# Patient Record
Sex: MALE | Race: WHITE | NOT HISPANIC OR LATINO | Employment: OTHER | URBAN - METROPOLITAN AREA
[De-identification: names, ages, dates, MRNs, and addresses within clinical notes are randomized per-mention and may not be internally consistent; named-entity substitution may affect disease eponyms.]

---

## 2017-06-20 ENCOUNTER — APPOINTMENT (OUTPATIENT)
Dept: CT IMAGING | Facility: HOSPITAL | Age: 72
End: 2017-06-20

## 2017-06-20 ENCOUNTER — HOSPITAL ENCOUNTER (INPATIENT)
Facility: HOSPITAL | Age: 72
LOS: 6 days | Discharge: HOME HEALTH | End: 2017-06-26
Attending: EMERGENCY MEDICINE | Admitting: INTERNAL MEDICINE

## 2017-06-20 ENCOUNTER — APPOINTMENT (OUTPATIENT)
Dept: GENERAL RADIOLOGY | Facility: HOSPITAL | Age: 72
End: 2017-06-20

## 2017-06-20 DIAGNOSIS — D72.829 LEUKOCYTOSIS, UNSPECIFIED TYPE: ICD-10-CM

## 2017-06-20 DIAGNOSIS — M62.82 NON-TRAUMATIC RHABDOMYOLYSIS: ICD-10-CM

## 2017-06-20 DIAGNOSIS — R53.1 WEAKNESS: ICD-10-CM

## 2017-06-20 DIAGNOSIS — A41.9 SEPSIS, DUE TO UNSPECIFIED ORGANISM: ICD-10-CM

## 2017-06-20 DIAGNOSIS — L03.115 CELLULITIS OF RIGHT LOWER EXTREMITY: ICD-10-CM

## 2017-06-20 DIAGNOSIS — Z78.9 IMPAIRED MOBILITY AND ADLS: ICD-10-CM

## 2017-06-20 DIAGNOSIS — N28.9 RENAL INSUFFICIENCY: ICD-10-CM

## 2017-06-20 DIAGNOSIS — Z74.09 IMPAIRED MOBILITY AND ADLS: ICD-10-CM

## 2017-06-20 DIAGNOSIS — Z74.09 IMPAIRED FUNCTIONAL MOBILITY, BALANCE, GAIT, AND ENDURANCE: ICD-10-CM

## 2017-06-20 DIAGNOSIS — R50.9 FEBRILE ILLNESS: Primary | ICD-10-CM

## 2017-06-20 PROBLEM — N17.9 AKI (ACUTE KIDNEY INJURY) (HCC): Status: ACTIVE | Noted: 2017-06-20

## 2017-06-20 PROBLEM — I73.9 PVD (PERIPHERAL VASCULAR DISEASE) (HCC): Status: ACTIVE | Noted: 2017-06-20

## 2017-06-20 LAB
ALBUMIN SERPL-MCNC: 3.5 G/DL (ref 3.2–4.8)
ALBUMIN/GLOB SERPL: 0.9 G/DL (ref 1.5–2.5)
ALP SERPL-CCNC: 99 U/L (ref 25–100)
ALT SERPL W P-5'-P-CCNC: 13 U/L (ref 7–40)
ANION GAP SERPL CALCULATED.3IONS-SCNC: 6 MMOL/L (ref 3–11)
AST SERPL-CCNC: 27 U/L (ref 0–33)
BASOPHILS # BLD AUTO: 0.03 10*3/MM3 (ref 0–0.2)
BASOPHILS NFR BLD AUTO: 0.1 % (ref 0–1)
BILIRUB SERPL-MCNC: 0.7 MG/DL (ref 0.3–1.2)
BUN BLD-MCNC: 19 MG/DL (ref 9–23)
BUN/CREAT SERPL: 11.2 (ref 7–25)
CALCIUM SPEC-SCNC: 9.1 MG/DL (ref 8.7–10.4)
CHLORIDE SERPL-SCNC: 101 MMOL/L (ref 99–109)
CK SERPL-CCNC: 598 U/L (ref 26–174)
CO2 SERPL-SCNC: 30 MMOL/L (ref 20–31)
CREAT BLD-MCNC: 1.7 MG/DL (ref 0.6–1.3)
CRP SERPL-MCNC: 7.55 MG/DL (ref 0–1)
D-LACTATE SERPL-SCNC: 1.6 MMOL/L (ref 0.5–2)
DEPRECATED RDW RBC AUTO: 54.5 FL (ref 37–54)
EOSINOPHIL # BLD AUTO: 0.01 10*3/MM3 (ref 0.1–0.3)
EOSINOPHIL NFR BLD AUTO: 0 % (ref 0–3)
ERYTHROCYTE [DISTWIDTH] IN BLOOD BY AUTOMATED COUNT: 14.6 % (ref 11.3–14.5)
ERYTHROCYTE [SEDIMENTATION RATE] IN BLOOD: 56 MM/HR (ref 0–20)
GFR SERPL CREATININE-BSD FRML MDRD: 40 ML/MIN/1.73
GLOBULIN UR ELPH-MCNC: 3.8 GM/DL
GLUCOSE BLD-MCNC: 108 MG/DL (ref 70–100)
HCT VFR BLD AUTO: 42.4 % (ref 38.9–50.9)
HGB BLD-MCNC: 13.8 G/DL (ref 13.1–17.5)
HOLD SPECIMEN: NORMAL
IMM GRANULOCYTES # BLD: 0.16 10*3/MM3 (ref 0–0.03)
IMM GRANULOCYTES NFR BLD: 0.6 % (ref 0–0.6)
INR PPP: 1.21
LIPASE SERPL-CCNC: 19 U/L (ref 6–51)
LYMPHOCYTES # BLD AUTO: 0.58 10*3/MM3 (ref 0.6–4.8)
LYMPHOCYTES NFR BLD AUTO: 2.2 % (ref 24–44)
MAGNESIUM SERPL-MCNC: 2.3 MG/DL (ref 1.3–2.7)
MCH RBC QN AUTO: 33.3 PG (ref 27–31)
MCHC RBC AUTO-ENTMCNC: 32.5 G/DL (ref 32–36)
MCV RBC AUTO: 102.4 FL (ref 80–99)
MONOCYTES # BLD AUTO: 1.35 10*3/MM3 (ref 0–1)
MONOCYTES NFR BLD AUTO: 5.2 % (ref 0–12)
MYOGLOBIN SERPL-MCNC: 570 NG/ML (ref 3–110)
NEUTROPHILS # BLD AUTO: 24.08 10*3/MM3 (ref 1.5–8.3)
NEUTROPHILS NFR BLD AUTO: 91.9 % (ref 41–71)
PLAT MORPH BLD: NORMAL
PLATELET # BLD AUTO: 260 10*3/MM3 (ref 150–450)
PMV BLD AUTO: 9.9 FL (ref 6–12)
POTASSIUM BLD-SCNC: 4.4 MMOL/L (ref 3.5–5.5)
PROT SERPL-MCNC: 7.3 G/DL (ref 5.7–8.2)
PROTHROMBIN TIME: 13.3 SECONDS (ref 9.6–11.5)
RBC # BLD AUTO: 4.14 10*6/MM3 (ref 4.2–5.76)
RBC MORPH BLD: NORMAL
SODIUM BLD-SCNC: 137 MMOL/L (ref 132–146)
WBC MORPH BLD: NORMAL
WBC NRBC COR # BLD: 26.21 10*3/MM3 (ref 3.5–10.8)
WHOLE BLOOD HOLD SPECIMEN: NORMAL
WHOLE BLOOD HOLD SPECIMEN: NORMAL

## 2017-06-20 PROCEDURE — 99285 EMERGENCY DEPT VISIT HI MDM: CPT

## 2017-06-20 PROCEDURE — 99291 CRITICAL CARE FIRST HOUR: CPT | Performed by: INTERNAL MEDICINE

## 2017-06-20 PROCEDURE — 87186 SC STD MICRODIL/AGAR DIL: CPT | Performed by: EMERGENCY MEDICINE

## 2017-06-20 PROCEDURE — 82550 ASSAY OF CK (CPK): CPT | Performed by: EMERGENCY MEDICINE

## 2017-06-20 PROCEDURE — 87147 CULTURE TYPE IMMUNOLOGIC: CPT | Performed by: EMERGENCY MEDICINE

## 2017-06-20 PROCEDURE — 71010 HC CHEST PA OR AP: CPT

## 2017-06-20 PROCEDURE — 85025 COMPLETE CBC W/AUTO DIFF WBC: CPT | Performed by: EMERGENCY MEDICINE

## 2017-06-20 PROCEDURE — 83735 ASSAY OF MAGNESIUM: CPT | Performed by: EMERGENCY MEDICINE

## 2017-06-20 PROCEDURE — 93005 ELECTROCARDIOGRAM TRACING: CPT | Performed by: EMERGENCY MEDICINE

## 2017-06-20 PROCEDURE — 85610 PROTHROMBIN TIME: CPT | Performed by: EMERGENCY MEDICINE

## 2017-06-20 PROCEDURE — 86140 C-REACTIVE PROTEIN: CPT | Performed by: EMERGENCY MEDICINE

## 2017-06-20 PROCEDURE — 85007 BL SMEAR W/DIFF WBC COUNT: CPT | Performed by: EMERGENCY MEDICINE

## 2017-06-20 PROCEDURE — 70450 CT HEAD/BRAIN W/O DYE: CPT

## 2017-06-20 PROCEDURE — 80053 COMPREHEN METABOLIC PANEL: CPT | Performed by: EMERGENCY MEDICINE

## 2017-06-20 PROCEDURE — 81003 URINALYSIS AUTO W/O SCOPE: CPT | Performed by: EMERGENCY MEDICINE

## 2017-06-20 PROCEDURE — 87150 DNA/RNA AMPLIFIED PROBE: CPT | Performed by: EMERGENCY MEDICINE

## 2017-06-20 PROCEDURE — 83874 ASSAY OF MYOGLOBIN: CPT | Performed by: EMERGENCY MEDICINE

## 2017-06-20 PROCEDURE — 87040 BLOOD CULTURE FOR BACTERIA: CPT | Performed by: EMERGENCY MEDICINE

## 2017-06-20 PROCEDURE — 83605 ASSAY OF LACTIC ACID: CPT | Performed by: EMERGENCY MEDICINE

## 2017-06-20 PROCEDURE — 83690 ASSAY OF LIPASE: CPT | Performed by: EMERGENCY MEDICINE

## 2017-06-20 PROCEDURE — 85652 RBC SED RATE AUTOMATED: CPT | Performed by: EMERGENCY MEDICINE

## 2017-06-20 PROCEDURE — 25010000002 PIPERACILLIN-TAZOBACTAM: Performed by: EMERGENCY MEDICINE

## 2017-06-20 PROCEDURE — 25010000002 VANCOMYCIN: Performed by: EMERGENCY MEDICINE

## 2017-06-20 PROCEDURE — 87086 URINE CULTURE/COLONY COUNT: CPT | Performed by: EMERGENCY MEDICINE

## 2017-06-20 RX ORDER — METOPROLOL TARTRATE 100 MG/1
100 TABLET ORAL 2 TIMES DAILY
COMMUNITY
End: 2018-03-15 | Stop reason: HOSPADM

## 2017-06-20 RX ORDER — SODIUM CHLORIDE 0.9 % (FLUSH) 0.9 %
10 SYRINGE (ML) INJECTION AS NEEDED
Status: DISCONTINUED | OUTPATIENT
Start: 2017-06-20 | End: 2017-06-26 | Stop reason: HOSPADM

## 2017-06-20 RX ORDER — MORPHINE SULFATE 15 MG/1
15 TABLET ORAL EVERY 6 HOURS PRN
Status: ON HOLD | COMMUNITY
End: 2018-02-20

## 2017-06-20 RX ORDER — MINOCYCLINE HYDROCHLORIDE 100 MG/1
100 CAPSULE ORAL 2 TIMES DAILY
COMMUNITY
End: 2017-06-26 | Stop reason: HOSPADM

## 2017-06-20 RX ORDER — SODIUM CHLORIDE 9 MG/ML
10 INJECTION, SOLUTION INTRAVENOUS CONTINUOUS
Status: DISCONTINUED | OUTPATIENT
Start: 2017-06-20 | End: 2017-06-26 | Stop reason: HOSPADM

## 2017-06-20 RX ORDER — IPRATROPIUM BROMIDE AND ALBUTEROL SULFATE 2.5; .5 MG/3ML; MG/3ML
3 SOLUTION RESPIRATORY (INHALATION)
Status: DISCONTINUED | OUTPATIENT
Start: 2017-06-20 | End: 2017-06-26 | Stop reason: HOSPADM

## 2017-06-20 RX ORDER — FUROSEMIDE 20 MG/1
40 TABLET ORAL 2 TIMES DAILY
Status: ON HOLD | COMMUNITY
End: 2018-02-20

## 2017-06-20 RX ORDER — ACETAMINOPHEN 325 MG/1
650 TABLET ORAL EVERY 6 HOURS PRN
Status: DISCONTINUED | OUTPATIENT
Start: 2017-06-20 | End: 2017-06-26 | Stop reason: HOSPADM

## 2017-06-20 RX ORDER — MELOXICAM 15 MG/1
15 TABLET ORAL DAILY
Status: ON HOLD | COMMUNITY
End: 2018-02-20

## 2017-06-20 RX ORDER — HYDROCODONE BITARTRATE AND ACETAMINOPHEN 7.5; 325 MG/1; MG/1
1 TABLET ORAL 3 TIMES DAILY PRN
COMMUNITY
End: 2018-03-15 | Stop reason: HOSPADM

## 2017-06-20 RX ORDER — ONDANSETRON 2 MG/ML
4 INJECTION INTRAMUSCULAR; INTRAVENOUS EVERY 6 HOURS PRN
Status: DISCONTINUED | OUTPATIENT
Start: 2017-06-20 | End: 2017-06-26 | Stop reason: HOSPADM

## 2017-06-20 RX ADMIN — SODIUM CHLORIDE 1000 ML: 9 INJECTION, SOLUTION INTRAVENOUS at 20:57

## 2017-06-20 RX ADMIN — TAZOBACTAM SODIUM AND PIPERACILLIN SODIUM 4.5 G: .5; 4 INJECTION, POWDER, LYOPHILIZED, FOR SOLUTION INTRAVENOUS at 20:58

## 2017-06-20 RX ADMIN — VANCOMYCIN HYDROCHLORIDE 1500 MG: 1 INJECTION, POWDER, LYOPHILIZED, FOR SOLUTION INTRAVENOUS at 22:49

## 2017-06-20 NOTE — ED PROVIDER NOTES
Subjective   HPI Comments: Mr. Yassine Love is a 71 y.o. male who presents to the ED after a fall. EMS notes that he was found on the floor today after being down for 30 minutes. It is unknown if he had any LOC, but did have a fever of 102 while being checked out by home health. En route a temperature of 105 was noted and a rectal temperature of 99.5. He was hypotensive en route at 78/50. Family does note that he has increased redness in his RLE. He denies any CP, abdominal pain, N/V/D, urinary sx, or any other sx at this time.  Patient is a 71 y.o. male presenting with fever.   History provided by:  Patient  Fever   Max temp prior to arrival:  105  Temp source:  Unable to specify  Severity:  Moderate  Onset quality:  Sudden  Duration: Today.  Timing:  Unable to specify  Progression:  Unable to specify  Chronicity:  New  Relieved by:  None tried  Worsened by:  Nothing  Ineffective treatments:  None tried  Associated symptoms: no chest pain, no chills, no cough, no diarrhea, no dysuria, no headaches, no nausea, no rhinorrhea, no sore throat and no vomiting        Review of Systems   Constitutional: Positive for fever. Negative for chills.   HENT: Negative for rhinorrhea and sore throat.    Respiratory: Negative for cough.    Cardiovascular: Negative for chest pain.   Gastrointestinal: Negative for abdominal pain, blood in stool, diarrhea, nausea and vomiting.   Genitourinary: Negative for difficulty urinating, dysuria and hematuria.   Skin: Positive for color change (RLE).   Neurological: Positive for weakness. Negative for headaches.   All other systems reviewed and are negative.      Past Medical History:   Diagnosis Date   • Cancer    • Chronic pain    • COPD (chronic obstructive pulmonary disease)    • Hypertension        Allergies   Allergen Reactions   • Ceftin [Cefuroxime Axetil] Angioedema       Past Surgical History:   Procedure Laterality Date   • LEG SURGERY     • NECK SURGERY         History reviewed.  No pertinent family history.    Social History     Social History   • Marital status:      Spouse name: N/A   • Number of children: N/A   • Years of education: N/A     Social History Main Topics   • Smoking status: Current Every Day Smoker     Packs/day: 1.00     Types: Cigarettes   • Smokeless tobacco: None   • Alcohol use No   • Drug use: No   • Sexual activity: Defer     Other Topics Concern   • None     Social History Narrative   • None         Objective   Physical Exam   Constitutional: He is oriented to person, place, and time. He appears well-developed and well-nourished. No distress.   HENT:   Head: Normocephalic and atraumatic.   Eyes: Conjunctivae are normal.   Neck: Normal range of motion. Neck supple.   Cardiovascular: Normal rate, regular rhythm, normal heart sounds and intact distal pulses.    Pulmonary/Chest: Effort normal. No respiratory distress.   Crackles in right base that do not clear with coughing.    Abdominal: Soft. There is no tenderness.   Musculoskeletal: Normal range of motion.   Neurological: He is alert and oriented to person, place, and time. He has normal strength.   Skin: Skin is warm and dry. There is erythema.   Erythema and warmth at RLE. Erythema extends medially 4 cm proximally to the knee but circumflexly inferior to the knee more anteriorly than posteriorly.   Psychiatric: He has a normal mood and affect. His behavior is normal.   Nursing note and vitals reviewed.      Critical Care  Performed by: AYDEN RAMIREZ  Authorized by: AYDEN RAMIREZ   Total critical care time: 35 minutes  Critical care time was exclusive of separately billable procedures and treating other patients.  Critical care was necessary to treat or prevent imminent or life-threatening deterioration of the following conditions: circulatory failure and sepsis.  Critical care was time spent personally by me on the following activities: discussions with primary provider, evaluation of patient's response  to treatment, ordering and performing treatments and interventions, examination of patient, ordering and review of laboratory studies, re-evaluation of patient's condition, obtaining history from patient or surrogate and ordering and review of radiographic studies.               ED Course  ED Course   Comment By Time   Discussed case with Dr. Nelson, [facility] medicine, who will admit the pt. - Trever Cortez 06/20 1952   Discussed admission with patient and family who are in agreement.  Temperature at the scene by home health was greater than 102°.  Family does report the erythema of the right leg is new and different today. Aren Mohr MD 06/20 1952   Patient reports that he's had no difficulty with penicillins in the past.  White white count is markedly elevated.  Sepsis protocol antibiotics are ordered as well as sepsis fluid bolus. Aren Mohr MD 06/20 2055   Blood pressure with progressive decline throughout the ED course.  Second fluid bolus is started.  Legs are reevaluated after removal of his dressings with large open areas on his feet and the continued erythema up his right leg.  Sepsis antibiotics are given. Aren Mohr MD 06/20 2147   Discussed case with Dr. Leigh, intesnivist, who will admit the pt to the ICU. -[facility] 2150     Recent Results (from the past 24 hour(s))   Comprehensive Metabolic Panel    Collection Time: 06/20/17  7:55 PM   Result Value Ref Range    Glucose 108 (H) 70 - 100 mg/dL    BUN 19 9 - 23 mg/dL    Creatinine 1.70 (H) 0.60 - 1.30 mg/dL    Sodium 137 132 - 146 mmol/L    Potassium 4.4 3.5 - 5.5 mmol/L    Chloride 101 99 - 109 mmol/L    CO2 30.0 20.0 - 31.0 mmol/L    Calcium 9.1 8.7 - 10.4 mg/dL    Total Protein 7.3 5.7 - 8.2 g/dL    Albumin 3.50 3.20 - 4.80 g/dL    ALT (SGPT) 13 7 - 40 U/L    AST (SGOT) 27 0 - 33 U/L    Alkaline Phosphatase 99 25 - 100 U/L    Total Bilirubin 0.7 0.3 - 1.2 mg/dL    eGFR Non African Amer 40 (L) >60 mL/min/1.73    Globulin 3.8  gm/dL    A/G Ratio 0.9 (L) 1.5 - 2.5 g/dL    BUN/Creatinine Ratio 11.2 7.0 - 25.0    Anion Gap 6.0 3.0 - 11.0 mmol/L   Protime-INR    Collection Time: 06/20/17  7:55 PM   Result Value Ref Range    Protime 13.3 (H) 9.6 - 11.5 Seconds    INR 1.21    Lipase    Collection Time: 06/20/17  7:55 PM   Result Value Ref Range    Lipase 19 6 - 51 U/L   C-reactive Protein    Collection Time: 06/20/17  7:55 PM   Result Value Ref Range    C-Reactive Protein 7.55 (H) 0.00 - 1.00 mg/dL   Sedimentation Rate    Collection Time: 06/20/17  7:55 PM   Result Value Ref Range    Sed Rate 56 (H) 0 - 20 mm/hr   Magnesium    Collection Time: 06/20/17  7:55 PM   Result Value Ref Range    Magnesium 2.3 1.3 - 2.7 mg/dL   Lactic Acid, Plasma    Collection Time: 06/20/17  7:55 PM   Result Value Ref Range    Lactate 1.6 0.5 - 2.0 mmol/L   CBC Auto Differential    Collection Time: 06/20/17  7:55 PM   Result Value Ref Range    WBC 26.21 (H) 3.50 - 10.80 10*3/mm3    RBC 4.14 (L) 4.20 - 5.76 10*6/mm3    Hemoglobin 13.8 13.1 - 17.5 g/dL    Hematocrit 42.4 38.9 - 50.9 %    .4 (H) 80.0 - 99.0 fL    MCH 33.3 (H) 27.0 - 31.0 pg    MCHC 32.5 32.0 - 36.0 g/dL    RDW 14.6 (H) 11.3 - 14.5 %    RDW-SD 54.5 (H) 37.0 - 54.0 fl    MPV 9.9 6.0 - 12.0 fL    Platelets 260 150 - 450 10*3/mm3    Neutrophil % 91.9 (H) 41.0 - 71.0 %    Lymphocyte % 2.2 (L) 24.0 - 44.0 %    Monocyte % 5.2 0.0 - 12.0 %    Eosinophil % 0.0 0.0 - 3.0 %    Basophil % 0.1 0.0 - 1.0 %    Immature Grans % 0.6 0.0 - 0.6 %    Neutrophils, Absolute 24.08 (H) 1.50 - 8.30 10*3/mm3    Lymphocytes, Absolute 0.58 (L) 0.60 - 4.80 10*3/mm3    Monocytes, Absolute 1.35 (H) 0.00 - 1.00 10*3/mm3    Eosinophils, Absolute 0.01 (L) 0.10 - 0.30 10*3/mm3    Basophils, Absolute 0.03 0.00 - 0.20 10*3/mm3    Immature Grans, Absolute 0.16 (H) 0.00 - 0.03 10*3/mm3   Light Blue Top    Collection Time: 06/20/17  7:55 PM   Result Value Ref Range    Extra Tube hold for add-on    Green Top (Gel)    Collection Time:  "06/20/17  7:55 PM   Result Value Ref Range    Extra Tube Hold for add-ons.    Lavender Top    Collection Time: 06/20/17  7:55 PM   Result Value Ref Range    Extra Tube hold for add-on    CK    Collection Time: 06/20/17  7:55 PM   Result Value Ref Range    Creatine Kinase 598 (H) 26 - 174 U/L   Myoglobin, Serum    Collection Time: 06/20/17  7:55 PM   Result Value Ref Range    Myoglobin 570.0 (H) 3.0 - 110.0 ng/mL   Scan Slide    Collection Time: 06/20/17  7:55 PM   Result Value Ref Range    RBC Morphology Normal Normal    WBC Morphology Normal Normal    Platelet Morphology Normal Normal     Note: In addition to lab results from this visit, the labs listed above may include labs taken at another facility or during a different encounter within the last 24 hours. Please correlate lab times with ED admission and discharge times for further clarification of the services performed during this visit.    CT Head Without Contrast   Final Result   Abnormal      1. No acute findings.      2. Non-acute findings are described above.      THIS DOCUMENT HAS BEEN ELECTRONICALLY SIGNED BY RAY SALAZAR MD      XR Chest 1 View   Final Result   Abnormal      1. No acute findings.      2. Non-acute findings are described above.      THIS DOCUMENT HAS BEEN ELECTRONICALLY SIGNED BY RAY SALAZAR MD        Vitals:    06/20/17 1900 06/20/17 1907 06/20/17 2100   BP: 105/52  (!) 85/42   Pulse: 94     Resp: 20     Temp:  99.5 °F (37.5 °C)    TempSrc: Oral Rectal    SpO2: 100%     Weight: 170 lb (77.1 kg)     Height: 70\" (177.8 cm)       Medications   sodium chloride 0.9 % flush 10 mL (not administered)   sodium chloride 0.9 % bolus 2,313 mL (1,000 mL Intravenous New Bag 6/20/17 2057)   vancomycin 1500 mg/500 mL 0.9% NS IVPB ([facility]) (not administered)   piperacillin-tazobactam (ZOSYN) 4.5 g/100 mL 0.9% NS IVPB (mbp) (4.5 g Intravenous New Bag 6/20/17 2058)     ECG/EMG Results (last 24 hours)     ** No results found for the last 24 hours. **    "                       MDM    Final diagnoses:   Febrile illness   Cellulitis of right lower extremity   Weakness   Sepsis, due to unspecified organism   Renal insufficiency   Non-traumatic rhabdomyolysis   Leukocytosis, unspecified type       Documentation assistance provided by brice LIMON.  Information recorded by the scribe was done at my direction and has been verified and validated by me.     Trever Limon  06/20/17 1926       Trever Limon  06/20/17 1952       Aren Mohr MD  06/20/17 1952       Aren Mohr MD  06/20/17 2057       Aren Mohr MD  06/20/17 2150       Aren Mohr MD  06/20/17 2157

## 2017-06-21 PROBLEM — R50.9 FEBRILE ILLNESS: Status: ACTIVE | Noted: 2017-06-21

## 2017-06-21 PROBLEM — A40.9 STREPTOCOCCAL SEPSIS (HCC): Status: ACTIVE | Noted: 2017-06-20

## 2017-06-21 LAB
ALBUMIN SERPL-MCNC: 2.6 G/DL (ref 3.2–4.8)
ALBUMIN/GLOB SERPL: 1 G/DL (ref 1.5–2.5)
ALP SERPL-CCNC: 83 U/L (ref 25–100)
ALT SERPL W P-5'-P-CCNC: 15 U/L (ref 7–40)
ANION GAP SERPL CALCULATED.3IONS-SCNC: 1 MMOL/L (ref 3–11)
ARTERIAL PATENCY WRIST A: ABNORMAL
AST SERPL-CCNC: 25 U/L (ref 0–33)
ATMOSPHERIC PRESS: ABNORMAL MMHG
BACTERIA BLD CULT: ABNORMAL
BASE EXCESS BLDA CALC-SCNC: 2.6 MMOL/L (ref 0–2)
BASOPHILS # BLD AUTO: 0.05 10*3/MM3 (ref 0–0.2)
BASOPHILS NFR BLD AUTO: 0.2 % (ref 0–1)
BDY SITE: ABNORMAL
BILIRUB SERPL-MCNC: 0.6 MG/DL (ref 0.3–1.2)
BILIRUB UR QL STRIP: NEGATIVE
BUN BLD-MCNC: 21 MG/DL (ref 9–23)
BUN/CREAT SERPL: 15 (ref 7–25)
CALCIUM SPEC-SCNC: 7.8 MG/DL (ref 8.7–10.4)
CHLORIDE SERPL-SCNC: 111 MMOL/L (ref 99–109)
CLARITY UR: CLEAR
CO2 BLDA-SCNC: 28 MMOL/L (ref 22–33)
CO2 SERPL-SCNC: 29 MMOL/L (ref 20–31)
COHGB MFR BLD: 3.5 % (ref 0–2)
COLOR UR: YELLOW
CREAT BLD-MCNC: 1.4 MG/DL (ref 0.6–1.3)
CRP SERPL-MCNC: 13.12 MG/DL (ref 0–1)
DEPRECATED RDW RBC AUTO: 56.4 FL (ref 37–54)
EOSINOPHIL # BLD AUTO: 0 10*3/MM3 (ref 0.1–0.3)
EOSINOPHIL NFR BLD AUTO: 0 % (ref 0–3)
ERYTHROCYTE [DISTWIDTH] IN BLOOD BY AUTOMATED COUNT: 14.8 % (ref 11.3–14.5)
FOLATE SERPL-MCNC: 2.41 NG/ML (ref 3.2–20)
GFR SERPL CREATININE-BSD FRML MDRD: 50 ML/MIN/1.73
GLOBULIN UR ELPH-MCNC: 2.6 GM/DL
GLUCOSE BLD-MCNC: 111 MG/DL (ref 70–100)
GLUCOSE UR STRIP-MCNC: NEGATIVE MG/DL
HCO3 BLDA-SCNC: 26.8 MMOL/L (ref 20–26)
HCT VFR BLD AUTO: 38.1 % (ref 38.9–50.9)
HCT VFR BLD CALC: 36.7 %
HGB BLD-MCNC: 12.4 G/DL (ref 13.1–17.5)
HGB BLDA-MCNC: 12 G/DL (ref 13.5–17.5)
HGB UR QL STRIP.AUTO: NEGATIVE
HOROWITZ INDEX BLD+IHG-RTO: 28 %
IMM GRANULOCYTES # BLD: 0.16 10*3/MM3 (ref 0–0.03)
IMM GRANULOCYTES NFR BLD: 0.5 % (ref 0–0.6)
INR PPP: 1.27
KETONES UR QL STRIP: NEGATIVE
LEUKOCYTE ESTERASE UR QL STRIP.AUTO: NEGATIVE
LYMPHOCYTES # BLD AUTO: 0.93 10*3/MM3 (ref 0.6–4.8)
LYMPHOCYTES NFR BLD AUTO: 3.1 % (ref 24–44)
MAGNESIUM SERPL-MCNC: 2.1 MG/DL (ref 1.3–2.7)
MCH RBC QN AUTO: 34 PG (ref 27–31)
MCHC RBC AUTO-ENTMCNC: 32.5 G/DL (ref 32–36)
MCV RBC AUTO: 104.4 FL (ref 80–99)
METHGB BLD QL: 0.7 % (ref 0–1.5)
MODALITY: ABNORMAL
MONOCYTES # BLD AUTO: 1.17 10*3/MM3 (ref 0–1)
MONOCYTES NFR BLD AUTO: 3.9 % (ref 0–12)
NEUTROPHILS # BLD AUTO: 27.7 10*3/MM3 (ref 1.5–8.3)
NEUTROPHILS NFR BLD AUTO: 92.3 % (ref 41–71)
NITRITE UR QL STRIP: NEGATIVE
OXYHGB MFR BLDV: 86.1 % (ref 94–99)
PCO2 BLDA: 38.7 MM HG (ref 35–48)
PH BLDA: 7.45 PH UNITS (ref 7.35–7.45)
PH UR STRIP.AUTO: 7 [PH] (ref 5–8)
PHOSPHATE SERPL-MCNC: 4 MG/DL (ref 2.4–5.1)
PLATELET # BLD AUTO: 265 10*3/MM3 (ref 150–450)
PMV BLD AUTO: 10 FL (ref 6–12)
PO2 BLDA: 54.8 MM HG (ref 83–108)
POTASSIUM BLD-SCNC: 4.7 MMOL/L (ref 3.5–5.5)
PROCALCITONIN SERPL-MCNC: 23.07 NG/ML
PROT SERPL-MCNC: 5.2 G/DL (ref 5.7–8.2)
PROT UR QL STRIP: NEGATIVE
PROTHROMBIN TIME: 14 SECONDS (ref 9.6–11.5)
RBC # BLD AUTO: 3.65 10*6/MM3 (ref 4.2–5.76)
SODIUM BLD-SCNC: 141 MMOL/L (ref 132–146)
SP GR UR STRIP: 1.02 (ref 1–1.03)
UROBILINOGEN UR QL STRIP: NORMAL
VIT B12 BLD-MCNC: 285 PG/ML (ref 211–911)
WBC NRBC COR # BLD: 30.01 10*3/MM3 (ref 3.5–10.8)

## 2017-06-21 PROCEDURE — 94640 AIRWAY INHALATION TREATMENT: CPT

## 2017-06-21 PROCEDURE — 86140 C-REACTIVE PROTEIN: CPT | Performed by: INTERNAL MEDICINE

## 2017-06-21 PROCEDURE — 87186 SC STD MICRODIL/AGAR DIL: CPT | Performed by: INTERNAL MEDICINE

## 2017-06-21 PROCEDURE — 82607 VITAMIN B-12: CPT | Performed by: INTERNAL MEDICINE

## 2017-06-21 PROCEDURE — 83735 ASSAY OF MAGNESIUM: CPT | Performed by: INTERNAL MEDICINE

## 2017-06-21 PROCEDURE — 84145 PROCALCITONIN (PCT): CPT | Performed by: INTERNAL MEDICINE

## 2017-06-21 PROCEDURE — 80053 COMPREHEN METABOLIC PANEL: CPT | Performed by: INTERNAL MEDICINE

## 2017-06-21 PROCEDURE — 25010000002 HYDROMORPHONE PER 4 MG: Performed by: NURSE PRACTITIONER

## 2017-06-21 PROCEDURE — 87205 SMEAR GRAM STAIN: CPT | Performed by: INTERNAL MEDICINE

## 2017-06-21 PROCEDURE — 25010000002 MEROPENEM: Performed by: INTERNAL MEDICINE

## 2017-06-21 PROCEDURE — 99291 CRITICAL CARE FIRST HOUR: CPT | Performed by: INTERNAL MEDICINE

## 2017-06-21 PROCEDURE — 94760 N-INVAS EAR/PLS OXIMETRY 1: CPT

## 2017-06-21 PROCEDURE — 97162 PT EVAL MOD COMPLEX 30 MIN: CPT

## 2017-06-21 PROCEDURE — 87070 CULTURE OTHR SPECIMN AEROBIC: CPT | Performed by: INTERNAL MEDICINE

## 2017-06-21 PROCEDURE — 84100 ASSAY OF PHOSPHORUS: CPT | Performed by: INTERNAL MEDICINE

## 2017-06-21 PROCEDURE — 85610 PROTHROMBIN TIME: CPT | Performed by: INTERNAL MEDICINE

## 2017-06-21 PROCEDURE — 25010000002 VANCOMYCIN HCL IN NACL 1.75-0.9 GM/500ML-% SOLUTION

## 2017-06-21 PROCEDURE — 97110 THERAPEUTIC EXERCISES: CPT

## 2017-06-21 PROCEDURE — 85025 COMPLETE CBC W/AUTO DIFF WBC: CPT | Performed by: INTERNAL MEDICINE

## 2017-06-21 PROCEDURE — 25010000002 HYDROMORPHONE PER 4 MG: Performed by: INTERNAL MEDICINE

## 2017-06-21 PROCEDURE — 87077 CULTURE AEROBIC IDENTIFY: CPT | Performed by: INTERNAL MEDICINE

## 2017-06-21 PROCEDURE — 36600 WITHDRAWAL OF ARTERIAL BLOOD: CPT | Performed by: INTERNAL MEDICINE

## 2017-06-21 PROCEDURE — 94799 UNLISTED PULMONARY SVC/PX: CPT

## 2017-06-21 PROCEDURE — 25010000002 HEPARIN (PORCINE) PER 1000 UNITS: Performed by: INTERNAL MEDICINE

## 2017-06-21 PROCEDURE — 82805 BLOOD GASES W/O2 SATURATION: CPT | Performed by: INTERNAL MEDICINE

## 2017-06-21 PROCEDURE — 82746 ASSAY OF FOLIC ACID SERUM: CPT | Performed by: INTERNAL MEDICINE

## 2017-06-21 RX ORDER — VANCOMYCIN 1.75 GRAM/500 ML IN 0.9 % SODIUM CHLORIDE INTRAVENOUS
20 ONCE
Status: COMPLETED | OUTPATIENT
Start: 2017-06-21 | End: 2017-06-21

## 2017-06-21 RX ORDER — BUDESONIDE AND FORMOTEROL FUMARATE DIHYDRATE 80; 4.5 UG/1; UG/1
2 AEROSOL RESPIRATORY (INHALATION)
COMMUNITY
End: 2018-03-15 | Stop reason: HOSPADM

## 2017-06-21 RX ORDER — ITRACONAZOLE 100 MG/1
100 CAPSULE ORAL 2 TIMES DAILY
COMMUNITY
End: 2017-06-26 | Stop reason: HOSPADM

## 2017-06-21 RX ORDER — ALBUTEROL SULFATE 90 UG/1
2 AEROSOL, METERED RESPIRATORY (INHALATION) EVERY 4 HOURS PRN
COMMUNITY
End: 2018-03-15 | Stop reason: HOSPADM

## 2017-06-21 RX ORDER — NICOTINE 21 MG/24HR
1 PATCH, TRANSDERMAL 24 HOURS TRANSDERMAL EVERY 24 HOURS
Status: DISCONTINUED | OUTPATIENT
Start: 2017-06-21 | End: 2017-06-26 | Stop reason: HOSPADM

## 2017-06-21 RX ORDER — HYDROMORPHONE HYDROCHLORIDE 1 MG/ML
0.2 INJECTION, SOLUTION INTRAMUSCULAR; INTRAVENOUS; SUBCUTANEOUS EVERY 4 HOURS PRN
Status: DISCONTINUED | OUTPATIENT
Start: 2017-06-21 | End: 2017-06-21

## 2017-06-21 RX ORDER — HYDROCODONE BITARTRATE AND ACETAMINOPHEN 7.5; 325 MG/1; MG/1
1 TABLET ORAL EVERY 6 HOURS PRN
Status: DISCONTINUED | OUTPATIENT
Start: 2017-06-21 | End: 2017-06-26 | Stop reason: HOSPADM

## 2017-06-21 RX ORDER — HYDROMORPHONE HYDROCHLORIDE 1 MG/ML
0.2 INJECTION, SOLUTION INTRAMUSCULAR; INTRAVENOUS; SUBCUTANEOUS
Status: DISCONTINUED | OUTPATIENT
Start: 2017-06-21 | End: 2017-06-26 | Stop reason: HOSPADM

## 2017-06-21 RX ORDER — VANCOMYCIN HYDROCHLORIDE
15 EVERY 24 HOURS
Status: DISCONTINUED | OUTPATIENT
Start: 2017-06-22 | End: 2017-06-22

## 2017-06-21 RX ORDER — HEPARIN SODIUM 5000 [USP'U]/ML
5000 INJECTION, SOLUTION INTRAVENOUS; SUBCUTANEOUS EVERY 8 HOURS SCHEDULED
Status: DISCONTINUED | OUTPATIENT
Start: 2017-06-21 | End: 2017-06-26 | Stop reason: HOSPADM

## 2017-06-21 RX ORDER — HYDROMORPHONE HYDROCHLORIDE 1 MG/ML
0.2 INJECTION, SOLUTION INTRAMUSCULAR; INTRAVENOUS; SUBCUTANEOUS ONCE
Status: COMPLETED | OUTPATIENT
Start: 2017-06-21 | End: 2017-06-21

## 2017-06-21 RX ORDER — DOCUSATE SODIUM 250 MG
250 CAPSULE ORAL DAILY PRN
COMMUNITY
End: 2018-08-20

## 2017-06-21 RX ADMIN — HYDROMORPHONE HYDROCHLORIDE 0.2 MG: 1 INJECTION, SOLUTION INTRAMUSCULAR; INTRAVENOUS; SUBCUTANEOUS at 22:57

## 2017-06-21 RX ADMIN — HYDROMORPHONE HYDROCHLORIDE 0.2 MG: 1 INJECTION, SOLUTION INTRAMUSCULAR; INTRAVENOUS; SUBCUTANEOUS at 14:19

## 2017-06-21 RX ADMIN — MEROPENEM 1 G: 1 INJECTION, POWDER, FOR SOLUTION INTRAVENOUS at 07:31

## 2017-06-21 RX ADMIN — HYDROMORPHONE HYDROCHLORIDE 0.2 MG: 1 INJECTION, SOLUTION INTRAMUSCULAR; INTRAVENOUS; SUBCUTANEOUS at 18:07

## 2017-06-21 RX ADMIN — SODIUM CHLORIDE 500 ML: 9 INJECTION, SOLUTION INTRAVENOUS at 12:02

## 2017-06-21 RX ADMIN — IPRATROPIUM BROMIDE AND ALBUTEROL SULFATE 3 ML: .5; 3 SOLUTION RESPIRATORY (INHALATION) at 20:24

## 2017-06-21 RX ADMIN — IPRATROPIUM BROMIDE AND ALBUTEROL SULFATE 3 ML: .5; 3 SOLUTION RESPIRATORY (INHALATION) at 07:40

## 2017-06-21 RX ADMIN — HYDROCODONE BITARTRATE AND ACETAMINOPHEN 1 TABLET: 7.5; 325 TABLET ORAL at 14:49

## 2017-06-21 RX ADMIN — SODIUM CHLORIDE 1000 ML: 9 INJECTION, SOLUTION INTRAVENOUS at 00:40

## 2017-06-21 RX ADMIN — HYDROCODONE BITARTRATE AND ACETAMINOPHEN 1 TABLET: 7.5; 325 TABLET ORAL at 08:43

## 2017-06-21 RX ADMIN — MEROPENEM 1 G: 1 INJECTION, POWDER, FOR SOLUTION INTRAVENOUS at 17:06

## 2017-06-21 RX ADMIN — Medication 1750 MG: at 13:27

## 2017-06-21 RX ADMIN — NICOTINE 1 PATCH: 21 PATCH, EXTENDED RELEASE TRANSDERMAL at 00:38

## 2017-06-21 RX ADMIN — HYDROCODONE BITARTRATE AND ACETAMINOPHEN 1 TABLET: 7.5; 325 TABLET ORAL at 20:47

## 2017-06-21 RX ADMIN — Medication 0.02 MCG/KG/MIN: at 00:30

## 2017-06-21 RX ADMIN — HEPARIN SODIUM 5000 UNITS: 5000 INJECTION, SOLUTION INTRAVENOUS; SUBCUTANEOUS at 20:47

## 2017-06-21 RX ADMIN — SODIUM CHLORIDE 150 ML/HR: 9 INJECTION, SOLUTION INTRAVENOUS at 00:30

## 2017-06-21 RX ADMIN — MEROPENEM 1 G: 1 INJECTION, POWDER, FOR SOLUTION INTRAVENOUS at 00:32

## 2017-06-21 RX ADMIN — IPRATROPIUM BROMIDE AND ALBUTEROL SULFATE 3 ML: .5; 3 SOLUTION RESPIRATORY (INHALATION) at 12:17

## 2017-06-21 RX ADMIN — HYDROMORPHONE HYDROCHLORIDE 0.2 MG: 1 INJECTION, SOLUTION INTRAMUSCULAR; INTRAVENOUS; SUBCUTANEOUS at 12:02

## 2017-06-21 RX ADMIN — HEPARIN SODIUM 5000 UNITS: 5000 INJECTION, SOLUTION INTRAVENOUS; SUBCUTANEOUS at 13:29

## 2017-06-21 RX ADMIN — HYDROMORPHONE HYDROCHLORIDE 0.2 MG: 1 INJECTION, SOLUTION INTRAMUSCULAR; INTRAVENOUS; SUBCUTANEOUS at 20:47

## 2017-06-21 RX ADMIN — HYDROMORPHONE HYDROCHLORIDE 0.2 MG: 1 INJECTION, SOLUTION INTRAMUSCULAR; INTRAVENOUS; SUBCUTANEOUS at 08:43

## 2017-06-21 RX ADMIN — SODIUM CHLORIDE 500 ML: 9 INJECTION, SOLUTION INTRAVENOUS at 18:07

## 2017-06-21 RX ADMIN — ACETAMINOPHEN 650 MG: 325 TABLET, FILM COATED ORAL at 06:08

## 2017-06-21 RX ADMIN — IPRATROPIUM BROMIDE AND ALBUTEROL SULFATE 3 ML: .5; 3 SOLUTION RESPIRATORY (INHALATION) at 15:52

## 2017-06-21 RX ADMIN — SODIUM CHLORIDE 500 ML: 9 INJECTION, SOLUTION INTRAVENOUS at 09:47

## 2017-06-21 NOTE — PROGRESS NOTES
Discharge Planning Assessment  [facility]     Patient Name: Yassine Love  MRN: [id number]  Today's Date: 6/21/2017    Admit Date: 6/20/2017          Discharge Needs Assessment       06/21/17 1517    Living Environment    Lives With alone    Living Arrangements house    Home Accessibility stairs to enter home    Number of Stairs to Enter Home 1    Stair Railings at Home none    Type of Financial/Environmental Concern none    Transportation Available family or friend will provide    Living Environment    Provides Primary Care For no one    Quality Of Family Relationships unable to assess    Discharge Needs Assessment    Concerns To Be Addressed denies needs/concerns at this time    Readmission Within The Last 30 Days no previous admission in last 30 days    Equipment Currently Used at Home walker, rolling;cane, straight;oxygen    Equipment Needed After Discharge none            Discharge Plan       06/21/17 1518    Case Management/Social Work Plan    Plan Ongoing    Patient/Family In Agreement With Plan yes    Additional Comments Spoke with patient at bedside.  Patient resides in [facility] and lives alone.  Prior to admission patient was independent with ADL's and uses a walker for mobility.  Patient has a walk in shower.  Patient is on oxygen at home when needed and states this is provided by [employer].  Patient states he has a portable tank.  PT at bedside.  CM will continue to follow.        Discharge Placement     No information found        Expected Discharge Date and Time     Expected Discharge Date Expected Discharge Time    Jun 26, 2017               Demographic Summary       06/21/17 1517    Referral Information    Admission Type inpatient    Arrived From home or self-care    Referral Source admission list    Reason For Consult discharge planning    Record Reviewed clinical discipline documentation;history and physical;medical record;patient profile    Contact Information    Permission Granted to  Share Information With ;family/designee            Functional Status     None            Psychosocial     None            Abuse/Neglect     None            Legal     None            Substance Abuse     None            Patient Forms     None          Nory Lackey RN

## 2017-06-21 NOTE — PROGRESS NOTES
"Adult Nutrition  Assessment/PES    Patient Name:  Yassine Love  YOB: 1945  MRN: [id number]  Admit Date:  6/20/2017    Assessment Date:  6/21/2017        Reason for Assessment       06/21/17 0950    Reason for Assessment    Reason For Assessment/Visit identified at risk by screening criteria    Identified At Risk By Screening Criteria large or nonhealing wound, burn or pressure ulcer;difficulty chewing    Time Spent (min) 30    Diagnosis Diagnosis    Infectious Disease Sepsis   r/t cellulitis.     Pulmonary/Critical Care COPD    Skin Cellulitis;Non healing wound    Substance Use Tobacco   Patient Active Problem List   Diagnosis   • Sepsis   • Cellulitis of right lower extremity   • RAFAEL (acute kidney injury)   • PVD (peripheral vascular disease)   • Febrile illness                Nutrition/Diet History       06/21/17 0951    Nutrition/Diet History    Food Preferences [facility].     Factors Affecting Nutritional Intake Factors    Reported/Observed By RN    Other lower ext cellulitis x 1 year.  No s/s of dysphagia.  Tolerating PO diet.  Not much PO Intake.  per pt- amber any chewing or swallowing issues.  did not eat much for breakfast; feels appetite better now.  Refused supplement @ this time.              Anthropometrics       06/21/17 0953    Anthropometrics    Height 177.8 cm (70\")    Weight 85.3 kg (188 lb)    Ideal Body Weight (IBW)    Ideal Body Weight (IBW), Male (kg) 76.48    % Ideal Body Weight 111.73    Body Mass Index (BMI)    BMI (kg/m2) 27.03            Labs/Tests/Procedures/Meds       06/21/17 0953    Labs/Tests/Procedures/Meds    Labs/Tests Review Reviewed;Creat    Medication Review Reviewed, pertinent;Pressors   IVF @ 150ml                Nutrition Prescription Ordered       06/21/17 0954    Nutrition Prescription PO    Current PO Diet Soft Texture    Texture Whole foods            Evaluation of Received Nutrient/Fluid Intake       06/21/17 0956    PO Evaluation    Number of Days PO " Intake Evaluated Insufficient Data              Problem/Interventions:        Problem 1       06/21/17 0958    Nutrition Diagnoses Problem 1    Problem 1 Increased Nutrient Needs    Etiology (related to) Other (comment)   skin integrity.     Signs/Symptoms (evidenced by) Other (comment)   lower ext cellulitis (non healing wounds) x 1 year                    Intervention Goal       06/21/17 0956    Intervention Goal    General Nutrition support treatment    PO Establish PO            Nutrition Intervention       06/21/17 0957    Nutrition Intervention    RD/Tech Action Follow Tx progress;Care plan reviewd;Interview for preference;Menu provided;Supplement offered/refused;Encourage intake              Education/Evaluation       06/21/17 0958    Monitor/Evaluation    Monitor Per protocol;PO intake;Pertinent labs            Electronically signed by:  Emerald Bowen RD  06/21/17 12:53 PM

## 2017-06-21 NOTE — PLAN OF CARE
Problem: Patient Care Overview (Adult)  Goal: Plan of Care Review  Outcome: Ongoing (interventions implemented as appropriate)    06/21/17 1445   Coping/Psychosocial Response Interventions   Plan Of Care Reviewed With patient   Outcome Evaluation   Outcome Summary/Follow up Plan PT evaluation complete. Pt demonstrates generalized weakness and decreased indep re: functional mobility, warranting further skilled PT services to promote PLOF and safe d/c. Req'd significant edu re: benefit of therapy and OOB activity in order to participate. Recommend SNF placement based upon current level of function.          Problem: Inpatient Physical Therapy  Goal: Bed Mobility Goal LTG- PT  Outcome: Ongoing (interventions implemented as appropriate)    06/21/17 1445   Bed Mobility PT LTG   Bed Mobility PT LTG, Date Established 06/21/17   Bed Mobility PT LTG, Time to Achieve 2 wks   Bed Mobility PT LTG, Activity Type supine to sit/sit to supine   Bed Mobility PT LTG, [place] Level supervision required       Goal: Transfer Training Goal 1 LTG- PT  Outcome: Ongoing (interventions implemented as appropriate)    06/21/17 1445   Transfer Training PT LTG   Transfer Training PT LTG, Date Established 06/21/17   Transfer Training PT LTG, Time to Achieve 2 wks   Transfer Training PT LTG, Activity Type sit to stand/stand to sit   Transfer Training PT LTG, [place] Level supervision required   Transfer Training PT LTG, Assist Device walker, rolling       Goal: Gait Training Goal LTG- PT  Outcome: Ongoing (interventions implemented as appropriate)    06/21/17 1445   Gait Training PT LTG   Gait Training Goal PT LTG, Date Established 06/21/17   Gait Training Goal PT LTG, Time to Achieve 2 wks   Gait Training Goal PT LTG, [place] Level supervision required   Gait Training Goal PT LTG, Assist Device walker, rolling   Gait Training Goal PT LTG, Distance to Achieve 200

## 2017-06-21 NOTE — PATIENT CARE CONFERENCE
ICU ROUNDS: PT wound care consult. Mobility consult per rounds today. Pt lives alone and is at risk of falling.

## 2017-06-21 NOTE — PLAN OF CARE
Problem: Patient Care Overview (Adult)  Goal: Plan of Care Review  Outcome: Ongoing (interventions implemented as appropriate)    06/21/17 1445 06/21/17 1833   Coping/Psychosocial Response Interventions   Plan Of Care Reviewed With patient --    Patient Care Overview   Progress --  progress towards functional goals is fair   Outcome Evaluation   Outcome Summary/Follow up Plan --  Weaning levo. NS boluses for MAP <65. Gr + cocci in chains in aerobic. Vanc started. OOB to chair. Ambulated with PT. Medicate for pain.          Problem: Sepsis (Adult)  Goal: Signs and Symptoms of Listed Potential Problems Will be Absent or Manageable (Sepsis)  Outcome: Ongoing (interventions implemented as appropriate)    06/21/17 0507   Sepsis   Problems Assessed (Sepsis) all   Problems Present (Sepsis) hypoperfusion/hemodynamic instability;hypoxia/hypoxemia;progression of infection         Problem: Fall Risk (Adult)  Goal: Identify Related Risk Factors and Signs and Symptoms  Outcome: Outcome(s) achieved Date Met:  06/21/17  Goal: Absence of Falls  Outcome: Ongoing (interventions implemented as appropriate)    06/21/17 1833   Fall Risk (Adult)   Absence of Falls making progress toward outcome         Problem: Pressure Ulcer (Adult)  Goal: Signs and Symptoms of Listed Potential Problems Will be Absent or Manageable (Pressure Ulcer)  Outcome: Ongoing (interventions implemented as appropriate)    06/21/17 0507 06/21/17 1833   Pressure Ulcer   Problems Assessed (Pressure Ulcer) --  all   Problems Present (Pressure Ulcer) pain;infection;wound complications;wound progression/extension --
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
## 2017-06-21 NOTE — CONSULTS
Patient has refused PICC line. Dr Otero aware. Please re consult if marvin florentino wants later  Flaquita Menendez RN [facility], [place]

## 2017-06-21 NOTE — PROGRESS NOTES
"INTENSIVIST NOTE     Hospital Day: 1      Mr. Yassine Love, 71 y.o. male is followed for:   <principal problem not specified>       SUBJECTIVE     71 y.o. male who was found on the floor by his home health nurse. He reports that he slid down to the floor and could not get up. He thinks he laid there for approximately 30 minutes. His temperature was 102. He was brought to the emergency room where his systolic blood pressure was 78. It did improve with volume resuscitation. He has edema and erythema of his lower extremities, right greater than left. He reports his legs have been swelling for years, \"up and down\". He is currently taking doxycycline. He is minimally ambulatory with a walker. He denies loss of consciousness. He has a chronic cough productive of mucus. He denies chest pain. One year ago approximately he was hospitalized at [facility] for cellulitis of the right leg. He reports poor circulation to his lower extremities.  He was admitted to ICU with a diagnosis of sepsis, likely from lower extremity cellulitis and acute kidney injury.    Interval history:    Main complaint is lower extremity pain.  Remains on low dose pressor therapy.  Blood cultures positive for Streptococcus    The patient's relevant past medical, surgical and social history were reviewed and updated in Epic as appropriate.       OBJECTIVE     Vital Sign Min/Max for last 24 hours  Temp  Min: 97.7 °F (36.5 °C)  Max: 99.5 °F (37.5 °C)   BP  Min: 51/35  Max: 120/69   Pulse  Min: 56  Max: 94   Resp  Min: 18  Max: 28   SpO2  Min: 86 %  Max: 100 %   Flow (L/min)  Min: 2  Max: 4   Weight  Min: 170 lb (77.1 kg)  Max: 188 lb 15 oz (85.7 kg)      Intake/Output Summary (Last 24 hours) at 06/21/17 1609  Last data filed at 06/21/17 1400   Gross per 24 hour   Intake          3901.17 ml   Output             1020 ml   Net          2881.17 ml      Flowsheet Rows         First Filed Value    Admission Height  70\" (177.8 cm) Documented at 06/20/2017 " "1900    Admission Weight  170 lb (77.1 kg) Documented at 06/20/2017 1900         Last 3 weights    06/20/17  1900 06/20/17  2345 06/21/17  0953   Weight: 170 lb (77.1 kg) 188 lb 15 oz (85.7 kg) 188 lb (85.3 kg)            Objective:  General Appearance:  In no acute distress and ill-appearing.    Vital signs: (most recent): Blood pressure 99/71, pulse 80, temperature 97.7 °F (36.5 °C), temperature source Axillary, resp. rate 18, height 70\" (177.8 cm), weight 188 lb (85.3 kg), SpO2 95 %.    HEENT: Normal HEENT exam.    Lungs:  Normal respiratory rate and normal effort.  He is not in respiratory distress.  There are decreased breath sounds.  No wheezes, rales or rhonchi.    Heart: Normal rate.  Regular rhythm.  S1 normal and S2 normal.  No murmur, gallop or friction rub.   Chest: Symmetric chest wall expansion.   Abdomen: Abdomen is soft and non-distended.  Bowel sounds are normal.   There is no abdominal tenderness.   There is no mass. There is no splenomegaly. There is no hepatomegaly.   Extremities: (Chronic edema that is severe with erythema.  Absence of right great toenail)  Neurological: Patient is alert and oriented to person, place and time.    Pupils:  Pupils are equal, round, and reactive to light.    Skin:  Warm and dry.              I reviewed the patient's new clinical results.  I reviewed the patient's new imaging results/reports including actual images and agree with reports.      Chest X-Ray:  No acute infiltrates    INFUSIONS    Pharmacy Consult     norepinephrine 0.02-0.3 mcg/kg/min Last Rate: 0.04 mcg/kg/min (06/21/17 1256)   Pharmacy to dose vancomycin     sodium chloride 100 mL/hr Last Rate: 100 mL/hr (06/21/17 0946)         Results from last 7 days  Lab Units 06/21/17 0434 06/20/17 1955   WBC 10*3/mm3 30.01* 26.21*   HEMOGLOBIN g/dL 12.4* 13.8   HEMATOCRIT % 38.1* 42.4   PLATELETS 10*3/mm3 265 260       Results from last 7 days  Lab Units 06/21/17 0434 06/20/17 1955   SODIUM mmol/L 141 137 "   POTASSIUM mmol/L 4.7 4.4   CHLORIDE mmol/L 111* 101   TOTAL CO2 mmol/L 29.0 30.0   BUN mg/dL 21 19   GLUCOSE mg/dL 111* 108*   CREATININE mg/dL 1.40* 1.70*   MAGNESIUM mg/dL 2.1 2.3   CALCIUM mg/dL 7.8* 9.1           Results from last 7 days  Lab Units 06/21/17  0406   PH, ARTERIAL pH units 7.450   PCO2, ARTERIAL mm Hg 38.7   PO2 ART mm Hg 54.8*   FIO2 % 28         [facility] Ventilation:      I reviewed the patient's medications.    Assessment/Plan   ASSESSMENT      Hospital Problem List     Streptococcal sepsis    Cellulitis of right lower extremity    RAFAEL (acute kidney injury)    PVD (peripheral vascular disease)    Febrile illness            He has sepsis with the presumed source being cellulitis of his lower extremity and right great toe infection.  Streptococcus is present in the blood.  He is requiring small amounts of pressor support.  Renal function has improved slightly with fluid resuscitation.          PLAN     -Continue aggressive fluid resuscitation   -Wean pressors   -Vancomycin and Merrem   -Monitor labs and renal function   -Additional meds for pain control   -Infectious disease consult   -Remains critically ill          I discussed the patient's findings and my recommendations with patient and nursing staff     Plan of care and goals reviewed with mulitdisciplinary team at daily rounds.    Critical Care time spent in direct patient care: 30 minutes (excluding procedure time, if applicable) including high complexity decision making to assess, manipulate, and support vital organ system failure in this individual who has impairment of one or more vital organ systems such that there is a high probability of imminent or life threatening deterioration in the patient’s condition.    Giles Metzger MD  Pulmonary and Critical Care Medicine  06/21/17 4:09 PM

## 2017-06-21 NOTE — PROGRESS NOTES
Pharmacy Consult--Antimicrobial Dosing  Pt is a 72yo male on vancomycin for the treatment of bacteremia.    Consulting Provider: Giles Metzger MD    Current Antimicrobial Therapy  1. PTD vancomycin   2. Meropenem 1g IV q8h      Allergies as of 06/20/2017 - Steve as Reviewed 06/20/2017   Allergen Reaction Noted   • Ceftin [cefuroxime axetil] Angioedema 06/20/2017         Labs      Results from last 7 days     Lab Units 06/21/17 0434 06/20/17 1955   SODIUM mmol/L 141 137   POTASSIUM mmol/L 4.7 4.4   CHLORIDE mmol/L 111* 101   TOTAL CO2 mmol/L 29.0 30.0   BUN mg/dL 21 19   CREATININE mg/dL 1.40* 1.70*   CALCIUM mg/dL 7.8* 9.1   BILIRUBIN mg/dL 0.6 0.7   ALK PHOS U/L 83 99   ALT (SGPT) U/L 15 13   AST (SGOT) U/L 25 27   GLUCOSE mg/dL 111* 108*         Results from last 7 days     Lab Units 06/21/17 0434 06/20/17 1955   WBC 10*3/mm3 30.01* 26.21*   HEMOGLOBIN g/dL 12.4* 13.8   HEMATOCRIT % 38.1* 42.4   PLATELETS 10*3/mm3 265 260       Evaluation of Dosing  Weight: 85.3kg  Goal Trough: 15-20 mcg/mL    Estimated Creatinine Clearance: 49.2 mL/min (by C-G formula based on Cr of 1.4).    I/O last 3 completed shifts:  In: 1234.9 [I.V.:1034.9; IV Piggyback:200]  Out: 570 [Urine:570]      Microbiology and Radiology  Blood Culture   Date Value Ref Range Status   06/20/2017 No growth at less than 24 hours  Preliminary       BCID, PCR   Date Value Ref Range Status   06/20/2017 (A) Negative by BCID PCR. Culture to Follow. Final    Streptococcus spp, not A, B, or pneumoniae. Identification by BCID PCR.         Plan:  Patient has elevated wbc, but is afebrile. Scr is improving, uop is poor.  1. Patient given loading dose of vancomycin 1500mg (20mg/kg) IV x 1 today. Maintenance dose of vancomycin 1250mg q24h scheduled to start on 6/22.  2. Vancomycin trough ordered for 6/23 @ 0830 prior to 3rd dose.  3. Continue to follow patient's clinical progress and monitor renal function and make dose adjustments as  needed.    Thanks,  Jorge Billings, PharmD  6/21/2017  3:15 PM

## 2017-06-21 NOTE — PLAN OF CARE
Problem: Patient Care Overview (Adult)  Goal: Plan of Care Review  Outcome: Ongoing (interventions implemented as appropriate)    06/21/17 2530   Coping/Psychosocial Response Interventions   Plan Of Care Reviewed With patient   Patient Care Overview   Progress no change   Outcome Evaluation   Outcome Summary/Follow up Plan patient was found in floor by HH nurse, EMS was called and pt was brought to the ED. Presented with cellulitis of BLE that the pt states has been ongoing for over a year. pt is hypotensive, 3L NS bolus given, levophed started. on 2L NC (baseline).          Problem: Sepsis (Adult)  Goal: Signs and Symptoms of Listed Potential Problems Will be Absent or Manageable (Sepsis)  Outcome: Ongoing (interventions implemented as appropriate)    Problem: Fall Risk (Adult)  Goal: Identify Related Risk Factors and Signs and Symptoms  Outcome: Ongoing (interventions implemented as appropriate)  Goal: Absence of Falls  Outcome: Ongoing (interventions implemented as appropriate)    Problem: Pressure Ulcer (Adult)  Goal: Signs and Symptoms of Listed Potential Problems Will be Absent or Manageable (Pressure Ulcer)  Outcome: Ongoing (interventions implemented as appropriate)

## 2017-06-21 NOTE — NURSING NOTE
WOCN consulted for BLE. Patient presents with BLE cellulitis and pitting edema. On IV abx therapy. Consulted PT wound care for compression wrap therapy. Recommended waiting 24 hours r/t pain. Thanks
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
## 2017-06-22 ENCOUNTER — APPOINTMENT (OUTPATIENT)
Dept: GENERAL RADIOLOGY | Facility: HOSPITAL | Age: 72
End: 2017-06-22

## 2017-06-22 LAB
ALBUMIN SERPL-MCNC: 2.5 G/DL (ref 3.2–4.8)
ANION GAP SERPL CALCULATED.3IONS-SCNC: -3 MMOL/L (ref 3–11)
BASOPHILS # BLD AUTO: 0.02 10*3/MM3 (ref 0–0.2)
BASOPHILS NFR BLD AUTO: 0.1 % (ref 0–1)
BUN BLD-MCNC: 17 MG/DL (ref 9–23)
BUN/CREAT SERPL: 17 (ref 7–25)
CALCIUM SPEC-SCNC: 8.1 MG/DL (ref 8.7–10.4)
CHLORIDE SERPL-SCNC: 113 MMOL/L (ref 99–109)
CO2 SERPL-SCNC: 28 MMOL/L (ref 20–31)
CREAT BLD-MCNC: 1 MG/DL (ref 0.6–1.3)
DEPRECATED RDW RBC AUTO: 54.1 FL (ref 37–54)
EOSINOPHIL # BLD AUTO: 0.16 10*3/MM3 (ref 0.1–0.3)
EOSINOPHIL NFR BLD AUTO: 1.1 % (ref 0–3)
ERYTHROCYTE [DISTWIDTH] IN BLOOD BY AUTOMATED COUNT: 14.6 % (ref 11.3–14.5)
GFR SERPL CREATININE-BSD FRML MDRD: 73 ML/MIN/1.73
GLUCOSE BLD-MCNC: 99 MG/DL (ref 70–100)
HCT VFR BLD AUTO: 35.5 % (ref 38.9–50.9)
HGB BLD-MCNC: 11.4 G/DL (ref 13.1–17.5)
IMM GRANULOCYTES # BLD: 0.02 10*3/MM3 (ref 0–0.03)
IMM GRANULOCYTES NFR BLD: 0.1 % (ref 0–0.6)
LYMPHOCYTES # BLD AUTO: 0.68 10*3/MM3 (ref 0.6–4.8)
LYMPHOCYTES NFR BLD AUTO: 4.8 % (ref 24–44)
MAGNESIUM SERPL-MCNC: 2.2 MG/DL (ref 1.3–2.7)
MCH RBC QN AUTO: 32.9 PG (ref 27–31)
MCHC RBC AUTO-ENTMCNC: 32.1 G/DL (ref 32–36)
MCV RBC AUTO: 102.3 FL (ref 80–99)
MONOCYTES # BLD AUTO: 0.69 10*3/MM3 (ref 0–1)
MONOCYTES NFR BLD AUTO: 4.9 % (ref 0–12)
NEUTROPHILS # BLD AUTO: 12.58 10*3/MM3 (ref 1.5–8.3)
NEUTROPHILS NFR BLD AUTO: 89 % (ref 41–71)
PHOSPHATE SERPL-MCNC: 2.2 MG/DL (ref 2.4–5.1)
PLATELET # BLD AUTO: 226 10*3/MM3 (ref 150–450)
PMV BLD AUTO: 9.6 FL (ref 6–12)
POTASSIUM BLD-SCNC: 3.6 MMOL/L (ref 3.5–5.5)
RBC # BLD AUTO: 3.47 10*6/MM3 (ref 4.2–5.76)
SODIUM BLD-SCNC: 138 MMOL/L (ref 132–146)
WBC NRBC COR # BLD: 14.15 10*3/MM3 (ref 3.5–10.8)

## 2017-06-22 PROCEDURE — 25010000002 VANCOMYCIN HCL IN NACL 1.25-0.9 GM/250ML-% SOLUTION

## 2017-06-22 PROCEDURE — 99223 1ST HOSP IP/OBS HIGH 75: CPT | Performed by: THORACIC SURGERY (CARDIOTHORACIC VASCULAR SURGERY)

## 2017-06-22 PROCEDURE — 83735 ASSAY OF MAGNESIUM: CPT | Performed by: INTERNAL MEDICINE

## 2017-06-22 PROCEDURE — 25010000002 HEPARIN (PORCINE) PER 1000 UNITS: Performed by: INTERNAL MEDICINE

## 2017-06-22 PROCEDURE — 94760 N-INVAS EAR/PLS OXIMETRY 1: CPT

## 2017-06-22 PROCEDURE — 25010000002 MEROPENEM: Performed by: INTERNAL MEDICINE

## 2017-06-22 PROCEDURE — 94799 UNLISTED PULMONARY SVC/PX: CPT

## 2017-06-22 PROCEDURE — 80069 RENAL FUNCTION PANEL: CPT | Performed by: INTERNAL MEDICINE

## 2017-06-22 PROCEDURE — 97602 WOUND(S) CARE NON-SELECTIVE: CPT

## 2017-06-22 PROCEDURE — 25010000002 HYDROMORPHONE PER 4 MG: Performed by: INTERNAL MEDICINE

## 2017-06-22 PROCEDURE — 97116 GAIT TRAINING THERAPY: CPT

## 2017-06-22 PROCEDURE — 97110 THERAPEUTIC EXERCISES: CPT

## 2017-06-22 PROCEDURE — 29581 APPL MULTLAYER CMPRN SYS LEG: CPT

## 2017-06-22 PROCEDURE — 99233 SBSQ HOSP IP/OBS HIGH 50: CPT | Performed by: INTERNAL MEDICINE

## 2017-06-22 PROCEDURE — 94640 AIRWAY INHALATION TREATMENT: CPT

## 2017-06-22 PROCEDURE — 71010 HC CHEST PA OR AP: CPT

## 2017-06-22 PROCEDURE — 85025 COMPLETE CBC W/AUTO DIFF WBC: CPT | Performed by: INTERNAL MEDICINE

## 2017-06-22 RX ORDER — MORPHINE SULFATE 15 MG/1
15 TABLET, FILM COATED, EXTENDED RELEASE ORAL EVERY 12 HOURS SCHEDULED
Status: DISCONTINUED | OUTPATIENT
Start: 2017-06-22 | End: 2017-06-23

## 2017-06-22 RX ORDER — METOPROLOL TARTRATE 50 MG/1
50 TABLET, FILM COATED ORAL EVERY 12 HOURS SCHEDULED
Status: DISCONTINUED | OUTPATIENT
Start: 2017-06-22 | End: 2017-06-26 | Stop reason: HOSPADM

## 2017-06-22 RX ADMIN — VANCOMYCIN HYDROCHLORIDE 1250 MG: 1 INJECTION, POWDER, LYOPHILIZED, FOR SOLUTION INTRAVENOUS at 09:56

## 2017-06-22 RX ADMIN — MEROPENEM 1 G: 1 INJECTION, POWDER, FOR SOLUTION INTRAVENOUS at 01:07

## 2017-06-22 RX ADMIN — IPRATROPIUM BROMIDE AND ALBUTEROL SULFATE 3 ML: .5; 3 SOLUTION RESPIRATORY (INHALATION) at 12:16

## 2017-06-22 RX ADMIN — HYDROCODONE BITARTRATE AND ACETAMINOPHEN 1 TABLET: 7.5; 325 TABLET ORAL at 03:25

## 2017-06-22 RX ADMIN — HYDROCODONE BITARTRATE AND ACETAMINOPHEN 1 TABLET: 7.5; 325 TABLET ORAL at 20:52

## 2017-06-22 RX ADMIN — SODIUM CHLORIDE 100 ML/HR: 9 INJECTION, SOLUTION INTRAVENOUS at 01:11

## 2017-06-22 RX ADMIN — IPRATROPIUM BROMIDE AND ALBUTEROL SULFATE 3 ML: .5; 3 SOLUTION RESPIRATORY (INHALATION) at 07:31

## 2017-06-22 RX ADMIN — IPRATROPIUM BROMIDE AND ALBUTEROL SULFATE 3 ML: .5; 3 SOLUTION RESPIRATORY (INHALATION) at 16:11

## 2017-06-22 RX ADMIN — HYDROMORPHONE HYDROCHLORIDE 0.2 MG: 1 INJECTION, SOLUTION INTRAMUSCULAR; INTRAVENOUS; SUBCUTANEOUS at 01:08

## 2017-06-22 RX ADMIN — HYDROCODONE BITARTRATE AND ACETAMINOPHEN 1 TABLET: 7.5; 325 TABLET ORAL at 09:54

## 2017-06-22 RX ADMIN — HEPARIN SODIUM 5000 UNITS: 5000 INJECTION, SOLUTION INTRAVENOUS; SUBCUTANEOUS at 06:56

## 2017-06-22 RX ADMIN — METOPROLOL TARTRATE 50 MG: 50 TABLET, FILM COATED ORAL at 21:11

## 2017-06-22 RX ADMIN — MORPHINE SULFATE 15 MG: 15 TABLET, EXTENDED RELEASE ORAL at 12:30

## 2017-06-22 RX ADMIN — MEROPENEM 1 G: 1 INJECTION, POWDER, FOR SOLUTION INTRAVENOUS at 08:25

## 2017-06-22 RX ADMIN — HYDROMORPHONE HYDROCHLORIDE 0.2 MG: 1 INJECTION, SOLUTION INTRAMUSCULAR; INTRAVENOUS; SUBCUTANEOUS at 03:25

## 2017-06-22 RX ADMIN — HEPARIN SODIUM 5000 UNITS: 5000 INJECTION, SOLUTION INTRAVENOUS; SUBCUTANEOUS at 14:57

## 2017-06-22 RX ADMIN — MEROPENEM 1 G: 1 INJECTION, POWDER, FOR SOLUTION INTRAVENOUS at 16:22

## 2017-06-22 RX ADMIN — NICOTINE 1 PATCH: 21 PATCH, EXTENDED RELEASE TRANSDERMAL at 01:09

## 2017-06-22 RX ADMIN — HYDROMORPHONE HYDROCHLORIDE 0.2 MG: 1 INJECTION, SOLUTION INTRAMUSCULAR; INTRAVENOUS; SUBCUTANEOUS at 06:56

## 2017-06-22 RX ADMIN — MORPHINE SULFATE 15 MG: 15 TABLET, EXTENDED RELEASE ORAL at 20:52

## 2017-06-22 RX ADMIN — HYDROCODONE BITARTRATE AND ACETAMINOPHEN 1 TABLET: 7.5; 325 TABLET ORAL at 15:01

## 2017-06-22 RX ADMIN — IPRATROPIUM BROMIDE AND ALBUTEROL SULFATE 3 ML: .5; 3 SOLUTION RESPIRATORY (INHALATION) at 20:07

## 2017-06-22 RX ADMIN — HEPARIN SODIUM 5000 UNITS: 5000 INJECTION, SOLUTION INTRAVENOUS; SUBCUTANEOUS at 21:11

## 2017-06-22 RX ADMIN — SODIUM CHLORIDE 10 ML/HR: 9 INJECTION, SOLUTION INTRAVENOUS at 17:43

## 2017-06-22 RX ADMIN — METOPROLOL TARTRATE 50 MG: 50 TABLET, FILM COATED ORAL at 09:54

## 2017-06-22 NOTE — PLAN OF CARE
Problem: Patient Care Overview (Adult)  Goal: Plan of Care Review  Outcome: Ongoing (interventions implemented as appropriate)    06/22/17 1437   Coping/Psychosocial Response Interventions   Plan Of Care Reviewed With patient   Patient Care Overview   Progress improving   Outcome Evaluation   Outcome Summary/Follow up Plan Pt with improved motivation to participate today. Progressed forward ambulation distance to 140 ft with use of RWx and 1 seated rest break. Edu pt re: HEP. Will cont PT POC as clinically warranted.          Problem: Inpatient Physical Therapy  Goal: Bed Mobility Goal LTG- PT  Outcome: Ongoing (interventions implemented as appropriate)    06/21/17 1445 06/22/17 1437   Bed Mobility PT LTG   Bed Mobility PT LTG, Date Established 06/21/17 --    Bed Mobility PT LTG, Time to Achieve 2 wks --    Bed Mobility PT LTG, Activity Type supine to sit/sit to supine --    Bed Mobility PT LTG, [place] Level supervision required --    Bed Mobility PT LTG, Outcome --  goal ongoing       Goal: Transfer Training Goal 1 LTG- PT  Outcome: Ongoing (interventions implemented as appropriate)    06/21/17 1445 06/22/17 1437   Transfer Training PT LTG   Transfer Training PT LTG, Date Established 06/21/17 --    Transfer Training PT LTG, Time to Achieve 2 wks --    Transfer Training PT LTG, Activity Type sit to stand/stand to sit --    Transfer Training PT LTG, [place] Level supervision required --    Transfer Training PT LTG, Assist Device walker, rolling --    Transfer Training PT LTG, Outcome --  goal ongoing       Goal: Gait Training Goal LTG- PT  Outcome: Ongoing (interventions implemented as appropriate)    06/21/17 1445 06/22/17 1437   Gait Training PT LTG   Gait Training Goal PT LTG, Date Established 06/21/17 --    Gait Training Goal PT LTG, Time to Achieve 2 wks --    Gait Training Goal PT LTG, [place] Level supervision required --    Gait Training Goal PT LTG, Assist Device walker, rolling --    Gait  Training Goal PT LTG, Distance to Achieve 200 --    Gait Training Goal PT LTG, Outcome --  goal ongoing

## 2017-06-22 NOTE — THERAPY TREATMENT NOTE
Acute Care - Physical Therapy Treatment Note  [facility]     Patient Name: Yassine Love  : 1945  MRN: [id number]  Today's Date: 2017  Onset of Illness/Injury or Date of Surgery Date: 17  Date of Referral to PT: 17  Referring Physician: Dr Otero    Admit Date: 2017    Visit Dx:    ICD-10-CM ICD-9-CM   1. Febrile illness R50.9 780.60   2. Cellulitis of right lower extremity L03.115 682.6   3. Weakness R53.1 780.79   4. Sepsis, due to unspecified organism A41.9 038.9     995.91   5. Renal insufficiency N28.9 593.9   6. Non-traumatic rhabdomyolysis M62.82 728.88   7. Leukocytosis, unspecified type D72.829 288.60   8. Impaired functional mobility, balance, gait, and endurance Z74.09 V49.89     Patient Active Problem List   Diagnosis   • Streptococcal sepsis   • Cellulitis of right lower extremity   • RAFAEL (acute kidney injury)   • PVD (peripheral vascular disease)   • Febrile illness               Adult Rehabilitation Note       17 1340          Rehab Assessment/Intervention    Discipline physical therapist  -LS      Document Type therapy note (daily note)  -LS      Subjective Information agree to therapy;weakness;pain  -LS      Patient Effort, Rehab Treatment good  -LS      Precautions/Limitations fall precautions;oxygen therapy device and L/min;other (see comments)   hypersensitive BLEs with noted dec skin integrity  -LS      Recorded by [LS] Zari Rai, PT      Vital Signs    Pre Systolic BP Rehab 98  -LS      Pre Treatment Diastolic BP 56  -LS      Post Systolic BP Rehab 114  -LS      Post Treatment Diastolic BP 68  -LS      Pretreatment Heart Rate (beats/min) 83  -LS      Posttreatment Heart Rate (beats/min) 83  -LS      Pre SpO2 (%) 96  -LS      O2 Delivery Pre Treatment supplemental O2  -LS      Post SpO2 (%) 94  -LS      O2 Delivery Post Treatment supplemental O2  -LS      Pre Patient Position Sitting  -LS      Intra Patient Position Standing  -LS      Post Patient  Position Sitting  -LS      Recorded by [LS] Zari Rai, PT      Pain Assessment    Pain Assessment 0-10  -LS      Pain Score 8  -LS      Post Pain Score 8  -LS      Pain Type Chronic pain  -LS      Pain Location Back   and L hip  -LS      Pain Intervention(s) Repositioned;Ambulation/increased activity  -LS      Response to Interventions tolerated; RN aware  -LS      Recorded by [LS] Zari Rai, PT      Cognitive Assessment/Intervention    Current Cognitive/Communication Assessment functional  -LS      Orientation Status oriented x 4  -LS      Follows Commands/Answers Questions able to follow single-step instructions;100% of the time;needs cueing  -LS      Personal Safety mild impairment;decreased awareness, need for safety;decreased insight to deficits  -LS      Personal Safety Interventions fall prevention program maintained;gait belt;nonskid shoes/slippers when out of bed  -LS      Recorded by [LS] Zari Rai, PT      Bed Mobility, Assessment/Treatment    Bed Mobility, Comment [facility]IC upon arrival.  -LS      Recorded by [LS] Zari Rai, PT      Transfer Assessment/Treatment    Transfers, Sit-Stand [place] contact guard assist;verbal cues required  -LS      Transfers, Stand-Sit [place] contact guard assist;verbal cues required  -LS      Transfers, Sit-Stand-Sit, Assist Device rolling walker  -LS      Transfer, Impairments strength decreased;impaired balance  -LS      Transfer, Comment VC's for hand placement and weightshifting forward to initiate transfer.   -LS      Recorded by [LS] Zari Rai, PT      Gait Assessment/Treatment    Gait, [place] Level contact guard assist;verbal cues required;1 person + 1 person to manage equipment  -LS      Gait, Assistive Device rolling walker  -LS      Gait, Distance (Feet) 140  -LS      Gait, Gait Deviations vidhi decreased;forward flexed posture;step length decreased;decreased heel strike  -LS      Gait, Impairments strength decreased;pain  -LS       Gait, Comment VC's for upright posture and pushing RWx forward at steady pace to promote reciprocal gait pattern. Req'd 1 brief sitting rest break due to fatigue (after 70 ft gait).   -LS      Recorded by [LS] Zari Rai, PT      Motor Skills/Interventions    Additional Documentation Balance Skills Training (Group)  -LS      Recorded by [LS] Zari Rai, PT      Balance Skills Training    Sitting-Level of Assistance Close supervision  -LS      Sitting-Balance Support Feet supported  -LS      Standing-Level of Assistance Contact guard  -LS      Static Standing Balance Support assistive device  -LS      Gait Balance-Level of Assistance Contact guard  -LS      Gait Balance Support assistive device  -LS      Recorded by [LS] Zari Rai, PT      Therapy Exercises    Bilateral Lower Extremities AROM:;10 reps;sitting;ankle pumps/circles;glut sets;quad sets  -LS      Bilateral Upper Extremity AROM:;sitting;hand pumps;elbow flexion/extension  -LS      Recorded by [LS] Zari Rai, PT      Positioning and Restraints    Pre-Treatment Position sitting in chair/recliner  -LS      Post Treatment Position chair  -LS      In Chair notified nsg;reclined;call light within reach;encouraged to call for assist;exit alarm on;RUE elevated;LUE elevated;legs elevated;heels elevated  -LS      Recorded by [LS] Zari Rai, PT        User Key  (r) = Recorded By, (t) = Taken By, (c) = Cosigned By    Initials Name Effective Dates    SARWAT Rai, PT 06/19/15 -                 IP PT Goals       06/22/17 1437 06/22/17 0815 06/21/17 1445    Bed Mobility PT LTG    Bed Mobility PT LTG, Date Established   06/21/17  -LS    Bed Mobility PT LTG, Time to Achieve   2 wks  -LS    Bed Mobility PT LTG, Activity Type   supine to sit/sit to supine  -LS    Bed Mobility PT LTG, [place] Level   supervision required  -LS    Bed Mobility PT LTG, Outcome goal ongoing  -LS      Transfer Training PT LTG    Transfer Training PT LTG, Date Established    06/21/17  -LS    Transfer Training PT LTG, Time to Achieve   2 wks  -LS    Transfer Training PT LTG, Activity Type   sit to stand/stand to sit  -LS    Transfer Training PT LTG, [place] Level   supervision required  -LS    Transfer Training PT LTG, Assist Device   walker, rolling  -LS    Transfer Training PT LTG, Outcome goal ongoing  -LS      Gait Training PT LTG    Gait Training Goal PT LTG, Date Established   06/21/17  -LS    Gait Training Goal PT LTG, Time to Achieve   2 wks  -LS    Gait Training Goal PT LTG, [place] Level   supervision required  -LS    Gait Training Goal PT LTG, Assist Device   walker, rolling  -LS    Gait Training Goal PT LTG, Distance to Achieve   200  -LS    Gait Training Goal PT LTG, Outcome goal ongoing  -LS      Wound Care PT LTG    Wound Care PT LTG 1, Date Established  06/22/17  -MF     Wound Care PT LTG 1, Time to Achieve  other (see comments)   10 days  -MF     Wound Care PT LTG 1, Location  BLEs  -MF     Wound Care PT LTG 1, No S&S of Infection  yes  -MF     Wound Care PT LTG 1, Decrease Limb Girth %  10  -MF     Wound Care PT LTG 1, No Skin Break Down  yes  -MF     Wound Care PT LTG 1, No New Skin Break Down  yes  -MF       User Key  (r) = Recorded By, (t) = Taken By, (c) = Cosigned By    Initials Name Provider Type    ALBANIA Hassan, PT Physical Therapist    SARWAT Rai, PT Physical Therapist          Physical Therapy Education     Title: PT OT SLP Therapies (Active)     Topic: Physical Therapy (Active)     Point: Mobility training (Active)    Learning Progress Summary    Learner Readiness Method Response Comment Documented by Status   Patient Acceptance E,D NR   06/22/17 1437 Active    Acceptance E,D NR   06/21/17 1445 Active               Point: Home exercise program (Active)    Learning Progress Summary    Learner Readiness Method Response Comment Documented by Status   Patient Acceptance E,D NR   06/22/17 1437 Active               Point: Body  mechanics (Active)    Learning Progress Summary    Learner Readiness Method Response Comment Documented by Status   Patient Acceptance E,D NR  LS 06/22/17 1437 Active    Acceptance E,D NR  LS 06/21/17 1445 Active               Point: Precautions (Active)    Learning Progress Summary    Learner Readiness Method Response Comment Documented by Status   Patient Acceptance E,D NR  LS 06/22/17 1437 Active    Acceptance E,D NR  LS 06/21/17 1445 Active                      User Key     Initials Effective Dates Name Provider Type Discipline     06/19/15 -  Zari Rai, PT Physical Therapist PT                    PT Recommendation and Plan  Anticipated Discharge Disposition: skilled nursing facility  Demonstrates Need for Referral to Another Service: occupational therapy  PT Frequency: daily  Plan of Care Review  Plan Of Care Reviewed With: patient  Progress: improving  Outcome Summary/Follow up Plan: Pt with improved motivation to participate today. Progressed forward ambulation distance to 140 ft with use of RWx and 1 seated rest break. Edu pt re: HEP. Will cont PT POC as clinically warranted.           Outcome Measures       06/22/17 1340 06/21/17 1340       How much help from another person do you currently need...    Turning from your back to your side while in flat bed without using bedrails? 3  -LS 3  -LS     Moving from lying on back to sitting on the side of a flat bed without bedrails? 2  -LS 2  -LS     Moving to and from a bed to a chair (including a wheelchair)? 3  -LS 3  -LS     Standing up from a chair using your arms (e.g., wheelchair, bedside chair)? 3  -LS 3  -LS     Climbing 3-5 steps with a railing? 1  -LS 1  -LS     To walk in hospital room? 3  -LS 3  -LS     AM-PAC 6 Clicks Score 15  -LS 15  -LS     Functional Assessment    Outcome Measure Options AM-PAC 6 Clicks Basic Mobility (PT)  -LS AM-PAC 6 Clicks Basic Mobility (PT)  -LS       User Key  (r) = Recorded By, (t) = Taken By, (c) = Cosigned By     Initials Name Provider Type    LS Zari Rai, PT Physical Therapist           Time Calculation:         PT Charges       06/22/17 1439 06/22/17 0815       Time Calculation    Start Time 1340  - 0815  -     PT Received On 06/22/17  -LS      PT Goal Re-Cert Due Date 07/01/17  -LS 07/01/17  -MF     Time Calculation- PT    Total Timed Code Minutes- PT 24 minute(s)  -LS 60 minute(s)  -       User Key  (r) = Recorded By, (t) = Taken By, (c) = Cosigned By    Initials Name Provider Type     Carlos Hassan, PT Physical Therapist    LS Zari Rai, PT Physical Therapist          Therapy Charges for Today     Code Description Service Date Service Provider Modifiers Qty    70631005246 HC PT EVAL MOD COMPLEXITY 4 6/21/2017 Zair Rai, PT GP 1    68833878713 HC PT THER PROC EA 15 MIN 6/21/2017 Zari Rai, PT GP 1    55114987765 HC GAIT TRAINING EA 15 MIN 6/22/2017 Zari Rai, PT GP 1    23428129857 HC PT THER PROC EA 15 MIN 6/22/2017 Zari Rai, PT GP 1    80959079537 HC PT THER SUPP EA 15 MIN 6/22/2017 Zari Rai, PT GP 2          PT G-Codes  Outcome Measure Options: AM-PAC 6 Clicks Basic Mobility (PT)    Zari Rai, PT  6/22/2017

## 2017-06-22 NOTE — PROGRESS NOTES
Nutrition Services      Multidisciplinary Rounds  Time: 20min  Patient Name: Yassine Love  Date of Encounter: 1:39 PM  MRN: [id number]  Admission date: 6/20/2017      Reason for visit: MDR. RD to continue to follow per protocol.     Additional information obtained during MDR:      Current diet:        Dietary Orders            Start     Ordered    06/21/17 1255  DIET MESSAGE 6/21 choice for lunch.  Once     Comments:  6/21 choice for lunch.    06/21/17 1254    06/20/17 2247  Diet Soft Texture; Whole Foods; Thin  Diet Effective Now     Question Answer Comment   Diet Texture / Consistency Soft Texture    Select Texture: Whole [employer]    Fluid Consistency Thin        06/20/17 2246            Intervention:  Review menu  Follow treatment plan  Care plan reviewed    Follow up:   Per protocol    Electronically signed by:  Emerald Bowen RD  06/22/17 1:39 PM

## 2017-06-22 NOTE — PROGRESS NOTES
Continued Stay Note  [facility]     Patient Name: Yassine Love  MRN: [id number]  Today's Date: 6/22/2017    Admit Date: 6/20/2017          Discharge Plan       06/22/17 1508    Case Management/Social Work Plan    Plan [facility]    Additional Comments Spoke with patient at bedside.  He is now agreeable to rehab at discharge.  Will send out referrals after OT consult.  CM will continue to follow.              Discharge Codes     None        Expected Discharge Date and Time     Expected Discharge Date Expected Discharge Time    Jun 26, 2017             Nory Lackey RN

## 2017-06-22 NOTE — PLAN OF CARE
Problem: Patient Care Overview (Adult)  Goal: Plan of Care Review  Outcome: Ongoing (interventions implemented as appropriate)    06/22/17 9217   Coping/Psychosocial Response Interventions   Plan Of Care Reviewed With patient   Patient Care Overview   Progress improving   Outcome Evaluation   Outcome Summary/Follow up Plan Pt up to chair all shift. Ambulated with PT. OT consult. MD notified of K 3.6- no orders. Restarted home morphine and metoprolol at decreased dose. Agrees to PICC line today- PICC RN unable to come this day. Pt more opimistic regarding potential need for rehab at discharge. VSS throughout shift.        Goal: Adult Individualization and Mutuality  Outcome: Ongoing (interventions implemented as appropriate)  Goal: Discharge Needs Assessment  Outcome: Ongoing (interventions implemented as appropriate)    Problem: Sepsis (Adult)  Goal: Signs and Symptoms of Listed Potential Problems Will be Absent or Manageable (Sepsis)  Outcome: Ongoing (interventions implemented as appropriate)    Problem: Fall Risk (Adult)  Goal: Absence of Falls  Outcome: Ongoing (interventions implemented as appropriate)    Problem: Pressure Ulcer (Adult)  Goal: Signs and Symptoms of Listed Potential Problems Will be Absent or Manageable (Pressure Ulcer)  Outcome: Ongoing (interventions implemented as appropriate)
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
## 2017-06-22 NOTE — PLAN OF CARE
Problem: Patient Care Overview (Adult)  Goal: Plan of Care Review  Outcome: Ongoing (interventions implemented as appropriate)    06/22/17 0815   Coping/Psychosocial Response Interventions   Plan Of Care Reviewed With patient   Outcome Evaluation   Outcome Summary/Follow up Plan Pt presents with BLE edema and weeping ulcerations. Pt will benefit from light compression to help increase venous return to improve healing potential.          Problem: Inpatient Physical Therapy  Goal: Wound Care Goal 1 LTG- PT  Outcome: Ongoing (interventions implemented as appropriate)    06/22/17 0815   Wound Care PT LTG   Wound Care PT LTG 1, Date Established 06/22/17   Wound Care PT LTG 1, Time to Achieve other (see comments)  (10 days)   Wound Care PT LTG 1, Location BLEs   Wound Care PT LTG 1, No S&S of Infection yes   Wound Care PT LTG 1, Decrease Limb Girth % 10   Wound Care PT LTG 1, No Skin Break Down yes   Wound Care PT LTG 1, No New Skin Break Down yes

## 2017-06-22 NOTE — PROGRESS NOTES
"INTENSIVIST NOTE     Hospital Day: 2      Mr. Yassine Love, 71 y.o. male is followed for:   <principal problem not specified>       SUBJECTIVE     71 y.o. male who was found on the floor by his home health nurse. He reports that he slid down to the floor and could not get up. He thinks he laid there for approximately 30 minutes. His temperature was 102. He was brought to the emergency room where his systolic blood pressure was 78. It did improve with volume resuscitation. He has edema and erythema of his lower extremities, right greater than left. He reports his legs have been swelling for years, \"up and down\". He is currently taking doxycycline. He is minimally ambulatory with a walker. He denies loss of consciousness. He has a chronic cough productive of mucus. He denies chest pain. One year ago approximately he was hospitalized at [facility] for cellulitis of the right leg. He reports poor circulation to his lower extremities.  He was admitted to ICU with a diagnosis of sepsis, likely from lower extremity cellulitis and acute kidney injury.    Interval history:    Continues to complain of back and leg pain.  Off pressors.    The patient's relevant past medical, surgical and social history were reviewed and updated in Epic as appropriate.       OBJECTIVE     Vital Sign Min/Max for last 24 hours  Temp  Min: 97.7 °F (36.5 °C)  Max: 98.2 °F (36.8 °C)   BP  Min: 74/55  Max: 136/90   Pulse  Min: 71  Max: 120   Resp  Min: 16  Max: 24   SpO2  Min: 89 %  Max: 98 %   Flow (L/min)  Min: 2  Max: 4   No Data Recorded      Intake/Output Summary (Last 24 hours) at 06/22/17 1548  Last data filed at 06/22/17 1300   Gross per 24 hour   Intake             3726 ml   Output              700 ml   Net             3026 ml      Flowsheet Rows         First Filed Value    Admission Height  70\" (177.8 cm) Documented at 06/20/2017 1900    Admission Weight  170 lb (77.1 kg) Documented at 06/20/2017 1900         Last 3 weights    " "06/20/17  1900 06/20/17  2345 06/21/17  0953   Weight: 170 lb (77.1 kg) 188 lb 15 oz (85.7 kg) 188 lb (85.3 kg)            Objective:  General Appearance:  In no acute distress and ill-appearing.    Vital signs: (most recent): Blood pressure 98/56, pulse 82, temperature 97.7 °F (36.5 °C), temperature source Tympanic, resp. rate 18, height 70\" (177.8 cm), weight 188 lb (85.3 kg), SpO2 95 %.    HEENT: Normal HEENT exam.    Lungs:  Normal respiratory rate and normal effort.  He is not in respiratory distress.  There are decreased breath sounds.  No wheezes, rales or rhonchi.    Heart: Normal rate.  Regular rhythm.  S1 normal and S2 normal.  No murmur, gallop or friction rub.   Chest: Symmetric chest wall expansion.   Abdomen: Abdomen is soft and non-distended.  Bowel sounds are normal.   There is no abdominal tenderness.   There is no mass. There is no splenomegaly. There is no hepatomegaly.   Extremities: (Chronic edema that is severe with erythema.  Absence of right great toenail)  Neurological: Patient is alert and oriented to person, place and time.    Pupils:  Pupils are equal, round, and reactive to light.    Skin:  Warm and dry.              I reviewed the patient's new clinical results.  I reviewed the patient's new imaging results/reports including actual images and agree with reports.      Chest X-Ray:  Bibasilar infiltrates    INFUSIONS    Pharmacy Consult     Pharmacy to dose vancomycin     sodium chloride 10 mL/hr Last Rate: 10 mL/hr (06/22/17 0956)         Results from last 7 days  Lab Units 06/22/17 0432 06/21/17 0434 06/20/17 1955   WBC 10*3/mm3 14.15* 30.01* 26.21*   HEMOGLOBIN g/dL 11.4* 12.4* 13.8   HEMATOCRIT % 35.5* 38.1* 42.4   PLATELETS 10*3/mm3 226 265 260       Results from last 7 days  Lab Units 06/22/17  0432 06/21/17 0434 06/20/17 1955   SODIUM mmol/L 138 141 137   POTASSIUM mmol/L 3.6 4.7 4.4   CHLORIDE mmol/L 113* 111* 101   TOTAL CO2 mmol/L 28.0 29.0 30.0   BUN mg/dL 17 21 19 "   GLUCOSE mg/dL 99 111* 108*   CREATININE mg/dL 1.00 1.40* 1.70*   MAGNESIUM mg/dL 2.2 2.1 2.3   CALCIUM mg/dL 8.1* 7.8* 9.1           Results from last 7 days  Lab Units 06/21/17  0406   PH, ARTERIAL pH units 7.450   PCO2, ARTERIAL mm Hg 38.7   PO2 ART mm Hg 54.8*   FIO2 % 28         [facility] Ventilation:      I reviewed the patient's medications.    Assessment/Plan   ASSESSMENT      Hospital Problem List     Streptococcal sepsis    Cellulitis of right lower extremity    RAFAEL (acute kidney injury)    PVD (peripheral vascular disease)    Febrile illness            Beta-hemolytic strep and blood presumably due to cellulitis/toe infection.  Clinically improved and is not requiring pressors and renal function has returned to baseline.          PLAN     -Decrease IV fluids   -Resume Lopressor as at home at a reduced dose for now  -Antibiotics per ID  -Monitor labs and renal function   -Will resume long-acting morphine as it home for his chronic back pain   -Infectious disease Input appreciated           I discussed the patient's findings and my recommendations with patient and nursing staff     Plan of care and goals reviewed with mulitdisciplinary team at daily rounds.    Giles Metzger MD  Pulmonary and Critical Care Medicine  06/22/17 3:48 PM

## 2017-06-22 NOTE — PLAN OF CARE
Problem: Patient Care Overview (Adult)  Goal: Plan of Care Review  Outcome: Ongoing (interventions implemented as appropriate)    06/22/17 0620   Coping/Psychosocial Response Interventions   Plan Of Care Reviewed With patient   Patient Care Overview   Progress improving   Outcome Evaluation   Outcome Summary/Follow up Plan was able to wean levophed off, WBC count this morning has decreased from 30K TO 14K. Medication for pain given frequnetly. c/o of chronic back pain that is not being controlled appropriately here per patient.          Problem: Sepsis (Adult)  Goal: Signs and Symptoms of Listed Potential Problems Will be Absent or Manageable (Sepsis)  Outcome: Ongoing (interventions implemented as appropriate)    Problem: Fall Risk (Adult)  Goal: Absence of Falls  Outcome: Ongoing (interventions implemented as appropriate)    Problem: Pressure Ulcer (Adult)  Goal: Signs and Symptoms of Listed Potential Problems Will be Absent or Manageable (Pressure Ulcer)  Outcome: Ongoing (interventions implemented as appropriate)
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
## 2017-06-23 ENCOUNTER — APPOINTMENT (OUTPATIENT)
Dept: GENERAL RADIOLOGY | Facility: HOSPITAL | Age: 72
End: 2017-06-23

## 2017-06-23 LAB
ALBUMIN SERPL-MCNC: 2.6 G/DL (ref 3.2–4.8)
ANION GAP SERPL CALCULATED.3IONS-SCNC: -1 MMOL/L (ref 3–11)
BACTERIA SPEC AEROBE CULT: NORMAL
BASOPHILS # BLD AUTO: 0.03 10*3/MM3 (ref 0–0.2)
BASOPHILS NFR BLD AUTO: 0.3 % (ref 0–1)
BUN BLD-MCNC: 13 MG/DL (ref 9–23)
BUN/CREAT SERPL: 14.4 (ref 7–25)
CALCIUM SPEC-SCNC: 8.2 MG/DL (ref 8.7–10.4)
CHLORIDE SERPL-SCNC: 115 MMOL/L (ref 99–109)
CO2 SERPL-SCNC: 25 MMOL/L (ref 20–31)
CREAT BLD-MCNC: 0.9 MG/DL (ref 0.6–1.3)
DEPRECATED RDW RBC AUTO: 53.6 FL (ref 37–54)
EOSINOPHIL # BLD AUTO: 0.49 10*3/MM3 (ref 0.1–0.3)
EOSINOPHIL NFR BLD AUTO: 5.1 % (ref 0–3)
ERYTHROCYTE [DISTWIDTH] IN BLOOD BY AUTOMATED COUNT: 14.6 % (ref 11.3–14.5)
GFR SERPL CREATININE-BSD FRML MDRD: 83 ML/MIN/1.73
GLUCOSE BLD-MCNC: 84 MG/DL (ref 70–100)
HCT VFR BLD AUTO: 33.4 % (ref 38.9–50.9)
HGB BLD-MCNC: 10.8 G/DL (ref 13.1–17.5)
IMM GRANULOCYTES # BLD: 0.02 10*3/MM3 (ref 0–0.03)
IMM GRANULOCYTES NFR BLD: 0.2 % (ref 0–0.6)
LYMPHOCYTES # BLD AUTO: 1.11 10*3/MM3 (ref 0.6–4.8)
LYMPHOCYTES NFR BLD AUTO: 11.5 % (ref 24–44)
MAGNESIUM SERPL-MCNC: 2.2 MG/DL (ref 1.3–2.7)
MCH RBC QN AUTO: 32.7 PG (ref 27–31)
MCHC RBC AUTO-ENTMCNC: 32.3 G/DL (ref 32–36)
MCV RBC AUTO: 101.2 FL (ref 80–99)
MONOCYTES # BLD AUTO: 0.84 10*3/MM3 (ref 0–1)
MONOCYTES NFR BLD AUTO: 8.7 % (ref 0–12)
NEUTROPHILS # BLD AUTO: 7.13 10*3/MM3 (ref 1.5–8.3)
NEUTROPHILS NFR BLD AUTO: 74.2 % (ref 41–71)
PHOSPHATE SERPL-MCNC: 2.2 MG/DL (ref 2.4–5.1)
PLATELET # BLD AUTO: 222 10*3/MM3 (ref 150–450)
PMV BLD AUTO: 9.7 FL (ref 6–12)
POTASSIUM BLD-SCNC: 3.9 MMOL/L (ref 3.5–5.5)
RBC # BLD AUTO: 3.3 10*6/MM3 (ref 4.2–5.76)
SODIUM BLD-SCNC: 139 MMOL/L (ref 132–146)
WBC NRBC COR # BLD: 9.62 10*3/MM3 (ref 3.5–10.8)

## 2017-06-23 PROCEDURE — 99233 SBSQ HOSP IP/OBS HIGH 50: CPT | Performed by: INTERNAL MEDICINE

## 2017-06-23 PROCEDURE — C1894 INTRO/SHEATH, NON-LASER: HCPCS

## 2017-06-23 PROCEDURE — 71010 HC CHEST PA OR AP: CPT

## 2017-06-23 PROCEDURE — 25010000002 MEROPENEM: Performed by: INTERNAL MEDICINE

## 2017-06-23 PROCEDURE — 94799 UNLISTED PULMONARY SVC/PX: CPT

## 2017-06-23 PROCEDURE — 94640 AIRWAY INHALATION TREATMENT: CPT

## 2017-06-23 PROCEDURE — 85025 COMPLETE CBC W/AUTO DIFF WBC: CPT | Performed by: INTERNAL MEDICINE

## 2017-06-23 PROCEDURE — 25010000002 HEPARIN (PORCINE) PER 1000 UNITS: Performed by: INTERNAL MEDICINE

## 2017-06-23 PROCEDURE — 97602 WOUND(S) CARE NON-SELECTIVE: CPT

## 2017-06-23 PROCEDURE — 97116 GAIT TRAINING THERAPY: CPT

## 2017-06-23 PROCEDURE — 4A02X4A MEASUREMENT OF CARDIAC ELECTRICAL ACTIVITY, GUIDANCE, EXTERNAL APPROACH: ICD-10-PCS | Performed by: INTERNAL MEDICINE

## 2017-06-23 PROCEDURE — 80069 RENAL FUNCTION PANEL: CPT | Performed by: INTERNAL MEDICINE

## 2017-06-23 PROCEDURE — 97110 THERAPEUTIC EXERCISES: CPT

## 2017-06-23 PROCEDURE — 97167 OT EVAL HIGH COMPLEX 60 MIN: CPT

## 2017-06-23 PROCEDURE — 83735 ASSAY OF MAGNESIUM: CPT | Performed by: INTERNAL MEDICINE

## 2017-06-23 PROCEDURE — 02HV33Z INSERTION OF INFUSION DEVICE INTO SUPERIOR VENA CAVA, PERCUTANEOUS APPROACH: ICD-10-PCS | Performed by: INTERNAL MEDICINE

## 2017-06-23 PROCEDURE — 94760 N-INVAS EAR/PLS OXIMETRY 1: CPT

## 2017-06-23 PROCEDURE — 99232 SBSQ HOSP IP/OBS MODERATE 35: CPT | Performed by: THORACIC SURGERY (CARDIOTHORACIC VASCULAR SURGERY)

## 2017-06-23 RX ORDER — MORPHINE SULFATE 30 MG/1
30 TABLET, FILM COATED, EXTENDED RELEASE ORAL EVERY 12 HOURS SCHEDULED
Status: DISCONTINUED | OUTPATIENT
Start: 2017-06-23 | End: 2017-06-26 | Stop reason: HOSPADM

## 2017-06-23 RX ORDER — SODIUM CHLORIDE 0.9 % (FLUSH) 0.9 %
10 SYRINGE (ML) INJECTION AS NEEDED
Status: DISCONTINUED | OUTPATIENT
Start: 2017-06-23 | End: 2017-06-26 | Stop reason: HOSPADM

## 2017-06-23 RX ORDER — MORPHINE SULFATE 15 MG/1
15 TABLET, FILM COATED, EXTENDED RELEASE ORAL ONCE
Status: COMPLETED | OUTPATIENT
Start: 2017-06-23 | End: 2017-06-23

## 2017-06-23 RX ADMIN — HEPARIN SODIUM 5000 UNITS: 5000 INJECTION, SOLUTION INTRAVENOUS; SUBCUTANEOUS at 05:49

## 2017-06-23 RX ADMIN — HYDROCODONE BITARTRATE AND ACETAMINOPHEN 1 TABLET: 7.5; 325 TABLET ORAL at 10:08

## 2017-06-23 RX ADMIN — MORPHINE SULFATE 30 MG: 30 TABLET, EXTENDED RELEASE ORAL at 20:18

## 2017-06-23 RX ADMIN — MEROPENEM 1 G: 1 INJECTION, POWDER, FOR SOLUTION INTRAVENOUS at 00:00

## 2017-06-23 RX ADMIN — HEPARIN SODIUM 5000 UNITS: 5000 INJECTION, SOLUTION INTRAVENOUS; SUBCUTANEOUS at 20:19

## 2017-06-23 RX ADMIN — MORPHINE SULFATE 15 MG: 15 TABLET, EXTENDED RELEASE ORAL at 12:31

## 2017-06-23 RX ADMIN — IPRATROPIUM BROMIDE AND ALBUTEROL SULFATE 3 ML: .5; 3 SOLUTION RESPIRATORY (INHALATION) at 16:05

## 2017-06-23 RX ADMIN — IPRATROPIUM BROMIDE AND ALBUTEROL SULFATE 3 ML: .5; 3 SOLUTION RESPIRATORY (INHALATION) at 20:01

## 2017-06-23 RX ADMIN — MORPHINE SULFATE 15 MG: 15 TABLET, EXTENDED RELEASE ORAL at 08:32

## 2017-06-23 RX ADMIN — HYDROCODONE BITARTRATE AND ACETAMINOPHEN 1 TABLET: 7.5; 325 TABLET ORAL at 22:08

## 2017-06-23 RX ADMIN — IPRATROPIUM BROMIDE AND ALBUTEROL SULFATE 3 ML: .5; 3 SOLUTION RESPIRATORY (INHALATION) at 08:20

## 2017-06-23 RX ADMIN — METOPROLOL TARTRATE 50 MG: 50 TABLET, FILM COATED ORAL at 20:19

## 2017-06-23 RX ADMIN — HYDROCODONE BITARTRATE AND ACETAMINOPHEN 1 TABLET: 7.5; 325 TABLET ORAL at 03:14

## 2017-06-23 RX ADMIN — IPRATROPIUM BROMIDE AND ALBUTEROL SULFATE 3 ML: .5; 3 SOLUTION RESPIRATORY (INHALATION) at 12:43

## 2017-06-23 RX ADMIN — MEROPENEM 1 G: 1 INJECTION, POWDER, FOR SOLUTION INTRAVENOUS at 08:34

## 2017-06-23 RX ADMIN — ERTAPENEM SODIUM 1 G: 1 INJECTION, POWDER, LYOPHILIZED, FOR SOLUTION INTRAMUSCULAR; INTRAVENOUS at 13:34

## 2017-06-23 RX ADMIN — HEPARIN SODIUM 5000 UNITS: 5000 INJECTION, SOLUTION INTRAVENOUS; SUBCUTANEOUS at 13:37

## 2017-06-23 RX ADMIN — HYDROCODONE BITARTRATE AND ACETAMINOPHEN 1 TABLET: 7.5; 325 TABLET ORAL at 16:04

## 2017-06-23 RX ADMIN — NICOTINE 1 PATCH: 21 PATCH, EXTENDED RELEASE TRANSDERMAL at 00:02

## 2017-06-23 RX ADMIN — METOPROLOL TARTRATE 50 MG: 50 TABLET, FILM COATED ORAL at 08:32

## 2017-06-23 NOTE — PROGRESS NOTES
CTS Progress Note       LOS: 3 days   Patient Care Team:  No Known Provider as PCP - General  No Known Provider as PCP - Family Medicine    Chief complaint; no new current complaints    Vital Signs  Temp:  [97.6 °F (36.4 °C)-98.2 °F (36.8 °C)] 98.1 °F (36.7 °C)  Heart Rate:  [] 87  Resp:  [14-20] 16  BP: ()/(54-83) 105/60    Physical Exam: Continues with edema of lower extremities but Doppler signals are present and posterior tibial and dorsalis pedis vessels       Results     Results from last 7 days  Lab Units 06/23/17  0334   WBC 10*3/mm3 9.62   HEMOGLOBIN g/dL 10.8*   HEMATOCRIT % 33.4*   PLATELETS 10*3/mm3 222       Results from last 7 days  Lab Units 06/23/17  0334   SODIUM mmol/L 139   POTASSIUM mmol/L 3.9   CHLORIDE mmol/L 115*   TOTAL CO2 mmol/L 25.0   BUN mg/dL 13   CREATININE mg/dL 0.90   GLUCOSE mg/dL 84   CALCIUM mg/dL 8.2*           Imaging Results (last 24 hours)     Procedure Component Value Units Date/Time    XR Chest 1 View [[id number]] Collected:  06/22/17 0959     Updated:  06/22/17 1137    Narrative:       EXAMINATION: XR CHEST 1 VW-      INDICATION: R50.9-Fever, unspecified; L03.115-Cellulitis of right lower  limb; R53.1-Weakness; A41.9-Sepsis, unspecified organism; N28.9-Disorder  of kidney and ureter, unspecified; M62.82-Rhabdomyolysis;  D72.829-Elevated white blood cell count, unspecified; Z74.09-Other  reduced mobility.      FINDINGS: Compared to previous studies of 2 days ago, the chest has  changed for the worse with increasing volume loss in the mid and lower  lung zones. Further, congestive interstitial and alveolar airspace  opacities are identified in both mid and lower lung zones with  consolidation at the bases and basilar effusions. These are all new and  progressive over 2 days. The upper lung zones remain clear and the heart  remains normal.           Impression:       Interval development of consolidative atelectatic airspace  opacities in the mid and lower lung zones  with volume reduction and  abnormal basilar densities with small effusions at the bases, all new  and progressive since 2 days ago.     D:  06/22/2017  E:  06/22/2017     This report was finalized on 6/22/2017 11:35 AM by Dr. Yassine Watson MD.       XR Chest 1 View [[id number]] Updated:  06/23/17 0530          Assessment    Active Problems:    Streptococcal sepsis    Cellulitis of right lower extremity    RAFAEL (acute kidney injury)    PVD (peripheral vascular disease)    Febrile illness  Patient should be a candidate for Unna boot therapy.  Doppler signals are present in the posterior tibial and dorsalis pedis vessels bilaterally.  They are not palpable secondary to edema of the lower extremities.  I have been reluctant to perform a CT and she O based upon patient's elevated serum creatinine levels.  However his levels have returned to a normal range for a few days.  I will order a CT angiogram of the aorta with runoff today to 4 formally evaluate his vasculature      Plan   CT angiogram of the abdominal aorta with complete runoff and 3-D reconstruction this a.m.    Please note that portions of this note were completed with a voice recognition program. Efforts were made to edit the dictations, but occasionally words are mistranscribed.    Mario Moore MD  06/23/17  8:32 AM

## 2017-06-23 NOTE — THERAPY WOUND CARE TREATMENT
Acute Care - Wound/Debridement Treatment Note  [facility]     Patient Name: Yassine Love  : 1945  MRN: [id number]  Today's Date: 2017  Onset of Illness/Injury or Date of Surgery Date: 17   Date of Referral to PT: 17   Referring Physician: Dr Otero       Admit Date: 2017    Visit Dx:    ICD-10-CM ICD-9-CM   1. Febrile illness R50.9 780.60   2. Cellulitis of right lower extremity L03.115 682.6   3. Weakness R53.1 780.79   4. Sepsis, due to unspecified organism A41.9 038.9     995.91   5. Renal insufficiency N28.9 593.9   6. Non-traumatic rhabdomyolysis M62.82 728.88   7. Leukocytosis, unspecified type D72.829 288.60   8. Impaired functional mobility, balance, gait, and endurance Z74.09 V49.89       Patient Active Problem List   Diagnosis   • Streptococcal sepsis   • Cellulitis of right lower extremity   • RAFAEL (acute kidney injury)   • PVD (peripheral vascular disease)   • Febrile illness                 Lymphedema       17 0815          Compression/Skin Care    Bandaging Comments BLE wraps intact with no issues noted to this point. xeroform replaced to toes  -        User Key  (r) = Recorded By, (t) = Taken By, (c) = Cosigned By    Initials Name Provider Type    ALBANIA Hassan, PT Physical Therapist          WOUND DEBRIDEMENT  Debridement Site 1  Location- Site 1: Btoes  Selective Debridement- Site 1: Wound Surface >20cmsq  Instruments- Site 1: other (comment) (4x4s and wash cloth)  Excised Tissue Description- Site 1: minimum, other (comment), slough (dry flaking skin)  Bleeding- Site 1: none                  Adult Rehabilitation Note       17 1025 17 0815 17 1340    Rehab Assessment/Intervention    Discipline physical therapist  -LS physical therapist  -MF physical therapist  -LS    Document Type therapy note (daily note)  -LS therapy note (daily note)  -MF therapy note (daily note)  -LS    Subjective Information agree to therapy;complains of;pain   -LS agree to therapy;complains of;weakness;fatigue;pain  -MF agree to therapy;weakness;pain  -LS    Patient Effort, Rehab Treatment good  -LS  good  -LS    Precautions/Limitations fall precautions;oxygen therapy device and L/min;other (see comments)   hypersensitive BLEs with dec skin integrity  -LS  fall precautions;oxygen therapy device and L/min;other (see comments)   hypersensitive BLEs with noted dec skin integrity  -LS    Recorded by [LS] Zari Rai, PT [MF] Carlos Hassan, PT [LS] Zari Rai, PT    Vital Signs    Pre Systolic BP Rehab 117  -LS  98  -LS    Pre Treatment Diastolic BP 69  -LS  56  -LS    Post Systolic BP Rehab 124  -LS  114  -LS    Post Treatment Diastolic BP 68  -LS  68  -LS    Pretreatment Heart Rate (beats/min) 80  -LS  83  -LS    Posttreatment Heart Rate (beats/min) 77  -LS  83  -LS    Pre SpO2 (%) 93  -LS  96  -LS    O2 Delivery Pre Treatment supplemental O2   3L  -LS  supplemental O2  -LS    Post SpO2 (%) 97  -LS  94  -LS    O2 Delivery Post Treatment supplemental O2  -LS  supplemental O2  -LS    Pre Patient Position Sitting  -LS  Sitting  -LS    Intra Patient Position Standing  -LS  Standing  -LS    Post Patient Position Sitting  -LS  Sitting  -LS    Recorded by [LS] Zari Rai, PT  [LS] Zari Rai, PT    Pain Assessment    Pain Assessment 0-10  -LS Rojas-Fontenot FACES  -MF 0-10  -LS    Rojas-Fontenot FACES Pain Rating  6  -MF     Pain Score 6  -LS  8  -LS    Post Pain Score 6  -LS  8  -LS    Pain Type Chronic pain  -LS  Chronic pain  -LS    Pain Location Knee   and also hips  -LS Foot  -MF Back   and L hip  -LS    Pain Orientation Right;Left  -LS Right;Left  -MF     Pain Intervention(s) Repositioned;Ambulation/increased activity  -LS Repositioned;Medication (See MAR)  -MF Repositioned;Ambulation/increased activity  -LS    Response to Interventions tolerated; RN aware  -LS  tolerated; RN aware  -LS    Recorded by [LS] Zari Rai, PT [MF] Carlos Hassan, PT [LS] Zari MENDOZA  Fredi PT    Cognitive Assessment/Intervention    Current Cognitive/Communication Assessment functional  -LS  functional  -LS    Orientation Status oriented x 4  -LS  oriented x 4  -LS    Follows Commands/Answers Questions able to follow single-step instructions;100% of the time;needs cueing;needs increased time  -LS  able to follow single-step instructions;100% of the time;needs cueing  -LS    Personal Safety mild impairment;decreased awareness, need for safety  -LS  mild impairment;decreased awareness, need for safety;decreased insight to deficits  -LS    Personal Safety Interventions fall prevention program maintained;gait belt;nonskid shoes/slippers when out of bed  -LS  fall prevention program maintained;gait belt;nonskid shoes/slippers when out of bed  -LS    Recorded by [LS] Zari Rai, PT  [LS] Zari Rai, PT    Bed Mobility, Assessment/Treatment    Bed Mobility, Comment [facility] upon arrival.   -LS  [facility] upon arrival.  -LS    Recorded by [LS] Zari Rai, PT  [LS] Zari Rai, PT    Transfer Assessment/Treatment    Transfers, Sit-Stand [place] minimum assist (75% patient effort);2 person assist required;verbal cues required  -LS  contact guard assist;verbal cues required  -LS    Transfers, Stand-Sit [place] minimum assist (75% patient effort);2 person assist required;verbal cues required  -LS  contact guard assist;verbal cues required  -LS    Transfers, Sit-Stand-Sit, Assist Device rolling walker  -LS  rolling walker  -LS    Transfer, Impairments   strength decreased;impaired balance  -LS    Transfer, Comment   VC's for hand placement and weightshifting forward to initiate transfer.   -LS    Recorded by [LS] Zari Rai, PT  [LS] Zari Rai, PT    Gait Assessment/Treatment    Gait, [place] Level contact guard assist;2 person assist required  -LS  contact guard assist;verbal cues required;1 person + 1 person to manage equipment  -LS    Gait, Assistive Device rolling walker  -LS   rolling walker  -LS    Gait, Distance (Feet) 180  -LS  140  -LS    Gait, Gait Deviations vidhi decreased;forward flexed posture;decreased heel strike;step length decreased  -LS  vidhi decreased;forward flexed posture;step length decreased;decreased heel strike  -LS    Gait, Impairments strength decreased;pain  -LS  strength decreased;pain  -LS    Gait, Comment VC's for PLB and increasing step length within RWx. Required 1 brief seated rest break after approx 80 ft of gait due to fatigue.   -LS  VC's for upright posture and pushing RWx forward at steady pace to promote reciprocal gait pattern. Req'd 1 brief sitting rest break due to fatigue (after 70 ft gait).   -LS    Recorded by [LS] Zari Rai, PT  [LS] Zari Rai, PT    Motor Skills/Interventions    Additional Documentation Balance Skills Training (Group)  -LS  Balance Skills Training (Group)  -LS    Recorded by [LS] Zari Rai, PT  [LS] Zari Rai PT    Balance Skills Training    Sitting-Level of Assistance Close supervision  -LS  Close supervision  -LS    Sitting-Balance Support Feet supported  -LS  Feet supported  -LS    Standing-Level of Assistance Contact guard  -LS  Contact guard  -LS    Static Standing Balance Support assistive device  -LS  assistive device  -LS    Gait Balance-Level of Assistance Contact guard;x2  -LS  Contact guard  -LS    Gait Balance Support assistive device  -LS  assistive device  -LS    Recorded by [LS] Zari Rai, PT  [LS] Zari Rai, PT    Therapy Exercises    Bilateral Lower Extremities AROM:;10 reps;ankle pumps/circles;LAQ;hip abduction/adduction  -LS  AROM:;10 reps;sitting;ankle pumps/circles;glut sets;quad sets  -LS    Bilateral Upper Extremity   AROM:;sitting;hand pumps;elbow flexion/extension  -LS    Recorded by [LS] Zari Rai, PT  [LS] Zari Rai, PT    Positioning and Restraints    Pre-Treatment Position sitting in chair/recliner  -LS sitting in chair/recliner  -MF sitting in chair/recliner   -LS    Post Treatment Position chair  -LS chair  -MF chair  -LS    In Chair notified nsg;reclined;call light within reach;encouraged to call for assist;exit alarm on;RUE elevated;LUE elevated;legs elevated;heels elevated  -LS reclined;call light within reach;with nsg  -MF notified nsg;reclined;call light within reach;encouraged to call for assist;exit alarm on;RUE elevated;LUE elevated;legs elevated;heels elevated  -LS    Recorded by [LS] Zari Rai, PT [] Carlos Hassan, PT [LS] Zari Rai, PT      User Key  (r) = Recorded By, (t) = Taken By, (c) = Cosigned By    Initials Name Effective Dates     Carlos Hassan, PT 06/19/15 -     LS Zari Rai, PT 06/19/15 -                 IP PT Goals       06/23/17 1101 06/23/17 0815 06/22/17 1437    Bed Mobility PT LTG    Bed Mobility PT LTG, Outcome goal ongoing  -LS  goal ongoing  -LS    Transfer Training PT LTG    Transfer Training PT LTG, Outcome goal ongoing  -LS  goal ongoing  -LS    Gait Training PT LTG    Gait Training Goal PT LTG, Outcome goal ongoing  -LS  goal ongoing  -LS    Wound Care PT LTG    Wound Care PT LTG 1, Outcome  goal ongoing  -       06/22/17 0815 06/21/17 1445       Bed Mobility PT LTG    Bed Mobility PT LTG, Date Established  06/21/17  -LS     Bed Mobility PT LTG, Time to Achieve  2 wks  -LS     Bed Mobility PT LTG, Activity Type  supine to sit/sit to supine  -LS     Bed Mobility PT LTG, [place] Level  supervision required  -LS     Transfer Training PT LTG    Transfer Training PT LTG, Date Established  06/21/17  -LS     Transfer Training PT LTG, Time to Achieve  2 wks  -LS     Transfer Training PT LTG, Activity Type  sit to stand/stand to sit  -LS     Transfer Training PT LTG, [place] Level  supervision required  -LS     Transfer Training PT LTG, Assist Device  walker, rolling  -LS     Gait Training PT LTG    Gait Training Goal PT LTG, Date Established  06/21/17  -LS     Gait Training Goal PT LTG, Time to Achieve  2 wks   -LS     Gait Training Goal PT LTG, [place] Level  supervision required  -LS     Gait Training Goal PT LTG, Assist Device  walker, rolling  -LS     Gait Training Goal PT LTG, Distance to Achieve  200  -LS     Wound Care PT LTG    Wound Care PT LTG 1, Date Established 06/22/17  -MF      Wound Care PT LTG 1, Time to Achieve other (see comments)   10 days  -MF      Wound Care PT LTG 1, Location BLEs  -MF      Wound Care PT LTG 1, No S&S of Infection yes  -MF      Wound Care PT LTG 1, Decrease Limb Girth % 10  -MF      Wound Care PT LTG 1, No Skin Break Down yes  -MF      Wound Care PT LTG 1, No New Skin Break Down yes  -MF        User Key  (r) = Recorded By, (t) = Taken By, (c) = Cosigned By    Initials Name Provider Type    ALBANIA Hassan, PT Physical Therapist    LS Zari Rai, PT Physical Therapist          Physical Therapy Education     Title: PT OT SLP Therapies (Active)     Topic: Physical Therapy (Active)     Point: Mobility training (Active)    Learning Progress Summary    Learner Readiness Method Response Comment Documented by Status   Patient Acceptance E,D NR   06/23/17 1100 Active    Acceptance E VU   06/23/17 0151 Done    Acceptance E,D NR   06/22/17 1437 Active    Acceptance E,D NR   06/21/17 1445 Active               Point: Home exercise program (Active)    Learning Progress Summary    Learner Readiness Method Response Comment Documented by Status   Patient Acceptance E,D NR   06/23/17 1100 Active    Acceptance E,D NR   06/22/17 1437 Active               Point: Body mechanics (Active)    Learning Progress Summary    Learner Readiness Method Response Comment Documented by Status   Patient Acceptance E,D NR   06/23/17 1100 Active    Acceptance E VU   06/23/17 0151 Done    Acceptance E,D NR   06/22/17 1437 Active    Acceptance E,D NR   06/21/17 1445 Active               Point: Precautions (Active)    Learning Progress Summary    Learner Readiness Method Response Comment  Documented by Status   Patient Acceptance E,D NR  LS 06/23/17 1100 Active    Acceptance E,D NR  LS 06/22/17 1437 Active    Acceptance E,D NR  LS 06/21/17 1445 Active                      User Key     Initials Effective Dates Name Provider Type Discipline    LS 06/19/15 -  Zari Rai, PT Physical Therapist PT    NN 06/16/16 -  Demian De La Torre, RN Registered Nurse Nurse                   PT ASSESSMENT (last 72 hours)      PT Evaluation       06/23/17 1025 06/23/17 1000    Rehab Evaluation    Document Type therapy note (daily note)  -LS     Subjective Information agree to therapy;complains of;pain  -LS     Patient Effort, Rehab Treatment good  -LS     General Information    Precautions/Limitations fall precautions;oxygen therapy device and L/min;other (see comments)   hypersensitive BLEs with dec skin integrity  -LS     Vital Signs    Pre Systolic BP Rehab 117  -LS     Pre Treatment Diastolic BP 69  -LS     Post Systolic BP Rehab 124  -LS     Post Treatment Diastolic BP 68  -LS     Pretreatment Heart Rate (beats/min) 80  -LS     Posttreatment Heart Rate (beats/min) 77  -LS     Pre SpO2 (%) 93  -LS     O2 Delivery Pre Treatment supplemental O2   3L  -LS     Post SpO2 (%) 97  -LS     O2 Delivery Post Treatment supplemental O2  -LS     Pre Patient Position Sitting  -LS     Intra Patient Position Standing  -LS     Post Patient Position Sitting  -LS     Pain Assessment    Pain Assessment 0-10  -LS     Pain Score 6  -LS     Post Pain Score 6  -LS     Pain Type Chronic pain  -LS     Pain Location Knee   and also hips  -LS     Pain Orientation Right;Left  -LS     Pain Intervention(s) Repositioned;Ambulation/increased activity  -LS     Response to Interventions tolerated; RN aware  -LS     Cognitive Assessment/Intervention    Current Cognitive/Communication Assessment functional  -LS     Orientation Status oriented x 4  -LS     Follows Commands/Answers Questions able to follow single-step instructions;100% of the  time;needs cueing;needs increased time  -LS     Personal Safety mild impairment;decreased awareness, need for safety  -LS     Personal Safety Interventions fall prevention program maintained;gait belt;nonskid shoes/slippers when out of bed  -LS     Bed Mobility, Assessment/Treatment    Bed Mobility, Comment [facility] upon arrival.   -LS     Transfer Assessment/Treatment    Transfers, Sit-Stand [place] minimum assist (75% patient effort);2 person assist required;verbal cues required  -LS     Transfers, Stand-Sit [place] minimum assist (75% patient effort);2 person assist required;verbal cues required  -LS     Transfers, Sit-Stand-Sit, Assist Device rolling walker  -LS     Gait Assessment/Treatment    Gait, [place] Level contact guard assist;2 person assist required  -LS     Gait, Assistive Device rolling walker  -LS     Gait, Distance (Feet) 180  -LS     Gait, Gait Deviations ivdhi decreased;forward flexed posture;decreased heel strike;step length decreased  -LS     Gait, Impairments strength decreased;pain  -LS     Gait, Comment VC's for PLB and increasing step length within RWx. Required 1 brief seated rest break after approx 80 ft of gait due to fatigue.   -LS     Motor Skills/Interventions    Additional Documentation Balance Skills Training (Group)  -LS     Balance Skills Training    Sitting-Level of Assistance Close supervision  -LS     Sitting-Balance Support Feet supported  -LS     Standing-Level of Assistance Contact guard  -LS     Static Standing Balance Support assistive device  -LS     Gait Balance-Level of Assistance Contact guard;x2  -LS     Gait Balance Support assistive device  -LS     Therapy Exercises    Bilateral Lower Extremities AROM:;10 reps;ankle pumps/circles;LAQ;hip abduction/adduction  -LS     Sensory Assessment/Intervention    LLE Light Touch  (P)  moderate impairment  -SS    RLE Light Touch  (P)  moderate impairment  -SS    Positioning and Restraints    Pre-Treatment Position  sitting in chair/recliner  -LS     Post Treatment Position chair  -LS     In Chair notified nsg;reclined;call light within reach;encouraged to call for assist;exit alarm on;RUE elevated;LUE elevated;legs elevated;heels elevated  -LS       06/23/17 0815 06/23/17 0800    Rehab Evaluation    Document Type therapy note (daily note)  -     Subjective Information agree to therapy;complains of;weakness;fatigue;pain  -MF     Pain Assessment    Pain Assessment Rojas-Baker FACES  -     Rojas-Baker FACES Pain Rating 6  -     Pain Location Foot  -     Pain Orientation Right;Left  -MF     Pain Intervention(s) Repositioned;Medication (See MAR)  -     Sensory Assessment/Intervention    LLE Light Touch  moderate impairment  -SS    RLE Light Touch  moderate impairment  -SS    Positioning and Restraints    Pre-Treatment Position sitting in chair/recliner  -     Post Treatment Position chair  -MF     In Chair reclined;call light within reach;with nsg  -MF       06/23/17 0600 06/23/17 0400    Sensory Assessment/Intervention    LLE Light Touch moderate impairment  -NN moderate impairment  -NN    RLE Light Touch moderate impairment  -NN moderate impairment  -NN      06/23/17 0200 06/23/17 0000    Sensory Assessment/Intervention    LLE Light Touch moderate impairment  -NN moderate impairment  -NN    RLE Light Touch moderate impairment  -NN moderate impairment  -NN      06/22/17 2200 06/22/17 2000    Sensory Assessment/Intervention    LLE Light Touch moderate impairment  -NN moderate impairment  -NN    RLE Light Touch moderate impairment  -NN moderate impairment  -NN      06/22/17 1340 06/22/17 0815    Rehab Evaluation    Document Type therapy note (daily note)  - therapy note (daily note);evaluation   wound care eval  -    Subjective Information agree to therapy;weakness;pain  -LS agree to therapy;complains of;weakness;fatigue;pain  -MF    Patient Effort, Rehab Treatment good  -LS     General Information    Patient Profile  Review  yes  -    Onset of Illness/Injury or Date of Surgery Date  06/21/17  -    Referring Physician  Dr Otero  -    Pertinent History Of Current Problem  BLE edema with weeping erythema to feet  -    Precautions/Limitations fall precautions;oxygen therapy device and L/min;other (see comments)   hypersensitive BLEs with noted dec skin integrity  -     Plans/Goals Discussed With  patient;agreed upon  -    Risks Reviewed  patient:;increased discomfort  -    Benefits Reviewed  patient:;improve skin integrity  -    Barriers to Rehab  medically complex;previous functional deficit;physical barrier  -    Living Environment    Lives With  alone  -    Living Arrangements  house  -    Clinical Impression    Date of Referral to PT  06/21/17  -    PT Diagnosis  BLE edema with pain and weeping ulcerations.  -    Patient/Family Goals Statement  decrease pain and edema  -    Criteria for Skilled Therapeutic Interventions Met  yes;treatment indicated  -    Rehab Potential  fair, will monitor progress closely  -    Vital Signs    Pre Systolic BP Rehab 98  -LS     Pre Treatment Diastolic BP 56  -LS     Post Systolic BP Rehab 114  -LS     Post Treatment Diastolic BP 68  -LS     Pretreatment Heart Rate (beats/min) 83  -LS     Posttreatment Heart Rate (beats/min) 83  -LS     Pre SpO2 (%) 96  -LS     O2 Delivery Pre Treatment supplemental O2  -LS     Post SpO2 (%) 94  -LS     O2 Delivery Post Treatment supplemental O2  -LS     Pre Patient Position Sitting  -LS     Intra Patient Position Standing  -LS     Post Patient Position Sitting  -LS     Pain Assessment    Pain Assessment 0-10  -LS Rojas-Fontenot FACES  -    Rojas-Baker FACES Pain Rating  6  -MF    Pain Score 8  -LS     Post Pain Score 8  -LS     Pain Type Chronic pain  -LS Chronic pain  -    Pain Location Back   and L hip  -LS Knee  -    Pain Orientation  Right;Left  -    Pain Intervention(s) Repositioned;Ambulation/increased activity  -  Repositioned;Medication (See MAR)  -MF    Response to Interventions tolerated; RN aware  -LS     Cognitive Assessment/Intervention    Current Cognitive/Communication Assessment functional  -LS functional  -MF    Orientation Status oriented x 4  -LS oriented x 4  -MF    Follows Commands/Answers Questions able to follow single-step instructions;100% of the time;needs cueing  -LS     Personal Safety mild impairment;decreased awareness, need for safety;decreased insight to deficits  -LS     Personal Safety Interventions fall prevention program maintained;gait belt;nonskid shoes/slippers when out of bed  -LS     Bed Mobility, Assessment/Treatment    Bed Mobility, Comment [facility] upon arrival.  -LS     Transfer Assessment/Treatment    Transfers, Sit-Stand [place] contact guard assist;verbal cues required  -LS     Transfers, Stand-Sit [place] contact guard assist;verbal cues required  -LS     Transfers, Sit-Stand-Sit, Assist Device rolling walker  -LS     Transfer, Impairments strength decreased;impaired balance  -LS     Transfer, Comment VC's for hand placement and weightshifting forward to initiate transfer.   -LS     Gait Assessment/Treatment    Gait, [place] Level contact guard assist;verbal cues required;1 person + 1 person to manage equipment  -LS     Gait, Assistive Device rolling walker  -LS     Gait, Distance (Feet) 140  -LS     Gait, Gait Deviations vidhi decreased;forward flexed posture;step length decreased;decreased heel strike  -LS     Gait, Impairments strength decreased;pain  -LS     Gait, Comment VC's for upright posture and pushing RWx forward at steady pace to promote reciprocal gait pattern. Req'd 1 brief sitting rest break due to fatigue (after 70 ft gait).   -LS     Motor Skills/Interventions    Additional Documentation Balance Skills Training (Group)  -LS     Balance Skills Training    Sitting-Level of Assistance Close supervision  -LS     Sitting-Balance Support Feet supported  -      Standing-Level of Assistance Contact guard  -LS     Static Standing Balance Support assistive device  -LS     Gait Balance-Level of Assistance Contact guard  -LS     Gait Balance Support assistive device  -LS     Therapy Exercises    Bilateral Lower Extremities AROM:;10 reps;sitting;ankle pumps/circles;glut sets;quad sets  -LS     Bilateral Upper Extremity AROM:;sitting;hand pumps;elbow flexion/extension  -     Positioning and Restraints    Pre-Treatment Position sitting in chair/recliner  - sitting in chair/recliner  -    Post Treatment Position chair  -LS chair  -MF    In Chair notified nsg;reclined;call light within reach;encouraged to call for assist;exit alarm on;RUE elevated;LUE elevated;legs elevated;heels elevated  - reclined;call light within reach  -      06/22/17 0600 06/22/17 0400    Muscle Tone Assessment    Bilateral Upper Extremities Muscle Tone Assessment mildly decreased tone  -CH mildly decreased tone  -CH    Bilateral Lower Extremities Muscle Tone Assessment severely decreased tone  -CH severely decreased tone  -CH      06/22/17 0200 06/22/17 0000    Muscle Tone Assessment    Bilateral Upper Extremities Muscle Tone Assessment mildly decreased tone  -CH mildly decreased tone  -CH    Bilateral Lower Extremities Muscle Tone Assessment severely decreased tone  -CH severely decreased tone  -CH      06/21/17 2200 06/21/17 2000    Muscle Tone Assessment    Bilateral Upper Extremities Muscle Tone Assessment mildly decreased tone  -CH mildly decreased tone  -CH    Bilateral Lower Extremities Muscle Tone Assessment severely decreased tone  -CH severely decreased tone  -CH      06/21/17 1800 06/21/17 1600    Muscle Tone Assessment    Bilateral Upper Extremities Muscle Tone Assessment mildly decreased tone  -NC mildly decreased tone  -NC    Bilateral Lower Extremities Muscle Tone Assessment severely decreased tone  -NC severely decreased tone  -NC      06/21/17 1517 06/21/17 1400    General  Information    Equipment Currently Used at Home walker, rolling;cane, straight;oxygen  -SC     Living Environment    Lives With alone  -SC     Living Arrangements house  -SC     Home Accessibility stairs to enter home  -SC     Number of Stairs to Enter Home 1  -SC     Stair Railings at Home none  -SC     Type of Financial/Environmental Concern none  -SC     Transportation Available family or friend will provide  -SC     Muscle Tone Assessment    Bilateral Upper Extremities Muscle Tone Assessment  mildly decreased tone  -NC    Bilateral Lower Extremities Muscle Tone Assessment  severely decreased tone  -NC      06/21/17 1340 06/21/17 1200    Rehab Evaluation    Document Type evaluation  -LS     Subjective Information agree to therapy;complains of;pain  -LS     Patient Effort, Rehab Treatment good  -LS     General Information    Patient Profile Review yes  -LS     Onset of Illness/Injury or Date of Surgery Date 06/21/17  -     Referring Physician MD Yassine  -     Pertinent History Of Current Problem To ED after  nurse found pt down on floor at home; noted significant LE edema. Found to be septic d/t BLE cellulitis.   -LS     Precautions/Limitations fall precautions;oxygen therapy device and L/min;other (see comments)   hypersensitive BLEs; decreased skin integ  -LS     Prior Level of Function independent:;all household mobility;ADL's;bathing;dressing  -LS     Equipment Currently Used at Home walker, rolling;cane, straight;oxygen  -LS     Plans/Goals Discussed With patient;agreed upon  -     Risks Reviewed patient:;LOB;increased discomfort;dizziness;change in vital signs  -LS     Benefits Reviewed patient:;improve function;increase independence;increase strength;increase balance;increase knowledge;decrease pain  -LS     Barriers to Rehab medically complex  -LS     Living Environment    Lives With alone  -LS     Living Arrangements house  -     Home Accessibility stairs to enter home  -LS     Number of  Stairs to Enter Home 1  -LS     Living Environment Comment Pt reports his son also lives in [place].   -LS     Clinical Impression    Date of Referral to PT 06/21/17  -LS     PT Diagnosis impaired functional mobility, balance, gait  -LS     Patient/Family Goals Statement return to PLOF  -LS     Rehab Potential good, to achieve stated therapy goals  -LS     Vital Signs    Pre Systolic BP Rehab 94  -LS     Pre Treatment Diastolic BP 54  -LS     Post Systolic BP Rehab 107  -LS     Post Treatment Diastolic BP 62  -LS     Pretreatment Heart Rate (beats/min) 80  -LS     Posttreatment Heart Rate (beats/min) 87  -LS     Pre SpO2 (%) 94  -LS     O2 Delivery Pre Treatment supplemental O2   4L  -LS     Post SpO2 (%) 89  -LS     O2 Delivery Post Treatment supplemental O2  -LS     Pre Patient Position Supine  -LS     Intra Patient Position Standing  -LS     Post Patient Position Sitting  -LS     Pain Assessment    Pain Assessment 0-10  -LS     Pain Score 6  -LS     Post Pain Score 6  -LS     Pain Type Chronic pain  -LS     Pain Location Knee   and also chronic LBP  -LS     Pain Orientation Right;Left  -LS     Pain Intervention(s) Ambulation/increased activity;Repositioned;Medication (See MAR)  -LS     Response to Interventions RN aware; tolerated  -LS     ROM (Range of Motion)    General ROM other (see comments)   noted decreased cervical extension (baseline; hx surgery x3)  -LS     MMT (Manual Muscle Testing)    General MMT Assessment lower extremity strength deficits identified;upper extremity strength deficits identified  -LS     Upper Extremity    Upper Ext Manual Muscle Testing Detail BUE grossly 4-/5  -LS     Lower Extremity    Lower Ext Manual Muscle Testing Detail BLEs grossly 4-/5 as demonstrated functionally; formal MMT deferred due to LE sensitivity today  -LS     Muscle Tone Assessment    Bilateral Upper Extremities Muscle Tone Assessment  mildly decreased tone  -NC    Bilateral Lower Extremities Muscle Tone  Assessment  severely decreased tone  -NC    Bed Mobility, Assessment/Treatment    Bed Mobility, Assistive Device draw sheet;head of bed elevated  -LS     Bed Mob, Supine to Sit, Kirby moderate assist (50% patient effort);2 person assist required;verbal cues required  -LS     Bed Mob, Sit to Supine, Kirby not tested  -LS     Bed Mobility, Impairments pain;strength decreased  -LS     Bed Mobility, Comment VC's for sequencing.   -LS     Transfer Assessment/Treatment    Transfers, Sit-Stand Kirby contact guard assist;verbal cues required  -LS     Transfers, Stand-Sit Kirby contact guard assist;verbal cues required  -LS     Transfers, Sit-Stand-Sit, Assist Device rolling walker  -LS     Transfer, Impairments strength decreased;pain  -LS     Transfer, Comment VC's for appropriate hand placement.   -LS     Gait Assessment/Treatment    Gait, Kirby Level minimum assist (75% patient effort);verbal cues required;1 person + 1 person to manage equipment  -LS     Gait, Assistive Device rolling walker  -LS     Gait, Distance (Feet) 105  -LS     Gait, Gait Deviations vidhi decreased;forward flexed posture;step length decreased;decreased heel strike  -LS     Gait, Impairments pain;strength decreased;impaired balance  -LS     Gait, Comment VC's for posture and taking steps within RWx. Followed with recliner for safety. Distance limited by fatigue.   -LS     Motor Skills/Interventions    Additional Documentation Balance Skills Training (Group)  -LS     Balance Skills Training    Sitting-Level of Assistance Contact guard  -LS     Sitting-Balance Support Feet supported  -LS     Standing-Level of Assistance Contact guard  -LS     Static Standing Balance Support assistive device  -LS     Gait Balance-Level of Assistance Minimum assistance  -LS     Gait Balance Support assistive device  -LS     Therapy Exercises    Bilateral Lower Extremities AROM:;10 reps;supine;ankle pumps/circles  -LS     Sensory  Assessment/Intervention    Sensory Impairment hypersensitivity  -LS     Light Touch RLE;LLE  -LS     LLE Light Touch moderate impairment  -LS     RLE Light Touch moderate impairment  -LS     Positioning and Restraints    Pre-Treatment Position in bed  -LS     Post Treatment Position chair  -LS     In Chair notified nsg;reclined;call light within reach;encouraged to call for assist;exit alarm on;legs elevated;waffle cushion;heels elevated;LUE elevated;RUE elevated  -LS       06/21/17 1000 06/21/17 0755    Muscle Tone Assessment    Bilateral Upper Extremities Muscle Tone Assessment mildly decreased tone  -NC mildly decreased tone  -NC    Bilateral Lower Extremities Muscle Tone Assessment severely decreased tone  -NC severely decreased tone  -NC      06/21/17 0600 06/21/17 0400    Muscle Tone Assessment    Muscle Tone Assessment Bilateral Upper Extremities;Bilateral Lower Extremities  -CH Bilateral Upper Extremities;Bilateral Lower Extremities  -CH    Bilateral Upper Extremities Muscle Tone Assessment mildly decreased tone  -CH mildly decreased tone  -CH    Bilateral Lower Extremities Muscle Tone Assessment severely decreased tone  -CH severely decreased tone  -CH      06/21/17 0200 06/21/17 0007    Living Environment    Lives With  alone  -DJ    Living Arrangements  house  -DJ    Home Accessibility  no concerns  -DJ    Stair Railings at Home  none  -DJ    Type of Financial/Environmental Concern  none  -DJ    Transportation Available  family or friend will provide  -DJ    Muscle Tone Assessment    Muscle Tone Assessment Bilateral Upper Extremities;Bilateral Lower Extremities  -CH     Bilateral Upper Extremities Muscle Tone Assessment mildly decreased tone  -CH     Bilateral Lower Extremities Muscle Tone Assessment severely decreased tone  -CH       06/21/17 0000 06/20/17 5444    General Information    Equipment Currently Used at Home  cane, quad;oxygen;walker, standard;shower chair  -DJ    Muscle Tone Assessment     Muscle Tone Assessment Bilateral Upper Extremities;Bilateral Lower Extremities  -CH     Bilateral Upper Extremities Muscle Tone Assessment mildly decreased tone  -CH     Bilateral Lower Extremities Muscle Tone Assessment severely decreased tone  -CH       User Key  (r) = Recorded By, (t) = Taken By, (c) = Cosigned By    Initials Name Provider Type    LABANIA Hassan, PT Physical Therapist    LS Zari Rai, PT Physical Therapist    ASH Palma, RN Registered Nurse    MARY Sanchez, RN Registered Nurse    AJAY Cornell, RN Registered Nurse    STEVE Lackey, NAHID Case Manager    NN Demian De La Torre, RN Registered Nurse    SS Perla Jones, RN Registered Nurse            PT Recommendation and Plan  Anticipated Discharge Disposition: skilled nursing facility  Demonstrates Need for Referral to Another Service: occupational therapy  PT Frequency: daily    Plan Of Care Reviewed With: patient       Outcome Summary/Follow up Plan: BLE wraps intact, PT changed toe dressings with light mechanical debridement to help decrease bioburden and improve healing potential.           Outcome Measures       06/23/17 1025 06/22/17 1340 06/21/17 1340    How much help from another person do you currently need...    Turning from your back to your side while in flat bed without using bedrails? 3  -LS 3  -LS 3  -LS    Moving from lying on back to sitting on the side of a flat bed without bedrails? 2  -LS 2  -LS 2  -LS    Moving to and from a bed to a chair (including a wheelchair)? 3  -LS 3  -LS 3  -LS    Standing up from a chair using your arms (e.g., wheelchair, bedside chair)? 3  -LS 3  -LS 3  -LS    Climbing 3-5 steps with a railing? 2  -LS 1  -LS 1  -LS    To walk in hospital room? 3  -LS 3  -LS 3  -LS    AM-PAC 6 Clicks Score 16  -LS 15  -LS 15  -LS    Functional Assessment    Outcome Measure Options AM-PAC 6 Clicks Basic Mobility (PT)  -LS AM-PAC 6 Clicks Basic Mobility (PT)  -LS AM-PAC 6 Clicks Basic  Mobility (PT)  -LS      User Key  (r) = Recorded By, (t) = Taken By, (c) = Cosigned By    Initials Name Provider Type    LS Zari Rai, PT Physical Therapist              Time Calculation        PT Charges       06/23/17 1103 06/23/17 0815       Time Calculation    Start Time 1025  -LS 0815  -     PT Received On 06/23/17  -      PT Goal Re-Cert Due Date 07/01/17  -LS 07/01/17  -MF     Time Calculation- PT    Total Timed Code Minutes- PT 25 minute(s)  -LS 20 minute(s)  -       User Key  (r) = Recorded By, (t) = Taken By, (c) = Cosigned By    Initials Name Provider Type     Carlos Hassan, PT Physical Therapist     Zari Rai, PT Physical Therapist             Therapy Charges for Today     Code Description Service Date Service Provider Modifiers Qty    37164267541 HC PT MULTI LAYER COMP SYS BELOW KNEE 6/22/2017 Carlos Hassan, PT GP 1    79109911401 HC NONSELECTIVE DEBRIDEMENT 6/22/2017 Carlos Hassan, PT GP 1    [id number] HC NONSELECTIVE DEBRIDEMENT 6/23/2017 Carlos Hassan, PT GP 1            PT G-Codes  Outcome Measure Options: AM-PAC 6 Clicks Basic Mobility (PT)        Carlos Hassan, PT  6/23/2017

## 2017-06-23 NOTE — THERAPY EVALUATION
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     Topic: Occupational Therapy (Active)     Point: Home exercise program (Done)    Description: Instruct learner(s) on appropriate technique for monitoring, assisting and/or progressing therapeutic exercises/activities.    Learning Progress Summary    Learner Readiness Method Response Comment Documented by Status   Patient Acceptance E,D VU,NR Body mechanics with fxl transfers; BUE HEP initiated; reinforced need for call for assist with OOB activities TA 06/23/17 1129 Done               Point: Precautions (Done)    Description: Instruct learner(s) on prescribed precautions during self-care and functional transfers.    Learning Progress Summary    Learner Readiness Method Response Comment Documented by Status   Patient Acceptance E,D VU,NR Body mechanics with fxl transfers; BUE HEP initiated; reinforced need for call for assist with OOB activities TA 06/23/17 1129 Done               Point: Body mechanics (Done)    Description: Instruct learner(s) on proper positioning and spine alignment during self-care, functional mobility activities and/or exercises.    Learning Progress Summary    Learner Readiness Method Response Comment Documented by Status   Patient  Acceptance E,D VU,NR Body mechanics with fxl transfers; BUE HEP initiated; reinforced need for call for assist with OOB activities TA 06/23/17 1129 Done                      User Key     Initials Effective Dates Name Provider Type Discipline     03/14/16 -  Nehemias Aponte OT Occupational Therapist OT                  OT Recommendation and Plan  Anticipated Equipment Needs At Discharge:  (AE TBD)  Anticipated Discharge Disposition: skilled nursing facility  Planned Therapy Interventions: activity intolerance, adaptive equipment training, ADL retraining, balance training, bed mobility training, home exercise program, ROM (Range of Motion), strengthening, transfer training  Therapy Frequency: daily (Per priority policy)  Plan of Care Review  Plan Of Care Reviewed With: patient  Outcome Summary/Follow up Plan: Pt limited by chronic joint pain; fxl decline from PLOF, deficits in ADL performance, fxl mobility, occupational endurance; will benefit from skilled OT services to address deficits, facilitate increased fxl I, safe transition to next level of care. Recommend SNF for rehab at discharge.          OT Goals       06/23/17 1131          Bed Mobility OT LTG    Bed Mobility OT LTG, Date Established 06/23/17  -TA      Bed Mobility OT LTG, Time to Achieve 1 wk  -TA      Bed Mobility OT LTG, Activity Type supine to sit/sit to supine  -TA      Bed Mobility OT LTG, [place] Level supervision required;verbal cues required  -TA      Bed Mobility OT LTG, Outcome goal ongoing  -TA      Transfer Training OT LTG    Transfer Training OT LTG, Date Established 06/23/17  -TA      Transfer Training OT LTG, Time to Achieve 1 wk  -TA      Transfer Training OT LTG, Activity Type bed to chair /chair to bed;sit to stand/stand to sit;toilet  -TA      Transfer Training OT LTG, [place] Level supervision required;verbal cues required  -TA      Transfer Training OT LTG, Assist Device walker, rolling  -TA      Transfer Training OT  LTG, Outcome goal ongoing  -TA      Patient Education OT LTG    Patient Education OT LTG, Date Established 06/23/17  -TA      Patient Education OT LTG, Time to Achieve 1 wk  -TA      Patient Education OT LTG, Education Type HEP;adaptive equipment mgmt;adaptive breathing;energy conservation  -TA      Patient Education OT LTG, Education Understanding verbalizes understanding;demonstrates adequately  -TA      Patient Education OT LTG Outcome goal ongoing  -TA      Toileting OT LTG    Toileting Goal OT LTG, Date Established 06/23/17  -TA      Toileting Goal OT LTG, Time to Achieve 1 wk  -TA      Toileting Goal OT LTG, [place] Level minimum assist (75% patient effort)  -TA      Toileting Goal OT LTG, Outcome goal ongoing  -TA      LB Dressing OT LTG    LB Dressing Goal OT LTG, Date Established 06/23/17  -TA      LB Dressing Goal OT LTG, Time to Achieve 1 wk  -TA      LB Dressing Goal OT LTG, [place] Level minimum assist (75% patient effort)  -TA      LB Dressing Goal OT LTG, Adaptive Equipment --   AE PRN  -TA      LB Dressing Goal OT LTG, Outcome goal ongoing  -TA        User Key  (r) = Recorded By, (t) = Taken By, (c) = Cosigned By    Initials Name Provider Type    MONIQUE Aponte, RAOUL Occupational Therapist                Outcome Measures       06/23/17 1025 06/23/17 1017 06/22/17 1340    How much help from another person do you currently need...    Turning from your back to your side while in flat bed without using bedrails? 3  -LS  3  -LS    Moving from lying on back to sitting on the side of a flat bed without bedrails? 2  -LS  2  -LS    Moving to and from a bed to a chair (including a wheelchair)? 3  -LS  3  -LS    Standing up from a chair using your arms (e.g., wheelchair, bedside chair)? 3  -LS  3  -LS    Climbing 3-5 steps with a railing? 2  -LS  1  -LS    To walk in hospital room? 3  -LS  3  -LS    AM-PAC 6 Clicks Score 16  -LS  15  -LS    How much help from another is currently needed...     Putting on and taking off regular lower body clothing?  2  -TA     Bathing (including washing, rinsing, and drying)  2  -TA     Toileting (which includes using toilet bed pan or urinal)  2  -TA     Putting on and taking off regular upper body clothing  3  -TA     Taking care of personal grooming (such as brushing teeth)  3  -TA     Eating meals  4  -TA     Score  16  -TA     Functional Assessment    Outcome Measure Options AM-PAC 6 Clicks Basic Mobility (PT)  -LS AM-PAC 6 Clicks Daily Activity (OT)  -TA AM-PAC 6 Clicks Basic Mobility (PT)  -LS      06/21/17 1340          How much help from another person do you currently need...    Turning from your back to your side while in flat bed without using bedrails? 3  -LS      Moving from lying on back to sitting on the side of a flat bed without bedrails? 2  -LS      Moving to and from a bed to a chair (including a wheelchair)? 3  -LS      Standing up from a chair using your arms (e.g., wheelchair, bedside chair)? 3  -LS      Climbing 3-5 steps with a railing? 1  -LS      To walk in hospital room? 3  -LS      AM-PAC 6 Clicks Score 15  -LS      Functional Assessment    Outcome Measure Options AM-PAC 6 Clicks Basic Mobility (PT)  -LS        User Key  (r) = Recorded By, (t) = Taken By, (c) = Cosigned By    Initials Name Provider Type    LS Zari Rai, PT Physical Therapist    TA Nehemias Aponte OT Occupational Therapist          Time Calculation:   OT Start Time: 1017 (ttc 0 minutes)    Therapy Charges for Today     Code Description Service Date Service Provider Modifiers Qty    [id number]  OT EVAL HIGH COMPLEXITY 4 6/23/2017 Nehemias Aponte OT GO 1               Nehemias Aponte OT  6/23/2017

## 2017-06-23 NOTE — PLAN OF CARE
Problem: Patient Care Overview (Adult)  Goal: Plan of Care Review  Outcome: Ongoing (interventions implemented as appropriate)    06/23/17 1101   Coping/Psychosocial Response Interventions   Plan Of Care Reviewed With patient   Patient Care Overview   Progress improving   Outcome Evaluation   Outcome Summary/Follow up Plan Pt progressed forward ambulation distance to 180 total ft (seated rest after 80 ft) with use of RWx and 3L O2. Gave good effort with progression of LE ther ex; cont to be limited by c/o pain. Will cont PT POC for progression towards goals.          Problem: Inpatient Physical Therapy  Goal: Bed Mobility Goal LTG- PT  Outcome: Ongoing (interventions implemented as appropriate)    06/21/17 1445 06/23/17 1101   Bed Mobility PT LTG   Bed Mobility PT LTG, Date Established 06/21/17 --    Bed Mobility PT LTG, Time to Achieve 2 wks --    Bed Mobility PT LTG, Activity Type supine to sit/sit to supine --    Bed Mobility PT LTG, [place] Level supervision required --    Bed Mobility PT LTG, Outcome --  goal ongoing       Goal: Transfer Training Goal 1 LTG- PT  Outcome: Ongoing (interventions implemented as appropriate)    06/21/17 1445 06/23/17 1101   Transfer Training PT LTG   Transfer Training PT LTG, Date Established 06/21/17 --    Transfer Training PT LTG, Time to Achieve 2 wks --    Transfer Training PT LTG, Activity Type sit to stand/stand to sit --    Transfer Training PT LTG, [place] Level supervision required --    Transfer Training PT LTG, Assist Device walker, rolling --    Transfer Training PT LTG, Outcome --  goal ongoing       Goal: Gait Training Goal LTG- PT  Outcome: Ongoing (interventions implemented as appropriate)    06/21/17 1445 06/23/17 1101   Gait Training PT LTG   Gait Training Goal PT LTG, Date Established 06/21/17 --    Gait Training Goal PT LTG, Time to Achieve 2 wks --    Gait Training Goal PT LTG, [place] Level supervision required --    Gait Training Goal PT LTG,  Assist Device walker, rolling --    Gait Training Goal PT LTG, Distance to Achieve 200 --    Gait Training Goal PT LTG, Outcome --  goal ongoing

## 2017-06-23 NOTE — PROGRESS NOTES
Continued Stay Note  [facility]     Patient Name: Yassine Love  MRN: [id number]  Today's Date: 6/23/2017    Admit Date: 6/20/2017          Discharge Plan       06/23/17 1425    Case Management/Social Work Plan    Plan update    Additional Comments Sierra Yeager at [facility] has a bed available for pt when he is medically ready. CM will cont. to follow.      06/23/17 1241    Case Management/Social Work Plan    Additional Comments Referral received from [facility] for IV antibiotics at their office after discharge.  Spoke with Dr. Duncan regarding at this time patient could benefit from short term rehab before he returns home, where he lives alone.  Dr. Duncan states that office is aware it is a possibility that patient will improve enough before discharge to go home or that patient will end up refusing rehab and will then need to go to their office for daily IV antibiotics.  Consult faxed to office as requested at [phone].  CM will continue to follow.              Discharge Codes     None        Expected Discharge Date and Time     Expected Discharge Date Expected Discharge Time    Jun 26, 2017         [facility] will be able to accept pt to [facility] when he is medically ready for transfer.    Sulma Patel RN
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
## 2017-06-23 NOTE — PLAN OF CARE
Problem: Patient Care Overview (Adult)  Goal: Plan of Care Review  Outcome: Ongoing (interventions implemented as appropriate)    06/23/17 1131   Coping/Psychosocial Response Interventions   Plan Of Care Reviewed With patient   Outcome Evaluation   Outcome Summary/Follow up Plan Pt limited by chronic joint pain; fxl decline from PLOF, deficits in ADL performance, fxl mobility, occupational endurance; will benefit from skilled OT services to address deficits, facilitate increased fxl I, safe transition to next level of care. Recommend SNF for rehab at discharge.         Problem: Inpatient Occupational Therapy  Goal: Bed Mobility Goal LTG- OT  Outcome: Ongoing (interventions implemented as appropriate)    06/23/17 1131   Bed Mobility OT LTG   Bed Mobility OT LTG, Date Established 06/23/17   Bed Mobility OT LTG, Time to Achieve 1 wk   Bed Mobility OT LTG, Activity Type supine to sit/sit to supine   Bed Mobility OT LTG, [place] Level supervision required;verbal cues required   Bed Mobility OT LTG, Outcome goal ongoing       Goal: Transfer Training Goal 1 LTG- OT  Outcome: Ongoing (interventions implemented as appropriate)    06/23/17 1131   Transfer Training OT LTG   Transfer Training OT LTG, Date Established 06/23/17   Transfer Training OT LTG, Time to Achieve 1 wk   Transfer Training OT LTG, Activity Type bed to chair /chair to bed;sit to stand/stand to sit;toilet   Transfer Training OT LTG, [place] Level supervision required;verbal cues required   Transfer Training OT LTG, Assist Device walker, rolling   Transfer Training OT LTG, Outcome goal ongoing       Goal: Patient Education Goal LTG- OT  Outcome: Ongoing (interventions implemented as appropriate)    06/23/17 1131   Patient Education OT LTG   Patient Education OT LTG, Date Established 06/23/17   Patient Education OT LTG, Time to Achieve 1 wk   Patient Education OT LTG, Education Type HEP;adaptive equipment mgmt;adaptive breathing;energy conservation    Patient Education OT LTG, Education Understanding verbalizes understanding;demonstrates adequately   Patient Education OT LTG Outcome goal ongoing       Goal: Toileting Goal LTG- OT  Outcome: Ongoing (interventions implemented as appropriate)    06/23/17 1131   Toileting OT LTG   Toileting Goal OT LTG, Date Established 06/23/17   Toileting Goal OT LTG, Time to Achieve 1 wk   Toileting Goal OT LTG, [place] Level minimum assist (75% patient effort)   Toileting Goal OT LTG, Outcome goal ongoing       Goal: LB Dressing Goal LTG- OT  Outcome: Ongoing (interventions implemented as appropriate)    06/23/17 1131   LB Dressing OT LTG   LB Dressing Goal OT LTG, Date Established 06/23/17   LB Dressing Goal OT LTG, Time to Achieve 1 wk   LB Dressing Goal OT LTG, [place] Level minimum assist (75% patient effort)   LB Dressing Goal OT LTG, Adaptive Equipment (AE PRN)   LB Dressing Goal OT LTG, Outcome goal ongoing

## 2017-06-23 NOTE — PATIENT CARE CONFERENCE
ICU ROUNDS: OT consult pending. Cont PT mobility and wound care POC. Scheduled to undergo CT ang of LEs today. D/c disposition is SNF at this time.

## 2017-06-23 NOTE — PROGRESS NOTES
"INTENSIVIST NOTE     Hospital Day: 3      Mr. Yassine Love, 71 y.o. male is followed for:   <principal problem not specified>       SUBJECTIVE     71 y.o. male who was found on the floor by his home health nurse. He reports that he slid down to the floor and could not get up. He thinks he laid there for approximately 30 minutes. His temperature was 102. He was brought to the emergency room where his systolic blood pressure was 78. It did improve with volume resuscitation. He has edema and erythema of his lower extremities, right greater than left. He reports his legs have been swelling for years, \"up and down\". He is currently taking doxycycline. He is minimally ambulatory with a walker. He denies loss of consciousness. He has a chronic cough productive of mucus. He denies chest pain. One year ago approximately he was hospitalized at [facility] for cellulitis of the right leg. He reports poor circulation to his lower extremities.  He was admitted to ICU with a diagnosis of sepsis, likely from lower extremity cellulitis and acute kidney injury.    Interval history:    Continues to complain of back and leg pain despite MS Contin and as needed Percocet    The patient's relevant past medical, surgical and social history were reviewed and updated in Epic as appropriate.       OBJECTIVE     Vital Sign Min/Max for last 24 hours  Temp  Min: 97.5 °F (36.4 °C)  Max: 98.2 °F (36.8 °C)   BP  Min: 93/54  Max: 138/83   Pulse  Min: 61  Max: 108   Resp  Min: 14  Max: 18   SpO2  Min: 91 %  Max: 100 %   Flow (L/min)  Min: 2  Max: 4   No Data Recorded      Intake/Output Summary (Last 24 hours) at 06/23/17 1630  Last data filed at 06/23/17 1200   Gross per 24 hour   Intake              460 ml   Output             1225 ml   Net             -765 ml      Flowsheet Rows         First Filed Value    Admission Height  70\" (177.8 cm) Documented at 06/20/2017 1900    Admission Weight  170 lb (77.1 kg) Documented at 06/20/2017 1900         " "Last 3 weights    06/20/17  1900 06/20/17  2345 06/21/17  0953   Weight: 170 lb (77.1 kg) 188 lb 15 oz (85.7 kg) 188 lb (85.3 kg)            Objective:  General Appearance:  In no acute distress and ill-appearing.    Vital signs: (most recent): Blood pressure 113/82, pulse 89, temperature 97.5 °F (36.4 °C), temperature source Axillary, resp. rate 18, height 70\" (177.8 cm), weight 188 lb (85.3 kg), SpO2 91 %.    HEENT: Normal HEENT exam.    Lungs:  Normal respiratory rate and normal effort.  He is not in respiratory distress.  There are decreased breath sounds.  No wheezes, rales or rhonchi.    Heart: Normal rate.  Regular rhythm.  S1 normal and S2 normal.  No murmur, gallop or friction rub.   Chest: Symmetric chest wall expansion.   Abdomen: Abdomen is soft and non-distended.  Bowel sounds are normal.   There is no abdominal tenderness.   There is no mass. There is no splenomegaly. There is no hepatomegaly.   Extremities: (Chronic edema that is severe with erythema.  Absence of right great toenail)  Neurological: Patient is alert and oriented to person, place and time.    Pupils:  Pupils are equal, round, and reactive to light.    Skin:  Warm and dry.              I reviewed the patient's new clinical results.  I reviewed the patient's new imaging results/reports including actual images and agree with reports.      Chest X-Ray:  Bibasilar infiltrates    INFUSIONS    Pharmacy Consult     sodium chloride 10 mL/hr Last Rate: 10 mL/hr (06/22/17 1743)         Results from last 7 days  Lab Units 06/23/17  0334 06/22/17 0432 06/21/17  0434   WBC 10*3/mm3 9.62 14.15* 30.01*   HEMOGLOBIN g/dL 10.8* 11.4* 12.4*   HEMATOCRIT % 33.4* 35.5* 38.1*   PLATELETS 10*3/mm3 222 226 265       Results from last 7 days  Lab Units 06/23/17  0334 06/22/17  0432 06/21/17  0434   SODIUM mmol/L 139 138 141   POTASSIUM mmol/L 3.9 3.6 4.7   CHLORIDE mmol/L 115* 113* 111*   TOTAL CO2 mmol/L 25.0 28.0 29.0   BUN mg/dL 13 17 21   GLUCOSE mg/dL 84 " 99 111*   CREATININE mg/dL 0.90 1.00 1.40*   MAGNESIUM mg/dL 2.2 2.2 2.1   CALCIUM mg/dL 8.2* 8.1* 7.8*           Results from last 7 days  Lab Units 06/21/17  0406   PH, ARTERIAL pH units 7.450   PCO2, ARTERIAL mm Hg 38.7   PO2 ART mm Hg 54.8*   FIO2 % 28         [facility]      I reviewed the patient's medications.    Assessment/Plan   ASSESSMENT      Hospital Problem List     Streptococcal sepsis    Cellulitis of right lower extremity    RAFAEL (acute kidney injury)    PVD (peripheral vascular disease)    Febrile illness            Beta-hemolytic strep and blood presumably due to cellulitis/toe infection.  Clinically improved and is not requiring pressors and renal function has returned to baseline. Okay for telemetry transfer.          PLAN     -Dr. Moore has ordered a lower extremity CT angiogram as his renal function has improved  -Antibiotics per ID  -Monitor labs and renal function   -Still having pain so we will increase his MS Contin to his home doses.  -Telemetry transfer           I discussed the patient's findings and my recommendations with patient and nursing staff     Plan of care and goals reviewed with mulitdisciplinary team at daily rounds.    Giles Metzger MD  Pulmonary and Critical Care Medicine  06/23/17 4:30 PM

## 2017-06-23 NOTE — DISCHARGE PLACEMENT REQUEST
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 a voice recognition program. Efforts were made to edit the dictations, but occasionally words are mistranscribed.    Mario Moore MD  06/23/17  8:32 AM         Electronically signed by Mario Moore MD at 6/23/2017  8:34 AM           Physical Therapy Notes (most recent note)      Zari Rai, PT at 6/23/2017  9:47 AM  Version 1 of 1         ICU ROUNDS: OT consult pending. Cont PT mobility and wound care POC. Scheduled to undergo CT ang of LEs today. D/c disposition is SNF at this time.      Electronically signed by Zari Rai PT at 6/23/2017  9:48 AM        Occupational Therapy Notes (most recent note)     No notes of this type exist for this encounter.

## 2017-06-23 NOTE — PROGRESS NOTES
Continued Stay Note  [facility]     Patient Name: Yassine Love  MRN: [id number]  Today's Date: 6/23/2017    Admit Date: 6/20/2017          Discharge Plan       06/23/17 1114    Case Management/Social Work Plan    Additional Comments Referral given to Zohra with [facility].  Spoke with Brea and  faxed referral to [facility].  CM will continue to follow.              Discharge Codes     None        Expected Discharge Date and Time     Expected Discharge Date Expected Discharge Time    Jun 26, 2017             Nory Lackey RN

## 2017-06-23 NOTE — PLAN OF CARE
Problem: Patient Care Overview (Adult)  Goal: Plan of Care Review  Outcome: Ongoing (interventions implemented as appropriate)    06/23/17 0427   Coping/Psychosocial Response Interventions   Plan Of Care Reviewed With patient   Patient Care Overview   Progress no change   Outcome Evaluation   Outcome Summary/Follow up Plan Pt in chair all shift. Pt reports normally sleeps in chair at home. Ambulated briefly this evening. Pain control continues to be problematic. Pt never appears to be completely comfortable. Recieved positive blood culture Gram + bacillus, pt already on vancomycin. Slated for evaluation for PICC line today.         Problem: Sepsis (Adult)  Goal: Signs and Symptoms of Listed Potential Problems Will be Absent or Manageable (Sepsis)  Outcome: Ongoing (interventions implemented as appropriate)    06/23/17 0427   Sepsis   Problems Assessed (Sepsis) all   Problems Present (Sepsis) hypoperfusion/hemodynamic instability;hypoxia/hypoxemia;progression of infection         Problem: Fall Risk (Adult)  Goal: Absence of Falls  Outcome: Ongoing (interventions implemented as appropriate)    06/23/17 0427   Fall Risk (Adult)   Absence of Falls making progress toward outcome         Problem: Pressure Ulcer (Adult)  Goal: Signs and Symptoms of Listed Potential Problems Will be Absent or Manageable (Pressure Ulcer)  Outcome: Ongoing (interventions implemented as appropriate)    06/23/17 0427   Pressure Ulcer   Problems Assessed (Pressure Ulcer) all   Problems Present (Pressure Ulcer) pain;wound complications;infection;wound progression/extension

## 2017-06-23 NOTE — THERAPY TREATMENT NOTE
Acute Care - Physical Therapy Treatment Note  [facility]     Patient Name: Yassine Love  : 1945  MRN: [id number]  Today's Date: 2017  Onset of Illness/Injury or Date of Surgery Date: 17  Date of Referral to PT: 17  Referring Physician: Dr Otero    Admit Date: 2017    Visit Dx:    ICD-10-CM ICD-9-CM   1. Febrile illness R50.9 780.60   2. Cellulitis of right lower extremity L03.115 682.6   3. Weakness R53.1 780.79   4. Sepsis, due to unspecified organism A41.9 038.9     995.91   5. Renal insufficiency N28.9 593.9   6. Non-traumatic rhabdomyolysis M62.82 728.88   7. Leukocytosis, unspecified type D72.829 288.60   8. Impaired functional mobility, balance, gait, and endurance Z74.09 V49.89     Patient Active Problem List   Diagnosis   • Streptococcal sepsis   • Cellulitis of right lower extremity   • RAFAEL (acute kidney injury)   • PVD (peripheral vascular disease)   • Febrile illness               Adult Rehabilitation Note       17 1025 17 1340       Rehab Assessment/Intervention    Discipline physical therapist  -LS physical therapist  -LS     Document Type therapy note (daily note)  -LS therapy note (daily note)  -LS     Subjective Information agree to therapy;complains of;pain  -LS agree to therapy;weakness;pain  -LS     Patient Effort, Rehab Treatment good  -LS good  -LS     Precautions/Limitations fall precautions;oxygen therapy device and L/min;other (see comments)   hypersensitive BLEs with dec skin integrity  -LS fall precautions;oxygen therapy device and L/min;other (see comments)   hypersensitive BLEs with noted dec skin integrity  -LS     Recorded by [LS] Zari Rai, PT [LS] Zari Rai, PT     Vital Signs    Pre Systolic BP Rehab 117  -LS 98  -LS     Pre Treatment Diastolic BP 69  -LS 56  -LS     Post Systolic BP Rehab 124  -  -LS     Post Treatment Diastolic BP 68  -LS 68  -LS     Pretreatment Heart Rate (beats/min) 80  -LS 83  -LS     Posttreatment  Heart Rate (beats/min) 77  -LS 83  -LS     Pre SpO2 (%) 93  -LS 96  -LS     O2 Delivery Pre Treatment supplemental O2   3L  -LS supplemental O2  -LS     Post SpO2 (%) 97  -LS 94  -LS     O2 Delivery Post Treatment supplemental O2  -LS supplemental O2  -LS     Pre Patient Position Sitting  -LS Sitting  -LS     Intra Patient Position Standing  -LS Standing  -LS     Post Patient Position Sitting  -LS Sitting  -LS     Recorded by [LS] Zari Rai, PT [LS] Zari Rai, PT     Pain Assessment    Pain Assessment 0-10  -LS 0-10  -LS     Pain Score 6  -LS 8  -LS     Post Pain Score 6  -LS 8  -LS     Pain Type Chronic pain  -LS Chronic pain  -LS     Pain Location Knee   and also hips  -LS Back   and L hip  -LS     Pain Orientation Right;Left  -LS      Pain Intervention(s) Repositioned;Ambulation/increased activity  -LS Repositioned;Ambulation/increased activity  -LS     Response to Interventions tolerated; RN aware  -LS tolerated; RN aware  -LS     Recorded by [LS] Zari Rai, PT [LS] Zari Rai, PT     Cognitive Assessment/Intervention    Current Cognitive/Communication Assessment functional  -LS functional  -LS     Orientation Status oriented x 4  -LS oriented x 4  -LS     Follows Commands/Answers Questions able to follow single-step instructions;100% of the time;needs cueing;needs increased time  -LS able to follow single-step instructions;100% of the time;needs cueing  -LS     Personal Safety mild impairment;decreased awareness, need for safety  -LS mild impairment;decreased awareness, need for safety;decreased insight to deficits  -LS     Personal Safety Interventions fall prevention program maintained;gait belt;nonskid shoes/slippers when out of bed  -LS fall prevention program maintained;gait belt;nonskid shoes/slippers when out of bed  -LS     Recorded by [LS] Zari Rai, PT [LS] Zari Rai, PT     Bed Mobility, Assessment/Treatment    Bed Mobility, Comment UIC upon arrival.   -LS UIC upon arrival.   -LS     Recorded by [LS] Zari Rai, PT [LS] Zari Rai, PT     Transfer Assessment/Treatment    Transfers, Sit-Stand [place] minimum assist (75% patient effort);2 person assist required;verbal cues required  -LS contact guard assist;verbal cues required  -LS     Transfers, Stand-Sit [place] minimum assist (75% patient effort);2 person assist required;verbal cues required  -LS contact guard assist;verbal cues required  -LS     Transfers, Sit-Stand-Sit, Assist Device rolling walker  -LS rolling walker  -LS     Transfer, Impairments  strength decreased;impaired balance  -LS     Transfer, Comment  VC's for hand placement and weightshifting forward to initiate transfer.   -LS     Recorded by [LS] Zari Rai, PT [LS] Zari Rai, PT     Gait Assessment/Treatment    Gait, [place] Level contact guard assist;2 person assist required  -LS contact guard assist;verbal cues required;1 person + 1 person to manage equipment  -LS     Gait, Assistive Device rolling walker  -LS rolling walker  -LS     Gait, Distance (Feet) 180  -  -LS     Gait, Gait Deviations vidhi decreased;forward flexed posture;decreased heel strike;step length decreased  -LS vidhi decreased;forward flexed posture;step length decreased;decreased heel strike  -LS     Gait, Impairments strength decreased;pain  -LS strength decreased;pain  -LS     Gait, Comment VC's for PLB and increasing step length within RWx. Required 1 brief seated rest break after approx 80 ft of gait due to fatigue.   -LS VC's for upright posture and pushing RWx forward at steady pace to promote reciprocal gait pattern. Req'd 1 brief sitting rest break due to fatigue (after 70 ft gait).   -LS     Recorded by [LS] Zari Rai, PT [LS] Zari Rai, PT     Motor Skills/Interventions    Additional Documentation Balance Skills Training (Group)  -LS Balance Skills Training (Group)  -LS     Recorded by [LS] Zari Rai, PT [LS] Zari Rai, PT     Balance  Skills Training    Sitting-Level of Assistance Close supervision  -LS Close supervision  -LS     Sitting-Balance Support Feet supported  -LS Feet supported  -LS     Standing-Level of Assistance Contact guard  -LS Contact guard  -LS     Static Standing Balance Support assistive device  -LS assistive device  -LS     Gait Balance-Level of Assistance Contact guard;x2  -LS Contact guard  -LS     Gait Balance Support assistive device  -LS assistive device  -LS     Recorded by [LS] Zari Rai, PT [LS] Zari Rai, PT     Therapy Exercises    Bilateral Lower Extremities AROM:;10 reps;ankle pumps/circles;LAQ;hip abduction/adduction  -LS AROM:;10 reps;sitting;ankle pumps/circles;glut sets;quad sets  -LS     Bilateral Upper Extremity  AROM:;sitting;hand pumps;elbow flexion/extension  -LS     Recorded by [LS] Zari Rai, PT [LS] Zari Rai, PT     Positioning and Restraints    Pre-Treatment Position sitting in chair/recliner  -LS sitting in chair/recliner  -LS     Post Treatment Position chair  -LS chair  -LS     In Chair notified nsg;reclined;call light within reach;encouraged to call for assist;exit alarm on;RUE elevated;LUE elevated;legs elevated;heels elevated  -LS notified nsg;reclined;call light within reach;encouraged to call for assist;exit alarm on;RUE elevated;LUE elevated;legs elevated;heels elevated  -LS     Recorded by [LS] Zari Rai, PT [LS] Zari Rai, PT       User Key  (r) = Recorded By, (t) = Taken By, (c) = Cosigned By    Initials Name Effective Dates     Zari Rai, PT 06/19/15 -                 IP PT Goals       06/23/17 1101 06/22/17 1437 06/22/17 0815    Bed Mobility PT LTG    Bed Mobility PT LTG, Outcome goal ongoing  -LS goal ongoing  -LS     Transfer Training PT LTG    Transfer Training PT LTG, Outcome goal ongoing  -LS goal ongoing  -LS     Gait Training PT LTG    Gait Training Goal PT LTG, Outcome goal ongoing  -LS goal ongoing  -LS     Wound Care PT LTG    Wound Care PT LTG 1,  Date Established   06/22/17  -MF    Wound Care PT LTG 1, Time to Achieve   other (see comments)   10 days  -MF    Wound Care PT LTG 1, Location   BLEs  -MF    Wound Care PT LTG 1, No S&S of Infection   yes  -MF    Wound Care PT LTG 1, Decrease Limb Girth %   10  -MF    Wound Care PT LTG 1, No Skin Break Down   yes  -MF    Wound Care PT LTG 1, No New Skin Break Down   yes  -MF      06/21/17 1445          Bed Mobility PT LTG    Bed Mobility PT LTG, Date Established 06/21/17  -LS      Bed Mobility PT LTG, Time to Achieve 2 wks  -LS      Bed Mobility PT LTG, Activity Type supine to sit/sit to supine  -LS      Bed Mobility PT LTG, [place] Level supervision required  -LS      Transfer Training PT LTG    Transfer Training PT LTG, Date Established 06/21/17  -LS      Transfer Training PT LTG, Time to Achieve 2 wks  -LS      Transfer Training PT LTG, Activity Type sit to stand/stand to sit  -LS      Transfer Training PT LTG, [place] Level supervision required  -LS      Transfer Training PT LTG, Assist Device walker, rolling  -LS      Gait Training PT LTG    Gait Training Goal PT LTG, Date Established 06/21/17  -LS      Gait Training Goal PT LTG, Time to Achieve 2 wks  -LS      Gait Training Goal PT LTG, [place] Level supervision required  -LS      Gait Training Goal PT LTG, Assist Device walker, rolling  -LS      Gait Training Goal PT LTG, Distance to Achieve 200  -LS        User Key  (r) = Recorded By, (t) = Taken By, (c) = Cosigned By    Initials Name Provider Type    ALBANIA Hassan, PT Physical Therapist    LS Zari Rai, PT Physical Therapist          Physical Therapy Education     Title: PT OT SLP Therapies (Active)     Topic: Physical Therapy (Active)     Point: Mobility training (Active)    Learning Progress Summary    Learner Readiness Method Response Comment Documented by Status   Patient Acceptance E,D NR  LS 06/23/17 1100 Active    Acceptance E VU  NN 06/23/17 0151 Done    Acceptance E,D NR   LS 06/22/17 1437 Active    Acceptance E,D NR  LS 06/21/17 1445 Active               Point: Home exercise program (Active)    Learning Progress Summary    Learner Readiness Method Response Comment Documented by Status   Patient Acceptance E,D NR  LS 06/23/17 1100 Active    Acceptance E,D NR  LS 06/22/17 1437 Active               Point: Body mechanics (Active)    Learning Progress Summary    Learner Readiness Method Response Comment Documented by Status   Patient Acceptance E,D NR  LS 06/23/17 1100 Active    Acceptance E VU  NN 06/23/17 0151 Done    Acceptance E,D NR  LS 06/22/17 1437 Active    Acceptance E,D NR  LS 06/21/17 1445 Active               Point: Precautions (Active)    Learning Progress Summary    Learner Readiness Method Response Comment Documented by Status   Patient Acceptance E,D NR  LS 06/23/17 1100 Active    Acceptance E,D NR  LS 06/22/17 1437 Active    Acceptance E,D NR  LS 06/21/17 1445 Active                      User Key     Initials Effective Dates Name Provider Type Discipline     06/19/15 -  Zari Rai, PT Physical Therapist PT     06/16/16 -  Demian De La Torre, RN Registered Nurse Nurse                    PT Recommendation and Plan  Anticipated Discharge Disposition: skilled nursing facility  Demonstrates Need for Referral to Another Service: occupational therapy  PT Frequency: daily  Plan of Care Review  Plan Of Care Reviewed With: patient  Progress: improving  Outcome Summary/Follow up Plan: Pt progressed forward ambulation distance to 180 total ft (seated rest after 80 ft) with use of RWx and 3L O2. Gave good effort with progression of LE ther ex; cont to be limited by c/o pain. Will cont PT POC for progression towards goals.           Outcome Measures       06/23/17 1025 06/22/17 1340 06/21/17 1340    How much help from another person do you currently need...    Turning from your back to your side while in flat bed without using bedrails? 3  -LS 3  -LS 3  -LS    Moving from lying  on back to sitting on the side of a flat bed without bedrails? 2  -LS 2  -LS 2  -LS    Moving to and from a bed to a chair (including a wheelchair)? 3  -LS 3  -LS 3  -LS    Standing up from a chair using your arms (e.g., wheelchair, bedside chair)? 3  -LS 3  -LS 3  -LS    Climbing 3-5 steps with a railing? 2  -LS 1  -LS 1  -LS    To walk in hospital room? 3  -LS 3  -LS 3  -LS    AM-PAC 6 Clicks Score 16  -LS 15  -LS 15  -LS    Functional Assessment    Outcome Measure Options AM-PAC 6 Clicks Basic Mobility (PT)  -LS AM-PAC 6 Clicks Basic Mobility (PT)  -LS AM-PAC 6 Clicks Basic Mobility (PT)  -LS      User Key  (r) = Recorded By, (t) = Taken By, (c) = Cosigned By    Initials Name Provider Type    LS Zari Rai, PT Physical Therapist           Time Calculation:         PT Charges       06/23/17 1103          Time Calculation    Start Time 1025  -LS      PT Received On 06/23/17  -LS      PT Goal Re-Cert Due Date 07/01/17  -LS      Time Calculation- PT    Total Timed Code Minutes- PT 25 minute(s)  -LS        User Key  (r) = Recorded By, (t) = Taken By, (c) = Cosigned By    Initials Name Provider Type    SARWAT Rai, PT Physical Therapist          Therapy Charges for Today     Code Description Service Date Service Provider Modifiers Qty    66658838455 HC GAIT TRAINING EA 15 MIN 6/22/2017 Zari Rai, PT GP 1    38984591661 HC PT THER PROC EA 15 MIN 6/22/2017 Zari Rai, PT GP 1    33682153494 HC PT THER SUPP EA 15 MIN 6/22/2017 Zari Rai, PT GP 2    91456891523 HC GAIT TRAINING EA 15 MIN 6/23/2017 Zari Rai, PT GP 1    17404110888 HC PT THER PROC EA 15 MIN 6/23/2017 Zari Rai, PT GP 1          PT G-Codes  Outcome Measure Options: AM-PAC 6 Clicks Basic Mobility (PT)    Zari Rai, PT  6/23/2017

## 2017-06-23 NOTE — PLAN OF CARE
Problem: Patient Care Overview (Adult)  Goal: Plan of Care Review  Outcome: Ongoing (interventions implemented as appropriate)    06/23/17 0815   Coping/Psychosocial Response Interventions   Plan Of Care Reviewed With patient   Outcome Evaluation   Outcome Summary/Follow up Plan BLE wraps intact, PT changed toe dressings with light mechanical debridement to help decrease bioburden and improve healing potential.          Problem: Inpatient Physical Therapy  Goal: Wound Care Goal 1 LTG- PT  Outcome: Ongoing (interventions implemented as appropriate)    06/22/17 0815 06/23/17 0815   Wound Care PT LTG   Wound Care PT LTG 1, Date Established 06/22/17 --    Wound Care PT LTG 1, Time to Achieve other (see comments)  (10 days) --    Wound Care PT LTG 1, Location BLEs --    Wound Care PT LTG 1, No S&S of Infection yes --    Wound Care PT LTG 1, Decrease Limb Girth % 10 --    Wound Care PT LTG 1, No Skin Break Down yes --    Wound Care PT LTG 1, No New Skin Break Down yes --    Wound Care PT LTG 1, Outcome --  goal ongoing

## 2017-06-24 ENCOUNTER — APPOINTMENT (OUTPATIENT)
Dept: GENERAL RADIOLOGY | Facility: HOSPITAL | Age: 72
End: 2017-06-24

## 2017-06-24 PROBLEM — R65.21 SEPTIC SHOCK (HCC): Status: ACTIVE | Noted: 2017-06-20

## 2017-06-24 PROBLEM — R50.9 FEBRILE ILLNESS: Status: RESOLVED | Noted: 2017-06-21 | Resolved: 2017-06-24

## 2017-06-24 PROBLEM — R78.81 STREPTOCOCCAL BACTEREMIA: Status: ACTIVE | Noted: 2017-06-24

## 2017-06-24 PROBLEM — B95.5 STREPTOCOCCAL BACTEREMIA: Status: ACTIVE | Noted: 2017-06-24

## 2017-06-24 LAB
ALBUMIN SERPL-MCNC: 2.7 G/DL (ref 3.2–4.8)
ANION GAP SERPL CALCULATED.3IONS-SCNC: 4 MMOL/L (ref 3–11)
BACTERIA SPEC RESP CULT: ABNORMAL
BASOPHILS # BLD AUTO: 0.06 10*3/MM3 (ref 0–0.2)
BASOPHILS NFR BLD AUTO: 0.7 % (ref 0–1)
BUN BLD-MCNC: 12 MG/DL (ref 9–23)
BUN/CREAT SERPL: 15 (ref 7–25)
CALCIUM SPEC-SCNC: 8.7 MG/DL (ref 8.7–10.4)
CHLORIDE SERPL-SCNC: 112 MMOL/L (ref 99–109)
CO2 SERPL-SCNC: 26 MMOL/L (ref 20–31)
CREAT BLD-MCNC: 0.8 MG/DL (ref 0.6–1.3)
DEPRECATED RDW RBC AUTO: 55.3 FL (ref 37–54)
EOSINOPHIL # BLD AUTO: 0.54 10*3/MM3 (ref 0.1–0.3)
EOSINOPHIL NFR BLD AUTO: 6.1 % (ref 0–3)
ERYTHROCYTE [DISTWIDTH] IN BLOOD BY AUTOMATED COUNT: 14.7 % (ref 11.3–14.5)
GFR SERPL CREATININE-BSD FRML MDRD: 95 ML/MIN/1.73
GLUCOSE BLD-MCNC: 89 MG/DL (ref 70–100)
GRAM STN SPEC: ABNORMAL
HCT VFR BLD AUTO: 36.3 % (ref 38.9–50.9)
HGB BLD-MCNC: 11.5 G/DL (ref 13.1–17.5)
IMM GRANULOCYTES # BLD: 0.02 10*3/MM3 (ref 0–0.03)
IMM GRANULOCYTES NFR BLD: 0.2 % (ref 0–0.6)
LYMPHOCYTES # BLD AUTO: 1.24 10*3/MM3 (ref 0.6–4.8)
LYMPHOCYTES NFR BLD AUTO: 14 % (ref 24–44)
MAGNESIUM SERPL-MCNC: 2.1 MG/DL (ref 1.3–2.7)
MCH RBC QN AUTO: 32.8 PG (ref 27–31)
MCHC RBC AUTO-ENTMCNC: 31.7 G/DL (ref 32–36)
MCV RBC AUTO: 103.4 FL (ref 80–99)
MONOCYTES # BLD AUTO: 0.78 10*3/MM3 (ref 0–1)
MONOCYTES NFR BLD AUTO: 8.8 % (ref 0–12)
NEUTROPHILS # BLD AUTO: 6.19 10*3/MM3 (ref 1.5–8.3)
NEUTROPHILS NFR BLD AUTO: 70.2 % (ref 41–71)
PHOSPHATE SERPL-MCNC: 2.4 MG/DL (ref 2.4–5.1)
PLATELET # BLD AUTO: 243 10*3/MM3 (ref 150–450)
PMV BLD AUTO: 9.7 FL (ref 6–12)
POTASSIUM BLD-SCNC: 4.4 MMOL/L (ref 3.5–5.5)
RBC # BLD AUTO: 3.51 10*6/MM3 (ref 4.2–5.76)
SODIUM BLD-SCNC: 142 MMOL/L (ref 132–146)
WBC NRBC COR # BLD: 8.83 10*3/MM3 (ref 3.5–10.8)

## 2017-06-24 PROCEDURE — 94799 UNLISTED PULMONARY SVC/PX: CPT

## 2017-06-24 PROCEDURE — 94640 AIRWAY INHALATION TREATMENT: CPT

## 2017-06-24 PROCEDURE — 71010 HC CHEST PA OR AP: CPT

## 2017-06-24 PROCEDURE — 80069 RENAL FUNCTION PANEL: CPT | Performed by: INTERNAL MEDICINE

## 2017-06-24 PROCEDURE — 94760 N-INVAS EAR/PLS OXIMETRY 1: CPT

## 2017-06-24 PROCEDURE — 99231 SBSQ HOSP IP/OBS SF/LOW 25: CPT | Performed by: THORACIC SURGERY (CARDIOTHORACIC VASCULAR SURGERY)

## 2017-06-24 PROCEDURE — 83735 ASSAY OF MAGNESIUM: CPT | Performed by: INTERNAL MEDICINE

## 2017-06-24 PROCEDURE — 85025 COMPLETE CBC W/AUTO DIFF WBC: CPT | Performed by: INTERNAL MEDICINE

## 2017-06-24 PROCEDURE — 99233 SBSQ HOSP IP/OBS HIGH 50: CPT | Performed by: INTERNAL MEDICINE

## 2017-06-24 PROCEDURE — 25010000002 HEPARIN (PORCINE) PER 1000 UNITS: Performed by: INTERNAL MEDICINE

## 2017-06-24 RX ORDER — BUDESONIDE AND FORMOTEROL FUMARATE DIHYDRATE 80; 4.5 UG/1; UG/1
2 AEROSOL RESPIRATORY (INHALATION)
Status: DISCONTINUED | OUTPATIENT
Start: 2017-06-24 | End: 2017-06-26 | Stop reason: HOSPADM

## 2017-06-24 RX ORDER — FUROSEMIDE 40 MG/1
40 TABLET ORAL 2 TIMES DAILY
Status: DISCONTINUED | OUTPATIENT
Start: 2017-06-24 | End: 2017-06-26 | Stop reason: HOSPADM

## 2017-06-24 RX ADMIN — NICOTINE 1 PATCH: 21 PATCH, EXTENDED RELEASE TRANSDERMAL at 00:41

## 2017-06-24 RX ADMIN — FUROSEMIDE 40 MG: 40 TABLET ORAL at 17:33

## 2017-06-24 RX ADMIN — HEPARIN SODIUM 5000 UNITS: 5000 INJECTION, SOLUTION INTRAVENOUS; SUBCUTANEOUS at 20:24

## 2017-06-24 RX ADMIN — HEPARIN SODIUM 5000 UNITS: 5000 INJECTION, SOLUTION INTRAVENOUS; SUBCUTANEOUS at 06:32

## 2017-06-24 RX ADMIN — ERTAPENEM SODIUM 1 G: 1 INJECTION, POWDER, LYOPHILIZED, FOR SOLUTION INTRAMUSCULAR; INTRAVENOUS at 09:06

## 2017-06-24 RX ADMIN — IPRATROPIUM BROMIDE AND ALBUTEROL SULFATE 3 ML: .5; 3 SOLUTION RESPIRATORY (INHALATION) at 20:06

## 2017-06-24 RX ADMIN — HYDROCODONE BITARTRATE AND ACETAMINOPHEN 1 TABLET: 7.5; 325 TABLET ORAL at 17:33

## 2017-06-24 RX ADMIN — HYDROCODONE BITARTRATE AND ACETAMINOPHEN 1 TABLET: 7.5; 325 TABLET ORAL at 03:48

## 2017-06-24 RX ADMIN — IPRATROPIUM BROMIDE AND ALBUTEROL SULFATE 3 ML: .5; 3 SOLUTION RESPIRATORY (INHALATION) at 12:58

## 2017-06-24 RX ADMIN — METOPROLOL TARTRATE 50 MG: 50 TABLET, FILM COATED ORAL at 20:24

## 2017-06-24 RX ADMIN — HYDROCODONE BITARTRATE AND ACETAMINOPHEN 1 TABLET: 7.5; 325 TABLET ORAL at 10:57

## 2017-06-24 RX ADMIN — MORPHINE SULFATE 30 MG: 30 TABLET, EXTENDED RELEASE ORAL at 20:24

## 2017-06-24 RX ADMIN — HEPARIN SODIUM 5000 UNITS: 5000 INJECTION, SOLUTION INTRAVENOUS; SUBCUTANEOUS at 14:44

## 2017-06-24 RX ADMIN — MORPHINE SULFATE 30 MG: 30 TABLET, EXTENDED RELEASE ORAL at 09:06

## 2017-06-24 RX ADMIN — BUDESONIDE AND FORMOTEROL FUMARATE DIHYDRATE 2 PUFF: 80; 4.5 AEROSOL RESPIRATORY (INHALATION) at 20:07

## 2017-06-24 RX ADMIN — IPRATROPIUM BROMIDE AND ALBUTEROL SULFATE 3 ML: .5; 3 SOLUTION RESPIRATORY (INHALATION) at 08:42

## 2017-06-24 NOTE — PLAN OF CARE
Problem: Sepsis (Adult)  Goal: Signs and Symptoms of Listed Potential Problems Will be Absent or Manageable (Sepsis)  Outcome: Ongoing (interventions implemented as appropriate)    Problem: Fall Risk (Adult)  Goal: Absence of Falls  Outcome: Ongoing (interventions implemented as appropriate)    Problem: Pressure Ulcer (Adult)  Goal: Signs and Symptoms of Listed Potential Problems Will be Absent or Manageable (Pressure Ulcer)  Outcome: Ongoing (interventions implemented as appropriate)

## 2017-06-24 NOTE — PROGRESS NOTES
CTS Progress Note       LOS: 4 days   Patient Care Team:  No Known Provider as PCP - General  No Known Provider as PCP - Family Medicine    Chief complaint; no new complaints    Vital Signs  Temp:  [97.8 °F (36.6 °C)-98.3 °F (36.8 °C)] 97.8 °F (36.6 °C)  Heart Rate:  [66-94] 86  Resp:  [16-18] 18  BP: (102-124)/(57-95) 114/70    Physical Exam: Still with peripheral edema       Results     Results from last 7 days  Lab Units 06/24/17  0346   WBC 10*3/mm3 8.83   HEMOGLOBIN g/dL 11.5*   HEMATOCRIT % 36.3*   PLATELETS 10*3/mm3 243       Results from last 7 days  Lab Units 06/24/17  0346   SODIUM mmol/L 142   POTASSIUM mmol/L 4.4   CHLORIDE mmol/L 112*   TOTAL CO2 mmol/L 26.0   BUN mg/dL 12   CREATININE mg/dL 0.80   GLUCOSE mg/dL 89   CALCIUM mg/dL 8.7           Imaging Results (last 24 hours)     Procedure Component Value Units Date/Time    XR Chest 1 View [[id number]] Collected:  06/23/17 0951     Updated:  06/23/17 1015    Narrative:       EXAMINATION: XR CHEST 1 VW- 06/23/2017     INDICATION: R50.9-Fever, unspecified; L03.115-Cellulitis of right lower  limb; R53.1-Weakness; A41.9-Sepsis, unspecified organism; N28.9-Disorder  of kidney and ureter, unspecified; M62.82-Rhabdomyolysis;  D72.829-Elevated white blood cell count, unspecified; Z74.09-Other  reduced mobility      COMPARISON: 06/22/2017     FINDINGS: The heart is slightly large. There is bibasilar airspace  disease and probable left pleural effusion. Aeration of the lungs has  improved slightly when compared with 06/22/2017.           Impression:       Bibasilar airspace disease and small left pleural effusion,  slightly improved when compared to the previous examination of the  previous day.     D:  06/23/2017  E:  06/23/2017     This report was finalized on 6/23/2017 10:13 AM by Dr. Nehemias Nye MD.       XR Chest 1 View [[id number]] Updated:  06/24/17 0454          Assessment    Active Problems:    Streptococcal sepsis    Cellulitis of right lower extremity     RAFAEL (acute kidney injury)    PVD (peripheral vascular disease)    Febrile illness  CT angiogram with runoff which was ordered before yesterday at 8:30 AM is still yet to be completed over 24 hours later.  Will recheck on the order.  Plan   As stated above    Please note that portions of this note were completed with a voice recognition program. Efforts were made to edit the dictations, but occasionally words are mistranscribed.    Mario Moore MD  06/24/17  9:28 AM

## 2017-06-24 NOTE — PROGRESS NOTES
[facility] Medicine Services  INPATIENT PROGRESS NOTE    Date of Admission: 6/20/2017  Length of Stay: 4  Primary Care Physician: No Known Provider    Subjective   CC: sepsis    HPI:  Assuming care from ICU.    He states he is doing better.  Legs are typically swollen.  States he feels like he is able to go home alone.    Review Of Systems:   Review of Systems   Constitutional: Positive for fatigue. Negative for fever.   Respiratory: Negative for shortness of breath.    Cardiovascular: Positive for leg swelling.   Gastrointestinal: Negative for abdominal pain.   All other systems reviewed and are negative.        Objective      Temp:  [97.8 °F (36.6 °C)-98.3 °F (36.8 °C)] 97.8 °F (36.6 °C)  Heart Rate:  [66-94] 86  Resp:  [16-18] 16  BP: (105-124)/(62-95) 114/70  Physical Exam   Constitutional: He is oriented to person, place, and time. He appears well-developed and well-nourished. No distress.   HENT:   Head: Normocephalic.   Eyes: Pupils are equal, round, and reactive to light.   Cardiovascular: Normal rate, regular rhythm and normal heart sounds.    Pulmonary/Chest: Effort normal and breath sounds normal. No respiratory distress.   Abdominal: Soft. Bowel sounds are normal.   obese   Musculoskeletal:   Legs wrapped, wound not examined.  Pitting edema around knees and thighs   Neurological: He is alert and oriented to person, place, and time.   Skin: Skin is warm and dry. No pallor.   Psychiatric: He has a normal mood and affect.   Vitals reviewed.        Results Review:    I have reviewed the labs, radiology results and diagnostic studies.      Results from last 7 days  Lab Units 06/24/17  0346   WBC 10*3/mm3 8.83   HEMOGLOBIN g/dL 11.5*   PLATELETS 10*3/mm3 243       Results from last 7 days  Lab Units 06/24/17  0346   SODIUM mmol/L 142   POTASSIUM mmol/L 4.4   CHLORIDE mmol/L 112*   TOTAL CO2 mmol/L 26.0   BUN mg/dL 12   CREATININE mg/dL 0.80   GLUCOSE mg/dL 89   CALCIUM mg/dL 8.7        Culture Data:      Blood Culture   Date Value Ref Range Status   06/20/2017 Abnormal Stain (A)  Preliminary   06/20/2017 Streptococcus, Beta Hemolytic, Group C (A)  Preliminary     Urine Culture   Date Value Ref Range Status   06/20/2017 No growth at 2 days  Final     Respiratory Culture   Date Value Ref Range Status   06/21/2017 Light growth (2+) Proteus vulgaris (A)  Final     Comment:          06/21/2017 Light growth (2+) Pseudomonas aeruginosa (A)  Final   06/21/2017 Scant growth (1+) Normal Respiratory Nataliia  Final       Radiology Data:   Reviewe    I have reviewed the medications.    Assessment/Plan     Problem List  Hospital Problem List     * (Principal)Septic shock    Cellulitis of right lower extremity    RAFAEL (acute kidney injury)    Streptococcal bacteremia    PVD (peripheral vascular disease)          Assessment/Plan:  - taking over from ICU.  Admitted there with septic shock requiring pressors and found to have legs wounds/cellulitis and strep bacteremia.  - clinically improved.  Continues on invanz, plan 2 IV therapy, PICC in place  - reviewed Dr. Moore note, CT with runoff pending  - check echo, resume home lasix  - resume home meds as appropriate  - patient plans home with HH as discharge.  Discussed therapy recs for SNF, but he feels like he can care for himself.  - if goes home, ID plans office for daily infusions, but will need to d/w CM on Monday to determine if this is feasable for the patient.    DVT prophylaxis: heparin    High complexity    Travis Kamara MD   06/24/17   3:49 PM

## 2017-06-24 NOTE — PLAN OF CARE
Problem: Patient Care Overview (Adult)  Goal: Plan of Care Review  Outcome: Ongoing (interventions implemented as appropriate)    06/24/17 0233   Coping/Psychosocial Response Interventions   Plan Of Care Reviewed With patient   Patient Care Overview   Progress improving   Outcome Evaluation   Outcome Summary/Follow up Plan chronic pain, hips, back, knees, frequent c/o pain, position changes q2       Goal: Adult Individualization and Mutuality  Outcome: Ongoing (interventions implemented as appropriate)  Goal: Discharge Needs Assessment  Outcome: Ongoing (interventions implemented as appropriate)    Problem: Sepsis (Adult)  Goal: Signs and Symptoms of Listed Potential Problems Will be Absent or Manageable (Sepsis)  Outcome: Ongoing (interventions implemented as appropriate)    Problem: Fall Risk (Adult)  Goal: Absence of Falls  Outcome: Ongoing (interventions implemented as appropriate)    Problem: Pressure Ulcer (Adult)  Goal: Signs and Symptoms of Listed Potential Problems Will be Absent or Manageable (Pressure Ulcer)  Outcome: Ongoing (interventions implemented as appropriate)

## 2017-06-25 ENCOUNTER — APPOINTMENT (OUTPATIENT)
Dept: GENERAL RADIOLOGY | Facility: HOSPITAL | Age: 72
End: 2017-06-25

## 2017-06-25 ENCOUNTER — APPOINTMENT (OUTPATIENT)
Dept: CARDIOLOGY | Facility: HOSPITAL | Age: 72
End: 2017-06-25
Attending: INTERNAL MEDICINE

## 2017-06-25 LAB
ALBUMIN SERPL-MCNC: 2.9 G/DL (ref 3.2–4.8)
ANION GAP SERPL CALCULATED.3IONS-SCNC: 2 MMOL/L (ref 3–11)
BASOPHILS # BLD AUTO: 0.08 10*3/MM3 (ref 0–0.2)
BASOPHILS NFR BLD AUTO: 1.1 % (ref 0–1)
BH CV ECHO MEAS - AO MAX PG (FULL): 3.7 MMHG
BH CV ECHO MEAS - AO MAX PG: 8.1 MMHG
BH CV ECHO MEAS - AO MEAN PG (FULL): 2 MMHG
BH CV ECHO MEAS - AO MEAN PG: 4 MMHG
BH CV ECHO MEAS - AO V2 MAX: 142 CM/SEC
BH CV ECHO MEAS - AO V2 MEAN: 99 CM/SEC
BH CV ECHO MEAS - AO V2 VTI: 29.2 CM
BH CV ECHO MEAS - AVA(I,A): 3.4 CM^2
BH CV ECHO MEAS - AVA(I,D): 3.4 CM^2
BH CV ECHO MEAS - AVA(V,A): 3.3 CM^2
BH CV ECHO MEAS - AVA(V,D): 3.3 CM^2
BH CV ECHO MEAS - BSA(HAYCOCK): 2.1 M^2
BH CV ECHO MEAS - BSA: 2 M^2
BH CV ECHO MEAS - BZI_BMI: 27 KILOGRAMS/M^2
BH CV ECHO MEAS - BZI_METRIC_HEIGHT: 177.8 CM
BH CV ECHO MEAS - BZI_METRIC_WEIGHT: 85.3 KG
BH CV ECHO MEAS - EDV(CUBED): 64 ML
BH CV ECHO MEAS - EDV(TEICH): 70 ML
BH CV ECHO MEAS - EF(CUBED): 72.5 %
BH CV ECHO MEAS - EF(TEICH): 64.8 %
BH CV ECHO MEAS - ESV(CUBED): 17.6 ML
BH CV ECHO MEAS - ESV(TEICH): 24.6 ML
BH CV ECHO MEAS - FS: 35 %
BH CV ECHO MEAS - IVS/LVPW: 1
BH CV ECHO MEAS - IVSD: 1.3 CM
BH CV ECHO MEAS - LA DIMENSION: 4.4 CM
BH CV ECHO MEAS - LAT PEAK E' VEL: 11.7 CM/SEC
BH CV ECHO MEAS - LV MASS(C)D: 174.8 GRAMS
BH CV ECHO MEAS - LV MASS(C)DI: 86 GRAMS/M^2
BH CV ECHO MEAS - LV MAX PG: 4.3 MMHG
BH CV ECHO MEAS - LV MEAN PG: 2 MMHG
BH CV ECHO MEAS - LV V1 MAX: 104 CM/SEC
BH CV ECHO MEAS - LV V1 MEAN: 67.6 CM/SEC
BH CV ECHO MEAS - LV V1 VTI: 21.9 CM
BH CV ECHO MEAS - LVIDD: 4 CM
BH CV ECHO MEAS - LVIDS: 2.6 CM
BH CV ECHO MEAS - LVOT AREA (M): 4.5 CM^2
BH CV ECHO MEAS - LVOT AREA: 4.5 CM^2
BH CV ECHO MEAS - LVOT DIAM: 2.4 CM
BH CV ECHO MEAS - LVPWD: 1.2 CM
BH CV ECHO MEAS - MED PEAK E' VEL: 5.75 CM/SEC
BH CV ECHO MEAS - MV A MAX VEL: 75.5 CM/SEC
BH CV ECHO MEAS - MV E MAX VEL: 88.4 CM/SEC
BH CV ECHO MEAS - MV E/A: 1.2
BH CV ECHO MEAS - PA ACC SLOPE: 519 CM/SEC^2
BH CV ECHO MEAS - PA ACC TIME: 0.12 SEC
BH CV ECHO MEAS - PA MAX PG: 2.8 MMHG
BH CV ECHO MEAS - PA PR(ACCEL): 25 MMHG
BH CV ECHO MEAS - PA V2 MAX: 83.6 CM/SEC
BH CV ECHO MEAS - SI(CUBED): 22.8 ML/M^2
BH CV ECHO MEAS - SI(LVOT): 48.7 ML/M^2
BH CV ECHO MEAS - SI(TEICH): 22.3 ML/M^2
BH CV ECHO MEAS - SV(CUBED): 46.4 ML
BH CV ECHO MEAS - SV(LVOT): 99.1 ML
BH CV ECHO MEAS - SV(TEICH): 45.4 ML
BH CV ECHO MEAS - TAPSE (>1.6): 2.9 CM2
BH CV VAS BP RIGHT ARM: NORMAL MMHG
BH CV XLRA - RV BASE: 3.6 CM
BH CV XLRA - RV LENGTH: 9.7 CM
BH CV XLRA - RV MID: 3.1 CM
BH CV XLRA - TDI S': 14.2 CM/SEC
BUN BLD-MCNC: 11 MG/DL (ref 9–23)
BUN/CREAT SERPL: 12.2 (ref 7–25)
CALCIUM SPEC-SCNC: 8.9 MG/DL (ref 8.7–10.4)
CHLORIDE SERPL-SCNC: 110 MMOL/L (ref 99–109)
CO2 SERPL-SCNC: 30 MMOL/L (ref 20–31)
CREAT BLD-MCNC: 0.9 MG/DL (ref 0.6–1.3)
DEPRECATED RDW RBC AUTO: 55.6 FL (ref 37–54)
E/E' RATIO: 7.6
EOSINOPHIL # BLD AUTO: 0.78 10*3/MM3 (ref 0.1–0.3)
EOSINOPHIL NFR BLD AUTO: 10.3 % (ref 0–3)
ERYTHROCYTE [DISTWIDTH] IN BLOOD BY AUTOMATED COUNT: 14.7 % (ref 11.3–14.5)
GFR SERPL CREATININE-BSD FRML MDRD: 83 ML/MIN/1.73
GLUCOSE BLD-MCNC: 83 MG/DL (ref 70–100)
HCT VFR BLD AUTO: 36 % (ref 38.9–50.9)
HGB BLD-MCNC: 11.3 G/DL (ref 13.1–17.5)
IMM GRANULOCYTES # BLD: 0.02 10*3/MM3 (ref 0–0.03)
IMM GRANULOCYTES NFR BLD: 0.3 % (ref 0–0.6)
LEFT ATRIUM VOLUME INDEX: 30.5 ML/M2
LV EF 2D ECHO EST: 60 %
LYMPHOCYTES # BLD AUTO: 1.46 10*3/MM3 (ref 0.6–4.8)
LYMPHOCYTES NFR BLD AUTO: 19.4 % (ref 24–44)
MAGNESIUM SERPL-MCNC: 2 MG/DL (ref 1.3–2.7)
MCH RBC QN AUTO: 32.7 PG (ref 27–31)
MCHC RBC AUTO-ENTMCNC: 31.4 G/DL (ref 32–36)
MCV RBC AUTO: 104 FL (ref 80–99)
MONOCYTES # BLD AUTO: 0.89 10*3/MM3 (ref 0–1)
MONOCYTES NFR BLD AUTO: 11.8 % (ref 0–12)
NEUTROPHILS # BLD AUTO: 4.31 10*3/MM3 (ref 1.5–8.3)
NEUTROPHILS NFR BLD AUTO: 57.1 % (ref 41–71)
PHOSPHATE SERPL-MCNC: 3.5 MG/DL (ref 2.4–5.1)
PLATELET # BLD AUTO: 242 10*3/MM3 (ref 150–450)
PMV BLD AUTO: 9.5 FL (ref 6–12)
POTASSIUM BLD-SCNC: 4.4 MMOL/L (ref 3.5–5.5)
RBC # BLD AUTO: 3.46 10*6/MM3 (ref 4.2–5.76)
SODIUM BLD-SCNC: 142 MMOL/L (ref 132–146)
WBC NRBC COR # BLD: 7.54 10*3/MM3 (ref 3.5–10.8)

## 2017-06-25 PROCEDURE — 93306 TTE W/DOPPLER COMPLETE: CPT | Performed by: INTERNAL MEDICINE

## 2017-06-25 PROCEDURE — 25010000002 HEPARIN (PORCINE) PER 1000 UNITS: Performed by: INTERNAL MEDICINE

## 2017-06-25 PROCEDURE — 83735 ASSAY OF MAGNESIUM: CPT | Performed by: INTERNAL MEDICINE

## 2017-06-25 PROCEDURE — 93306 TTE W/DOPPLER COMPLETE: CPT

## 2017-06-25 PROCEDURE — 71010 HC CHEST PA OR AP: CPT

## 2017-06-25 PROCEDURE — 94640 AIRWAY INHALATION TREATMENT: CPT

## 2017-06-25 PROCEDURE — 94799 UNLISTED PULMONARY SVC/PX: CPT

## 2017-06-25 PROCEDURE — 80069 RENAL FUNCTION PANEL: CPT | Performed by: INTERNAL MEDICINE

## 2017-06-25 PROCEDURE — 94760 N-INVAS EAR/PLS OXIMETRY 1: CPT

## 2017-06-25 PROCEDURE — 85025 COMPLETE CBC W/AUTO DIFF WBC: CPT | Performed by: INTERNAL MEDICINE

## 2017-06-25 PROCEDURE — 99231 SBSQ HOSP IP/OBS SF/LOW 25: CPT | Performed by: THORACIC SURGERY (CARDIOTHORACIC VASCULAR SURGERY)

## 2017-06-25 PROCEDURE — 99233 SBSQ HOSP IP/OBS HIGH 50: CPT | Performed by: INTERNAL MEDICINE

## 2017-06-25 RX ADMIN — IPRATROPIUM BROMIDE AND ALBUTEROL SULFATE 3 ML: .5; 3 SOLUTION RESPIRATORY (INHALATION) at 16:17

## 2017-06-25 RX ADMIN — ACETAMINOPHEN 650 MG: 325 TABLET, FILM COATED ORAL at 20:49

## 2017-06-25 RX ADMIN — MORPHINE SULFATE 30 MG: 30 TABLET, EXTENDED RELEASE ORAL at 20:49

## 2017-06-25 RX ADMIN — FUROSEMIDE 40 MG: 40 TABLET ORAL at 17:35

## 2017-06-25 RX ADMIN — MORPHINE SULFATE 30 MG: 30 TABLET, EXTENDED RELEASE ORAL at 08:38

## 2017-06-25 RX ADMIN — IPRATROPIUM BROMIDE AND ALBUTEROL SULFATE 3 ML: .5; 3 SOLUTION RESPIRATORY (INHALATION) at 08:51

## 2017-06-25 RX ADMIN — BUDESONIDE AND FORMOTEROL FUMARATE DIHYDRATE 2 PUFF: 80; 4.5 AEROSOL RESPIRATORY (INHALATION) at 20:18

## 2017-06-25 RX ADMIN — IPRATROPIUM BROMIDE AND ALBUTEROL SULFATE 3 ML: .5; 3 SOLUTION RESPIRATORY (INHALATION) at 20:17

## 2017-06-25 RX ADMIN — FUROSEMIDE 40 MG: 40 TABLET ORAL at 08:38

## 2017-06-25 RX ADMIN — HEPARIN SODIUM 5000 UNITS: 5000 INJECTION, SOLUTION INTRAVENOUS; SUBCUTANEOUS at 06:03

## 2017-06-25 RX ADMIN — BUDESONIDE AND FORMOTEROL FUMARATE DIHYDRATE 2 PUFF: 80; 4.5 AEROSOL RESPIRATORY (INHALATION) at 08:51

## 2017-06-25 RX ADMIN — METOPROLOL TARTRATE 50 MG: 50 TABLET, FILM COATED ORAL at 20:49

## 2017-06-25 RX ADMIN — HYDROCODONE BITARTRATE AND ACETAMINOPHEN 1 TABLET: 7.5; 325 TABLET ORAL at 00:52

## 2017-06-25 RX ADMIN — IPRATROPIUM BROMIDE AND ALBUTEROL SULFATE 3 ML: .5; 3 SOLUTION RESPIRATORY (INHALATION) at 13:47

## 2017-06-25 RX ADMIN — NICOTINE 1 PATCH: 21 PATCH, EXTENDED RELEASE TRANSDERMAL at 00:52

## 2017-06-25 RX ADMIN — METOPROLOL TARTRATE 50 MG: 50 TABLET, FILM COATED ORAL at 08:38

## 2017-06-25 RX ADMIN — ACETAMINOPHEN 325 MG: 325 TABLET, FILM COATED ORAL at 06:47

## 2017-06-25 RX ADMIN — HYDROCODONE BITARTRATE AND ACETAMINOPHEN 1 TABLET: 7.5; 325 TABLET ORAL at 13:01

## 2017-06-25 RX ADMIN — HYDROCODONE BITARTRATE AND ACETAMINOPHEN 1 TABLET: 7.5; 325 TABLET ORAL at 06:48

## 2017-06-25 RX ADMIN — HEPARIN SODIUM 5000 UNITS: 5000 INJECTION, SOLUTION INTRAVENOUS; SUBCUTANEOUS at 14:39

## 2017-06-25 RX ADMIN — ERTAPENEM SODIUM 1 G: 1 INJECTION, POWDER, LYOPHILIZED, FOR SOLUTION INTRAMUSCULAR; INTRAVENOUS at 08:38

## 2017-06-25 RX ADMIN — HEPARIN SODIUM 5000 UNITS: 5000 INJECTION, SOLUTION INTRAVENOUS; SUBCUTANEOUS at 21:20

## 2017-06-25 NOTE — PROGRESS NOTES
"   LOS: 5 days   Patient Care Team:  No Known Provider as PCP - General  No Known Provider as PCP - Family Medicine     Chief complaint leg pain, sepsis      Subjective sitting up in bed, still has leg pain    Objective    Vital Sign Min/Max for last 24 hours  Temp  Min: 98.3 °F (36.8 °C)  Max: 98.3 °F (36.8 °C)   BP  Min: 114/70  Max: 126/78   Pulse  Min: 85  Max: 100   Resp  Min: 16  Max: 18   SpO2  Min: 94 %  Max: 98 %   Flow (L/min)  Min: 2  Max: 2   No Data Recorded     Flowsheet Rows         First Filed Value    Admission Height  70\" (177.8 cm) Documented at 06/20/2017 1900    Admission Weight  170 lb (77.1 kg) Documented at 06/20/2017 1900          Physical Exam:Afebrile, vital signs are stable    Bilateral lower extremity edema and venous stasis                  Results Review:     Results from last 7 days  Lab Units 06/25/17  0523   WBC 10*3/mm3 7.54   HEMOGLOBIN g/dL 11.3*   HEMATOCRIT % 36.0*   PLATELETS 10*3/mm3 242       Results from last 7 days  Lab Units 06/25/17  0523   SODIUM mmol/L 142   POTASSIUM mmol/L 4.4   CHLORIDE mmol/L 110*   TOTAL CO2 mmol/L 30.0   BUN mg/dL 11   CREATININE mg/dL 0.90   GLUCOSE mg/dL 83   CALCIUM mg/dL 8.9       Results from last 7 days  Lab Units 06/21/17  0406   PH, ARTERIAL pH units 7.450   PO2 ART mm Hg 54.8*   PCO2, ARTERIAL mm Hg 38.7   HCO3 ART mmol/L 26.8*         Assessment    Principal Problem:    Septic shock  Active Problems:    Cellulitis of right lower extremity    RAFAEL (acute kidney injury)    PVD (peripheral vascular disease)    Streptococcal bacteremia      He refused to have a CTA done on Friday.  He has agreed to have it done today.  Discussed with the CT scan a CTA with runoff.  The lower extremities today    As above patient has now agreed to proceed with CT angiogram today to more clearly define arterial vasculature    Timmy Lawson PA-C  06/25/17  8:16 AM      Please note that portions of this note were completed with a voice recognition program. " Efforts were made to edit the dictations, but words may be mistranscribed

## 2017-06-25 NOTE — PROGRESS NOTES
[facility] Medicine Services  INPATIENT PROGRESS NOTE    Date of Admission: 6/20/2017  Length of Stay: 5  Primary Care Physician: No Known Provider    Subjective   CC: sepsis    HPI:  No problems overnight.  He is doing well, able to ambulate with walker.  Still wanting to go home.    Review Of Systems:   Review of Systems   Constitutional: Positive for fatigue. Negative for fever.   Respiratory: Negative for shortness of breath.    Cardiovascular: Positive for leg swelling.   Gastrointestinal: Negative for abdominal pain.   All other systems reviewed and are negative.        Objective      Temp:  [98.3 °F (36.8 °C)] 98.3 °F (36.8 °C)  Heart Rate:  [] 100  Resp:  [16-18] 18  BP: (114-126)/(70-78) 126/78  Physical Exam   Constitutional: He is oriented to person, place, and time. He appears well-developed and well-nourished. No distress.   Up on side of bed   HENT:   Head: Normocephalic and atraumatic.   Eyes: Pupils are equal, round, and reactive to light.   Cardiovascular: Normal rate, regular rhythm and normal heart sounds.    Pulmonary/Chest: Effort normal and breath sounds normal. No respiratory distress.   Abdominal: Soft. Bowel sounds are normal.   obese   Musculoskeletal:   Legs wrapped, wound not examined.  Pitting edema around knees and thighs   Neurological: He is alert and oriented to person, place, and time.   Skin: Skin is warm and dry. No pallor.   Psychiatric: He has a normal mood and affect.   Vitals reviewed.  Exam unchanged from previous      Results Review:    I have reviewed the labs, radiology results and diagnostic studies.      Results from last 7 days  Lab Units 06/25/17  0523   WBC 10*3/mm3 7.54   HEMOGLOBIN g/dL 11.3*   PLATELETS 10*3/mm3 242       Results from last 7 days  Lab Units 06/25/17  0523   SODIUM mmol/L 142   POTASSIUM mmol/L 4.4   CHLORIDE mmol/L 110*   TOTAL CO2 mmol/L 30.0   BUN mg/dL 11   CREATININE mg/dL 0.90   GLUCOSE mg/dL 83   CALCIUM mg/dL  8.9       Culture Data:      Blood Culture   Date Value Ref Range Status   06/20/2017 Abnormal Stain (A)  Preliminary   06/20/2017 Streptococcus, Beta Hemolytic, Group C (A)  Preliminary     Urine Culture   Date Value Ref Range Status   06/20/2017 No growth at 2 days  Final     Respiratory Culture   Date Value Ref Range Status   06/21/2017 Light growth (2+) Proteus vulgaris (A)  Final     Comment:          06/21/2017 Light growth (2+) Pseudomonas aeruginosa (A)  Final   06/21/2017 Scant growth (1+) Normal Respiratory Nataliia  Final       Radiology Data:   CXR (my read) - improved congestion    I have reviewed the medications.    Assessment/Plan     Problem List  Hospital Problem List     * (Principal)Septic shock    Cellulitis of right lower extremity    RAFAEL (acute kidney injury)    Streptococcal bacteremia    PVD (peripheral vascular disease)          Assessment/Plan:  - Admitted to ICU with septic shock requiring pressors and found to have legs wounds/cellulitis and strep bacteremia.  - clinically improved.  Continues on invanz, plan 2 weeks IV therapy, PICC in place  - reviewed Dr. Moore note, patient to have CTA today to eval vasculature  - renal function as resolved to normal  - echo pending, resumed home lasix  - resumed home meds as appropriate  - d/c daily CXR  - patient again expresses desire to go home at the time of discharge with HH.  Will d/w CM tomorrow re: plan for IV abx and HH.  - possible home tomorrow    DVT prophylaxis: heparin    High complexity    Travis Kamara MD   06/25/17   8:29 AM

## 2017-06-25 NOTE — PLAN OF CARE
Problem: Patient Care Overview (Adult)  Goal: Plan of Care Review  Outcome: Ongoing (interventions implemented as appropriate)    06/25/17 0153   Coping/Psychosocial Response Interventions   Plan Of Care Reviewed With patient   Patient Care Overview   Progress improving   Outcome Evaluation   Outcome Summary/Follow up Plan o2 sat better, less frequent calls for pain meds, rested well, VSS       Goal: Adult Individualization and Mutuality  Outcome: Ongoing (interventions implemented as appropriate)  Goal: Discharge Needs Assessment  Outcome: Ongoing (interventions implemented as appropriate)    Problem: Sepsis (Adult)  Goal: Signs and Symptoms of Listed Potential Problems Will be Absent or Manageable (Sepsis)  Outcome: Ongoing (interventions implemented as appropriate)    Problem: Fall Risk (Adult)  Goal: Absence of Falls  Outcome: Ongoing (interventions implemented as appropriate)    Problem: Pressure Ulcer (Adult)  Goal: Signs and Symptoms of Listed Potential Problems Will be Absent or Manageable (Pressure Ulcer)  Outcome: Ongoing (interventions implemented as appropriate)

## 2017-06-26 VITALS
TEMPERATURE: 99 F | HEART RATE: 71 BPM | WEIGHT: 188.05 LBS | BODY MASS INDEX: 26.92 KG/M2 | DIASTOLIC BLOOD PRESSURE: 63 MMHG | SYSTOLIC BLOOD PRESSURE: 112 MMHG | RESPIRATION RATE: 24 BRPM | HEIGHT: 70 IN | OXYGEN SATURATION: 97 %

## 2017-06-26 LAB
ALBUMIN SERPL-MCNC: 3 G/DL (ref 3.2–4.8)
ANION GAP SERPL CALCULATED.3IONS-SCNC: 3 MMOL/L (ref 3–11)
BACTERIA SPEC AEROBE CULT: ABNORMAL
BACTERIA SPEC AEROBE CULT: ABNORMAL
BASOPHILS # BLD AUTO: 0.06 10*3/MM3 (ref 0–0.2)
BASOPHILS NFR BLD AUTO: 0.9 % (ref 0–1)
BUN BLD-MCNC: 13 MG/DL (ref 9–23)
BUN/CREAT SERPL: 13 (ref 7–25)
CALCIUM SPEC-SCNC: 9 MG/DL (ref 8.7–10.4)
CHLORIDE SERPL-SCNC: 106 MMOL/L (ref 99–109)
CO2 SERPL-SCNC: 31 MMOL/L (ref 20–31)
CREAT BLD-MCNC: 1 MG/DL (ref 0.6–1.3)
DEPRECATED RDW RBC AUTO: 52.1 FL (ref 37–54)
EOSINOPHIL # BLD AUTO: 0.6 10*3/MM3 (ref 0.1–0.3)
EOSINOPHIL NFR BLD AUTO: 9.4 % (ref 0–3)
ERYTHROCYTE [DISTWIDTH] IN BLOOD BY AUTOMATED COUNT: 14.1 % (ref 11.3–14.5)
GFR SERPL CREATININE-BSD FRML MDRD: 73 ML/MIN/1.73
GLUCOSE BLD-MCNC: 86 MG/DL (ref 70–100)
GRAM STN SPEC: ABNORMAL
GRAM STN SPEC: ABNORMAL
HCT VFR BLD AUTO: 36.3 % (ref 38.9–50.9)
HGB BLD-MCNC: 11.7 G/DL (ref 13.1–17.5)
IMM GRANULOCYTES # BLD: 0.02 10*3/MM3 (ref 0–0.03)
IMM GRANULOCYTES NFR BLD: 0.3 % (ref 0–0.6)
ISOLATED FROM: ABNORMAL
ISOLATED FROM: ABNORMAL
LYMPHOCYTES # BLD AUTO: 1.36 10*3/MM3 (ref 0.6–4.8)
LYMPHOCYTES NFR BLD AUTO: 21.3 % (ref 24–44)
MAGNESIUM SERPL-MCNC: 1.8 MG/DL (ref 1.3–2.7)
MCH RBC QN AUTO: 32.3 PG (ref 27–31)
MCHC RBC AUTO-ENTMCNC: 32.2 G/DL (ref 32–36)
MCV RBC AUTO: 100.3 FL (ref 80–99)
MONOCYTES # BLD AUTO: 0.75 10*3/MM3 (ref 0–1)
MONOCYTES NFR BLD AUTO: 11.7 % (ref 0–12)
NEUTROPHILS # BLD AUTO: 3.61 10*3/MM3 (ref 1.5–8.3)
NEUTROPHILS NFR BLD AUTO: 56.4 % (ref 41–71)
PHOSPHATE SERPL-MCNC: 3.9 MG/DL (ref 2.4–5.1)
PLATELET # BLD AUTO: 264 10*3/MM3 (ref 150–450)
PMV BLD AUTO: 9.8 FL (ref 6–12)
POTASSIUM BLD-SCNC: 4.4 MMOL/L (ref 3.5–5.5)
RBC # BLD AUTO: 3.62 10*6/MM3 (ref 4.2–5.76)
SODIUM BLD-SCNC: 140 MMOL/L (ref 132–146)
STREP GROUPING: ABNORMAL
WBC NRBC COR # BLD: 6.4 10*3/MM3 (ref 3.5–10.8)

## 2017-06-26 PROCEDURE — 94640 AIRWAY INHALATION TREATMENT: CPT

## 2017-06-26 PROCEDURE — 83735 ASSAY OF MAGNESIUM: CPT | Performed by: INTERNAL MEDICINE

## 2017-06-26 PROCEDURE — 29581 APPL MULTLAYER CMPRN SYS LEG: CPT

## 2017-06-26 PROCEDURE — 25010000002 HEPARIN (PORCINE) PER 1000 UNITS: Performed by: INTERNAL MEDICINE

## 2017-06-26 PROCEDURE — 97530 THERAPEUTIC ACTIVITIES: CPT

## 2017-06-26 PROCEDURE — 99239 HOSP IP/OBS DSCHRG MGMT >30: CPT | Performed by: INTERNAL MEDICINE

## 2017-06-26 PROCEDURE — 94799 UNLISTED PULMONARY SVC/PX: CPT

## 2017-06-26 PROCEDURE — 85025 COMPLETE CBC W/AUTO DIFF WBC: CPT | Performed by: INTERNAL MEDICINE

## 2017-06-26 PROCEDURE — 97602 WOUND(S) CARE NON-SELECTIVE: CPT

## 2017-06-26 PROCEDURE — 25010000002 HYDROMORPHONE PER 4 MG: Performed by: INTERNAL MEDICINE

## 2017-06-26 PROCEDURE — 97116 GAIT TRAINING THERAPY: CPT

## 2017-06-26 PROCEDURE — 99231 SBSQ HOSP IP/OBS SF/LOW 25: CPT | Performed by: THORACIC SURGERY (CARDIOTHORACIC VASCULAR SURGERY)

## 2017-06-26 PROCEDURE — 80069 RENAL FUNCTION PANEL: CPT | Performed by: INTERNAL MEDICINE

## 2017-06-26 RX ADMIN — HYDROMORPHONE HYDROCHLORIDE 0.2 MG: 1 INJECTION, SOLUTION INTRAMUSCULAR; INTRAVENOUS; SUBCUTANEOUS at 05:22

## 2017-06-26 RX ADMIN — ACETAMINOPHEN 650 MG: 325 TABLET, FILM COATED ORAL at 03:59

## 2017-06-26 RX ADMIN — BUDESONIDE AND FORMOTEROL FUMARATE DIHYDRATE 2 PUFF: 80; 4.5 AEROSOL RESPIRATORY (INHALATION) at 07:40

## 2017-06-26 RX ADMIN — MORPHINE SULFATE 30 MG: 30 TABLET, EXTENDED RELEASE ORAL at 08:16

## 2017-06-26 RX ADMIN — METOPROLOL TARTRATE 50 MG: 50 TABLET, FILM COATED ORAL at 08:16

## 2017-06-26 RX ADMIN — IPRATROPIUM BROMIDE AND ALBUTEROL SULFATE 3 ML: .5; 3 SOLUTION RESPIRATORY (INHALATION) at 07:40

## 2017-06-26 RX ADMIN — HEPARIN SODIUM 5000 UNITS: 5000 INJECTION, SOLUTION INTRAVENOUS; SUBCUTANEOUS at 05:04

## 2017-06-26 RX ADMIN — NICOTINE 1 PATCH: 21 PATCH, EXTENDED RELEASE TRANSDERMAL at 00:52

## 2017-06-26 RX ADMIN — HYDROCODONE BITARTRATE AND ACETAMINOPHEN 1 TABLET: 7.5; 325 TABLET ORAL at 05:21

## 2017-06-26 RX ADMIN — FUROSEMIDE 40 MG: 40 TABLET ORAL at 08:16

## 2017-06-26 RX ADMIN — ERTAPENEM SODIUM 1 G: 1 INJECTION, POWDER, LYOPHILIZED, FOR SOLUTION INTRAMUSCULAR; INTRAVENOUS at 08:16

## 2017-06-26 RX ADMIN — IPRATROPIUM BROMIDE AND ALBUTEROL SULFATE 3 ML: .5; 3 SOLUTION RESPIRATORY (INHALATION) at 11:58

## 2017-06-26 RX ADMIN — HYDROMORPHONE HYDROCHLORIDE 0.2 MG: 1 INJECTION, SOLUTION INTRAMUSCULAR; INTRAVENOUS; SUBCUTANEOUS at 08:35

## 2017-06-26 NOTE — PROGRESS NOTES
Continued Stay Note  [facility]     Patient Name: Yassine Love  MRN: [id number]  Today's Date: 6/26/2017    Admit Date: 6/20/2017          Discharge Plan       06/26/17 1117    Case Management/Social Work Plan    Additional Comments Spoke with patient regarding transportation to [facility] for IV antibx-would have to use Wheels and he is currently with incomplete application.  Unable to arrange transportation right away, plan IV antibx treatment @ home through home health vs. [facility] office.  Uses [facility]-will consult with orders.      06/26/17 1051    Case Management/Social Work Plan    Plan Home    Patient/Family In Agreement With Plan --   Patient    Additional Comments Received calls from [facility] offering bed for short term rehab; met with Mr. Love-he wishes to go home vs. transfer to SNF.  Plan home with home health; IV antibiotics through [facility]-will arrange transportation.  Per PT, will need rolling walker for home use, will order.              Discharge Codes     None        Expected Discharge Date and Time     Expected Discharge Date Expected Discharge Time    Jun 26, 2017             ANDRY Tierney

## 2017-06-26 NOTE — PROGRESS NOTES
"Adult Nutrition  Assessment/PES    Patient Name:  Yassine Love  YOB: 1945  MRN: [id number]  Admit Date:  6/20/2017    Assessment Date:  6/26/2017        Reason for Assessment       06/26/17 1342    Reason for Assessment    Reason For Assessment/Visit follow up protocol    Time Spent (min) 20    Diagnosis Diagnosis   per notes this admission                Anthropometrics       06/26/17 1342    Anthropometrics    Height 177.8 cm (70\")    Weight 85.3 kg (188 lb 0.8 oz)    Ideal Body Weight (IBW)    Ideal Body Weight (IBW), Male (kg) 76.48    % Ideal Body Weight 111.77    Body Mass Index (BMI)    BMI (kg/m2) 27.04            Labs/Tests/Procedures/Meds       06/26/17 1342    Labs/Tests/Procedures/Meds    Labs/Tests Review Reviewed                Nutrition Prescription Ordered       06/26/17 1342    Nutrition Prescription PO    Current PO Diet Soft Texture    Texture Whole foods    Fluid Consistency Thin            Evaluation of Received Nutrient/Fluid Intake       06/26/17 1342    PO Evaluation    Number of Meals 6    % PO Intake 92              Problem/Interventions:        Problem 1       06/26/17 1420    Nutrition Diagnoses Problem 1    Problem 1 Increased Nutrient Needs    Etiology (related to) Other (comment)   skin integrity.     Signs/Symptoms (evidenced by) Other (comment)   lower ext cellulitis (non healing wounds) x 1 year                    Intervention Goal       06/26/17 1421    Intervention Goal    General Nutrition support treatment    PO Continue positive trend            Nutrition Intervention       06/26/17 1421    Nutrition Intervention    RD/Tech Action Care plan reviewd;Follow Tx progress              Education/Evaluation       06/26/17 1421    Monitor/Evaluation    Monitor Per protocol;PO intake        Comments:      Electronically signed by:  Micki Fallon CNA  06/26/17 2:23 PM  "

## 2017-06-26 NOTE — PLAN OF CARE
Problem: Patient Care Overview (Adult)  Goal: Plan of Care Review  Outcome: Ongoing (interventions implemented as appropriate)  Goal: Adult Individualization and Mutuality  Outcome: Ongoing (interventions implemented as appropriate)  Goal: Discharge Needs Assessment  Outcome: Ongoing (interventions implemented as appropriate)    Problem: Sepsis (Adult)  Goal: Signs and Symptoms of Listed Potential Problems Will be Absent or Manageable (Sepsis)  Outcome: Ongoing (interventions implemented as appropriate)    Problem: Fall Risk (Adult)  Goal: Absence of Falls  Outcome: Ongoing (interventions implemented as appropriate)    Problem: Pressure Ulcer (Adult)  Goal: Signs and Symptoms of Listed Potential Problems Will be Absent or Manageable (Pressure Ulcer)  Outcome: Ongoing (interventions implemented as appropriate)

## 2017-06-26 NOTE — PROGRESS NOTES
Continued Stay Note  [facility]     Patient Name: Yassine Love  MRN: [id number]  Today's Date: 6/26/2017    Admit Date: 6/20/2017          Discharge Plan       06/26/17 1436    Case Management/Social Work Plan    Plan Home    Patient/Family In Agreement With Plan --   Patient    Final Note    Final Note Discharge home today.  Arrangements in place for [facility] (#[phone]) for the following:  daily IV antibx tx (Invanz 1g) x 2 weeks; RN for [facility] care; PT/OT.  Faxed medical record to [facility] per request. Invanz provided through [facility]-patient with no Rx coverage, co-pay for medication over $2,000.  Discussed at length with Mr. Love, who reports he is able to pay cost of medication and wishes to proceed.  Home O2 in place through [facility]; currently with rolling walker @ home.  Will arrange MOW's and [employer] per patient request.  All d/c plans discussed with Osman Duncan & Asad.  Yolanda Alfred, Ext. [phone]              Discharge Codes     None        Expected Discharge Date and Time     Expected Discharge Date Expected Discharge Time    Jun 26, 2017             ANDRY Tierney

## 2017-06-26 NOTE — THERAPY TREATMENT NOTE
Acute Care - Physical Therapy Treatment Note  [facility]     Patient Name: Yassine Love  : 1945  MRN: [id number]  Today's Date: 2017  Onset of Illness/Injury or Date of Surgery Date: 17  Date of Referral to PT: 17  Referring Physician: Dr Otero    Admit Date: 2017    Visit Dx:    ICD-10-CM ICD-9-CM   1. Febrile illness R50.9 780.60   2. Cellulitis of right lower extremity L03.115 682.6   3. Weakness R53.1 780.79   4. Sepsis, due to unspecified organism A41.9 038.9     995.91   5. Renal insufficiency N28.9 593.9   6. Non-traumatic rhabdomyolysis M62.82 728.88   7. Leukocytosis, unspecified type D72.829 288.60   8. Impaired functional mobility, balance, gait, and endurance Z74.09 V49.89   9. Impaired mobility and ADLs Z74.09 799.89     Patient Active Problem List   Diagnosis   • Septic shock   • Cellulitis of right lower extremity   • RAFAEL (acute kidney injury)   • PVD (peripheral vascular disease)   • Streptococcal bacteremia               Adult Rehabilitation Note       17 0807 17 1025       Rehab Assessment/Intervention    Discipline physical therapist  - physical therapist  -     Document Type therapy note (daily note)  - therapy note (daily note)  -     Subjective Information agree to therapy;complains of;pain  - agree to therapy;complains of;pain  -LS     Patient Effort, Rehab Treatment good  -EH good  -LS     Precautions/Limitations fall precautions;oxygen therapy device and L/min   hypersensitive LEs, decreased skin integrity  - fall precautions;oxygen therapy device and L/min;other (see comments)   hypersensitive BLEs with dec skin integrity  -LS     Recorded by [EH] Mary Kate Parra, PT [LS] Zari Rai, PT     Vital Signs    Pre Systolic BP Rehab 112  -  -LS     Pre Treatment Diastolic BP 63  -EH 69  -LS     Post Systolic BP Rehab  124  -LS     Post Treatment Diastolic BP  68  -LS     Pretreatment Heart Rate (beats/min) 98  -EH 80  -LS      Posttreatment Heart Rate (beats/min)  77  -LS     Pre SpO2 (%) 89  -EH 93  -LS     O2 Delivery Pre Treatment room air  - supplemental O2   3L  -LS     Post SpO2 (%) 94  -EH 97  -LS     O2 Delivery Post Treatment supplemental O2  -EH supplemental O2  -LS     Pre Patient Position Sitting  -EH Sitting  -LS     Intra Patient Position Standing  -EH Standing  -LS     Post Patient Position Sitting  -EH Sitting  -LS     Recorded by [EH] Mary Kate Parra, PT [LS] Zari Rai, PT     Pain Assessment    Pain Assessment 0-10  -EH 0-10  -LS     Rojas-Fontenot FACES Pain Rating 4  -EH      Pain Score 3  -EH 6  -LS     Post Pain Score 3  -EH 6  -LS     Pain Type Chronic pain  - Chronic pain  -LS     Pain Location Knee  - Knee   and also hips  -LS     Pain Orientation Left;Right   and hip  - Right;Left  -LS     Pain Intervention(s)  Repositioned;Ambulation/increased activity  -LS     Response to Interventions  tolerated; RN aware  -LS     Recorded by [EH] Mary Kate Parra, PT [LS] Zari Rai, PT     Cognitive Assessment/Intervention    Current Cognitive/Communication Assessment functional  - functional  -LS     Orientation Status oriented x 4  - oriented x 4  -LS     Follows Commands/Answers Questions 100% of the time;able to follow single-step instructions  - able to follow single-step instructions;100% of the time;needs cueing;needs increased time  -     Personal Safety mild impairment  - mild impairment;decreased awareness, need for safety  -     Personal Safety Interventions fall prevention program maintained;nonskid shoes/slippers when out of bed;gait belt  - fall prevention program maintained;gait belt;nonskid shoes/slippers when out of bed  -LS     Recorded by [EH] Mary Kate Parra, PT [LS] Zari Rai, PT     Bed Mobility, Assessment/Treatment    Bed Mobility, Comment pt sitting EOB; requests to be left EOB  -[facility] upon arrival.   -LS     Recorded by [EH] Mary Kate Parra, PT [LS] Zari  KELLY aRi, PT     Transfer Assessment/Treatment    Transfers, Sit-Stand [place] supervision required  - minimum assist (75% patient effort);2 person assist required;verbal cues required  -LS     Transfers, Stand-Sit [place] conditional independence  - minimum assist (75% patient effort);2 person assist required;verbal cues required  -LS     Transfers, Sit-Stand-Sit, Assist Device rolling walker  - rolling walker  -LS     Recorded by [] Mary Kate Parra, PT [] Zari Rai, PT     Gait Assessment/Treatment    Gait, [place] Level supervision required   initially CGA; no LOB  - contact guard assist;2 person assist required  -LS     Gait, Assistive Device rolling walker  - rolling walker  -LS     Gait, Distance (Feet) 200  - 180  -     Gait, Gait Deviations vidhi decreased;forward flexed posture;step length decreased  - vidhi decreased;forward flexed posture;decreased heel strike;step length decreased  -     Gait, Impairments  strength decreased;pain  -     Gait, Comment 3 standing rest breaks; extended time needed; neck in forward flexed posture, per pt can not look up   cues for pursed lip breathing needed  - VC's for PLB and increasing step length within RWx. Required 1 brief seated rest break after approx 80 ft of gait due to fatigue.   -LS     Recorded by [EH] Mary Kate Parra, PT [] Zari Rai, PT     Motor Skills/Interventions    Additional Documentation  Balance Skills Training (Group)  -LS     Recorded by  [LS] Zari Rai, PT     Balance Skills Training    Sitting-Level of Assistance  Close supervision  -     Sitting-Balance Support  Feet supported  -     Standing-Level of Assistance  Contact guard  -     Static Standing Balance Support  assistive device  -     Gait Balance-Level of Assistance  Contact guard;x2  -LS     Gait Balance Support  assistive device  -LS     Recorded by  [LS] Zari Rai, PT     Therapy Exercises    Bilateral Lower  Extremities  AROM:;10 reps;ankle pumps/circles;LAQ;hip abduction/adduction  -LS     Recorded by  [LS] Zari Rai, PT     Positioning and Restraints    Pre-Treatment Position in bed  - sitting in chair/recliner  -LS     Post Treatment Position bed  - chair  -LS     In Bed notified nsg;sitting EOB;call light within reach;encouraged to call for assist;with PT;with nsg   with PT wound care  -EH      In Chair  notified nsg;reclined;call light within reach;encouraged to call for assist;exit alarm on;RUE elevated;LUE elevated;legs elevated;heels elevated  -LS     Recorded by [EH] Mary Kate Parra, PT [LS] Zari Rai, PT       User Key  (r) = Recorded By, (t) = Taken By, (c) = Cosigned By    Initials Name Effective Dates     Mary Kate Parra, PT 06/19/15 -     LS Zari Rai, PT 06/19/15 -                 IP PT Goals       06/26/17 0848 06/23/17 1101 06/23/17 0815    Bed Mobility PT LTG    Bed Mobility PT LTG, Outcome goal not met  -EH goal ongoing  -LS     Transfer Training PT LTG    Transfer Training PT LTG, Outcome goal met  -EH goal ongoing  -LS     Gait Training PT LTG    Gait Training Goal PT LTG, Outcome goal met  - goal ongoing  -LS     Wound Care PT LTG    Wound Care PT LTG 1, Outcome   goal ongoing  -MF      06/22/17 1437 06/22/17 0815 06/21/17 1445    Bed Mobility PT LTG    Bed Mobility PT LTG, Date Established   06/21/17  -LS    Bed Mobility PT LTG, Time to Achieve   2 wks  -LS    Bed Mobility PT LTG, Activity Type   supine to sit/sit to supine  -LS    Bed Mobility PT LTG, [place] Level   supervision required  -LS    Bed Mobility PT LTG, Outcome goal ongoing  -LS      Transfer Training PT LTG    Transfer Training PT LTG, Date Established   06/21/17  -LS    Transfer Training PT LTG, Time to Achieve   2 wks  -LS    Transfer Training PT LTG, Activity Type   sit to stand/stand to sit  -LS    Transfer Training PT LTG, [place] Level   supervision required  -LS    Transfer Training PT LTG,  Assist Device   walker, rolling  -LS    Transfer Training PT LTG, Outcome goal ongoing  -LS      Gait Training PT LTG    Gait Training Goal PT LTG, Date Established   06/21/17  -LS    Gait Training Goal PT LTG, Time to Achieve   2 wks  -LS    Gait Training Goal PT LTG, [place] Level   supervision required  -LS    Gait Training Goal PT LTG, Assist Device   walker, rolling  -LS    Gait Training Goal PT LTG, Distance to Achieve   200  -LS    Gait Training Goal PT LTG, Outcome goal ongoing  -LS      Wound Care PT LTG    Wound Care PT LTG 1, Date Established  06/22/17  -MF     Wound Care PT LTG 1, Time to Achieve  other (see comments)   10 days  -MF     Wound Care PT LTG 1, Location  BLEs  -MF     Wound Care PT LTG 1, No S&S of Infection  yes  -MF     Wound Care PT LTG 1, Decrease Limb Girth %  10  -MF     Wound Care PT LTG 1, No Skin Break Down  yes  -MF     Wound Care PT LTG 1, No New Skin Break Down  yes  -MF       User Key  (r) = Recorded By, (t) = Taken By, (c) = Cosigned By    Initials Name Provider Type    MF Carlos Hassan, PT Physical Therapist     Mary Kate Parra, PT Physical Therapist     Zari Rai, PT Physical Therapist          Physical Therapy Education     Title: PT OT SLP Therapies (Active)     Topic: Physical Therapy (Done)     Point: Mobility training (Done)    Learning Progress Summary    Learner Readiness Method Response Comment Documented by Status   Patient Acceptance E JODY ISAAC teaching on RWx vs standard Wx. Pt recommended to have Rwx.  06/26/17 0845 Done    Acceptance E,D NR   06/23/17 1100 Active    Acceptance E VU   06/23/17 0151 Done    Acceptance E,D NR   06/22/17 1437 Active    Acceptance E,D NR   06/21/17 1445 Active               Point: Home exercise program (Done)    Learning Progress Summary    Learner Readiness Method Response Comment Documented by Status   Patient Acceptance E JODY ISAAC teaching on RWx vs standard Wx. Pt recommended to have Rwx.  06/26/17 0845  Done    Acceptance E,D NR  LS 06/23/17 1100 Active    Acceptance E,D NR  LS 06/22/17 1437 Active               Point: Body mechanics (Done)    Learning Progress Summary    Learner Readiness Method Response Comment Documented by Status   Patient Acceptance E VU,DU teaching on RWx vs standard Wx. Pt recommended to have Rwx.  06/26/17 0845 Done    Acceptance E,D NR  LS 06/23/17 1100 Active    Acceptance E VU  NN 06/23/17 0151 Done    Acceptance E,D NR  LS 06/22/17 1437 Active    Acceptance E,D NR  LS 06/21/17 1445 Active               Point: Precautions (Done)    Learning Progress Summary    Learner Readiness Method Response Comment Documented by Status   Patient Acceptance E VU,DU teaching on RWx vs standard Wx. Pt recommended to have Rwx.  06/26/17 0845 Done    Acceptance E,D NR  LS 06/23/17 1100 Active    Acceptance E,D NR  LS 06/22/17 1437 Active    Acceptance E,D NR  LS 06/21/17 1445 Active                      User Key     Initials Effective Dates Name Provider Type Discipline     06/19/15 -  Mary Kate Parra, PT Physical Therapist PT     06/19/15 -  Zari Rai, PT Physical Therapist PT     06/16/16 -  Demian De La Torre, RN Registered Nurse Nurse                    PT Recommendation and Plan  Anticipated Discharge Disposition: skilled nursing facility  Demonstrates Need for Referral to Another Service: occupational therapy  PT Frequency: daily  Plan of Care Review  Plan Of Care Reviewed With: patient  Outcome Summary/Follow up Plan: Pt had fatigue with need for standing rest breaks during gait x200' using RWx. Pt independence with gait and t/f improved. Pt expected to D/C home. Reports need for new walker for increased steadiness,  a RWx is recommended,          Outcome Measures       06/26/17 0807 06/23/17 1025 06/23/17 1017    How much help from another person do you currently need...    Turning from your back to your side while in flat bed without using bedrails? 3  -EH 3  -LS     Moving from  lying on back to sitting on the side of a flat bed without bedrails? 3  -EH 2  -LS     Moving to and from a bed to a chair (including a wheelchair)? 3  -EH 3  -LS     Standing up from a chair using your arms (e.g., wheelchair, bedside chair)? 4  -EH 3  -LS     Climbing 3-5 steps with a railing? 3  -EH 2  -LS     To walk in hospital room? 3  -EH 3  -LS     AM-PAC 6 Clicks Score 19  -EH 16  -LS     How much help from another is currently needed...    Putting on and taking off regular lower body clothing?   2  -TA    Bathing (including washing, rinsing, and drying)   2  -TA    Toileting (which includes using toilet bed pan or urinal)   2  -TA    Putting on and taking off regular upper body clothing   3  -TA    Taking care of personal grooming (such as brushing teeth)   3  -TA    Eating meals   4  -TA    Score   16  -TA    Functional Assessment    Outcome Measure Options AM-PAC 6 Clicks Basic Mobility (PT)  - AM-PAC 6 Clicks Basic Mobility (PT)  -LS AM-PAC 6 Clicks Daily Activity (OT)  -TA      User Key  (r) = Recorded By, (t) = Taken By, (c) = Cosigned By    Initials Name Provider Type     Mary Kate Parra, PT Physical Therapist    SARWAT Rai, PT Physical Therapist    TA Nehemias Aponte, OT Occupational Therapist           Time Calculation:         PT Charges       06/26/17 0900          Time Calculation    Start Time 0807  -      PT Received On 06/26/17  -      PT Goal Re-Cert Due Date 07/01/17  -      Time Calculation- PT    Total Timed Code Minutes- PT 26 minute(s)  -        User Key  (r) = Recorded By, (t) = Taken By, (c) = Cosigned By    Initials Name Provider Type     Mary Kate Parra, PT Physical Therapist          Therapy Charges for Today     Code Description Service Date Service Provider Modifiers Qty    80651284915 HC GAIT TRAINING EA 15 MIN 6/26/2017 Mary Kate Parra, PT GP 1    15241162450 HC PT THERAPEUTIC ACT EA 15 MIN 6/26/2017 Mary Kate Parra, PT GP 1          PT  G-Codes  Outcome Measure Options: AM-PAC 6 Clicks Basic Mobility (PT)    Mary Kate Parra, PT  6/26/2017

## 2017-06-26 NOTE — PLAN OF CARE
Problem: Patient Care Overview (Adult)  Goal: Plan of Care Review  Outcome: Outcome(s) achieved Date Met:  06/26/17 06/26/17 0848   Coping/Psychosocial Response Interventions   Plan Of Care Reviewed With patient   Outcome Evaluation   Outcome Summary/Follow up Plan Pt had fatigue with need for standing rest breaks during gait x200' using RWx. Pt independence with gait and t/f improved. Pt expected to D/C home. Reports need for new walker for increased steadiness, a RWx is recommended.         Problem: Inpatient Physical Therapy  Goal: Bed Mobility Goal LTG- PT  Outcome: Unable to achieve outcome(s) by discharge Date Met:  06/26/17 06/21/17 1445 06/26/17 0848   Bed Mobility PT LTG   Bed Mobility PT LTG, Date Established 06/21/17 --    Bed Mobility PT LTG, Time to Achieve 2 wks --    Bed Mobility PT LTG, Activity Type supine to sit/sit to supine --    Bed Mobility PT LTG, [place] Level supervision required --    Bed Mobility PT LTG, Outcome --  goal not met       Goal: Transfer Training Goal 1 LTG- PT  Outcome: Outcome(s) achieved Date Met:  06/26/17 06/21/17 1445 06/26/17 0848   Transfer Training PT LTG   Transfer Training PT LTG, Date Established 06/21/17 --    Transfer Training PT LTG, Time to Achieve 2 wks --    Transfer Training PT LTG, Activity Type sit to stand/stand to sit --    Transfer Training PT LTG, [place] Level supervision required --    Transfer Training PT LTG, Assist Device walker, rolling --    Transfer Training PT LTG, Outcome --  goal met       Goal: Gait Training Goal LTG- PT  Outcome: Outcome(s) achieved Date Met:  06/26/17 06/21/17 1445 06/26/17 0848   Gait Training PT LTG   Gait Training Goal PT LTG, Date Established 06/21/17 --    Gait Training Goal PT LTG, Time to Achieve 2 wks --    Gait Training Goal PT LTG, [place] Level supervision required --    Gait Training Goal PT LTG, Assist Device walker, rolling --    Gait Training Goal PT LTG, Distance to Achieve 200 --     Gait Training Goal PT LTG, Outcome --  goal met

## 2017-06-26 NOTE — PLAN OF CARE
Problem: Patient Care Overview (Adult)  Goal: Plan of Care Review  Outcome: Ongoing (interventions implemented as appropriate)    06/26/17 0815   Coping/Psychosocial Response Interventions   Plan Of Care Reviewed With patient   Outcome Evaluation   Outcome Summary/Follow up Plan Pt cont to have significant edema in BLEs, but erythema and ulcerations to B feet noted to be significantly improved.          Problem: Inpatient Physical Therapy  Goal: Wound Care Goal 1 LTG- PT  Outcome: Ongoing (interventions implemented as appropriate)    06/22/17 0815 06/23/17 0815   Wound Care PT LTG   Wound Care PT LTG 1, Date Established 06/22/17 --    Wound Care PT LTG 1, Time to Achieve other (see comments)  (10 days) --    Wound Care PT LTG 1, Location BLEs --    Wound Care PT LTG 1, No S&S of Infection yes --    Wound Care PT LTG 1, Decrease Limb Girth % 10 --    Wound Care PT LTG 1, No Skin Break Down yes --    Wound Care PT LTG 1, No New Skin Break Down yes --    Wound Care PT LTG 1, Outcome --  goal ongoing

## 2017-06-26 NOTE — PROGRESS NOTES
CTS Progress Note       LOS: 6 days   Patient Care Team:  No Known Provider as PCP - General  No Known Provider as PCP - Family Medicine        Vital Signs  Temp:  [97.4 °F (36.3 °C)-98.4 °F (36.9 °C)] 98.1 °F (36.7 °C)  Heart Rate:  [] 88  Resp:  [18] 18  BP: (104-122)/(67-82) 122/82  Chief complaint;  wants to go home  Physical Exam:  Still with some peripheral edema     Results     Results from last 7 days  Lab Units 06/26/17  0338   WBC 10*3/mm3 6.40   HEMOGLOBIN g/dL 11.7*   HEMATOCRIT % 36.3*   PLATELETS 10*3/mm3 264       Results from last 7 days  Lab Units 06/26/17  0338   SODIUM mmol/L 140   POTASSIUM mmol/L 4.4   CHLORIDE mmol/L 106   TOTAL CO2 mmol/L 31.0   BUN mg/dL 13   CREATININE mg/dL 1.00   GLUCOSE mg/dL 86   CALCIUM mg/dL 9.0           Imaging Results (last 24 hours)     Procedure Component Value Units Date/Time    XR Chest 1 View [[id number]] Collected:  06/25/17 1353     Updated:  06/25/17 2231    Narrative:          EXAMINATION: XR CHEST, SINGLE VIEW - 06/25/2017     INDICATION: R50.9-Fever, unspecified; L03.115-Cellulitis of right lower  limb; R53.1-Weakness; A41.9-Sepsis, unspecified organism; N28.9-Disorder  of kidney and ureter, unspecified; M62.82-Rhabdomyolysis;  D72.829-Elevated white blood cell count, unspecified; Z74.09-Other  reduced mobility.      COMPARISON: 6/24/2017 portable chest radiograph.     FINDINGS: Perihilar disease is improved, now with only mild remaining  perihilar edema. There are small pleural effusions and mild bibasilar  atelectasis, also improved. No pneumothorax is seen. PICC line remains  in the distal SVC in good position.           Impression:       Resolving pulmonary vascular congestion.     DICTATED:     06/25/2017  EDITED:         06/25/2017     This report was finalized on 6/25/2017 10:29 PM by DR. Chip Hsieh MD.             Assessment    Principal Problem:    Septic shock  Active Problems:    Cellulitis of right lower extremity    RAFAEL (acute kidney  injury)    PVD (peripheral vascular disease)    Streptococcal bacteremia    Patient initially refused CT angiogram.  He then agreed to CT angiogram and the orders still in place but apparently the test has  not been done.  I feel quite confident this patient has no significant arterial disease.  He has  good-quality Doppler signals in both the posterior tibial and dorsalis pedis vessels bilaterally.  These are difficult to palpate due to peripheral edema.  Also I think his primary problem is venous stasis disease.  At this point I see no real advantage in aggressively pursuing a CT angiogram if Dr. Kamara thinks the patient is able to be discharged home today.  Agree with discharge home today and cancel CT angiogram  Plan   Home when okay with medical service    Please note that portions of this note were completed with a voice recognition program. Efforts were made to edit the dictations, but occasionally words are mistranscribed.    Mario Moore MD  06/26/17  7:26 AM
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
## 2017-06-26 NOTE — DISCHARGE SUMMARY
[facility] Medicine Services  DISCHARGE SUMMARY       Date of Admission: 6/20/2017  Date of Discharge:  6/26/2017  Primary Care Physician: No Known Provider  Consulting Physician(s)     Provider Relationship    Paul Duncan MD Consulting Physician          Discharge Diagnoses:  Active Hospital Problems (** Indicates Principal Problem)    Diagnosis Date Noted   • **Septic shock [A41.9, R65.21] 06/20/2017   • Streptococcal bacteremia [R78.81] 06/24/2017     Priority: Medium   • Cellulitis of right lower extremity [L03.115] 06/20/2017     Priority: Medium   • RAFAEL (acute kidney injury) [N17.9] 06/20/2017     Priority: Medium   • PVD (peripheral vascular disease) [I73.9] 06/20/2017      Resolved Hospital Problems    Diagnosis Date Noted Date Resolved   • Febrile illness [R50.9] 06/21/2017 06/24/2017       Presenting Problem/History of Present Illness  Febrile illness [R50.9]     Chief Complaint on Day of Discharge: LE wounds    History of Present Illness on Day of Discharge:   No problems overnight.  He is anxious to get home and still declines any skilled facility.    Hospital Course  Patient is a 71 y.o. male with multiple medical problems who was found on the floor by his home health nurse and unable to get up.  He was noted to have temperature 100.2 in the emergency room initial blood pressure systolic was 78.  He has had waxing and waning edema and erythema of both lower extremities and was currently taking doxycycline on arrival.  He was admitted into the ICU with septic shock and did eventually require pressors.  White blood cell count 24,000 on arrival.  Resume source was lower extremity cellulitis.  Blood cultures ultimately returned with streptococcus.  Dr. Duncan from infectious disease was consulted and antibiotics were modified to Invanz with plan to complete 2 weeks of IV therapy.  He was monitored on the ICU until he became hemodynamically stable.  There was some  "consideration of any peripheral arterial disease and vascular surgery was consulted however he had pulses by Doppler in the foot was warm.  There was some talk of an angiogram to further evaluate this but ultimately decided this was not indicated.  His renal failure did improve to normal.  The patient was recommended to go to some inpatient rehabilitation due to difficulty walking and generalized weakness however he feels he is at his baseline and will do okay at home.   has arranged a rolling walker, home health, and his home IV antibiotics.  He will follow-up with Dr. Duncan for final determination of antibiotic length.  PICC line is in place prior to discharge.      Procedures Performed         Consults:   Consults     Date and Time Order Name Status Description    6/21/2017 1544 Inpatient Consult to Vascular Surgery Completed     6/20/2017 2242 Inpatient Consult to Infectious Diseases Completed           Pertinent Test Results:  Blood cultures 2 of 2 bottles- beta hemolytic streptococcus, group C    Echocardiogram showed a dilated left atrium, normal LV function, trace MR and TR    Creatinine was 1.7 on admission, 1.03 at discharge    Condition on Discharge:  Fair    Physical Exam on Discharge:/63  Pulse 97  Temp 99 °F (37.2 °C) (Oral)   Resp 22  Ht 70\" (177.8 cm)  Wt 188 lb (85.3 kg)  SpO2 93%  BMI 26.98 kg/m2  Physical Exam   Constitutional: He is oriented to person, place, and time. He appears well-developed and well-nourished.   Up in bed, no distress, mild chronically ill appearance.   HENT:   Head: Normocephalic and atraumatic.   Cardiovascular: Normal rate, regular rhythm and normal heart sounds.    PICC in RUE   Pulmonary/Chest: Effort normal and breath sounds normal.   Diminished at bases   Abdominal: Soft. Bowel sounds are normal. He exhibits no distension.   obese   Musculoskeletal: He exhibits no edema.   Legs wrapped bilaterally to knees, some pitting edema in knees and thighs "   Neurological: He is alert and oriented to person, place, and time. No cranial nerve deficit.   Skin: Skin is warm and dry.   Psychiatric: He has a normal mood and affect.   Vitals reviewed.        Discharge Disposition  Home or Self Care    Discharge Medications   Yassine Love   Home Medication Instructions LEXA:[id number]    Printed on:06/26/17 5236   Medication Information                      albuterol (PROVENTIL HFA;VENTOLIN HFA) 108 (90 BASE) MCG/ACT inhaler  Inhale 2 puffs Every 4 (Four) Hours As Needed for Wheezing.             budesonide-formoterol (SYMBICORT) 80-4.5 MCG/ACT inhaler  Inhale 2 puffs 2 (Two) Times a Day.             docusate sodium (COLACE) 250 MG capsule  Take 250 mg by mouth Daily.             ertapenem (INVanz) 1 g/100 mL 0.9% NS VTB (mbp)  Infuse 100 mL into a venous catheter Daily. Indications: Skin and Soft Tissue Infection             furosemide (LASIX) 20 MG tablet  Take 40 mg by mouth 2 (Two) Times a Day.             HYDROcodone-acetaminophen (NORCO) 7.5-325 MG per tablet  Take 1 tablet by mouth Daily.             meloxicam (MOBIC) 15 MG tablet  Take 15 mg by mouth Daily.             metoprolol tartrate (LOPRESSOR) 100 MG tablet  Take 100 mg by mouth 2 (Two) Times a Day.             Morphine (MSIR) 15 MG tablet  Take 15 mg by mouth Every 6 (Six) Hours As Needed for Severe Pain (7-10).             tiotropium (SPIRIVA) 18 MCG per inhalation capsule  Place 1 capsule into inhaler and inhale Daily.                 Discharge Diet: Cardiac    Discharge Care Plan / Instructions:    Activity at Discharge:   Activity Instructions     Activity as Tolerated                   Follow-up Appointments  No future appointments.  Additional Instructions for the Follow-ups that You Need to Schedule     Discharge Follow-up with Specialty    As directed    Specialty:  Dr. Duncan per him               Test Results Pending at Discharge   Order Current Status    Blood Culture Preliminary result    Blood  Culture Preliminary result           Travis Kamara MD 06/26/17 11:36 AM    Time: Discharge 40 min    Please note that portions of this note may have been completed with a voice recognition program. Efforts were made to edit the dictations, but occasionally words are mistranscribed.

## 2017-06-26 NOTE — PROGRESS NOTES
Continued Stay Note  [facility]     Patient Name: Yassine Love  MRN: [id number]  Today's Date: 6/26/2017    Admit Date: 6/20/2017          Discharge Plan       06/26/17 1051    Case Management/Social Work Plan    Plan Home    Patient/Family In Agreement With Plan --   Patient    Additional Comments Received calls from [facility] [place] offering bed for short term rehab; met with Mr. Love-he wishes to go home vs. transfer to SNF.  Plan home with home health; IV antibiotics through [facility]-will arrange transportation.  Per PT, will need rolling walker for home use, will order.  Yolanda Alfred, Ext. [phone]              Discharge Codes     None        Expected Discharge Date and Time     Expected Discharge Date Expected Discharge Time    Jun 26, 2017             ANDRY Tierney

## 2017-06-27 NOTE — THERAPY DISCHARGE NOTE
Acute Care - Physical Therapy Discharge Summary  [facility]       Patient Name: Yassine Love  : 1945  MRN: [id number]    Today's Date: 2017  Onset of Illness/Injury or Date of Surgery Date: 17    Date of Referral to PT: 17  Referring Physician: Dr Otero      Admit Date: 2017      PT Recommendation and Plan    Visit Dx:    ICD-10-CM ICD-9-CM   1. Febrile illness R50.9 780.60   2. Cellulitis of right lower extremity L03.115 682.6   3. Weakness R53.1 780.79   4. Sepsis, due to unspecified organism A41.9 038.9     995.91   5. Renal insufficiency N28.9 593.9   6. Non-traumatic rhabdomyolysis M62.82 728.88   7. Leukocytosis, unspecified type D72.829 288.60   8. Impaired functional mobility, balance, gait, and endurance Z74.09 V49.89   9. Impaired mobility and ADLs Z74.09 799.89             Outcome Measures       17 0807          How much help from another person do you currently need...    Turning from your back to your side while in flat bed without using bedrails? 3  -EH      Moving from lying on back to sitting on the side of a flat bed without bedrails? 3  -EH      Moving to and from a bed to a chair (including a wheelchair)? 3  -EH      Standing up from a chair using your arms (e.g., wheelchair, bedside chair)? 4  -EH      Climbing 3-5 steps with a railing? 3  -EH      To walk in hospital room? 3  -EH      AM-PAC 6 Clicks Score 19  -EH      Functional Assessment    Outcome Measure Options AM-PAC 6 Clicks Basic Mobility (PT)  -EH        User Key  (r) = Recorded By, (t) = Taken By, (c) = Cosigned By    Initials Name Provider Type     Mary Kate Parra, PT Physical Therapist                      IP PT Goals       17 0848 17 1101 17 0815    Bed Mobility PT LTG    Bed Mobility PT LTG, Outcome goal not met  -EH goal ongoing  -LS     Transfer Training PT LTG    Transfer Training PT LTG, Outcome goal met  -EH goal ongoing  -LS     Gait Training PT LTG    Gait  Training Goal PT LTG, Outcome goal met  - goal ongoing  -LS     Wound Care PT LTG    Wound Care PT LTG 1, Outcome   goal ongoing  -MF      06/22/17 1437 06/22/17 0815 06/21/17 1445    Bed Mobility PT LTG    Bed Mobility PT LTG, Date Established   06/21/17  -LS    Bed Mobility PT LTG, Time to Achieve   2 wks  -LS    Bed Mobility PT LTG, Activity Type   supine to sit/sit to supine  -LS    Bed Mobility PT LTG, [place] Level   supervision required  -LS    Bed Mobility PT LTG, Outcome goal ongoing  -LS      Transfer Training PT LTG    Transfer Training PT LTG, Date Established   06/21/17  -LS    Transfer Training PT LTG, Time to Achieve   2 wks  -LS    Transfer Training PT LTG, Activity Type   sit to stand/stand to sit  -LS    Transfer Training PT LTG, [place] Level   supervision required  -LS    Transfer Training PT LTG, Assist Device   walker, rolling  -LS    Transfer Training PT LTG, Outcome goal ongoing  -LS      Gait Training PT LTG    Gait Training Goal PT LTG, Date Established   06/21/17  -LS    Gait Training Goal PT LTG, Time to Achieve   2 wks  -LS    Gait Training Goal PT LTG, [place] Level   supervision required  -LS    Gait Training Goal PT LTG, Assist Device   walker, rolling  -LS    Gait Training Goal PT LTG, Distance to Achieve   200  -LS    Gait Training Goal PT LTG, Outcome goal ongoing  -LS      Wound Care PT LTG    Wound Care PT LTG 1, Date Established  06/22/17  -MF     Wound Care PT LTG 1, Time to Achieve  other (see comments)   10 days  -MF     Wound Care PT LTG 1, Location  BLEs  -MF     Wound Care PT LTG 1, No S&S of Infection  yes  -MF     Wound Care PT LTG 1, Decrease Limb Girth %  10  -MF     Wound Care PT LTG 1, No Skin Break Down  yes  -MF     Wound Care PT LTG 1, No New Skin Break Down  yes  -MF       User Key  (r) = Recorded By, (t) = Taken By, (c) = Cosigned By    Initials Name Provider Type    ALBANIA Hassan, PT Physical Therapist     Mary Ktae Parra, PT  Physical Therapist    SARWAT Rai, PT Physical Therapist            Goals Status: Treatment plan discontinued secondary to discharge from acute facility.  PT Discharge Summary  Reason for Discharge: Discharge from facility  Outcomes Achieved: Refer to plan of care for updates on goals achieved  Discharge Destination: Home with home health      Annie Templeton, PT   6/27/2017

## 2017-07-05 ENCOUNTER — TRANSCRIBE ORDERS (OUTPATIENT)
Dept: PHYSICAL THERAPY | Facility: HOSPITAL | Age: 72
End: 2017-07-05

## 2017-07-05 DIAGNOSIS — L03.119 CELLULITIS OF LOWER EXTREMITY, UNSPECIFIED LATERALITY: Primary | ICD-10-CM

## 2017-09-02 NOTE — PROGRESS NOTES
[facility] Emergency Department    [address] [place] [address]    Phone:  [phone]    Fax:  [phone]                                       Leland Min   MRN: [id number]    Department:  [facility] Emergency Department   Date of Visit:  9/2/2017           After Visit Summary Signature Page     I have received my discharge instructions, and my questions have been answered. I have discussed any challenges I see with this plan with the nurse or doctor.    ..........................................................................................................................................  Patient/Patient Representative Signature      ..........................................................................................................................................  Patient Representative Print Name and Relationship to Patient    ..................................................               ................................................  Date                                            Time    ..........................................................................................................................................  Reviewed by Signature/Title    ...................................................              ..............................................  Date                                                            Time           Adult Nutrition  Assessment/PES    Patient Name:  Yassine Love  YOB: 1945  MRN: [id number]  Admit Date:  6/20/2017    Assessment Date:  6/23/2017        Reason for Assessment       06/23/17 1211    Reason for Assessment    Reason For Assessment/Visit follow up protocol;multidisciplinary rounds    Time Spent (min) 30    Diagnosis Diagnosis   per previous nutrition note.               Nutrition/Diet History       06/23/17 1211    Nutrition/Diet History    Reported/Observed By Patient    Other good appetite, likes food provided.               Labs/Tests/Procedures/Meds       06/23/17 1212    Labs/Tests/Procedures/Meds    Labs/Tests Review Reviewed;Phos   replacement protocol ordered.     Medication Review Reviewed, pertinent                Nutrition Prescription Ordered       06/23/17 1213    Nutrition Prescription PO    Current PO Diet Regular    Texture Whole foods            Evaluation of Received Nutrient/Fluid Intake       06/23/17 1213    PO Evaluation    Number of Days PO Intake Evaluated 1 day    Number of Meals 3    % PO Intake 58              Problem/Interventions:        Problem 1       06/23/17 1213    Nutrition Diagnoses Problem 1    Problem 1 Increased Nutrient Needs    Etiology (related to) Other (comment)   skin integrity.     Signs/Symptoms (evidenced by) Other (comment)   lower ext cellulitis (non healing wounds) x 1 year                    Intervention Goal       06/23/17 1214    Intervention Goal    General Nutrition support treatment    PO Continue positive trend            Nutrition Intervention       06/23/17 1214    Nutrition Intervention    RD/Tech Action Care plan reviewd;Follow Tx progress;Supplement provided;Interview for preference;Encourage intake;Menu adjusted              Education/Evaluation       06/23/17 1214    Monitor/Evaluation    Monitor Per protocol;PO intake;Pertinent labs            Electronically signed by:  Emerald Bowen RD  06/23/17 12:14 PM

## 2017-09-08 ENCOUNTER — TRANSCRIBE ORDERS (OUTPATIENT)
Dept: PHYSICAL THERAPY | Facility: HOSPITAL | Age: 72
End: 2017-09-08

## 2017-09-08 DIAGNOSIS — I89.0 LYMPH EDEMA: Primary | ICD-10-CM

## 2018-02-16 ENCOUNTER — HOSPITAL ENCOUNTER (INPATIENT)
Facility: HOSPITAL | Age: 73
LOS: 27 days | Discharge: NURSING, REHAB OR LONG-TERM CARE | End: 2018-03-15
Attending: EMERGENCY MEDICINE | Admitting: INTERNAL MEDICINE

## 2018-02-16 ENCOUNTER — APPOINTMENT (OUTPATIENT)
Dept: GENERAL RADIOLOGY | Facility: HOSPITAL | Age: 73
End: 2018-02-16

## 2018-02-16 DIAGNOSIS — Z74.09 IMPAIRED MOBILITY AND ADLS: ICD-10-CM

## 2018-02-16 DIAGNOSIS — Z74.09 IMPAIRED FUNCTIONAL MOBILITY, BALANCE, GAIT, AND ENDURANCE: ICD-10-CM

## 2018-02-16 DIAGNOSIS — I10 ESSENTIAL HYPERTENSION: ICD-10-CM

## 2018-02-16 DIAGNOSIS — Z78.9 IMPAIRED MOBILITY AND ADLS: ICD-10-CM

## 2018-02-16 DIAGNOSIS — L03.116 BILATERAL CELLULITIS OF LOWER LEG: Primary | ICD-10-CM

## 2018-02-16 DIAGNOSIS — L03.115 CELLULITIS OF RIGHT LOWER EXTREMITY: ICD-10-CM

## 2018-02-16 DIAGNOSIS — I73.9 PVD (PERIPHERAL VASCULAR DISEASE) (HCC): ICD-10-CM

## 2018-02-16 DIAGNOSIS — L03.116 CELLULITIS OF BOTH LOWER EXTREMITIES: ICD-10-CM

## 2018-02-16 DIAGNOSIS — R09.02 HYPOXIA: ICD-10-CM

## 2018-02-16 DIAGNOSIS — L03.115 BILATERAL CELLULITIS OF LOWER LEG: Primary | ICD-10-CM

## 2018-02-16 DIAGNOSIS — L03.115 CELLULITIS OF BOTH LOWER EXTREMITIES: ICD-10-CM

## 2018-02-16 DIAGNOSIS — J44.9 CHRONIC OBSTRUCTIVE PULMONARY DISEASE, UNSPECIFIED COPD TYPE (HCC): ICD-10-CM

## 2018-02-16 DIAGNOSIS — N17.9 AKI (ACUTE KIDNEY INJURY) (HCC): ICD-10-CM

## 2018-02-16 PROBLEM — E87.1 HYPONATREMIA: Status: ACTIVE | Noted: 2018-02-16

## 2018-02-16 LAB
ALBUMIN SERPL-MCNC: 3.4 G/DL (ref 3.2–4.8)
ALBUMIN/GLOB SERPL: 0.9 G/DL (ref 1.5–2.5)
ALP SERPL-CCNC: 110 U/L (ref 25–100)
ALT SERPL W P-5'-P-CCNC: 7 U/L (ref 7–40)
ANION GAP SERPL CALCULATED.3IONS-SCNC: 6 MMOL/L (ref 3–11)
AST SERPL-CCNC: 16 U/L (ref 0–33)
BASOPHILS # BLD AUTO: 0.03 10*3/MM3 (ref 0–0.2)
BASOPHILS NFR BLD AUTO: 0.2 % (ref 0–1)
BILIRUB SERPL-MCNC: 0.4 MG/DL (ref 0.3–1.2)
BNP SERPL-MCNC: 33 PG/ML (ref 0–100)
BUN BLD-MCNC: 33 MG/DL (ref 9–23)
BUN/CREAT SERPL: 19.4 (ref 7–25)
CALCIUM SPEC-SCNC: 8.5 MG/DL (ref 8.7–10.4)
CHLORIDE SERPL-SCNC: 101 MMOL/L (ref 99–109)
CO2 SERPL-SCNC: 23 MMOL/L (ref 20–31)
CREAT BLD-MCNC: 1.7 MG/DL (ref 0.6–1.3)
DEPRECATED RDW RBC AUTO: 62.3 FL (ref 37–54)
EOSINOPHIL # BLD AUTO: 0.06 10*3/MM3 (ref 0–0.3)
EOSINOPHIL NFR BLD AUTO: 0.4 % (ref 0–3)
ERYTHROCYTE [DISTWIDTH] IN BLOOD BY AUTOMATED COUNT: 17 % (ref 11.3–14.5)
GFR SERPL CREATININE-BSD FRML MDRD: 40 ML/MIN/1.73
GLOBULIN UR ELPH-MCNC: 3.9 GM/DL
GLUCOSE BLD-MCNC: 118 MG/DL (ref 70–100)
HCT VFR BLD AUTO: 37.8 % (ref 38.9–50.9)
HGB BLD-MCNC: 12.5 G/DL (ref 13.1–17.5)
HOLD SPECIMEN: NORMAL
HOLD SPECIMEN: NORMAL
IMM GRANULOCYTES # BLD: 0.04 10*3/MM3 (ref 0–0.03)
IMM GRANULOCYTES NFR BLD: 0.3 % (ref 0–0.6)
LYMPHOCYTES # BLD AUTO: 0.45 10*3/MM3 (ref 0.6–4.8)
LYMPHOCYTES NFR BLD AUTO: 3.2 % (ref 24–44)
MCH RBC QN AUTO: 33.2 PG (ref 27–31)
MCHC RBC AUTO-ENTMCNC: 33.1 G/DL (ref 32–36)
MCV RBC AUTO: 100.3 FL (ref 80–99)
MONOCYTES # BLD AUTO: 1.34 10*3/MM3 (ref 0–1)
MONOCYTES NFR BLD AUTO: 9.6 % (ref 0–12)
NEUTROPHILS # BLD AUTO: 12.07 10*3/MM3 (ref 1.5–8.3)
NEUTROPHILS NFR BLD AUTO: 86.3 % (ref 41–71)
PLATELET # BLD AUTO: 340 10*3/MM3 (ref 150–450)
PMV BLD AUTO: 9.3 FL (ref 6–12)
POTASSIUM BLD-SCNC: 4.1 MMOL/L (ref 3.5–5.5)
PROT SERPL-MCNC: 7.3 G/DL (ref 5.7–8.2)
RBC # BLD AUTO: 3.77 10*6/MM3 (ref 4.2–5.76)
SODIUM BLD-SCNC: 130 MMOL/L (ref 132–146)
TROPONIN I SERPL-MCNC: 0.02 NG/ML (ref 0–0.07)
WBC NRBC COR # BLD: 13.99 10*3/MM3 (ref 3.5–10.8)
WHOLE BLOOD HOLD SPECIMEN: NORMAL
WHOLE BLOOD HOLD SPECIMEN: NORMAL

## 2018-02-16 PROCEDURE — 25010000002 MEROPENEM: Performed by: PHYSICIAN ASSISTANT

## 2018-02-16 PROCEDURE — 25010000002 HYDROMORPHONE PER 4 MG: Performed by: EMERGENCY MEDICINE

## 2018-02-16 PROCEDURE — 25010000002 ONDANSETRON PER 1 MG: Performed by: PHYSICIAN ASSISTANT

## 2018-02-16 PROCEDURE — 84484 ASSAY OF TROPONIN QUANT: CPT

## 2018-02-16 PROCEDURE — 99406 BEHAV CHNG SMOKING 3-10 MIN: CPT | Performed by: NURSE PRACTITIONER

## 2018-02-16 PROCEDURE — 93005 ELECTROCARDIOGRAM TRACING: CPT | Performed by: EMERGENCY MEDICINE

## 2018-02-16 PROCEDURE — 80053 COMPREHEN METABOLIC PANEL: CPT | Performed by: EMERGENCY MEDICINE

## 2018-02-16 PROCEDURE — 25010000002 HEPARIN (PORCINE) PER 1000 UNITS: Performed by: INTERNAL MEDICINE

## 2018-02-16 PROCEDURE — 25010000002 VANCOMYCIN PER 500 MG: Performed by: PHYSICIAN ASSISTANT

## 2018-02-16 PROCEDURE — 99285 EMERGENCY DEPT VISIT HI MDM: CPT

## 2018-02-16 PROCEDURE — 85025 COMPLETE CBC W/AUTO DIFF WBC: CPT | Performed by: EMERGENCY MEDICINE

## 2018-02-16 PROCEDURE — 83880 ASSAY OF NATRIURETIC PEPTIDE: CPT | Performed by: EMERGENCY MEDICINE

## 2018-02-16 PROCEDURE — 99223 1ST HOSP IP/OBS HIGH 75: CPT | Performed by: INTERNAL MEDICINE

## 2018-02-16 PROCEDURE — 87205 SMEAR GRAM STAIN: CPT | Performed by: PHYSICIAN ASSISTANT

## 2018-02-16 PROCEDURE — 87040 BLOOD CULTURE FOR BACTERIA: CPT | Performed by: PHYSICIAN ASSISTANT

## 2018-02-16 PROCEDURE — 87070 CULTURE OTHR SPECIMN AEROBIC: CPT | Performed by: PHYSICIAN ASSISTANT

## 2018-02-16 PROCEDURE — 87147 CULTURE TYPE IMMUNOLOGIC: CPT | Performed by: PHYSICIAN ASSISTANT

## 2018-02-16 PROCEDURE — 25010000002 HYDROMORPHONE PER 4 MG: Performed by: PHYSICIAN ASSISTANT

## 2018-02-16 PROCEDURE — 71045 X-RAY EXAM CHEST 1 VIEW: CPT

## 2018-02-16 PROCEDURE — 87077 CULTURE AEROBIC IDENTIFY: CPT | Performed by: PHYSICIAN ASSISTANT

## 2018-02-16 PROCEDURE — 87186 SC STD MICRODIL/AGAR DIL: CPT | Performed by: PHYSICIAN ASSISTANT

## 2018-02-16 PROCEDURE — 25010000002 VANCOMYCIN

## 2018-02-16 RX ORDER — HEPARIN SODIUM 5000 [USP'U]/ML
5000 INJECTION, SOLUTION INTRAVENOUS; SUBCUTANEOUS EVERY 8 HOURS SCHEDULED
Status: DISCONTINUED | OUTPATIENT
Start: 2018-02-16 | End: 2018-03-15 | Stop reason: HOSPADM

## 2018-02-16 RX ORDER — METOPROLOL TARTRATE 100 MG/1
100 TABLET ORAL EVERY 12 HOURS SCHEDULED
Status: DISCONTINUED | OUTPATIENT
Start: 2018-02-16 | End: 2018-02-18

## 2018-02-16 RX ORDER — ONDANSETRON 2 MG/ML
4 INJECTION INTRAMUSCULAR; INTRAVENOUS EVERY 6 HOURS PRN
Status: DISCONTINUED | OUTPATIENT
Start: 2018-02-16 | End: 2018-03-15 | Stop reason: HOSPADM

## 2018-02-16 RX ORDER — DOCUSATE SODIUM 100 MG/1
200 CAPSULE, LIQUID FILLED ORAL DAILY
Status: DISCONTINUED | OUTPATIENT
Start: 2018-02-17 | End: 2018-03-15 | Stop reason: HOSPADM

## 2018-02-16 RX ORDER — BISACODYL 10 MG
10 SUPPOSITORY, RECTAL RECTAL DAILY PRN
Status: DISCONTINUED | OUTPATIENT
Start: 2018-02-16 | End: 2018-03-15 | Stop reason: HOSPADM

## 2018-02-16 RX ORDER — ACETAMINOPHEN 325 MG/1
650 TABLET ORAL EVERY 4 HOURS PRN
Status: DISCONTINUED | OUTPATIENT
Start: 2018-02-16 | End: 2018-03-15 | Stop reason: HOSPADM

## 2018-02-16 RX ORDER — FUROSEMIDE 40 MG/1
40 TABLET ORAL DAILY
Status: DISCONTINUED | OUTPATIENT
Start: 2018-02-17 | End: 2018-02-18

## 2018-02-16 RX ORDER — VANCOMYCIN HYDROCHLORIDE 1 G/200ML
1000 INJECTION, SOLUTION INTRAVENOUS ONCE
Status: COMPLETED | OUTPATIENT
Start: 2018-02-16 | End: 2018-02-16

## 2018-02-16 RX ORDER — MORPHINE SULFATE 30 MG/1
15 TABLET ORAL EVERY 6 HOURS PRN
Status: DISCONTINUED | OUTPATIENT
Start: 2018-02-16 | End: 2018-02-17

## 2018-02-16 RX ORDER — BUDESONIDE AND FORMOTEROL FUMARATE DIHYDRATE 80; 4.5 UG/1; UG/1
2 AEROSOL RESPIRATORY (INHALATION)
Status: DISCONTINUED | OUTPATIENT
Start: 2018-02-17 | End: 2018-03-06

## 2018-02-16 RX ORDER — SODIUM CHLORIDE 0.9 % (FLUSH) 0.9 %
1-10 SYRINGE (ML) INJECTION AS NEEDED
Status: DISCONTINUED | OUTPATIENT
Start: 2018-02-16 | End: 2018-03-15 | Stop reason: HOSPADM

## 2018-02-16 RX ORDER — HYDROMORPHONE HYDROCHLORIDE 1 MG/ML
0.25 INJECTION, SOLUTION INTRAMUSCULAR; INTRAVENOUS; SUBCUTANEOUS ONCE
Status: COMPLETED | OUTPATIENT
Start: 2018-02-16 | End: 2018-02-16

## 2018-02-16 RX ORDER — IPRATROPIUM BROMIDE AND ALBUTEROL SULFATE 2.5; .5 MG/3ML; MG/3ML
3 SOLUTION RESPIRATORY (INHALATION)
Status: DISCONTINUED | OUTPATIENT
Start: 2018-02-17 | End: 2018-03-15 | Stop reason: HOSPADM

## 2018-02-16 RX ORDER — SODIUM CHLORIDE 0.9 % (FLUSH) 0.9 %
10 SYRINGE (ML) INJECTION AS NEEDED
Status: DISCONTINUED | OUTPATIENT
Start: 2018-02-16 | End: 2018-03-15 | Stop reason: HOSPADM

## 2018-02-16 RX ORDER — VANCOMYCIN HYDROCHLORIDE 1 G/200ML
1000 INJECTION, SOLUTION INTRAVENOUS EVERY 24 HOURS
Status: DISCONTINUED | OUTPATIENT
Start: 2018-02-17 | End: 2018-02-16

## 2018-02-16 RX ADMIN — HYDROMORPHONE HYDROCHLORIDE 0.25 MG: 10 INJECTION INTRAMUSCULAR; INTRAVENOUS; SUBCUTANEOUS at 20:06

## 2018-02-16 RX ADMIN — HYDROMORPHONE HYDROCHLORIDE 0.25 MG: 10 INJECTION INTRAMUSCULAR; INTRAVENOUS; SUBCUTANEOUS at 21:11

## 2018-02-16 RX ADMIN — HEPARIN SODIUM 5000 UNITS: 5000 INJECTION, SOLUTION INTRAVENOUS; SUBCUTANEOUS at 22:43

## 2018-02-16 RX ADMIN — NICOTINE 1 PATCH: 7 PATCH, EXTENDED RELEASE TRANSDERMAL at 20:59

## 2018-02-16 RX ADMIN — VANCOMYCIN HYDROCHLORIDE 500 MG: 500 INJECTION, POWDER, LYOPHILIZED, FOR SOLUTION INTRAVENOUS at 23:32

## 2018-02-16 RX ADMIN — MEROPENEM 1 G: 1 INJECTION, POWDER, FOR SOLUTION INTRAVENOUS at 20:20

## 2018-02-16 RX ADMIN — VANCOMYCIN HYDROCHLORIDE 1000 MG: 1 INJECTION, SOLUTION INTRAVENOUS at 20:22

## 2018-02-16 RX ADMIN — ONDANSETRON 4 MG: 2 INJECTION INTRAMUSCULAR; INTRAVENOUS at 20:06

## 2018-02-17 PROBLEM — E87.1 HYPONATREMIA: Status: RESOLVED | Noted: 2018-02-16 | Resolved: 2018-02-17

## 2018-02-17 LAB
ALBUMIN SERPL-MCNC: 2.8 G/DL (ref 3.2–4.8)
ALBUMIN/GLOB SERPL: 0.9 G/DL (ref 1.5–2.5)
ALP SERPL-CCNC: 91 U/L (ref 25–100)
ALT SERPL W P-5'-P-CCNC: 4 U/L (ref 7–40)
ANION GAP SERPL CALCULATED.3IONS-SCNC: 2 MMOL/L (ref 3–11)
AST SERPL-CCNC: 15 U/L (ref 0–33)
BASOPHILS # BLD AUTO: 0.03 10*3/MM3 (ref 0–0.2)
BASOPHILS NFR BLD AUTO: 0.3 % (ref 0–1)
BILIRUB SERPL-MCNC: 0.5 MG/DL (ref 0.3–1.2)
BUN BLD-MCNC: 32 MG/DL (ref 9–23)
BUN/CREAT SERPL: 21.3 (ref 7–25)
CALCIUM SPEC-SCNC: 8.1 MG/DL (ref 8.7–10.4)
CHLORIDE SERPL-SCNC: 104 MMOL/L (ref 99–109)
CO2 SERPL-SCNC: 28 MMOL/L (ref 20–31)
CREAT BLD-MCNC: 1.5 MG/DL (ref 0.6–1.3)
D-LACTATE SERPL-SCNC: 0.8 MMOL/L (ref 0.5–2)
DEPRECATED RDW RBC AUTO: 62.2 FL (ref 37–54)
EOSINOPHIL # BLD AUTO: 0.1 10*3/MM3 (ref 0–0.3)
EOSINOPHIL NFR BLD AUTO: 1.1 % (ref 0–3)
ERYTHROCYTE [DISTWIDTH] IN BLOOD BY AUTOMATED COUNT: 16.8 % (ref 11.3–14.5)
GFR SERPL CREATININE-BSD FRML MDRD: 46 ML/MIN/1.73
GLOBULIN UR ELPH-MCNC: 3.1 GM/DL
GLUCOSE BLD-MCNC: 112 MG/DL (ref 70–100)
HCT VFR BLD AUTO: 33.3 % (ref 38.9–50.9)
HGB BLD-MCNC: 10.7 G/DL (ref 13.1–17.5)
IMM GRANULOCYTES # BLD: 0.03 10*3/MM3 (ref 0–0.03)
IMM GRANULOCYTES NFR BLD: 0.3 % (ref 0–0.6)
LYMPHOCYTES # BLD AUTO: 0.71 10*3/MM3 (ref 0.6–4.8)
LYMPHOCYTES NFR BLD AUTO: 7.6 % (ref 24–44)
MCH RBC QN AUTO: 32.3 PG (ref 27–31)
MCHC RBC AUTO-ENTMCNC: 32.1 G/DL (ref 32–36)
MCV RBC AUTO: 100.6 FL (ref 80–99)
MONOCYTES # BLD AUTO: 1.38 10*3/MM3 (ref 0–1)
MONOCYTES NFR BLD AUTO: 14.7 % (ref 0–12)
NEUTROPHILS # BLD AUTO: 7.13 10*3/MM3 (ref 1.5–8.3)
NEUTROPHILS NFR BLD AUTO: 76 % (ref 41–71)
PLATELET # BLD AUTO: 279 10*3/MM3 (ref 150–450)
PMV BLD AUTO: 8.9 FL (ref 6–12)
POTASSIUM BLD-SCNC: 4.2 MMOL/L (ref 3.5–5.5)
PROT SERPL-MCNC: 5.9 G/DL (ref 5.7–8.2)
RBC # BLD AUTO: 3.31 10*6/MM3 (ref 4.2–5.76)
SODIUM BLD-SCNC: 134 MMOL/L (ref 132–146)
WBC NRBC COR # BLD: 9.38 10*3/MM3 (ref 3.5–10.8)

## 2018-02-17 PROCEDURE — 25010000002 MEROPENEM: Performed by: INTERNAL MEDICINE

## 2018-02-17 PROCEDURE — 99233 SBSQ HOSP IP/OBS HIGH 50: CPT | Performed by: FAMILY MEDICINE

## 2018-02-17 PROCEDURE — P9046 ALBUMIN (HUMAN), 25%, 20 ML: HCPCS | Performed by: INTERNAL MEDICINE

## 2018-02-17 PROCEDURE — 83605 ASSAY OF LACTIC ACID: CPT | Performed by: INTERNAL MEDICINE

## 2018-02-17 PROCEDURE — 25010000002 VANCOMYCIN

## 2018-02-17 PROCEDURE — 29581 APPL MULTLAYER CMPRN SYS LEG: CPT | Performed by: PHYSICAL THERAPIST

## 2018-02-17 PROCEDURE — 25010000002 HYDROMORPHONE PER 4 MG: Performed by: FAMILY MEDICINE

## 2018-02-17 PROCEDURE — 94640 AIRWAY INHALATION TREATMENT: CPT

## 2018-02-17 PROCEDURE — 94799 UNLISTED PULMONARY SVC/PX: CPT

## 2018-02-17 PROCEDURE — 25010000002 HEPARIN (PORCINE) PER 1000 UNITS: Performed by: INTERNAL MEDICINE

## 2018-02-17 PROCEDURE — 85025 COMPLETE CBC W/AUTO DIFF WBC: CPT | Performed by: INTERNAL MEDICINE

## 2018-02-17 PROCEDURE — 80053 COMPREHEN METABOLIC PANEL: CPT | Performed by: INTERNAL MEDICINE

## 2018-02-17 PROCEDURE — 97162 PT EVAL MOD COMPLEX 30 MIN: CPT | Performed by: PHYSICAL THERAPIST

## 2018-02-17 PROCEDURE — 94760 N-INVAS EAR/PLS OXIMETRY 1: CPT

## 2018-02-17 PROCEDURE — 25010000002 ALBUMIN HUMAN 25% PER 50 ML: Performed by: INTERNAL MEDICINE

## 2018-02-17 RX ORDER — HYDROMORPHONE HYDROCHLORIDE 1 MG/ML
0.5 INJECTION, SOLUTION INTRAMUSCULAR; INTRAVENOUS; SUBCUTANEOUS
Status: DISCONTINUED | OUTPATIENT
Start: 2018-02-17 | End: 2018-02-19

## 2018-02-17 RX ORDER — ALBUMIN (HUMAN) 12.5 G/50ML
12.5 SOLUTION INTRAVENOUS ONCE
Status: COMPLETED | OUTPATIENT
Start: 2018-02-17 | End: 2018-02-17

## 2018-02-17 RX ORDER — ALBUMIN (HUMAN) 12.5 G/50ML
12.5 SOLUTION INTRAVENOUS ONCE
Status: DISCONTINUED | OUTPATIENT
Start: 2018-02-17 | End: 2018-02-17 | Stop reason: SDUPTHER

## 2018-02-17 RX ORDER — MORPHINE SULFATE 30 MG/1
15 TABLET ORAL EVERY 4 HOURS PRN
Status: DISCONTINUED | OUTPATIENT
Start: 2018-02-17 | End: 2018-02-18

## 2018-02-17 RX ADMIN — MORPHINE SULFATE 15 MG: 30 TABLET ORAL at 09:21

## 2018-02-17 RX ADMIN — IPRATROPIUM BROMIDE AND ALBUTEROL SULFATE 3 ML: 2.5; .5 SOLUTION RESPIRATORY (INHALATION) at 16:07

## 2018-02-17 RX ADMIN — ACETAMINOPHEN 650 MG: 325 TABLET ORAL at 21:58

## 2018-02-17 RX ADMIN — ACETAMINOPHEN 650 MG: 325 TABLET ORAL at 01:26

## 2018-02-17 RX ADMIN — BUDESONIDE AND FORMOTEROL FUMARATE DIHYDRATE 2 PUFF: 80; 4.5 AEROSOL RESPIRATORY (INHALATION) at 19:56

## 2018-02-17 RX ADMIN — MORPHINE SULFATE 15 MG: 30 TABLET ORAL at 16:39

## 2018-02-17 RX ADMIN — IPRATROPIUM BROMIDE AND ALBUTEROL SULFATE 3 ML: 2.5; .5 SOLUTION RESPIRATORY (INHALATION) at 07:50

## 2018-02-17 RX ADMIN — HYDROMORPHONE HYDROCHLORIDE 0.5 MG: 10 INJECTION INTRAMUSCULAR; INTRAVENOUS; SUBCUTANEOUS at 23:12

## 2018-02-17 RX ADMIN — HYDROMORPHONE HYDROCHLORIDE 0.5 MG: 10 INJECTION INTRAMUSCULAR; INTRAVENOUS; SUBCUTANEOUS at 18:45

## 2018-02-17 RX ADMIN — ALBUMIN HUMAN 12.5 G: 0.25 SOLUTION INTRAVENOUS at 05:32

## 2018-02-17 RX ADMIN — VANCOMYCIN HYDROCHLORIDE 1250 MG: 10 INJECTION, POWDER, LYOPHILIZED, FOR SOLUTION INTRAVENOUS at 21:05

## 2018-02-17 RX ADMIN — MEROPENEM 1 G: 1 INJECTION, POWDER, FOR SOLUTION INTRAVENOUS at 15:35

## 2018-02-17 RX ADMIN — HEPARIN SODIUM 5000 UNITS: 5000 INJECTION, SOLUTION INTRAVENOUS; SUBCUTANEOUS at 05:32

## 2018-02-17 RX ADMIN — NICOTINE 1 PATCH: 7 PATCH, EXTENDED RELEASE TRANSDERMAL at 21:07

## 2018-02-17 RX ADMIN — SODIUM CHLORIDE 250 ML: 9 INJECTION, SOLUTION INTRAVENOUS at 01:27

## 2018-02-17 RX ADMIN — SODIUM CHLORIDE 500 ML: 9 INJECTION, SOLUTION INTRAVENOUS at 05:32

## 2018-02-17 RX ADMIN — BUDESONIDE AND FORMOTEROL FUMARATE DIHYDRATE 2 PUFF: 80; 4.5 AEROSOL RESPIRATORY (INHALATION) at 07:50

## 2018-02-17 RX ADMIN — FUROSEMIDE 40 MG: 40 TABLET ORAL at 09:21

## 2018-02-17 RX ADMIN — MEROPENEM 1 G: 1 INJECTION, POWDER, FOR SOLUTION INTRAVENOUS at 06:30

## 2018-02-17 RX ADMIN — ACETAMINOPHEN 650 MG: 325 TABLET ORAL at 06:12

## 2018-02-17 RX ADMIN — IPRATROPIUM BROMIDE AND ALBUTEROL SULFATE 3 ML: 2.5; .5 SOLUTION RESPIRATORY (INHALATION) at 19:56

## 2018-02-17 RX ADMIN — MORPHINE SULFATE 15 MG: 30 TABLET ORAL at 21:06

## 2018-02-17 RX ADMIN — DOCUSATE SODIUM 200 MG: 100 CAPSULE, LIQUID FILLED ORAL at 09:21

## 2018-02-17 RX ADMIN — MAGNESIUM HYDROXIDE 10 ML: 2400 SUSPENSION ORAL at 21:05

## 2018-02-17 RX ADMIN — HEPARIN SODIUM 5000 UNITS: 5000 INJECTION, SOLUTION INTRAVENOUS; SUBCUTANEOUS at 21:05

## 2018-02-17 RX ADMIN — HEPARIN SODIUM 5000 UNITS: 5000 INJECTION, SOLUTION INTRAVENOUS; SUBCUTANEOUS at 13:37

## 2018-02-18 ENCOUNTER — APPOINTMENT (OUTPATIENT)
Dept: GENERAL RADIOLOGY | Facility: HOSPITAL | Age: 73
End: 2018-02-18

## 2018-02-18 LAB
ANION GAP SERPL CALCULATED.3IONS-SCNC: 0 MMOL/L (ref 3–11)
ANION GAP SERPL CALCULATED.3IONS-SCNC: 1 MMOL/L (ref 3–11)
BNP SERPL-MCNC: 404 PG/ML (ref 0–100)
BUN BLD-MCNC: 25 MG/DL (ref 9–23)
BUN BLD-MCNC: 26 MG/DL (ref 9–23)
BUN/CREAT SERPL: 20.8 (ref 7–25)
BUN/CREAT SERPL: 21.7 (ref 7–25)
CALCIUM SPEC-SCNC: 7.6 MG/DL (ref 8.7–10.4)
CALCIUM SPEC-SCNC: 7.9 MG/DL (ref 8.7–10.4)
CHLORIDE SERPL-SCNC: 108 MMOL/L (ref 99–109)
CHLORIDE SERPL-SCNC: 108 MMOL/L (ref 99–109)
CO2 SERPL-SCNC: 26 MMOL/L (ref 20–31)
CO2 SERPL-SCNC: 29 MMOL/L (ref 20–31)
CREAT BLD-MCNC: 1.2 MG/DL (ref 0.6–1.3)
CREAT BLD-MCNC: 1.2 MG/DL (ref 0.6–1.3)
DEPRECATED RDW RBC AUTO: 60.5 FL (ref 37–54)
ERYTHROCYTE [DISTWIDTH] IN BLOOD BY AUTOMATED COUNT: 17 % (ref 11.3–14.5)
GFR SERPL CREATININE-BSD FRML MDRD: 59 ML/MIN/1.73
GFR SERPL CREATININE-BSD FRML MDRD: 59 ML/MIN/1.73
GLUCOSE BLD-MCNC: 106 MG/DL (ref 70–100)
GLUCOSE BLD-MCNC: 146 MG/DL (ref 70–100)
HCT VFR BLD AUTO: 29.8 % (ref 38.9–50.9)
HGB BLD-MCNC: 9.7 G/DL (ref 13.1–17.5)
MCH RBC QN AUTO: 32.2 PG (ref 27–31)
MCHC RBC AUTO-ENTMCNC: 32.6 G/DL (ref 32–36)
MCV RBC AUTO: 99 FL (ref 80–99)
PLATELET # BLD AUTO: 262 10*3/MM3 (ref 150–450)
PMV BLD AUTO: 8.9 FL (ref 6–12)
POTASSIUM BLD-SCNC: 4.6 MMOL/L (ref 3.5–5.5)
POTASSIUM BLD-SCNC: 4.8 MMOL/L (ref 3.5–5.5)
RBC # BLD AUTO: 3.01 10*6/MM3 (ref 4.2–5.76)
SODIUM BLD-SCNC: 135 MMOL/L (ref 132–146)
SODIUM BLD-SCNC: 137 MMOL/L (ref 132–146)
VANCOMYCIN TROUGH SERPL-MCNC: 16.1 MCG/ML (ref 10–20)
WBC NRBC COR # BLD: 9.22 10*3/MM3 (ref 3.5–10.8)

## 2018-02-18 PROCEDURE — 94640 AIRWAY INHALATION TREATMENT: CPT

## 2018-02-18 PROCEDURE — 25010000002 MEROPENEM: Performed by: INTERNAL MEDICINE

## 2018-02-18 PROCEDURE — 87040 BLOOD CULTURE FOR BACTERIA: CPT | Performed by: NURSE PRACTITIONER

## 2018-02-18 PROCEDURE — 94799 UNLISTED PULMONARY SVC/PX: CPT

## 2018-02-18 PROCEDURE — 25010000002 VANCOMYCIN PER 500 MG

## 2018-02-18 PROCEDURE — 71045 X-RAY EXAM CHEST 1 VIEW: CPT

## 2018-02-18 PROCEDURE — 80048 BASIC METABOLIC PNL TOTAL CA: CPT | Performed by: FAMILY MEDICINE

## 2018-02-18 PROCEDURE — 85027 COMPLETE CBC AUTOMATED: CPT | Performed by: NURSE PRACTITIONER

## 2018-02-18 PROCEDURE — 94760 N-INVAS EAR/PLS OXIMETRY 1: CPT

## 2018-02-18 PROCEDURE — 80202 ASSAY OF VANCOMYCIN: CPT

## 2018-02-18 PROCEDURE — 80048 BASIC METABOLIC PNL TOTAL CA: CPT | Performed by: NURSE PRACTITIONER

## 2018-02-18 PROCEDURE — 99233 SBSQ HOSP IP/OBS HIGH 50: CPT | Performed by: HOSPITALIST

## 2018-02-18 PROCEDURE — 83880 ASSAY OF NATRIURETIC PEPTIDE: CPT | Performed by: HOSPITALIST

## 2018-02-18 PROCEDURE — 25010000002 HEPARIN (PORCINE) PER 1000 UNITS: Performed by: INTERNAL MEDICINE

## 2018-02-18 RX ORDER — VANCOMYCIN HYDROCHLORIDE 1 G/200ML
1000 INJECTION, SOLUTION INTRAVENOUS EVERY 24 HOURS
Status: DISCONTINUED | OUTPATIENT
Start: 2018-02-18 | End: 2018-02-19

## 2018-02-18 RX ORDER — ALBUTEROL SULFATE 2.5 MG/3ML
2.5 SOLUTION RESPIRATORY (INHALATION) EVERY 6 HOURS PRN
Status: DISCONTINUED | OUTPATIENT
Start: 2018-02-18 | End: 2018-03-06

## 2018-02-18 RX ORDER — METOPROLOL TARTRATE 50 MG/1
50 TABLET, FILM COATED ORAL EVERY 12 HOURS SCHEDULED
Status: DISCONTINUED | OUTPATIENT
Start: 2018-02-18 | End: 2018-02-18

## 2018-02-18 RX ORDER — GABAPENTIN 100 MG/1
100 CAPSULE ORAL EVERY 8 HOURS SCHEDULED
Status: DISCONTINUED | OUTPATIENT
Start: 2018-02-18 | End: 2018-02-20

## 2018-02-18 RX ORDER — MORPHINE SULFATE 30 MG/1
30 TABLET ORAL EVERY 4 HOURS PRN
Status: DISCONTINUED | OUTPATIENT
Start: 2018-02-18 | End: 2018-02-18

## 2018-02-18 RX ORDER — MORPHINE SULFATE 30 MG/1
30 TABLET ORAL EVERY 6 HOURS PRN
Status: DISCONTINUED | OUTPATIENT
Start: 2018-02-18 | End: 2018-03-15 | Stop reason: HOSPADM

## 2018-02-18 RX ORDER — MORPHINE SULFATE 30 MG/1
15 TABLET ORAL ONCE
Status: COMPLETED | OUTPATIENT
Start: 2018-02-18 | End: 2018-02-18

## 2018-02-18 RX ADMIN — MORPHINE SULFATE 15 MG: 30 TABLET ORAL at 10:15

## 2018-02-18 RX ADMIN — VANCOMYCIN HYDROCHLORIDE 1000 MG: 1 INJECTION, SOLUTION INTRAVENOUS at 21:55

## 2018-02-18 RX ADMIN — IPRATROPIUM BROMIDE AND ALBUTEROL SULFATE 3 ML: 2.5; .5 SOLUTION RESPIRATORY (INHALATION) at 08:55

## 2018-02-18 RX ADMIN — IPRATROPIUM BROMIDE AND ALBUTEROL SULFATE 3 ML: 2.5; .5 SOLUTION RESPIRATORY (INHALATION) at 13:54

## 2018-02-18 RX ADMIN — MORPHINE SULFATE 30 MG: 30 TABLET ORAL at 17:14

## 2018-02-18 RX ADMIN — MAGNESIUM HYDROXIDE 10 ML: 2400 SUSPENSION ORAL at 08:37

## 2018-02-18 RX ADMIN — MORPHINE SULFATE 15 MG: 30 TABLET ORAL at 10:14

## 2018-02-18 RX ADMIN — GABAPENTIN 100 MG: 100 CAPSULE ORAL at 13:45

## 2018-02-18 RX ADMIN — HEPARIN SODIUM 5000 UNITS: 5000 INJECTION, SOLUTION INTRAVENOUS; SUBCUTANEOUS at 15:12

## 2018-02-18 RX ADMIN — MEROPENEM 1 G: 1 INJECTION, POWDER, FOR SOLUTION INTRAVENOUS at 04:11

## 2018-02-18 RX ADMIN — GABAPENTIN 100 MG: 100 CAPSULE ORAL at 19:30

## 2018-02-18 RX ADMIN — NICOTINE 1 PATCH: 7 PATCH, EXTENDED RELEASE TRANSDERMAL at 19:31

## 2018-02-18 RX ADMIN — MEROPENEM 1 G: 1 INJECTION, POWDER, FOR SOLUTION INTRAVENOUS at 16:00

## 2018-02-18 RX ADMIN — METOPROLOL TARTRATE 100 MG: 100 TABLET ORAL at 08:37

## 2018-02-18 RX ADMIN — HEPARIN SODIUM 5000 UNITS: 5000 INJECTION, SOLUTION INTRAVENOUS; SUBCUTANEOUS at 19:30

## 2018-02-18 RX ADMIN — IPRATROPIUM BROMIDE AND ALBUTEROL SULFATE 3 ML: 2.5; .5 SOLUTION RESPIRATORY (INHALATION) at 19:49

## 2018-02-18 RX ADMIN — BUDESONIDE AND FORMOTEROL FUMARATE DIHYDRATE 2 PUFF: 80; 4.5 AEROSOL RESPIRATORY (INHALATION) at 08:55

## 2018-02-18 RX ADMIN — MORPHINE SULFATE 15 MG: 30 TABLET ORAL at 09:26

## 2018-02-18 RX ADMIN — HEPARIN SODIUM 5000 UNITS: 5000 INJECTION, SOLUTION INTRAVENOUS; SUBCUTANEOUS at 05:10

## 2018-02-18 RX ADMIN — IPRATROPIUM BROMIDE AND ALBUTEROL SULFATE 3 ML: 2.5; .5 SOLUTION RESPIRATORY (INHALATION) at 15:52

## 2018-02-18 RX ADMIN — BUDESONIDE AND FORMOTEROL FUMARATE DIHYDRATE 2 PUFF: 80; 4.5 AEROSOL RESPIRATORY (INHALATION) at 19:49

## 2018-02-18 RX ADMIN — ACETAMINOPHEN 650 MG: 325 TABLET ORAL at 20:29

## 2018-02-18 RX ADMIN — SODIUM CHLORIDE 250 ML: 9 INJECTION, SOLUTION INTRAVENOUS at 00:50

## 2018-02-18 RX ADMIN — DOCUSATE SODIUM 200 MG: 100 CAPSULE, LIQUID FILLED ORAL at 08:37

## 2018-02-18 RX ADMIN — FUROSEMIDE 40 MG: 40 TABLET ORAL at 08:37

## 2018-02-18 RX ADMIN — MORPHINE SULFATE 15 MG: 30 TABLET ORAL at 05:10

## 2018-02-18 RX ADMIN — MORPHINE SULFATE 30 MG: 30 TABLET ORAL at 13:45

## 2018-02-19 LAB
ALBUMIN SERPL-MCNC: 2.5 G/DL (ref 3.2–4.8)
ANION GAP SERPL CALCULATED.3IONS-SCNC: 0 MMOL/L (ref 3–11)
ARTERIAL PATENCY WRIST A: ABNORMAL
ATMOSPHERIC PRESS: ABNORMAL MMHG
BASE EXCESS BLDA CALC-SCNC: 1.7 MMOL/L (ref 0–2)
BDY SITE: ABNORMAL
BNP SERPL-MCNC: 337 PG/ML (ref 0–100)
BUN BLD-MCNC: 22 MG/DL (ref 9–23)
BUN/CREAT SERPL: 20 (ref 7–25)
CALCIUM SPEC-SCNC: 7.9 MG/DL (ref 8.7–10.4)
CHLORIDE SERPL-SCNC: 105 MMOL/L (ref 99–109)
CO2 BLDA-SCNC: 27.8 MMOL/L (ref 22–33)
CO2 SERPL-SCNC: 28 MMOL/L (ref 20–31)
COHGB MFR BLD: 1.6 % (ref 0–2)
CREAT BLD-MCNC: 1.1 MG/DL (ref 0.6–1.3)
GFR SERPL CREATININE-BSD FRML MDRD: 66 ML/MIN/1.73
GLUCOSE BLD-MCNC: 110 MG/DL (ref 70–100)
GLUCOSE BLDC GLUCOMTR-MCNC: 128 MG/DL (ref 70–130)
HCO3 BLDA-SCNC: 26.5 MMOL/L (ref 20–26)
HCT VFR BLD CALC: 30.7 %
HGB BLDA-MCNC: 10 G/DL (ref 13.5–17.5)
HOROWITZ INDEX BLD+IHG-RTO: 44 %
METHGB BLD QL: 0.9 % (ref 0–1.5)
MODALITY: ABNORMAL
OXYHGB MFR BLDV: 80.5 % (ref 94–99)
PCO2 BLDA: 41.6 MM HG (ref 35–48)
PH BLDA: 7.41 PH UNITS (ref 7.35–7.45)
PHOSPHATE SERPL-MCNC: 2.5 MG/DL (ref 2.4–5.1)
PO2 BLDA: 46.3 MM HG (ref 83–108)
POTASSIUM BLD-SCNC: 5 MMOL/L (ref 3.5–5.5)
SAO2 % BLDCOA: 80.5 %
SODIUM BLD-SCNC: 133 MMOL/L (ref 132–146)

## 2018-02-19 PROCEDURE — 94760 N-INVAS EAR/PLS OXIMETRY 1: CPT

## 2018-02-19 PROCEDURE — 36600 WITHDRAWAL OF ARTERIAL BLOOD: CPT | Performed by: NURSE PRACTITIONER

## 2018-02-19 PROCEDURE — 83880 ASSAY OF NATRIURETIC PEPTIDE: CPT | Performed by: HOSPITALIST

## 2018-02-19 PROCEDURE — 25010000002 HYDROMORPHONE PER 4 MG: Performed by: HOSPITALIST

## 2018-02-19 PROCEDURE — 87205 SMEAR GRAM STAIN: CPT | Performed by: HOSPITALIST

## 2018-02-19 PROCEDURE — 94640 AIRWAY INHALATION TREATMENT: CPT

## 2018-02-19 PROCEDURE — 82962 GLUCOSE BLOOD TEST: CPT

## 2018-02-19 PROCEDURE — 25010000002 MEROPENEM: Performed by: INTERNAL MEDICINE

## 2018-02-19 PROCEDURE — 25010000002 FUROSEMIDE PER 20 MG: Performed by: HOSPITALIST

## 2018-02-19 PROCEDURE — 87070 CULTURE OTHR SPECIMN AEROBIC: CPT | Performed by: HOSPITALIST

## 2018-02-19 PROCEDURE — 97110 THERAPEUTIC EXERCISES: CPT

## 2018-02-19 PROCEDURE — 25010000002 HEPARIN (PORCINE) PER 1000 UNITS: Performed by: INTERNAL MEDICINE

## 2018-02-19 PROCEDURE — 82805 BLOOD GASES W/O2 SATURATION: CPT | Performed by: NURSE PRACTITIONER

## 2018-02-19 PROCEDURE — 99233 SBSQ HOSP IP/OBS HIGH 50: CPT | Performed by: HOSPITALIST

## 2018-02-19 PROCEDURE — 94799 UNLISTED PULMONARY SVC/PX: CPT

## 2018-02-19 PROCEDURE — 80069 RENAL FUNCTION PANEL: CPT | Performed by: HOSPITALIST

## 2018-02-19 PROCEDURE — 25010000002 METHYLPREDNISOLONE PER 125 MG: Performed by: HOSPITALIST

## 2018-02-19 PROCEDURE — 25010000002 DAPTOMYCIN PER 1 MG: Performed by: INTERNAL MEDICINE

## 2018-02-19 RX ORDER — FUROSEMIDE 10 MG/ML
40 INJECTION INTRAMUSCULAR; INTRAVENOUS ONCE
Status: COMPLETED | OUTPATIENT
Start: 2018-02-19 | End: 2018-02-19

## 2018-02-19 RX ORDER — GUAIFENESIN 600 MG/1
600 TABLET, EXTENDED RELEASE ORAL 2 TIMES DAILY PRN
Status: DISCONTINUED | OUTPATIENT
Start: 2018-02-19 | End: 2018-03-15 | Stop reason: HOSPADM

## 2018-02-19 RX ORDER — METHYLPREDNISOLONE SODIUM SUCCINATE 125 MG/2ML
125 INJECTION, POWDER, LYOPHILIZED, FOR SOLUTION INTRAMUSCULAR; INTRAVENOUS ONCE
Status: COMPLETED | OUTPATIENT
Start: 2018-02-19 | End: 2018-02-19

## 2018-02-19 RX ORDER — HYDROMORPHONE HYDROCHLORIDE 1 MG/ML
0.5 INJECTION, SOLUTION INTRAMUSCULAR; INTRAVENOUS; SUBCUTANEOUS
Status: DISCONTINUED | OUTPATIENT
Start: 2018-02-19 | End: 2018-02-27

## 2018-02-19 RX ADMIN — METHYLPREDNISOLONE SODIUM SUCCINATE 125 MG: 125 INJECTION, POWDER, FOR SOLUTION INTRAMUSCULAR; INTRAVENOUS at 17:34

## 2018-02-19 RX ADMIN — IPRATROPIUM BROMIDE AND ALBUTEROL SULFATE 3 ML: 2.5; .5 SOLUTION RESPIRATORY (INHALATION) at 16:32

## 2018-02-19 RX ADMIN — MEROPENEM 1 G: 1 INJECTION, POWDER, FOR SOLUTION INTRAVENOUS at 16:22

## 2018-02-19 RX ADMIN — ACETAMINOPHEN 650 MG: 325 TABLET ORAL at 19:41

## 2018-02-19 RX ADMIN — METOPROLOL TARTRATE 25 MG: 25 TABLET ORAL at 08:41

## 2018-02-19 RX ADMIN — MEROPENEM 1 G: 1 INJECTION, POWDER, FOR SOLUTION INTRAVENOUS at 04:03

## 2018-02-19 RX ADMIN — DAPTOMYCIN 350 MG: 500 INJECTION, POWDER, LYOPHILIZED, FOR SOLUTION INTRAVENOUS at 20:48

## 2018-02-19 RX ADMIN — GABAPENTIN 100 MG: 100 CAPSULE ORAL at 20:48

## 2018-02-19 RX ADMIN — HEPARIN SODIUM 5000 UNITS: 5000 INJECTION, SOLUTION INTRAVENOUS; SUBCUTANEOUS at 04:04

## 2018-02-19 RX ADMIN — HYDROMORPHONE HYDROCHLORIDE 0.5 MG: 10 INJECTION INTRAMUSCULAR; INTRAVENOUS; SUBCUTANEOUS at 11:50

## 2018-02-19 RX ADMIN — BUDESONIDE AND FORMOTEROL FUMARATE DIHYDRATE 2 PUFF: 80; 4.5 AEROSOL RESPIRATORY (INHALATION) at 07:26

## 2018-02-19 RX ADMIN — IPRATROPIUM BROMIDE AND ALBUTEROL SULFATE 3 ML: 2.5; .5 SOLUTION RESPIRATORY (INHALATION) at 07:26

## 2018-02-19 RX ADMIN — FUROSEMIDE 40 MG: 10 INJECTION, SOLUTION INTRAMUSCULAR; INTRAVENOUS at 08:41

## 2018-02-19 RX ADMIN — NICOTINE 1 PATCH: 7 PATCH, EXTENDED RELEASE TRANSDERMAL at 20:47

## 2018-02-19 RX ADMIN — HEPARIN SODIUM 5000 UNITS: 5000 INJECTION, SOLUTION INTRAVENOUS; SUBCUTANEOUS at 13:12

## 2018-02-19 RX ADMIN — BUDESONIDE AND FORMOTEROL FUMARATE DIHYDRATE 2 PUFF: 80; 4.5 AEROSOL RESPIRATORY (INHALATION) at 20:46

## 2018-02-19 RX ADMIN — MORPHINE SULFATE 30 MG: 30 TABLET ORAL at 11:38

## 2018-02-19 RX ADMIN — DOCUSATE SODIUM 200 MG: 100 CAPSULE, LIQUID FILLED ORAL at 08:41

## 2018-02-19 RX ADMIN — HEPARIN SODIUM 5000 UNITS: 5000 INJECTION, SOLUTION INTRAVENOUS; SUBCUTANEOUS at 20:47

## 2018-02-19 RX ADMIN — IPRATROPIUM BROMIDE AND ALBUTEROL SULFATE 3 ML: 2.5; .5 SOLUTION RESPIRATORY (INHALATION) at 20:46

## 2018-02-19 RX ADMIN — MORPHINE SULFATE 30 MG: 30 TABLET ORAL at 17:34

## 2018-02-19 RX ADMIN — GABAPENTIN 100 MG: 100 CAPSULE ORAL at 04:03

## 2018-02-19 RX ADMIN — FUROSEMIDE 40 MG: 10 INJECTION, SOLUTION INTRAMUSCULAR; INTRAVENOUS at 16:22

## 2018-02-19 RX ADMIN — GABAPENTIN 100 MG: 100 CAPSULE ORAL at 13:12

## 2018-02-19 RX ADMIN — IPRATROPIUM BROMIDE AND ALBUTEROL SULFATE 3 ML: 2.5; .5 SOLUTION RESPIRATORY (INHALATION) at 13:12

## 2018-02-20 ENCOUNTER — APPOINTMENT (OUTPATIENT)
Dept: GENERAL RADIOLOGY | Facility: HOSPITAL | Age: 73
End: 2018-02-20

## 2018-02-20 ENCOUNTER — APPOINTMENT (OUTPATIENT)
Dept: CARDIOLOGY | Facility: HOSPITAL | Age: 73
End: 2018-02-20
Attending: INTERNAL MEDICINE

## 2018-02-20 LAB
ALBUMIN SERPL-MCNC: 2.6 G/DL (ref 3.2–4.8)
ANION GAP SERPL CALCULATED.3IONS-SCNC: 3 MMOL/L (ref 3–11)
BH CV LOWER VASCULAR LEFT COMMON FEMORAL AUGMENT: NORMAL
BH CV LOWER VASCULAR LEFT COMMON FEMORAL COMPRESS: NORMAL
BH CV LOWER VASCULAR LEFT COMMON FEMORAL PHASIC: NORMAL
BH CV LOWER VASCULAR LEFT COMMON FEMORAL SPONT: NORMAL
BH CV LOWER VASCULAR LEFT DISTAL FEMORAL COMPRESS: NORMAL
BH CV LOWER VASCULAR LEFT MID FEMORAL AUGMENT: NORMAL
BH CV LOWER VASCULAR LEFT MID FEMORAL COMPRESS: NORMAL
BH CV LOWER VASCULAR LEFT MID FEMORAL PHASIC: NORMAL
BH CV LOWER VASCULAR LEFT MID FEMORAL SPONT: NORMAL
BH CV LOWER VASCULAR LEFT POPLITEAL AUGMENT: NORMAL
BH CV LOWER VASCULAR LEFT POPLITEAL COMPRESS: NORMAL
BH CV LOWER VASCULAR LEFT POPLITEAL PHASIC: NORMAL
BH CV LOWER VASCULAR LEFT POPLITEAL SPONT: NORMAL
BH CV LOWER VASCULAR LEFT PROXIMAL FEMORAL COMPRESS: NORMAL
BH CV LOWER VASCULAR LEFT SAPHENOFEMORAL JUNCTION AUGMENT: NORMAL
BH CV LOWER VASCULAR LEFT SAPHENOFEMORAL JUNCTION COMPRESS: NORMAL
BH CV LOWER VASCULAR LEFT SAPHENOFEMORAL JUNCTION PHASIC: NORMAL
BH CV LOWER VASCULAR LEFT SAPHENOFEMORAL JUNCTION SPONT: NORMAL
BH CV LOWER VASCULAR RIGHT COMMON FEMORAL AUGMENT: NORMAL
BH CV LOWER VASCULAR RIGHT COMMON FEMORAL COMPRESS: NORMAL
BH CV LOWER VASCULAR RIGHT COMMON FEMORAL PHASIC: NORMAL
BH CV LOWER VASCULAR RIGHT COMMON FEMORAL SPONT: NORMAL
BH CV LOWER VASCULAR RIGHT DISTAL FEMORAL COMPRESS: NORMAL
BH CV LOWER VASCULAR RIGHT MID FEMORAL AUGMENT: NORMAL
BH CV LOWER VASCULAR RIGHT MID FEMORAL COMPRESS: NORMAL
BH CV LOWER VASCULAR RIGHT MID FEMORAL PHASIC: NORMAL
BH CV LOWER VASCULAR RIGHT MID FEMORAL SPONT: NORMAL
BH CV LOWER VASCULAR RIGHT POPLITEAL AUGMENT: NORMAL
BH CV LOWER VASCULAR RIGHT POPLITEAL COMPRESS: NORMAL
BH CV LOWER VASCULAR RIGHT POPLITEAL PHASIC: NORMAL
BH CV LOWER VASCULAR RIGHT POPLITEAL SPONT: NORMAL
BH CV LOWER VASCULAR RIGHT PROXIMAL FEMORAL COMPRESS: NORMAL
BH CV LOWER VASCULAR RIGHT SAPHENOFEMORAL JUNCTION AUGMENT: NORMAL
BH CV LOWER VASCULAR RIGHT SAPHENOFEMORAL JUNCTION COMPRESS: NORMAL
BH CV LOWER VASCULAR RIGHT SAPHENOFEMORAL JUNCTION PHASIC: NORMAL
BH CV LOWER VASCULAR RIGHT SAPHENOFEMORAL JUNCTION SPONT: NORMAL
BNP SERPL-MCNC: 344 PG/ML (ref 0–100)
BUN BLD-MCNC: 25 MG/DL (ref 9–23)
BUN/CREAT SERPL: 25 (ref 7–25)
CALCIUM SPEC-SCNC: 7.9 MG/DL (ref 8.7–10.4)
CHLORIDE SERPL-SCNC: 103 MMOL/L (ref 99–109)
CO2 SERPL-SCNC: 32 MMOL/L (ref 20–31)
CREAT BLD-MCNC: 1 MG/DL (ref 0.6–1.3)
DEPRECATED RDW RBC AUTO: 60.4 FL (ref 37–54)
ERYTHROCYTE [DISTWIDTH] IN BLOOD BY AUTOMATED COUNT: 16.7 % (ref 11.3–14.5)
GFR SERPL CREATININE-BSD FRML MDRD: 73 ML/MIN/1.73
GLUCOSE BLD-MCNC: 156 MG/DL (ref 70–100)
HCT VFR BLD AUTO: 34.3 % (ref 38.9–50.9)
HGB BLD-MCNC: 11.1 G/DL (ref 13.1–17.5)
MCH RBC QN AUTO: 31.9 PG (ref 27–31)
MCHC RBC AUTO-ENTMCNC: 32.4 G/DL (ref 32–36)
MCV RBC AUTO: 98.6 FL (ref 80–99)
PHOSPHATE SERPL-MCNC: 4 MG/DL (ref 2.4–5.1)
PLATELET # BLD AUTO: 300 10*3/MM3 (ref 150–450)
PMV BLD AUTO: 9.3 FL (ref 6–12)
POTASSIUM BLD-SCNC: 4.3 MMOL/L (ref 3.5–5.5)
RBC # BLD AUTO: 3.48 10*6/MM3 (ref 4.2–5.76)
SODIUM BLD-SCNC: 138 MMOL/L (ref 132–146)
WBC NRBC COR # BLD: 9.18 10*3/MM3 (ref 3.5–10.8)

## 2018-02-20 PROCEDURE — 25010000002 MEROPENEM: Performed by: INTERNAL MEDICINE

## 2018-02-20 PROCEDURE — 83880 ASSAY OF NATRIURETIC PEPTIDE: CPT | Performed by: HOSPITALIST

## 2018-02-20 PROCEDURE — 80069 RENAL FUNCTION PANEL: CPT | Performed by: HOSPITALIST

## 2018-02-20 PROCEDURE — 71045 X-RAY EXAM CHEST 1 VIEW: CPT

## 2018-02-20 PROCEDURE — 94640 AIRWAY INHALATION TREATMENT: CPT

## 2018-02-20 PROCEDURE — 94760 N-INVAS EAR/PLS OXIMETRY 1: CPT

## 2018-02-20 PROCEDURE — 85027 COMPLETE CBC AUTOMATED: CPT | Performed by: HOSPITALIST

## 2018-02-20 PROCEDURE — 25010000002 HEPARIN (PORCINE) PER 1000 UNITS: Performed by: INTERNAL MEDICINE

## 2018-02-20 PROCEDURE — 93970 EXTREMITY STUDY: CPT

## 2018-02-20 PROCEDURE — 25010000002 DAPTOMYCIN PER 1 MG: Performed by: INTERNAL MEDICINE

## 2018-02-20 PROCEDURE — 94799 UNLISTED PULMONARY SVC/PX: CPT

## 2018-02-20 PROCEDURE — 93970 EXTREMITY STUDY: CPT | Performed by: INTERNAL MEDICINE

## 2018-02-20 PROCEDURE — 99233 SBSQ HOSP IP/OBS HIGH 50: CPT | Performed by: HOSPITALIST

## 2018-02-20 PROCEDURE — 63710000001 PREDNISONE PER 1 MG: Performed by: HOSPITALIST

## 2018-02-20 RX ORDER — BUMETANIDE 1 MG/1
2 TABLET ORAL
Status: DISCONTINUED | OUTPATIENT
Start: 2018-02-20 | End: 2018-02-21

## 2018-02-20 RX ORDER — GABAPENTIN 100 MG/1
200 CAPSULE ORAL EVERY 8 HOURS SCHEDULED
Status: DISCONTINUED | OUTPATIENT
Start: 2018-02-20 | End: 2018-02-25

## 2018-02-20 RX ORDER — METOLAZONE 5 MG/1
5 TABLET ORAL ONCE
Status: COMPLETED | OUTPATIENT
Start: 2018-02-20 | End: 2018-02-20

## 2018-02-20 RX ORDER — FUROSEMIDE 40 MG/1
40 TABLET ORAL 2 TIMES DAILY
COMMUNITY
End: 2018-03-15 | Stop reason: HOSPADM

## 2018-02-20 RX ORDER — FLUTICASONE PROPIONATE 50 MCG
2 SPRAY, SUSPENSION (ML) NASAL DAILY
Status: DISCONTINUED | OUTPATIENT
Start: 2018-02-20 | End: 2018-03-15 | Stop reason: HOSPADM

## 2018-02-20 RX ORDER — BUMETANIDE 1 MG/1
2 TABLET ORAL ONCE
Status: COMPLETED | OUTPATIENT
Start: 2018-02-20 | End: 2018-02-20

## 2018-02-20 RX ORDER — PREDNISONE 20 MG/1
40 TABLET ORAL
Status: COMPLETED | OUTPATIENT
Start: 2018-02-20 | End: 2018-02-23

## 2018-02-20 RX ADMIN — MEROPENEM 1 G: 1 INJECTION, POWDER, FOR SOLUTION INTRAVENOUS at 04:16

## 2018-02-20 RX ADMIN — GABAPENTIN 200 MG: 100 CAPSULE ORAL at 21:09

## 2018-02-20 RX ADMIN — DAPTOMYCIN 350 MG: 500 INJECTION, POWDER, LYOPHILIZED, FOR SOLUTION INTRAVENOUS at 21:10

## 2018-02-20 RX ADMIN — GABAPENTIN 100 MG: 100 CAPSULE ORAL at 06:11

## 2018-02-20 RX ADMIN — METOLAZONE 5 MG: 5 TABLET ORAL at 18:00

## 2018-02-20 RX ADMIN — BUMETANIDE 2 MG: 1 TABLET ORAL at 08:18

## 2018-02-20 RX ADMIN — MORPHINE SULFATE 30 MG: 30 TABLET ORAL at 12:09

## 2018-02-20 RX ADMIN — GABAPENTIN 200 MG: 100 CAPSULE ORAL at 14:47

## 2018-02-20 RX ADMIN — MORPHINE SULFATE 30 MG: 30 TABLET ORAL at 21:09

## 2018-02-20 RX ADMIN — MEROPENEM 1 G: 1 INJECTION, POWDER, FOR SOLUTION INTRAVENOUS at 23:31

## 2018-02-20 RX ADMIN — BUMETANIDE 2 MG: 1 TABLET ORAL at 14:48

## 2018-02-20 RX ADMIN — IPRATROPIUM BROMIDE AND ALBUTEROL SULFATE 3 ML: 2.5; .5 SOLUTION RESPIRATORY (INHALATION) at 16:14

## 2018-02-20 RX ADMIN — MORPHINE SULFATE 30 MG: 30 TABLET ORAL at 06:12

## 2018-02-20 RX ADMIN — IPRATROPIUM BROMIDE AND ALBUTEROL SULFATE 3 ML: 2.5; .5 SOLUTION RESPIRATORY (INHALATION) at 13:18

## 2018-02-20 RX ADMIN — HEPARIN SODIUM 5000 UNITS: 5000 INJECTION, SOLUTION INTRAVENOUS; SUBCUTANEOUS at 14:48

## 2018-02-20 RX ADMIN — HEPARIN SODIUM 5000 UNITS: 5000 INJECTION, SOLUTION INTRAVENOUS; SUBCUTANEOUS at 06:12

## 2018-02-20 RX ADMIN — FLUTICASONE PROPIONATE 2 SPRAY: 50 SPRAY, METERED NASAL at 10:24

## 2018-02-20 RX ADMIN — IPRATROPIUM BROMIDE AND ALBUTEROL SULFATE 3 ML: 2.5; .5 SOLUTION RESPIRATORY (INHALATION) at 08:36

## 2018-02-20 RX ADMIN — BUDESONIDE AND FORMOTEROL FUMARATE DIHYDRATE 2 PUFF: 80; 4.5 AEROSOL RESPIRATORY (INHALATION) at 20:01

## 2018-02-20 RX ADMIN — NICOTINE 1 PATCH: 7 PATCH, EXTENDED RELEASE TRANSDERMAL at 21:09

## 2018-02-20 RX ADMIN — PREDNISONE 40 MG: 20 TABLET ORAL at 10:24

## 2018-02-20 RX ADMIN — BUMETANIDE 2 MG: 1 TABLET ORAL at 18:00

## 2018-02-20 RX ADMIN — GUAIFENESIN 600 MG: 600 TABLET, EXTENDED RELEASE ORAL at 04:25

## 2018-02-20 RX ADMIN — DOCUSATE SODIUM 200 MG: 100 CAPSULE, LIQUID FILLED ORAL at 08:18

## 2018-02-20 RX ADMIN — MEROPENEM 1 G: 1 INJECTION, POWDER, FOR SOLUTION INTRAVENOUS at 16:37

## 2018-02-20 RX ADMIN — HEPARIN SODIUM 5000 UNITS: 5000 INJECTION, SOLUTION INTRAVENOUS; SUBCUTANEOUS at 21:09

## 2018-02-20 RX ADMIN — MORPHINE SULFATE 30 MG: 30 TABLET ORAL at 00:56

## 2018-02-20 RX ADMIN — BUDESONIDE AND FORMOTEROL FUMARATE DIHYDRATE 2 PUFF: 80; 4.5 AEROSOL RESPIRATORY (INHALATION) at 08:36

## 2018-02-20 RX ADMIN — GUAIFENESIN 600 MG: 600 TABLET, EXTENDED RELEASE ORAL at 08:18

## 2018-02-20 RX ADMIN — IPRATROPIUM BROMIDE AND ALBUTEROL SULFATE 3 ML: 2.5; .5 SOLUTION RESPIRATORY (INHALATION) at 20:01

## 2018-02-21 LAB
ALBUMIN SERPL-MCNC: 2.9 G/DL (ref 3.2–4.8)
ANION GAP SERPL CALCULATED.3IONS-SCNC: 8 MMOL/L (ref 3–11)
BACTERIA SPEC AEROBE CULT: NORMAL
BACTERIA SPEC AEROBE CULT: NORMAL
BACTERIA SPEC RESP CULT: NORMAL
BACTERIA SPEC RESP CULT: NORMAL
BUN BLD-MCNC: 37 MG/DL (ref 9–23)
BUN/CREAT SERPL: 30.8 (ref 7–25)
CALCIUM SPEC-SCNC: 8.4 MG/DL (ref 8.7–10.4)
CHLORIDE SERPL-SCNC: 94 MMOL/L (ref 99–109)
CO2 SERPL-SCNC: 37 MMOL/L (ref 20–31)
CREAT BLD-MCNC: 1.2 MG/DL (ref 0.6–1.3)
DEPRECATED RDW RBC AUTO: 59 FL (ref 37–54)
ERYTHROCYTE [DISTWIDTH] IN BLOOD BY AUTOMATED COUNT: 16.4 % (ref 11.3–14.5)
GFR SERPL CREATININE-BSD FRML MDRD: 59 ML/MIN/1.73
GLUCOSE BLD-MCNC: 104 MG/DL (ref 70–100)
GRAM STN SPEC: NORMAL
GRAM STN SPEC: NORMAL
HCT VFR BLD AUTO: 34.9 % (ref 38.9–50.9)
HGB BLD-MCNC: 11.5 G/DL (ref 13.1–17.5)
MAGNESIUM SERPL-MCNC: 2.1 MG/DL (ref 1.3–2.7)
MCH RBC QN AUTO: 32.3 PG (ref 27–31)
MCHC RBC AUTO-ENTMCNC: 33 G/DL (ref 32–36)
MCV RBC AUTO: 98 FL (ref 80–99)
PHOSPHATE SERPL-MCNC: 3.6 MG/DL (ref 2.4–5.1)
PLATELET # BLD AUTO: 377 10*3/MM3 (ref 150–450)
PMV BLD AUTO: 10 FL (ref 6–12)
POTASSIUM BLD-SCNC: 3.6 MMOL/L (ref 3.5–5.5)
RBC # BLD AUTO: 3.56 10*6/MM3 (ref 4.2–5.76)
SODIUM BLD-SCNC: 139 MMOL/L (ref 132–146)
WBC NRBC COR # BLD: 16.62 10*3/MM3 (ref 3.5–10.8)

## 2018-02-21 PROCEDURE — 94799 UNLISTED PULMONARY SVC/PX: CPT

## 2018-02-21 PROCEDURE — 94760 N-INVAS EAR/PLS OXIMETRY 1: CPT

## 2018-02-21 PROCEDURE — 80069 RENAL FUNCTION PANEL: CPT | Performed by: HOSPITALIST

## 2018-02-21 PROCEDURE — 83735 ASSAY OF MAGNESIUM: CPT | Performed by: HOSPITALIST

## 2018-02-21 PROCEDURE — 25010000002 DAPTOMYCIN PER 1 MG: Performed by: INTERNAL MEDICINE

## 2018-02-21 PROCEDURE — 94640 AIRWAY INHALATION TREATMENT: CPT

## 2018-02-21 PROCEDURE — 25010000002 HEPARIN (PORCINE) PER 1000 UNITS: Performed by: INTERNAL MEDICINE

## 2018-02-21 PROCEDURE — 99232 SBSQ HOSP IP/OBS MODERATE 35: CPT | Performed by: HOSPITALIST

## 2018-02-21 PROCEDURE — 63710000001 PREDNISONE PER 1 MG: Performed by: HOSPITALIST

## 2018-02-21 PROCEDURE — 85027 COMPLETE CBC AUTOMATED: CPT | Performed by: HOSPITALIST

## 2018-02-21 PROCEDURE — 25010000002 MEROPENEM: Performed by: INTERNAL MEDICINE

## 2018-02-21 PROCEDURE — 25010000002 ONDANSETRON PER 1 MG: Performed by: PHYSICIAN ASSISTANT

## 2018-02-21 RX ORDER — BUMETANIDE 1 MG/1
2 TABLET ORAL DAILY
Status: DISCONTINUED | OUTPATIENT
Start: 2018-02-22 | End: 2018-02-24

## 2018-02-21 RX ADMIN — FLUTICASONE PROPIONATE 2 SPRAY: 50 SPRAY, METERED NASAL at 09:52

## 2018-02-21 RX ADMIN — BUDESONIDE AND FORMOTEROL FUMARATE DIHYDRATE 2 PUFF: 80; 4.5 AEROSOL RESPIRATORY (INHALATION) at 09:18

## 2018-02-21 RX ADMIN — IPRATROPIUM BROMIDE AND ALBUTEROL SULFATE 3 ML: 2.5; .5 SOLUTION RESPIRATORY (INHALATION) at 13:10

## 2018-02-21 RX ADMIN — HEPARIN SODIUM 5000 UNITS: 5000 INJECTION, SOLUTION INTRAVENOUS; SUBCUTANEOUS at 05:52

## 2018-02-21 RX ADMIN — GABAPENTIN 200 MG: 100 CAPSULE ORAL at 22:35

## 2018-02-21 RX ADMIN — GABAPENTIN 200 MG: 100 CAPSULE ORAL at 14:04

## 2018-02-21 RX ADMIN — HEPARIN SODIUM 5000 UNITS: 5000 INJECTION, SOLUTION INTRAVENOUS; SUBCUTANEOUS at 13:52

## 2018-02-21 RX ADMIN — MORPHINE SULFATE 30 MG: 30 TABLET ORAL at 05:52

## 2018-02-21 RX ADMIN — BUDESONIDE AND FORMOTEROL FUMARATE DIHYDRATE 2 PUFF: 80; 4.5 AEROSOL RESPIRATORY (INHALATION) at 20:28

## 2018-02-21 RX ADMIN — MEROPENEM 1 G: 1 INJECTION, POWDER, FOR SOLUTION INTRAVENOUS at 16:58

## 2018-02-21 RX ADMIN — MORPHINE SULFATE 30 MG: 30 TABLET ORAL at 22:35

## 2018-02-21 RX ADMIN — IPRATROPIUM BROMIDE AND ALBUTEROL SULFATE 3 ML: 2.5; .5 SOLUTION RESPIRATORY (INHALATION) at 09:18

## 2018-02-21 RX ADMIN — HEPARIN SODIUM 5000 UNITS: 5000 INJECTION, SOLUTION INTRAVENOUS; SUBCUTANEOUS at 22:35

## 2018-02-21 RX ADMIN — NICOTINE 1 PATCH: 7 PATCH, EXTENDED RELEASE TRANSDERMAL at 19:46

## 2018-02-21 RX ADMIN — PREDNISONE 40 MG: 20 TABLET ORAL at 09:52

## 2018-02-21 RX ADMIN — IPRATROPIUM BROMIDE AND ALBUTEROL SULFATE 3 ML: 2.5; .5 SOLUTION RESPIRATORY (INHALATION) at 20:27

## 2018-02-21 RX ADMIN — MAGNESIUM HYDROXIDE 10 ML: 2400 SUSPENSION ORAL at 17:03

## 2018-02-21 RX ADMIN — MEROPENEM 1 G: 1 INJECTION, POWDER, FOR SOLUTION INTRAVENOUS at 09:52

## 2018-02-21 RX ADMIN — DOCUSATE SODIUM 200 MG: 100 CAPSULE, LIQUID FILLED ORAL at 09:52

## 2018-02-21 RX ADMIN — ONDANSETRON 4 MG: 2 INJECTION INTRAMUSCULAR; INTRAVENOUS at 19:45

## 2018-02-21 RX ADMIN — DAPTOMYCIN 350 MG: 500 INJECTION, POWDER, LYOPHILIZED, FOR SOLUTION INTRAVENOUS at 19:45

## 2018-02-21 RX ADMIN — IPRATROPIUM BROMIDE AND ALBUTEROL SULFATE 3 ML: 2.5; .5 SOLUTION RESPIRATORY (INHALATION) at 16:24

## 2018-02-21 RX ADMIN — GABAPENTIN 200 MG: 100 CAPSULE ORAL at 05:52

## 2018-02-21 RX ADMIN — BUMETANIDE 2 MG: 1 TABLET ORAL at 09:51

## 2018-02-21 RX ADMIN — MORPHINE SULFATE 30 MG: 30 TABLET ORAL at 17:03

## 2018-02-22 LAB
ALBUMIN SERPL-MCNC: 2.8 G/DL (ref 3.2–4.8)
ANION GAP SERPL CALCULATED.3IONS-SCNC: 3 MMOL/L (ref 3–11)
BUN BLD-MCNC: 43 MG/DL (ref 9–23)
BUN/CREAT SERPL: 35.8 (ref 7–25)
CALCIUM SPEC-SCNC: 8.7 MG/DL (ref 8.7–10.4)
CHLORIDE SERPL-SCNC: 94 MMOL/L (ref 99–109)
CO2 SERPL-SCNC: 40 MMOL/L (ref 20–31)
CREAT BLD-MCNC: 1.2 MG/DL (ref 0.6–1.3)
DEPRECATED RDW RBC AUTO: 59.4 FL (ref 37–54)
ERYTHROCYTE [DISTWIDTH] IN BLOOD BY AUTOMATED COUNT: 16.4 % (ref 11.3–14.5)
GFR SERPL CREATININE-BSD FRML MDRD: 59 ML/MIN/1.73
GLUCOSE BLD-MCNC: 132 MG/DL (ref 70–100)
HCT VFR BLD AUTO: 34.1 % (ref 38.9–50.9)
HGB BLD-MCNC: 11.1 G/DL (ref 13.1–17.5)
MAGNESIUM SERPL-MCNC: 2.4 MG/DL (ref 1.3–2.7)
MCH RBC QN AUTO: 32 PG (ref 27–31)
MCHC RBC AUTO-ENTMCNC: 32.6 G/DL (ref 32–36)
MCV RBC AUTO: 98.3 FL (ref 80–99)
PHOSPHATE SERPL-MCNC: 3.6 MG/DL (ref 2.4–5.1)
PLATELET # BLD AUTO: 369 10*3/MM3 (ref 150–450)
PMV BLD AUTO: 10 FL (ref 6–12)
POTASSIUM BLD-SCNC: 3.3 MMOL/L (ref 3.5–5.5)
RBC # BLD AUTO: 3.47 10*6/MM3 (ref 4.2–5.76)
SODIUM BLD-SCNC: 137 MMOL/L (ref 132–146)
WBC NRBC COR # BLD: 12.69 10*3/MM3 (ref 3.5–10.8)

## 2018-02-22 PROCEDURE — 83735 ASSAY OF MAGNESIUM: CPT | Performed by: HOSPITALIST

## 2018-02-22 PROCEDURE — 94640 AIRWAY INHALATION TREATMENT: CPT

## 2018-02-22 PROCEDURE — 97598 DBRDMT OPN WND ADDL 20CM/<: CPT

## 2018-02-22 PROCEDURE — 25010000002 ACETAZOLAMIDE PER 500 MG: Performed by: HOSPITALIST

## 2018-02-22 PROCEDURE — 29581 APPL MULTLAYER CMPRN SYS LEG: CPT

## 2018-02-22 PROCEDURE — 99232 SBSQ HOSP IP/OBS MODERATE 35: CPT | Performed by: HOSPITALIST

## 2018-02-22 PROCEDURE — 97116 GAIT TRAINING THERAPY: CPT

## 2018-02-22 PROCEDURE — 97166 OT EVAL MOD COMPLEX 45 MIN: CPT | Performed by: OCCUPATIONAL THERAPIST

## 2018-02-22 PROCEDURE — 85027 COMPLETE CBC AUTOMATED: CPT | Performed by: HOSPITALIST

## 2018-02-22 PROCEDURE — 80069 RENAL FUNCTION PANEL: CPT | Performed by: HOSPITALIST

## 2018-02-22 PROCEDURE — 25010000002 HEPARIN (PORCINE) PER 1000 UNITS: Performed by: INTERNAL MEDICINE

## 2018-02-22 PROCEDURE — 94799 UNLISTED PULMONARY SVC/PX: CPT

## 2018-02-22 PROCEDURE — 25010000002 MEROPENEM: Performed by: INTERNAL MEDICINE

## 2018-02-22 PROCEDURE — 63710000001 PREDNISONE PER 1 MG: Performed by: HOSPITALIST

## 2018-02-22 PROCEDURE — 97530 THERAPEUTIC ACTIVITIES: CPT | Performed by: OCCUPATIONAL THERAPIST

## 2018-02-22 PROCEDURE — 84132 ASSAY OF SERUM POTASSIUM: CPT | Performed by: HOSPITALIST

## 2018-02-22 PROCEDURE — 94760 N-INVAS EAR/PLS OXIMETRY 1: CPT

## 2018-02-22 PROCEDURE — 97597 DBRDMT OPN WND 1ST 20 CM/<: CPT

## 2018-02-22 PROCEDURE — 97164 PT RE-EVAL EST PLAN CARE: CPT

## 2018-02-22 RX ORDER — POTASSIUM CHLORIDE 1.5 G/1.77G
40 POWDER, FOR SOLUTION ORAL AS NEEDED
Status: DISCONTINUED | OUTPATIENT
Start: 2018-02-22 | End: 2018-03-15 | Stop reason: HOSPADM

## 2018-02-22 RX ORDER — MINOCYCLINE HYDROCHLORIDE 50 MG/1
100 CAPSULE ORAL EVERY 12 HOURS
Status: DISCONTINUED | OUTPATIENT
Start: 2018-02-22 | End: 2018-03-08

## 2018-02-22 RX ORDER — MAGNESIUM SULFATE HEPTAHYDRATE 40 MG/ML
4 INJECTION, SOLUTION INTRAVENOUS AS NEEDED
Status: DISCONTINUED | OUTPATIENT
Start: 2018-02-22 | End: 2018-03-15 | Stop reason: HOSPADM

## 2018-02-22 RX ORDER — POTASSIUM CHLORIDE 750 MG/1
40 CAPSULE, EXTENDED RELEASE ORAL AS NEEDED
Status: DISCONTINUED | OUTPATIENT
Start: 2018-02-22 | End: 2018-03-15 | Stop reason: HOSPADM

## 2018-02-22 RX ORDER — ACETAZOLAMIDE SODIUM 500 MG/5ML
500 INJECTION, POWDER, LYOPHILIZED, FOR SOLUTION INTRAVENOUS ONCE
Status: COMPLETED | OUTPATIENT
Start: 2018-02-22 | End: 2018-02-22

## 2018-02-22 RX ORDER — MAGNESIUM CARB/ALUMINUM HYDROX 105-160MG
296 TABLET,CHEWABLE ORAL ONCE
Status: COMPLETED | OUTPATIENT
Start: 2018-02-22 | End: 2018-02-22

## 2018-02-22 RX ORDER — BUMETANIDE 1 MG/1
2 TABLET ORAL DAILY
Status: DISCONTINUED | OUTPATIENT
Start: 2018-02-22 | End: 2018-02-23 | Stop reason: SDUPTHER

## 2018-02-22 RX ORDER — MAGNESIUM SULFATE HEPTAHYDRATE 40 MG/ML
2 INJECTION, SOLUTION INTRAVENOUS AS NEEDED
Status: DISCONTINUED | OUTPATIENT
Start: 2018-02-22 | End: 2018-03-15 | Stop reason: HOSPADM

## 2018-02-22 RX ADMIN — IPRATROPIUM BROMIDE AND ALBUTEROL SULFATE 3 ML: 2.5; .5 SOLUTION RESPIRATORY (INHALATION) at 20:08

## 2018-02-22 RX ADMIN — BUMETANIDE 2 MG: 1 TABLET ORAL at 18:49

## 2018-02-22 RX ADMIN — POTASSIUM CHLORIDE 40 MEQ: 750 CAPSULE, EXTENDED RELEASE ORAL at 09:13

## 2018-02-22 RX ADMIN — Medication 296 ML: at 13:01

## 2018-02-22 RX ADMIN — ACETAMINOPHEN 650 MG: 325 TABLET ORAL at 03:25

## 2018-02-22 RX ADMIN — MINOCYCLINE HYDROCHLORIDE 100 MG: 50 CAPSULE ORAL at 18:00

## 2018-02-22 RX ADMIN — HEPARIN SODIUM 5000 UNITS: 5000 INJECTION, SOLUTION INTRAVENOUS; SUBCUTANEOUS at 14:31

## 2018-02-22 RX ADMIN — MORPHINE SULFATE 30 MG: 30 TABLET ORAL at 20:40

## 2018-02-22 RX ADMIN — MEROPENEM 1 G: 1 INJECTION, POWDER, FOR SOLUTION INTRAVENOUS at 01:02

## 2018-02-22 RX ADMIN — IPRATROPIUM BROMIDE AND ALBUTEROL SULFATE 3 ML: 2.5; .5 SOLUTION RESPIRATORY (INHALATION) at 07:56

## 2018-02-22 RX ADMIN — IPRATROPIUM BROMIDE AND ALBUTEROL SULFATE 3 ML: 2.5; .5 SOLUTION RESPIRATORY (INHALATION) at 13:05

## 2018-02-22 RX ADMIN — BUDESONIDE AND FORMOTEROL FUMARATE DIHYDRATE 2 PUFF: 80; 4.5 AEROSOL RESPIRATORY (INHALATION) at 07:56

## 2018-02-22 RX ADMIN — PREDNISONE 40 MG: 20 TABLET ORAL at 09:13

## 2018-02-22 RX ADMIN — BUDESONIDE AND FORMOTEROL FUMARATE DIHYDRATE 2 PUFF: 80; 4.5 AEROSOL RESPIRATORY (INHALATION) at 20:10

## 2018-02-22 RX ADMIN — WATER 500 MG: 1 INJECTION INTRAMUSCULAR; INTRAVENOUS; SUBCUTANEOUS at 18:48

## 2018-02-22 RX ADMIN — BUMETANIDE 2 MG: 1 TABLET ORAL at 09:13

## 2018-02-22 RX ADMIN — GABAPENTIN 200 MG: 100 CAPSULE ORAL at 06:29

## 2018-02-22 RX ADMIN — MORPHINE SULFATE 30 MG: 30 TABLET ORAL at 06:30

## 2018-02-22 RX ADMIN — DOCUSATE SODIUM 200 MG: 100 CAPSULE, LIQUID FILLED ORAL at 09:14

## 2018-02-22 RX ADMIN — IPRATROPIUM BROMIDE AND ALBUTEROL SULFATE 3 ML: 2.5; .5 SOLUTION RESPIRATORY (INHALATION) at 16:53

## 2018-02-22 RX ADMIN — GABAPENTIN 200 MG: 100 CAPSULE ORAL at 20:39

## 2018-02-22 RX ADMIN — MEROPENEM 1 G: 1 INJECTION, POWDER, FOR SOLUTION INTRAVENOUS at 09:14

## 2018-02-22 RX ADMIN — FLUTICASONE PROPIONATE 2 SPRAY: 50 SPRAY, METERED NASAL at 09:14

## 2018-02-22 RX ADMIN — MORPHINE SULFATE 30 MG: 30 TABLET ORAL at 14:31

## 2018-02-22 RX ADMIN — NICOTINE 1 PATCH: 7 PATCH, EXTENDED RELEASE TRANSDERMAL at 20:40

## 2018-02-22 RX ADMIN — POTASSIUM CHLORIDE 40 MEQ: 750 CAPSULE, EXTENDED RELEASE ORAL at 14:31

## 2018-02-22 RX ADMIN — HEPARIN SODIUM 5000 UNITS: 5000 INJECTION, SOLUTION INTRAVENOUS; SUBCUTANEOUS at 20:40

## 2018-02-22 RX ADMIN — GABAPENTIN 200 MG: 100 CAPSULE ORAL at 14:31

## 2018-02-22 RX ADMIN — HEPARIN SODIUM 5000 UNITS: 5000 INJECTION, SOLUTION INTRAVENOUS; SUBCUTANEOUS at 06:30

## 2018-02-23 LAB
ALBUMIN SERPL-MCNC: 3.3 G/DL (ref 3.2–4.8)
ANION GAP SERPL CALCULATED.3IONS-SCNC: 7 MMOL/L (ref 3–11)
BACTERIA SPEC AEROBE CULT: ABNORMAL
BACTERIA SPEC AEROBE CULT: NORMAL
BACTERIA SPEC AEROBE CULT: NORMAL
BUN BLD-MCNC: 50 MG/DL (ref 9–23)
BUN/CREAT SERPL: 38.5 (ref 7–25)
CALCIUM SPEC-SCNC: 8.7 MG/DL (ref 8.7–10.4)
CHLORIDE SERPL-SCNC: 90 MMOL/L (ref 99–109)
CO2 SERPL-SCNC: 40 MMOL/L (ref 20–31)
CREAT BLD-MCNC: 1.3 MG/DL (ref 0.6–1.3)
DEPRECATED RDW RBC AUTO: 59.9 FL (ref 37–54)
ERYTHROCYTE [DISTWIDTH] IN BLOOD BY AUTOMATED COUNT: 16.3 % (ref 11.3–14.5)
GFR SERPL CREATININE-BSD FRML MDRD: 54 ML/MIN/1.73
GLUCOSE BLD-MCNC: 124 MG/DL (ref 70–100)
GRAM STN SPEC: ABNORMAL
HCT VFR BLD AUTO: 36.5 % (ref 38.9–50.9)
HGB BLD-MCNC: 11.7 G/DL (ref 13.1–17.5)
MCH RBC QN AUTO: 32.2 PG (ref 27–31)
MCHC RBC AUTO-ENTMCNC: 32.1 G/DL (ref 32–36)
MCV RBC AUTO: 100.6 FL (ref 80–99)
PHOSPHATE SERPL-MCNC: 3 MG/DL (ref 2.4–5.1)
PLATELET # BLD AUTO: 386 10*3/MM3 (ref 150–450)
PMV BLD AUTO: 9.7 FL (ref 6–12)
POTASSIUM BLD-SCNC: 4.5 MMOL/L (ref 3.5–5.5)
POTASSIUM BLD-SCNC: 4.6 MMOL/L (ref 3.5–5.5)
RBC # BLD AUTO: 3.63 10*6/MM3 (ref 4.2–5.76)
SODIUM BLD-SCNC: 137 MMOL/L (ref 132–146)
STREP GROUPING: ABNORMAL
WBC NRBC COR # BLD: 11.21 10*3/MM3 (ref 3.5–10.8)

## 2018-02-23 PROCEDURE — 25010000002 HYDROMORPHONE PER 4 MG: Performed by: HOSPITALIST

## 2018-02-23 PROCEDURE — 94640 AIRWAY INHALATION TREATMENT: CPT

## 2018-02-23 PROCEDURE — 94760 N-INVAS EAR/PLS OXIMETRY 1: CPT

## 2018-02-23 PROCEDURE — 94799 UNLISTED PULMONARY SVC/PX: CPT

## 2018-02-23 PROCEDURE — 99233 SBSQ HOSP IP/OBS HIGH 50: CPT | Performed by: INTERNAL MEDICINE

## 2018-02-23 PROCEDURE — 97110 THERAPEUTIC EXERCISES: CPT

## 2018-02-23 PROCEDURE — 25010000002 HEPARIN (PORCINE) PER 1000 UNITS: Performed by: INTERNAL MEDICINE

## 2018-02-23 PROCEDURE — 80069 RENAL FUNCTION PANEL: CPT | Performed by: HOSPITALIST

## 2018-02-23 PROCEDURE — 97116 GAIT TRAINING THERAPY: CPT

## 2018-02-23 PROCEDURE — 97530 THERAPEUTIC ACTIVITIES: CPT | Performed by: OCCUPATIONAL THERAPIST

## 2018-02-23 PROCEDURE — 85027 COMPLETE CBC AUTOMATED: CPT | Performed by: HOSPITALIST

## 2018-02-23 PROCEDURE — 63710000001 PREDNISONE PER 1 MG: Performed by: HOSPITALIST

## 2018-02-23 RX ADMIN — PREDNISONE 40 MG: 20 TABLET ORAL at 08:23

## 2018-02-23 RX ADMIN — HEPARIN SODIUM 5000 UNITS: 5000 INJECTION, SOLUTION INTRAVENOUS; SUBCUTANEOUS at 06:33

## 2018-02-23 RX ADMIN — IPRATROPIUM BROMIDE AND ALBUTEROL SULFATE 3 ML: 2.5; .5 SOLUTION RESPIRATORY (INHALATION) at 08:43

## 2018-02-23 RX ADMIN — IPRATROPIUM BROMIDE AND ALBUTEROL SULFATE 3 ML: 2.5; .5 SOLUTION RESPIRATORY (INHALATION) at 16:21

## 2018-02-23 RX ADMIN — GABAPENTIN 200 MG: 100 CAPSULE ORAL at 14:11

## 2018-02-23 RX ADMIN — DOCUSATE SODIUM 200 MG: 100 CAPSULE, LIQUID FILLED ORAL at 08:23

## 2018-02-23 RX ADMIN — IPRATROPIUM BROMIDE AND ALBUTEROL SULFATE 3 ML: 2.5; .5 SOLUTION RESPIRATORY (INHALATION) at 20:44

## 2018-02-23 RX ADMIN — MORPHINE SULFATE 30 MG: 30 TABLET ORAL at 06:50

## 2018-02-23 RX ADMIN — GABAPENTIN 200 MG: 100 CAPSULE ORAL at 06:33

## 2018-02-23 RX ADMIN — HEPARIN SODIUM 5000 UNITS: 5000 INJECTION, SOLUTION INTRAVENOUS; SUBCUTANEOUS at 21:08

## 2018-02-23 RX ADMIN — GABAPENTIN 200 MG: 100 CAPSULE ORAL at 21:07

## 2018-02-23 RX ADMIN — MORPHINE SULFATE 30 MG: 30 TABLET ORAL at 14:11

## 2018-02-23 RX ADMIN — BUDESONIDE AND FORMOTEROL FUMARATE DIHYDRATE 2 PUFF: 80; 4.5 AEROSOL RESPIRATORY (INHALATION) at 08:43

## 2018-02-23 RX ADMIN — IPRATROPIUM BROMIDE AND ALBUTEROL SULFATE 3 ML: 2.5; .5 SOLUTION RESPIRATORY (INHALATION) at 13:26

## 2018-02-23 RX ADMIN — MINOCYCLINE HYDROCHLORIDE 100 MG: 50 CAPSULE ORAL at 17:33

## 2018-02-23 RX ADMIN — NICOTINE 1 PATCH: 7 PATCH, EXTENDED RELEASE TRANSDERMAL at 21:08

## 2018-02-23 RX ADMIN — BISACODYL 10 MG: 10 SUPPOSITORY RECTAL at 08:23

## 2018-02-23 RX ADMIN — HYDROMORPHONE HYDROCHLORIDE 0.5 MG: 10 INJECTION INTRAMUSCULAR; INTRAVENOUS; SUBCUTANEOUS at 17:35

## 2018-02-23 RX ADMIN — BUDESONIDE AND FORMOTEROL FUMARATE DIHYDRATE 2 PUFF: 80; 4.5 AEROSOL RESPIRATORY (INHALATION) at 20:44

## 2018-02-23 RX ADMIN — MINOCYCLINE HYDROCHLORIDE 100 MG: 50 CAPSULE ORAL at 06:33

## 2018-02-23 RX ADMIN — FLUTICASONE PROPIONATE 2 SPRAY: 50 SPRAY, METERED NASAL at 08:23

## 2018-02-23 RX ADMIN — BUMETANIDE 2 MG: 1 TABLET ORAL at 08:23

## 2018-02-23 RX ADMIN — HEPARIN SODIUM 5000 UNITS: 5000 INJECTION, SOLUTION INTRAVENOUS; SUBCUTANEOUS at 14:11

## 2018-02-24 LAB
ANION GAP SERPL CALCULATED.3IONS-SCNC: 4 MMOL/L (ref 3–11)
BASOPHILS # BLD AUTO: 0.01 10*3/MM3 (ref 0–0.2)
BASOPHILS NFR BLD AUTO: 0.1 % (ref 0–1)
BUN BLD-MCNC: 55 MG/DL (ref 9–23)
BUN/CREAT SERPL: 42.3 (ref 7–25)
CALCIUM SPEC-SCNC: 8.5 MG/DL (ref 8.7–10.4)
CHLORIDE SERPL-SCNC: 91 MMOL/L (ref 99–109)
CO2 SERPL-SCNC: 41 MMOL/L (ref 20–31)
CREAT BLD-MCNC: 1.3 MG/DL (ref 0.6–1.3)
DEPRECATED RDW RBC AUTO: 60.8 FL (ref 37–54)
EOSINOPHIL # BLD AUTO: 0.01 10*3/MM3 (ref 0–0.3)
EOSINOPHIL NFR BLD AUTO: 0.1 % (ref 0–3)
ERYTHROCYTE [DISTWIDTH] IN BLOOD BY AUTOMATED COUNT: 16.3 % (ref 11.3–14.5)
GFR SERPL CREATININE-BSD FRML MDRD: 54 ML/MIN/1.73
GLUCOSE BLD-MCNC: 150 MG/DL (ref 70–100)
HCT VFR BLD AUTO: 35 % (ref 38.9–50.9)
HGB BLD-MCNC: 11 G/DL (ref 13.1–17.5)
IMM GRANULOCYTES # BLD: 0.11 10*3/MM3 (ref 0–0.03)
IMM GRANULOCYTES NFR BLD: 0.9 % (ref 0–0.6)
LYMPHOCYTES # BLD AUTO: 0.67 10*3/MM3 (ref 0.6–4.8)
LYMPHOCYTES NFR BLD AUTO: 5.7 % (ref 24–44)
MCH RBC QN AUTO: 31.9 PG (ref 27–31)
MCHC RBC AUTO-ENTMCNC: 31.4 G/DL (ref 32–36)
MCV RBC AUTO: 101.4 FL (ref 80–99)
MONOCYTES # BLD AUTO: 0.89 10*3/MM3 (ref 0–1)
MONOCYTES NFR BLD AUTO: 7.5 % (ref 0–12)
NEUTROPHILS # BLD AUTO: 10.1 10*3/MM3 (ref 1.5–8.3)
NEUTROPHILS NFR BLD AUTO: 85.7 % (ref 41–71)
PLATELET # BLD AUTO: 360 10*3/MM3 (ref 150–450)
PMV BLD AUTO: 9.9 FL (ref 6–12)
POTASSIUM BLD-SCNC: 4.1 MMOL/L (ref 3.5–5.5)
RBC # BLD AUTO: 3.45 10*6/MM3 (ref 4.2–5.76)
SODIUM BLD-SCNC: 136 MMOL/L (ref 132–146)
WBC NRBC COR # BLD: 11.79 10*3/MM3 (ref 3.5–10.8)

## 2018-02-24 PROCEDURE — 80048 BASIC METABOLIC PNL TOTAL CA: CPT | Performed by: INTERNAL MEDICINE

## 2018-02-24 PROCEDURE — 94640 AIRWAY INHALATION TREATMENT: CPT

## 2018-02-24 PROCEDURE — 85025 COMPLETE CBC W/AUTO DIFF WBC: CPT | Performed by: INTERNAL MEDICINE

## 2018-02-24 PROCEDURE — 99233 SBSQ HOSP IP/OBS HIGH 50: CPT | Performed by: INTERNAL MEDICINE

## 2018-02-24 PROCEDURE — 94760 N-INVAS EAR/PLS OXIMETRY 1: CPT

## 2018-02-24 PROCEDURE — 94799 UNLISTED PULMONARY SVC/PX: CPT

## 2018-02-24 PROCEDURE — 25010000002 HEPARIN (PORCINE) PER 1000 UNITS: Performed by: INTERNAL MEDICINE

## 2018-02-24 RX ORDER — CALCIUM CARBONATE 200(500)MG
2 TABLET,CHEWABLE ORAL 2 TIMES DAILY PRN
Status: DISCONTINUED | OUTPATIENT
Start: 2018-02-24 | End: 2018-03-15 | Stop reason: HOSPADM

## 2018-02-24 RX ORDER — FAMOTIDINE 20 MG/1
20 TABLET, FILM COATED ORAL 2 TIMES DAILY
Status: DISCONTINUED | OUTPATIENT
Start: 2018-02-24 | End: 2018-03-07

## 2018-02-24 RX ORDER — BUMETANIDE 1 MG/1
1 TABLET ORAL DAILY
Status: DISCONTINUED | OUTPATIENT
Start: 2018-02-24 | End: 2018-03-12

## 2018-02-24 RX ORDER — MAGNESIUM CARB/ALUMINUM HYDROX 105-160MG
150 TABLET,CHEWABLE ORAL ONCE
Status: COMPLETED | OUTPATIENT
Start: 2018-02-24 | End: 2018-02-24

## 2018-02-24 RX ADMIN — IPRATROPIUM BROMIDE AND ALBUTEROL SULFATE 3 ML: 2.5; .5 SOLUTION RESPIRATORY (INHALATION) at 12:34

## 2018-02-24 RX ADMIN — MAGNESIUM HYDROXIDE 10 ML: 2400 SUSPENSION ORAL at 11:36

## 2018-02-24 RX ADMIN — Medication 150 ML: at 11:36

## 2018-02-24 RX ADMIN — FLUTICASONE PROPIONATE 2 SPRAY: 50 SPRAY, METERED NASAL at 08:39

## 2018-02-24 RX ADMIN — BUMETANIDE 1 MG: 1 TABLET ORAL at 08:39

## 2018-02-24 RX ADMIN — BUDESONIDE AND FORMOTEROL FUMARATE DIHYDRATE 2 PUFF: 80; 4.5 AEROSOL RESPIRATORY (INHALATION) at 20:16

## 2018-02-24 RX ADMIN — ACETAMINOPHEN 650 MG: 325 TABLET ORAL at 09:17

## 2018-02-24 RX ADMIN — IPRATROPIUM BROMIDE AND ALBUTEROL SULFATE 3 ML: 2.5; .5 SOLUTION RESPIRATORY (INHALATION) at 08:14

## 2018-02-24 RX ADMIN — DOCUSATE SODIUM 200 MG: 100 CAPSULE, LIQUID FILLED ORAL at 08:39

## 2018-02-24 RX ADMIN — NICOTINE 1 PATCH: 7 PATCH, EXTENDED RELEASE TRANSDERMAL at 20:48

## 2018-02-24 RX ADMIN — MINOCYCLINE HYDROCHLORIDE 100 MG: 50 CAPSULE ORAL at 05:25

## 2018-02-24 RX ADMIN — Medication 2 TABLET: at 21:14

## 2018-02-24 RX ADMIN — ACETAMINOPHEN 650 MG: 325 TABLET ORAL at 03:22

## 2018-02-24 RX ADMIN — FAMOTIDINE 20 MG: 20 TABLET, FILM COATED ORAL at 20:58

## 2018-02-24 RX ADMIN — BUDESONIDE AND FORMOTEROL FUMARATE DIHYDRATE 2 PUFF: 80; 4.5 AEROSOL RESPIRATORY (INHALATION) at 08:13

## 2018-02-24 RX ADMIN — IPRATROPIUM BROMIDE AND ALBUTEROL SULFATE 3 ML: 2.5; .5 SOLUTION RESPIRATORY (INHALATION) at 20:16

## 2018-02-24 RX ADMIN — HEPARIN SODIUM 5000 UNITS: 5000 INJECTION, SOLUTION INTRAVENOUS; SUBCUTANEOUS at 14:13

## 2018-02-24 RX ADMIN — MINOCYCLINE HYDROCHLORIDE 100 MG: 50 CAPSULE ORAL at 17:52

## 2018-02-24 RX ADMIN — HEPARIN SODIUM 5000 UNITS: 5000 INJECTION, SOLUTION INTRAVENOUS; SUBCUTANEOUS at 20:45

## 2018-02-24 RX ADMIN — MORPHINE SULFATE 30 MG: 30 TABLET ORAL at 20:59

## 2018-02-24 RX ADMIN — GABAPENTIN 200 MG: 100 CAPSULE ORAL at 05:25

## 2018-02-24 RX ADMIN — MORPHINE SULFATE 30 MG: 30 TABLET ORAL at 14:13

## 2018-02-24 RX ADMIN — MORPHINE SULFATE 30 MG: 30 TABLET ORAL at 08:39

## 2018-02-24 RX ADMIN — HEPARIN SODIUM 5000 UNITS: 5000 INJECTION, SOLUTION INTRAVENOUS; SUBCUTANEOUS at 05:25

## 2018-02-24 RX ADMIN — GABAPENTIN 200 MG: 100 CAPSULE ORAL at 20:45

## 2018-02-24 RX ADMIN — GABAPENTIN 200 MG: 100 CAPSULE ORAL at 14:13

## 2018-02-24 RX ADMIN — IPRATROPIUM BROMIDE AND ALBUTEROL SULFATE 3 ML: 2.5; .5 SOLUTION RESPIRATORY (INHALATION) at 15:48

## 2018-02-25 LAB
ANION GAP SERPL CALCULATED.3IONS-SCNC: 4 MMOL/L (ref 3–11)
BUN BLD-MCNC: 52 MG/DL (ref 9–23)
BUN/CREAT SERPL: 43.3 (ref 7–25)
CALCIUM SPEC-SCNC: 8.5 MG/DL (ref 8.7–10.4)
CHLORIDE SERPL-SCNC: 94 MMOL/L (ref 99–109)
CO2 SERPL-SCNC: 37 MMOL/L (ref 20–31)
CREAT BLD-MCNC: 1.2 MG/DL (ref 0.6–1.3)
GFR SERPL CREATININE-BSD FRML MDRD: 59 ML/MIN/1.73
GLUCOSE BLD-MCNC: 114 MG/DL (ref 70–100)
POTASSIUM BLD-SCNC: 4.4 MMOL/L (ref 3.5–5.5)
SODIUM BLD-SCNC: 135 MMOL/L (ref 132–146)

## 2018-02-25 PROCEDURE — 25010000002 HYDROMORPHONE PER 4 MG: Performed by: HOSPITALIST

## 2018-02-25 PROCEDURE — 94640 AIRWAY INHALATION TREATMENT: CPT

## 2018-02-25 PROCEDURE — 94799 UNLISTED PULMONARY SVC/PX: CPT

## 2018-02-25 PROCEDURE — 99232 SBSQ HOSP IP/OBS MODERATE 35: CPT | Performed by: INTERNAL MEDICINE

## 2018-02-25 PROCEDURE — 25010000002 HEPARIN (PORCINE) PER 1000 UNITS: Performed by: INTERNAL MEDICINE

## 2018-02-25 PROCEDURE — 80048 BASIC METABOLIC PNL TOTAL CA: CPT | Performed by: INTERNAL MEDICINE

## 2018-02-25 RX ORDER — CASTOR OIL AND BALSAM, PERU 788; 87 MG/G; MG/G
OINTMENT TOPICAL EVERY 12 HOURS SCHEDULED
Status: DISCONTINUED | OUTPATIENT
Start: 2018-02-25 | End: 2018-03-15 | Stop reason: HOSPADM

## 2018-02-25 RX ORDER — GABAPENTIN 300 MG/1
300 CAPSULE ORAL EVERY 8 HOURS SCHEDULED
Status: DISCONTINUED | OUTPATIENT
Start: 2018-02-25 | End: 2018-02-26

## 2018-02-25 RX ADMIN — MORPHINE SULFATE 30 MG: 30 TABLET ORAL at 13:52

## 2018-02-25 RX ADMIN — MORPHINE SULFATE 30 MG: 30 TABLET ORAL at 21:38

## 2018-02-25 RX ADMIN — BUDESONIDE AND FORMOTEROL FUMARATE DIHYDRATE 2 PUFF: 80; 4.5 AEROSOL RESPIRATORY (INHALATION) at 07:55

## 2018-02-25 RX ADMIN — FAMOTIDINE 20 MG: 20 TABLET, FILM COATED ORAL at 08:02

## 2018-02-25 RX ADMIN — BUDESONIDE AND FORMOTEROL FUMARATE DIHYDRATE 2 PUFF: 80; 4.5 AEROSOL RESPIRATORY (INHALATION) at 20:31

## 2018-02-25 RX ADMIN — HEPARIN SODIUM 5000 UNITS: 5000 INJECTION, SOLUTION INTRAVENOUS; SUBCUTANEOUS at 06:28

## 2018-02-25 RX ADMIN — IPRATROPIUM BROMIDE AND ALBUTEROL SULFATE 3 ML: 2.5; .5 SOLUTION RESPIRATORY (INHALATION) at 12:01

## 2018-02-25 RX ADMIN — HEPARIN SODIUM 5000 UNITS: 5000 INJECTION, SOLUTION INTRAVENOUS; SUBCUTANEOUS at 21:32

## 2018-02-25 RX ADMIN — IPRATROPIUM BROMIDE AND ALBUTEROL SULFATE 3 ML: 2.5; .5 SOLUTION RESPIRATORY (INHALATION) at 15:53

## 2018-02-25 RX ADMIN — NICOTINE 1 PATCH: 7 PATCH, EXTENDED RELEASE TRANSDERMAL at 21:33

## 2018-02-25 RX ADMIN — CASTOR OIL AND BALSAM, PERU: 788; 87 OINTMENT TOPICAL at 21:38

## 2018-02-25 RX ADMIN — GABAPENTIN 300 MG: 300 CAPSULE ORAL at 21:32

## 2018-02-25 RX ADMIN — MINOCYCLINE HYDROCHLORIDE 100 MG: 50 CAPSULE ORAL at 06:27

## 2018-02-25 RX ADMIN — MAGNESIUM HYDROXIDE 10 ML: 2400 SUSPENSION ORAL at 22:28

## 2018-02-25 RX ADMIN — HYDROMORPHONE HYDROCHLORIDE 0.5 MG: 10 INJECTION INTRAMUSCULAR; INTRAVENOUS; SUBCUTANEOUS at 01:27

## 2018-02-25 RX ADMIN — FAMOTIDINE 20 MG: 20 TABLET, FILM COATED ORAL at 21:32

## 2018-02-25 RX ADMIN — MORPHINE SULFATE 30 MG: 30 TABLET ORAL at 08:02

## 2018-02-25 RX ADMIN — GABAPENTIN 300 MG: 300 CAPSULE ORAL at 13:52

## 2018-02-25 RX ADMIN — FLUTICASONE PROPIONATE 2 SPRAY: 50 SPRAY, METERED NASAL at 08:02

## 2018-02-25 RX ADMIN — BUMETANIDE 1 MG: 1 TABLET ORAL at 08:02

## 2018-02-25 RX ADMIN — DOCUSATE SODIUM 200 MG: 100 CAPSULE, LIQUID FILLED ORAL at 08:02

## 2018-02-25 RX ADMIN — GABAPENTIN 200 MG: 100 CAPSULE ORAL at 06:27

## 2018-02-25 RX ADMIN — IPRATROPIUM BROMIDE AND ALBUTEROL SULFATE 3 ML: 2.5; .5 SOLUTION RESPIRATORY (INHALATION) at 20:31

## 2018-02-25 RX ADMIN — MINOCYCLINE HYDROCHLORIDE 100 MG: 50 CAPSULE ORAL at 17:08

## 2018-02-25 RX ADMIN — HEPARIN SODIUM 5000 UNITS: 5000 INJECTION, SOLUTION INTRAVENOUS; SUBCUTANEOUS at 13:52

## 2018-02-25 RX ADMIN — IPRATROPIUM BROMIDE AND ALBUTEROL SULFATE 3 ML: 2.5; .5 SOLUTION RESPIRATORY (INHALATION) at 07:55

## 2018-02-26 ENCOUNTER — APPOINTMENT (OUTPATIENT)
Dept: GENERAL RADIOLOGY | Facility: HOSPITAL | Age: 73
End: 2018-02-26

## 2018-02-26 LAB
ANION GAP SERPL CALCULATED.3IONS-SCNC: 2 MMOL/L (ref 3–11)
BUN BLD-MCNC: 46 MG/DL (ref 9–23)
BUN/CREAT SERPL: 38.3 (ref 7–25)
CALCIUM SPEC-SCNC: 8.3 MG/DL (ref 8.7–10.4)
CHLORIDE SERPL-SCNC: 95 MMOL/L (ref 99–109)
CO2 SERPL-SCNC: 38 MMOL/L (ref 20–31)
CREAT BLD-MCNC: 1.2 MG/DL (ref 0.6–1.3)
GFR SERPL CREATININE-BSD FRML MDRD: 59 ML/MIN/1.73
GLUCOSE BLD-MCNC: 125 MG/DL (ref 70–100)
POTASSIUM BLD-SCNC: 3.8 MMOL/L (ref 3.5–5.5)
SODIUM BLD-SCNC: 135 MMOL/L (ref 132–146)

## 2018-02-26 PROCEDURE — 94799 UNLISTED PULMONARY SVC/PX: CPT

## 2018-02-26 PROCEDURE — 94640 AIRWAY INHALATION TREATMENT: CPT

## 2018-02-26 PROCEDURE — 80048 BASIC METABOLIC PNL TOTAL CA: CPT | Performed by: INTERNAL MEDICINE

## 2018-02-26 PROCEDURE — 94760 N-INVAS EAR/PLS OXIMETRY 1: CPT

## 2018-02-26 PROCEDURE — 25010000002 HEPARIN (PORCINE) PER 1000 UNITS: Performed by: INTERNAL MEDICINE

## 2018-02-26 PROCEDURE — 99233 SBSQ HOSP IP/OBS HIGH 50: CPT | Performed by: INTERNAL MEDICINE

## 2018-02-26 PROCEDURE — 71045 X-RAY EXAM CHEST 1 VIEW: CPT

## 2018-02-26 PROCEDURE — 97110 THERAPEUTIC EXERCISES: CPT

## 2018-02-26 RX ORDER — GABAPENTIN 100 MG/1
200 CAPSULE ORAL EVERY 8 HOURS SCHEDULED
Status: DISCONTINUED | OUTPATIENT
Start: 2018-02-26 | End: 2018-03-15 | Stop reason: HOSPADM

## 2018-02-26 RX ADMIN — IPRATROPIUM BROMIDE AND ALBUTEROL SULFATE 3 ML: 2.5; .5 SOLUTION RESPIRATORY (INHALATION) at 17:30

## 2018-02-26 RX ADMIN — DOCUSATE SODIUM 200 MG: 100 CAPSULE, LIQUID FILLED ORAL at 09:34

## 2018-02-26 RX ADMIN — FLUTICASONE PROPIONATE 2 SPRAY: 50 SPRAY, METERED NASAL at 09:35

## 2018-02-26 RX ADMIN — GABAPENTIN 300 MG: 300 CAPSULE ORAL at 06:24

## 2018-02-26 RX ADMIN — BUDESONIDE AND FORMOTEROL FUMARATE DIHYDRATE 2 PUFF: 80; 4.5 AEROSOL RESPIRATORY (INHALATION) at 19:44

## 2018-02-26 RX ADMIN — MINOCYCLINE HYDROCHLORIDE 100 MG: 50 CAPSULE ORAL at 06:24

## 2018-02-26 RX ADMIN — GABAPENTIN 200 MG: 100 CAPSULE ORAL at 18:25

## 2018-02-26 RX ADMIN — HEPARIN SODIUM 5000 UNITS: 5000 INJECTION, SOLUTION INTRAVENOUS; SUBCUTANEOUS at 22:51

## 2018-02-26 RX ADMIN — CASTOR OIL AND BALSAM, PERU: 788; 87 OINTMENT TOPICAL at 22:52

## 2018-02-26 RX ADMIN — BUDESONIDE AND FORMOTEROL FUMARATE DIHYDRATE 2 PUFF: 80; 4.5 AEROSOL RESPIRATORY (INHALATION) at 10:06

## 2018-02-26 RX ADMIN — GABAPENTIN 200 MG: 100 CAPSULE ORAL at 22:51

## 2018-02-26 RX ADMIN — ALBUTEROL SULFATE 2.5 MG: 2.5 SOLUTION RESPIRATORY (INHALATION) at 05:01

## 2018-02-26 RX ADMIN — HEPARIN SODIUM 5000 UNITS: 5000 INJECTION, SOLUTION INTRAVENOUS; SUBCUTANEOUS at 18:25

## 2018-02-26 RX ADMIN — CASTOR OIL AND BALSAM, PERU: 788; 87 OINTMENT TOPICAL at 09:35

## 2018-02-26 RX ADMIN — IPRATROPIUM BROMIDE AND ALBUTEROL SULFATE 3 ML: 2.5; .5 SOLUTION RESPIRATORY (INHALATION) at 19:44

## 2018-02-26 RX ADMIN — FAMOTIDINE 20 MG: 20 TABLET, FILM COATED ORAL at 22:51

## 2018-02-26 RX ADMIN — IPRATROPIUM BROMIDE AND ALBUTEROL SULFATE 3 ML: 2.5; .5 SOLUTION RESPIRATORY (INHALATION) at 10:01

## 2018-02-26 RX ADMIN — MORPHINE SULFATE 30 MG: 30 TABLET ORAL at 09:34

## 2018-02-26 RX ADMIN — HEPARIN SODIUM 5000 UNITS: 5000 INJECTION, SOLUTION INTRAVENOUS; SUBCUTANEOUS at 06:24

## 2018-02-26 RX ADMIN — NICOTINE 1 PATCH: 7 PATCH, EXTENDED RELEASE TRANSDERMAL at 22:51

## 2018-02-26 RX ADMIN — FAMOTIDINE 20 MG: 20 TABLET, FILM COATED ORAL at 09:34

## 2018-02-26 RX ADMIN — BUMETANIDE 1 MG: 1 TABLET ORAL at 09:34

## 2018-02-26 RX ADMIN — MINOCYCLINE HYDROCHLORIDE 100 MG: 50 CAPSULE ORAL at 18:25

## 2018-02-27 PROCEDURE — 94799 UNLISTED PULMONARY SVC/PX: CPT

## 2018-02-27 PROCEDURE — 99232 SBSQ HOSP IP/OBS MODERATE 35: CPT | Performed by: INTERNAL MEDICINE

## 2018-02-27 PROCEDURE — 97597 DBRDMT OPN WND 1ST 20 CM/<: CPT

## 2018-02-27 PROCEDURE — 94640 AIRWAY INHALATION TREATMENT: CPT

## 2018-02-27 PROCEDURE — 93010 ELECTROCARDIOGRAM REPORT: CPT | Performed by: INTERNAL MEDICINE

## 2018-02-27 PROCEDURE — 25010000002 HEPARIN (PORCINE) PER 1000 UNITS: Performed by: INTERNAL MEDICINE

## 2018-02-27 PROCEDURE — 93005 ELECTROCARDIOGRAM TRACING: CPT | Performed by: INTERNAL MEDICINE

## 2018-02-27 PROCEDURE — 94760 N-INVAS EAR/PLS OXIMETRY 1: CPT

## 2018-02-27 PROCEDURE — 29581 APPL MULTLAYER CMPRN SYS LEG: CPT

## 2018-02-27 PROCEDURE — 25010000002 HYDROMORPHONE PER 4 MG: Performed by: HOSPITALIST

## 2018-02-27 RX ORDER — HYDROCODONE BITARTRATE AND ACETAMINOPHEN 5; 325 MG/1; MG/1
1 TABLET ORAL EVERY 4 HOURS PRN
Status: DISCONTINUED | OUTPATIENT
Start: 2018-02-27 | End: 2018-03-04

## 2018-02-27 RX ORDER — HYDROCODONE BITARTRATE AND ACETAMINOPHEN 5; 325 MG/1; MG/1
1 TABLET ORAL EVERY 6 HOURS PRN
Status: DISCONTINUED | OUTPATIENT
Start: 2018-02-27 | End: 2018-02-27

## 2018-02-27 RX ADMIN — CASTOR OIL AND BALSAM, PERU: 788; 87 OINTMENT TOPICAL at 09:47

## 2018-02-27 RX ADMIN — FLUTICASONE PROPIONATE 2 SPRAY: 50 SPRAY, METERED NASAL at 09:46

## 2018-02-27 RX ADMIN — DOCUSATE SODIUM 200 MG: 100 CAPSULE, LIQUID FILLED ORAL at 09:47

## 2018-02-27 RX ADMIN — GABAPENTIN 200 MG: 100 CAPSULE ORAL at 23:11

## 2018-02-27 RX ADMIN — HYDROMORPHONE HYDROCHLORIDE 0.5 MG: 10 INJECTION INTRAMUSCULAR; INTRAVENOUS; SUBCUTANEOUS at 01:09

## 2018-02-27 RX ADMIN — MINOCYCLINE HYDROCHLORIDE 100 MG: 50 CAPSULE ORAL at 18:09

## 2018-02-27 RX ADMIN — MORPHINE SULFATE 30 MG: 30 TABLET ORAL at 02:28

## 2018-02-27 RX ADMIN — NICOTINE 1 PATCH: 7 PATCH, EXTENDED RELEASE TRANSDERMAL at 23:10

## 2018-02-27 RX ADMIN — FAMOTIDINE 20 MG: 20 TABLET, FILM COATED ORAL at 23:10

## 2018-02-27 RX ADMIN — HEPARIN SODIUM 5000 UNITS: 5000 INJECTION, SOLUTION INTRAVENOUS; SUBCUTANEOUS at 23:10

## 2018-02-27 RX ADMIN — MORPHINE SULFATE 30 MG: 30 TABLET ORAL at 23:15

## 2018-02-27 RX ADMIN — Medication 2 TABLET: at 09:46

## 2018-02-27 RX ADMIN — BUDESONIDE AND FORMOTEROL FUMARATE DIHYDRATE 2 PUFF: 80; 4.5 AEROSOL RESPIRATORY (INHALATION) at 07:52

## 2018-02-27 RX ADMIN — IPRATROPIUM BROMIDE AND ALBUTEROL SULFATE 3 ML: 2.5; .5 SOLUTION RESPIRATORY (INHALATION) at 12:24

## 2018-02-27 RX ADMIN — GABAPENTIN 200 MG: 100 CAPSULE ORAL at 18:09

## 2018-02-27 RX ADMIN — BUMETANIDE 1 MG: 1 TABLET ORAL at 09:46

## 2018-02-27 RX ADMIN — IPRATROPIUM BROMIDE AND ALBUTEROL SULFATE 3 ML: 2.5; .5 SOLUTION RESPIRATORY (INHALATION) at 16:15

## 2018-02-27 RX ADMIN — IPRATROPIUM BROMIDE AND ALBUTEROL SULFATE 3 ML: 2.5; .5 SOLUTION RESPIRATORY (INHALATION) at 20:18

## 2018-02-27 RX ADMIN — IPRATROPIUM BROMIDE AND ALBUTEROL SULFATE 3 ML: 2.5; .5 SOLUTION RESPIRATORY (INHALATION) at 07:52

## 2018-02-27 RX ADMIN — HEPARIN SODIUM 5000 UNITS: 5000 INJECTION, SOLUTION INTRAVENOUS; SUBCUTANEOUS at 18:10

## 2018-02-27 RX ADMIN — MORPHINE SULFATE 30 MG: 30 TABLET ORAL at 18:09

## 2018-02-27 RX ADMIN — BUDESONIDE AND FORMOTEROL FUMARATE DIHYDRATE 2 PUFF: 80; 4.5 AEROSOL RESPIRATORY (INHALATION) at 20:18

## 2018-02-27 RX ADMIN — CASTOR OIL AND BALSAM, PERU: 788; 87 OINTMENT TOPICAL at 23:13

## 2018-02-27 RX ADMIN — MORPHINE SULFATE 30 MG: 30 TABLET ORAL at 09:47

## 2018-02-27 RX ADMIN — FAMOTIDINE 20 MG: 20 TABLET, FILM COATED ORAL at 09:47

## 2018-02-28 LAB
ANION GAP SERPL CALCULATED.3IONS-SCNC: 6 MMOL/L (ref 3–11)
BUN BLD-MCNC: 42 MG/DL (ref 9–23)
BUN/CREAT SERPL: 32.3 (ref 7–25)
CALCIUM SPEC-SCNC: 8.4 MG/DL (ref 8.7–10.4)
CHLORIDE SERPL-SCNC: 96 MMOL/L (ref 99–109)
CO2 SERPL-SCNC: 34 MMOL/L (ref 20–31)
CREAT BLD-MCNC: 1.3 MG/DL (ref 0.6–1.3)
GFR SERPL CREATININE-BSD FRML MDRD: 54 ML/MIN/1.73
GLUCOSE BLD-MCNC: 97 MG/DL (ref 70–100)
POTASSIUM BLD-SCNC: 4 MMOL/L (ref 3.5–5.5)
SODIUM BLD-SCNC: 136 MMOL/L (ref 132–146)

## 2018-02-28 PROCEDURE — 97530 THERAPEUTIC ACTIVITIES: CPT

## 2018-02-28 PROCEDURE — 94799 UNLISTED PULMONARY SVC/PX: CPT

## 2018-02-28 PROCEDURE — 99232 SBSQ HOSP IP/OBS MODERATE 35: CPT | Performed by: INTERNAL MEDICINE

## 2018-02-28 PROCEDURE — 94760 N-INVAS EAR/PLS OXIMETRY 1: CPT

## 2018-02-28 PROCEDURE — 94640 AIRWAY INHALATION TREATMENT: CPT

## 2018-02-28 PROCEDURE — 80048 BASIC METABOLIC PNL TOTAL CA: CPT | Performed by: INTERNAL MEDICINE

## 2018-02-28 PROCEDURE — 25010000002 HEPARIN (PORCINE) PER 1000 UNITS: Performed by: INTERNAL MEDICINE

## 2018-02-28 RX ADMIN — IPRATROPIUM BROMIDE AND ALBUTEROL SULFATE 3 ML: 2.5; .5 SOLUTION RESPIRATORY (INHALATION) at 07:16

## 2018-02-28 RX ADMIN — DICLOFENAC SODIUM 2 G: 10 GEL TOPICAL at 18:13

## 2018-02-28 RX ADMIN — HEPARIN SODIUM 5000 UNITS: 5000 INJECTION, SOLUTION INTRAVENOUS; SUBCUTANEOUS at 14:53

## 2018-02-28 RX ADMIN — HYDROCODONE BITARTRATE AND ACETAMINOPHEN 1 TABLET: 5; 325 TABLET ORAL at 19:28

## 2018-02-28 RX ADMIN — HYDROCODONE BITARTRATE AND ACETAMINOPHEN 1 TABLET: 5; 325 TABLET ORAL at 14:56

## 2018-02-28 RX ADMIN — IPRATROPIUM BROMIDE AND ALBUTEROL SULFATE 3 ML: 2.5; .5 SOLUTION RESPIRATORY (INHALATION) at 20:27

## 2018-02-28 RX ADMIN — BUMETANIDE 1 MG: 1 TABLET ORAL at 08:50

## 2018-02-28 RX ADMIN — NICOTINE 1 PATCH: 7 PATCH, EXTENDED RELEASE TRANSDERMAL at 21:01

## 2018-02-28 RX ADMIN — BUDESONIDE AND FORMOTEROL FUMARATE DIHYDRATE 2 PUFF: 80; 4.5 AEROSOL RESPIRATORY (INHALATION) at 20:27

## 2018-02-28 RX ADMIN — BUDESONIDE AND FORMOTEROL FUMARATE DIHYDRATE 2 PUFF: 80; 4.5 AEROSOL RESPIRATORY (INHALATION) at 07:16

## 2018-02-28 RX ADMIN — MINOCYCLINE HYDROCHLORIDE 100 MG: 50 CAPSULE ORAL at 05:07

## 2018-02-28 RX ADMIN — MINOCYCLINE HYDROCHLORIDE 100 MG: 50 CAPSULE ORAL at 18:13

## 2018-02-28 RX ADMIN — FAMOTIDINE 20 MG: 20 TABLET, FILM COATED ORAL at 21:00

## 2018-02-28 RX ADMIN — FLUTICASONE PROPIONATE 2 SPRAY: 50 SPRAY, METERED NASAL at 08:50

## 2018-02-28 RX ADMIN — IPRATROPIUM BROMIDE AND ALBUTEROL SULFATE 3 ML: 2.5; .5 SOLUTION RESPIRATORY (INHALATION) at 13:43

## 2018-02-28 RX ADMIN — MORPHINE SULFATE 30 MG: 30 TABLET ORAL at 05:00

## 2018-02-28 RX ADMIN — FAMOTIDINE 20 MG: 20 TABLET, FILM COATED ORAL at 08:50

## 2018-02-28 RX ADMIN — HEPARIN SODIUM 5000 UNITS: 5000 INJECTION, SOLUTION INTRAVENOUS; SUBCUTANEOUS at 05:00

## 2018-02-28 RX ADMIN — DICLOFENAC SODIUM 2 G: 10 GEL TOPICAL at 21:00

## 2018-02-28 RX ADMIN — DOCUSATE SODIUM 200 MG: 100 CAPSULE, LIQUID FILLED ORAL at 08:50

## 2018-02-28 RX ADMIN — CASTOR OIL AND BALSAM, PERU 1 EACH: 788; 87 OINTMENT TOPICAL at 20:59

## 2018-02-28 RX ADMIN — GABAPENTIN 200 MG: 100 CAPSULE ORAL at 05:00

## 2018-02-28 RX ADMIN — HEPARIN SODIUM 5000 UNITS: 5000 INJECTION, SOLUTION INTRAVENOUS; SUBCUTANEOUS at 21:00

## 2018-02-28 RX ADMIN — GABAPENTIN 200 MG: 100 CAPSULE ORAL at 14:53

## 2018-02-28 RX ADMIN — GABAPENTIN 200 MG: 100 CAPSULE ORAL at 21:00

## 2018-02-28 RX ADMIN — CASTOR OIL AND BALSAM, PERU: 788; 87 OINTMENT TOPICAL at 08:51

## 2018-03-01 PROCEDURE — 94640 AIRWAY INHALATION TREATMENT: CPT

## 2018-03-01 PROCEDURE — 97116 GAIT TRAINING THERAPY: CPT

## 2018-03-01 PROCEDURE — 97110 THERAPEUTIC EXERCISES: CPT

## 2018-03-01 PROCEDURE — 99232 SBSQ HOSP IP/OBS MODERATE 35: CPT | Performed by: HOSPITALIST

## 2018-03-01 PROCEDURE — 94760 N-INVAS EAR/PLS OXIMETRY 1: CPT

## 2018-03-01 PROCEDURE — 94799 UNLISTED PULMONARY SVC/PX: CPT

## 2018-03-01 PROCEDURE — 25010000002 HEPARIN (PORCINE) PER 1000 UNITS: Performed by: INTERNAL MEDICINE

## 2018-03-01 RX ADMIN — Medication 10 ML: at 21:18

## 2018-03-01 RX ADMIN — DOCUSATE SODIUM 200 MG: 100 CAPSULE, LIQUID FILLED ORAL at 08:10

## 2018-03-01 RX ADMIN — BUDESONIDE AND FORMOTEROL FUMARATE DIHYDRATE 2 PUFF: 80; 4.5 AEROSOL RESPIRATORY (INHALATION) at 20:30

## 2018-03-01 RX ADMIN — GABAPENTIN 200 MG: 100 CAPSULE ORAL at 14:22

## 2018-03-01 RX ADMIN — BUMETANIDE 1 MG: 1 TABLET ORAL at 08:10

## 2018-03-01 RX ADMIN — HYDROCODONE BITARTRATE AND ACETAMINOPHEN 1 TABLET: 5; 325 TABLET ORAL at 01:10

## 2018-03-01 RX ADMIN — GABAPENTIN 200 MG: 100 CAPSULE ORAL at 06:02

## 2018-03-01 RX ADMIN — HYDROCODONE BITARTRATE AND ACETAMINOPHEN 1 TABLET: 5; 325 TABLET ORAL at 11:16

## 2018-03-01 RX ADMIN — HYDROCODONE BITARTRATE AND ACETAMINOPHEN 1 TABLET: 5; 325 TABLET ORAL at 21:21

## 2018-03-01 RX ADMIN — IPRATROPIUM BROMIDE AND ALBUTEROL SULFATE 3 ML: 2.5; .5 SOLUTION RESPIRATORY (INHALATION) at 12:55

## 2018-03-01 RX ADMIN — DICLOFENAC SODIUM 2 G: 10 GEL TOPICAL at 08:10

## 2018-03-01 RX ADMIN — CASTOR OIL AND BALSAM, PERU: 788; 87 OINTMENT TOPICAL at 08:11

## 2018-03-01 RX ADMIN — DICLOFENAC SODIUM 2 G: 10 GEL TOPICAL at 17:59

## 2018-03-01 RX ADMIN — HYDROCODONE BITARTRATE AND ACETAMINOPHEN 1 TABLET: 5; 325 TABLET ORAL at 06:02

## 2018-03-01 RX ADMIN — IPRATROPIUM BROMIDE AND ALBUTEROL SULFATE 3 ML: 2.5; .5 SOLUTION RESPIRATORY (INHALATION) at 06:29

## 2018-03-01 RX ADMIN — MORPHINE SULFATE 30 MG: 30 TABLET ORAL at 08:10

## 2018-03-01 RX ADMIN — IPRATROPIUM BROMIDE AND ALBUTEROL SULFATE 3 ML: 2.5; .5 SOLUTION RESPIRATORY (INHALATION) at 15:59

## 2018-03-01 RX ADMIN — GABAPENTIN 200 MG: 100 CAPSULE ORAL at 21:18

## 2018-03-01 RX ADMIN — HEPARIN SODIUM 5000 UNITS: 5000 INJECTION, SOLUTION INTRAVENOUS; SUBCUTANEOUS at 14:21

## 2018-03-01 RX ADMIN — FLUTICASONE PROPIONATE 2 SPRAY: 50 SPRAY, METERED NASAL at 08:10

## 2018-03-01 RX ADMIN — CASTOR OIL AND BALSAM, PERU: 788; 87 OINTMENT TOPICAL at 21:19

## 2018-03-01 RX ADMIN — FAMOTIDINE 20 MG: 20 TABLET, FILM COATED ORAL at 21:18

## 2018-03-01 RX ADMIN — HEPARIN SODIUM 5000 UNITS: 5000 INJECTION, SOLUTION INTRAVENOUS; SUBCUTANEOUS at 06:02

## 2018-03-01 RX ADMIN — HEPARIN SODIUM 5000 UNITS: 5000 INJECTION, SOLUTION INTRAVENOUS; SUBCUTANEOUS at 21:19

## 2018-03-01 RX ADMIN — DICLOFENAC SODIUM 2 G: 10 GEL TOPICAL at 12:03

## 2018-03-01 RX ADMIN — NICOTINE 1 PATCH: 7 PATCH, EXTENDED RELEASE TRANSDERMAL at 21:18

## 2018-03-01 RX ADMIN — FAMOTIDINE 20 MG: 20 TABLET, FILM COATED ORAL at 08:10

## 2018-03-01 RX ADMIN — BUDESONIDE AND FORMOTEROL FUMARATE DIHYDRATE 2 PUFF: 80; 4.5 AEROSOL RESPIRATORY (INHALATION) at 06:29

## 2018-03-01 RX ADMIN — MINOCYCLINE HYDROCHLORIDE 100 MG: 50 CAPSULE ORAL at 17:58

## 2018-03-01 RX ADMIN — MORPHINE SULFATE 30 MG: 30 TABLET ORAL at 14:22

## 2018-03-01 RX ADMIN — HYDROCODONE BITARTRATE AND ACETAMINOPHEN 1 TABLET: 5; 325 TABLET ORAL at 15:45

## 2018-03-01 RX ADMIN — MINOCYCLINE HYDROCHLORIDE 100 MG: 50 CAPSULE ORAL at 06:02

## 2018-03-01 RX ADMIN — DICLOFENAC SODIUM 2 G: 10 GEL TOPICAL at 21:19

## 2018-03-01 RX ADMIN — IPRATROPIUM BROMIDE AND ALBUTEROL SULFATE 3 ML: 2.5; .5 SOLUTION RESPIRATORY (INHALATION) at 20:30

## 2018-03-01 NOTE — THERAPY TREATMENT NOTE
Acute Care - Physical Therapy Treatment Note  [facility]     Patient Name: Yassine Love  : 1945  MRN: [id number]  Today's Date: 3/1/2018  Onset of Illness/Injury or Date of Surgery Date: 18  Date of Referral to PT: 18  Referring Physician: Dr. Watkins    Admit Date: 2018    Visit Dx:    ICD-10-CM ICD-9-CM   1. Bilateral cellulitis of lower leg L03.116 682.6    L03.115    2. RAFAEL (acute kidney injury) N17.9 584.9   3. PVD (peripheral vascular disease) I73.9 443.9   4. Impaired mobility and ADLs Z74.09 799.89   5. Impaired functional mobility, balance, gait, and endurance Z74.09 V49.89     Patient Active Problem List   Diagnosis   • Cellulitis of right lower extremity   • RAFAEL (acute kidney injury)   • PVD (peripheral vascular disease)   • Cellulitis of both lower extremities   • COPD (chronic obstructive pulmonary disease)   • Essential hypertension   • Hypoxia   • Bilateral cellulitis of lower leg               Adult Rehabilitation Note       18 1021 18 1012 18 1400    Rehab Assessment/Intervention    Discipline physical therapist  -EH occupational therapist  -ROSEANNE physical therapist  -    Document Type therapy note (daily note)  - therapy note (daily note)  -ROSEANNE therapy note (daily note)  -    Subjective Information agree to therapy;complains of;pain;fatigue  - agree to therapy;complains of;pain  -ROSEANNE agree to therapy;complains of;weakness;fatigue;pain  -    Patient Effort, Rehab Treatment adequate  -EH adequate  -ROSEANNE     Symptoms Noted During/After Treatment fatigue;significant change in vital signs  -      Symptoms Noted Comment oxygen desaturation  -      Precautions/Limitations fall precautions;oxygen therapy device and L/min  -EH fall precautions;oxygen therapy device and L/min  -ROSEANNE     Recorded by [EH] Mary Kate Parra, PT [ROSEANNE] Marichuy Gagnon, OT [MF] Carlos Hassan, PT    Vital Signs    Pre Systolic BP Rehab  103  -ROSEANNE     Pre Treatment Diastolic BP  67   -ROSEANNE     Pretreatment Heart Rate (beats/min)  91  -ROSEANNE     Intratreatment Heart Rate (beats/min)  98  -ROSAENNE     Posttreatment Heart Rate (beats/min)  100  -ROSEANNE     Pre SpO2 (%) 97  -EH 93  -ROSEANNE     O2 Delivery Pre Treatment supplemental O2  -EH supplemental O2  -ROSEANNE     Intra SpO2 (%) 84  -EH 78  -ROSEANNE     O2 Delivery Intra Treatment supplemental O2  -EH room air   pt. refused 02 in bathroom, offered multiple times  -ROSEANNE     Post SpO2 (%) 93  -EH 89  -ROSEANNE     O2 Delivery Post Treatment supplemental O2  -EH supplemental O2  -ROSEANNE     Pre Patient Position Supine  -EH      Intra Patient Position Standing  -EH      Post Patient Position Supine  -EH      Recovery Time 3  -EH      Rest Breaks  3   2 small, 1 longer rest break  -      Recorded by [EH] Mary Kate Parra, PT [ROSEANNE] Marichuy Gagnon, OT     Pain Assessment    Pain Assessment Rojas-Fontenot FACES  - 0-10  -ROSEANNE Rojas-Fontenot FACES  -    Rojas-Baker FACES Pain Rating 6  -EH  6  -MF    Pain Score  8  -ROSEANNE     Post Pain Score  8  -ROSEANNE     Pain Type Chronic pain  - Chronic pain  -ROSEANNE Chronic pain  -    Pain Location Leg  -EH Leg  -ROSEANNE Leg  -MF    Pain Orientation Right;Left  -EH Right;Left  -ROSEANNE Right;Left  -MF    Pain Intervention(s) Repositioned;Ambulation/increased activity  - Ambulation/increased activity;Repositioned  -ROSEANNE Repositioned  -    Response to Interventions Pt c/o increased pain in back, hip, and knee during mobility. Pt c/o hip pain during turns and has 1 instance of knee buckling.  - tolerated  -ROSEANNE     Recorded by [EH] Mary Kate Parra, PT [ROSEANNE] Marichuy Gagnon, OT [MF] Carlos Hassan, PT    Cognitive Assessment/Intervention    Current Cognitive/Communication Assessment functional  - functional  -ROSEANNE     Orientation Status oriented x 4  -EH oriented to;person   other not reassessed  -ROSEANNE     Follows Commands/Answers Questions 100% of the time;able to follow single-step instructions  - 100% of the time;able to follow single-step instructions  -      Personal Safety WNL/WFL  -EH WNL/WFL  -ROSEANNE     Personal Safety Interventions fall prevention program maintained;gait belt;nonskid shoes/slippers when out of bed  - fall prevention program maintained;gait belt;nonskid shoes/slippers when out of bed  -ROSEANNE     Recorded by [EH] Mary Kate Parra, PT [ROSEANNE] Marichuy Gagnon, OT     Bed Mobility, Assessment/Treatment    Bed Mobility, Assistive Device bed rails;head of bed elevated  - bed rails;head of bed elevated  -ROSEANNE     Bed Mobility, Scoot/Bridge, [place]  supervision required  -ROSEANNE     Bed Mob, Supine to Sit, [place] conditional independence  - supervision required  -ROSEANNE     Bed Mob, Sit to Supine, [place] conditional independence   bed rail only  -      Bed Mobility, Safety Issues  decreased use of arms for pushing/pulling;decreased use of legs for bridging/pushing;impaired trunk control for bed mobility  -ROSEANNE     Bed Mobility, Impairments  strength decreased;pain  -ROSEANNE     Bed Mobility, Comment no extra time required; pt sits and rests prior to mobility.  - extra time, but completed without assist.  -ROSEANNE     Recorded by [EH] Mary Kate Parra, PT [ROSEANNE] Marichuy Gagnon, OT     Transfer Assessment/Treatment    Transfers, Sit-Stand [place] contact guard assist  - contact guard assist  -ROSEANNE     Transfers, Stand-Sit [place] contact guard assist  - contact guard assist;verbal cues required   cues to reach back  -ROSEANNE     Transfers, Sit-Stand-Sit, Assist Device rolling walker  - rolling walker  -ROSEANNE     Toilet Transfer, [place] not tested  - contact guard assist  -ROSEANNE     Toilet Transfer, Assistive Device  rolling walker;elevated toilet seat   grab bar  -ROSEANNE     Transfer, Safety Issues step length decreased;weight-shifting ability decreased  - step length decreased;weight-shifting ability decreased  -ROSEANNE     Transfer, Impairments strength decreased;impaired balance;pain  - strength decreased;impaired balance;pain  -ROSEANNE     Transfer, Comment   Pt. did not need physical assist, but extra time and cues for hand placement stand to sit.  -     Recorded by [] Mary Kate Parra, PT [ROSEANNE] Marichuy Gagnon OT     Gait Assessment/Treatment    Gait, [place] Level minimum assist (75% patient effort)  -      Gait, Assistive Device rolling walker  -      Gait, Distance (Feet) 50   3 standing rest breaks during ambulation  -      Gait, Gait Pattern Analysis swing-to gait  -      Gait, Gait Deviations bilateral:;step length decreased;vidhi decreased;right:;knee buckling;double stance time increased   1 instance of knee buckling  -      Gait, Safety Issues sequencing ability decreased;step length decreased;steps too close front assistive device  -      Gait, Impairments pain;strength decreased;motor control impaired  -      Gait, Comment PT with forward flexed posture at cervical and thoracic spine. With cueing pt able to improve posture somewhat in t-spine but unable to maintain it  -      Recorded by [] Mary Kate Parra, PT      Functional Mobility    Functional Mobility- Ind. Level  contact guard assist  -     Functional Mobility- Device  rolling walker  -     Functional Mobility-Distance (Feet)  20  -     Functional Mobility- Safety Issues  step length decreased;weight-shifting ability decreased  -     Functional Mobility- Comment  to toilet and back to recliner  -     Recorded by  [ROSEANNE] Marichuy Gagnon OT     Upper Body Dressing Assessment/Training    UB Dressing Assess/Train, Clothing Type  donning:   robe  -ROSEANNE     UB Dressing Assess/Train, Position  edge of bed  -     UB Dressing Assess/Train, [place]  moderate assist (50% patient effort)  -     UB Dressing Assess/Train, Impairments  ROM decreased;decreased flexibility;pain  -ROSEANNE     Recorded by  [ROSEANNE] Marichuy Gagnon OT     Lower Body Dressing Assessment/Training    LB Dressing Assess/Train, Clothing Type  doffing:;donning:;slipper socks   used pillow case as pants  leg due no pants available.  -ROSEANNE     LB Dressing Assess/Train, Assist Device  reacher;sock-aid;long-handled shoe horn  -ROSEANNE     LB Dressing Assess/Train, [place]  supervision required;verbal cues required;minimum assist (75% patient effort)  -ROSEANNE     LB Dressing Assess/Train, Impairments  decreased flexibility;impaired vision  -ROSEANNE     LB Dressing Assess/Train, Comment  Pt. able to doff socks post education using shoe horn with min cues.  Pt. able to jewell sock on aide post demonstration with min assist to help over leg wraps which have sticky texture.  When wraps off will increase sock-aid effectiveness..    -ROSEANNE     Recorded by  [ROSEANNE] Marichuy Gagnon, OT     Toileting Assessment/Training    Toileting Assess/Train, Comment  Pt. to bathroom for BM, but only urinated despite trying for several minutes.    -ROSEANNE     Recorded by  [ROSEANNE] Marichuy Gagnon, OT     Balance Skills Training    Sitting-Level of Assistance  Independent  -ROSEANNE     Sitting-Balance Support  Feet supported  -ROSEANNE     Standing-Level of Assistance  Contact guard  -ROSEANNE     Static Standing Balance Support  assistive device  -ROSEANNE     Recorded by  [ROSEANNE] Marichuy Gagnon, OT     Therapy Exercises    Bilateral Lower Extremities --   deferred, cueing for PLB performed  -      Recorded by [] Mary Kate Parra, PT      Positioning and Restraints    Pre-Treatment Position in bed  - in bed  -ROSEANNE in bed  -    Post Treatment Position bed  - chair  -ROSEANNE bed  -    In Bed supine;call light within reach;encouraged to call for assist  -  supine;call light within reach;encouraged to call for assist  -    In Chair  reclined;call light within reach;encouraged to call for assist;exit alarm on   Pt. declined waffle boots back on or pillow under legs.  -ROSEANNE     Recorded by [] Mary Kate Parra, PT [ROSEANNE] Marichuy Gagnon, OT [] Carlos Hassan, PT      02/26/18 0857          Rehab Assessment/Intervention    Discipline physical therapist  -LM      Document Type therapy note  (daily note)  -LM      Subjective Information agree to therapy;complains of;pain;fatigue  -LM      Patient Effort, Rehab Treatment adequate  -LM      Symptoms Noted During/After Treatment fatigue  -LM      Precautions/Limitations fall precautions;oxygen therapy device and L/min  -LM      Recorded by [LM] Bekah Caro, PT      Vital Signs    Pre SpO2 (%) 91  -LM      O2 Delivery Pre Treatment supplemental O2   4L  -LM      O2 Delivery Intra Treatment supplemental O2  -LM      Post SpO2 (%) 90  -LM      O2 Delivery Post Treatment supplemental O2  -LM      Pre Patient Position Supine  -LM      Intra Patient Position Standing  -LM      Post Patient Position Sitting  -LM      Recorded by [LM] Bekah Caro PT      Pain Assessment    Pain Assessment 0-10  -LM      Pain Score 7  -LM      Post Pain Score 7  -LM      Pain Type Chronic pain  -LM      Pain Location Hip  -LM      Pain Orientation Left  -LM      Pain Intervention(s) Repositioned;Ambulation/increased activity  -LM      Recorded by [LM] Bekah Caro PT      Cognitive Assessment/Intervention    Current Cognitive/Communication Assessment functional  -LM      Orientation Status oriented x 4  -LM      Follows Commands/Answers Questions 75% of the time;able to follow single-step instructions;needs cueing;needs repetition  -LM      Personal Safety mild impairment;decreased awareness, need for assist;decreased awareness, need for safety;decreased insight to deficits  -LM      Recorded by [LM] Bekah Caro PT      Bed Mobility, Assessment/Treatment    Bed Mobility, Assistive Device bed rails;head of bed elevated  -LM      Bed Mob, Supine to Sit, [place] supervision required;verbal cues required  -LM      Bed Mob, Sit to Supine, [place] not tested  -LM      Bed Mobility, Safety Issues decreased use of arms for pushing/pulling;decreased use of legs for bridging/pushing  -LM      Bed Mobility, Impairments strength decreased;pain  -LM      Bed  Mobility, Comment Pt positioned HOB to 90 degrees before attempting to transition. Pt able to bring bilateral LEs off EOB, cues to scoot hips forward to place feet flat on floor.  -LM      Recorded by [LM] Bekah Caro PT      Transfer Assessment/Treatment    Transfers, Sit-Stand [place] minimum assist (75% patient effort);verbal cues required  -LM      Transfers, Stand-Sit [place] minimum assist (75% patient effort);verbal cues required  -LM      Transfers, Sit-Stand-Sit, Assist Device rolling walker  -LM      Transfer, Safety Issues balance decreased during turns;sequencing ability decreased;step length decreased;weight-shifting ability decreased  -LM      Transfer, Impairments strength decreased;impaired balance  -LM      Transfer, Comment Pt required increased assist to complete full stand, pt reports increased left hip today. Upon standing, pt unable to tolerate increased weight-bearing through left LE, ambulated to bedside chair. Cues for correct hand placement with transfers.  -LM      Recorded by [LM] Bekah Caro PT      Gait Assessment/Treatment    Gait, [place] Level contact guard assist;verbal cues required  -LM      Gait, Assistive Device rolling walker  -LM      Gait, Distance (Feet) 8  -LM      Gait, Gait Pattern Analysis swing-through gait  -LM      Gait, Gait Deviations bilateral:;vidhi decreased;decreased heel strike;forward flexed posture;step length decreased;weight-shifting ability decreased  -LM      Gait, Safety Issues sequencing ability decreased;step length decreased;weight-shifting ability decreased;balance decreased during turns  -LM      Gait, Impairments strength decreased;impaired balance;pain  -LM      Gait, Comment Pt able to take small steps to bedside chair at slow pace with very forward flexed posture. Verbal cues for upright posture, increase step length, remain within RW during turns. Cues to fully back up to chair prior to sitting. Gait limited by  increased left hip pain.  -LM      Recorded by [LM] Bekah Caro PT      Therapy Exercises    Bilateral Lower Extremities AROM:;10 reps;sitting;ankle pumps/circles;glut sets;SLR;LAQ;hip flexion   modA SLR  -LM      Bilateral Upper Extremity AROM:;10 reps;sitting;elbow flexion/extension;shoulder extension/flexion  -LM      Recorded by [LM] Bekah Caro PT      Positioning and Restraints    Pre-Treatment Position in bed  -LM      Post Treatment Position chair  -LM      In Chair notified nsg;reclined;sitting;call light within reach;encouraged to call for assist;exit alarm on;legs elevated;waffle boot/both   pillows under knees  -LM      Recorded by [LM] Bekah Caro PT        User Key  (r) = Recorded By, (t) = Taken By, (c) = Cosigned By    Initials Name Effective Dates    ROSEANNE Marichuy Spivey Kalin, OT 06/22/15 -      Carlos Hassan, PT 06/19/15 -     EH Mary Kate Parra, PT 06/19/15 -     LM Bekah Caro, PT 06/09/17 -                 IP PT Goals       03/01/18 1058 02/26/18 1445 02/23/18 0949    Bed Mobility PT LTG    Bed Mobility PT LTG, Date Goal Reviewed  02/26/18  -LM     Bed Mobility PT LTG, Outcome goal ongoing  -EH goal ongoing  -LM goal ongoing  -BD    Transfer Training PT LTG    Transfer Training PT  LTG, Date Goal Reviewed  02/26/18  -LM     Transfer Training PT LTG, Outcome goal ongoing  -EH goal ongoing  -LM goal ongoing  -BD    Gait Training PT LTG    Gait Training Goal PT LTG, Date Goal Reviewed  02/26/18  -LM     Gait Training Goal PT LTG, Outcome goal ongoing  -EH goal ongoing  -LM goal ongoing  -BD      02/22/18 1156 02/22/18 1050 02/19/18 1135    Bed Mobility PT LTG    Bed Mobility PT LTG, Date Established 02/22/18  -KM      Bed Mobility PT LTG, Time to Achieve 2 wks  -KM      Bed Mobility PT LTG, Activity Type all bed mobility  -KM      Bed Mobility PT LTG, [place] Level independent  -KM      Transfer Training PT LTG    Transfer Training PT LTG, Date Established 02/22/18  -KM       Transfer Training PT LTG, Time to Achieve 2 wks  -KM      Transfer Training PT LTG, Activity Type bed to chair /chair to bed;sit to stand/stand to sit  -KM      Transfer Training PT LTG, [place] Level independent  -KM      Transfer Training PT LTG, Assist Device walker, rolling  -KM      Gait Training PT LTG    Gait Training Goal PT LTG, Date Established 02/22/18  -KM      Gait Training Goal PT LTG, Time to Achieve 2 wks  -KM      Gait Training Goal PT LTG, [place] Level independent  -KM      Gait Training Goal PT LTG, Assist Device walker, rolling  -KM      Gait Training Goal PT LTG, Distance to Achieve 100  -KM      Wound Care PT LTG    Wound Care PT LTG 1, Outcome  goal partially met  -MF goal ongoing  -MF    Wound Care 2 PT LTG    Wound Care PT LTG 2, Outcome  goal partially met  -MF goal ongoing  -MF      02/17/18 1121          Wound Care PT LTG    Wound Care PT LTG 1, Date Established 02/17/18  -MW      Wound Care PT LTG 1, Time to Achieve 2 wks  -MW      Wound Care PT LTG 1, Location LLE   -MW      Wound Care PT LTG 1, No S&S of Infection yes  -MW      Wound Care PT LTG 1, Decrease Wound Size 10%  -MW      Wound Care PT LTG 1, Decrease Exudate minimum  -MW      Wound Care PT LTG 1, Decrease Limb Girth % 10%  -MW      Wound Care PT LTG 1, No New Skin Break Down yes  -MW      Wound Care PT LTG 1, Education S&S of infection;dressing changes;edema management;progression of POC;skin care/inspection  -MW      Wound Care PT LTG 1, Education Understanding verbalize understanding  -MW      Wound Care 2 PT LTG    Wound Care PT LTG 2, Date Established 02/17/18  -MW      Wound Care PT LTG 2, Time to Achieve 2 wks  -MW      Wound Care PT LTG 2, Location RLE   -MW      Wound Care PT LTG 2, No S&S of Infection yes  -MW      Wound Care PT LTG 2, Decrease Wound Size 20%  -MW      Wound Care PT LTG 2, Decrease Exudate minimum  -MW      Wound Care PT LTG 2, Decrease Limb Girth % 10%  -MW      Wound Care PT LTG 2,  No New Skin Break Down yes  -MW      Wound Care PT LTG 2, Education dressing changes;edema management;pressure relief;pain managment;progression of POC  -MW      Wound Care PT LTG 2, Education Understanding verbalize understanding  -MW        User Key  (r) = Recorded By, (t) = Taken By, (c) = Cosigned By    Initials Name Provider Type    MF Carlos Hassan, PT Physical Therapist     Mary Kate Parra, PT Physical Therapist     Carmela Washington, PT Physical Therapist     Karen Lopez, PT Physical Therapist    BD Estrella Rojo, PT Physical Therapist     Bekah Caro, PT Physical Therapist          Physical Therapy Education     Title: PT OT SLP Therapies (Done)     Topic: Physical Therapy (Done)     Point: Mobility training (Done)    Learning Progress Summary    Learner Readiness Method Response Comment Documented by Status   Patient Acceptance E VU,NR   03/01/18 1058 Done    Acceptance E,D NR Reviewed benefits of activity, HEP, safety with mobility.  02/26/18 1445 Active    Acceptance E NR   02/23/18 0949 Active    Acceptance E [facility] 02/22/18 1159 Done               Point: Home exercise program (Done)    Learning Progress Summary    Learner Readiness Method Response Comment Documented by Status   Patient Acceptance E VU,NR   03/01/18 1058 Done    Acceptance E,D NR Reviewed benefits of activity, HEP, safety with mobility.  02/26/18 1445 Active    Acceptance E NR   02/23/18 0949 Active    Acceptance E VU   02/22/18 1159 Done               Point: Body mechanics (Done)    Learning Progress Summary    Learner Readiness Method Response Comment Documented by Status   Patient Acceptance E VU,NR   03/01/18 1058 Done    Acceptance E,D NR Reviewed benefits of activity, HEP, safety with mobility.  02/26/18 1445 Active    Acceptance E NR   02/23/18 0949 Active    Acceptance E [facility] 02/22/18 1159 Done               Point: Precautions (Done)    Learning Progress Summary    Learner  Readiness Method Response Comment Documented by Status   Patient Acceptance E VU,NR   03/01/18 1058 Done    Acceptance E,D NR Reviewed benefits of activity, HEP, safety with mobility.  02/26/18 1445 Active    Acceptance E NR   02/23/18 0949 Active    Acceptance E VU   02/22/18 1159 Done    Acceptance E VU reviewed POC for skin/ edema care.  02/17/18 1126 Done                      User Key     Initials Effective Dates Name Provider Type Discipline     06/19/15 -  Mary Kate Parra, PT Physical Therapist PT     06/19/15 -  Carmela Washington, PT Physical Therapist PT     02/12/18 -  Karen Lopez, PT Physical Therapist PT     06/13/16 -  Estrella Rojo, PT Physical Therapist PT     06/09/17 -  Bekah Caro, PT Physical Therapist PT                    PT Recommendation and Plan  Anticipated Discharge Disposition: home with home health, skilled nursing facility  Planned Therapy Interventions: wound care  Plan of Care Review  Plan Of Care Reviewed With: patient  Progress: progress toward functional goals as expected  Outcome Summary/Follow up Plan: Pt ambulates 50 ft with RWx CGA with need for 3 standing rest breaks and cues for PLB. C/o back, hip, knee, leg pain while ambulating.          Outcome Measures       02/28/18 1012 02/26/18 1417       How much help from another person do you currently need...    Turning from your back to your side while in flat bed without using bedrails?  3  -LM     Moving from lying on back to sitting on the side of a flat bed without bedrails?  2  -LM     Moving to and from a bed to a chair (including a wheelchair)?  3  -LM     Standing up from a chair using your arms (e.g., wheelchair, bedside chair)?  3  -LM     Climbing 3-5 steps with a railing?  2  -LM     To walk in hospital room?  3  -LM     AM-PAC 6 Clicks Score  16  -LM     How much help from another is currently needed...    Putting on and taking off regular lower body clothing? 2   min socks with  AE  -ROSEANNE      Bathing (including washing, rinsing, and drying) 2  -ROSEANNE      Toileting (which includes using toilet bed pan or urinal) 3  -ROSEANNE      Putting on and taking off regular upper body clothing 2  -ROSEANNE      Taking care of personal grooming (such as brushing teeth) 3  -ROSEANNE      Eating meals 3  -ROSEANNE      Score 15  -ROSEANNE      Functional Assessment    Outcome Measure Options AM-PAC 6 Clicks Daily Activity (OT)  -ROSEANNE AM-PAC 6 Clicks Basic Mobility (PT)  -LM       User Key  (r) = Recorded By, (t) = Taken By, (c) = Cosigned By    Initials Name Provider Type    ROSEANNE Marichuy Gagnon, OT Occupational Therapist    LM Bekah Caro, PT Physical Therapist           Time Calculation:         PT Charges       03/01/18 1101          Time Calculation    Start Time 1021  -      PT Received On 03/01/18  -      PT Goal Re-Cert Due Date 03/09/18  -      Time Calculation- PT    Total Timed Code Minutes- PT 23 minute(s)  -        User Key  (r) = Recorded By, (t) = Taken By, (c) = Cosigned By    Initials Name Provider Type     Mary Kate Parra, PT Physical Therapist          Therapy Charges for Today     Code Description Service Date Service Provider Modifiers Qty    37184611189 HC GAIT TRAINING EA 15 MIN 3/1/2018 Mary Kate Parra, PT GP 1    [id number] HC PT THER PROC EA 15 MIN 3/1/2018 Mary Kate Parra, PT GP 1          PT G-Codes  Outcome Measure Options: AM-PAC 6 Clicks Daily Activity (OT)    Mary Kate Parra, PT  3/1/2018

## 2018-03-01 NOTE — PROGRESS NOTES
[facility] Medicine Services  PROGRESS NOTE    Patient Name: Yassine Love  : 1945  MRN: [id number]    Date of Admission: 2018  Length of Stay: 13  Primary Care Physician: No Known Provider    Subjective   Subjective     CC:  F/U LE pain    HPI:  Patient seen this morning. He says he is doing better with PT, states he walked 50 feet today.    Review of Systems  Gen-no fevers, no chills  CV-no chest pain, no palpitations  Resp-no cough, no dyspnea  GI-no N/V/D, no abd pain      Otherwise ROS is negative except as mentioned in the HPI.    Objective   Objective     Vital Signs:   Temp:  [97.9 °F (36.6 °C)-98.5 °F (36.9 °C)] 98.5 °F (36.9 °C)  Heart Rate:  [76-87] 86  Resp:  [16-20] 18  BP: ()/(54-81) 92/54        Physical Exam:  Gen-no acute distress, on 5 L today  CV-RRR, S1 S2 normal, no m/r/g  Resp-decreased bilaterally, no wheezes, nonlabored   Abd-soft, NT, ND, +BS  Ext-BLE edema, both legs wrapped  Neuro-A&Ox3, no focal deficits  Psych-appropriate mood      Results Reviewed:  I have personally reviewed current lab, radiology, and data and agree.      Results from last 7 days  Lab Units 18  0319 18  0307   WBC 10*3/mm3 11.79* 11.21*   HEMOGLOBIN g/dL 11.0* 11.7*   HEMATOCRIT % 35.0* 36.5*   PLATELETS 10*3/mm3 360 386       Results from last 7 days  Lab Units 18  0451 18  0513 18  0841   SODIUM mmol/L 136 135 135   POTASSIUM mmol/L 4.0 3.8 4.4   CHLORIDE mmol/L 96* 95* 94*   CO2 mmol/L 34.0* 38.0* 37.0*   BUN mg/dL 42* 46* 52*   CREATININE mg/dL 1.30 1.20 1.20   GLUCOSE mg/dL 97 125* 114*   CALCIUM mg/dL 8.4* 8.3* 8.5*     No results found for: BNP  No results found for: PHART    Microbiology Results Abnormal     Procedure Component Value - Date/Time    Blood Culture - Blood, [[id number]]  (Normal) Collected:  18 2308    Lab Status:  Final result Specimen:  Blood from Arm, Left Updated:  18 2331     Blood Culture No growth at 5  days    Blood Culture - Blood, [[id number]]  (Normal) Collected:  02/18/18 2311    Lab Status:  Final result Specimen:  Blood from Arm, Left Updated:  02/23/18 2331     Blood Culture No growth at 5 days    Wound Culture - Wound, Foot, Right [[id number]]  (Abnormal)  (Susceptibility) Collected:  02/16/18 1931    Lab Status:  Final result Specimen:  Wound from Foot, Right Updated:  02/23/18 1045     Wound Culture --      Light growth (2+) Pseudomonas aeruginosa (A)      Scant growth (1+) Escherichia coli (A)      Scant growth (1+) Proteus mirabilis (A)      Scant growth (1+) Enterococcus faecalis (A)      Scant growth (1+) Streptococcus, Beta Hemolytic, Group G (A)        If Clindamycin or Erythromycin is the drug of choice, notify the laboratory within 7 days to request susceptibility testing.        STREP GROUPING G     Gram Stain Result Few (2+) WBCs seen      Many (4+) Gram negative bacilli      Many (4+) Gram positive bacilli      Many (4+) Gram positive cocci in pairs and clusters    Susceptibility      Pseudomonas aeruginosa     FAVIAN     Aztreonam <=8 ug/ml Susceptible     Cefepime <=8 ug/ml Susceptible     Ceftazidime 4 ug/ml Susceptible     Gentamicin <=4 ug/ml Susceptible     Levofloxacin <=2 ug/ml Susceptible     Meropenem <=1 ug/ml Susceptible     Piperacillin + Tazobactam <=16 ug/ml Susceptible     Tobramycin <=4 ug/ml Susceptible                Susceptibility      Escherichia coli     FAVIAN     Ampicillin <=8 ug/ml Susceptible     Ampicillin + Sulbactam <=8/4 ug/ml Susceptible     Aztreonam <=8 ug/ml Susceptible     Cefepime <=8 ug/ml Susceptible     Cefotaxime <=2 ug/ml Susceptible     Ceftriaxone <=8 ug/ml Susceptible     Cefuroxime sodium <=4 ug/ml Susceptible     Ertapenem <=1 ug/ml Susceptible     Gentamicin <=4 ug/ml Susceptible     Levofloxacin <=2 ug/ml Susceptible     Meropenem <=1 ug/ml Susceptible     Piperacillin + Tazobactam <=16 ug/ml Susceptible     Tetracycline <=4 ug/ml Susceptible      Tobramycin <=4 ug/ml Susceptible     Trimethoprim + Sulfamethoxazole <=2/38 ug/ml Susceptible                Susceptibility      Proteus mirabilis     FAVIAN     Ampicillin >16 ug/ml Resistant     Ampicillin + Sulbactam >16/8 ug/ml Resistant     Aztreonam <=8 ug/ml Susceptible     Cefepime <=8 ug/ml Susceptible     Cefotaxime <=2 ug/ml Susceptible     Ceftriaxone <=8 ug/ml Susceptible     Cefuroxime sodium <=4 ug/ml Susceptible     Ertapenem <=1 ug/ml Susceptible     Gentamicin <=4 ug/ml Susceptible     Levofloxacin <=2 ug/ml Susceptible     Meropenem <=1 ug/ml Susceptible     Piperacillin + Tazobactam <=16 ug/ml Susceptible     Tetracycline >8 ug/ml Resistant     Tobramycin <=4 ug/ml Susceptible     Trimethoprim + Sulfamethoxazole >2/38 ug/ml Resistant                Susceptibility      Enterococcus faecalis     FAVIAN     Ampicillin <=2 ug/ml Susceptible     Gentamicin High Level Synergy <=500 ug/ml Susceptible     Linezolid 2 ug/ml Susceptible     Penicillin G 2 ug/ml Susceptible     Streptomycin High Level Synergy <=1000 ug/ml Susceptible     Vancomycin 1 ug/ml Susceptible                    Blood Culture - Blood, Blood, Venous Line [[id number]]  (Normal) Collected:  02/16/18 2010    Lab Status:  Final result Specimen:  Blood from Arm, Left Updated:  02/21/18 2046     Blood Culture No growth at 5 days    Blood Culture - Blood, Blood, Venous Line [[id number]]  (Normal) Collected:  02/16/18 2005    Lab Status:  Final result Specimen:  Blood from Arm, Right Updated:  02/21/18 2046     Blood Culture No growth at 5 days    Respiratory Culture - Sputum, Cough [[id number]] Collected:  02/19/18 0656    Lab Status:  Final result Specimen:  Sputum from Cough Updated:  02/21/18 0931     Respiratory Culture --      Scant growth (1+) Normal Respiratory Nataliia     Gram Stain Result Occasional WBCs per low power field      No organisms seen          Imaging Results (last 24 hours)     ** No results found for the last 24 hours. **             I have reviewed the medications.    Assessment/Plan   Assessment / Plan     Hospital Problem List     * (Principal)Cellulitis of both lower extremities    RAFAEL (acute kidney injury)    PVD (peripheral vascular disease)    COPD (chronic obstructive pulmonary disease)    Essential hypertension    Hypoxia    Bilateral cellulitis of lower leg           Brief Hospital Course to date:  Yassine Love is a 72 y.o. male  with a past medical history significant for PVD, essential hypertension, COPD, cellulitis of lower extremities, and tobacco abuse who presents to the ED with complaints of fevers, chills and bilateral lower extremity pain/edema/redness.    Assessment & Plan:  Acute hypoxemic respiratory failure - stable  - secondary to COPD exacerbation, diastolic heart failure  - He is still hypoxic on room air with ambulation. Not on supplemental O2 at home, however claims he was previously on 2L. Consult CM to set up home oxygen for discharge  - Decreased bumex to 1mg daily due to contraction alkalosis, renal function stable.  - Daily fluid restriction to 1500cc  - treating COPD as below  - I expect will improve with continued ambulation and I encouraged frequent use of incentive spirometer    COPD exacerbation: Symbicort, Duonebs, completed 5 day course of prednisone    Acute on chronic diastolic CHF: ECHO 06/2017 EF normal    LE cellulitis/ Chronic LE wounds: switched to minocycline, ID on board. Continue PO morphine at home dose PRN - OLIVA reviewed. Decreased gabapentin back to prior dose, increased dose made him drowsy. Continue PT WOC for bilateral LE wrapping.    HTN: Holding BP meds for normotension    RAFAEL: Baseline Cr 0.9-1.3. Stable    DVT Prophylaxis:  H    CODE STATUS: Conditional Code    Disposition: I expect the patient to be discharged TBD. CM working with son and patient to make decision regarding rehab at discharge vs. Home. Son to possibly visit facilities on Monday. I think ultimately patient  will end up going home with HHPT.     Leora Ignacio MD  03/01/18  3:39 PM

## 2018-03-01 NOTE — PLAN OF CARE
Problem: Patient Care Overview (Adult)  Goal: Plan of Care Review  Outcome: Ongoing (interventions implemented as appropriate)   03/01/18 1656   Coping/Psychosocial Response Interventions   Plan Of Care Reviewed With patient   Patient Care Overview   Progress no change   Outcome Evaluation   Outcome Summary/Follow up Plan Vitals WNL with SBP in the 90's today. Pt continues to require 5 L NC. Pt alert and oriented X 4. Viods spontaneously. BM this AM. Tolerates ambulation in room with assist X 1 and walker. Pt reports generalized pain affecting back and bilateral legs. This pain seems to be improved more so with PRN lortab than morphine. Pt more compliant with fluid restriction today, but requires frequent education regarding this matter. HH vs. SNF at D/C.        Problem: Cellulitis (Adult)  Goal: Signs and Symptoms of Listed Potential Problems Will be Absent or Manageable (Cellulitis)  Outcome: Ongoing (interventions implemented as appropriate)   03/01/18 1656   Cellulitis   Problems Assessed (Cellulitis) all   Problems Present (Cellulitis) pain;infection       Problem: Pressure Ulcer Risk (Jim Scale) (Adult,Obstetrics,Pediatric)  Goal: Identify Related Risk Factors and Signs and Symptoms  Outcome: Ongoing (interventions implemented as appropriate)   03/01/18 1656   Pressure Ulcer Risk (Jim Scale)   Related Risk Factors (Pressure Ulcer Risk (Jim Scale)) mobility impaired     Goal: Skin Integrity  Outcome: Ongoing (interventions implemented as appropriate)   03/01/18 1656   Pressure Ulcer Risk (Jim Scale) (Adult,Obstetrics,Pediatric)   Skin Integrity making progress toward outcome       Problem: Respiratory Insufficiency (Adult)  Goal: Identify Related Risk Factors and Signs and Symptoms  Outcome: Ongoing (interventions implemented as appropriate)   03/01/18 1656   Respiratory Insufficiency   Related Risk Factors (Respiratory Insufficiency) activity intolerance;knowledge deficit;pain;smoking   Signs and  Symptoms (Respiratory Insufficiency) abnormal breath sounds;decreased oxygen saturation     Goal: Acid/Base Balance  Outcome: Ongoing (interventions implemented as appropriate)   03/01/18 1656   Respiratory Insufficiency (Adult)   Acid/Base Balance making progress toward outcome     Goal: Effective Ventilation  Outcome: Ongoing (interventions implemented as appropriate)   03/01/18 1656   Respiratory Insufficiency (Adult)   Effective Ventilation making progress toward outcome       Problem: Fall Risk (Adult)  Goal: Identify Related Risk Factors and Signs and Symptoms  Outcome: Ongoing (interventions implemented as appropriate)   03/01/18 1656   Fall Risk   Fall Risk: Related Risk Factors history of falls;gait/mobility problems;environment unfamiliar   Fall Risk: Signs and Symptoms presence of risk factors     Goal: Absence of Falls  Outcome: Ongoing (interventions implemented as appropriate)   03/01/18 1656   Fall Risk (Adult)   Absence of Falls making progress toward outcome

## 2018-03-01 NOTE — NURSING NOTE
72 y.o. Male seen today for wound care f/u visit on right ischial pressure injury, which has resolved/ reepithelialized, and friction and shear on gluteal/ coccyx area. Venelex to be continued BID and a dry nonadherent dressing to be applied. WOCN will f/u and please call with any concerns or questions.

## 2018-03-01 NOTE — PROGRESS NOTES
Continued Stay Note  [facility]     Patient Name: Yassine Love  MRN: [id number]  Today's Date: 3/1/2018    Admit Date: 2/16/2018          Discharge Plan       03/01/18 1434    Case Management/Social Work Plan    Plan Hopefully rehab     Additional Comments Complex Care still  following for discharge plans. Hopefully patient will stay on course and agree with short term rehab for therapy and bilat wound care. I have spoken with Amarilis at [facility] and they have a WOC nurse on staff  to do drsg changes and just recently added a wound care doctor to their staff and he would follow also. Spoke with [facility]la/Piotr and they have a Woc nurse at the [facility] location. Spoke with Korin/Tasha and they can offer patient a bed at [facility] or [facility] and both facilities have Woc nurses. Rec'd call from [facility] @ [phone]. They follow patient for SKN x2 per week for wound care and PT x 2 per week. Patient will only allow one nurse in the home. [facility] just added a WOC to their staff and this nurse will take  over care for his wounds if patient decides to go home at d/c.  MD will need a resume order for  at d/c if plan is home instead of [facility] facility. [facility] fax# [phone].  plans to see patient in am to explain all this information. Plan still to talk with son on Monday and have him tour these four facilites before patient can make his mind up.  Glendy at ext [phone]               Discharge Codes     None        Expected Discharge Date and Time     Expected Discharge Date Expected Discharge Time    Mar 5, 2018             Silvana Owusu, RN

## 2018-03-01 NOTE — PLAN OF CARE
Problem: Patient Care Overview (Adult)  Goal: Plan of Care Review  Outcome: Ongoing (interventions implemented as appropriate)   02/28/18 2003   Coping/Psychosocial Response Interventions   Plan Of Care Reviewed With patient   Patient Care Overview   Progress progress toward functional goals as expected   Outcome Evaluation   Outcome Summary/Follow up Plan Pain seems to be a small amount better, ambulated well to bathroom twice. Seems slightly gittery,. He normally smokes, ptovide him with hard candy to help.       Problem: Cellulitis (Adult)  Goal: Signs and Symptoms of Listed Potential Problems Will be Absent or Manageable (Cellulitis)  Outcome: Ongoing (interventions implemented as appropriate)   02/28/18 2003   Cellulitis   Problems Assessed (Cellulitis) all   Problems Present (Cellulitis) pain

## 2018-03-01 NOTE — PLAN OF CARE
Problem: Patient Care Overview (Adult)  Goal: Plan of Care Review  Outcome: Ongoing (interventions implemented as appropriate)   03/01/18 1058   Coping/Psychosocial Response Interventions   Plan Of Care Reviewed With patient   Patient Care Overview   Progress progress toward functional goals as expected   Outcome Evaluation   Outcome Summary/Follow up Plan Pt ambulates 50 ft with RWx CGA with need for 3 standing rest breaks and cues for PLB. C/o back, hip, knee, leg pain while ambulating.       Problem: Inpatient Physical Therapy  Goal: Bed Mobility Goal LTG- PT  Outcome: Outcome(s) achieved Date Met: 03/01/18 02/22/18 1156 02/26/18 1445 03/01/18 1058   Bed Mobility PT LTG   Bed Mobility PT LTG, Date Established 02/22/18 --  --    Bed Mobility PT LTG, Time to Achieve 2 wks --  --    Bed Mobility PT LTG, Activity Type all bed mobility --  --    Bed Mobility PT LTG, [place] Level independent --  --    Bed Mobility PT LTG, Date Goal Reviewed --  02/26/18 --    Bed Mobility PT LTG, Outcome --  --  goal ongoing     Goal: Transfer Training Goal 1 LTG- PT  Outcome: Ongoing (interventions implemented as appropriate)   02/22/18 1156 02/26/18 1445 03/01/18 1058   Transfer Training PT LTG   Transfer Training PT LTG, Date Established 02/22/18 --  --    Transfer Training PT LTG, Time to Achieve 2 wks --  --    Transfer Training PT LTG, Activity Type bed to chair /chair to bed;sit to stand/stand to sit --  --    Transfer Training PT LTG, [place] Level independent --  --    Transfer Training PT LTG, Assist Device walker, rolling --  --    Transfer Training PT LTG, Date Goal Reviewed --  02/26/18 --    Transfer Training PT LTG, Outcome --  --  goal ongoing     Goal: Gait Training Goal LTG- PT  Outcome: Ongoing (interventions implemented as appropriate)   02/22/18 1156 02/26/18 1445 03/01/18 1058   Gait Training PT LTG   Gait Training Goal PT LTG, Date Established 02/22/18 --  --    Gait Training Goal PT LTG, Time to  Achieve 2 wks --  --    Gait Training Goal PT LTG, [place] Level independent --  --    Gait Training Goal PT LTG, Assist Device walker, rolling --  --    Gait Training Goal PT LTG, Distance to Achieve 100 --  --    Gait Training Goal PT LTG, Date Goal Reviewed --  02/26/18 --    Gait Training Goal PT LTG, Outcome --  --  goal ongoing

## 2018-03-02 LAB
ANION GAP SERPL CALCULATED.3IONS-SCNC: 4 MMOL/L (ref 3–11)
BUN BLD-MCNC: 36 MG/DL (ref 9–23)
BUN/CREAT SERPL: 30 (ref 7–25)
CALCIUM SPEC-SCNC: 8.3 MG/DL (ref 8.7–10.4)
CHLORIDE SERPL-SCNC: 99 MMOL/L (ref 99–109)
CO2 SERPL-SCNC: 35 MMOL/L (ref 20–31)
CREAT BLD-MCNC: 1.2 MG/DL (ref 0.6–1.3)
DEPRECATED RDW RBC AUTO: 64.9 FL (ref 37–54)
ERYTHROCYTE [DISTWIDTH] IN BLOOD BY AUTOMATED COUNT: 17.4 % (ref 11.3–14.5)
GFR SERPL CREATININE-BSD FRML MDRD: 59 ML/MIN/1.73
GLUCOSE BLD-MCNC: 105 MG/DL (ref 70–100)
HCT VFR BLD AUTO: 30 % (ref 38.9–50.9)
HGB BLD-MCNC: 9.3 G/DL (ref 13.1–17.5)
MCH RBC QN AUTO: 31.6 PG (ref 27–31)
MCHC RBC AUTO-ENTMCNC: 31 G/DL (ref 32–36)
MCV RBC AUTO: 102 FL (ref 80–99)
PLATELET # BLD AUTO: 364 10*3/MM3 (ref 150–450)
PMV BLD AUTO: 10 FL (ref 6–12)
POTASSIUM BLD-SCNC: 4.1 MMOL/L (ref 3.5–5.5)
RBC # BLD AUTO: 2.94 10*6/MM3 (ref 4.2–5.76)
SODIUM BLD-SCNC: 138 MMOL/L (ref 132–146)
WBC NRBC COR # BLD: 10.47 10*3/MM3 (ref 3.5–10.8)

## 2018-03-02 PROCEDURE — 85027 COMPLETE CBC AUTOMATED: CPT | Performed by: HOSPITALIST

## 2018-03-02 PROCEDURE — 99232 SBSQ HOSP IP/OBS MODERATE 35: CPT | Performed by: NURSE PRACTITIONER

## 2018-03-02 PROCEDURE — 94640 AIRWAY INHALATION TREATMENT: CPT

## 2018-03-02 PROCEDURE — 80048 BASIC METABOLIC PNL TOTAL CA: CPT | Performed by: HOSPITALIST

## 2018-03-02 PROCEDURE — 25010000002 HEPARIN (PORCINE) PER 1000 UNITS: Performed by: INTERNAL MEDICINE

## 2018-03-02 PROCEDURE — 97110 THERAPEUTIC EXERCISES: CPT

## 2018-03-02 PROCEDURE — 94799 UNLISTED PULMONARY SVC/PX: CPT

## 2018-03-02 PROCEDURE — 29580 STRAPPING UNNA BOOT: CPT

## 2018-03-02 PROCEDURE — 97116 GAIT TRAINING THERAPY: CPT

## 2018-03-02 PROCEDURE — 97597 DBRDMT OPN WND 1ST 20 CM/<: CPT

## 2018-03-02 RX ADMIN — DICLOFENAC SODIUM 2 G: 10 GEL TOPICAL at 21:05

## 2018-03-02 RX ADMIN — CASTOR OIL AND BALSAM, PERU: 788; 87 OINTMENT TOPICAL at 21:05

## 2018-03-02 RX ADMIN — IPRATROPIUM BROMIDE AND ALBUTEROL SULFATE 3 ML: 2.5; .5 SOLUTION RESPIRATORY (INHALATION) at 16:08

## 2018-03-02 RX ADMIN — IPRATROPIUM BROMIDE AND ALBUTEROL SULFATE 3 ML: 2.5; .5 SOLUTION RESPIRATORY (INHALATION) at 08:23

## 2018-03-02 RX ADMIN — BUDESONIDE AND FORMOTEROL FUMARATE DIHYDRATE 2 PUFF: 80; 4.5 AEROSOL RESPIRATORY (INHALATION) at 20:07

## 2018-03-02 RX ADMIN — CASTOR OIL AND BALSAM, PERU: 788; 87 OINTMENT TOPICAL at 08:14

## 2018-03-02 RX ADMIN — NICOTINE 1 PATCH: 7 PATCH, EXTENDED RELEASE TRANSDERMAL at 21:05

## 2018-03-02 RX ADMIN — MORPHINE SULFATE 30 MG: 30 TABLET ORAL at 15:31

## 2018-03-02 RX ADMIN — DICLOFENAC SODIUM 2 G: 10 GEL TOPICAL at 18:45

## 2018-03-02 RX ADMIN — BUDESONIDE AND FORMOTEROL FUMARATE DIHYDRATE 2 PUFF: 80; 4.5 AEROSOL RESPIRATORY (INHALATION) at 08:23

## 2018-03-02 RX ADMIN — GABAPENTIN 200 MG: 100 CAPSULE ORAL at 06:24

## 2018-03-02 RX ADMIN — MINOCYCLINE HYDROCHLORIDE 100 MG: 50 CAPSULE ORAL at 06:24

## 2018-03-02 RX ADMIN — FAMOTIDINE 20 MG: 20 TABLET, FILM COATED ORAL at 08:14

## 2018-03-02 RX ADMIN — DOCUSATE SODIUM 200 MG: 100 CAPSULE, LIQUID FILLED ORAL at 08:15

## 2018-03-02 RX ADMIN — ACETAMINOPHEN 650 MG: 325 TABLET ORAL at 15:31

## 2018-03-02 RX ADMIN — FLUTICASONE PROPIONATE 2 SPRAY: 50 SPRAY, METERED NASAL at 08:14

## 2018-03-02 RX ADMIN — HEPARIN SODIUM 5000 UNITS: 5000 INJECTION, SOLUTION INTRAVENOUS; SUBCUTANEOUS at 21:05

## 2018-03-02 RX ADMIN — GABAPENTIN 200 MG: 100 CAPSULE ORAL at 21:04

## 2018-03-02 RX ADMIN — IPRATROPIUM BROMIDE AND ALBUTEROL SULFATE 3 ML: 2.5; .5 SOLUTION RESPIRATORY (INHALATION) at 20:07

## 2018-03-02 RX ADMIN — DICLOFENAC SODIUM 2 G: 10 GEL TOPICAL at 12:18

## 2018-03-02 RX ADMIN — HYDROCODONE BITARTRATE AND ACETAMINOPHEN 1 TABLET: 5; 325 TABLET ORAL at 14:43

## 2018-03-02 RX ADMIN — MORPHINE SULFATE 30 MG: 30 TABLET ORAL at 08:14

## 2018-03-02 RX ADMIN — HEPARIN SODIUM 5000 UNITS: 5000 INJECTION, SOLUTION INTRAVENOUS; SUBCUTANEOUS at 06:24

## 2018-03-02 RX ADMIN — HYDROCODONE BITARTRATE AND ACETAMINOPHEN 1 TABLET: 5; 325 TABLET ORAL at 06:24

## 2018-03-02 RX ADMIN — MINOCYCLINE HYDROCHLORIDE 100 MG: 50 CAPSULE ORAL at 18:45

## 2018-03-02 RX ADMIN — HEPARIN SODIUM 5000 UNITS: 5000 INJECTION, SOLUTION INTRAVENOUS; SUBCUTANEOUS at 14:43

## 2018-03-02 RX ADMIN — IPRATROPIUM BROMIDE AND ALBUTEROL SULFATE 3 ML: 2.5; .5 SOLUTION RESPIRATORY (INHALATION) at 12:58

## 2018-03-02 RX ADMIN — GABAPENTIN 200 MG: 100 CAPSULE ORAL at 14:43

## 2018-03-02 RX ADMIN — FAMOTIDINE 20 MG: 20 TABLET, FILM COATED ORAL at 21:05

## 2018-03-02 RX ADMIN — HYDROCODONE BITARTRATE AND ACETAMINOPHEN 1 TABLET: 5; 325 TABLET ORAL at 10:03

## 2018-03-02 RX ADMIN — BUMETANIDE 1 MG: 1 TABLET ORAL at 08:14

## 2018-03-02 RX ADMIN — DICLOFENAC SODIUM 2 G: 10 GEL TOPICAL at 08:14

## 2018-03-02 NOTE — PLAN OF CARE
Problem: Patient Care Overview (Adult)  Goal: Plan of Care Review  Outcome: Ongoing (interventions implemented as appropriate)   03/02/18 0800   Coping/Psychosocial Response Interventions   Plan Of Care Reviewed With patient   Outcome Evaluation   Outcome Summary/Follow up Plan BLE ulcerations significantly improved with only small superficial ulceration to RLE. Pt does cont to have moderate erythema and pain to BLE with h/o venous stasis ulcerations. PT changed to unna boot today to provide compression and for calamine / zinc to help protect skin and decrease inflammation to improve healing potential.        Problem: Inpatient Physical Therapy  Goal: Wound Care Goal 1 LTG- PT  Outcome: Ongoing (interventions implemented as appropriate)   02/17/18 1121 02/22/18 1050   Wound Care PT LTG   Wound Care PT LTG 1, Date Established 02/17/18 --    Wound Care PT LTG 1, Time to Achieve 2 wks --    Wound Care PT LTG 1, Location LLE  --    Wound Care PT LTG 1, No S&S of Infection yes --    Wound Care PT LTG 1, Decrease Wound Size 10% --    Wound Care PT LTG 1, Decrease Exudate minimum --    Wound Care PT LTG 1, Decrease Limb Girth % 10% --    Wound Care PT LTG 1, No New Skin Break Down yes --    Wound Care PT LTG 1, Education S&S of infection;dressing changes;edema management;progression of POC;skin care/inspection --    Wound Care PT LTG 1, Education Understanding verbalize understanding --    Wound Care PT LTG 1, Outcome --  goal partially met     Goal: Wound Care Goal 2 LTG- PT  Outcome: Ongoing (interventions implemented as appropriate)   02/17/18 1121 02/22/18 1050   Wound Care 2 PT LTG   Wound Care PT LTG 2, Date Established 02/17/18 --    Wound Care PT LTG 2, Time to Achieve 2 wks --    Wound Care PT LTG 2, Location RLE  --    Wound Care PT LTG 2, No S&S of Infection yes --    Wound Care PT LTG 2, Decrease Wound Size 20% --    Wound Care PT LTG 2, Decrease Exudate minimum --    Wound Care PT LTG 2, Decrease Limb Girth %  10% --    Wound Care PT LTG 2, No New Skin Break Down yes --    Wound Care PT LTG 2, Education dressing changes;edema management;pressure relief;pain managment;progression of POC --    Wound Care PT LTG 2, Education Understanding verbalize understanding --    Wound Care PT LTG 2, Outcome --  goal partially met

## 2018-03-02 NOTE — PROGRESS NOTES
Continued Stay Note  [facility]     Patient Name: Yassine Love  MRN: [id number]  Today's Date: 3/2/2018    Admit Date: 2/16/2018          Discharge Plan       03/02/18 1455    Case Management/Social Work Plan    Plan Home     Patient/Family In Agreement With Plan yes    Additional Comments Plan home over the weekend. Contacted [employer]  with resume order for SKN and new order for PT. All information faxed. Need to contact [employer] with d/c date. Patient wanted to use [employer] @ [phone] (used in past), Called and faxed order and demo to [phone]. Please call Dottie at d/c use their # [phone]  and fax room air sats to [phone]. Patient has family or friend to drive him home. Glendy ext [phone]       03/02/18 1205    Case Management/Social Work Plan    Plan Home     Additional Comments Spoke with  about patient's wishes to go home with home health. She's not surprised patient told her he was going home.Patient not medically ready for d/c today. Called son Chris and he agrees with plan home at d/c. CM following. Glendy at ext [phone]               Discharge Codes     None        Expected Discharge Date and Time     Expected Discharge Date Expected Discharge Time    Mar 5, 2018             Silvana Owusu, RN

## 2018-03-02 NOTE — THERAPY WOUND CARE TREATMENT
Acute Care - Wound/Debridement Treatment Note  [facility]     Patient Name: Yassine Love  : 1945  MRN: [id number]  Today's Date: 3/2/2018  Onset of Illness/Injury or Date of Surgery Date: 18   Date of Referral to PT: 18   Referring Physician: Dr. Watkins       Admit Date: 2018    Visit Dx:    ICD-10-CM ICD-9-CM   1. Bilateral cellulitis of lower leg L03.116 682.6    L03.115    2. RAFAEL (acute kidney injury) N17.9 584.9   3. PVD (peripheral vascular disease) I73.9 443.9   4. Impaired mobility and ADLs Z74.09 799.89   5. Impaired functional mobility, balance, gait, and endurance Z74.09 V49.89               Patient Active Problem List   Diagnosis   • Cellulitis of right lower extremity   • RAFAEL (acute kidney injury)   • PVD (peripheral vascular disease)   • Cellulitis of both lower extremities   • COPD (chronic obstructive pulmonary disease)   • Essential hypertension   • Hypoxia   • Bilateral cellulitis of lower leg               LDA Wound       18 0800 18 1315       Wound 18 2225 Bilateral lower leg     Wound - Properties Group Date first assessed: 18  -KB Time first assessed:   -KB Side: Bilateral  -KB Orientation: lower  -KB Location: leg  -KB    Wound WDL ex  -MF      Dressing Appearance dry;intact  -MF      Base moist;pink  -MF      Periwound Area redness;swelling;intact;dry  -MF      Edges irregular  -MF      Length (cm) --   scant ulceration to R lateral foot   -MF      Drainage Characteristics/Odor serous  -MF      Drainage Amount small  -MF      Picture taken yes  -MF      Wound Cleaning cleansed with;soap and water  -MF      Wound Interventions debrided  -MF      Dressing foam;low-adherent  -MF      Wound 18 1020 Bilateral gluteal other (see comments)    Wound - Properties Group Date first assessed: 18  -AS Time first assessed: 1020  -AS Side: Bilateral  -AS Location: gluteal  -AS Type: other (see comments)  -AS, friction/shearing      Base   "dry;purple;reddened;not granulating;clean;pink   friction/ shear damage  -     Periwound Area  redness;blanchable;intact  -     Edges  irregular;jagged  -     Drainage Characteristics/Odor  no odor  -HW     Drainage Amount  none  -HW     Wound Interventions  barrier applied;other (see comments)   venelex and zguard applied  -     Dressing  open to air  -       User Key  (r) = Recorded By, (t) = Taken By, (c) = Cosigned By    Initials Name Provider Type     Carlos Hassan, PT Physical Therapist    BRITTNI Queen, RN Registered Nurse    AS Dhaval Cardozo, RN Registered Nurse     BRITTNY Thomas Nurse Practitioner              Lymphedema       03/02/18 0800          Lymphedema Edema Assessment    Ptting Edema Category By severity  -      Pitting Edema Mild  -      Skin Changes/Observations    Location/Assessment Lower Extremity  -      Lower Extremity Conditions bilateral:;scaly;fragile  -      Lymphedema Sensation    Lymphedema Sensation Comments hypersensitive  -      Compression/Skin Care    Compression/Skin Care skin care;wrapping location;bandaging  -      Skin Care washed/dried;lotion applied  -      Wrapping Location lower extremity  -      Wrapping Location LE bilateral:;toes;foot;ankle;calf  -      Wrapping Comments unna boot to BLEs with 4\" coban and spandage to secure  -        User Key  (r) = Recorded By, (t) = Taken By, (c) = Cosigned By    Initials Name Provider Type     Carlos Hassan, PT Physical Therapist          WOUND DEBRIDEMENT  Debridement Site 1  Location- Site 1: BLEs  Selective Debridement- Site 1: Wound Surface >20cmsq  Instruments- Site 1: tweezers  Excised Tissue Description- Site 1: moderate, other (comment) (dry, flaking, nonviable skin)  Bleeding- Site 1: none                  Adult Rehabilitation Note       03/02/18 0800 03/01/18 1021 02/28/18 1012    Rehab Assessment/Intervention    Discipline physical therapist  - physical therapist "  - occupational therapist  -ROSEANNE    Document Type therapy note (daily note)  - therapy note (daily note)  - therapy note (daily note)  -    Subjective Information agree to therapy;complains of;weakness;fatigue;pain  - agree to therapy;complains of;pain;fatigue  - agree to therapy;complains of;pain  -    Patient Effort, Rehab Treatment  adequate  - adequate  -ROSEANNE    Symptoms Noted During/After Treatment  fatigue;significant change in vital signs  -     Symptoms Noted Comment  oxygen desaturation  -     Precautions/Limitations  fall precautions;oxygen therapy device and L/min  - fall precautions;oxygen therapy device and L/min  -ROSEANNE    Recorded by [] Carlos Hassan, PT [EH] Mary Kate Parra, PT [ROSEANNE] Marichuy Gagnon, OT    Vital Signs    Pre Systolic BP Rehab   103  -ROSEANNE    Pre Treatment Diastolic BP   67  -ROSEANNE    Pretreatment Heart Rate (beats/min)   91  -ROSEANNE    Intratreatment Heart Rate (beats/min)   98  -ROSEANNE    Posttreatment Heart Rate (beats/min)   100  -ROSEANNE    Pre SpO2 (%)  97  -EH 93  -ROSEANNE    O2 Delivery Pre Treatment  supplemental O2  - supplemental O2  -ROSEANNE    Intra SpO2 (%)  84  -EH 78  -ROSEANNE    O2 Delivery Intra Treatment  supplemental O2  - room air   pt. refused 02 in bathroom, offered multiple times  -ROSEANNE    Post SpO2 (%)  93  -EH 89  -ROSEANNE    O2 Delivery Post Treatment  supplemental O2  - supplemental O2  -ROSEANNE    Pre Patient Position  Supine  -     Intra Patient Position  Standing  -     Post Patient Position  Supine  -EH     Recovery Time  3  -EH     Rest Breaks   3   2 small, 1 longer rest break  -     Recorded by  [] Mary Kate Parra, PT [ROSEANNE] Marichuy Gagnon, OT    Pain Assessment    Pain Assessment Rojas-Baker FACES  - Rojas-Baker FACES  - 0-10  -ROSEANNE    Rojas-[place] FACES Pain Rating 6  - 6  -EH     Pain Score   8  -ROSEANNE    Post Pain Score   8  -ROSEANNE    Pain Type Chronic pain  - Chronic pain  - Chronic pain  -    Pain Location Leg  - Leg  - Leg  -    Pain Orientation  Right;Left  - Right;Left  - Right;Left  -ROSEANNE    Pain Intervention(s) Repositioned  - Repositioned;Ambulation/increased activity  - Ambulation/increased activity;Repositioned  -ROSEANNE    Response to Interventions  Pt c/o increased pain in back, hip, and knee during mobility. Pt c/o hip pain during turns and has 1 instance of knee buckling.  - tolerated  -ROSEANNE    Recorded by [] Carlos Hassan, PT [] Mary Kate Parra, PT [ROSEANNE] Marichuy Gagnon, OT    Cognitive Assessment/Intervention    Current Cognitive/Communication Assessment  functional  - functional  -ROSEANNE    Orientation Status  oriented x 4  - oriented to;person   other not reassessed  -ROSEANNE    Follows Commands/Answers Questions  100% of the time;able to follow single-step instructions  - 100% of the time;able to follow single-step instructions  -    Personal Safety  WNL/WFL  - WNL/WFL  -ROSEANNE    Personal Safety Interventions  fall prevention program maintained;gait belt;nonskid shoes/slippers when out of bed  - fall prevention program maintained;gait belt;nonskid shoes/slippers when out of bed  -ROSEANNE    Recorded by  [] Mary Kate Parra, PT [ROSEANNE] Marichuy Gagnon, OT    Bed Mobility, Assessment/Treatment    Bed Mobility, Assistive Device  bed rails;head of bed elevated  - bed rails;head of bed elevated  -    Bed Mobility, Scoot/Bridge, [place]   supervision required  -    Bed Mob, Supine to Sit, [place]  conditional independence  - supervision required  -    Bed Mob, Sit to Supine, [place]  conditional independence   bed rail only  -     Bed Mobility, Safety Issues   decreased use of arms for pushing/pulling;decreased use of legs for bridging/pushing;impaired trunk control for bed mobility  -    Bed Mobility, Impairments   strength decreased;pain  -    Bed Mobility, Comment  no extra time required; pt sits and rests prior to mobility.  - extra time, but completed without assist.  -ROSEANNE    Recorded by  [] Mary Kate ALAMO  Fidel, PT [ROSEANNE] Marichuy Gagnon, OT    Transfer Assessment/Treatment    Transfers, Sit-Stand [place]  contact guard assist  - contact guard assist  -ROSEANNE    Transfers, Stand-Sit [place]  contact guard assist  - contact guard assist;verbal cues required   cues to reach back  -ROSEANNE    Transfers, Sit-Stand-Sit, Assist Device  rolling walker  - rolling walker  -ROSEANNE    Toilet Transfer, [place]  not tested  - contact guard assist  -ROSEANNE    Toilet Transfer, Assistive Device   rolling walker;elevated toilet seat   grab bar  -ROSEANNE    Transfer, Safety Issues  step length decreased;weight-shifting ability decreased  - step length decreased;weight-shifting ability decreased  -ROSEANNE    Transfer, Impairments  strength decreased;impaired balance;pain  - strength decreased;impaired balance;pain  -ROSEANNE    Transfer, Comment   Pt. did not need physical assist, but extra time and cues for hand placement stand to sit.  -ROSEANNE    Recorded by  [EH] Mary Kate Parra, PT [ROSEANNE] Marichuy Gagnon, OT    Gait Assessment/Treatment    Gait, [place] Level  minimum assist (75% patient effort)  -     Gait, Assistive Device  rolling walker  -     Gait, Distance (Feet)  50   3 standing rest breaks during ambulation  -     Gait, Gait Pattern Analysis  swing-to gait  -     Gait, Gait Deviations  bilateral:;step length decreased;vidhi decreased;right:;knee buckling;double stance time increased   1 instance of knee buckling  -     Gait, Safety Issues  sequencing ability decreased;step length decreased;steps too close front assistive device  -     Gait, Impairments  pain;strength decreased;motor control impaired  -     Gait, Comment  PT with forward flexed posture at cervical and thoracic spine. With cueing pt able to improve posture somewhat in t-spine but unable to maintain it  -     Recorded by  [EH] Mary Kate Parra, PT     Functional Mobility    Functional Mobility- Ind. Level   contact guard assist  -ROSEANNE    Functional  Mobility- Device   rolling walker  -ROSEANNE    Functional Mobility-Distance (Feet)   20  -ROSEANNE    Functional Mobility- Safety Issues   step length decreased;weight-shifting ability decreased  -ROSEANNE    Functional Mobility- Comment   to toilet and back to recliner  -ROSEANNE    Recorded by   [ROSEANNE] Marichuy Gagnon OT    Upper Body Dressing Assessment/Training    UB Dressing Assess/Train, Clothing Type   donning:   robe  -ROSEANNE    UB Dressing Assess/Train, Position   edge of bed  -ROSEANNE    UB Dressing Assess/Train, [place]   moderate assist (50% patient effort)  -ROSEANNE    UB Dressing Assess/Train, Impairments   ROM decreased;decreased flexibility;pain  -ROSEANNE    Recorded by   [ROSEANNE] Marichuy Gagnon OT    Lower Body Dressing Assessment/Training    LB Dressing Assess/Train, Clothing Type   doffing:;donning:;slipper socks   used pillow case as pants leg due no pants available.  -ROSEANNE    LB Dressing Assess/Train, Assist Device   reacher;sock-aid;long-handled shoe horn  -ROSEANNE    LB Dressing Assess/Train, [place]   supervision required;verbal cues required;minimum assist (75% patient effort)  -ROSEANNE    LB Dressing Assess/Train, Impairments   decreased flexibility;impaired vision  -ROSEANNE    LB Dressing Assess/Train, Comment   Pt. able to doff socks post education using shoe horn with min cues.  Pt. able to jewell sock on aide post demonstration with min assist to help over leg wraps which have sticky texture.  When wraps off will increase sock-aid effectiveness..    -ROSEANNE    Recorded by   [ROSEANNE] Marichuy Gagnon OT    Toileting Assessment/Training    Toileting Assess/Train, Comment   Pt. to bathroom for BM, but only urinated despite trying for several minutes.    -ROSEANNE    Recorded by   [ROSEANNE] Marichuy Gagnon OT    Balance Skills Training    Sitting-Level of Assistance   Independent  -ROSEANNE    Sitting-Balance Support   Feet supported  -ROSEANNE    Standing-Level of Assistance   Contact guard  -ROSEANNE    Static Standing Balance Support   assistive device  -ROSEANNE    Recorded by   [ROSEANNE]  Marichuy Gagnon, OT    Therapy Exercises    Bilateral Lower Extremities  --   deferred, cueing for PLB performed  -     Recorded by  [EH] Mary Kate Parra, PT     Positioning and Restraints    Pre-Treatment Position in bed  -MF in bed  -EH in bed  -ROSEANEN    Post Treatment Position bed  -MF bed  -EH chair  -ROSEANNE    In Bed supine;call light within reach  - supine;call light within reach;encouraged to call for assist  -EH     In Chair   reclined;call light within reach;encouraged to call for assist;exit alarm on   Pt. declined waffle boots back on or pillow under legs.  -    Recorded by [] Carlos Hassan, PT [EH] Mary Kate Parra, PT [ROSEANNE] Marichuy Gagnon, OT      02/27/18 1400          Rehab Assessment/Intervention    Discipline physical therapist  -      Document Type therapy note (daily note)  -      Subjective Information agree to therapy;complains of;weakness;fatigue;pain  -      Recorded by [MF] Carlos Hassan, PT      Pain Assessment    Pain Assessment Rojas-Baker FACES  -      Rojas-Baker FACES Pain Rating 6  -      Pain Type Chronic pain  -      Pain Location Leg  -      Pain Orientation Right;Left  -      Pain Intervention(s) Repositioned  -      Recorded by [] Carlos Hassan, PT      Positioning and Restraints    Pre-Treatment Position in bed  -MF      Post Treatment Position bed  -MF      In Bed supine;call light within reach;encouraged to call for assist  -      Recorded by [] Carlos Hassan, PT        User Key  (r) = Recorded By, (t) = Taken By, (c) = Cosigned By    Initials Name Effective Dates    ROSEANNE Marichuy Gagnon, OT 06/22/15 -      Carlos Hassan, PT 06/19/15 -     EH Mary Kate Parra, PT 06/19/15 -                 IP PT Goals       03/01/18 1058 02/26/18 1445 02/23/18 0949    Bed Mobility PT LTG    Bed Mobility PT LTG, Date Goal Reviewed  02/26/18  -LM     Bed Mobility PT LTG, Outcome goal ongoing  -EH goal ongoing  -LM goal ongoing  -BD    Transfer  Training PT LTG    Transfer Training PT  LTG, Date Goal Reviewed  02/26/18  -LM     Transfer Training PT LTG, Outcome goal ongoing  -EH goal ongoing  -LM goal ongoing  -BD    Gait Training PT LTG    Gait Training Goal PT LTG, Date Goal Reviewed  02/26/18  -LM     Gait Training Goal PT LTG, Outcome goal ongoing  -EH goal ongoing  -LM goal ongoing  -BD      02/22/18 1156 02/22/18 1050 02/19/18 1135    Bed Mobility PT LTG    Bed Mobility PT LTG, Date Established 02/22/18  -KM      Bed Mobility PT LTG, Time to Achieve 2 wks  -KM      Bed Mobility PT LTG, Activity Type all bed mobility  -KM      Bed Mobility PT LTG, [place] Level independent  -KM      Transfer Training PT LTG    Transfer Training PT LTG, Date Established 02/22/18  -KM      Transfer Training PT LTG, Time to Achieve 2 wks  -KM      Transfer Training PT LTG, Activity Type bed to chair /chair to bed;sit to stand/stand to sit  -KM      Transfer Training PT LTG, [place] Level independent  -KM      Transfer Training PT LTG, Assist Device walker, rolling  -KM      Gait Training PT LTG    Gait Training Goal PT LTG, Date Established 02/22/18  -KM      Gait Training Goal PT LTG, Time to Achieve 2 wks  -KM      Gait Training Goal PT LTG, [place] Level independent  -KM      Gait Training Goal PT LTG, Assist Device walker, rolling  -KM      Gait Training Goal PT LTG, Distance to Achieve 100  -KM      Wound Care PT LTG    Wound Care PT LTG 1, Outcome  goal partially met  -MF goal ongoing  -MF    Wound Care 2 PT LTG    Wound Care PT LTG 2, Outcome  goal partially met  -MF goal ongoing  -MF      02/17/18 1121          Wound Care PT LTG    Wound Care PT LTG 1, Date Established 02/17/18  -MW      Wound Care PT LTG 1, Time to Achieve 2 wks  -MW      Wound Care PT LTG 1, Location LLE   -MW      Wound Care PT LTG 1, No S&S of Infection yes  -MW      Wound Care PT LTG 1, Decrease Wound Size 10%  -MW      Wound Care PT LTG 1, Decrease Exudate minimum  -MW       Wound Care PT LTG 1, Decrease Limb Girth % 10%  -MW      Wound Care PT LTG 1, No New Skin Break Down yes  -MW      Wound Care PT LTG 1, Education S&S of infection;dressing changes;edema management;progression of POC;skin care/inspection  -MW      Wound Care PT LTG 1, Education Understanding verbalize understanding  -MW      Wound Care 2 PT LTG    Wound Care PT LTG 2, Date Established 02/17/18  -MW      Wound Care PT LTG 2, Time to Achieve 2 wks  -MW      Wound Care PT LTG 2, Location RLE   -MW      Wound Care PT LTG 2, No S&S of Infection yes  -MW      Wound Care PT LTG 2, Decrease Wound Size 20%  -MW      Wound Care PT LTG 2, Decrease Exudate minimum  -MW      Wound Care PT LTG 2, Decrease Limb Girth % 10%  -MW      Wound Care PT LTG 2, No New Skin Break Down yes  -MW      Wound Care PT LTG 2, Education dressing changes;edema management;pressure relief;pain managment;progression of POC  -MW      Wound Care PT LTG 2, Education Understanding verbalize understanding  -MW        User Key  (r) = Recorded By, (t) = Taken By, (c) = Cosigned By    Initials Name Provider Type     Carlos Hassan, PT Physical Therapist     Mary Kate Parra, PT Physical Therapist    KM Carmela Washington, PT Physical Therapist    MW Karen Lopez, PT Physical Therapist    BD Estrella Rojo, PT Physical Therapist    LM Bekah Caro, PT Physical Therapist          Physical Therapy Education     Title: PT OT SLP Therapies (Done)     Topic: Physical Therapy (Done)     Point: Mobility training (Done)    Learning Progress Summary    Learner Readiness Method Response Comment Documented by Status   Patient Acceptance E VU,NR   03/01/18 1058 Done    Acceptance E,D NR Reviewed benefits of activity, HEP, safety with mobility. LM 02/26/18 1445 Active    Acceptance E NR  BD 02/23/18 0949 Active    Acceptance E VU  KM 02/22/18 1159 Done               Point: Home exercise program (Done)    Learning Progress Summary    Learner  Readiness Method Response Comment Documented by Status   Patient Acceptance E VU,NR   03/01/18 1058 Done    Acceptance E,D NR Reviewed benefits of activity, HEP, safety with mobility.  02/26/18 1445 Active    Acceptance E NR   02/23/18 0949 Active    Acceptance E VU   02/22/18 1159 Done               Point: Body mechanics (Done)    Learning Progress Summary    Learner Readiness Method Response Comment Documented by Status   Patient Acceptance E VU,NR   03/01/18 1058 Done    Acceptance E,D NR Reviewed benefits of activity, HEP, safety with mobility.  02/26/18 1445 Active    Acceptance E NR   02/23/18 0949 Active    Acceptance E VU   02/22/18 1159 Done               Point: Precautions (Done)    Learning Progress Summary    Learner Readiness Method Response Comment Documented by Status   Patient Acceptance E VU,NR   03/01/18 1058 Done    Acceptance E,D NR Reviewed benefits of activity, HEP, safety with mobility.  02/26/18 1445 Active    Acceptance E NR   02/23/18 0949 Active    Acceptance E VU   02/22/18 1159 Done    Acceptance E VU reviewed POC for skin/ edema care.  02/17/18 1126 Done                      User Key     Initials Effective Dates Name Provider Type Discipline     06/19/15 -  Mary Kate Parra, PT Physical Therapist PT     06/19/15 -  Carmela Washington, PT Physical Therapist PT     02/12/18 -  Karen Lopez, PT Physical Therapist PT     06/13/16 -  Estrella Rojo, PT Physical Therapist PT     06/09/17 -  Bekah Caro, PT Physical Therapist PT                   PT ASSESSMENT (last 72 hours)      PT Evaluation       03/02/18 0800 03/01/18 2020    Rehab Evaluation    Document Type therapy note (daily note)  -     Subjective Information agree to therapy;complains of;weakness;fatigue;pain  -     Pain Assessment    Pain Assessment Rojas-Baker FACES  -     Rojas-Baker FACES Pain Rating 6  -     Pain Type Chronic pain  -     Pain Location Leg  -      Pain Orientation Right;Left  -     Pain Intervention(s) Repositioned  -     Muscle Tone Assessment    Muscle Tone Assessment  Bilateral Upper Extremities  -TC    Bilateral Upper Extremities Muscle Tone Assessment  mildly decreased tone  -TC    Bilateral Lower Extremities Muscle Tone Assessment  moderately decreased tone  -TC    Sensory Assessment/Intervention    Light Touch  LUE;RUE  -TC    LUE Light Touch  mild impairment   chronic tingling  -TC    RUE Light Touch  mild impairment   chronic tingling  -TC    Positioning and Restraints    Pre-Treatment Position in bed  -     Post Treatment Position bed  -     In Bed supine;call light within reach  -       03/01/18 1021 03/01/18 0810    Rehab Evaluation    Document Type therapy note (daily note)  -     Subjective Information agree to therapy;complains of;pain;fatigue  -     Patient Effort, Rehab Treatment adequate  -     Symptoms Noted During/After Treatment fatigue;significant change in vital signs  -     Symptoms Noted Comment oxygen desaturation  -     General Information    Precautions/Limitations fall precautions;oxygen therapy device and L/min  -     Vital Signs    Pre SpO2 (%) 97  -EH     O2 Delivery Pre Treatment supplemental O2  -EH     Intra SpO2 (%) 84  -EH     O2 Delivery Intra Treatment supplemental O2  -EH     Post SpO2 (%) 93  -EH     O2 Delivery Post Treatment supplemental O2  -EH     Pre Patient Position Supine  -EH     Intra Patient Position Standing  -EH     Post Patient Position Supine  -EH     Recovery Time 3  -EH     Rest Breaks  3   2 small, 1 longer rest break  -     Pain Assessment    Pain Assessment Rojas-[place] FACES  -     Rojas-Baker FACES Pain Rating 6  -     Pain Type Chronic pain  -     Pain Location Leg  -     Pain Orientation Right;Left  -     Pain Intervention(s) Repositioned;Ambulation/increased activity  -     Response to Interventions Pt c/o increased pain in back, hip, and knee during mobility. Pt  c/o hip pain during turns and has 1 instance of knee buckling.  -     Cognitive Assessment/Intervention    Current Cognitive/Communication Assessment functional  -     Orientation Status oriented x 4  -     Follows Commands/Answers Questions 100% of the time;able to follow single-step instructions  -     Personal Safety WNL/WFL  -     Personal Safety Interventions fall prevention program maintained;gait belt;nonskid shoes/slippers when out of bed  -     Muscle Tone Assessment    Muscle Tone Assessment  LLE;RLE  -KW    LLE Muscle Tone Assessment  moderately decreased tone  -    RLE Muscle Tone Assessment  mildly decreased tone  -KW    Bed Mobility, Assessment/Treatment    Bed Mobility, Assistive Device bed rails;head of bed elevated  -     Bed Mob, Supine to Sit, [place] conditional independence  -     Bed Mob, Sit to Supine, [place] conditional independence   bed rail only  -     Bed Mobility, Comment no extra time required; pt sits and rests prior to mobility.  -     Transfer Assessment/Treatment    Transfers, Sit-Stand [place] contact guard assist  -     Transfers, Stand-Sit [place] contact guard assist  -     Transfers, Sit-Stand-Sit, Assist Device rolling walker  -     Toilet Transfer, [place] not tested  -     Transfer, Safety Issues step length decreased;weight-shifting ability decreased  -     Transfer, Impairments strength decreased;impaired balance;pain  -     Gait Assessment/Treatment    Gait, [place] Level minimum assist (75% patient effort)  -     Gait, Assistive Device rolling walker  -     Gait, Distance (Feet) 50   3 standing rest breaks during ambulation  -     Gait, Gait Pattern Analysis swing-to gait  -     Gait, Gait Deviations bilateral:;step length decreased;vidhi decreased;right:;knee buckling;double stance time increased   1 instance of knee buckling  -     Gait, Safety Issues sequencing ability decreased;step length  decreased;steps too close front assistive device  -     Gait, Impairments pain;strength decreased;motor control impaired  -     Gait, Comment PT with forward flexed posture at cervical and thoracic spine. With cueing pt able to improve posture somewhat in t-spine but unable to maintain it  -     Therapy Exercises    Bilateral Lower Extremities --   deferred, cueing for PLB performed  -     Sensory Assessment/Intervention    LUE Light Touch  mild impairment   baseline tingling  -KW    RUE Light Touch  mild impairment   baseline tingling  -KW    Positioning and Restraints    Pre-Treatment Position in bed  -     Post Treatment Position bed  -     In Bed supine;call light within reach;encouraged to call for assist  -       02/28/18 2015 02/28/18 1012    Rehab Evaluation    Document Type  therapy note (daily note)  -ROSEANNE    Subjective Information  agree to therapy;complains of;pain  -ROSEANNE    Patient Effort, Rehab Treatment  adequate  -ROSEANNE    General Information    Precautions/Limitations  fall precautions;oxygen therapy device and L/min  -ROSEANNE    Vital Signs    Pre Systolic BP Rehab  103  -ROSEANNE    Pre Treatment Diastolic BP  67  -ROSEANNE    Pretreatment Heart Rate (beats/min)  91  -ROSEANNE    Intratreatment Heart Rate (beats/min)  98  -ROSEANNE    Posttreatment Heart Rate (beats/min)  100  -ROSEANNE    Pre SpO2 (%)  93  -ROSEANNE    O2 Delivery Pre Treatment  supplemental O2  -ROSEANNE    Intra SpO2 (%)  78  -ROSEANNE    O2 Delivery Intra Treatment  room air   pt. refused 02 in bathroom, offered multiple times  -ROSEANNE    Post SpO2 (%)  89  -ROSEANNE    O2 Delivery Post Treatment  supplemental O2  -ROSEANNE    Pain Assessment    Pain Assessment  0-10  -ROSEANNE    Pain Score  8  -ROSEANNE    Post Pain Score  8  -ROSEANNE    Pain Type  Chronic pain  -ROSEANNE    Pain Location  Leg  -ROSEANNE    Pain Orientation  Right;Left  -ROSEANNE    Pain Intervention(s)  Ambulation/increased activity;Repositioned  -ROSEANNE    Response to Interventions  tolerated  -ROSEANNE    Cognitive Assessment/Intervention    Current  Cognitive/Communication Assessment  functional  -ROSEANNE    Orientation Status  oriented to;person   other not reassessed  -ROSEANNE    Follows Commands/Answers Questions  100% of the time;able to follow single-step instructions  -ROSEANNE    Personal Safety  WNL/WFL  -ROSEANNE    Personal Safety Interventions  fall prevention program maintained;gait belt;nonskid shoes/slippers when out of bed  -ROSEANNE    Muscle Tone Assessment    Muscle Tone Assessment Bilateral Upper Extremities  -TC     Bilateral Upper Extremities Muscle Tone Assessment mildly decreased tone  -TC     Bilateral Lower Extremities Muscle Tone Assessment moderately decreased tone  -TC     Bed Mobility, Assessment/Treatment    Bed Mobility, Assistive Device  bed rails;head of bed elevated  -ROSEANNE    Bed Mobility, Scoot/Bridge, Reading  supervision required  -ROSEANNE    Bed Mob, Supine to Sit, Reading  supervision required  -ROSEANNE    Bed Mobility, Safety Issues  decreased use of arms for pushing/pulling;decreased use of legs for bridging/pushing;impaired trunk control for bed mobility  -ROSEANNE    Bed Mobility, Impairments  strength decreased;pain  -ROSEANNE    Bed Mobility, Comment  extra time, but completed without assist.  -ROSEANNE    Transfer Assessment/Treatment    Transfers, Sit-Stand Reading  contact guard assist  -ROSEANNE    Transfers, Stand-Sit Reading  contact guard assist;verbal cues required   cues to reach back  -ROSEANNE    Transfers, Sit-Stand-Sit, Assist Device  rolling walker  -ROSEANNE    Toilet Transfer, Reading  contact guard assist  -ROSEANNE    Toilet Transfer, Assistive Device  rolling walker;elevated toilet seat   grab bar  -ROSEANNE    Transfer, Safety Issues  step length decreased;weight-shifting ability decreased  -ROSEANNE    Transfer, Impairments  strength decreased;impaired balance;pain  -ROSEANNE    Transfer, Comment  Pt. did not need physical assist, but extra time and cues for hand placement stand to sit.  -ROSEANNE    Balance Skills Training    Sitting-Level of Assistance  Independent  -ROSEANNE     Sitting-Balance Support  Feet supported  -ROSEANNE    Standing-Level of Assistance  Contact guard  -ROSEANNE    Static Standing Balance Support  assistive device  -ROSEANNE    Positioning and Restraints    Pre-Treatment Position  in bed  -ROSEANNE    Post Treatment Position  chair  -ROSEANNE    In Chair  reclined;call light within reach;encouraged to call for assist;exit alarm on   Pt. declined waffle boots back on or pillow under legs.  -ROSEANNE      02/28/18 0850 02/27/18 2000    Muscle Tone Assessment    Muscle Tone Assessment Bilateral Upper Extremities  -KL Bilateral Upper Extremities  -    Bilateral Upper Extremities Muscle Tone Assessment mildly decreased tone  -KL mildly decreased tone  -BH    Bilateral Lower Extremities Muscle Tone Assessment moderately decreased tone  -KL moderately decreased tone  -    Sensory Assessment/Intervention    LUE Light Touch  mild impairment  -    RUE Light Touch  mild impairment  -      02/27/18 1400       Rehab Evaluation    Document Type therapy note (daily note)  -     Subjective Information agree to therapy;complains of;weakness;fatigue;pain  -     Pain Assessment    Pain Assessment Rojas-Baker FACES  -     Rojas-Baker FACES Pain Rating 6  -     Pain Type Chronic pain  -     Pain Location Leg  -     Pain Orientation Right;Left  -     Pain Intervention(s) Repositioned  -MF     Positioning and Restraints    Pre-Treatment Position in bed  -MF     Post Treatment Position bed  -MF     In Bed supine;call light within reach;encouraged to call for assist  -       User Key  (r) = Recorded By, (t) = Taken By, (c) = Cosigned By    Initials Name Provider Type    ROSEANNE Gagnon, OT Occupational Therapist     Carlos Hassan, PT Physical Therapist     Mary Kate Parra, PT Physical Therapist     Jossy Sommers, RN Registered Nurse    DAVIE Ying, RN Registered Nurse    MARK Nicholas, NAHID Registered Nurse    JOCE Baron, RN Registered Nurse            PT Recommendation  and Plan  Anticipated Discharge Disposition: home with home health, skilled nursing facility  Planned Therapy Interventions: wound care    Plan Of Care Reviewed With: patient   Progress: improving   Outcome Summary/Follow up Plan: BLE ulcerations significantly improved with only small superficial ulceration to RLE.  Pt does cont to have moderate erythema and pain to BLE with h/o venous stasis ulcerations.  PT changed to unna boot today to provide compression and for calamine / zinc to help protect skin and decrease inflammation to improve healing potential.           Outcome Measures       02/28/18 1012          How much help from another is currently needed...    Putting on and taking off regular lower body clothing? 2   min socks with AE  -ROSEANNE      Bathing (including washing, rinsing, and drying) 2  -ROSEANNE      Toileting (which includes using toilet bed pan or urinal) 3  -ROSEANNE      Putting on and taking off regular upper body clothing 2  -ROSEANNE      Taking care of personal grooming (such as brushing teeth) 3  -ROSEANNE      Eating meals 3  -ROSEANNE      Score 15  -ROSEANNE      Functional Assessment    Outcome Measure Options AM-PAC 6 Clicks Daily Activity (OT)  -        User Key  (r) = Recorded By, (t) = Taken By, (c) = Cosigned By    Initials Name Provider Type     Marichuy Gagnon, OT Occupational Therapist              Time Calculation        PT Charges       03/02/18 0800          Time Calculation    Start Time 0800  -      PT Goal Re-Cert Due Date 03/09/18  -      Time Calculation- PT    Total Timed Code Minutes- PT 45 minute(s)  -        User Key  (r) = Recorded By, (t) = Taken By, (c) = Cosigned By    Initials Name Provider Type     Carlos Hassan, PT Physical Therapist             Therapy Charges for Today     Code Description Service Date Service Provider Modifiers Qty    [id number] HC PT STAPPING UNNA BOOT 3/2/2018 Carlos Hassan, PT GP 1    [id number] HC MICHAEL DEBRIDE OPEN WOUND UP TO 20CM 3/2/2018 Carlos LEE  Mo, PT GP 1            PT G-Codes  Outcome Measure Options: AM-PAC 6 Clicks Daily Activity (OT)        Carlos Hassan, PT  3/2/2018

## 2018-03-02 NOTE — THERAPY TREATMENT NOTE
Acute Care - Physical Therapy Treatment Note  [facility]     Patient Name: Yassine Love  : 1945  MRN: [id number]  Today's Date: 3/2/2018  Onset of Illness/Injury or Date of Surgery Date: 18  Date of Referral to PT: 18  Referring Physician: Dr. Watkins    Admit Date: 2018    Visit Dx:    ICD-10-CM ICD-9-CM   1. Bilateral cellulitis of lower leg L03.116 682.6    L03.115    2. RAFAEL (acute kidney injury) N17.9 584.9   3. PVD (peripheral vascular disease) I73.9 443.9   4. Impaired mobility and ADLs Z74.09 799.89   5. Impaired functional mobility, balance, gait, and endurance Z74.09 V49.89     Patient Active Problem List   Diagnosis   • Cellulitis of right lower extremity   • RAFAEL (acute kidney injury)   • PVD (peripheral vascular disease)   • Cellulitis of both lower extremities   • COPD (chronic obstructive pulmonary disease)   • Essential hypertension   • Hypoxia   • Bilateral cellulitis of lower leg               Adult Rehabilitation Note       18 1053 18 0800 18 1021    Rehab Assessment/Intervention    Discipline physical therapy assistant  -AS physical therapist  - physical therapist  -    Document Type therapy note (daily note)  -AS therapy note (daily note)  - therapy note (daily note)  -    Subjective Information agree to therapy;complains of;pain;weakness  -AS agree to therapy;complains of;weakness;fatigue;pain  - agree to therapy;complains of;pain;fatigue  -    Patient Effort, Rehab Treatment good  -AS  adequate  -    Symptoms Noted During/After Treatment fatigue;increased pain  -AS  fatigue;significant change in vital signs  -    Symptoms Noted Comment   oxygen desaturation  -EH    Precautions/Limitations fall precautions;other (see comments)   bilateral LE edema and leg wraps  -AS  fall precautions;oxygen therapy device and L/min  -EH    Recorded by [AS] Yasmine Fuller PTA [MF] Carlos Hassan, PT [] Mary Kate Parra, PT    Vital Signs     Pre SpO2 (%)   97  -EH    O2 Delivery Pre Treatment   supplemental O2  -EH    Intra SpO2 (%)   84  -EH    O2 Delivery Intra Treatment   supplemental O2  -EH    Post SpO2 (%)   93  -EH    O2 Delivery Post Treatment   supplemental O2  -EH    Pre Patient Position   Supine  -EH    Intra Patient Position   Standing  -EH    Post Patient Position   Supine  -EH    Recovery Time   3  -EH    Rest Breaks    3   2 small, 1 longer rest break  -EH    Recorded by   [EH] Mary Kate Parra, PT    Pain Assessment    Pain Assessment Rojas-Fontenot FACES  -AS Rojas-Baker FACES  -MF Rojas-Baker FACES  -EH    Rojas-Baker FACES Pain Rating 2  -AS 6  -MF 6  -EH    Pain Type Chronic pain  -AS Chronic pain  -MF Chronic pain  -EH    Pain Location Leg  -AS Leg  -MF Leg  -EH    Pain Orientation Right;Left  -AS Right;Left  -MF Right;Left  -EH    Pain Intervention(s) Repositioned;Ambulation/increased activity  -AS Repositioned  -MF Repositioned;Ambulation/increased activity  -EH    Response to Interventions tolerated ambulation in hallway and up to chair  -AS  Pt c/o increased pain in back, hip, and knee during mobility. Pt c/o hip pain during turns and has 1 instance of knee buckling.  -EH    Recorded by [AS] Yasmine Fuller PTA [MF] Carlos Hassan, PT [] Mary Kate Parra, PT    Cognitive Assessment/Intervention    Current Cognitive/Communication Assessment functional  -AS  functional  -EH    Orientation Status oriented to;person;place  -AS  oriented x 4  -EH    Follows Commands/Answers Questions 100% of the time  -AS  100% of the time;able to follow single-step instructions  -EH    Personal Safety mild impairment  -AS  WNL/WFL  -    Personal Safety Interventions fall prevention program maintained;gait belt;nonskid shoes/slippers when out of bed;other (see comments)   exit alarm  -AS  fall prevention program maintained;gait belt;nonskid shoes/slippers when out of bed  -EH    Recorded by [AS] Yasmine Fuller PTA  [] Mary Kate ALAMO  Fidel PT    Bed Mobility, Assessment/Treatment    Bed Mobility, Assistive Device bed rails;head of bed elevated  -AS  bed rails;head of bed elevated  -    Bed Mob, Supine to Sit, [place] supervision required  -AS  conditional independence  -    Bed Mob, Sit to Supine, [place] not tested  -AS  conditional independence   bed rail only  -    Bed Mobility, Comment patient demonstrated good technique  -AS  no extra time required; pt sits and rests prior to mobility.  -EH    Recorded by [AS] Yasmine Fuller PTA  [] Mary Kate Parra, PT    Transfer Assessment/Treatment    Transfers, Sit-Stand [place] verbal cues required;contact guard assist  -AS  contact guard assist  -    Transfers, Stand-Sit [place] verbal cues required;contact guard assist  -AS  contact guard assist  -    Transfers, Sit-Stand-Sit, Assist Device rolling walker  -AS  rolling walker  -    Toilet Transfer, [place]   not tested  -    Transfer, Safety Issues   step length decreased;weight-shifting ability decreased  -    Transfer, Impairments strength decreased  -AS  strength decreased;impaired balance;pain  -    Transfer, Comment verbal cues for hand placement  -AS      Recorded by [AS] Yasmine Fuller PTA  [] Mary Kate Parra, PT    Gait Assessment/Treatment    Gait, [place] Level verbal cues required;minimum assist (75% patient effort)  -AS  minimum assist (75% patient effort)  -    Gait, Assistive Device rolling walker  -AS  rolling walker  -    Gait, Distance (Feet) 80  -AS  50   3 standing rest breaks during ambulation  -    Gait, Gait Pattern Analysis   swing-to gait  -    Gait, Gait Deviations vidhi decreased;forward flexed posture;step length decreased  -AS  bilateral:;step length decreased;vidhi decreased;right:;knee buckling;double stance time increased   1 instance of knee buckling  -    Gait, Safety Issues step length decreased;supplemental O2  -AS  sequencing  ability decreased;step length decreased;steps too close front assistive device  -EH    Gait, Impairments strength decreased  -AS  pain;strength decreased;motor control impaired  -EH    Gait, Comment verbal cues for posture and to look forward rather than down at floor  -AS  PT with forward flexed posture at cervical and thoracic spine. With cueing pt able to improve posture somewhat in t-spine but unable to maintain it  -EH    Recorded by [AS] Yasmine Fuller PTA  [] Mary Kate Parra, PT    Therapy Exercises    Bilateral Lower Extremities AROM:;10 reps;sitting;ankle pumps/circles;hip flexion;LAQ  -AS  --   deferred, cueing for PLB performed  -EH    Recorded by [AS] Yasmine Fuller PTA  [] Mary Kate Parra, PT    Positioning and Restraints    Pre-Treatment Position in bed  -AS in bed  - in bed  -    Post Treatment Position chair  -AS bed  - bed  -    In Bed  supine;call light within reach  - supine;call light within reach;encouraged to call for assist  -    In Chair reclined;call light within reach;encouraged to call for assist;exit alarm on  -AS      Recorded by [AS] Yasmine Fuller PTA [MF] Carlos Hassan, PT [EH] Mary Kate Parra, PT      02/28/18 1012 02/27/18 1400       Rehab Assessment/Intervention    Discipline occupational therapist  -ROSEANNE physical therapist  -     Document Type therapy note (daily note)  - therapy note (daily note)  -     Subjective Information agree to therapy;complains of;pain  - agree to therapy;complains of;weakness;fatigue;pain  -     Patient Effort, Rehab Treatment adequate  -      Precautions/Limitations fall precautions;oxygen therapy device and L/min  -ROSEANNE      Recorded by [ROSEANNE] Marichuy Gagnon OT [MF] Carlos Hassan, PT     Vital Signs    Pre Systolic BP Rehab 103  -ROSEANNE      Pre Treatment Diastolic BP 67  -ROSEANNE      Pretreatment Heart Rate (beats/min) 91  -ROSEANNE      Intratreatment Heart Rate (beats/min) 98  -ROSEANNE      Posttreatment Heart  Rate (beats/min) 100  -ROSEANNE      Pre SpO2 (%) 93  -ROSEANNE      O2 Delivery Pre Treatment supplemental O2  -ROSEANNE      Intra SpO2 (%) 78  -ROSEANNE      O2 Delivery Intra Treatment room air   pt. refused 02 in bathroom, offered multiple times  -ROSEANNE      Post SpO2 (%) 89  -ROSEANNE      O2 Delivery Post Treatment supplemental O2  -ROSEANNE      Recorded by [ROSEANNE] Marichuy Gagnon, OT      Pain Assessment    Pain Assessment 0-10  -ROSEANNE Rojas-Fontenot FACES  -MF     Rojas-Baker FACES Pain Rating  6  -MF     Pain Score 8  -ROSEANNE      Post Pain Score 8  -ROSEANNE      Pain Type Chronic pain  -ROSEANNE Chronic pain  -MF     Pain Location Leg  -ROSEANNE Leg  -MF     Pain Orientation Right;Left  -ROSEANNE Right;Left  -MF     Pain Intervention(s) Ambulation/increased activity;Repositioned  -ROSEANNE Repositioned  -MF     Response to Interventions tolerated  -ROSEANNE      Recorded by [ROSEANNE] Marichuy Gagnon, OT [MF] Carlos Hassan, PT     Cognitive Assessment/Intervention    Current Cognitive/Communication Assessment functional  -ROSEANNE      Orientation Status oriented to;person   other not reassessed  -ROSEANNE      Follows Commands/Answers Questions 100% of the time;able to follow single-step instructions  -ROSEANNE      Personal Safety WNL/WFL  -ROSEANNE      Personal Safety Interventions fall prevention program maintained;gait belt;nonskid shoes/slippers when out of bed  -ROSEANNE      Recorded by [ROSEANNE] Marichuy Gagnon, OT      Bed Mobility, Assessment/Treatment    Bed Mobility, Assistive Device bed rails;head of bed elevated  -ROSEANNE      Bed Mobility, Scoot/Bridge, [place] supervision required  -ORSEANNE      Bed Mob, Supine to Sit, [place] supervision required  -ROSEANNE      Bed Mobility, Safety Issues decreased use of arms for pushing/pulling;decreased use of legs for bridging/pushing;impaired trunk control for bed mobility  -ROSEANNE      Bed Mobility, Impairments strength decreased;pain  -ROSEANNE      Bed Mobility, Comment extra time, but completed without assist.  -ROSEANNE      Recorded by [ROSEANNE] Marichuy Gagnon, OT      Transfer  Assessment/Treatment    Transfers, Sit-Stand [place] contact guard assist  -ROSEANNE      Transfers, Stand-Sit [place] contact guard assist;verbal cues required   cues to reach back  -ROSEANNE      Transfers, Sit-Stand-Sit, Assist Device rolling walker  -ROSEANNE      Toilet Transfer, [place] contact guard assist  -ROSEANNE      Toilet Transfer, Assistive Device rolling walker;elevated toilet seat   grab bar  -ROSEANNE      Transfer, Safety Issues step length decreased;weight-shifting ability decreased  -ROSEANNE      Transfer, Impairments strength decreased;impaired balance;pain  -ROSEANNE      Transfer, Comment Pt. did not need physical assist, but extra time and cues for hand placement stand to sit.  -ROSEANNE      Recorded by [ROSEANNE] Marichuy Gagnon OT      Functional Mobility    Functional Mobility- Ind. Level contact guard assist  -ROSEANNE      Functional Mobility- Device rolling walker  -ROSEANNE      Functional Mobility-Distance (Feet) 20  -ROSEANNE      Functional Mobility- Safety Issues step length decreased;weight-shifting ability decreased  -ROSEANNE      Functional Mobility- Comment to toilet and back to recliner  -ROSEANNE      Recorded by [ROSEANNE] Marichuy Gagnon OT      Upper Body Dressing Assessment/Training    UB Dressing Assess/Train, Clothing Type donning:   robe  -ROSEANNE      UB Dressing Assess/Train, Position edge of bed  -ROSEANNE      UB Dressing Assess/Train, [place] moderate assist (50% patient effort)  -ROSEANNE      UB Dressing Assess/Train, Impairments ROM decreased;decreased flexibility;pain  -ROSEANNE      Recorded by [ROSEANNE] Marichuy Gagnon OT      Lower Body Dressing Assessment/Training    LB Dressing Assess/Train, Clothing Type doffing:;donning:;slipper socks   used pillow case as pants leg due no pants available.  -ROSEANNE      LB Dressing Assess/Train, Assist Device reacher;sock-aid;long-handled shoe horn  -ROSEANNE      LB Dressing Assess/Train, [place] supervision required;verbal cues required;minimum assist (75% patient effort)  -ROSEANNE      LB Dressing Assess/Train,  Impairments decreased flexibility;impaired vision  -ROSEANNE      LB Dressing Assess/Train, Comment Pt. able to doff socks post education using shoe horn with min cues.  Pt. able to jewell sock on aide post demonstration with min assist to help over leg wraps which have sticky texture.  When wraps off will increase sock-aid effectiveness..    -ROSEANNE      Recorded by [ROSEANNE] Marichuy Gagnon, OT      Toileting Assessment/Training    Toileting Assess/Train, Comment Pt. to bathroom for BM, but only urinated despite trying for several minutes.    -ROSEANNE      Recorded by [ROSEANNE] Marichuy Gagnon, OT      Balance Skills Training    Sitting-Level of Assistance Independent  -ROSEANNE      Sitting-Balance Support Feet supported  -ROSEANNE      Standing-Level of Assistance Contact guard  -ROSEANNE      Static Standing Balance Support assistive device  -ROSEANNE      Recorded by [ROSEANNE] Marichuy Gagnon, OT      Positioning and Restraints    Pre-Treatment Position in bed  -ROSEANNE in bed  -     Post Treatment Position chair  -ROSEANNE bed  -     In Bed  supine;call light within reach;encouraged to call for assist  -     In Chair reclined;call light within reach;encouraged to call for assist;exit alarm on   Pt. declined waffle boots back on or pillow under legs.  -ROSEANNE      Recorded by [ROSEANNE] Marichuy Gagnon, OT [MF] Carlos Hassan, PT       User Key  (r) = Recorded By, (t) = Taken By, (c) = Cosigned By    Initials Name Effective Dates    ROSEANNE Gagnon, OT 06/22/15 -      Carlos Hassan, PT 06/19/15 -      Mary Kate Parra, PT 06/19/15 -     AS Yasmine Fuller, PTA 06/22/15 -                 IP PT Goals       03/02/18 1127 03/01/18 1058 02/26/18 1445    Bed Mobility PT LTG    Bed Mobility PT LTG, Date Goal Reviewed   02/26/18  -LM    Bed Mobility PT LTG, Outcome  goal ongoing  -EH goal ongoing  -LM    Transfer Training PT LTG    Transfer Training PT  LTG, Date Goal Reviewed 03/02/18  -AS  02/26/18  -LM    Transfer Training PT LTG, Outcome goal ongoing  -AS goal ongoing   -EH goal ongoing  -LM    Gait Training PT LTG    Gait Training Goal PT LTG, Date Goal Reviewed 03/02/18  -AS  02/26/18  -LM    Gait Training Goal PT LTG, Outcome goal ongoing  -AS goal ongoing  -EH goal ongoing  -LM      02/23/18 0949 02/22/18 1156 02/22/18 1050    Bed Mobility PT LTG    Bed Mobility PT LTG, Date Established  02/22/18  -KM     Bed Mobility PT LTG, Time to Achieve  2 wks  -KM     Bed Mobility PT LTG, Activity Type  all bed mobility  -KM     Bed Mobility PT LTG, [place] Level  independent  -KM     Bed Mobility PT LTG, Outcome goal ongoing  -BD      Transfer Training PT LTG    Transfer Training PT LTG, Date Established  02/22/18  -KM     Transfer Training PT LTG, Time to Achieve  2 wks  -KM     Transfer Training PT LTG, Activity Type  bed to chair /chair to bed;sit to stand/stand to sit  -KM     Transfer Training PT LTG, [place] Level  independent  -KM     Transfer Training PT LTG, Assist Device  walker, rolling  -KM     Transfer Training PT LTG, Outcome goal ongoing  -BD      Gait Training PT LTG    Gait Training Goal PT LTG, Date Established  02/22/18  -KM     Gait Training Goal PT LTG, Time to Achieve  2 wks  -KM     Gait Training Goal PT LTG, [place] Level  independent  -KM     Gait Training Goal PT LTG, Assist Device  walker, rolling  -KM     Gait Training Goal PT LTG, Distance to Achieve  100  -KM     Gait Training Goal PT LTG, Outcome goal ongoing  -BD      Wound Care PT LTG    Wound Care PT LTG 1, Outcome   goal partially met  -MF    Wound Care 2 PT LTG    Wound Care PT LTG 2, Outcome   goal partially met  -MF      02/19/18 1135 02/17/18 1121       Wound Care PT LTG    Wound Care PT LTG 1, Date Established  02/17/18  -MW     Wound Care PT LTG 1, Time to Achieve  2 wks  -MW     Wound Care PT LTG 1, Location  LLE   -MW     Wound Care PT LTG 1, No S&S of Infection  yes  -MW     Wound Care PT LTG 1, Decrease Wound Size  10%  -MW     Wound Care PT LTG 1, Decrease Exudate  minimum  -MW      Wound Care PT LTG 1, Decrease Limb Girth %  10%  -MW     Wound Care PT LTG 1, No New Skin Break Down  yes  -MW     Wound Care PT LTG 1, Education  S&S of infection;dressing changes;edema management;progression of POC;skin care/inspection  -MW     Wound Care PT LTG 1, Education Understanding  verbalize understanding  -MW     Wound Care PT LTG 1, Outcome goal ongoing  -MF      Wound Care 2 PT LTG    Wound Care PT LTG 2, Date Established  02/17/18  -MW     Wound Care PT LTG 2, Time to Achieve  2 wks  -MW     Wound Care PT LTG 2, Location  RLE   -MW     Wound Care PT LTG 2, No S&S of Infection  yes  -MW     Wound Care PT LTG 2, Decrease Wound Size  20%  -MW     Wound Care PT LTG 2, Decrease Exudate  minimum  -MW     Wound Care PT LTG 2, Decrease Limb Girth %  10%  -MW     Wound Care PT LTG 2, No New Skin Break Down  yes  -MW     Wound Care PT LTG 2, Education  dressing changes;edema management;pressure relief;pain managment;progression of POC  -MW     Wound Care PT LTG 2, Education Understanding  verbalize understanding  -MW     Wound Care PT LTG 2, Outcome goal ongoing  -MF        User Key  (r) = Recorded By, (t) = Taken By, (c) = Cosigned By    Initials Name Provider Type     Carlos Hassan, PT Physical Therapist     Mary Kate Parra, PT Physical Therapist    KM Carmela Washington, PT Physical Therapist    AS Yasmine Fuller, PTA Physical Therapy Assistant    MW Karen Lopez, PT Physical Therapist    JORDYN Rojo, PT Physical Therapist    DARWIN Caro, PT Physical Therapist          Physical Therapy Education     Title: PT OT SLP Therapies (Active)     Topic: Physical Therapy (Active)     Point: Mobility training (Active)    Learning Progress Summary    Learner Readiness Method Response Comment Documented by Status   Patient Acceptance E NR  AS 03/02/18 1126 Active    Acceptance E VU,NR  EH 03/01/18 1058 Done    Acceptance E,D NR Reviewed benefits of activity, HEP, safety with  mobility.  02/26/18 1445 Active    Acceptance E NR   02/23/18 0949 Active    Acceptance E VU   02/22/18 1159 Done               Point: Home exercise program (Active)    Learning Progress Summary    Learner Readiness Method Response Comment Documented by Status   Patient Acceptance E NR  AS 03/02/18 1126 Active    Acceptance E VU,NR   03/01/18 1058 Done    Acceptance E,D NR Reviewed benefits of activity, HEP, safety with mobility.  02/26/18 1445 Active    Acceptance E NR   02/23/18 0949 Active    Acceptance E VU   02/22/18 1159 Done               Point: Body mechanics (Active)    Learning Progress Summary    Learner Readiness Method Response Comment Documented by Status   Patient Acceptance E NR  AS 03/02/18 1126 Active    Acceptance E VU,NR   03/01/18 1058 Done    Acceptance E,D NR Reviewed benefits of activity, HEP, safety with mobility.  02/26/18 1445 Active    Acceptance E NR   02/23/18 0949 Active    Acceptance E VU   02/22/18 1159 Done               Point: Precautions (Active)    Learning Progress Summary    Learner Readiness Method Response Comment Documented by Status   Patient Acceptance E NR  AS 03/02/18 1126 Active    Acceptance E VU,NR   03/01/18 1058 Done    Acceptance E,D NR Reviewed benefits of activity, HEP, safety with mobility.  02/26/18 1445 Active    Acceptance E NR   02/23/18 0949 Active    Acceptance E VU   02/22/18 1159 Done    Acceptance E VU reviewed POC for skin/ edema care.  02/17/18 1126 Done                      User Key     Initials Effective Dates Name Provider Type Discipline     06/19/15 -  Mary Kate Parra, PT Physical Therapist PT     06/19/15 -  Carmela Washington, PT Physical Therapist PT    AS 06/22/15 -  Yasmine Fuller, PTA Physical Therapy Assistant PT     02/12/18 -  Karen Lopez, PT Physical Therapist PT     06/13/16 -  Estrella Rojo, PT Physical Therapist PT     06/09/17 -  Bekah Caro, PT Physical Therapist PT                     PT Recommendation and Plan  Anticipated Discharge Disposition: home with home health, skilled nursing facility  Planned Therapy Interventions: wound care  Plan of Care Review  Plan Of Care Reviewed With: patient  Progress: improving  Outcome Summary/Follow up Plan: patient able to increase gait distance, continues to fatigue quickly with mobility, B LE exercises performed sitting in recliner. Exit alarm set.          Outcome Measures       03/02/18 1053 02/28/18 1012       How much help from another person do you currently need...    Turning from your back to your side while in flat bed without using bedrails? 4  -AS      Moving from lying on back to sitting on the side of a flat bed without bedrails? 3  -AS      Moving to and from a bed to a chair (including a wheelchair)? 3  -AS      Standing up from a chair using your arms (e.g., wheelchair, bedside chair)? 3  -AS      Climbing 3-5 steps with a railing? 2  -AS      To walk in hospital room? 3  -AS      AM-PAC 6 Clicks Score 18  -AS      How much help from another is currently needed...    Putting on and taking off regular lower body clothing?  2   min socks with AE  -ROSEANNE     Bathing (including washing, rinsing, and drying)  2  -ROSEANNE     Toileting (which includes using toilet bed pan or urinal)  3  -ROSEANNE     Putting on and taking off regular upper body clothing  2  -ROSEANNE     Taking care of personal grooming (such as brushing teeth)  3  -ROSEANNE     Eating meals  3  -ROSEANNE     Score  15  -ROSEANNE     Functional Assessment    Outcome Measure Options AM-PAC 6 Clicks Basic Mobility (PT)  -AS AM-PAC 6 Clicks Daily Activity (OT)  -ROSEANNE       User Key  (r) = Recorded By, (t) = Taken By, (c) = Cosigned By    Initials Name Provider Type    ROSEANNE Gagnon, OT Occupational Therapist    AS Yasmine Fuller PTA Physical Therapy Assistant           Time Calculation:         PT Charges       03/02/18 1129 03/02/18 0800       Time Calculation    Start Time 1053  -AS 0800  -      PT Received On 03/02/18  -AS      PT Goal Re-Cert Due Date 03/09/18  -AS 03/09/18  -     Time Calculation- PT    Total Timed Code Minutes- PT 27 minute(s)  -AS 45 minute(s)  -       User Key  (r) = Recorded By, (t) = Taken By, (c) = Cosigned By    Initials Name Provider Type    MF Carlos Hassan, PT Physical Therapist    AS Yasmine Fuller, CRISTÓBAL Physical Therapy Assistant          Therapy Charges for Today     Code Description Service Date Service Provider Modifiers Qty    70807641670 HC GAIT TRAINING EA 15 MIN 3/2/2018 Yasmine Fuller, CRISTÓBAL GP 1    98708623521 HC PT THER PROC EA 15 MIN 3/2/2018 Yasmine Fuller, CRISTÓBAL GP 1          PT G-Codes  Outcome Measure Options: AM-PAC 6 Clicks Basic Mobility (PT)    Yasmine Fuller PTA  3/2/2018

## 2018-03-02 NOTE — PROGRESS NOTES
Continued Stay Note  [facility]     Patient Name: Yassine Love  MRN: [id number]  Today's Date: 3/2/2018    Admit Date: 2/16/2018          Discharge Plan       03/02/18 1129    Case Management/Social Work Plan    Plan Plan home     Patient/Family In Agreement With Plan yes    Additional Comments Spoke with patient at bedside and he wants to go home at time of d/c. He's not interested in short term rehab. I explained to him there are several facilites in [place] that have [facility] nurses on site  and they would continue his wound care for  20 more days and then he could go home. Patient wants and plans to go home. [facility] He has DME in the home, may need home 02 at d/c. Call placed to  to discuss. Glendy at ext [phone]               Discharge Codes     None        Expected Discharge Date and Time     Expected Discharge Date Expected Discharge Time    Mar 5, 2018             Silvana Owusu RN

## 2018-03-02 NOTE — PLAN OF CARE
Problem: Patient Care Overview (Adult)  Goal: Plan of Care Review  Outcome: Ongoing (interventions implemented as appropriate)   03/02/18 1127   Coping/Psychosocial Response Interventions   Plan Of Care Reviewed With patient   Patient Care Overview   Progress improving   Outcome Evaluation   Outcome Summary/Follow up Plan patient able to increase gait distance, continues to fatigue quickly with mobility, B LE exercises performed sitting in recliner. Exit alarm set.       Problem: Inpatient Physical Therapy  Goal: Transfer Training Goal 1 LTG- PT  Outcome: Ongoing (interventions implemented as appropriate)   02/22/18 1156 03/02/18 1127   Transfer Training PT LTG   Transfer Training PT LTG, Date Established 02/22/18 --    Transfer Training PT LTG, Time to Achieve 2 wks --    Transfer Training PT LTG, Activity Type bed to chair /chair to bed;sit to stand/stand to sit --    Transfer Training PT LTG, [place] Level independent --    Transfer Training PT LTG, Assist Device walker, rolling --    Transfer Training PT LTG, Date Goal Reviewed --  03/02/18   Transfer Training PT LTG, Outcome --  goal ongoing     Goal: Gait Training Goal LTG- PT  Outcome: Ongoing (interventions implemented as appropriate)   02/22/18 1156 03/02/18 1127   Gait Training PT LTG   Gait Training Goal PT LTG, Date Established 02/22/18 --    Gait Training Goal PT LTG, Time to Achieve 2 wks --    Gait Training Goal PT LTG, [place] Level independent --    Gait Training Goal PT LTG, Assist Device walker, rolling --    Gait Training Goal PT LTG, Distance to Achieve 100 --    Gait Training Goal PT LTG, Date Goal Reviewed --  03/02/18   Gait Training Goal PT LTG, Outcome --  goal ongoing

## 2018-03-02 NOTE — PROGRESS NOTES
Continued Stay Note  [facility]     Patient Name: Yassine Love  MRN: [id number]  Today's Date: 3/2/2018    Admit Date: 2/16/2018          Discharge Plan       03/02/18 0808    Case Management/Social Work Plan    Plan Son    Additional Comments Son Chris called yesterday afternoon and asked to check with [facility] to see if they have a [facility] nurse. They do not. Son plans to be here today to discuss rehab options. Glendy at [phone]               Discharge Codes     None        Expected Discharge Date and Time     Expected Discharge Date Expected Discharge Time    Mar 5, 2018             Silvana Owusu RN

## 2018-03-02 NOTE — PROGRESS NOTES
Adult Nutrition  Assessment/PES    Patient Name:  Yassine Love  YOB: 1945  MRN: [id number]  Admit Date:  2/16/2018    Assessment Date:  3/2/2018    Comments:            Reason for Assessment       03/02/18 1127    Reason for Assessment    Reason For Assessment/Visit follow up protocol    Time Spent (min) 15    Diagnosis --   per notes this adm                        Nutrition Prescription Ordered       03/02/18 1128    Nutrition Prescription PO    Current PO Diet Regular    Common Modifiers Cardiac    Fluid Restriction mL per Day 1500 mL            Evaluation of Received Nutrient/Fluid Intake       03/02/18 1128    PO Evaluation    Number of Meals 8    % PO Intake 88            Problem/Interventions:        Problem 1       03/02/18 1128    Nutrition Diagnoses Problem 1    Problem 1 Nutrition Appropriate for Condition at this Time    Etiology (related to) MNT for Treatment/Condition    Signs/Symptoms (evidenced by) PO Intake    Percent (%) intake recorded 88 %    Over number of meals 8                    Intervention Goal       03/02/18 1128    Intervention Goal    PO Maintain intake            Nutrition Intervention       03/02/18 1128    Nutrition Intervention    RD/Tech Action Follow Tx progress              Education/Evaluation       03/02/18 1128    Monitor/Evaluation    Monitor Per protocol        Electronically signed by:  Laura Mcginnis MS,RD,LD  03/02/18 11:29 AM

## 2018-03-02 NOTE — PROGRESS NOTES
[facility]  PROGRESS NOTE    Patient Name: Yassine Love  : 1945  MRN: [id number]    Date of Admission: 2018  Length of Stay: 14  Primary Care Physician: No Known Provider    Subjective   Subjective     CC:  F/U LE pain    HPI:  He is walking more and more with PT. Gets winded when he walks a very far distance otherwise denies dyspnea. His breathing feels the same on 2L vs 5L    Review of Systems  Gen-no fevers, no chills  CV-no chest pain, no palpitations  Resp-AM cough  GI-no N/V/D, no abd pain      Otherwise ROS is negative except as mentioned in the HPI.    Objective   Objective     Vital Signs:   Temp:  [98.1 °F (36.7 °C)-99.1 °F (37.3 °C)] 99 °F (37.2 °C)  Heart Rate:  [81-91] 90  Resp:  [2-18] 16  BP: ()/(52-70) 108/66        Physical Exam:  Gen-no acute distress, sitting up in chair  CV-RRR, S1 S2 normal, no m/r/g  Resp-decreased bilaterally throughout, normal WOB, decreased O2 to 3L  Abd-soft, NT, ND, +BS  Ext-BLE edema, both legs wrapped  Neuro-A&Ox3, no focal deficits  Psych-appropriate mood      Results Reviewed:  I have personally reviewed current lab, radiology, and data and agree.      Results from last 7 days  Lab Units 18  0501 18  0319   WBC 10*3/mm3 10.47 11.79*   HEMOGLOBIN g/dL 9.3* 11.0*   HEMATOCRIT % 30.0* 35.0*   PLATELETS 10*3/mm3 364 360       Results from last 7 days  Lab Units 18  0501 18  0451 18  0513   SODIUM mmol/L 138 136 135   POTASSIUM mmol/L 4.1 4.0 3.8   CHLORIDE mmol/L 99 96* 95*   CO2 mmol/L 35.0* 34.0* 38.0*   BUN mg/dL 36* 42* 46*   CREATININE mg/dL 1.20 1.30 1.20   GLUCOSE mg/dL 105* 97 125*   CALCIUM mg/dL 8.3* 8.4* 8.3*           Microbiology Results Abnormal     Procedure Component Value - Date/Time    Blood Culture - Blood, [[id number]]  (Normal) Collected:  18 2308    Lab Status:  Final result Specimen:  Blood from Arm, Left Updated:  18 2331     Blood Culture No  growth at 5 days    Blood Culture - Blood, [[id number]]  (Normal) Collected:  02/18/18 2311    Lab Status:  Final result Specimen:  Blood from Arm, Left Updated:  02/23/18 2331     Blood Culture No growth at 5 days    Wound Culture - Wound, Foot, Right [[id number]]  (Abnormal)  (Susceptibility) Collected:  02/16/18 1931    Lab Status:  Final result Specimen:  Wound from Foot, Right Updated:  02/23/18 1045     Wound Culture --      Light growth (2+) Pseudomonas aeruginosa (A)      Scant growth (1+) Escherichia coli (A)      Scant growth (1+) Proteus mirabilis (A)      Scant growth (1+) Enterococcus faecalis (A)      Scant growth (1+) Streptococcus, Beta Hemolytic, Group G (A)        If Clindamycin or Erythromycin is the drug of choice, notify the laboratory within 7 days to request susceptibility testing.        STREP GROUPING G     Gram Stain Result Few (2+) WBCs seen      Many (4+) Gram negative bacilli      Many (4+) Gram positive bacilli      Many (4+) Gram positive cocci in pairs and clusters    Susceptibility      Pseudomonas aeruginosa     FAVIAN     Aztreonam <=8 ug/ml Susceptible     Cefepime <=8 ug/ml Susceptible     Ceftazidime 4 ug/ml Susceptible     Gentamicin <=4 ug/ml Susceptible     Levofloxacin <=2 ug/ml Susceptible     Meropenem <=1 ug/ml Susceptible     Piperacillin + Tazobactam <=16 ug/ml Susceptible     Tobramycin <=4 ug/ml Susceptible                Susceptibility      Escherichia coli     FAVIAN     Ampicillin <=8 ug/ml Susceptible     Ampicillin + Sulbactam <=8/4 ug/ml Susceptible     Aztreonam <=8 ug/ml Susceptible     Cefepime <=8 ug/ml Susceptible     Cefotaxime <=2 ug/ml Susceptible     Ceftriaxone <=8 ug/ml Susceptible     Cefuroxime sodium <=4 ug/ml Susceptible     Ertapenem <=1 ug/ml Susceptible     Gentamicin <=4 ug/ml Susceptible     Levofloxacin <=2 ug/ml Susceptible     Meropenem <=1 ug/ml Susceptible     Piperacillin + Tazobactam <=16 ug/ml Susceptible     Tetracycline <=4 ug/ml  Susceptible     Tobramycin <=4 ug/ml Susceptible     Trimethoprim + Sulfamethoxazole <=2/38 ug/ml Susceptible                Susceptibility      Proteus mirabilis     FAVIAN     Ampicillin >16 ug/ml Resistant     Ampicillin + Sulbactam >16/8 ug/ml Resistant     Aztreonam <=8 ug/ml Susceptible     Cefepime <=8 ug/ml Susceptible     Cefotaxime <=2 ug/ml Susceptible     Ceftriaxone <=8 ug/ml Susceptible     Cefuroxime sodium <=4 ug/ml Susceptible     Ertapenem <=1 ug/ml Susceptible     Gentamicin <=4 ug/ml Susceptible     Levofloxacin <=2 ug/ml Susceptible     Meropenem <=1 ug/ml Susceptible     Piperacillin + Tazobactam <=16 ug/ml Susceptible     Tetracycline >8 ug/ml Resistant     Tobramycin <=4 ug/ml Susceptible     Trimethoprim + Sulfamethoxazole >2/38 ug/ml Resistant                Susceptibility      Enterococcus faecalis     FAVIAN     Ampicillin <=2 ug/ml Susceptible     Gentamicin High Level Synergy <=500 ug/ml Susceptible     Linezolid 2 ug/ml Susceptible     Penicillin G 2 ug/ml Susceptible     Streptomycin High Level Synergy <=1000 ug/ml Susceptible     Vancomycin 1 ug/ml Susceptible                    Blood Culture - Blood, Blood, Venous Line [[id number]]  (Normal) Collected:  02/16/18 2010    Lab Status:  Final result Specimen:  Blood from Arm, Left Updated:  02/21/18 2046     Blood Culture No growth at 5 days    Blood Culture - Blood, Blood, Venous Line [[id number]]  (Normal) Collected:  02/16/18 2005    Lab Status:  Final result Specimen:  Blood from Arm, Right Updated:  02/21/18 2046     Blood Culture No growth at 5 days    Respiratory Culture - Sputum, Cough [[id number]] Collected:  02/19/18 0656    Lab Status:  Final result Specimen:  Sputum from Cough Updated:  02/21/18 0931     Respiratory Culture --      Scant growth (1+) Normal Respiratory Nataliia     Gram Stain Result Occasional WBCs per low power field      No organisms seen          Imaging Results (last 24 hours)     ** No results found for the last 24  hours. **            I have reviewed the medications.    Assessment/Plan   Assessment / Plan     Hospital Problem List     * (Principal)Cellulitis of both lower extremities    RAFAEL (acute kidney injury)    PVD (peripheral vascular disease)    COPD (chronic obstructive pulmonary disease)    Essential hypertension    Hypoxia    Bilateral cellulitis of lower leg           Brief Hospital Course to date:  Yassine Love is a 72 y.o. male  with a past medical history significant for PVD, essential hypertension, COPD, cellulitis of lower extremities, and tobacco abuse who presents to the ED with complaints of fevers, chills and bilateral lower extremity pain/edema/redness.    Assessment & Plan:  Acute hypoxemic respiratory failure - stable  - secondary to COPD exacerbation, diastolic heart failure  - He is still hypoxic on room air with ambulation. Not on supplemental O2 at home, however claims he was previously on 2L. Consult CM to set up home oxygen for discharge  - Decreased bumex to 1mg daily due to contraction alkalosis, renal function stable. Hold for SBP less than 100  - Daily fluid restriction to 1500cc (which patient is non complaint with)  - treating COPD as below  - I expect will improve with continued ambulation and I encouraged frequent use of incentive spirometer    COPD exacerbation: Symbicort, Duonebs, completed 5 day course of prednisone, stable at this time    Acute on chronic diastolic CHF: ECHO 06/2017 EF normal    LE cellulitis/ Chronic LE wounds: switched to minocycline through 3/10, ID on board. Continue PO morphine at home dose PRN - OLIVA reviewed. Decreased gabapentin back to prior dose, increased dose made him drowsy. Continue PT WOC for bilateral LE wrapping.    HTN: Holding BP meds for normotension    RAFAEL: Baseline Cr 0.9-1.3. Stable    DVT Prophylaxis:  [facility]    CODE STATUS: Conditional Code    Disposition: He is refusing rehab. He can go home tomorrow or Sunday    Belle Valdivia  APRN  03/02/18  12:41 PM

## 2018-03-02 NOTE — PROGRESS NOTES
Continued Stay Note  [facility]     Patient Name: Yassine Love  MRN: [id number]  Today's Date: 3/2/2018    Admit Date: 2/16/2018          Discharge Plan       03/02/18 1205    Case Management/Social Work Plan    Plan Home     Additional Comments Spoke with  about patient's wishes to go home with home health. She's not surprised patient told her he was going home.Patient not medically ready for d/c today maybe ready at the beginning of next week.  Called son Chris and he agrees with plan home at d/c. CM following. Glendy at ext [phone]       03/02/18 1129    Case Management/Social Work Plan    Plan Plan home     Patient/Family In Agreement With Plan yes    Additional Comments Spoke with patient at bedside and he wants to go home at time of d/c. He's not interested in short term rehab. I explained to him there are several facilites in [place] that have [facility] nurses on site  and they would continue his wound care for  20 more days and then he could go home. Patient wants and plans to go home. He is followed by Charla  for SKN and PT. He has DME in the home, may need home 02 at d/c. Call placed to  to discuss. Glendy at ext [phone]               Discharge Codes     None        Expected Discharge Date and Time     Expected Discharge Date Expected Discharge Time    Mar 5, 2018             Silvana Owusu, RN

## 2018-03-03 ENCOUNTER — APPOINTMENT (OUTPATIENT)
Dept: GENERAL RADIOLOGY | Facility: HOSPITAL | Age: 73
End: 2018-03-03

## 2018-03-03 LAB
DEPRECATED RDW RBC AUTO: 67.1 FL (ref 37–54)
ERYTHROCYTE [DISTWIDTH] IN BLOOD BY AUTOMATED COUNT: 17.4 % (ref 11.3–14.5)
FERRITIN SERPL-MCNC: 175 NG/ML (ref 22–322)
FLUAV SUBTYP SPEC NAA+PROBE: NOT DETECTED
FLUBV RNA ISLT QL NAA+PROBE: NOT DETECTED
FOLATE SERPL-MCNC: 5.65 NG/ML (ref 3.2–20)
HCT VFR BLD AUTO: 30 % (ref 38.9–50.9)
HCT VFR BLD AUTO: 30.1 % (ref 38.9–50.9)
HGB BLD-MCNC: 9.3 G/DL (ref 13.1–17.5)
HGB BLD-MCNC: 9.3 G/DL (ref 13.1–17.5)
IRON 24H UR-MRATE: 28 MCG/DL (ref 50–175)
IRON SATN MFR SERPL: 11 % (ref 20–50)
MCH RBC QN AUTO: 32.4 PG (ref 27–31)
MCHC RBC AUTO-ENTMCNC: 31 G/DL (ref 32–36)
MCV RBC AUTO: 104.5 FL (ref 80–99)
PLATELET # BLD AUTO: 326 10*3/MM3 (ref 150–450)
PMV BLD AUTO: 10.5 FL (ref 6–12)
RBC # BLD AUTO: 2.87 10*6/MM3 (ref 4.2–5.76)
TIBC SERPL-MCNC: 260 MCG/DL (ref 250–450)
VIT B12 BLD-MCNC: 360 PG/ML (ref 211–911)
WBC NRBC COR # BLD: 9.89 10*3/MM3 (ref 3.5–10.8)

## 2018-03-03 PROCEDURE — 87502 INFLUENZA DNA AMP PROBE: CPT | Performed by: NURSE PRACTITIONER

## 2018-03-03 PROCEDURE — 94760 N-INVAS EAR/PLS OXIMETRY 1: CPT

## 2018-03-03 PROCEDURE — 94640 AIRWAY INHALATION TREATMENT: CPT

## 2018-03-03 PROCEDURE — 82746 ASSAY OF FOLIC ACID SERUM: CPT | Performed by: NURSE PRACTITIONER

## 2018-03-03 PROCEDURE — 71045 X-RAY EXAM CHEST 1 VIEW: CPT

## 2018-03-03 PROCEDURE — 83540 ASSAY OF IRON: CPT | Performed by: NURSE PRACTITIONER

## 2018-03-03 PROCEDURE — 25010000002 HEPARIN (PORCINE) PER 1000 UNITS: Performed by: INTERNAL MEDICINE

## 2018-03-03 PROCEDURE — 94799 UNLISTED PULMONARY SVC/PX: CPT

## 2018-03-03 PROCEDURE — 85014 HEMATOCRIT: CPT | Performed by: NURSE PRACTITIONER

## 2018-03-03 PROCEDURE — 85018 HEMOGLOBIN: CPT | Performed by: NURSE PRACTITIONER

## 2018-03-03 PROCEDURE — 82728 ASSAY OF FERRITIN: CPT | Performed by: NURSE PRACTITIONER

## 2018-03-03 PROCEDURE — 85027 COMPLETE CBC AUTOMATED: CPT | Performed by: NURSE PRACTITIONER

## 2018-03-03 PROCEDURE — 99232 SBSQ HOSP IP/OBS MODERATE 35: CPT | Performed by: NURSE PRACTITIONER

## 2018-03-03 PROCEDURE — 82607 VITAMIN B-12: CPT | Performed by: NURSE PRACTITIONER

## 2018-03-03 PROCEDURE — 83550 IRON BINDING TEST: CPT | Performed by: NURSE PRACTITIONER

## 2018-03-03 RX ADMIN — GABAPENTIN 200 MG: 100 CAPSULE ORAL at 21:04

## 2018-03-03 RX ADMIN — BUMETANIDE 1 MG: 1 TABLET ORAL at 08:40

## 2018-03-03 RX ADMIN — IPRATROPIUM BROMIDE AND ALBUTEROL SULFATE 3 ML: 2.5; .5 SOLUTION RESPIRATORY (INHALATION) at 12:48

## 2018-03-03 RX ADMIN — GABAPENTIN 200 MG: 100 CAPSULE ORAL at 14:50

## 2018-03-03 RX ADMIN — DICLOFENAC SODIUM 2 G: 10 GEL TOPICAL at 21:04

## 2018-03-03 RX ADMIN — IPRATROPIUM BROMIDE AND ALBUTEROL SULFATE 3 ML: 2.5; .5 SOLUTION RESPIRATORY (INHALATION) at 20:26

## 2018-03-03 RX ADMIN — CASTOR OIL AND BALSAM, PERU: 788; 87 OINTMENT TOPICAL at 08:40

## 2018-03-03 RX ADMIN — ACETAMINOPHEN 650 MG: 325 TABLET ORAL at 21:04

## 2018-03-03 RX ADMIN — MORPHINE SULFATE 30 MG: 30 TABLET ORAL at 16:05

## 2018-03-03 RX ADMIN — MINOCYCLINE HYDROCHLORIDE 100 MG: 50 CAPSULE ORAL at 18:58

## 2018-03-03 RX ADMIN — HYDROCODONE BITARTRATE AND ACETAMINOPHEN 1 TABLET: 5; 325 TABLET ORAL at 14:48

## 2018-03-03 RX ADMIN — HYDROCODONE BITARTRATE AND ACETAMINOPHEN 1 TABLET: 5; 325 TABLET ORAL at 09:54

## 2018-03-03 RX ADMIN — HEPARIN SODIUM 5000 UNITS: 5000 INJECTION, SOLUTION INTRAVENOUS; SUBCUTANEOUS at 05:45

## 2018-03-03 RX ADMIN — DICLOFENAC SODIUM 2 G: 10 GEL TOPICAL at 18:59

## 2018-03-03 RX ADMIN — FAMOTIDINE 20 MG: 20 TABLET, FILM COATED ORAL at 21:04

## 2018-03-03 RX ADMIN — DICLOFENAC SODIUM 2 G: 10 GEL TOPICAL at 14:51

## 2018-03-03 RX ADMIN — BUDESONIDE AND FORMOTEROL FUMARATE DIHYDRATE 2 PUFF: 80; 4.5 AEROSOL RESPIRATORY (INHALATION) at 08:43

## 2018-03-03 RX ADMIN — IPRATROPIUM BROMIDE AND ALBUTEROL SULFATE 3 ML: 2.5; .5 SOLUTION RESPIRATORY (INHALATION) at 16:23

## 2018-03-03 RX ADMIN — MINOCYCLINE HYDROCHLORIDE 100 MG: 50 CAPSULE ORAL at 05:45

## 2018-03-03 RX ADMIN — FLUTICASONE PROPIONATE 2 SPRAY: 50 SPRAY, METERED NASAL at 08:40

## 2018-03-03 RX ADMIN — IPRATROPIUM BROMIDE AND ALBUTEROL SULFATE 3 ML: 2.5; .5 SOLUTION RESPIRATORY (INHALATION) at 08:43

## 2018-03-03 RX ADMIN — HYDROCODONE BITARTRATE AND ACETAMINOPHEN 1 TABLET: 5; 325 TABLET ORAL at 04:17

## 2018-03-03 RX ADMIN — BUDESONIDE AND FORMOTEROL FUMARATE DIHYDRATE 2 PUFF: 80; 4.5 AEROSOL RESPIRATORY (INHALATION) at 20:26

## 2018-03-03 RX ADMIN — GABAPENTIN 200 MG: 100 CAPSULE ORAL at 05:45

## 2018-03-03 RX ADMIN — FAMOTIDINE 20 MG: 20 TABLET, FILM COATED ORAL at 08:41

## 2018-03-03 RX ADMIN — CASTOR OIL AND BALSAM, PERU: 788; 87 OINTMENT TOPICAL at 20:50

## 2018-03-03 RX ADMIN — DICLOFENAC SODIUM 2 G: 10 GEL TOPICAL at 08:40

## 2018-03-03 RX ADMIN — HEPARIN SODIUM 5000 UNITS: 5000 INJECTION, SOLUTION INTRAVENOUS; SUBCUTANEOUS at 14:51

## 2018-03-03 RX ADMIN — HEPARIN SODIUM 5000 UNITS: 5000 INJECTION, SOLUTION INTRAVENOUS; SUBCUTANEOUS at 21:04

## 2018-03-03 RX ADMIN — NICOTINE 1 PATCH: 7 PATCH, EXTENDED RELEASE TRANSDERMAL at 21:09

## 2018-03-03 NOTE — PLAN OF CARE
Problem: Patient Care Overview (Adult)  Goal: Plan of Care Review  Outcome: Ongoing (interventions implemented as appropriate)   03/02/18 2047   Coping/Psychosocial Response Interventions   Plan Of Care Reviewed With patient   Patient Care Overview   Progress improving   Outcome Evaluation   Outcome Summary/Follow up Plan Vitals WNL with SBP in the 110's throughout the shift today. Holding parameters added to bumex. Pt continues to void spontaneously. Tolerates ambulation with assist X 1 and walker. Pt sat up in chair this afternoon. Pt continues to report pain ranging from 4-7/10 with prn morphine and lortab as well as scheduled voltaren cream. Pt reports pain in back, buttocks, and bilateral legs. Venelex and z guard applied to coccyx wound and mepilex changed this AM. Pt repositioned on side to relieve pressure from coccyx, but pt does not tolerate this position for long. Pt refusing heel elevation this shift. Discussed pt's non-compliance with 1500 mL fluid restriction with BRITTNY Alvares. Pt continues to refuse rehab. Anticipate D/C home with home health tomorrow. RA sat of 82% charted to help pt qualify for home O2.        Problem: Cellulitis (Adult)  Goal: Signs and Symptoms of Listed Potential Problems Will be Absent or Manageable (Cellulitis)  Outcome: Ongoing (interventions implemented as appropriate)   03/02/18 2047   Cellulitis   Problems Assessed (Cellulitis) all   Problems Present (Cellulitis) pain       Problem: Pressure Ulcer Risk (Jim Scale) (Adult,Obstetrics,Pediatric)  Goal: Identify Related Risk Factors and Signs and Symptoms  Outcome: Ongoing (interventions implemented as appropriate)   03/02/18 2047   Pressure Ulcer Risk (Jim Scale)   Related Risk Factors (Pressure Ulcer Risk (Jim Scale)) mobility impaired     Goal: Skin Integrity  Outcome: Ongoing (interventions implemented as appropriate)   03/02/18 2047   Pressure Ulcer Risk (Jim Scale) (Adult,Obstetrics,Pediatric)   Skin  Integrity making progress toward outcome       Problem: Respiratory Insufficiency (Adult)  Goal: Identify Related Risk Factors and Signs and Symptoms  Outcome: Ongoing (interventions implemented as appropriate)   03/02/18 2047   Respiratory Insufficiency   Related Risk Factors (Respiratory Insufficiency) activity intolerance;knowledge deficit;pain;smoking   Signs and Symptoms (Respiratory Insufficiency) abnormal breath sounds;decreased oxygen saturation     Goal: Acid/Base Balance  Outcome: Ongoing (interventions implemented as appropriate)   03/02/18 2047   Respiratory Insufficiency (Adult)   Acid/Base Balance making progress toward outcome     Goal: Effective Ventilation  Outcome: Ongoing (interventions implemented as appropriate)   03/02/18 2047   Respiratory Insufficiency (Adult)   Effective Ventilation making progress toward outcome       Problem: Fall Risk (Adult)  Goal: Identify Related Risk Factors and Signs and Symptoms  Outcome: Ongoing (interventions implemented as appropriate)   03/02/18 2047   Fall Risk   Fall Risk: Related Risk Factors gait/mobility problems;environment unfamiliar   Fall Risk: Signs and Symptoms presence of risk factors     Goal: Absence of Falls  Outcome: Ongoing (interventions implemented as appropriate)   03/02/18 2047   Fall Risk (Adult)   Absence of Falls making progress toward outcome

## 2018-03-03 NOTE — SIGNIFICANT NOTE
Pt hypotensive, was low 100s, then 77/66 and 88/48, says sitting on edge of bed but has been in that position for some time. On diuretic with volume overload, respiratory failure. Recent ECHO not bad though, looks like PVD and some of that could be localized in BLE too - RN states lungs overall sound clear.    Recheck in 10 min and call with update. Would prefer not to give fluids if able. Non-compliant with fluid restriction. Might do little 250 ml bolus.    I note hgb lower? Recheck h/h. RN does not know of any bleeding.    ID following for cellulitis, that doesn't seem to be worse.

## 2018-03-03 NOTE — PROGRESS NOTES
[facility] Medicine Services  PROGRESS NOTE    Patient Name: Yassine Love  : 1945  MRN: [id number]    Date of Admission: 2018  Length of Stay: 15  Primary Care Physician: No Known Provider    Subjective   Subjective     CC:  F/U LE pain    HPI:  Feels worse today. Is aching all over, runny nose, cough is a little worse. Feels weak.   Never had a colonoscopy and doesn't want one.     Review of Systems  Gen-no fevers, no chills  CV-no chest pain, no palpitations  Resp-AM cough  GI-no N/V/D, no abd pain      Otherwise ROS is negative except as mentioned in the HPI.    Objective   Objective     Vital Signs:   Temp:  [98.2 °F (36.8 °C)-99.5 °F (37.5 °C)] 99.1 °F (37.3 °C)  Heart Rate:  [79-95] 95  Resp:  [14-16] 14  BP: ()/(29-72) 90/50        Physical Exam:  Gen-no acute distress, sitting up on side of bed, looks weaker today compared to yesterday  CV-RRR, S1 S2 normal, no m/r/g  Resp-decreased bilaterally throughout, normal WOB, on 5LNC, loose cough with tan sputum noted  Abd-soft, NT, ND, +BS  Ext-BLE edema, both legs wrapped  Neuro-A&Ox3, no focal deficits  Psych-appropriate mood      Results Reviewed:  I have personally reviewed current lab, radiology, and data and agree.      Results from last 7 days  Lab Units 18  0136 18  0501   WBC 10*3/mm3 9.89 10.47   HEMOGLOBIN g/dL 9.3*  9.3* 9.3*   HEMATOCRIT % 30.0*  30.1* 30.0*   PLATELETS 10*3/mm3 326 364       Results from last 7 days  Lab Units 18  0501 18  0451 18  0513   SODIUM mmol/L 138 136 135   POTASSIUM mmol/L 4.1 4.0 3.8   CHLORIDE mmol/L 99 96* 95*   CO2 mmol/L 35.0* 34.0* 38.0*   BUN mg/dL 36* 42* 46*   CREATININE mg/dL 1.20 1.30 1.20   GLUCOSE mg/dL 105* 97 125*   CALCIUM mg/dL 8.3* 8.4* 8.3*           Microbiology Results Abnormal     Procedure Component Value - Date/Time    Blood Culture - Blood, [[id number]]  (Normal) Collected:  18 2028    Lab Status:  Final result  Specimen:  Blood from Arm, Left Updated:  02/23/18 2331     Blood Culture No growth at 5 days    Blood Culture - Blood, [[id number]]  (Normal) Collected:  02/18/18 2311    Lab Status:  Final result Specimen:  Blood from Arm, Left Updated:  02/23/18 2331     Blood Culture No growth at 5 days    Wound Culture - Wound, Foot, Right [[id number]]  (Abnormal)  (Susceptibility) Collected:  02/16/18 1931    Lab Status:  Final result Specimen:  Wound from Foot, Right Updated:  02/23/18 1045     Wound Culture --      Light growth (2+) Pseudomonas aeruginosa (A)      Scant growth (1+) Escherichia coli (A)      Scant growth (1+) Proteus mirabilis (A)      Scant growth (1+) Enterococcus faecalis (A)      Scant growth (1+) Streptococcus, Beta Hemolytic, Group G (A)        If Clindamycin or Erythromycin is the drug of choice, notify the laboratory within 7 days to request susceptibility testing.        STREP GROUPING G     Gram Stain Result Few (2+) WBCs seen      Many (4+) Gram negative bacilli      Many (4+) Gram positive bacilli      Many (4+) Gram positive cocci in pairs and clusters    Susceptibility      Pseudomonas aeruginosa     FAVIAN     Aztreonam <=8 ug/ml Susceptible     Cefepime <=8 ug/ml Susceptible     Ceftazidime 4 ug/ml Susceptible     Gentamicin <=4 ug/ml Susceptible     Levofloxacin <=2 ug/ml Susceptible     Meropenem <=1 ug/ml Susceptible     Piperacillin + Tazobactam <=16 ug/ml Susceptible     Tobramycin <=4 ug/ml Susceptible                Susceptibility      Escherichia coli     FAVIAN     Ampicillin <=8 ug/ml Susceptible     Ampicillin + Sulbactam <=8/4 ug/ml Susceptible     Aztreonam <=8 ug/ml Susceptible     Cefepime <=8 ug/ml Susceptible     Cefotaxime <=2 ug/ml Susceptible     Ceftriaxone <=8 ug/ml Susceptible     Cefuroxime sodium <=4 ug/ml Susceptible     Ertapenem <=1 ug/ml Susceptible     Gentamicin <=4 ug/ml Susceptible     Levofloxacin <=2 ug/ml Susceptible     Meropenem <=1 ug/ml Susceptible      Piperacillin + Tazobactam <=16 ug/ml Susceptible     Tetracycline <=4 ug/ml Susceptible     Tobramycin <=4 ug/ml Susceptible     Trimethoprim + Sulfamethoxazole <=2/38 ug/ml Susceptible                Susceptibility      Proteus mirabilis     FAVIAN     Ampicillin >16 ug/ml Resistant     Ampicillin + Sulbactam >16/8 ug/ml Resistant     Aztreonam <=8 ug/ml Susceptible     Cefepime <=8 ug/ml Susceptible     Cefotaxime <=2 ug/ml Susceptible     Ceftriaxone <=8 ug/ml Susceptible     Cefuroxime sodium <=4 ug/ml Susceptible     Ertapenem <=1 ug/ml Susceptible     Gentamicin <=4 ug/ml Susceptible     Levofloxacin <=2 ug/ml Susceptible     Meropenem <=1 ug/ml Susceptible     Piperacillin + Tazobactam <=16 ug/ml Susceptible     Tetracycline >8 ug/ml Resistant     Tobramycin <=4 ug/ml Susceptible     Trimethoprim + Sulfamethoxazole >2/38 ug/ml Resistant                Susceptibility      Enterococcus faecalis     FAVIAN     Ampicillin <=2 ug/ml Susceptible     Gentamicin High Level Synergy <=500 ug/ml Susceptible     Linezolid 2 ug/ml Susceptible     Penicillin G 2 ug/ml Susceptible     Streptomycin High Level Synergy <=1000 ug/ml Susceptible     Vancomycin 1 ug/ml Susceptible                    Blood Culture - Blood, Blood, Venous Line [[id number]]  (Normal) Collected:  02/16/18 2010    Lab Status:  Final result Specimen:  Blood from Arm, Left Updated:  02/21/18 2046     Blood Culture No growth at 5 days    Blood Culture - Blood, Blood, Venous Line [[id number]]  (Normal) Collected:  02/16/18 2005    Lab Status:  Final result Specimen:  Blood from Arm, Right Updated:  02/21/18 2046     Blood Culture No growth at 5 days    Respiratory Culture - Sputum, Cough [[id number]] Collected:  02/19/18 0656    Lab Status:  Final result Specimen:  Sputum from Cough Updated:  02/21/18 0931     Respiratory Culture --      Scant growth (1+) Normal Respiratory Nataliia     Gram Stain Result Occasional WBCs per low power field      No organisms seen           I have reviewed the medications.    Assessment/Plan   Assessment / Plan     Hospital Problem List     * (Principal)Cellulitis of both lower extremities    RAFAEL (acute kidney injury)    PVD (peripheral vascular disease)    COPD (chronic obstructive pulmonary disease)    Essential hypertension    Hypoxia    Bilateral cellulitis of lower leg           Brief Hospital Course to date:  Yassine Love is a 72 y.o. male  with a past medical history significant for PVD, essential hypertension, COPD, cellulitis of lower extremities, and tobacco abuse who presents to the ED with complaints of fevers, chills and bilateral lower extremity pain/edema/redness.    Assessment & Plan:  Acute hypoxemic respiratory failure - stable  - secondary to COPD exacerbation, diastolic heart failure  - He is still hypoxic on room air with ambulation. Not on supplemental O2 at home, however claims he was previously on 2L. Consult CM to set up home oxygen for discharge  - Continue daily Bumex, hold for SBP less than 100  - Daily fluid restriction to 1500cc (which patient is non complaint with)  - treating COPD as below  - will check CXR  - check for flu    COPD exacerbation: Symbicort, Duonebs, completed 5 day course of prednisone, stable at this time    Acute on chronic diastolic CHF: ECHO 06/2017 EF normal    LE cellulitis/ Chronic LE wounds: switched to minocycline through 3/10, ID on board. Continue PO morphine at home dose PRN - OLIVA reviewed. Decreased gabapentin back to prior dose, increased dose made him drowsy. Continue PT WOC for bilateral LE wrapping.    HTN: Holding BP meds for normotension    RAFAEL: Baseline Cr 0.9-1.3. Stable    Anemia: mixed anemia, Iron low, will start oral Iron, check stool for occult, monitor H&H, no signs of overt bleeding    DVT Prophylaxis:  [facility]    CODE STATUS: Conditional Code    Disposition: He is refusing rehab. He can go home tomorrow if better??    B[place], APRN  03/03/18  2:22 PM

## 2018-03-04 ENCOUNTER — APPOINTMENT (OUTPATIENT)
Dept: CT IMAGING | Facility: HOSPITAL | Age: 73
End: 2018-03-04

## 2018-03-04 LAB
BNP SERPL-MCNC: 145 PG/ML (ref 0–100)
HEMOCCULT STL QL: NEGATIVE

## 2018-03-04 PROCEDURE — 94760 N-INVAS EAR/PLS OXIMETRY 1: CPT

## 2018-03-04 PROCEDURE — 0 IOPAMIDOL PER 1 ML: Performed by: INTERNAL MEDICINE

## 2018-03-04 PROCEDURE — 99233 SBSQ HOSP IP/OBS HIGH 50: CPT | Performed by: INTERNAL MEDICINE

## 2018-03-04 PROCEDURE — 94640 AIRWAY INHALATION TREATMENT: CPT

## 2018-03-04 PROCEDURE — 71275 CT ANGIOGRAPHY CHEST: CPT

## 2018-03-04 PROCEDURE — 83880 ASSAY OF NATRIURETIC PEPTIDE: CPT | Performed by: INTERNAL MEDICINE

## 2018-03-04 PROCEDURE — 82272 OCCULT BLD FECES 1-3 TESTS: CPT | Performed by: NURSE PRACTITIONER

## 2018-03-04 PROCEDURE — 97530 THERAPEUTIC ACTIVITIES: CPT

## 2018-03-04 PROCEDURE — 94799 UNLISTED PULMONARY SVC/PX: CPT

## 2018-03-04 PROCEDURE — 25010000002 HEPARIN (PORCINE) PER 1000 UNITS: Performed by: INTERNAL MEDICINE

## 2018-03-04 RX ORDER — HYDROCODONE BITARTRATE AND ACETAMINOPHEN 7.5; 325 MG/1; MG/1
1 TABLET ORAL EVERY 4 HOURS PRN
Status: DISPENSED | OUTPATIENT
Start: 2018-03-04 | End: 2018-03-14

## 2018-03-04 RX ORDER — LANOLIN ALCOHOL/MO/W.PET/CERES
1000 CREAM (GRAM) TOPICAL DAILY
Status: DISCONTINUED | OUTPATIENT
Start: 2018-03-05 | End: 2018-03-15 | Stop reason: HOSPADM

## 2018-03-04 RX ADMIN — GABAPENTIN 200 MG: 100 CAPSULE ORAL at 06:13

## 2018-03-04 RX ADMIN — HYDROCODONE BITARTRATE AND ACETAMINOPHEN 1 TABLET: 7.5; 325 TABLET ORAL at 17:47

## 2018-03-04 RX ADMIN — ACETAMINOPHEN 650 MG: 325 TABLET ORAL at 08:42

## 2018-03-04 RX ADMIN — HEPARIN SODIUM 5000 UNITS: 5000 INJECTION, SOLUTION INTRAVENOUS; SUBCUTANEOUS at 14:48

## 2018-03-04 RX ADMIN — MINOCYCLINE HYDROCHLORIDE 100 MG: 50 CAPSULE ORAL at 17:47

## 2018-03-04 RX ADMIN — MINOCYCLINE HYDROCHLORIDE 100 MG: 50 CAPSULE ORAL at 06:13

## 2018-03-04 RX ADMIN — NICOTINE 1 PATCH: 7 PATCH, EXTENDED RELEASE TRANSDERMAL at 20:43

## 2018-03-04 RX ADMIN — ALBUTEROL SULFATE 2.5 MG: 2.5 SOLUTION RESPIRATORY (INHALATION) at 04:26

## 2018-03-04 RX ADMIN — BUDESONIDE AND FORMOTEROL FUMARATE DIHYDRATE 2 PUFF: 80; 4.5 AEROSOL RESPIRATORY (INHALATION) at 07:32

## 2018-03-04 RX ADMIN — IPRATROPIUM BROMIDE AND ALBUTEROL SULFATE 3 ML: 2.5; .5 SOLUTION RESPIRATORY (INHALATION) at 19:55

## 2018-03-04 RX ADMIN — HEPARIN SODIUM 5000 UNITS: 5000 INJECTION, SOLUTION INTRAVENOUS; SUBCUTANEOUS at 20:43

## 2018-03-04 RX ADMIN — IPRATROPIUM BROMIDE AND ALBUTEROL SULFATE 3 ML: 2.5; .5 SOLUTION RESPIRATORY (INHALATION) at 07:32

## 2018-03-04 RX ADMIN — MORPHINE SULFATE 30 MG: 30 TABLET ORAL at 14:48

## 2018-03-04 RX ADMIN — DICLOFENAC SODIUM 2 G: 10 GEL TOPICAL at 21:00

## 2018-03-04 RX ADMIN — MORPHINE SULFATE 30 MG: 30 TABLET ORAL at 06:13

## 2018-03-04 RX ADMIN — FAMOTIDINE 20 MG: 20 TABLET, FILM COATED ORAL at 08:42

## 2018-03-04 RX ADMIN — DICLOFENAC SODIUM 2 G: 10 GEL TOPICAL at 14:49

## 2018-03-04 RX ADMIN — CASTOR OIL AND BALSAM, PERU: 788; 87 OINTMENT TOPICAL at 08:42

## 2018-03-04 RX ADMIN — IPRATROPIUM BROMIDE AND ALBUTEROL SULFATE 3 ML: 2.5; .5 SOLUTION RESPIRATORY (INHALATION) at 17:08

## 2018-03-04 RX ADMIN — BUMETANIDE 1 MG: 1 TABLET ORAL at 08:42

## 2018-03-04 RX ADMIN — HEPARIN SODIUM 5000 UNITS: 5000 INJECTION, SOLUTION INTRAVENOUS; SUBCUTANEOUS at 06:13

## 2018-03-04 RX ADMIN — FLUTICASONE PROPIONATE 2 SPRAY: 50 SPRAY, METERED NASAL at 08:42

## 2018-03-04 RX ADMIN — ACETAMINOPHEN 650 MG: 325 TABLET ORAL at 20:42

## 2018-03-04 RX ADMIN — GABAPENTIN 200 MG: 100 CAPSULE ORAL at 20:43

## 2018-03-04 RX ADMIN — DICLOFENAC SODIUM 2 G: 10 GEL TOPICAL at 08:42

## 2018-03-04 RX ADMIN — MORPHINE SULFATE 30 MG: 30 TABLET ORAL at 20:43

## 2018-03-04 RX ADMIN — IOPAMIDOL 80 ML: 755 INJECTION, SOLUTION INTRAVENOUS at 16:00

## 2018-03-04 RX ADMIN — IPRATROPIUM BROMIDE AND ALBUTEROL SULFATE 3 ML: 2.5; .5 SOLUTION RESPIRATORY (INHALATION) at 12:48

## 2018-03-04 RX ADMIN — DOCUSATE SODIUM 200 MG: 100 CAPSULE, LIQUID FILLED ORAL at 08:42

## 2018-03-04 RX ADMIN — BUDESONIDE AND FORMOTEROL FUMARATE DIHYDRATE 2 PUFF: 80; 4.5 AEROSOL RESPIRATORY (INHALATION) at 19:57

## 2018-03-04 RX ADMIN — FAMOTIDINE 20 MG: 20 TABLET, FILM COATED ORAL at 20:43

## 2018-03-04 RX ADMIN — HYDROCODONE BITARTRATE AND ACETAMINOPHEN 1 TABLET: 5; 325 TABLET ORAL at 04:24

## 2018-03-04 RX ADMIN — GABAPENTIN 200 MG: 100 CAPSULE ORAL at 14:47

## 2018-03-04 RX ADMIN — CASTOR OIL AND BALSAM, PERU: 788; 87 OINTMENT TOPICAL at 21:00

## 2018-03-04 NOTE — PROGRESS NOTES
"    [facility] Medicine Services  PROGRESS NOTE    Patient Name: Yassine Love  : 1945  MRN: [id number]    Date of Admission: 2018  Length of Stay: 16  Primary Care Physician: No Known Provider    Subjective   Subjective     CC:  F/U LE pain    HPI:  Very dyspneic today.  Barely able to walk to BR and back on oxygen  Awoke this morning \"thinking I was going to die\" due to dyspnea.   Vague chest pain lower R side.   Says he was not like this at home  Bringing up a little sputum  Quit tob when he was admitted - jairo patch helping.   Generalzied aching - pain meds help but not enough.    Review of Systems  Gen-no fevers, no chills  CV-no palpitations  GI-no N/V/D, no abd pain      Otherwise ROS is negative except as mentioned in the HPI.    Objective   Objective     Vital Signs:   Temp:  [97.9 °F (36.6 °C)-99.9 °F (37.7 °C)] 98.2 °F (36.8 °C)  Heart Rate:  [] 95  Resp:  [16-22] 17  BP: ()/(53-60) 108/55        Physical Exam:  Gen-no acute distress, sitting up on side of bed, looks uncomfortable, mildly labord resps,   CV-RRR, S1 S2 normal, no m/r/g  Resp-decreased bilaterally throughout, increased WOB, rhonchi that partly clear with coughing, rare wheeze  Abd-soft, NT, ND, +BS  Ext-BLE edema, both legs wrapped  Neuro-A&Ox3, no focal deficits  Psych-appropriate mood  musc-skel - neck thrust forward, hard time lifting head      Results Reviewed:  I have personally reviewed current lab, radiology, and data and agree.      Results from last 7 days  Lab Units 18  0136 18  0501   WBC 10*3/mm3 9.89 10.47   HEMOGLOBIN g/dL 9.3*  9.3* 9.3*   HEMATOCRIT % 30.0*  30.1* 30.0*   PLATELETS 10*3/mm3 326 364       Results from last 7 days  Lab Units 18  0501 18  0451 18  0513   SODIUM mmol/L 138 136 135   POTASSIUM mmol/L 4.1 4.0 3.8   CHLORIDE mmol/L 99 96* 95*   CO2 mmol/L 35.0* 34.0* 38.0*   BUN mg/dL 36* 42* 46*   CREATININE mg/dL 1.20 1.30 1.20 "   GLUCOSE mg/dL 105* 97 125*   CALCIUM mg/dL 8.3* 8.4* 8.3*           Microbiology Results Abnormal     Procedure Component Value - Date/Time    Influenza A & B, RT PCR - Swab, Nasopharynx [[id number]]  (Normal) Collected:  03/03/18 1356    Lab Status:  Final result Specimen:  Swab from Nasopharynx Updated:  03/03/18 1607     Influenza A PCR Not Detected     Influenza B PCR Not Detected    Blood Culture - Blood, [[id number]]  (Normal) Collected:  02/18/18 2308    Lab Status:  Final result Specimen:  Blood from Arm, Left Updated:  02/23/18 2331     Blood Culture No growth at 5 days    Blood Culture - Blood, [[id number]]  (Normal) Collected:  02/18/18 2311    Lab Status:  Final result Specimen:  Blood from Arm, Left Updated:  02/23/18 2331     Blood Culture No growth at 5 days    Wound Culture - Wound, Foot, Right [[id number]]  (Abnormal)  (Susceptibility) Collected:  02/16/18 1931    Lab Status:  Final result Specimen:  Wound from Foot, Right Updated:  02/23/18 1045     Wound Culture --      Light growth (2+) Pseudomonas aeruginosa (A)      Scant growth (1+) Escherichia coli (A)      Scant growth (1+) Proteus mirabilis (A)      Scant growth (1+) Enterococcus faecalis (A)      Scant growth (1+) Streptococcus, Beta Hemolytic, Group G (A)        If Clindamycin or Erythromycin is the drug of choice, notify the laboratory within 7 days to request susceptibility testing.        STREP GROUPING G     Gram Stain Result Few (2+) WBCs seen      Many (4+) Gram negative bacilli      Many (4+) Gram positive bacilli      Many (4+) Gram positive cocci in pairs and clusters    Susceptibility      Pseudomonas aeruginosa     FAVIAN     Aztreonam <=8 ug/ml Susceptible     Cefepime <=8 ug/ml Susceptible     Ceftazidime 4 ug/ml Susceptible     Gentamicin <=4 ug/ml Susceptible     Levofloxacin <=2 ug/ml Susceptible     Meropenem <=1 ug/ml Susceptible     Piperacillin + Tazobactam <=16 ug/ml Susceptible     Tobramycin <=4 ug/ml Susceptible                 Susceptibility      Escherichia coli     FAVIAN     Ampicillin <=8 ug/ml Susceptible     Ampicillin + Sulbactam <=8/4 ug/ml Susceptible     Aztreonam <=8 ug/ml Susceptible     Cefepime <=8 ug/ml Susceptible     Cefotaxime <=2 ug/ml Susceptible     Ceftriaxone <=8 ug/ml Susceptible     Cefuroxime sodium <=4 ug/ml Susceptible     Ertapenem <=1 ug/ml Susceptible     Gentamicin <=4 ug/ml Susceptible     Levofloxacin <=2 ug/ml Susceptible     Meropenem <=1 ug/ml Susceptible     Piperacillin + Tazobactam <=16 ug/ml Susceptible     Tetracycline <=4 ug/ml Susceptible     Tobramycin <=4 ug/ml Susceptible     Trimethoprim + Sulfamethoxazole <=2/38 ug/ml Susceptible                Susceptibility      Proteus mirabilis     FAVIAN     Ampicillin >16 ug/ml Resistant     Ampicillin + Sulbactam >16/8 ug/ml Resistant     Aztreonam <=8 ug/ml Susceptible     Cefepime <=8 ug/ml Susceptible     Cefotaxime <=2 ug/ml Susceptible     Ceftriaxone <=8 ug/ml Susceptible     Cefuroxime sodium <=4 ug/ml Susceptible     Ertapenem <=1 ug/ml Susceptible     Gentamicin <=4 ug/ml Susceptible     Levofloxacin <=2 ug/ml Susceptible     Meropenem <=1 ug/ml Susceptible     Piperacillin + Tazobactam <=16 ug/ml Susceptible     Tetracycline >8 ug/ml Resistant     Tobramycin <=4 ug/ml Susceptible     Trimethoprim + Sulfamethoxazole >2/38 ug/ml Resistant                Susceptibility      Enterococcus faecalis     FAVIAN     Ampicillin <=2 ug/ml Susceptible     Gentamicin High Level Synergy <=500 ug/ml Susceptible     Linezolid 2 ug/ml Susceptible     Penicillin G 2 ug/ml Susceptible     Streptomycin High Level Synergy <=1000 ug/ml Susceptible     Vancomycin 1 ug/ml Susceptible                    Blood Culture - Blood, Blood, Venous Line [[id number]]  (Normal) Collected:  02/16/18 2010    Lab Status:  Final result Specimen:  Blood from Arm, Left Updated:  02/21/18 2046     Blood Culture No growth at 5 days    Blood Culture - Blood, Blood, Venous Line  [[id number]]  (Normal) Collected:  02/16/18 2005    Lab Status:  Final result Specimen:  Blood from Arm, Right Updated:  02/21/18 2046     Blood Culture No growth at 5 days    Respiratory Culture - Sputum, Cough [[id number]] Collected:  02/19/18 0656    Lab Status:  Final result Specimen:  Sputum from Cough Updated:  02/21/18 0931     Respiratory Culture --      Scant growth (1+) Normal Respiratory Nataliia     Gram Stain Result Occasional WBCs per low power field      No organisms seen          I have reviewed the medications.    Assessment/Plan   Assessment / Plan     Hospital Problem List     * (Principal)Cellulitis of both lower extremities    RAFAEL (acute kidney injury)    PVD (peripheral vascular disease)    COPD (chronic obstructive pulmonary disease)    Essential hypertension    Hypoxia    Bilateral cellulitis of lower leg           Brief Hospital Course to date:  Yassine Love is a 72 y.o. male  with a past medical history significant for PVD, essential hypertension, COPD, cellulitis of lower extremities, and tobacco abuse who presents to the ED with complaints of fevers, chills and bilateral lower extremity pain/edema/redness.    Assessment & Plan:    Acute hypoxemic respiratory failure -   - secondary to COPD exacerbation, diastolic heart failure  -- need to r/o PE , could also be flash pulm edema or copd flare.   -- chec CT-A  - Continue daily Bumex, hold for SBP less than 100  - Daily fluid restriction to 1500cc (which patient is non complaint with)  - treating COPD as below  - check for flu    COPD exacerbation: Symbicort, Duonebs, completed 5 day course of prednisone, stable at this time    Acute on chronic diastolic CHF: ECHO 06/2017 EF normal    LE cellulitis/ Chronic LE wounds: switched to minocycline through 3/10, ID on board. Continue PO morphine at home dose PRN - OLIVA reviewed. Decreased gabapentin back to prior dose, increased dose made him drowsy. Continue PT WOC for bilateral LE  wrapping.    HTN: Holding BP meds for normotension    RAFAEL: Baseline Cr 0.9-1.3. Stable    Anemia: mixed anemia, Iron low, will start oral Iron, check stool for occult, monitor H&H, no signs of overt bleeding    DVT Prophylaxis:  [facility]    CODE STATUS: Conditional Code    Disposition: He is refusing rehab. He can go home tomorrow if better??    Yumiko Castanon MD  03/04/18  1:06 PM

## 2018-03-04 NOTE — THERAPY PROGRESS REPORT/RE-CERT
Acute Care - Occupational Therapy Progress Note  [facility]     Patient Name: Yassine Love  : 1945  MRN: [id number]  Today's Date: 3/4/2018  Onset of Illness/Injury or Date of Surgery Date: 18  Date of Referral to OT: 18  Referring Physician: Dr. Watkins      Admit Date: 2018    Visit Dx:     ICD-10-CM ICD-9-CM   1. Bilateral cellulitis of lower leg L03.116 682.6    L03.115    2. RAFAEL (acute kidney injury) N17.9 584.9   3. PVD (peripheral vascular disease) I73.9 443.9   4. Impaired mobility and ADLs Z74.09 799.89   5. Impaired functional mobility, balance, gait, and endurance Z74.09 V49.89   6. Cellulitis of both lower extremities L03.115 682.6    L03.116    7. Chronic obstructive pulmonary disease, unspecified COPD type J44.9 496   8. Essential hypertension I10 401.9   9. Hypoxia R09.02 799.02   10. Cellulitis of right lower extremity L03.115 682.6     Patient Active Problem List   Diagnosis   • Cellulitis of right lower extremity   • RAFAEL (acute kidney injury)   • PVD (peripheral vascular disease)   • Cellulitis of both lower extremities   • COPD (chronic obstructive pulmonary disease)   • Essential hypertension   • Hypoxia   • Bilateral cellulitis of lower leg             Adult Rehabilitation Note       18 1403 18 1053 18 0800    Rehab Assessment/Intervention    Discipline occupational therapist  -TA physical therapy assistant  -AS physical therapist  -    Document Type progress note;therapy note (daily note)  -TA therapy note (daily note)  -AS therapy note (daily note)  -    Subjective Information agree to therapy;no complaints  -TA agree to therapy;complains of;pain;weakness  -AS agree to therapy;complains of;weakness;fatigue;pain  -    Patient Effort, Rehab Treatment good  -TA good  -AS     Symptoms Noted During/After Treatment shortness of breath  -TA fatigue;increased pain  -AS     Precautions/Limitations fall precautions;oxygen therapy device and L/min;other  (see comments)   BLE wraps  -TA fall precautions;other (see comments)   bilateral LE edema and leg wraps  -AS     Recorded by [TA] Nehemias Aponte OT [AS] Yasmine Fuller PTA [MF] Carlos Hassan, PT    Vital Signs    Pre SpO2 (%) 97  -TA      O2 Delivery Pre Treatment supplemental O2  -TA      Intra SpO2 (%) 88  -TA      O2 Delivery Intra Treatment supplemental O2  -TA      Post SpO2 (%) 94  -TA      O2 Delivery Post Treatment supplemental O2  -TA      Pre Patient Position Supine  -TA      Intra Patient Position Standing  -TA      Post Patient Position Sitting  -TA      Recorded by [TA] Nehemias Aponte OT      Pain Assessment    Pain Assessment Rojas-Fontenot FACES  -TA Rojas-Baker FACES  -AS Rojas-Baker FACES  -MF    Rojas-Baker FACES Pain Rating 2  -TA 2  -AS 6  -MF    Pain Score 5  -TA      Post Pain Score 5  -TA      Pain Type Chronic pain  -TA Chronic pain  -AS Chronic pain  -MF    Pain Location Leg  -TA Leg  -AS Leg  -MF    Pain Orientation Left;Right  -TA Right;Left  -AS Right;Left  -MF    Pain Intervention(s) Repositioned;Ambulation/increased activity  -TA Repositioned;Ambulation/increased activity  -AS Repositioned  -MF    Response to Interventions tolerated  -TA tolerated ambulation in hallway and up to chair  -AS     Recorded by [TA] Nehemias Aponte OT [AS] Yasmine Fuller PTA [MF] Carlos Hassan, PT    Cognitive Assessment/Intervention    Current Cognitive/Communication Assessment functional  -TA functional  -AS     Orientation Status oriented to;person;place;situation  -TA oriented to;person;place  -AS     Follows Commands/Answers Questions 100% of the time;able to follow single-step instructions  -% of the time  -AS     Personal Safety mild impairment  -TA mild impairment  -AS     Personal Safety Interventions fall prevention program maintained;gait belt;nonskid shoes/slippers when out of bed;supervised activity  -TA fall prevention program maintained;gait belt;nonskid  shoes/slippers when out of bed;other (see comments)   exit alarm  -AS     Recorded by [TA] Nehemias Aponte OT [AS] Yasmine Fuller PTA     Bed Mobility, Assessment/Treatment    Bed Mobility, Assistive Device bed rails  -TA bed rails;head of bed elevated  -AS     Bed Mobility, Scoot/Bridge, [place] supervision required  -TA      Bed Mob, Supine to Sit, [place] supervision required  -TA supervision required  -AS     Bed Mob, Sit to Supine, [place] not tested  -TA not tested  -AS     Bed Mobility, Impairments strength decreased;pain  -TA      Bed Mobility, Comment  patient demonstrated good technique  -AS     Recorded by [TA] Nehemias Aponte OT [AS] Yasmine Fuller PTA     Transfer Assessment/Treatment    Transfers, Sit-Stand [place] contact guard assist;verbal cues required  -TA verbal cues required;contact guard assist  -AS     Transfers, Stand-Sit [place] contact guard assist;verbal cues required  -TA verbal cues required;contact guard assist  -AS     Transfers, Sit-Stand-Sit, Assist Device rolling walker  -TA rolling walker  -AS     Transfer, Safety Issues step length decreased;weight-shifting ability decreased  -TA      Transfer, Impairments strength decreased;impaired balance  -TA strength decreased  -AS     Transfer, Comment VCs for safe technique  -TA verbal cues for hand placement  -AS     Recorded by [TA] Nehemias Aponte OT [AS] Yasmine Fuller PTA     Gait Assessment/Treatment    Gait, [place] Level  verbal cues required;minimum assist (75% patient effort)  -AS     Gait, Assistive Device  rolling walker  -AS     Gait, Distance (Feet)  80  -AS     Gait, Gait Deviations  vidhi decreased;forward flexed posture;step length decreased  -AS     Gait, Safety Issues  step length decreased;supplemental O2  -AS     Gait, Impairments  strength decreased  -AS     Gait, Comment  verbal cues for posture and to look forward rather than down at floor  -AS     Recorded  by  [AS] Yasmine Fuller PTA     Functional Mobility    Functional Mobility- Ind. Level contact guard assist;verbal cues required  -TA      Functional Mobility- Device rolling walker  -TA      Functional Mobility-Distance (Feet) 5  -TA      Functional Mobility- Safety Issues step length decreased;weight-shifting ability decreased;supplemental O2  -TA      Functional Mobility- Comment VCs for adaptive breathing  -TA      Recorded by [TA] Nehemias Aponte OT      Toileting Assessment/Training    Toileting Assess/Train, Assistive Device urinal  -TA      Toileting Assess/Train, Position sitting;edge of bed  -TA      Toileting Assess/Train, Indepen Level supervision required;set up required  -TA      Recorded by [TA] Nehemias Aponte OT      Motor Skills/Interventions    Additional Documentation Balance Skills Training (Group)  -TA      Recorded by [TA] Nehemias Aponte OT      Balance Skills Training    Sitting-Level of Assistance Distant supervision  -TA      Sitting-Balance Support Feet supported  -TA      Standing-Level of Assistance Contact guard  -TA      Static Standing Balance Support assistive device  -TA      Standing-Balance Activities Weight Shift A-P;Weight Shift R-L;Reaching for objects  -TA      Recorded by [TA] Nehemias Aponte OT      Therapy Exercises    Bilateral Lower Extremities  AROM:;10 reps;sitting;ankle pumps/circles;hip flexion;LAQ  -AS     Recorded by  [AS] Yasmine Fuller PTA     Positioning and Restraints    Pre-Treatment Position in bed  -TA in bed  -AS in bed  -MF    Post Treatment Position chair  -TA chair  -AS bed  -MF    In Bed   supine;call light within reach  -MF    In Chair reclined;call light within reach;encouraged to call for assist;exit alarm on;waffle cushion;legs elevated  -TA reclined;call light within reach;encouraged to call for assist;exit alarm on  -AS     Recorded by [TA] Nehemias Aponte OT [AS] Yasmine Fuller PTA [MF] Carlos Hassan, PT       User Key  (r) = Recorded By, (t) = Taken By, (c) = Cosigned By    Initials Name Effective Dates    MF Carlos JESUS Hassan, PT 06/19/15 -     AS Yasmine Fuller, PTA 06/22/15 -     TA Nehemias Aponte, OT 03/14/16 -                 OT Goals       03/04/18 1441 02/28/18 1100 02/23/18 1223    Transfer Training OT LTG    Transfer Training OT LTG, Outcome goal ongoing   CGA this date for STS from EOB  -TA goal ongoing   CGA level, close to meeting goal  -ROSEANNE goal ongoing  -AR    Patient Education OT LTG    Patient Education OT LTG Outcome goal ongoing   Progressing with adaptive breathing  -TA goal ongoing   good progress with AE and AB  -ROSEANNE goal ongoing  -AR    LB Dressing OT LTG    LB Dressing Goal OT LTG, Outcome goal partially met  -TA goal partially met   met donning and doffing socks.  -ROSEANNE goal ongoing  -AR    Endurance OT LTG    Endurance Goal OT LTG, Outcome goal ongoing   88% with toileting, fxl transfers  -TA goal ongoing   89 % with dressing task, dropped toileting due 02 off  -ROSEANNE goal ongoing  -AR      02/22/18 1729          Transfer Training OT LTG    Transfer Training OT LTG, Date Established 02/22/18  -AR      Transfer Training OT LTG, Time to Achieve 1 wk  -AR      Transfer Training OT LTG, Activity Type toilet;sit to stand/stand to sit  -AR      Transfer Training OT LTG, [place] Level verbal cues required;supervision required  -AR      Transfer Training OT LTG, Assist Device commode, bedside;walker, rolling  -AR      Patient Education OT LTG    Patient Education OT LTG, Date Established 02/22/18  -AR      Patient Education OT LTG, Time to Achieve 1 wk  -AR      Patient Education OT LTG, Education Type adaptive equipment mgmt;energy conservation;work simplification;adaptive breathing  -AR      Patient Education OT LTG, Education Understanding demonstrates adequately;verbalizes understanding  -AR      LB Dressing OT LTG    LB Dressing Goal OT LTG, Date Established 02/22/18  -AR      LB Dressing  Goal OT LTG, Time to Achieve 1 wk  -AR      LB Dressing Goal OT LTG, Activity Type Pt will complete LB dressing task   -AR      LB Dressing Goal OT LTG, [place] Level verbal cues required;moderate assist (50% patient effort)  -AR      LB Dressing Goal OT LTG, Adaptive Equipment sock-aid;reacher  -AR      Endurance OT LTG    Endurance Goal OT LTG, Date Established 02/22/18  -AR      Endurance Goal OT LTG, Time to Achieve 1 wk  -AR      Endurance Goal OT LTG, Additional Goal Pt will complete 5 minurtes ADL activity while maintaining oxygen saturation 90% or above with min cues for PLB and EC/WS  -AR        User Key  (r) = Recorded By, (t) = Taken By, (c) = Cosigned By    Initials Name Provider Type    ROSEANNE Gagnon, OT Occupational Therapist    RUPERTO Kumar, OT Occupational Therapist    MONIQUE Aponte, OT Occupational Therapist          Occupational Therapy Education     Title: PT OT SLP Therapies (Active)     Topic: Occupational Therapy (Done)     Point: ADL training (Done)    Description: Instruct learner(s) on proper safety adaptation and remediation techniques during self care or transfers.   Instruct in proper use of assistive devices.    Learning Progress Summary    Learner Readiness Method Response Comment Documented by Status   Patient Acceptance E,FRANSICO ISAAC,DU,NR AE for increased LBD indep.  need to keep 02 on at all times.  adaptive breathing. ROSEANNE 02/28/18 1059 Done    LINNEA Hung D VU,NR bed mobility, transfer training, BUE HEP, ADL retraining, PLB AR 02/23/18 1222 Done    LINNEA Hung VU,NR role of OT, goals of care, ADL retraining, bed mobility, transfer training AR 02/22/18 1728 Done               Point: Home exercise program (Done)    Description: Instruct learner(s) on appropriate technique for monitoring, assisting and/or progressing therapeutic exercises/activities.    Learning Progress Summary    Learner Readiness Method Response Comment Documented by Status   Patient Acceptance DUANE,D  VU,NR Education re adaptive breathing with exertion to assist with mgt of SOA; reinforced need for call for assist with OOB activities. TA 03/04/18 1440 Done    Eager ETB VU,NR role of OT, goals of care, ADL retraining, bed mobility, transfer training AR 02/22/18 1728 Done               Point: Precautions (Done)    Description: Instruct learner(s) on prescribed precautions during self-care and functional transfers.    Learning Progress Summary    Learner Readiness Method Response Comment Documented by Status   Patient Acceptance E,D VU,NR Education re adaptive breathing with exertion to assist with mgt of SOA; reinforced need for call for assist with OOB activities. TA 03/04/18 1440 Done    Acceptance E,D VU,DU,NR AE for increased LBD indep.  need to keep 02 on at all times.  adaptive breathing. ROSEANNE 02/28/18 1059 Done    Eager E,TB,D VU,NR bed mobility, transfer training, BUE HEP, ADL retraining, PLB AR 02/23/18 1222 Done    Eagjose rafael ETB VU,NR role of OT, goals of care, ADL retraining, bed mobility, transfer training AR 02/22/18 1728 Done               Point: Body mechanics (Done)    Description: Instruct learner(s) on proper positioning and spine alignment during self-care, functional mobility activities and/or exercises.    Learning Progress Summary    Learner Readiness Method Response Comment Documented by Status   Patient Eager E,TB,D VU,NR bed mobility, transfer training, BUE HEP, ADL retraining, PLB AR 02/23/18 1222 Done    LINNEA Hung VU,NR role of OT, goals of care, ADL retraining, bed mobility, transfer training AR 02/22/18 1728 Done                      User Key     Initials Effective Dates Name Provider Type Discipline    ROSEANNE 06/22/15 -  Marichuy Gagnon, OT Occupational Therapist OT    AR 06/22/15 -  Merline Kumar, OT Occupational Therapist OT    TA 03/14/16 -  Nehemias Aponte, OT Occupational Therapist OT                  OT Recommendation and Plan  Anticipated Equipment Needs At Discharge:   (defer-recommend SNF-level rehab)  Anticipated Discharge Disposition: skilled nursing facility  Therapy Frequency: daily (per priority policy)  Plan of Care Review  Plan Of Care Reviewed With: patient  Progress: improving  Outcome Summary/Follow up Plan: Pt progressing with fxl activity tolerance; desat to 88% with toileting, fxl transfers but recovers into 90s with VCs for adaptive breathing in less than 1 minute. CGA STS and CGA for ambulation to chair with RW.. Continue per OT POC.        Outcome Measures       03/04/18 1403 03/02/18 1053       How much help from another person do you currently need...    Turning from your back to your side while in flat bed without using bedrails?  4  -AS     Moving from lying on back to sitting on the side of a flat bed without bedrails?  3  -AS     Moving to and from a bed to a chair (including a wheelchair)?  3  -AS     Standing up from a chair using your arms (e.g., wheelchair, bedside chair)?  3  -AS     Climbing 3-5 steps with a railing?  2  -AS     To walk in hospital room?  3  -AS     AM-PAC 6 Clicks Score  18  -AS     How much help from another is currently needed...    Putting on and taking off regular lower body clothing? 2  -TA      Bathing (including washing, rinsing, and drying) 2  -TA      Toileting (which includes using toilet bed pan or urinal) 3  -TA      Putting on and taking off regular upper body clothing 3  -TA      Taking care of personal grooming (such as brushing teeth) 3  -TA      Eating meals 4  -TA      Score 17  -TA      Functional Assessment    Outcome Measure Options AM-PAC 6 Clicks Daily Activity (OT)  -TA AM-PAC 6 Clicks Basic Mobility (PT)  -AS       User Key  (r) = Recorded By, (t) = Taken By, (c) = Cosigned By    Initials Name Provider Type    AS Yasmine Fuller PTA Physical Therapy Assistant    MONIQUE Aponte OT Occupational Therapist           Time Calculation:         Time Calculation- OT       03/04/18 1446          Time  Calculation- OT    OT Start Time 1403   ttc 28 minutes  -TA      Total Timed Code Minutes- OT 28 minute(s)  -TA      OT Received On 03/04/18  -TA      OT Goal Re-Cert Due Date 03/14/18  -TA        User Key  (r) = Recorded By, (t) = Taken By, (c) = Cosigned By    Initials Name Provider Type    TA Nehemias Aponte OT Occupational Therapist           Therapy Charges for Today     Code Description Service Date Service Provider Modifiers Qty    [id number]  OT THERAPEUTIC ACT EA 15 MIN 3/4/2018 Nehemias Aponte OT GO 2               Nehemias Aponte OT  3/4/2018

## 2018-03-04 NOTE — PLAN OF CARE
Problem: Patient Care Overview (Adult)  Goal: Plan of Care Review  Outcome: Ongoing (interventions implemented as appropriate)   03/04/18 1441   Coping/Psychosocial Response Interventions   Plan Of Care Reviewed With patient   Patient Care Overview   Progress improving   Outcome Evaluation   Outcome Summary/Follow up Plan Pt progressing with fxl activity tolerance; desat to 88% with toileting, fxl transfers but recovers into 90s with VCs for adaptive breathing in less than 1 minute. CGA STS and CGA for ambulation to chair with RW.. Continue per OT POC.       Problem: Inpatient Occupational Therapy  Goal: Transfer Training Goal 1 LTG- OT  Outcome: Ongoing (interventions implemented as appropriate)   02/22/18 1729 03/04/18 1441   Transfer Training OT LTG   Transfer Training OT LTG, Date Established 02/22/18 --    Transfer Training OT LTG, Time to Achieve 1 wk --    Transfer Training OT LTG, Activity Type toilet;sit to stand/stand to sit --    Transfer Training OT LTG, [place] Level verbal cues required;supervision required --    Transfer Training OT LTG, Assist Device commode, bedside;walker, rolling --    Transfer Training OT LTG, Outcome --  goal ongoing  (CGA this date for STS from EOB)     Goal: Patient Education Goal LTG- OT  Outcome: Ongoing (interventions implemented as appropriate)   02/22/18 1729 03/04/18 1441   Patient Education OT LTG   Patient Education OT LTG, Date Established 02/22/18 --    Patient Education OT LTG, Time to Achieve 1 wk --    Patient Education OT LTG, Education Type adaptive equipment mgmt;energy conservation;work simplification;adaptive breathing --    Patient Education OT LTG, Education Understanding demonstrates adequately;verbalizes understanding --    Patient Education OT LTG Outcome --  goal ongoing  (Progressing with adaptive breathing)     Goal: LB Dressing Goal LTG- OT  Outcome: Ongoing (interventions implemented as appropriate)   02/22/18 1729 03/04/18 1441   LB Dressing  OT LTG   LB Dressing Goal OT LTG, Date Established 02/22/18 --    LB Dressing Goal OT LTG, Time to Achieve 1 wk --    LB Dressing Goal OT LTG, Activity Type Pt will complete LB dressing task  --    LB Dressing Goal OT LTG, [place] Level verbal cues required;moderate assist (50% patient effort) --    LB Dressing Goal OT LTG, Adaptive Equipment sock-aid;reacher --    LB Dressing Goal OT LTG, Outcome --  goal partially met     Goal: Endurance Goal LTG- OT  Outcome: Ongoing (interventions implemented as appropriate)   02/22/18 1729 03/04/18 1441   Endurance OT LTG   Endurance Goal OT LTG, Date Established 02/22/18 --    Endurance Goal OT LTG, Time to Achieve 1 wk --    Endurance Goal OT LTG, Additional Goal Pt will complete 5 minurtes ADL activity while maintaining oxygen saturation 90% or above with min cues for PLB and EC/WS --    Endurance Goal OT LTG, Outcome --  goal ongoing  (88% with toileting, fxl transfers)

## 2018-03-05 LAB
ANION GAP SERPL CALCULATED.3IONS-SCNC: 3 MMOL/L (ref 3–11)
BUN BLD-MCNC: 22 MG/DL (ref 9–23)
BUN/CREAT SERPL: 18.3 (ref 7–25)
CALCIUM SPEC-SCNC: 8.5 MG/DL (ref 8.7–10.4)
CHLORIDE SERPL-SCNC: 102 MMOL/L (ref 99–109)
CO2 SERPL-SCNC: 32 MMOL/L (ref 20–31)
CREAT BLD-MCNC: 1.2 MG/DL (ref 0.6–1.3)
DEPRECATED RDW RBC AUTO: 64.2 FL (ref 37–54)
ERYTHROCYTE [DISTWIDTH] IN BLOOD BY AUTOMATED COUNT: 17.1 % (ref 11.3–14.5)
GFR SERPL CREATININE-BSD FRML MDRD: 59 ML/MIN/1.73
GLUCOSE BLD-MCNC: 104 MG/DL (ref 70–100)
HCT VFR BLD AUTO: 28.5 % (ref 38.9–50.9)
HGB BLD-MCNC: 9 G/DL (ref 13.1–17.5)
MCH RBC QN AUTO: 32.5 PG (ref 27–31)
MCHC RBC AUTO-ENTMCNC: 31.6 G/DL (ref 32–36)
MCV RBC AUTO: 102.9 FL (ref 80–99)
PLATELET # BLD AUTO: 334 10*3/MM3 (ref 150–450)
PMV BLD AUTO: 9.6 FL (ref 6–12)
POTASSIUM BLD-SCNC: 4.2 MMOL/L (ref 3.5–5.5)
RBC # BLD AUTO: 2.77 10*6/MM3 (ref 4.2–5.76)
SODIUM BLD-SCNC: 137 MMOL/L (ref 132–146)
WBC NRBC COR # BLD: 9.11 10*3/MM3 (ref 3.5–10.8)

## 2018-03-05 PROCEDURE — 25010000002 HEPARIN (PORCINE) PER 1000 UNITS: Performed by: INTERNAL MEDICINE

## 2018-03-05 PROCEDURE — 99233 SBSQ HOSP IP/OBS HIGH 50: CPT | Performed by: INTERNAL MEDICINE

## 2018-03-05 PROCEDURE — 25010000002 METHYLPREDNISOLONE PER 40 MG: Performed by: INTERNAL MEDICINE

## 2018-03-05 PROCEDURE — 85027 COMPLETE CBC AUTOMATED: CPT | Performed by: INTERNAL MEDICINE

## 2018-03-05 PROCEDURE — 97110 THERAPEUTIC EXERCISES: CPT

## 2018-03-05 PROCEDURE — 94799 UNLISTED PULMONARY SVC/PX: CPT

## 2018-03-05 PROCEDURE — 97602 WOUND(S) CARE NON-SELECTIVE: CPT

## 2018-03-05 PROCEDURE — 94760 N-INVAS EAR/PLS OXIMETRY 1: CPT

## 2018-03-05 PROCEDURE — 80048 BASIC METABOLIC PNL TOTAL CA: CPT | Performed by: INTERNAL MEDICINE

## 2018-03-05 PROCEDURE — 29580 STRAPPING UNNA BOOT: CPT

## 2018-03-05 PROCEDURE — 94640 AIRWAY INHALATION TREATMENT: CPT

## 2018-03-05 RX ORDER — METHYLPREDNISOLONE SODIUM SUCCINATE 40 MG/ML
40 INJECTION, POWDER, LYOPHILIZED, FOR SOLUTION INTRAMUSCULAR; INTRAVENOUS EVERY 12 HOURS
Status: DISCONTINUED | OUTPATIENT
Start: 2018-03-05 | End: 2018-03-06

## 2018-03-05 RX ADMIN — DICLOFENAC SODIUM 2 G: 10 GEL TOPICAL at 08:44

## 2018-03-05 RX ADMIN — FAMOTIDINE 20 MG: 20 TABLET, FILM COATED ORAL at 21:42

## 2018-03-05 RX ADMIN — METHYLPREDNISOLONE SODIUM SUCCINATE 40 MG: 40 INJECTION, POWDER, FOR SOLUTION INTRAMUSCULAR; INTRAVENOUS at 16:19

## 2018-03-05 RX ADMIN — BUMETANIDE 1 MG: 1 TABLET ORAL at 08:44

## 2018-03-05 RX ADMIN — DICLOFENAC SODIUM 2 G: 10 GEL TOPICAL at 17:03

## 2018-03-05 RX ADMIN — IPRATROPIUM BROMIDE AND ALBUTEROL SULFATE 3 ML: 2.5; .5 SOLUTION RESPIRATORY (INHALATION) at 16:24

## 2018-03-05 RX ADMIN — HYDROCODONE BITARTRATE AND ACETAMINOPHEN 1 TABLET: 7.5; 325 TABLET ORAL at 16:19

## 2018-03-05 RX ADMIN — MORPHINE SULFATE 30 MG: 30 TABLET ORAL at 21:42

## 2018-03-05 RX ADMIN — HEPARIN SODIUM 5000 UNITS: 5000 INJECTION, SOLUTION INTRAVENOUS; SUBCUTANEOUS at 05:41

## 2018-03-05 RX ADMIN — BUDESONIDE AND FORMOTEROL FUMARATE DIHYDRATE 2 PUFF: 80; 4.5 AEROSOL RESPIRATORY (INHALATION) at 08:07

## 2018-03-05 RX ADMIN — CYANOCOBALAMIN TAB 1000 MCG 1000 MCG: 1000 TAB at 08:44

## 2018-03-05 RX ADMIN — ACETAMINOPHEN 650 MG: 325 TABLET ORAL at 14:34

## 2018-03-05 RX ADMIN — BUDESONIDE AND FORMOTEROL FUMARATE DIHYDRATE 2 PUFF: 80; 4.5 AEROSOL RESPIRATORY (INHALATION) at 19:21

## 2018-03-05 RX ADMIN — IPRATROPIUM BROMIDE AND ALBUTEROL SULFATE 3 ML: 2.5; .5 SOLUTION RESPIRATORY (INHALATION) at 08:07

## 2018-03-05 RX ADMIN — CASTOR OIL AND BALSAM, PERU: 788; 87 OINTMENT TOPICAL at 08:44

## 2018-03-05 RX ADMIN — IPRATROPIUM BROMIDE AND ALBUTEROL SULFATE 3 ML: 2.5; .5 SOLUTION RESPIRATORY (INHALATION) at 19:21

## 2018-03-05 RX ADMIN — MINOCYCLINE HYDROCHLORIDE 100 MG: 50 CAPSULE ORAL at 05:41

## 2018-03-05 RX ADMIN — GABAPENTIN 200 MG: 100 CAPSULE ORAL at 05:41

## 2018-03-05 RX ADMIN — DOCUSATE SODIUM 200 MG: 100 CAPSULE, LIQUID FILLED ORAL at 08:44

## 2018-03-05 RX ADMIN — ACETAMINOPHEN 650 MG: 325 TABLET ORAL at 08:48

## 2018-03-05 RX ADMIN — DICLOFENAC SODIUM 2 G: 10 GEL TOPICAL at 14:27

## 2018-03-05 RX ADMIN — CASTOR OIL AND BALSAM, PERU: 788; 87 OINTMENT TOPICAL at 21:00

## 2018-03-05 RX ADMIN — GABAPENTIN 200 MG: 100 CAPSULE ORAL at 21:42

## 2018-03-05 RX ADMIN — MINOCYCLINE HYDROCHLORIDE 100 MG: 50 CAPSULE ORAL at 17:02

## 2018-03-05 RX ADMIN — FAMOTIDINE 20 MG: 20 TABLET, FILM COATED ORAL at 08:44

## 2018-03-05 RX ADMIN — IPRATROPIUM BROMIDE AND ALBUTEROL SULFATE 3 ML: 2.5; .5 SOLUTION RESPIRATORY (INHALATION) at 12:12

## 2018-03-05 RX ADMIN — HEPARIN SODIUM 5000 UNITS: 5000 INJECTION, SOLUTION INTRAVENOUS; SUBCUTANEOUS at 21:42

## 2018-03-05 RX ADMIN — NICOTINE 1 PATCH: 7 PATCH, EXTENDED RELEASE TRANSDERMAL at 21:42

## 2018-03-05 RX ADMIN — GABAPENTIN 200 MG: 100 CAPSULE ORAL at 14:27

## 2018-03-05 NOTE — PROGRESS NOTES
"    [facility] Medicine Services  PROGRESS NOTE    Patient Name: Yassine Love  : 1945  MRN: [id number]    Date of Admission: 2018  Length of Stay: 17  Primary Care Physician: No Known Provider    Subjective   Subjective     CC:  F/U LE pain    HPI:  Had another 'spell' of severe dyspnea while walking to [place] - \"thought I might die\".   Takes a long time to recover after getting back to bed  When [place] oxygen slips off, sats plunge to 70s.      Quit tob when he was admitted - jairo patch helping.     Review of Systems   HENT: Positive for trouble swallowing.      Gen-no fevers, no chills  CV-no palpitations  GI-no N/V/D, no abd pain      Otherwise ROS is negative except as mentioned in the HPI.    Objective   Objective     Vital Signs:   Temp:  [97.3 °F (36.3 °C)-98.8 °F (37.1 °C)] 97.8 °F (36.6 °C)  Heart Rate:  [83-97] 94  Resp:  [16-24] 16  BP: (104-116)/(62-66) 116/66        Physical Exam:  Gen- asleep, hard to wake but wakes to full alertness.  Very pleasanat.  No acute distress at rest on oxygen    CV-RRR, S1 S2 normal, no m/r/g  Resp-decreased bilaterally throughout, nl WOB, few rhonchi today, rare wheeze  Abd-soft, NT, ND, +BS  Ext-BLE edema, both legs wrapped  Neuro-A&Ox3, no focal deficits  Psych-appropriate mood  musc-skel - neck thrust forward, hard time lifting head      Results Reviewed:  I have personally reviewed current lab, radiology, and data and agree.      Results from last 7 days  Lab Units 18  0605 18  0136 18  0501   WBC 10*3/mm3 9.11 9.89 10.47   HEMOGLOBIN g/dL 9.0* 9.3*  9.3* 9.3*   HEMATOCRIT % 28.5* 30.0*  30.1* 30.0*   PLATELETS 10*3/mm3 334 326 364       Results from last 7 days  Lab Units 18  0605 18  0501 18  0451   SODIUM mmol/L 137 138 136   POTASSIUM mmol/L 4.2 4.1 4.0   CHLORIDE mmol/L 102 99 96*   CO2 mmol/L 32.0* 35.0* 34.0*   BUN mg/dL 22 36* 42*   CREATININE mg/dL 1.20 1.20 1.30   GLUCOSE mg/dL 104* 105* 97 "   CALCIUM mg/dL 8.5* 8.3* 8.4*           Microbiology Results Abnormal     Procedure Component Value - Date/Time    Influenza A & B, RT PCR - Swab, Nasopharynx [[id number]]  (Normal) Collected:  03/03/18 1356    Lab Status:  Final result Specimen:  Swab from Nasopharynx Updated:  03/03/18 1607     Influenza A PCR Not Detected     Influenza B PCR Not Detected    Blood Culture - Blood, [[id number]]  (Normal) Collected:  02/18/18 2308    Lab Status:  Final result Specimen:  Blood from Arm, Left Updated:  02/23/18 2331     Blood Culture No growth at 5 days    Blood Culture - Blood, [[id number]]  (Normal) Collected:  02/18/18 2311    Lab Status:  Final result Specimen:  Blood from Arm, Left Updated:  02/23/18 2331     Blood Culture No growth at 5 days    Wound Culture - Wound, Foot, Right [[id number]]  (Abnormal)  (Susceptibility) Collected:  02/16/18 1931    Lab Status:  Final result Specimen:  Wound from Foot, Right Updated:  02/23/18 1045     Wound Culture --      Light growth (2+) Pseudomonas aeruginosa (A)      Scant growth (1+) Escherichia coli (A)      Scant growth (1+) Proteus mirabilis (A)      Scant growth (1+) Enterococcus faecalis (A)      Scant growth (1+) Streptococcus, Beta Hemolytic, Group G (A)        If Clindamycin or Erythromycin is the drug of choice, notify the laboratory within 7 days to request susceptibility testing.        STREP GROUPING G     Gram Stain Result Few (2+) WBCs seen      Many (4+) Gram negative bacilli      Many (4+) Gram positive bacilli      Many (4+) Gram positive cocci in pairs and clusters    Susceptibility      Pseudomonas aeruginosa     FAVIAN     Aztreonam <=8 ug/ml Susceptible     Cefepime <=8 ug/ml Susceptible     Ceftazidime 4 ug/ml Susceptible     Gentamicin <=4 ug/ml Susceptible     Levofloxacin <=2 ug/ml Susceptible     Meropenem <=1 ug/ml Susceptible     Piperacillin + Tazobactam <=16 ug/ml Susceptible     Tobramycin <=4 ug/ml Susceptible                Susceptibility       Escherichia coli     FAVIAN     Ampicillin <=8 ug/ml Susceptible     Ampicillin + Sulbactam <=8/4 ug/ml Susceptible     Aztreonam <=8 ug/ml Susceptible     Cefepime <=8 ug/ml Susceptible     Cefotaxime <=2 ug/ml Susceptible     Ceftriaxone <=8 ug/ml Susceptible     Cefuroxime sodium <=4 ug/ml Susceptible     Ertapenem <=1 ug/ml Susceptible     Gentamicin <=4 ug/ml Susceptible     Levofloxacin <=2 ug/ml Susceptible     Meropenem <=1 ug/ml Susceptible     Piperacillin + Tazobactam <=16 ug/ml Susceptible     Tetracycline <=4 ug/ml Susceptible     Tobramycin <=4 ug/ml Susceptible     Trimethoprim + Sulfamethoxazole <=2/38 ug/ml Susceptible                Susceptibility      Proteus mirabilis     FAVIAN     Ampicillin >16 ug/ml Resistant     Ampicillin + Sulbactam >16/8 ug/ml Resistant     Aztreonam <=8 ug/ml Susceptible     Cefepime <=8 ug/ml Susceptible     Cefotaxime <=2 ug/ml Susceptible     Ceftriaxone <=8 ug/ml Susceptible     Cefuroxime sodium <=4 ug/ml Susceptible     Ertapenem <=1 ug/ml Susceptible     Gentamicin <=4 ug/ml Susceptible     Levofloxacin <=2 ug/ml Susceptible     Meropenem <=1 ug/ml Susceptible     Piperacillin + Tazobactam <=16 ug/ml Susceptible     Tetracycline >8 ug/ml Resistant     Tobramycin <=4 ug/ml Susceptible     Trimethoprim + Sulfamethoxazole >2/38 ug/ml Resistant                Susceptibility      Enterococcus faecalis     FAVIAN     Ampicillin <=2 ug/ml Susceptible     Gentamicin High Level Synergy <=500 ug/ml Susceptible     Linezolid 2 ug/ml Susceptible     Penicillin G 2 ug/ml Susceptible     Streptomycin High Level Synergy <=1000 ug/ml Susceptible     Vancomycin 1 ug/ml Susceptible                    Blood Culture - Blood, Blood, Venous Line [[id number]]  (Normal) Collected:  02/16/18 2010    Lab Status:  Final result Specimen:  Blood from Arm, Left Updated:  02/21/18 2046     Blood Culture No growth at 5 days    Blood Culture - Blood, Blood, Venous Line [[id number]]  (Normal) Collected:   02/16/18 2005    Lab Status:  Final result Specimen:  Blood from Arm, Right Updated:  02/21/18 2046     Blood Culture No growth at 5 days    Respiratory Culture - Sputum, Cough [[id number]] Collected:  02/19/18 0656    Lab Status:  Final result Specimen:  Sputum from Cough Updated:  02/21/18 0931     Respiratory Culture --      Scant growth (1+) Normal Respiratory Nataliia     Gram Stain Result Occasional WBCs per low power field      No organisms seen          I have reviewed the medications.    Assessment/Plan   Assessment / Plan     Hospital Problem List     * (Principal)Cellulitis of both lower extremities    RAFAEL (acute kidney injury)    PVD (peripheral vascular disease)    COPD (chronic obstructive pulmonary disease)    Essential hypertension    Hypoxia    Bilateral cellulitis of lower leg           Brief Hospital Course to date:  Yassine Love is a 72 y.o. male  with a past medical history significant for PVD, essential hypertension, COPD, cellulitis of lower extremities, and tobacco abuse who presents to the ED with complaints of fevers, chills and bilateral lower extremity pain/edema/redness.    Assessment & Plan:    Acute hypoxemic respiratory failure -   - secondary to COPD exacerbation, diastolic heart failure  -- consider pulm HTN - echo done last summer  --  CT-A shows no PE  - Continue daily Bumex, hold for SBP less than 100  - Daily fluid restriction to 1500cc (which patient is non complaint with)  - treating COPD as below  - pulm consult    COPD exacerbation: Symbicort, Duonebs, completed 5 day course of prednisone,   -- will give a day of IV steroids to see if it helps anything    Acute on chronic diastolic CHF: ECHO 06/2017 EF normal    LE cellulitis/ Chronic LE wounds: switched to minocycline through 3/10, ID on board. Continue PO morphine at home dose PRN - OLIVA reviewed. Decreased gabapentin back to prior dose, increased dose made him drowsy. Continue PT WOC for bilateral LE wrapping.    HTN:  Holding BP meds for normotension    RAFAEL: Baseline Cr 0.9-1.3. Stable    Anemia: mixed anemia, Iron low, will start oral Iron, check stool for occult, monitor H&H, no signs of overt bleeding    DVT Prophylaxis:  [facility]    CODE STATUS: Conditional Code    Disposition: refusing rehab    Yumiko Castanon MD  03/05/18  1:58 PM

## 2018-03-05 NOTE — PLAN OF CARE
Problem: Patient Care Overview (Adult)  Goal: Plan of Care Review  Outcome: Ongoing (interventions implemented as appropriate)   03/05/18 1821   Coping/Psychosocial Response Interventions   Plan Of Care Reviewed With patient   Patient Care Overview   Progress improving   Outcome Evaluation   Outcome Summary/Follow up Plan Pt short of breath this AM through much of the afternoon. Resting now. Still remains on 5L.

## 2018-03-05 NOTE — PLAN OF CARE
Problem: Patient Care Overview (Adult)  Goal: Plan of Care Review  Outcome: Ongoing (interventions implemented as appropriate)   03/05/18 1613   Coping/Psychosocial Response Interventions   Plan Of Care Reviewed With patient   Outcome Evaluation   Outcome Summary/Follow up Plan Pt limited this date due to fatigue and pain, causing refusal of OOB activity. PT to continue as able.        Problem: Inpatient Physical Therapy  Goal: Transfer Training Goal 1 LTG- PT  Outcome: Ongoing (interventions implemented as appropriate)   02/22/18 1156 03/02/18 1127 03/05/18 1613   Transfer Training PT LTG   Transfer Training PT LTG, Date Established 02/22/18 --  --    Transfer Training PT LTG, Time to Achieve 2 wks --  --    Transfer Training PT LTG, Activity Type bed to chair /chair to bed;sit to stand/stand to sit --  --    Transfer Training PT LTG, [place] Level independent --  --    Transfer Training PT LTG, Assist Device walker, rolling --  --    Transfer Training PT LTG, Date Goal Reviewed --  03/02/18 --    Transfer Training PT LTG, Outcome --  --  goal ongoing     Goal: Gait Training Goal LTG- PT  Outcome: Ongoing (interventions implemented as appropriate)   02/22/18 1156 03/02/18 1127 03/05/18 1613   Gait Training PT LTG   Gait Training Goal PT LTG, Date Established 02/22/18 --  --    Gait Training Goal PT LTG, Time to Achieve 2 wks --  --    Gait Training Goal PT LTG, [place] Level independent --  --    Gait Training Goal PT LTG, Assist Device walker, rolling --  --    Gait Training Goal PT LTG, Distance to Achieve 100 --  --    Gait Training Goal PT LTG, Date Goal Reviewed --  03/02/18 --    Gait Training Goal PT LTG, Outcome --  --  goal ongoing

## 2018-03-05 NOTE — THERAPY WOUND CARE TREATMENT
Acute Care - Wound/Debridement Treatment Note  [facility]     Patient Name: Yassine Love  : 1945  MRN: [id number]  Today's Date: 3/5/2018  Onset of Illness/Injury or Date of Surgery Date: 18   Date of Referral to PT: 18   Referring Physician: Dr. Watknis       Admit Date: 2018    Visit Dx:    ICD-10-CM ICD-9-CM   1. Bilateral cellulitis of lower leg L03.116 682.6    L03.115    2. RAFAEL (acute kidney injury) N17.9 584.9   3. PVD (peripheral vascular disease) I73.9 443.9   4. Impaired mobility and ADLs Z74.09 799.89   5. Impaired functional mobility, balance, gait, and endurance Z74.09 V49.89   6. Cellulitis of both lower extremities L03.115 682.6    L03.116    7. Chronic obstructive pulmonary disease, unspecified COPD type J44.9 496   8. Essential hypertension I10 401.9   9. Hypoxia R09.02 799.02   10. Cellulitis of right lower extremity L03.115 682.6       Patient Active Problem List   Diagnosis   • Cellulitis of right lower extremity   • RAFAEL (acute kidney injury)   • PVD (peripheral vascular disease)   • Cellulitis of both lower extremities   • COPD (chronic obstructive pulmonary disease)   • Essential hypertension   • Hypoxia   • Bilateral cellulitis of lower leg               LDA Wound       18 1500          [REMOVED] Wound 18 2225 Bilateral lower leg     Wound - Properties Group Date first assessed: 18  -KB Time first assessed: 2225  -KB Side: Bilateral  -KB Orientation: lower  -KB Location: leg  -KB Resolution Date: 18  -MF Resolution Time: 1530  -MF    Wound WDL ex  -MF      Dressing Appearance dry;intact  -MF      Base closed/resurfaced  -MF      Wound 18 1020 Bilateral gluteal other (see comments)    Wound - Properties Group Date first assessed: 18  -AS Time first assessed: 1020  -AS Side: Bilateral  -AS Location: gluteal  -AS Type: other (see comments)  -AS, friction/shearing        User Key  (r) = Recorded By, (t) = Taken By, (c) = Cosigned By     Initials Name Provider Type     Carlos Hassan, PT Physical Therapist    BRITTNI Queen, RN Registered Nurse    AS Dhaval Cardozo, RN Registered Nurse              Lymphedema       03/05/18 1500          Lymphedema Edema Assessment    Ptting Edema Category By severity  -      Pitting Edema Mild  -      Skin Changes/Observations    Location/Assessment Lower Extremity  -      Lower Extremity Conditions bilateral:;scaly;fragile  -      Lower Extremity Color/Pigment bilateral:;erythema;red  -      Compression/Skin Care    Compression/Skin Care skin care;wrapping location;bandaging  -      Skin Care washed/dried;lotion applied  -      Wrapping Location lower extremity  -      Wrapping Location LE bilateral:;toes;foot;ankle;calf  -      Wrapping Comments unna boots with coban and spandage to secure  -        User Key  (r) = Recorded By, (t) = Taken By, (c) = Cosigned By    Initials Name Provider Type     Carlos Hassan, PT Physical Therapist          WOUND DEBRIDEMENT  Debridement Site 1  Location- Site 1: BLEs  Selective Debridement- Site 1: Wound Surface >20cmsq  Instruments- Site 1: other (comment) (wash cloth and 4x4s)  Excised Tissue Description- Site 1: moderate, other (comment) (dry, flaking, nonviable skin)  Bleeding- Site 1: none                  Adult Rehabilitation Note       03/05/18 1556 03/05/18 1500 03/04/18 1403    Rehab Assessment/Intervention    Discipline physical therapist  -EH physical therapist  -MF occupational therapist  -TA    Document Type therapy note (daily note)  - therapy note (daily note)  -MF progress note;therapy note (daily note)  -TA    Subjective Information agree to therapy;complains of;pain;fatigue  - agree to therapy;complains of;pain  - agree to therapy;no complaints  -TA    Patient Effort, Rehab Treatment   good  -TA    Symptoms Noted During/After Treatment   shortness of breath  -TA    Symptoms Noted Comment had prior SOB episode per notes  this date; Wound care PT left a few minutes prior to mobility PT entering  -      Precautions/Limitations   fall precautions;oxygen therapy device and L/min;other (see comments)   BLE wraps  -TA    Recorded by [] Mary Kate Parra, PT [MF] Carlos Hassan, PT [TA] Nehemias Aponte, OT    Vital Signs    Pre SpO2 (%) 94  -EH  97  -TA    O2 Delivery Pre Treatment supplemental O2  -EH  supplemental O2  -TA    Intra SpO2 (%)   88  -TA    O2 Delivery Intra Treatment   supplemental O2  -TA    Post SpO2 (%) 92  -EH  94  -TA    O2 Delivery Post Treatment supplemental O2  -EH  supplemental O2  -TA    Pre Patient Position   Supine  -TA    Intra Patient Position   Standing  -TA    Post Patient Position   Sitting  -TA    Recorded by [] Mary Kate Parra, PT  [TA] Nehemias Aponte, RAOUL    Pain Assessment    Pain Assessment Rojas-Fontenot FACES  - Rojas-Baker FACES  - Rojas-Baker FACES  -    Rojas-Fontenot FACES Pain Rating 6  - 6  - 2  -TA    Pain Score   5  -TA    Post Pain Score   5  -TA    Pain Type Chronic pain  - Chronic pain  - Chronic pain  -TA    Pain Location Leg   and back  - Leg  - Leg  -TA    Pain Orientation Right;Left  - Right;Left  - Left;Right  -TA    Pain Intervention(s) Repositioned;Ambulation/increased activity  -  Repositioned;Ambulation/increased activity  -    Response to Interventions tolerated  -  tolerated  -TA    Recorded by [] Mary Kate Parra, PT [MF] Carlos Hassan, PT [TA] Nehemias Aponte, RAOUL    Cognitive Assessment/Intervention    Current Cognitive/Communication Assessment functional  -  functional  -TA    Orientation Status oriented to;person;situation;place  -  oriented to;person;place;situation  -TA    Follows Commands/Answers Questions 100% of the time;able to follow single-step instructions   with encouragement  -  100% of the time;able to follow single-step instructions  -TA    Personal Safety   mild impairment  -TA    Personal Safety  Interventions   fall prevention program maintained;gait belt;nonskid shoes/slippers when out of bed;supervised activity  -TA    Recorded by [EH] Mary Kate Parra, PT  [TA] Nehemias Aponte OT    Bed Mobility, Assessment/Treatment    Bed Mobility, Assistive Device   bed rails  -TA    Bed Mobility, Scoot/Bridge, [place]   supervision required  -TA    Bed Mob, Supine to Sit, [place]   supervision required  -TA    Bed Mob, Sit to Supine, [place]   not tested  -TA    Bed Mobility, Impairments   strength decreased;pain  -TA    Bed Mobility, Comment Refuses mobility  -EH      Recorded by [EH] Mary Kate Parra, PT  [TA] Nehemias Aponte OT    Transfer Assessment/Treatment    Transfers, Sit-Stand [place]   contact guard assist;verbal cues required  -TA    Transfers, Stand-Sit [place]   contact guard assist;verbal cues required  -TA    Transfers, Sit-Stand-Sit, Assist Device   rolling walker  -TA    Transfer, Safety Issues   step length decreased;weight-shifting ability decreased  -TA    Transfer, Impairments   strength decreased;impaired balance  -TA    Transfer, Comment   VCs for safe technique  -TA    Recorded by   [TA] Nehemias Aponte OT    Functional Mobility    Functional Mobility- Ind. Level   contact guard assist;verbal cues required  -TA    Functional Mobility- Device   rolling walker  -TA    Functional Mobility-Distance (Feet)   5  -TA    Functional Mobility- Safety Issues   step length decreased;weight-shifting ability decreased;supplemental O2  -TA    Functional Mobility- Comment   VCs for adaptive breathing  -TA    Recorded by   [TA] Nehemias Aponte OT    Toileting Assessment/Training    Toileting Assess/Train, Assistive Device   urinal  -TA    Toileting Assess/Train, Position   sitting;edge of bed  -TA    Toileting Assess/Train, Indepen Level   supervision required;set up required  -TA    Recorded by   [TA] Nehemias Aponte OT    Motor Skills/Interventions     Additional Documentation   Balance Skills Training (Group)  -TA    Recorded by   [TA] Nehemias Aponte OT    Balance Skills Training    Sitting-Level of Assistance   Distant supervision  -TA    Sitting-Balance Support   Feet supported  -TA    Standing-Level of Assistance   Contact guard  -TA    Static Standing Balance Support   assistive device  -TA    Standing-Balance Activities   Weight Shift A-P;Weight Shift R-L;Reaching for objects  -TA    Recorded by   [TA] Nehemias Aponte OT    Therapy Exercises    Bilateral Lower Extremities AROM:;5 reps;ankle pumps/circles;quad sets;glut sets  -      Bilateral Upper Extremity AROM:;5 reps;elbow flexion/extension;hand pumps;shoulder extension/flexion;pronation/supination  -EH      Recorded by [EH] Mary Kate Parra, PT      Positioning and Restraints    Pre-Treatment Position in bed  - in bed  - in bed  -    Post Treatment Position bed  - bed  - chair  -TA    In Bed notified nsg;supine;call light within reach;encouraged to call for assist;exit alarm on  - supine;call light within reach  -     In Chair   reclined;call light within reach;encouraged to call for assist;exit alarm on;waffle cushion;legs elevated  -TA    Recorded by [EH] Mary Kate Parra, PT [MF] Carlos Hassan, PT [TA] Nehemias Aponte OT      User Key  (r) = Recorded By, (t) = Taken By, (c) = Cosigned By    Initials Name Effective Dates     Carlos Hassan, PT 06/19/15 -      Mary Kate Parra, PT 06/19/15 -     TA Nehemias Aponte OT 03/14/16 -                 IP PT Goals       03/05/18 1613 03/02/18 1127 03/01/18 1058    Bed Mobility PT LTG    Bed Mobility PT LTG, Outcome   goal ongoing  -    Transfer Training PT LTG    Transfer Training PT  LTG, Date Goal Reviewed  03/02/18  -AS     Transfer Training PT LTG, Outcome goal ongoing  - goal ongoing  -AS goal ongoing  -    Gait Training PT LTG    Gait Training Goal PT LTG, Date Goal Reviewed  03/02/18  -AS     Gait  Training Goal PT LTG, Outcome goal ongoing  -EH goal ongoing  -AS goal ongoing  -EH      02/26/18 1445 02/23/18 0949 02/22/18 1156    Bed Mobility PT LTG    Bed Mobility PT LTG, Date Established   02/22/18  -KM    Bed Mobility PT LTG, Time to Achieve   2 wks  -KM    Bed Mobility PT LTG, Activity Type   all bed mobility  -KM    Bed Mobility PT LTG, [place] Level   independent  -KM    Bed Mobility PT LTG, Date Goal Reviewed 02/26/18  -LM      Bed Mobility PT LTG, Outcome goal ongoing  -LM goal ongoing  -BD     Transfer Training PT LTG    Transfer Training PT LTG, Date Established   02/22/18  -KM    Transfer Training PT LTG, Time to Achieve   2 wks  -KM    Transfer Training PT LTG, Activity Type   bed to chair /chair to bed;sit to stand/stand to sit  -KM    Transfer Training PT LTG, [place] Level   independent  -KM    Transfer Training PT LTG, Assist Device   walker, rolling  -KM    Transfer Training PT  LTG, Date Goal Reviewed 02/26/18  -LM      Transfer Training PT LTG, Outcome goal ongoing  -LM goal ongoing  -BD     Gait Training PT LTG    Gait Training Goal PT LTG, Date Established   02/22/18  -KM    Gait Training Goal PT LTG, Time to Achieve   2 wks  -KM    Gait Training Goal PT LTG, [place] Level   independent  -KM    Gait Training Goal PT LTG, Assist Device   walker, rolling  -KM    Gait Training Goal PT LTG, Distance to Achieve   100  -KM    Gait Training Goal PT LTG, Date Goal Reviewed 02/26/18  -LM      Gait Training Goal PT LTG, Outcome goal ongoing  -LM goal ongoing  -BD       02/22/18 1050          Wound Care PT LTG    Wound Care PT LTG 1, Outcome goal partially met  -MF      Wound Care 2 PT LTG    Wound Care PT LTG 2, Outcome goal partially met  -MF        User Key  (r) = Recorded By, (t) = Taken By, (c) = Cosigned By    Initials Name Provider Type    ALBANIA Hassan, PT Physical Therapist     Mary Kate Parra, PT Physical Therapist     Carmela Washington, PT Physical  Therapist    AS Yasmine Fuller, PTA Physical Therapy Assistant    BD Estrella Rojo, PT Physical Therapist    LM Bekah Caro, PT Physical Therapist          Physical Therapy Education     Title: PT OT SLP Therapies (Active)     Topic: Physical Therapy (Active)     Point: Mobility training (Active)    Learning Progress Summary    Learner Readiness Method Response Comment Documented by Status   Patient Acceptance E NR   03/05/18 1613 Active    Acceptance E NR  AS 03/02/18 1126 Active    Acceptance E VU,NR   03/01/18 1058 Done    Acceptance E,D NR Reviewed benefits of activity, HEP, safety with mobility.  02/26/18 1445 Active    Acceptance E NR   02/23/18 0949 Active    Acceptance E VU   02/22/18 1159 Done               Point: Home exercise program (Active)    Learning Progress Summary    Learner Readiness Method Response Comment Documented by Status   Patient Acceptance E NR   03/05/18 1613 Active    Acceptance E NR  AS 03/02/18 1126 Active    Acceptance E VU,NR   03/01/18 1058 Done    Acceptance E,D NR Reviewed benefits of activity, HEP, safety with mobility.  02/26/18 1445 Active    Acceptance E NR   02/23/18 0949 Active    Acceptance E VU   02/22/18 1159 Done               Point: Body mechanics (Active)    Learning Progress Summary    Learner Readiness Method Response Comment Documented by Status   Patient Acceptance E NR  AS 03/02/18 1126 Active    Acceptance E VU,NR   03/01/18 1058 Done    Acceptance E,D NR Reviewed benefits of activity, HEP, safety with mobility.  02/26/18 1445 Active    Acceptance E NR   02/23/18 0949 Active    Acceptance E VU   02/22/18 1159 Done               Point: Precautions (Active)    Learning Progress Summary    Learner Readiness Method Response Comment Documented by Status   Patient Acceptance E NR   03/05/18 1613 Active    Acceptance E NR  AS 03/02/18 1126 Active    Acceptance E VU,NR   03/01/18 1058 Done    Acceptance E,D NR Reviewed  benefits of activity, HEP, safety with mobility.  02/26/18 1445 Active    Acceptance E NR  BD 02/23/18 0949 Active    Acceptance E VU   02/22/18 1159 Done    Acceptance E VU reviewed POC for skin/ edema care.  02/17/18 1126 Done                      User Key     Initials Effective Dates Name Provider Type Discipline     06/19/15 -  Mary Kate Parra, PT Physical Therapist PT     06/19/15 -  Carmela Washington, PT Physical Therapist PT    AS 06/22/15 -  Yasmine Fuller, PTA Physical Therapy Assistant PT     02/12/18 -  Karen Lopez, PT Physical Therapist PT     06/13/16 -  Estrella Rojo, PT Physical Therapist PT     06/09/17 -  Bekah Caro, PT Physical Therapist PT                   PT ASSESSMENT (last 72 hours)      PT Evaluation       03/05/18 1556 03/05/18 1500    Rehab Evaluation    Document Type therapy note (daily note)  - therapy note (daily note)  -    Subjective Information agree to therapy;complains of;pain;fatigue  - agree to therapy;complains of;pain  -    Symptoms Noted Comment had prior SOB episode per notes this date; Wound care PT left a few minutes prior to mobility PT entering  -     Vital Signs    Pre SpO2 (%) 94  -EH     O2 Delivery Pre Treatment supplemental O2  -EH     Post SpO2 (%) 92  -EH     O2 Delivery Post Treatment supplemental O2  -EH     Pain Assessment    Pain Assessment Rojas-Fontenot FACES  - Rojas-Baker FACES  -    Rojas-Baker FACES Pain Rating 6  -EH 6  -MF    Pain Type Chronic pain  -EH Chronic pain  -MF    Pain Location Leg   and back  - Leg  -MF    Pain Orientation Right;Left  -EH Right;Left  -MF    Pain Intervention(s) Repositioned;Ambulation/increased activity  -EH     Response to Interventions tolerated  -EH     Cognitive Assessment/Intervention    Current Cognitive/Communication Assessment functional  -EH     Orientation Status oriented to;person;situation;place  -     Follows Commands/Answers Questions 100% of the time;able to  follow single-step instructions   with encouragement  [facility]     Bed Mobility, Assessment/Treatment    Bed Mobility, Comment Refuses mobility  -     Therapy Exercises    Bilateral Lower Extremities AROM:;5 reps;ankle pumps/circles;quad sets;glut sets  -     Bilateral Upper Extremity AROM:;5 reps;elbow flexion/extension;hand pumps;shoulder extension/flexion;pronation/supination  [facility]     Positioning and Restraints    Pre-Treatment Position in bed  - in bed  -    Post Treatment Position bed  - bed  -    In Bed notified nsg;supine;call light within reach;encouraged to call for assist;exit alarm on  - supine;call light within reach  -      03/05/18 0848 03/04/18 2030    Muscle Tone Assessment    Muscle Tone Assessment  Bilateral Upper Extremities  -    Bilateral Upper Extremities Muscle Tone Assessment  mildly decreased tone  -    Bilateral Lower Extremities Muscle Tone Assessment moderately decreased tone  -AS moderately decreased tone  -    Sensory Assessment/Intervention    LUE Light Touch  mild impairment  -    RUE Light Touch  mild impairment  -      03/04/18 1403 03/04/18 0852    Rehab Evaluation    Document Type progress note;therapy note (daily note)  -TA     Subjective Information agree to therapy;no complaints  -     Patient Effort, Rehab Treatment good  -TA     Symptoms Noted During/After Treatment shortness of breath  -TA     General Information    Precautions/Limitations fall precautions;oxygen therapy device and L/min;other (see comments)   BLE wraps  -TA     Vital Signs    Pre SpO2 (%) 97  -TA     O2 Delivery Pre Treatment supplemental O2  -TA     Intra SpO2 (%) 88  -TA     O2 Delivery Intra Treatment supplemental O2  -TA     Post SpO2 (%) 94  -TA     O2 Delivery Post Treatment supplemental O2  -TA     Pre Patient Position Supine  -TA     Intra Patient Position Standing  -TA     Post Patient Position Sitting  -TA     Pain Assessment    Pain Assessment Rojas-Fontenot FACES  -TA      Rojas-Baker FACES Pain Rating 2  -TA     Pain Score 5  -TA     Post Pain Score 5  -TA     Pain Type Chronic pain  -TA     Pain Location Leg  -TA     Pain Orientation Left;Right  -TA     Pain Intervention(s) Repositioned;Ambulation/increased activity  -TA     Response to Interventions tolerated  -TA     Cognitive Assessment/Intervention    Current Cognitive/Communication Assessment functional  -TA     Orientation Status oriented to;person;place;situation  -TA     Follows Commands/Answers Questions 100% of the time;able to follow single-step instructions  -TA     Personal Safety mild impairment  -TA     Personal Safety Interventions fall prevention program maintained;gait belt;nonskid shoes/slippers when out of bed;supervised activity  -TA     Muscle Tone Assessment    Muscle Tone Assessment  Bilateral Upper Extremities;Bilateral Lower Extremities  -SJ    Bilateral Upper Extremities Muscle Tone Assessment  mildly decreased tone  -SJ    LLE Muscle Tone Assessment  moderately decreased tone  -SJ    RLE Muscle Tone Assessment  moderately decreased tone  -SJ    Bed Mobility, Assessment/Treatment    Bed Mobility, Assistive Device bed rails  -TA     Bed Mobility, Scoot/Bridge, [place] supervision required  -TA     Bed Mob, Supine to Sit, [place] supervision required  -TA     Bed Mob, Sit to Supine, [place] not tested  -TA     Bed Mobility, Impairments strength decreased;pain  -TA     Transfer Assessment/Treatment    Transfers, Sit-Stand [place] contact guard assist;verbal cues required  -TA     Transfers, Stand-Sit [place] contact guard assist;verbal cues required  -TA     Transfers, Sit-Stand-Sit, Assist Device rolling walker  -TA     Transfer, Safety Issues step length decreased;weight-shifting ability decreased  -TA     Transfer, Impairments strength decreased;impaired balance  -TA     Transfer, Comment VCs for safe technique  -TA     Motor Skills/Interventions    Additional Documentation Balance  Skills Training (Group)  -TA     Balance Skills Training    Sitting-Level of Assistance Distant supervision  -TA     Sitting-Balance Support Feet supported  -TA     Standing-Level of Assistance Contact guard  -TA     Static Standing Balance Support assistive device  -TA     Standing-Balance Activities Weight Shift A-P;Weight Shift R-L;Reaching for objects  -TA     Positioning and Restraints    Pre-Treatment Position in bed  -TA     Post Treatment Position chair  -TA     In Chair reclined;call light within reach;encouraged to call for assist;exit alarm on;waffle cushion;legs elevated  -TA       03/03/18 2104 03/03/18 0853    Muscle Tone Assessment    Muscle Tone Assessment Bilateral Upper Extremities;Bilateral Lower Extremities  -AB Bilateral Upper Extremities;Bilateral Lower Extremities  -SJ    Bilateral Upper Extremities Muscle Tone Assessment mildly decreased tone  -AB mildly decreased tone  -SJ    LLE Muscle Tone Assessment moderately decreased tone  -AB moderately decreased tone  -SJ    RLE Muscle Tone Assessment moderately decreased tone  -AB moderately decreased tone  -SJ      03/02/18 2105       Muscle Tone Assessment    Muscle Tone Assessment Bilateral Upper Extremities;Bilateral Lower Extremities  -AB     Bilateral Upper Extremities Muscle Tone Assessment mildly decreased tone  -AB     LLE Muscle Tone Assessment moderately decreased tone  -AB     RLE Muscle Tone Assessment moderately decreased tone  -AB       User Key  (r) = Recorded By, (t) = Taken By, (c) = Cosigned By    Initials Name Provider Type    ALBANIA Hassan, PT Physical Therapist    EH Mary Kate Parra, PT Physical Therapist    TA Nehemias Aponte, OT Occupational Therapist    AB Norma Roque, RN Registered Nurse     Jossy Sommers, RN Registered Nurse    LAURENCE Aguilar RN Registered Nurse    AS Osiel Ramirez RN Registered Nurse            PT Recommendation and Plan  Anticipated Discharge Disposition: home with home  health, skilled nursing facility  Planned Therapy Interventions: wound care    Plan Of Care Reviewed With: patient   Progress: improving   Outcome Summary/Follow up Plan: Pt progressing well with unna boots.  BLE skin intact with no open ulcerations noted, but pt cont to have very fragile skin with moderate erythema.  Pt will benefit from cont compression to help improve LE skin integrity.           Outcome Measures       03/05/18 1556 03/04/18 1403       How much help from another person do you currently need...    Turning from your back to your side while in flat bed without using bedrails? 4  -EH      Moving from lying on back to sitting on the side of a flat bed without bedrails? 3  -EH      Moving to and from a bed to a chair (including a wheelchair)? 3  -EH      Standing up from a chair using your arms (e.g., wheelchair, bedside chair)? 3  -EH      Climbing 3-5 steps with a railing? 2  -EH      To walk in hospital room? 3  -EH      AM-PAC 6 Clicks Score 18  -EH      How much help from another is currently needed...    Putting on and taking off regular lower body clothing?  2  -TA     Bathing (including washing, rinsing, and drying)  2  -TA     Toileting (which includes using toilet bed pan or urinal)  3  -TA     Putting on and taking off regular upper body clothing  3  -TA     Taking care of personal grooming (such as brushing teeth)  3  -TA     Eating meals  4  -TA     Score  17  -TA     Functional Assessment    Outcome Measure Options AM-PAC 6 Clicks Basic Mobility (PT)  - AM-PAC 6 Clicks Daily Activity (OT)  -TA       User Key  (r) = Recorded By, (t) = Taken By, (c) = Cosigned By    Initials Name Provider Type     Mary Kate Parra, PT Physical Therapist    MONIQUE Aponte OT Occupational Therapist              Time Calculation        PT Charges       03/05/18 1615 03/05/18 1500       Time Calculation    Start Time 1546  -EH 1500  -MF     PT Received On 03/05/18  -      PT Goal Re-Cert Due  Date 03/15/18  - 03/15/18  -     Time Calculation- PT    Total Timed Code Minutes- PT 9 minute(s)  - 35 minute(s)  -       User Key  (r) = Recorded By, (t) = Taken By, (c) = Cosigned By    Initials Name Provider Type     Carlos Hassan, PT Physical Therapist     Mary Kate Parra, PT Physical Therapist             Therapy Charges for Today     Code Description Service Date Service Provider Modifiers Qty    43576767613 HC PT STAPPING UNNA BOOT 3/5/2018 Carlos Hassan, PT GP 1    13079534104 HC NONSELECTIVE DEBRIDEMENT 3/5/2018 Carlos Hassan, PT GP 1            PT G-Codes  Outcome Measure Options: AM-PAC 6 Clicks Basic Mobility (PT)        Carlos Hassan, PT  3/5/2018

## 2018-03-05 NOTE — PLAN OF CARE
Problem: Patient Care Overview (Adult)  Goal: Plan of Care Review  Outcome: Ongoing (interventions implemented as appropriate)   03/05/18 0249   Coping/Psychosocial Response Interventions   Plan Of Care Reviewed With patient   Patient Care Overview   Progress improving   Outcome Evaluation   Outcome Summary/Follow up Plan pt. unable to go w/o O2/4-5L. He is able to ambulate well. Pain controlled with PO meds.

## 2018-03-05 NOTE — PROGRESS NOTES
Continued Stay Note  [facility]     Patient Name: Yassine Love  MRN: [id number]  Today's Date: 3/5/2018    Admit Date: 2/16/2018          Discharge Plan       03/05/18 1334    Case Management/Social Work Plan    Plan Rehab/Assisted Living    Patient/Family In Agreement With Plan --   Patient/Son    Additional Comments Met with son and representatives from [facility] [employer] [facility], (contact: Stephanie Sorensen, [phone]), in patient room.  [facility] would like for Mr. Love to increase ambulation and decrease O2 prior to transfer and are recommending short term rehab.  Mr. Love is agreeable, he and son have requested a referral be made to [facility].  [facility] report they work well with [facility] and have accepted several patients post rehab. Referral made to [facility], case/rehab plan discussed with [facility] Liaison.  Yolanda Tima, Ext. [phone]      03/05/18 1143    Case Management/Social Work Plan    Plan Home/Assisted Living    Patient/Family In Agreement With Plan --   Patient/Son    Additional Comments Spoke with patient's son this am, reports he has located an assisted living facility for his father which will allow him to have pet and smoke-son reports father is agreeable.  Son and liaison from facility planning to come to hospital later today to meet patient and discuss facility.  Discussed with son that his father would need to continue to be agreeable to go to assisted living facility and be agreeable to speak with representative at hospital-discussed transfers to assisted living facilities are arranged by patient/family privately due to private pay costs, etc.  Son voiced understanding, informed him I would be happy to speak with liaison from assisted living facility as needed.  Plan home vs. assisted living facility when medically ready-looks like he remains on 5 litres of O2.                 Discharge Codes     None        Expected Discharge Date and Time     Expected Discharge Date Expected  Discharge Time    Mar 5, 2018             ANDRY Tierney

## 2018-03-05 NOTE — PLAN OF CARE
Problem: Patient Care Overview (Adult)  Goal: Plan of Care Review  Outcome: Ongoing (interventions implemented as appropriate)   03/05/18 1500   Coping/Psychosocial Response Interventions   Plan Of Care Reviewed With patient   Patient Care Overview   Progress improving   Outcome Evaluation   Outcome Summary/Follow up Plan Pt progressing well with unna boots. BLE skin intact with no open ulcerations noted, but pt cont to have very fragile skin with moderate erythema. Pt will benefit from cont compression to help improve LE skin integrity.        Problem: Inpatient Physical Therapy  Goal: Wound Care Goal 1 LTG- PT  Outcome: Ongoing (interventions implemented as appropriate)   02/17/18 1121 02/22/18 1050   Wound Care PT LTG   Wound Care PT LTG 1, Date Established 02/17/18 --    Wound Care PT LTG 1, Time to Achieve 2 wks --    Wound Care PT LTG 1, Location LLE  --    Wound Care PT LTG 1, No S&S of Infection yes --    Wound Care PT LTG 1, Decrease Wound Size 10% --    Wound Care PT LTG 1, Decrease Exudate minimum --    Wound Care PT LTG 1, Decrease Limb Girth % 10% --    Wound Care PT LTG 1, No New Skin Break Down yes --    Wound Care PT LTG 1, Education S&S of infection;dressing changes;edema management;progression of POC;skin care/inspection --    Wound Care PT LTG 1, Education Understanding verbalize understanding --    Wound Care PT LTG 1, Outcome --  goal partially met     Goal: Wound Care Goal 2 LTG- PT  Outcome: Ongoing (interventions implemented as appropriate)   02/17/18 1121 02/22/18 1050   Wound Care 2 PT LTG   Wound Care PT LTG 2, Date Established 02/17/18 --    Wound Care PT LTG 2, Time to Achieve 2 wks --    Wound Care PT LTG 2, Location RLE  --    Wound Care PT LTG 2, No S&S of Infection yes --    Wound Care PT LTG 2, Decrease Wound Size 20% --    Wound Care PT LTG 2, Decrease Exudate minimum --    Wound Care PT LTG 2, Decrease Limb Girth % 10% --    Wound Care PT LTG 2, No New Skin Break Down yes --     Wound Care PT LTG 2, Education dressing changes;edema management;pressure relief;pain managment;progression of POC --    Wound Care PT LTG 2, Education Understanding verbalize understanding --    Wound Care PT LTG 2, Outcome --  goal partially met

## 2018-03-05 NOTE — PROGRESS NOTES
Continued Stay Note   [facility]     Patient Name: Yassine Love  MRN: [id number]  Today's Date: 3/5/2018    Admit Date: 2/16/2018          Discharge Plan       03/05/18 1143    Case Management/Social Work Plan    Plan Home/Assisted Living    Patient/Family In Agreement With Plan --   Patient/Son    Additional Comments Spoke with patient's son this am, reports he has located an assisted living facility for his father which will allow him to have pet and smoke-son reports father is agreeable.  Son and liaison from facility planning to visit hospital later today to meet patient and discuss facility.  Discussed with son that his father would need to continue to be agreeable to go to assisted living facility and be agreeable to speak with representative at hospital-discussed transfers to assisted living facilities are arranged by patient/family privately due to private pay costs, etc.  Son voiced understanding, informed him I would be happy to speak with liaison from assisted living facility as needed.  Plan home vs. assisted living facility when medically ready-looks like he remains on 5 litres of O2.  Yolanda Alfred, Ext. [phone]                 Discharge Codes     None        Expected Discharge Date and Time     Expected Discharge Date Expected Discharge Time    Mar 5, 2018             ANDRY Tierney

## 2018-03-05 NOTE — PROGRESS NOTES
Yassine Love  1945  [id number]  3/5/2018    CC:   Chief Complaint   Patient presents with   • Leg Swelling       Yassine Love is a 72 y.o. male here for leg infections   History of present illness:    Mr. Yassine Love is a 72 y.o. male with CC: Cellulitis of both lower extremities. He presented to the [facility] ED with increased bilateral extremity edema with increased pain and redness in the setting of unna boot therapy via home health care for the past several months. He does complain of foul odor and drainage of BLE and fever, and chills.    He denies No SOB or cough today: No chest pain, No N/V/D at present and no abdominal pain.  No  symptoms: no new rash: No HA, photophobia or neck stiffness; He denies other focal pain.  Mr. Love was followed by Dr. Duncan during a June, 2017 hospitalization and was was treated with Invanz for a streptococcal bacteremia.      2/18 - co leg infection  No fever or chills  2/19/18 - C/O HA, generalized pain, and respiratory distress  Patient denies any fever, chills, or sweats.  Patient denies any nausea, vomiting, or diarrhea.  Now on hi-emperatriz oxygen. Seen and agree  2/20 - co leg infections  Co weakness  No fever  Lives alone  2/21/18 - Patient reports mild dyspnea, remains on hi emperatriz oxygen.  Patient denies any fever, chills, or sweats.  Patient denies any nausea, vomiting, or diarrhea.  C/O BLE pain.  Seen and agree  2/22 - co SOA  Co weakness  No fever or chills  No rash  Co leg pain and swelling  2/23/18 - Patient is sleeping today peacefully on 4 LNC.  ROS discussed with staff.  Seen and agree  2/26 - No hx available  ROS discussed with nurse  2/28/18:   Does not want to go to rehab, wants to go home.  Unna boots just changed yesterday.  No fever, chills, sweats.   Seen and agree  3/5/18 - Looking at places for rehab.  Patient denies any fever, chills, or sweats.  Per nursing requiring 4LNC.  On oral antibiotics.    Seen and agree      Past medical history:  Past  Medical History:   Diagnosis Date   • Cancer    • Cellulitis of both lower extremities     [facility] 2016   • Chronic pain    • COPD (chronic obstructive pulmonary disease)    • Hypertension    • Osteoarthritis cervical spine    • Osteoarthritis of both knees    • Osteoarthritis of left hip        Medications:   Current Facility-Administered Medications:   •  acetaminophen (TYLENOL) tablet 650 mg, 650 mg, Oral, Q4H PRN, Nicole Hewitt DO, 650 mg at 03/05/18 0848  •  albuterol (PROVENTIL) nebulizer solution 0.083% 2.5 mg/3mL, 2.5 mg, Nebulization, Q6H PRN, Gay Elise, APRN, 2.5 mg at 03/04/18 0426  •  bisacodyl (DULCOLAX) suppository 10 mg, 10 mg, Rectal, Daily PRN, Nicole Hewitt DO, 10 mg at 02/23/18 0823  •  budesonide-formoterol (SYMBICORT) 80-4.5 MCG/ACT inhaler 2 puff, 2 puff, Inhalation, BID - RT, Nicole Hewitt DO, 2 puff at 03/05/18 0807  •  bumetanide (BUMEX) tablet 1 mg, 1 mg, Oral, Daily, BRITTNY Alvares, 1 mg at 03/05/18 0844  •  calcium carbonate (TUMS) chewable tablet 500 mg (200 mg elemental), 2 tablet, Oral, BID PRN, Beatriz Yousif APRN, 2 tablet at 02/27/18 0946  •  castor oil-balsam peru (VENELEX) ointment, , Topical, Q12H, Nicole Hewitt DO  •  diclofenac (VOLTAREN) 1 % gel 2 g, 2 g, Topical, 4x Daily, Nicole Hewitt DO, 2 g at 03/05/18 0844  •  docusate sodium (COLACE) capsule 200 mg, 200 mg, Oral, Daily, Nicole Hewitt DO, 200 mg at 03/05/18 0844  •  famotidine (PEPCID) tablet 20 mg, 20 mg, Oral, BID, Beatriz Yousif APRN, 20 mg at 03/05/18 0844  •  fluticasone (FLONASE) 50 MCG/ACT nasal spray 2 spray, 2 spray, Each Nare, Daily, Esmer Watkins MD, 2 spray at 03/04/18 0842  •  gabapentin (NEURONTIN) capsule 200 mg, 200 mg, Oral, Q8H, Nicole Hewitt DO, 200 mg at 03/05/18 0541  •  guaiFENesin (MUCINEX) 12 hr tablet 600 mg, 600 mg, Oral, BID PRN, Ezequiel Alfred PA-C, 600 mg at 02/20/18 0818  •  heparin (porcine) 5000 UNIT/ML injection 5,000 Units,  5,000 Units, Subcutaneous, Q8H, Nicole Hewitt DO, 5,000 Units at 03/05/18 0541  •  HYDROcodone-acetaminophen (NORCO) 7.5-325 MG per tablet 1 tablet, 1 tablet, Oral, Q4H PRN, Yumiko Castanon MD, 1 tablet at 03/04/18 1747  •  ipratropium-albuterol (DUO-NEB) nebulizer solution 3 mL, 3 mL, Nebulization, 4x Daily - RT, Nicole Hewitt DO, 3 mL at 03/05/18 1212  •  magnesium hydroxide (MILK OF MAGNESIA) suspension 2400 mg/10mL 10 mL, 10 mL, Oral, Daily PRN, Nicole Hewitt DO, 10 mL at 02/25/18 2228  •  Magnesium Sulfate 2 gram Bolus, followed by 8 gram infusion (total Mg dose 10 grams)- Mg less than or equal to 1mg/dL, 2 g, Intravenous, PRN **OR** Magnesium Sulfate 6 gram Infusion (2 gm x 3) -Mg 1.1 -1.5 mg/dL, 2 g, Intravenous, PRN **OR** magnesium sulfate 4 gram infusion- Mg 1.6-1.9 mg/dL, 4 g, Intravenous, PRN, Esmer Watkins MD  •  minocycline (MINOCIN,DYNACIN) capsule 100 mg, 100 mg, Oral, Q12H, Parish Belle MD, 100 mg at 03/05/18 0541  •  Morphine (MSIR) tablet 30 mg, 30 mg, Oral, Q6H PRN, Esmer Watkins MD, 30 mg at 03/04/18 2043  •  nicotine (NICODERM CQ) 7 MG/24HR patch 1 patch, 1 patch, Transdermal, Q24H, Alexandro Suarez PA-C, 1 patch at 03/04/18 2043  •  ondansetron (ZOFRAN) injection 4 mg, 4 mg, Intravenous, Q6H PRN, Alexandro Suarez PA-C, 4 mg at 02/21/18 1945  •  Pharmacy Consult - [facility], , Does not apply, Daily, Annette Ray [facility]  •  potassium chloride (KLOR-CON) packet 40 mEq, 40 mEq, Oral, PRN, Esmer Watkins MD  •  potassium chloride (MICRO-K) CR capsule 40 mEq, 40 mEq, Oral, PRN, Esmer Watkins MD, 40 mEq at 02/22/18 1431  •  sodium chloride 0.9 % flush 1-10 mL, 1-10 mL, Intravenous, PRN, Nicole Hewitt, DO  •  sodium chloride 0.9 % flush 10 mL, 10 mL, Intravenous, PRN, Steve Mcmanus MD, 10 mL at 03/01/18 2113  •  vitamin B-12 (CYANOCOBALAMIN) tablet 1,000 mcg, 1,000 mcg, Oral, Daily, Yumiko Castanon MD, 1,000 mcg at 03/05/18 0844  Antibiotics:  Anti-Infectives     Ordered     Dose/Rate  "Route Frequency Start Stop    02/22/18 1646  minocycline (MINOCIN,DYNACIN) capsule 100 mg     Zeb Moore [facility] reviewed the order on 03/02/18 1310.   Ordering Provider:  Parish Belle MD    100 mg Oral Every 12 Hours 02/22/18 1800 03/10/18 2359    02/16/18 2302  vancomycin 500 mg/100 mL 0.9% NS IVPB (mbp)     Ordering Provider:  Carlos Walls RPH    500 mg  over 60 Minutes Intravenous Once 02/16/18 2345 02/17/18 0032    02/16/18 1952  vancomycin (VANCOCIN) in iso-osmotic dextrose IVPB 1 g (premix) 200 mL     Ordering Provider:  Alexandro Suarez PA-C    1,000 mg  over 60 Minutes Intravenous Once 02/16/18 1954 02/16/18 2122 02/16/18 1952  meropenem (MERREM) 1 g/100 mL 0.9% NS VTB (mbp)     Ordering Provider:  Alexandro Suarez PA-C    1 g  over 30 Minutes Intravenous Once 02/16/18 1954 02/16/18 2059          Allergies:  is allergic to ciprofloxacin hcl and ceftin [cefuroxime axetil].    Family History: family history includes COPD in his brother; Heart attack in his brother; Stroke in his father.    Social History:  reports that he has been smoking Cigarettes.  He has a 84.00 pack-year smoking history. He does not have any smokeless tobacco history on file. He reports that he does not drink alcohol or use illicit drugs.    Review of Systems: All other reviewed and negative except as per HPI    Blood pressure 116/66, pulse 94, temperature 97.8 °F (36.6 °C), temperature source Oral, resp. rate 16, height 172.7 cm (68\"), weight 82.2 kg (181 lb 4.8 oz), SpO2 (!) 87 %.  GENERAL:Chronically ill appearing.  Sitting on side of bed.  HEENT: Oropharynx without thrush.  EYES: . No conjunctival injection. No icterus.   LYMPHATICS: No lymphadenopathy of the neck or axillary or inguinal regions.   HEART:  Irregular  LUNGS:  Diminished throughout.  On 4LNC.  ABDOMEN: Soft, nontender, ND   SKIN: BLE dressings intact.    EXT:  + edema. BLE with compression stockings.  Seen and agree - groggy          DIAGNOSTICS:  Lab " Results   Component Value Date    WBC 9.11 03/05/2018    HGB 9.0 (L) 03/05/2018    HCT 28.5 (L) 03/05/2018     03/05/2018     Lab Results   Component Value Date    CRP 13.12 (H) 06/21/2017     Lab Results   Component Value Date    SEDRATE 56 (H) 06/20/2017     Lab Results   Component Value Date    GLUCOSE 104 (H) 03/05/2018    BUN 22 03/05/2018    CREATININE 1.20 03/05/2018    EGFRIFNONA 59 (L) 03/05/2018    BCR 18.3 03/05/2018    CO2 32.0 (H) 03/05/2018    CALCIUM 8.5 (L) 03/05/2018    ALBUMIN 3.30 02/23/2018    LABIL2 0.9 (L) 02/17/2018    AST 15 02/17/2018    ALT 4 (L) 02/17/2018       Microbiology:   Microbiology Results Abnormal     Procedure Component Value - Date/Time    Influenza A & B, RT PCR - Swab, Nasopharynx [[id number]]  (Normal) Collected:  03/03/18 1356    Lab Status:  Final result Specimen:  Swab from Nasopharynx Updated:  03/03/18 1607     Influenza A PCR Not Detected     Influenza B PCR Not Detected    Blood Culture - Blood, [[id number]]  (Normal) Collected:  02/18/18 2308    Lab Status:  Final result Specimen:  Blood from Arm, Left Updated:  02/23/18 2331     Blood Culture No growth at 5 days    Blood Culture - Blood, [[id number]]  (Normal) Collected:  02/18/18 2311    Lab Status:  Final result Specimen:  Blood from Arm, Left Updated:  02/23/18 2331     Blood Culture No growth at 5 days    Wound Culture - Wound, Foot, Right [[id number]]  (Abnormal)  (Susceptibility) Collected:  02/16/18 1931    Lab Status:  Final result Specimen:  Wound from Foot, Right Updated:  02/23/18 1045     Wound Culture --      Light growth (2+) Pseudomonas aeruginosa (A)      Scant growth (1+) Escherichia coli (A)      Scant growth (1+) Proteus mirabilis (A)      Scant growth (1+) Enterococcus faecalis (A)      Scant growth (1+) Streptococcus, Beta Hemolytic, Group G (A)        If Clindamycin or Erythromycin is the drug of choice, notify the laboratory within 7 days to request susceptibility testing.        STREP  GROUPING G     Gram Stain Result Few (2+) WBCs seen      Many (4+) Gram negative bacilli      Many (4+) Gram positive bacilli      Many (4+) Gram positive cocci in pairs and clusters    Susceptibility      Pseudomonas aeruginosa     FAVIAN     Aztreonam <=8 ug/ml Susceptible     Cefepime <=8 ug/ml Susceptible     Ceftazidime 4 ug/ml Susceptible     Gentamicin <=4 ug/ml Susceptible     Levofloxacin <=2 ug/ml Susceptible     Meropenem <=1 ug/ml Susceptible     Piperacillin + Tazobactam <=16 ug/ml Susceptible     Tobramycin <=4 ug/ml Susceptible                Susceptibility      Escherichia coli     FAVIAN     Ampicillin <=8 ug/ml Susceptible     Ampicillin + Sulbactam <=8/4 ug/ml Susceptible     Aztreonam <=8 ug/ml Susceptible     Cefepime <=8 ug/ml Susceptible     Cefotaxime <=2 ug/ml Susceptible     Ceftriaxone <=8 ug/ml Susceptible     Cefuroxime sodium <=4 ug/ml Susceptible     Ertapenem <=1 ug/ml Susceptible     Gentamicin <=4 ug/ml Susceptible     Levofloxacin <=2 ug/ml Susceptible     Meropenem <=1 ug/ml Susceptible     Piperacillin + Tazobactam <=16 ug/ml Susceptible     Tetracycline <=4 ug/ml Susceptible     Tobramycin <=4 ug/ml Susceptible     Trimethoprim + Sulfamethoxazole <=2/38 ug/ml Susceptible                Susceptibility      Proteus mirabilis     FAVIAN     Ampicillin >16 ug/ml Resistant     Ampicillin + Sulbactam >16/8 ug/ml Resistant     Aztreonam <=8 ug/ml Susceptible     Cefepime <=8 ug/ml Susceptible     Cefotaxime <=2 ug/ml Susceptible     Ceftriaxone <=8 ug/ml Susceptible     Cefuroxime sodium <=4 ug/ml Susceptible     Ertapenem <=1 ug/ml Susceptible     Gentamicin <=4 ug/ml Susceptible     Levofloxacin <=2 ug/ml Susceptible     Meropenem <=1 ug/ml Susceptible     Piperacillin + Tazobactam <=16 ug/ml Susceptible     Tetracycline >8 ug/ml Resistant     Tobramycin <=4 ug/ml Susceptible     Trimethoprim + Sulfamethoxazole >2/38 ug/ml Resistant                Susceptibility      Enterococcus faecalis      FAVIAN     Ampicillin <=2 ug/ml Susceptible     Gentamicin High Level Synergy <=500 ug/ml Susceptible     Linezolid 2 ug/ml Susceptible     Penicillin G 2 ug/ml Susceptible     Streptomycin High Level Synergy <=1000 ug/ml Susceptible     Vancomycin 1 ug/ml Susceptible                    Blood Culture - Blood, Blood, Venous Line [[id number]]  (Normal) Collected:  02/16/18 2010    Lab Status:  Final result Specimen:  Blood from Arm, Left Updated:  02/21/18 2046     Blood Culture No growth at 5 days    Blood Culture - Blood, Blood, Venous Line [[id number]]  (Normal) Collected:  02/16/18 2005    Lab Status:  Final result Specimen:  Blood from Arm, Right Updated:  02/21/18 2046     Blood Culture No growth at 5 days    Respiratory Culture - Sputum, Cough [[id number]] Collected:  02/19/18 0656    Lab Status:  Final result Specimen:  Sputum from Cough Updated:  02/21/18 0931     Respiratory Culture --      Scant growth (1+) Normal Respiratory Nataliia     Gram Stain Result Occasional WBCs per low power field      No organisms seen              RADIOLOGY:  Imaging Results (last 72 hours)     Procedure Component Value Units Date/Time    XR Chest 1 View [[id number]] Collected:  02/16/18 2125     Updated:  02/16/18 2212    Narrative:       EXAM:    XR Chest, 1 View    CLINICAL HISTORY:    72 years old, male; Peripheral vascular disease, unspecified; Cellulitis of   right lower limb; Cellulitis of left lower limb; Acute kidney failure,   unspecified; Signs and symptoms; Other: Hypoxia    TECHNIQUE:    Frontal view of the chest.    COMPARISON:    CR - XR CHEST 1  2017-06-25 06:32    FINDINGS:    Lungs:  There is mild prominence of the pulmonary vasculature. Chronic   scarring.    Pleural space:  Unremarkable.  No pneumothorax.    Heart:  There is an atherosclerotic cardiovascular configuration without   cardiomegaly.    Mediastinum:  Unremarkable.    Bones/joints:  There are degenerative changes of the spine.      Impression:          Mild pulmonary vascular congestion.    THIS DOCUMENT HAS BEEN ELECTRONICALLY SIGNED BY LENIN DIAZ MD    XR Chest 1 View [[id number]] Updated:  02/18/18 1333        CXR's seen and compared        Assessment and Plan:   Impression:      -- Cellulitis - Bilateral Lower Extremities - H/O streptococcal bacteremia      -- Acute Kidney Injury - stable     -- COPD - with worsening respiratory failure   diuretics given      --  HTN      -- Tobacco Abuse      -- Acute Hypoxic respiratory failure, remaining on 4LNC.    -- Leukocytosis, on prednisone.      -- Infiltrates - Primarily fluid, improved        PLAN/RECOMMENDATIONS:      -- Continue Minocycline - planning through 3/10  -- Continue wound care and compression wraps.    UM discussed with nursing looking for short term placement options.    BRITTNY Cotto for Dr. Parish Belle  3/5/2018

## 2018-03-06 ENCOUNTER — APPOINTMENT (OUTPATIENT)
Dept: GENERAL RADIOLOGY | Facility: HOSPITAL | Age: 73
End: 2018-03-06
Attending: INTERNAL MEDICINE

## 2018-03-06 LAB
MAGNESIUM SERPL-MCNC: 2.2 MG/DL (ref 1.3–2.7)
POTASSIUM BLD-SCNC: 4.4 MMOL/L (ref 3.5–5.5)

## 2018-03-06 PROCEDURE — 94799 UNLISTED PULMONARY SVC/PX: CPT

## 2018-03-06 PROCEDURE — 25010000002 HEPARIN (PORCINE) PER 1000 UNITS: Performed by: INTERNAL MEDICINE

## 2018-03-06 PROCEDURE — 63710000001 PREDNISONE PER 1 MG: Performed by: INTERNAL MEDICINE

## 2018-03-06 PROCEDURE — 97530 THERAPEUTIC ACTIVITIES: CPT

## 2018-03-06 PROCEDURE — 94760 N-INVAS EAR/PLS OXIMETRY 1: CPT

## 2018-03-06 PROCEDURE — 99223 1ST HOSP IP/OBS HIGH 75: CPT | Performed by: INTERNAL MEDICINE

## 2018-03-06 PROCEDURE — 92610 EVALUATE SWALLOWING FUNCTION: CPT

## 2018-03-06 PROCEDURE — 83735 ASSAY OF MAGNESIUM: CPT | Performed by: NURSE PRACTITIONER

## 2018-03-06 PROCEDURE — 25010000002 METHYLPREDNISOLONE PER 40 MG: Performed by: INTERNAL MEDICINE

## 2018-03-06 PROCEDURE — 93010 ELECTROCARDIOGRAM REPORT: CPT | Performed by: INTERNAL MEDICINE

## 2018-03-06 PROCEDURE — 84132 ASSAY OF SERUM POTASSIUM: CPT | Performed by: NURSE PRACTITIONER

## 2018-03-06 PROCEDURE — 74220 X-RAY XM ESOPHAGUS 1CNTRST: CPT

## 2018-03-06 PROCEDURE — 93005 ELECTROCARDIOGRAM TRACING: CPT | Performed by: NURSE PRACTITIONER

## 2018-03-06 PROCEDURE — 97116 GAIT TRAINING THERAPY: CPT

## 2018-03-06 PROCEDURE — 71046 X-RAY EXAM CHEST 2 VIEWS: CPT

## 2018-03-06 PROCEDURE — 97110 THERAPEUTIC EXERCISES: CPT

## 2018-03-06 PROCEDURE — 94640 AIRWAY INHALATION TREATMENT: CPT

## 2018-03-06 PROCEDURE — 99232 SBSQ HOSP IP/OBS MODERATE 35: CPT | Performed by: INTERNAL MEDICINE

## 2018-03-06 RX ORDER — ACETYLCYSTEINE 200 MG/ML
4 SOLUTION ORAL; RESPIRATORY (INHALATION) 4 TIMES DAILY
Status: DISCONTINUED | OUTPATIENT
Start: 2018-03-06 | End: 2018-03-08

## 2018-03-06 RX ORDER — BUDESONIDE 0.5 MG/2ML
0.5 INHALANT ORAL
Status: DISCONTINUED | OUTPATIENT
Start: 2018-03-06 | End: 2018-03-15 | Stop reason: HOSPADM

## 2018-03-06 RX ORDER — ARFORMOTEROL TARTRATE 15 UG/2ML
15 SOLUTION RESPIRATORY (INHALATION)
Status: DISCONTINUED | OUTPATIENT
Start: 2018-03-06 | End: 2018-03-15 | Stop reason: HOSPADM

## 2018-03-06 RX ORDER — PREDNISONE 20 MG/1
40 TABLET ORAL
Status: COMPLETED | OUTPATIENT
Start: 2018-03-06 | End: 2018-03-10

## 2018-03-06 RX ADMIN — FAMOTIDINE 20 MG: 20 TABLET, FILM COATED ORAL at 08:28

## 2018-03-06 RX ADMIN — BUDESONIDE 0.5 MG: 0.5 INHALANT RESPIRATORY (INHALATION) at 19:27

## 2018-03-06 RX ADMIN — DICLOFENAC SODIUM 2 G: 10 GEL TOPICAL at 12:04

## 2018-03-06 RX ADMIN — IPRATROPIUM BROMIDE AND ALBUTEROL SULFATE 3 ML: 2.5; .5 SOLUTION RESPIRATORY (INHALATION) at 07:38

## 2018-03-06 RX ADMIN — MINOCYCLINE HYDROCHLORIDE 100 MG: 50 CAPSULE ORAL at 17:30

## 2018-03-06 RX ADMIN — ACETAMINOPHEN 650 MG: 325 TABLET ORAL at 04:17

## 2018-03-06 RX ADMIN — ACETYLCYSTEINE 4 ML: 200 SOLUTION ORAL; RESPIRATORY (INHALATION) at 12:53

## 2018-03-06 RX ADMIN — CYANOCOBALAMIN TAB 1000 MCG 1000 MCG: 1000 TAB at 08:28

## 2018-03-06 RX ADMIN — HYDROCODONE BITARTRATE AND ACETAMINOPHEN 1 TABLET: 7.5; 325 TABLET ORAL at 08:28

## 2018-03-06 RX ADMIN — HEPARIN SODIUM 5000 UNITS: 5000 INJECTION, SOLUTION INTRAVENOUS; SUBCUTANEOUS at 19:54

## 2018-03-06 RX ADMIN — BUDESONIDE AND FORMOTEROL FUMARATE DIHYDRATE 2 PUFF: 80; 4.5 AEROSOL RESPIRATORY (INHALATION) at 07:38

## 2018-03-06 RX ADMIN — MINOCYCLINE HYDROCHLORIDE 100 MG: 50 CAPSULE ORAL at 04:18

## 2018-03-06 RX ADMIN — HYDROCODONE BITARTRATE AND ACETAMINOPHEN 1 TABLET: 7.5; 325 TABLET ORAL at 17:33

## 2018-03-06 RX ADMIN — IPRATROPIUM BROMIDE AND ALBUTEROL SULFATE 3 ML: 2.5; .5 SOLUTION RESPIRATORY (INHALATION) at 12:52

## 2018-03-06 RX ADMIN — NICOTINE 1 PATCH: 7 PATCH, EXTENDED RELEASE TRANSDERMAL at 19:53

## 2018-03-06 RX ADMIN — HEPARIN SODIUM 5000 UNITS: 5000 INJECTION, SOLUTION INTRAVENOUS; SUBCUTANEOUS at 04:17

## 2018-03-06 RX ADMIN — CASTOR OIL AND BALSAM, PERU: 788; 87 OINTMENT TOPICAL at 08:29

## 2018-03-06 RX ADMIN — BARIUM SULFATE 183 ML: 960 POWDER, FOR SUSPENSION ORAL at 11:48

## 2018-03-06 RX ADMIN — GABAPENTIN 200 MG: 100 CAPSULE ORAL at 19:53

## 2018-03-06 RX ADMIN — FLUTICASONE PROPIONATE 2 SPRAY: 50 SPRAY, METERED NASAL at 08:29

## 2018-03-06 RX ADMIN — HEPARIN SODIUM 5000 UNITS: 5000 INJECTION, SOLUTION INTRAVENOUS; SUBCUTANEOUS at 13:36

## 2018-03-06 RX ADMIN — IPRATROPIUM BROMIDE AND ALBUTEROL SULFATE 3 ML: 2.5; .5 SOLUTION RESPIRATORY (INHALATION) at 16:04

## 2018-03-06 RX ADMIN — ARFORMOTEROL TARTRATE 15 MCG: 15 SOLUTION RESPIRATORY (INHALATION) at 19:30

## 2018-03-06 RX ADMIN — GABAPENTIN 200 MG: 100 CAPSULE ORAL at 04:17

## 2018-03-06 RX ADMIN — DICLOFENAC SODIUM 2 G: 10 GEL TOPICAL at 08:29

## 2018-03-06 RX ADMIN — DOCUSATE SODIUM 200 MG: 100 CAPSULE, LIQUID FILLED ORAL at 08:28

## 2018-03-06 RX ADMIN — BUMETANIDE 1 MG: 1 TABLET ORAL at 08:28

## 2018-03-06 RX ADMIN — GABAPENTIN 200 MG: 100 CAPSULE ORAL at 13:36

## 2018-03-06 RX ADMIN — ARFORMOTEROL TARTRATE 15 MCG: 15 SOLUTION RESPIRATORY (INHALATION) at 13:05

## 2018-03-06 RX ADMIN — FAMOTIDINE 20 MG: 20 TABLET, FILM COATED ORAL at 19:54

## 2018-03-06 RX ADMIN — IPRATROPIUM BROMIDE AND ALBUTEROL SULFATE 3 ML: 2.5; .5 SOLUTION RESPIRATORY (INHALATION) at 19:27

## 2018-03-06 RX ADMIN — ACETAMINOPHEN 650 MG: 325 TABLET ORAL at 19:53

## 2018-03-06 RX ADMIN — PREDNISONE 40 MG: 20 TABLET ORAL at 12:03

## 2018-03-06 RX ADMIN — DICLOFENAC SODIUM 2 G: 10 GEL TOPICAL at 17:30

## 2018-03-06 NOTE — PROGRESS NOTES
Yassine Love  1945  [id number]  3/6/2018    CC:   Chief Complaint   Patient presents with   • Leg Swelling       Yassine Love is a 72 y.o. male here for leg infections   History of present illness:    Mr. Yassine Love is a 72 y.o. male with CC: Cellulitis of both lower extremities. He presented to the [facility] ED with increased bilateral extremity edema with increased pain and redness in the setting of unna boot therapy via home health care for the past several months. He does complain of foul odor and drainage of BLE and fever, and chills.    He denies No SOB or cough today: No chest pain, No N/V/D at present and no abdominal pain.  No  symptoms: no new rash: No HA, photophobia or neck stiffness; He denies other focal pain.  Mr. Love was followed by Dr. Duncan during a June, 2017 hospitalization and was was treated with Invanz for a streptococcal bacteremia.      2/18 - co leg infection  No fever or chills  2/19/18 - C/O HA, generalized pain, and respiratory distress  Patient denies any fever, chills, or sweats.  Patient denies any nausea, vomiting, or diarrhea.  Now on hi-emperatriz oxygen. Seen and agree  2/20 - co leg infections  Co weakness  No fever  Lives alone  2/21/18 - Patient reports mild dyspnea, remains on hi emperatriz oxygen.  Patient denies any fever, chills, or sweats.  Patient denies any nausea, vomiting, or diarrhea.  C/O BLE pain.  Seen and agree  2/22 - co SOA  Co weakness  No fever or chills  No rash  Co leg pain and swelling  2/23/18 - Patient is sleeping today peacefully on 4 LNC.  ROS discussed with staff.  Seen and agree  2/26 - No hx available  ROS discussed with nurse  2/28/18:   Does not want to go to rehab, wants to go home.  Unna boots just changed yesterday.  No fever, chills, sweats.   Seen and agree  3/5/18 - Looking at places for rehab.  Patient denies any fever, chills, or sweats.  Per nursing requiring 4LNC.  On oral antibiotics.    Seen and agree  3/6/18 - C/O pain in BLE.   Patient denies any fever, chills, or sweats.  Continues on 4LNC.  Patient denies any nausea, vomiting, or diarrhea.  RN at .  Patient working with PT.  Seen and agree      Past medical history:  Past Medical History:   Diagnosis Date   • Cancer    • Cellulitis of both lower extremities     [facility] 2016   • Chronic pain    • COPD (chronic obstructive pulmonary disease)    • Hypertension    • Osteoarthritis cervical spine    • Osteoarthritis of both knees    • Osteoarthritis of left hip        Medications:   Current Facility-Administered Medications:   •  acetaminophen (TYLENOL) tablet 650 mg, 650 mg, Oral, Q4H PRN, Nicole Hewitt DO, 650 mg at 03/06/18 0417  •  acetylcysteine (MUCOMYST) 20 % solution 4 mL, 4 mL, Nebulization, 4x Daily, Todd Keys MD  •  arformoterol (BROVANA) nebulizer solution 15 mcg, 15 mcg, Nebulization, BID - RT, Todd Keys MD  •  bisacodyl (DULCOLAX) suppository 10 mg, 10 mg, Rectal, Daily PRN, Nicloe Hewitt DO, 10 mg at 02/23/18 0823  •  budesonide (PULMICORT) nebulizer solution 0.5 mg, 0.5 mg, Nebulization, BID - RT, Todd Keys MD  •  bumetanide (BUMEX) tablet 1 mg, 1 mg, Oral, Daily, BRITTNY Alvares, 1 mg at 03/06/18 0828  •  calcium carbonate (TUMS) chewable tablet 500 mg (200 mg elemental), 2 tablet, Oral, BID PRN, BRITTNY Estrada, 2 tablet at 02/27/18 0946  •  castor oil-balsam peru (VENELEX) ointment, , Topical, Q12H, Nicole Hewitt DO  •  diclofenac (VOLTAREN) 1 % gel 2 g, 2 g, Topical, 4x Daily, Nicole Hewitt DO, 2 g at 03/06/18 0829  •  docusate sodium (COLACE) capsule 200 mg, 200 mg, Oral, Daily, Nicole Hewitt DO, 200 mg at 03/06/18 0828  •  famotidine (PEPCID) tablet 20 mg, 20 mg, Oral, BID, BRITTNY Estrada, 20 mg at 03/06/18 0828  •  fluticasone (FLONASE) 50 MCG/ACT nasal spray 2 spray, 2 spray, Each Nare, Daily, Esmer Watkins MD, 2 spray at 03/06/18 0829  •  gabapentin  (NEURONTIN) capsule 200 mg, 200 mg, Oral, Q8H, Nicole Hewitt DO, 200 mg at 03/06/18 0417  •  guaiFENesin (MUCINEX) 12 hr tablet 600 mg, 600 mg, Oral, BID PRN, Ezequiel Alfred PA-C, 600 mg at 02/20/18 0818  •  heparin (porcine) 5000 UNIT/ML injection 5,000 Units, 5,000 Units, Subcutaneous, Q8H, Nicole Hewitt DO, 5,000 Units at 03/06/18 0417  •  HYDROcodone-acetaminophen (NORCO) 7.5-325 MG per tablet 1 tablet, 1 tablet, Oral, Q4H PRN, Yumiko Castanon MD, 1 tablet at 03/06/18 0828  •  ipratropium-albuterol (DUO-NEB) nebulizer solution 3 mL, 3 mL, Nebulization, 4x Daily - RT, Nicole Hewitt DO, 3 mL at 03/06/18 0738  •  magnesium hydroxide (MILK OF MAGNESIA) suspension 2400 mg/10mL 10 mL, 10 mL, Oral, Daily PRN, Nicole Hewitt DO, 10 mL at 02/25/18 2228  •  Magnesium Sulfate 2 gram Bolus, followed by 8 gram infusion (total Mg dose 10 grams)- Mg less than or equal to 1mg/dL, 2 g, Intravenous, PRN **OR** Magnesium Sulfate 6 gram Infusion (2 gm x 3) -Mg 1.1 -1.5 mg/dL, 2 g, Intravenous, PRN **OR** magnesium sulfate 4 gram infusion- Mg 1.6-1.9 mg/dL, 4 g, Intravenous, PRN, Esmer Watkins MD  •  minocycline (MINOCIN,DYNACIN) capsule 100 mg, 100 mg, Oral, Q12H, Parish Belle MD, 100 mg at 03/06/18 0418  •  Morphine (MSIR) tablet 30 mg, 30 mg, Oral, Q6H PRN, Esmer Watkins MD, 30 mg at 03/05/18 2142  •  nicotine (NICODERM CQ) 7 MG/24HR patch 1 patch, 1 patch, Transdermal, Q24H, Alexandro Suarez PA-C, 1 patch at 03/05/18 2142  •  ondansetron (ZOFRAN) injection 4 mg, 4 mg, Intravenous, Q6H PRN, Alexandro Suarez PA-C, 4 mg at 02/21/18 1945  •  Pharmacy Consult - [facility], , Does not apply, Daily, Annette Ray, [facility]  •  potassium chloride (KLOR-CON) packet 40 mEq, 40 mEq, Oral, PRN, Esmer Watkins MD  •  potassium chloride (MICRO-K) CR capsule 40 mEq, 40 mEq, Oral, PRN, Esmer Watkins MD, 40 mEq at 02/22/18 1431  •  predniSONE (DELTASONE) tablet 40 mg, 40 mg, Oral, Daily With Breakfast, Todd Keys,  "MD  •  sodium chloride 0.9 % flush 1-10 mL, 1-10 mL, Intravenous, PRN, Nicole Hewitt, DO  •  sodium chloride 0.9 % flush 10 mL, 10 mL, Intravenous, PRN, Steve Mcmanus MD, 10 mL at 03/01/18 2118  •  vitamin B-12 (CYANOCOBALAMIN) tablet 1,000 mcg, 1,000 mcg, Oral, Daily, Yumiko Castanon MD, 1,000 mcg at 03/06/18 0828  Antibiotics:  Anti-Infectives     Ordered     Dose/Rate Route Frequency Start Stop    02/22/18 1646  minocycline (MINOCIN,DYNACIN) capsule 100 mg     Zeb Moore [facility] reviewed the order on 03/02/18 1310.   Ordering Provider:  Parish Belle MD    100 mg Oral Every 12 Hours 02/22/18 1800 03/10/18 2359 02/16/18 2302  vancomycin 500 mg/100 mL 0.9% NS IVPB (mbp)     Ordering Provider:  Carlos Walls RPH    500 mg  over 60 Minutes Intravenous Once 02/16/18 2345 02/17/18 0032    02/16/18 1952  vancomycin (VANCOCIN) in iso-osmotic dextrose IVPB 1 g (premix) 200 mL     Ordering Provider:  Alexandro Suarez PA-C    1,000 mg  over 60 Minutes Intravenous Once 02/16/18 1954 02/16/18 2122 02/16/18 1952  meropenem (MERREM) 1 g/100 mL 0.9% NS VTB (mbp)     Ordering Provider:  Alexandro Suarez PA-C    1 g  over 30 Minutes Intravenous Once 02/16/18 1954 02/16/18 2059          Allergies:  is allergic to ciprofloxacin hcl and ceftin [cefuroxime axetil].    Family History: family history includes COPD in his brother; Heart attack in his brother; Stroke in his father.    Social History:  reports that he has been smoking Cigarettes.  He has a 84.00 pack-year smoking history. He does not have any smokeless tobacco history on file. He reports that he does not drink alcohol or use illicit drugs.    Review of Systems: All other reviewed and negative except as per HPI    Blood pressure 132/72, pulse 104, temperature 98.1 °F (36.7 °C), resp. rate 16, height 172.7 cm (68\"), weight 82.2 kg (181 lb 4.8 oz), SpO2 90 %.  GENERAL:Chronically ill appearing.  Walking with PT.  HEENT: Oropharynx without thrush.  EYES: . No " conjunctival injection. No icterus.   LYMPHATICS: No lymphadenopathy of the neck or axillary or inguinal regions.   HEART:  Irregular  LUNGS:  Diminished throughout.  On 4LNC.  ABDOMEN: Soft, nontender, ND   SKIN: BLE dressings intact.  Three small deep tissues injuries noted on buttocks and sacral area.  Mild erythema, appear superficial.    EXT:  + edema. BLE with compression stockings.  Seen and agree          DIAGNOSTICS:  Lab Results   Component Value Date    WBC 9.11 03/05/2018    HGB 9.0 (L) 03/05/2018    HCT 28.5 (L) 03/05/2018     03/05/2018     Lab Results   Component Value Date    CRP 13.12 (H) 06/21/2017     Lab Results   Component Value Date    SEDRATE 56 (H) 06/20/2017     Lab Results   Component Value Date    GLUCOSE 104 (H) 03/05/2018    BUN 22 03/05/2018    CREATININE 1.20 03/05/2018    EGFRIFNONA 59 (L) 03/05/2018    BCR 18.3 03/05/2018    CO2 32.0 (H) 03/05/2018    CALCIUM 8.5 (L) 03/05/2018    ALBUMIN 3.30 02/23/2018    LABIL2 0.9 (L) 02/17/2018    AST 15 02/17/2018    ALT 4 (L) 02/17/2018       Microbiology:   Microbiology Results Abnormal     Procedure Component Value - Date/Time    Influenza A & B, RT PCR - Swab, Nasopharynx [[id number]]  (Normal) Collected:  03/03/18 1356    Lab Status:  Final result Specimen:  Swab from Nasopharynx Updated:  03/03/18 1607     Influenza A PCR Not Detected     Influenza B PCR Not Detected    Blood Culture - Blood, [[id number]]  (Normal) Collected:  02/18/18 2308    Lab Status:  Final result Specimen:  Blood from Arm, Left Updated:  02/23/18 2331     Blood Culture No growth at 5 days    Blood Culture - Blood, [[id number]]  (Normal) Collected:  02/18/18 2311    Lab Status:  Final result Specimen:  Blood from Arm, Left Updated:  02/23/18 2331     Blood Culture No growth at 5 days    Wound Culture - Wound, Foot, Right [[id number]]  (Abnormal)  (Susceptibility) Collected:  02/16/18 1931    Lab Status:  Final result Specimen:  Wound from Foot, Right Updated:   02/23/18 1045     Wound Culture --      Light growth (2+) Pseudomonas aeruginosa (A)      Scant growth (1+) Escherichia coli (A)      Scant growth (1+) Proteus mirabilis (A)      Scant growth (1+) Enterococcus faecalis (A)      Scant growth (1+) Streptococcus, Beta Hemolytic, Group G (A)        If Clindamycin or Erythromycin is the drug of choice, notify the laboratory within 7 days to request susceptibility testing.        STREP GROUPING G     Gram Stain Result Few (2+) WBCs seen      Many (4+) Gram negative bacilli      Many (4+) Gram positive bacilli      Many (4+) Gram positive cocci in pairs and clusters    Susceptibility      Pseudomonas aeruginosa     FAVIAN     Aztreonam <=8 ug/ml Susceptible     Cefepime <=8 ug/ml Susceptible     Ceftazidime 4 ug/ml Susceptible     Gentamicin <=4 ug/ml Susceptible     Levofloxacin <=2 ug/ml Susceptible     Meropenem <=1 ug/ml Susceptible     Piperacillin + Tazobactam <=16 ug/ml Susceptible     Tobramycin <=4 ug/ml Susceptible                Susceptibility      Escherichia coli     FAVIAN     Ampicillin <=8 ug/ml Susceptible     Ampicillin + Sulbactam <=8/4 ug/ml Susceptible     Aztreonam <=8 ug/ml Susceptible     Cefepime <=8 ug/ml Susceptible     Cefotaxime <=2 ug/ml Susceptible     Ceftriaxone <=8 ug/ml Susceptible     Cefuroxime sodium <=4 ug/ml Susceptible     Ertapenem <=1 ug/ml Susceptible     Gentamicin <=4 ug/ml Susceptible     Levofloxacin <=2 ug/ml Susceptible     Meropenem <=1 ug/ml Susceptible     Piperacillin + Tazobactam <=16 ug/ml Susceptible     Tetracycline <=4 ug/ml Susceptible     Tobramycin <=4 ug/ml Susceptible     Trimethoprim + Sulfamethoxazole <=2/38 ug/ml Susceptible                Susceptibility      Proteus mirabilis     FAVIAN     Ampicillin >16 ug/ml Resistant     Ampicillin + Sulbactam >16/8 ug/ml Resistant     Aztreonam <=8 ug/ml Susceptible     Cefepime <=8 ug/ml Susceptible     Cefotaxime <=2 ug/ml Susceptible     Ceftriaxone <=8 ug/ml Susceptible      Cefuroxime sodium <=4 ug/ml Susceptible     Ertapenem <=1 ug/ml Susceptible     Gentamicin <=4 ug/ml Susceptible     Levofloxacin <=2 ug/ml Susceptible     Meropenem <=1 ug/ml Susceptible     Piperacillin + Tazobactam <=16 ug/ml Susceptible     Tetracycline >8 ug/ml Resistant     Tobramycin <=4 ug/ml Susceptible     Trimethoprim + Sulfamethoxazole >2/38 ug/ml Resistant                Susceptibility      Enterococcus faecalis     FAVIAN     Ampicillin <=2 ug/ml Susceptible     Gentamicin High Level Synergy <=500 ug/ml Susceptible     Linezolid 2 ug/ml Susceptible     Penicillin G 2 ug/ml Susceptible     Streptomycin High Level Synergy <=1000 ug/ml Susceptible     Vancomycin 1 ug/ml Susceptible                    Blood Culture - Blood, Blood, Venous Line [[id number]]  (Normal) Collected:  02/16/18 2010    Lab Status:  Final result Specimen:  Blood from Arm, Left Updated:  02/21/18 2046     Blood Culture No growth at 5 days    Blood Culture - Blood, Blood, Venous Line [[id number]]  (Normal) Collected:  02/16/18 2005    Lab Status:  Final result Specimen:  Blood from Arm, Right Updated:  02/21/18 2046     Blood Culture No growth at 5 days    Respiratory Culture - Sputum, Cough [[id number]] Collected:  02/19/18 0656    Lab Status:  Final result Specimen:  Sputum from Cough Updated:  02/21/18 0931     Respiratory Culture --      Scant growth (1+) Normal Respiratory Nataliia     Gram Stain Result Occasional WBCs per low power field      No organisms seen              RADIOLOGY:  Imaging Results (last 72 hours)     Procedure Component Value Units Date/Time    XR Chest 1 View [[id number]] Collected:  02/16/18 2125     Updated:  02/16/18 2212    Narrative:       EXAM:    XR Chest, 1 View    CLINICAL HISTORY:    72 years old, male; Peripheral vascular disease, unspecified; Cellulitis of   right lower limb; Cellulitis of left lower limb; Acute kidney failure,   unspecified; Signs and symptoms; Other: Hypoxia    TECHNIQUE:    Frontal  view of the chest.    COMPARISON:    CR - XR CHEST 1 VW 2017-06-25 06:32    FINDINGS:    Lungs:  There is mild prominence of the pulmonary vasculature. Chronic   scarring.    Pleural space:  Unremarkable.  No pneumothorax.    Heart:  There is an atherosclerotic cardiovascular configuration without   cardiomegaly.    Mediastinum:  Unremarkable.    Bones/joints:  There are degenerative changes of the spine.      Impression:         Mild pulmonary vascular congestion.    THIS DOCUMENT HAS BEEN ELECTRONICALLY SIGNED BY LENIN DIAZ MD    XR Chest 1 View [[id number]] Updated:  02/18/18 1333        CXR's seen and compared        Assessment and Plan:   Impression:      -- Cellulitis - Bilateral Lower Extremities - H/O streptococcal bacteremia      -- Acute Kidney Injury - stable     -- COPD - with worsening respiratory failure   diuretics given      --  HTN      -- Tobacco Abuse      -- Acute Hypoxic respiratory failure, remaining on 4LNC.    -- Leukocytosis, on prednisone.      -- Infiltrates - Primarily fluid, improved     -- Possible aspiration per pulmonary note - speech is to evaluate.     -- DTI on buttocks    -- PNA? - new - check CXR       PLAN/RECOMMENDATIONS:      -- Continue Minocycline - planning through 3/10  -- Continue wound care and compression wraps.    UM discussed with nursing looking for short term placement options.    Angy Singh, BRITTNY for Dr. Parish Belle  3/6/2018

## 2018-03-06 NOTE — PLAN OF CARE
Problem: Patient Care Overview (Adult)  Goal: Plan of Care Review  Outcome: Ongoing (interventions implemented as appropriate)   03/06/18 1119   Coping/Psychosocial Response Interventions   Plan Of Care Reviewed With patient   Patient Care Overview   Progress improving   Outcome Evaluation   Outcome Summary/Follow up Plan Pt with no s/s aspiration or pharyngeal dysphagia with any consistency. Did multiple trials of thins. Pt's swallow appears functional. May use regular thin barium for esophagram.
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
## 2018-03-06 NOTE — PROGRESS NOTES
Continued Stay Note   [place]     Patient Name: Yassine Love  MRN: [id number]  Today's Date: 3/6/2018    Admit Date: 2/16/2018          Discharge Plan       03/06/18 1428    Case Management/Social Work Plan    Plan Rehab     Additional Comments Complex Care following for discharge needs/plans. Spoke with Maribeth/THAI and she is following along for a bed in their pulmonary unit once ready for d/c. Waiting on speech consult and Pulmonary follow up. Updated son Chris. Glendy at [phone]               Discharge Codes     None        Expected Discharge Date and Time     Expected Discharge Date Expected Discharge Time    Mar 9, 2018             Silvana Owusu RN

## 2018-03-06 NOTE — THERAPY TREATMENT NOTE
Acute Care - Occupational Therapy Treatment Note  [facility]     Patient Name: Yassine Love  : 1945  MRN: [id number]  Today's Date: 3/6/2018  Onset of Illness/Injury or Date of Surgery Date: 18  Date of Referral to OT: 18  Referring Physician: Dr. Watkins      Admit Date: 2018    Visit Dx:     ICD-10-CM ICD-9-CM   1. Bilateral cellulitis of lower leg L03.116 682.6    L03.115    2. RAFAEL (acute kidney injury) N17.9 584.9   3. PVD (peripheral vascular disease) I73.9 443.9   4. Impaired mobility and ADLs Z74.09 799.89   5. Impaired functional mobility, balance, gait, and endurance Z74.09 V49.89   6. Cellulitis of both lower extremities L03.115 682.6    L03.116    7. Chronic obstructive pulmonary disease, unspecified COPD type J44.9 496   8. Essential hypertension I10 401.9   9. Hypoxia R09.02 799.02   10. Cellulitis of right lower extremity L03.115 682.6     Patient Active Problem List   Diagnosis   • Cellulitis of right lower extremity   • RAFAEL (acute kidney injury)   • PVD (peripheral vascular disease)   • Cellulitis of both lower extremities   • COPD (chronic obstructive pulmonary disease)   • Essential hypertension   • Hypoxia   • Bilateral cellulitis of lower leg             Adult Rehabilitation Note       18 1053 18 1024 18 0910    Rehab Assessment/Intervention    Discipline occupational therapist  -HK physical therapist  -LM physical therapist  -    Document Type therapy note (daily note)  -HK therapy note (daily note)  -LM therapy note (daily note)  -MC    Subjective Information agree to therapy;no complaints  -HK agree to therapy   complains of BLE swelling  -LM agree to therapy;complains of;pain  -MC    Patient Effort, Rehab Treatment good  -HK good  -LM     Symptoms Noted During/After Treatment fatigue;shortness of breath  -HK fatigue;shortness of breath  -LM     Symptoms Noted Comment  Required standing rest breaks with ambulation, notified RN.  -LM      Precautions/Limitations fall precautions;oxygen therapy device and L/min  -HK fall precautions;oxygen therapy device and L/min   BLE wraps  -LM     Recorded by [HK] Violette Smith, OT [LM] Bekah Caro, PT [MC] Jessi Villa, PT    Vital Signs    Pre Systolic BP Rehab --   RN cleared for tx  -HK      Pre SpO2 (%) 92  -HK 92  -LM     O2 Delivery Pre Treatment supplemental O2  -HK supplemental O2  -LM     Intra SpO2 (%) 89  -HK      O2 Delivery Intra Treatment room air   washing face  -HK      Post SpO2 (%) 92  -HK 92  -LM     O2 Delivery Post Treatment supplemental O2  -HK supplemental O2  -LM     Recorded by [HK] Violette Smith, OT [LM] Bekah Caro, PT     Pain Assessment    Pain Assessment Rojas-Fontenot FACES  -HK Rojas-Baker FACES  -LM Rojas-Fontenot FACES  -MC    Rojas-Fontenot FACES Pain Rating 4  -HK 6  -LM 4  -MC    Pain Type Chronic pain  -HK Chronic pain  -LM     Pain Location Hip  -HK Hip   and knee  -LM     Pain Orientation Left  -HK Left  -LM     Pain Intervention(s) Repositioned;Ambulation/increased activity  -HK Repositioned;Ambulation/increased activity  -LM     Response to Interventions Pt tolerated  -HK      Recorded by [HK] Violette Smith, OT [LM] Bekah Caro, PT [MC] Jessi Villa, PT    Vision Assessment/Intervention    Visual Impairment WFL  -HK      Recorded by [HK] Violette Smith OT      Cognitive Assessment/Intervention    Current Cognitive/Communication Assessment functional  -HK functional  -LM     Orientation Status oriented x 4  -HK oriented x 4  -LM     Follows Commands/Answers Questions 100% of the time;able to follow single-step instructions  -% of the time;able to follow single-step instructions;needs cueing  -LM     Personal Safety mild impairment;decreased awareness, need for assist;decreased awareness, need for safety;decreased insight to deficits  -HK mild impairment;decreased awareness, need for assist;decreased awareness, need for safety  -LM     Recorded by [HK] Violette BATEMAN  Luis, OT [LM] Bekah Caro, PT     Bed Mobility, Assessment/Treatment    Bed Mob, Supine to Sit, [place]  not tested  -LM     Bed Mob, Sit to Supine, [place]  not tested  -LM     Bed Mobility, Comment Pt received sitting upright in chair  -HK Received sitting EOB, left UIC.  -LM     Recorded by [HK] Violette Smith, OT [LM] Bekah Caro, PT     Transfer Assessment/Treatment    Transfers, Sit-Stand [place] contact guard assist;verbal cues required  -HK contact guard assist;verbal cues required  -LM     Transfers, Stand-Sit [place] contact guard assist;verbal cues required  -HK contact guard assist;verbal cues required  -LM     Transfers, Sit-Stand-Sit, Assist Device rolling walker  -HK rolling walker  -LM     Toilet Transfer, [place]  contact guard assist;verbal cues required  -LM     Toilet Transfer, Assistive Device  rolling walker;elevated toilet seat  -LM     Transfer, Safety Issues  step length decreased  -LM     Transfer, Impairments strength decreased;impaired balance  -HK strength decreased;impaired balance  -LM     Transfer, Comment Pt requires verbal cues for safe hand placement   -HK Verbal cues for correct hand placement.  -LM     Recorded by [HK] Violette Smith, OT [LM] Bekah Caro, PT     Gait Assessment/Treatment    Gait, [place] Level  contact guard assist;verbal cues required  -LM     Gait, Assistive Device  rolling walker  -LM     Gait, Distance (Feet)  45  -LM     Gait, Gait Pattern Analysis  swing-to gait  -LM     Gait, Gait Deviations  bilateral:;vidhi decreased;forward flexed posture;step length decreased;weight-shifting ability decreased  -LM     Gait, Safety Issues  balance decreased during turns;step length decreased;weight-shifting ability decreased  -LM     Gait, Impairments  strength decreased;impaired balance;pain  -LM     Gait, Comment  Pt ambulated with step-to pattern at slow pace with forward flexed posture. Verbal cues for upright posture,  "increase step length, and remain within RW during turns. Pt required multiple standing rest breaks due to SOA and increasing left hip/knee \"bone on bone\" pain. Gait limited by pain and fatigue.  -LM     Recorded by  [LM] Bekah Caro, PT     Functional Mobility    Functional Mobility- Comment not tested  -HK      Recorded by [HK] Violette Smith, OT      Upper Body Bathing Assessment/Training    UB Bathing Assess/Train, Position sitting  -HK      UB Bathing Assess/Train, [place] Level minimum assist (75% patient effort);verbal cues required  -HK      UB Bathing Assess/Train, Impairments strength decreased;impaired balance;ROM decreased;decreased flexibility  -HK      UB Bathing Assess/Train, Comment OT provided Cecille to wash back   -HK      Recorded by [HK] Violette Smith OT      Lower Body Bathing Assessment/Training    LB Bathing Assess/Train, Position sitting;standing  -HK      LB Bathing Assess/Train, [place] Level supervision required;verbal cues required  -HK      LB Bathing Assess/Train, Impairments strength decreased;impaired balance;ROM decreased;decreased flexibility  -HK      LB Bathing Assess/Train, Comment OT provided verbal cues to ensure all of body was washed.   -HK      Recorded by [HK] Violette Smith, RAOUL      Toileting Assessment/Training    Toileting Assess/Train, Comment pt declined need to void  -HK      Recorded by [HK] Violette Smith OT      Grooming Assessment/Training    Grooming Assess/Train, Position sitting  -HK      Grooming Assess/Train, Indepen Level set up required  -HK      Grooming Assess/Train, Impairments strength decreased;impaired balance  -HK      Grooming Assess/Train, Comment Pt rinsed mouth with mouth wash but declined to brush teeth  -HK      Recorded by [HK] Violette Smith OT      Balance Skills Training    Sitting-Level of Assistance Close supervision  -HK      Sitting-Balance Support Feet supported  -HK      Standing-Level of Assistance Contact guard  -HK      Static " Standing Balance Support assistive device  -HK      Recorded by [HK] Violette Smith OT      Therapy Exercises    Bilateral Lower Extremities  --   deferred as pt with other staff (nsg&infec disease)  -LM     Bilateral Upper Extremity --   speech entered for swallow before exercises could be done.   -HK      Recorded by [HK] Violette Smith OT [LM] Bekah Caro PT     Positioning and Restraints    Pre-Treatment Position sitting in chair/recliner  -HK in bed  -LM in bed  -    Post Treatment Position chair  -HK chair  -LM bed  -MC    In Bed   supine;call light within reach;encouraged to call for assist  -MC    In Chair notified nsg;sitting;call light within reach;encouraged to call for assist;exit alarm on;with SLP  -HK reclined;sitting;call light within reach;encouraged to call for assist;exit alarm on;with other staff;with nsg  -LM     Recorded by [HK] Violette Smith OT [LM] Bekah Caro, PT [] Jessi Villa, PT      03/05/18 1556 03/05/18 1500 03/04/18 1403    Rehab Assessment/Intervention    Discipline physical therapist  - physical therapist  - occupational therapist  -TA    Document Type therapy note (daily note)  - therapy note (daily note)  - progress note;therapy note (daily note)  -TA    Subjective Information agree to therapy;complains of;pain;fatigue  - agree to therapy;complains of;pain  - agree to therapy;no complaints  -TA    Patient Effort, Rehab Treatment   good  -TA    Symptoms Noted During/After Treatment   shortness of breath  -TA    Symptoms Noted Comment had prior SOB episode per notes this date; Wound care PT left a few minutes prior to mobility PT entering  -      Precautions/Limitations   fall precautions;oxygen therapy device and L/min;other (see comments)   BLE wraps  -TA    Recorded by [] Mary Kate Parra, PT [MF] Carlos Hassan, PT [TA] Nehemias Aponte OT    Vital Signs    Pre SpO2 (%) 94  -EH  97  -TA    O2 Delivery Pre Treatment supplemental O2  -   supplemental O2  -TA    Intra SpO2 (%)   88  -TA    O2 Delivery Intra Treatment   supplemental O2  -TA    Post SpO2 (%) 92  -EH  94  -TA    O2 Delivery Post Treatment supplemental O2  -EH  supplemental O2  -TA    Pre Patient Position   Supine  -TA    Intra Patient Position   Standing  -TA    Post Patient Position   Sitting  -TA    Recorded by [] Mary Kate Parra, PT  [TA] Nehemias Aponte OT    Pain Assessment    Pain Assessment Rojas-Fontenot FACES  -EH Rojas-Baker FACES  - Rojas-Baker FACES  -TA    Rojas-Fontenot FACES Pain Rating 6  -EH 6  -MF 2  -TA    Pain Score   5  -TA    Post Pain Score   5  -TA    Pain Type Chronic pain  -EH Chronic pain  -MF Chronic pain  -TA    Pain Location Leg   and back  -EH Leg  -MF Leg  -TA    Pain Orientation Right;Left  -EH Right;Left  -MF Left;Right  -TA    Pain Intervention(s) Repositioned;Ambulation/increased activity  -  Repositioned;Ambulation/increased activity  -TA    Response to Interventions tolerated  -  tolerated  -TA    Recorded by [EH] Mary Kate Parra, PT [MF] Carlos Hassan, PT [TA] Nehemias Aponte, RAOUL    Cognitive Assessment/Intervention    Current Cognitive/Communication Assessment functional  -  functional  -TA    Orientation Status oriented to;person;situation;place  -  oriented to;person;place;situation  -TA    Follows Commands/Answers Questions 100% of the time;able to follow single-step instructions   with encouragement  -  100% of the time;able to follow single-step instructions  -TA    Personal Safety   mild impairment  -TA    Personal Safety Interventions   fall prevention program maintained;gait belt;nonskid shoes/slippers when out of bed;supervised activity  -TA    Recorded by [] Mary Kate Parra, PT  [TA] Nehemias Aponte, RAOUL    Bed Mobility, Assessment/Treatment    Bed Mobility, Assistive Device   bed rails  -TA    Bed Mobility, Scoot/Bridge, [place]   supervision required  -TA    Bed Mob, Supine to Sit, [place]    supervision required  -TA    Bed Mob, Sit to Supine, [place]   not tested  -TA    Bed Mobility, Impairments   strength decreased;pain  -TA    Bed Mobility, Comment Refuses mobility  -EH      Recorded by [EH] Mary Kate Parra, PT  [TA] Nehemias Aponte OT    Transfer Assessment/Treatment    Transfers, Sit-Stand [place]   contact guard assist;verbal cues required  -TA    Transfers, Stand-Sit [place]   contact guard assist;verbal cues required  -TA    Transfers, Sit-Stand-Sit, Assist Device   rolling walker  -TA    Transfer, Safety Issues   step length decreased;weight-shifting ability decreased  -TA    Transfer, Impairments   strength decreased;impaired balance  -TA    Transfer, Comment   VCs for safe technique  -TA    Recorded by   [TA] Nehemias Aponte OT    Functional Mobility    Functional Mobility- Ind. Level   contact guard assist;verbal cues required  -TA    Functional Mobility- Device   rolling walker  -TA    Functional Mobility-Distance (Feet)   5  -TA    Functional Mobility- Safety Issues   step length decreased;weight-shifting ability decreased;supplemental O2  -TA    Functional Mobility- Comment   VCs for adaptive breathing  -TA    Recorded by   [TA] Nehemias Aponte OT    Toileting Assessment/Training    Toileting Assess/Train, Assistive Device   urinal  -TA    Toileting Assess/Train, Position   sitting;edge of bed  -TA    Toileting Assess/Train, [place]epen Level   supervision required;set up required  -TA    Recorded by   [TA] Nehemias Aponte OT    Motor Skills/Interventions    Additional Documentation   Balance Skills Training (Group)  -TA    Recorded by   [TA] Nehemias Aponte OT    Balance Skills Training    Sitting-Level of Assistance   Distant supervision  -TA    Sitting-Balance Support   Feet supported  -TA    Standing-Level of Assistance   Contact guard  -TA    Static Standing Balance Support   assistive device  -TA    Standing-Balance Activities   Weight Shift  A-P;Weight Shift R-L;Reaching for objects  -TA    Recorded by   [TA] Nehemias Aponte OT    Therapy Exercises    Bilateral Lower Extremities AROM:;5 reps;ankle pumps/circles;quad sets;glut sets  -      Bilateral Upper Extremity AROM:;5 reps;elbow flexion/extension;hand pumps;shoulder extension/flexion;pronation/supination  -      Recorded by [EH] Mary Kate Parra, PT      Positioning and Restraints    Pre-Treatment Position in bed  - in bed  - in bed  -    Post Treatment Position bed  - bed  - chair  -TA    In Bed notified nsg;supine;call light within reach;encouraged to call for assist;exit alarm on  - supine;call light within reach  -     In Chair   reclined;call light within reach;encouraged to call for assist;exit alarm on;waffle cushion;legs elevated  -TA    Recorded by [EH] Mary Kate Parra, PT [MF] Carlos Hassan, PT [TA] Nehemias Aponte OT      User Key  (r) = Recorded By, (t) = Taken By, (c) = Cosigned By    Initials Name Effective Dates     Carlos Hassan, PT 06/19/15 -     EH Mary Kate Parra, PT 06/19/15 -     TA Nehemias Aponte, OT 03/14/16 -     NORY Villa, PT 03/14/16 -     LM Bekah Caro, PT 06/09/17 -     HK Violette Smith, OT 09/28/17 -                 OT Goals       03/04/18 1441 02/28/18 1100 02/23/18 1223    Transfer Training OT LTG    Transfer Training OT LTG, Outcome goal ongoing   CGA this date for STS from EOB  -TA goal ongoing   CGA level, close to meeting goal  -ROSEANNE goal ongoing  -AR    Patient Education OT LTG    Patient Education OT LTG Outcome goal ongoing   Progressing with adaptive breathing  -TA goal ongoing   good progress with AE and AB  -ROSEANNE goal ongoing  -AR    LB Dressing OT LTG    LB Dressing Goal OT LTG, Outcome goal partially met  -TA goal partially met   met donning and doffing socks.  -ROSEANNE goal ongoing  -AR    Endurance OT LTG    Endurance Goal OT LTG, Outcome goal ongoing   88% with toileting, fxl transfers  -TA goal  ongoing   89 % with dressing task, dropped toileting due 02 off  -ROSEANNE goal ongoing  -AR      02/22/18 1729          Transfer Training OT LTG    Transfer Training OT LTG, Date Established 02/22/18  -AR      Transfer Training OT LTG, Time to Achieve 1 wk  -AR      Transfer Training OT LTG, Activity Type toilet;sit to stand/stand to sit  -AR      Transfer Training OT LTG, [place] Level verbal cues required;supervision required  -AR      Transfer Training OT LTG, Assist Device commode, bedside;walker, rolling  -AR      Patient Education OT LTG    Patient Education OT LTG, Date Established 02/22/18  -AR      Patient Education OT LTG, Time to Achieve 1 wk  -AR      Patient Education OT LTG, Education Type adaptive equipment mgmt;energy conservation;work simplification;adaptive breathing  -AR      Patient Education OT LTG, Education Understanding demonstrates adequately;verbalizes understanding  -AR      LB Dressing OT LTG    LB Dressing Goal OT LTG, Date Established 02/22/18  -AR      LB Dressing Goal OT LTG, Time to Achieve 1 wk  -AR      LB Dressing Goal OT LTG, Activity Type Pt will complete LB dressing task   -AR      LB Dressing Goal OT LTG, [place] Level verbal cues required;moderate assist (50% patient effort)  -AR      LB Dressing Goal OT LTG, Adaptive Equipment sock-aid;reacher  -AR      Endurance OT LTG    Endurance Goal OT LTG, Date Established 02/22/18  -AR      Endurance Goal OT LTG, Time to Achieve 1 wk  -AR      Endurance Goal OT LTG, Additional Goal Pt will complete 5 minurtes ADL activity while maintaining oxygen saturation 90% or above with min cues for PLB and EC/WS  -AR        User Key  (r) = Recorded By, (t) = Taken By, (c) = Cosigned By    Initials Name Provider Type    ROSEANNE Gagnon, OT Occupational Therapist    RUPERTO Kumar, OT Occupational Therapist    MONIQUE Aponte, OT Occupational Therapist          Occupational Therapy Education     Title: PT OT SLP Therapies  (Active)     Topic: Occupational Therapy (Done)     Point: ADL training (Done)    Description: Instruct learner(s) on proper safety adaptation and remediation techniques during self care or transfers.   Instruct in proper use of assistive devices.    Learning Progress Summary    Learner Readiness Method Response Comment Documented by Status   Patient Acceptance E VU Pt educated on ADL retraining with self bathing and safety precautions. HK 03/06/18 1306 Done    Acceptance E,D VU,DU,NR AE for increased LBD indep.  need to keep 02 on at all times.  adaptive breathing. ROSEANNE 02/28/18 1059 Done    Eager E,TB,D VU,NR bed mobility, transfer training, BUE HEP, ADL retraining, PLB AR 02/23/18 1222 Done    LINNEA Hung VU,NR role of OT, goals of care, ADL retraining, bed mobility, transfer training AR 02/22/18 1728 Done               Point: Home exercise program (Done)    Description: Instruct learner(s) on appropriate technique for monitoring, assisting and/or progressing therapeutic exercises/activities.    Learning Progress Summary    Learner Readiness Method Response Comment Documented by Status   Patient Acceptance DUANED VU,NR Education re adaptive breathing with exertion to assist with mgt of SOA; reinforced need for call for assist with OOB activities. TA 03/04/18 1440 Done    LINNEA Hung VU,NR role of OT, goals of care, ADL retraining, bed mobility, transfer training AR 02/22/18 1728 Done               Point: Precautions (Done)    Description: Instruct learner(s) on prescribed precautions during self-care and functional transfers.    Learning Progress Summary    Learner Readiness Method Response Comment Documented by Status   Patient Acceptance E VU Pt educated on ADL retraining with self bathing and safety precautions. HK 03/06/18 1306 Done    Acceptance ED VU,NR Education re adaptive breathing with exertion to assist with mgt of SOA; reinforced need for call for assist with OOB activities. TA 03/04/18 1440 Done     Acceptance FRANSICO ZEPEDA,DU,NR AE for increased LBD indep.  need to keep 02 on at all times.  adaptive breathing. ROSEANNE 02/28/18 1059 Done    LINNEA Hung D VU,NR bed mobility, transfer training, BUE HEP, ADL retraining, PLB AR 02/23/18 1222 Done    LINNEA Hung VU,NR role of OT, goals of care, ADL retraining, bed mobility, transfer training AR 02/22/18 1728 Done               Point: Body mechanics (Done)    Description: Instruct learner(s) on proper positioning and spine alignment during self-care, functional mobility activities and/or exercises.    Learning Progress Summary    Learner Readiness Method Response Comment Documented by Status   Patient LINNEA Hung D VU,NR bed mobility, transfer training, BUE HEP, ADL retraining, PLB AR 02/23/18 1222 Done    LINNEA Hung VU,NR role of OT, goals of care, ADL retraining, bed mobility, transfer training AR 02/22/18 1728 Done                      User Key     Initials Effective Dates Name Provider Type Discipline    ROSEANNE 06/22/15 -  Marichuy Gagnon, OT Occupational Therapist OT    AR 06/22/15 -  Merline Kumar, OT Occupational Therapist OT    TA 03/14/16 -  Nehemias Aponte, OT Occupational Therapist OT     09/28/17 -  Violette Smith, OT Occupational Therapist OT                  OT Recommendation and Plan  Anticipated Equipment Needs At Discharge:  (defer-recommend SNF-level rehab)  Anticipated Discharge Disposition: skilled nursing facility  Therapy Frequency: daily (per priority policy)  Plan of Care Review  Plan Of Care Reviewed With: patient  Progress: improving  Outcome Summary/Follow up Plan: Pt completed UB and LB bathing with Cecille for back. Pt rinsed mouth with mouth wash with setup. Pt requires CGA and RW for sit to stand.  Continue IP OT per POC.         Outcome Measures       03/06/18 1053 03/06/18 1024 03/05/18 1556    How much help from another person do you currently need...    Turning from your back to your side while in flat bed without using bedrails?  4  -LM 4  -EH     Moving from lying on back to sitting on the side of a flat bed without bedrails?  3  -LM 3  -EH    Moving to and from a bed to a chair (including a wheelchair)?  3  -LM 3  -EH    Standing up from a chair using your arms (e.g., wheelchair, bedside chair)?  3  -LM 3  -EH    Climbing 3-5 steps with a railing?  2  -LM 2  -EH    To walk in hospital room?  3  -LM 3  -EH    AM-PAC 6 Clicks Score  18  -LM 18  -EH    How much help from another is currently needed...    Putting on and taking off regular lower body clothing? 2  -HK      Bathing (including washing, rinsing, and drying) 3  -HK      Toileting (which includes using toilet bed pan or urinal) 3  -HK      Putting on and taking off regular upper body clothing 2  -HK      Taking care of personal grooming (such as brushing teeth) 3  -HK      Eating meals 3  -HK      Score 16  -HK      Functional Assessment    Outcome Measure Options AM-PAC 6 Clicks Daily Activity (OT)  -HK AM-PAC 6 Clicks Basic Mobility (PT)  - AM-PAC 6 Clicks Basic Mobility (PT)  -      03/04/18 1403          How much help from another is currently needed...    Putting on and taking off regular lower body clothing? 2  -TA      Bathing (including washing, rinsing, and drying) 2  -TA      Toileting (which includes using toilet bed pan or urinal) 3  -TA      Putting on and taking off regular upper body clothing 3  -TA      Taking care of personal grooming (such as brushing teeth) 3  -TA      Eating meals 4  -TA      Score 17  -TA      Functional Assessment    Outcome Measure Options AM-PAC 6 Clicks Daily Activity (OT)  -TA        User Key  (r) = Recorded By, (t) = Taken By, (c) = Cosigned By    Initials Name Provider Type     Mary Kate Parra, PT Physical Therapist    TA Nehemias Aponte, OT Occupational Therapist    DARWIN Caro, PT Physical Therapist    HK Violette Smith, OT Occupational Therapist           Time Calculation:         Time Calculation- OT       03/06/18 1308          Time  Calculation- OT    OT Start Time 1053  -      Total Timed Code Minutes- OT 12 minute(s)  -HK      OT Received On 03/06/18  -      OT Goal Re-Cert Due Date 03/14/18  -        User Key  (r) = Recorded By, (t) = Taken By, (c) = Cosigned By    Initials Name Provider Type     Violette Smith OT Occupational Therapist           Therapy Charges for Today     Code Description Service Date Service Provider Modifiers Qty    [id number]  OT THERAPEUTIC ACT EA 15 MIN 3/6/2018 Violette Smith OT GO 1               Violette Smith OT  3/6/2018

## 2018-03-06 NOTE — PROGRESS NOTES
"    [facility] Medicine Services  PROGRESS NOTE    Patient Name: Yassine Love  : 1945  MRN: [id number]    Date of Admission: 2018  Length of Stay: 18  Primary Care Physician: No Known Provider    Subjective   Subjective     CC:  F/U LE pain    HPI:  Had another 'spell' of severe dyspnea while walking to [place] - \"thought I might die\".   Takes a long time to recover after getting back to bed  When [place] oxygen slips off, sats plunge to 70s.      Quit tob when he was admitted - jairo patch helping.     Review of Systems   HENT: Positive for trouble swallowing.      Gen-no fevers, no chills  CV-no palpitations  GI-no N/V/D, no abd pain      Otherwise ROS is negative except as mentioned in the HPI.    Objective   Objective     Vital Signs:   Temp:  [98 °F (36.7 °C)-98.2 °F (36.8 °C)] 98.1 °F (36.7 °C)  Heart Rate:  [] 104  Resp:  [16-18] 16  BP: (110-133)/(63-78) 132/72        Physical Exam:  Gen- asleep, hard to wake but wakes to full alertness.  Very pleasanat.  No acute distress at rest on oxygen    CV-RRR, S1 S2 normal, no m/r/g  Resp-decreased bilaterally throughout, nl WOB, few rhonchi today, rare wheeze  Abd-soft, NT, ND, +BS  Ext-BLE edema, both legs wrapped  Neuro-A&Ox3, no focal deficits  Psych-appropriate mood  musc-skel - neck thrust forward, hard time lifting head      Results Reviewed:  I have personally reviewed current lab, radiology, and data and agree.      Results from last 7 days  Lab Units 18  0605 18  0136 18  0501   WBC 10*3/mm3 9.11 9.89 10.47   HEMOGLOBIN g/dL 9.0* 9.3*  9.3* 9.3*   HEMATOCRIT % 28.5* 30.0*  30.1* 30.0*   PLATELETS 10*3/mm3 334 326 364       Results from last 7 days  Lab Units 18  0108 18  0605 18  0501 18  0451   SODIUM mmol/L  --  137 138 136   POTASSIUM mmol/L 4.4 4.2 4.1 4.0   CHLORIDE mmol/L  --  102 99 96*   CO2 mmol/L  --  32.0* 35.0* 34.0*   BUN mg/dL  --  22 36* 42*   CREATININE mg/dL  --  " 1.20 1.20 1.30   GLUCOSE mg/dL  --  104* 105* 97   CALCIUM mg/dL  --  8.5* 8.3* 8.4*           Microbiology Results Abnormal     Procedure Component Value - Date/Time    Influenza A & B, RT PCR - Swab, Nasopharynx [[id number]]  (Normal) Collected:  03/03/18 1356    Lab Status:  Final result Specimen:  Swab from Nasopharynx Updated:  03/03/18 1607     Influenza A PCR Not Detected     Influenza B PCR Not Detected    Blood Culture - Blood, [[id number]]  (Normal) Collected:  02/18/18 2308    Lab Status:  Final result Specimen:  Blood from Arm, Left Updated:  02/23/18 2331     Blood Culture No growth at 5 days    Blood Culture - Blood, [[id number]]  (Normal) Collected:  02/18/18 2311    Lab Status:  Final result Specimen:  Blood from Arm, Left Updated:  02/23/18 2331     Blood Culture No growth at 5 days    Wound Culture - Wound, Foot, Right [[id number]]  (Abnormal)  (Susceptibility) Collected:  02/16/18 1931    Lab Status:  Final result Specimen:  Wound from Foot, Right Updated:  02/23/18 1045     Wound Culture --      Light growth (2+) Pseudomonas aeruginosa (A)      Scant growth (1+) Escherichia coli (A)      Scant growth (1+) Proteus mirabilis (A)      Scant growth (1+) Enterococcus faecalis (A)      Scant growth (1+) Streptococcus, Beta Hemolytic, Group G (A)        If Clindamycin or Erythromycin is the drug of choice, notify the laboratory within 7 days to request susceptibility testing.        STREP GROUPING G     Gram Stain Result Few (2+) WBCs seen      Many (4+) Gram negative bacilli      Many (4+) Gram positive bacilli      Many (4+) Gram positive cocci in pairs and clusters    Susceptibility      Pseudomonas aeruginosa     FAVIAN     Aztreonam <=8 ug/ml Susceptible     Cefepime <=8 ug/ml Susceptible     Ceftazidime 4 ug/ml Susceptible     Gentamicin <=4 ug/ml Susceptible     Levofloxacin <=2 ug/ml Susceptible     Meropenem <=1 ug/ml Susceptible     Piperacillin + Tazobactam <=16 ug/ml Susceptible     Tobramycin <=4  ug/ml Susceptible                Susceptibility      Escherichia coli     FAVIAN     Ampicillin <=8 ug/ml Susceptible     Ampicillin + Sulbactam <=8/4 ug/ml Susceptible     Aztreonam <=8 ug/ml Susceptible     Cefepime <=8 ug/ml Susceptible     Cefotaxime <=2 ug/ml Susceptible     Ceftriaxone <=8 ug/ml Susceptible     Cefuroxime sodium <=4 ug/ml Susceptible     Ertapenem <=1 ug/ml Susceptible     Gentamicin <=4 ug/ml Susceptible     Levofloxacin <=2 ug/ml Susceptible     Meropenem <=1 ug/ml Susceptible     Piperacillin + Tazobactam <=16 ug/ml Susceptible     Tetracycline <=4 ug/ml Susceptible     Tobramycin <=4 ug/ml Susceptible     Trimethoprim + Sulfamethoxazole <=2/38 ug/ml Susceptible                Susceptibility      Proteus mirabilis     FAVIAN     Ampicillin >16 ug/ml Resistant     Ampicillin + Sulbactam >16/8 ug/ml Resistant     Aztreonam <=8 ug/ml Susceptible     Cefepime <=8 ug/ml Susceptible     Cefotaxime <=2 ug/ml Susceptible     Ceftriaxone <=8 ug/ml Susceptible     Cefuroxime sodium <=4 ug/ml Susceptible     Ertapenem <=1 ug/ml Susceptible     Gentamicin <=4 ug/ml Susceptible     Levofloxacin <=2 ug/ml Susceptible     Meropenem <=1 ug/ml Susceptible     Piperacillin + Tazobactam <=16 ug/ml Susceptible     Tetracycline >8 ug/ml Resistant     Tobramycin <=4 ug/ml Susceptible     Trimethoprim + Sulfamethoxazole >2/38 ug/ml Resistant                Susceptibility      Enterococcus faecalis     FAVIAN     Ampicillin <=2 ug/ml Susceptible     Gentamicin High Level Synergy <=500 ug/ml Susceptible     Linezolid 2 ug/ml Susceptible     Penicillin G 2 ug/ml Susceptible     Streptomycin High Level Synergy <=1000 ug/ml Susceptible     Vancomycin 1 ug/ml Susceptible                    Blood Culture - Blood, Blood, Venous Line [[id number]]  (Normal) Collected:  02/16/18 2010    Lab Status:  Final result Specimen:  Blood from Arm, Left Updated:  02/21/18 2046     Blood Culture No growth at 5 days    Blood Culture - Blood,  Blood, Venous Line [[id number]]  (Normal) Collected:  02/16/18 2005    Lab Status:  Final result Specimen:  Blood from Arm, Right Updated:  02/21/18 2046     Blood Culture No growth at 5 days    Respiratory Culture - Sputum, Cough [[id number]] Collected:  02/19/18 0656    Lab Status:  Final result Specimen:  Sputum from Cough Updated:  02/21/18 0931     Respiratory Culture --      Scant growth (1+) Normal Respiratory Nataliia     Gram Stain Result Occasional WBCs per low power field      No organisms seen          I have reviewed the medications.    Assessment/Plan   Assessment / Plan     Hospital Problem List     * (Principal)Cellulitis of both lower extremities    RAFAEL (acute kidney injury)    PVD (peripheral vascular disease)    COPD (chronic obstructive pulmonary disease)    Essential hypertension    Hypoxia    Bilateral cellulitis of lower leg           Brief Hospital Course to date:  Yassine Love is a 72 y.o. male  with a past medical history significant for PVD, essential hypertension, COPD, cellulitis of lower extremities, and tobacco abuse who presents to the ED with complaints of fevers, chills and bilateral lower extremity pain/edema/redness.    Assessment & Plan:    Acute hypoxemic respiratory failure -   - secondary to probable aspiration, COPD exacerbation, diastolic heart failure  -- Pulm recs noted and appreciated  --  CT-A shows no PE  - Continue daily Bumex, hold for SBP less than 100  - Daily fluid restriction to 1500cc (which patient is non complaint with)  - treating COPD as below    COPD exacerbation: Symbicort, Duonebs,repeat 5 day course of prednisone,       Acute on chronic diastolic CHF: ECHO 06/2017 EF normal    LE cellulitis/ Chronic LE wounds: switched to minocycline through 3/10, ID on board. Continue PO morphine at home dose PRN - OLIVA reviewed. Decreased gabapentin back to prior dose, increased dose made him drowsy. Continue PT WOC for bilateral LE wrapping.    HTN: Holding BP meds for  normotension    RAFAEL: Baseline Cr 0.9-1.3. Stable    Anemia: mixed anemia, Iron low, will start oral Iron, check stool for occult, monitor H&H, no signs of overt bleeding    DVT Prophylaxis:  [facility]    CODE STATUS: Conditional Code    Disposition: refusing rehab    Yumiko Castanon MD  03/06/18  11:42 AM
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
## 2018-03-06 NOTE — THERAPY TREATMENT NOTE
Acute Care - Physical Therapy Treatment Note  [facility]     Patient Name: Yassine Love  : 1945  MRN: [id number]  Today's Date: 3/6/2018  Onset of Illness/Injury or Date of Surgery Date: 18  Date of Referral to PT: 18  Referring Physician: Dr. Watkins    Admit Date: 2018    Visit Dx:    ICD-10-CM ICD-9-CM   1. Bilateral cellulitis of lower leg L03.116 682.6    L03.115    2. RAFAEL (acute kidney injury) N17.9 584.9   3. PVD (peripheral vascular disease) I73.9 443.9   4. Impaired mobility and ADLs Z74.09 799.89   5. Impaired functional mobility, balance, gait, and endurance Z74.09 V49.89   6. Cellulitis of both lower extremities L03.115 682.6    L03.116    7. Chronic obstructive pulmonary disease, unspecified COPD type J44.9 496   8. Essential hypertension I10 401.9   9. Hypoxia R09.02 799.02   10. Cellulitis of right lower extremity L03.115 682.6     Patient Active Problem List   Diagnosis   • Cellulitis of right lower extremity   • RAFAEL (acute kidney injury)   • PVD (peripheral vascular disease)   • Cellulitis of both lower extremities   • COPD (chronic obstructive pulmonary disease)   • Essential hypertension   • Hypoxia   • Bilateral cellulitis of lower leg               Adult Rehabilitation Note       18 1024 18 0910 18 1556    Rehab Assessment/Intervention    Discipline physical therapist  -LM physical therapist  -MC physical therapist  -    Document Type therapy note (daily note)  -LM therapy note (daily note)  - therapy note (daily note)  -    Subjective Information agree to therapy   complains of BLE swelling  - agree to therapy;complains of;pain  - agree to therapy;complains of;pain;fatigue  -    Patient Effort, Rehab Treatment good  -      Symptoms Noted During/After Treatment fatigue;shortness of breath  -      Symptoms Noted Comment Required standing rest breaks with ambulation, notified RN.  -LM  had prior SOB episode per notes this date; Wound  care PT left a few minutes prior to mobility PT entering  -    Precautions/Limitations fall precautions;oxygen therapy device and L/min   BLE wraps  -LM      Recorded by [LM] Bekah Caro, PT [] Jessi Villa, PT [] Mary Kate Parra, PT    Vital Signs    Pre SpO2 (%) 92  -LM  94  -EH    O2 Delivery Pre Treatment supplemental O2  -LM  supplemental O2  -EH    Post SpO2 (%) 92  -LM  92  -EH    O2 Delivery Post Treatment supplemental O2  -LM  supplemental O2  -EH    Recorded by [LM] Bekah Caro, PT  [EH] Mary Kate Parra, PT    Pain Assessment    Pain Assessment Rojas-Fontenot FACES  -LM Rojas-Fontenot FACES  -MC Rojas-Baker FACES  -EH    Rojas-Baker FACES Pain Rating 6  -LM 4  -MC 6  -EH    Pain Type Chronic pain  -LM  Chronic pain  -EH    Pain Location Hip   and knee  -LM  Leg   and back  -EH    Pain Orientation Left  -LM  Right;Left  -EH    Pain Intervention(s) Repositioned;Ambulation/increased activity  -LM  Repositioned;Ambulation/increased activity  -EH    Response to Interventions   tolerated  -EH    Recorded by [LM] Bekah Caro, PT [] Jessi Villa, PT [EH] Mary Kate Parra, PT    Cognitive Assessment/Intervention    Current Cognitive/Communication Assessment functional  -LM  functional  -EH    Orientation Status oriented x 4  -LM  oriented to;person;situation;place  -    Follows Commands/Answers Questions 100% of the time;able to follow single-step instructions;needs cueing  -  100% of the time;able to follow single-step instructions   with encouragement  -    Personal Safety mild impairment;decreased awareness, need for assist;decreased awareness, need for safety  -LM      Recorded by [LM] Bekah Caro, PT  [EH] Mary Kate Parra, PT    Bed Mobility, Assessment/Treatment    Bed Mob, Supine to Sit, [place] not tested  -LM      Bed Mob, Sit to Supine, [place] not tested  -LM      Bed Mobility, Comment Received sitting EOB, left UIC.  -LM  Refuses mobility  -     "Recorded by [LM] Bekah Caro, PT  [EH] Mary Kate Parra, PT    Transfer Assessment/Treatment    Transfers, Sit-Stand [place] contact guard assist;verbal cues required  -LM      Transfers, Stand-Sit [place] contact guard assist;verbal cues required  -LM      Transfers, Sit-Stand-Sit, Assist Device rolling walker  -LM      Toilet Transfer, [place] contact guard assist;verbal cues required  -LM      Toilet Transfer, Assistive Device rolling walker;elevated toilet seat  -LM      Transfer, Safety Issues step length decreased  -LM      Transfer, Impairments strength decreased;impaired balance  -LM      Transfer, Comment Verbal cues for correct hand placement.  -LM      Recorded by [LM] Bekah Caro, PT      Gait Assessment/Treatment    Gait, [place] Level contact guard assist;verbal cues required  -LM      Gait, Assistive Device rolling walker  -LM      Gait, Distance (Feet) 45  -LM      Gait, Gait Pattern Analysis swing-to gait  -LM      Gait, Gait Deviations bilateral:;vidhi decreased;forward flexed posture;step length decreased;weight-shifting ability decreased  -LM      Gait, Safety Issues balance decreased during turns;step length decreased;weight-shifting ability decreased  -LM      Gait, Impairments strength decreased;impaired balance;pain  -LM      Gait, Comment Pt ambulated with step-to pattern at slow pace with forward flexed posture. Verbal cues for upright posture, increase step length, and remain within RW during turns. Pt required multiple standing rest breaks due to SOA and increasing left hip/knee \"bone on bone\" pain. Gait limited by pain and fatigue.  -LM      Recorded by [LM] Bekah aCro, PT      Therapy Exercises    Bilateral Lower Extremities --   deferred as pt with other staff (nsg&infec disease)  -  AROM:;5 reps;ankle pumps/circles;quad sets;glut sets  -    Bilateral Upper Extremity   AROM:;5 reps;elbow flexion/extension;hand pumps;shoulder " extension/flexion;pronation/supination  -    Recorded by [LM] Bekah Caro, PT  [EH] Mary Kate Parra, PT    Positioning and Restraints    Pre-Treatment Position in bed  - in bed  - in bed  -    Post Treatment Position chair  - bed  - bed  -    In Bed  supine;call light within reach;encouraged to call for assist  - notified nsg;supine;call light within reach;encouraged to call for assist;exit alarm on  -    In Chair reclined;sitting;call light within reach;encouraged to call for assist;exit alarm on;with other staff;with nsg  -LM      Recorded by [LM] Bekah Caro, PT [] Jessi Villa PT [] Mary Kate Parra, PT      03/05/18 1500 03/04/18 1403       Rehab Assessment/Intervention    Discipline physical therapist  -MF occupational therapist  -TA     Document Type therapy note (daily note)  -MF progress note;therapy note (daily note)  -TA     Subjective Information agree to therapy;complains of;pain  - agree to therapy;no complaints  -TA     Patient Effort, Rehab Treatment  good  -TA     Symptoms Noted During/After Treatment  shortness of breath  -TA     Precautions/Limitations  fall precautions;oxygen therapy device and L/min;other (see comments)   BLE wraps  -TA     Recorded by [MF] Carlos Hassan, PT [TA] Nehemias Aponte OT     Vital Signs    Pre SpO2 (%)  97  -TA     O2 Delivery Pre Treatment  supplemental O2  -TA     Intra SpO2 (%)  88  -TA     O2 Delivery Intra Treatment  supplemental O2  -TA     Post SpO2 (%)  94  -TA     O2 Delivery Post Treatment  supplemental O2  -TA     Pre Patient Position  Supine  -TA     Intra Patient Position  Standing  -TA     Post Patient Position  Sitting  -TA     Recorded by  [TA] Nehemias Aponte OT     Pain Assessment    Pain Assessment Rojas-Baker FACES  - Rojas-Baker FACES  -TA     Rojas-Fontenot FACES Pain Rating 6  - 2  -TA     Pain Score  5  -TA     Post Pain Score  5  -TA     Pain Type Chronic pain  - Chronic pain  -TA     Pain  Location Leg  -MF Leg  -TA     Pain Orientation Right;Left  -MF Left;Right  -TA     Pain Intervention(s)  Repositioned;Ambulation/increased activity  -TA     Response to Interventions  tolerated  -TA     Recorded by [MF] Carlos Hassan, PT [TA] Nehemias Aponte OT     Cognitive Assessment/Intervention    Current Cognitive/Communication Assessment  functional  -TA     Orientation Status  oriented to;person;place;situation  -TA     Follows Commands/Answers Questions  100% of the time;able to follow single-step instructions  -TA     Personal Safety  mild impairment  -TA     Personal Safety Interventions  fall prevention program maintained;gait belt;nonskid shoes/slippers when out of bed;supervised activity  -TA     Recorded by  [TA] Nehemias Aponte OT     Bed Mobility, Assessment/Treatment    Bed Mobility, Assistive Device  bed rails  -TA     Bed Mobility, Scoot/Bridge, [place]  supervision required  -TA     Bed Mob, Supine to Sit, [place]  supervision required  -TA     Bed Mob, Sit to Supine, [place]  not tested  -TA     Bed Mobility, Impairments  strength decreased;pain  -TA     Recorded by  [TA] Nehemias Aponte OT     Transfer Assessment/Treatment    Transfers, Sit-Stand [place]  contact guard assist;verbal cues required  -TA     Transfers, Stand-Sit [place]  contact guard assist;verbal cues required  -TA     Transfers, Sit-Stand-Sit, Assist Device  rolling walker  -TA     Transfer, Safety Issues  step length decreased;weight-shifting ability decreased  -TA     Transfer, Impairments  strength decreased;impaired balance  -TA     Transfer, Comment  VCs for safe technique  -TA     Recorded by  [TA] Nehemias Aponte OT     Functional Mobility    Functional Mobility- Ind. Level  contact guard assist;verbal cues required  -TA     Functional Mobility- Device  rolling walker  -TA     Functional Mobility-Distance (Feet)  5  -TA     Functional Mobility- Safety Issues  step length  decreased;weight-shifting ability decreased;supplemental O2  -TA     Functional Mobility- Comment  VCs for adaptive breathing  -TA     Recorded by  [TA] Nehemias Aponte OT     Toileting Assessment/Training    Toileting Assess/Train, Assistive Device  urinal  -TA     Toileting Assess/Train, Position  sitting;edge of bed  -TA     Toileting Assess/Train, Indepen Level  supervision required;set up required  -TA     Recorded by  [TA] Nehemias Aponte OT     Motor Skills/Interventions    Additional Documentation  Balance Skills Training (Group)  -TA     Recorded by  [TA] Nehemias Aponte OT     Balance Skills Training    Sitting-Level of Assistance  Distant supervision  -TA     Sitting-Balance Support  Feet supported  -TA     Standing-Level of Assistance  Contact guard  -TA     Static Standing Balance Support  assistive device  -TA     Standing-Balance Activities  Weight Shift A-P;Weight Shift R-L;Reaching for objects  -TA     Recorded by  [TA] Nehemias Aponte OT     Positioning and Restraints    Pre-Treatment Position in bed  -MF in bed  -TA     Post Treatment Position bed  - chair  -TA     In Bed supine;call light within reach  -      In Chair  reclined;call light within reach;encouraged to call for assist;exit alarm on;waffle cushion;legs elevated  -TA     Recorded by [MF] Carlos Hassan, PT [TA] Nehemias Aponte OT       User Key  (r) = Recorded By, (t) = Taken By, (c) = Cosigned By    Initials Name Effective Dates     Carlos Hassan, PT 06/19/15 -     EH Mary Kate Parra, PT 06/19/15 -     TA Nehemias Aponte OT 03/14/16 -      Jessi iVlla, PT 03/14/16 -     LM Bekah Caro, PT 06/09/17 -                 IP PT Goals       03/06/18 1051 03/06/18 0910 03/05/18 1613    Transfer Training PT LTG    Transfer Training PT  LTG, Date Goal Reviewed 03/06/18  -LM      Transfer Training PT LTG, Outcome goal ongoing  -LM  goal ongoing  -EH    Gait Training PT LTG    Gait Training Goal  PT LTG, Date Goal Reviewed 03/06/18  -LM      Gait Training Goal PT LTG, Outcome goal ongoing  -LM  goal ongoing  -EH    Wound Care PT LTG    Wound Care PT LTG 1, Outcome  goal partially met  -MC     Wound Care 2 PT LTG    Wound Care PT LTG 2, Outcome  goal partially met  -MC       03/02/18 1127 03/01/18 1058 02/26/18 1445    Bed Mobility PT LTG    Bed Mobility PT LTG, Date Goal Reviewed   02/26/18  -LM    Bed Mobility PT LTG, Outcome  goal ongoing  -EH goal ongoing  -LM    Transfer Training PT LTG    Transfer Training PT  LTG, Date Goal Reviewed 03/02/18  -AS  02/26/18  -LM    Transfer Training PT LTG, Outcome goal ongoing  -AS goal ongoing  -EH goal ongoing  -LM    Gait Training PT LTG    Gait Training Goal PT LTG, Date Goal Reviewed 03/02/18  -AS  02/26/18  -LM    Gait Training Goal PT LTG, Outcome goal ongoing  -AS goal ongoing  -EH goal ongoing  -LM      02/23/18 0949 02/22/18 1156 02/22/18 1050    Bed Mobility PT LTG    Bed Mobility PT LTG, Date Established  02/22/18  -KM     Bed Mobility PT LTG, Time to Achieve  2 wks  -KM     Bed Mobility PT LTG, Activity Type  all bed mobility  -KM     Bed Mobility PT LTG, Long Bottom Level  independent  -KM     Bed Mobility PT LTG, Outcome goal ongoing  -BD      Transfer Training PT LTG    Transfer Training PT LTG, Date Established  02/22/18  -KM     Transfer Training PT LTG, Time to Achieve  2 wks  -KM     Transfer Training PT LTG, Activity Type  bed to chair /chair to bed;sit to stand/stand to sit  -KM     Transfer Training PT LTG, [place] Level  independent  -KM     Transfer Training PT LTG, Assist Device  walker, rolling  -KM     Transfer Training PT LTG, Outcome goal ongoing  -BD      Gait Training PT LTG    Gait Training Goal PT LTG, Date Established  02/22/18  -KM     Gait Training Goal PT LTG, Time to Achieve  2 wks  -KM     Gait Training Goal PT LTG, Long [place] Level  independent  -KM     Gait Training Goal PT LTG, Assist Device  walker, rolling  -KM      Gait Training Goal PT LTG, Distance to Achieve  100  -KM     Gait Training Goal PT LTG, Outcome goal ongoing  -BD      Wound Care PT LTG    Wound Care PT LTG 1, Outcome   goal partially met  -MF    Wound Care 2 PT LTG    Wound Care PT LTG 2, Outcome   goal partially met  -MF      User Key  (r) = Recorded By, (t) = Taken By, (c) = Cosigned By    Initials Name Provider Type     Carlos Hassan, PT Physical Therapist     Mary Kate Parra, PT Physical Therapist     Carmela Washington, PT Physical Therapist    AS Yasmine Fulelr, PTA Physical Therapy Assistant     Jessi Villa, PT Physical Therapist    BD Estrella Rojo, PT Physical Therapist    LM Bekah Caro, PT Physical Therapist          Physical Therapy Education     Title: PT OT SLP Therapies (Active)     Topic: Physical Therapy (Active)     Point: Mobility training (Done)    Learning Progress Summary    Learner Readiness Method Response Comment Documented by Status   Patient Acceptance E,D NR,VU Reviewed benefits of activity, ambulating with nsg, safety with mobility/transfers, correct gait mechanics.  03/06/18 1051 Done    Acceptance E NR   03/05/18 1613 Active    Acceptance E NR  AS 03/02/18 1126 Active    Acceptance E VU,NR   03/01/18 1058 Done    Acceptance E,D NR Reviewed benefits of activity, HEP, safety with mobility.  02/26/18 1445 Active    Acceptance E NR   02/23/18 0949 Active    Acceptance E VU   02/22/18 1159 Done               Point: Home exercise program (Active)    Learning Progress Summary    Learner Readiness Method Response Comment Documented by Status   Patient Acceptance E NR   03/05/18 1613 Active    Acceptance E NR  AS 03/02/18 1126 Active    Acceptance E VU,NR   03/01/18 1058 Done    Acceptance E,D NR Reviewed benefits of activity, HEP, safety with mobility.  02/26/18 1445 Active    Acceptance E NR   02/23/18 0949 Active    Acceptance E VU   02/22/18 1159 Done               Point: Body  mechanics (Done)    Learning Progress Summary    Learner Readiness Method Response Comment Documented by Status   Patient Acceptance E,D NR,VU Reviewed benefits of activity, ambulating with nsg, safety with mobility/transfers, correct gait mechanics.  03/06/18 1051 Done    Acceptance E NR  AS 03/02/18 1126 Active    Acceptance E VU,NR   03/01/18 1058 Done    Acceptance E,D NR Reviewed benefits of activity, HEP, safety with mobility.  02/26/18 1445 Active    Acceptance E NR   02/23/18 0949 Active    Acceptance E VU   02/22/18 1159 Done               Point: Precautions (Done)    Learning Progress Summary    Learner Readiness Method Response Comment Documented by Status   Patient Acceptance E,D NR,VU Reviewed benefits of activity, ambulating with nsg, safety with mobility/transfers, correct gait mechanics.  03/06/18 1051 Done    Acceptance E NR   03/05/18 1613 Active    Acceptance E NR  AS 03/02/18 1126 Active    Acceptance E VU,NR   03/01/18 1058 Done    Acceptance E,D NR Reviewed benefits of activity, HEP, safety with mobility.  02/26/18 1445 Active    Acceptance E NR   02/23/18 0949 Active    Acceptance E VU   02/22/18 1159 Done    Acceptance E VU reviewed POC for skin/ edema care.  02/17/18 1126 Done                      User Key     Initials Effective Dates Name Provider Type Discipline     06/19/15 -  Mary Kate Parra, PT Physical Therapist PT     06/19/15 -  Carmela Washington, PT Physical Therapist PT    AS 06/22/15 -  Yasmine Fuller, PTA Physical Therapy Assistant PT     02/12/18 -  Karen Lopez, PT Physical Therapist PT     06/13/16 -  Estrella Rojo, PT Physical Therapist PT     06/09/17 -  Bekah Caro, PT Physical Therapist PT                    PT Recommendation and Plan  Anticipated Discharge Disposition: home with home health, skilled nursing facility  Planned Therapy Interventions: wound care  Plan of Care Review  Plan Of Care Reviewed With:  patient  Progress: progress toward functional goals as expected  Outcome Summary/Follow up Plan: Pt increased ambulation distance to 45 feet with CGA and RW, limited by increased left hip/knee pain and SOA. Pt requires CGA for bed mobility and transfers, will progress with mobility as able.           Outcome Measures       03/06/18 1024 03/05/18 1556 03/04/18 1403    How much help from another person do you currently need...    Turning from your back to your side while in flat bed without using bedrails? 4  -LM 4  -EH     Moving from lying on back to sitting on the side of a flat bed without bedrails? 3  -LM 3  -EH     Moving to and from a bed to a chair (including a wheelchair)? 3  -LM 3  -EH     Standing up from a chair using your arms (e.g., wheelchair, bedside chair)? 3  -LM 3  -EH     Climbing 3-5 steps with a railing? 2  -LM 2  -EH     To walk in hospital room? 3  -LM 3  -EH     AM-PAC 6 Clicks Score 18  -LM 18  -EH     How much help from another is currently needed...    Putting on and taking off regular lower body clothing?   2  -TA    Bathing (including washing, rinsing, and drying)   2  -TA    Toileting (which includes using toilet bed pan or urinal)   3  -TA    Putting on and taking off regular upper body clothing   3  -TA    Taking care of personal grooming (such as brushing teeth)   3  -TA    Eating meals   4  -TA    Score   17  -TA    Functional Assessment    Outcome Measure Options AM-PAC 6 Clicks Basic Mobility (PT)  -LM AM-PAC 6 Clicks Basic Mobility (PT)  -EH AM-PAC 6 Clicks Daily Activity (OT)  -TA      User Key  (r) = Recorded By, (t) = Taken By, (c) = Cosigned By    Initials Name Provider Type    EH Mary Kate Parra, PT Physical Therapist    TA Nehemias Aponte, OT Occupational Therapist    DARWIN Caro, PT Physical Therapist           Time Calculation:         PT Charges       03/06/18 1053 03/06/18 0910       Time Calculation    Start Time 1024  -LM 0910  -     PT Received On  03/06/18  -LM      PT Goal Re-Cert Due Date 03/15/18  -LM 03/15/18  -     Time Calculation- PT    Total Timed Code Minutes- PT 23 minute(s)  -LM 8 minute(s)  -       User Key  (r) = Recorded By, (t) = Taken By, (c) = Cosigned By    Initials Name Provider Type    NORY Villa, PT Physical Therapist    LM Bekah Caro, PT Physical Therapist          Therapy Charges for Today     Code Description Service Date Service Provider Modifiers Qty    60496206110 HC GAIT TRAINING EA 15 MIN 3/6/2018 Bekah Caro, PT GP 1    89116247624 HC PT THER PROC EA 15 MIN 3/6/2018 Bekah Caro, PT GP 1          PT G-Codes  Outcome Measure Options: AM-PAC 6 Clicks Basic Mobility (PT)    Bekah Caro PT  3/6/2018

## 2018-03-06 NOTE — THERAPY WOUND CARE TREATMENT
Acute Care - Wound/Debridement Treatment Note  [facility]     Patient Name: Yassine Love  : 1945  MRN: [id number]  Today's Date: 3/6/2018  Onset of Illness/Injury or Date of Surgery Date: 18   Date of Referral to PT: 18   Referring Physician: Dr. Watkins       Admit Date: 2018    Visit Dx:    ICD-10-CM ICD-9-CM   1. Bilateral cellulitis of lower leg L03.116 682.6    L03.115    2. RAFAEL (acute kidney injury) N17.9 584.9   3. PVD (peripheral vascular disease) I73.9 443.9   4. Impaired mobility and ADLs Z74.09 799.89   5. Impaired functional mobility, balance, gait, and endurance Z74.09 V49.89   6. Cellulitis of both lower extremities L03.115 682.6    L03.116    7. Chronic obstructive pulmonary disease, unspecified COPD type J44.9 496   8. Essential hypertension I10 401.9   9. Hypoxia R09.02 799.02   10. Cellulitis of right lower extremity L03.115 682.6       Patient Active Problem List   Diagnosis   • Cellulitis of right lower extremity   • RAFAEL (acute kidney injury)   • PVD (peripheral vascular disease)   • Cellulitis of both lower extremities   • COPD (chronic obstructive pulmonary disease)   • Essential hypertension   • Hypoxia   • Bilateral cellulitis of lower leg               LDA Wound       18 1500          [REMOVED] Wound 18 2225 Bilateral lower leg     Wound - Properties Group Date first assessed: 18  -KB Time first assessed: 2225  -KB Side: Bilateral  -KB Orientation: lower  -KB Location: leg  -KB Resolution Date: 18  -MF Resolution Time: 1530  -MF    Wound WDL ex  -MF      Dressing Appearance dry;intact  -MF      Base closed/resurfaced  -MF      Wound 18 1020 Bilateral gluteal other (see comments)    Wound - Properties Group Date first assessed: 18  -AS Time first assessed: 1020  -AS Side: Bilateral  -AS Location: gluteal  -AS Type: other (see comments)  -AS, friction/shearing        User Key  (r) = Recorded By, (t) = Taken By, (c) = Cosigned By     Initials Name Provider Type     Carlos Hassan, PT Physical Therapist    BRITTNI Queen, RN Registered Nurse    AS Dhaval Cardozo, RN Registered Nurse              Lymphedema       03/06/18 0910 03/05/18 1500       Lymphedema Edema Assessment    Ptting Edema Category  By severity  -     Pitting Edema  Mild  -     Skin Changes/Observations    Location/Assessment  Lower Extremity  -     Lower Extremity Conditions  bilateral:;scaly;fragile  -     Lower Extremity Color/Pigment  bilateral:;erythema;red  -     Lymphedema Pulses/Capillary Refill    Lymphedema Pulses/Capillary Refill capillary refill  -      Capillary Refill lower extremity capillary refill  -      Lower Extremity Capillary Refill right:;left:;less than 3 seconds  -      Compression/Skin Care    Compression/Skin Care  skin care;wrapping location;bandaging  -     Skin Care  washed/dried;lotion applied  -     Wrapping Location  lower extremity  -     Wrapping Location LE  bilateral:;toes;foot;ankle;calf  -     Wrapping Comments  unna boots with coban and spandage to secure  -     Bandaging Comments BLE wraps intact with no apparent issues or complaints from the pt.  -        User Key  (r) = Recorded By, (t) = Taken By, (c) = Cosigned By    Initials Name Provider Type     Carlos Hassan, PT Physical Therapist    NORY Villa, PT Physical Therapist          WOUND DEBRIDEMENT                     Adult Rehabilitation Note       03/06/18 0910 03/05/18 1556 03/05/18 1500    Rehab Assessment/Intervention    Discipline physical therapist  - physical therapist  - physical therapist  -    Document Type therapy note (daily note)  - therapy note (daily note)  - therapy note (daily note)  -    Subjective Information agree to therapy;complains of;pain  - agree to therapy;complains of;pain;fatigue  - agree to therapy;complains of;pain  -    Symptoms Noted Comment  had prior SOB episode per notes this date;  Wound care PT left a few minutes prior to mobility PT entering  -EH     Recorded by [] Jessi Villa, PT [] Mary Kate Parra, PT [] Carlos Hassan, PT    Vital Signs    Pre SpO2 (%)  94  -EH     O2 Delivery Pre Treatment  supplemental O2  -EH     Post SpO2 (%)  92  -EH     O2 Delivery Post Treatment  supplemental O2  -EH     Recorded by  [] Mary Kate Parra, PT     Pain Assessment    Pain Assessment Rojas-Fontenot FACES  - Rojas-Baker FACES  - Rojas-Baker FACES  -    Rojas-Baker FACES Pain Rating 4  -MC 6  -EH 6  -MF    Pain Type  Chronic pain  -EH Chronic pain  -    Pain Location  Leg   and back  - Leg  -MF    Pain Orientation  Right;Left  -EH Right;Left  -    Pain Intervention(s)  Repositioned;Ambulation/increased activity  -     Response to Interventions  tolerated  -EH     Recorded by [] Jessi Villa, PT [] Mary Kate Parra, PT [] Carlos Hassan, PT    Cognitive Assessment/Intervention    Current Cognitive/Communication Assessment  functional  -     Orientation Status  oriented to;person;situation;place  -     Follows Commands/Answers Questions  100% of the time;able to follow single-step instructions   with encouragement  -EH     Recorded by  [] Mary Kate Parra, PT     Bed Mobility, Assessment/Treatment    Bed Mobility, Comment  Refuses mobility  -EH     Recorded by  [] Mary Kate Parra, PT     Therapy Exercises    Bilateral Lower Extremities  AROM:;5 reps;ankle pumps/circles;quad sets;glut sets  -     Bilateral Upper Extremity  AROM:;5 reps;elbow flexion/extension;hand pumps;shoulder extension/flexion;pronation/supination  -EH     Recorded by  [] Mary Kate Parra, PT     Positioning and Restraints    Pre-Treatment Position in bed  - in bed  - in bed  -    Post Treatment Position bed  - bed  - bed  -    In Bed supine;call light within reach;encouraged to call for assist  - notified nsg;supine;call light within reach;encouraged to call  for assist;exit alarm on  - supine;call light within reach  -    Recorded by [] Jessi Villa, PT [] Mary Kate Parra, PT [] Carlos Hassan, PT      03/04/18 0381          Rehab Assessment/Intervention    Discipline occupational therapist  -TA      Document Type progress note;therapy note (daily note)  -TA      Subjective Information agree to therapy;no complaints  -TA      Patient Effort, Rehab Treatment good  -TA      Symptoms Noted During/After Treatment shortness of breath  -TA      Precautions/Limitations fall precautions;oxygen therapy device and L/min;other (see comments)   BLE wraps  -TA      Recorded by [TA] Nehemias Aponte OT      Vital Signs    Pre SpO2 (%) 97  -TA      O2 Delivery Pre Treatment supplemental O2  -TA      Intra SpO2 (%) 88  -TA      O2 Delivery Intra Treatment supplemental O2  -TA      Post SpO2 (%) 94  -TA      O2 Delivery Post Treatment supplemental O2  -TA      Pre Patient Position Supine  -TA      Intra Patient Position Standing  -TA      Post Patient Position Sitting  -TA      Recorded by [TA] Nehemias Aponte OT      Pain Assessment    Pain Assessment Rojas-Fontenot FACES  -TA      Rojas-Fontenot FACES Pain Rating 2  -TA      Pain Score 5  -TA      Post Pain Score 5  -TA      Pain Type Chronic pain  -TA      Pain Location Leg  -TA      Pain Orientation Left;Right  -TA      Pain Intervention(s) Repositioned;Ambulation/increased activity  -TA      Response to Interventions tolerated  -TA      Recorded by [TA] Nehemias Aponte OT      Cognitive Assessment/Intervention    Current Cognitive/Communication Assessment functional  -TA      Orientation Status oriented to;person;place;situation  -TA      Follows Commands/Answers Questions 100% of the time;able to follow single-step instructions  -TA      Personal Safety mild impairment  -TA      Personal Safety Interventions fall prevention program maintained;gait belt;nonskid shoes/slippers when out of bed;supervised  activity  -TA      Recorded by [TA] Nehemias Aponte OT      Bed Mobility, Assessment/Treatment    Bed Mobility, Assistive Device bed rails  -TA      Bed Mobility, Scoot/Bridge, [place] supervision required  -TA      Bed Mob, Supine to Sit, [place] supervision required  -TA      Bed Mob, Sit to Supine, [place] not tested  -TA      Bed Mobility, Impairments strength decreased;pain  -TA      Recorded by [TA] Nehemias Aponte OT      Transfer Assessment/Treatment    Transfers, Sit-Stand [place] contact guard assist;verbal cues required  -TA      Transfers, Stand-Sit [place] contact guard assist;verbal cues required  -TA      Transfers, Sit-Stand-Sit, Assist Device rolling walker  -TA      Transfer, Safety Issues step length decreased;weight-shifting ability decreased  -TA      Transfer, Impairments strength decreased;impaired balance  -TA      Transfer, Comment VCs for safe technique  -TA      Recorded by [TA] Nehemias Aponte OT      Functional Mobility    Functional Mobility- Ind. Level contact guard assist;verbal cues required  -TA      Functional Mobility- Device rolling walker  -TA      Functional Mobility-Distance (Feet) 5  -TA      Functional Mobility- Safety Issues step length decreased;weight-shifting ability decreased;supplemental O2  -TA      Functional Mobility- Comment VCs for adaptive breathing  -TA      Recorded by [TA] Nehemias Aponte OT      Toileting Assessment/Training    Toileting Assess/Train, Assistive Device urinal  -TA      Toileting Assess/Train, Position sitting;edge of bed  -TA      Toileting Assess/Train, Indepen Level supervision required;set up required  -TA      Recorded by [TA] Nehemias Aponte OT      Motor Skills/Interventions    Additional Documentation Balance Skills Training (Group)  -TA      Recorded by [TA] Nehemias Aponte OT      Balance Skills Training    Sitting-Level of Assistance Distant supervision  -TA      Sitting-Balance  Support Feet supported  -TA      Standing-Level of Assistance Contact guard  -TA      Static Standing Balance Support assistive device  -TA      Standing-Balance Activities Weight Shift A-P;Weight Shift R-L;Reaching for objects  -TA      Recorded by [TA] Nehemias Aponte OT      Positioning and Restraints    Pre-Treatment Position in bed  -TA      Post Treatment Position chair  -TA      In Chair reclined;call light within reach;encouraged to call for assist;exit alarm on;waffle cushion;legs elevated  -TA      Recorded by [TA] Nehemias Aponte OT        User Key  (r) = Recorded By, (t) = Taken By, (c) = Cosigned By    Initials Name Effective Dates     Carlos Hassan, PT 06/19/15 -     EH Mary Kate Parra, PT 06/19/15 -     TA Nehemias Apnote, OT 03/14/16 -     MC Jessi Villa, PT 03/14/16 -                 IP PT Goals       03/06/18 0910 03/05/18 1613 03/02/18 1127    Transfer Training PT LTG    Transfer Training PT  LTG, Date Goal Reviewed   03/02/18  -AS    Transfer Training PT LTG, Outcome  goal ongoing  -EH goal ongoing  -AS    Gait Training PT LTG    Gait Training Goal PT LTG, Date Goal Reviewed   03/02/18  -AS    Gait Training Goal PT LTG, Outcome  goal ongoing  -EH goal ongoing  -AS    Wound Care PT LTG    Wound Care PT LTG 1, Outcome goal partially met  -      Wound Care 2 PT LTG    Wound Care PT LTG 2, Outcome goal partially met  -        03/01/18 1058 02/26/18 1445 02/23/18 0949    Bed Mobility PT LTG    Bed Mobility PT LTG, Date Goal Reviewed  02/26/18  -LM     Bed Mobility PT LTG, Outcome goal ongoing  -EH goal ongoing  -LM goal ongoing  -BD    Transfer Training PT LTG    Transfer Training PT  LTG, Date Goal Reviewed  02/26/18  -LM     Transfer Training PT LTG, Outcome goal ongoing  -EH goal ongoing  -LM goal ongoing  -BD    Gait Training PT LTG    Gait Training Goal PT LTG, Date Goal Reviewed  02/26/18  -LM     Gait Training Goal PT LTG, Outcome goal ongoing  -EH goal ongoing   -LM goal ongoing  -BD      02/22/18 1156 02/22/18 1050       Bed Mobility PT LTG    Bed Mobility PT LTG, Date Established 02/22/18  -KM      Bed Mobility PT LTG, Time to Achieve 2 wks  -KM      Bed Mobility PT LTG, Activity Type all bed mobility  -KM      Bed Mobility PT LTG, [place] Level independent  -KM      Transfer Training PT LTG    Transfer Training PT LTG, Date Established 02/22/18  -KM      Transfer Training PT LTG, Time to Achieve 2 wks  -KM      Transfer Training PT LTG, Activity Type bed to chair /chair to bed;sit to stand/stand to sit  -KM      Transfer Training PT LTG, [place] Level independent  -KM      Transfer Training PT LTG, Assist Device walker, rolling  -KM      Gait Training PT LTG    Gait Training Goal PT LTG, Date Established 02/22/18  -KM      Gait Training Goal PT LTG, Time to Achieve 2 wks  -KM      Gait Training Goal PT LTG, [place] Level independent  -KM      Gait Training Goal PT LTG, Assist Device walker, rolling  -KM      Gait Training Goal PT LTG, Distance to Achieve 100  -KM      Wound Care PT LTG    Wound Care PT LTG 1, Outcome  goal partially met  -MF     Wound Care 2 PT LTG    Wound Care PT LTG 2, Outcome  goal partially met  -MF       User Key  (r) = Recorded By, (t) = Taken By, (c) = Cosigned By    Initials Name Provider Type     Carlos Hassan, PT Physical Therapist     Mary Kate Parra, PT Physical Therapist     Carmela Washington, PT Physical Therapist    AS Yasmine Fuller, PTA Physical Therapy Assistant    NORY Villa, PT Physical Therapist    JORDYN Rojo, PT Physical Therapist    LM Bekah Caro, PT Physical Therapist          Physical Therapy Education     Title: PT OT SLP Therapies (Active)     Topic: Physical Therapy (Active)     Point: Mobility training (Active)    Learning Progress Summary    Learner Readiness Method Response Comment Documented by Status   Patient Acceptance E NR   03/05/18 8044 Active     Acceptance E NR  AS 03/02/18 1126 Active    Acceptance E VU,NR   03/01/18 1058 Done    Acceptance E,D NR Reviewed benefits of activity, HEP, safety with mobility.  02/26/18 1445 Active    Acceptance E NR   02/23/18 0949 Active    Acceptance E VU  KM 02/22/18 1159 Done               Point: Home exercise program (Active)    Learning Progress Summary    Learner Readiness Method Response Comment Documented by Status   Patient Acceptance E NR   03/05/18 1613 Active    Acceptance E NR  AS 03/02/18 1126 Active    Acceptance E VU,NR   03/01/18 1058 Done    Acceptance E,D NR Reviewed benefits of activity, HEP, safety with mobility.  02/26/18 1445 Active    Acceptance E NR   02/23/18 0949 Active    Acceptance E VU   02/22/18 1159 Done               Point: Body mechanics (Active)    Learning Progress Summary    Learner Readiness Method Response Comment Documented by Status   Patient Acceptance E NR  AS 03/02/18 1126 Active    Acceptance E VU,NR   03/01/18 1058 Done    Acceptance E,D NR Reviewed benefits of activity, HEP, safety with mobility.  02/26/18 1445 Active    Acceptance E NR   02/23/18 0949 Active    Acceptance E VU   02/22/18 1159 Done               Point: Precautions (Active)    Learning Progress Summary    Learner Readiness Method Response Comment Documented by Status   Patient Acceptance E NR   03/05/18 1613 Active    Acceptance E NR  AS 03/02/18 1126 Active    Acceptance E VU,NR   03/01/18 1058 Done    Acceptance E,D NR Reviewed benefits of activity, HEP, safety with mobility.  02/26/18 1445 Active    Acceptance E NR   02/23/18 0949 Active    Acceptance E VU   02/22/18 1159 Done    Acceptance E VU reviewed POC for skin/ edema care.  02/17/18 1126 Done                      User Key     Initials Effective Dates Name Provider Type Discipline     06/19/15 -  Mary Kate Parra, PT Physical Therapist PT     06/19/15 -  Carmela Washington, PT Physical Therapist PT    AS  06/22/15 -  Yasmine Fuller, PTA Physical Therapy Assistant PT    MW 02/12/18 -  Karen Lopez, PT Physical Therapist PT    BD 06/13/16 -  Estrella Rojo, PT Physical Therapist PT    LM 06/09/17 -  Bekah Caro, PT Physical Therapist PT                   PT ASSESSMENT (last 72 hours)      PT Evaluation       03/06/18 0910 03/06/18 0800    Rehab Evaluation    Document Type therapy note (daily note)  -     Subjective Information agree to therapy;complains of;pain  -     Pain Assessment    Pain Assessment Rojas-Fontenot FACES  -     Rojas-Fontenot FACES Pain Rating 4  -     Sensory Assessment/Intervention    Light Touch  LUE;RUE  -TA    LUE Light Touch  mild impairment  -TA    RUE Light Touch  mild impairment  -TA    Positioning and Restraints    Pre-Treatment Position in bed  -     Post Treatment Position bed  -     In Bed supine;call light within reach;encouraged to call for assist  -       03/05/18 2142 03/05/18 1556    Rehab Evaluation    Document Type  therapy note (daily note)  -    Subjective Information  agree to therapy;complains of;pain;fatigue  -    Symptoms Noted Comment  had prior SOB episode per notes this date; Wound care PT left a few minutes prior to mobility PT entering  -    Vital Signs    Pre SpO2 (%)  94  -EH    O2 Delivery Pre Treatment  supplemental O2  -EH    Post SpO2 (%)  92  -EH    O2 Delivery Post Treatment  supplemental O2  -EH    Pain Assessment    Pain Assessment  Rojas-Fontenot FACES  -    Rojas-Baker FACES Pain Rating  6  -    Pain Type  Chronic pain  -    Pain Location  Leg   and back  -    Pain Orientation  Right;Left  -    Pain Intervention(s)  Repositioned;Ambulation/increased activity  -    Response to Interventions  tolerated  -    Cognitive Assessment/Intervention    Current Cognitive/Communication Assessment  functional  -    Orientation Status  oriented to;person;situation;place  -    Follows Commands/Answers Questions  100% of the  time;able to follow single-step instructions   with encouragement  -    Muscle Tone Assessment    Muscle Tone Assessment Bilateral Upper Extremities  -     Bilateral Upper Extremities Muscle Tone Assessment mildly decreased tone  -     Bilateral Lower Extremities Muscle Tone Assessment moderately decreased tone  -     Bed Mobility, Assessment/Treatment    Bed Mobility, Comment  Refuses mobility  -    Therapy Exercises    Bilateral Lower Extremities  AROM:;5 reps;ankle pumps/circles;quad sets;glut sets  -    Bilateral Upper Extremity  AROM:;5 reps;elbow flexion/extension;hand pumps;shoulder extension/flexion;pronation/supination  -    Sensory Assessment/Intervention    LUE Light Touch mild impairment  -     RUE Light Touch mild impairment  -     Positioning and Restraints    Pre-Treatment Position  in bed  -    Post Treatment Position  bed  -    In Bed  notified nsg;supine;call light within reach;encouraged to call for assist;exit alarm on  -      03/05/18 1500 03/05/18 0848    Rehab Evaluation    Document Type therapy note (daily note)  -     Subjective Information agree to therapy;complains of;pain  -     Pain Assessment    Pain Assessment Rojas-Baker FACES  -     Rojas-Baker FACES Pain Rating 6  -     Pain Type Chronic pain  -     Pain Location Leg  -     Pain Orientation Right;Left  -     Muscle Tone Assessment    Bilateral Lower Extremities Muscle Tone Assessment  moderately decreased tone  -AS    Positioning and Restraints    Pre-Treatment Position in bed  -     Post Treatment Position bed  -     In Bed supine;call light within reach  -       03/04/18 2030 03/04/18 1403    Rehab Evaluation    Document Type  progress note;therapy note (daily note)  -TAA    Subjective Information  agree to therapy;no complaints  -TAA    Patient Effort, Rehab Treatment  good  -TAA    Symptoms Noted During/After Treatment  shortness of breath  -TAA    General Information     Precautions/Limitations  fall precautions;oxygen therapy device and L/min;other (see comments)   BLE wraps  -TAA    Vital Signs    Pre SpO2 (%)  97  -TAA    O2 Delivery Pre Treatment  supplemental O2  -TAA    Intra SpO2 (%)  88  -TAA    O2 Delivery Intra Treatment  supplemental O2  -TAA    Post SpO2 (%)  94  -TAA    O2 Delivery Post Treatment  supplemental O2  -TAA    Pre Patient Position  Supine  -TAA    Intra Patient Position  Standing  -TAA    Post Patient Position  Sitting  -TAA    Pain Assessment    Pain Assessment  Rojas-Fontenot FACES  -TAA    Rojas-Fontenot FACES Pain Rating  2  -TAA    Pain Score  5  -TAA    Post Pain Score  5  -TAA    Pain Type  Chronic pain  -TAA    Pain Location  Leg  -TAA    Pain Orientation  Left;Right  -TAA    Pain Intervention(s)  Repositioned;Ambulation/increased activity  -TAA    Response to Interventions  tolerated  -TAA    Cognitive Assessment/Intervention    Current Cognitive/Communication Assessment  functional  -TAA    Orientation Status  oriented to;person;place;situation  -TAA    Follows Commands/Answers Questions  100% of the time;able to follow single-step instructions  -[facility]    Personal Safety  mild impairment  -TAA    Personal Safety Interventions  fall prevention program maintained;gait belt;nonskid shoes/slippers when out of bed;supervised activity  -[facility]    Muscle Tone Assessment    Muscle Tone Assessment Bilateral Upper Extremities  -     Bilateral Upper Extremities Muscle Tone Assessment mildly decreased tone  -     Bilateral Lower Extremities Muscle Tone Assessment moderately decreased tone  -     Bed Mobility, Assessment/Treatment    Bed Mobility, Assistive Device  bed rails  -TAA    Bed Mobility, Scoot/Bridge, [place]  supervision required  -TAA    Bed Mob, Supine to Sit, [place]  supervision required  -TAA    Bed Mob, Sit to Supine, [place]  not tested  -TAA    Bed Mobility, Impairments  strength decreased;pain  -TAA    Transfer Assessment/Treatment     Transfers, Sit-Stand [facility]  contact guard assist;verbal cues required  -TAA    Transfers, Stand-Sit [facility]  contact guard assist;verbal cues required  -TAA    Transfers, Sit-Stand-Sit, Assist Device  rolling walker  -TAA    Transfer, Safety Issues  step length decreased;weight-shifting ability decreased  -TAA    Transfer, Impairments  strength decreased;impaired balance  -TAA    Transfer, Comment  VCs for safe technique  -TAA    Motor Skills/Interventions    Additional Documentation  Balance Skills Training (Group)  -TAA    Balance Skills Training    Sitting-Level of Assistance  Distant supervision  -TAA    Sitting-Balance Support  Feet supported  -TAA    Standing-Level of Assistance  Contact guard  -TAA    Static Standing Balance Support  assistive device  -TAA    Standing-Balance Activities  Weight Shift A-P;Weight Shift R-L;Reaching for objects  -TAA    Sensory Assessment/Intervention    LUE Light Touch mild impairment  -BH     RUE Light Touch mild impairment  -     Positioning and Restraints    Pre-Treatment Position  in bed  -TAA    Post Treatment Position  chair  -TAA    In Chair  reclined;call light within reach;encouraged to call for assist;exit alarm on;waffle cushion;legs elevated  -[facility]      03/04/18 0852 03/03/18 2266    Muscle Tone Assessment    Muscle Tone Assessment Bilateral Upper Extremities;Bilateral Lower Extremities  -SJ Bilateral Upper Extremities;Bilateral Lower Extremities  -AB    Bilateral Upper Extremities Muscle Tone Assessment mildly decreased tone  -SJ mildly decreased tone  -AB    LLE Muscle Tone Assessment moderately decreased tone  -SJ moderately decreased tone  -AB    RLE Muscle Tone Assessment moderately decreased tone  -SJ moderately decreased tone  -AB      User Key  (r) = Recorded By, (t) = Taken By, (c) = Cosigned By    Initials Name Provider Type    ALBANIA Hassan, PT Physical Therapist    MYRA Parra, PT Physical Therapist    TA Latanya Gonzalez RN  Registered Nurse    ISRA Aponte, OT Occupational Therapist    NORY Villa, PT Physical Therapist    AB Norma Roque, RN Registered Nurse     Jossy Sommers, RN Registered Nurse    LAURENCE Aguilar, RN Registered Nurse    AS Osiel Ramirez, RN Registered Nurse            PT Recommendation and Plan  Anticipated Discharge Disposition: home with home health, skilled nursing facility  Planned Therapy Interventions: wound care    Plan Of Care Reviewed With: patient   Progress: progress toward functional goals as expected   Outcome Summary/Follow up Plan: BLE unna boots intact with no apparent issues or complaints from the pt. Anticipate change in 1-2 days.          Outcome Measures       03/05/18 1556 03/04/18 1403       How much help from another person do you currently need...    Turning from your back to your side while in flat bed without using bedrails? 4  -EH      Moving from lying on back to sitting on the side of a flat bed without bedrails? 3  -EH      Moving to and from a bed to a chair (including a wheelchair)? 3  -EH      Standing up from a chair using your arms (e.g., wheelchair, bedside chair)? 3  -EH      Climbing 3-5 steps with a railing? 2  -EH      To walk in hospital room? 3  -EH      AM-PAC 6 Clicks Score 18  -EH      How much help from another is currently needed...    Putting on and taking off regular lower body clothing?  2  -TA     Bathing (including washing, rinsing, and drying)  2  -TA     Toileting (which includes using toilet bed pan or urinal)  3  -TA     Putting on and taking off regular upper body clothing  3  -TA     Taking care of personal grooming (such as brushing teeth)  3  -TA     Eating meals  4  -TA     Score  17  -TA     Functional Assessment    Outcome Measure Options AM-PAC 6 Clicks Basic Mobility (PT)  -EH AM-PAC 6 Clicks Daily Activity (OT)  -TA       User Key  (r) = Recorded By, (t) = Taken By, (c) = Cosigned By    Initials Name Provider Type      Mary Kate Parra, PT Physical Therapist    TA Nehemias Aponte, OT Occupational Therapist              Time Calculation        PT Charges       03/06/18 0910          Time Calculation    Start Time 0910  -      PT Goal Re-Cert Due Date 03/15/18  -      Time Calculation- PT    Total Timed Code Minutes- PT 8 minute(s)  -        User Key  (r) = Recorded By, (t) = Taken By, (c) = Cosigned By    Initials Name Provider Type     Jessi Villa, PT Physical Therapist             Therapy Charges for Today     Code Description Service Date Service Provider Modifiers Qty    90905030591 HC PT THER PROC EA 15 MIN 3/6/2018 Jessi Villa, PT GP 1            PT G-Codes  Outcome Measure Options: AM-PAC 6 Clicks Basic Mobility (PT)        Jessi Villa, PT  3/6/2018

## 2018-03-06 NOTE — PLAN OF CARE
Problem: Patient Care Overview (Adult)  Goal: Plan of Care Review  Outcome: Ongoing (interventions implemented as appropriate)   03/06/18 0910   Coping/Psychosocial Response Interventions   Plan Of Care Reviewed With patient   Patient Care Overview   Progress progress toward functional goals as expected   Outcome Evaluation   Outcome Summary/Follow up Plan BLE unna boots intact with no apparent issues or complaints from the pt. Anticipate change in 1-2 days.       Problem: Inpatient Physical Therapy  Goal: Wound Care Goal 1 LTG- PT  Outcome: Ongoing (interventions implemented as appropriate)   02/17/18 1121 03/06/18 0910   Wound Care PT LTG   Wound Care PT LTG 1, Date Established 02/17/18 --    Wound Care PT LTG 1, Time to Achieve 2 wks --    Wound Care PT LTG 1, Location LLE  --    Wound Care PT LTG 1, No S&S of Infection yes --    Wound Care PT LTG 1, Decrease Wound Size 10% --    Wound Care PT LTG 1, Decrease Exudate minimum --    Wound Care PT LTG 1, Decrease Limb Girth % 10% --    Wound Care PT LTG 1, No New Skin Break Down yes --    Wound Care PT LTG 1, Education S&S of infection;dressing changes;edema management;progression of POC;skin care/inspection --    Wound Care PT LTG 1, Education Understanding verbalize understanding --    Wound Care PT LTG 1, Outcome --  goal partially met     Goal: Wound Care Goal 2 LTG- PT  Outcome: Ongoing (interventions implemented as appropriate)   02/17/18 1121 03/06/18 0910   Wound Care 2 PT LTG   Wound Care PT LTG 2, Date Established 02/17/18 --    Wound Care PT LTG 2, Time to Achieve 2 wks --    Wound Care PT LTG 2, Location RLE  --    Wound Care PT LTG 2, No S&S of Infection yes --    Wound Care PT LTG 2, Decrease Wound Size 20% --    Wound Care PT LTG 2, Decrease Exudate minimum --    Wound Care PT LTG 2, Decrease Limb Girth % 10% --    Wound Care PT LTG 2, No New Skin Break Down yes --    Wound Care PT LTG 2, Education dressing changes;edema management;pressure  relief;pain managment;progression of POC --    Wound Care PT LTG 2, Education Understanding verbalize understanding --    Wound Care PT LTG 2, Outcome --  goal partially met

## 2018-03-06 NOTE — PLAN OF CARE
Problem: Patient Care Overview (Adult)  Goal: Plan of Care Review  Outcome: Ongoing (interventions implemented as appropriate)   03/06/18 9255   Coping/Psychosocial Response Interventions   Plan Of Care Reviewed With patient   Patient Care Overview   Progress no change   Outcome Evaluation   Outcome Summary/Follow up Plan VSS, complaints of pain managed with meds. Worked with PT today. OOB to chair for quite a bit today.

## 2018-03-06 NOTE — PLAN OF CARE
Problem: Patient Care Overview (Adult)  Goal: Plan of Care Review  Outcome: Ongoing (interventions implemented as appropriate)   03/06/18 1306   Coping/Psychosocial Response Interventions   Plan Of Care Reviewed With patient   Patient Care Overview   Progress improving   Outcome Evaluation   Outcome Summary/Follow up Plan Pt completed UB and LB bathing with Cecille for back. Pt rinsed mouth with mouth wash with setup. Pt requires CGA and RW for sit to stand. Continue IP OT per POC.        Problem: Inpatient Occupational Therapy  Goal: Transfer Training Goal 1 LTG- OT  Outcome: Ongoing (interventions implemented as appropriate)   02/22/18 1729 03/04/18 1441   Transfer Training OT LTG   Transfer Training OT LTG, Date Established 02/22/18 --    Transfer Training OT LTG, Time to Achieve 1 wk --    Transfer Training OT LTG, Activity Type toilet;sit to stand/stand to sit --    Transfer Training OT LTG, [place] Level verbal cues required;supervision required --    Transfer Training OT LTG, Assist Device commode, bedside;walker, rolling --    Transfer Training OT LTG, Outcome --  goal ongoing  (CGA this date for STS from EOB)     Goal: Patient Education Goal LTG- OT  Outcome: Ongoing (interventions implemented as appropriate)   02/22/18 1729 03/04/18 1441   Patient Education OT LTG   Patient Education OT LTG, Date Established 02/22/18 --    Patient Education OT LTG, Time to Achieve 1 wk --    Patient Education OT LTG, Education Type adaptive equipment mgmt;energy conservation;work simplification;adaptive breathing --    Patient Education OT LTG, Education Understanding demonstrates adequately;verbalizes understanding --    Patient Education OT LTG Outcome --  goal ongoing  (Progressing with adaptive breathing)     Goal: LB Dressing Goal LTG- OT  Outcome: Ongoing (interventions implemented as appropriate)   02/22/18 1729 03/04/18 1441   LB Dressing OT LTG   LB Dressing Goal OT LTG, Date Established 02/22/18 --    LB  Dressing Goal OT LTG, Time to Achieve 1 wk --    LB Dressing Goal OT LTG, Activity Type Pt will complete LB dressing task  --    LB Dressing Goal OT LTG, [place] Level verbal cues required;moderate assist (50% patient effort) --    LB Dressing Goal OT LTG, Adaptive Equipment sock-aid;reacher --    LB Dressing Goal OT LTG, Outcome --  goal partially met     Goal: Endurance Goal LTG- OT  Outcome: Ongoing (interventions implemented as appropriate)   02/22/18 1729 03/04/18 1441   Endurance OT LTG   Endurance Goal OT LTG, Date Established 02/22/18 --    Endurance Goal OT LTG, Time to Achieve 1 wk --    Endurance Goal OT LTG, Additional Goal Pt will complete 5 minurtes ADL activity while maintaining oxygen saturation 90% or above with min cues for PLB and EC/WS --    Endurance Goal OT LTG, Outcome --  goal ongoing  (88% with toileting, fxl transfers)

## 2018-03-06 NOTE — PLAN OF CARE
Problem: Patient Care Overview (Adult)  Goal: Plan of Care Review  Outcome: Ongoing (interventions implemented as appropriate)   03/06/18 1051   Coping/Psychosocial Response Interventions   Plan Of Care Reviewed With patient   Patient Care Overview   Progress progress toward functional goals as expected   Outcome Evaluation   Outcome Summary/Follow up Plan Pt increased ambulation distance to 45 feet with CGA and RW, limited by increased left hip/knee pain and SOA. Pt requires CGA for bed mobility and transfers, will progress with mobility as able.        Problem: Inpatient Physical Therapy  Goal: Transfer Training Goal 1 LTG- PT  Outcome: Ongoing (interventions implemented as appropriate)   02/22/18 1156 03/06/18 1051   Transfer Training PT LTG   Transfer Training PT LTG, Date Established 02/22/18 --    Transfer Training PT LTG, Time to Achieve 2 wks --    Transfer Training PT LTG, Activity Type bed to chair /chair to bed;sit to stand/stand to sit --    Transfer Training PT LTG, [place] Level independent --    Transfer Training PT LTG, Assist Device walker, rolling --    Transfer Training PT LTG, Date Goal Reviewed --  03/06/18   Transfer Training PT LTG, Outcome --  goal ongoing     Goal: Gait Training Goal LTG- PT  Outcome: Ongoing (interventions implemented as appropriate)   02/22/18 1156 03/06/18 1051   Gait Training PT LTG   Gait Training Goal PT LTG, Date Established 02/22/18 --    Gait Training Goal PT LTG, Time to Achieve 2 wks --    Gait Training Goal PT LTG, [place] Level independent --    Gait Training Goal PT LTG, Assist Device walker, rolling --    Gait Training Goal PT LTG, Distance to Achieve 100 --    Gait Training Goal PT LTG, Date Goal Reviewed --  03/06/18   Gait Training Goal PT LTG, Outcome --  goal ongoing

## 2018-03-06 NOTE — PLAN OF CARE
"Problem: Patient Care Overview (Adult)  Goal: Plan of Care Review  Outcome: Ongoing (interventions implemented as appropriate)   03/06/18 0341   Coping/Psychosocial Response Interventions   Plan Of Care Reviewed With patient   Patient Care Overview   Progress progress toward functional goals is gradual   Outcome Evaluation   Outcome Summary/Follow up Plan PT. RESTED WELL UNTIL 1 AM. HE HAD A RUN A V-TACH (20SEC). AS I WOKE HIM, HE WENT BACK INTO [facility]. PROVIDER CALLED AND EKG AND LABS WERE OBTAINED. ALL WAS WNL. PT. VERY NERVOUS NOW AND STATES \"IM SCARED TO SLEEP NOW.\" I REASSURED HIM THAT WE WERE WATCHING HIM CLOSE AND THAT HE COULD REST.          "

## 2018-03-07 ENCOUNTER — APPOINTMENT (OUTPATIENT)
Dept: CARDIOLOGY | Facility: HOSPITAL | Age: 73
End: 2018-03-07
Attending: INTERNAL MEDICINE

## 2018-03-07 PROCEDURE — 97530 THERAPEUTIC ACTIVITIES: CPT

## 2018-03-07 PROCEDURE — 94640 AIRWAY INHALATION TREATMENT: CPT

## 2018-03-07 PROCEDURE — 94799 UNLISTED PULMONARY SVC/PX: CPT

## 2018-03-07 PROCEDURE — 99233 SBSQ HOSP IP/OBS HIGH 50: CPT | Performed by: INTERNAL MEDICINE

## 2018-03-07 PROCEDURE — 87070 CULTURE OTHR SPECIMN AEROBIC: CPT | Performed by: INTERNAL MEDICINE

## 2018-03-07 PROCEDURE — 87205 SMEAR GRAM STAIN: CPT | Performed by: INTERNAL MEDICINE

## 2018-03-07 PROCEDURE — 63710000001 PREDNISONE PER 1 MG: Performed by: INTERNAL MEDICINE

## 2018-03-07 PROCEDURE — 87186 SC STD MICRODIL/AGAR DIL: CPT | Performed by: INTERNAL MEDICINE

## 2018-03-07 PROCEDURE — 97116 GAIT TRAINING THERAPY: CPT

## 2018-03-07 PROCEDURE — 87077 CULTURE AEROBIC IDENTIFY: CPT | Performed by: INTERNAL MEDICINE

## 2018-03-07 PROCEDURE — 25010000002 HEPARIN (PORCINE) PER 1000 UNITS: Performed by: INTERNAL MEDICINE

## 2018-03-07 RX ORDER — NICOTINE 21 MG/24HR
1 PATCH, TRANSDERMAL 24 HOURS TRANSDERMAL EVERY 24 HOURS
Status: DISCONTINUED | OUTPATIENT
Start: 2018-03-07 | End: 2018-03-09

## 2018-03-07 RX ORDER — PANTOPRAZOLE SODIUM 40 MG/1
40 TABLET, DELAYED RELEASE ORAL
Status: DISCONTINUED | OUTPATIENT
Start: 2018-03-07 | End: 2018-03-15 | Stop reason: HOSPADM

## 2018-03-07 RX ADMIN — FAMOTIDINE 20 MG: 20 TABLET, FILM COATED ORAL at 08:45

## 2018-03-07 RX ADMIN — HEPARIN SODIUM 5000 UNITS: 5000 INJECTION, SOLUTION INTRAVENOUS; SUBCUTANEOUS at 06:20

## 2018-03-07 RX ADMIN — IPRATROPIUM BROMIDE AND ALBUTEROL SULFATE 3 ML: 2.5; .5 SOLUTION RESPIRATORY (INHALATION) at 19:29

## 2018-03-07 RX ADMIN — CASTOR OIL AND BALSAM, PERU: 788; 87 OINTMENT TOPICAL at 13:22

## 2018-03-07 RX ADMIN — NICOTINE 1 PATCH: 21 PATCH, EXTENDED RELEASE TRANSDERMAL at 10:32

## 2018-03-07 RX ADMIN — CASTOR OIL AND BALSAM, PERU: 788; 87 OINTMENT TOPICAL at 21:46

## 2018-03-07 RX ADMIN — PREDNISONE 40 MG: 20 TABLET ORAL at 08:45

## 2018-03-07 RX ADMIN — BUDESONIDE 0.5 MG: 0.5 INHALANT RESPIRATORY (INHALATION) at 19:29

## 2018-03-07 RX ADMIN — DOCUSATE SODIUM 200 MG: 100 CAPSULE, LIQUID FILLED ORAL at 08:45

## 2018-03-07 RX ADMIN — BUDESONIDE 0.5 MG: 0.5 INHALANT RESPIRATORY (INHALATION) at 07:48

## 2018-03-07 RX ADMIN — HEPARIN SODIUM 5000 UNITS: 5000 INJECTION, SOLUTION INTRAVENOUS; SUBCUTANEOUS at 21:43

## 2018-03-07 RX ADMIN — HYDROCODONE BITARTRATE AND ACETAMINOPHEN 1 TABLET: 7.5; 325 TABLET ORAL at 17:17

## 2018-03-07 RX ADMIN — ARFORMOTEROL TARTRATE 15 MCG: 15 SOLUTION RESPIRATORY (INHALATION) at 07:48

## 2018-03-07 RX ADMIN — DICLOFENAC SODIUM 2 G: 10 GEL TOPICAL at 13:22

## 2018-03-07 RX ADMIN — MINOCYCLINE HYDROCHLORIDE 100 MG: 50 CAPSULE ORAL at 17:17

## 2018-03-07 RX ADMIN — FLUTICASONE PROPIONATE 2 SPRAY: 50 SPRAY, METERED NASAL at 08:45

## 2018-03-07 RX ADMIN — CYANOCOBALAMIN TAB 1000 MCG 1000 MCG: 1000 TAB at 08:45

## 2018-03-07 RX ADMIN — HYDROCODONE BITARTRATE AND ACETAMINOPHEN 1 TABLET: 7.5; 325 TABLET ORAL at 08:45

## 2018-03-07 RX ADMIN — GABAPENTIN 200 MG: 100 CAPSULE ORAL at 13:22

## 2018-03-07 RX ADMIN — MORPHINE SULFATE 30 MG: 30 TABLET ORAL at 18:52

## 2018-03-07 RX ADMIN — IPRATROPIUM BROMIDE AND ALBUTEROL SULFATE 3 ML: 2.5; .5 SOLUTION RESPIRATORY (INHALATION) at 12:52

## 2018-03-07 RX ADMIN — HEPARIN SODIUM 5000 UNITS: 5000 INJECTION, SOLUTION INTRAVENOUS; SUBCUTANEOUS at 13:22

## 2018-03-07 RX ADMIN — HYDROCODONE BITARTRATE AND ACETAMINOPHEN 1 TABLET: 7.5; 325 TABLET ORAL at 13:22

## 2018-03-07 RX ADMIN — DICLOFENAC SODIUM 2 G: 10 GEL TOPICAL at 04:55

## 2018-03-07 RX ADMIN — GABAPENTIN 200 MG: 100 CAPSULE ORAL at 21:43

## 2018-03-07 RX ADMIN — DICLOFENAC SODIUM 2 G: 10 GEL TOPICAL at 08:45

## 2018-03-07 RX ADMIN — ARFORMOTEROL TARTRATE 15 MCG: 15 SOLUTION RESPIRATORY (INHALATION) at 19:29

## 2018-03-07 RX ADMIN — GABAPENTIN 200 MG: 100 CAPSULE ORAL at 06:20

## 2018-03-07 RX ADMIN — CASTOR OIL AND BALSAM, PERU: 788; 87 OINTMENT TOPICAL at 04:55

## 2018-03-07 RX ADMIN — MINOCYCLINE HYDROCHLORIDE 100 MG: 50 CAPSULE ORAL at 06:21

## 2018-03-07 RX ADMIN — IPRATROPIUM BROMIDE AND ALBUTEROL SULFATE 3 ML: 2.5; .5 SOLUTION RESPIRATORY (INHALATION) at 16:25

## 2018-03-07 RX ADMIN — PANTOPRAZOLE SODIUM 40 MG: 40 TABLET, DELAYED RELEASE ORAL at 13:22

## 2018-03-07 RX ADMIN — NICOTINE 1 PATCH: 7 PATCH, EXTENDED RELEASE TRANSDERMAL at 21:47

## 2018-03-07 RX ADMIN — MORPHINE SULFATE 30 MG: 30 TABLET ORAL at 01:36

## 2018-03-07 RX ADMIN — IPRATROPIUM BROMIDE AND ALBUTEROL SULFATE 3 ML: 2.5; .5 SOLUTION RESPIRATORY (INHALATION) at 07:48

## 2018-03-07 RX ADMIN — BUMETANIDE 1 MG: 1 TABLET ORAL at 08:45

## 2018-03-07 NOTE — PLAN OF CARE
Problem: Patient Care Overview (Adult)  Goal: Plan of Care Review  Outcome: Ongoing (interventions implemented as appropriate)   03/07/18 1537   Coping/Psychosocial Response Interventions   Plan Of Care Reviewed With patient   Patient Care Overview   Progress progress toward functional goals as expected   Outcome Evaluation   Outcome Summary/Follow up Plan Pt increased ambulation distance to 102 feet with CGA and RW, limited by increased left hip/knee pain and SOA. Will progress with mobility as able.        Problem: Inpatient Physical Therapy  Goal: Transfer Training Goal 1 LTG- PT  Outcome: Ongoing (interventions implemented as appropriate)   02/22/18 1156 03/07/18 1537   Transfer Training PT LTG   Transfer Training PT LTG, Date Established 02/22/18 --    Transfer Training PT LTG, Time to Achieve 2 wks --    Transfer Training PT LTG, Activity Type bed to chair /chair to bed;sit to stand/stand to sit --    Transfer Training PT LTG, [place] Level independent --    Transfer Training PT LTG, Assist Device walker, rolling --    Transfer Training PT LTG, Date Goal Reviewed --  03/07/18   Transfer Training PT LTG, Outcome --  goal ongoing     Goal: Gait Training Goal LTG- PT  Outcome: Ongoing (interventions implemented as appropriate)   02/22/18 1156 03/07/18 1537   Gait Training PT LTG   Gait Training Goal PT LTG, Date Established 02/22/18 --    Gait Training Goal PT LTG, Time to Achieve 2 wks --    Gait Training Goal PT LTG, [place] Level independent --    Gait Training Goal PT LTG, Assist Device walker, rolling --    Gait Training Goal PT LTG, Distance to Achieve 100 --    Gait Training Goal PT LTG, Date Goal Reviewed --  03/07/18   Gait Training Goal PT LTG, Outcome --  goal partially met  (met distance, not assistance)

## 2018-03-07 NOTE — PROGRESS NOTES
INTENSIVIST / PULMONARY FOLLOW UP NOTE     Hospital:  LOS: 19 days   Mr. Yassine Love, 72 y.o. male is followed for:   Principal Problem:    Cellulitis of both lower extremities  Active Problems:    RAFAEL (acute kidney injury)    PVD (peripheral vascular disease)    COPD (chronic obstructive pulmonary disease)    Essential hypertension    Hypoxia    Bilateral cellulitis of lower leg          SUBJECTIVE   Breathing about the same    The patient's relevant past medical, surgical, family, and social history were reviewed    Allergies and medications were reviewed    ROS:  Per subjective, all other systems were reviewed and were negative        OBJECTIVE     Vital Sign Min/Max for last 24 hours:  Temp  Min: 97.7 °F (36.5 °C)  Max: 98.6 °F (37 °C)   BP  Min: 105/63  Max: 123/71   Pulse  Min: 80  Max: 112   Resp  Min: 16  Max: 20   SpO2  Min: 93 %  Max: 96 %   Flow (L/min)  Min: 4  Max: 5     Physical Exam:  General Appearance:  Conversant, in no acute distress  Eyes:  No scleral icterus or pallor, pupils normal  Ears, Nose, Mouth, Throat:  Atraumatic, oropharynx clear  Neck:  Trachea midline, thyroid normal  Respiratory:  Diminished to auscultation bilaterally, normal effort, no tenderness to palpation  Cardiovascular:  Regular rate and rhythm, no murmurs,1+ peripheral edema, no thrill  Gastrointestinal:  Soft, non-tender, non-distended, no hepatosplenomegaly  Skin:  Normal temperature, no rash  Psychiatric:  Alert and oriented x 3, normal judgement and insight  Neuro:  No new focal neurologic deficits observed    Telemetry:  Sinus Rhythm: normal sinus rhythm           Hemodynamics:   CVP:     PAP:     PAOP:     CO:     CI:     SVI:     SVR:       SpO2: 94 % SpO2  Min: 93 %  Max: 96 %   Device: nasal cannula    Flow Rate: 5 Flow (L/min)  Min: 4  Max: 5     Mechanical Ventilator Settings:                                         Intake/Ouptut 24 hrs (7:00AM - 6:59 AM)  Intake & Output (last 3 days)       03/04 0701 - 03/05  0700 03/05 0701 - 03/06 0700 03/06 0701 - 03/07 0700 03/07 0701 - 03/08 0700    P.O. 1220 840 600     Total Intake(mL/kg) 1220 (14.8) 840 (10.2) 600 (7.3)     Urine (mL/kg/hr) 1550 (0.8) 975 (0.5) 2900 (1.5) 175 (0.4)    Stool 0 (0) 0 (0)  0 (0)    Total Output 8288 707 8409 175    Net -330 -135 -2300 -175            Unmeasured Urine Occurrence  2 x  1 x    Unmeasured Stool Occurrence 1 x 2 x  1 x          Lines, Drains & Airways    Active LDAs     None                Hematology:    Results from last 7 days  Lab Units 03/05/18  0605 03/03/18  0136 03/02/18  0501   WBC 10*3/mm3 9.11 9.89 10.47   HEMOGLOBIN g/dL 9.0* 9.3*  9.3* 9.3*   HEMATOCRIT % 28.5* 30.0*  30.1* 30.0*   PLATELETS 10*3/mm3 334 326 364     Electrolytes, Magnesium and Phosphorus:    Results from last 7 days  Lab Units 03/06/18  0108 03/05/18  0605 03/02/18  0501   SODIUM mmol/L  --  137 138   CHLORIDE mmol/L  --  102 99   POTASSIUM mmol/L 4.4 4.2 4.1   CO2 mmol/L  --  32.0* 35.0*   MAGNESIUM mg/dL 2.2  --   --      Renal:    Results from last 7 days  Lab Units 03/05/18  0605 03/02/18  0501   CREATININE mg/dL 1.20 1.20   BUN mg/dL 22 36*     Estimated Creatinine Clearance: 57.3 mL/min (by C-G formula based on Cr of 1.2).  Hepatic:      Arterial Blood Gases:              Lab Results   Component Value Date    LACTATE 0.8 02/17/2018       Relevant imaging studies and labs from 03/07/18 were reviewed and interpreted by me    Medications (drips):         acetylcysteine 4 mL Nebulization 4x Daily   arformoterol 15 mcg Nebulization BID - RT   budesonide 0.5 mg Nebulization BID - RT   bumetanide 1 mg Oral Daily   castor oil-balsam peru  Topical Q12H   diclofenac 2 g Topical 4x Daily   docusate sodium 200 mg Oral Daily   fluticasone 2 spray Each Nare Daily   gabapentin 200 mg Oral Q8H   heparin (porcine) 5,000 Units Subcutaneous Q8H   ipratropium-albuterol 3 mL Nebulization 4x Daily - RT   minocycline 100 mg Oral Q12H   nicotine 1 patch Transdermal Q24H    nicotine 1 patch Transdermal Q24H   pantoprazole 40 mg Oral Q AM   pharmacy consult - MTM  Does not apply Daily   predniSONE 40 mg Oral Daily With Breakfast   cyancobalamin 1,000 mcg Oral Daily       Assessment/Plan   IMPRESSION / PLAN     Inpatient Problem List:  72 y.o.male:  Hospital Problem List     * (Principal)Cellulitis of both lower extremities    RAFAEL (acute kidney injury)    PVD (peripheral vascular disease)    COPD (chronic obstructive pulmonary disease)    Essential hypertension    Hypoxia    Bilateral cellulitis of lower leg           Impression:  72 y.o.male with relevant PMH of extensive tobacco abuse, copd, home O2 admitted 2/16/2018 w/ bilateral LE cellulitis.  Hospital course complicated by acute on chronic worsening of hypoxemia.  CT PE protocol 3/4 shows upper lobe predominant emphysema, possibly some fibrosis along the fissures, No PE, small bilateral pleural effusions, bilateral lower lobe dependent consolidations, and endobronchial material in the distal bronchus intermedius suspicious for ongoing aspiration.     Plan:  Hypoxemia / COPD - I think he is aspirating from reflux and he has V/Q mismatch from his lower lobe consolidations/atelectasis. Start PPI, reflux precautions.  There appears to be endobronchial material in the distal BI.  I discussed doing bronch (which would require general anesthesia given his O2 requirement and co-morbidities) vs optimal pulmonary toilet with repeat CT in 3 months and he prefers the later.  I think aspiration is most likely but I can't rule out endobronchial tumor given his risk factors.  Will give 5 day course of pred 40mg, changed bronchodilators to pulmicort + brovanna bid + duoneb qid + mucomyst qid.  Do IS q1hwa and flutter qid.  Abx per ID.      Ok for [facility] from my standpoint.  I would continue Pulmicort/Brovana/Ipratropium neb bid.  Repeat CT in 3 months as O/P.    Todd Keys MD  Intensive Care Medicine  03/07/18 12:22 PM

## 2018-03-07 NOTE — PROGRESS NOTES
Yassine Love  1945  [id number]  3/7/2018    CC:   Chief Complaint   Patient presents with   • Leg Swelling       Yassine Love is a 72 y.o. male here for leg infections   History of present illness:    Mr. Yassine Love is a 72 y.o. male with CC: Cellulitis of both lower extremities. He presented to the [facility] ED with increased bilateral extremity edema with increased pain and redness in the setting of unna boot therapy via home health care for the past several months. He does complain of foul odor and drainage of BLE and fever, and chills.    He denies No SOB or cough today: No chest pain, No N/V/D at present and no abdominal pain.  No  symptoms: no new rash: No HA, photophobia or neck stiffness; He denies other focal pain.  Mr. Love was followed by Dr. Duncan during a June, 2017 hospitalization and was was treated with Invanz for a streptococcal bacteremia.      2/18 - co leg infection  No fever or chills  2/19/18 - C/O HA, generalized pain, and respiratory distress  Patient denies any fever, chills, or sweats.  Patient denies any nausea, vomiting, or diarrhea.  Now on hi-emperatriz oxygen. Seen and agree  2/20 - co leg infections  Co weakness  No fever  Lives alone  2/21/18 - Patient reports mild dyspnea, remains on hi emperatriz oxygen.  Patient denies any fever, chills, or sweats.  Patient denies any nausea, vomiting, or diarrhea.  C/O BLE pain.  Seen and agree  2/22 - co SOA  Co weakness  No fever or chills  No rash  Co leg pain and swelling  2/23/18 - Patient is sleeping today peacefully on 4 LNC.  ROS discussed with staff.  Seen and agree  2/26 - No hx available  ROS discussed with nurse  2/28/18:   Does not want to go to rehab, wants to go home.  Unna boots just changed yesterday.  No fever, chills, sweats.   Seen and agree  3/5/18 - Looking at places for rehab.  Patient denies any fever, chills, or sweats.  Per nursing requiring 4LNC.  On oral antibiotics.    Seen and agree  3/6/18 - C/O pain in BLE.   Patient denies any fever, chills, or sweats.  Continues on 4LNC.  Patient denies any nausea, vomiting, or diarrhea.  RN at .  Patient working with PT.  Seen and agree  3/7 - co new issue with decubitus  Co SOA  Co weakness  No fever      Past medical history:  Past Medical History:   Diagnosis Date   • Cancer    • Cellulitis of both lower extremities     [facility] 2016   • Chronic pain    • COPD (chronic obstructive pulmonary disease)    • Hypertension    • Osteoarthritis cervical spine    • Osteoarthritis of both knees    • Osteoarthritis of left hip        Medications:   Current Facility-Administered Medications:   •  acetaminophen (TYLENOL) tablet 650 mg, 650 mg, Oral, Q4H PRN, Nicole Hewitt DO, 650 mg at 03/06/18 1953  •  acetylcysteine (MUCOMYST) 20 % solution 4 mL, 4 mL, Nebulization, 4x Daily, Todd Keys MD, 4 mL at 03/06/18 1253  •  arformoterol (BROVANA) nebulizer solution 15 mcg, 15 mcg, Nebulization, BID - RT, Todd Keys MD, 15 mcg at 03/07/18 0748  •  bisacodyl (DULCOLAX) suppository 10 mg, 10 mg, Rectal, Daily PRN, Nicole Hewitt DO, 10 mg at 02/23/18 0823  •  budesonide (PULMICORT) nebulizer solution 0.5 mg, 0.5 mg, Nebulization, BID - RT, Todd Keys MD, 0.5 mg at 03/07/18 0748  •  bumetanide (BUMEX) tablet 1 mg, 1 mg, Oral, Daily, BRITTNY Alvares, 1 mg at 03/07/18 0845  •  calcium carbonate (TUMS) chewable tablet 500 mg (200 mg elemental), 2 tablet, Oral, BID PRN, BRITTNY Estrada, 2 tablet at 02/27/18 0946  •  castor oil-balsam peru (VENELEX) ointment, , Topical, Q12H, Nicole Hewitt DO  •  diclofenac (VOLTAREN) 1 % gel 2 g, 2 g, Topical, 4x Daily, Nicole Hewitt DO, 2 g at 03/07/18 0845  •  docusate sodium (COLACE) capsule 200 mg, 200 mg, Oral, Daily, Nicole Hewitt DO, 200 mg at 03/07/18 0845  •  fluticasone (FLONASE) 50 MCG/ACT nasal spray 2 spray, 2 spray, Each Nare, Daily, Esmer Watkins MD, 2 spray at  03/07/18 0845  •  gabapentin (NEURONTIN) capsule 200 mg, 200 mg, Oral, Q8H, Nicole Hewitt DO, 200 mg at 03/07/18 0620  •  guaiFENesin (MUCINEX) 12 hr tablet 600 mg, 600 mg, Oral, BID PRN, Ezequiel Alfred PA-C, 600 mg at 02/20/18 0818  •  heparin (porcine) 5000 UNIT/ML injection 5,000 Units, 5,000 Units, Subcutaneous, Q8H, Nicole Hewitt DO, 5,000 Units at 03/07/18 0620  •  HYDROcodone-acetaminophen (NORCO) 7.5-325 MG per tablet 1 tablet, 1 tablet, Oral, Q4H PRN, Yumiko Castanon MD, 1 tablet at 03/07/18 0845  •  ipratropium-albuterol (DUO-NEB) nebulizer solution 3 mL, 3 mL, Nebulization, 4x Daily - RT, Nicole Hewitt DO, 3 mL at 03/07/18 0748  •  magnesium hydroxide (MILK OF MAGNESIA) suspension 2400 mg/10mL 10 mL, 10 mL, Oral, Daily PRN, Nicole Hewitt DO, 10 mL at 02/25/18 2228  •  Magnesium Sulfate 2 gram Bolus, followed by 8 gram infusion (total Mg dose 10 grams)- Mg less than or equal to 1mg/dL, 2 g, Intravenous, PRN **OR** Magnesium Sulfate 6 gram Infusion (2 gm x 3) -Mg 1.1 -1.5 mg/dL, 2 g, Intravenous, PRN **OR** magnesium sulfate 4 gram infusion- Mg 1.6-1.9 mg/dL, 4 g, Intravenous, PRN, Esmer Watkins MD  •  minocycline (MINOCIN,DYNACIN) capsule 100 mg, 100 mg, Oral, Q12H, Parish Belle MD, 100 mg at 03/07/18 0621  •  Morphine (MSIR) tablet 30 mg, 30 mg, Oral, Q6H PRN, Esmer Watkins MD, 30 mg at 03/07/18 0136  •  nicotine (NICODERM CQ) 21 MG/24HR patch 1 patch, 1 patch, Transdermal, Q24H, Yumiko Castanon MD, 1 patch at 03/07/18 1032  •  nicotine (NICODERM CQ) 7 MG/24HR patch 1 patch, 1 patch, Transdermal, Q24H, Alexandro Suarez PA-C, 1 patch at 03/06/18 1953  •  ondansetron (ZOFRAN) injection 4 mg, 4 mg, Intravenous, Q6H PRN, Alexandro Suarez PA-C, 4 mg at 02/21/18 1945  •  pantoprazole (PROTONIX) EC tablet 40 mg, 40 mg, Oral, Q AM, Todd Keys MD  •  Pharmacy Consult - [facility], , Does not apply, Daily, Annette Ray, [facility]  •  potassium chloride (KLOR-CON) packet 40 mEq, 40 mEq,  Oral, PRN, Esmer Watkins MD  •  potassium chloride (MICRO-K) CR capsule 40 mEq, 40 mEq, Oral, PRN, Esmer Watkins MD, 40 mEq at 02/22/18 1431  •  predniSONE (DELTASONE) tablet 40 mg, 40 mg, Oral, Daily With Breakfast, Todd Keys MD, 40 mg at 03/07/18 0845  •  sodium chloride 0.9 % flush 1-10 mL, 1-10 mL, Intravenous, PRN, Nicole Hewitt DO  •  sodium chloride 0.9 % flush 10 mL, 10 mL, Intravenous, PRN, Steve Mcmanus MD, 10 mL at 03/01/18 2118  •  vitamin B-12 (CYANOCOBALAMIN) tablet 1,000 mcg, 1,000 mcg, Oral, Daily, Yumiko Castanon MD, 1,000 mcg at 03/07/18 0845  Antibiotics:  Anti-Infectives     Ordered     Dose/Rate Route Frequency Start Stop    02/22/18 1646  minocycline (MINOCIN,DYNACIN) capsule 100 mg     Zeb Moore [facility] reviewed the order on 03/02/18 1310.   Ordering Provider:  Parish Belle MD    100 mg Oral Every 12 Hours 02/22/18 1800 03/10/18 2359    02/16/18 2302  vancomycin 500 mg/100 mL 0.9% NS IVPB (mbp)     Ordering Provider:  Carlos Walls RPH    500 mg  over 60 Minutes Intravenous Once 02/16/18 2345 02/17/18 0032    02/16/18 1952  vancomycin (VANCOCIN) in iso-osmotic dextrose IVPB 1 g (premix) 200 mL     Ordering Provider:  Alexandro Suarez PA-C    1,000 mg  over 60 Minutes Intravenous Once 02/16/18 1954 02/16/18 2122 02/16/18 1952  meropenem (MERREM) 1 g/100 mL 0.9% NS VTB (mbp)     Ordering Provider:  Alexandro Suarez PA-C    1 g  over 30 Minutes Intravenous Once 02/16/18 1954 02/16/18 2059          Allergies:  is allergic to ciprofloxacin hcl and ceftin [cefuroxime axetil].    Family History: family history includes COPD in his brother; Heart attack in his brother; Stroke in his father.    Social History:  reports that he has been smoking Cigarettes.  He has a 84.00 pack-year smoking history. He does not have any smokeless tobacco history on file. He reports that he does not drink alcohol or use illicit drugs.    Review of Systems: All other reviewed and  "negative except as per HPI    Blood pressure 105/63, pulse 88, temperature 98.6 °F (37 °C), temperature source Oral, resp. rate 16, height 172.7 cm (68\"), weight 82.2 kg (181 lb 4.8 oz), SpO2 94 %.  GENERAL:Chronically ill appearing.    HEENT: Oropharynx without thrush.  EYES: . No conjunctival injection. No icterus.   LYMPHATICS: No lymphadenopathy of the neck or axillary or inguinal regions.   HEART:  Irregular  LUNGS:  Diminished throughout.  On 4LNC.  ABDOMEN: Soft, nontender, ND   SKIN: BLE dressings intact.  Sacral decub seen, stage 2  EXT:  + edema. BLE with compression stockings.          DIAGNOSTICS:  Lab Results   Component Value Date    WBC 9.11 03/05/2018    HGB 9.0 (L) 03/05/2018    HCT 28.5 (L) 03/05/2018     03/05/2018     Lab Results   Component Value Date    CRP 13.12 (H) 06/21/2017     Lab Results   Component Value Date    SEDRATE 56 (H) 06/20/2017     Lab Results   Component Value Date    GLUCOSE 104 (H) 03/05/2018    BUN 22 03/05/2018    CREATININE 1.20 03/05/2018    EGFRIFNONA 59 (L) 03/05/2018    BCR 18.3 03/05/2018    CO2 32.0 (H) 03/05/2018    CALCIUM 8.5 (L) 03/05/2018    ALBUMIN 3.30 02/23/2018    LABIL2 0.9 (L) 02/17/2018    AST 15 02/17/2018    ALT 4 (L) 02/17/2018       Microbiology:   Microbiology Results Abnormal     Procedure Component Value - Date/Time    Influenza A & B, RT PCR - Swab, Nasopharynx [[id number]]  (Normal) Collected:  03/03/18 1356    Lab Status:  Final result Specimen:  Swab from Nasopharynx Updated:  03/03/18 1607     Influenza A PCR Not Detected     Influenza B PCR Not Detected    Blood Culture - Blood, [[id number]]  (Normal) Collected:  02/18/18 2308    Lab Status:  Final result Specimen:  Blood from Arm, Left Updated:  02/23/18 2331     Blood Culture No growth at 5 days    Blood Culture - Blood, [[id number]]  (Normal) Collected:  02/18/18 2311    Lab Status:  Final result Specimen:  Blood from Arm, Left Updated:  02/23/18 6594     Blood Culture No growth at 5 " days    Wound Culture - Wound, Foot, Right [[id number]]  (Abnormal)  (Susceptibility) Collected:  02/16/18 1931    Lab Status:  Final result Specimen:  Wound from Foot, Right Updated:  02/23/18 1045     Wound Culture --      Light growth (2+) Pseudomonas aeruginosa (A)      Scant growth (1+) Escherichia coli (A)      Scant growth (1+) Proteus mirabilis (A)      Scant growth (1+) Enterococcus faecalis (A)      Scant growth (1+) Streptococcus, Beta Hemolytic, Group G (A)        If Clindamycin or Erythromycin is the drug of choice, notify the laboratory within 7 days to request susceptibility testing.        STREP GROUPING G     Gram Stain Result Few (2+) WBCs seen      Many (4+) Gram negative bacilli      Many (4+) Gram positive bacilli      Many (4+) Gram positive cocci in pairs and clusters    Susceptibility      Pseudomonas aeruginosa     FAVIAN     Aztreonam <=8 ug/ml Susceptible     Cefepime <=8 ug/ml Susceptible     Ceftazidime 4 ug/ml Susceptible     Gentamicin <=4 ug/ml Susceptible     Levofloxacin <=2 ug/ml Susceptible     Meropenem <=1 ug/ml Susceptible     Piperacillin + Tazobactam <=16 ug/ml Susceptible     Tobramycin <=4 ug/ml Susceptible                Susceptibility      Escherichia coli     FAVIAN     Ampicillin <=8 ug/ml Susceptible     Ampicillin + Sulbactam <=8/4 ug/ml Susceptible     Aztreonam <=8 ug/ml Susceptible     Cefepime <=8 ug/ml Susceptible     Cefotaxime <=2 ug/ml Susceptible     Ceftriaxone <=8 ug/ml Susceptible     Cefuroxime sodium <=4 ug/ml Susceptible     Ertapenem <=1 ug/ml Susceptible     Gentamicin <=4 ug/ml Susceptible     Levofloxacin <=2 ug/ml Susceptible     Meropenem <=1 ug/ml Susceptible     Piperacillin + Tazobactam <=16 ug/ml Susceptible     Tetracycline <=4 ug/ml Susceptible     Tobramycin <=4 ug/ml Susceptible     Trimethoprim + Sulfamethoxazole <=2/38 ug/ml Susceptible                Susceptibility      Proteus mirabilis     FAVIAN     Ampicillin >16 ug/ml Resistant      Ampicillin + Sulbactam >16/8 ug/ml Resistant     Aztreonam <=8 ug/ml Susceptible     Cefepime <=8 ug/ml Susceptible     Cefotaxime <=2 ug/ml Susceptible     Ceftriaxone <=8 ug/ml Susceptible     Cefuroxime sodium <=4 ug/ml Susceptible     Ertapenem <=1 ug/ml Susceptible     Gentamicin <=4 ug/ml Susceptible     Levofloxacin <=2 ug/ml Susceptible     Meropenem <=1 ug/ml Susceptible     Piperacillin + Tazobactam <=16 ug/ml Susceptible     Tetracycline >8 ug/ml Resistant     Tobramycin <=4 ug/ml Susceptible     Trimethoprim + Sulfamethoxazole >2/38 ug/ml Resistant                Susceptibility      Enterococcus faecalis     FAVIAN     Ampicillin <=2 ug/ml Susceptible     Gentamicin High Level Synergy <=500 ug/ml Susceptible     Linezolid 2 ug/ml Susceptible     Penicillin G 2 ug/ml Susceptible     Streptomycin High Level Synergy <=1000 ug/ml Susceptible     Vancomycin 1 ug/ml Susceptible                    Blood Culture - Blood, Blood, Venous Line [[id number]]  (Normal) Collected:  02/16/18 2010    Lab Status:  Final result Specimen:  Blood from Arm, Left Updated:  02/21/18 2046     Blood Culture No growth at 5 days    Blood Culture - Blood, Blood, Venous Line [[id number]]  (Normal) Collected:  02/16/18 2005    Lab Status:  Final result Specimen:  Blood from Arm, Right Updated:  02/21/18 2046     Blood Culture No growth at 5 days    Respiratory Culture - Sputum, Cough [[id number]] Collected:  02/19/18 0656    Lab Status:  Final result Specimen:  Sputum from Cough Updated:  02/21/18 0931     Respiratory Culture --      Scant growth (1+) Normal Respiratory Nataliia     Gram Stain Result Occasional WBCs per low power field      No organisms seen              RADIOLOGY:  Imaging Results (last 72 hours)     Procedure Component Value Units Date/Time    XR Chest 1 View [[id number]] Collected:  02/16/18 2125     Updated:  02/16/18 2212    Narrative:       EXAM:    XR Chest, 1 View    CLINICAL HISTORY:    72 years old, male; Peripheral  vascular disease, unspecified; Cellulitis of   right lower limb; Cellulitis of left lower limb; Acute kidney failure,   unspecified; Signs and symptoms; Other: Hypoxia    TECHNIQUE:    Frontal view of the chest.    COMPARISON:    CR - XR CHEST 1 VW 2017-06-25 06:32    FINDINGS:    Lungs:  There is mild prominence of the pulmonary vasculature. Chronic   scarring.    Pleural space:  Unremarkable.  No pneumothorax.    Heart:  There is an atherosclerotic cardiovascular configuration without   cardiomegaly.    Mediastinum:  Unremarkable.    Bones/joints:  There are degenerative changes of the spine.      Impression:         Mild pulmonary vascular congestion.    THIS DOCUMENT HAS BEEN ELECTRONICALLY SIGNED BY LENIN DIAZ MD    XR Chest 1 View [[id number]] Updated:  02/18/18 1333        CXR's seen and compared        Assessment and Plan:   Impression:      -- Cellulitis - Bilateral Lower Extremities - H/O streptococcal bacteremia      -- Acute Kidney Injury - stable     -- COPD - with worsening respiratory failure   diuretics given      --  HTN      -- Tobacco Abuse      -- Acute Hypoxic respiratory failure, remaining on 4LNC.    -- Leukocytosis, on prednisone.      -- Infiltrates - Primarily fluid, improved     -- Possible aspiration per pulmonary note - speech is to evaluate.     -- DTI - sacral  Stage 2 - new    -- PNA? -  CXR better to me       PLAN/RECOMMENDATIONS:      -- Continue Minocycline - planning through 3/10  -- Continue wound care and compression wraps.    UM discussed with nursing looking for short term placement options.    Parish Belle MD for Dr. Parish Belle  3/7/2018

## 2018-03-07 NOTE — PROGRESS NOTES
Continued Stay Note  [facility]     Patient Name: Yassine Love  MRN: [id number]  Today's Date: 3/7/2018    Admit Date: 2/16/2018          Discharge Plan       03/07/18 0951    Case Management/Social Work Plan    Plan Rehab/[facility]    Additional Comments Received call from [facility] this am, patient has been accepted and will be placed in bed on Pulmonary Rehab Unit.  Tentatively plan transfer tomorrow, 3/7/18, pending approval of physicians.  Yolanda Alfred, Ext. [phone]              Discharge Codes     None        Expected Discharge Date and Time     Expected Discharge Date Expected Discharge Time    Mar 9, 2018             ANDRY Tierney

## 2018-03-07 NOTE — PROGRESS NOTES
Continued Stay Note  [facility]     Patient Name: Yassine Love  MRN: [id number]  Today's Date: 3/7/2018    Admit Date: 2/16/2018          Discharge Plan       03/07/18 1637    Case Management/Social Work Plan    Plan [facility]    Patient/Family In Agreement With Plan --   Patient/Son    Additional Comments Arrangements in place to transfer to [facility] tomorrow, 3/8/18.  [facility] to transport at 1:00pm.  Discussed with patient's son, who is agreeable.  Yolanda Alfred, Ext. [phone]               Discharge Codes     None        Expected Discharge Date and Time     Expected Discharge Date Expected Discharge Time    Mar 9, 2018             ANDRY Tierney

## 2018-03-07 NOTE — PLAN OF CARE
Problem: Patient Care Overview (Adult)  Goal: Plan of Care Review  Outcome: Ongoing (interventions implemented as appropriate)   03/07/18 0905   Coping/Psychosocial Response Interventions   Plan Of Care Reviewed With patient   Patient Care Overview   Progress no change   Outcome Evaluation   Outcome Summary/Follow up Plan WO follow-up for bilateral gluteal shearing damage. Wounds at this time show no signs of improvement. Will continue Venelex ointment at this time. Patient had refused a LUCINA mattress r/t discomfort. Ordered Dolphin bed. Hopeful that he will tolerate the surface. Also presents with a new friction blister on medial sacral spine, not a PI. [facility] will continue to follow. Please contact [facility] nurse if needs arise. Thanks

## 2018-03-07 NOTE — PROGRESS NOTES
[facility] Medicine Services  PROGRESS NOTE    Patient Name: Yassine Love  : 1945  MRN: [id number]    Date of Admission: 2018  Length of Stay: 19  Primary Care Physician: No Known Provider    Subjective   Subjective     CC:  F/U LE pain    HPI:  Stable today.    Esophagram result noted  Feels about the same, comfortable at rest but TREJO     Quit tob when he was admitted - jairo patch helping.     Review of Systems   HENT: Positive for trouble swallowing.      Gen-no fevers, no chills  CV-no palpitations  GI-no N/V/D, no abd pain      Otherwise ROS is negative except as mentioned in the HPI.    Objective   Objective     Vital Signs:   Temp:  [97.7 °F (36.5 °C)-98.6 °F (37 °C)] 98.6 °F (37 °C)  Heart Rate:  [80-96] 96  Resp:  [16-20] 16  BP: (105-123)/(63-75) 119/75        Physical Exam:  Gen- asleep, wakes easily, very pleasant.  No acute distress at rest on oxygen    CV-RRR, S1 S2 normal, no m/r/g  Resp- decreased bilaterally throughout, nl WOB, occ rhonchi and wheeze.    Abd-soft, NT, ND, +BS  Ext-BLE edema, both legs wrapped  Neuro-A&Ox3, no focal deficits  Psych-appropriate mood    Results Reviewed:  I have personally reviewed current lab, radiology, and data and agree.      Results from last 7 days  Lab Units 18  0605 18  0136 18  0501   WBC 10*3/mm3 9.11 9.89 10.47   HEMOGLOBIN g/dL 9.0* 9.3*  9.3* 9.3*   HEMATOCRIT % 28.5* 30.0*  30.1* 30.0*   PLATELETS 10*3/mm3 334 326 364       Results from last 7 days  Lab Units 18  0108 18  0605 18  0501   SODIUM mmol/L  --  137 138   POTASSIUM mmol/L 4.4 4.2 4.1   CHLORIDE mmol/L  --  102 99   CO2 mmol/L  --  32.0* 35.0*   BUN mg/dL  --  22 36*   CREATININE mg/dL  --  1.20 1.20   GLUCOSE mg/dL  --  104* 105*   CALCIUM mg/dL  --  8.5* 8.3*           Microbiology Results Abnormal     Procedure Component Value - Date/Time    Respiratory Culture - Sputum, Cough [[id number]] Collected:  18 0801     Lab Status:  Preliminary result Specimen:  Sputum from Cough Updated:  03/07/18 1400     Gram Stain Result Moderate (3+) WBCs per low power field      Rare (1+) Epithelial cells per low power field      Many (4+) Yeast      Rare (1+) Normal respiratory ernesto    Influenza A & B, RT PCR - Swab, Nasopharynx [[id number]]  (Normal) Collected:  03/03/18 1356    Lab Status:  Final result Specimen:  Swab from Nasopharynx Updated:  03/03/18 1607     Influenza A PCR Not Detected     Influenza B PCR Not Detected    Blood Culture - Blood, [[id number]]  (Normal) Collected:  02/18/18 2308    Lab Status:  Final result Specimen:  Blood from Arm, Left Updated:  02/23/18 2331     Blood Culture No growth at 5 days    Blood Culture - Blood, [[id number]]  (Normal) Collected:  02/18/18 2311    Lab Status:  Final result Specimen:  Blood from Arm, Left Updated:  02/23/18 2331     Blood Culture No growth at 5 days    Wound Culture - Wound, Foot, Right [[id number]]  (Abnormal)  (Susceptibility) Collected:  02/16/18 1931    Lab Status:  Final result Specimen:  Wound from Foot, Right Updated:  02/23/18 1045     Wound Culture --      Light growth (2+) Pseudomonas aeruginosa (A)      Scant growth (1+) Escherichia coli (A)      Scant growth (1+) Proteus mirabilis (A)      Scant growth (1+) Enterococcus faecalis (A)      Scant growth (1+) Streptococcus, Beta Hemolytic, Group G (A)        If Clindamycin or Erythromycin is the drug of choice, notify the laboratory within 7 days to request susceptibility testing.        STREP GROUPING G     Gram Stain Result Few (2+) WBCs seen      Many (4+) Gram negative bacilli      Many (4+) Gram positive bacilli      Many (4+) Gram positive cocci in pairs and clusters    Susceptibility      Pseudomonas aeruginosa     FAVIAN     Aztreonam <=8 ug/ml Susceptible     Cefepime <=8 ug/ml Susceptible     Ceftazidime 4 ug/ml Susceptible     Gentamicin <=4 ug/ml Susceptible     Levofloxacin <=2 ug/ml Susceptible     Meropenem  <=1 ug/ml Susceptible     Piperacillin + Tazobactam <=16 ug/ml Susceptible     Tobramycin <=4 ug/ml Susceptible                Susceptibility      Escherichia coli     FAVIAN     Ampicillin <=8 ug/ml Susceptible     Ampicillin + Sulbactam <=8/4 ug/ml Susceptible     Aztreonam <=8 ug/ml Susceptible     Cefepime <=8 ug/ml Susceptible     Cefotaxime <=2 ug/ml Susceptible     Ceftriaxone <=8 ug/ml Susceptible     Cefuroxime sodium <=4 ug/ml Susceptible     Ertapenem <=1 ug/ml Susceptible     Gentamicin <=4 ug/ml Susceptible     Levofloxacin <=2 ug/ml Susceptible     Meropenem <=1 ug/ml Susceptible     Piperacillin + Tazobactam <=16 ug/ml Susceptible     Tetracycline <=4 ug/ml Susceptible     Tobramycin <=4 ug/ml Susceptible     Trimethoprim + Sulfamethoxazole <=2/38 ug/ml Susceptible                Susceptibility      Proteus mirabilis     FAVIAN     Ampicillin >16 ug/ml Resistant     Ampicillin + Sulbactam >16/8 ug/ml Resistant     Aztreonam <=8 ug/ml Susceptible     Cefepime <=8 ug/ml Susceptible     Cefotaxime <=2 ug/ml Susceptible     Ceftriaxone <=8 ug/ml Susceptible     Cefuroxime sodium <=4 ug/ml Susceptible     Ertapenem <=1 ug/ml Susceptible     Gentamicin <=4 ug/ml Susceptible     Levofloxacin <=2 ug/ml Susceptible     Meropenem <=1 ug/ml Susceptible     Piperacillin + Tazobactam <=16 ug/ml Susceptible     Tetracycline >8 ug/ml Resistant     Tobramycin <=4 ug/ml Susceptible     Trimethoprim + Sulfamethoxazole >2/38 ug/ml Resistant                Susceptibility      Enterococcus faecalis     FAVIAN     Ampicillin <=2 ug/ml Susceptible     Gentamicin High Level Synergy <=500 ug/ml Susceptible     Linezolid 2 ug/ml Susceptible     Penicillin G 2 ug/ml Susceptible     Streptomycin High Level Synergy <=1000 ug/ml Susceptible     Vancomycin 1 ug/ml Susceptible                    Blood Culture - Blood, Blood, Venous Line [[id number]]  (Normal) Collected:  02/16/18 2010    Lab Status:  Final result Specimen:  Blood from Arm,  Left Updated:  02/21/18 2046     Blood Culture No growth at 5 days    Blood Culture - Blood, Blood, Venous Line [[id number]]  (Normal) Collected:  02/16/18 2005    Lab Status:  Final result Specimen:  Blood from Arm, Right Updated:  02/21/18 2046     Blood Culture No growth at 5 days    Respiratory Culture - Sputum, Cough [[id number]] Collected:  02/19/18 0656    Lab Status:  Final result Specimen:  Sputum from Cough Updated:  02/21/18 0931     Respiratory Culture --      Scant growth (1+) Normal Respiratory Nataliia     Gram Stain Result Occasional WBCs per low power field      No organisms seen          I have reviewed the medications.    Assessment/Plan   Assessment / Plan     Hospital Problem List     * (Principal)Cellulitis of both lower extremities    RAFAEL (acute kidney injury)    PVD (peripheral vascular disease)    COPD (chronic obstructive pulmonary disease)    Essential hypertension    Hypoxia    Bilateral cellulitis of lower leg           Brief Hospital Course to date:  Yassine Love is a 72 y.o. male  with a past medical history significant for PVD, essential hypertension, COPD, cellulitis of lower extremities, and tobacco abuse who presents to the ED with complaints of fevers, chills and bilateral lower extremity pain/edema/redness.    Assessment & Plan:    Acute hypoxemic respiratory failure -   - secondary to probable aspiration, COPD exacerbation, diastolic heart failure  -- Pulm recs noted and appreciated  --  CT-A shows no PE  - Continue daily Bumex, hold for SBP less than 100  - Daily fluid restriction to 1500cc (which patient is non complaint with)  - treating COPD as below    COPD exacerbation: Symbicort, Duonebs,repeat 5 day course of prednisone,     Vtach nonsustained  -- check Tsh and echo and mag/gary      Acute on chronic diastolic CHF: ECHO 06/2017 EF normal    LE cellulitis/ Chronic LE wounds: switched to minocycline through 3/10, ID on board. Continue PO morphine at home dose PRN - OLIVA  reviewed. Decreased gabapentin back to prior dose, increased dose made him drowsy. Continue PT WOC for bilateral LE wrapping.    HTN: Holding BP meds for normotension    RAFAEL: Baseline Cr 0.9-1.3. Stable    Anemia: mixed anemia, Iron low, will start oral Iron, check stool for occult, monitor H&H, no signs of overt bleeding    DVT Prophylaxis:  [facility]    CODE STATUS: Conditional Code    Disposition: agrees to rehab - [facility] christina Castanon MD  03/07/18  2:54 PM

## 2018-03-07 NOTE — THERAPY TREATMENT NOTE
Acute Care - Occupational Therapy Treatment Note  [facility]     Patient Name: Yassine Love  : 1945  MRN: [id number]  Today's Date: 3/7/2018  Onset of Illness/Injury or Date of Surgery Date: 18  Date of Referral to OT: 18  Referring Physician: Dr. Watkins      Admit Date: 2018    Visit Dx:     ICD-10-CM ICD-9-CM   1. Bilateral cellulitis of lower leg L03.116 682.6    L03.115    2. RAFAEL (acute kidney injury) N17.9 584.9   3. PVD (peripheral vascular disease) I73.9 443.9   4. Impaired mobility and ADLs Z74.09 799.89   5. Impaired functional mobility, balance, gait, and endurance Z74.09 V49.89   6. Cellulitis of both lower extremities L03.115 682.6    L03.116    7. Chronic obstructive pulmonary disease, unspecified COPD type J44.9 496   8. Essential hypertension I10 401.9   9. Hypoxia R09.02 799.02   10. Cellulitis of right lower extremity L03.115 682.6     Patient Active Problem List   Diagnosis   • Cellulitis of right lower extremity   • RAFAEL (acute kidney injury)   • PVD (peripheral vascular disease)   • Cellulitis of both lower extremities   • COPD (chronic obstructive pulmonary disease)   • Essential hypertension   • Hypoxia   • Bilateral cellulitis of lower leg             Adult Rehabilitation Note       18 1524 18 1507 18 1053    Rehab Assessment/Intervention    Discipline occupational therapist  -AN physical therapist  -LM occupational therapist  -HK    Document Type therapy note (daily note)  -AN therapy note (daily note)  -LM therapy note (daily note)  -HK    Subjective Information agree to therapy;complains of;fatigue  -AN agree to therapy;complains of;fatigue;pain  -LM agree to therapy;no complaints  -HK    Patient Effort, Rehab Treatment good  -AN good  -LM good  -HK    Symptoms Noted During/After Treatment dizziness  -AN fatigue;shortness of breath  -LM fatigue;shortness of breath  -HK    Symptoms Noted Comment  Required multiple standing rest breaks with  ambulation  -LM     Precautions/Limitations fall precautions;oxygen therapy device and L/min  -AN fall precautions;other (see comments);oxygen therapy device and L/min   BLE wraps  -LM fall precautions;oxygen therapy device and L/min  -HK    Recorded by [AN] Ebony Valdes OT [LM] Bekah Caro, PT [HK] Violette Smith OT    Vital Signs    Pre Systolic BP Rehab   --   RN cleared for tx  -HK    Pre SpO2 (%) 92  -AN 93  -LM 92  -HK    O2 Delivery Pre Treatment supplemental O2  -AN supplemental O2  -LM supplemental O2  -HK    Intra SpO2 (%) 89  -AN  89  -HK    O2 Delivery Intra Treatment supplemental O2  -AN supplemental O2  -LM room air   washing face  -HK    Post SpO2 (%) 91  -AN 90  -LM 92  -HK    O2 Delivery Post Treatment supplemental O2  -AN supplemental O2  -LM supplemental O2  -HK    Pre Patient Position Sitting  -AN Supine  -LM     Intra Patient Position Sitting  -AN Standing  -LM     Post Patient Position  Sitting  -LM     Recorded by [AN] Ebony Valdes OT [LM] Bekah Caro, PT [HK] Violette Smith OT    Pain Assessment    Pain Assessment No/denies pain  -AN Rojas-Fontenot FACES  -LM Rojas-Fontenot FACES  -HK    Rojas-Fontenot FACES Pain Rating  4  -LM 4  -HK    Pain Type  Chronic pain  -LM Chronic pain  -HK    Pain Location  Hip   and knee  -LM Hip  -HK    Pain Orientation  Left  -LM Left  -HK    Pain Intervention(s)  Repositioned;Ambulation/increased activity  -LM Repositioned;Ambulation/increased activity  -HK    Response to Interventions   Pt tolerated  -HK    Recorded by [AN] Ebony Valdes OT [LM] Bekah Caro, PT [HK] Violette Smith OT    Vision Assessment/Intervention    Visual Impairment   WFL  -HK    Recorded by   [HK] Violette Smith OT    Cognitive Assessment/Intervention    Current Cognitive/Communication Assessment functional  -AN functional  -LM functional  -HK    Orientation Status oriented x 4  -AN oriented x 4  -LM oriented x 4  -HK    Follows Commands/Answers Questions 75% of the time  -% of  the time;able to follow single-step instructions;needs cueing  -% of the time;able to follow single-step instructions  -HK    Personal Safety mild impairment;decreased awareness, need for assist  -AN mild impairment;decreased awareness, need for assist;decreased awareness, need for safety  -LM mild impairment;decreased awareness, need for assist;decreased awareness, need for safety;decreased insight to deficits  -HK    Recorded by [AN] Ebony Valdes, OT [LM] Bekah Caro, PT [HK] Violette Smith, OT    Bed Mobility, Assessment/Treatment    Bed Mobility, Assistive Device  bed rails;head of bed elevated  -LM     Bed Mob, Supine to Sit, [place]  supervision required  -LM     Bed Mob, Sit to Supine, [place]  not tested   Pt left UIC  -LM     Bed Mobility, Safety Issues  decreased use of arms for pushing/pulling;decreased use of legs for bridging/pushing  -LM     Bed Mobility, Impairments  strength decreased;pain  -LM     Bed Mobility, Comment pt up in chair  -AN Pt demo good safety awareness.  -LM Pt received sitting upright in chair  -HK    Recorded by [AN] Ebony Valdes, OT [LM] Bekah Caro, PT [HK] Violette Smith, OT    Transfer Assessment/Treatment    Transfers, Sit-Stand [place]  contact guard assist;verbal cues required  -LM contact guard assist;verbal cues required  -HK    Transfers, Stand-Sit [place]  contact guard assist;verbal cues required  -LM contact guard assist;verbal cues required  -HK    Transfers, Sit-Stand-Sit, Assist Device  rolling walker  -LM rolling walker  -HK    Transfer, Safety Issues  step length decreased  -LM     Transfer, Impairments  strength decreased;impaired balance  -LM strength decreased;impaired balance  -HK    Transfer, Comment tx from chair, just return from ambulation with PT  -AN Verbal cues to push from bed when standing, reach for chair armrests when sitting.  -LM Pt requires verbal cues for safe hand placement   -HK    Recorded by [AN] Ebony HERNANDEZ  Lucio, OT [LM] Bekah Caro, PT [HK] Violette Smith, OT    Gait Assessment/Treatment    Gait, [place] Level  contact guard assist;verbal cues required  -LM     Gait, Assistive Device  rolling walker  -LM     Gait, Distance (Feet)  102  -LM     Gait, Gait Pattern Analysis  swing-through gait  -LM     Gait, Gait Deviations  bilateral:;vidhi decreased;decreased heel strike;forward flexed posture;step length decreased;toe-to-floor clearance decreased;weight-shifting ability decreased  -LM     Gait, Safety Issues  balance decreased during turns;step length decreased  -LM     Gait, Impairments  strength decreased;impaired balance;pain  -LM     Gait, Comment  Pt ambulated with step-through pattern at slow pace with wide WARREN and forward flexed posture. Pt demo decreased swing phase and decreased step length. Verbal cues for upright posture and increase step length. Gait limited by increased left hip/knee pain and SOA. Pt required x4 standing rest breaks throughout ambulation.  -LM     Recorded by  [LM] Bekah Caro, PT     Functional Mobility    Functional Mobility- Comment   not tested  -HK    Recorded by   [HK] Violette Smith, OT    Upper Body Bathing Assessment/Training    UB Bathing Assess/Train, Position   sitting  -HK    UB Bathing Assess/Train, [place] Level   minimum assist (75% patient effort);verbal cues required  -HK    UB Bathing Assess/Train, Impairments   strength decreased;impaired balance;ROM decreased;decreased flexibility  -HK    UB Bathing Assess/Train, Comment   OT provided Cecille to wash back   -HK    Recorded by   [HK] Violette Smith, OT    Lower Body Bathing Assessment/Training    LB Bathing Assess/Train, Position   sitting;standing  -HK    LB Bathing Assess/Train, [place] Level   supervision required;verbal cues required  -HK    LB Bathing Assess/Train, Impairments   strength decreased;impaired balance;ROM decreased;decreased flexibility  -HK    LB Bathing Assess/Train, Comment   OT  provided verbal cues to ensure all of body was washed.   -HK    Recorded by   [HK] Violette Smith OT    Toileting Assessment/Training    Toileting Assess/Train, Assistive Device urinal  -AN      Toileting Assess/Train, Position sitting  -AN      Toileting Assess/Train, Comment   pt declined need to void  -HK    Recorded by [AN] Ebony Valdes OT  [HK] Violette Smith, RAOUL    Grooming Assessment/Training    Grooming Assess/Train, Position   sitting  -HK    Grooming Assess/Train, [place] Level   set up required  -HK    Grooming Assess/Train, Impairments   strength decreased;impaired balance  -HK    Grooming Assess/Train, Comment   Pt rinsed mouth with mouth wash but declined to brush teeth  -HK    Recorded by   [HK] Violette Smith OT    Balance Skills Training    Sitting-Level of Assistance Distant supervision  -AN  Close supervision  -HK    Sitting-Balance Support   Feet supported  -HK    Standing-Level of Assistance   Contact guard  -HK    Static Standing Balance Support   assistive device  -HK    Recorded by [AN] Ebony Valdes OT  [HK] Violette Smith OT    Therapy Exercises    Bilateral Upper Extremity AROM:;10 reps;15 reps;elbow flexion/extension;hand pumps;pronation/supination;shoulder horizontal abd/add;shoulder extension/flexion;shoulder pendulum;shoulder protraction/retraction   5 reps overhead shoulder flex; 2 rest breaks  -AN  --   speech entered for swallow before exercises could be done.   -HK    Recorded by [AN] Ebony Valdes OT  [HK] Violette Smith OT    Positioning and Restraints    Pre-Treatment Position sitting in chair/recliner  -AN in bed  -LM sitting in chair/recliner  -HK    Post Treatment Position chair  -AN chair  -LM chair  -HK    In Chair reclined;call light within reach;encouraged to call for assist;waffle cushion  -AN sitting;with OT  -LM notified nsg;sitting;call light within reach;encouraged to call for assist;exit alarm on;with SLP  -HK    Recorded by [AN] Ebony Valdes OT [LM] Bekah WOO  Vania, PT [] Violette BATEMAN Smith, OT      03/06/18 1024 03/06/18 0910 03/05/18 1556    Rehab Assessment/Intervention    Discipline physical therapist  -LM physical therapist  - physical therapist  -    Document Type therapy note (daily note)  -LM therapy note (daily note)  - therapy note (daily note)  -    Subjective Information agree to therapy   complains of BLE swelling  - agree to therapy;complains of;pain  - agree to therapy;complains of;pain;fatigue  -    Patient Effort, Rehab Treatment good  -      Symptoms Noted During/After Treatment fatigue;shortness of breath  -      Symptoms Noted Comment Required standing rest breaks with ambulation, notified RN.  -LM  had prior SOB episode per notes this date; Wound care PT left a few minutes prior to mobility PT entering  -    Precautions/Limitations fall precautions;oxygen therapy device and L/min   BLE wraps  -LM      Recorded by [LM] Bekah Caro, PT [] Jessi Villa, PT [EH] Mary Kate Parra, PT    Vital Signs    Pre SpO2 (%) 92  -LM  94  -EH    O2 Delivery Pre Treatment supplemental O2  -LM  supplemental O2  -EH    Post SpO2 (%) 92  -LM  92  -EH    O2 Delivery Post Treatment supplemental O2  -LM  supplemental O2  -EH    Recorded by [LM] Bekah Caro, PT  [] Mary Kate Parra, PT    Pain Assessment    Pain Assessment Rojas-Fontenot FACES  -LM Rojas-Fontenot FACES  - Rojas-Baker FACES  -EH    Rojas-Baker FACES Pain Rating 6  -LM 4  -MC 6  -EH    Pain Type Chronic pain  -LM  Chronic pain  -EH    Pain Location Hip   and knee  -LM  Leg   and back  -EH    Pain Orientation Left  -LM  Right;Left  -EH    Pain Intervention(s) Repositioned;Ambulation/increased activity  -LM  Repositioned;Ambulation/increased activity  -    Response to Interventions   tolerated  -EH    Recorded by [LM] Bekah Caro, PT [] Jessi Villa, PT [EH] Mary Kate Parra, PT    Cognitive Assessment/Intervention    Current Cognitive/Communication Assessment  functional  -LM  functional  -    Orientation Status oriented x 4  -LM  oriented to;person;situation;place  -    Follows Commands/Answers Questions 100% of the time;able to follow single-step instructions;needs cueing  -LM  100% of the time;able to follow single-step instructions   with encouragement  -    Personal Safety mild impairment;decreased awareness, need for assist;decreased awareness, need for safety  -LM      Recorded by [LM] Bekah Caro, PT  [EH] Mary Kate Parra, PT    Bed Mobility, Assessment/Treatment    Bed Mob, Supine to Sit, [place] not tested  -LM      Bed Mob, Sit to Supine, [place] not tested  -LM      Bed Mobility, Comment Received sitting EOB, left UIC.  -LM  Refuses mobility  -EH    Recorded by [LM] Bekah Caro, PT  [EH] Mary Kate Parra, PT    Transfer Assessment/Treatment    Transfers, Sit-Stand [place] contact guard assist;verbal cues required  -LM      Transfers, Stand-Sit [place] contact guard assist;verbal cues required  -LM      Transfers, Sit-Stand-Sit, Assist Device rolling walker  -LM      Toilet Transfer, [place] contact guard assist;verbal cues required  -LM      Toilet Transfer, Assistive Device rolling walker;elevated toilet seat  -LM      Transfer, Safety Issues step length decreased  -LM      Transfer, Impairments strength decreased;impaired balance  -LM      Transfer, Comment Verbal cues for correct hand placement.  -LM      Recorded by [LM] Bekah Caro PT      Gait Assessment/Treatment    Gait, [place] Level contact guard assist;verbal cues required  -LM      Gait, Assistive Device rolling walker  -LM      Gait, Distance (Feet) 45  -LM      Gait, Gait Pattern Analysis swing-to gait  -LM      Gait, Gait Deviations bilateral:;vidhi decreased;forward flexed posture;step length decreased;weight-shifting ability decreased  -LM      Gait, Safety Issues balance decreased during turns;step length decreased;weight-shifting  "ability decreased  -LM      Gait, Impairments strength decreased;impaired balance;pain  -LM      Gait, Comment Pt ambulated with step-to pattern at slow pace with forward flexed posture. Verbal cues for upright posture, increase step length, and remain within RW during turns. Pt required multiple standing rest breaks due to SOA and increasing left hip/knee \"bone on bone\" pain. Gait limited by pain and fatigue.  -      Recorded by [LM] Bekah Caro PT      Therapy Exercises    Bilateral Lower Extremities --   deferred as pt with other staff (nsg&infec disease)  -  AROM:;5 reps;ankle pumps/circles;quad sets;glut sets  -    Bilateral Upper Extremity   AROM:;5 reps;elbow flexion/extension;hand pumps;shoulder extension/flexion;pronation/supination  -    Recorded by [] Bekah Caro, PT  [] Mary Kate Parra, PT    Positioning and Restraints    Pre-Treatment Position in bed  - in bed  - in bed  -    Post Treatment Position chair  - bed  - bed  -    In Bed  supine;call light within reach;encouraged to call for assist  - notified nsg;supine;call light within reach;encouraged to call for assist;exit alarm on  -    In Chair reclined;sitting;call light within reach;encouraged to call for assist;exit alarm on;with other staff;with nsg  -LM      Recorded by [LM] Bekah Caro, PT [] Jessi Villa PT [] Mary Kate Parra, PT      03/05/18 1500          Rehab Assessment/Intervention    Discipline physical therapist  -      Document Type therapy note (daily note)  -      Subjective Information agree to therapy;complains of;pain  -      Recorded by [] Carlos Hassan, PT      Pain Assessment    Pain Assessment Rojas-Baker FACES  -      Rojas-Baker FACES Pain Rating 6  -      Pain Type Chronic pain  -      Pain Location Leg  -      Pain Orientation Right;Left  -MF      Recorded by [] Carlos Hassan, PT      Positioning and Restraints    Pre-Treatment Position in bed  " -MF      Post Treatment Position bed  -MF      In Bed supine;call light within reach  -MF      Recorded by [MF] Carlos Hassan, PT        User Key  (r) = Recorded By, (t) = Taken By, (c) = Cosigned By    Initials Name Effective Dates    MF Carlos Whitneylan, PT 06/19/15 -     EH Mary Kate ALAMO Fidel, PT 06/19/15 -     AN Ebony Valdes, OT 06/22/15 -     NORY Villa, PT 03/14/16 -     LM Bekah Caro, PT 06/09/17 - 03/06/18     Bekah Caro, PT 03/07/18 -     HK Violette FRANSICO Smith, OT 09/28/17 - 03/06/18                OT Goals       03/04/18 1441 02/28/18 1100 02/23/18 1223    Transfer Training OT LTG    Transfer Training OT LTG, Outcome goal ongoing   CGA this date for STS from EOB  -TA goal ongoing   CGA level, close to meeting goal  -ROSEANNE goal ongoing  -AR    Patient Education OT LTG    Patient Education OT LTG Outcome goal ongoing   Progressing with adaptive breathing  -TA goal ongoing   good progress with AE and AB  -ROSEANNE goal ongoing  -AR    LB Dressing OT LTG    LB Dressing Goal OT LTG, Outcome goal partially met  -TA goal partially met   met donning and doffing socks.  -ROSEANNE goal ongoing  -AR    Endurance OT LTG    Endurance Goal OT LTG, Outcome goal ongoing   88% with toileting, fxl transfers  -TA goal ongoing   89 % with dressing task, dropped toileting due 02 off  -ROSEANNE goal ongoing  -AR      02/22/18 1729          Transfer Training OT LTG    Transfer Training OT LTG, Date Established 02/22/18  -AR      Transfer Training OT LTG, Time to Achieve 1 wk  -AR      Transfer Training OT LTG, Activity Type toilet;sit to stand/stand to sit  -AR      Transfer Training OT LTG, [place] Level verbal cues required;supervision required  -AR      Transfer Training OT LTG, Assist Device commode, bedside;walker, rolling  -AR      Patient Education OT LTG    Patient Education OT LTG, Date Established 02/22/18  -AR      Patient Education OT LTG, Time to Achieve 1 wk  -AR      Patient Education OT LTG, Education Type  adaptive equipment mgmt;energy conservation;work simplification;adaptive breathing  -AR      Patient Education OT LTG, Education Understanding demonstrates adequately;verbalizes understanding  -AR      LB Dressing OT LTG    LB Dressing Goal OT LTG, Date Established 02/22/18  -AR      LB Dressing Goal OT LTG, Time to Achieve 1 wk  -AR      LB Dressing Goal OT LTG, Activity Type Pt will complete LB dressing task   -AR      LB Dressing Goal OT LTG, [place] Level verbal cues required;moderate assist (50% patient effort)  -AR      LB Dressing Goal OT LTG, Adaptive Equipment sock-aid;reacher  -AR      Endurance OT LTG    Endurance Goal OT LTG, Date Established 02/22/18  -AR      Endurance Goal OT LTG, Time to Achieve 1 wk  -AR      Endurance Goal OT LTG, Additional Goal Pt will complete 5 minurtes ADL activity while maintaining oxygen saturation 90% or above with min cues for PLB and EC/WS  -AR        User Key  (r) = Recorded By, (t) = Taken By, (c) = Cosigned By    Initials Name Provider Type    ROSEANNE Gagnon, OT Occupational Therapist    RUPERTO Kumar, OT Occupational Therapist    MONIQUE Aponte, OT Occupational Therapist          Occupational Therapy Education     Title: PT OT SLP Therapies (Active)     Topic: Occupational Therapy (Done)     Point: ADL training (Done)    Description: Instruct learner(s) on proper safety adaptation and remediation techniques during self care or transfers.   Instruct in proper use of assistive devices.    Learning Progress Summary    Learner Readiness Method Response Comment Documented by Status   Patient Acceptance E VU Pt educated on ADL retraining with self bathing and safety precautions.  03/06/18 1306 Done    Acceptance FRANSICO ZEPEDA,JODY,NR AE for increased LBD indep.  need to keep 02 on at all times.  adaptive breathing.  02/28/18 1059 Done    LINNEA Hung,D PONCHO,NR bed mobility, transfer training, BUE HEP, ADL retraining, PLB AR 02/23/18 1222 Done    LINNEA Hung  VU,NR role of OT, goals of care, ADL retraining, bed mobility, transfer training AR 02/22/18 1728 Done               Point: Home exercise program (Done)    Description: Instruct learner(s) on appropriate technique for monitoring, assisting and/or progressing therapeutic exercises/activities.    Learning Progress Summary    Learner Readiness Method Response Comment Documented by Status   Patient Acceptance E VU,NR Reviewed benefits of therapeutic ex/activities. AN 03/07/18 1545 Done    Acceptance ED VU,NR Education re adaptive breathing with exertion to assist with mgt of SOA; reinforced need for call for assist with OOB activities. TA 03/04/18 1440 Done    LINNEA Hung VU,NR role of OT, goals of care, ADL retraining, bed mobility, transfer training AR 02/22/18 1728 Done               Point: Precautions (Done)    Description: Instruct learner(s) on prescribed precautions during self-care and functional transfers.    Learning Progress Summary    Learner Readiness Method Response Comment Documented by Status   Patient Acceptance E VU Pt educated on ADL retraining with self bathing and safety precautions. HK 03/06/18 1306 Done    Acceptance ED VU,NR Education re adaptive breathing with exertion to assist with mgt of SOA; reinforced need for call for assist with OOB activities. TA 03/04/18 1440 Done    Acceptance E,D VU,DU,NR AE for increased LBD indep.  need to keep 02 on at all times.  adaptive breathing. ROSEANNE 02/28/18 1059 Done    Donyer DUANE,TB,D VU,NR bed mobility, transfer training, BUE HEP, ADL retraining, PLB AR 02/23/18 1222 Done    Christina ZEPEDATB VU,NR role of OT, goals of care, ADL retraining, bed mobility, transfer training AR 02/22/18 1728 Done               Point: Body mechanics (Done)    Description: Instruct learner(s) on proper positioning and spine alignment during self-care, functional mobility activities and/or exercises.    Learning Progress Summary    Learner Readiness Method Response Comment Documented by  Status   Patient Eager E,TB,D VU,NR bed mobility, transfer training, BUE HEP, ADL retraining, PLB AR 02/23/18 1222 Done    Eager E,TB VU,NR role of OT, goals of care, ADL retraining, bed mobility, transfer training AR 02/22/18 1728 Done                      User Key     Initials Effective Dates Name Provider Type Discipline    ROSEANNE 06/22/15 -  Marichuy Gagnon, OT Occupational Therapist OT    AN 06/22/15 -  Ebony Valdes, OT Occupational Therapist OT    AR 06/22/15 -  Merline Kumar, OT Occupational Therapist OT    TA 03/14/16 -  Nehemias Aponte, OT Occupational Therapist OT    HK 09/28/17 - 03/06/18 Violette Smith, OT Occupational Therapist OT                  OT Recommendation and Plan  Anticipated Equipment Needs At Discharge:  (defer-recommend SNF-level rehab)  Anticipated Discharge Disposition: skilled nursing facility  Therapy Frequency: daily (per priority policy)  Plan of Care Review  Plan Of Care Reviewed With: patient  Progress: progress toward functional goals as expected  Outcome Summary/Follow up Plan: Pt. tolerated UE ex with OT with ADL following ambulation with PT. Pt increasing endurance of functional activities with O2 above 90% during majority of session. Continue OT.         Outcome Measures       03/07/18 1524 03/07/18 1507 03/06/18 1053    How much help from another person do you currently need...    Turning from your back to your side while in flat bed without using bedrails?  4  -LM     Moving from lying on back to sitting on the side of a flat bed without bedrails?  3  -LM     Moving to and from a bed to a chair (including a wheelchair)?  3  -LM     Standing up from a chair using your arms (e.g., wheelchair, bedside chair)?  3  -LM     Climbing 3-5 steps with a railing?  2  -LM     To walk in hospital room?  3  -LM     AM-PAC 6 Clicks Score  18  -LM     How much help from another is currently needed...    Putting on and taking off regular lower body clothing? 2  -AN  2  -HK    Bathing  (including washing, rinsing, and drying) 3  -AN  3  -HK    Toileting (which includes using toilet bed pan or urinal) 3  -AN  3  -HK    Putting on and taking off regular upper body clothing 2  -AN  2  -HK    Taking care of personal grooming (such as brushing teeth) 3  -AN  3  -HK    Eating meals 3  -AN  3  -HK    Score 16  -AN  16  -HK    Functional Assessment    Outcome Measure Options  AM-PAC 6 Clicks Basic Mobility (PT)  -LM AM-PAC 6 Clicks Daily Activity (OT)  -HK      03/06/18 1024 03/05/18 1556       How much help from another person do you currently need...    Turning from your back to your side while in flat bed without using bedrails? 4  -LM 4  -EH     Moving from lying on back to sitting on the side of a flat bed without bedrails? 3  -LM 3  -EH     Moving to and from a bed to a chair (including a wheelchair)? 3  -LM 3  -EH     Standing up from a chair using your arms (e.g., wheelchair, bedside chair)? 3  -LM 3  -EH     Climbing 3-5 steps with a railing? 2  -LM 2  -EH     To walk in hospital room? 3  -LM 3  -EH     AM-PAC 6 Clicks Score 18  -LM 18  -EH     Functional Assessment    Outcome Measure Options AM-PAC 6 Clicks Basic Mobility (PT)  -LM AM-PAC 6 Clicks Basic Mobility (PT)  -EH       User Key  (r) = Recorded By, (t) = Taken By, (c) = Cosigned By    Initials Name Provider Type     Mary Kate Parra, PT Physical Therapist    MITRA Valdes, OT Occupational Therapist    LM Bekah Caro, PT Physical Therapist     Bekah Caro, PT Physical Therapist    HK Violette Smith, OT Occupational Therapist           Time Calculation:         Time Calculation- OT       03/07/18 1554 03/07/18 1552       Time Calculation- OT    OT Start Time 1524  -AN 1524  -AN     Total Timed Code Minutes- OT 16 minute(s)  -AN      OT Received On 03/07/18  -AN 03/07/18  -AN     OT Goal Re-Cert Due Date 03/14/18  -AN        User Key  (r) = Recorded By, (t) = Taken By, (c) = Cosigned By    Initials Name Provider Type    AN  Ebony Valdes OT Occupational Therapist           Therapy Charges for Today     Code Description Service Date Service Provider Modifiers Qty    [id number]  OT THERAPEUTIC ACT EA 15 MIN 3/7/2018 Ebony Valdes OT GO 1               Ebony Valdes OT  3/7/2018

## 2018-03-07 NOTE — PLAN OF CARE
Problem: Patient Care Overview (Adult)  Goal: Plan of Care Review  Outcome: Ongoing (interventions implemented as appropriate)   03/07/18 1547   Coping/Psychosocial Response Interventions   Plan Of Care Reviewed With patient   Patient Care Overview   Progress progress toward functional goals as expected   Outcome Evaluation   Outcome Summary/Follow up Plan Pt. tolerated UE ex with OT with ADL following ambulation with PT. Pt increasing endurance of functional activities with O2 above 90% during majority of session. Continue OT.        Problem: Inpatient Occupational Therapy  Goal: Transfer Training Goal 1 LTG- OT  Outcome: Ongoing (interventions implemented as appropriate)   02/22/18 1729 03/04/18 1441   Transfer Training OT LTG   Transfer Training OT LTG, Date Established 02/22/18 --    Transfer Training OT LTG, Time to Achieve 1 wk --    Transfer Training OT LTG, Activity Type toilet;sit to stand/stand to sit --    Transfer Training OT LTG, [place] Level verbal cues required;supervision required --    Transfer Training OT LTG, Assist Device commode, bedside;walker, rolling --    Transfer Training OT LTG, Outcome --  goal ongoing  (CGA this date for STS from EOB)     Goal: Patient Education Goal LTG- OT  Outcome: Ongoing (interventions implemented as appropriate)   02/22/18 1729 03/04/18 1441   Patient Education OT LTG   Patient Education OT LTG, Date Established 02/22/18 --    Patient Education OT LTG, Time to Achieve 1 wk --    Patient Education OT LTG, Education Type adaptive equipment mgmt;energy conservation;work simplification;adaptive breathing --    Patient Education OT LTG, Education Understanding demonstrates adequately;verbalizes understanding --    Patient Education OT LTG Outcome --  goal ongoing  (Progressing with adaptive breathing)     Goal: LB Dressing Goal LTG- OT  Outcome: Ongoing (interventions implemented as appropriate)   02/22/18 1729 03/04/18 1441   LB Dressing OT LTG   LB Dressing Goal  OT LTG, Date Established 02/22/18 --    LB Dressing Goal OT LTG, Time to Achieve 1 wk --    LB Dressing Goal OT LTG, Activity Type Pt will complete LB dressing task  --    LB Dressing Goal OT LTG, [place] Level verbal cues required;moderate assist (50% patient effort) --    LB Dressing Goal OT LTG, Adaptive Equipment sock-aid;reacher --    LB Dressing Goal OT LTG, Outcome --  goal partially met     Goal: Endurance Goal LTG- OT  Outcome: Ongoing (interventions implemented as appropriate)   02/22/18 1729 03/04/18 1441   Endurance OT LTG   Endurance Goal OT LTG, Date Established 02/22/18 --    Endurance Goal OT LTG, Time to Achieve 1 wk --    Endurance Goal OT LTG, Additional Goal Pt will complete 5 minurtes ADL activity while maintaining oxygen saturation 90% or above with min cues for PLB and EC/WS --    Endurance Goal OT LTG, Outcome --  goal ongoing  (88% with toileting, fxl transfers)

## 2018-03-07 NOTE — THERAPY TREATMENT NOTE
Acute Care - Physical Therapy Treatment Note  [facility]     Patient Name: Yassine Love  : 1945  MRN: [id number]  Today's Date: 3/7/2018  Onset of Illness/Injury or Date of Surgery Date: 18  Date of Referral to PT: 18  Referring Physician: Dr. Watkins    Admit Date: 2018    Visit Dx:    ICD-10-CM ICD-9-CM   1. Bilateral cellulitis of lower leg L03.116 682.6    L03.115    2. RAFAEL (acute kidney injury) N17.9 584.9   3. PVD (peripheral vascular disease) I73.9 443.9   4. Impaired mobility and ADLs Z74.09 799.89   5. Impaired functional mobility, balance, gait, and endurance Z74.09 V49.89   6. Cellulitis of both lower extremities L03.115 682.6    L03.116    7. Chronic obstructive pulmonary disease, unspecified COPD type J44.9 496   8. Essential hypertension I10 401.9   9. Hypoxia R09.02 799.02   10. Cellulitis of right lower extremity L03.115 682.6     Patient Active Problem List   Diagnosis   • Cellulitis of right lower extremity   • RAFAEL (acute kidney injury)   • PVD (peripheral vascular disease)   • Cellulitis of both lower extremities   • COPD (chronic obstructive pulmonary disease)   • Essential hypertension   • Hypoxia   • Bilateral cellulitis of lower leg               Adult Rehabilitation Note       18 1507 18 1053 18 1024    Rehab Assessment/Intervention    Discipline physical therapist  -LM occupational therapist  -HK physical therapist  -LM    Document Type therapy note (daily note)  -LM therapy note (daily note)  -HK therapy note (daily note)  -LM    Subjective Information agree to therapy;complains of;fatigue;pain  -LM agree to therapy;no complaints  -HK agree to therapy   complains of BLE swelling  -LM    Patient Effort, Rehab Treatment good  -LM good  -HK good  -LM    Symptoms Noted During/After Treatment fatigue;shortness of breath  -LM fatigue;shortness of breath  -HK fatigue;shortness of breath  -LM    Symptoms Noted Comment Required multiple standing rest  breaks with ambulation  -LM  Required standing rest breaks with ambulation, notified RN.  -LM    Precautions/Limitations fall precautions;other (see comments);oxygen therapy device and L/min   BLE wraps  -LM fall precautions;oxygen therapy device and L/min  -HK fall precautions;oxygen therapy device and L/min   BLE wraps  -LM    Recorded by [LM] Bekah Caro, PT [HK] Violette Smith, OT [LM] Bekah Caro PT    Vital Signs    Pre Systolic BP Rehab  --   RN cleared for tx  -HK     Pre SpO2 (%) 93  -LM 92  -HK 92  -LM    O2 Delivery Pre Treatment supplemental O2  -LM supplemental O2  -HK supplemental O2  -LM    Intra SpO2 (%)  89  -HK     O2 Delivery Intra Treatment supplemental O2  -LM room air   washing face  -HK     Post SpO2 (%) 90  -LM 92  -HK 92  -LM    O2 Delivery Post Treatment supplemental O2  -LM supplemental O2  -HK supplemental O2  -LM    Pre Patient Position Supine  -LM      Intra Patient Position Standing  -LM      Post Patient Position Sitting  -LM      Recorded by [LM] Bekah Caro, PT [HK] Violette Smith, OT [LM] Bekah Caro, PT    Pain Assessment    Pain Assessment Rojas-Fontenot FACES  -LM Rojas-Fontenot FACES  -HK Rojas-Baker FACES  -LM    Rojas-Fontenot FACES Pain Rating 4  -LM 4  -HK 6  -LM    Pain Type Chronic pain  -LM Chronic pain  -HK Chronic pain  -LM    Pain Location Hip   and knee  -LM Hip  -HK Hip   and knee  -LM    Pain Orientation Left  -LM Left  -HK Left  -LM    Pain Intervention(s) Repositioned;Ambulation/increased activity  -LM Repositioned;Ambulation/increased activity  -HK Repositioned;Ambulation/increased activity  -LM    Response to Interventions  Pt tolerated  -HK     Recorded by [LM] Bekah Caro, PT [HK] Violette Smith, OT [LM] Bekah Caro, PT    Vision Assessment/Intervention    Visual Impairment  WFL  -HK     Recorded by  [HK] Violette Smith, OT     Cognitive Assessment/Intervention    Current Cognitive/Communication Assessment functional  -LM functional  -HK functional  -LM     Orientation Status oriented x 4  -LM oriented x 4  -HK oriented x 4  -LM    Follows Commands/Answers Questions 100% of the time;able to follow single-step instructions;needs cueing  -% of the time;able to follow single-step instructions  -% of the time;able to follow single-step instructions;needs cueing  -LM    Personal Safety mild impairment;decreased awareness, need for assist;decreased awareness, need for safety  -LM mild impairment;decreased awareness, need for assist;decreased awareness, need for safety;decreased insight to deficits  -HK mild impairment;decreased awareness, need for assist;decreased awareness, need for safety  -LM    Recorded by [LM] Bekah Caro, PT [HK] Violette Smith, OT [LM] Bekah Caro PT    Bed Mobility, Assessment/Treatment    Bed Mobility, Assistive Device bed rails;head of bed elevated  -LM      Bed Mob, Supine to Sit, [place] supervision required  -LM  not tested  -LM    Bed Mob, Sit to Supine, [place] not tested   Pt left UIC  -LM  not tested  -LM    Bed Mobility, Safety Issues decreased use of arms for pushing/pulling;decreased use of legs for bridging/pushing  -LM      Bed Mobility, Impairments strength decreased;pain  -LM      Bed Mobility, Comment Pt demo good safety awareness.  -LM Pt received sitting upright in chair  -HK Received sitting EOB, left UIC.  -LM    Recorded by [LM] Bekah Caro, PT [HK] Violette Smith, OT [LM] Bekah Caro PT    Transfer Assessment/Treatment    Transfers, Sit-Stand [place] contact guard assist;verbal cues required  -LM contact guard assist;verbal cues required  -HK contact guard assist;verbal cues required  -LM    Transfers, Stand-Sit [place] contact guard assist;verbal cues required  -LM contact guard assist;verbal cues required  -HK contact guard assist;verbal cues required  -LM    Transfers, Sit-Stand-Sit, Assist Device rolling walker  -LM rolling walker  -HK rolling walker  -LM    Toilet Transfer,  [place]   contact guard assist;verbal cues required  -LM    Toilet Transfer, Assistive Device   rolling walker;elevated toilet seat  -LM    Transfer, Safety Issues step length decreased  -LM  step length decreased  -LM    Transfer, Impairments strength decreased;impaired balance  -LM strength decreased;impaired balance  -HK strength decreased;impaired balance  -LM    Transfer, Comment Verbal cues to push from bed when standing, reach for chair armrests when sitting.  -LM Pt requires verbal cues for safe hand placement   -HK Verbal cues for correct hand placement.  -LM    Recorded by [LM] Bekah Caro PT [HK] Violette Smith OT [LM] Bekah Caro PT    Gait Assessment/Treatment    Gait, [place] Level contact guard assist;verbal cues required  -LM  contact guard assist;verbal cues required  -LM    Gait, Assistive Device rolling walker  -LM  rolling walker  -LM    Gait, Distance (Feet) 102  -LM  45  -LM    Gait, Gait Pattern Analysis swing-through gait  -LM  swing-to gait  -LM    Gait, Gait Deviations bilateral:;vidhi decreased;decreased heel strike;forward flexed posture;step length decreased;toe-to-floor clearance decreased;weight-shifting ability decreased  -LM  bilateral:;vidhi decreased;forward flexed posture;step length decreased;weight-shifting ability decreased  -LM    Gait, Safety Issues balance decreased during turns;step length decreased  -LM  balance decreased during turns;step length decreased;weight-shifting ability decreased  -LM    Gait, Impairments strength decreased;impaired balance;pain  -LM  strength decreased;impaired balance;pain  -LM    Gait, Comment Pt ambulated with step-through pattern at slow pace with wide WARREN and forward flexed posture. Pt demo decreased swing phase and decreased step length. Verbal cues for upright posture and increase step length. Gait limited by increased left hip/knee pain and SOA. Pt required x4 standing rest breaks throughout ambulation.  -LM  Pt  "ambulated with step-to pattern at slow pace with forward flexed posture. Verbal cues for upright posture, increase step length, and remain within RW during turns. Pt required multiple standing rest breaks due to SOA and increasing left hip/knee \"bone on bone\" pain. Gait limited by pain and fatigue.  -LM    Recorded by [LM] Bekah Caro, PT  [LM] Bekah Caro, PT    Functional Mobility    Functional Mobility- Comment  not tested  -HK     Recorded by  [HK] Violette Smith, OT     Upper Body Bathing Assessment/Training    UB Bathing Assess/Train, Position  sitting  -HK     UB Bathing Assess/Train, [place] Level  minimum assist (75% patient effort);verbal cues required  -HK     UB Bathing Assess/Train, Impairments  strength decreased;impaired balance;ROM decreased;decreased flexibility  -HK     UB Bathing Assess/Train, Comment  OT provided Cecille to wash back   -HK     Recorded by  [HK] Violette Smith, OT     Lower Body Bathing Assessment/Training    LB Bathing Assess/Train, Position  sitting;standing  -HK     LB Bathing Assess/Train, [place] Level  supervision required;verbal cues required  -HK     LB Bathing Assess/Train, Impairments  strength decreased;impaired balance;ROM decreased;decreased flexibility  -HK     LB Bathing Assess/Train, Comment  OT provided verbal cues to ensure all of body was washed.   -HK     Recorded by  [HK] Violette Smith OT     Toileting Assessment/Training    Toileting Assess/Train, Comment  pt declined need to void  -HK     Recorded by  [HK] Violette Smith, OT     Grooming Assessment/Training    Grooming Assess/Train, Position  sitting  -HK     Grooming Assess/Train, Indepen Level  set up required  -HK     Grooming Assess/Train, Impairments  strength decreased;impaired balance  -HK     Grooming Assess/Train, Comment  Pt rinsed mouth with mouth wash but declined to brush teeth  -HK     Recorded by  [HK] Violette Smith, OT     Balance Skills Training    Sitting-Level of Assistance  Close " supervision  -HK     Sitting-Balance Support  Feet supported  -HK     Standing-Level of Assistance  Contact guard  -HK     Static Standing Balance Support  assistive device  -HK     Recorded by  [HK] Violette Smith OT     Therapy Exercises    Bilateral Lower Extremities   --   deferred as pt with other staff (nsg&infec disease)  -LM    Bilateral Upper Extremity  --   speech entered for swallow before exercises could be done.   -HK     Recorded by  [HK] Violette Smith OT [LM] Bekah Caro PT    Positioning and Restraints    Pre-Treatment Position in bed  -LM sitting in chair/recliner  -HK in bed  -LM    Post Treatment Position chair  -LM chair  -HK chair  -LM    In Chair sitting;with OT  -LM notified nsg;sitting;call light within reach;encouraged to call for assist;exit alarm on;with SLP  -HK reclined;sitting;call light within reach;encouraged to call for assist;exit alarm on;with other staff;with nsg  -LM    Recorded by [LM] Bekah Caro PT [HK] Violette Smith, OT [LM] Bekah Caro PT      03/06/18 0910 03/05/18 1556 03/05/18 1500    Rehab Assessment/Intervention    Discipline physical therapist  - physical therapist  - physical therapist  -    Document Type therapy note (daily note)  - therapy note (daily note)  - therapy note (daily note)  -    Subjective Information agree to therapy;complains of;pain  - agree to therapy;complains of;pain;fatigue  - agree to therapy;complains of;pain  -    Symptoms Noted Comment  had prior SOB episode per notes this date; Wound care PT left a few minutes prior to mobility PT entering  -EH     Recorded by [] Jessi Villa, PT [] Mary Kate Parra, PT [] Carlos Hassan, PT    Vital Signs    Pre SpO2 (%)  94  -EH     O2 Delivery Pre Treatment  supplemental O2  -EH     Post SpO2 (%)  92  -EH     O2 Delivery Post Treatment  supplemental O2  -EH     Recorded by  [] Mary Kate Parra, PT     Pain Assessment    Pain Assessment Rojas-Fontenot FACES   - Rojas-Fontenot FACES  - Rojas-Baker FACES  -    Rojas-Baker FACES Pain Rating 4  - 6  -EH 6  -MF    Pain Type  Chronic pain  -EH Chronic pain  -    Pain Location  Leg   and back  - Leg  -MF    Pain Orientation  Right;Left  -EH Right;Left  -MF    Pain Intervention(s)  Repositioned;Ambulation/increased activity  -     Response to Interventions  tolerated  -EH     Recorded by [] Jessi Villa, PT [] Mary Kate Parra, PT [] Carlos Hassan, PT    Cognitive Assessment/Intervention    Current Cognitive/Communication Assessment  functional  -     Orientation Status  oriented to;person;situation;place  -     Follows Commands/Answers Questions  100% of the time;able to follow single-step instructions   with encouragement  -     Recorded by  [] Mary Kate Parra, PT     Bed Mobility, Assessment/Treatment    Bed Mobility, Comment  Refuses mobility  -     Recorded by  [] Mary Kate Parra, PT     Therapy Exercises    Bilateral Lower Extremities  AROM:;5 reps;ankle pumps/circles;quad sets;glut sets  -     Bilateral Upper Extremity  AROM:;5 reps;elbow flexion/extension;hand pumps;shoulder extension/flexion;pronation/supination  -     Recorded by  [] Mary Kate Parra, PT     Positioning and Restraints    Pre-Treatment Position in bed  - in bed  - in bed  -    Post Treatment Position bed  - bed  - bed  -    In Bed supine;call light within reach;encouraged to call for assist  - notified nsg;supine;call light within reach;encouraged to call for assist;exit alarm on  - supine;call light within reach  -    Recorded by [] Jessi Villa, PT [] Mary Kate Parra, PT [] Carlos Hassan, PT      User Key  (r) = Recorded By, (t) = Taken By, (c) = Cosigned By    Initials Name Effective Dates     Carlos Hassan, PT 06/19/15 -      Mary Kate Parra, PT 06/19/15 -      Jessi Villa, PT 03/14/16 -      Bekah Caro, PT 06/09/17 - 03/06/18     Bekah WOO  Vania, PT 03/07/18 -     HK Violette FRANSICO Smith, OT 09/28/17 - 03/06/18                IP PT Goals       03/07/18 1537 03/06/18 1051 03/06/18 0910    Transfer Training PT LTG    Transfer Training PT  LTG, Date Goal Reviewed 03/07/18  -LM 03/06/18  -LM     Transfer Training PT LTG, Outcome goal ongoing  -LM goal ongoing  -LM     Gait Training PT LTG    Gait Training Goal PT LTG, Date Goal Reviewed 03/07/18  -LM 03/06/18  -LM     Gait Training Goal PT LTG, Outcome goal partially met   met distance, not assistance  -LM goal ongoing  -LM     Wound Care PT LTG    Wound Care PT LTG 1, Outcome   goal partially met  -MC    Wound Care 2 PT LTG    Wound Care PT LTG 2, Outcome   goal partially met  -MC      03/05/18 1613 03/02/18 1127 03/01/18 1058    Bed Mobility PT LTG    Bed Mobility PT LTG, Outcome   goal ongoing  -EH    Transfer Training PT LTG    Transfer Training PT  LTG, Date Goal Reviewed  03/02/18  -AS     Transfer Training PT LTG, Outcome goal ongoing  -EH goal ongoing  -AS goal ongoing  -EH    Gait Training PT LTG    Gait Training Goal PT LTG, Date Goal Reviewed  03/02/18  -AS     Gait Training Goal PT LTG, Outcome goal ongoing  -EH goal ongoing  -AS goal ongoing  -EH      02/26/18 1445 02/23/18 0949 02/22/18 1156    Bed Mobility PT LTG    Bed Mobility PT LTG, Date Established   02/22/18  -KM    Bed Mobility PT LTG, Time to Achieve   2 wks  -KM    Bed Mobility PT LTG, Activity Type   all bed mobility  -KM    Bed Mobility PT LTG, [place] Level   independent  -KM    Bed Mobility PT LTG, Date Goal Reviewed 02/26/18  -LM      Bed Mobility PT LTG, Outcome goal ongoing  -LM goal ongoing  -BD     Transfer Training PT LTG    Transfer Training PT LTG, Date Established   02/22/18  -KM    Transfer Training PT LTG, Time to Achieve   2 wks  -KM    Transfer Training PT LTG, Activity Type   bed to chair /chair to bed;sit to stand/stand to sit  -KM    Transfer Training PT LTG, [place] Level   independent  -KM    Transfer  Training PT LTG, Assist Device   walker, rolling  -KM    Transfer Training PT  LTG, Date Goal Reviewed 02/26/18  -LM      Transfer Training PT LTG, Outcome goal ongoing  -LM goal ongoing  -BD     Gait Training PT LTG    Gait Training Goal PT LTG, Date Established   02/22/18  -KM    Gait Training Goal PT LTG, Time to Achieve   2 wks  -KM    Gait Training Goal PT LTG, [place] Level   independent  -KM    Gait Training Goal PT LTG, Assist Device   walker, rolling  -KM    Gait Training Goal PT LTG, Distance to Achieve   100  -KM    Gait Training Goal PT LTG, Date Goal Reviewed 02/26/18  -LM      Gait Training Goal PT LTG, Outcome goal ongoing  -LM goal ongoing  -BD       02/22/18 1050          Wound Care PT LTG    Wound Care PT LTG 1, Outcome goal partially met  -MF      Wound Care 2 PT LTG    Wound Care PT LTG 2, Outcome goal partially met  -MF        User Key  (r) = Recorded By, (t) = Taken By, (c) = Cosigned By    Initials Name Provider Type     Carlos Hassan, PT Physical Therapist     Mary Kate Parra, PT Physical Therapist     Carmela Washington, PT Physical Therapist    AS Yasmine Fuller, PTA Physical Therapy Assistant     Jessi Villa, PT Physical Therapist    BD Estrella Rojo, PT Physical Therapist     Bekah Caro, PT Physical Therapist     Bekah Caro, PT Physical Therapist          Physical Therapy Education     Title: PT OT SLP Therapies (Active)     Topic: Physical Therapy (Active)     Point: Mobility training (Done)    Learning Progress Summary    Learner Readiness Method Response Comment Documented by Status   Patient Acceptance E,D NR,VU Reviewed benefits of activity, ambulating in [place] with nsg, safety with mobility, correct gait mechanics.  03/07/18 1537 Done    Acceptance E,D NR,VU Reviewed benefits of activity, ambulating with nsg, safety with mobility/transfers, correct gait mechanics.  03/06/18 1051 Done    Acceptance E NR   03/05/18 1613 Active     Acceptance E NR  AS 03/02/18 1126 Active    Acceptance E VU,NR   03/01/18 1058 Done    Acceptance E,D NR Reviewed benefits of activity, HEP, safety with mobility.  02/26/18 1445 Active    Acceptance E NR   02/23/18 0949 Active    Acceptance E VU   02/22/18 1159 Done               Point: Home exercise program (Active)    Learning Progress Summary    Learner Readiness Method Response Comment Documented by Status   Patient Acceptance E NR   03/05/18 1613 Active    Acceptance E NR  AS 03/02/18 1126 Active    Acceptance E VU,NR   03/01/18 1058 Done    Acceptance E,D NR Reviewed benefits of activity, HEP, safety with mobility.  02/26/18 1445 Active    Acceptance E NR   02/23/18 0949 Active    Acceptance E VU   02/22/18 1159 Done               Point: Body mechanics (Done)    Learning Progress Summary    Learner Readiness Method Response Comment Documented by Status   Patient Acceptance E,D NR,VU Reviewed benefits of activity, ambulating in [place] with nsg, safety with mobility, correct gait mechanics.  03/07/18 1537 Done    Acceptance E,D NR,VU Reviewed benefits of activity, ambulating with nsg, safety with mobility/transfers, correct gait mechanics.  03/06/18 1051 Done    Acceptance E NR  AS 03/02/18 1126 Active    Acceptance E VU,NR   03/01/18 1058 Done    Acceptance E,D NR Reviewed benefits of activity, HEP, safety with mobility.  02/26/18 1445 Active    Acceptance E NR   02/23/18 0949 Active    Acceptance E VU   02/22/18 1159 Done               Point: Precautions (Done)    Learning Progress Summary    Learner Readiness Method Response Comment Documented by Status   Patient Acceptance E,D NR,VU Reviewed benefits of activity, ambulating in [place] with nsg, safety with mobility, correct gait mechanics.  03/07/18 1537 Done    Acceptance E,D NR,VU Reviewed benefits of activity, ambulating with nsg, safety with mobility/transfers, correct gait mechanics.  03/06/18 1051 Done    Acceptance E NR    03/05/18 1613 Active    Acceptance E NR  AS 03/02/18 1126 Active    Acceptance E VU,NR   03/01/18 1058 Done    Acceptance E,D NR Reviewed benefits of activity, HEP, safety with mobility.  02/26/18 1445 Active    Acceptance E NR  BD 02/23/18 0949 Active    Acceptance E VU   02/22/18 1159 Done    Acceptance E VU reviewed POC for skin/ edema care.  02/17/18 1126 Done                      User Key     Initials Effective Dates Name Provider Type Discipline     06/19/15 -  Mary Kate Parra, PT Physical Therapist PT     06/19/15 -  Carmela Washington, PT Physical Therapist PT    AS 06/22/15 -  Yasmine Fuller, PTA Physical Therapy Assistant PT     02/12/18 - 03/06/18 Karen Lopez, PT Physical Therapist PT     06/13/16 -  Estrella Rojo, PT Physical Therapist PT     06/09/17 - 03/06/18 Bekah Caro, PT Physical Therapist PT     03/07/18 -  Bekah Caro, PT Physical Therapist PT                    PT Recommendation and Plan  Anticipated Discharge Disposition: home with home health, skilled nursing facility  Planned Therapy Interventions: wound care  Plan of Care Review  Plan Of Care Reviewed With: patient  Progress: progress toward functional goals as expected  Outcome Summary/Follow up Plan: Pt increased ambulation distance to 102 feet with CGA and RW, limited by increased left hip/knee pain and SOA. Will progress with mobility as able.           Outcome Measures       03/07/18 1507 03/06/18 1053 03/06/18 1024    How much help from another person do you currently need...    Turning from your back to your side while in flat bed without using bedrails? 4  -LM  4  -LM    Moving from lying on back to sitting on the side of a flat bed without bedrails? 3  -LM  3  -LM    Moving to and from a bed to a chair (including a wheelchair)? 3  -LM  3  -LM    Standing up from a chair using your arms (e.g., wheelchair, bedside chair)? 3  -LM  3  -LM    Climbing 3-5 steps with a railing? 2  -LM  2   -LM    To walk in hospital room? 3  -LM  3  -LM    AM-PAC 6 Clicks Score 18  -LM  18  -LM    How much help from another is currently needed...    Putting on and taking off regular lower body clothing?  2  -HK     Bathing (including washing, rinsing, and drying)  3  -HK     Toileting (which includes using toilet bed pan or urinal)  3  -HK     Putting on and taking off regular upper body clothing  2  -HK     Taking care of personal grooming (such as brushing teeth)  3  -HK     Eating meals  3  -HK     Score  16  -HK     Functional Assessment    Outcome Measure Options AM-PAC 6 Clicks Basic Mobility (PT)  -LM AM-PAC 6 Clicks Daily Activity (OT)  -HK AM-PAC 6 Clicks Basic Mobility (PT)  -LM      03/05/18 1556          How much help from another person do you currently need...    Turning from your back to your side while in flat bed without using bedrails? 4  -EH      Moving from lying on back to sitting on the side of a flat bed without bedrails? 3  -EH      Moving to and from a bed to a chair (including a wheelchair)? 3  -EH      Standing up from a chair using your arms (e.g., wheelchair, bedside chair)? 3  -EH      Climbing 3-5 steps with a railing? 2  -EH      To walk in hospital room? 3  -EH      AM-PAC 6 Clicks Score 18  -EH      Functional Assessment    Outcome Measure Options AM-PAC 6 Clicks Basic Mobility (PT)  -EH        User Key  (r) = Recorded By, (t) = Taken By, (c) = Cosigned By    Initials Name Provider Type     Mary Kate Parra, PT Physical Therapist    LM Bekah Caro, PT Physical Therapist     Bekah Caro, PT Physical Therapist    HK Violette Smith, OT Occupational Therapist           Time Calculation:         PT Charges       03/07/18 1539          Time Calculation    Start Time 1507  -LM      PT Received On 03/07/18  -LM      PT Goal Re-Cert Due Date 03/15/18  -LM      Time Calculation- PT    Total Timed Code Minutes- PT 21 minute(s)  -LM        User Key  (r) = Recorded By, (t) = Taken By,  (c) = Cosigned By    Initials Name Provider Type    LM Bekah Caro, PT Physical Therapist          Therapy Charges for Today     Code Description Service Date Service Provider Modifiers Qty    56725894439 HC GAIT TRAINING EA 15 MIN 3/6/2018 Bekah Caro, PT GP 1    36295387223 HC PT THER PROC EA 15 MIN 3/6/2018 Bekah Caro, PT GP 1    30348362705 HC GAIT TRAINING EA 15 MIN 3/7/2018 Bekah Caro, PT GP 1          PT G-Codes  Outcome Measure Options: AM-PAC 6 Clicks Basic Mobility (PT)    Bekah Caro PT  3/7/2018

## 2018-03-08 ENCOUNTER — APPOINTMENT (OUTPATIENT)
Dept: CARDIOLOGY | Facility: HOSPITAL | Age: 73
End: 2018-03-08
Attending: INTERNAL MEDICINE

## 2018-03-08 PROBLEM — I47.29 NON-SUSTAINED VENTRICULAR TACHYCARDIA (HCC): Status: ACTIVE | Noted: 2018-03-08

## 2018-03-08 LAB
ANION GAP SERPL CALCULATED.3IONS-SCNC: 2 MMOL/L (ref 3–11)
BH CV ECHO MEAS - AO ROOT AREA (BSA CORRECTED): 1.9
BH CV ECHO MEAS - AO ROOT AREA: 10.6 CM^2
BH CV ECHO MEAS - AO ROOT DIAM: 3.7 CM
BH CV ECHO MEAS - BSA(HAYCOCK): 2 M^2
BH CV ECHO MEAS - BSA: 2 M^2
BH CV ECHO MEAS - BZI_BMI: 27.5 KILOGRAMS/M^2
BH CV ECHO MEAS - BZI_METRIC_HEIGHT: 172.7 CM
BH CV ECHO MEAS - BZI_METRIC_WEIGHT: 82.1 KG
BH CV ECHO MEAS - CONTRAST EF 4CH: 66.3 ML/M^2
BH CV ECHO MEAS - EDV(CUBED): 93.2 ML
BH CV ECHO MEAS - EDV(MOD-SP4): 83 ML
BH CV ECHO MEAS - EDV(TEICH): 94.1 ML
BH CV ECHO MEAS - EF(CUBED): 74.1 %
BH CV ECHO MEAS - EF(MOD-SP4): 66.3 %
BH CV ECHO MEAS - EF(TEICH): 66 %
BH CV ECHO MEAS - ESV(CUBED): 24.2 ML
BH CV ECHO MEAS - ESV(MOD-SP4): 28 ML
BH CV ECHO MEAS - ESV(TEICH): 32 ML
BH CV ECHO MEAS - FS: 36.2 %
BH CV ECHO MEAS - IVS/LVPW: 0.94
BH CV ECHO MEAS - IVSD: 0.87 CM
BH CV ECHO MEAS - LA DIMENSION: 4 CM
BH CV ECHO MEAS - LA/AO: 1.1
BH CV ECHO MEAS - LV DIASTOLIC VOL/BSA (35-75): 42.4 ML/M^2
BH CV ECHO MEAS - LV MASS(C)D: 135.1 GRAMS
BH CV ECHO MEAS - LV MASS(C)DI: 69 GRAMS/M^2
BH CV ECHO MEAS - LV SYSTOLIC VOL/BSA (12-30): 14.3 ML/M^2
BH CV ECHO MEAS - LVIDD: 4.5 CM
BH CV ECHO MEAS - LVIDS: 2.9 CM
BH CV ECHO MEAS - LVLD AP4: 7.5 CM
BH CV ECHO MEAS - LVLS AP4: 6 CM
BH CV ECHO MEAS - LVPWD: 0.93 CM
BH CV ECHO MEAS - RVDD: 3.3 CM
BH CV ECHO MEAS - SI(CUBED): 35.2 ML/M^2
BH CV ECHO MEAS - SI(MOD-SP4): 28.1 ML/M^2
BH CV ECHO MEAS - SI(TEICH): 31.7 ML/M^2
BH CV ECHO MEAS - SV(CUBED): 69 ML
BH CV ECHO MEAS - SV(MOD-SP4): 55 ML
BH CV ECHO MEAS - SV(TEICH): 62.1 ML
BH CV VAS BP LEFT ARM: NORMAL MMHG
BNP SERPL-MCNC: 107 PG/ML (ref 0–100)
BUN BLD-MCNC: 26 MG/DL (ref 9–23)
BUN/CREAT SERPL: 26 (ref 7–25)
CA-I SERPL ISE-MCNC: 1.22 MMOL/L (ref 1.12–1.32)
CALCIUM SPEC-SCNC: 8.6 MG/DL (ref 8.7–10.4)
CHLORIDE SERPL-SCNC: 107 MMOL/L (ref 99–109)
CO2 SERPL-SCNC: 31 MMOL/L (ref 20–31)
CREAT BLD-MCNC: 1 MG/DL (ref 0.6–1.3)
DEPRECATED RDW RBC AUTO: 62.7 FL (ref 37–54)
ERYTHROCYTE [DISTWIDTH] IN BLOOD BY AUTOMATED COUNT: 17 % (ref 11.3–14.5)
GFR SERPL CREATININE-BSD FRML MDRD: 73 ML/MIN/1.73
GLUCOSE BLD-MCNC: 103 MG/DL (ref 70–100)
HCT VFR BLD AUTO: 30.1 % (ref 38.9–50.9)
HGB BLD-MCNC: 9.6 G/DL (ref 13.1–17.5)
LV EF 2D ECHO EST: 65 %
MAGNESIUM SERPL-MCNC: 2.1 MG/DL (ref 1.3–2.7)
MAXIMAL PREDICTED HEART RATE: 147 BPM
MCH RBC QN AUTO: 32.3 PG (ref 27–31)
MCHC RBC AUTO-ENTMCNC: 31.9 G/DL (ref 32–36)
MCV RBC AUTO: 101.3 FL (ref 80–99)
PLATELET # BLD AUTO: 334 10*3/MM3 (ref 150–450)
PMV BLD AUTO: 9.7 FL (ref 6–12)
POTASSIUM BLD-SCNC: 4.1 MMOL/L (ref 3.5–5.5)
RBC # BLD AUTO: 2.97 10*6/MM3 (ref 4.2–5.76)
SODIUM BLD-SCNC: 140 MMOL/L (ref 132–146)
STRESS TARGET HR: 125 BPM
TSH SERPL DL<=0.05 MIU/L-ACNC: 1.84 MIU/ML (ref 0.35–5.35)
WBC NRBC COR # BLD: 9.02 10*3/MM3 (ref 3.5–10.8)

## 2018-03-08 PROCEDURE — 94799 UNLISTED PULMONARY SVC/PX: CPT

## 2018-03-08 PROCEDURE — 85027 COMPLETE CBC AUTOMATED: CPT | Performed by: INTERNAL MEDICINE

## 2018-03-08 PROCEDURE — 25010000002 HEPARIN (PORCINE) PER 1000 UNITS: Performed by: INTERNAL MEDICINE

## 2018-03-08 PROCEDURE — 93325 DOPPLER ECHO COLOR FLOW MAPG: CPT

## 2018-03-08 PROCEDURE — 94760 N-INVAS EAR/PLS OXIMETRY 1: CPT

## 2018-03-08 PROCEDURE — 93308 TTE F-UP OR LMTD: CPT | Performed by: INTERNAL MEDICINE

## 2018-03-08 PROCEDURE — 82330 ASSAY OF CALCIUM: CPT | Performed by: INTERNAL MEDICINE

## 2018-03-08 PROCEDURE — 83880 ASSAY OF NATRIURETIC PEPTIDE: CPT | Performed by: PHYSICIAN ASSISTANT

## 2018-03-08 PROCEDURE — 80048 BASIC METABOLIC PNL TOTAL CA: CPT | Performed by: INTERNAL MEDICINE

## 2018-03-08 PROCEDURE — 83735 ASSAY OF MAGNESIUM: CPT | Performed by: INTERNAL MEDICINE

## 2018-03-08 PROCEDURE — 93308 TTE F-UP OR LMTD: CPT

## 2018-03-08 PROCEDURE — 93321 DOPPLER ECHO F-UP/LMTD STD: CPT | Performed by: INTERNAL MEDICINE

## 2018-03-08 PROCEDURE — 97116 GAIT TRAINING THERAPY: CPT

## 2018-03-08 PROCEDURE — 84443 ASSAY THYROID STIM HORMONE: CPT | Performed by: INTERNAL MEDICINE

## 2018-03-08 PROCEDURE — 97110 THERAPEUTIC EXERCISES: CPT

## 2018-03-08 PROCEDURE — 99233 SBSQ HOSP IP/OBS HIGH 50: CPT | Performed by: PHYSICIAN ASSISTANT

## 2018-03-08 PROCEDURE — 63710000001 PREDNISONE PER 1 MG: Performed by: INTERNAL MEDICINE

## 2018-03-08 PROCEDURE — 92610 EVALUATE SWALLOWING FUNCTION: CPT

## 2018-03-08 PROCEDURE — 94640 AIRWAY INHALATION TREATMENT: CPT

## 2018-03-08 PROCEDURE — 99222 1ST HOSP IP/OBS MODERATE 55: CPT | Performed by: INTERNAL MEDICINE

## 2018-03-08 PROCEDURE — 93321 DOPPLER ECHO F-UP/LMTD STD: CPT

## 2018-03-08 RX ORDER — SACCHAROMYCES BOULARDII 250 MG
250 CAPSULE ORAL 2 TIMES DAILY
Status: DISCONTINUED | OUTPATIENT
Start: 2018-03-08 | End: 2018-03-15 | Stop reason: HOSPADM

## 2018-03-08 RX ORDER — MINOCYCLINE HYDROCHLORIDE 50 MG/1
100 CAPSULE ORAL EVERY 12 HOURS
Status: DISCONTINUED | OUTPATIENT
Start: 2018-03-08 | End: 2018-03-15 | Stop reason: HOSPADM

## 2018-03-08 RX ADMIN — BUMETANIDE 1 MG: 1 TABLET ORAL at 08:25

## 2018-03-08 RX ADMIN — Medication 250 MG: at 12:33

## 2018-03-08 RX ADMIN — ACETAMINOPHEN 650 MG: 325 TABLET ORAL at 01:04

## 2018-03-08 RX ADMIN — IPRATROPIUM BROMIDE AND ALBUTEROL SULFATE 3 ML: 2.5; .5 SOLUTION RESPIRATORY (INHALATION) at 11:59

## 2018-03-08 RX ADMIN — MORPHINE SULFATE 30 MG: 30 TABLET ORAL at 08:25

## 2018-03-08 RX ADMIN — HYDROCODONE BITARTRATE AND ACETAMINOPHEN 1 TABLET: 7.5; 325 TABLET ORAL at 17:34

## 2018-03-08 RX ADMIN — HYDROCODONE BITARTRATE AND ACETAMINOPHEN 1 TABLET: 7.5; 325 TABLET ORAL at 03:40

## 2018-03-08 RX ADMIN — HEPARIN SODIUM 5000 UNITS: 5000 INJECTION, SOLUTION INTRAVENOUS; SUBCUTANEOUS at 21:36

## 2018-03-08 RX ADMIN — PREDNISONE 40 MG: 20 TABLET ORAL at 08:25

## 2018-03-08 RX ADMIN — GABAPENTIN 200 MG: 100 CAPSULE ORAL at 21:35

## 2018-03-08 RX ADMIN — BUDESONIDE 0.5 MG: 0.5 INHALANT RESPIRATORY (INHALATION) at 19:43

## 2018-03-08 RX ADMIN — MINOCYCLINE HYDROCHLORIDE 100 MG: 50 CAPSULE ORAL at 17:34

## 2018-03-08 RX ADMIN — MORPHINE SULFATE 30 MG: 30 TABLET ORAL at 21:37

## 2018-03-08 RX ADMIN — MORPHINE SULFATE 30 MG: 30 TABLET ORAL at 13:50

## 2018-03-08 RX ADMIN — ARFORMOTEROL TARTRATE 15 MCG: 15 SOLUTION RESPIRATORY (INHALATION) at 19:43

## 2018-03-08 RX ADMIN — HEPARIN SODIUM 5000 UNITS: 5000 INJECTION, SOLUTION INTRAVENOUS; SUBCUTANEOUS at 13:50

## 2018-03-08 RX ADMIN — CYANOCOBALAMIN TAB 1000 MCG 1000 MCG: 1000 TAB at 08:25

## 2018-03-08 RX ADMIN — HYDROCODONE BITARTRATE AND ACETAMINOPHEN 1 TABLET: 7.5; 325 TABLET ORAL at 12:33

## 2018-03-08 RX ADMIN — FLUTICASONE PROPIONATE 2 SPRAY: 50 SPRAY, METERED NASAL at 08:30

## 2018-03-08 RX ADMIN — METOPROLOL TARTRATE 25 MG: 25 TABLET ORAL at 21:36

## 2018-03-08 RX ADMIN — HEPARIN SODIUM 5000 UNITS: 5000 INJECTION, SOLUTION INTRAVENOUS; SUBCUTANEOUS at 06:28

## 2018-03-08 RX ADMIN — NICOTINE 1 PATCH: 21 PATCH, EXTENDED RELEASE TRANSDERMAL at 08:25

## 2018-03-08 RX ADMIN — ARFORMOTEROL TARTRATE 15 MCG: 15 SOLUTION RESPIRATORY (INHALATION) at 08:50

## 2018-03-08 RX ADMIN — METOPROLOL TARTRATE 25 MG: 25 TABLET ORAL at 12:33

## 2018-03-08 RX ADMIN — CASTOR OIL AND BALSAM, PERU 1 EACH: 788; 87 OINTMENT TOPICAL at 21:43

## 2018-03-08 RX ADMIN — GABAPENTIN 200 MG: 100 CAPSULE ORAL at 06:28

## 2018-03-08 RX ADMIN — CASTOR OIL AND BALSAM, PERU: 788; 87 OINTMENT TOPICAL at 08:25

## 2018-03-08 RX ADMIN — IPRATROPIUM BROMIDE AND ALBUTEROL SULFATE 3 ML: 2.5; .5 SOLUTION RESPIRATORY (INHALATION) at 16:05

## 2018-03-08 RX ADMIN — BUDESONIDE 0.5 MG: 0.5 INHALANT RESPIRATORY (INHALATION) at 08:50

## 2018-03-08 RX ADMIN — HYDROCODONE BITARTRATE AND ACETAMINOPHEN 1 TABLET: 7.5; 325 TABLET ORAL at 21:37

## 2018-03-08 RX ADMIN — Medication 250 MG: at 21:35

## 2018-03-08 RX ADMIN — GABAPENTIN 200 MG: 100 CAPSULE ORAL at 13:49

## 2018-03-08 RX ADMIN — IPRATROPIUM BROMIDE AND ALBUTEROL SULFATE 3 ML: 2.5; .5 SOLUTION RESPIRATORY (INHALATION) at 19:43

## 2018-03-08 RX ADMIN — MINOCYCLINE HYDROCHLORIDE 100 MG: 50 CAPSULE ORAL at 06:28

## 2018-03-08 RX ADMIN — PANTOPRAZOLE SODIUM 40 MG: 40 TABLET, DELAYED RELEASE ORAL at 06:28

## 2018-03-08 RX ADMIN — DOCUSATE SODIUM 200 MG: 100 CAPSULE, LIQUID FILLED ORAL at 08:25

## 2018-03-08 NOTE — PROGRESS NOTES
Continued Stay Note  [facility]     Patient Name: Yassine Love  MRN: [id number]  Today's Date: 3/8/2018    Admit Date: 2/16/2018          Discharge Plan       03/08/18 0922    Case Management/Social Work Plan    Plan [facility]    Patient/Family In Agreement With Plan --   Patient/Son    Final Note    Final Note Transfer to [facility] today @ 1:00pm via [facility].  Discussed transfer with both patient/son and they voice agreement with discharge plan.  [facility] RN to call report to [phone]; will fax transfer summary to [phone].  Yolanda Aflred, Ext. [phone]              Discharge Codes       03/08/18 0925    Discharge Codes    Discharge Codes 62  Discharged/transferred to an inpatient rehabilitation facility including distinct parts of units of a hospital        Expected Discharge Date and Time     Expected Discharge Date Expected Discharge Time    Mar 9, 2018             ANDRY Tierney

## 2018-03-08 NOTE — PLAN OF CARE
Problem: Patient Care Overview (Adult)  Goal: Plan of Care Review  Outcome: Ongoing (interventions implemented as appropriate)   03/08/18 0829   Coping/Psychosocial Response Interventions   Plan Of Care Reviewed With patient   Patient Care Overview   Progress improving   Outcome Evaluation   Outcome Summary/Follow up Plan patient with increased hip and knee pain limiting gait distance. B LE exercises in chair.       Problem: Inpatient Physical Therapy  Goal: Transfer Training Goal 1 LTG- PT  Outcome: Ongoing (interventions implemented as appropriate)   02/22/18 1156 03/08/18 0829   Transfer Training PT LTG   Transfer Training PT LTG, Date Established 02/22/18 --    Transfer Training PT LTG, Time to Achieve 2 wks --    Transfer Training PT LTG, Activity Type bed to chair /chair to bed;sit to stand/stand to sit --    Transfer Training PT LTG, [place] Level independent --    Transfer Training PT LTG, Assist Device walker, rolling --    Transfer Training PT LTG, Date Goal Reviewed --  03/08/18   Transfer Training PT LTG, Outcome --  goal ongoing     Goal: Gait Training Goal LTG- PT  Outcome: Ongoing (interventions implemented as appropriate)   02/22/18 1156 03/08/18 0829   Gait Training PT LTG   Gait Training Goal PT LTG, Date Established 02/22/18 --    Gait Training Goal PT LTG, Time to Achieve 2 wks --    Gait Training Goal PT LTG, [place] Level independent --    Gait Training Goal PT LTG, Assist Device walker, rolling --    Gait Training Goal PT LTG, Distance to Achieve 100 --    Gait Training Goal PT LTG, Date Goal Reviewed --  03/08/18   Gait Training Goal PT LTG, Outcome --  goal ongoing

## 2018-03-08 NOTE — PROGRESS NOTES
Yassine Love  1945  [id number]  3/8/2018    CC:   Chief Complaint   Patient presents with   • Leg Swelling       Yassine Love is a 72 y.o. male here for leg infections   History of present illness:    Mr. Yassine Love is a 72 y.o. male with CC: Cellulitis of both lower extremities. He presented to the [facility] ED with increased bilateral extremity edema with increased pain and redness in the setting of unna boot therapy via home health care for the past several months. He does complain of foul odor and drainage of BLE and fever, and chills.    He denies No SOB or cough today: No chest pain, No N/V/D at present and no abdominal pain.  No  symptoms: no new rash: No HA, photophobia or neck stiffness; He denies other focal pain.  Mr. Love was followed by Dr. Duncan during a June, 2017 hospitalization and was was treated with Invanz for a streptococcal bacteremia.      2/18 - co leg infection  No fever or chills  2/19/18 - C/O HA, generalized pain, and respiratory distress  Patient denies any fever, chills, or sweats.  Patient denies any nausea, vomiting, or diarrhea.  Now on hi-emperatriz oxygen. Seen and agree  2/20 - co leg infections  Co weakness  No fever  Lives alone  2/21/18 - Patient reports mild dyspnea, remains on hi emperatriz oxygen.  Patient denies any fever, chills, or sweats.  Patient denies any nausea, vomiting, or diarrhea.  C/O BLE pain.  Seen and agree  2/22 - co SOA  Co weakness  No fever or chills  No rash  Co leg pain and swelling  2/23/18 - Patient is sleeping today peacefully on 4 LNC.  ROS discussed with staff.  Seen and agree  2/26 - No hx available  ROS discussed with nurse  2/28/18:   Does not want to go to rehab, wants to go home.  Unna boots just changed yesterday.  No fever, chills, sweats.   Seen and agree  3/5/18 - Looking at places for rehab.  Patient denies any fever, chills, or sweats.  Per nursing requiring 4LNC.  On oral antibiotics.    Seen and agree  3/6/18 - C/O pain in BLE.   "Patient denies any fever, chills, or sweats.  Continues on 4LNC.  Patient denies any nausea, vomiting, or diarrhea.  RN at .  Patient working with PT.  Seen and agree  3/7 - co new issue with decubitus  Co SOA  Co weakness  No fever  3/8/18 - \"They think there is something wrong with my heart.\"  Patient denies any fever, chills, or sweats.  Patient reports to me he doesn't want any chest compressions or be put on the vent.\"  Discussed with nursing this is current conditional code status.  Seen and agree.    Past medical history:  Past Medical History:   Diagnosis Date   • Cancer    • Cellulitis of both lower extremities     [facility] 2016   • Chronic pain    • COPD (chronic obstructive pulmonary disease)    • Hypertension    • Osteoarthritis cervical spine    • Osteoarthritis of both knees    • Osteoarthritis of left hip        Medications:   Current Facility-Administered Medications:   •  acetaminophen (TYLENOL) tablet 650 mg, 650 mg, Oral, Q4H PRN, Nicole Hewitt DO, 650 mg at 03/08/18 0104  •  arformoterol (BROVANA) nebulizer solution 15 mcg, 15 mcg, Nebulization, BID - RT, Todd Keys MD, 15 mcg at 03/08/18 0850  •  bisacodyl (DULCOLAX) suppository 10 mg, 10 mg, Rectal, Daily PRN, Nicole Hewitt DO, 10 mg at 02/23/18 0823  •  budesonide (PULMICORT) nebulizer solution 0.5 mg, 0.5 mg, Nebulization, BID - RT, Todd Keys MD, 0.5 mg at 03/08/18 0850  •  bumetanide (BUMEX) tablet 1 mg, 1 mg, Oral, Daily, BRITTNY Alvares, 1 mg at 03/08/18 0825  •  calcium carbonate (TUMS) chewable tablet 500 mg (200 mg elemental), 2 tablet, Oral, BID PRN, BRITTNY Estrada, 2 tablet at 02/27/18 0946  •  castor oil-balsam peru (VENELEX) ointment, , Topical, Q12H, Nicole Hewitt DO  •  diclofenac (VOLTAREN) 1 % gel 2 g, 2 g, Topical, 4x Daily, Nicole Hewitt DO, 2 g at 03/07/18 1322  •  docusate sodium (COLACE) capsule 200 mg, 200 mg, Oral, Daily, Nicole Hewitt DO, 200 " mg at 03/08/18 0825  •  fluticasone (FLONASE) 50 MCG/ACT nasal spray 2 spray, 2 spray, Each Nare, Daily, Esmer Watkins MD, 2 spray at 03/08/18 0830  •  gabapentin (NEURONTIN) capsule 200 mg, 200 mg, Oral, Q8H, Nicole Hewitt DO, 200 mg at 03/08/18 0628  •  guaiFENesin (MUCINEX) 12 hr tablet 600 mg, 600 mg, Oral, BID PRN, Ezequiel Alfred PA-C, 600 mg at 02/20/18 0818  •  heparin (porcine) 5000 UNIT/ML injection 5,000 Units, 5,000 Units, Subcutaneous, Q8H, Nicole Hewitt DO, 5,000 Units at 03/08/18 0628  •  HYDROcodone-acetaminophen (NORCO) 7.5-325 MG per tablet 1 tablet, 1 tablet, Oral, Q4H PRN, Yumiko Castanon MD, 1 tablet at 03/08/18 0340  •  ipratropium-albuterol (DUO-NEB) nebulizer solution 3 mL, 3 mL, Nebulization, 4x Daily - RT, Nicole Hewitt DO, 3 mL at 03/07/18 1929  •  magnesium hydroxide (MILK OF MAGNESIA) suspension 2400 mg/10mL 10 mL, 10 mL, Oral, Daily PRN, Nicole Hewitt DO, 10 mL at 02/25/18 2228  •  Magnesium Sulfate 2 gram Bolus, followed by 8 gram infusion (total Mg dose 10 grams)- Mg less than or equal to 1mg/dL, 2 g, Intravenous, PRN **OR** Magnesium Sulfate 6 gram Infusion (2 gm x 3) -Mg 1.1 -1.5 mg/dL, 2 g, Intravenous, PRN **OR** magnesium sulfate 4 gram infusion- Mg 1.6-1.9 mg/dL, 4 g, Intravenous, PRN, Esmer Watkins MD  •  metoprolol tartrate (LOPRESSOR) tablet 25 mg, 25 mg, Oral, Q12H, BRITTNY Schaefer  •  minocycline (MINOCIN,DYNACIN) capsule 100 mg, 100 mg, Oral, Q12H, Casie M Mayne, PA-C  •  Morphine (MSIR) tablet 30 mg, 30 mg, Oral, Q6H PRN, Esmer Watkins MD, 30 mg at 03/08/18 0825  •  nicotine (NICODERM CQ) 21 MG/24HR patch 1 patch, 1 patch, Transdermal, Q24H, Yumiko Castanon MD, 1 patch at 03/08/18 0825  •  ondansetron (ZOFRAN) injection 4 mg, 4 mg, Intravenous, Q6H PRN, Alexandro Suarez PA-C, 4 mg at 02/21/18 1945  •  pantoprazole (PROTONIX) EC tablet 40 mg, 40 mg, Oral, Q AM, Todd Keys MD, 40 mg at 03/08/18 0628  •  Pharmacy Consult -  MTM, , Does not apply, Daily, Annette Ray RPH  •  potassium chloride (KLOR-CON) packet 40 mEq, 40 mEq, Oral, PRN, Esmer Watkins MD  •  potassium chloride (MICRO-K) CR capsule 40 mEq, 40 mEq, Oral, PRN, Esmer Watkins MD, 40 mEq at 02/22/18 1431  •  predniSONE (DELTASONE) tablet 40 mg, 40 mg, Oral, Daily With Breakfast, Todd Keys MD, 40 mg at 03/08/18 0825  •  saccharomyces boulardii (FLORASTOR) capsule 250 mg, 250 mg, Oral, BID, Casie M Mayne, PA-C  •  sodium chloride 0.9 % flush 1-10 mL, 1-10 mL, Intravenous, PRN, Nicole Hewitt,   •  sodium chloride 0.9 % flush 10 mL, 10 mL, Intravenous, PRN, Steve Mcmanus MD, 10 mL at 03/01/18 2118  •  vitamin B-12 (CYANOCOBALAMIN) tablet 1,000 mcg, 1,000 mcg, Oral, Daily, Yumiko Castanon MD, 1,000 mcg at 03/08/18 0825  Antibiotics:  Anti-Infectives     Ordered     Dose/Rate Route Frequency Start Stop    03/08/18 0921  minocycline (MINOCIN,DYNACIN) capsule 100 mg     Casie M Mayne, PA-C reviewed the order on 03/08/18 0955.   Ordering Provider:  Casie M Mayne, PA-C    100 mg Oral Every 12 Hours 03/08/18 1800 03/17/18 2359    02/16/18 2302  vancomycin 500 mg/100 mL 0.9% NS IVPB (mbp)     Ordering Provider:  Carlos Walls RPH    500 mg  over 60 Minutes Intravenous Once 02/16/18 2345 02/17/18 0032    02/16/18 1952  vancomycin (VANCOCIN) in iso-osmotic dextrose IVPB 1 g (premix) 200 mL     Ordering Provider:  Alexandro Suarez PA-C    1,000 mg  over 60 Minutes Intravenous Once 02/16/18 1954 02/16/18 2122    02/16/18 1952  meropenem (MERREM) 1 g/100 mL 0.9% NS VTB (mbp)     Ordering Provider:  Alexandro Suarez PA-C    1 g  over 30 Minutes Intravenous Once 02/16/18 1954 02/16/18 2059          Allergies:  is allergic to ciprofloxacin hcl and ceftin [cefuroxime axetil].    Family History: family history includes COPD in his brother; Heart attack in his brother; Stroke in his father.    Social History:  reports that he has been smoking Cigarettes.  He has a  "84.00 pack-year smoking history. He does not have any smokeless tobacco history on file. He reports that he does not drink alcohol or use illicit drugs.    Review of Systems: All other reviewed and negative except as per HPI    Blood pressure 116/74, pulse 82, temperature 98.2 °F (36.8 °C), resp. rate 16, height 172.7 cm (68\"), weight 82.2 kg (181 lb 4.8 oz), SpO2 97 %.  GENERAL:Chronically ill appearing.  Sitting on side of bed.   HEENT: Oropharynx without thrush.  EYES: . No conjunctival injection. No icterus.   LYMPHATICS: No lymphadenopathy of the neck or axillary or inguinal regions.   HEART:  Irregular  LUNGS:  Diminished throughout.  On 4LNC.  ABDOMEN: Soft, nontender, ND   SKIN: BLE dressings intact.  Sacral decub multiple sites stage 2  EXT:  + edema. BLE with compression stockings. Seen and agree.          DIAGNOSTICS:  Lab Results   Component Value Date    WBC 9.02 03/08/2018    HGB 9.6 (L) 03/08/2018    HCT 30.1 (L) 03/08/2018     03/08/2018     Lab Results   Component Value Date    CRP 13.12 (H) 06/21/2017     Lab Results   Component Value Date    SEDRATE 56 (H) 06/20/2017     Lab Results   Component Value Date    GLUCOSE 103 (H) 03/08/2018    BUN 26 (H) 03/08/2018    CREATININE 1.00 03/08/2018    EGFRIFNONA 73 03/08/2018    BCR 26.0 (H) 03/08/2018    CO2 31.0 03/08/2018    CALCIUM 8.6 (L) 03/08/2018    ALBUMIN 3.30 02/23/2018    LABIL2 0.9 (L) 02/17/2018    AST 15 02/17/2018    ALT 4 (L) 02/17/2018       Microbiology:   Microbiology Results Abnormal     Procedure Component Value - Date/Time    Respiratory Culture - Sputum, Cough [[id number]]  (Abnormal) Collected:  03/07/18 0801    Lab Status:  Preliminary result Specimen:  Sputum from Cough Updated:  03/08/18 0745     Respiratory Culture --      Light growth (2+) Gram Negative Bacilli (A)      Scant growth (1+) Normal Respiratory Nataliia     Gram Stain Result Moderate (3+) WBCs per low power field      Rare (1+) Epithelial cells per low power " field      Many (4+) Yeast      Rare (1+) Normal respiratory ernesto    Influenza A & B, RT PCR - Swab, Nasopharynx [[id number]]  (Normal) Collected:  03/03/18 1356    Lab Status:  Final result Specimen:  Swab from Nasopharynx Updated:  03/03/18 1607     Influenza A PCR Not Detected     Influenza B PCR Not Detected    Blood Culture - Blood, [[id number]]  (Normal) Collected:  02/18/18 2308    Lab Status:  Final result Specimen:  Blood from Arm, Left Updated:  02/23/18 2331     Blood Culture No growth at 5 days    Blood Culture - Blood, [[id number]]  (Normal) Collected:  02/18/18 2311    Lab Status:  Final result Specimen:  Blood from Arm, Left Updated:  02/23/18 2331     Blood Culture No growth at 5 days    Wound Culture - Wound, Foot, Right [[id number]]  (Abnormal)  (Susceptibility) Collected:  02/16/18 1931    Lab Status:  Final result Specimen:  Wound from Foot, Right Updated:  02/23/18 1045     Wound Culture --      Light growth (2+) Pseudomonas aeruginosa (A)      Scant growth (1+) Escherichia coli (A)      Scant growth (1+) Proteus mirabilis (A)      Scant growth (1+) Enterococcus faecalis (A)      Scant growth (1+) Streptococcus, Beta Hemolytic, Group G (A)        If Clindamycin or Erythromycin is the drug of choice, notify the laboratory within 7 days to request susceptibility testing.        STREP GROUPING G     Gram Stain Result Few (2+) WBCs seen      Many (4+) Gram negative bacilli      Many (4+) Gram positive bacilli      Many (4+) Gram positive cocci in pairs and clusters    Susceptibility      Pseudomonas aeruginosa     FAVIAN     Aztreonam <=8 ug/ml Susceptible     Cefepime <=8 ug/ml Susceptible     Ceftazidime 4 ug/ml Susceptible     Gentamicin <=4 ug/ml Susceptible     Levofloxacin <=2 ug/ml Susceptible     Meropenem <=1 ug/ml Susceptible     Piperacillin + Tazobactam <=16 ug/ml Susceptible     Tobramycin <=4 ug/ml Susceptible                Susceptibility      Escherichia coli     FAVIAN     Ampicillin <=8  ug/ml Susceptible     Ampicillin + Sulbactam <=8/4 ug/ml Susceptible     Aztreonam <=8 ug/ml Susceptible     Cefepime <=8 ug/ml Susceptible     Cefotaxime <=2 ug/ml Susceptible     Ceftriaxone <=8 ug/ml Susceptible     Cefuroxime sodium <=4 ug/ml Susceptible     Ertapenem <=1 ug/ml Susceptible     Gentamicin <=4 ug/ml Susceptible     Levofloxacin <=2 ug/ml Susceptible     Meropenem <=1 ug/ml Susceptible     Piperacillin + Tazobactam <=16 ug/ml Susceptible     Tetracycline <=4 ug/ml Susceptible     Tobramycin <=4 ug/ml Susceptible     Trimethoprim + Sulfamethoxazole <=2/38 ug/ml Susceptible                Susceptibility      Proteus mirabilis     FAVIAN     Ampicillin >16 ug/ml Resistant     Ampicillin + Sulbactam >16/8 ug/ml Resistant     Aztreonam <=8 ug/ml Susceptible     Cefepime <=8 ug/ml Susceptible     Cefotaxime <=2 ug/ml Susceptible     Ceftriaxone <=8 ug/ml Susceptible     Cefuroxime sodium <=4 ug/ml Susceptible     Ertapenem <=1 ug/ml Susceptible     Gentamicin <=4 ug/ml Susceptible     Levofloxacin <=2 ug/ml Susceptible     Meropenem <=1 ug/ml Susceptible     Piperacillin + Tazobactam <=16 ug/ml Susceptible     Tetracycline >8 ug/ml Resistant     Tobramycin <=4 ug/ml Susceptible     Trimethoprim + Sulfamethoxazole >2/38 ug/ml Resistant                Susceptibility      Enterococcus faecalis     FAVIAN     Ampicillin <=2 ug/ml Susceptible     Gentamicin High Level Synergy <=500 ug/ml Susceptible     Linezolid 2 ug/ml Susceptible     Penicillin G 2 ug/ml Susceptible     Streptomycin High Level Synergy <=1000 ug/ml Susceptible     Vancomycin 1 ug/ml Susceptible                    Blood Culture - Blood, Blood, Venous Line [[id number]]  (Normal) Collected:  02/16/18 2010    Lab Status:  Final result Specimen:  Blood from Arm, Left Updated:  02/21/18 2046     Blood Culture No growth at 5 days    Blood Culture - Blood, Blood, Venous Line [[id number]]  (Normal) Collected:  02/16/18 2005    Lab Status:  Final result  Specimen:  Blood from Arm, Right Updated:  02/21/18 2046     Blood Culture No growth at 5 days    Respiratory Culture - Sputum, Cough [[id number]] Collected:  02/19/18 0656    Lab Status:  Final result Specimen:  Sputum from Cough Updated:  02/21/18 0931     Respiratory Culture --      Scant growth (1+) Normal Respiratory Nataliia     Gram Stain Result Occasional WBCs per low power field      No organisms seen              RADIOLOGY:  Imaging Results (last 72 hours)     Procedure Component Value Units Date/Time    XR Chest 1 View [[id number]] Collected:  02/16/18 2125     Updated:  02/16/18 2212    Narrative:       EXAM:    XR Chest, 1 View    CLINICAL HISTORY:    72 years old, male; Peripheral vascular disease, unspecified; Cellulitis of   right lower limb; Cellulitis of left lower limb; Acute kidney failure,   unspecified; Signs and symptoms; Other: Hypoxia    TECHNIQUE:    Frontal view of the chest.    COMPARISON:    CR - XR CHEST 1 VW 2017-06-25 06:32    FINDINGS:    Lungs:  There is mild prominence of the pulmonary vasculature. Chronic   scarring.    Pleural space:  Unremarkable.  No pneumothorax.    Heart:  There is an atherosclerotic cardiovascular configuration without   cardiomegaly.    Mediastinum:  Unremarkable.    Bones/joints:  There are degenerative changes of the spine.      Impression:         Mild pulmonary vascular congestion.    THIS DOCUMENT HAS BEEN ELECTRONICALLY SIGNED BY LENIN DIAZ MD    XR Chest 1 View [[id number]] Updated:  02/18/18 1333        CXR's seen and compared        Assessment and Plan:   Impression:      -- Cellulitis - Bilateral Lower Extremities - H/O streptococcal bacteremia      -- Acute Kidney Injury - stable     -- COPD - with worsening respiratory failure - diuretics given      --  HTN      -- Tobacco Abuse      -- Acute Hypoxic respiratory failure, remaining on 4LNC.    -- Leukocytosis, on prednisone.      -- Infiltrates - Primarily fluid, improved     -- Possible aspiration  per pulmonary note - speech is to evaluate.     -- DTI - sacral  Stage 2     -- Gram negative and sputum, new       PLAN/RECOMMENDATIONS:      -- Continue Minocycline - planning through 3/10  -- Continue wound care and compression wraps.    UM discussed with nursing planning to go to [facility] after cleared by cardiology.    Discussed with primary team, the gram negative in the sputum probably won't need intervention.    BRITTNY Cotto for Dr. Parish Belle  3/8/2018

## 2018-03-08 NOTE — PLAN OF CARE
Problem: Patient Care Overview (Adult)  Goal: Plan of Care Review  Outcome: Ongoing (interventions implemented as appropriate)   03/08/18 0532   Coping/Psychosocial Response Interventions   Plan Of Care Reviewed With patient   Patient Care Overview   Progress improving   Outcome Evaluation   Outcome Summary/Follow up Plan VSS. No ectopy on telemetry. Plan to d/c to [facility] today via ambulance at 13:00.      Goal: Adult Individualization and Mutuality  Outcome: Ongoing (interventions implemented as appropriate)    Goal: Discharge Needs Assessment  Outcome: Ongoing (interventions implemented as appropriate)      Problem: Cellulitis (Adult)  Goal: Signs and Symptoms of Listed Potential Problems Will be Absent or Manageable (Cellulitis)  Outcome: Ongoing (interventions implemented as appropriate)      Problem: Pressure Ulcer Risk (Jim Scale) (Adult,Obstetrics,Pediatric)  Goal: Identify Related Risk Factors and Signs and Symptoms  Outcome: Ongoing (interventions implemented as appropriate)    Goal: Skin Integrity  Outcome: Ongoing (interventions implemented as appropriate)      Problem: Respiratory Insufficiency (Adult)  Goal: Identify Related Risk Factors and Signs and Symptoms  Outcome: Ongoing (interventions implemented as appropriate)    Goal: Acid/Base Balance  Outcome: Ongoing (interventions implemented as appropriate)    Goal: Effective Ventilation  Outcome: Ongoing (interventions implemented as appropriate)      Problem: Fall Risk (Adult)  Goal: Identify Related Risk Factors and Signs and Symptoms  Outcome: Outcome(s) achieved Date Met: 03/08/18    Goal: Absence of Falls  Outcome: Outcome(s) achieved Date Met: 03/08/18

## 2018-03-08 NOTE — PROGRESS NOTES
Continued Stay Note   [facility]     Patient Name: Yassine Love  MRN: [id number]  Today's Date: 3/8/2018    Admit Date: 2/16/2018          Discharge Plan       03/08/18 1322    Case Management/Social Work Plan    Plan Rehab/[facility]    Patient/Family In Agreement With Plan --   Patient/Son    Additional Comments Postponed transfer to [facility] for 3/9/18 pending Cardiology recommendations.  Ambulance scheduled for pick-up on 3/9/18 @ 4:00pm.  Discussed with [facility].  Yolanda Alfred, Ext. [phone]                Discharge Codes     None        Expected Discharge Date and Time     Expected Discharge Date Expected Discharge Time    Mar 9, 2018             ANDRY Tierney

## 2018-03-08 NOTE — PLAN OF CARE
Problem: Patient Care Overview (Adult)  Goal: Plan of Care Review  Outcome: Ongoing (interventions implemented as appropriate)   03/08/18 1926   Coping/Psychosocial Response Interventions   Plan Of Care Reviewed With patient   Outcome Evaluation   Outcome Summary/Follow up Plan Dysphagia Re-Eval: Showing no s/s or concerns for pharyngeal dysphagia. Not recent Barium swallow (esophagram) mentioned reflux. In agreement with MD, worsening lung status could be related to refllux. Recommend: Continue regular diet, thin liquids, strict reflux precautions, further recs per MD. No further SLP needs at this time.

## 2018-03-08 NOTE — CONSULTS
[facility] Consultation Note    Yassine Love  1945  [phone]      03/08/18    DATE OF ADMISSION: 2/16/2018  [address]    No Known Provider  [facility] / [facility] [id number]    Chief Complaint: NSVT, possible sustained VTach           Patient Active Problem List   Diagnosis   • Cellulitis of right lower extremity   • RAFAEL (acute kidney injury)   • PVD (peripheral vascular disease)   • Cellulitis of both lower extremities   • COPD (chronic obstructive pulmonary disease)   • Essential hypertension   • Hypoxia   • Bilateral cellulitis of lower leg   • Non-sustained ventricular tachycardia       History of Present Illness:   Patient is a 72 year old  male with a past medical history notable for PVD, hypertension, COPD, cellulitis of lower extremities, tobacco abuse, remote history of atrial fibrillation who presented to the ED back on 2/16 for c/o fevers, chills, and bilateral LE pain/edema/redness.  He has been treated for bilateral LE cellulitis and COPD exacerbation/acute respiratory failure.  He was improving and was planning on transferring to rehab today but he developed 2 episodes of NSVT, first episode 3/6 at 1238AM lasting 20seconds, second episode 3/7 at 0704AM lasting 40 seconds.  He states when the first episode occurred, he was sleeping and was awoken by the nurses.  When he woke up, he noted that his heart was racing but otherwise felt well.  The second episode early in the morning of the 7th, he was awake when he had sudden onset of heart racing but denied feeling dizzy, lightheaded.  He denies any previous syncopal episodes.  Echocardiogram has been ordered and is pending at time of consult.  Last echocardiogram in June showing EF 60% and no significant valvular abnormalities.  He denies following with a cardiologist and denies any previous ischemic work up including stress testing or left heart catheterization.  He denies any c/o chest pain,  pressure, or tightness either at rest or with exertion.  He has chronic shortness of breath with exertion which can be significant at times.  He is a current smoker, 1 1/2 PPD x 60 years.  His discharge to rehab has been put on hold until he is further evaluated by cardiology.      Cardiac risk factors: Male, hypertension, tobacco abuse, family history of heart disease    Allergies   Allergen Reactions   • Ciprofloxacin Hcl Anaphylaxis   • Ceftin [Cefuroxime Axetil] Angioedema       Prior to Admission Medications     Prescriptions Last Dose Informant Patient Reported? Taking?    albuterol (PROVENTIL HFA;VENTOLIN HFA) 108 (90 BASE) MCG/ACT inhaler Past Week  Yes Yes    Inhale 2 puffs Every 4 (Four) Hours As Needed for Wheezing.    budesonide-formoterol (SYMBICORT) 80-4.5 MCG/ACT inhaler 2/16/2018  Yes Yes    Inhale 2 puffs 2 (Two) Times a Day.    docusate sodium (COLACE) 250 MG capsule Past Week  Yes Yes    Take 250 mg by mouth Daily As Needed for Constipation.    furosemide (LASIX) 40 MG tablet   Yes Yes    Take 40 mg by mouth 2 (Two) Times a Day.    HYDROcodone-acetaminophen (NORCO) 7.5-325 MG per tablet 2/15/2018  Yes Yes    Take 1 tablet by mouth 3 (Three) Times a Day As Needed for Severe Pain .    metoprolol tartrate (LOPRESSOR) 100 MG tablet 2/16/2018  Yes Yes    Take 100 mg by mouth 2 (Two) Times a Day.    Morphine Sulfate ER 15 MG tablet extended-release   Yes Yes    Take 15 mg by mouth Every 12 (Twelve) Hours.    tiotropium (SPIRIVA) 18 MCG per inhalation capsule 2/16/2018  Yes Yes    Place 1 capsule into inhaler and inhale Daily.    ertapenem (INVanz) 1 g/100 mL 0.9% NS VTB (mbp)   No No    Infuse 100 mL into a venous catheter Daily. Indications: Skin and Soft Tissue Infection            Current Facility-Administered Medications:   •  acetaminophen (TYLENOL) tablet 650 mg, 650 mg, Oral, Q4H PRN, Nicole Hewitt, , 650 mg at 03/08/18 0104  •  arformoterol (BROVANA) nebulizer solution 15 mcg, 15 mcg,  Nebulization, BID - RT, Todd Keys MD, 15 mcg at 03/08/18 0850  •  bisacodyl (DULCOLAX) suppository 10 mg, 10 mg, Rectal, Daily PRN, Nicole Hewitt DO, 10 mg at 02/23/18 0823  •  budesonide (PULMICORT) nebulizer solution 0.5 mg, 0.5 mg, Nebulization, BID - RT, Todd Keys MD, 0.5 mg at 03/08/18 0850  •  bumetanide (BUMEX) tablet 1 mg, 1 mg, Oral, Daily, Belle Valdivia, APRN, 1 mg at 03/08/18 0825  •  calcium carbonate (TUMS) chewable tablet 500 mg (200 mg elemental), 2 tablet, Oral, BID PRN, Beatriz Yousif, APRN, 2 tablet at 02/27/18 0946  •  castor oil-balsam peru (VENELEX) ointment, , Topical, Q12H, Nicole Hewitt DO  •  diclofenac (VOLTAREN) 1 % gel 2 g, 2 g, Topical, 4x Daily, Nicole Hewitt DO, 2 g at 03/07/18 1322  •  docusate sodium (COLACE) capsule 200 mg, 200 mg, Oral, Daily, Nicole Hewitt DO, 200 mg at 03/08/18 0825  •  fluticasone (FLONASE) 50 MCG/ACT nasal spray 2 spray, 2 spray, Each Nare, Daily, Esmer Watkins MD, 2 spray at 03/08/18 0830  •  gabapentin (NEURONTIN) capsule 200 mg, 200 mg, Oral, Q8H, Nicole Hewitt DO, 200 mg at 03/08/18 0628  •  guaiFENesin (MUCINEX) 12 hr tablet 600 mg, 600 mg, Oral, BID PRN, Ezequiel Alfred PA-C, 600 mg at 02/20/18 0818  •  heparin (porcine) 5000 UNIT/ML injection 5,000 Units, 5,000 Units, Subcutaneous, Q8H, Nicole Hewitt DO, 5,000 Units at 03/08/18 0628  •  HYDROcodone-acetaminophen (NORCO) 7.5-325 MG per tablet 1 tablet, 1 tablet, Oral, Q4H PRN, Yumiko Castanon MD, 1 tablet at 03/08/18 0340  •  ipratropium-albuterol (DUO-NEB) nebulizer solution 3 mL, 3 mL, Nebulization, 4x Daily - RT, Nicole Hewitt DO, 3 mL at 03/07/18 1929  •  magnesium hydroxide (MILK OF MAGNESIA) suspension 2400 mg/10mL 10 mL, 10 mL, Oral, Daily PRN, Nicole Hewitt DO, 10 mL at 02/25/18 2228  •  Magnesium Sulfate 2 gram Bolus, followed by 8 gram infusion (total Mg dose 10 grams)- Mg less than or equal to 1mg/dL, 2 g, Intravenous,  PRN **OR** Magnesium Sulfate 6 gram Infusion (2 gm x 3) -Mg 1.1 -1.5 mg/dL, 2 g, Intravenous, PRN **OR** magnesium sulfate 4 gram infusion- Mg 1.6-1.9 mg/dL, 4 g, Intravenous, PRN, Esmer Watkins MD  •  minocycline (MINOCIN,DYNACIN) capsule 100 mg, 100 mg, Oral, Q12H, Casie M Mayne, PA-C  •  Morphine (MSIR) tablet 30 mg, 30 mg, Oral, Q6H PRN, Esmer Watkins MD, 30 mg at 03/08/18 0825  •  nicotine (NICODERM CQ) 21 MG/24HR patch 1 patch, 1 patch, Transdermal, Q24H, Yumiko Castanon MD, 1 patch at 03/08/18 0825  •  nicotine (NICODERM CQ) 7 MG/24HR patch 1 patch, 1 patch, Transdermal, Q24H, Alexandro Suarez PA-C, 1 patch at 03/07/18 2147  •  ondansetron (ZOFRAN) injection 4 mg, 4 mg, Intravenous, Q6H PRN, Alexandro Suarez PA-C, 4 mg at 02/21/18 1945  •  pantoprazole (PROTONIX) EC tablet 40 mg, 40 mg, Oral, Q AM, Todd Keys MD, 40 mg at 03/08/18 0628  •  Pharmacy Consult - [facility], , Does not apply, Daily, Annette Ray, [facility]  •  potassium chloride (KLOR-CON) packet 40 mEq, 40 mEq, Oral, PRN, Esmer Watkins MD  •  potassium chloride (MICRO-K) CR capsule 40 mEq, 40 mEq, Oral, PRN, Esmer Watkins MD, 40 mEq at 02/22/18 1431  •  predniSONE (DELTASONE) tablet 40 mg, 40 mg, Oral, Daily With Breakfast, Todd Keys MD, 40 mg at 03/08/18 0825  •  sodium chloride 0.9 % flush 1-10 mL, 1-10 mL, Intravenous, PRN, Nicole Hewitt,   •  sodium chloride 0.9 % flush 10 mL, 10 mL, Intravenous, PRN, Steve Mcmanus MD, 10 mL at 03/01/18 2118  •  vitamin B-12 (CYANOCOBALAMIN) tablet 1,000 mcg, 1,000 mcg, Oral, Daily, Yumiko Castanon MD, 1,000 mcg at 03/08/18 0825    Social History     Social History   • Marital status:    • Number of children: 1     Occupational History   • retired       Social History Main Topics   • Smoking status: Current Every Day Smoker     Packs/day: 1.50     Years: 56.00     Types: Cigarettes   • Alcohol use No   • Drug use: No   • Sexual activity: Defer     Social  History Narrative    Moved to [place] 17 yr ago to be near son.  Lives alone. Minimally ambulatory with walker       Family History   Problem Relation Age of Onset   • Stroke Father    • Heart attack Brother    • COPD Brother        REVIEW OF SYSTEMS:   CONST:  + weakness, no weight loss, fever, chills   HEENT:  No visual loss, blurred vision, double vision, yellow sclerae.                   No hearing loss, congestion, sore throat.   SKIN:      No rashes, urticaria, ulcers, sores.     RESP:     + shortness of breath, - hemoptysis, + cough, sputum.   GI:           No anorexia, nausea, vomiting, diarrhea. No abdominal pain, melena.   :         No burning on urination, hematuria or increased frequency.  ENDO:    No diaphoresis, cold or heat intolerance. No polyuria or polydipsia.   NEURO:  No headache, + occasional dizziness, - syncope, paralysis, ataxia, or parasthesias.                  No change in bowel or bladder control. No history of CVA/TIA  MUSC:    No muscle, back pain, joint pain or stiffness.   HEME:    No anemia, bleeding, bruising. No history of DVT/PE.  PSYCH:  No history of depression, anxiety         Objective:  Vitals:    03/08/18 0100 03/08/18 0345 03/08/18 0500 03/08/18 0850   BP:  113/65     BP Location:  Left arm     Patient Position:  Lying     Pulse:  74  82   Resp:  18  16   Temp:  97.8 °F (36.6 °C)     TempSrc:  Oral     SpO2: 95%  95% 97%   Weight:       Height:             Vital Sign Min/Max for last 24 hours  Temp  Min: 97.4 °F (36.3 °C)  Max: 97.9 °F (36.6 °C)   BP  Min: 113/65  Max: 133/77   Pulse  Min: 74  Max: 100   Resp  Min: 16  Max: 18   SpO2  Min: 88 %  Max: 97 %   Flow (L/min)  Min: 4  Max: 5      Intake/Output Summary (Last 24 hours) at 03/08/18 0949  Last data filed at 03/08/18 0500   Gross per 24 hour   Intake              780 ml   Output              950 ml   Net             -170 ml             Physical Exam:  GEN: Well nourished, Well- developed  No acute distress  HEENT: Poor  dentition, normocephalic, Atraumatic, PERRLA, moist mucous membranes  NECK: supple, NO JVD, no thyromegaly, no lymphadenopathy  CARD: S1S2, RRR, no murmur, gallop, rub  LUNGS: Diffuse rhonchi bilaterally, normal respiratory effort on nasal cannula  ABDOMEN: Soft, nontender, normal bowel sounds  EXTREMITIES: LE wrapped, bilateral LE edema, no clubbing, cyanosis  SKIN: Warm, dry  NEURO: No focal deficits  PSYCHIATRIC: Normal affect and mood      Data:     Results from last 7 days  Lab Units 03/08/18  0601 03/05/18  0605 03/03/18  0136   WBC 10*3/mm3 9.02 9.11 9.89   HEMOGLOBIN g/dL 9.6* 9.0* 9.3*  9.3*   HEMATOCRIT % 30.1* 28.5* 30.0*  30.1*   PLATELETS 10*3/mm3 334 334 326       Results from last 7 days  Lab Units 03/08/18  0601 03/06/18  0108 03/05/18  0605 03/02/18  0501   SODIUM mmol/L 140  --  137 138   POTASSIUM mmol/L 4.1 4.4 4.2 4.1   CHLORIDE mmol/L 107  --  102 99   CO2 mmol/L 31.0  --  32.0* 35.0*   BUN mg/dL 26*  --  22 36*   CREATININE mg/dL 1.00  --  1.20 1.20   GLUCOSE mg/dL 103*  --  104* 105*            Results from last 7 days  Lab Units 03/08/18  0601   TSH mIU/mL 1.841                           Intake/Output Summary (Last 24 hours) at 03/08/18 0949  Last data filed at 03/08/18 0500   Gross per 24 hour   Intake              780 ml   Output              950 ml   Net             -170 ml       Chest X-Ray:  Imaging Results (last 24 hours)     ** No results found for the last 24 hours. **          Telemetry: NSR, HR   EKG 3/6/18: Sinus tachycardia,  bpm       Assessment and Plan:   1. NSVT, possible sustained VTach on 3/7 (40 seconds) versus SVT with aberrancy  - Two episodes of NSVT, one episode 3/6 lasting 20 seconds, another episode 3/7 with symptoms of racing heart lasting 40 seconds.  - Echocardiogram pending at this time  - Thyroid, electrolytes normal  - Would pursue ischemic evaluation with Lexiscan stress test, likely tomorrow as patient has not been NPO today, if positive, would  pursue left heart catheterization and patient is agreeable to this  - Restarted metoprolol at 25 mg BID; on 100mg BID at home    2. SVT, 20 seconds, 3/7/18 16:28  -Beta blocker as above    3. Essential hypertension:  - Well controlled  - Starting metoprolol as noted above, monitor BPs; increase as tolerated    4. COPD/hypoxic respiratory failure:  - Managed per primary team/pulmonology    Scribed for Carlos Harvey MD by Zohra Griggs, APRN. 3/8/2018  9:49 AM      I, Carlos Harvey MD, personally performed the services as scribed by the above named individual. I have made any necessary edits and it is both accurate and complete.     Carlos Harvey MD, MSc, [facility]  Interventional Cardiology  [facility] at [facility]

## 2018-03-08 NOTE — PROGRESS NOTES
Continued Stay Note  [facility]     Patient Name: Yassine Love  MRN: [id number]  Today's Date: 3/8/2018    Admit Date: 2/16/2018          Discharge Plan     Consent obtained for the participation in the [facility] Transitions Program. Yessi Duomnt RN                Discharge Codes     None        Expected Discharge Date and Time     Expected Discharge Date Expected Discharge Time    Mar 9, 2018             Yessi Dumont RN

## 2018-03-08 NOTE — PROGRESS NOTES
[facility] Medicine Services  PROGRESS NOTE    Patient Name: Yassine Love  : 1945  MRN: [id number]    Date of Admission: 2018  Length of Stay: 20  Primary Care Physician: No Known Provider    Subjective   Subjective     CC:  F/U LE pain    HPI:  nsg reports NSVT 2 mornings  3/6 and 3/7. Pt reports sitting dyspneic with ambulating short distances, intermittent palpitations. Occasional dizziness upon standing. No Fever, chills, myalgia, CP, orthopnea, N/V. Pt reports being treated for afib requiring ECV a few years ago, does not have a cardiologist.     Review of Systems  Gen-no fevers, no chills  Resp- cough, dyspnea with activity  CV- palpitations no CP or orthopnea  GI-no N/V/D, no abd pain  - no difficulty urinating, dysuria  Msk-lower extremity edema improving, cramping of legs at times improving  Neuro- occasional dizziness, no syncope  Skin- erythema legs improving      Otherwise ROS is negative except as mentioned in the HPI.    Objective   Objective     Vital Signs:   Temp:  [97.4 °F (36.3 °C)-97.9 °F (36.6 °C)] 97.8 °F (36.6 °C)  Heart Rate:  [] 82  Resp:  [16-18] 16  BP: (113-133)/(65-77) 113/65        Physical Exam:  Gen-sitting up in bed, awake alert, appears in NAD    CV-RRR, S1 S2 normal, no m/r/g  Resp-decreased bilaterally throughout greatest in bases, few scattered rhonchi, nonlabored respirations on 4LNC  Abd-soft, NT, ND, +BS  Neuro-A&Ox3, no focal deficits  Psych-appropriate mood, cooperative  Msk: BLE edema, both legs wrapped did not remove, no cyanosis of extremities  Skin- visible aspects of distal feet with mild erythema R>L    Results Reviewed:  I have personally reviewed current lab, radiology, and data and agree.      Results from last 7 days  Lab Units 18  0601 18  0605 18  0136   WBC 10*3/mm3 9.02 9.11 9.89   HEMOGLOBIN g/dL 9.6* 9.0* 9.3*  9.3*   HEMATOCRIT % 30.1* 28.5* 30.0*  30.1*   PLATELETS 10*3/mm3 961 852 115        Results from last 7 days  Lab Units 03/08/18  0601 03/06/18  0108 03/05/18  0605 03/02/18  0501   SODIUM mmol/L 140  --  137 138   POTASSIUM mmol/L 4.1 4.4 4.2 4.1   CHLORIDE mmol/L 107  --  102 99   CO2 mmol/L 31.0  --  32.0* 35.0*   BUN mg/dL 26*  --  22 36*   CREATININE mg/dL 1.00  --  1.20 1.20   GLUCOSE mg/dL 103*  --  104* 105*   CALCIUM mg/dL 8.6*  --  8.5* 8.3*           Microbiology Results Abnormal     Procedure Component Value - Date/Time    Respiratory Culture - Sputum, Cough [[id number]]  (Abnormal) Collected:  03/07/18 0801    Lab Status:  Preliminary result Specimen:  Sputum from Cough Updated:  03/08/18 0745     Respiratory Culture --      Light growth (2+) Gram Negative Bacilli (A)      Scant growth (1+) Normal Respiratory Ernesto     Gram Stain Result Moderate (3+) WBCs per low power field      Rare (1+) Epithelial cells per low power field      Many (4+) Yeast      Rare (1+) Normal respiratory ernesto    Influenza A & B, RT PCR - Swab, Nasopharynx [[id number]]  (Normal) Collected:  03/03/18 1356    Lab Status:  Final result Specimen:  Swab from Nasopharynx Updated:  03/03/18 1607     Influenza A PCR Not Detected     Influenza B PCR Not Detected    Blood Culture - Blood, [[id number]]  (Normal) Collected:  02/18/18 2308    Lab Status:  Final result Specimen:  Blood from Arm, Left Updated:  02/23/18 2331     Blood Culture No growth at 5 days    Blood Culture - Blood, [[id number]]  (Normal) Collected:  02/18/18 2311    Lab Status:  Final result Specimen:  Blood from Arm, Left Updated:  02/23/18 2331     Blood Culture No growth at 5 days    Wound Culture - Wound, Foot, Right [[id number]]  (Abnormal)  (Susceptibility) Collected:  02/16/18 1931    Lab Status:  Final result Specimen:  Wound from Foot, Right Updated:  02/23/18 1045     Wound Culture --      Light growth (2+) Pseudomonas aeruginosa (A)      Scant growth (1+) Escherichia coli (A)      Scant growth (1+) Proteus mirabilis (A)      Scant growth  (1+) Enterococcus faecalis (A)      Scant growth (1+) Streptococcus, Beta Hemolytic, Group G (A)        If Clindamycin or Erythromycin is the drug of choice, notify the laboratory within 7 days to request susceptibility testing.        STREP GROUPING G     Gram Stain Result Few (2+) WBCs seen      Many (4+) Gram negative bacilli      Many (4+) Gram positive bacilli      Many (4+) Gram positive cocci in pairs and clusters    Susceptibility      Pseudomonas aeruginosa     FAVIAN     Aztreonam <=8 ug/ml Susceptible     Cefepime <=8 ug/ml Susceptible     Ceftazidime 4 ug/ml Susceptible     Gentamicin <=4 ug/ml Susceptible     Levofloxacin <=2 ug/ml Susceptible     Meropenem <=1 ug/ml Susceptible     Piperacillin + Tazobactam <=16 ug/ml Susceptible     Tobramycin <=4 ug/ml Susceptible                Susceptibility      Escherichia coli     FAVIAN     Ampicillin <=8 ug/ml Susceptible     Ampicillin + Sulbactam <=8/4 ug/ml Susceptible     Aztreonam <=8 ug/ml Susceptible     Cefepime <=8 ug/ml Susceptible     Cefotaxime <=2 ug/ml Susceptible     Ceftriaxone <=8 ug/ml Susceptible     Cefuroxime sodium <=4 ug/ml Susceptible     Ertapenem <=1 ug/ml Susceptible     Gentamicin <=4 ug/ml Susceptible     Levofloxacin <=2 ug/ml Susceptible     Meropenem <=1 ug/ml Susceptible     Piperacillin + Tazobactam <=16 ug/ml Susceptible     Tetracycline <=4 ug/ml Susceptible     Tobramycin <=4 ug/ml Susceptible     Trimethoprim + Sulfamethoxazole <=2/38 ug/ml Susceptible                Susceptibility      Proteus mirabilis     FAVIAN     Ampicillin >16 ug/ml Resistant     Ampicillin + Sulbactam >16/8 ug/ml Resistant     Aztreonam <=8 ug/ml Susceptible     Cefepime <=8 ug/ml Susceptible     Cefotaxime <=2 ug/ml Susceptible     Ceftriaxone <=8 ug/ml Susceptible     Cefuroxime sodium <=4 ug/ml Susceptible     Ertapenem <=1 ug/ml Susceptible     Gentamicin <=4 ug/ml Susceptible     Levofloxacin <=2 ug/ml Susceptible     Meropenem <=1 ug/ml Susceptible      Piperacillin + Tazobactam <=16 ug/ml Susceptible     Tetracycline >8 ug/ml Resistant     Tobramycin <=4 ug/ml Susceptible     Trimethoprim + Sulfamethoxazole >2/38 ug/ml Resistant                Susceptibility      Enterococcus faecalis     FAVIAN     Ampicillin <=2 ug/ml Susceptible     Gentamicin High Level Synergy <=500 ug/ml Susceptible     Linezolid 2 ug/ml Susceptible     Penicillin G 2 ug/ml Susceptible     Streptomycin High Level Synergy <=1000 ug/ml Susceptible     Vancomycin 1 ug/ml Susceptible                    Blood Culture - Blood, Blood, Venous Line [[id number]]  (Normal) Collected:  02/16/18 2010    Lab Status:  Final result Specimen:  Blood from Arm, Left Updated:  02/21/18 2046     Blood Culture No growth at 5 days    Blood Culture - Blood, Blood, Venous Line [[id number]]  (Normal) Collected:  02/16/18 2005    Lab Status:  Final result Specimen:  Blood from Arm, Right Updated:  02/21/18 2046     Blood Culture No growth at 5 days    Respiratory Culture - Sputum, Cough [[id number]] Collected:  02/19/18 0656    Lab Status:  Final result Specimen:  Sputum from Cough Updated:  02/21/18 0931     Respiratory Culture --      Scant growth (1+) Normal Respiratory Nataliia     Gram Stain Result Occasional WBCs per low power field      No organisms seen          I have reviewed the medications.    Assessment/Plan   Assessment / Plan     Hospital Problem List     * (Principal)Cellulitis of both lower extremities    RAFAEL (acute kidney injury)    PVD (peripheral vascular disease)    COPD (chronic obstructive pulmonary disease)    Essential hypertension    Hypoxia    Bilateral cellulitis of lower leg    Non-sustained ventricular tachycardia           Brief Hospital Course to date:  Yassine Love is a 72 y.o. male  with a past medical history significant for PVD, essential hypertension, COPD, cellulitis of lower extremities, and tobacco abuse who presents to the ED with complaints of fevers, chills and bilateral lower  extremity pain/edema/redness.    Assessment & Plan:    Acute hypoxemic respiratory failure -   - secondary to COPD exacerbation, diastolic heart failure  -- consider pulm HTN - echo done last summer  -- CTA shows no PE  -reviewed cxr 3/6 stable no changes  - Continue daily Bumex, hold for SBP less than 100  - Daily fluid restriction to 1500cc (which patient is non complaint with)  - treating COPD as below  - pulmonology consulting, appreciate input:  day course of pred 40mg, changed bronchodilators to pulmicort + brovanna bid + duoneb qid + mucomyst qid.  Do IS q1hwa and flutter qid.  Abx per ID. At dc continue Pulmicort/Brovana/Ipratropium neb bid.  Repeat CT in 3 months as O/P.  -pulms suspects aspiration, SLP consult  -sputum 3/7 following. D/w Dr Belle, extending abx for additional week. Probiotic     COPD exacerbation: Symbicort, Duonebs, completed 5 day course of prednisone,   -- getting prednisone 40mg   -currently on 4L NC    Acute on chronic diastolic CHF: ECHO 06/2017 EF normal  -will repeat ECHO and check BNP given recent NSVT     NSVT   -3/6 and 3/7 in a.m.   -TSH normal, Mg and K+ normal  -Consult Cardiology, appreciate input. [facility].   -awaiting ECHO    LE cellulitis/ Chronic LE wounds: switched to minocycline through 3/10, ID on board. Continue PO morphine at home dose PRN - OLIVA reviewed. Decreased gabapentin back to prior dose, increased dose made him drowsy. Continue PT WOC for bilateral LE wrapping.    HTN: Holding BP meds for normotension    RAFAEL: Baseline Cr 0.9-1.3. Stable    Anemia: mixed anemia, Iron low, will start oral Iron, check stool for occult, monitor H&H, no signs of overt bleeding    Tobacco abuse- nicotine patch, plans to stop    DVT Prophylaxis:  [facility]    CODE STATUS: Conditional Code    Disposition: will delay transfer to Rehab until tomorrow pending cardiology evaluation, d/w CM  More than 50% of time spent counseling on current  illness and plan of care. Case discussed with: pt,nsg,  Cardiology, ID, and Dr Castanon as well CM.   Total time spent face to face with the patient was 45 minutes.  Total time of the encounter was 60 minutes.        Casie M Mayne, PA-C  03/08/18  9:35 AM
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
## 2018-03-08 NOTE — THERAPY TREATMENT NOTE
Acute Care - Physical Therapy Treatment Note  [facility]     Patient Name: Yassine Love  : 1945  MRN: [id number]  Today's Date: 3/8/2018  Onset of Illness/Injury or Date of Surgery Date: 18  Date of Referral to PT: 18  Referring Physician: Dr. Watkisn    Admit Date: 2018    Visit Dx:    ICD-10-CM ICD-9-CM   1. Bilateral cellulitis of lower leg L03.116 682.6    L03.115    2. RAFAEL (acute kidney injury) N17.9 584.9   3. PVD (peripheral vascular disease) I73.9 443.9   4. Impaired mobility and ADLs Z74.09 799.89   5. Impaired functional mobility, balance, gait, and endurance Z74.09 V49.89   6. Cellulitis of both lower extremities L03.115 682.6    L03.116    7. Chronic obstructive pulmonary disease, unspecified COPD type J44.9 496   8. Essential hypertension I10 401.9   9. Hypoxia R09.02 799.02   10. Cellulitis of right lower extremity L03.115 682.6     Patient Active Problem List   Diagnosis   • Cellulitis of right lower extremity   • RAFAEL (acute kidney injury)   • PVD (peripheral vascular disease)   • Cellulitis of both lower extremities   • COPD (chronic obstructive pulmonary disease)   • Essential hypertension   • Hypoxia   • Bilateral cellulitis of lower leg               Adult Rehabilitation Note       18 0800 18 1524 18 1507    Rehab Assessment/Intervention    Discipline physical therapy assistant  -AS occupational therapist  -AN physical therapist  -LM    Document Type therapy note (daily note)  -AS therapy note (daily note)  -AN therapy note (daily note)  -LM    Subjective Information agree to therapy;complains of;pain;weakness  -AS agree to therapy;complains of;fatigue  -AN agree to therapy;complains of;fatigue;pain  -LM    Patient Effort, Rehab Treatment good  -AS good  -AN good  -LM    Symptoms Noted During/After Treatment increased pain;fatigue  -AS dizziness  -AN fatigue;shortness of breath  -LM    Symptoms Noted Comment   Required multiple standing rest breaks with  ambulation  -LM    Precautions/Limitations fall precautions;oxygen therapy device and L/min;other (see comments)   B LE wraps due to edema  -AS fall precautions;oxygen therapy device and L/min  -AN fall precautions;other (see comments);oxygen therapy device and L/min   BLE wraps  -LM    Recorded by [AS] Yasmine Fuller, CRISTÓBAL [AN] Ebony Valdes, OT [LM] Bekah Caro, PT    Vital Signs    Pre SpO2 (%)  92  -AN 93  -LM    O2 Delivery Pre Treatment  supplemental O2  -AN supplemental O2  -LM    Intra SpO2 (%)  89  -AN     O2 Delivery Intra Treatment  supplemental O2  -AN supplemental O2  -LM    Post SpO2 (%)  91  -AN 90  -LM    O2 Delivery Post Treatment  supplemental O2  -AN supplemental O2  -LM    Pre Patient Position  Sitting  -AN Supine  -LM    Intra Patient Position  Sitting  -AN Standing  -LM    Post Patient Position   Sitting  -LM    Recorded by  [AN] Ebony Valdes OT [LM] Bekah Caro, PT    Pain Assessment    Pain Assessment 0-10  -AS No/denies pain  -AN Rojas-Fontenot FACES  -LM    Rojas-Fontenot FACES Pain Rating   4  -LM    Pain Score 6  -AS      Post Pain Score 6  -AS      Pain Type Chronic pain  -AS  Chronic pain  -LM    Pain Location Hip  -AS  Hip   and knee  -LM    Pain Orientation Left  -AS  Left  -LM    Patient's Stated Pain Goal No pain  -AS      Pain Intervention(s) Repositioned;Ambulation/increased activity  -AS  Repositioned;Ambulation/increased activity  -LM    Response to Interventions tolerated  -AS      Recorded by [AS] Yasmine Fuller, CRISTÓBAL [AN] Ebony Valdes, OT [LM] Bekah Caro, PT    Cognitive Assessment/Intervention    Current Cognitive/Communication Assessment functional  -AS functional  -AN functional  -LM    Orientation Status oriented x 4  -AS oriented x 4  -AN oriented x 4  -LM    Follows Commands/Answers Questions 75% of the time;able to follow single-step instructions;needs cueing  -AS 75% of the time  -% of the time;able to follow single-step instructions;needs cueing   -LM    Personal Safety mild impairment;decreased awareness, need for assist;decreased awareness, need for safety;decreased insight to deficits  -AS mild impairment;decreased awareness, need for assist  -AN mild impairment;decreased awareness, need for assist;decreased awareness, need for safety  -LM    Personal Safety Interventions fall prevention program maintained;gait belt;nonskid shoes/slippers when out of bed;other (see comments)   exit alarm  -AS      Recorded by [AS] Yasmine Fuller PTA [AN] Ebony Valdes, OT [LM] Bekah Caro, PT    Bed Mobility, Assessment/Treatment    Bed Mobility, Assistive Device   bed rails;head of bed elevated  -LM    Bed Mob, Supine to Sit, [place] supervision required  -AS  supervision required  -LM    Bed Mob, Sit to Supine, [place] not tested  -AS  not tested   Pt left UIC  -LM    Bed Mobility, Safety Issues   decreased use of arms for pushing/pulling;decreased use of legs for bridging/pushing  -LM    Bed Mobility, Impairments strength decreased;pain  -AS  strength decreased;pain  -LM    Bed Mobility, Comment  pt up in chair  -AN Pt demo good safety awareness.  -LM    Recorded by [AS] Yasmine Fuller PTA [AN] Ebony Valdes, OT [LM] Bekah Caro, PT    Transfer Assessment/Treatment    Transfers, Sit-Stand [place] verbal cues required;contact guard assist  -AS  contact guard assist;verbal cues required  -LM    Transfers, Stand-Sit [place] verbal cues required;contact guard assist  -AS  contact guard assist;verbal cues required  -LM    Transfers, Sit-Stand-Sit, Assist Device rolling walker  -AS  rolling walker  -LM    Transfer, Safety Issues   step length decreased  -LM    Transfer, Impairments   strength decreased;impaired balance  -LM    Transfer, Comment verbal cues to reach back when sitting  -AS tx from chair, just return from ambulation with PT  -AN Verbal cues to push from bed when standing, reach for chair armrests when sitting.  -LM     Recorded by [AS] Yasmine Fuller PTA [AN] Ebony Valdes OT [LM] Bekah Caro PT    Gait Assessment/Treatment    Gait, [place] Level verbal cues required;contact guard assist  -AS  contact guard assist;verbal cues required  -LM    Gait, Assistive Device rolling walker  -AS  rolling walker  -LM    Gait, Distance (Feet) 80  -AS  102  -LM    Gait, Gait Pattern Analysis   swing-through gait  -LM    Gait, Gait Deviations bilateral:;vidhi decreased;forward flexed posture;step length decreased;weight-shifting ability decreased;left:;antalgic  -AS  bilateral:;vidhi decreased;decreased heel strike;forward flexed posture;step length decreased;toe-to-floor clearance decreased;weight-shifting ability decreased  -LM    Gait, Safety Issues balance decreased during turns;sequencing ability decreased;step length decreased;weight-shifting ability decreased;supplemental O2  -AS  balance decreased during turns;step length decreased  -LM    Gait, Impairments strength decreased;impaired balance;pain  -AS  strength decreased;impaired balance;pain  -LM    Gait, Comment patient takes several standing rest breaks due to pain in left hip and knee.  -AS  Pt ambulated with step-through pattern at slow pace with wide WARREN and forward flexed posture. Pt demo decreased swing phase and decreased step length. Verbal cues for upright posture and increase step length. Gait limited by increased left hip/knee pain and SOA. Pt required x4 standing rest breaks throughout ambulation.  -LM    Recorded by [AS] Yasmine Fuller PTA  [LM] Bekah Caro PT    Toileting Assessment/Training    Toileting Assess/Train, Assistive Device  urinal  -AN     Toileting Assess/Train, Position  sitting  -AN     Recorded by  [AN] Ebony Valdes OT     Balance Skills Training    Sitting-Level of Assistance  Distant supervision  -AN     Recorded by  [AN] Ebony Valdes OT     Therapy Exercises    Bilateral Lower Extremities AROM:;10 reps;sitting;ankle  pumps/circles;LAQ  -AS      Bilateral Upper Extremity  AROM:;10 reps;15 reps;elbow flexion/extension;hand pumps;pronation/supination;shoulder horizontal abd/add;shoulder extension/flexion;shoulder pendulum;shoulder protraction/retraction   5 reps overhead shoulder flex; 2 rest breaks  -AN     Recorded by [AS] Yasmine Fuller PTA [AN] Ebony Valdes OT     Positioning and Restraints    Pre-Treatment Position in bed  -AS sitting in chair/recliner  -AN in bed  -LM    Post Treatment Position chair  -AS chair  -AN chair  -LM    In Chair sitting;call light within reach;encouraged to call for assist;exit alarm on  -AS reclined;call light within reach;encouraged to call for assist;waffle cushion  -AN sitting;with OT  -LM    Recorded by [AS] Yasmine Fuller PTA [AN] Ebony Valdes OT [LM] Bekah Caro, PT      03/06/18 1053 03/06/18 1024 03/06/18 0910    Rehab Assessment/Intervention    Discipline occupational therapist  -HK physical therapist  -LM physical therapist  -    Document Type therapy note (daily note)  -HK therapy note (daily note)  - therapy note (daily note)  -    Subjective Information agree to therapy;no complaints  -HK agree to therapy   complains of BLE swelling  -LM agree to therapy;complains of;pain  -    Patient Effort, Rehab Treatment good  -HK good  -LM     Symptoms Noted During/After Treatment fatigue;shortness of breath  -HK fatigue;shortness of breath  -LM     Symptoms Noted Comment  Required standing rest breaks with ambulation, notified RN.  -LM     Precautions/Limitations fall precautions;oxygen therapy device and L/min  -HK fall precautions;oxygen therapy device and L/min   BLE wraps  -LM     Recorded by [HK] Violette Smith, OT [LM] Bekah Caro, PT [MC] Jessi Villa, PT    Vital Signs    Pre Systolic BP Rehab --   RN cleared for tx  -HK      Pre SpO2 (%) 92  -HK 92  -LM     O2 Delivery Pre Treatment supplemental O2  -HK supplemental O2  -LM     Intra SpO2 (%) 89  -HK       O2 Delivery Intra Treatment room air   washing face  -HK      Post SpO2 (%) 92  -HK 92  -LM     O2 Delivery Post Treatment supplemental O2  -HK supplemental O2  -LM     Recorded by [HK] Violette Smith, OT [LM] Bekah Caro, PT     Pain Assessment    Pain Assessment Rojas-Fontenot FACES  -HK Rojas-Baker FACES  -LM Rojas-Fontenot FACES  -MC    Rojas-Fontenot FACES Pain Rating 4  -HK 6  -LM 4  -MC    Pain Type Chronic pain  -HK Chronic pain  -LM     Pain Location Hip  -HK Hip   and knee  -LM     Pain Orientation Left  -HK Left  -LM     Pain Intervention(s) Repositioned;Ambulation/increased activity  -HK Repositioned;Ambulation/increased activity  -LM     Response to Interventions Pt tolerated  -HK      Recorded by [HK] Violette Smith, OT [LM] Bekah Caro, PT [MC] Jessi Villa, PT    Vision Assessment/Intervention    Visual Impairment WFL  -HK      Recorded by [HK] Violette Smith, OT      Cognitive Assessment/Intervention    Current Cognitive/Communication Assessment functional  -HK functional  -LM     Orientation Status oriented x 4  -HK oriented x 4  -LM     Follows Commands/Answers Questions 100% of the time;able to follow single-step instructions  -% of the time;able to follow single-step instructions;needs cueing  -LM     Personal Safety mild impairment;decreased awareness, need for assist;decreased awareness, need for safety;decreased insight to deficits  -HK mild impairment;decreased awareness, need for assist;decreased awareness, need for safety  -LM     Recorded by [HK] Violette Smith, OT [LM] Bekah Caro, PT     Bed Mobility, Assessment/Treatment    Bed Mob, Supine to Sit, [place]  not tested  -LM     Bed Mob, Sit to Supine, [place]  not tested  -LM     Bed Mobility, Comment Pt received sitting upright in chair  -HK Received sitting EOB, left UIC.  -LM     Recorded by [HK] Violette Smith, OT [LM] Bekah Caro, PT     Transfer Assessment/Treatment    Transfers, Sit-Stand [place] contact  "guard assist;verbal cues required  -HK contact guard assist;verbal cues required  -LM     Transfers, Stand-Sit [place] contact guard assist;verbal cues required  -HK contact guard assist;verbal cues required  -LM     Transfers, Sit-Stand-Sit, Assist Device rolling walker  -HK rolling walker  -LM     Toilet Transfer, [place]  contact guard assist;verbal cues required  -LM     Toilet Transfer, Assistive Device  rolling walker;elevated toilet seat  -LM     Transfer, Safety Issues  step length decreased  -LM     Transfer, Impairments strength decreased;impaired balance  -HK strength decreased;impaired balance  -LM     Transfer, Comment Pt requires verbal cues for safe hand placement   -HK Verbal cues for correct hand placement.  -LM     Recorded by [HK] Violette Smith OT [LM] Bekah Caro, PT     Gait Assessment/Treatment    Gait, [place] Level  contact guard assist;verbal cues required  -LM     Gait, Assistive Device  rolling walker  -LM     Gait, Distance (Feet)  45  -LM     Gait, Gait Pattern Analysis  swing-to gait  -LM     Gait, Gait Deviations  bilateral:;vidhi decreased;forward flexed posture;step length decreased;weight-shifting ability decreased  -LM     Gait, Safety Issues  balance decreased during turns;step length decreased;weight-shifting ability decreased  -LM     Gait, Impairments  strength decreased;impaired balance;pain  -LM     Gait, Comment  Pt ambulated with step-to pattern at slow pace with forward flexed posture. Verbal cues for upright posture, increase step length, and remain within RW during turns. Pt required multiple standing rest breaks due to SOA and increasing left hip/knee \"bone on bone\" pain. Gait limited by pain and fatigue.  -LM     Recorded by  [LM] Bekah Caro, PT     Functional Mobility    Functional Mobility- Comment not tested  -HK      Recorded by [HK] Violette Smith, OT      Upper Body Bathing Assessment/Training    UB Bathing Assess/Train, Position sitting  " -HK      UB Bathing Assess/Train, [place] Level minimum assist (75% patient effort);verbal cues required  -HK      UB Bathing Assess/Train, Impairments strength decreased;impaired balance;ROM decreased;decreased flexibility  -HK      UB Bathing Assess/Train, Comment OT provided Cecille to wash back   -HK      Recorded by [HK] Violette Smith, OT      Lower Body Bathing Assessment/Training    LB Bathing Assess/Train, Position sitting;standing  -HK      LB Bathing Assess/Train, [place] Level supervision required;verbal cues required  -HK      LB Bathing Assess/Train, Impairments strength decreased;impaired balance;ROM decreased;decreased flexibility  -HK      LB Bathing Assess/Train, Comment OT provided verbal cues to ensure all of body was washed.   -HK      Recorded by [HK] Violette Smith OT      Toileting Assessment/Training    Toileting Assess/Train, Comment pt declined need to void  -HK      Recorded by [HK] Violette Smith, OT      Grooming Assessment/Training    Grooming Assess/Train, Position sitting  -HK      Grooming Assess/Train, Indep[place] Level set up required  -HK      Grooming Assess/Train, Impairments strength decreased;impaired balance  -HK      Grooming Assess/Train, Comment Pt rinsed mouth with mouth wash but declined to brush teeth  -HK      Recorded by [HK] Violette Smith OT      Balance Skills Training    Sitting-Level of Assistance Close supervision  -HK      Sitting-Balance Support Feet supported  -HK      Standing-Level of Assistance Contact guard  -HK      Static Standing Balance Support assistive device  -HK      Recorded by [HK] Violette Smith OT      Therapy Exercises    Bilateral Lower Extremities  --   deferred as pt with other staff (nsg&infec disease)  -LM     Bilateral Upper Extremity --   speech entered for swallow before exercises could be done.   -HK      Recorded by [HK] Violette Smith OT [LM] Bekah Caro, PT     Positioning and Restraints    Pre-Treatment Position sitting in  chair/recliner  -HK in bed  -LM in bed  -    Post Treatment Position chair  -HK chair  - bed  -    In Bed   supine;call light within reach;encouraged to call for assist  -    In Chair notified nsg;sitting;call light within reach;encouraged to call for assist;exit alarm on;with SLP  -HK reclined;sitting;call light within reach;encouraged to call for assist;exit alarm on;with other staff;with nsg  -LM     Recorded by [] Violette Smith, OT [] Bekah Caro, PT [] Jessi Villa, PT      03/05/18 1556 03/05/18 1500       Rehab Assessment/Intervention    Discipline physical therapist  - physical therapist  -     Document Type therapy note (daily note)  - therapy note (daily note)  -     Subjective Information agree to therapy;complains of;pain;fatigue  - agree to therapy;complains of;pain  -     Symptoms Noted Comment had prior SOB episode per notes this date; Wound care PT left a few minutes prior to mobility PT entering  -      Recorded by [] Mary Kate Parra, PT [] Carlos Hassan, PT     Vital Signs    Pre SpO2 (%) 94  -EH      O2 Delivery Pre Treatment supplemental O2  -EH      Post SpO2 (%) 92  -EH      O2 Delivery Post Treatment supplemental O2  -EH      Recorded by [] Mary Kate aPrra, PT      Pain Assessment    Pain Assessment Rojas-Fontenot FACES  - Rojas-Baker FACES  -     Rojas-Baker FACES Pain Rating 6  -EH 6  -MF     Pain Type Chronic pain  - Chronic pain  -     Pain Location Leg   and back  - Leg  -MF     Pain Orientation Right;Left  -EH Right;Left  -     Pain Intervention(s) Repositioned;Ambulation/increased activity  -      Response to Interventions tolerated  -EH      Recorded by [] Mary Kate Parra, PT [] Carlos Hassan, PT     Cognitive Assessment/Intervention    Current Cognitive/Communication Assessment functional  -      Orientation Status oriented to;person;situation;place  -      Follows Commands/Answers Questions 100% of the  time;able to follow single-step instructions   with encouragement  -      Recorded by [] Mary Kate Parra, PT      Bed Mobility, Assessment/Treatment    Bed Mobility, Comment Refuses mobility  -      Recorded by [] Mary Kate Parra PT      Therapy Exercises    Bilateral Lower Extremities AROM:;5 reps;ankle pumps/circles;quad sets;glut sets  -      Bilateral Upper Extremity AROM:;5 reps;elbow flexion/extension;hand pumps;shoulder extension/flexion;pronation/supination  -      Recorded by [] Mary Kate Parra PT      Positioning and Restraints    Pre-Treatment Position in bed  - in bed  -     Post Treatment Position bed  - bed  -     In Bed notified nsg;supine;call light within reach;encouraged to call for assist;exit alarm on  - supine;call light within reach  -     Recorded by [] Mary Kate Parra, PT [] Carlos Hassan, PT       User Key  (r) = Recorded By, (t) = Taken By, (c) = Cosigned By    Initials Name Effective Dates     Carlos Hassan, PT 06/19/15 -      Mary Kate Parra, PT 06/19/15 -     AN Ebony Valdes, OT 06/22/15 -     AS Yasmine Fuller, PTA 06/22/15 -      Jessi Villa, PT 03/14/16 -     LM Bekah Caro, PT 06/09/17 - 03/06/18     Bekah Caro, PT 03/07/18 -     HK Violette Smith, OT 09/28/17 - 03/06/18                IP PT Goals       03/08/18 0829 03/07/18 1537 03/06/18 1051    Transfer Training PT LTG    Transfer Training PT  LTG, Date Goal Reviewed 03/08/18  -AS 03/07/18  -LM 03/06/18  -LM    Transfer Training PT LTG, Outcome goal ongoing  -AS goal ongoing  -LM goal ongoing  -LM    Gait Training PT LTG    Gait Training Goal PT LTG, Date Goal Reviewed 03/08/18  -AS 03/07/18  -LM 03/06/18  -LM    Gait Training Goal PT LTG, Outcome goal ongoing  -AS goal partially met   met distance, not assistance  -LM goal ongoing  -LM      03/06/18 0910 03/05/18 1613 03/02/18 1127    Transfer Training PT LTG    Transfer Training PT  LTG, Date Goal  Reviewed   03/02/18  -AS    Transfer Training PT LTG, Outcome  goal ongoing  -EH goal ongoing  -AS    Gait Training PT LTG    Gait Training Goal PT LTG, Date Goal Reviewed   03/02/18  -AS    Gait Training Goal PT LTG, Outcome  goal ongoing  -EH goal ongoing  -AS    Wound Care PT LTG    Wound Care PT LTG 1, Outcome goal partially met  -      Wound Care 2 PT LTG    Wound Care PT LTG 2, Outcome goal partially met  -MC        03/01/18 1058 02/26/18 1445 02/23/18 0949    Bed Mobility PT LTG    Bed Mobility PT LTG, Date Goal Reviewed  02/26/18  -LM     Bed Mobility PT LTG, Outcome goal ongoing  -EH goal ongoing  -LM goal ongoing  -BD    Transfer Training PT LTG    Transfer Training PT  LTG, Date Goal Reviewed  02/26/18  -LM     Transfer Training PT LTG, Outcome goal ongoing  -EH goal ongoing  -LM goal ongoing  -BD    Gait Training PT LTG    Gait Training Goal PT LTG, Date Goal Reviewed  02/26/18  -LM     Gait Training Goal PT LTG, Outcome goal ongoing  -EH goal ongoing  -LM goal ongoing  -BD      User Key  (r) = Recorded By, (t) = Taken By, (c) = Cosigned By    Initials Name Provider Type    EH Mary Kate Parra, PT Physical Therapist    AS Yasmine Fuller, PTA Physical Therapy Assistant    MC Jessi Villa, PT Physical Therapist    JORDYN Rojo, PT Physical Therapist    LM Bekah Caro, PT Physical Therapist     Bekah Caro, PT Physical Therapist          Physical Therapy Education     Title: PT OT SLP Therapies (Active)     Topic: Physical Therapy (Active)     Point: Mobility training (Active)    Learning Progress Summary    Learner Readiness Method Response Comment Documented by Status   Patient Acceptance E NR  AS 03/08/18 0829 Active    Acceptance E,D NR,VU Reviewed benefits of activity, ambulating in [place] with nsg, safety with mobility, correct gait mechanics. LM 03/07/18 1537 Done    Acceptance E,D NR,VU Reviewed benefits of activity, ambulating with nsg, safety with mobility/transfers,  correct gait mechanics.  03/06/18 1051 Done    Acceptance E NR   03/05/18 1613 Active    Acceptance E NR  AS 03/02/18 1126 Active    Acceptance E VU,NR   03/01/18 1058 Done    Acceptance E,D NR Reviewed benefits of activity, HEP, safety with mobility.  02/26/18 1445 Active    Acceptance E NR   02/23/18 0949 Active    Acceptance E VU   02/22/18 1159 Done               Point: Home exercise program (Active)    Learning Progress Summary    Learner Readiness Method Response Comment Documented by Status   Patient Acceptance E NR  AS 03/08/18 0829 Active    Acceptance E NR   03/05/18 1613 Active    Acceptance E NR  AS 03/02/18 1126 Active    Acceptance E VU,NR   03/01/18 1058 Done    Acceptance E,D NR Reviewed benefits of activity, HEP, safety with mobility.  02/26/18 1445 Active    Acceptance E NR   02/23/18 0949 Active    Acceptance E VU   02/22/18 1159 Done               Point: Body mechanics (Active)    Learning Progress Summary    Learner Readiness Method Response Comment Documented by Status   Patient Acceptance E NR  AS 03/08/18 0829 Active    Acceptance E,D NR,VU Reviewed benefits of activity, ambulating in [place] with nsg, safety with mobility, correct gait mechanics.  03/07/18 1537 Done    Acceptance E,D NR,VU Reviewed benefits of activity, ambulating with nsg, safety with mobility/transfers, correct gait mechanics.  03/06/18 1051 Done    Acceptance E NR  AS 03/02/18 1126 Active    Acceptance E VU,NR   03/01/18 1058 Done    Acceptance E,D NR Reviewed benefits of activity, HEP, safety with mobility.  02/26/18 1445 Active    Acceptance E NR   02/23/18 0949 Active    Acceptance E VU   02/22/18 1159 Done               Point: Precautions (Active)    Learning Progress Summary    Learner Readiness Method Response Comment Documented by Status   Patient Acceptance E NR  AS 03/08/18 0829 Active    Acceptance E,D NR,VU Reviewed benefits of activity, ambulating in [place] with nsg, safety with  mobility, correct gait mechanics.  03/07/18 1537 Done    Acceptance E,D NR,VU Reviewed benefits of activity, ambulating with nsg, safety with mobility/transfers, correct gait mechanics.  03/06/18 1051 Done    Acceptance E NR   03/05/18 1613 Active    Acceptance E NR  AS 03/02/18 1126 Active    Acceptance E VU,NR   03/01/18 1058 Done    Acceptance E,D NR Reviewed benefits of activity, HEP, safety with mobility.  02/26/18 1445 Active    Acceptance E NR   02/23/18 0949 Active    Acceptance E VU   02/22/18 1159 Done    Acceptance E VU reviewed POC for skin/ edema care.  02/17/18 1126 Done                      User Key     Initials Effective Dates Name Provider Type Discipline     06/19/15 -  Mary Kate Parra, PT Physical Therapist PT     06/19/15 -  Carmela Washington, PT Physical Therapist PT    AS 06/22/15 -  Yasmine Fuller, PTA Physical Therapy Assistant PT     02/12/18 - 03/06/18 Karen Lopez, PT Physical Therapist PT     06/13/16 -  Estrella Rojo, PT Physical Therapist PT     06/09/17 - 03/06/18 Bekah Caro, PT Physical Therapist PT     03/07/18 -  Bekah Caro, PT Physical Therapist PT                    PT Recommendation and Plan  Anticipated Discharge Disposition: home with home health, skilled nursing facility  Planned Therapy Interventions: wound care  Plan of Care Review  Plan Of Care Reviewed With: patient  Progress: improving  Outcome Summary/Follow up Plan: patient with increased hip and knee pain limiting gait distance. B LE exercises in chair.          Outcome Measures       03/08/18 0800 03/07/18 1524 03/07/18 1507    How much help from another person do you currently need...    Turning from your back to your side while in flat bed without using bedrails? 4  -AS  4  -LM    Moving from lying on back to sitting on the side of a flat bed without bedrails? 4  -AS  3  -LM    Moving to and from a bed to a chair (including a wheelchair)? 3  -AS  3  -LM     Standing up from a chair using your arms (e.g., wheelchair, bedside chair)? 3  -AS  3  -LM    Climbing 3-5 steps with a railing? 2  -AS  2  -LM    To walk in hospital room? 3  -AS  3  -LM    AM-PAC 6 Clicks Score 19  -AS  18  -LM    How much help from another is currently needed...    Putting on and taking off regular lower body clothing?  2  -AN     Bathing (including washing, rinsing, and drying)  3  -AN     Toileting (which includes using toilet bed pan or urinal)  3  -AN     Putting on and taking off regular upper body clothing  2  -AN     Taking care of personal grooming (such as brushing teeth)  3  -AN     Eating meals  3  -AN     Score  16  -AN     Functional Assessment    Outcome Measure Options AM-PAC 6 Clicks Basic Mobility (PT)  -AS  AM-PAC 6 Clicks Basic Mobility (PT)  -LM      03/06/18 1053 03/06/18 1024 03/05/18 1556    How much help from another person do you currently need...    Turning from your back to your side while in flat bed without using bedrails?  4  -LM 4  -EH    Moving from lying on back to sitting on the side of a flat bed without bedrails?  3  -LM 3  -EH    Moving to and from a bed to a chair (including a wheelchair)?  3  -LM 3  -EH    Standing up from a chair using your arms (e.g., wheelchair, bedside chair)?  3  -LM 3  -EH    Climbing 3-5 steps with a railing?  2  -LM 2  -EH    To walk in hospital room?  3  -LM 3  -EH    AM-PAC 6 Clicks Score  18  -LM 18  -EH    How much help from another is currently needed...    Putting on and taking off regular lower body clothing? 2  -HK      Bathing (including washing, rinsing, and drying) 3  -HK      Toileting (which includes using toilet bed pan or urinal) 3  -HK      Putting on and taking off regular upper body clothing 2  -HK      Taking care of personal grooming (such as brushing teeth) 3  -HK      Eating meals 3  -HK      Score 16  -HK      Functional Assessment    Outcome Measure Options AM-PAC 6 Clicks Daily Activity (OT)  -HK AM-PAC 6  Clicks Basic Mobility (PT)  -LM AM-PAC 6 Clicks Basic Mobility (PT)  -      User Key  (r) = Recorded By, (t) = Taken By, (c) = Cosigned By    Initials Name Provider Type     Mary Kate Parra, PT Physical Therapist    MITRA Valdes, OT Occupational Therapist    AS Yasmine Fuller, PTA Physical Therapy Assistant    LM Bekah Caro, PT Physical Therapist     Bekah Caro, PT Physical Therapist    HK Violette Smith, OT Occupational Therapist           Time Calculation:         PT Charges       03/08/18 0831          Time Calculation    Start Time 0800  -AS      PT Received On 03/08/18  -AS      PT Goal Re-Cert Due Date 03/15/18  -AS      Time Calculation- PT    Total Timed Code Minutes- PT 24 minute(s)  -AS        User Key  (r) = Recorded By, (t) = Taken By, (c) = Cosigned By    Initials Name Provider Type    AS Yasmine Fuller PTA Physical Therapy Assistant          Therapy Charges for Today     Code Description Service Date Service Provider Modifiers Qty    37181753987 HC GAIT TRAINING EA 15 MIN 3/8/2018 Yasmine Fuller PTA GP 1    84080672411 HC PT THER PROC EA 15 MIN 3/8/2018 Yasmine Fuller PTA GP 1          PT G-Codes  Outcome Measure Options: AM-PAC 6 Clicks Basic Mobility (PT)    Yasmine Fuller PTA  3/8/2018

## 2018-03-09 ENCOUNTER — APPOINTMENT (OUTPATIENT)
Dept: CARDIOLOGY | Facility: HOSPITAL | Age: 73
End: 2018-03-09
Attending: NURSE PRACTITIONER

## 2018-03-09 LAB
BACTERIA SPEC RESP CULT: ABNORMAL
BH CV STRESS BP STAGE 2: NORMAL
BH CV STRESS BP STAGE 4: NORMAL
BH CV STRESS COMMENTS STAGE 1: NORMAL
BH CV STRESS COMMENTS STAGE 2: NORMAL
BH CV STRESS DOSE REGADENOSON STAGE 1: 0.4
BH CV STRESS DURATION MIN STAGE 1: 0
BH CV STRESS DURATION MIN STAGE 2: 1
BH CV STRESS DURATION MIN STAGE 3: 1
BH CV STRESS DURATION MIN STAGE 4: 1
BH CV STRESS DURATION SEC STAGE 1: 10
BH CV STRESS DURATION SEC STAGE 2: 0
BH CV STRESS DURATION SEC STAGE 3: 0
BH CV STRESS DURATION SEC STAGE 4: 0
BH CV STRESS HR STAGE 1: 76
BH CV STRESS HR STAGE 2: 72
BH CV STRESS HR STAGE 3: 65
BH CV STRESS HR STAGE 4: 62
BH CV STRESS O2 STAGE 1: 100
BH CV STRESS O2 STAGE 2: 100
BH CV STRESS O2 STAGE 3: 99
BH CV STRESS O2 STAGE 4: 99
BH CV STRESS PROTOCOL 1: NORMAL
BH CV STRESS RECOVERY BP: NORMAL MMHG
BH CV STRESS RECOVERY HR: 62 BPM
BH CV STRESS RECOVERY O2: 99 %
BH CV STRESS STAGE 1: 1
BH CV STRESS STAGE 2: 2
BH CV STRESS STAGE 3: 3
BH CV STRESS STAGE 4: 4
GRAM STN SPEC: ABNORMAL
LV EF NUC BP: 58 %
MAXIMAL PREDICTED HEART RATE: 147 BPM
PERCENT MAX PREDICTED HR: 51.7 %
STRESS BASELINE BP: NORMAL MMHG
STRESS BASELINE HR: 63 BPM
STRESS O2 SAT REST: 97 %
STRESS PERCENT HR: 61 %
STRESS POST ESTIMATED WORKLOAD: 1 METS
STRESS POST EXERCISE DUR MIN: 4 MIN
STRESS POST EXERCISE DUR SEC: 0 SEC
STRESS POST O2 SAT PEAK: 100 %
STRESS POST PEAK BP: NORMAL MMHG
STRESS POST PEAK HR: 76 BPM
STRESS TARGET HR: 125 BPM

## 2018-03-09 PROCEDURE — 93017 CV STRESS TEST TRACING ONLY: CPT

## 2018-03-09 PROCEDURE — 78492 MYOCRD IMG PET MLT RST&STRS: CPT

## 2018-03-09 PROCEDURE — 94640 AIRWAY INHALATION TREATMENT: CPT

## 2018-03-09 PROCEDURE — C1769 GUIDE WIRE: HCPCS | Performed by: INTERNAL MEDICINE

## 2018-03-09 PROCEDURE — 78492 MYOCRD IMG PET MLT RST&STRS: CPT | Performed by: INTERNAL MEDICINE

## 2018-03-09 PROCEDURE — 0 RUBIDIUM CHLORIDE: Performed by: INTERNAL MEDICINE

## 2018-03-09 PROCEDURE — 99232 SBSQ HOSP IP/OBS MODERATE 35: CPT | Performed by: PHYSICIAN ASSISTANT

## 2018-03-09 PROCEDURE — 93018 CV STRESS TEST I&R ONLY: CPT | Performed by: INTERNAL MEDICINE

## 2018-03-09 PROCEDURE — 94799 UNLISTED PULMONARY SVC/PX: CPT

## 2018-03-09 PROCEDURE — 25010000002 FENTANYL CITRATE (PF) 100 MCG/2ML SOLUTION: Performed by: INTERNAL MEDICINE

## 2018-03-09 PROCEDURE — 25010000002 HEPARIN (PORCINE) PER 1000 UNITS: Performed by: INTERNAL MEDICINE

## 2018-03-09 PROCEDURE — 99232 SBSQ HOSP IP/OBS MODERATE 35: CPT | Performed by: INTERNAL MEDICINE

## 2018-03-09 PROCEDURE — A9555 RB82 RUBIDIUM: HCPCS | Performed by: INTERNAL MEDICINE

## 2018-03-09 PROCEDURE — 25010000002 MIDAZOLAM PER 1 MG: Performed by: INTERNAL MEDICINE

## 2018-03-09 PROCEDURE — 93458 L HRT ARTERY/VENTRICLE ANGIO: CPT | Performed by: INTERNAL MEDICINE

## 2018-03-09 PROCEDURE — 63710000001 PREDNISONE PER 1 MG: Performed by: INTERNAL MEDICINE

## 2018-03-09 PROCEDURE — 0 IOPAMIDOL PER 1 ML: Performed by: INTERNAL MEDICINE

## 2018-03-09 PROCEDURE — 94760 N-INVAS EAR/PLS OXIMETRY 1: CPT

## 2018-03-09 PROCEDURE — 25010000002 REGADENOSON 0.4 MG/5ML SOLUTION: Performed by: INTERNAL MEDICINE

## 2018-03-09 PROCEDURE — C1894 INTRO/SHEATH, NON-LASER: HCPCS | Performed by: INTERNAL MEDICINE

## 2018-03-09 RX ORDER — NICOTINE 21 MG/24HR
1 PATCH, TRANSDERMAL 24 HOURS TRANSDERMAL
Status: DISCONTINUED | OUTPATIENT
Start: 2018-03-09 | End: 2018-03-15 | Stop reason: HOSPADM

## 2018-03-09 RX ORDER — LIDOCAINE HYDROCHLORIDE 10 MG/ML
INJECTION, SOLUTION EPIDURAL; INFILTRATION; INTRACAUDAL; PERINEURAL AS NEEDED
Status: DISCONTINUED | OUTPATIENT
Start: 2018-03-09 | End: 2018-03-09 | Stop reason: HOSPADM

## 2018-03-09 RX ORDER — ATORVASTATIN CALCIUM 40 MG/1
40 TABLET, FILM COATED ORAL NIGHTLY
Status: DISCONTINUED | OUTPATIENT
Start: 2018-03-09 | End: 2018-03-15 | Stop reason: HOSPADM

## 2018-03-09 RX ORDER — MIDAZOLAM HYDROCHLORIDE 1 MG/ML
INJECTION INTRAMUSCULAR; INTRAVENOUS AS NEEDED
Status: DISCONTINUED | OUTPATIENT
Start: 2018-03-09 | End: 2018-03-09 | Stop reason: HOSPADM

## 2018-03-09 RX ORDER — FENTANYL CITRATE 50 UG/ML
INJECTION, SOLUTION INTRAMUSCULAR; INTRAVENOUS AS NEEDED
Status: DISCONTINUED | OUTPATIENT
Start: 2018-03-09 | End: 2018-03-09 | Stop reason: HOSPADM

## 2018-03-09 RX ORDER — SODIUM CHLORIDE 9 MG/ML
INJECTION, SOLUTION INTRAVENOUS CONTINUOUS PRN
Status: DISCONTINUED | OUTPATIENT
Start: 2018-03-09 | End: 2018-03-09 | Stop reason: HOSPADM

## 2018-03-09 RX ORDER — ASPIRIN 81 MG/1
81 TABLET ORAL DAILY
Status: DISCONTINUED | OUTPATIENT
Start: 2018-03-09 | End: 2018-03-15 | Stop reason: HOSPADM

## 2018-03-09 RX ADMIN — HEPARIN SODIUM 5000 UNITS: 5000 INJECTION, SOLUTION INTRAVENOUS; SUBCUTANEOUS at 06:22

## 2018-03-09 RX ADMIN — PREDNISONE 40 MG: 20 TABLET ORAL at 16:37

## 2018-03-09 RX ADMIN — HYDROCODONE BITARTRATE AND ACETAMINOPHEN 1 TABLET: 7.5; 325 TABLET ORAL at 18:56

## 2018-03-09 RX ADMIN — RUBIDIUM CHLORIDE RB-82 1 DOSE: 150 INJECTION, SOLUTION INTRAVENOUS at 08:20

## 2018-03-09 RX ADMIN — IPRATROPIUM BROMIDE AND ALBUTEROL SULFATE 3 ML: 2.5; .5 SOLUTION RESPIRATORY (INHALATION) at 16:09

## 2018-03-09 RX ADMIN — GABAPENTIN 200 MG: 100 CAPSULE ORAL at 20:58

## 2018-03-09 RX ADMIN — HYDROCODONE BITARTRATE AND ACETAMINOPHEN 1 TABLET: 7.5; 325 TABLET ORAL at 02:05

## 2018-03-09 RX ADMIN — MINOCYCLINE HYDROCHLORIDE 100 MG: 50 CAPSULE ORAL at 06:22

## 2018-03-09 RX ADMIN — CYANOCOBALAMIN TAB 1000 MCG 1000 MCG: 1000 TAB at 16:38

## 2018-03-09 RX ADMIN — REGADENOSON 0.4 MG: 0.08 INJECTION, SOLUTION INTRAVENOUS at 08:33

## 2018-03-09 RX ADMIN — BUDESONIDE 0.5 MG: 0.5 INHALANT RESPIRATORY (INHALATION) at 19:27

## 2018-03-09 RX ADMIN — ASPIRIN 81 MG: 81 TABLET, COATED ORAL at 16:37

## 2018-03-09 RX ADMIN — BUMETANIDE 1 MG: 1 TABLET ORAL at 16:38

## 2018-03-09 RX ADMIN — Medication 250 MG: at 16:37

## 2018-03-09 RX ADMIN — ARFORMOTEROL TARTRATE 15 MCG: 15 SOLUTION RESPIRATORY (INHALATION) at 19:27

## 2018-03-09 RX ADMIN — HEPARIN SODIUM 5000 UNITS: 5000 INJECTION, SOLUTION INTRAVENOUS; SUBCUTANEOUS at 20:58

## 2018-03-09 RX ADMIN — MORPHINE SULFATE 30 MG: 30 TABLET ORAL at 16:41

## 2018-03-09 RX ADMIN — CASTOR OIL AND BALSAM, PERU: 788; 87 OINTMENT TOPICAL at 20:59

## 2018-03-09 RX ADMIN — NICOTINE 1 PATCH: 14 PATCH TRANSDERMAL at 16:39

## 2018-03-09 RX ADMIN — IPRATROPIUM BROMIDE AND ALBUTEROL SULFATE 3 ML: 2.5; .5 SOLUTION RESPIRATORY (INHALATION) at 19:27

## 2018-03-09 RX ADMIN — PANTOPRAZOLE SODIUM 40 MG: 40 TABLET, DELAYED RELEASE ORAL at 06:22

## 2018-03-09 RX ADMIN — IPRATROPIUM BROMIDE AND ALBUTEROL SULFATE 3 ML: 2.5; .5 SOLUTION RESPIRATORY (INHALATION) at 12:46

## 2018-03-09 RX ADMIN — GABAPENTIN 200 MG: 100 CAPSULE ORAL at 16:37

## 2018-03-09 RX ADMIN — Medication 250 MG: at 20:57

## 2018-03-09 RX ADMIN — HYDROCODONE BITARTRATE AND ACETAMINOPHEN 1 TABLET: 7.5; 325 TABLET ORAL at 06:22

## 2018-03-09 RX ADMIN — RUBIDIUM CHLORIDE RB-82 1 DOSE: 150 INJECTION, SOLUTION INTRAVENOUS at 08:40

## 2018-03-09 RX ADMIN — MINOCYCLINE HYDROCHLORIDE 100 MG: 50 CAPSULE ORAL at 20:58

## 2018-03-09 RX ADMIN — ATORVASTATIN CALCIUM 40 MG: 40 TABLET, FILM COATED ORAL at 20:57

## 2018-03-09 RX ADMIN — GABAPENTIN 200 MG: 100 CAPSULE ORAL at 06:22

## 2018-03-09 RX ADMIN — DICLOFENAC SODIUM 2 G: 10 GEL TOPICAL at 20:59

## 2018-03-09 RX ADMIN — DOCUSATE SODIUM 200 MG: 100 CAPSULE, LIQUID FILLED ORAL at 16:38

## 2018-03-09 NOTE — PROGRESS NOTES
"    [facility] Medicine Services  PROGRESS NOTE    Patient Name: Yassine Love  : 1945  MRN: [id number]    Date of Admission: 2018  Length of Stay: 21  Primary Care Physician: No Known Provider    Subjective     CC: f/u cellulitis, NSVT    HPI:  \"It's not looking good.\" Stress test this morning, now planning for [facility] later this afternoon. Feeling discouraged.  Short of breath with activity. Currently, some mild left-sided chest pain. None during his stress test. No nausea or vomiting. Bowels moved yesterday. Feels like his legs are improving       Review of Systems  Gen- No fevers, chills  CV- No palpitations, (+) left-sided chest pain   Resp- (+) cough, exertional dyspnea  GI- No N/V/D, abd pain  Skin - BLE erythema and cramping are improving    Otherwise ROS is negative except as mentioned in the HPI.    Objective     Vital Signs:   Temp:  [98.1 °F (36.7 °C)-99 °F (37.2 °C)] 98.1 °F (36.7 °C)  Heart Rate:  [56-89] 56  Resp:  [16-18] 16  BP: (111-125)/(63-71) 125/71        Physical Exam:  Constitutional: No acute distress, awake, alert and conversant. He is upright on side of bed.   HENT: NCAT, mucous membranes moist  Respiratory: Decreased breath sounds bilaterally, particularly in bases. No wheezes, rales or rhonchi. Non-labored respirations on 4L NC.   Cardiovascular: RRR, no murmurs, rubs, or gallops, palpable pedal pulses bilaterally  Gastrointestinal: Positive bowel sounds, soft, nontender, nondistended  Musculoskeletal: BLEs are wrapped, did not remove for exam. Visible aspects are mildly edematous and erythematous R > L  Psychiatric: Appropriate affect, cooperative  Neurologic: Oriented x 3, strength symmetric in all extremities, Cranial Nerves grossly intact to confrontation, speech clear  Skin: No rashes    Results Reviewed:  I have personally reviewed current lab, radiology, and data and agree.      Results from last 7 days  Lab Units 18  0601 18  0605 " 03/03/18  0136   WBC 10*3/mm3 9.02 9.11 9.89   HEMOGLOBIN g/dL 9.6* 9.0* 9.3*  9.3*   HEMATOCRIT % 30.1* 28.5* 30.0*  30.1*   PLATELETS 10*3/mm3 334 334 326       Results from last 7 days  Lab Units 03/08/18  0601 03/06/18  0108 03/05/18  0605   SODIUM mmol/L 140  --  137   POTASSIUM mmol/L 4.1 4.4 4.2   CHLORIDE mmol/L 107  --  102   CO2 mmol/L 31.0  --  32.0*   BUN mg/dL 26*  --  22   CREATININE mg/dL 1.00  --  1.20   GLUCOSE mg/dL 103*  --  104*   CALCIUM mg/dL 8.6*  --  8.5*     Microbiology Results Abnormal     Procedure Component Value - Date/Time    Respiratory Culture - Sputum, Cough [[id number]]  (Abnormal) Collected:  03/07/18 0801    Lab Status:  Preliminary result Specimen:  Sputum from Cough Updated:  03/08/18 0745     Respiratory Culture --      Light growth (2+) Gram Negative Bacilli (A)      Scant growth (1+) Normal Respiratory Ernesto     Gram Stain Result Moderate (3+) WBCs per low power field      Rare (1+) Epithelial cells per low power field      Many (4+) Yeast      Rare (1+) Normal respiratory ernesto    Influenza A & B, RT PCR - Swab, Nasopharynx [[id number]]  (Normal) Collected:  03/03/18 1356    Lab Status:  Final result Specimen:  Swab from Nasopharynx Updated:  03/03/18 1607     Influenza A PCR Not Detected     Influenza B PCR Not Detected    Blood Culture - Blood, [[id number]]  (Normal) Collected:  02/18/18 2308    Lab Status:  Final result Specimen:  Blood from Arm, Left Updated:  02/23/18 2331     Blood Culture No growth at 5 days    Blood Culture - Blood, [[id number]]  (Normal) Collected:  02/18/18 2311    Lab Status:  Final result Specimen:  Blood from Arm, Left Updated:  02/23/18 2331     Blood Culture No growth at 5 days    Wound Culture - Wound, Foot, Right [[id number]]  (Abnormal)  (Susceptibility) Collected:  02/16/18 1931    Lab Status:  Final result Specimen:  Wound from Foot, Right Updated:  02/23/18 1045     Wound Culture --      Light growth (2+) Pseudomonas aeruginosa (A)       Scant growth (1+) Escherichia coli (A)      Scant growth (1+) Proteus mirabilis (A)      Scant growth (1+) Enterococcus faecalis (A)      Scant growth (1+) Streptococcus, Beta Hemolytic, Group G (A)        If Clindamycin or Erythromycin is the drug of choice, notify the laboratory within 7 days to request susceptibility testing.        STREP GROUPING G     Gram Stain Result Few (2+) WBCs seen      Many (4+) Gram negative bacilli      Many (4+) Gram positive bacilli      Many (4+) Gram positive cocci in pairs and clusters    Susceptibility      Pseudomonas aeruginosa     FAVIAN     Aztreonam <=8 ug/ml Susceptible     Cefepime <=8 ug/ml Susceptible     Ceftazidime 4 ug/ml Susceptible     Gentamicin <=4 ug/ml Susceptible     Levofloxacin <=2 ug/ml Susceptible     Meropenem <=1 ug/ml Susceptible     Piperacillin + Tazobactam <=16 ug/ml Susceptible     Tobramycin <=4 ug/ml Susceptible                Susceptibility      Escherichia coli     FAVIAN     Ampicillin <=8 ug/ml Susceptible     Ampicillin + Sulbactam <=8/4 ug/ml Susceptible     Aztreonam <=8 ug/ml Susceptible     Cefepime <=8 ug/ml Susceptible     Cefotaxime <=2 ug/ml Susceptible     Ceftriaxone <=8 ug/ml Susceptible     Cefuroxime sodium <=4 ug/ml Susceptible     Ertapenem <=1 ug/ml Susceptible     Gentamicin <=4 ug/ml Susceptible     Levofloxacin <=2 ug/ml Susceptible     Meropenem <=1 ug/ml Susceptible     Piperacillin + Tazobactam <=16 ug/ml Susceptible     Tetracycline <=4 ug/ml Susceptible     Tobramycin <=4 ug/ml Susceptible     Trimethoprim + Sulfamethoxazole <=2/38 ug/ml Susceptible                Susceptibility      Proteus mirabilis     FAVIAN     Ampicillin >16 ug/ml Resistant     Ampicillin + Sulbactam >16/8 ug/ml Resistant     Aztreonam <=8 ug/ml Susceptible     Cefepime <=8 ug/ml Susceptible     Cefotaxime <=2 ug/ml Susceptible     Ceftriaxone <=8 ug/ml Susceptible     Cefuroxime sodium <=4 ug/ml Susceptible     Ertapenem <=1 ug/ml Susceptible      Gentamicin <=4 ug/ml Susceptible     Levofloxacin <=2 ug/ml Susceptible     Meropenem <=1 ug/ml Susceptible     Piperacillin + Tazobactam <=16 ug/ml Susceptible     Tetracycline >8 ug/ml Resistant     Tobramycin <=4 ug/ml Susceptible     Trimethoprim + Sulfamethoxazole >2/38 ug/ml Resistant                Susceptibility      Enterococcus faecalis     FAVIAN     Ampicillin <=2 ug/ml Susceptible     Gentamicin High Level Synergy <=500 ug/ml Susceptible     Linezolid 2 ug/ml Susceptible     Penicillin G 2 ug/ml Susceptible     Streptomycin High Level Synergy <=1000 ug/ml Susceptible     Vancomycin 1 ug/ml Susceptible                    Blood Culture - Blood, Blood, Venous Line [[id number]]  (Normal) Collected:  02/16/18 2010    Lab Status:  Final result Specimen:  Blood from Arm, Left Updated:  02/21/18 2046     Blood Culture No growth at 5 days    Blood Culture - Blood, Blood, Venous Line [[id number]]  (Normal) Collected:  02/16/18 2005    Lab Status:  Final result Specimen:  Blood from Arm, Right Updated:  02/21/18 2046     Blood Culture No growth at 5 days    Respiratory Culture - Sputum, Cough [[id number]] Collected:  02/19/18 0656    Lab Status:  Final result Specimen:  Sputum from Cough Updated:  02/21/18 0931     Respiratory Culture --      Scant growth (1+) Normal Respiratory Nataliia     Gram Stain Result Occasional WBCs per low power field      No organisms seen          I have reviewed the medications.    Assessment/Plan   Assessment / Plan     Hospital Problem List     * (Principal)Cellulitis of both lower extremities    RAFAEL (acute kidney injury)    PVD (peripheral vascular disease)    COPD (chronic obstructive pulmonary disease)    Essential hypertension    Hypoxia    Bilateral cellulitis of lower leg    Non-sustained ventricular tachycardia        Brief Hospital Course to date:  Yassine Love is a 72 y.o. male  with a past medical history significant for PVD, essential hypertension, COPD, cellulitis of lower  extremities, and tobacco abuse who presents to the ED with complaints of fevers, chills and bilateral lower extremity pain/edema/redness.    Assessment & Plan:    Acute hypoxemic respiratory failure -   - secondary to COPD exacerbation and diastolic heart failure  - CTA negative for PE  - 3/6 CXR reviewed, stable, no acute changes  - 3/8 ECHO EF 63%, RVSP not noted. Consider pulmonary hypertension   - Continue daily Bumex, hold for SBP less than 100  - Daily fluid restriction to 1500cc (which patient is non-complaint with)  - treating COPD as below  - Pulmonology consulting, appreciate input: 5 day course of pred 40mg, changed bronchodilators to pulmicort + brovanna bid + duoneb qid + mucomyst qid. Do IS q1hwa and flutter qid. Abx per ID. At dc continue Pulmicort/Brovana/Ipratropium neb bid.  Repeat CT chest  3 months as O/P.  - No s/s or concerns for pharyngeal dysphagia, continue regular diet. Possibly secondary to reflux  -sputum 3/7 following. D/w Dr Belle, extending abx for additional week. Probiotic     COPD exacerbation: Symbicort, Duonebs  - Continue 5 day course of prednisone 40mg   - Currently on 4L NC    NSVT   -3/6 and 3/7 in a.m.   -TSH normal, Mg and K+ normal  - Continue Beta blocker per cardiology  - Stress test today, planning for [facility] later this afternoon     LE cellulitis/ Chronic LE wounds: switched to minocycline through 3/10, ID on board. Continue PO morphine at home dose PRN - OLIVA reviewed. Decreased gabapentin back to prior dose, increased dose made him drowsy. Continue PT WOC for bilateral LE wrapping.    HTN: Holding BP meds for normotension    RAFAEL: Baseline Cr 0.9-1.3. Stable. Monitor renal function after [facility] today     Anemia: mixed anemia, Iron low, will start oral Iron. Negative FOBT     Tobacco abuse- nicotine patch, decrease dose today     DVT Prophylaxis:  H  CODE STATUS: Conditional Code    Disposition: To [facility] when cleared by cardiology. Over the weekend or Monday? CM notified.      Stella Garvin PA-C  03/09/18  10:54 AM

## 2018-03-09 NOTE — PROGRESS NOTES
Adult Nutrition  Assessment/PES    Patient Name:  Yassine Love  YOB: 1945  MRN: [id number]  Admit Date:  2/16/2018    Assessment Date:  3/9/2018    Comments:            Reason for Assessment       03/09/18 1307    Reason for Assessment    Reason For Assessment/Visit follow up protocol    Time Spent (min) 15    Diagnosis --   complete list per MD notes this adm                        Nutrition Prescription Ordered       03/09/18 1307    Nutrition Prescription PO    Current PO Diet NPO   NPO today; pt has been on cardiac diet with 1.5L/d fluid restriction prior to today.             Evaluation of Received Nutrient/Fluid Intake       03/09/18 1308    PO Evaluation    Number of Meals 7    % PO Intake 86            Problem/Interventions:        Problem 1       03/09/18 1308    Nutrition Diagnoses Problem 1    Problem 1 No Nutrition Diagnosis at this Time    Signs/Symptoms (evidenced by) PO Intake    Percent (%) intake recorded 86 % @ 7 meals                    Intervention Goal       03/09/18 1309    Intervention Goal    PO Maintain intake            Nutrition Intervention       03/09/18 1309    Nutrition Intervention    RD/Tech Action Follow Tx progress              Education/Evaluation       03/09/18 1309    Monitor/Evaluation    Monitor Per protocol        Electronically signed by:  Laura Mcginnis, MS,RD,LD  03/09/18 1:09 PM

## 2018-03-09 NOTE — PROGRESS NOTES
Yassine Love  1945  [id number]  3/9/2018    CC:   Chief Complaint   Patient presents with   • Leg Swelling       Yassine Love is a 72 y.o. male here for leg infections   History of present illness:    Mr. Yassine Love is a 72 y.o. male with CC: Cellulitis of both lower extremities. He presented to the [facility] ED with increased bilateral extremity edema with increased pain and redness in the setting of unna boot therapy via home health care for the past several months. He does complain of foul odor and drainage of BLE and fever, and chills.    He denies No SOB or cough today: No chest pain, No N/V/D at present and no abdominal pain.  No  symptoms: no new rash: No HA, photophobia or neck stiffness; He denies other focal pain.  Mr. Love was followed by Dr. Duncan during a June, 2017 hospitalization and was was treated with Invanz for a streptococcal bacteremia.      2/18 - co leg infection  No fever or chills  2/19/18 - C/O HA, generalized pain, and respiratory distress  Patient denies any fever, chills, or sweats.  Patient denies any nausea, vomiting, or diarrhea.  Now on hi-emperatriz oxygen. Seen and agree  2/20 - co leg infections  Co weakness  No fever  Lives alone  2/21/18 - Patient reports mild dyspnea, remains on hi emperatriz oxygen.  Patient denies any fever, chills, or sweats.  Patient denies any nausea, vomiting, or diarrhea.  C/O BLE pain.  Seen and agree  2/22 - co SOA  Co weakness  No fever or chills  No rash  Co leg pain and swelling  2/23/18 - Patient is sleeping today peacefully on 4 LNC.  ROS discussed with staff.  Seen and agree  2/26 - No hx available  ROS discussed with nurse  2/28/18:   Does not want to go to rehab, wants to go home.  Unna boots just changed yesterday.  No fever, chills, sweats.   Seen and agree  3/5/18 - Looking at places for rehab.  Patient denies any fever, chills, or sweats.  Per nursing requiring 4LNC.  On oral antibiotics.    Seen and agree  3/6/18 - C/O pain in BLE.   "Patient denies any fever, chills, or sweats.  Continues on 4LNC.  Patient denies any nausea, vomiting, or diarrhea.  RN at .  Patient working with PT.  Seen and agree  3/7 - co new issue with decubitus  Co SOA  Co weakness  No fever  3/8/18 - \"They think there is something wrong with my heart.\"  Patient denies any fever, chills, or sweats.  Patient reports to me he doesn't want any chest compressions or be put on the vent.\"  Discussed with nursing this is current conditional code status.  Seen and agree.  3/9 - No Hx available  ROS discussed with staff      Past medical history:  Past Medical History:   Diagnosis Date   • Cancer    • Cellulitis of both lower extremities     [facility] [place] 2016   • Chronic pain    • COPD (chronic obstructive pulmonary disease)    • Hypertension    • Osteoarthritis cervical spine    • Osteoarthritis of both knees    • Osteoarthritis of left hip        Medications:   Current Facility-Administered Medications:   •  acetaminophen (TYLENOL) tablet 650 mg, 650 mg, Oral, Q4H PRN, Nicole Hewitt DO, 650 mg at 03/08/18 0104  •  arformoterol (BROVANA) nebulizer solution 15 mcg, 15 mcg, Nebulization, BID - RT, Todd Keys MD, 15 mcg at 03/08/18 1943  •  bisacodyl (DULCOLAX) suppository 10 mg, 10 mg, Rectal, Daily PRN, Nicole Hewitt DO, 10 mg at 02/23/18 0823  •  budesonide (PULMICORT) nebulizer solution 0.5 mg, 0.5 mg, Nebulization, BID - RT, Todd Keys MD, 0.5 mg at 03/08/18 1943  •  bumetanide (BUMEX) tablet 1 mg, 1 mg, Oral, Daily, Belle Valdivia APRN, 1 mg at 03/08/18 0825  •  calcium carbonate (TUMS) chewable tablet 500 mg (200 mg elemental), 2 tablet, Oral, BID PRN, Beatriz Yousif APRN, 2 tablet at 02/27/18 0946  •  castor oil-balsam peru (VENELEX) ointment, , Topical, Q12H, Nicole Hewitt DO, 1 each at 03/08/18 2143  •  diclofenac (VOLTAREN) 1 % gel 2 g, 2 g, Topical, 4x Daily, Nicole Hewitt DO, 2 g at 03/07/18 1322  •  docusate " sodium (COLACE) capsule 200 mg, 200 mg, Oral, Daily, Nicole Hewitt DO, 200 mg at 03/08/18 0825  •  fluticasone (FLONASE) 50 MCG/ACT nasal spray 2 spray, 2 spray, Each Nare, Daily, Esmer Watkins MD, 2 spray at 03/08/18 0830  •  gabapentin (NEURONTIN) capsule 200 mg, 200 mg, Oral, Q8H, Nicole Hewitt DO, 200 mg at 03/09/18 0622  •  guaiFENesin (MUCINEX) 12 hr tablet 600 mg, 600 mg, Oral, BID PRN, Ezequiel Alfred PA-C, 600 mg at 02/20/18 0818  •  heparin (porcine) 5000 UNIT/ML injection 5,000 Units, 5,000 Units, Subcutaneous, Q8H, Nicole Hewitt DO, 5,000 Units at 03/09/18 0622  •  HYDROcodone-acetaminophen (NORCO) 7.5-325 MG per tablet 1 tablet, 1 tablet, Oral, Q4H PRN, Yumiko Castanon MD, 1 tablet at 03/09/18 0622  •  ipratropium-albuterol (DUO-NEB) nebulizer solution 3 mL, 3 mL, Nebulization, 4x Daily - RT, Nicole Hewitt DO, 3 mL at 03/08/18 1943  •  magnesium hydroxide (MILK OF MAGNESIA) suspension 2400 mg/10mL 10 mL, 10 mL, Oral, Daily PRN, Nicole Hewitt DO, 10 mL at 02/25/18 2228  •  Magnesium Sulfate 2 gram Bolus, followed by 8 gram infusion (total Mg dose 10 grams)- Mg less than or equal to 1mg/dL, 2 g, Intravenous, PRN **OR** Magnesium Sulfate 6 gram Infusion (2 gm x 3) -Mg 1.1 -1.5 mg/dL, 2 g, Intravenous, PRN **OR** magnesium sulfate 4 gram infusion- Mg 1.6-1.9 mg/dL, 4 g, Intravenous, PRN, Esmer Watkins MD  •  metoprolol tartrate (LOPRESSOR) tablet 25 mg, 25 mg, Oral, Q12H, BRITTNY Schaefer, 25 mg at 03/08/18 2136  •  minocycline (MINOCIN,DYNACIN) capsule 100 mg, 100 mg, Oral, Q12H, Casie M Mayne, PA-C, 100 mg at 03/09/18 0622  •  Morphine (MSIR) tablet 30 mg, 30 mg, Oral, Q6H PRN, Esmer Watkins MD, 30 mg at 03/08/18 2137  •  nicotine (NICODERM CQ) 21 MG/24HR patch 1 patch, 1 patch, Transdermal, Q24H, Yumiko Castanon MD, 1 patch at 03/08/18 0825  •  ondansetron (ZOFRAN) injection 4 mg, 4 mg, Intravenous, Q6H PRN, Alexandro Suarez PA-C, 4 mg at 02/21/18 1945  •   pantoprazole (PROTONIX) EC tablet 40 mg, 40 mg, Oral, Q AM, Todd Keys MD, 40 mg at 03/09/18 0622  •  Pharmacy Consult - [facility], , Does not apply, Daily, Annette Ray RPH  •  potassium chloride (KLOR-CON) packet 40 mEq, 40 mEq, Oral, PRN, Esmer Watkins MD  •  potassium chloride (MICRO-K) CR capsule 40 mEq, 40 mEq, Oral, PRN, Esmer Watkins MD, 40 mEq at 02/22/18 1431  •  predniSONE (DELTASONE) tablet 40 mg, 40 mg, Oral, Daily With Breakfast, Todd Keys MD, 40 mg at 03/08/18 0825  •  saccharomyces boulardii (FLORASTOR) capsule 250 mg, 250 mg, Oral, BID, Casie M Mayne, PA-C, 250 mg at 03/08/18 2135  •  sodium chloride 0.9 % flush 1-10 mL, 1-10 mL, Intravenous, PRN, Nicole Hewitt,   •  sodium chloride 0.9 % flush 10 mL, 10 mL, Intravenous, PRN, Steve Mcmanus MD, 10 mL at 03/01/18 2118  •  vitamin B-12 (CYANOCOBALAMIN) tablet 1,000 mcg, 1,000 mcg, Oral, Daily, Yumiko Castanon MD, 1,000 mcg at 03/08/18 0825  Antibiotics:  Anti-Infectives     Ordered     Dose/Rate Route Frequency Start Stop    03/08/18 0921  minocycline (MINOCIN,DYNACIN) capsule 100 mg     Casie M Mayne, PA-C reviewed the order on 03/08/18 0955.   Ordering Provider:  Casie M Mayne, PA-C    100 mg Oral Every 12 Hours 03/08/18 1800 03/17/18 2359    02/16/18 2302  vancomycin 500 mg/100 mL 0.9% NS IVPB (mbp)     Ordering Provider:  Carlos Walls RPH    500 mg  over 60 Minutes Intravenous Once 02/16/18 2345 02/17/18 0032    02/16/18 1952  vancomycin (VANCOCIN) in iso-osmotic dextrose IVPB 1 g (premix) 200 mL     Ordering Provider:  Alexandro Suarez PA-C    1,000 mg  over 60 Minutes Intravenous Once 02/16/18 1954 02/16/18 2122 02/16/18 1952  meropenem (MERREM) 1 g/100 mL 0.9% NS VTB (mbp)     Ordering Provider:  Alexandro Suarez PA-C    1 g  over 30 Minutes Intravenous Once 02/16/18 1954 02/16/18 2059          Allergies:  is allergic to ciprofloxacin hcl and ceftin [cefuroxime axetil].    Family History: family  "history includes COPD in his brother; Heart attack in his brother; Stroke in his father.    Social History:  reports that he has been smoking Cigarettes.  He has a 84.00 pack-year smoking history. He does not have any smokeless tobacco history on file. He reports that he does not drink alcohol or use illicit drugs.    Review of Systems: All other reviewed and negative except as per HPI    Blood pressure 125/71, pulse 56, temperature 98.1 °F (36.7 °C), resp. rate 16, height 172.7 cm (67.99\"), weight 82.1 kg (181 lb), SpO2 95 %.  GENERAL:Chronically ill appearing.  Groggy  In bed  HEENT: Oropharynx without thrush.  EYES: . No conjunctival injection. No icterus.   LYMPHATICS: No lymphadenopathy of the neck or axillary or inguinal regions.   HEART:  Irregular  LUNGS:  Diminished throughout.  On 4LNC.  ABDOMEN: Soft, nontender, ND   SKIN: BLE dressings intact.  Sacral decub multiple sites stage 2  EXT:  + edema. BLE with compression stockings. Seen and agree.          DIAGNOSTICS:  Lab Results   Component Value Date    WBC 9.02 03/08/2018    HGB 9.6 (L) 03/08/2018    HCT 30.1 (L) 03/08/2018     03/08/2018     Lab Results   Component Value Date    CRP 13.12 (H) 06/21/2017     Lab Results   Component Value Date    SEDRATE 56 (H) 06/20/2017     Lab Results   Component Value Date    GLUCOSE 103 (H) 03/08/2018    BUN 26 (H) 03/08/2018    CREATININE 1.00 03/08/2018    EGFRIFNONA 73 03/08/2018    BCR 26.0 (H) 03/08/2018    CO2 31.0 03/08/2018    CALCIUM 8.6 (L) 03/08/2018    ALBUMIN 3.30 02/23/2018    LABIL2 0.9 (L) 02/17/2018    AST 15 02/17/2018    ALT 4 (L) 02/17/2018       Microbiology:   Microbiology Results Abnormal     Procedure Component Value - Date/Time    Respiratory Culture - Sputum, Cough [[id number]]  (Abnormal) Collected:  03/07/18 0801    Lab Status:  Preliminary result Specimen:  Sputum from Cough Updated:  03/08/18 0745     Respiratory Culture --      Light growth (2+) Gram Negative Bacilli (A)      " Scant growth (1+) Normal Respiratory Ernesto     Gram Stain Result Moderate (3+) WBCs per low power field      Rare (1+) Epithelial cells per low power field      Many (4+) Yeast      Rare (1+) Normal respiratory ernesto    Influenza A & B, RT PCR - Swab, Nasopharynx [[id number]]  (Normal) Collected:  03/03/18 1356    Lab Status:  Final result Specimen:  Swab from Nasopharynx Updated:  03/03/18 1607     Influenza A PCR Not Detected     Influenza B PCR Not Detected    Blood Culture - Blood, [[id number]]  (Normal) Collected:  02/18/18 2308    Lab Status:  Final result Specimen:  Blood from Arm, Left Updated:  02/23/18 2331     Blood Culture No growth at 5 days    Blood Culture - Blood, [[id number]]  (Normal) Collected:  02/18/18 2311    Lab Status:  Final result Specimen:  Blood from Arm, Left Updated:  02/23/18 2331     Blood Culture No growth at 5 days    Wound Culture - Wound, Foot, Right [[id number]]  (Abnormal)  (Susceptibility) Collected:  02/16/18 1931    Lab Status:  Final result Specimen:  Wound from Foot, Right Updated:  02/23/18 1045     Wound Culture --      Light growth (2+) Pseudomonas aeruginosa (A)      Scant growth (1+) Escherichia coli (A)      Scant growth (1+) Proteus mirabilis (A)      Scant growth (1+) Enterococcus faecalis (A)      Scant growth (1+) Streptococcus, Beta Hemolytic, Group G (A)        If Clindamycin or Erythromycin is the drug of choice, notify the laboratory within 7 days to request susceptibility testing.        STREP GROUPING G     Gram Stain Result Few (2+) WBCs seen      Many (4+) Gram negative bacilli      Many (4+) Gram positive bacilli      Many (4+) Gram positive cocci in pairs and clusters    Susceptibility      Pseudomonas aeruginosa     FAVIAN     Aztreonam <=8 ug/ml Susceptible     Cefepime <=8 ug/ml Susceptible     Ceftazidime 4 ug/ml Susceptible     Gentamicin <=4 ug/ml Susceptible     Levofloxacin <=2 ug/ml Susceptible     Meropenem <=1 ug/ml Susceptible     Piperacillin +  Tazobactam <=16 ug/ml Susceptible     Tobramycin <=4 ug/ml Susceptible                Susceptibility      Escherichia coli     FAVIAN     Ampicillin <=8 ug/ml Susceptible     Ampicillin + Sulbactam <=8/4 ug/ml Susceptible     Aztreonam <=8 ug/ml Susceptible     Cefepime <=8 ug/ml Susceptible     Cefotaxime <=2 ug/ml Susceptible     Ceftriaxone <=8 ug/ml Susceptible     Cefuroxime sodium <=4 ug/ml Susceptible     Ertapenem <=1 ug/ml Susceptible     Gentamicin <=4 ug/ml Susceptible     Levofloxacin <=2 ug/ml Susceptible     Meropenem <=1 ug/ml Susceptible     Piperacillin + Tazobactam <=16 ug/ml Susceptible     Tetracycline <=4 ug/ml Susceptible     Tobramycin <=4 ug/ml Susceptible     Trimethoprim + Sulfamethoxazole <=2/38 ug/ml Susceptible                Susceptibility      Proteus mirabilis     FAVIAN     Ampicillin >16 ug/ml Resistant     Ampicillin + Sulbactam >16/8 ug/ml Resistant     Aztreonam <=8 ug/ml Susceptible     Cefepime <=8 ug/ml Susceptible     Cefotaxime <=2 ug/ml Susceptible     Ceftriaxone <=8 ug/ml Susceptible     Cefuroxime sodium <=4 ug/ml Susceptible     Ertapenem <=1 ug/ml Susceptible     Gentamicin <=4 ug/ml Susceptible     Levofloxacin <=2 ug/ml Susceptible     Meropenem <=1 ug/ml Susceptible     Piperacillin + Tazobactam <=16 ug/ml Susceptible     Tetracycline >8 ug/ml Resistant     Tobramycin <=4 ug/ml Susceptible     Trimethoprim + Sulfamethoxazole >2/38 ug/ml Resistant                Susceptibility      Enterococcus faecalis     FAVIAN     Ampicillin <=2 ug/ml Susceptible     Gentamicin High Level Synergy <=500 ug/ml Susceptible     Linezolid 2 ug/ml Susceptible     Penicillin G 2 ug/ml Susceptible     Streptomycin High Level Synergy <=1000 ug/ml Susceptible     Vancomycin 1 ug/ml Susceptible                    Blood Culture - Blood, Blood, Venous Line [[id number]]  (Normal) Collected:  02/16/18 2010    Lab Status:  Final result Specimen:  Blood from Arm, Left Updated:  02/21/18 2046     Blood  Culture No growth at 5 days    Blood Culture - Blood, Blood, Venous Line [[id number]]  (Normal) Collected:  02/16/18 2005    Lab Status:  Final result Specimen:  Blood from Arm, Right Updated:  02/21/18 2046     Blood Culture No growth at 5 days    Respiratory Culture - Sputum, Cough [[id number]] Collected:  02/19/18 0656    Lab Status:  Final result Specimen:  Sputum from Cough Updated:  02/21/18 0931     Respiratory Culture --      Scant growth (1+) Normal Respiratory Nataliia     Gram Stain Result Occasional WBCs per low power field      No organisms seen              RADIOLOGY:  Imaging Results (last 72 hours)     Procedure Component Value Units Date/Time    XR Chest 1 View [[id number]] Collected:  02/16/18 2125     Updated:  02/16/18 2212    Narrative:       EXAM:    XR Chest, 1 View    CLINICAL HISTORY:    72 years old, male; Peripheral vascular disease, unspecified; Cellulitis of   right lower limb; Cellulitis of left lower limb; Acute kidney failure,   unspecified; Signs and symptoms; Other: Hypoxia    TECHNIQUE:    Frontal view of the chest.    COMPARISON:    CR - XR CHEST 1 VW 2017-06-25 06:32    FINDINGS:    Lungs:  There is mild prominence of the pulmonary vasculature. Chronic   scarring.    Pleural space:  Unremarkable.  No pneumothorax.    Heart:  There is an atherosclerotic cardiovascular configuration without   cardiomegaly.    Mediastinum:  Unremarkable.    Bones/joints:  There are degenerative changes of the spine.      Impression:         Mild pulmonary vascular congestion.    THIS DOCUMENT HAS BEEN ELECTRONICALLY SIGNED BY LENIN DIAZ MD    XR Chest 1 View [[id number]] Updated:  02/18/18 1333        CXR's seen and compared        Assessment and Plan:   Impression:      -- Cellulitis - Bilateral Lower Extremities - H/O streptococcal bacteremia      -- Acute Kidney Injury - stable     -- COPD - with worsening respiratory failure - diuretics given      --  HTN      -- Tobacco Abuse      -- Acute Hypoxic  respiratory failure, remaining on 4LNC.    -- Leukocytosis, on prednisone.      -- Infiltrates - Primarily fluid, improved     -- Possible aspiration per pulmonary note - speech is to evaluate.     -- DTI - sacral  Stage 2     -- Gram negative and sputum, new       PLAN/RECOMMENDATIONS:      -- Continue Minocycline - planning through 3/10  -- Continue wound care and compression wraps.    UM discussed with nursing planning to go to [facility] after cleared by cardiology.    Discussed with primary team, the gram negative in the sputum probably won't need intervention.  Complex case but feel OK to go to [facility] - we can see there    Parish Belle MD for Dr. Parish Belle  3/9/2018

## 2018-03-09 NOTE — PROGRESS NOTES
Continued Stay Note  [facility]     Patient Name: Yassine Love  MRN: [id number]  Today's Date: 3/9/2018    Admit Date: 2/16/2018          Discharge Plan       03/09/18 1201    Case Management/Social Work Plan    Plan [facility]    Patient/Family In Agreement With Plan --   Patient/Son    Additional Comments Updated [facility] regarding plan for Cardiology intervention today.  Transfer postponed until Monday, 3/12/18 @ 4:00pm--pending Cardiology approval.  Yolanda Alfred, ext. [phone]              Discharge Codes     None        Expected Discharge Date and Time     Expected Discharge Date Expected Discharge Time    Mar 9, 2018             ANDRY Tierney

## 2018-03-09 NOTE — PROGRESS NOTES
[facility]    Inpatient Progress Note      Chief Complaint/Reason for service:    · Wide complex tachycardia         Subjective:       Denies palpitations during 15 sec of NSVT yesterday evening. Denies chest pain. Stable chronic dyspnea improved with NC O2    Past medical, surgical, social and family history reviewed in the patient's electronic medical record.    Review of Systems:   Positive for dyspnea  Negative for exertional chest pain, lower extremity edema, palpitations     Problem List  Active Hospital Problems (** Indicates Principal Problem)    Diagnosis Date Noted   • **Cellulitis of both lower extremities [L03.115, L03.116] 02/16/2018   • Non-sustained ventricular tachycardia [I47.2] 03/08/2018   • COPD (chronic obstructive pulmonary disease) [J44.9] 02/16/2018   • Essential hypertension [I10] 02/16/2018   • Hypoxia [R09.02] 02/16/2018   • Bilateral cellulitis of lower leg [L03.116, L03.115] 02/16/2018   • RAFAEL (acute kidney injury) [N17.9] 06/20/2017   • PVD (peripheral vascular disease) [I73.9] 06/20/2017      Resolved Hospital Problems    Diagnosis Date Noted Date Resolved   • Hyponatremia [E87.1] 02/16/2018 02/17/2018            Objective:      Current Facility-Administered Medications:   •  acetaminophen (TYLENOL) tablet 650 mg, 650 mg, Oral, Q4H PRN, Nicole Hewitt DO, 650 mg at 03/08/18 0104  •  arformoterol (BROVANA) nebulizer solution 15 mcg, 15 mcg, Nebulization, BID - RT, Todd Keys MD, 15 mcg at 03/08/18 1943  •  bisacodyl (DULCOLAX) suppository 10 mg, 10 mg, Rectal, Daily PRN, Nicole Hewitt DO, 10 mg at 02/23/18 0823  •  budesonide (PULMICORT) nebulizer solution 0.5 mg, 0.5 mg, Nebulization, BID - RT, Todd Keys MD, 0.5 mg at 03/08/18 1943  •  bumetanide (BUMEX) tablet 1 mg, 1 mg, Oral, Daily, BRITTNY Alvares, 1 mg at 03/08/18 0825  •  calcium carbonate (TUMS) chewable tablet 500 mg (200 mg elemental), 2  tablet, Oral, BID PRN, BRITTNY Estrada, 2 tablet at 02/27/18 0946  •  castor oil-balsam peru (VENELEX) ointment, , Topical, Q12H, Nicole Hewitt DO, 1 each at 03/08/18 2143  •  diclofenac (VOLTAREN) 1 % gel 2 g, 2 g, Topical, 4x Daily, Nicole Hewitt DO, 2 g at 03/07/18 1322  •  docusate sodium (COLACE) capsule 200 mg, 200 mg, Oral, Daily, Nicole Hewitt DO, 200 mg at 03/08/18 0825  •  fluticasone (FLONASE) 50 MCG/ACT nasal spray 2 spray, 2 spray, Each Nare, Daily, Esmer Watkins MD, 2 spray at 03/08/18 0830  •  gabapentin (NEURONTIN) capsule 200 mg, 200 mg, Oral, Q8H, Nicole Hewitt DO, 200 mg at 03/09/18 0622  •  guaiFENesin (MUCINEX) 12 hr tablet 600 mg, 600 mg, Oral, BID PRN, Ezequiel Alfred PA-C, 600 mg at 02/20/18 0818  •  heparin (porcine) 5000 UNIT/ML injection 5,000 Units, 5,000 Units, Subcutaneous, Q8H, Nicole Hewitt DO, 5,000 Units at 03/09/18 0622  •  HYDROcodone-acetaminophen (NORCO) 7.5-325 MG per tablet 1 tablet, 1 tablet, Oral, Q4H PRN, Yumiko Castanon MD, 1 tablet at 03/09/18 0622  •  ipratropium-albuterol (DUO-NEB) nebulizer solution 3 mL, 3 mL, Nebulization, 4x Daily - RT, Nicole Hewitt DO, 3 mL at 03/08/18 1943  •  magnesium hydroxide (MILK OF MAGNESIA) suspension 2400 mg/10mL 10 mL, 10 mL, Oral, Daily PRN, Nicole Hewitt DO, 10 mL at 02/25/18 2228  •  Magnesium Sulfate 2 gram Bolus, followed by 8 gram infusion (total Mg dose 10 grams)- Mg less than or equal to 1mg/dL, 2 g, Intravenous, PRN **OR** Magnesium Sulfate 6 gram Infusion (2 gm x 3) -Mg 1.1 -1.5 mg/dL, 2 g, Intravenous, PRN **OR** magnesium sulfate 4 gram infusion- Mg 1.6-1.9 mg/dL, 4 g, Intravenous, PRN, Esmer Watkins MD  •  metoprolol tartrate (LOPRESSOR) tablet 25 mg, 25 mg, Oral, Q12H, BRITTNY Schaefer, 25 mg at 03/08/18 2136  •  minocycline (MINOCIN,DYNACIN) capsule 100 mg, 100 mg, Oral, Q12H, Casie M Mayne, PA-C, 100 mg at 03/09/18 0622  •  Morphine (MSIR) tablet 30 mg, 30 mg, Oral,  Q6H PRN, Esmer Watkins MD, 30 mg at 03/08/18 2137  •  nicotine (NICODERM CQ) 21 MG/24HR patch 1 patch, 1 patch, Transdermal, Q24H, Yumiko Castanon MD, 1 patch at 03/08/18 0825  •  ondansetron (ZOFRAN) injection 4 mg, 4 mg, Intravenous, Q6H PRN, Alexandro Suarez PA-C, 4 mg at 02/21/18 1945  •  pantoprazole (PROTONIX) EC tablet 40 mg, 40 mg, Oral, Q AM, Todd Keys MD, 40 mg at 03/09/18 0622  •  Pharmacy Consult - [facility], , Does not apply, Daily, Annette Ray, [facility]  •  potassium chloride (KLOR-CON) packet 40 mEq, 40 mEq, Oral, PRN, Esmer Watkins MD  •  potassium chloride (MICRO-K) CR capsule 40 mEq, 40 mEq, Oral, PRN, Esmer Watkins MD, 40 mEq at 02/22/18 1431  •  predniSONE (DELTASONE) tablet 40 mg, 40 mg, Oral, Daily With Breakfast, Todd Keys MD, 40 mg at 03/08/18 0825  •  regadenoson (LEXISCAN) injection 0.4 mg, 0.4 mg, Intravenous, Once, Yumiko Castanon MD  •  saccharomyces boulardii (FLORASTOR) capsule 250 mg, 250 mg, Oral, BID, Casie M Mayne, PA-C, 250 mg at 03/08/18 2135  •  sodium chloride 0.9 % flush 1-10 mL, 1-10 mL, Intravenous, PRN, Nicole Hewitt DO  •  sodium chloride 0.9 % flush 10 mL, 10 mL, Intravenous, PRN, Steve Mcmanus MD, 10 mL at 03/01/18 2118  •  vitamin B-12 (CYANOCOBALAMIN) tablet 1,000 mcg, 1,000 mcg, Oral, Daily, Yumiko Castanon MD, 1,000 mcg at 03/08/18 0825      Vital Sign Min/Max for last 24 hours  Temp  Min: 98.1 °F (36.7 °C)  Max: 99 °F (37.2 °C)   BP  Min: 111/63  Max: 125/71   Pulse  Min: 56  Max: 89   Resp  Min: 16  Max: 18   SpO2  Min: 92 %  Max: 97 %   Flow (L/min)  Min: 4  Max: 5      Intake/Output Summary (Last 24 hours) at 03/09/18 0825  Last data filed at 03/08/18 2004   Gross per 24 hour   Intake                0 ml   Output              720 ml   Net             -720 ml           CONSTITUTIONAL: No acute distress, normal affect  RESPIRATORY: Normal effort. Without wheezing or rales. Mild rhonchi  CARDIOVASCULAR: Regular rate and rhythm with  normal S1 and S2. Without murmur, gallop or rub.  PERIPHERAL VASCULAR: Normal radial pulses bilaterally. There is mild peripheral edema bilaterally.    Results Review:   I reviewed the patient's recent labs in the electronic medical record.      Tele:  NSR    TTE 3/8/18  · This was a limited echocardiogram performed to assess left ventricular ejection fraction and wall motion only  · Left ventricular systolic function is normal. Estimated EF = 65%.  · All left ventricular wall segments contract normally.    TTE 6/2017  · Left ventricular systolic function is normal. Estimated EF = 60%.  · Left atrial cavity size is mildly dilated.  · Trace mitral regurgitation, trace tricuspid regurgitation.         Assessment/Plan:     ASSESSMENT:  1. NSVT, possible sustained VTach (40 seconds) versus SVT with aberrancy  - Two episodes of NSVT/VT, 3/6 00:38AM lasting 20 seconds, 3/7 07:04AM with symptoms of racing heart lasting 40 seconds, 3/8 22:52PM 15 seconds    - Echocardiogram without significant structural abnormality  - Thyroid, electrolytes normal  - Currently on metoprolol at 25 mg BID; on 100mg BID at home     2. SVT, 20 seconds, 3/7/18 16:28  -Beta blocker as above     3. Essential hypertension:  - Well controlled  - Starting metoprolol as noted above, monitor BPs; increase as tolerated     4. COPD/hypoxic respiratory failure:  - Managed per primary team/pulmonology    PLAN:  -Stress test today; leave NPO afterwards for possible C to follow if stress abnormal  -Continue metoprolol    Carlos Harvey MD, MSc, [facility]  Interventional Cardiology  [facility]  3/9/2018     Addendum  Stress test:  · Left ventricular ejection fraction is normal (Calculated EF = 58%).  · There were occasional PACs were noted with pharmacologic stress and during recovery.  · Myocardial perfusion imaging indicates a normal myocardial perfusion study with no evidence of ischemia.  · The patient had severe diffuse  coronary artery calcification on the CT scan portion of the stress test.  · Impressions are consistent with a perfusion study indicating low risk, but overall the patient's risk is uncertain in light of the patient's recurrent ventricular tachycardia syndrome and severe coronary calcification noted on the stress test CT scan.    Plan: [facility] today, possible future EP study and/or anti arrhythmics    Carlos Harvey MD, MSc, [facility]  Interventional Cardiology  [facility]      Addendum  [facility]:  IMPRESSION:  · There is moderate diffuse coronary calcification.  There is severe multivessel coronary artery disease involving the proximal LAD and the mid RCA.  · There is an 80% discrete proximal LAD stenosis.  There is an 80% discrete mid RCA stenosis.  · Left ventricular ejection fraction 65% with no regional wall motion abnormalities in the VILLA projection.     RECOMMENDATIONS:  · Consideration of multivessel PCI.  The patient's coronary disease has a low SYNTAX score and lesions are amenable to PCI. Would plan to perform intervention through the femoral approach since the patient had elbow pain via the radial approach and the lesions are moderately calcified. Plan to perform PCI next Tuesday  · Aspirin 81 mg, high intensity statin.  · Hemoglobin A1c, fasting lipid panel.  · Management of constipation/heavy stool burden noted during xrays of heart cath per primary team    Carlos Harvey MD, MSc, [facility]  Interventional Cardiology  [facility]

## 2018-03-09 NOTE — PLAN OF CARE
Problem: Patient Care Overview (Adult)  Goal: Plan of Care Review  Outcome: Ongoing (interventions implemented as appropriate)   03/09/18 1723   Coping/Psychosocial Response Interventions   Plan Of Care Reviewed With patient   Patient Care Overview   Progress no change   Outcome Evaluation   Outcome Summary/Follow up Plan VSS. No CP or SOA at this time. NSR on the monitor. No ectopy at this time. Had LHC today, pt does have blockages and Dr. Harvey will take him back to cath lab Tuesday and go femoral approach. No complaints at this time. Will continue to monitor.

## 2018-03-10 LAB
ANION GAP SERPL CALCULATED.3IONS-SCNC: 4 MMOL/L (ref 3–11)
ARTICHOKE IGE QN: 143 MG/DL (ref 0–130)
BUN BLD-MCNC: 30 MG/DL (ref 9–23)
BUN/CREAT SERPL: 27.3 (ref 7–25)
CALCIUM SPEC-SCNC: 8.9 MG/DL (ref 8.7–10.4)
CHLORIDE SERPL-SCNC: 100 MMOL/L (ref 99–109)
CHOLEST SERPL-MCNC: 215 MG/DL (ref 0–200)
CO2 SERPL-SCNC: 32 MMOL/L (ref 20–31)
CREAT BLD-MCNC: 1.1 MG/DL (ref 0.6–1.3)
DEPRECATED RDW RBC AUTO: 61.3 FL (ref 37–54)
ERYTHROCYTE [DISTWIDTH] IN BLOOD BY AUTOMATED COUNT: 16.2 % (ref 11.3–14.5)
GFR SERPL CREATININE-BSD FRML MDRD: 66 ML/MIN/1.73
GLUCOSE BLD-MCNC: 119 MG/DL (ref 70–100)
HBA1C MFR BLD: 6.1 % (ref 4.8–5.6)
HCT VFR BLD AUTO: 35.4 % (ref 38.9–50.9)
HDLC SERPL-MCNC: 70 MG/DL (ref 40–60)
HGB BLD-MCNC: 11.3 G/DL (ref 13.1–17.5)
MCH RBC QN AUTO: 32.7 PG (ref 27–31)
MCHC RBC AUTO-ENTMCNC: 31.9 G/DL (ref 32–36)
MCV RBC AUTO: 102.3 FL (ref 80–99)
PLATELET # BLD AUTO: 310 10*3/MM3 (ref 150–450)
PMV BLD AUTO: 9.2 FL (ref 6–12)
POTASSIUM BLD-SCNC: 5.5 MMOL/L (ref 3.5–5.5)
RBC # BLD AUTO: 3.46 10*6/MM3 (ref 4.2–5.76)
SODIUM BLD-SCNC: 136 MMOL/L (ref 132–146)
TRIGL SERPL-MCNC: 117 MG/DL (ref 0–150)
WBC NRBC COR # BLD: 9.87 10*3/MM3 (ref 3.5–10.8)

## 2018-03-10 PROCEDURE — 99232 SBSQ HOSP IP/OBS MODERATE 35: CPT | Performed by: INTERNAL MEDICINE

## 2018-03-10 PROCEDURE — 94799 UNLISTED PULMONARY SVC/PX: CPT

## 2018-03-10 PROCEDURE — 97597 DBRDMT OPN WND 1ST 20 CM/<: CPT

## 2018-03-10 PROCEDURE — 97110 THERAPEUTIC EXERCISES: CPT

## 2018-03-10 PROCEDURE — 99231 SBSQ HOSP IP/OBS SF/LOW 25: CPT | Performed by: INTERNAL MEDICINE

## 2018-03-10 PROCEDURE — 94760 N-INVAS EAR/PLS OXIMETRY 1: CPT

## 2018-03-10 PROCEDURE — 29580 STRAPPING UNNA BOOT: CPT

## 2018-03-10 PROCEDURE — 94640 AIRWAY INHALATION TREATMENT: CPT

## 2018-03-10 PROCEDURE — 85027 COMPLETE CBC AUTOMATED: CPT | Performed by: INTERNAL MEDICINE

## 2018-03-10 PROCEDURE — 83036 HEMOGLOBIN GLYCOSYLATED A1C: CPT | Performed by: INTERNAL MEDICINE

## 2018-03-10 PROCEDURE — 80061 LIPID PANEL: CPT | Performed by: INTERNAL MEDICINE

## 2018-03-10 PROCEDURE — 80048 BASIC METABOLIC PNL TOTAL CA: CPT | Performed by: INTERNAL MEDICINE

## 2018-03-10 PROCEDURE — 63710000001 PREDNISONE PER 1 MG: Performed by: INTERNAL MEDICINE

## 2018-03-10 PROCEDURE — 97598 DBRDMT OPN WND ADDL 20CM/<: CPT

## 2018-03-10 PROCEDURE — 25010000002 HEPARIN (PORCINE) PER 1000 UNITS: Performed by: INTERNAL MEDICINE

## 2018-03-10 RX ADMIN — MINOCYCLINE HYDROCHLORIDE 100 MG: 50 CAPSULE ORAL at 05:59

## 2018-03-10 RX ADMIN — Medication 250 MG: at 08:28

## 2018-03-10 RX ADMIN — PANTOPRAZOLE SODIUM 40 MG: 40 TABLET, DELAYED RELEASE ORAL at 06:00

## 2018-03-10 RX ADMIN — ASPIRIN 81 MG: 81 TABLET, COATED ORAL at 08:28

## 2018-03-10 RX ADMIN — MINOCYCLINE HYDROCHLORIDE 100 MG: 50 CAPSULE ORAL at 16:47

## 2018-03-10 RX ADMIN — ARFORMOTEROL TARTRATE 15 MCG: 15 SOLUTION RESPIRATORY (INHALATION) at 10:03

## 2018-03-10 RX ADMIN — IPRATROPIUM BROMIDE AND ALBUTEROL SULFATE 3 ML: 2.5; .5 SOLUTION RESPIRATORY (INHALATION) at 10:04

## 2018-03-10 RX ADMIN — HYDROCODONE BITARTRATE AND ACETAMINOPHEN 1 TABLET: 7.5; 325 TABLET ORAL at 16:47

## 2018-03-10 RX ADMIN — HEPARIN SODIUM 5000 UNITS: 5000 INJECTION, SOLUTION INTRAVENOUS; SUBCUTANEOUS at 06:00

## 2018-03-10 RX ADMIN — PREDNISONE 40 MG: 20 TABLET ORAL at 08:29

## 2018-03-10 RX ADMIN — METOPROLOL TARTRATE 25 MG: 25 TABLET ORAL at 20:03

## 2018-03-10 RX ADMIN — GABAPENTIN 200 MG: 100 CAPSULE ORAL at 20:03

## 2018-03-10 RX ADMIN — HYDROCODONE BITARTRATE AND ACETAMINOPHEN 1 TABLET: 7.5; 325 TABLET ORAL at 04:18

## 2018-03-10 RX ADMIN — BUDESONIDE 0.5 MG: 0.5 INHALANT RESPIRATORY (INHALATION) at 20:31

## 2018-03-10 RX ADMIN — ACETAMINOPHEN 650 MG: 325 TABLET ORAL at 20:03

## 2018-03-10 RX ADMIN — CYANOCOBALAMIN TAB 1000 MCG 1000 MCG: 1000 TAB at 08:28

## 2018-03-10 RX ADMIN — BUMETANIDE 1 MG: 1 TABLET ORAL at 08:28

## 2018-03-10 RX ADMIN — MORPHINE SULFATE 30 MG: 30 TABLET ORAL at 08:28

## 2018-03-10 RX ADMIN — HEPARIN SODIUM 5000 UNITS: 5000 INJECTION, SOLUTION INTRAVENOUS; SUBCUTANEOUS at 20:03

## 2018-03-10 RX ADMIN — GABAPENTIN 200 MG: 100 CAPSULE ORAL at 16:47

## 2018-03-10 RX ADMIN — IPRATROPIUM BROMIDE AND ALBUTEROL SULFATE 3 ML: 2.5; .5 SOLUTION RESPIRATORY (INHALATION) at 14:01

## 2018-03-10 RX ADMIN — Medication 250 MG: at 20:03

## 2018-03-10 RX ADMIN — ARFORMOTEROL TARTRATE 15 MCG: 15 SOLUTION RESPIRATORY (INHALATION) at 20:31

## 2018-03-10 RX ADMIN — IPRATROPIUM BROMIDE AND ALBUTEROL SULFATE 3 ML: 2.5; .5 SOLUTION RESPIRATORY (INHALATION) at 20:31

## 2018-03-10 RX ADMIN — CASTOR OIL AND BALSAM, PERU: 788; 87 OINTMENT TOPICAL at 08:28

## 2018-03-10 RX ADMIN — HEPARIN SODIUM 5000 UNITS: 5000 INJECTION, SOLUTION INTRAVENOUS; SUBCUTANEOUS at 16:47

## 2018-03-10 RX ADMIN — GABAPENTIN 200 MG: 100 CAPSULE ORAL at 05:59

## 2018-03-10 RX ADMIN — CASTOR OIL AND BALSAM, PERU: 788; 87 OINTMENT TOPICAL at 20:05

## 2018-03-10 RX ADMIN — DOCUSATE SODIUM 200 MG: 100 CAPSULE, LIQUID FILLED ORAL at 08:29

## 2018-03-10 RX ADMIN — METOPROLOL TARTRATE 25 MG: 25 TABLET ORAL at 08:29

## 2018-03-10 RX ADMIN — NICOTINE 1 PATCH: 14 PATCH TRANSDERMAL at 08:28

## 2018-03-10 RX ADMIN — ATORVASTATIN CALCIUM 40 MG: 40 TABLET, FILM COATED ORAL at 20:03

## 2018-03-10 RX ADMIN — BUDESONIDE 0.5 MG: 0.5 INHALANT RESPIRATORY (INHALATION) at 10:03

## 2018-03-10 NOTE — PLAN OF CARE
Problem: Patient Care Overview (Adult)  Goal: Plan of Care Review   03/10/18 1023   Coping/Psychosocial Response Interventions   Plan Of Care Reviewed With patient   Outcome Evaluation   Outcome Summary/Follow up Plan Pt walking w/less assistance. Distance limited by SOA.

## 2018-03-10 NOTE — PLAN OF CARE
Problem: Patient Care Overview (Adult)  Goal: Plan of Care Review  Outcome: Ongoing (interventions implemented as appropriate)   03/09/18 1723 03/10/18 0516   Coping/Psychosocial Response Interventions   Plan Of Care Reviewed With patient --    Patient Care Overview   Progress --  progress toward functional goals as expected   Outcome Evaluation   Outcome Summary/Follow up Plan --  Pt rested well this shift. C/o pain in bilateral legs, relieved with PRN medication. VSS. NSR. Held evening metoprolol due to BP. 4L per NC.      Goal: Adult Individualization and Mutuality  Outcome: Ongoing (interventions implemented as appropriate)    Goal: Discharge Needs Assessment  Outcome: Ongoing (interventions implemented as appropriate)      Problem: Cellulitis (Adult)  Goal: Signs and Symptoms of Listed Potential Problems Will be Absent or Manageable (Cellulitis)  Outcome: Ongoing (interventions implemented as appropriate)      Problem: Pressure Ulcer Risk (Jim Scale) (Adult,Obstetrics,Pediatric)  Goal: Identify Related Risk Factors and Signs and Symptoms  Outcome: Ongoing (interventions implemented as appropriate)    Goal: Skin Integrity  Outcome: Ongoing (interventions implemented as appropriate)      Problem: Respiratory Insufficiency (Adult)  Goal: Identify Related Risk Factors and Signs and Symptoms  Outcome: Ongoing (interventions implemented as appropriate)    Goal: Acid/Base Balance  Outcome: Ongoing (interventions implemented as appropriate)    Goal: Effective Ventilation  Outcome: Ongoing (interventions implemented as appropriate)      Problem: Fall Risk (Adult)  Goal: Identify Related Risk Factors and Signs and Symptoms  Outcome: Ongoing (interventions implemented as appropriate)    Goal: Absence of Fall  Outcome: Ongoing (interventions implemented as appropriate)

## 2018-03-10 NOTE — THERAPY TREATMENT NOTE
Acute Care - Physical Therapy Treatment Note  [facility]     Patient Name: Yassine Love  : 1945  MRN: [id number]  Today's Date: 3/10/2018     Date of Referral to PT: 18       Admit Date: 2018    Visit Dx:    ICD-10-CM ICD-9-CM   1. Bilateral cellulitis of lower leg L03.116 682.6    L03.115    2. RAFAEL (acute kidney injury) N17.9 584.9   3. PVD (peripheral vascular disease) I73.9 443.9   4. Impaired mobility and ADLs Z74.09 799.89   5. Impaired functional mobility, balance, gait, and endurance Z74.09 V49.89   6. Cellulitis of both lower extremities L03.115 682.6    L03.116    7. Chronic obstructive pulmonary disease, unspecified COPD type J44.9 496   8. Essential hypertension I10 401.9   9. Hypoxia R09.02 799.02   10. Cellulitis of right lower extremity L03.115 682.6     Patient Active Problem List   Diagnosis   • Cellulitis of right lower extremity   • RAFAEL (acute kidney injury)   • PVD (peripheral vascular disease)   • Cellulitis of both lower extremities   • COPD (chronic obstructive pulmonary disease)   • Essential hypertension   • Hypoxia   • Bilateral cellulitis of lower leg   • Non-sustained ventricular tachycardia       Therapy Treatment    Therapy Treatment / Health Promotion    Treatment Time/Intention  Discipline: physical therapist  Document Type: therapy note (daily note)  Subjective Information: complains of, dyspnea  Therapy Frequency (PT Clinical Impression): daily  Therapy Frequency (OT Eval): daily    Vitals/Pain/Safety  Vital Signs  Pre SpO2 (%): 91  O2 Delivery Pre Treatment: supplemental O2 (3L)  O2 Delivery Intra Treatment: supplemental O2 (3L)  Post SpO2 (%): 91  O2 Delivery Post Treatment: supplemental O2 (3L)  Pain Assessment  Additional Documentation: Pain Scale: Numbers Pre/Post-Treatment (Group)  Pain Scale: Numbers Pre/Post-Treatment  Pain Scale: Numbers, Pretreatment: 0/10 - no pain  Pain Scale: Numbers, Post-Treatment: 0/10 - no pain  Positioning and  Restraints  Pre-Treatment Position: in bed  Post Treatment Position: chair  In Chair: notified nsg, sitting, call light within reach, encouraged to call for assist, legs elevated    Mobility,ADL,Motor, Modality  Bed Mobility Assessment/Treatment  Supine-Sit [place] (Bed Mobility): conditional independence  Assistive Device (Bed Mobility): bed rails  Comment (Bed Mobility): increased time and effort  Transfer Assessment/Treatment  Transfer Assessment/Treatment: sit-stand transfer  Comment (Transfers): vcs for safe hand placement with sit to stand but used approp hand placement with stand to sit without vcs  Sit-Stand Transfer  Sit-Stand [place] (Transfers): supervision  Assistive Device (Sit-Stand Transfers): walker, front-wheeled  Stand-Sit Transfer  Stand-Sit [place] (Transfers): supervision  Gait/Stairs Assessment/Training  [place] Level (Gait): supervision  Assistive Device (Gait): walker, front-wheeled  Distance in Feet (Gait): 80  Deviations/Abnormal Patterns (Gait): gait speed decreased, bilateral deviations, stride length decreased  Comment (Gait/Stairs): fwd flexed posture. several standing rest breaks due to SOA     Therapeutic Exercise  Lower Extremity Range of Motion (Therapeutic Exercise): hip flexion/extension, right, knee flexion/extension, right, ankle dorsiflexion/plantar flexion, bilateral (R hip flex & LAQ. pt refused exs on LLE saying needs THR&TKR)  Exercise Type (Therapeutic Exercise): AROM (active range of motion)  Position (Therapeutic Exercise): seated  Sets/Reps (Therapeutic Exercise): 10           ROM/MMT             Sensory, Edema, Orthotics          Cognition, Communication, Swallow  Speaking Valve  Pre SpO2 (%): 91  Post SpO2 (%): 91  General Eating/Swallowing Observations  Pre SpO2 (%): 91  Post SpO2 (%): 91    Outcome Summary               PT Rehab Goals     Row Name 03/10/18 0935             Transfer Goal 1 (PT)    Activity/Assistive Device (Transfer Goal 1, PT)  bed-to-chair/chair-to-bed;sit-to-stand/stand-to-sit;walker, rolling  -LS      [place] Level/Cues Needed (Transfer Goal 1, PT) independent  -LS         Gait Training Goal 1 (PT)    Activity/Assistive Device (Gait Training Goal 1, PT) walker, rolling   vcs for upright posture. PT also adjusted walker.  -LS      [place] Level (Gait Training Goal 1, PT) independent  -LS      Distance (Gait Goal 1, PT) 100  -LS        User Key  (r) = Recorded By, (t) = Taken By, (c) = Cosigned By    Initials Name Provider Type    LS Bekah Kwan, PT Physical Therapist          Physical Therapy Education     Title: PT OT SLP Therapies (Active)     Topic: Physical Therapy (Active)     Point: Mobility training (Done)    Learning Progress Summary     Learner Status Readiness Method Response Comment Documented by    Patient Done Acceptance E VU,NR  LS 03/10/18 1025     Active Acceptance E NR  AS 03/08/18 0829     Done Acceptance E,D NR,VU Reviewed benefits of activity, ambulating in [place] with nsg, safety with mobility, correct gait mechanics.  03/07/18 1537     Done Acceptance E,D NR,VU Reviewed benefits of activity, ambulating with nsg, safety with mobility/transfers, correct gait mechanics. LM 03/06/18 1051     Active Acceptance E NR  EH 03/05/18 1613     Active Acceptance E NR  AS 03/02/18 1126     Done Acceptance E VU,NR   03/01/18 1058     Active Acceptance E,D NR Reviewed benefits of activity, HEP, safety with mobility. LM 02/26/18 1445     Active Acceptance E NR  BD 02/23/18 0949     Done Acceptance E VU  KM 02/22/18 1159          Point: Home exercise program (Active)    Learning Progress Summary     Learner Status Readiness Method Response Comment Documented by    Patient Active Acceptance E NR  AS 03/08/18 0829     Active Acceptance E NR  EH 03/05/18 1613     Active Acceptance E NR  AS 03/02/18 1126     Done Acceptance E VU,NR   03/01/18 1058     Active Acceptance E,D NR Reviewed benefits of activity, HEP,  safety with mobility.  02/26/18 1445     Active Acceptance E NR   02/23/18 0949     Done Acceptance E VU   02/22/18 1159          Point: Body mechanics (Active)    Learning Progress Summary     Learner Status Readiness Method Response Comment Documented by    Patient Active Acceptance E NR  AS 03/08/18 0829     Done Acceptance E,D NR,VU Reviewed benefits of activity, ambulating in [place] with nsg, safety with mobility, correct gait mechanics.  03/07/18 1537     Done Acceptance E,D NR,VU Reviewed benefits of activity, ambulating with nsg, safety with mobility/transfers, correct gait mechanics.  03/06/18 1051     Active Acceptance E NR  AS 03/02/18 1126     Done Acceptance E VU,NR   03/01/18 1058     Active Acceptance E,D NR Reviewed benefits of activity, HEP, safety with mobility.  02/26/18 1445     Active Acceptance E NR   02/23/18 0949     Done Acceptance E VU   02/22/18 1159          Point: Precautions (Active)    Learning Progress Summary     Learner Status Readiness Method Response Comment Documented by    Patient Active Acceptance E NR  AS 03/08/18 0829     Done Acceptance E,D NR,VU Reviewed benefits of activity, ambulating in [place] with nsg, safety with mobility, correct gait mechanics.  03/07/18 1537     Done Acceptance E,D NR,VU Reviewed benefits of activity, ambulating with nsg, safety with mobility/transfers, correct gait mechanics.  03/06/18 1051     Active Acceptance E NR   03/05/18 1613     Active Acceptance E NR  AS 03/02/18 1126     Done Acceptance E VU,NR   03/01/18 1058     Active Acceptance E,D NR Reviewed benefits of activity, HEP, safety with mobility.  02/26/18 1445     Active Acceptance E NR   02/23/18 0949     Done Acceptance E VU   02/22/18 1159     Done Acceptance E VU reviewed POC for skin/ edema care.  02/17/18 1126                      User Key     Initials Effective Dates Name Provider Type Discipline     06/19/15 -  Mary Kate Parra, PT Physical  Therapist PT    KM 06/19/15 -  Carmela Washington, PT Physical Therapist PT    AS 06/22/15 -  Yasmine Fuller, PTA Physical Therapy Assistant PT    LS 06/19/15 -  Bekah Kwan, PT Physical Therapist PT    MW 02/12/18 - 03/06/18 Karen Lopez, PT Physical Therapist PT    BD 06/13/16 -  Estrella Rojo, PT Physical Therapist PT    LM 06/09/17 - 03/06/18 Bekah Caro, PT Physical Therapist PT    LM 03/07/18 -  Bekah Caro, PT Physical Therapist PT                    PT Recommendation and Plan  Therapy Frequency (PT Clinical Impression): daily             Outcome Measures     Row Name 03/10/18 0935 03/08/18 0800 03/07/18 1524       How much help from another person do you currently need...    Turning from your back to your side while in flat bed without using bedrails? 4  -LS 4  -AS  --    Moving from lying on back to sitting on the side of a flat bed without bedrails? 4  -LS 4  -AS  --    Moving to and from a bed to a chair (including a wheelchair)? 3  -LS 3  -AS  --    Standing up from a chair using your arms (e.g., wheelchair, bedside chair)? 3  -LS 3  -AS  --    Climbing 3-5 steps with a railing? 3  -LS 2  -AS  --    To walk in hospital room? 3  -LS 3  -AS  --    AM-PAC 6 Clicks Score 20  -LS 19  -AS  --       How much help from another is currently needed...    Putting on and taking off regular lower body clothing?  --  -- 2  -AN    Bathing (including washing, rinsing, and drying)  --  -- 3  -AN    Toileting (which includes using toilet bed pan or urinal)  --  -- 3  -AN    Putting on and taking off regular upper body clothing  --  -- 2  -AN    Taking care of personal grooming (such as brushing teeth)  --  -- 3  -AN    Eating meals  --  -- 3  -AN    Score  --  -- 16  -AN       Functional Assessment    Outcome Measure Options AM-PAC 6 Clicks Basic Mobility (PT)  -LS AM-PAC 6 Clicks Basic Mobility (PT)  -AS  --    Row Name 03/07/18 1507             How much help from another person do you  currently need...    Turning from your back to your side while in flat bed without using bedrails? 4  -LM      Moving from lying on back to sitting on the side of a flat bed without bedrails? 3  -LM      Moving to and from a bed to a chair (including a wheelchair)? 3  -LM      Standing up from a chair using your arms (e.g., wheelchair, bedside chair)? 3  -LM      Climbing 3-5 steps with a railing? 2  -LM      To walk in hospital room? 3  -LM      AM-PAC 6 Clicks Score 18  -LM         Functional Assessment    Outcome Measure Options AM-PAC 6 Clicks Basic Mobility (PT)  -LM        User Key  (r) = Recorded By, (t) = Taken By, (c) = Cosigned By    Initials Name Provider Type    AN Ebony Valdes, OT Occupational Therapist    AS Yasmine Fuller, PTA Physical Therapy Assistant    SARWAT Kwan, PT Physical Therapist    LM Bekah Caro, PT Physical Therapist           Time Calculation:         PT Charges     Row Name 03/10/18 1023             Time Calculation    Start Time 0935   tcc: 25min  -LS      PT Received On 03/10/18  -      PT Goal Re-Cert Due Date 03/15/18  -        User Key  (r) = Recorded By, (t) = Taken By, (c) = Cosigned By    Initials Name Provider Type     Bekah Kwan, PT Physical Therapist          Therapy Charges for Today     Code Description Service Date Service Provider Modifiers Qty    [id number] HC PT THER PROC EA 15 MIN 3/10/2018 Bekah Kwan, PT GP 2          PT G-Codes  Outcome Measure Options: AM-PAC 6 Clicks Basic Mobility (PT)    Bekah Kwan, PT  3/10/2018

## 2018-03-10 NOTE — PLAN OF CARE
Problem: Patient Care Overview (Adult)  Goal: Plan of Care Review  Outcome: Ongoing (interventions implemented as appropriate)   03/10/18 8630   Coping/Psychosocial Response Interventions   Plan Of Care Reviewed With patient   Patient Care Overview   Progress improving   Outcome Evaluation   Outcome Summary/Follow up Plan BLE with only trace/mild edema, improving erythema, and improving skin integrity once scaly areas debrided today. Pt will continue to benefit from skilled PT wound care for unna boot/edema mgmt.

## 2018-03-10 NOTE — PROGRESS NOTES
[facility] Medicine Services  PROGRESS NOTE    Patient Name: Yassine Love  : 1945  MRN: [id number]    Date of Admission: 2018  Length of Stay: 22  Primary Care Physician: No Known Provider    Subjective     CC: f/u cellulitis, NSVT    HPI:  Cath'd yesterday, results noted.   Breathing is stable.  No chest pain.    He has no dyspenea at rest on oxygen, but is still soa with walking.   He;s keeping his spirits up.  Doesn't need anything    Review of Systems  Gen- No fevers, chills  CV- No palpitations, (+) left-sided chest pain   Resp- (+) cough, exertional dyspnea  GI- No N/V/D, abd pain  Skin - BLE erythema and cramping are improving    Otherwise ROS is negative except as mentioned in the HPI.    Objective     Vital Signs:   Temp:  [97.9 °F (36.6 °C)-98.4 °F (36.9 °C)] 97.9 °F (36.6 °C)  Heart Rate:  [67-90] 69  Resp:  [18-20] 18  BP: ()/(49-89) 105/59        Physical Exam:  Constitutional: No acute distress, awake, alert and conversant. He is up in chair.  PT is debriding his feet currently.   HENT: NCAT, mucous membranes moist  Respiratory: Decreased breath sounds bilaterally, particularly in bases. No wheezes, rales or rhonchi. Non-labored respirations on 4L NC.   Cardiovascular: RRR, no murmurs, rubs, or gallops, palpable pedal pulses bilaterally  Gastrointestinal: Positive bowel sounds, soft, nontender, nondistended  Musculoskeletal: BLEs are unwrapped, lots of sloughing skin legs/dorsal feet, no weeping.  Erythema pretibially.   Psychiatric: Appropriate affect, cooperative  Neurologic: Oriented x 3, strength symmetric in all extremities, Cranial Nerves grossly intact to confrontation, speech clear  Skin: No rashes    Results Reviewed:  I have personally reviewed current lab, radiology, and data and agree.      Results from last 7 days  Lab Units 03/10/18  1000 18  0601 18  0605   WBC 10*3/mm3 9.87 9.02 9.11   HEMOGLOBIN g/dL 11.3* 9.6* 9.0*    HEMATOCRIT % 35.4* 30.1* 28.5*   PLATELETS 10*3/mm3 310 334 334       Results from last 7 days  Lab Units 03/10/18  1000 03/08/18  0601 03/06/18  0108 03/05/18  0605   SODIUM mmol/L 136 140  --  137   POTASSIUM mmol/L 5.5 4.1 4.4 4.2   CHLORIDE mmol/L 100 107  --  102   CO2 mmol/L 32.0* 31.0  --  32.0*   BUN mg/dL 30* 26*  --  22   CREATININE mg/dL 1.10 1.00  --  1.20   GLUCOSE mg/dL 119* 103*  --  104*   CALCIUM mg/dL 8.9 8.6*  --  8.5*     Microbiology Results Abnormal     Procedure Component Value - Date/Time    Respiratory Culture - Sputum, Cough [[id number]]  (Abnormal)  (Susceptibility) Collected:  03/07/18 0801    Lab Status:  Final result Specimen:  Sputum from Cough Updated:  03/09/18 1424     Respiratory Culture --      Light growth (2+) Klebsiella pneumoniae (A)      Scant growth (1+) Normal Respiratory Ernesto     Gram Stain Result Moderate (3+) WBCs per low power field      Rare (1+) Epithelial cells per low power field      Many (4+) Yeast      Rare (1+) Normal respiratory ernesto    Susceptibility      Klebsiella pneumoniae     FAVIAN     Ampicillin 16 ug/ml Intermediate     Ampicillin + Sulbactam <=8/4 ug/ml Susceptible     Aztreonam <=8 ug/ml Susceptible     Cefepime <=8 ug/ml Susceptible     Cefotaxime <=2 ug/ml Susceptible     Ceftriaxone <=8 ug/ml Susceptible     Cefuroxime sodium <=4 ug/ml Susceptible     Ertapenem <=1 ug/ml Susceptible     Gentamicin <=4 ug/ml Susceptible     Levofloxacin <=2 ug/ml Susceptible     Meropenem <=1 ug/ml Susceptible     Piperacillin + Tazobactam <=16 ug/ml Susceptible     Tetracycline <=4 ug/ml Susceptible     Tobramycin <=4 ug/ml Susceptible     Trimethoprim + Sulfamethoxazole <=2/38 ug/ml Susceptible                    Influenza A & B, RT PCR - Swab, Nasopharynx [[id number]]  (Normal) Collected:  03/03/18 1356    Lab Status:  Final result Specimen:  Swab from Nasopharynx Updated:  03/03/18 1607     Influenza A PCR Not Detected     Influenza B PCR Not Detected    Blood  Culture - Blood, [[id number]]  (Normal) Collected:  02/18/18 2308    Lab Status:  Final result Specimen:  Blood from Arm, Left Updated:  02/23/18 2331     Blood Culture No growth at 5 days    Blood Culture - Blood, [[id number]]  (Normal) Collected:  02/18/18 2311    Lab Status:  Final result Specimen:  Blood from Arm, Left Updated:  02/23/18 2331     Blood Culture No growth at 5 days    Wound Culture - Wound, Foot, Right [[id number]]  (Abnormal)  (Susceptibility) Collected:  02/16/18 1931    Lab Status:  Final result Specimen:  Wound from Foot, Right Updated:  02/23/18 1045     Wound Culture --      Light growth (2+) Pseudomonas aeruginosa (A)      Scant growth (1+) Escherichia coli (A)      Scant growth (1+) Proteus mirabilis (A)      Scant growth (1+) Enterococcus faecalis (A)      Scant growth (1+) Streptococcus, Beta Hemolytic, Group G (A)     Comment:   If Clindamycin or Erythromycin is the drug of choice, notify the laboratory within 7 days to request susceptibility testing.        STREP GROUPING G     Gram Stain Result Few (2+) WBCs seen      Many (4+) Gram negative bacilli      Many (4+) Gram positive bacilli      Many (4+) Gram positive cocci in pairs and clusters    Susceptibility      Pseudomonas aeruginosa    Escherichia coli       FAVIAN    FAVIAN       Ampicillin       <=8 ug/ml Susceptible       Ampicillin + Sulbactam       <=8/4 ug/ml Susceptible       Aztreonam <=8 ug/ml Susceptible    <=8 ug/ml Susceptible       Cefepime <=8 ug/ml Susceptible    <=8 ug/ml Susceptible       Cefotaxime       <=2 ug/ml Susceptible       Ceftazidime 4 ug/ml Susceptible             Ceftriaxone       <=8 ug/ml Susceptible       Cefuroxime sodium       <=4 ug/ml Susceptible       Ertapenem       <=1 ug/ml Susceptible       Gentamicin <=4 ug/ml Susceptible    <=4 ug/ml Susceptible       Levofloxacin <=2 ug/ml Susceptible    <=2 ug/ml Susceptible       Meropenem <=1 ug/ml Susceptible    <=1 ug/ml Susceptible       Piperacillin +  Tazobactam <=16 ug/ml Susceptible    <=16 ug/ml Susceptible       Tetracycline       <=4 ug/ml Susceptible       Tobramycin <=4 ug/ml Susceptible    <=4 ug/ml Susceptible       Trimethoprim + Sulfamethoxazole       <=2/38 ug/ml Susceptible                  Susceptibility      Proteus mirabilis    Enterococcus faecalis       FAVIAN    FAVIAN       Ampicillin >16 ug/ml Resistant    <=2 ug/ml Susceptible       Ampicillin + Sulbactam >16/8 ug/ml Resistant             Aztreonam <=8 ug/ml Susceptible             Cefepime <=8 ug/ml Susceptible             Cefotaxime <=2 ug/ml Susceptible             Ceftriaxone <=8 ug/ml Susceptible             Cefuroxime sodium <=4 ug/ml Susceptible             Ertapenem <=1 ug/ml Susceptible             Gentamicin <=4 ug/ml Susceptible             Gentamicin High Level Synergy       <=500 ug/ml Susceptible       Levofloxacin <=2 ug/ml Susceptible             Linezolid       2 ug/ml Susceptible       Meropenem <=1 ug/ml Susceptible             Penicillin G       2 ug/ml Susceptible       Piperacillin + Tazobactam <=16 ug/ml Susceptible             Streptomycin High Level Synergy       <=1000 ug/ml Susceptible       Tetracycline >8 ug/ml Resistant             Tobramycin <=4 ug/ml Susceptible             Trimethoprim + Sulfamethoxazole >2/38 ug/ml Resistant             Vancomycin       1 ug/ml Susceptible                      Blood Culture - Blood, Blood, Venous Line [[id number]]  (Normal) Collected:  02/16/18 2010    Lab Status:  Final result Specimen:  Blood from Arm, Left Updated:  02/21/18 2046     Blood Culture No growth at 5 days    Blood Culture - Blood, Blood, Venous Line [[id number]]  (Normal) Collected:  02/16/18 2005    Lab Status:  Final result Specimen:  Blood from Arm, Right Updated:  02/21/18 2046     Blood Culture No growth at 5 days    Respiratory Culture - Sputum, Cough [[id number]] Collected:  02/19/18 0656    Lab Status:  Final result Specimen:  Sputum from Cough Updated:   02/21/18 0931     Respiratory Culture --      Scant growth (1+) Normal Respiratory Nataliia     Gram Stain Result Occasional WBCs per low power field      No organisms seen          I have reviewed the medications.    Assessment/Plan   Assessment / Plan     Hospital Problem List     * (Principal)Cellulitis of both lower extremities    RAFAEL (acute kidney injury)    PVD (peripheral vascular disease)    COPD (chronic obstructive pulmonary disease)    Essential hypertension    Hypoxia    Bilateral cellulitis of lower leg    Non-sustained ventricular tachycardia        Brief Hospital Course to date:  Yassine Love is a 72 y.o. male  with a past medical history significant for PVD, essential hypertension, COPD, cellulitis of lower extremities, and tobacco abuse who presents to the ED with complaints of fevers, chills and bilateral lower extremity pain/edema/redness.    Assessment & Plan:    Acute hypoxemic respiratory failure -   - secondary to COPD exacerbation and diastolic heart failure  - CTA negative for PE  - 3/6 CXR reviewed, stable, no acute changes  - 3/8 ECHO EF 63%, RVSP not noted. Consider pulmonary hypertension   - Continue daily Bumex, hold for SBP less than 100  - Daily fluid restriction to 1500cc (which patient is non-complaint with)  - treating COPD as below  - Pulmonology consulting, appreciate input: 5 day course of pred 40mg, changed bronchodilators to pulmicort + brovanna bid + duoneb qid + mucomyst qid. Do IS q1hwa and flutter qid. Abx per ID. At dc continue Pulmicort/Brovana/Ipratropium neb bid.  Repeat CT chest  3 months as O/P.  - No s/s or concerns for pharyngeal dysphagia, continue regular diet. Possibly secondary to reflux  -sputum 3/7 following. D/w Dr Belle, extending abx for additional week. Probiotic     COPD exacerbation: Symbicort, Duonebs  - Continue 5 day course of prednisone 40mg   - Currently on 4L NC    NSVT ? VT of 40 secs  -3/6 and 3/7 in a.m.   -TSH normal, Mg and K+ normal  - [facility]  noted.  Plan PCI on Tuesday    LE cellulitis/ Chronic LE wounds: switched to minocycline through 3/10, ID on board. Continue PO morphine at home dose PRN - OLIVA reviewed. Decreased gabapentin back to prior dose, increased dose made him drowsy. Continue PT WOC for bilateral LE wrapping.    HTN: Holding BP meds for normotension    RAFAEL: Baseline Cr 0.9-1.3. Stable. Monitor renal function after LHC today     Anemia: mixed anemia, Iron low, will start oral Iron. Negative FOBT     Tobacco abuse- nicotine patch, decrease dose today     DVT Prophylaxis:  [facility]  CODE STATUS: Conditional Code    Disposition: To [facility] when cleared by cardiology. Over the weekend or Monday? CM notified.     Yumiko Castanon MD  03/10/18  3:10 PM

## 2018-03-10 NOTE — THERAPY WOUND CARE TREATMENT
Acute Care - Wound/Debridement Treatment Note  [facility]     Patient Name: Yassine Love  : 1945  MRN: [id number]  Today's Date: 3/10/2018      Date of Referral to PT: 18           Admit Date: 2018    Visit Dx:    ICD-10-CM ICD-9-CM   1. Bilateral cellulitis of lower leg L03.116 682.6    L03.115    2. RAFAEL (acute kidney injury) N17.9 584.9   3. PVD (peripheral vascular disease) I73.9 443.9   4. Impaired mobility and ADLs Z74.09 799.89   5. Impaired functional mobility, balance, gait, and endurance Z74.09 V49.89   6. Cellulitis of both lower extremities L03.115 682.6    L03.116    7. Chronic obstructive pulmonary disease, unspecified COPD type J44.9 496   8. Essential hypertension I10 401.9   9. Hypoxia R09.02 799.02   10. Cellulitis of right lower extremity L03.115 682.6       Patient Active Problem List   Diagnosis   • Cellulitis of right lower extremity   • RAFAEL (acute kidney injury)   • PVD (peripheral vascular disease)   • Cellulitis of both lower extremities   • COPD (chronic obstructive pulmonary disease)   • Essential hypertension   • Hypoxia   • Bilateral cellulitis of lower leg   • Non-sustained ventricular tachycardia               LDA Wound     Row Name               Wound 18 1020 other (see comments) gluteal    Wound - Properties Group Date first assessed: 18  - Time first assessed:   -ABHINAV Side: other (see comments)  -ABHINAV, bilateral  Location: gluteal  -ABHINAV       [REMOVED] Wound 18 1020 Bilateral gluteal other (see comments)    Wound - Properties Group Date first assessed: 18  -AS Time first assessed: 1020  -AS Side: Bilateral  -AS Location: gluteal  -AS Type: other (see comments)  -AS, friction/shearing   Additional Comments: for cutover, new LDA will be added  -ABHINAV Resolution Date: 03/10/18  -ABHINAV Resolution Time: 21ABHINAV      User Key  (r) = Recorded By, (t) = Taken By, (c) = Cosigned By    Initials Name Provider Type    ABHINAV Vicente RN  "Registered Nurse    AS Dhaval Cardozo RN Registered Nurse              Lymphedema     Row Name 03/10/18 3465             Subjective Comments    Subjective Comments Pt reports the tops of his feet are  to the touch, but otherwise his legs look and feel good to him.  -         Lymphedema Edema Assessment    Ptting Edema Category By severity  -      Pitting Edema Other (comment);Mild   trace/mild  -         Skin Changes/Observations    Lower Extremity Conditions bilateral:;intact;dry;shiny;scaly;crust;fragile  -      Lower Extremity Color/Pigment bilateral:;blanchable;erythema  -         Lymphedema Pulses/Capillary Refill    Lower Extremity Capillary Refill right:;left:;less than 3 seconds  -         Compression/Skin Care    Compression/Skin Care skin care;wrapping location;bandaging  -      Skin Care washed/dried  -      Wrapping Location lower extremity  -      Wrapping Location LE bilateral:;foot to knee  -      Wrapping Comments unna boots, 4\" coban, size 5 spandage  -      Bandaging Technique light compression;figure-eight  -        User Key  (r) = Recorded By, (t) = Taken By, (c) = Cosigned By    Initials Name Provider Type     Jessi Villa, PT Physical Therapist          WOUND DEBRIDEMENT  Debridement Site 1  Location- Site 1: BLEs  Selective Debridement- Site 1: Wound Surface >20cmsq  Instruments- Site 1: tweezers  Excised Tissue Description- Site 1: maximum, other (comment) (adherent skin flakes over fragile, intact skin)  Bleeding- Site 1: none            Therapy Treatment    Therapy Treatment / Health Promotion    Treatment Time/Intention  Discipline: physical therapist  Document Type: wound care, therapy note (daily note)  Subjective Information: no complaints  Care Plan Review: care plan/treatment goals reviewed, patient/other agree to care plan  Therapy Frequency (PT Clinical Impression): daily  Therapy Frequency (OT Eval): daily    Vitals/Pain/Safety  Vital " Signs  Pre SpO2 (%): 91  O2 Delivery Pre Treatment: supplemental O2 (3L)  O2 Delivery Intra Treatment: supplemental O2 (3L)  Post SpO2 (%): 91  O2 Delivery Post Treatment: supplemental O2 (3L)  Pain Assessment  Additional Documentation: Pain Scale: Numbers Pre/Post-Treatment (Group)  Pain Scale: Numbers Pre/Post-Treatment  Pain Scale: Numbers, Pretreatment: 0/10 - no pain  Pain Scale: Numbers, Post-Treatment: 0/10 - no pain  Positioning and Restraints  Pre-Treatment Position: sitting in chair/recliner  Post Treatment Position: chair  In Chair: reclined, call light within reach, encouraged to call for assist    Mobility,ADL,Motor, Modality  Bed Mobility Assessment/Treatment  Supine-Sit Candler (Bed Mobility): conditional independence  Assistive Device (Bed Mobility): bed rails  Comment (Bed Mobility): increased time and effort  Transfer Assessment/Treatment  Transfer Assessment/Treatment: sit-stand transfer  Comment (Transfers): vcs for safe hand placement with sit to stand but used approp hand placement with stand to sit without vcs  Sit-Stand Transfer  Sit-Stand Candler (Transfers): supervision  Assistive Device (Sit-Stand Transfers): walker, front-wheeled  Stand-Sit Transfer  Stand-Sit Candler (Transfers): supervision  Gait/Stairs Assessment/Training  Candler Level (Gait): supervision  Assistive Device (Gait): walker, front-wheeled  Distance in Feet (Gait): 80  Deviations/Abnormal Patterns (Gait): gait speed decreased, bilateral deviations, stride length decreased  Comment (Gait/Stairs): fwd flexed posture. several standing rest breaks due to SOA     Therapeutic Exercise  Lower Extremity Range of Motion (Therapeutic Exercise): hip flexion/extension, right, knee flexion/extension, right, ankle dorsiflexion/plantar flexion, bilateral (R hip flex & LAQ. pt refused exs on LLE saying needs THR&TKR)  Exercise Type (Therapeutic Exercise): AROM (active range of motion)  Position (Therapeutic Exercise):  seated  Sets/Reps (Therapeutic Exercise): 10           ROM/MMT             Sensory, Edema, Orthotics          Cognition, Communication, Swallow  Cognitive Assessment/Intervention  Additional Documentation: Cognitive Assessment/Intervention (Group)  Cognitive Assessment/Intervention- PT/OT  Orientation Status (Cognition): oriented x 4  Follows Commands (Cognition): WNL  Speaking Valve  Pre SpO2 (%): 91  Post SpO2 (%): 91  General Eating/Swallowing Observations  Pre SpO2 (%): 91  Post SpO2 (%): 91    Outcome Summary  Outcome Summary/Treatment Plan (PT)  Daily Summary of Progress (PT): progress toward functional goals as expected          PT Rehab Goals     Row Name 03/10/18 0935             Transfer Goal 1 (PT)    Activity/Assistive Device (Transfer Goal 1, PT) bed-to-chair/chair-to-bed;sit-to-stand/stand-to-sit;walker, rolling  -LS      [place] Level/Cues Needed (Transfer Goal 1, PT) independent  -LS         Gait Training Goal 1 (PT)    Activity/Assistive Device (Gait Training Goal 1, PT) walker, rolling   vcs for upright posture. PT also adjusted walker.  -LS      [place] Level (Gait Training Goal 1, PT) independent  -LS      Distance (Gait Goal 1, PT) 100  -LS        User Key  (r) = Recorded By, (t) = Taken By, (c) = Cosigned By    Initials Name Provider Type    LS Bekah Kwan, PT Physical Therapist          Physical Therapy Education     Title: PT OT SLP Therapies (Active)     Topic: Physical Therapy (Active)     Point: Mobility training (Done)    Learning Progress Summary     Learner Status Readiness Method Response Comment Documented by    Patient Done Acceptance E PONCHO,NR  LS 03/10/18 1025     Active Acceptance E NR  AS 03/08/18 0829     Done Acceptance E,D PONCHO AKBAR Reviewed benefits of activity, ambulating in [place] with nsg, safety with mobility, correct gait mechanics. LM 03/07/18 1537     Done Acceptance E,D PONCHO AKBAR Reviewed benefits of activity, ambulating with nsg, safety with mobility/transfers,  correct gait mechanics.  03/06/18 1051     Active Acceptance E NR   03/05/18 1613     Active Acceptance E NR  AS 03/02/18 1126     Done Acceptance E VU,NR   03/01/18 1058     Active Acceptance E,D NR Reviewed benefits of activity, HEP, safety with mobility.  02/26/18 1445     Active Acceptance E NR   02/23/18 0949     Done Acceptance E VU   02/22/18 1159          Point: Home exercise program (Active)    Learning Progress Summary     Learner Status Readiness Method Response Comment Documented by    Patient Active Acceptance E NR  AS 03/08/18 0829     Active Acceptance E NR   03/05/18 1613     Active Acceptance E NR  AS 03/02/18 1126     Done Acceptance E VU,NR   03/01/18 1058     Active Acceptance E,D NR Reviewed benefits of activity, HEP, safety with mobility.  02/26/18 1445     Active Acceptance E NR   02/23/18 0949     Done Acceptance E VU   02/22/18 1159          Point: Body mechanics (Active)    Learning Progress Summary     Learner Status Readiness Method Response Comment Documented by    Patient Active Acceptance E NR  AS 03/08/18 0829     Done Acceptance E,D NR,VU Reviewed benefits of activity, ambulating in [place] with nsg, safety with mobility, correct gait mechanics.  03/07/18 1537     Done Acceptance E,D NR,VU Reviewed benefits of activity, ambulating with nsg, safety with mobility/transfers, correct gait mechanics.  03/06/18 1051     Active Acceptance E NR  AS 03/02/18 1126     Done Acceptance E VU,NR   03/01/18 1058     Active Acceptance E,D NR Reviewed benefits of activity, HEP, safety with mobility.  02/26/18 1445     Active Acceptance E NR   02/23/18 0949     Done Acceptance E VU   02/22/18 1159          Point: Precautions (Active)    Learning Progress Summary     Learner Status Readiness Method Response Comment Documented by    Patient Active Acceptance E NR  AS 03/08/18 0829     Done Acceptance E,D NR,VU Reviewed benefits of activity, ambulating in [place] with nsg,  safety with mobility, correct gait mechanics.  03/07/18 1537     Done Acceptance E,D NR,VU Reviewed benefits of activity, ambulating with nsg, safety with mobility/transfers, correct gait mechanics.  03/06/18 1051     Active Acceptance E NR   03/05/18 1613     Active Acceptance E NR  AS 03/02/18 1126     Done Acceptance E VU,NR  EH 03/01/18 1058     Active Acceptance E,D NR Reviewed benefits of activity, HEP, safety with mobility.  02/26/18 1445     Active Acceptance E NR   02/23/18 0949     Done Acceptance E VU   02/22/18 1159     Done Acceptance E VU reviewed POC for skin/ edema care.  02/17/18 1126                      User Key     Initials Effective Dates Name Provider Type Discipline     06/19/15 -  Mary Kate Parra, PT Physical Therapist PT     06/19/15 -  Carmela Washington, PT Physical Therapist PT    AS 06/22/15 -  Yasmine Fuller, PTA Physical Therapy Assistant PT     06/19/15 -  Bekah Kwan, PT Physical Therapist PT     02/12/18 - 03/06/18 Karen Lopez, PT Physical Therapist PT     06/13/16 -  Estrella Rojo, PT Physical Therapist PT     06/09/17 - 03/06/18 Bekah Caro, PT Physical Therapist PT     03/07/18 -  Bekah Caro, PT Physical Therapist PT                   PT ASSESSMENT (last 72 hours)      Physical Therapy Evaluation     Row Name             Wound 02/25/18 1020 other (see comments) gluteal    Wound - Properties Group Date first assessed: 02/25/18  -ABHINAV Time first assessed: 1020  -ABHINAV Side: other (see comments)  -ABHINAV, bilateral  Location: gluteal  -ABHINAV    Row Name 03/10/18 1540          Physical Therapy Goals    Wound Care Goal Selection (PT) wound care, PT goal 1;wound care, PT goal 2  -MC     Additional Documentation Wound Care Goal Selection (PT) (Row)  -MC     Row Name 03/10/18 1540          Wound Care Goal 1 (PT)    Wound Care Goal 1 (PT) Pt will demonstrate 10% reduction in limb girth, with no s/sx of infection and no new skin  breakdown, to indicate healing progress.  -     Time Frame (Wound Care Goal 1, PT) 10 days  -     Progress/Outcome (Wound Care Goal 1, PT) goal ongoing  -       User Key  (r) = Recorded By, (t) = Taken By, (c) = Cosigned By    Initials Name Provider Type    NORY Villa, PT Physical Therapist    ABHINAV Vicente, RN Registered Nurse            PT Recommendation and Plan  Therapy Frequency (PT Clinical Impression): daily            Outcome Summary/Treatment Plan (PT)  Daily Summary of Progress (PT): progress toward functional goals as expected  Daily Summary of Progress (PT): progress toward functional goals as expected          Outcome Measures     Row Name 03/10/18 0935 03/08/18 0800          How much help from another person do you currently need...    Turning from your back to your side while in flat bed without using bedrails? 4  -LS 4  -AS     Moving from lying on back to sitting on the side of a flat bed without bedrails? 4  -LS 4  -AS     Moving to and from a bed to a chair (including a wheelchair)? 3  -LS 3  -AS     Standing up from a chair using your arms (e.g., wheelchair, bedside chair)? 3  -LS 3  -AS     Climbing 3-5 steps with a railing? 3  -LS 2  -AS     To walk in hospital room? 3  -LS 3  -AS     AM-PAC 6 Clicks Score 20  -LS 19  -AS        Functional Assessment    Outcome Measure Options AM-PAC 6 Clicks Basic Mobility (PT)  -LS AM-PAC 6 Clicks Basic Mobility (PT)  -AS       User Key  (r) = Recorded By, (t) = Taken By, (c) = Cosigned By    Initials Name Provider Type    AS Yasmine Fuller, PTA Physical Therapy Assistant     Bekah Kwan, PT Physical Therapist              Time Calculation        PT Charges     Row Name 03/10/18 1540 03/10/18 1023          Time Calculation    Start Time 1540  - 0935   tcc: 25min  -     PT Received On  -- 03/10/18  -     PT Goal Re-Cert Due Date 03/15/18  - 03/15/18  -       User Key  (r) = Recorded By, (t) = Taken By, (c) =  Cosigned By    Initials Name Provider Type    LS Bekah Kwan, PT Physical Therapist     Jessi Villa, PT Physical Therapist             Therapy Charges for Today     Code Description Service Date Service Provider Modifiers Qty    36687305243 HC PT STAPPING UNNA BOOT 3/10/2018 Jessi Villa, PT GP 1    58040137255 HC MICHAEL DEBRIDE OPEN WOUND UP TO 20CM 3/10/2018 Jessi Villa, PT GP 1    20802355822 HC PT MICHAEL DEBRIDE OPEN WOUND EA ADD 20CM 3/10/2018 Jessi Villa, PT GP 1            PT G-Codes  Outcome Measure Options: AM-PAC 6 Clicks Basic Mobility (PT)        Jessi Villa, PT  3/10/2018

## 2018-03-10 NOTE — PROGRESS NOTES
[facility]    Inpatient Progress Note      Chief Complaint/Reason for service:    · Wide complex tachycardia         Subjective:       Denies palpitations, chest pain. Stable chronic dyspnea improved with NC O2. Had significant bowel movement yesterday evening    Past medical, surgical, social and family history reviewed in the patient's electronic medical record.    Review of Systems:   Positive for dyspnea  Negative for exertional chest pain, lower extremity edema, palpitations     Problem List  Active Hospital Problems (** Indicates Principal Problem)    Diagnosis Date Noted   • **Cellulitis of both lower extremities [L03.115, L03.116] 02/16/2018   • Non-sustained ventricular tachycardia [I47.2] 03/08/2018   • COPD (chronic obstructive pulmonary disease) [J44.9] 02/16/2018   • Essential hypertension [I10] 02/16/2018   • Hypoxia [R09.02] 02/16/2018   • Bilateral cellulitis of lower leg [L03.116, L03.115] 02/16/2018   • RAFAEL (acute kidney injury) [N17.9] 06/20/2017   • PVD (peripheral vascular disease) [I73.9] 06/20/2017      Resolved Hospital Problems    Diagnosis Date Noted Date Resolved   • Hyponatremia [E87.1] 02/16/2018 02/17/2018            Objective:      Current Facility-Administered Medications:   •  acetaminophen (TYLENOL) tablet 650 mg, 650 mg, Oral, Q4H PRN, Nicole Hewitt, DO, 650 mg at 03/08/18 0104  •  arformoterol (BROVANA) nebulizer solution 15 mcg, 15 mcg, Nebulization, BID - RT, Todd Keys MD, 15 mcg at 03/09/18 1927  •  aspirin EC tablet 81 mg, 81 mg, Oral, Daily, Carlos Harvey MD, 81 mg at 03/09/18 1637  •  atorvastatin (LIPITOR) tablet 40 mg, 40 mg, Oral, Nightly, Carlos Harvey MD, 40 mg at 03/09/18 2057  •  bisacodyl (DULCOLAX) suppository 10 mg, 10 mg, Rectal, Daily PRN, Nicole Hewitt, DO, 10 mg at 02/23/18 0823  •  budesonide (PULMICORT) nebulizer solution 0.5 mg, 0.5 mg, Nebulization, BID - RT, Todd Keys MD, 0.5  mg at 03/09/18 1927  •  bumetanide (BUMEX) tablet 1 mg, 1 mg, Oral, Daily, Belle Valdivia, APRN, 1 mg at 03/09/18 1638  •  calcium carbonate (TUMS) chewable tablet 500 mg (200 mg elemental), 2 tablet, Oral, BID PRN, Beatriz Yousif, APRN, 2 tablet at 02/27/18 0946  •  castor oil-balsam peru (VENELEX) ointment, , Topical, Q12H, Nicole Hewitt DO  •  diclofenac (VOLTAREN) 1 % gel 2 g, 2 g, Topical, 4x Daily, Nicole Hewitt DO, 2 g at 03/09/18 2059  •  docusate sodium (COLACE) capsule 200 mg, 200 mg, Oral, Daily, Nicole Hewitt DO, 200 mg at 03/09/18 1638  •  fluticasone (FLONASE) 50 MCG/ACT nasal spray 2 spray, 2 spray, Each Nare, Daily, Esmer Watkins MD, 2 spray at 03/08/18 0830  •  gabapentin (NEURONTIN) capsule 200 mg, 200 mg, Oral, Q8H, Nicole Hewitt DO, 200 mg at 03/10/18 0559  •  guaiFENesin (MUCINEX) 12 hr tablet 600 mg, 600 mg, Oral, BID PRN, Ezequiel Alfred PA-C, 600 mg at 02/20/18 0818  •  heparin (porcine) 5000 UNIT/ML injection 5,000 Units, 5,000 Units, Subcutaneous, Q8H, Nicole Hewitt DO, 5,000 Units at 03/10/18 0600  •  HYDROcodone-acetaminophen (NORCO) 7.5-325 MG per tablet 1 tablet, 1 tablet, Oral, Q4H PRN, Yumiko Castanon MD, 1 tablet at 03/10/18 0418  •  ipratropium-albuterol (DUO-NEB) nebulizer solution 3 mL, 3 mL, Nebulization, 4x Daily - RT, Nicole Hewitt DO, 3 mL at 03/09/18 1927  •  magnesium hydroxide (MILK OF MAGNESIA) suspension 2400 mg/10mL 10 mL, 10 mL, Oral, Daily PRN, Nicole Hewitt DO, 10 mL at 02/25/18 2228  •  Magnesium Sulfate 2 gram Bolus, followed by 8 gram infusion (total Mg dose 10 grams)- Mg less than or equal to 1mg/dL, 2 g, Intravenous, PRN **OR** Magnesium Sulfate 6 gram Infusion (2 gm x 3) -Mg 1.1 -1.5 mg/dL, 2 g, Intravenous, PRN **OR** magnesium sulfate 4 gram infusion- Mg 1.6-1.9 mg/dL, 4 g, Intravenous, PRN, Esmer Watkins MD  •  metoprolol tartrate (LOPRESSOR) tablet 25 mg, 25 mg, Oral, Q12H, BRITTNY Schaefer, 25 mg at  03/08/18 2136  •  minocycline (MINOCIN,DYNACIN) capsule 100 mg, 100 mg, Oral, Q12H, Casie M Mayne, PA-C, 100 mg at 03/10/18 0559  •  Morphine (MSIR) tablet 30 mg, 30 mg, Oral, Q6H PRN, Esmer Watkins MD, 30 mg at 03/09/18 1641  •  nicotine (NICODERM CQ) 14 MG/24HR patch 1 patch, 1 patch, Transdermal, Q24H, Stella Garvin PA-C, 1 patch at 03/09/18 1639  •  ondansetron (ZOFRAN) injection 4 mg, 4 mg, Intravenous, Q6H PRN, Alexandro Suarez PA-C, 4 mg at 02/21/18 1945  •  pantoprazole (PROTONIX) EC tablet 40 mg, 40 mg, Oral, Q AM, Todd Keys MD, 40 mg at 03/10/18 0600  •  Pharmacy Consult - [facility], , Does not apply, Daily, Annette Ray, [facility]  •  potassium chloride (KLOR-CON) packet 40 mEq, 40 mEq, Oral, PRN, Esmer Watkins MD  •  potassium chloride (MICRO-K) CR capsule 40 mEq, 40 mEq, Oral, PRN, Esmer Watkins MD, 40 mEq at 02/22/18 1431  •  predniSONE (DELTASONE) tablet 40 mg, 40 mg, Oral, Daily With Breakfast, Todd Keys MD, 40 mg at 03/09/18 1637  •  saccharomyces boulardii (FLORASTOR) capsule 250 mg, 250 mg, Oral, BID, Casie M Mayne, PA-C, 250 mg at 03/09/18 2057  •  sodium chloride 0.9 % flush 1-10 mL, 1-10 mL, Intravenous, PRN, Nicole Hewitt,   •  sodium chloride 0.9 % flush 10 mL, 10 mL, Intravenous, PRN, Steve Mcmanus MD, 10 mL at 03/01/18 2118  •  vitamin B-12 (CYANOCOBALAMIN) tablet 1,000 mcg, 1,000 mcg, Oral, Daily, Yumiko Castanon MD, 1,000 mcg at 03/09/18 1638      Vital Sign Min/Max for last 24 hours  Temp  Min: 98 °F (36.7 °C)  Max: 98.4 °F (36.9 °C)   BP  Min: 88/52  Max: 125/71   Pulse  Min: 56  Max: 90   Resp  Min: 16  Max: 20   SpO2  Min: 78 %  Max: 97 %   Flow (L/min)  Min: 4  Max: 4      Intake/Output Summary (Last 24 hours) at 03/10/18 0740  Last data filed at 03/10/18 0355   Gross per 24 hour   Intake             1650 ml   Output             1675 ml   Net              -25 ml           CONSTITUTIONAL: No acute distress, normal affect  RESPIRATORY: Normal  effort. Without wheezing or rales. Mild rhonchi  CARDIOVASCULAR: Regular rate and rhythm with normal S1 and S2. Without murmur, gallop or rub.  PERIPHERAL VASCULAR: Right radial artery access site CDI. Normal radial pulses bilaterally. There is mild peripheral edema bilaterally.    Results Review:   I reviewed the patient's recent labs in the electronic medical record.      Tele:  NSR, no VTach overnight    TTE 3/8/18  · This was a limited echocardiogram performed to assess left ventricular ejection fraction and wall motion only  · Left ventricular systolic function is normal. Estimated EF = 65%.  · All left ventricular wall segments contract normally.    TTE 6/2017  · Left ventricular systolic function is normal. Estimated EF = 60%.  · Left atrial cavity size is mildly dilated.  · Trace mitral regurgitation, trace tricuspid regurgitation.    [facility] 3/9/18:  · There is moderate diffuse coronary calcification.  There is severe multivessel coronary artery disease involving the proximal LAD and the mid RCA.  · There is an 80% discrete proximal LAD stenosis.  There is an 80% discrete mid RCA stenosis.  · Left ventricular ejection fraction 65% with no regional wall motion abnormalities in the VILLA projection.         Assessment/Plan:     ASSESSMENT:  1. Severe 2V CAD  - Planning for PCI on Tuesday    2. NSVT, possible sustained VTach (40 seconds) versus SVT with aberrancy  - Two episodes of NSVT/VT, 3/6 00:38AM lasting 20 seconds, 3/7 07:04AM with symptoms of racing heart lasting 40 seconds, 3/8 22:52PM 15 seconds    - Echocardiogram without significant structural abnormality  - Thyroid, electrolytes normal  - Possibly ischemic rhythms in the setting of severe CAD and severe COPD  - Currently on metoprolol at 25 mg BID; on 100mg BID at home     3. SVT, 20 seconds, 3/7/18 16:28  -Beta blocker as above     4. Essential hypertension:  - Well controlled  - Starting metoprolol as noted above, monitor BPs; increase as  tolerated     5. COPD/hypoxic respiratory failure:  - Managed per primary team/pulmonology    PLAN:  -Planned PCI Tuesday  -Continue current CAD meds    Carlos Harvey MD, MSc, [facility]  Interventional Cardiology  [facility]  3/10/2018

## 2018-03-11 PROCEDURE — 94640 AIRWAY INHALATION TREATMENT: CPT

## 2018-03-11 PROCEDURE — 97530 THERAPEUTIC ACTIVITIES: CPT

## 2018-03-11 PROCEDURE — 99232 SBSQ HOSP IP/OBS MODERATE 35: CPT | Performed by: INTERNAL MEDICINE

## 2018-03-11 PROCEDURE — 99232 SBSQ HOSP IP/OBS MODERATE 35: CPT | Performed by: NURSE PRACTITIONER

## 2018-03-11 PROCEDURE — 25010000002 HEPARIN (PORCINE) PER 1000 UNITS: Performed by: INTERNAL MEDICINE

## 2018-03-11 PROCEDURE — 94799 UNLISTED PULMONARY SVC/PX: CPT

## 2018-03-11 PROCEDURE — 94760 N-INVAS EAR/PLS OXIMETRY 1: CPT

## 2018-03-11 RX ORDER — CLOPIDOGREL BISULFATE 75 MG/1
300 TABLET ORAL ONCE
Status: COMPLETED | OUTPATIENT
Start: 2018-03-12 | End: 2018-03-12

## 2018-03-11 RX ADMIN — IPRATROPIUM BROMIDE AND ALBUTEROL SULFATE 3 ML: 2.5; .5 SOLUTION RESPIRATORY (INHALATION) at 15:58

## 2018-03-11 RX ADMIN — METOPROLOL TARTRATE 25 MG: 25 TABLET ORAL at 08:13

## 2018-03-11 RX ADMIN — NICOTINE 1 PATCH: 14 PATCH TRANSDERMAL at 08:15

## 2018-03-11 RX ADMIN — ARFORMOTEROL TARTRATE 15 MCG: 15 SOLUTION RESPIRATORY (INHALATION) at 20:49

## 2018-03-11 RX ADMIN — GABAPENTIN 200 MG: 100 CAPSULE ORAL at 15:16

## 2018-03-11 RX ADMIN — BUDESONIDE 0.5 MG: 0.5 INHALANT RESPIRATORY (INHALATION) at 20:49

## 2018-03-11 RX ADMIN — ACETAMINOPHEN 650 MG: 325 TABLET ORAL at 08:13

## 2018-03-11 RX ADMIN — ACETAMINOPHEN 650 MG: 325 TABLET ORAL at 19:56

## 2018-03-11 RX ADMIN — IPRATROPIUM BROMIDE AND ALBUTEROL SULFATE 3 ML: 2.5; .5 SOLUTION RESPIRATORY (INHALATION) at 07:48

## 2018-03-11 RX ADMIN — MORPHINE SULFATE 30 MG: 30 TABLET ORAL at 09:09

## 2018-03-11 RX ADMIN — MINOCYCLINE HYDROCHLORIDE 100 MG: 50 CAPSULE ORAL at 05:37

## 2018-03-11 RX ADMIN — FLUTICASONE PROPIONATE 2 SPRAY: 50 SPRAY, METERED NASAL at 08:14

## 2018-03-11 RX ADMIN — DOCUSATE SODIUM 200 MG: 100 CAPSULE, LIQUID FILLED ORAL at 08:13

## 2018-03-11 RX ADMIN — GABAPENTIN 200 MG: 100 CAPSULE ORAL at 19:56

## 2018-03-11 RX ADMIN — ARFORMOTEROL TARTRATE 15 MCG: 15 SOLUTION RESPIRATORY (INHALATION) at 07:48

## 2018-03-11 RX ADMIN — CASTOR OIL AND BALSAM, PERU: 788; 87 OINTMENT TOPICAL at 08:15

## 2018-03-11 RX ADMIN — ASPIRIN 81 MG: 81 TABLET, COATED ORAL at 08:13

## 2018-03-11 RX ADMIN — Medication 250 MG: at 19:56

## 2018-03-11 RX ADMIN — MAGNESIUM HYDROXIDE 10 ML: 2400 SUSPENSION ORAL at 18:02

## 2018-03-11 RX ADMIN — HEPARIN SODIUM 5000 UNITS: 5000 INJECTION, SOLUTION INTRAVENOUS; SUBCUTANEOUS at 05:38

## 2018-03-11 RX ADMIN — BUMETANIDE 1 MG: 1 TABLET ORAL at 08:14

## 2018-03-11 RX ADMIN — BUDESONIDE 0.5 MG: 0.5 INHALANT RESPIRATORY (INHALATION) at 07:48

## 2018-03-11 RX ADMIN — CYANOCOBALAMIN TAB 1000 MCG 1000 MCG: 1000 TAB at 08:13

## 2018-03-11 RX ADMIN — CASTOR OIL AND BALSAM, PERU: 788; 87 OINTMENT TOPICAL at 19:57

## 2018-03-11 RX ADMIN — IPRATROPIUM BROMIDE AND ALBUTEROL SULFATE 3 ML: 2.5; .5 SOLUTION RESPIRATORY (INHALATION) at 20:49

## 2018-03-11 RX ADMIN — GABAPENTIN 200 MG: 100 CAPSULE ORAL at 05:37

## 2018-03-11 RX ADMIN — Medication 250 MG: at 08:14

## 2018-03-11 RX ADMIN — HEPARIN SODIUM 5000 UNITS: 5000 INJECTION, SOLUTION INTRAVENOUS; SUBCUTANEOUS at 19:57

## 2018-03-11 RX ADMIN — ATORVASTATIN CALCIUM 40 MG: 40 TABLET, FILM COATED ORAL at 19:56

## 2018-03-11 RX ADMIN — METOPROLOL TARTRATE 25 MG: 25 TABLET ORAL at 19:56

## 2018-03-11 RX ADMIN — IPRATROPIUM BROMIDE AND ALBUTEROL SULFATE 3 ML: 2.5; .5 SOLUTION RESPIRATORY (INHALATION) at 13:16

## 2018-03-11 RX ADMIN — HEPARIN SODIUM 5000 UNITS: 5000 INJECTION, SOLUTION INTRAVENOUS; SUBCUTANEOUS at 15:17

## 2018-03-11 RX ADMIN — PANTOPRAZOLE SODIUM 40 MG: 40 TABLET, DELAYED RELEASE ORAL at 05:37

## 2018-03-11 RX ADMIN — MINOCYCLINE HYDROCHLORIDE 100 MG: 50 CAPSULE ORAL at 18:02
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
## 2018-03-11 NOTE — PLAN OF CARE
Problem: Patient Care Overview (Adult)  Goal: Plan of Care Review  Outcome: Ongoing (interventions implemented as appropriate)   03/11/18 0517   Coping/Psychosocial Response Interventions   Plan Of Care Reviewed With patient   Patient Care Overview   Progress progress toward functional goals as expected   Outcome Evaluation   Outcome Summary/Follow up Plan Pt rested well this shift. No c/o pain. VSS. NSR.      Goal: Adult Individualization and Mutuality  Outcome: Ongoing (interventions implemented as appropriate)    Goal: Discharge Needs Assessment  Outcome: Ongoing (interventions implemented as appropriate)      Problem: Cellulitis (Adult)  Goal: Signs and Symptoms of Listed Potential Problems Will be Absent or Manageable (Cellulitis)  Outcome: Ongoing (interventions implemented as appropriate)      Problem: Pressure Ulcer Risk (Jim Scale) (Adult,Obstetrics,Pediatric)  Goal: Identify Related Risk Factors and Signs and Symptoms  Outcome: Ongoing (interventions implemented as appropriate)    Goal: Skin Integrity  Outcome: Ongoing (interventions implemented as appropriate)      Problem: Respiratory Insufficiency (Adult)  Goal: Identify Related Risk Factors and Signs and Symptoms  Outcome: Ongoing (interventions implemented as appropriate)    Goal: Acid/Base Balance  Outcome: Ongoing (interventions implemented as appropriate)    Goal: Effective Ventilation  Outcome: Ongoing (interventions implemented as appropriate)      Problem: Fall Risk (Adult)  Goal: Absence of Fall  Outcome: Ongoing (interventions implemented as appropriate)

## 2018-03-11 NOTE — PROGRESS NOTES
[facility] Medicine Services  PROGRESS NOTE    Patient Name: Yassine Love  : 1945  MRN: [id number]    Date of Admission: 2018  Length of Stay: 23  Primary Care Physician: No Known Provider    Subjective   CC: f/u cellulitis, NSVT    HPI:  Patient sitting up on the side of the bed in NAD.  Patient states that his legs are hurting, but the swelling is down.  Encouraged patient to keep his legs elevated as much as possible.  Positive BM, but small.  Patient states that he's eating very well.  No adverse events overnight.  No family in the room.    Patient is aware that he is going for cardiac intervention on Tuesday.    Review of Systems  Gen- No fevers, chills  CV- No palpitations, (+) left-sided chest pain   Resp- (+) cough, exertional dyspnea  GI- No N/V/D, abd pain  Skin - BLE erythema and cramping are improving  Otherwise ROS is negative except as mentioned in the HPI.    Objective   Vital Signs:   Temp:  [97.8 °F (36.6 °C)-98.3 °F (36.8 °C)] 98.3 °F (36.8 °C)  Heart Rate:  [59-78] 69  Resp:  [16-18] 17  BP: (105-118)/(59-71) 118/69  Physical Exam:  General: Alert, well-developed well-nourished male in no acute distress    Head: Normocephalic atraumatic    Eyes: PERRLA, EOMI, nonicteric, conjunctiva normal    ENT: Pink, moist mucous membranes    Neck: Supple, nontender, trachea midline without lymphadenopathy, JVD, nuchal rigidity.      Cardiovascular: RRR  no M/R/G  +1 DP pulses bilaterally    Respiratory: Nonlabored, symmetrical chest expansion, clear to auscultation bilaterally    Abdomen: Obese, soft, nontender, nondistended,  positive bowel sounds in all 4 quadrants     Extremities: FROM in upper and lower extremities bilaterally.  Positive edema bilaterally.  Cap refill in toes bilaterally <2sec.  Chronic changes to toes bilaterally.  LE below the knees wrapped in dressings-CDI    Skin: Pink/warm/dry.  No rash or lesions noted    Neuro: Alert and oriented to person  place time and situation, speech is clear, follows all commands, recent and remote memory intact    Psych: Patient is pleasant and cooperative.  Normal affect.      Results Reviewed:  I have personally reviewed current lab, radiology, and data and agree.    Results from last 7 days  Lab Units 03/10/18  1000 03/08/18  0601 03/05/18  0605   WBC 10*3/mm3 9.87 9.02 9.11   HEMOGLOBIN g/dL 11.3* 9.6* 9.0*   HEMATOCRIT % 35.4* 30.1* 28.5*   PLATELETS 10*3/mm3 310 334 334       Results from last 7 days  Lab Units 03/10/18  1000 03/08/18  0601 03/06/18  0108 03/05/18  0605   SODIUM mmol/L 136 140  --  137   POTASSIUM mmol/L 5.5 4.1 4.4 4.2   CHLORIDE mmol/L 100 107  --  102   CO2 mmol/L 32.0* 31.0  --  32.0*   BUN mg/dL 30* 26*  --  22   CREATININE mg/dL 1.10 1.00  --  1.20   GLUCOSE mg/dL 119* 103*  --  104*   CALCIUM mg/dL 8.9 8.6*  --  8.5*     Microbiology Results Abnormal     Procedure Component Value - Date/Time    Respiratory Culture - Sputum, Cough [[id number]]  (Abnormal)  (Susceptibility) Collected:  03/07/18 0801    Lab Status:  Final result Specimen:  Sputum from Cough Updated:  03/09/18 1424     Respiratory Culture --      Light growth (2+) Klebsiella pneumoniae (A)      Scant growth (1+) Normal Respiratory Ernesto     Gram Stain Result Moderate (3+) WBCs per low power field      Rare (1+) Epithelial cells per low power field      Many (4+) Yeast      Rare (1+) Normal respiratory ernesto    Susceptibility      Klebsiella pneumoniae     FAVIAN     Ampicillin 16 ug/ml Intermediate     Ampicillin + Sulbactam <=8/4 ug/ml Susceptible     Aztreonam <=8 ug/ml Susceptible     Cefepime <=8 ug/ml Susceptible     Cefotaxime <=2 ug/ml Susceptible     Ceftriaxone <=8 ug/ml Susceptible     Cefuroxime sodium <=4 ug/ml Susceptible     Ertapenem <=1 ug/ml Susceptible     Gentamicin <=4 ug/ml Susceptible     Levofloxacin <=2 ug/ml Susceptible     Meropenem <=1 ug/ml Susceptible     Piperacillin + Tazobactam <=16 ug/ml Susceptible      Tetracycline <=4 ug/ml Susceptible     Tobramycin <=4 ug/ml Susceptible     Trimethoprim + Sulfamethoxazole <=2/38 ug/ml Susceptible                    Influenza A & B, RT PCR - Swab, Nasopharynx [[id number]]  (Normal) Collected:  03/03/18 1356    Lab Status:  Final result Specimen:  Swab from Nasopharynx Updated:  03/03/18 1607     Influenza A PCR Not Detected     Influenza B PCR Not Detected    Blood Culture - Blood, [[id number]]  (Normal) Collected:  02/18/18 2308    Lab Status:  Final result Specimen:  Blood from Arm, Left Updated:  02/23/18 2331     Blood Culture No growth at 5 days    Blood Culture - Blood, [[id number]]  (Normal) Collected:  02/18/18 2311    Lab Status:  Final result Specimen:  Blood from Arm, Left Updated:  02/23/18 2331     Blood Culture No growth at 5 days    Wound Culture - Wound, Foot, Right [[id number]]  (Abnormal)  (Susceptibility) Collected:  02/16/18 1931    Lab Status:  Final result Specimen:  Wound from Foot, Right Updated:  02/23/18 1045     Wound Culture --      Light growth (2+) Pseudomonas aeruginosa (A)      Scant growth (1+) Escherichia coli (A)      Scant growth (1+) Proteus mirabilis (A)      Scant growth (1+) Enterococcus faecalis (A)      Scant growth (1+) Streptococcus, Beta Hemolytic, Group G (A)     Comment:   If Clindamycin or Erythromycin is the drug of choice, notify the laboratory within 7 days to request susceptibility testing.        STREP GROUPING G     Gram Stain Result Few (2+) WBCs seen      Many (4+) Gram negative bacilli      Many (4+) Gram positive bacilli      Many (4+) Gram positive cocci in pairs and clusters    Susceptibility      Pseudomonas aeruginosa    Escherichia coli       FAVIAN    FAVIAN       Ampicillin       <=8 ug/ml Susceptible       Ampicillin + Sulbactam       <=8/4 ug/ml Susceptible       Aztreonam <=8 ug/ml Susceptible    <=8 ug/ml Susceptible       Cefepime <=8 ug/ml Susceptible    <=8 ug/ml Susceptible       Cefotaxime       <=2 ug/ml  Susceptible       Ceftazidime 4 ug/ml Susceptible             Ceftriaxone       <=8 ug/ml Susceptible       Cefuroxime sodium       <=4 ug/ml Susceptible       Ertapenem       <=1 ug/ml Susceptible       Gentamicin <=4 ug/ml Susceptible    <=4 ug/ml Susceptible       Levofloxacin <=2 ug/ml Susceptible    <=2 ug/ml Susceptible       Meropenem <=1 ug/ml Susceptible    <=1 ug/ml Susceptible       Piperacillin + Tazobactam <=16 ug/ml Susceptible    <=16 ug/ml Susceptible       Tetracycline       <=4 ug/ml Susceptible       Tobramycin <=4 ug/ml Susceptible    <=4 ug/ml Susceptible       Trimethoprim + Sulfamethoxazole       <=2/38 ug/ml Susceptible                  Susceptibility      Proteus mirabilis    Enterococcus faecalis       FAVIAN    FAVIAN       Ampicillin >16 ug/ml Resistant    <=2 ug/ml Susceptible       Ampicillin + Sulbactam >16/8 ug/ml Resistant             Aztreonam <=8 ug/ml Susceptible             Cefepime <=8 ug/ml Susceptible             Cefotaxime <=2 ug/ml Susceptible             Ceftriaxone <=8 ug/ml Susceptible             Cefuroxime sodium <=4 ug/ml Susceptible             Ertapenem <=1 ug/ml Susceptible             Gentamicin <=4 ug/ml Susceptible             Gentamicin High Level Synergy       <=500 ug/ml Susceptible       Levofloxacin <=2 ug/ml Susceptible             Linezolid       2 ug/ml Susceptible       Meropenem <=1 ug/ml Susceptible             Penicillin G       2 ug/ml Susceptible       Piperacillin + Tazobactam <=16 ug/ml Susceptible             Streptomycin High Level Synergy       <=1000 ug/ml Susceptible       Tetracycline >8 ug/ml Resistant             Tobramycin <=4 ug/ml Susceptible             Trimethoprim + Sulfamethoxazole >2/38 ug/ml Resistant             Vancomycin       1 ug/ml Susceptible                      Blood Culture - Blood, Blood, Venous Line [[id number]]  (Normal) Collected:  02/16/18 2010    Lab Status:  Final result Specimen:  Blood from Arm, Left Updated:  02/21/18  2046     Blood Culture No growth at 5 days    Blood Culture - Blood, Blood, Venous Line [[id number]]  (Normal) Collected:  02/16/18 2005    Lab Status:  Final result Specimen:  Blood from Arm, Right Updated:  02/21/18 2046     Blood Culture No growth at 5 days    Respiratory Culture - Sputum, Cough [[id number]] Collected:  02/19/18 0656    Lab Status:  Final result Specimen:  Sputum from Cough Updated:  02/21/18 0931     Respiratory Culture --      Scant growth (1+) Normal Respiratory Nataliia     Gram Stain Result Occasional WBCs per low power field      No organisms seen        I have reviewed the medications.    Assessment/Plan   Assessment / Plan     Hospital Problem List     * (Principal)Cellulitis of both lower extremities    RAFAEL (acute kidney injury)    PVD (peripheral vascular disease)    COPD (chronic obstructive pulmonary disease)    Essential hypertension    Hypoxia    Bilateral cellulitis of lower leg    Non-sustained ventricular tachycardia        Brief Hospital Course to date:  Yassine Love is a 72 y.o. male  with a past medical history significant for PVD, essential hypertension, COPD, cellulitis of lower extremities, and tobacco abuse who presents to the ED with complaints of fevers, chills and bilateral lower extremity pain/edema/redness.    Assessment & Plan:  Acute hypoxemic respiratory failure -   - secondary to COPD exacerbation and diastolic heart failure  - CTA negative for PE  - 3/6 CXR reviewed, stable, no acute changes  - 3/8 ECHO EF 63%, RVSP not noted. Consider pulmonary hypertension   - Continue daily Bumex, hold for SBP less than 100  - Daily fluid restriction to 1500cc (which patient is non-complaint with)  - treating COPD as below  - Pulmonology consulting, appreciate input: 5 day course of pred 40mg, changed bronchodilators to pulmicort + brovanna bid + duoneb qid + mucomyst qid. Do IS q1hwa and flutter qid. Abx per ID. At dc continue Pulmicort/Brovana/Ipratropium neb bid.  Repeat CT  chest  3 months as O/P.  - No s/s or concerns for pharyngeal dysphagia, continue regular diet. Possibly secondary to reflux  -sputum 3/7 following. D/w Dr Belle, extending abx for additional week. Probiotic     Severe 2V CAD s/p LHC  --PCI of RCA and LAD Tuesday 3/13 per Dr. Harvey  --Plavix loading dose 300mg Monday 3/12; ordered    COPD exacerbation: Symbicort, Duonebs  - Continue 5 day course of prednisone 40mg   - Currently on 4L NC    NSVT ? VT of 40 secs  -3/6 and 3/7 in a.m.   -TSH normal, Mg and K+ normal  - LHC noted.  Plan PCI on Tuesday    LE cellulitis/ Chronic LE wounds: switched to minocycline through 3/10, ID on board. Continue PO morphine at home dose PRN - OLIVA reviewed. Decreased gabapentin back to prior dose, increased dose made him drowsy. Continue PT WOC for bilateral LE wrapping.    HTN: Holding BP meds for normotension    RAFAEL: Baseline Cr 0.9-1.3. Stable. Monitor renal function after LHC today     Anemia: mixed anemia, Iron low, will start oral Iron. Negative FOBT     Tobacco abuse- Nicotine patch    DVT Prophylaxis:  [facility]  CODE STATUS: Conditional Code    Disposition: To [facility] when cleared by cardiology. Over the weekend or Monday? CM notified.     Yarelis Elias, APRN  03/11/18  10:37 AM

## 2018-03-11 NOTE — PROGRESS NOTES
[facility] at [facility]    Inpatient Progress Note      Chief Complaint/Reason for service:    · Wide complex tachycardia         Subjective:       Denies palpitations, chest pain. Stable chronic dyspnea improved with NC O2. Nervous about PCI on Tues    Past medical, surgical, social and family history reviewed in the patient's electronic medical record.    Review of Systems:   Positive for dyspnea  Negative for exertional chest pain, lower extremity edema, palpitations     Problem List  Active Hospital Problems (** Indicates Principal Problem)    Diagnosis Date Noted   • **Cellulitis of both lower extremities [L03.115, L03.116] 02/16/2018   • Non-sustained ventricular tachycardia [I47.2] 03/08/2018   • COPD (chronic obstructive pulmonary disease) [J44.9] 02/16/2018   • Essential hypertension [I10] 02/16/2018   • Hypoxia [R09.02] 02/16/2018   • Bilateral cellulitis of lower leg [L03.116, L03.115] 02/16/2018   • RAFAEL (acute kidney injury) [N17.9] 06/20/2017   • PVD (peripheral vascular disease) [I73.9] 06/20/2017      Resolved Hospital Problems    Diagnosis Date Noted Date Resolved   • Hyponatremia [E87.1] 02/16/2018 02/17/2018            Objective:      Current Facility-Administered Medications:   •  acetaminophen (TYLENOL) tablet 650 mg, 650 mg, Oral, Q4H PRN, Nicole Hewitt DO, 650 mg at 03/11/18 0813  •  arformoterol (BROVANA) nebulizer solution 15 mcg, 15 mcg, Nebulization, BID - RT, Todd Keys MD, 15 mcg at 03/11/18 0748  •  aspirin EC tablet 81 mg, 81 mg, Oral, Daily, Carlos Harvey MD, 81 mg at 03/11/18 0813  •  atorvastatin (LIPITOR) tablet 40 mg, 40 mg, Oral, Nightly, Carlos Harvey MD, 40 mg at 03/10/18 2003  •  bisacodyl (DULCOLAX) suppository 10 mg, 10 mg, Rectal, Daily PRN, Nicole Hewitt DO, 10 mg at 02/23/18 0823  •  budesonide (PULMICORT) nebulizer solution 0.5 mg, 0.5 mg, Nebulization, BID - RT, Todd Keys MD, 0.5 mg at 03/11/18 0748  •   bumetanide (BUMEX) tablet 1 mg, 1 mg, Oral, Daily, Belle Valdivia, APRN, 1 mg at 03/11/18 0814  •  calcium carbonate (TUMS) chewable tablet 500 mg (200 mg elemental), 2 tablet, Oral, BID PRN, Beatriz Yousif, APRN, 2 tablet at 02/27/18 0946  •  castor oil-balsam peru (VENELEX) ointment, , Topical, Q12H, Nicole Hewitt DO  •  diclofenac (VOLTAREN) 1 % gel 2 g, 2 g, Topical, 4x Daily, Nicole Hewitt DO, 2 g at 03/09/18 2059  •  docusate sodium (COLACE) capsule 200 mg, 200 mg, Oral, Daily, Nicole Hewitt DO, 200 mg at 03/11/18 0813  •  fluticasone (FLONASE) 50 MCG/ACT nasal spray 2 spray, 2 spray, Each Nare, Daily, Esmer Watkins MD, 2 spray at 03/11/18 0814  •  gabapentin (NEURONTIN) capsule 200 mg, 200 mg, Oral, Q8H, Nicole Hewitt DO, 200 mg at 03/11/18 0537  •  guaiFENesin (MUCINEX) 12 hr tablet 600 mg, 600 mg, Oral, BID PRN, Ezequiel Alfred PA-C, 600 mg at 02/20/18 0818  •  heparin (porcine) 5000 UNIT/ML injection 5,000 Units, 5,000 Units, Subcutaneous, Q8H, Nicole Hewitt DO, 5,000 Units at 03/11/18 0538  •  HYDROcodone-acetaminophen (NORCO) 7.5-325 MG per tablet 1 tablet, 1 tablet, Oral, Q4H PRN, Yumiko Castanon MD, 1 tablet at 03/10/18 1647  •  ipratropium-albuterol (DUO-NEB) nebulizer solution 3 mL, 3 mL, Nebulization, 4x Daily - RT, Nicole Hewitt DO, 3 mL at 03/11/18 0748  •  magnesium hydroxide (MILK OF MAGNESIA) suspension 2400 mg/10mL 10 mL, 10 mL, Oral, Daily PRN, Nicole Hewitt DO, 10 mL at 02/25/18 2228  •  Magnesium Sulfate 2 gram Bolus, followed by 8 gram infusion (total Mg dose 10 grams)- Mg less than or equal to 1mg/dL, 2 g, Intravenous, PRN **OR** Magnesium Sulfate 6 gram Infusion (2 gm x 3) -Mg 1.1 -1.5 mg/dL, 2 g, Intravenous, PRN **OR** magnesium sulfate 4 gram infusion- Mg 1.6-1.9 mg/dL, 4 g, Intravenous, PRN, Esmer Watkins MD  •  metoprolol tartrate (LOPRESSOR) tablet 25 mg, 25 mg, Oral, Q12H, Zohra Griggs, APRN, 25 mg at 03/11/18 1597  •  minocycline  (MINOCIN,DYNACIN) capsule 100 mg, 100 mg, Oral, Q12H, Casie M Mayne, PA-C, 100 mg at 03/11/18 0537  •  Morphine (MSIR) tablet 30 mg, 30 mg, Oral, Q6H PRN, Esmer Watkins MD, 30 mg at 03/11/18 0909  •  nicotine (NICODERM CQ) 14 MG/24HR patch 1 patch, 1 patch, Transdermal, Q24H, Stella Garvin PA-C, 1 patch at 03/11/18 0815  •  ondansetron (ZOFRAN) injection 4 mg, 4 mg, Intravenous, Q6H PRN, Alexandro Suarez PA-C, 4 mg at 02/21/18 1945  •  pantoprazole (PROTONIX) EC tablet 40 mg, 40 mg, Oral, Q AM, Todd Keys MD, 40 mg at 03/11/18 0537  •  Pharmacy Consult - [facility], , Does not apply, Daily, Annette Ray, [facility]  •  potassium chloride (KLOR-CON) packet 40 mEq, 40 mEq, Oral, PRN, Esmer Watkins MD  •  potassium chloride (MICRO-K) CR capsule 40 mEq, 40 mEq, Oral, PRN, Esmer Watkins MD, 40 mEq at 02/22/18 1431  •  saccharomyces boulardii (FLORASTOR) capsule 250 mg, 250 mg, Oral, BID, Casie M Mayne, PA-C, 250 mg at 03/11/18 0814  •  sodium chloride 0.9 % flush 1-10 mL, 1-10 mL, Intravenous, PRN, Nicole Hewitt, DO  •  sodium chloride 0.9 % flush 10 mL, 10 mL, Intravenous, PRN, Steve Mcmanus MD, 10 mL at 03/01/18 2118  •  vitamin B-12 (CYANOCOBALAMIN) tablet 1,000 mcg, 1,000 mcg, Oral, Daily, Yumiko Castanon MD, 1,000 mcg at 03/11/18 0813      Vital Sign Min/Max for last 24 hours  Temp  Min: 97.8 °F (36.6 °C)  Max: 98.3 °F (36.8 °C)   BP  Min: 105/59  Max: 118/69   Pulse  Min: 59  Max: 78   Resp  Min: 16  Max: 18   SpO2  Min: 94 %  Max: 100 %   No Data Recorded      Intake/Output Summary (Last 24 hours) at 03/11/18 0958  Last data filed at 03/11/18 0830   Gross per 24 hour   Intake             1080 ml   Output              800 ml   Net              280 ml           CONSTITUTIONAL: No acute distress, normal affect  RESPIRATORY: Normal effort. Without wheezing or rales. Mild rhonchi  CARDIOVASCULAR: Regular rate and rhythm with normal S1 and S2. Without murmur, gallop or rub.  PERIPHERAL VASCULAR:  Normal radial pulses bilaterally. There is mild peripheral edema bilaterally.    Results Review:   I reviewed the patient's recent labs in the electronic medical record.      Tele:  NSR, no VTach overnight    TTE 3/8/18  · This was a limited echocardiogram performed to assess left ventricular ejection fraction and wall motion only  · Left ventricular systolic function is normal. Estimated EF = 65%.  · All left ventricular wall segments contract normally.    TTE 6/2017  · Left ventricular systolic function is normal. Estimated EF = 60%.  · Left atrial cavity size is mildly dilated.  · Trace mitral regurgitation, trace tricuspid regurgitation.    LHC 3/9/18:  · There is moderate diffuse coronary calcification.  There is severe multivessel coronary artery disease involving the proximal LAD and the mid RCA.  · There is an 80% discrete proximal LAD stenosis.  There is an 80% discrete mid RCA stenosis.  · Left ventricular ejection fraction 65% with no regional wall motion abnormalities in the VILLA projection.         Assessment/Plan:     ASSESSMENT:  1. Severe 2V CAD  - Planning for PCI on Tuesday    2. NSVT, possible sustained VTach (40 seconds) versus SVT with aberrancy  - Two episodes of NSVT/VT, 3/6 00:38AM lasting 20 seconds, 3/7 07:04AM with symptoms of racing heart lasting 40 seconds, 3/8 22:52PM 15 seconds    - Echocardiogram without significant structural abnormality  - Thyroid, electrolytes normal  - Possibly ischemic rhythms in the setting of severe CAD and severe COPD  - Currently on metoprolol at 25 mg BID; on 100mg BID at home     3. SVT, 20 seconds, 3/7/18 16:28  -Beta blocker as above     4. Essential hypertension:  - Well controlled  - Starting metoprolol as noted above, monitor BPs; increase as tolerated     5. COPD/hypoxic respiratory failure:  - Managed per primary team/pulmonology    PLAN:  -Planned PCI of RCA and LAD Tuesday  -Clopidogrel 300mg loading tomorrow AM   -Continue other CAD meds    Carlos  ENRIQUETA Harvey MD, MSc, [facility]  Interventional Cardiology  [facility]  3/11/2018

## 2018-03-11 NOTE — PLAN OF CARE
Problem: Patient Care Overview (Adult)  Goal: Plan of Care Review  Outcome: Ongoing (interventions implemented as appropriate)   03/11/18 1602   Coping/Psychosocial Response Interventions   Plan Of Care Reviewed With patient   Patient Care Overview   Progress no change   Outcome Evaluation   Outcome Summary/Follow up Plan Pt been sleeping on and off. VSS. No c.o. of pain.      Goal: Adult Individualization and Mutuality  Outcome: Ongoing (interventions implemented as appropriate)

## 2018-03-11 NOTE — PLAN OF CARE
Problem: Patient Care Overview (Adult)  Goal: Plan of Care Review  Outcome: Ongoing (interventions implemented as appropriate)   03/11/18 1449   Coping/Psychosocial Response Interventions   Plan Of Care Reviewed With patient   Patient Care Overview   Progress improving   Outcome Evaluation   Outcome Summary/Follow up Plan Pt demonstrated improved functional mobility and transfers this date. Cont OT POC

## 2018-03-12 PROBLEM — I25.10 CORONARY ARTERY DISEASE INVOLVING NATIVE CORONARY ARTERY WITHOUT ANGINA PECTORIS: Status: ACTIVE | Noted: 2018-03-12

## 2018-03-12 LAB
ANION GAP SERPL CALCULATED.3IONS-SCNC: 4 MMOL/L (ref 3–11)
BUN BLD-MCNC: 31 MG/DL (ref 9–23)
BUN/CREAT SERPL: 25.8 (ref 7–25)
CALCIUM SPEC-SCNC: 8.3 MG/DL (ref 8.7–10.4)
CHLORIDE SERPL-SCNC: 104 MMOL/L (ref 99–109)
CO2 SERPL-SCNC: 29 MMOL/L (ref 20–31)
CREAT BLD-MCNC: 1.2 MG/DL (ref 0.6–1.3)
GFR SERPL CREATININE-BSD FRML MDRD: 59 ML/MIN/1.73
GLUCOSE BLD-MCNC: 177 MG/DL (ref 70–100)
POTASSIUM BLD-SCNC: 4.5 MMOL/L (ref 3.5–5.5)
SODIUM BLD-SCNC: 137 MMOL/L (ref 132–146)

## 2018-03-12 PROCEDURE — 99232 SBSQ HOSP IP/OBS MODERATE 35: CPT | Performed by: PHYSICIAN ASSISTANT

## 2018-03-12 PROCEDURE — 94799 UNLISTED PULMONARY SVC/PX: CPT

## 2018-03-12 PROCEDURE — 94640 AIRWAY INHALATION TREATMENT: CPT

## 2018-03-12 PROCEDURE — 99232 SBSQ HOSP IP/OBS MODERATE 35: CPT | Performed by: NURSE PRACTITIONER

## 2018-03-12 PROCEDURE — 25010000002 HEPARIN (PORCINE) PER 1000 UNITS: Performed by: INTERNAL MEDICINE

## 2018-03-12 PROCEDURE — 80048 BASIC METABOLIC PNL TOTAL CA: CPT | Performed by: PHYSICIAN ASSISTANT

## 2018-03-12 PROCEDURE — 94760 N-INVAS EAR/PLS OXIMETRY 1: CPT

## 2018-03-12 RX ORDER — SODIUM CHLORIDE 9 MG/ML
50 INJECTION, SOLUTION INTRAVENOUS CONTINUOUS
Status: DISCONTINUED | OUTPATIENT
Start: 2018-03-12 | End: 2018-03-12

## 2018-03-12 RX ORDER — CLOPIDOGREL BISULFATE 75 MG/1
75 TABLET ORAL DAILY
Status: DISCONTINUED | OUTPATIENT
Start: 2018-03-13 | End: 2018-03-15 | Stop reason: HOSPADM

## 2018-03-12 RX ORDER — BUMETANIDE 1 MG/1
1 TABLET ORAL DAILY
Status: DISCONTINUED | OUTPATIENT
Start: 2018-03-14 | End: 2018-03-15 | Stop reason: HOSPADM

## 2018-03-12 RX ORDER — ALBUTEROL SULFATE 2.5 MG/3ML
2.5 SOLUTION RESPIRATORY (INHALATION) EVERY 6 HOURS PRN
Status: DISCONTINUED | OUTPATIENT
Start: 2018-03-12 | End: 2018-03-15 | Stop reason: HOSPADM

## 2018-03-12 RX ORDER — SODIUM CHLORIDE 9 MG/ML
75 INJECTION, SOLUTION INTRAVENOUS CONTINUOUS
Status: ACTIVE | OUTPATIENT
Start: 2018-03-12 | End: 2018-03-13

## 2018-03-12 RX ADMIN — METOPROLOL TARTRATE 25 MG: 25 TABLET ORAL at 22:47

## 2018-03-12 RX ADMIN — HYDROCODONE BITARTRATE AND ACETAMINOPHEN 1 TABLET: 7.5; 325 TABLET ORAL at 14:28

## 2018-03-12 RX ADMIN — IPRATROPIUM BROMIDE AND ALBUTEROL SULFATE 3 ML: 2.5; .5 SOLUTION RESPIRATORY (INHALATION) at 16:12

## 2018-03-12 RX ADMIN — BUMETANIDE 1 MG: 1 TABLET ORAL at 09:34

## 2018-03-12 RX ADMIN — FLUTICASONE PROPIONATE 2 SPRAY: 50 SPRAY, METERED NASAL at 09:35

## 2018-03-12 RX ADMIN — IPRATROPIUM BROMIDE AND ALBUTEROL SULFATE 3 ML: 2.5; .5 SOLUTION RESPIRATORY (INHALATION) at 07:37

## 2018-03-12 RX ADMIN — IPRATROPIUM BROMIDE AND ALBUTEROL SULFATE 3 ML: 2.5; .5 SOLUTION RESPIRATORY (INHALATION) at 20:24

## 2018-03-12 RX ADMIN — Medication 250 MG: at 09:34

## 2018-03-12 RX ADMIN — GABAPENTIN 200 MG: 100 CAPSULE ORAL at 14:31

## 2018-03-12 RX ADMIN — HEPARIN SODIUM 5000 UNITS: 5000 INJECTION, SOLUTION INTRAVENOUS; SUBCUTANEOUS at 22:53

## 2018-03-12 RX ADMIN — IPRATROPIUM BROMIDE AND ALBUTEROL SULFATE 3 ML: 2.5; .5 SOLUTION RESPIRATORY (INHALATION) at 12:17

## 2018-03-12 RX ADMIN — BUDESONIDE 0.5 MG: 0.5 INHALANT RESPIRATORY (INHALATION) at 07:37

## 2018-03-12 RX ADMIN — HYDROCODONE BITARTRATE AND ACETAMINOPHEN 1 TABLET: 7.5; 325 TABLET ORAL at 22:50

## 2018-03-12 RX ADMIN — MINOCYCLINE HYDROCHLORIDE 100 MG: 50 CAPSULE ORAL at 06:37

## 2018-03-12 RX ADMIN — PANTOPRAZOLE SODIUM 40 MG: 40 TABLET, DELAYED RELEASE ORAL at 06:37

## 2018-03-12 RX ADMIN — CYANOCOBALAMIN TAB 1000 MCG 1000 MCG: 1000 TAB at 09:35

## 2018-03-12 RX ADMIN — HYDROCODONE BITARTRATE AND ACETAMINOPHEN 1 TABLET: 7.5; 325 TABLET ORAL at 00:41

## 2018-03-12 RX ADMIN — HEPARIN SODIUM 5000 UNITS: 5000 INJECTION, SOLUTION INTRAVENOUS; SUBCUTANEOUS at 14:31

## 2018-03-12 RX ADMIN — CASTOR OIL AND BALSAM, PERU: 788; 87 OINTMENT TOPICAL at 22:52

## 2018-03-12 RX ADMIN — GABAPENTIN 200 MG: 100 CAPSULE ORAL at 22:46

## 2018-03-12 RX ADMIN — SODIUM CHLORIDE 75 ML/HR: 9 INJECTION, SOLUTION INTRAVENOUS at 12:57

## 2018-03-12 RX ADMIN — HEPARIN SODIUM 5000 UNITS: 5000 INJECTION, SOLUTION INTRAVENOUS; SUBCUTANEOUS at 06:37

## 2018-03-12 RX ADMIN — BUDESONIDE 0.5 MG: 0.5 INHALANT RESPIRATORY (INHALATION) at 20:24

## 2018-03-12 RX ADMIN — DOCUSATE SODIUM 200 MG: 100 CAPSULE, LIQUID FILLED ORAL at 09:34

## 2018-03-12 RX ADMIN — HYDROCODONE BITARTRATE AND ACETAMINOPHEN 1 TABLET: 7.5; 325 TABLET ORAL at 07:08

## 2018-03-12 RX ADMIN — DICLOFENAC SODIUM 2 G: 10 GEL TOPICAL at 22:50

## 2018-03-12 RX ADMIN — CASTOR OIL AND BALSAM, PERU: 788; 87 OINTMENT TOPICAL at 09:35

## 2018-03-12 RX ADMIN — MINOCYCLINE HYDROCHLORIDE 100 MG: 50 CAPSULE ORAL at 17:38

## 2018-03-12 RX ADMIN — ATORVASTATIN CALCIUM 40 MG: 40 TABLET, FILM COATED ORAL at 22:46

## 2018-03-12 RX ADMIN — HYDROCODONE BITARTRATE AND ACETAMINOPHEN 1 TABLET: 7.5; 325 TABLET ORAL at 18:50

## 2018-03-12 RX ADMIN — ARFORMOTEROL TARTRATE 15 MCG: 15 SOLUTION RESPIRATORY (INHALATION) at 07:37

## 2018-03-12 RX ADMIN — ARFORMOTEROL TARTRATE 15 MCG: 15 SOLUTION RESPIRATORY (INHALATION) at 20:24

## 2018-03-12 RX ADMIN — Medication 250 MG: at 22:46

## 2018-03-12 RX ADMIN — GABAPENTIN 200 MG: 100 CAPSULE ORAL at 06:37

## 2018-03-12 RX ADMIN — ASPIRIN 81 MG: 81 TABLET, COATED ORAL at 09:34

## 2018-03-12 RX ADMIN — CLOPIDOGREL BISULFATE 300 MG: 75 TABLET ORAL at 09:33

## 2018-03-12 RX ADMIN — NICOTINE 1 PATCH: 14 PATCH TRANSDERMAL at 09:35

## 2018-03-12 RX ADMIN — ACETAMINOPHEN 650 MG: 325 TABLET ORAL at 17:37

## 2018-03-12 RX ADMIN — METOPROLOL TARTRATE 25 MG: 25 TABLET ORAL at 09:34

## 2018-03-12 NOTE — THERAPY WOUND CARE TREATMENT
Acute Care - Wound/Debridement Treatment Note  [facility]     Patient Name: Yassine Love  : 1945  MRN: [id number]  Today's Date: 3/12/2018      Date of Referral to PT: 18           Admit Date: 2018    Visit Dx:    ICD-10-CM ICD-9-CM   1. Bilateral cellulitis of lower leg L03.116 682.6    L03.115    2. RAFAEL (acute kidney injury) N17.9 584.9   3. PVD (peripheral vascular disease) I73.9 443.9   4. Impaired mobility and ADLs Z74.09 799.89   5. Impaired functional mobility, balance, gait, and endurance Z74.09 V49.89   6. Cellulitis of both lower extremities L03.115 682.6    L03.116    7. Chronic obstructive pulmonary disease, unspecified COPD type J44.9 496   8. Essential hypertension I10 401.9   9. Hypoxia R09.02 799.02   10. Cellulitis of right lower extremity L03.115 682.6       Patient Active Problem List   Diagnosis   • Cellulitis of right lower extremity   • RAFAEL (acute kidney injury)   • PVD (peripheral vascular disease)   • Cellulitis of both lower extremities   • COPD (chronic obstructive pulmonary disease)   • Essential hypertension   • Hypoxia   • Bilateral cellulitis of lower leg   • Non-sustained ventricular tachycardia   • Coronary artery disease involving native coronary artery without angina pectoris               LDA Wound     Row Name               Wound 18 1020 other (see comments) gluteal    Wound - Properties Group Date first assessed: 18  -ABHINAV Time first assessed: 1020  -ABHINAV Side: other (see comments)  -ABHINAV, bilateral  Location: gluteal  -ABHINAV      User Key  (r) = Recorded By, (t) = Taken By, (c) = Cosigned By    Initials Name Provider Type    ABHINAV Vicente, RN Registered Nurse              Lymphedema     Row Name 18 1040             Lymphedema Pulses/Capillary Refill    Lower Extremity Capillary Refill right:;left:;less than 3 seconds  -SIMON         Compression/Skin Care    Bandaging Comments BLE unna boots intact without loosening or drainage.  -SIMON         User Key  (r) = Recorded By, (t) = Taken By, (c) = Cosigned By    Initials Name Provider Type    SIMON Villalobos, PT Physical Therapist            Therapy Treatment    Therapy Treatment / Health Promotion    Treatment Time/Intention  Discipline: physical therapist  Document Type: therapy note (daily note), wound care  Subjective Information: complains of, pain  Plan of Care Review  Plan of Care Reviewed With: patient    Vitals/Pain/Safety  Pain Assessment  Additional Documentation: Pain Scale: FACES Pre/Post-Treatment (Group)  Pain Scale: Numbers Pre/Post-Treatment  Pain Location: other (see comments) (buttocks/sacrum)  Pain Intervention(s): Repositioned  Pain Scale: Word Pre/Post-Treatment  Pain Location: other (see comments) (buttocks/sacrum)  Pain Intervention(s): Repositioned  Pain Scale: FACES Pre/Post-Treatment  Pain: FACES Scale, Pretreatment: 2-->hurts little bit  Pain: FACES Scale, Post-Treatment: 2-->hurts little bit  Pain Location: other (see comments) (buttocks/sacrum)  Pain Intervention(s): Repositioned  Positioning and Restraints  Pre-Treatment Position: sitting in chair/recliner  Post Treatment Position: chair  In Chair: reclined, call light within reach, encouraged to call for assist, legs elevated      Cognition, Communication, Swallow  Cognitive Assessment/Intervention- PT/OT  Orientation Status (Cognition): oriented x 4  Follows Commands (Cognition): WNL    Outcome Summary  Outcome Summary/Treatment Plan (PT)  Daily Summary of Progress (PT): progress toward functional goals as expected          PT Rehab Goals     Row Name 03/10/18 0935             Transfer Goal 1 (PT)    Activity/Assistive Device (Transfer Goal 1, PT) bed-to-chair/chair-to-bed;sit-to-stand/stand-to-sit;walker, rolling  -LS      [place] Level/Cues Needed (Transfer Goal 1, PT) independent  -LS         Gait Training Goal 1 (PT)    Activity/Assistive Device (Gait Training Goal 1, PT) walker, rolling   vcs for upright  posture. PT also adjusted walker.  -LS      [place] Level (Gait Training Goal 1, PT) independent  -LS      Distance (Gait Goal 1, PT) 100  -LS        User Key  (r) = Recorded By, (t) = Taken By, (c) = Cosigned By    Initials Name Provider Type    SARWAT Kwan, PT Physical Therapist          Physical Therapy Education     Title: PT OT SLP Therapies (Active)     Topic: Physical Therapy (Active)     Point: Mobility training (Done)    Learning Progress Summary     Learner Status Readiness Method Response Comment Documented by    Patient Done Acceptance E VU,NR   03/10/18 1025     Active Acceptance E NR  AS 03/08/18 0829     Done Acceptance E,D NR,VU Reviewed benefits of activity, ambulating in [place] with nsg, safety with mobility, correct gait mechanics.  03/07/18 1537     Done Acceptance E,D NR,VU Reviewed benefits of activity, ambulating with nsg, safety with mobility/transfers, correct gait mechanics.  03/06/18 1051     Active Acceptance E NR   03/05/18 1613     Active Acceptance E NR  AS 03/02/18 1126     Done Acceptance E VU,NR   03/01/18 1058     Active Acceptance E,D NR Reviewed benefits of activity, HEP, safety with mobility.  02/26/18 1445     Active Acceptance E NR   02/23/18 0949     Done Acceptance E VU  KM 02/22/18 1159          Point: Home exercise program (Active)    Learning Progress Summary     Learner Status Readiness Method Response Comment Documented by    Patient Active Acceptance E NR  AS 03/08/18 0829     Active Acceptance E NR  EH 03/05/18 1613     Active Acceptance E NR  AS 03/02/18 1126     Done Acceptance E VU,NR   03/01/18 1058     Active Acceptance E,D NR Reviewed benefits of activity, HEP, safety with mobility.  02/26/18 1445     Active Acceptance E NR   02/23/18 0949     Done Acceptance E VU  KM 02/22/18 1159          Point: Body mechanics (Active)    Learning Progress Summary     Learner Status Readiness Method Response Comment Documented by    Patient  Active Acceptance E NR  AS 03/08/18 0829     Done Acceptance E,D NR,VU Reviewed benefits of activity, ambulating in [place] with nsg, safety with mobility, correct gait mechanics.  03/07/18 1537     Done Acceptance E,D NR,VU Reviewed benefits of activity, ambulating with nsg, safety with mobility/transfers, correct gait mechanics.  03/06/18 1051     Active Acceptance E NR  AS 03/02/18 1126     Done Acceptance E VU,NR   03/01/18 1058     Active Acceptance E,D NR Reviewed benefits of activity, HEP, safety with mobility.  02/26/18 1445     Active Acceptance E NR   02/23/18 0949     Done Acceptance E VU   02/22/18 1159          Point: Precautions (Active)    Learning Progress Summary     Learner Status Readiness Method Response Comment Documented by    Patient Active Acceptance E NR  AS 03/08/18 0829     Done Acceptance E,D NR,VU Reviewed benefits of activity, ambulating in [place] with nsg, safety with mobility, correct gait mechanics.  03/07/18 1537     Done Acceptance E,D NR,VU Reviewed benefits of activity, ambulating with nsg, safety with mobility/transfers, correct gait mechanics.  03/06/18 1051     Active Acceptance E NR   03/05/18 1613     Active Acceptance E NR  AS 03/02/18 1126     Done Acceptance E VU,NR   03/01/18 1058     Active Acceptance E,D NR Reviewed benefits of activity, HEP, safety with mobility.  02/26/18 1445     Active Acceptance E NR   02/23/18 0949     Done Acceptance E VU   02/22/18 1159     Done Acceptance E VU reviewed POC for skin/ edema care.  02/17/18 1126                      User Key     Initials Effective Dates Name Provider Type Discipline     06/19/15 -  Mary Kate Parra, PT Physical Therapist PT     06/19/15 -  Carmela Washington, PT Physical Therapist PT    AS 06/22/15 -  Yasmine Fuller, PTA Physical Therapy Assistant PT     06/19/15 -  Bekah Kwan, PT Physical Therapist PT     02/12/18 - 03/06/18 Karen Lopez, PT Physical Therapist  PT    BD 06/13/16 -  Estrella Rojo, PT Physical Therapist PT    LM 06/09/17 - 03/06/18 Bekah Caro, PT Physical Therapist PT    LM 03/07/18 -  Bekah Caro, PT Physical Therapist PT                   PT ASSESSMENT (last 72 hours)      Physical Therapy Evaluation     Row Name             Wound 02/25/18 1020 other (see comments) gluteal    Wound - Properties Group Date first assessed: 02/25/18  -ABHINAV Time first assessed: 1020  -ABHINAV Side: other (see comments)  -ABHINAV, bilateral  Location: gluteal  -ABHINAV    Row Name 03/10/18 1540          Physical Therapy Goals    Wound Care Goal Selection (PT) wound care, PT goal 1;wound care, PT goal 2  -MC     Additional Documentation Wound Care Goal Selection (PT) (Row)  -     Row Name 03/10/18 1540          Wound Care Goal 1 (PT)    Wound Care Goal 1 (PT) Pt will demonstrate 10% reduction in limb girth, with no s/sx of infection and no new skin breakdown, to indicate healing progress.  -     Time Frame (Wound Care Goal 1, PT) 10 days  -     Progress/Outcome (Wound Care Goal 1, PT) goal ongoing  -       User Key  (r) = Recorded By, (t) = Taken By, (c) = Cosigned By    Initials Name Provider Type    NORY Villa, PT Physical Therapist    ABHINAV Vicente RN Registered Nurse            PT Recommendation and Plan  Therapy Frequency (PT Clinical Impression): daily        Progress: no change   Outcome Summary/Treatment Plan (PT)  Daily Summary of Progress (PT): progress toward functional goals as expected  Daily Summary of Progress (PT): progress toward functional goals as expected  Progress: no change  Outcome Summary: BLE unna boots intact, no drainage or loosening, B toes blanchable.  Plan to change wraps in 1-2 days.  Plan of Care Reviewed With: patient          Outcome Measures     Row Name 03/11/18 1319 03/10/18 0935          How much help from another person do you currently need...    Turning from your back to your side while in flat bed without using  bedrails?  -- 4  -LS     Moving from lying on back to sitting on the side of a flat bed without bedrails?  -- 4  -LS     Moving to and from a bed to a chair (including a wheelchair)?  -- 3  -LS     Standing up from a chair using your arms (e.g., wheelchair, bedside chair)?  -- 3  -LS     Climbing 3-5 steps with a railing?  -- 3  -LS     To walk in hospital room?  -- 3  -LS     AM-PAC 6 Clicks Score  -- 20  -LS        How much help from another is currently needed...    Putting on and taking off regular lower body clothing? 2  -MC  --     Bathing (including washing, rinsing, and drying) 3  -MC  --     Toileting (which includes using toilet bed pan or urinal) 3  -MC  --     Putting on and taking off regular upper body clothing 2  -MC  --     Taking care of personal grooming (such as brushing teeth) 3  -MC  --     Eating meals 3  -MC  --     Score 16  -MC  --        Functional Assessment    Outcome Measure Options AM-PAC 6 Clicks Daily Activity (OT)  -[facility]-PAC 6 Clicks Basic Mobility (PT)  -       User Key  (r) = Recorded By, (t) = Taken By, (c) = Cosigned By    Initials Name Provider Type     Bekah Kwan, PT Physical Therapist    NORY Calero, OT Occupational Therapist              Time Calculation        PT Charges     Row Name 03/12/18 1200             Time Calculation    Start Time 1040  -JM      PT Goal Re-Cert Due Date 03/15/18  -        User Key  (r) = Recorded By, (t) = Taken By, (c) = Cosigned By    Initials Name Provider Type    SIMON Villalobos, PT Physical Therapist                   PT G-Codes  Outcome Measure Options: -[facility] 6 Clicks Daily Activity (OT)        Yolanda Villalobos, PT  3/12/2018

## 2018-03-12 NOTE — PROGRESS NOTES
aYssine Love  1945  [id number]  3/12/2018    CC:   Chief Complaint   Patient presents with   • Leg Swelling       Yassine Love is a 72 y.o. male here for leg infections   History of present illness:    Mr. Yassine Love is a 72 y.o. male with CC: Cellulitis of both lower extremities. He presented to the [facility] ED with increased bilateral extremity edema with increased pain and redness in the setting of unna boot therapy via home health care for the past several months. He does complain of foul odor and drainage of BLE and fever, and chills.    He denies No SOB or cough today: No chest pain, No N/V/D at present and no abdominal pain.  No  symptoms: no new rash: No HA, photophobia or neck stiffness; He denies other focal pain.  Mr. Love was followed by Dr. Duncan during a June, 2017 hospitalization and was was treated with Invanz for a streptococcal bacteremia.      2/18 - co leg infection  No fever or chills  2/19/18 - C/O HA, generalized pain, and respiratory distress  Patient denies any fever, chills, or sweats.  Patient denies any nausea, vomiting, or diarrhea.  Now on hi-emperatriz oxygen. Seen and agree  2/20 - co leg infections  Co weakness  No fever  Lives alone  2/21/18 - Patient reports mild dyspnea, remains on hi emperatriz oxygen.  Patient denies any fever, chills, or sweats.  Patient denies any nausea, vomiting, or diarrhea.  C/O BLE pain.  Seen and agree  2/22 - co SOA  Co weakness  No fever or chills  No rash  Co leg pain and swelling  2/23/18 - Patient is sleeping today peacefully on 4 LNC.  ROS discussed with staff.  Seen and agree  2/26 - No hx available  ROS discussed with nurse  2/28/18:   Does not want to go to rehab, wants to go home.  Unna boots just changed yesterday.  No fever, chills, sweats.   Seen and agree  3/5/18 - Looking at places for rehab.  Patient denies any fever, chills, or sweats.  Per nursing requiring 4LNC.  On oral antibiotics.    Seen and agree  3/6/18 - C/O pain in BLE.   "Patient denies any fever, chills, or sweats.  Continues on 4LNC.  Patient denies any nausea, vomiting, or diarrhea.  RN at .  Patient working with PT.  Seen and agree  3/7 - co new issue with decubitus  Co SOA  Co weakness  No fever  3/8/18 - \"They think there is something wrong with my heart.\"  Patient denies any fever, chills, or sweats.  Patient reports to me he doesn't want any chest compressions or be put on the vent.\"  Discussed with nursing this is current conditional code status.  Seen and agree.  3/9 - No Hx available  ROS discussed with staff  3/12/18 - \"I'm have a heart cath tomorrow.\"  Patient denies any fever, chills, or sweats.  Patient denies any nausea, vomiting, or diarrhea.  C/O pain in BLE.  Seen and agree      Past medical history:  Past Medical History:   Diagnosis Date   • Cancer    • Cellulitis of both lower extremities     [facility] 2016   • Chronic pain    • COPD (chronic obstructive pulmonary disease)    • Hypertension    • Osteoarthritis cervical spine    • Osteoarthritis of both knees    • Osteoarthritis of left hip        Medications:   Current Facility-Administered Medications:   •  acetaminophen (TYLENOL) tablet 650 mg, 650 mg, Oral, Q4H PRN, Nicloe Hewitt DO, 650 mg at 03/11/18 1956  •  albuterol (PROVENTIL) nebulizer solution 0.083% 2.5 mg/3mL, 2.5 mg, Nebulization, Q6H PRN, Casie M Mayne, PA-C  •  arformoterol (BROVANA) nebulizer solution 15 mcg, 15 mcg, Nebulization, BID - RT, Todd Keys MD, 15 mcg at 03/12/18 0737  •  aspirin EC tablet 81 mg, 81 mg, Oral, Daily, Carlos Harvey MD, 81 mg at 03/12/18 0934  •  atorvastatin (LIPITOR) tablet 40 mg, 40 mg, Oral, Nightly, Carlos Harvey MD, 40 mg at 03/11/18 1956  •  bisacodyl (DULCOLAX) suppository 10 mg, 10 mg, Rectal, Daily PRN, Nicole Hewitt DO, 10 mg at 02/23/18 0823  •  budesonide (PULMICORT) nebulizer solution 0.5 mg, 0.5 mg, Nebulization, BID - RT, Todd Keys MD, 0.5 mg at 03/12/18 " 0737  •  [START ON 3/14/2018] bumetanide (BUMEX) tablet 1 mg, 1 mg, Oral, Daily, Casie M Mayne, PA-C  •  calcium carbonate (TUMS) chewable tablet 500 mg (200 mg elemental), 2 tablet, Oral, BID PRN, BRITTNY Estrada, 2 tablet at 02/27/18 0946  •  castor oil-balsam peru (VENELEX) ointment, , Topical, Q12H, Nicole Hewitt DO  •  [START ON 3/13/2018] clopidogrel (PLAVIX) tablet 75 mg, 75 mg, Oral, Daily, Carlos Harvey MD  •  diclofenac (VOLTAREN) 1 % gel 2 g, 2 g, Topical, 4x Daily, Nicole Hewitt DO, 2 g at 03/09/18 2059  •  docusate sodium (COLACE) capsule 200 mg, 200 mg, Oral, Daily, Nicole Hewitt DO, 200 mg at 03/12/18 0934  •  fluticasone (FLONASE) 50 MCG/ACT nasal spray 2 spray, 2 spray, Each Nare, Daily, Esmer Watkins MD, 2 spray at 03/12/18 0935  •  gabapentin (NEURONTIN) capsule 200 mg, 200 mg, Oral, Q8H, Nicole Hewitt DO, 200 mg at 03/12/18 0637  •  guaiFENesin (MUCINEX) 12 hr tablet 600 mg, 600 mg, Oral, BID PRN, Ezequiel Alfred PA-C, 600 mg at 02/20/18 0818  •  heparin (porcine) 5000 UNIT/ML injection 5,000 Units, 5,000 Units, Subcutaneous, Q8H, Nicole Hewitt DO, 5,000 Units at 03/12/18 0637  •  HYDROcodone-acetaminophen (NORCO) 7.5-325 MG per tablet 1 tablet, 1 tablet, Oral, Q4H PRN, Yumiko Castanon MD, 1 tablet at 03/12/18 0708  •  ipratropium-albuterol (DUO-NEB) nebulizer solution 3 mL, 3 mL, Nebulization, 4x Daily - RT, Nicole Hewitt DO, 3 mL at 03/12/18 1217  •  magnesium hydroxide (MILK OF MAGNESIA) suspension 2400 mg/10mL 10 mL, 10 mL, Oral, Daily PRN, Nicole Hewitt DO, 10 mL at 03/11/18 1802  •  Magnesium Sulfate 2 gram Bolus, followed by 8 gram infusion (total Mg dose 10 grams)- Mg less than or equal to 1mg/dL, 2 g, Intravenous, PRN **OR** Magnesium Sulfate 6 gram Infusion (2 gm x 3) -Mg 1.1 -1.5 mg/dL, 2 g, Intravenous, PRN **OR** magnesium sulfate 4 gram infusion- Mg 1.6-1.9 mg/dL, 4 g, Intravenous, PRN, Esmer Watkins MD  •  metoprolol tartrate (LOPRESSOR)  tablet 25 mg, 25 mg, Oral, Q12H, Zohra Griggs, APRN, 25 mg at 03/12/18 0934  •  minocycline (MINOCIN,DYNACIN) capsule 100 mg, 100 mg, Oral, Q12H, Casie M Mayne, PA-C, 100 mg at 03/12/18 0637  •  Morphine (MSIR) tablet 30 mg, 30 mg, Oral, Q6H PRN, Esmer Watkins MD, 30 mg at 03/11/18 0909  •  nicotine (NICODERM CQ) 14 MG/24HR patch 1 patch, 1 patch, Transdermal, Q24H, Stella Garvin PA-C, 1 patch at 03/12/18 0935  •  ondansetron (ZOFRAN) injection 4 mg, 4 mg, Intravenous, Q6H PRN, Alexandro Suarez PA-C, 4 mg at 02/21/18 1945  •  pantoprazole (PROTONIX) EC tablet 40 mg, 40 mg, Oral, Q AM, Todd Keys MD, 40 mg at 03/12/18 0637  •  Pharmacy Consult - [facility], , Does not apply, Daily, Annette Ray, [facility]  •  potassium chloride (KLOR-CON) packet 40 mEq, 40 mEq, Oral, PRN, Esmer Watkins MD  •  potassium chloride (MICRO-K) CR capsule 40 mEq, 40 mEq, Oral, PRN, Esmer Watkins MD, 40 mEq at 02/22/18 1431  •  saccharomyces boulardii (FLORASTOR) capsule 250 mg, 250 mg, Oral, BID, Casie M Mayne, PA-C, 250 mg at 03/12/18 0934  •  sodium chloride 0.9 % flush 1-10 mL, 1-10 mL, Intravenous, PRN, Nicole Hewitt, DO  •  sodium chloride 0.9 % flush 10 mL, 10 mL, Intravenous, PRN, Steve Mcmanus MD, 10 mL at 03/01/18 2118  •  sodium chloride 0.9 % infusion, 75 mL/hr, Intravenous, Continuous, Casie M Mayne, PA-C, Last Rate: 75 mL/hr at 03/12/18 1257, 75 mL/hr at 03/12/18 1257  •  vitamin B-12 (CYANOCOBALAMIN) tablet 1,000 mcg, 1,000 mcg, Oral, Daily, Yumiko Castanon MD, 1,000 mcg at 03/12/18 0935  Antibiotics:  Anti-Infectives     Ordered     Dose/Rate Route Frequency Start Stop    03/08/18 0921  minocycline (MINOCIN,DYNACIN) capsule 100 mg     Casie M Mayne, PA-C reviewed the order on 03/08/18 0955.   Ordering Provider:  Casie M Mayne, PA-C    100 mg Oral Every 12 Hours 03/08/18 1800 03/17/18 1789    02/16/18 2302  vancomycin 500 mg/100 mL 0.9% NS IVPB (mbp)     Ordering Provider:  Carlos Walls [facility]     "500 mg  over 60 Minutes Intravenous Once 02/16/18 2345 02/17/18 0032    02/16/18 1952  vancomycin (VANCOCIN) in iso-osmotic dextrose IVPB 1 g (premix) 200 mL     Ordering Provider:  Alexandro Suarez PA-C    1,000 mg  over 60 Minutes Intravenous Once 02/16/18 1954 02/16/18 2122 02/16/18 1952  meropenem (MERREM) 1 g/100 mL 0.9% NS VTB (mbp)     Ordering Provider:  Alexandro Suarez PA-C    1 g  over 30 Minutes Intravenous Once 02/16/18 1954 02/16/18 2059          Allergies:  is allergic to ciprofloxacin hcl and ceftin [cefuroxime axetil].    Family History: family history includes COPD in his brother; Heart attack in his brother; Stroke in his father.    Social History:  reports that he has been smoking Cigarettes.  He has a 84.00 pack-year smoking history. He does not have any smokeless tobacco history on file. He reports that he does not drink alcohol or use drugs.    Review of Systems: All other reviewed and negative except as per HPI    Blood pressure 105/53, pulse 64, temperature 98.4 °F (36.9 °C), temperature source Oral, resp. rate 18, height 172.7 cm (67.99\"), weight 82.1 kg (181 lb), SpO2 100 %.  GENERAL:Chronically ill appearing.  OOB in chair  HEENT: Oropharynx without thrush.  EYES: . No conjunctival injection. No icterus.   LYMPHATICS: No lymphadenopathy of the neck or axillary or inguinal regions.   HEART:  Irregular  LUNGS:  Diminished throughout.  On 4LNC.  ABDOMEN: Soft, nontender, ND   SKIN: BLE dressings intact.  Sacral decub multiple sites stage 2  EXT:  + edema. BLE with compression stockings.   Seen and agree      DIAGNOSTICS:  Lab Results   Component Value Date    WBC 9.87 03/10/2018    HGB 11.3 (L) 03/10/2018    HCT 35.4 (L) 03/10/2018     03/10/2018     Lab Results   Component Value Date    CRP 13.12 (H) 06/21/2017     Lab Results   Component Value Date    SEDRATE 56 (H) 06/20/2017     Lab Results   Component Value Date    GLUCOSE 177 (H) 03/12/2018    BUN 31 (H) 03/12/2018    " CREATININE 1.20 03/12/2018    EGFRIFNONA 59 (L) 03/12/2018    BCR 25.8 (H) 03/12/2018    CO2 29.0 03/12/2018    CALCIUM 8.3 (L) 03/12/2018    ALBUMIN 3.30 02/23/2018    LABIL2 0.9 (L) 02/17/2018    AST 15 02/17/2018    ALT 4 (L) 02/17/2018       Microbiology:   Microbiology Results Abnormal     Procedure Component Value - Date/Time    Respiratory Culture - Sputum, Cough [[id number]]  (Abnormal)  (Susceptibility) Collected:  03/07/18 0801    Lab Status:  Final result Specimen:  Sputum from Cough Updated:  03/09/18 1424     Respiratory Culture --      Light growth (2+) Klebsiella pneumoniae (A)      Scant growth (1+) Normal Respiratory Ernesto     Gram Stain Result Moderate (3+) WBCs per low power field      Rare (1+) Epithelial cells per low power field      Many (4+) Yeast      Rare (1+) Normal respiratory ernesto    Susceptibility      Klebsiella pneumoniae     FAVIAN     Ampicillin 16 ug/ml Intermediate     Ampicillin + Sulbactam <=8/4 ug/ml Susceptible     Aztreonam <=8 ug/ml Susceptible     Cefepime <=8 ug/ml Susceptible     Cefotaxime <=2 ug/ml Susceptible     Ceftriaxone <=8 ug/ml Susceptible     Cefuroxime sodium <=4 ug/ml Susceptible     Ertapenem <=1 ug/ml Susceptible     Gentamicin <=4 ug/ml Susceptible     Levofloxacin <=2 ug/ml Susceptible     Meropenem <=1 ug/ml Susceptible     Piperacillin + Tazobactam <=16 ug/ml Susceptible     Tetracycline <=4 ug/ml Susceptible     Tobramycin <=4 ug/ml Susceptible     Trimethoprim + Sulfamethoxazole <=2/38 ug/ml Susceptible                    Influenza A & B, RT PCR - Swab, Nasopharynx [[id number]]  (Normal) Collected:  03/03/18 1356    Lab Status:  Final result Specimen:  Swab from Nasopharynx Updated:  03/03/18 1607     Influenza A PCR Not Detected     Influenza B PCR Not Detected    Blood Culture - Blood, [[id number]]  (Normal) Collected:  02/18/18 2305    Lab Status:  Final result Specimen:  Blood from Arm, Left Updated:  02/23/18 2331     Blood Culture No growth at 5 days     Blood Culture - Blood, [[id number]]  (Normal) Collected:  02/18/18 2311    Lab Status:  Final result Specimen:  Blood from Arm, Left Updated:  02/23/18 2331     Blood Culture No growth at 5 days    Wound Culture - Wound, Foot, Right [[id number]]  (Abnormal)  (Susceptibility) Collected:  02/16/18 1931    Lab Status:  Final result Specimen:  Wound from Foot, Right Updated:  02/23/18 1045     Wound Culture --      Light growth (2+) Pseudomonas aeruginosa (A)      Scant growth (1+) Escherichia coli (A)      Scant growth (1+) Proteus mirabilis (A)      Scant growth (1+) Enterococcus faecalis (A)      Scant growth (1+) Streptococcus, Beta Hemolytic, Group G (A)     Comment:   If Clindamycin or Erythromycin is the drug of choice, notify the laboratory within 7 days to request susceptibility testing.        STREP GROUPING G     Gram Stain Result Few (2+) WBCs seen      Many (4+) Gram negative bacilli      Many (4+) Gram positive bacilli      Many (4+) Gram positive cocci in pairs and clusters    Susceptibility      Pseudomonas aeruginosa    Escherichia coli       FAVIAN    FAVIAN       Ampicillin       <=8 ug/ml Susceptible       Ampicillin + Sulbactam       <=8/4 ug/ml Susceptible       Aztreonam <=8 ug/ml Susceptible    <=8 ug/ml Susceptible       Cefepime <=8 ug/ml Susceptible    <=8 ug/ml Susceptible       Cefotaxime       <=2 ug/ml Susceptible       Ceftazidime 4 ug/ml Susceptible             Ceftriaxone       <=8 ug/ml Susceptible       Cefuroxime sodium       <=4 ug/ml Susceptible       Ertapenem       <=1 ug/ml Susceptible       Gentamicin <=4 ug/ml Susceptible    <=4 ug/ml Susceptible       Levofloxacin <=2 ug/ml Susceptible    <=2 ug/ml Susceptible       Meropenem <=1 ug/ml Susceptible    <=1 ug/ml Susceptible       Piperacillin + Tazobactam <=16 ug/ml Susceptible    <=16 ug/ml Susceptible       Tetracycline       <=4 ug/ml Susceptible       Tobramycin <=4 ug/ml Susceptible    <=4 ug/ml Susceptible       Trimethoprim +  Sulfamethoxazole       <=2/38 ug/ml Susceptible                  Susceptibility      Proteus mirabilis    Enterococcus faecalis       FAVIAN    FAVIAN       Ampicillin >16 ug/ml Resistant    <=2 ug/ml Susceptible       Ampicillin + Sulbactam >16/8 ug/ml Resistant             Aztreonam <=8 ug/ml Susceptible             Cefepime <=8 ug/ml Susceptible             Cefotaxime <=2 ug/ml Susceptible             Ceftriaxone <=8 ug/ml Susceptible             Cefuroxime sodium <=4 ug/ml Susceptible             Ertapenem <=1 ug/ml Susceptible             Gentamicin <=4 ug/ml Susceptible             Gentamicin High Level Synergy       <=500 ug/ml Susceptible       Levofloxacin <=2 ug/ml Susceptible             Linezolid       2 ug/ml Susceptible       Meropenem <=1 ug/ml Susceptible             Penicillin G       2 ug/ml Susceptible       Piperacillin + Tazobactam <=16 ug/ml Susceptible             Streptomycin High Level Synergy       <=1000 ug/ml Susceptible       Tetracycline >8 ug/ml Resistant             Tobramycin <=4 ug/ml Susceptible             Trimethoprim + Sulfamethoxazole >2/38 ug/ml Resistant             Vancomycin       1 ug/ml Susceptible                      Blood Culture - Blood, Blood, Venous Line [[id number]]  (Normal) Collected:  02/16/18 2010    Lab Status:  Final result Specimen:  Blood from Arm, Left Updated:  02/21/18 2046     Blood Culture No growth at 5 days    Blood Culture - Blood, Blood, Venous Line [[id number]]  (Normal) Collected:  02/16/18 2005    Lab Status:  Final result Specimen:  Blood from Arm, Right Updated:  02/21/18 2046     Blood Culture No growth at 5 days    Respiratory Culture - Sputum, Cough [[id number]] Collected:  02/19/18 0656    Lab Status:  Final result Specimen:  Sputum from Cough Updated:  02/21/18 0931     Respiratory Culture --      Scant growth (1+) Normal Respiratory Nataliia     Gram Stain Result Occasional WBCs per low power field      No organisms seen               RADIOLOGY:  Imaging Results (last 72 hours)     Procedure Component Value Units Date/Time    XR Chest 1 View [[id number]] Collected:  02/16/18 2125     Updated:  02/16/18 2212    Narrative:       EXAM:    XR Chest, 1 View    CLINICAL HISTORY:    72 years old, male; Peripheral vascular disease, unspecified; Cellulitis of   right lower limb; Cellulitis of left lower limb; Acute kidney failure,   unspecified; Signs and symptoms; Other: Hypoxia    TECHNIQUE:    Frontal view of the chest.    COMPARISON:    CR - XR CHEST 1 VW 2017-06-25 06:32    FINDINGS:    Lungs:  There is mild prominence of the pulmonary vasculature. Chronic   scarring.    Pleural space:  Unremarkable.  No pneumothorax.    Heart:  There is an atherosclerotic cardiovascular configuration without   cardiomegaly.    Mediastinum:  Unremarkable.    Bones/joints:  There are degenerative changes of the spine.      Impression:         Mild pulmonary vascular congestion.    THIS DOCUMENT HAS BEEN ELECTRONICALLY SIGNED BY LENIN DIAZ MD    XR Chest 1 View [[id number]] Updated:  02/18/18 1333            Assessment and Plan:   Impression:      -- Cellulitis - Bilateral Lower Extremities - H/O streptococcal bacteremia - currently on minocycline     -- Acute Kidney Injury - stable     -- COPD - with worsening respiratory failure - diuretics given      --  HTN      -- Tobacco Abuse      -- Acute Hypoxic respiratory failure, remaining on 4LNC.    -- Leukocytosis, on prednisone.      -- Infiltrates - Primarily fluid, improved     -- Possible aspiration per pulmonary note - speech is to evaluate.     -- DTI - sacral  Stage 2     -- Gram negative and sputum / Klebsiella pneumoniae    -  2Vessel CAD - cath tomorrow. - new issue       PLAN/RECOMMENDATIONS:      -- Continue Minocycline - continued through 3/17  -- Continue wound care and compression wraps.  -- Cath in     BRITTNY Cotto for Dr. Parish Belle  3/12/2018

## 2018-03-12 NOTE — PROGRESS NOTES
[facility] Medicine Services  PROGRESS NOTE    Patient Name: Yassine Love  : 1945  MRN: [id number]    Date of Admission: 2018  Length of Stay: 24  Primary Care Physician: No Known Provider    Subjective   CC: f/u cellulitis, NSVT    HPI:  Patient sitting up in chair eating lunch. Reports some lower abd cramping after colace, improved. Denies worsening SOA, CP, palpitations, N/V or difficulty urinating. Nebs help wheezing    Review of Systems  Gen- No fevers, chills  CV- No palpitations, CP  Resp- (+) cough, exertional dyspnea stable  GI- No N/V/D, abd pain  Skin - BLE erythema pain are improving  Otherwise ROS is negative except as mentioned in the HPI.    Objective   Vital Signs:   Temp:  [97.8 °F (36.6 °C)-98.9 °F (37.2 °C)] 97.8 °F (36.6 °C)  Heart Rate:  [61-83] 61  Resp:  [12-22] 18  BP: (104-133)/(58-92) 104/59  Physical Exam:  Gen-chronically ill appearing male, sitting up in chair, awake alert, appears in NAD    CV-RRR, S1 S2 normal, no m/r/g  Resp-decreased bilaterally throughout greatest in bases, few scattered expiratory wheezes, nonlabored respirations on 5LNC  Abd-soft, NT, ND, +BS  Neuro-A&Ox3, no focal deficits  Psych-appropriate mood, cooperative  Msk: BLE edema, both legs wrapped did not remove, no cyanosis of extremities  Skin- visible aspects of distal feet with mild erythema R>L     Results Reviewed:  I have personally reviewed current lab, radiology, and data and agree.    Results from last 7 days  Lab Units 03/10/18  1000 18  0601   WBC 10*3/mm3 9.87 9.02   HEMOGLOBIN g/dL 11.3* 9.6*   HEMATOCRIT % 35.4* 30.1*   PLATELETS 10*3/mm3 310 334       Results from last 7 days  Lab Units 18  0853 03/10/18  1000 18  0601   SODIUM mmol/L 137 136 140   POTASSIUM mmol/L 4.5 5.5 4.1   CHLORIDE mmol/L 104 100 107   CO2 mmol/L 29.0 32.0* 31.0   BUN mg/dL 31* 30* 26*   CREATININE mg/dL 1.20 1.10 1.00   GLUCOSE mg/dL 177* 119* 103*   CALCIUM mg/dL 8.3*  8.9 8.6*     Microbiology Results Abnormal     Procedure Component Value - Date/Time    Respiratory Culture - Sputum, Cough [[id number]]  (Abnormal)  (Susceptibility) Collected:  03/07/18 0801    Lab Status:  Final result Specimen:  Sputum from Cough Updated:  03/09/18 1424     Respiratory Culture --      Light growth (2+) Klebsiella pneumoniae (A)      Scant growth (1+) Normal Respiratory Ernesto     Gram Stain Result Moderate (3+) WBCs per low power field      Rare (1+) Epithelial cells per low power field      Many (4+) Yeast      Rare (1+) Normal respiratory ernesto    Susceptibility      Klebsiella pneumoniae     FAVIAN     Ampicillin 16 ug/ml Intermediate     Ampicillin + Sulbactam <=8/4 ug/ml Susceptible     Aztreonam <=8 ug/ml Susceptible     Cefepime <=8 ug/ml Susceptible     Cefotaxime <=2 ug/ml Susceptible     Ceftriaxone <=8 ug/ml Susceptible     Cefuroxime sodium <=4 ug/ml Susceptible     Ertapenem <=1 ug/ml Susceptible     Gentamicin <=4 ug/ml Susceptible     Levofloxacin <=2 ug/ml Susceptible     Meropenem <=1 ug/ml Susceptible     Piperacillin + Tazobactam <=16 ug/ml Susceptible     Tetracycline <=4 ug/ml Susceptible     Tobramycin <=4 ug/ml Susceptible     Trimethoprim + Sulfamethoxazole <=2/38 ug/ml Susceptible                    Influenza A & B, RT PCR - Swab, Nasopharynx [[id number]]  (Normal) Collected:  03/03/18 1356    Lab Status:  Final result Specimen:  Swab from Nasopharynx Updated:  03/03/18 1607     Influenza A PCR Not Detected     Influenza B PCR Not Detected    Blood Culture - Blood, [[id number]]  (Normal) Collected:  02/18/18 2308    Lab Status:  Final result Specimen:  Blood from Arm, Left Updated:  02/23/18 2331     Blood Culture No growth at 5 days    Blood Culture - Blood, [[id number]]  (Normal) Collected:  02/18/18 2311    Lab Status:  Final result Specimen:  Blood from Arm, Left Updated:  02/23/18 2331     Blood Culture No growth at 5 days    Wound Culture - Wound, Foot, Right [[id number]]   (Abnormal)  (Susceptibility) Collected:  02/16/18 1931    Lab Status:  Final result Specimen:  Wound from Foot, Right Updated:  02/23/18 1041     Wound Culture --      Light growth (2+) Pseudomonas aeruginosa (A)      Scant growth (1+) Escherichia coli (A)      Scant growth (1+) Proteus mirabilis (A)      Scant growth (1+) Enterococcus faecalis (A)      Scant growth (1+) Streptococcus, Beta Hemolytic, Group G (A)     Comment:   If Clindamycin or Erythromycin is the drug of choice, notify the laboratory within 7 days to request susceptibility testing.        STREP GROUPING G     Gram Stain Result Few (2+) WBCs seen      Many (4+) Gram negative bacilli      Many (4+) Gram positive bacilli      Many (4+) Gram positive cocci in pairs and clusters    Susceptibility      Pseudomonas aeruginosa    Escherichia coli       FAVIAN    FAVIAN       Ampicillin       <=8 ug/ml Susceptible       Ampicillin + Sulbactam       <=8/4 ug/ml Susceptible       Aztreonam <=8 ug/ml Susceptible    <=8 ug/ml Susceptible       Cefepime <=8 ug/ml Susceptible    <=8 ug/ml Susceptible       Cefotaxime       <=2 ug/ml Susceptible       Ceftazidime 4 ug/ml Susceptible             Ceftriaxone       <=8 ug/ml Susceptible       Cefuroxime sodium       <=4 ug/ml Susceptible       Ertapenem       <=1 ug/ml Susceptible       Gentamicin <=4 ug/ml Susceptible    <=4 ug/ml Susceptible       Levofloxacin <=2 ug/ml Susceptible    <=2 ug/ml Susceptible       Meropenem <=1 ug/ml Susceptible    <=1 ug/ml Susceptible       Piperacillin + Tazobactam <=16 ug/ml Susceptible    <=16 ug/ml Susceptible       Tetracycline       <=4 ug/ml Susceptible       Tobramycin <=4 ug/ml Susceptible    <=4 ug/ml Susceptible       Trimethoprim + Sulfamethoxazole       <=2/38 ug/ml Susceptible                  Susceptibility      Proteus mirabilis    Enterococcus faecalis       FAVIAN    FAVIAN       Ampicillin >16 ug/ml Resistant    <=2 ug/ml Susceptible       Ampicillin + Sulbactam >16/8 ug/ml  Resistant             Aztreonam <=8 ug/ml Susceptible             Cefepime <=8 ug/ml Susceptible             Cefotaxime <=2 ug/ml Susceptible             Ceftriaxone <=8 ug/ml Susceptible             Cefuroxime sodium <=4 ug/ml Susceptible             Ertapenem <=1 ug/ml Susceptible             Gentamicin <=4 ug/ml Susceptible             Gentamicin High Level Synergy       <=500 ug/ml Susceptible       Levofloxacin <=2 ug/ml Susceptible             Linezolid       2 ug/ml Susceptible       Meropenem <=1 ug/ml Susceptible             Penicillin G       2 ug/ml Susceptible       Piperacillin + Tazobactam <=16 ug/ml Susceptible             Streptomycin High Level Synergy       <=1000 ug/ml Susceptible       Tetracycline >8 ug/ml Resistant             Tobramycin <=4 ug/ml Susceptible             Trimethoprim + Sulfamethoxazole >2/38 ug/ml Resistant             Vancomycin       1 ug/ml Susceptible                      Blood Culture - Blood, Blood, Venous Line [[id number]]  (Normal) Collected:  02/16/18 2010    Lab Status:  Final result Specimen:  Blood from Arm, Left Updated:  02/21/18 2046     Blood Culture No growth at 5 days    Blood Culture - Blood, Blood, Venous Line [[id number]]  (Normal) Collected:  02/16/18 2005    Lab Status:  Final result Specimen:  Blood from Arm, Right Updated:  02/21/18 2046     Blood Culture No growth at 5 days    Respiratory Culture - Sputum, Cough [[id number]] Collected:  02/19/18 0656    Lab Status:  Final result Specimen:  Sputum from Cough Updated:  02/21/18 0931     Respiratory Culture --      Scant growth (1+) Normal Respiratory Nataliia     Gram Stain Result Occasional WBCs per low power field      No organisms seen        I have reviewed the medications.    Assessment/Plan   Assessment / Plan     Hospital Problem List     * (Principal)Cellulitis of both lower extremities    RAFAEL (acute kidney injury)    PVD (peripheral vascular disease)    COPD (chronic obstructive pulmonary disease)     Essential hypertension    Hypoxia    Bilateral cellulitis of lower leg    Non-sustained ventricular tachycardia        Brief Hospital Course to date:  Yasisne Love is a 72 y.o. male  with a past medical history significant for PVD, essential hypertension, COPD, cellulitis of lower extremities, and tobacco abuse who presents to the ED with complaints of fevers, chills and bilateral lower extremity pain/edema/redness.    Assessment & Plan:  Acute hypoxemic respiratory failure -   - secondary to COPD exacerbation and diastolic heart failure  - CTA negative for PE  - 3/6 CXR reviewed, stable, no acute changes  - 3/8 ECHO EF 63%, RVSP not noted. Consider pulmonary hypertension   - Continue daily Bumex, hold for SBP less than 100  - Daily fluid restriction to 1500cc (which patient is non-complaint with)  - treating COPD as below  - Pulmonology consulting, appreciate input: 5 day course of pred 40mg, changed bronchodilators to pulmicort + brovanna bid + duoneb qid + mucomyst qid. Do IS q1hwa and flutter qid. Abx per ID. At dc continue Pulmicort/Brovana/Ipratropium neb bid.  Repeat CT chest  3 months as O/P.    - No s/s or concerns for pharyngeal dysphagia, continue regular diet. Possibly secondary to reflux  -sputum 3/7 grew Klebsiella susceptible to tetracycline . D/w Dr Belle, extended abx for additional week. Probiotic     Severe 2V CAD s/p LHC  --LHC 3/11 revealed severe 2V CAD -80% LAD and RCA. Planning repeat LHC via femoral access and PCI tomorrow. By Dr Harvey   --Plavix loading dose 300mg Monday 3/12    COPD exacerbation: Symbicort, schedule Duonebs  - Continue 5 day course of prednisone 40mg   - Currently on 5L NC    NSVT ? VT of 40 secs  -3/6 and 3/7 in a.m.   -TSH normal, Mg and K+ normal    - LE cellulitis/ Chronic LE wounds: switched to minocycline through 3/10, ID on board. Continue PO morphine at home dose PRN - OLIVA reviewed. Decreased gabapentin back to prior dose, increased dose made him drowsy.  Continue PT WOC for bilateral LE wrapping.    HTN: Holding BP meds. Marginal BP at times.     RAFAEL: Baseline Cr 0.9-1.3. Stable. K+ improved, Cr 1.2 watch with 2nd cath planned. Will give gentle IVF NS @75ml/hr x24hr. Will hold bumex in a.m. For 1 dose. Strict I/O     Anemia: mixed anemia, Iron low, will start oral Iron. Negative FOBT     Tobacco abuse- Nicotine patch    DVT Prophylaxis:  [facility]  CODE STATUS: Conditional Code    Disposition: To [facility] when cleared by cardiology, planned PCI tomorrow.      Casie M Mayne, PA-C  03/12/18  11:50 AM

## 2018-03-12 NOTE — PROGRESS NOTES
[facility] at [facility]    Inpatient Progress Note      Chief Complaint/Reason for service:    · Wide complex tachycardia         Subjective:       Complains of fatigue from lack of sleep overnight.  He states he felt dyspneic throughout the night, but is feeling better now.  He is concerned about his [facility] planned for tomorrow.    Past medical, surgical, social and family history reviewed in the patient's electronic medical record.    Review of Systems:   Positive for dyspnea, anxiety  Negative for exertional chest pain, lower extremity edema, palpitations     Problem List  Active Hospital Problems (** Indicates Principal Problem)    Diagnosis Date Noted   • **Cellulitis of both lower extremities [L03.115, L03.116] 02/16/2018   • Non-sustained ventricular tachycardia [I47.2] 03/08/2018   • COPD (chronic obstructive pulmonary disease) [J44.9] 02/16/2018   • Essential hypertension [I10] 02/16/2018   • Hypoxia [R09.02] 02/16/2018   • Bilateral cellulitis of lower leg [L03.116, L03.115] 02/16/2018   • RAFAEL (acute kidney injury) [N17.9] 06/20/2017   • PVD (peripheral vascular disease) [I73.9] 06/20/2017      Resolved Hospital Problems    Diagnosis Date Noted Date Resolved   • Hyponatremia [E87.1] 02/16/2018 02/17/2018            Objective:      Current Facility-Administered Medications:   •  acetaminophen (TYLENOL) tablet 650 mg, 650 mg, Oral, Q4H PRN, Nicole Hewitt, DO, 650 mg at 03/11/18 1956  •  arformoterol (BROVANA) nebulizer solution 15 mcg, 15 mcg, Nebulization, BID - RT, Todd Keys MD, 15 mcg at 03/12/18 0737  •  aspirin EC tablet 81 mg, 81 mg, Oral, Daily, Carlos Harvey MD, 81 mg at 03/11/18 0813  •  atorvastatin (LIPITOR) tablet 40 mg, 40 mg, Oral, Nightly, Carlos Harvey MD, 40 mg at 03/11/18 1956  •  bisacodyl (DULCOLAX) suppository 10 mg, 10 mg, Rectal, Daily PRN, Nicole Hewitt DO, 10 mg at 02/23/18 0823  •  budesonide (PULMICORT) nebulizer solution 0.5 mg, 0.5  mg, Nebulization, BID - RT, Todd Keys MD, 0.5 mg at 03/12/18 0737  •  bumetanide (BUMEX) tablet 1 mg, 1 mg, Oral, Daily, BRITTNY Alvares, 1 mg at 03/11/18 0814  •  calcium carbonate (TUMS) chewable tablet 500 mg (200 mg elemental), 2 tablet, Oral, BID PRN, BRITTNY Estrada, 2 tablet at 02/27/18 0946  •  castor oil-balsam peru (VENELEX) ointment, , Topical, Q12H, Nicole Hewitt DO  •  clopidogrel (PLAVIX) tablet 300 mg, 300 mg, Oral, Once, Carlos Harvey MD  •  diclofenac (VOLTAREN) 1 % gel 2 g, 2 g, Topical, 4x Daily, Nicole Hewitt DO, 2 g at 03/09/18 2059  •  docusate sodium (COLACE) capsule 200 mg, 200 mg, Oral, Daily, Nicole Hewitt DO, 200 mg at 03/11/18 0813  •  fluticasone (FLONASE) 50 MCG/ACT nasal spray 2 spray, 2 spray, Each Nare, Daily, Esmer Watkins MD, 2 spray at 03/11/18 0814  •  gabapentin (NEURONTIN) capsule 200 mg, 200 mg, Oral, Q8H, Nicole Hewitt DO, 200 mg at 03/12/18 0637  •  guaiFENesin (MUCINEX) 12 hr tablet 600 mg, 600 mg, Oral, BID PRN, Ezequiel Alfred PA-C, 600 mg at 02/20/18 0818  •  heparin (porcine) 5000 UNIT/ML injection 5,000 Units, 5,000 Units, Subcutaneous, Q8H, Nicole Hewitt DO, 5,000 Units at 03/12/18 0637  •  HYDROcodone-acetaminophen (NORCO) 7.5-325 MG per tablet 1 tablet, 1 tablet, Oral, Q4H PRN, Yumiko Castanon MD, 1 tablet at 03/12/18 0708  •  ipratropium-albuterol (DUO-NEB) nebulizer solution 3 mL, 3 mL, Nebulization, 4x Daily - RT, Nicole Hewitt DO, 3 mL at 03/12/18 0737  •  magnesium hydroxide (MILK OF MAGNESIA) suspension 2400 mg/10mL 10 mL, 10 mL, Oral, Daily PRN, Nicole Hewitt DO, 10 mL at 03/11/18 1806  •  Magnesium Sulfate 2 gram Bolus, followed by 8 gram infusion (total Mg dose 10 grams)- Mg less than or equal to 1mg/dL, 2 g, Intravenous, PRN **OR** Magnesium Sulfate 6 gram Infusion (2 gm x 3) -Mg 1.1 -1.5 mg/dL, 2 g, Intravenous, PRN **OR** magnesium sulfate 4 gram infusion- Mg 1.6-1.9 mg/dL, 4 g, Intravenous,  PRN, Esmer Watkins MD  •  metoprolol tartrate (LOPRESSOR) tablet 25 mg, 25 mg, Oral, Q12H, BRITTNY Schaefer, 25 mg at 03/11/18 1956  •  minocycline (MINOCIN,DYNACIN) capsule 100 mg, 100 mg, Oral, Q12H, Casie M Mayne, PA-C, 100 mg at 03/12/18 0637  •  Morphine (MSIR) tablet 30 mg, 30 mg, Oral, Q6H PRN, Esmer Watkins MD, 30 mg at 03/11/18 0909  •  nicotine (NICODERM CQ) 14 MG/24HR patch 1 patch, 1 patch, Transdermal, Q24H, Stella Garvin PA-C, 1 patch at 03/11/18 0815  •  ondansetron (ZOFRAN) injection 4 mg, 4 mg, Intravenous, Q6H PRN, Alexandro Suarez PA-C, 4 mg at 02/21/18 1945  •  pantoprazole (PROTONIX) EC tablet 40 mg, 40 mg, Oral, Q AM, Todd Keys MD, 40 mg at 03/12/18 0637  •  Pharmacy Consult - [place], , Does not apply, Daily, Annette Ray, [facility]  •  potassium chloride (KLOR-CON) packet 40 mEq, 40 mEq, Oral, PRN, Esmer Watkins MD  •  potassium chloride (MICRO-K) CR capsule 40 mEq, 40 mEq, Oral, PRN, Esmer Watkins MD, 40 mEq at 02/22/18 1431  •  saccharomyces boulardii (FLORASTOR) capsule 250 mg, 250 mg, Oral, BID, Casie M Mayne, PA-C, 250 mg at 03/11/18 1956  •  sodium chloride 0.9 % flush 1-10 mL, 1-10 mL, Intravenous, PRN, Nicole Hewitt DO  •  sodium chloride 0.9 % flush 10 mL, 10 mL, Intravenous, PRN, Steve Mcmanus MD, 10 mL at 03/01/18 2118  •  vitamin B-12 (CYANOCOBALAMIN) tablet 1,000 mcg, 1,000 mcg, Oral, Daily, Yumkio Castanon MD, 1,000 mcg at 03/11/18 0813      Vital Sign Min/Max for last 24 hours  Temp  Min: 97.8 °F (36.6 °C)  Max: 98.9 °F (37.2 °C)   BP  Min: 104/59  Max: 133/72   Pulse  Min: 61  Max: 83   Resp  Min: 12  Max: 22   SpO2  Min: 91 %  Max: 100 %   No Data Recorded      Intake/Output Summary (Last 24 hours) at 03/12/18 0811  Last data filed at 03/12/18 0417   Gross per 24 hour   Intake             1080 ml   Output              725 ml   Net              355 ml           CONSTITUTIONAL: No acute distress, normal affect  RESPIRATORY: Normal effort.  Without wheezing or rales. Mild rhonchi.  CARDIOVASCULAR: Regular rate and rhythm with normal S1 and S2. Without murmur, gallop or rub.  PERIPHERAL VASCULAR: Normal radial pulses bilaterally. Normal dorsalis pedis pulses bilaterally. There is moderate peripheral edema bilaterally.    Results Review:   I reviewed the patient's recent labs in the electronic medical record.      Tele:  NSR, no VTach overnight    TTE 3/8/18  · This was a limited echocardiogram performed to assess left ventricular ejection fraction and wall motion only  · Left ventricular systolic function is normal. Estimated EF = 65%.  · All left ventricular wall segments contract normally.    TTE 6/2017  · Left ventricular systolic function is normal. Estimated EF = 60%.  · Left atrial cavity size is mildly dilated.  · Trace mitral regurgitation, trace tricuspid regurgitation.    C 3/9/18:  · There is moderate diffuse coronary calcification.  There is severe multivessel coronary artery disease involving the proximal LAD and the mid RCA.  · There is an 80% discrete proximal LAD stenosis.  There is an 80% discrete mid RCA stenosis.  · Left ventricular ejection fraction 65% with no regional wall motion abnormalities in the VILLA projection.         Assessment/Plan:     ASSESSMENT:  1. Severe 2V CAD  - Planning for PCI on Tuesday    2. NSVT, possible sustained VTach (40 seconds) versus SVT with aberrancy  - Two episodes of NSVT/VT, 3/6 00:38AM lasting 20 seconds, 3/7 07:04AM with symptoms of racing heart lasting 40 seconds, 3/8 22:52PM 15 seconds    - Echocardiogram without significant structural abnormality  - Thyroid, electrolytes normal  - Possibly ischemic rhythms in the setting of severe CAD and severe COPD  - Currently on metoprolol at 25 mg BID; on 100mg BID at home     3. SVT, 20 seconds, 3/7/18 16:28  -Beta blocker as above     4. Essential hypertension:  - Well controlled  - Starting metoprolol as noted above, monitor BPs; increase as  tolerated     5. COPD/hypoxic respiratory failure:  - Managed per primary team/pulmonology    6.Hyperlipidemia  -Previously started on atorvastatin 40 mg daily.    PLAN:  -Planned PCI of RCA and LAD tomorrow.  -Clopidogrel 300mg loading today.  -Continue other CAD meds  -NPO after midnight.    Patricia Guzman, APRN      3/12/2018

## 2018-03-12 NOTE — PLAN OF CARE
Problem: Patient Care Overview (Adult)  Goal: Plan of Care Review  Outcome: Ongoing (interventions implemented as appropriate)   03/12/18 0442   Coping/Psychosocial Response Interventions   Plan Of Care Reviewed With patient   Patient Care Overview   Progress progress toward functional goals as expected   Outcome Evaluation   Outcome Summary/Follow up Plan Pt rested well this shift. C/o headache, relieved with PRN meds. VSS. NSR.     Goal: Adult Individualization and Mutuality  Outcome: Ongoing (interventions implemented as appropriate)    Goal: Discharge Needs Assessment  Outcome: Ongoing (interventions implemented as appropriate)      Problem: Cellulitis (Adult)  Goal: Signs and Symptoms of Listed Potential Problems Will be Absent or Manageable (Cellulitis)  Outcome: Ongoing (interventions implemented as appropriate)      Problem: Fall Risk (Adult)  Goal: Identify Related Risk Factors and Signs and Symptoms  Outcome: Ongoing (interventions implemented as appropriate)    Goal: Absence of Fall  Outcome: Ongoing (interventions implemented as appropriate)

## 2018-03-12 NOTE — PLAN OF CARE
Problem: Patient Care Overview (Adult)  Goal: Plan of Care Review  Outcome: Ongoing (interventions implemented as appropriate)   03/12/18 1040   Coping/Psychosocial Response Interventions   Plan Of Care Reviewed With patient   Patient Care Overview   Progress progress toward functional goals as expected   Outcome Evaluation   Outcome Summary/Follow up Plan BLE unna boots intact wtihout loosening or drainage, B toes blanchable. Plan to change wraps again in 1-2 days.

## 2018-03-12 NOTE — PLAN OF CARE
Problem: Patient Care Overview  Goal: Plan of Care Review  Outcome: Ongoing (interventions implemented as appropriate)   03/12/18 1040   Coping/Psychosocial   Plan of Care Reviewed With patient   Plan of Care Review   Progress no change   OTHER   Outcome Summary BLE unna boots intact, no drainage or loosening, B toes blanchable. Plan to change wraps in 1-2 days.

## 2018-03-12 NOTE — PROGRESS NOTES
Continued Stay Note   [place]     Patient Name: Yassine Love  MRN: [id number]  Today's Date: 3/12/2018    Admit Date: 2/16/2018          Discharge Plan     Row Name 03/12/18 0910       Plan    Plan Placement     Final Note Complex Care following for discharge planning. Called and spoke with [facility] to let her know patient is scheduled for cardiac intervention on 3-13. Patient hopefully will be medically ready for discharge on 3-14 depending on bed availability at [facility] pulmonary unit. Called and cancelled [facility] ambulance for 3-12 at 1600. CM will arrange transportation and or family can transport.  CM following. Glendy at ext [phone]              Discharge Codes    No documentation.       Expected Discharge Date and Time     Expected Discharge Date Expected Discharge Time    Mar 9, 2018             Silvana Owusu RN

## 2018-03-13 LAB
ANION GAP SERPL CALCULATED.3IONS-SCNC: 4 MMOL/L (ref 3–11)
BUN BLD-MCNC: 32 MG/DL (ref 9–23)
BUN/CREAT SERPL: 29.1 (ref 7–25)
CALCIUM SPEC-SCNC: 8.1 MG/DL (ref 8.7–10.4)
CHLORIDE SERPL-SCNC: 102 MMOL/L (ref 99–109)
CO2 SERPL-SCNC: 30 MMOL/L (ref 20–31)
CREAT BLD-MCNC: 1.1 MG/DL (ref 0.6–1.3)
GFR SERPL CREATININE-BSD FRML MDRD: 66 ML/MIN/1.73
GLUCOSE BLD-MCNC: 89 MG/DL (ref 70–100)
HCT VFR BLD AUTO: 32.8 % (ref 38.9–50.9)
HGB BLD-MCNC: 10.4 G/DL (ref 13.1–17.5)
POTASSIUM BLD-SCNC: 4.4 MMOL/L (ref 3.5–5.5)
SODIUM BLD-SCNC: 136 MMOL/L (ref 132–146)

## 2018-03-13 PROCEDURE — 92978 ENDOLUMINL IVUS OCT C 1ST: CPT | Performed by: INTERNAL MEDICINE

## 2018-03-13 PROCEDURE — 97530 THERAPEUTIC ACTIVITIES: CPT

## 2018-03-13 PROCEDURE — C1769 GUIDE WIRE: HCPCS | Performed by: INTERNAL MEDICINE

## 2018-03-13 PROCEDURE — C1887 CATHETER, GUIDING: HCPCS | Performed by: INTERNAL MEDICINE

## 2018-03-13 PROCEDURE — C1894 INTRO/SHEATH, NON-LASER: HCPCS | Performed by: INTERNAL MEDICINE

## 2018-03-13 PROCEDURE — 92928 PRQ TCAT PLMT NTRAC ST 1 LES: CPT | Performed by: INTERNAL MEDICINE

## 2018-03-13 PROCEDURE — 94799 UNLISTED PULMONARY SVC/PX: CPT

## 2018-03-13 PROCEDURE — 85018 HEMOGLOBIN: CPT | Performed by: PHYSICIAN ASSISTANT

## 2018-03-13 PROCEDURE — C1725 CATH, TRANSLUMIN NON-LASER: HCPCS | Performed by: INTERNAL MEDICINE

## 2018-03-13 PROCEDURE — C1874 STENT, COATED/COV W/DEL SYS: HCPCS | Performed by: INTERNAL MEDICINE

## 2018-03-13 PROCEDURE — C9600 PERC DRUG-EL COR STENT SING: HCPCS | Performed by: INTERNAL MEDICINE

## 2018-03-13 PROCEDURE — 25010000002 BIVALIRUDIN PER 1 MG: Performed by: INTERNAL MEDICINE

## 2018-03-13 PROCEDURE — 80048 BASIC METABOLIC PNL TOTAL CA: CPT | Performed by: PHYSICIAN ASSISTANT

## 2018-03-13 PROCEDURE — 25010000002 MIDAZOLAM PER 1 MG: Performed by: INTERNAL MEDICINE

## 2018-03-13 PROCEDURE — 25010000002 FENTANYL CITRATE (PF) 100 MCG/2ML SOLUTION: Performed by: INTERNAL MEDICINE

## 2018-03-13 PROCEDURE — 94760 N-INVAS EAR/PLS OXIMETRY 1: CPT

## 2018-03-13 PROCEDURE — 99232 SBSQ HOSP IP/OBS MODERATE 35: CPT | Performed by: FAMILY MEDICINE

## 2018-03-13 PROCEDURE — 25010000002 HEPARIN (PORCINE) PER 1000 UNITS: Performed by: INTERNAL MEDICINE

## 2018-03-13 PROCEDURE — 0 IOPAMIDOL PER 1 ML: Performed by: INTERNAL MEDICINE

## 2018-03-13 PROCEDURE — 85014 HEMATOCRIT: CPT | Performed by: PHYSICIAN ASSISTANT

## 2018-03-13 PROCEDURE — C1753 CATH, INTRAVAS ULTRASOUND: HCPCS | Performed by: INTERNAL MEDICINE

## 2018-03-13 DEVICE — XIENCE ALPINE EVEROLIMUS ELUTING CORONARY STENT SYSTEM 4.00 MM X 18 MM / RAPID-EXCHANGE
Type: IMPLANTABLE DEVICE | Status: FUNCTIONAL
Brand: XIENCE ALPINE

## 2018-03-13 DEVICE — XIENCE ALPINE EVEROLIMUS ELUTING CORONARY STENT SYSTEM 3.50 MM X 15 MM / RAPID-EXCHANGE
Type: IMPLANTABLE DEVICE | Status: FUNCTIONAL
Brand: XIENCE ALPINE

## 2018-03-13 RX ORDER — SODIUM CHLORIDE 9 MG/ML
INJECTION, SOLUTION INTRAVENOUS CONTINUOUS PRN
Status: DISCONTINUED | OUTPATIENT
Start: 2018-03-13 | End: 2018-03-13 | Stop reason: HOSPADM

## 2018-03-13 RX ORDER — ISOSORBIDE MONONITRATE 30 MG/1
30 TABLET, EXTENDED RELEASE ORAL
Status: DISCONTINUED | OUTPATIENT
Start: 2018-03-13 | End: 2018-03-14

## 2018-03-13 RX ORDER — LIDOCAINE HYDROCHLORIDE 10 MG/ML
INJECTION, SOLUTION EPIDURAL; INFILTRATION; INTRACAUDAL; PERINEURAL AS NEEDED
Status: DISCONTINUED | OUTPATIENT
Start: 2018-03-13 | End: 2018-03-13 | Stop reason: HOSPADM

## 2018-03-13 RX ORDER — MIDAZOLAM HYDROCHLORIDE 1 MG/ML
INJECTION INTRAMUSCULAR; INTRAVENOUS AS NEEDED
Status: DISCONTINUED | OUTPATIENT
Start: 2018-03-13 | End: 2018-03-13 | Stop reason: HOSPADM

## 2018-03-13 RX ORDER — FENTANYL CITRATE 50 UG/ML
INJECTION, SOLUTION INTRAMUSCULAR; INTRAVENOUS AS NEEDED
Status: DISCONTINUED | OUTPATIENT
Start: 2018-03-13 | End: 2018-03-13 | Stop reason: HOSPADM

## 2018-03-13 RX ADMIN — HYDROCODONE BITARTRATE AND ACETAMINOPHEN 1 TABLET: 7.5; 325 TABLET ORAL at 08:48

## 2018-03-13 RX ADMIN — Medication 250 MG: at 08:48

## 2018-03-13 RX ADMIN — SODIUM CHLORIDE 75 ML/HR: 9 INJECTION, SOLUTION INTRAVENOUS at 10:12

## 2018-03-13 RX ADMIN — HYDROCODONE BITARTRATE AND ACETAMINOPHEN 1 TABLET: 7.5; 325 TABLET ORAL at 23:38

## 2018-03-13 RX ADMIN — MORPHINE SULFATE 30 MG: 30 TABLET ORAL at 03:39

## 2018-03-13 RX ADMIN — ARFORMOTEROL TARTRATE 15 MCG: 15 SOLUTION RESPIRATORY (INHALATION) at 21:08

## 2018-03-13 RX ADMIN — MINOCYCLINE HYDROCHLORIDE 100 MG: 50 CAPSULE ORAL at 06:20

## 2018-03-13 RX ADMIN — GABAPENTIN 200 MG: 100 CAPSULE ORAL at 06:19

## 2018-03-13 RX ADMIN — METOPROLOL TARTRATE 25 MG: 25 TABLET ORAL at 08:48

## 2018-03-13 RX ADMIN — ARFORMOTEROL TARTRATE 15 MCG: 15 SOLUTION RESPIRATORY (INHALATION) at 07:30

## 2018-03-13 RX ADMIN — CASTOR OIL AND BALSAM, PERU: 788; 87 OINTMENT TOPICAL at 08:47

## 2018-03-13 RX ADMIN — Medication 250 MG: at 21:12

## 2018-03-13 RX ADMIN — GABAPENTIN 200 MG: 100 CAPSULE ORAL at 14:55

## 2018-03-13 RX ADMIN — DOCUSATE SODIUM 200 MG: 100 CAPSULE, LIQUID FILLED ORAL at 08:48

## 2018-03-13 RX ADMIN — HEPARIN SODIUM 5000 UNITS: 5000 INJECTION, SOLUTION INTRAVENOUS; SUBCUTANEOUS at 21:12

## 2018-03-13 RX ADMIN — HYDROCODONE BITARTRATE AND ACETAMINOPHEN 1 TABLET: 7.5; 325 TABLET ORAL at 17:58

## 2018-03-13 RX ADMIN — BUDESONIDE 0.5 MG: 0.5 INHALANT RESPIRATORY (INHALATION) at 21:08

## 2018-03-13 RX ADMIN — CASTOR OIL AND BALSAM, PERU: 788; 87 OINTMENT TOPICAL at 21:00

## 2018-03-13 RX ADMIN — CYANOCOBALAMIN TAB 1000 MCG 1000 MCG: 1000 TAB at 08:54

## 2018-03-13 RX ADMIN — ASPIRIN 81 MG: 81 TABLET, COATED ORAL at 08:48

## 2018-03-13 RX ADMIN — PANTOPRAZOLE SODIUM 40 MG: 40 TABLET, DELAYED RELEASE ORAL at 06:20

## 2018-03-13 RX ADMIN — CLOPIDOGREL BISULFATE 75 MG: 75 TABLET ORAL at 08:48

## 2018-03-13 RX ADMIN — IPRATROPIUM BROMIDE AND ALBUTEROL SULFATE 3 ML: 2.5; .5 SOLUTION RESPIRATORY (INHALATION) at 16:55

## 2018-03-13 RX ADMIN — MINOCYCLINE HYDROCHLORIDE 100 MG: 50 CAPSULE ORAL at 17:59

## 2018-03-13 RX ADMIN — ATORVASTATIN CALCIUM 40 MG: 40 TABLET, FILM COATED ORAL at 21:11

## 2018-03-13 RX ADMIN — MORPHINE SULFATE 30 MG: 30 TABLET ORAL at 14:55

## 2018-03-13 RX ADMIN — MORPHINE SULFATE 30 MG: 30 TABLET ORAL at 21:12

## 2018-03-13 RX ADMIN — BUDESONIDE 0.5 MG: 0.5 INHALANT RESPIRATORY (INHALATION) at 07:30

## 2018-03-13 RX ADMIN — NICOTINE 1 PATCH: 14 PATCH TRANSDERMAL at 08:47

## 2018-03-13 RX ADMIN — DICLOFENAC SODIUM 2 G: 10 GEL TOPICAL at 22:17

## 2018-03-13 RX ADMIN — GABAPENTIN 200 MG: 100 CAPSULE ORAL at 21:11

## 2018-03-13 RX ADMIN — HYDROCODONE BITARTRATE AND ACETAMINOPHEN 1 TABLET: 7.5; 325 TABLET ORAL at 02:10

## 2018-03-13 RX ADMIN — METOPROLOL TARTRATE 25 MG: 25 TABLET ORAL at 21:12

## 2018-03-13 RX ADMIN — ISOSORBIDE MONONITRATE 30 MG: 30 TABLET, EXTENDED RELEASE ORAL at 14:55

## 2018-03-13 RX ADMIN — IPRATROPIUM BROMIDE AND ALBUTEROL SULFATE 3 ML: 2.5; .5 SOLUTION RESPIRATORY (INHALATION) at 21:08

## 2018-03-13 RX ADMIN — IPRATROPIUM BROMIDE AND ALBUTEROL SULFATE 3 ML: 2.5; .5 SOLUTION RESPIRATORY (INHALATION) at 07:30

## 2018-03-13 NOTE — PROGRESS NOTES
Yassine Love  1945  [id number]  3/13/2018    CC:   Chief Complaint   Patient presents with   • Leg Swelling       Yassine Love is a 72 y.o. male here for leg infections   History of present illness:    Mr. Yassine Love is a 72 y.o. male with CC: Cellulitis of both lower extremities. He presented to the [facility] ED with increased bilateral extremity edema with increased pain and redness in the setting of unna boot therapy via home health care for the past several months. He does complain of foul odor and drainage of BLE and fever, and chills.    He denies No SOB or cough today: No chest pain, No N/V/D at present and no abdominal pain.  No  symptoms: no new rash: No HA, photophobia or neck stiffness; He denies other focal pain.  Mr. Love was followed by Dr. Duncan during a June, 2017 hospitalization and was was treated with Invanz for a streptococcal bacteremia.      2/18 - co leg infection  No fever or chills  2/19/18 - C/O HA, generalized pain, and respiratory distress  Patient denies any fever, chills, or sweats.  Patient denies any nausea, vomiting, or diarrhea.  Now on hi-emperatriz oxygen. Seen and agree  2/20 - co leg infections  Co weakness  No fever  Lives alone  2/21/18 - Patient reports mild dyspnea, remains on hi emperatriz oxygen.  Patient denies any fever, chills, or sweats.  Patient denies any nausea, vomiting, or diarrhea.  C/O BLE pain.  Seen and agree  2/22 - co SOA  Co weakness  No fever or chills  No rash  Co leg pain and swelling  2/23/18 - Patient is sleeping today peacefully on 4 LNC.  ROS discussed with staff.  Seen and agree  2/26 - No hx available  ROS discussed with nurse  2/28/18:   Does not want to go to rehab, wants to go home.  Unna boots just changed yesterday.  No fever, chills, sweats.   Seen and agree  3/5/18 - Looking at places for rehab.  Patient denies any fever, chills, or sweats.  Per nursing requiring 4LNC.  On oral antibiotics.    Seen and agree  3/6/18 - C/O pain in BLE.   "Patient denies any fever, chills, or sweats.  Continues on 4LNC.  Patient denies any nausea, vomiting, or diarrhea.  RN at .  Patient working with PT.  Seen and agree  3/7 - co new issue with decubitus  Co SOA  Co weakness  No fever  3/8/18 - \"They think there is something wrong with my heart.\"  Patient denies any fever, chills, or sweats.  Patient reports to me he doesn't want any chest compressions or be put on the vent.\"  Discussed with nursing this is current conditional code status.  Seen and agree.  3/9 - No Hx available  ROS discussed with staff  3/12/18 - \"I'm have a heart cath tomorrow.\"  Patient denies any fever, chills, or sweats.  Patient denies any nausea, vomiting, or diarrhea.  C/O pain in BLE.  Seen and agree  3/13/18 - Patient is having heart cath today.  Patient denies any fever, chills, or sweats.  Patient denies any nausea, vomiting, or diarrhea.  Seen and agree    Past medical history:  Past Medical History:   Diagnosis Date   • Cancer    • Cellulitis of both lower extremities     hospitals 2016   • Chronic pain    • COPD (chronic obstructive pulmonary disease)    • Hypertension    • Osteoarthritis cervical spine    • Osteoarthritis of both knees    • Osteoarthritis of left hip        Medications:   Current Facility-Administered Medications:   •  acetaminophen (TYLENOL) tablet 650 mg, 650 mg, Oral, Q4H PRN, Nicole Hewitt, , 650 mg at 03/12/18 1737  •  albuterol (PROVENTIL) nebulizer solution 0.083% 2.5 mg/3mL, 2.5 mg, Nebulization, Q6H PRN, Casie M Mayne, PA-C  •  arformoterol (BROVANA) nebulizer solution 15 mcg, 15 mcg, Nebulization, BID - RT, Todd Keys MD, 15 mcg at 03/13/18 0730  •  aspirin EC tablet 81 mg, 81 mg, Oral, Daily, Carlos Harvey MD, 81 mg at 03/13/18 0848  •  atorvastatin (LIPITOR) tablet 40 mg, 40 mg, Oral, Nightly, Carlos Harvey MD, 40 mg at 03/12/18 2246  •  bisacodyl (DULCOLAX) suppository 10 mg, 10 mg, Rectal, Daily PRN, Nicole Hewitt DO, 10 mg " at 02/23/18 0823  •  budesonide (PULMICORT) nebulizer solution 0.5 mg, 0.5 mg, Nebulization, BID - RT, Todd Keys MD, 0.5 mg at 03/13/18 0730  •  [START ON 3/14/2018] bumetanide (BUMEX) tablet 1 mg, 1 mg, Oral, Daily, Casie M Mayne, PA-C  •  calcium carbonate (TUMS) chewable tablet 500 mg (200 mg elemental), 2 tablet, Oral, BID PRN, BRITTNY Estrada, 2 tablet at 02/27/18 0946  •  castor oil-balsam peru (VENELEX) ointment, , Topical, Q12H, Nicole Hewitt DO  •  clopidogrel (PLAVIX) tablet 75 mg, 75 mg, Oral, Daily, Carlos Harvey MD, 75 mg at 03/13/18 0848  •  diclofenac (VOLTAREN) 1 % gel 2 g, 2 g, Topical, 4x Daily, Nicole Hewitt DO, 2 g at 03/12/18 2250  •  docusate sodium (COLACE) capsule 200 mg, 200 mg, Oral, Daily, Nicole Hewitt DO, 200 mg at 03/13/18 0848  •  fluticasone (FLONASE) 50 MCG/ACT nasal spray 2 spray, 2 spray, Each Nare, Daily, Esmer Watkins MD, 2 spray at 03/12/18 0935  •  gabapentin (NEURONTIN) capsule 200 mg, 200 mg, Oral, Q8H, Nicole Hewitt DO, 200 mg at 03/13/18 0619  •  guaiFENesin (MUCINEX) 12 hr tablet 600 mg, 600 mg, Oral, BID PRN, Ezequiel Alfred PA-C, 600 mg at 02/20/18 0818  •  heparin (porcine) 5000 UNIT/ML injection 5,000 Units, 5,000 Units, Subcutaneous, Q8H, Nicole Hewitt DO, 5,000 Units at 03/12/18 2253  •  HYDROcodone-acetaminophen (NORCO) 7.5-325 MG per tablet 1 tablet, 1 tablet, Oral, Q4H PRN, Yumiko Castanon MD, 1 tablet at 03/13/18 0848  •  ipratropium-albuterol (DUO-NEB) nebulizer solution 3 mL, 3 mL, Nebulization, 4x Daily - RT, Nicole Hewitt DO, 3 mL at 03/13/18 0730  •  magnesium hydroxide (MILK OF MAGNESIA) suspension 2400 mg/10mL 10 mL, 10 mL, Oral, Daily PRN, Nicole Hewitt DO, 10 mL at 03/11/18 1802  •  Magnesium Sulfate 2 gram Bolus, followed by 8 gram infusion (total Mg dose 10 grams)- Mg less than or equal to 1mg/dL, 2 g, Intravenous, PRN **OR** Magnesium Sulfate 6 gram Infusion (2 gm x 3) -Mg 1.1 -1.5 mg/dL, 2 g,  Intravenous, PRN **OR** magnesium sulfate 4 gram infusion- Mg 1.6-1.9 mg/dL, 4 g, Intravenous, PRN, Esmer Watkins MD  •  metoprolol tartrate (LOPRESSOR) tablet 25 mg, 25 mg, Oral, Q12H, Zohra Griggs, APRN, 25 mg at 03/13/18 0848  •  minocycline (MINOCIN,DYNACIN) capsule 100 mg, 100 mg, Oral, Q12H, Casie M Mayne, PA-C, 100 mg at 03/13/18 0620  •  Morphine (MSIR) tablet 30 mg, 30 mg, Oral, Q6H PRN, Esmer Watkins MD, 30 mg at 03/13/18 0339  •  nicotine (NICODERM CQ) 14 MG/24HR patch 1 patch, 1 patch, Transdermal, Q24H, Stella Garvin PA-C, 1 patch at 03/13/18 0847  •  ondansetron (ZOFRAN) injection 4 mg, 4 mg, Intravenous, Q6H PRN, Alexandro Suarez PA-C, 4 mg at 02/21/18 1945  •  pantoprazole (PROTONIX) EC tablet 40 mg, 40 mg, Oral, Q AM, Todd Keys MD, 40 mg at 03/13/18 0620  •  Pharmacy Consult - [facility], , Does not apply, Daily, Annette Ray, [facility]  •  potassium chloride (KLOR-CON) packet 40 mEq, 40 mEq, Oral, PRN, Esmer Watkins MD  •  potassium chloride (MICRO-K) CR capsule 40 mEq, 40 mEq, Oral, PRN, Esmer Watkins MD, 40 mEq at 02/22/18 1431  •  saccharomyces boulardii (FLORASTOR) capsule 250 mg, 250 mg, Oral, BID, Casie M Mayne, PA-C, 250 mg at 03/13/18 0848  •  sodium chloride 0.9 % flush 1-10 mL, 1-10 mL, Intravenous, PRN, Nicole Hewitt, DO  •  sodium chloride 0.9 % flush 10 mL, 10 mL, Intravenous, PRN, Steve Mcmanus MD, 10 mL at 03/01/18 2118  •  sodium chloride 0.9 % infusion, 75 mL/hr, Intravenous, Continuous, Casie M Mayne, PA-C, Last Rate: 75 mL/hr at 03/12/18 1257, 75 mL/hr at 03/12/18 1257  •  vitamin B-12 (CYANOCOBALAMIN) tablet 1,000 mcg, 1,000 mcg, Oral, Daily, Yumiko Castanon MD, 1,000 mcg at 03/13/18 0854  Antibiotics:  Anti-Infectives     Ordered     Dose/Rate Route Frequency Start Stop    03/08/18 0921  minocycline (MINOCIN,DYNACIN) capsule 100 mg     Casie M Mayne, PA-C reviewed the order on 03/08/18 0955.   Ordering Provider:  Casie M Mayne, PA-C    100 mg  "Oral Every 12 Hours 03/08/18 1800 03/17/18 2359    02/16/18 2302  vancomycin 500 mg/100 mL 0.9% NS IVPB (mbp)     Ordering Provider:  Carlos Walls RPH    500 mg  over 60 Minutes Intravenous Once 02/16/18 2345 02/17/18 0032    02/16/18 1952  vancomycin (VANCOCIN) in iso-osmotic dextrose IVPB 1 g (premix) 200 mL     Ordering Provider:  Alexandro Suarez PA-C    1,000 mg  over 60 Minutes Intravenous Once 02/16/18 1954 02/16/18 2122 02/16/18 1952  meropenem (MERREM) 1 g/100 mL 0.9% NS VTB (mbp)     Ordering Provider:  Alexandro Suarez PA-C    1 g  over 30 Minutes Intravenous Once 02/16/18 1954 02/16/18 2059          Allergies:  is allergic to ciprofloxacin hcl and ceftin [cefuroxime axetil].    Family History: family history includes COPD in his brother; Heart attack in his brother; Stroke in his father.    Social History:  reports that he has been smoking Cigarettes.  He has a 84.00 pack-year smoking history. He does not have any smokeless tobacco history on file. He reports that he does not drink alcohol or use drugs.    Review of Systems: All other reviewed and negative except as per HPI    Blood pressure 125/74, pulse 70, temperature 98.1 °F (36.7 °C), temperature source Oral, resp. rate 20, height 172.7 cm (67.99\"), weight 82.1 kg (181 lb), SpO2 95 %.  GENERAL:Chronically ill appearing.  Mildly anxious  HEENT: Oropharynx without thrush.  EYES: . No conjunctival injection. No icterus.   LYMPHATICS: No lymphadenopathy of the neck or axillary or inguinal regions.   HEART:  Irregular  LUNGS:  Diminished throughout.  On 4LNC.  ABDOMEN: Soft, nontender, ND   SKIN: BLE dressings intact.  Sacral decub multiple sites stage 2  EXT:  + edema. BLE with compression stockings.   Seen and agree        DIAGNOSTICS:  Lab Results   Component Value Date    WBC 9.87 03/10/2018    HGB 10.4 (L) 03/13/2018    HCT 32.8 (L) 03/13/2018     03/10/2018     Lab Results   Component Value Date    CRP 13.12 (H) 06/21/2017     Lab " Results   Component Value Date    SEDRATE 56 (H) 06/20/2017     Lab Results   Component Value Date    GLUCOSE 89 03/13/2018    BUN 32 (H) 03/13/2018    CREATININE 1.10 03/13/2018    EGFRIFNONA 66 03/13/2018    BCR 29.1 (H) 03/13/2018    CO2 30.0 03/13/2018    CALCIUM 8.1 (L) 03/13/2018    ALBUMIN 3.30 02/23/2018    LABIL2 0.9 (L) 02/17/2018    AST 15 02/17/2018    ALT 4 (L) 02/17/2018       Microbiology:   Microbiology Results Abnormal     Procedure Component Value - Date/Time    Respiratory Culture - Sputum, Cough [[id number]]  (Abnormal)  (Susceptibility) Collected:  03/07/18 0801    Lab Status:  Final result Specimen:  Sputum from Cough Updated:  03/09/18 1424     Respiratory Culture --      Light growth (2+) Klebsiella pneumoniae (A)      Scant growth (1+) Normal Respiratory Ernesto     Gram Stain Result Moderate (3+) WBCs per low power field      Rare (1+) Epithelial cells per low power field      Many (4+) Yeast      Rare (1+) Normal respiratory ernesto    Susceptibility      Klebsiella pneumoniae     FAVIAN     Ampicillin 16 ug/ml Intermediate     Ampicillin + Sulbactam <=8/4 ug/ml Susceptible     Aztreonam <=8 ug/ml Susceptible     Cefepime <=8 ug/ml Susceptible     Cefotaxime <=2 ug/ml Susceptible     Ceftriaxone <=8 ug/ml Susceptible     Cefuroxime sodium <=4 ug/ml Susceptible     Ertapenem <=1 ug/ml Susceptible     Gentamicin <=4 ug/ml Susceptible     Levofloxacin <=2 ug/ml Susceptible     Meropenem <=1 ug/ml Susceptible     Piperacillin + Tazobactam <=16 ug/ml Susceptible     Tetracycline <=4 ug/ml Susceptible     Tobramycin <=4 ug/ml Susceptible     Trimethoprim + Sulfamethoxazole <=2/38 ug/ml Susceptible                    Influenza A & B, RT PCR - Swab, Nasopharynx [[id number]]  (Normal) Collected:  03/03/18 1356    Lab Status:  Final result Specimen:  Swab from Nasopharynx Updated:  03/03/18 1607     Influenza A PCR Not Detected     Influenza B PCR Not Detected    Blood Culture - Blood, [[id number]]  (Normal)  Collected:  02/18/18 2308    Lab Status:  Final result Specimen:  Blood from Arm, Left Updated:  02/23/18 2331     Blood Culture No growth at 5 days    Blood Culture - Blood, [[id number]]  (Normal) Collected:  02/18/18 2311    Lab Status:  Final result Specimen:  Blood from Arm, Left Updated:  02/23/18 2331     Blood Culture No growth at 5 days    Wound Culture - Wound, Foot, Right [[id number]]  (Abnormal)  (Susceptibility) Collected:  02/16/18 1931    Lab Status:  Final result Specimen:  Wound from Foot, Right Updated:  02/23/18 1045     Wound Culture --      Light growth (2+) Pseudomonas aeruginosa (A)      Scant growth (1+) Escherichia coli (A)      Scant growth (1+) Proteus mirabilis (A)      Scant growth (1+) Enterococcus faecalis (A)      Scant growth (1+) Streptococcus, Beta Hemolytic, Group G (A)     Comment:   If Clindamycin or Erythromycin is the drug of choice, notify the laboratory within 7 days to request susceptibility testing.        STREP GROUPING G     Gram Stain Result Few (2+) WBCs seen      Many (4+) Gram negative bacilli      Many (4+) Gram positive bacilli      Many (4+) Gram positive cocci in pairs and clusters    Susceptibility      Pseudomonas aeruginosa    Escherichia coli       FAVIAN    FAVIAN       Ampicillin       <=8 ug/ml Susceptible       Ampicillin + Sulbactam       <=8/4 ug/ml Susceptible       Aztreonam <=8 ug/ml Susceptible    <=8 ug/ml Susceptible       Cefepime <=8 ug/ml Susceptible    <=8 ug/ml Susceptible       Cefotaxime       <=2 ug/ml Susceptible       Ceftazidime 4 ug/ml Susceptible             Ceftriaxone       <=8 ug/ml Susceptible       Cefuroxime sodium       <=4 ug/ml Susceptible       Ertapenem       <=1 ug/ml Susceptible       Gentamicin <=4 ug/ml Susceptible    <=4 ug/ml Susceptible       Levofloxacin <=2 ug/ml Susceptible    <=2 ug/ml Susceptible       Meropenem <=1 ug/ml Susceptible    <=1 ug/ml Susceptible       Piperacillin + Tazobactam <=16 ug/ml Susceptible    <=16  ug/ml Susceptible       Tetracycline       <=4 ug/ml Susceptible       Tobramycin <=4 ug/ml Susceptible    <=4 ug/ml Susceptible       Trimethoprim + Sulfamethoxazole       <=2/38 ug/ml Susceptible                  Susceptibility      Proteus mirabilis    Enterococcus faecalis       FAVIAN    FAVIAN       Ampicillin >16 ug/ml Resistant    <=2 ug/ml Susceptible       Ampicillin + Sulbactam >16/8 ug/ml Resistant             Aztreonam <=8 ug/ml Susceptible             Cefepime <=8 ug/ml Susceptible             Cefotaxime <=2 ug/ml Susceptible             Ceftriaxone <=8 ug/ml Susceptible             Cefuroxime sodium <=4 ug/ml Susceptible             Ertapenem <=1 ug/ml Susceptible             Gentamicin <=4 ug/ml Susceptible             Gentamicin High Level Synergy       <=500 ug/ml Susceptible       Levofloxacin <=2 ug/ml Susceptible             Linezolid       2 ug/ml Susceptible       Meropenem <=1 ug/ml Susceptible             Penicillin G       2 ug/ml Susceptible       Piperacillin + Tazobactam <=16 ug/ml Susceptible             Streptomycin High Level Synergy       <=1000 ug/ml Susceptible       Tetracycline >8 ug/ml Resistant             Tobramycin <=4 ug/ml Susceptible             Trimethoprim + Sulfamethoxazole >2/38 ug/ml Resistant             Vancomycin       1 ug/ml Susceptible                      Blood Culture - Blood, Blood, Venous Line [[id number]]  (Normal) Collected:  02/16/18 2010    Lab Status:  Final result Specimen:  Blood from Arm, Left Updated:  02/21/18 2046     Blood Culture No growth at 5 days    Blood Culture - Blood, Blood, Venous Line [[id number]]  (Normal) Collected:  02/16/18 2005    Lab Status:  Final result Specimen:  Blood from Arm, Right Updated:  02/21/18 2046     Blood Culture No growth at 5 days    Respiratory Culture - Sputum, Cough [[id number]] Collected:  02/19/18 0656    Lab Status:  Final result Specimen:  Sputum from Cough Updated:  02/21/18 0931     Respiratory Culture --       Scant growth (1+) Normal Respiratory Nataliia     Gram Stain Result Occasional WBCs per low power field      No organisms seen              RADIOLOGY:  Imaging Results (last 72 hours)     Procedure Component Value Units Date/Time    XR Chest 1 View [[id number]] Collected:  02/16/18 2125     Updated:  02/16/18 2212    Narrative:       EXAM:    XR Chest, 1 View    CLINICAL HISTORY:    72 years old, male; Peripheral vascular disease, unspecified; Cellulitis of   right lower limb; Cellulitis of left lower limb; Acute kidney failure,   unspecified; Signs and symptoms; Other: Hypoxia    TECHNIQUE:    Frontal view of the chest.    COMPARISON:    CR - XR CHEST 1 VW 2017-06-25 06:32    FINDINGS:    Lungs:  There is mild prominence of the pulmonary vasculature. Chronic   scarring.    Pleural space:  Unremarkable.  No pneumothorax.    Heart:  There is an atherosclerotic cardiovascular configuration without   cardiomegaly.    Mediastinum:  Unremarkable.    Bones/joints:  There are degenerative changes of the spine.      Impression:         Mild pulmonary vascular congestion.    THIS DOCUMENT HAS BEEN ELECTRONICALLY SIGNED BY LENIN DIAZ MD    XR Chest 1 View [[id number]] Updated:  02/18/18 1333            Assessment and Plan:   Impression:      -- Cellulitis - Bilateral Lower Extremities - H/O streptococcal bacteremia - currently on minocycline     -- Acute Kidney Injury - stable     -- COPD - with worsening respiratory failure - diuretics given      --  HTN      -- Tobacco Abuse      -- Acute Hypoxic respiratory failure, remaining on 4LNC.    -- Leukocytosis, on prednisone.      -- Infiltrates - Primarily fluid, improved     -- Possible aspiration per pulmonary note - speech is to evaluate.     -- DTI - sacral  Stage 2     -- Gram negative and sputum / Klebsiella pneumoniae    -  2Vessel CAD - cath tomorrow. - new issue       PLAN/RECOMMENDATIONS:      -- Continue Minocycline - continued through 3/17  -- Continue wound care and  compression wraps.  -- heart cath today    BRITTNY Cotto for Dr. Parish Belle  3/13/2018

## 2018-03-13 NOTE — PLAN OF CARE
Problem: Patient Care Overview  Goal: Plan of Care Review  Outcome: Ongoing (interventions implemented as appropriate)   03/13/18 6543   Coping/Psychosocial   Plan of Care Reviewed With patient   Plan of Care Review   Progress no change   OTHER   Outcome Summary PT wound care following at this time for Unnaboot therapy and BLE management. Will defer care at this time to PT wound. Please contact WOC nurse if further needs arise. Thanks

## 2018-03-13 NOTE — PROGRESS NOTES
[facility] Medicine Services  PROGRESS NOTE    Patient Name: Yassine Love  : 1945  MRN: [id number]    Date of Admission: 2018  Length of Stay: 25  Primary Care Physician: No Known Provider    Subjective   CC: f/u cellulitis, NSVT    HPI:  Patient denies complaints, to go for heart cath today. Denies f/c/n/v/d    Review of Systems  Gen- No fevers, chills  CV- No palpitations, CP  Resp- (+) cough, exertional dyspnea stable  GI- No N/V/D, abd pain  Skin - BLE erythema pain are improving  Otherwise ROS is negative except as mentioned in the HPI.    Objective   Vital Signs:   Temp:  [97.7 °F (36.5 °C)-98.5 °F (36.9 °C)] 98.1 °F (36.7 °C)  Heart Rate:  [57-72] 62  Resp:  [16-20] 16  BP: (102-136)/(59-78) 123/67  Physical Exam:  Gen-chronically ill appearing male,  awake alert, appears in NAD    CV-RRR, S1 S2 normal, no m/r/g  Resp-decreased bilaterally throughout greatest in bases, few scattered expiratory wheezes, nonlabored respirations on 5LNC  Abd-soft, NT, ND, +BS  Neuro-A&Ox3, no focal deficits  Psych-appropriate mood, cooperative  Msk: BLE edema, both legs wrapped did not remove, no cyanosis of extremities  Skin- visible aspects of distal feet with mild erythema R>L     Results Reviewed:  I have personally reviewed current lab, radiology, and data and agree.    Results from last 7 days  Lab Units 18  0417 03/10/18  1000 18  0601   WBC 10*3/mm3  --  9.87 9.02   HEMOGLOBIN g/dL 10.4* 11.3* 9.6*   HEMATOCRIT % 32.8* 35.4* 30.1*   PLATELETS 10*3/mm3  --  310 334       Results from last 7 days  Lab Units 18  0416 18  0853 03/10/18  1000   SODIUM mmol/L 136 137 136   POTASSIUM mmol/L 4.4 4.5 5.5   CHLORIDE mmol/L 102 104 100   CO2 mmol/L 30.0 29.0 32.0*   BUN mg/dL 32* 31* 30*   CREATININE mg/dL 1.10 1.20 1.10   GLUCOSE mg/dL 89 177* 119*   CALCIUM mg/dL 8.1* 8.3* 8.9     Microbiology Results Abnormal     Procedure Component Value - Date/Time     Respiratory Culture - Sputum, Cough [[id number]]  (Abnormal)  (Susceptibility) Collected:  03/07/18 0801    Lab Status:  Final result Specimen:  Sputum from Cough Updated:  03/09/18 1424     Respiratory Culture --      Light growth (2+) Klebsiella pneumoniae (A)      Scant growth (1+) Normal Respiratory Ernesto     Gram Stain Result Moderate (3+) WBCs per low power field      Rare (1+) Epithelial cells per low power field      Many (4+) Yeast      Rare (1+) Normal respiratory ernesto    Susceptibility      Klebsiella pneumoniae     FAVIAN     Ampicillin 16 ug/ml Intermediate     Ampicillin + Sulbactam <=8/4 ug/ml Susceptible     Aztreonam <=8 ug/ml Susceptible     Cefepime <=8 ug/ml Susceptible     Cefotaxime <=2 ug/ml Susceptible     Ceftriaxone <=8 ug/ml Susceptible     Cefuroxime sodium <=4 ug/ml Susceptible     Ertapenem <=1 ug/ml Susceptible     Gentamicin <=4 ug/ml Susceptible     Levofloxacin <=2 ug/ml Susceptible     Meropenem <=1 ug/ml Susceptible     Piperacillin + Tazobactam <=16 ug/ml Susceptible     Tetracycline <=4 ug/ml Susceptible     Tobramycin <=4 ug/ml Susceptible     Trimethoprim + Sulfamethoxazole <=2/38 ug/ml Susceptible                    Influenza A & B, RT PCR - Swab, Nasopharynx [[id number]]  (Normal) Collected:  03/03/18 1356    Lab Status:  Final result Specimen:  Swab from Nasopharynx Updated:  03/03/18 1607     Influenza A PCR Not Detected     Influenza B PCR Not Detected    Blood Culture - Blood, [[id number]]  (Normal) Collected:  02/18/18 2308    Lab Status:  Final result Specimen:  Blood from Arm, Left Updated:  02/23/18 2331     Blood Culture No growth at 5 days    Blood Culture - Blood, [[id number]]  (Normal) Collected:  02/18/18 2311    Lab Status:  Final result Specimen:  Blood from Arm, Left Updated:  02/23/18 2331     Blood Culture No growth at 5 days    Wound Culture - Wound, Foot, Right [[id number]]  (Abnormal)  (Susceptibility) Collected:  02/16/18 1931    Lab Status:  Final result  Specimen:  Wound from Foot, Right Updated:  02/23/18 1045     Wound Culture --      Light growth (2+) Pseudomonas aeruginosa (A)      Scant growth (1+) Escherichia coli (A)      Scant growth (1+) Proteus mirabilis (A)      Scant growth (1+) Enterococcus faecalis (A)      Scant growth (1+) Streptococcus, Beta Hemolytic, Group G (A)     Comment:   If Clindamycin or Erythromycin is the drug of choice, notify the laboratory within 7 days to request susceptibility testing.        STREP GROUPING G     Gram Stain Result Few (2+) WBCs seen      Many (4+) Gram negative bacilli      Many (4+) Gram positive bacilli      Many (4+) Gram positive cocci in pairs and clusters    Susceptibility      Pseudomonas aeruginosa    Escherichia coli       FAVIAN    FAVIAN       Ampicillin       <=8 ug/ml Susceptible       Ampicillin + Sulbactam       <=8/4 ug/ml Susceptible       Aztreonam <=8 ug/ml Susceptible    <=8 ug/ml Susceptible       Cefepime <=8 ug/ml Susceptible    <=8 ug/ml Susceptible       Cefotaxime       <=2 ug/ml Susceptible       Ceftazidime 4 ug/ml Susceptible             Ceftriaxone       <=8 ug/ml Susceptible       Cefuroxime sodium       <=4 ug/ml Susceptible       Ertapenem       <=1 ug/ml Susceptible       Gentamicin <=4 ug/ml Susceptible    <=4 ug/ml Susceptible       Levofloxacin <=2 ug/ml Susceptible    <=2 ug/ml Susceptible       Meropenem <=1 ug/ml Susceptible    <=1 ug/ml Susceptible       Piperacillin + Tazobactam <=16 ug/ml Susceptible    <=16 ug/ml Susceptible       Tetracycline       <=4 ug/ml Susceptible       Tobramycin <=4 ug/ml Susceptible    <=4 ug/ml Susceptible       Trimethoprim + Sulfamethoxazole       <=2/38 ug/ml Susceptible                  Susceptibility      Proteus mirabilis    Enterococcus faecalis       FAVIAN    FAVIAN       Ampicillin >16 ug/ml Resistant    <=2 ug/ml Susceptible       Ampicillin + Sulbactam >16/8 ug/ml Resistant             Aztreonam <=8 ug/ml Susceptible             Cefepime <=8  ug/ml Susceptible             Cefotaxime <=2 ug/ml Susceptible             Ceftriaxone <=8 ug/ml Susceptible             Cefuroxime sodium <=4 ug/ml Susceptible             Ertapenem <=1 ug/ml Susceptible             Gentamicin <=4 ug/ml Susceptible             Gentamicin High Level Synergy       <=500 ug/ml Susceptible       Levofloxacin <=2 ug/ml Susceptible             Linezolid       2 ug/ml Susceptible       Meropenem <=1 ug/ml Susceptible             Penicillin G       2 ug/ml Susceptible       Piperacillin + Tazobactam <=16 ug/ml Susceptible             Streptomycin High Level Synergy       <=1000 ug/ml Susceptible       Tetracycline >8 ug/ml Resistant             Tobramycin <=4 ug/ml Susceptible             Trimethoprim + Sulfamethoxazole >2/38 ug/ml Resistant             Vancomycin       1 ug/ml Susceptible                      Blood Culture - Blood, Blood, Venous Line [[id number]]  (Normal) Collected:  02/16/18 2010    Lab Status:  Final result Specimen:  Blood from Arm, Left Updated:  02/21/18 2046     Blood Culture No growth at 5 days    Blood Culture - Blood, Blood, Venous Line [[id number]]  (Normal) Collected:  02/16/18 2005    Lab Status:  Final result Specimen:  Blood from Arm, Right Updated:  02/21/18 2046     Blood Culture No growth at 5 days    Respiratory Culture - Sputum, Cough [[id number]] Collected:  02/19/18 0656    Lab Status:  Final result Specimen:  Sputum from Cough Updated:  02/21/18 0931     Respiratory Culture --      Scant growth (1+) Normal Respiratory Nataliia     Gram Stain Result Occasional WBCs per low power field      No organisms seen        I have reviewed the medications.    Assessment/Plan   Assessment / Plan     Hospital Problem List     * (Principal)Cellulitis of both lower extremities    RAFAEL (acute kidney injury)    PVD (peripheral vascular disease)    COPD (chronic obstructive pulmonary disease)    Essential hypertension    Hypoxia    Bilateral cellulitis of lower leg     Non-sustained ventricular tachycardia    Coronary artery disease involving native coronary artery without angina pectoris        Brief Hospital Course to date:  Yassine Love is a 72 y.o. male  with a past medical history significant for PVD, essential hypertension, COPD, cellulitis of lower extremities, and tobacco abuse who presents to the ED with complaints of fevers, chills and bilateral lower extremity pain/edema/redness.    Assessment & Plan:  Acute hypoxemic respiratory failure -   - secondary to COPD exacerbation and diastolic heart failure  - CTA negative for PE  - 3/6 CXR reviewed, stable, no acute changes  - 3/8 ECHO EF 63%, RVSP not noted. Consider pulmonary hypertension   - Continue daily Bumex, holding today due to contrast and procedure, plan to restart in am.   - Daily fluid restriction to 1500cc (which patient is non-complaint with) was given light IVF for renoprotection.   - treating COPD as below  - Pulmonology consulting, appreciate input: 5 day course of pred 40mg, changed bronchodilators to pulmicort + brovanna bid + duoneb qid + mucomyst qid. Do IS q1hwa and flutter qid. Abx per ID. At dc continue Pulmicort/Brovana/Ipratropium neb bid.  Repeat CT chest  3 months as O/P.    - No s/s or concerns for pharyngeal dysphagia, continue regular diet. Possibly secondary to reflux  -sputum 3/7 grew Klebsiella susceptible to tetracycline . D/w Dr Belle, extended abx for additional week. Probiotic     Severe 2V CAD s/p LHC  --LHC 3/11 revealed severe 2V CAD -80% LAD and RCA. repeat LHC via femoral access and PCI today. By Dr Harvey   --Plavix load dose 300mg Monday 3/12    COPD exacerbation: Symbicort, schedule Duonebs  - finished  5 day course of prednisone 40mg on 3/10  - Currently on 5L NC    NSVT ? VT of 40 secs  -3/6 and 3/7 in a.m.   -TSH normal, Mg and K+ normal    - LE cellulitis/ Chronic LE wounds: switched to minocycline through 3/10, ID on board. Continue PO morphine at home dose PRN - OLIVA  reviewed. Decreased gabapentin back to prior dose, increased dose made him drowsy. Continue PT WOC for bilateral LE wrapping.    HTN: Holding BP meds. Marginal BP at times.     RAFAEL: Baseline Cr 0.9-1.3. Stable. K+ improved, Cr 1.2 watch with 2nd cath planned. Given IVF NS @75ml/hr x24hr. Will hold bumex today. For 1 dose. Strict I/O     Anemia: mixed anemia, Iron low, continue oral Iron. Negative FOBT     Tobacco abuse- Nicotine patch    DVT Prophylaxis:  [facility]  CODE STATUS: Full Code    Disposition: To [facility] when cleared by cardiology, planned PCI today      Danii Tabor,   03/13/18  2:59 PM

## 2018-03-13 NOTE — PLAN OF CARE
Problem: Patient Care Overview  Goal: Plan of Care Review  Outcome: Ongoing (interventions implemented as appropriate)   03/13/18 1121   Coping/Psychosocial   Plan of Care Reviewed With patient   Plan of Care Review   Progress improving   OTHER   Outcome Summary Pt completed bed mobility supine to sitting EOB with conditional I, sat EOB during grooming activities with UE support and close supervision. Pt bed to chair with CGA, R UE support, and VC's for HP and safety. Cont. IPOT per POC.

## 2018-03-13 NOTE — PLAN OF CARE
Problem: Fall Risk (Adult)  Goal: Identify Related Risk Factors and Signs and Symptoms  Outcome: Ongoing (interventions implemented as appropriate)    Goal: Absence of Fall  Outcome: Ongoing (interventions implemented as appropriate)      Problem: Arrhythmia/Dysrhythmia (Symptomatic) (Adult)  Goal: Signs and Symptoms of Listed Potential Problems Will be Absent, Minimized or Managed (Arrhythmia/Dysrhythmia)  Outcome: Ongoing (interventions implemented as appropriate)      Problem: Patient Care Overview  Goal: Plan of Care Review  Outcome: Ongoing (interventions implemented as appropriate)   03/13/18 0427   Coping/Psychosocial   Plan of Care Reviewed With patient   Plan of Care Review   Progress no change   OTHER   Outcome Summary VSS, unna boots intact. Been npo [id number] for heart cath today. Will continue to monitor

## 2018-03-13 NOTE — PROGRESS NOTES
[facility] at [facility]    Inpatient Progress Note      Chief Complaint/Reason for service:    · Wide complex tachycardia         Subjective:     Denies chest pain to suggest angina.  Denies palpitations to suggest DVT.  No VT noted on monitor over the last 2 days.  Stable chronic dyspnea on nasal cannula O2.  Anxious about PCI.  Has some left forearm and elbow discomfort that may be attributed to the prior radial artery access for his diagnostic catheterization.  Otherwise no difficulty using his hand.    Past medical, surgical, social and family history reviewed in the patient's electronic medical record.    Review of Systems:   Positive for dyspnea, arm discomfort  Negative for exertional chest pain, lower extremity edema, palpitations     Problem List  Active Hospital Problems (** Indicates Principal Problem)    Diagnosis Date Noted   • **Cellulitis of both lower extremities [L03.115, L03.116] 02/16/2018   • Coronary artery disease involving native coronary artery without angina pectoris [I25.10] 03/12/2018   • Non-sustained ventricular tachycardia [I47.2] 03/08/2018   • COPD (chronic obstructive pulmonary disease) [J44.9] 02/16/2018   • Essential hypertension [I10] 02/16/2018   • Hypoxia [R09.02] 02/16/2018   • Bilateral cellulitis of lower leg [L03.116, L03.115] 02/16/2018   • RAFAEL (acute kidney injury) [N17.9] 06/20/2017   • PVD (peripheral vascular disease) [I73.9] 06/20/2017      Resolved Hospital Problems    Diagnosis Date Noted Date Resolved   • Hyponatremia [E87.1] 02/16/2018 02/17/2018            Objective:      Current Facility-Administered Medications:   •  acetaminophen (TYLENOL) tablet 650 mg, 650 mg, Oral, Q4H PRN, Nicole Hewitt DO, 650 mg at 03/12/18 3987  •  albuterol (PROVENTIL) nebulizer solution 0.083% 2.5 mg/3mL, 2.5 mg, Nebulization, Q6H PRN, Casie M Mayne, PA-C  •  arformoterol (BROVANA) nebulizer solution 15 mcg, 15 mcg, Nebulization, BID - RT, Todd Tracy  MD Massimo, 15 mcg at 03/13/18 0730  •  aspirin EC tablet 81 mg, 81 mg, Oral, Daily, Carlos Harvey MD, 81 mg at 03/12/18 0934  •  atorvastatin (LIPITOR) tablet 40 mg, 40 mg, Oral, Nightly, Carlos Harvey MD, 40 mg at 03/12/18 2246  •  bisacodyl (DULCOLAX) suppository 10 mg, 10 mg, Rectal, Daily PRN, Nicole Hewitt DO, 10 mg at 02/23/18 0823  •  budesonide (PULMICORT) nebulizer solution 0.5 mg, 0.5 mg, Nebulization, BID - RT, Todd Keys MD, 0.5 mg at 03/13/18 0730  •  [START ON 3/14/2018] bumetanide (BUMEX) tablet 1 mg, 1 mg, Oral, Daily, Casie M Mayne, PA-C  •  calcium carbonate (TUMS) chewable tablet 500 mg (200 mg elemental), 2 tablet, Oral, BID PRN, BRITTNY Estrada, 2 tablet at 02/27/18 0946  •  castor oil-balsam peru (VENELEX) ointment, , Topical, Q12H, Nicole Hewitt DO  •  clopidogrel (PLAVIX) tablet 75 mg, 75 mg, Oral, Daily, Carlos Harvey MD  •  diclofenac (VOLTAREN) 1 % gel 2 g, 2 g, Topical, 4x Daily, Nicole Hewitt DO, 2 g at 03/12/18 2250  •  docusate sodium (COLACE) capsule 200 mg, 200 mg, Oral, Daily, Nicole Hewitt DO, 200 mg at 03/12/18 0934  •  fluticasone (FLONASE) 50 MCG/ACT nasal spray 2 spray, 2 spray, Each Nare, Daily, Esmer Watkins MD, 2 spray at 03/12/18 0935  •  gabapentin (NEURONTIN) capsule 200 mg, 200 mg, Oral, Q8H, Nicole Hewitt DO, 200 mg at 03/13/18 0619  •  guaiFENesin (MUCINEX) 12 hr tablet 600 mg, 600 mg, Oral, BID PRN, Ezequiel Alfred PA-C, 600 mg at 02/20/18 0818  •  heparin (porcine) 5000 UNIT/ML injection 5,000 Units, 5,000 Units, Subcutaneous, Q8H, Nicole Hewitt DO, 5,000 Units at 03/12/18 2253  •  HYDROcodone-acetaminophen (NORCO) 7.5-325 MG per tablet 1 tablet, 1 tablet, Oral, Q4H PRN, Yumiko Castanon MD, 1 tablet at 03/13/18 0210  •  ipratropium-albuterol (DUO-NEB) nebulizer solution 3 mL, 3 mL, Nebulization, 4x Daily - RT, Nicole Hewitt DO, 3 mL at 03/13/18 0730  •  magnesium hydroxide (MILK OF MAGNESIA) suspension 2400  mg/10mL 10 mL, 10 mL, Oral, Daily PRN, Nicole Hewitt, DO, 10 mL at 03/11/18 1802  •  Magnesium Sulfate 2 gram Bolus, followed by 8 gram infusion (total Mg dose 10 grams)- Mg less than or equal to 1mg/dL, 2 g, Intravenous, PRN **OR** Magnesium Sulfate 6 gram Infusion (2 gm x 3) -Mg 1.1 -1.5 mg/dL, 2 g, Intravenous, PRN **OR** magnesium sulfate 4 gram infusion- Mg 1.6-1.9 mg/dL, 4 g, Intravenous, PRN, Esmer Watkins MD  •  metoprolol tartrate (LOPRESSOR) tablet 25 mg, 25 mg, Oral, Q12H, BRITTNY Schaefer, 25 mg at 03/12/18 2247  •  minocycline (MINOCIN,DYNACIN) capsule 100 mg, 100 mg, Oral, Q12H, Casie M Mayne, PA-C, 100 mg at 03/13/18 0620  •  Morphine (MSIR) tablet 30 mg, 30 mg, Oral, Q6H PRN, Esmer Watkins MD, 30 mg at 03/13/18 0339  •  nicotine (NICODERM CQ) 14 MG/24HR patch 1 patch, 1 patch, Transdermal, Q24H, Stella Garvin PA-C, 1 patch at 03/12/18 0935  •  ondansetron (ZOFRAN) injection 4 mg, 4 mg, Intravenous, Q6H PRN, Alexandro Suarez PA-C, 4 mg at 02/21/18 1945  •  pantoprazole (PROTONIX) EC tablet 40 mg, 40 mg, Oral, Q AM, Todd Keys MD, 40 mg at 03/13/18 0620  •  Pharmacy Consult - [place], , Does not apply, Daily, Annette Ray, [facility]  •  potassium chloride (KLOR-CON) packet 40 mEq, 40 mEq, Oral, PRN, Esmer Watkins MD  •  potassium chloride (MICRO-K) CR capsule 40 mEq, 40 mEq, Oral, PRN, Esmer Watkins MD, 40 mEq at 02/22/18 1431  •  saccharomyces boulardii (FLORASTOR) capsule 250 mg, 250 mg, Oral, BID, Casie M Mayne, PA-C, 250 mg at 03/12/18 2246  •  sodium chloride 0.9 % flush 1-10 mL, 1-10 mL, Intravenous, PRN, Nicole Hewitt,   •  sodium chloride 0.9 % flush 10 mL, 10 mL, Intravenous, PRN, Steve Mcmanus MD, 10 mL at 03/01/18 2118  •  sodium chloride 0.9 % infusion, 75 mL/hr, Intravenous, Continuous, Casie M Mayne, PA-C, Last Rate: 75 mL/hr at 03/12/18 1257, 75 mL/hr at 03/12/18 1257  •  vitamin B-12 (CYANOCOBALAMIN) tablet 1,000 mcg, 1,000 mcg, Oral, Daily,  Yumiko Castanon MD, 1,000 mcg at 03/12/18 0935      Vital Sign Min/Max for last 24 hours  Temp  Min: 97.7 °F (36.5 °C)  Max: 98.5 °F (36.9 °C)   BP  Min: 102/59  Max: 131/76   Pulse  Min: 64  Max: 72   Resp  Min: 16  Max: 20   SpO2  Min: 91 %  Max: 100 %   No Data Recorded      Intake/Output Summary (Last 24 hours) at 03/13/18 0731  Last data filed at 03/13/18 0330   Gross per 24 hour   Intake             1080 ml   Output             1425 ml   Net             -345 ml         CONSTITUTIONAL: No acute distress, normal affect  RESPIRATORY: Normal effort. without wheezing or rales rhonchi   CARDIOVASCULAR: Regular rate and rhythm with normal S1 and S2. Without murmur, gallop or rub.  PERIPHERAL VASCULAR: There is mild peripheral edema bilaterally.  Lef mildly diminished left radial artery pulse.  t radial artery site without significant hematoma or ecchymosis.  Normal pulse ox waveform with pulse ox on third digit of left hand.    Results Review:   I reviewed the patient's recent labs in the electronic medical record.      Tele:  NSR, no VTach overnight    TTE 3/8/18  · This was a limited echocardiogram performed to assess left ventricular ejection fraction and wall motion only  · Left ventricular systolic function is normal. Estimated EF = 65%.  · All left ventricular wall segments contract normally.    TTE 6/2017  · Left ventricular systolic function is normal. Estimated EF = 60%.  · Left atrial cavity size is mildly dilated.  · Trace mitral regurgitation, trace tricuspid regurgitation.    [facility] 3/9/18:  · There is moderate diffuse coronary calcification.  There is severe multivessel coronary artery disease involving the proximal LAD and the mid RCA.  · There is an 80% discrete proximal LAD stenosis.  There is an 80% discrete mid RCA stenosis.  · Left ventricular ejection fraction 65% with no regional wall motion abnormalities in the VILLA projection.         Assessment/Plan:     ASSESSMENT:  1. Severe 2V CAD  - Planning  for PCI later today 3/13/18    2. NSVT, possible sustained VTach (40 seconds) versus SVT with aberrancy  - Two episodes of NSVT/VT, 3/6 00:38AM lasting 20 seconds, 3/7 07:04AM with symptoms of racing heart lasting 40 seconds, 3/8 22:52PM 15 seconds    - Echocardiogram without significant structural abnormality  - Thyroid, electrolytes normal  - Possibly ischemic rhythms in the setting of severe CAD and severe COPD  - Currently on metoprolol at 25 mg BID; on 100mg BID at home     3. SVT, 20 seconds, 3/7/18 16:28  -Beta blocker as above     4. Essential hypertension:  - Well controlled  - Starting metoprolol as noted above, monitor BPs; increase as tolerated     5. COPD/hypoxic respiratory failure:  - Managed per primary team/pulmonology    6. Hyperlipidemia  - . Continue statin.    PLAN:  -Planned PCI of LAD and RCA today via the right femoral artery approach.  Risks and benefits of the left heart catheterization were discussed.  Alternatives were also discussed.  Patient is agreeable to the plan.    -Loaded yesterday with Plavix, continuing today on daily dosing.  Continue all other CAD medicines for now.    -Adding Imdur for possible vasospasm of the left brachial artery and associated branches.      CHRISTEN Govea  03/13/18  8:10 AM     Carlos Harvey MD, MSc, [facility]  Interventional Cardiology  [facility] at [facility]    Addend  Heart Cath:  IMPRESSION:  · The 80% discrete proximal LAD stenosis is now status post intervention with a Xience Alpine 3.5 x 15 mm drug-eluting stent.  · The 80% discrete mid RCA stenosis is now status post intervention with a Xience Alpine 4.0 x 18 mm drug-eluting stent.    RECOMMENDATIONS:  · Continue dual antiplatelet, metoprolol, isosorbide mononitrate, high intensity statin  · Clinical monitoring overnight.  Patient may be except for discharge to rehabilitation by tomorrow.    Carlos Harvey MD, MSc, [facility]  Interventional Cardiology  [place]  [facility] at [facility]
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
## 2018-03-14 DIAGNOSIS — R00.2 PALPITATIONS: Primary | ICD-10-CM

## 2018-03-14 LAB
ANION GAP SERPL CALCULATED.3IONS-SCNC: 4 MMOL/L (ref 3–11)
BUN BLD-MCNC: 26 MG/DL (ref 9–23)
BUN/CREAT SERPL: 23.6 (ref 7–25)
CALCIUM SPEC-SCNC: 8.2 MG/DL (ref 8.7–10.4)
CHLORIDE SERPL-SCNC: 104 MMOL/L (ref 99–109)
CO2 SERPL-SCNC: 32 MMOL/L (ref 20–31)
CREAT BLD-MCNC: 1.1 MG/DL (ref 0.6–1.3)
DEPRECATED RDW RBC AUTO: 63.5 FL (ref 37–54)
ERYTHROCYTE [DISTWIDTH] IN BLOOD BY AUTOMATED COUNT: 17 % (ref 11.3–14.5)
GFR SERPL CREATININE-BSD FRML MDRD: 66 ML/MIN/1.73
GLUCOSE BLD-MCNC: 142 MG/DL (ref 70–100)
HCT VFR BLD AUTO: 33.7 % (ref 38.9–50.9)
HGB BLD-MCNC: 10.6 G/DL (ref 13.1–17.5)
MCH RBC QN AUTO: 32.1 PG (ref 27–31)
MCHC RBC AUTO-ENTMCNC: 31.5 G/DL (ref 32–36)
MCV RBC AUTO: 102.1 FL (ref 80–99)
PLATELET # BLD AUTO: 305 10*3/MM3 (ref 150–450)
PMV BLD AUTO: 9.8 FL (ref 6–12)
POTASSIUM BLD-SCNC: 5.3 MMOL/L (ref 3.5–5.5)
RBC # BLD AUTO: 3.3 10*6/MM3 (ref 4.2–5.76)
SODIUM BLD-SCNC: 140 MMOL/L (ref 132–146)
WBC NRBC COR # BLD: 8.26 10*3/MM3 (ref 3.5–10.8)

## 2018-03-14 PROCEDURE — 25010000002 HEPARIN (PORCINE) PER 1000 UNITS: Performed by: INTERNAL MEDICINE

## 2018-03-14 PROCEDURE — 94640 AIRWAY INHALATION TREATMENT: CPT

## 2018-03-14 PROCEDURE — 99233 SBSQ HOSP IP/OBS HIGH 50: CPT | Performed by: FAMILY MEDICINE

## 2018-03-14 PROCEDURE — 94799 UNLISTED PULMONARY SVC/PX: CPT

## 2018-03-14 PROCEDURE — 80048 BASIC METABOLIC PNL TOTAL CA: CPT | Performed by: INTERNAL MEDICINE

## 2018-03-14 PROCEDURE — 99232 SBSQ HOSP IP/OBS MODERATE 35: CPT | Performed by: INTERNAL MEDICINE

## 2018-03-14 PROCEDURE — 97110 THERAPEUTIC EXERCISES: CPT

## 2018-03-14 PROCEDURE — 85027 COMPLETE CBC AUTOMATED: CPT | Performed by: INTERNAL MEDICINE

## 2018-03-14 PROCEDURE — 94760 N-INVAS EAR/PLS OXIMETRY 1: CPT

## 2018-03-14 RX ORDER — HYDROCODONE BITARTRATE AND ACETAMINOPHEN 7.5; 325 MG/1; MG/1
1 TABLET ORAL EVERY 4 HOURS PRN
Status: DISCONTINUED | OUTPATIENT
Start: 2018-03-14 | End: 2018-03-15 | Stop reason: HOSPADM

## 2018-03-14 RX ADMIN — DOCUSATE SODIUM 200 MG: 100 CAPSULE, LIQUID FILLED ORAL at 08:11

## 2018-03-14 RX ADMIN — HEPARIN SODIUM 5000 UNITS: 5000 INJECTION, SOLUTION INTRAVENOUS; SUBCUTANEOUS at 05:20

## 2018-03-14 RX ADMIN — FLUTICASONE PROPIONATE 2 SPRAY: 50 SPRAY, METERED NASAL at 08:06

## 2018-03-14 RX ADMIN — ARFORMOTEROL TARTRATE 15 MCG: 15 SOLUTION RESPIRATORY (INHALATION) at 10:03

## 2018-03-14 RX ADMIN — MINOCYCLINE HYDROCHLORIDE 100 MG: 50 CAPSULE ORAL at 18:03

## 2018-03-14 RX ADMIN — ACETAMINOPHEN 650 MG: 325 TABLET ORAL at 15:36

## 2018-03-14 RX ADMIN — HEPARIN SODIUM 5000 UNITS: 5000 INJECTION, SOLUTION INTRAVENOUS; SUBCUTANEOUS at 21:47

## 2018-03-14 RX ADMIN — DICLOFENAC SODIUM 2 G: 10 GEL TOPICAL at 08:05

## 2018-03-14 RX ADMIN — ASPIRIN 81 MG: 81 TABLET, COATED ORAL at 08:11

## 2018-03-14 RX ADMIN — GABAPENTIN 200 MG: 100 CAPSULE ORAL at 21:47

## 2018-03-14 RX ADMIN — HYDROCODONE BITARTRATE AND ACETAMINOPHEN 1 TABLET: 7.5; 325 TABLET ORAL at 08:05

## 2018-03-14 RX ADMIN — METOPROLOL TARTRATE 25 MG: 25 TABLET ORAL at 21:47

## 2018-03-14 RX ADMIN — BUMETANIDE 1 MG: 1 TABLET ORAL at 08:11

## 2018-03-14 RX ADMIN — MORPHINE SULFATE 30 MG: 30 TABLET ORAL at 19:31

## 2018-03-14 RX ADMIN — DICLOFENAC SODIUM 2 G: 10 GEL TOPICAL at 21:54

## 2018-03-14 RX ADMIN — IPRATROPIUM BROMIDE AND ALBUTEROL SULFATE 3 ML: 2.5; .5 SOLUTION RESPIRATORY (INHALATION) at 08:09

## 2018-03-14 RX ADMIN — IPRATROPIUM BROMIDE AND ALBUTEROL SULFATE 3 ML: 2.5; .5 SOLUTION RESPIRATORY (INHALATION) at 21:03

## 2018-03-14 RX ADMIN — BUDESONIDE 0.5 MG: 0.5 INHALANT RESPIRATORY (INHALATION) at 08:11

## 2018-03-14 RX ADMIN — CASTOR OIL AND BALSAM, PERU 1 EACH: 788; 87 OINTMENT TOPICAL at 08:05

## 2018-03-14 RX ADMIN — IPRATROPIUM BROMIDE AND ALBUTEROL SULFATE 3 ML: 2.5; .5 SOLUTION RESPIRATORY (INHALATION) at 13:21

## 2018-03-14 RX ADMIN — HEPARIN SODIUM 5000 UNITS: 5000 INJECTION, SOLUTION INTRAVENOUS; SUBCUTANEOUS at 15:36

## 2018-03-14 RX ADMIN — Medication 250 MG: at 21:47

## 2018-03-14 RX ADMIN — METOPROLOL TARTRATE 25 MG: 25 TABLET ORAL at 08:11

## 2018-03-14 RX ADMIN — GABAPENTIN 200 MG: 100 CAPSULE ORAL at 15:36

## 2018-03-14 RX ADMIN — CLOPIDOGREL BISULFATE 75 MG: 75 TABLET ORAL at 08:11

## 2018-03-14 RX ADMIN — MORPHINE SULFATE 30 MG: 30 TABLET ORAL at 12:55

## 2018-03-14 RX ADMIN — MORPHINE SULFATE 30 MG: 30 TABLET ORAL at 05:19

## 2018-03-14 RX ADMIN — MINOCYCLINE HYDROCHLORIDE 100 MG: 50 CAPSULE ORAL at 05:19

## 2018-03-14 RX ADMIN — CASTOR OIL AND BALSAM, PERU: 788; 87 OINTMENT TOPICAL at 21:56

## 2018-03-14 RX ADMIN — GABAPENTIN 200 MG: 100 CAPSULE ORAL at 05:18

## 2018-03-14 RX ADMIN — CYANOCOBALAMIN TAB 1000 MCG 1000 MCG: 1000 TAB at 08:11

## 2018-03-14 RX ADMIN — BUDESONIDE 0.5 MG: 0.5 INHALANT RESPIRATORY (INHALATION) at 21:03

## 2018-03-14 RX ADMIN — ARFORMOTEROL TARTRATE 15 MCG: 15 SOLUTION RESPIRATORY (INHALATION) at 21:03

## 2018-03-14 RX ADMIN — Medication 250 MG: at 08:11

## 2018-03-14 RX ADMIN — IPRATROPIUM BROMIDE AND ALBUTEROL SULFATE 3 ML: 2.5; .5 SOLUTION RESPIRATORY (INHALATION) at 15:36

## 2018-03-14 RX ADMIN — ATORVASTATIN CALCIUM 40 MG: 40 TABLET, FILM COATED ORAL at 21:47

## 2018-03-14 RX ADMIN — NICOTINE 1 PATCH: 14 PATCH TRANSDERMAL at 08:10

## 2018-03-14 RX ADMIN — PANTOPRAZOLE SODIUM 40 MG: 40 TABLET, DELAYED RELEASE ORAL at 05:19

## 2018-03-14 RX ADMIN — ISOSORBIDE MONONITRATE 30 MG: 30 TABLET, EXTENDED RELEASE ORAL at 08:11

## 2018-03-14 NOTE — PLAN OF CARE
Problem: Patient Care Overview  Goal: Plan of Care Review  Outcome: Ongoing (interventions implemented as appropriate)   03/14/18 7050   Coping/Psychosocial   Plan of Care Reviewed With patient   Plan of Care Review   Progress no change   OTHER   Outcome Summary Cath site is c/d/i. Had some c/o of pain hip, head, and back pain. given PRN. [facility] tomorrow.

## 2018-03-14 NOTE — PROGRESS NOTES
Continued Stay Note  [facility]     Patient Name: Yassine Love  MRN: [id number]  Today's Date: 3/14/2018    Admit Date: 2/16/2018          Discharge Plan     Row Name 03/14/18 1247       Plan    Plan [facility] on 3-15     Patient/Family in Agreement with Plan yes    Plan Comments Patient has a bed at [facility] in their pulmonary unit on 3-15, call report to [phone] and fax transfer summary to [phone]. Called  bijal Perez, out of town, to inform of transfer and he agrees. Transportation arranged and  is at  1300 in patient's room and CM will arrange a [employer] 02 tank for car ride over.  Glendy at 0848     Row Name 03/14/18 1045       Plan    Plan [facility] on 3-15    Patient/Family in Agreement with Plan yes    Plan Comments Patient has a bed at [facility] on 3-15 in their pulmonary unit. Patient in agreement with this. CM will work on transportation. CM will call bijal Perez too. Glendy at ext [phone]               Discharge Codes    No documentation.       Expected Discharge Date and Time     Expected Discharge Date Expected Discharge Time    Mar 9, 2018             Silvana Owusu, RN

## 2018-03-14 NOTE — PLAN OF CARE
Problem: Patient Care Overview  Goal: Plan of Care Review  Outcome: Ongoing (interventions implemented as appropriate)   03/14/18 7436   Coping/Psychosocial   Plan of Care Reviewed With patient   Plan of Care Review   Progress improving   OTHER   Outcome Summary Able to perform 10 reps ther ex but decl. mobil d/t severe pain & fatigue (had amb to [facility] earlier w/ nsg); will d/c to [facility] 3-15

## 2018-03-14 NOTE — PLAN OF CARE
Problem: Fall Risk (Adult)  Goal: Identify Related Risk Factors and Signs and Symptoms  Outcome: Ongoing (interventions implemented as appropriate)    Goal: Absence of Fall  Outcome: Ongoing (interventions implemented as appropriate)      Problem: Arrhythmia/Dysrhythmia (Symptomatic) (Adult)  Goal: Signs and Symptoms of Listed Potential Problems Will be Absent, Minimized or Managed (Arrhythmia/Dysrhythmia)  Outcome: Ongoing (interventions implemented as appropriate)      Problem: Patient Care Overview  Goal: Plan of Care Review  Outcome: Ongoing (interventions implemented as appropriate)   03/14/18 0451   Coping/Psychosocial   Plan of Care Reviewed With patient   Plan of Care Review   Progress improving   OTHER   Outcome Summary Pt had heart cath today with stent to RCA and LAD. Has c/o pain in his feet and headache prn pain meds given. Possible d/c today to [facility]. Will continue to monitor.

## 2018-03-14 NOTE — PROGRESS NOTES
Continued Stay Note   [facility]     Patient Name: Yassine Love  MRN: [id number]  Today's Date: 3/14/2018    Admit Date: 2/16/2018          Discharge Plan     Row Name 03/14/18 1045       Plan    Plan [facility] on 3-15    Patient/Family in Agreement with Plan yes    Plan Comments Patient has a bed at [facility] on 3-15 in their pulmonary unit. Patient in agreement with this. CM will work on transportation. CM will call son Chris [place] ri[place]. Glendy at ext [phone]               Discharge Codes    No documentation.       Expected Discharge Date and Time     Expected Discharge Date Expected Discharge Time    Mar 9, 2018             Silvana Owusu RN

## 2018-03-14 NOTE — PROGRESS NOTES
Yassine Love  1945  [id number]  3/14/2018    CC:   Chief Complaint   Patient presents with   • Leg Swelling       Yassine Love is a 72 y.o. male here for leg infections   History of present illness:    Mr. Yassine Love is a 72 y.o. male with CC: Cellulitis of both lower extremities. He presented to the [facility] ED with increased bilateral extremity edema with increased pain and redness in the setting of unna boot therapy via home health care for the past several months. He does complain of foul odor and drainage of BLE and fever, and chills.    He denies No SOB or cough today: No chest pain, No N/V/D at present and no abdominal pain.  No  symptoms: no new rash: No HA, photophobia or neck stiffness; He denies other focal pain.  Mr. Love was followed by Dr. Duncan during a June, 2017 hospitalization and was was treated with Invanz for a streptococcal bacteremia.      2/18 - co leg infection  No fever or chills  2/19/18 - C/O HA, generalized pain, and respiratory distress  Patient denies any fever, chills, or sweats.  Patient denies any nausea, vomiting, or diarrhea.  Now on hi-emperatriz oxygen. Seen and agree  2/20 - co leg infections  Co weakness  No fever  Lives alone  2/21/18 - Patient reports mild dyspnea, remains on hi emperatriz oxygen.  Patient denies any fever, chills, or sweats.  Patient denies any nausea, vomiting, or diarrhea.  C/O BLE pain.  Seen and agree  2/22 - co SOA  Co weakness  No fever or chills  No rash  Co leg pain and swelling  2/23/18 - Patient is sleeping today peacefully on 4 LNC.  ROS discussed with staff.  Seen and agree  2/26 - No hx available  ROS discussed with nurse  2/28/18:   Does not want to go to rehab, wants to go home.  Unna boots just changed yesterday.  No fever, chills, sweats.   Seen and agree  3/5/18 - Looking at places for rehab.  Patient denies any fever, chills, or sweats.  Per nursing requiring 4LNC.  On oral antibiotics.    Seen and agree  3/6/18 - C/O pain in BLE.   "Patient denies any fever, chills, or sweats.  Continues on 4LNC.  Patient denies any nausea, vomiting, or diarrhea.  RN at .  Patient working with PT.  Seen and agree  3/7 - co new issue with decubitus  Co SOA  Co weakness  No fever  3/8/18 - \"They think there is something wrong with my heart.\"  Patient denies any fever, chills, or sweats.  Patient reports to me he doesn't want any chest compressions or be put on the vent.\"  Discussed with nursing this is current conditional code status.  Seen and agree.  3/9 - No Hx available  ROS discussed with staff  3/12/18 - \"I'm have a heart cath tomorrow.\"  Patient denies any fever, chills, or sweats.  Patient denies any nausea, vomiting, or diarrhea.  C/O pain in BLE.  Seen and agree  3/13/18 - Patient is having heart cath today.  Patient denies any fever, chills, or sweats.  Patient denies any nausea, vomiting, or diarrhea.  Seen and agree  3/14/18 - Patient did well yesterday with heart cath.  Patient denies any fever, chills, or sweats.  Patient denies any nausea, vomiting, or diarrhea. Concerned why his legs still hurt.  Concerned about discharge to [facility].  Seen and agree   Past medical history:  Past Medical History:   Diagnosis Date   • Cancer    • Cellulitis of both lower extremities     [facility] 2016   • Chronic pain    • COPD (chronic obstructive pulmonary disease)    • Hypertension    • Osteoarthritis cervical spine    • Osteoarthritis of both knees    • Osteoarthritis of left hip        Medications:   Current Facility-Administered Medications:   •  acetaminophen (TYLENOL) tablet 650 mg, 650 mg, Oral, Q4H PRN, Nicole Hewitt DO, 650 mg at 03/12/18 1737  •  albuterol (PROVENTIL) nebulizer solution 0.083% 2.5 mg/3mL, 2.5 mg, Nebulization, Q6H PRN, Casie M Mayne, PA-C  •  arformoterol (BROVANA) nebulizer solution 15 mcg, 15 mcg, Nebulization, BID - RT, Todd Keys MD, 15 mcg at 03/14/18 1003  •  aspirin EC tablet 81 mg, 81 mg, Oral, Daily, " Carlos Harvey MD, 81 mg at 03/14/18 0811  •  atorvastatin (LIPITOR) tablet 40 mg, 40 mg, Oral, Nightly, Carlos Harvey MD, 40 mg at 03/13/18 2111  •  bisacodyl (DULCOLAX) suppository 10 mg, 10 mg, Rectal, Daily PRN, Nicole Hewitt DO, 10 mg at 02/23/18 0823  •  budesonide (PULMICORT) nebulizer solution 0.5 mg, 0.5 mg, Nebulization, BID - RT, Todd Keys MD, 0.5 mg at 03/14/18 0811  •  bumetanide (BUMEX) tablet 1 mg, 1 mg, Oral, Daily, Casie M Mayne, PA-C, 1 mg at 03/14/18 0811  •  calcium carbonate (TUMS) chewable tablet 500 mg (200 mg elemental), 2 tablet, Oral, BID PRN, BRITTNY Estrada, 2 tablet at 02/27/18 0946  •  castor oil-balsam peru (VENELEX) ointment, , Topical, Q12H, Nicole Hewitt DO, 1 each at 03/14/18 0805  •  clopidogrel (PLAVIX) tablet 75 mg, 75 mg, Oral, Daily, Carlos Harvey MD, 75 mg at 03/14/18 0811  •  diclofenac (VOLTAREN) 1 % gel 2 g, 2 g, Topical, 4x Daily, Nicole Hewitt DO, 2 g at 03/14/18 0805  •  docusate sodium (COLACE) capsule 200 mg, 200 mg, Oral, Daily, Nicole Hewitt DO, 200 mg at 03/14/18 0811  •  fluticasone (FLONASE) 50 MCG/ACT nasal spray 2 spray, 2 spray, Each Nare, Daily, Esmer Watkins MD, 2 spray at 03/14/18 0806  •  gabapentin (NEURONTIN) capsule 200 mg, 200 mg, Oral, Q8H, Nicole Hewitt DO, 200 mg at 03/14/18 0518  •  guaiFENesin (MUCINEX) 12 hr tablet 600 mg, 600 mg, Oral, BID PRN, Ezequiel Alfred PA-C, 600 mg at 02/20/18 0818  •  heparin (porcine) 5000 UNIT/ML injection 5,000 Units, 5,000 Units, Subcutaneous, Q8H, Nicole Hewitt DO, 5,000 Units at 03/14/18 0520  •  HYDROcodone-acetaminophen (NORCO) 7.5-325 MG per tablet 1 tablet, 1 tablet, Oral, Q4H PRN, Yumiko Castanon MD, 1 tablet at 03/14/18 0805  •  ipratropium-albuterol (DUO-NEB) nebulizer solution 3 mL, 3 mL, Nebulization, 4x Daily - RT, Nicole Hewitt DO, 3 mL at 03/14/18 0809  •  magnesium hydroxide (MILK OF MAGNESIA) suspension 2400 mg/10mL 10 mL, 10 mL, Oral, Daily  PRN, Nicole Hewitt DO, 10 mL at 03/11/18 1802  •  Magnesium Sulfate 2 gram Bolus, followed by 8 gram infusion (total Mg dose 10 grams)- Mg less than or equal to 1mg/dL, 2 g, Intravenous, PRN **OR** Magnesium Sulfate 6 gram Infusion (2 gm x 3) -Mg 1.1 -1.5 mg/dL, 2 g, Intravenous, PRN **OR** magnesium sulfate 4 gram infusion- Mg 1.6-1.9 mg/dL, 4 g, Intravenous, PRN, Esmer Watkins MD  •  metoprolol tartrate (LOPRESSOR) tablet 25 mg, 25 mg, Oral, Q12H, BRITTNY Schaefer, 25 mg at 03/14/18 0811  •  minocycline (MINOCIN,DYNACIN) capsule 100 mg, 100 mg, Oral, Q12H, Casie M Mayne, PA-C, 100 mg at 03/14/18 0519  •  Morphine (MSIR) tablet 30 mg, 30 mg, Oral, Q6H PRN, Esmer Watkins MD, 30 mg at 03/14/18 0519  •  nicotine (NICODERM CQ) 14 MG/24HR patch 1 patch, 1 patch, Transdermal, Q24H, Stella Garvin PA-C, 1 patch at 03/14/18 0810  •  ondansetron (ZOFRAN) injection 4 mg, 4 mg, Intravenous, Q6H PRN, Alexandro Suarez PA-C, 4 mg at 02/21/18 1945  •  pantoprazole (PROTONIX) EC tablet 40 mg, 40 mg, Oral, Q AM, Todd Keys MD, 40 mg at 03/14/18 0519  •  Pharmacy Consult - [facility], , Does not apply, Daily, Annette Ray, [facility]  •  potassium chloride (KLOR-CON) packet 40 mEq, 40 mEq, Oral, PRN, Esmer Watkins MD  •  potassium chloride (MICRO-K) CR capsule 40 mEq, 40 mEq, Oral, PRN, Esmer Watkins MD, 40 mEq at 02/22/18 1431  •  saccharomyces boulardii (FLORASTOR) capsule 250 mg, 250 mg, Oral, BID, Casie M Mayne, PA-C, 250 mg at 03/14/18 0811  •  sodium chloride 0.9 % flush 1-10 mL, 1-10 mL, Intravenous, PRN, Nicole Hewitt,   •  sodium chloride 0.9 % flush 10 mL, 10 mL, Intravenous, PRN, Steve Mcmanus MD, 10 mL at 03/01/18 2118  •  vitamin B-12 (CYANOCOBALAMIN) tablet 1,000 mcg, 1,000 mcg, Oral, Daily, Yumiko Castanon MD, 1,000 mcg at 03/14/18 0811  Antibiotics:  Anti-Infectives     Ordered     Dose/Rate Route Frequency Start Stop    03/08/18 0921  minocycline (MINOCIN,DYNACIN) capsule 100  "mg     Casie M Mayne, PA-C reviewed the order on 03/08/18 0955.   Ordering Provider:  Casie M Mayne, PA-C    100 mg Oral Every 12 Hours 03/08/18 1800 03/17/18 2359    02/16/18 2302  vancomycin 500 mg/100 mL 0.9% NS IVPB (mbp)     Ordering Provider:  Carlos Walls, RPH    500 mg  over 60 Minutes Intravenous Once 02/16/18 2345 02/17/18 0032    02/16/18 1952  vancomycin (VANCOCIN) in iso-osmotic dextrose IVPB 1 g (premix) 200 mL     Ordering Provider:  Alexandro Suarez PA-C    1,000 mg  over 60 Minutes Intravenous Once 02/16/18 1954 02/16/18 2122 02/16/18 1952  meropenem (MERREM) 1 g/100 mL 0.9% NS VTB (mbp)     Ordering Provider:  Alexandro Suarez PA-C    1 g  over 30 Minutes Intravenous Once 02/16/18 1954 02/16/18 2059          Allergies:  is allergic to ciprofloxacin hcl and ceftin [cefuroxime axetil].    Family History: family history includes COPD in his brother; Heart attack in his brother; Stroke in his father.    Social History:  reports that he has been smoking Cigarettes.  He has a 84.00 pack-year smoking history. He does not have any smokeless tobacco history on file. He reports that he does not drink alcohol or use drugs.    Review of Systems: All other reviewed and negative except as per HPI    Blood pressure 121/75, pulse 77, temperature 98 °F (36.7 °C), temperature source Oral, resp. rate 16, height 172.7 cm (67.99\"), weight 82.1 kg (181 lb), SpO2 91 %.  GENERAL:Chronically ill appearing. Upset this a.m.  HEENT: Oropharynx without thrush.  EYES: . No conjunctival injection. No icterus.   LYMPHATICS: No lymphadenopathy of the neck or axillary or inguinal regions.   HEART:  Irregular  LUNGS:  Diminished throughout.  On 2LNC.  ABDOMEN: Soft, nontender, ND   SKIN: BLE dressings intact.  Sacral decub multiple sites stage 2  EXT:  + edema. BLE with compression stockings. Seen and agree       DIAGNOSTICS:  Lab Results   Component Value Date    WBC 8.26 03/14/2018    HGB 10.6 (L) 03/14/2018    HCT 33.7 " (L) 03/14/2018     03/14/2018     Lab Results   Component Value Date    CRP 13.12 (H) 06/21/2017     Lab Results   Component Value Date    SEDRATE 56 (H) 06/20/2017     Lab Results   Component Value Date    GLUCOSE 142 (H) 03/14/2018    BUN 26 (H) 03/14/2018    CREATININE 1.10 03/14/2018    EGFRIFNONA 66 03/14/2018    BCR 23.6 03/14/2018    CO2 32.0 (H) 03/14/2018    CALCIUM 8.2 (L) 03/14/2018    ALBUMIN 3.30 02/23/2018    LABIL2 0.9 (L) 02/17/2018    AST 15 02/17/2018    ALT 4 (L) 02/17/2018       Microbiology:   Microbiology Results Abnormal     Procedure Component Value - Date/Time    Respiratory Culture - Sputum, Cough [[id number]]  (Abnormal)  (Susceptibility) Collected:  03/07/18 0801    Lab Status:  Final result Specimen:  Sputum from Cough Updated:  03/09/18 1424     Respiratory Culture --      Light growth (2+) Klebsiella pneumoniae (A)      Scant growth (1+) Normal Respiratory Ernesto     Gram Stain Result Moderate (3+) WBCs per low power field      Rare (1+) Epithelial cells per low power field      Many (4+) Yeast      Rare (1+) Normal respiratory ernesto    Susceptibility      Klebsiella pneumoniae     FAVIAN     Ampicillin 16 ug/ml Intermediate     Ampicillin + Sulbactam <=8/4 ug/ml Susceptible     Aztreonam <=8 ug/ml Susceptible     Cefepime <=8 ug/ml Susceptible     Cefotaxime <=2 ug/ml Susceptible     Ceftriaxone <=8 ug/ml Susceptible     Cefuroxime sodium <=4 ug/ml Susceptible     Ertapenem <=1 ug/ml Susceptible     Gentamicin <=4 ug/ml Susceptible     Levofloxacin <=2 ug/ml Susceptible     Meropenem <=1 ug/ml Susceptible     Piperacillin + Tazobactam <=16 ug/ml Susceptible     Tetracycline <=4 ug/ml Susceptible     Tobramycin <=4 ug/ml Susceptible     Trimethoprim + Sulfamethoxazole <=2/38 ug/ml Susceptible                    Influenza A & B, RT PCR - Swab, Nasopharynx [[id number]]  (Normal) Collected:  03/03/18 1356    Lab Status:  Final result Specimen:  Swab from Nasopharynx Updated:  03/03/18  1607     Influenza A PCR Not Detected     Influenza B PCR Not Detected    Blood Culture - Blood, [[id number]]  (Normal) Collected:  02/18/18 2308    Lab Status:  Final result Specimen:  Blood from Arm, Left Updated:  02/23/18 2331     Blood Culture No growth at 5 days    Blood Culture - Blood, [[id number]]  (Normal) Collected:  02/18/18 2311    Lab Status:  Final result Specimen:  Blood from Arm, Left Updated:  02/23/18 2331     Blood Culture No growth at 5 days    Wound Culture - Wound, Foot, Right [[id number]]  (Abnormal)  (Susceptibility) Collected:  02/16/18 1931    Lab Status:  Final result Specimen:  Wound from Foot, Right Updated:  02/23/18 1045     Wound Culture --      Light growth (2+) Pseudomonas aeruginosa (A)      Scant growth (1+) Escherichia coli (A)      Scant growth (1+) Proteus mirabilis (A)      Scant growth (1+) Enterococcus faecalis (A)      Scant growth (1+) Streptococcus, Beta Hemolytic, Group G (A)     Comment:   If Clindamycin or Erythromycin is the drug of choice, notify the laboratory within 7 days to request susceptibility testing.        STREP GROUPING G     Gram Stain Result Few (2+) WBCs seen      Many (4+) Gram negative bacilli      Many (4+) Gram positive bacilli      Many (4+) Gram positive cocci in pairs and clusters    Susceptibility      Pseudomonas aeruginosa    Escherichia coli       FAVIAN    FAVIAN       Ampicillin       <=8 ug/ml Susceptible       Ampicillin + Sulbactam       <=8/4 ug/ml Susceptible       Aztreonam <=8 ug/ml Susceptible    <=8 ug/ml Susceptible       Cefepime <=8 ug/ml Susceptible    <=8 ug/ml Susceptible       Cefotaxime       <=2 ug/ml Susceptible       Ceftazidime 4 ug/ml Susceptible             Ceftriaxone       <=8 ug/ml Susceptible       Cefuroxime sodium       <=4 ug/ml Susceptible       Ertapenem       <=1 ug/ml Susceptible       Gentamicin <=4 ug/ml Susceptible    <=4 ug/ml Susceptible       Levofloxacin <=2 ug/ml Susceptible    <=2 ug/ml Susceptible        Meropenem <=1 ug/ml Susceptible    <=1 ug/ml Susceptible       Piperacillin + Tazobactam <=16 ug/ml Susceptible    <=16 ug/ml Susceptible       Tetracycline       <=4 ug/ml Susceptible       Tobramycin <=4 ug/ml Susceptible    <=4 ug/ml Susceptible       Trimethoprim + Sulfamethoxazole       <=2/38 ug/ml Susceptible                  Susceptibility      Proteus mirabilis    Enterococcus faecalis       FAVIAN    FAVIAN       Ampicillin >16 ug/ml Resistant    <=2 ug/ml Susceptible       Ampicillin + Sulbactam >16/8 ug/ml Resistant             Aztreonam <=8 ug/ml Susceptible             Cefepime <=8 ug/ml Susceptible             Cefotaxime <=2 ug/ml Susceptible             Ceftriaxone <=8 ug/ml Susceptible             Cefuroxime sodium <=4 ug/ml Susceptible             Ertapenem <=1 ug/ml Susceptible             Gentamicin <=4 ug/ml Susceptible             Gentamicin High Level Synergy       <=500 ug/ml Susceptible       Levofloxacin <=2 ug/ml Susceptible             Linezolid       2 ug/ml Susceptible       Meropenem <=1 ug/ml Susceptible             Penicillin G       2 ug/ml Susceptible       Piperacillin + Tazobactam <=16 ug/ml Susceptible             Streptomycin High Level Synergy       <=1000 ug/ml Susceptible       Tetracycline >8 ug/ml Resistant             Tobramycin <=4 ug/ml Susceptible             Trimethoprim + Sulfamethoxazole >2/38 ug/ml Resistant             Vancomycin       1 ug/ml Susceptible                      Blood Culture - Blood, Blood, Venous Line [[id number]]  (Normal) Collected:  02/16/18 2010    Lab Status:  Final result Specimen:  Blood from Arm, Left Updated:  02/21/18 2046     Blood Culture No growth at 5 days    Blood Culture - Blood, Blood, Venous Line [[id number]]  (Normal) Collected:  02/16/18 2005    Lab Status:  Final result Specimen:  Blood from Arm, Right Updated:  02/21/18 2046     Blood Culture No growth at 5 days    Respiratory Culture - Sputum, Cough [[id number]] Collected:  02/19/18  0656    Lab Status:  Final result Specimen:  Sputum from Cough Updated:  02/21/18 0931     Respiratory Culture --      Scant growth (1+) Normal Respiratory Nataliia     Gram Stain Result Occasional WBCs per low power field      No organisms seen              RADIOLOGY:  Imaging Results (last 72 hours)     Procedure Component Value Units Date/Time    XR Chest 1 View [[id number]] Collected:  02/16/18 2125     Updated:  02/16/18 2212    Narrative:       EXAM:    XR Chest, 1 View    CLINICAL HISTORY:    72 years old, male; Peripheral vascular disease, unspecified; Cellulitis of   right lower limb; Cellulitis of left lower limb; Acute kidney failure,   unspecified; Signs and symptoms; Other: Hypoxia    TECHNIQUE:    Frontal view of the chest.    COMPARISON:    CR - XR CHEST 1 VW 2017-06-25 06:32    FINDINGS:    Lungs:  There is mild prominence of the pulmonary vasculature. Chronic   scarring.    Pleural space:  Unremarkable.  No pneumothorax.    Heart:  There is an atherosclerotic cardiovascular configuration without   cardiomegaly.    Mediastinum:  Unremarkable.    Bones/joints:  There are degenerative changes of the spine.      Impression:         Mild pulmonary vascular congestion.    THIS DOCUMENT HAS BEEN ELECTRONICALLY SIGNED BY LENIN DIAZ MD    XR Chest 1 View [[id number]] Updated:  02/18/18 9423            Assessment and Plan:   Impression:      -- Cellulitis - Bilateral Lower Extremities - H/O streptococcal bacteremia - currently on minocycline     -- Acute Kidney Injury - stable     -- COPD - with worsening respiratory failure - diuretics given      --  HTN      -- Tobacco Abuse      -- Acute Hypoxic respiratory failure, remaining on 4LNC.    -- Leukocytosis, on prednisone.      -- Infiltrates - Primarily fluid, improved     -- Possible aspiration per pulmonary note - speech is to evaluate.     -- DTI - sacral  Stage 2     -- Gram negative and sputum / Klebsiella pneumoniae    -  2Vessel CAD - s/p  stent     PLAN/RECOMMENDATIONS:      -- Continue Minocycline - continued through 3/21- Reevaluate   -- Continue wound care and compression wraps.  -- planning transfer to [facility] at some point.  UM Discussed with staff     BRITTNY Cotto for Dr. Parish Belle  3/14/2018

## 2018-03-14 NOTE — THERAPY TREATMENT NOTE
Acute Care - Physical Therapy Treatment Note  [facility]     Patient Name: Yassine Love  : 1945  MRN: [id number]  Today's Date: 3/14/2018     Date of Referral to PT: 18       Admit Date: 2018    Visit Dx:    ICD-10-CM ICD-9-CM   1. Bilateral cellulitis of lower leg L03.116 682.6    L03.115    2. RAFAEL (acute kidney injury) N17.9 584.9   3. PVD (peripheral vascular disease) I73.9 443.9   4. Impaired mobility and ADLs Z74.09 799.89   5. Impaired functional mobility, balance, gait, and endurance Z74.09 V49.89   6. Cellulitis of both lower extremities L03.115 682.6    L03.116    7. Chronic obstructive pulmonary disease, unspecified COPD type J44.9 496   8. Essential hypertension I10 401.9   9. Hypoxia R09.02 799.02   10. Cellulitis of right lower extremity L03.115 682.6     Patient Active Problem List   Diagnosis   • Cellulitis of right lower extremity   • RAFAEL (acute kidney injury)   • PVD (peripheral vascular disease)   • Cellulitis of both lower extremities   • COPD (chronic obstructive pulmonary disease)   • Essential hypertension   • Hypoxia   • Bilateral cellulitis of lower leg   • Non-sustained ventricular tachycardia   • Coronary artery disease involving native coronary artery without angina pectoris       Therapy Treatment    Therapy Treatment / Health Promotion    Treatment Time/Intention  Discipline: physical therapist (Rubia Amador, PT)  Document Type: therapy note (daily note) (Rubia Amador, PT)  Subjective Information: complains of, weakness, pain (freezing;hadLHC,RHCyest;[facility]PremedL[facility]ab ) (Rubia Amador, PT)  Mode of Treatment: physical therapy (Rubai Amador, PT)  Patient/Family Observations: sup.in bed;tele;IV;O2,Exit alarm;blanket over head d/t severe HA/Shielding eyes from light (Rubia Amador, PT)  Care Plan Review: care plan/treatment goals reviewed, patient/other agree to care plan (Rubia Amador, PT)  Patient Effort: good (Rubia Amador,  "PT)  Existing Precautions/Restrictions: fall (\"bad L hip/knee;need THR,TKR\") (Rubia Amador PT)  Plan of Care Review  Plan of Care Reviewed With: patient (Rubia Amador PT)    Vitals/Pain/Safety  Vital Signs  Pre Systolic BP Rehab: 107 (Rubia Amador PT)  Pre Treatment Diastolic BP: 60 (Rubia Amador PT)  Post Systolic BP Rehab: 105 (Rubia Amador PT)  Post Treatment Diastolic BP: 60 (Rubia Amador PT)  Pretreatment Heart Rate (beats/min): 89 (Rubia Amador PT)  Intratreatment Heart Rate (beats/min): 92 (Rubia Amador PT)  Posttreatment Heart Rate (beats/min): 91 (Rubia Amador PT)  Pre SpO2 (%): 91 (Rubia Amador PT)  O2 Delivery Pre Treatment: nasal cannula (2L) (Rubia Amador PT)  Intra SpO2 (%): 89 (Rubia Amador PT)  O2 Delivery Intra Treatment: room air (Rubia Amador PT)  Post SpO2 (%): 85 (Rubia Amador PT)  O2 Delivery Post Treatment: room air (REMOVED cannula;\"agravatesHA';ref.to resume;nsg toPlaceMist ) (Rubia Amador PT)  Pre Patient Position: Supine (Rubia Amador PT)  Intra Patient Position: Supine (Rubia Amador PT)  Post Patient Position: Supine (respirex 1000 cc) (Rubia Amador PT)  Pain Assessment  Additional Documentation: Pain Scale: Numbers Pre/Post-Treatment (Group) (Rubia Amador PT)  Pain Scale: Numbers Pre/Post-Treatment  Pain Scale: Numbers, Pretreatment: 8/10 (Rubia Amador PT)  Pain Scale: Numbers, Post-Treatment: 9/10 (Rubia Amador PT)  Pain Location - Side: Bilateral (Rubia Amador PT)  Pain Location - Orientation: upper (Rubia Amador PT)  Pain Location: head (& B LE's) (Rubia Amador PT)  Pain Intervention(s): Medication (See MAR), Repositioned, Rest (Rubia M Perry, PT)  Pain Scale: Word Pre/Post-Treatment  Pain Location - Side: Bilateral (Rubia Amador, PT)  Pain Location - Orientation: upper (Rubia Amador, PT)  Pain Location: head (& B LE's) (Rubia Amador, PT)  Pain Intervention(s): Medication (See " MAR), Repositioned, Rest (Rubia Amador, PT)  Pain Scale: FACES Pre/Post-Treatment  Pain Location - Side: Bilateral (Rubia Amador PT)  Pain Location - Orientation: upper (Rubia Amador PT)  Pain Location: head (& B LE's) (Rubia Amador PT)  Pain Intervention(s): Medication (See MAR), Repositioned, Rest (Rubia Amador PT)  Positioning and Restraints  Pre-Treatment Position: in bed (Rubia Amador PT)  Post Treatment Position: bed (Rubia Amador PT)  In Bed: notified nsg, supine, call light within reach, encouraged to call for assist, exit alarm on (Rubia Amador PT)    Mobility,ADL,Motor, Modality  Bed Mobility Assessment/Treatment  Comment (Bed Mobility): decl. (severeHA,BODY ACHES) (Rubia Amador, PT)  Transfer Assessment/Treatment  Comment (Transfers): DECL. (Rubia Amador PT)  Gait/Stairs Assessment/Training  Comment (Gait/Stairs): DECL. (Rubia Amador PT)     Motor Skills Assessment/Interventions  Additional Documentation: Therapeutic Exercise (Group) (Rubia Amador, PT)  Therapeutic Exercise  Therapeutic Exercise: supine, upper extremities, supine, lower extremities (Rubia Amador PT)  Additional Documentation: Therapeutic Exercise (Row) (Rubia Amador, PT)  Upper Extremity Supine Therapeutic Exercise  Performed, Supine Upper Extremity (Therapeutic Exercise): shoulder flexion/extension, shoulder abduction/adduction, shoulder horizontal abduction/adduction, elbow flexion/extension (DEF. L curls d/t IV site pain) (Rubia Amador, JULIAN)  Exercise Type, Supine Upper Extremity (Therapeutic Exercise): AROM (active range of motion) (Rubia Amador PT)  Sets/Reps Detail, Supine Upper Extremity (Therapeutic Exercise): 10 (Rubia Amador, JULIAN)  Lower Extremity Supine Therapeutic Exercise  Performed, Supine Lower Extremity (Therapeutic Exercise): hip flexion/extension, hip abduction/adduction, hip external/internal rotation, knee flexion/extension, ankle dorsiflexion/plantarflexion,  SAQ (short arc quad) over bolster, quadriceps sets, hamstring sets, ankle pumps, heel slides, other (see comments) (def.L hip exer d/t need for THR (SEVERE pain)) (Rubia Amador, JULIAN)  Exercise Type, Supine Lower Extremity (Therapeutic Exercise): AROM (active range of motion) (Rubia Amador PT)  Sets/Reps Detail, Supine Lower Extremity (Therapeutic Exercise): 10 (Rubia Amador PT)           ROM/MMT             Sensory, Edema, Orthotics          Cognition, Communication, Swallow  Cognitive Assessment/Intervention  Additional Documentation: Cognitive Assessment/Intervention (Group) (Rubia Amador PT)  Cognitive Assessment/Intervention- PT/OT  Affect/Mental Status (Cognitive): WFL (Rubia Amador PT)  Orientation Status (Cognition): oriented x 4 (Rubia Amador PT)  Follows Commands (Cognition): follows one step commands, over 90% accuracy (Rubia Amador PT)  Cognitive Function (Cognitive): safety deficit (Rubia Amador PT)  Personal Safety Interventions: supervised activity (Rubia Amador PT)  Speaking Valve  Pretreatment Heart Rate (beats/min): 89 (Rubia Amador PT)  Pre SpO2 (%): 91 (Rubia Amador PT)  Posttreatment Heart Rate (beats/min): 91 (Rubia Amador PT)  Post SpO2 (%): 85 (Rubia Amador PT)  General Eating/Swallowing Observations  Pre SpO2 (%): 91 (Rubia Amador, PT)  Post SpO2 (%): 85 (Rubia Amador PT)    Outcome Summary  Outcome Summary/Treatment Plan (PT)  Daily Summary of Progress (PT): progress towards functional goals is fair (Rubia Amador, JULIAN)  Anticipated Discharge Disposition (PT): inpatient rehab facility (Rubia Amador, JULIAN)            PT Rehab Goals     Row Name 03/10/18 0935             Transfer Goal 1 (PT)    Activity/Assistive Device (Transfer Goal 1, PT) bed-to-chair/chair-to-bed;sit-to-stand/stand-to-sit;walker, rolling  -LS      [place] Level/Cues Needed (Transfer Goal 1, PT) independent  -LS         Gait Training Goal 1 (PT)     Activity/Assistive Device (Gait Training Goal 1, PT) walker, rolling   vcs for upright posture. PT also adjusted walker.  -LS      [place] Level (Gait Training Goal 1, PT) independent  -LS      Distance (Gait Goal 1, PT) 100  -LS        User Key  (r) = Recorded By, (t) = Taken By, (c) = Cosigned By    Initials Name Provider Type    LS Bekah Kwan, PT Physical Therapist          Physical Therapy Education     Title: PT OT SLP Therapies (Active)     Topic: Physical Therapy (Active)     Point: Mobility training (Active)    Learning Progress Summary     Learner Status Readiness Method Response Comment Documented by    Patient Active Eager E,D NR  DM 03/14/18 1718     Done Acceptance E VU,NR  LS 03/10/18 1025     Active Acceptance E NR  AS 03/08/18 0829     Done Acceptance E,D NR,VU Reviewed benefits of activity, ambulating in [place] with nsg, safety with mobility, correct gait mechanics.  03/07/18 1537     Done Acceptance E,D NR,VU Reviewed benefits of activity, ambulating with nsg, safety with mobility/transfers, correct gait mechanics.  03/06/18 1051     Active Acceptance E NR  EH 03/05/18 1613     Active Acceptance E NR  AS 03/02/18 1126     Done Acceptance E VU,NR  EH 03/01/18 1058     Active Acceptance E,D NR Reviewed benefits of activity, HEP, safety with mobility.  02/26/18 1445     Active Acceptance E NR  BD 02/23/18 0949     Done Acceptance E VU  KM 02/22/18 1159          Point: Home exercise program (Active)    Learning Progress Summary     Learner Status Readiness Method Response Comment Documented by    Patient Active Eager ED NR  DM 03/14/18 1718     Done Eager E VU  ROSEANNE 03/14/18 0822     Active Acceptance E NR  AS 03/08/18 0829     Active Acceptance E NR  EH 03/05/18 1613     Active Acceptance E NR  AS 03/02/18 1126     Done Acceptance E VU,NR  EH 03/01/18 1058     Active Acceptance E,D NR Reviewed benefits of activity, HEP, safety with mobility.  02/26/18 1445     Active Acceptance E NR    02/23/18 0949     Done Acceptance E VU  KM 02/22/18 1159          Point: Body mechanics (Active)    Learning Progress Summary     Learner Status Readiness Method Response Comment Documented by    Patient Active Eager E,D NR  DM 03/14/18 1718     Active Acceptance E NR  AS 03/08/18 0829     Done Acceptance E,D NR,VU Reviewed benefits of activity, ambulating in [place] with nsg, safety with mobility, correct gait mechanics.  03/07/18 1537     Done Acceptance E,D NR,VU Reviewed benefits of activity, ambulating with nsg, safety with mobility/transfers, correct gait mechanics.  03/06/18 1051     Active Acceptance E NR  AS 03/02/18 1126     Done Acceptance E VU,NR   03/01/18 1058     Active Acceptance E,D NR Reviewed benefits of activity, HEP, safety with mobility.  02/26/18 1445     Active Acceptance E NR   02/23/18 0949     Done Acceptance E VU   02/22/18 1159          Point: Precautions (Active)    Learning Progress Summary     Learner Status Readiness Method Response Comment Documented by    Patient Active Eager E,D NR  DM 03/14/18 1718     Active Acceptance E NR  AS 03/08/18 0829     Done Acceptance E,D NR,VU Reviewed benefits of activity, ambulating in [place] with nsg, safety with mobility, correct gait mechanics.  03/07/18 1537     Done Acceptance E,D NR,VU Reviewed benefits of activity, ambulating with nsg, safety with mobility/transfers, correct gait mechanics.  03/06/18 1051     Active Acceptance E NR   03/05/18 1613     Active Acceptance E NR  AS 03/02/18 1126     Done Acceptance E VU,NR   03/01/18 1058     Active Acceptance E,D NR Reviewed benefits of activity, HEP, safety with mobility.  02/26/18 1445     Active Acceptance E NR   02/23/18 0949     Done Acceptance E VU   02/22/18 1159     Done Acceptance E VU reviewed POC for skin/ edema care.  02/17/18 1126                      User Key     Initials Effective Dates Name Provider Type Discipline     06/19/15 -  Mary Kate Parra,  PT Physical Therapist PT    KM 06/19/15 -  Carmela Washington, PT Physical Therapist PT    DM 06/19/15 -  Rubia Amador, PT Physical Therapist PT    AS 06/22/15 -  Yasmine Fuller, PTA Physical Therapy Assistant PT    LS 06/19/15 -  Bekah Kwan, PT Physical Therapist PT    MW 02/12/18 - 03/06/18 Karen Lopez, PT Physical Therapist PT    BD 06/13/16 -  Estrella Rojo, PT Physical Therapist PT    ROSEANNE 06/16/16 -  Arlene Patel, RN Registered Nurse Nurse    LM 06/09/17 - 03/06/18 Bekah Caro, PT Physical Therapist PT    LM 03/07/18 -  Bekah Caro, PT Physical Therapist PT                    PT Recommendation and Plan  Anticipated Discharge Disposition (PT): inpatient rehab facility  Outcome Summary/Treatment Plan (PT)  Daily Summary of Progress (PT): progress towards functional goals is fair  Anticipated Discharge Disposition (PT): inpatient rehab facility  Plan of Care Reviewed With: patient  Progress: improving  Outcome Summary: Able to perform 10 reps ther ex  but decl. mobil d/t severe pain & fatigue (had amb to [facility] earlier w/ nsg); will d/c to [facility] 3-15          Outcome Measures     Row Name 03/14/18 1647 03/13/18 1010          How much help from another person do you currently need...    Turning from your back to your side while in flat bed without using bedrails? 4  -DM  --     Moving from lying on back to sitting on the side of a flat bed without bedrails? 4  -DM  --     Moving to and from a bed to a chair (including a wheelchair)? 3  -DM  --     Standing up from a chair using your arms (e.g., wheelchair, bedside chair)? 3  -DM  --     Climbing 3-5 steps with a railing? 3  -DM  --     To walk in hospital room? 3  -DM  --     AM-PAC 6 Clicks Score 20  -DM  --        How much help from another is currently needed...    Putting on and taking off regular lower body clothing?  -- 2  -KF     Bathing (including washing, rinsing, and drying)  -- 3  -KF     Toileting (which includes using  toilet bed pan or urinal)  -- 3  -KF     Putting on and taking off regular upper body clothing  -- 3  -KF     Taking care of personal grooming (such as brushing teeth)  -- 3  -KF     Eating meals  -- 3  -KF     Score  -- 17  -KF        Functional Assessment    Outcome Measure Options AM-PAC 6 Clicks Basic Mobility (PT)  -DM AM-PAC 6 Clicks Daily Activity (OT)  -KF       User Key  (r) = Recorded By, (t) = Taken By, (c) = Cosigned By    Initials Name Provider Type    STEFANI Amador, PT Physical Therapist    KF Cadence Buckley, OT Occupational Therapist           Time Calculation:         PT Charges     Row Name 03/14/18 1720             Time Calculation    Start Time 1647  -DM      PT Received On 03/14/18  -DM      PT Goal Re-Cert Due Date 03/15/18  -DM         Time Calculation- PT    Total Timed Code Minutes- PT 24 minute(s)  -DM        User Key  (r) = Recorded By, (t) = Taken By, (c) = Cosigned By    Initials Name Provider Type    STEFANI Amador, PT Physical Therapist          Therapy Charges for Today     Code Description Service Date Service Provider Modifiers Qty    [id number] HC PT THER PROC EA 15 MIN 3/14/2018 Rubia Amador, PT GP 2          PT G-Codes  Outcome Measure Options: AM-PAC 6 Clicks Basic Mobility (PT)    Rubia Amador, PT  3/14/2018

## 2018-03-14 NOTE — PROGRESS NOTES
"Adult Nutrition  Assessment/PES    Patient Name:  Yassine Love  YOB: 1945  MRN: [id number]  Admit Date:  2/16/2018    Assessment Date:  3/14/2018    Comments:            Adult Nutrition Assessment     Row Name 03/14/18 1430       Reason for Assessment    Reason For Assessment follow-up protocol   5\"       Nutrition Prescription PO    Current PO Diet Regular    Common Modifiers Cardiac          Problem/Interventions:        Problem 1     Row Name 03/14/18 1430       Nutrition Diagnoses Problem 1    Problem 1 No Nutrition Diagnosis at this Time    Signs/Symptoms (evidenced by) PO Intake    Percent (%) intake recorded 100 %    Over number of meals 3                          Education/Evaluation     Row Name 03/14/18 1430       Monitor/Evaluation    Monitor Per protocol        Electronically signed by:  Mary Finney RD  03/14/18 2:31 PM  "

## 2018-03-14 NOTE — PROGRESS NOTES
[facility] Medicine Services  PROGRESS NOTE    Patient Name: Yassine Love  : 1945  MRN: [id number]    Date of Admission: 2018  Length of Stay: 26  Primary Care Physician: No Known Provider    Subjective   CC: f/u cellulitis, NSVT    HPI:  Pt sitting up in bed, denies complaints. Agreeable to go to rehab tomorrow.  Denies cp or sob. Plan for ziopatch prior to discharge.     Review of Systems  Gen- No fevers, chills  CV- No palpitations, CP  Resp- (+) cough, exertional dyspnea stable  GI- No N/V/D, abd pain  Skin - BLE erythema pain are improving  Otherwise ROS is negative except as mentioned in the HPI.    Objective   Vital Signs:   Temp:  [97.9 °F (36.6 °C)-98.4 °F (36.9 °C)] 98.4 °F (36.9 °C)  Heart Rate:  [62-77] 73  Resp:  [16-18] 18  BP: (100-136)/(52-79) 101/52  Physical Exam:  Gen-chronically ill appearing male,  awake alert, appears in NAD    CV-RRR, S1 S2 normal, no m/r/g  Resp-decreased bilaterally throughout greatest in bases, few scattered expiratory wheezes, nonlabored respirations on 5LNC  Abd-soft, NT, ND, +BS  Neuro-A&Ox3, no focal deficits  Psych-appropriate mood, cooperative  Msk: BLE edema, both legs wrapped did not remove, no cyanosis of extremities  Skin- visible aspects of distal feet with mild erythema R>L     Results Reviewed:  I have personally reviewed current lab, radiology, and data and agree.    Results from last 7 days  Lab Units 18  0444 18  0417 03/10/18  1000 18  0601   WBC 10*3/mm3 8.26  --  9.87 9.02   HEMOGLOBIN g/dL 10.6* 10.4* 11.3* 9.6*   HEMATOCRIT % 33.7* 32.8* 35.4* 30.1*   PLATELETS 10*3/mm3 305  --  310 334       Results from last 7 days  Lab Units 18  0444 18  0416 18  0853   SODIUM mmol/L 140 136 137   POTASSIUM mmol/L 5.3 4.4 4.5   CHLORIDE mmol/L 104 102 104   CO2 mmol/L 32.0* 30.0 29.0   BUN mg/dL 26* 32* 31*   CREATININE mg/dL 1.10 1.10 1.20   GLUCOSE mg/dL 142* 89 177*   CALCIUM mg/dL 8.2*  8.1* 8.3*     Microbiology Results Abnormal     Procedure Component Value - Date/Time    Respiratory Culture - Sputum, Cough [[id number]]  (Abnormal)  (Susceptibility) Collected:  03/07/18 0801    Lab Status:  Final result Specimen:  Sputum from Cough Updated:  03/09/18 1424     Respiratory Culture --      Light growth (2+) Klebsiella pneumoniae (A)      Scant growth (1+) Normal Respiratory Ernesto     Gram Stain Result Moderate (3+) WBCs per low power field      Rare (1+) Epithelial cells per low power field      Many (4+) Yeast      Rare (1+) Normal respiratory ernesto    Susceptibility      Klebsiella pneumoniae     FAVIAN     Ampicillin 16 ug/ml Intermediate     Ampicillin + Sulbactam <=8/4 ug/ml Susceptible     Aztreonam <=8 ug/ml Susceptible     Cefepime <=8 ug/ml Susceptible     Cefotaxime <=2 ug/ml Susceptible     Ceftriaxone <=8 ug/ml Susceptible     Cefuroxime sodium <=4 ug/ml Susceptible     Ertapenem <=1 ug/ml Susceptible     Gentamicin <=4 ug/ml Susceptible     Levofloxacin <=2 ug/ml Susceptible     Meropenem <=1 ug/ml Susceptible     Piperacillin + Tazobactam <=16 ug/ml Susceptible     Tetracycline <=4 ug/ml Susceptible     Tobramycin <=4 ug/ml Susceptible     Trimethoprim + Sulfamethoxazole <=2/38 ug/ml Susceptible                    Influenza A & B, RT PCR - Swab, Nasopharynx [[id number]]  (Normal) Collected:  03/03/18 1356    Lab Status:  Final result Specimen:  Swab from Nasopharynx Updated:  03/03/18 1607     Influenza A PCR Not Detected     Influenza B PCR Not Detected    Blood Culture - Blood, [[id number]]  (Normal) Collected:  02/18/18 2308    Lab Status:  Final result Specimen:  Blood from Arm, Left Updated:  02/23/18 2331     Blood Culture No growth at 5 days    Blood Culture - Blood, [[id number]]  (Normal) Collected:  02/18/18 2311    Lab Status:  Final result Specimen:  Blood from Arm, Left Updated:  02/23/18 2331     Blood Culture No growth at 5 days    Wound Culture - Wound, Foot, Right [[id number]]   (Abnormal)  (Susceptibility) Collected:  02/16/18 1931    Lab Status:  Final result Specimen:  Wound from Foot, Right Updated:  02/23/18 104     Wound Culture --      Light growth (2+) Pseudomonas aeruginosa (A)      Scant growth (1+) Escherichia coli (A)      Scant growth (1+) Proteus mirabilis (A)      Scant growth (1+) Enterococcus faecalis (A)      Scant growth (1+) Streptococcus, Beta Hemolytic, Group G (A)     Comment:   If Clindamycin or Erythromycin is the drug of choice, notify the laboratory within 7 days to request susceptibility testing.        STREP GROUPING G     Gram Stain Result Few (2+) WBCs seen      Many (4+) Gram negative bacilli      Many (4+) Gram positive bacilli      Many (4+) Gram positive cocci in pairs and clusters    Susceptibility      Pseudomonas aeruginosa    Escherichia coli       FAVIAN    FAVIAN       Ampicillin       <=8 ug/ml Susceptible       Ampicillin + Sulbactam       <=8/4 ug/ml Susceptible       Aztreonam <=8 ug/ml Susceptible    <=8 ug/ml Susceptible       Cefepime <=8 ug/ml Susceptible    <=8 ug/ml Susceptible       Cefotaxime       <=2 ug/ml Susceptible       Ceftazidime 4 ug/ml Susceptible             Ceftriaxone       <=8 ug/ml Susceptible       Cefuroxime sodium       <=4 ug/ml Susceptible       Ertapenem       <=1 ug/ml Susceptible       Gentamicin <=4 ug/ml Susceptible    <=4 ug/ml Susceptible       Levofloxacin <=2 ug/ml Susceptible    <=2 ug/ml Susceptible       Meropenem <=1 ug/ml Susceptible    <=1 ug/ml Susceptible       Piperacillin + Tazobactam <=16 ug/ml Susceptible    <=16 ug/ml Susceptible       Tetracycline       <=4 ug/ml Susceptible       Tobramycin <=4 ug/ml Susceptible    <=4 ug/ml Susceptible       Trimethoprim + Sulfamethoxazole       <=2/38 ug/ml Susceptible                  Susceptibility      Proteus mirabilis    Enterococcus faecalis       FAVIAN    FAVIAN       Ampicillin >16 ug/ml Resistant    <=2 ug/ml Susceptible       Ampicillin + Sulbactam >16/8 ug/ml  Resistant             Aztreonam <=8 ug/ml Susceptible             Cefepime <=8 ug/ml Susceptible             Cefotaxime <=2 ug/ml Susceptible             Ceftriaxone <=8 ug/ml Susceptible             Cefuroxime sodium <=4 ug/ml Susceptible             Ertapenem <=1 ug/ml Susceptible             Gentamicin <=4 ug/ml Susceptible             Gentamicin High Level Synergy       <=500 ug/ml Susceptible       Levofloxacin <=2 ug/ml Susceptible             Linezolid       2 ug/ml Susceptible       Meropenem <=1 ug/ml Susceptible             Penicillin G       2 ug/ml Susceptible       Piperacillin + Tazobactam <=16 ug/ml Susceptible             Streptomycin High Level Synergy       <=1000 ug/ml Susceptible       Tetracycline >8 ug/ml Resistant             Tobramycin <=4 ug/ml Susceptible             Trimethoprim + Sulfamethoxazole >2/38 ug/ml Resistant             Vancomycin       1 ug/ml Susceptible                      Blood Culture - Blood, Blood, Venous Line [[id number]]  (Normal) Collected:  02/16/18 2010    Lab Status:  Final result Specimen:  Blood from Arm, Left Updated:  02/21/18 2046     Blood Culture No growth at 5 days    Blood Culture - Blood, Blood, Venous Line [[id number]]  (Normal) Collected:  02/16/18 2005    Lab Status:  Final result Specimen:  Blood from Arm, Right Updated:  02/21/18 2046     Blood Culture No growth at 5 days    Respiratory Culture - Sputum, Cough [[id number]] Collected:  02/19/18 0656    Lab Status:  Final result Specimen:  Sputum from Cough Updated:  02/21/18 0931     Respiratory Culture --      Scant growth (1+) Normal Respiratory Nataliia     Gram Stain Result Occasional WBCs per low power field      No organisms seen        I have reviewed the medications.    Assessment/Plan   Assessment / Plan     Hospital Problem List     * (Principal)Cellulitis of both lower extremities    RAFAEL (acute kidney injury)    PVD (peripheral vascular disease)    COPD (chronic obstructive pulmonary disease)     Essential hypertension    Hypoxia    Bilateral cellulitis of lower leg    Non-sustained ventricular tachycardia    Coronary artery disease involving native coronary artery without angina pectoris        Brief Hospital Course to date:  Yassine Love is a 72 y.o. male  with a past medical history significant for PVD, essential hypertension, COPD, cellulitis of lower extremities, and tobacco abuse who presents to the ED with complaints of fevers, chills and bilateral lower extremity pain/edema/redness.    Assessment & Plan:  Acute hypoxemic respiratory failure -   - secondary to COPD exacerbation and diastolic heart failure  - CTA negative for PE  - 3/6 CXR reviewed, stable, no acute changes  - 3/8 ECHO EF 63%, RVSP not noted. Consider pulmonary hypertension   - Continue daily Bumex,held yesterday due to contrast and procedure,restarted today   - Daily fluid restriction to 1500cc  - treating COPD as below  - Pulmonology consulted, appreciate input: s/p 5 day course of pred 40mg, now back on 10 mg daily No primary care provider on file. changed bronchodilators to pulmicort + brovanna bid + duoneb qid + mucomyst qid.. Abx per ID. At dc continue Pulmicort/Brovana/Ipratropium neb bid.  Repeat CT chest 3 months as O/P.    - No s/s or concerns for pharyngeal dysphagia, continue regular diet. Possibly secondary to reflux  -sputum 3/7 grew Klebsiella susceptible to tetracycline .on Doxy as outpt and Probiotic     Severe 2V CAD s/p LHC  --LHC 3/11 revealed severe 2V CAD -80% LAD and RCA. repeat LHC via femoral access and PCI today. By Dr Harvey   --Plavix load dose 300mg Monday 3/12    COPD exacerbation: Symbicort, schedule Duonebs  - finished  5 day course of prednisone 40mg on 3/10    NSVT ? VT of 40 secs  -3/6 and 3/7 in a.m.   -TSH normal, Mg and K+ normal    - LE cellulitis/ Chronic LE wounds: switched to minocycline through 3/10, ID on board. Continue WOC for bilateral LE wrapping.    HTN: Holding BP meds. Marginal BP at  times.     RAFAEL: Baseline Cr 0.9-1.3. Stable. K+ improved, Cr 1.1 now.     Anemia: mixed anemia, Iron low, continue oral Iron. Negative FOBT     Tobacco abuse- Nicotine patch    DVT Prophylaxis:  [facility]  CODE STATUS: Full Code    Disposition: To [facility] in am, will need CT chest 3 months as outpt      Danii Tabor, DO  03/14/18  1:21 PM

## 2018-03-15 VITALS
TEMPERATURE: 99.8 F | WEIGHT: 181 LBS | BODY MASS INDEX: 27.43 KG/M2 | SYSTOLIC BLOOD PRESSURE: 108 MMHG | RESPIRATION RATE: 20 BRPM | HEART RATE: 71 BPM | HEIGHT: 68 IN | OXYGEN SATURATION: 94 % | DIASTOLIC BLOOD PRESSURE: 55 MMHG

## 2018-03-15 PROCEDURE — 94640 AIRWAY INHALATION TREATMENT: CPT

## 2018-03-15 PROCEDURE — 94799 UNLISTED PULMONARY SVC/PX: CPT

## 2018-03-15 PROCEDURE — 99239 HOSP IP/OBS DSCHRG MGMT >30: CPT | Performed by: INTERNAL MEDICINE

## 2018-03-15 PROCEDURE — 25010000002 HEPARIN (PORCINE) PER 1000 UNITS: Performed by: INTERNAL MEDICINE

## 2018-03-15 RX ORDER — CLOPIDOGREL BISULFATE 75 MG/1
75 TABLET ORAL DAILY
Qty: 30 TABLET
Start: 2018-03-16 | End: 2019-01-02 | Stop reason: HOSPADM

## 2018-03-15 RX ORDER — CASTOR OIL AND BALSAM, PERU 788; 87 MG/G; MG/G
5 OINTMENT TOPICAL EVERY 12 HOURS SCHEDULED
Start: 2018-03-15 | End: 2018-08-20

## 2018-03-15 RX ORDER — BUDESONIDE 0.5 MG/2ML
0.5 INHALANT ORAL
Start: 2018-03-15 | End: 2018-08-20

## 2018-03-15 RX ORDER — ATORVASTATIN CALCIUM 40 MG/1
40 TABLET, FILM COATED ORAL NIGHTLY
Start: 2018-03-15 | End: 2018-08-20

## 2018-03-15 RX ORDER — MINOCYCLINE HYDROCHLORIDE 100 MG/1
100 CAPSULE ORAL EVERY 12 HOURS
Qty: 5 CAPSULE | Refills: 0
Start: 2018-03-15 | End: 2018-03-18

## 2018-03-15 RX ORDER — ARFORMOTEROL TARTRATE 15 UG/2ML
15 SOLUTION RESPIRATORY (INHALATION)
Qty: 120 ML
Start: 2018-03-15 | End: 2018-08-20

## 2018-03-15 RX ORDER — CALCIUM CARBONATE 200(500)MG
2 TABLET,CHEWABLE ORAL 2 TIMES DAILY PRN
Status: ON HOLD
Start: 2018-03-15 | End: 2018-10-03

## 2018-03-15 RX ORDER — GUAIFENESIN 600 MG/1
600 TABLET, EXTENDED RELEASE ORAL 2 TIMES DAILY PRN
Start: 2018-03-15 | End: 2018-08-20

## 2018-03-15 RX ORDER — MORPHINE SULFATE 30 MG/1
30 TABLET ORAL EVERY 6 HOURS PRN
Start: 2018-03-15 | End: 2018-08-20

## 2018-03-15 RX ORDER — ACETAMINOPHEN 325 MG/1
650 TABLET ORAL EVERY 4 HOURS PRN
Start: 2018-03-15 | End: 2018-08-20

## 2018-03-15 RX ORDER — FLUTICASONE PROPIONATE 50 MCG
2 SPRAY, SUSPENSION (ML) NASAL DAILY
Start: 2018-03-16

## 2018-03-15 RX ORDER — HYDROCODONE BITARTRATE AND ACETAMINOPHEN 7.5; 325 MG/1; MG/1
1 TABLET ORAL EVERY 4 HOURS PRN
Start: 2018-03-15 | End: 2018-03-24

## 2018-03-15 RX ORDER — BUMETANIDE 1 MG/1
1 TABLET ORAL DAILY
Start: 2018-03-15 | End: 2018-08-20

## 2018-03-15 RX ORDER — GABAPENTIN 100 MG/1
200 CAPSULE ORAL EVERY 8 HOURS SCHEDULED
Start: 2018-03-15 | End: 2018-08-20

## 2018-03-15 RX ORDER — ALBUTEROL SULFATE 2.5 MG/3ML
2.5 SOLUTION RESPIRATORY (INHALATION) EVERY 6 HOURS PRN
Refills: 12
Start: 2018-03-15 | End: 2018-03-30

## 2018-03-15 RX ORDER — PANTOPRAZOLE SODIUM 40 MG/1
40 TABLET, DELAYED RELEASE ORAL DAILY
Start: 2018-03-15 | End: 2018-10-22 | Stop reason: HOSPADM

## 2018-03-15 RX ORDER — BISACODYL 10 MG
10 SUPPOSITORY, RECTAL RECTAL DAILY PRN
Start: 2018-03-15 | End: 2018-08-20

## 2018-03-15 RX ORDER — ASPIRIN 81 MG/1
81 TABLET ORAL DAILY
Start: 2018-03-16 | End: 2019-01-02 | Stop reason: HOSPADM

## 2018-03-15 RX ORDER — NICOTINE 21 MG/24HR
1 PATCH, TRANSDERMAL 24 HOURS TRANSDERMAL
Start: 2018-03-16 | End: 2018-08-20

## 2018-03-15 RX ORDER — SACCHAROMYCES BOULARDII 250 MG
250 CAPSULE ORAL 2 TIMES DAILY
Start: 2018-03-15 | End: 2018-08-20

## 2018-03-15 RX ORDER — IPRATROPIUM BROMIDE AND ALBUTEROL SULFATE 2.5; .5 MG/3ML; MG/3ML
3 SOLUTION RESPIRATORY (INHALATION)
Qty: 360 ML
Start: 2018-03-15 | End: 2018-08-20

## 2018-03-15 RX ADMIN — MAGNESIUM HYDROXIDE 10 ML: 2400 SUSPENSION ORAL at 05:01

## 2018-03-15 RX ADMIN — Medication 250 MG: at 09:14

## 2018-03-15 RX ADMIN — ASPIRIN 81 MG: 81 TABLET, COATED ORAL at 09:04

## 2018-03-15 RX ADMIN — DOCUSATE SODIUM 200 MG: 100 CAPSULE, LIQUID FILLED ORAL at 09:05

## 2018-03-15 RX ADMIN — NICOTINE 1 PATCH: 14 PATCH TRANSDERMAL at 09:04

## 2018-03-15 RX ADMIN — BUDESONIDE 0.5 MG: 0.5 INHALANT RESPIRATORY (INHALATION) at 09:13

## 2018-03-15 RX ADMIN — DICLOFENAC SODIUM 2 G: 10 GEL TOPICAL at 09:05

## 2018-03-15 RX ADMIN — CYANOCOBALAMIN TAB 1000 MCG 1000 MCG: 1000 TAB at 09:04

## 2018-03-15 RX ADMIN — MORPHINE SULFATE 30 MG: 30 TABLET ORAL at 05:02

## 2018-03-15 RX ADMIN — GABAPENTIN 200 MG: 100 CAPSULE ORAL at 05:01

## 2018-03-15 RX ADMIN — PANTOPRAZOLE SODIUM 40 MG: 40 TABLET, DELAYED RELEASE ORAL at 05:01

## 2018-03-15 RX ADMIN — HYDROCODONE BITARTRATE AND ACETAMINOPHEN 1 TABLET: 7.5; 325 TABLET ORAL at 01:27

## 2018-03-15 RX ADMIN — IPRATROPIUM BROMIDE AND ALBUTEROL SULFATE 3 ML: 2.5; .5 SOLUTION RESPIRATORY (INHALATION) at 09:13

## 2018-03-15 RX ADMIN — FLUTICASONE PROPIONATE 2 SPRAY: 50 SPRAY, METERED NASAL at 09:06

## 2018-03-15 RX ADMIN — ARFORMOTEROL TARTRATE 15 MCG: 15 SOLUTION RESPIRATORY (INHALATION) at 09:13

## 2018-03-15 RX ADMIN — HEPARIN SODIUM 5000 UNITS: 5000 INJECTION, SOLUTION INTRAVENOUS; SUBCUTANEOUS at 05:02

## 2018-03-15 RX ADMIN — VARICELLA VIRUS VACCINE LIVE 0.5 ML: 1350 INJECTION, POWDER, LYOPHILIZED, FOR SUSPENSION SUBCUTANEOUS at 11:58

## 2018-03-15 RX ADMIN — CASTOR OIL AND BALSAM, PERU: 788; 87 OINTMENT TOPICAL at 09:14

## 2018-03-15 RX ADMIN — CLOPIDOGREL BISULFATE 75 MG: 75 TABLET ORAL at 09:04

## 2018-03-15 RX ADMIN — MINOCYCLINE HYDROCHLORIDE 100 MG: 50 CAPSULE ORAL at 05:01

## 2018-03-15 NOTE — PROGRESS NOTES
Continued Stay Note  [facility]     Patient Name: Yassine Love  MRN: [id number]  Today's Date: 3/15/2018    Admit Date: 2/16/2018          Discharge Plan     Row Name 03/15/18 0940       Plan    Plan Rehab    Patient/Family in Agreement with Plan yes    Plan Comments Patient transferring to [facility]/Pulmonary unit today. Lucien is transporting and  at 1300 in patient's room. Spoke with patient's son Chris this am and updated him on planned transfer. Spoke with Gladys/Rianna/HEATHER and she is bringing a portable loaner 02 tank to patient's room for transport to [facility]. Call report to [phone] and fax transfer summary to [phone]. Spoke with  this am and she is planning to do transfer summary and include wound care in her report. Glendy at [phone]               Discharge Codes    No documentation.       Expected Discharge Date and Time     Expected Discharge Date Expected Discharge Time    Mar 15, 2018             Silvana Owusu RN

## 2018-03-15 NOTE — PLAN OF CARE
Problem: Fall Risk (Adult)  Goal: Identify Related Risk Factors and Signs and Symptoms  Outcome: Ongoing (interventions implemented as appropriate)    Goal: Absence of Fall  Outcome: Ongoing (interventions implemented as appropriate)      Problem: Arrhythmia/Dysrhythmia (Symptomatic) (Adult)  Goal: Signs and Symptoms of Listed Potential Problems Will be Absent, Minimized or Managed (Arrhythmia/Dysrhythmia)  Outcome: Ongoing (interventions implemented as appropriate)      Problem: Patient Care Overview  Goal: Plan of Care Review  Outcome: Ongoing (interventions implemented as appropriate)   03/15/18 0417   Coping/Psychosocial   Plan of Care Reviewed With patient   Plan of Care Review   Progress improving   OTHER   Outcome Summary VSS, NSR on monitor, plan is to d/c to [facility] pulmonary unit. Transportation has been approved, needs to have a loaner tank of O2 for transport. Will continue to monitor.       Problem: Skin Injury Risk (Adult)  Goal: Identify Related Risk Factors and Signs and Symptoms  Outcome: Ongoing (interventions implemented as appropriate)    Goal: Skin Health and Integrity  Outcome: Ongoing (interventions implemented as appropriate)

## 2018-03-15 NOTE — PROGRESS NOTES
Yassine Love  1945  [id number]  3/15/2018    CC:   Chief Complaint   Patient presents with   • Leg Swelling       Yassine Love is a 72 y.o. male here for leg infections   History of present illness:    Mr. Yassine Love is a 72 y.o. male with CC: Cellulitis of both lower extremities. He presented to the [facility] ED with increased bilateral extremity edema with increased pain and redness in the setting of unna boot therapy via home health care for the past several months. He does complain of foul odor and drainage of BLE and fever, and chills.    He denies No SOB or cough today: No chest pain, No N/V/D at present and no abdominal pain.  No  symptoms: no new rash: No HA, photophobia or neck stiffness; He denies other focal pain.  Mr. Love was followed by Dr. Duncan during a June, 2017 hospitalization and was was treated with Invanz for a streptococcal bacteremia.      2/18 - co leg infection  No fever or chills  2/19/18 - C/O HA, generalized pain, and respiratory distress  Patient denies any fever, chills, or sweats.  Patient denies any nausea, vomiting, or diarrhea.  Now on hi-emperatriz oxygen. Seen and agree  2/20 - co leg infections  Co weakness  No fever  Lives alone  2/21/18 - Patient reports mild dyspnea, remains on hi emperatriz oxygen.  Patient denies any fever, chills, or sweats.  Patient denies any nausea, vomiting, or diarrhea.  C/O BLE pain.  Seen and agree  2/22 - co SOA  Co weakness  No fever or chills  No rash  Co leg pain and swelling  2/23/18 - Patient is sleeping today peacefully on 4 LNC.  ROS discussed with staff.  Seen and agree  2/26 - No hx available  ROS discussed with nurse  2/28/18:   Does not want to go to rehab, wants to go home.  Unna boots just changed yesterday.  No fever, chills, sweats.   Seen and agree  3/5/18 - Looking at places for rehab.  Patient denies any fever, chills, or sweats.  Per nursing requiring 4LNC.  On oral antibiotics.    Seen and agree  3/6/18 - C/O pain in BLE.   "Patient denies any fever, chills, or sweats.  Continues on 4LNC.  Patient denies any nausea, vomiting, or diarrhea.  RN at .  Patient working with PT.  Seen and agree  3/7 - co new issue with decubitus  Co SOA  Co weakness  No fever  3/8/18 - \"They think there is something wrong with my heart.\"  Patient denies any fever, chills, or sweats.  Patient reports to me he doesn't want any chest compressions or be put on the vent.\"  Discussed with nursing this is current conditional code status.  Seen and agree.  3/9 - No Hx available  ROS discussed with staff  3/12/18 - \"I'm have a heart cath tomorrow.\"  Patient denies any fever, chills, or sweats.  Patient denies any nausea, vomiting, or diarrhea.  C/O pain in BLE.  Seen and agree  3/13/18 - Patient is having heart cath today.  Patient denies any fever, chills, or sweats.  Patient denies any nausea, vomiting, or diarrhea.  Seen and agree  3/14/18 - Patient did well yesterday with heart cath.  Patient denies any fever, chills, or sweats.  Patient denies any nausea, vomiting, or diarrhea. Concerned why his legs still hurt.  Concerned about discharge to [facility].  Seen and agree   3/15/18 - Patient is going to [facility] today.  Patient denies any fever, chills, or sweats.  Patient denies any nausea, vomiting, or diarrhea.  Seen and agree  Past medical history:  Past Medical History:   Diagnosis Date   • Cancer    • Cellulitis of both lower extremities     [facility] 2016   • Chronic pain    • COPD (chronic obstructive pulmonary disease)    • Hypertension    • Osteoarthritis cervical spine    • Osteoarthritis of both knees    • Osteoarthritis of left hip        Medications:   Current Facility-Administered Medications:   •  acetaminophen (TYLENOL) tablet 650 mg, 650 mg, Oral, Q4H PRN, Nicole Hewitt DO, 650 mg at 03/14/18 1536  •  albuterol (PROVENTIL) nebulizer solution 0.083% 2.5 mg/3mL, 2.5 mg, Nebulization, Q6H PRN, Casie M Mayne, PA-C  •  arformoterol (BROVANA) nebulizer " solution 15 mcg, 15 mcg, Nebulization, BID - RT, Todd Keys MD, 15 mcg at 03/15/18 0913  •  aspirin EC tablet 81 mg, 81 mg, Oral, Daily, Carlos Harvey MD, 81 mg at 03/15/18 0904  •  atorvastatin (LIPITOR) tablet 40 mg, 40 mg, Oral, Nightly, Carlos Harvey MD, 40 mg at 03/14/18 2147  •  bisacodyl (DULCOLAX) suppository 10 mg, 10 mg, Rectal, Daily PRN, Nicole Hewitt DO, 10 mg at 02/23/18 0823  •  budesonide (PULMICORT) nebulizer solution 0.5 mg, 0.5 mg, Nebulization, BID - RT, Todd Keys MD, 0.5 mg at 03/15/18 0913  •  bumetanide (BUMEX) tablet 1 mg, 1 mg, Oral, Daily, Casie M Mayne, PA-C, 1 mg at 03/14/18 0811  •  calcium carbonate (TUMS) chewable tablet 500 mg (200 mg elemental), 2 tablet, Oral, BID PRN, Beatriz Yousif, APRN, 2 tablet at 02/27/18 0946  •  castor oil-balsam peru (VENELEX) ointment, , Topical, Q12H, Nicole Hewitt DO  •  clopidogrel (PLAVIX) tablet 75 mg, 75 mg, Oral, Daily, Carlos Harvey MD, 75 mg at 03/15/18 0904  •  diclofenac (VOLTAREN) 1 % gel 2 g, 2 g, Topical, 4x Daily, Nicole Hewitt DO, 2 g at 03/15/18 0905  •  docusate sodium (COLACE) capsule 200 mg, 200 mg, Oral, Daily, Nicole Hewitt DO, 200 mg at 03/15/18 0905  •  fluticasone (FLONASE) 50 MCG/ACT nasal spray 2 spray, 2 spray, Each Nare, Daily, Esmer Watkins MD, 2 spray at 03/15/18 0906  •  gabapentin (NEURONTIN) capsule 200 mg, 200 mg, Oral, Q8H, Nicole Hewitt DO, 200 mg at 03/15/18 0501  •  guaiFENesin (MUCINEX) 12 hr tablet 600 mg, 600 mg, Oral, BID PRN, Ezequiel Alfred PA-C, 600 mg at 02/20/18 0818  •  heparin (porcine) 5000 UNIT/ML injection 5,000 Units, 5,000 Units, Subcutaneous, Q8H, Nicole Hewitt DO, 5,000 Units at 03/15/18 0502  •  HYDROcodone-acetaminophen (NORCO) 7.5-325 MG per tablet 1 tablet, 1 tablet, Oral, Q4H PRN, Jelena Finney, BRITTNY, 1 tablet at 03/15/18 0127  •  ipratropium-albuterol (DUO-NEB) nebulizer solution 3 mL, 3 mL, Nebulization, 4x Daily - RT,  Nicole Hewitt, DO, 3 mL at 03/15/18 0913  •  magnesium hydroxide (MILK OF MAGNESIA) suspension 2400 mg/10mL 10 mL, 10 mL, Oral, Daily PRN, Nicole Hewitt DO, 10 mL at 03/15/18 0501  •  Magnesium Sulfate 2 gram Bolus, followed by 8 gram infusion (total Mg dose 10 grams)- Mg less than or equal to 1mg/dL, 2 g, Intravenous, PRN **OR** Magnesium Sulfate 6 gram Infusion (2 gm x 3) -Mg 1.1 -1.5 mg/dL, 2 g, Intravenous, PRN **OR** magnesium sulfate 4 gram infusion- Mg 1.6-1.9 mg/dL, 4 g, Intravenous, PRN, Esmer Watkins MD  •  metoprolol tartrate (LOPRESSOR) tablet 25 mg, 25 mg, Oral, Q12H, Zohra Griggs, APRN, 25 mg at 03/14/18 2147  •  minocycline (MINOCIN,DYNACIN) capsule 100 mg, 100 mg, Oral, Q12H, Casie M Mayne, PA-C, 100 mg at 03/15/18 0501  •  Morphine (MSIR) tablet 30 mg, 30 mg, Oral, Q6H PRN, Esmer Watkins MD, 30 mg at 03/15/18 0502  •  nicotine (NICODERM CQ) 14 MG/24HR patch 1 patch, 1 patch, Transdermal, Q24H, Stella Garvin PA-C, 1 patch at 03/15/18 0904  •  ondansetron (ZOFRAN) injection 4 mg, 4 mg, Intravenous, Q6H PRN, Alexandro Suarez PA-C, 4 mg at 02/21/18 1945  •  pantoprazole (PROTONIX) EC tablet 40 mg, 40 mg, Oral, Q AM, Todd Keys MD, 40 mg at 03/15/18 0501  •  Pharmacy Consult - [facility], , Does not apply, Daily, Annette Ray, [facility]  •  potassium chloride (KLOR-CON) packet 40 mEq, 40 mEq, Oral, PRN, Esmer Watkins MD  •  potassium chloride (MICRO-K) CR capsule 40 mEq, 40 mEq, Oral, PRN, Esmer Watkins MD, 40 mEq at 02/22/18 1431  •  saccharomyces boulardii (FLORASTOR) capsule 250 mg, 250 mg, Oral, BID, Casie M Mayne, PA-C, 250 mg at 03/15/18 0914  •  sodium chloride 0.9 % flush 1-10 mL, 1-10 mL, Intravenous, PRN, Nicole Hewitt, DO  •  sodium chloride 0.9 % flush 10 mL, 10 mL, Intravenous, PRN, Steve Mcmanus MD, 10 mL at 03/01/18 2118  •  vitamin B-12 (CYANOCOBALAMIN) tablet 1,000 mcg, 1,000 mcg, Oral, Daily, Yumiko Castanon MD, 1,000 mcg at 03/15/18  "0904  Antibiotics:  Anti-Infectives     Ordered     Dose/Rate Route Frequency Start Stop    03/15/18 0943  minocycline (MINOCIN,DYNACIN) 100 MG capsule     Ordering Provider:  Sheree Queen MD    100 mg Oral Every 12 Hours 03/15/18 0000 03/18/18 2359    03/08/18 0921  minocycline (MINOCIN,DYNACIN) capsule 100 mg     Casie M Mayne, PA-C reviewed the order on 03/08/18 0955.   Ordering Provider:  Casie M Mayne, PA-C    100 mg Oral Every 12 Hours 03/08/18 1800 03/17/18 2359    02/16/18 2302  vancomycin 500 mg/100 mL 0.9% NS IVPB (mbp)     Ordering Provider:  Carlos Walls RPH    500 mg  over 60 Minutes Intravenous Once 02/16/18 2345 02/17/18 0032 02/16/18 1952  vancomycin (VANCOCIN) in iso-osmotic dextrose IVPB 1 g (premix) 200 mL     Ordering Provider:  Alexandro Suarez PA-C    1,000 mg  over 60 Minutes Intravenous Once 02/16/18 1954 02/16/18 2122 02/16/18 1952  meropenem (MERREM) 1 g/100 mL 0.9% NS VTB (mbp)     Ordering Provider:  Alexandro Suarez PA-C    1 g  over 30 Minutes Intravenous Once 02/16/18 1954 02/16/18 2059          Allergies:  is allergic to ciprofloxacin hcl and ceftin [cefuroxime axetil].    Family History: family history includes COPD in his brother; Heart attack in his brother; Stroke in his father.    Social History:  reports that he has been smoking Cigarettes.  He has a 84.00 pack-year smoking history. He does not have any smokeless tobacco history on file. He reports that he does not drink alcohol or use drugs.    Review of Systems: All other reviewed and negative except as per HPI    Blood pressure 108/55, pulse 71, temperature 99.8 °F (37.7 °C), temperature source Oral, resp. rate 20, height 172.7 cm (67.99\"), weight 82.1 kg (181 lb), SpO2 94 %.  GENERAL:Chronically ill appearing. Alert  HEENT: Oropharynx without thrush.  EYES: . No conjunctival injection. No icterus.   LYMPHATICS: No lymphadenopathy of the neck or axillary or inguinal regions.   HEART:  Irregular  LUNGS:  " Diminished throughout.  On 3LNC.  ABDOMEN: Soft, nontender, ND   SKIN: BLE dressings intact.  Sacral decub multiple sites stage 2  EXT:  + edema. BLE with compression stockings. Seen and agree       DIAGNOSTICS:  Lab Results   Component Value Date    WBC 8.26 03/14/2018    HGB 10.6 (L) 03/14/2018    HCT 33.7 (L) 03/14/2018     03/14/2018     Lab Results   Component Value Date    CRP 13.12 (H) 06/21/2017     Lab Results   Component Value Date    SEDRATE 56 (H) 06/20/2017     Lab Results   Component Value Date    GLUCOSE 142 (H) 03/14/2018    BUN 26 (H) 03/14/2018    CREATININE 1.10 03/14/2018    EGFRIFNONA 66 03/14/2018    BCR 23.6 03/14/2018    CO2 32.0 (H) 03/14/2018    CALCIUM 8.2 (L) 03/14/2018    ALBUMIN 3.30 02/23/2018    LABIL2 0.9 (L) 02/17/2018    AST 15 02/17/2018    ALT 4 (L) 02/17/2018       Microbiology:   Microbiology Results Abnormal     Procedure Component Value - Date/Time    Respiratory Culture - Sputum, Cough [[id number]]  (Abnormal)  (Susceptibility) Collected:  03/07/18 0801    Lab Status:  Final result Specimen:  Sputum from Cough Updated:  03/09/18 1424     Respiratory Culture --      Light growth (2+) Klebsiella pneumoniae (A)      Scant growth (1+) Normal Respiratory Ernesto     Gram Stain Result Moderate (3+) WBCs per low power field      Rare (1+) Epithelial cells per low power field      Many (4+) Yeast      Rare (1+) Normal respiratory ernesto    Susceptibility      Klebsiella pneumoniae     FAVIAN     Ampicillin 16 ug/ml Intermediate     Ampicillin + Sulbactam <=8/4 ug/ml Susceptible     Aztreonam <=8 ug/ml Susceptible     Cefepime <=8 ug/ml Susceptible     Cefotaxime <=2 ug/ml Susceptible     Ceftriaxone <=8 ug/ml Susceptible     Cefuroxime sodium <=4 ug/ml Susceptible     Ertapenem <=1 ug/ml Susceptible     Gentamicin <=4 ug/ml Susceptible     Levofloxacin <=2 ug/ml Susceptible     Meropenem <=1 ug/ml Susceptible     Piperacillin + Tazobactam <=16 ug/ml Susceptible     Tetracycline <=4  ug/ml Susceptible     Tobramycin <=4 ug/ml Susceptible     Trimethoprim + Sulfamethoxazole <=2/38 ug/ml Susceptible                    Influenza A & B, RT PCR - Swab, Nasopharynx [[id number]]  (Normal) Collected:  03/03/18 1356    Lab Status:  Final result Specimen:  Swab from Nasopharynx Updated:  03/03/18 1607     Influenza A PCR Not Detected     Influenza B PCR Not Detected    Blood Culture - Blood, [[id number]]  (Normal) Collected:  02/18/18 2308    Lab Status:  Final result Specimen:  Blood from Arm, Left Updated:  02/23/18 2331     Blood Culture No growth at 5 days    Blood Culture - Blood, [[id number]]  (Normal) Collected:  02/18/18 2311    Lab Status:  Final result Specimen:  Blood from Arm, Left Updated:  02/23/18 2331     Blood Culture No growth at 5 days    Wound Culture - Wound, Foot, Right [[id number]]  (Abnormal)  (Susceptibility) Collected:  02/16/18 1931    Lab Status:  Final result Specimen:  Wound from Foot, Right Updated:  02/23/18 1045     Wound Culture --      Light growth (2+) Pseudomonas aeruginosa (A)      Scant growth (1+) Escherichia coli (A)      Scant growth (1+) Proteus mirabilis (A)      Scant growth (1+) Enterococcus faecalis (A)      Scant growth (1+) Streptococcus, Beta Hemolytic, Group G (A)     Comment:   If Clindamycin or Erythromycin is the drug of choice, notify the laboratory within 7 days to request susceptibility testing.        STREP GROUPING G     Gram Stain Result Few (2+) WBCs seen      Many (4+) Gram negative bacilli      Many (4+) Gram positive bacilli      Many (4+) Gram positive cocci in pairs and clusters    Susceptibility      Pseudomonas aeruginosa    Escherichia coli       FAVIAN    FAVIAN       Ampicillin       <=8 ug/ml Susceptible       Ampicillin + Sulbactam       <=8/4 ug/ml Susceptible       Aztreonam <=8 ug/ml Susceptible    <=8 ug/ml Susceptible       Cefepime <=8 ug/ml Susceptible    <=8 ug/ml Susceptible       Cefotaxime       <=2 ug/ml Susceptible        Ceftazidime 4 ug/ml Susceptible             Ceftriaxone       <=8 ug/ml Susceptible       Cefuroxime sodium       <=4 ug/ml Susceptible       Ertapenem       <=1 ug/ml Susceptible       Gentamicin <=4 ug/ml Susceptible    <=4 ug/ml Susceptible       Levofloxacin <=2 ug/ml Susceptible    <=2 ug/ml Susceptible       Meropenem <=1 ug/ml Susceptible    <=1 ug/ml Susceptible       Piperacillin + Tazobactam <=16 ug/ml Susceptible    <=16 ug/ml Susceptible       Tetracycline       <=4 ug/ml Susceptible       Tobramycin <=4 ug/ml Susceptible    <=4 ug/ml Susceptible       Trimethoprim + Sulfamethoxazole       <=2/38 ug/ml Susceptible                  Susceptibility      Proteus mirabilis    Enterococcus faecalis       FAVIAN    FAVIAN       Ampicillin >16 ug/ml Resistant    <=2 ug/ml Susceptible       Ampicillin + Sulbactam >16/8 ug/ml Resistant             Aztreonam <=8 ug/ml Susceptible             Cefepime <=8 ug/ml Susceptible             Cefotaxime <=2 ug/ml Susceptible             Ceftriaxone <=8 ug/ml Susceptible             Cefuroxime sodium <=4 ug/ml Susceptible             Ertapenem <=1 ug/ml Susceptible             Gentamicin <=4 ug/ml Susceptible             Gentamicin High Level Synergy       <=500 ug/ml Susceptible       Levofloxacin <=2 ug/ml Susceptible             Linezolid       2 ug/ml Susceptible       Meropenem <=1 ug/ml Susceptible             Penicillin G       2 ug/ml Susceptible       Piperacillin + Tazobactam <=16 ug/ml Susceptible             Streptomycin High Level Synergy       <=1000 ug/ml Susceptible       Tetracycline >8 ug/ml Resistant             Tobramycin <=4 ug/ml Susceptible             Trimethoprim + Sulfamethoxazole >2/38 ug/ml Resistant             Vancomycin       1 ug/ml Susceptible                      Blood Culture - Blood, Blood, Venous Line [[id number]]  (Normal) Collected:  02/16/18 2010    Lab Status:  Final result Specimen:  Blood from Arm, Left Updated:  02/21/18 2046     Blood  Culture No growth at 5 days    Blood Culture - Blood, Blood, Venous Line [[id number]]  (Normal) Collected:  02/16/18 2005    Lab Status:  Final result Specimen:  Blood from Arm, Right Updated:  02/21/18 2046     Blood Culture No growth at 5 days    Respiratory Culture - Sputum, Cough [[id number]] Collected:  02/19/18 0656    Lab Status:  Final result Specimen:  Sputum from Cough Updated:  02/21/18 0931     Respiratory Culture --      Scant growth (1+) Normal Respiratory Nataliia     Gram Stain Result Occasional WBCs per low power field      No organisms seen              RADIOLOGY:  Imaging Results (last 72 hours)     Procedure Component Value Units Date/Time    XR Chest 1 View [[id number]] Collected:  02/16/18 2125     Updated:  02/16/18 2212    Narrative:       EXAM:    XR Chest, 1 View    CLINICAL HISTORY:    72 years old, male; Peripheral vascular disease, unspecified; Cellulitis of   right lower limb; Cellulitis of left lower limb; Acute kidney failure,   unspecified; Signs and symptoms; Other: Hypoxia    TECHNIQUE:    Frontal view of the chest.    COMPARISON:    CR - XR CHEST 1 VW 2017-06-25 06:32    FINDINGS:    Lungs:  There is mild prominence of the pulmonary vasculature. Chronic   scarring.    Pleural space:  Unremarkable.  No pneumothorax.    Heart:  There is an atherosclerotic cardiovascular configuration without   cardiomegaly.    Mediastinum:  Unremarkable.    Bones/joints:  There are degenerative changes of the spine.      Impression:         Mild pulmonary vascular congestion.    THIS DOCUMENT HAS BEEN ELECTRONICALLY SIGNED BY LENIN DIAZ MD    XR Chest 1 View [[id number]] Updated:  02/18/18 1333            Assessment and Plan:   Impression:      -- Cellulitis - Bilateral Lower Extremities - H/O streptococcal bacteremia - currently on minocycline     -- Acute Kidney Injury - stable     -- COPD - with worsening respiratory failure - diuretics given      --  HTN      -- Tobacco Abuse      -- Acute Hypoxic  respiratory failure, remaining on 4LNC.    -- Leukocytosis, on prednisone.      -- Infiltrates - Primarily fluid, improved     -- Possible aspiration per pulmonary note - speech is to evaluate.     -- DTI - sacral  Stage 2     -- Gram negative and sputum / Klebsiella pneumoniae    -  2Vessel CAD - s/p stent     PLAN/RECOMMENDATIONS:      -- Continue Minocycline - continued through 3/21- Reevaluate   -- Continue wound care and compression wraps.  -- planning transfer to [facility] today    [facility] Discussed with staff   Discussed with pharmacy - will get varicella vaccine for patient due to neighbor (close friend currently has varicella, and patient reports he has never had the virus)  Discussed with primary team    BRITTNY Cotto for Dr. Parish Belle  3/15/2018

## 2018-03-15 NOTE — DISCHARGE SUMMARY
[facility] Medicine Services  DISCHARGE SUMMARY    Patient Name: Yassine Love  : 1945  MRN: [id number]    Date of Admission: 2018  Date of Discharge:  3/15/2018  Primary Care Physician: No Known Provider    Consults     Date and Time Order Name Status Description    3/8/2018 0920 Inpatient Cardiology Consult Completed     3/5/2018 1426 Inpatient Pulmonology Consult Completed     3/2/2018 1333 Inpatient Orthopedic Surgery Consult      2018 2219 Inpatient Consult to Infectious Diseases Completed         Hospital Course     Presenting Problem:   Bilateral cellulitis of lower leg [L03.116, L03.115]    Active Hospital Problems (** Indicates Principal Problem)    Diagnosis Date Noted   • **Cellulitis of both lower extremities [L03.115, L03.116] 2018   • Coronary artery disease involving native coronary artery without angina pectoris [I25.10] 2018   • Non-sustained ventricular tachycardia [I47.2] 2018   • COPD (chronic obstructive pulmonary disease) [J44.9] 2018   • Essential hypertension [I10] 2018   • Hypoxia [R09.02] 2018   • Bilateral cellulitis of lower leg [L03.116, L03.115] 2018   • RAFAEL (acute kidney injury) [N17.9] 2017   • PVD (peripheral vascular disease) [I73.9] 2017      Resolved Hospital Problems    Diagnosis Date Noted Date Resolved   • Hyponatremia [E87.1] 2018          Hospital Course:  Yassine Love is a 72 y.o. male with COPD, HTN, PVD, tobacco abuse who presented to the ED with complaints of worsening swelling, redness, fowl smell and pain of his bilateral lower extremities which have chronic wounds that are followed by home health and wound care as well as infectious disease with history of streptococcal bacteremia.  He was admitted for bilateral lower extremity cellulitis.  Hospital course complicated by acute hypoxemic respiratory failure/COPD exacerbation, possible V-tach vs. SVT  with aberrancy prompting stress test and subsequent [facility] with stent placement.    Bilateral lower extremity cellulitis   Initially started on vancomycin and meropenem, received 7 days of meropenem, eventually de-escalated to minocycline.  Wound culture with polymicrobal growth.  Blood cultures remained negative.  Followed by Dr. Belle with ID and will need outpatient follow up 1-2 weeks after discharge.  Plan to continue minocycline through 3/21.  Needs continued wound care with Unnaboot therapy changed twice weekly-due for next change 3/16/18.    COPD with exacerbation  Treated with course of PO prednisone and antibiotics as above.  Sputum culture grew klebsiella sensitive to tetracyclines.  Evaluated by Pulmonology with adjustments made to therapy.  CT chest showed endobronchial material, could not rule out endobronchial lesion.  Per discussion between patient and Dr. Keys, opted for conservative management with antimicrobials and aggressive pulmonary toilet and follow up CT chest in 3 months.  Needs to continue flutter valve QID and incentive spirometry Q1h.  Continue pulmicort, Brovanna BID, duoneb QID.  Follow up with pulmonology after discharge.    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  He had GIGI x 2 to LAD and RCA.  He will continue ASA, Plavix, atorvastatin.  Diuretics changed to bumex 1mg PO daily.  Metoprolol continued at decreased dose, 25mg BID (was on 100mg BID) due to low blood pressure.  This may be increased as tolerated following discharge.  IMDUR stopped due to low BP and headache.  He will need Zio Patch at discharge to be arranged by Cardiology clinic and follow up with Dr. Harvey in 4 weeks.       Procedure(s):  CBI LAD RCA       Day of Discharge     HPI:   Feeling OK today but still with significant pain in his legs.  Breathing is stable.  No chest pain.    Review of  Systems  CV- No chest pain, palpitations  Resp- Stable dyspnea  GI- No N/V/D, abd pain  MSK- Left knee/hip pain-chronic    Otherwise ROS is negative except as mentioned in the HPI.    Vital Signs:   Temp:  [98.1 °F (36.7 °C)-99.8 °F (37.7 °C)] 99.8 °F (37.7 °C)  Heart Rate:  [68-93] 71  Resp:  [16-22] 20  BP: (101-118)/(52-62) 108/55     Physical Exam:  Constitutional: No acute distress, awake, alert, sitting up in bed getting neb treatment  HENT: NCAT, mucous membranes moist  Respiratory: Clear to auscultation bilaterally, respiratory effort normal   Cardiovascular: RRR, no murmurs, rubs, or gallopsGastrointestinal: Positive bowel sounds, soft, nontender, nondistended  Musculoskeletal: Bilateral legs wrapped in Unnaboot  Psychiatric: Appropriate affect, cooperative  Neurologic: Cranial Nerves grossly intact to confrontation, speech clear  Skin: No rashes    Pertinent  and/or Most Recent Results       Results from last 7 days  Lab Units 03/14/18  0444 03/13/18  0417 03/13/18  0416 03/12/18  0853 03/10/18  1000   WBC 10*3/mm3 8.26  --   --   --  9.87   HEMOGLOBIN g/dL 10.6* 10.4*  --   --  11.3*   HEMATOCRIT % 33.7* 32.8*  --   --  35.4*   PLATELETS 10*3/mm3 305  --   --   --  310   SODIUM mmol/L 140  --  136 137 136   POTASSIUM mmol/L 5.3  --  4.4 4.5 5.5   CHLORIDE mmol/L 104  --  102 104 100   CO2 mmol/L 32.0*  --  30.0 29.0 32.0*   BUN mg/dL 26*  --  32* 31* 30*   CREATININE mg/dL 1.10  --  1.10 1.20 1.10   GLUCOSE mg/dL 142*  --  89 177* 119*   CALCIUM mg/dL 8.2*  --  8.1* 8.3* 8.9           Invalid input(s): PROT, LABALBU    Results from last 7 days  Lab Units 03/10/18  1000   CHOLESTEROL mg/dL 215*   TRIGLYCERIDES mg/dL 117   HDL CHOL mg/dL 70*       Results from last 7 days  Lab Units 03/10/18  1000   HEMOGLOBIN A1C % 6.10*     Brief Urine Lab Results  (Last result in the past 365 days)      Color   Clarity   Blood   Leuk Est   Nitrite   Protein   CREAT   Urine HCG        06/20/17 2350 Yellow Clear Negative  Negative Negative Negative               Microbiology Results Abnormal     Procedure Component Value - Date/Time    Respiratory Culture - Sputum, Cough [[id number]]  (Abnormal)  (Susceptibility) Collected:  03/07/18 0801    Lab Status:  Final result Specimen:  Sputum from Cough Updated:  03/09/18 1424     Respiratory Culture --      Light growth (2+) Klebsiella pneumoniae (A)      Scant growth (1+) Normal Respiratory Ernesto     Gram Stain Result Moderate (3+) WBCs per low power field      Rare (1+) Epithelial cells per low power field      Many (4+) Yeast      Rare (1+) Normal respiratory erensto    Susceptibility      Klebsiella pneumoniae     FAVIAN     Ampicillin 16 ug/ml Intermediate     Ampicillin + Sulbactam <=8/4 ug/ml Susceptible     Aztreonam <=8 ug/ml Susceptible     Cefepime <=8 ug/ml Susceptible     Cefotaxime <=2 ug/ml Susceptible     Ceftriaxone <=8 ug/ml Susceptible     Cefuroxime sodium <=4 ug/ml Susceptible     Ertapenem <=1 ug/ml Susceptible     Gentamicin <=4 ug/ml Susceptible     Levofloxacin <=2 ug/ml Susceptible     Meropenem <=1 ug/ml Susceptible     Piperacillin + Tazobactam <=16 ug/ml Susceptible     Tetracycline <=4 ug/ml Susceptible     Tobramycin <=4 ug/ml Susceptible     Trimethoprim + Sulfamethoxazole <=2/38 ug/ml Susceptible                    Influenza A & B, RT PCR - Swab, Nasopharynx [[id number]]  (Normal) Collected:  03/03/18 1356    Lab Status:  Final result Specimen:  Swab from Nasopharynx Updated:  03/03/18 1607     Influenza A PCR Not Detected     Influenza B PCR Not Detected    Blood Culture - Blood, [[id number]]  (Normal) Collected:  02/18/18 2308    Lab Status:  Final result Specimen:  Blood from Arm, Left Updated:  02/23/18 2331     Blood Culture No growth at 5 days    Blood Culture - Blood, [[id number]]  (Normal) Collected:  02/18/18 2311    Lab Status:  Final result Specimen:  Blood from Arm, Left Updated:  02/23/18 2331     Blood Culture No growth at 5 days    Wound Culture -  Wound, Foot, Right [[id number]]  (Abnormal)  (Susceptibility) Collected:  02/16/18 1931    Lab Status:  Final result Specimen:  Wound from Foot, Right Updated:  02/23/18 1045     Wound Culture --      Light growth (2+) Pseudomonas aeruginosa (A)      Scant growth (1+) Escherichia coli (A)      Scant growth (1+) Proteus mirabilis (A)      Scant growth (1+) Enterococcus faecalis (A)      Scant growth (1+) Streptococcus, Beta Hemolytic, Group G (A)     Comment:   If Clindamycin or Erythromycin is the drug of choice, notify the laboratory within 7 days to request susceptibility testing.        STREP GROUPING G     Gram Stain Result Few (2+) WBCs seen      Many (4+) Gram negative bacilli      Many (4+) Gram positive bacilli      Many (4+) Gram positive cocci in pairs and clusters    Susceptibility      Pseudomonas aeruginosa    Escherichia coli       FAVIAN    FAVIAN       Ampicillin       <=8 ug/ml Susceptible       Ampicillin + Sulbactam       <=8/4 ug/ml Susceptible       Aztreonam <=8 ug/ml Susceptible    <=8 ug/ml Susceptible       Cefepime <=8 ug/ml Susceptible    <=8 ug/ml Susceptible       Cefotaxime       <=2 ug/ml Susceptible       Ceftazidime 4 ug/ml Susceptible             Ceftriaxone       <=8 ug/ml Susceptible       Cefuroxime sodium       <=4 ug/ml Susceptible       Ertapenem       <=1 ug/ml Susceptible       Gentamicin <=4 ug/ml Susceptible    <=4 ug/ml Susceptible       Levofloxacin <=2 ug/ml Susceptible    <=2 ug/ml Susceptible       Meropenem <=1 ug/ml Susceptible    <=1 ug/ml Susceptible       Piperacillin + Tazobactam <=16 ug/ml Susceptible    <=16 ug/ml Susceptible       Tetracycline       <=4 ug/ml Susceptible       Tobramycin <=4 ug/ml Susceptible    <=4 ug/ml Susceptible       Trimethoprim + Sulfamethoxazole       <=2/38 ug/ml Susceptible                  Susceptibility      Proteus mirabilis    Enterococcus faecalis       FAVIAN    FAVIAN       Ampicillin >16 ug/ml Resistant    <=2 ug/ml Susceptible        Ampicillin + Sulbactam >16/8 ug/ml Resistant             Aztreonam <=8 ug/ml Susceptible             Cefepime <=8 ug/ml Susceptible             Cefotaxime <=2 ug/ml Susceptible             Ceftriaxone <=8 ug/ml Susceptible             Cefuroxime sodium <=4 ug/ml Susceptible             Ertapenem <=1 ug/ml Susceptible             Gentamicin <=4 ug/ml Susceptible             Gentamicin High Level Synergy       <=500 ug/ml Susceptible       Levofloxacin <=2 ug/ml Susceptible             Linezolid       2 ug/ml Susceptible       Meropenem <=1 ug/ml Susceptible             Penicillin G       2 ug/ml Susceptible       Piperacillin + Tazobactam <=16 ug/ml Susceptible             Streptomycin High Level Synergy       <=1000 ug/ml Susceptible       Tetracycline >8 ug/ml Resistant             Tobramycin <=4 ug/ml Susceptible             Trimethoprim + Sulfamethoxazole >2/38 ug/ml Resistant             Vancomycin       1 ug/ml Susceptible                      Blood Culture - Blood, Blood, Venous Line [[id number]]  (Normal) Collected:  02/16/18 2010    Lab Status:  Final result Specimen:  Blood from Arm, Left Updated:  02/21/18 2046     Blood Culture No growth at 5 days    Blood Culture - Blood, Blood, Venous Line [[id number]]  (Normal) Collected:  02/16/18 2005    Lab Status:  Final result Specimen:  Blood from Arm, Right Updated:  02/21/18 2046     Blood Culture No growth at 5 days    Respiratory Culture - Sputum, Cough [[id number]] Collected:  02/19/18 0656    Lab Status:  Final result Specimen:  Sputum from Cough Updated:  02/21/18 0931     Respiratory Culture --      Scant growth (1+) Normal Respiratory Nataliia     Gram Stain Result Occasional WBCs per low power field      No organisms seen          Imaging Results (all)     Procedure Component Value Units Date/Time    FL Esophagram Complete [[id number]] Collected:  03/06/18 1530     Updated:  03/06/18 2112    Narrative:       EXAMINATION: FL ESOPHAGRAM COMPLETE-     INDICATION:  screen for reflux; L03.116-Cellulitis of left lower limb;  L03.115-Cellulitis of right lower limb; N17.9-Acute kidney failure,  unspecified; I73.9-Peripheral vascular disease, unspecified;  Z74.09-Other reduced mobility; Z74.09-Other reduced mobility;  L03.115-Cellulitis of right lower limb; L03.116-Cellulitis of left lower  limb; J44.9-Chronic obstructive pulmonary disease, unspecified;  I10-Essential     TECHNIQUE: 2 minutes and 12 seconds of fluoroscopic time was used for  this exam. 14 associated images were saved.  imaging reveals a  gaseous abdomen.     COMPARISON: NONE     FINDINGS: Under fluoroscopic observation, the patient ingested thin  barium. The oral phase of deglutition appeared normal. The esophageal  mucosa appeared grossly normal. There was no evidence of a focal  esophageal stricture. There was mild esophageal dysmotility. There was  mild gastroesophageal reflux to the level of the thoracic inlet.  Examination of the stomach demonstrated multiple filling defects  consistent with undigested food.          Impression:       1. Mild esophageal dysmotility  2. Mild gastroesophageal reflux to the level of the thoracic inlet         This report was finalized on 3/6/2018 9:10 PM by DR. Chip Hsieh MD.       XR Chest PA & Lateral [[id number]] Collected:  03/06/18 1607     Updated:  03/06/18 1634    Narrative:       EXAMINATION: XR CHEST, PA AND LATERAL-03/06/2018:      INDICATION: SOA; L03.116-Cellulitis of left lower limb;  L03.115-Cellulitis of right lower limb; N17.9-Acute kidney failure,  unspecified; I73.9-Peripheral vascular disease, unspecified;  Z74.09-Other reduced mobility; Z74.09-Other reduced mobility;  L03.115-Cellulitis of right lower limb; L03.116-Cellulitis of left lower  limb; J44.9-Chronic obstructive pulmonary disease, unspecified;  I10-Essential (primary) hypertension.      COMPARISON: NONE.     FINDINGS: The heart is slightly large. There is patchy perihilar and  bibasilar airspace  disease with small, trace bilateral pleural  effusions. There has been little change since the previous examination  of 03/03/2018.           Impression:       Perihilar and bibasilar airspace changes with trace pleural  effusions, stable and unchanged when compared with 03/03/2018.     D:  03/06/2018  E:  03/06/2018     This report was finalized on 3/6/2018 4:32 PM by Dr. Nehemias Nye MD.       CT Angiogram Chest With & Without Contrast [[id number]] Collected:  03/04/18 1555     Updated:  03/05/18 0826    Narrative:       EXAMINATION: CT ANGIOGRAM CHEST W/WO CONTRAST - 03/04/2018      INDICATION: L03.116-Cellulitis of left lower limb; L03.115-Cellulitis of  right lower limb; N17.9-Acute kidney failure, unspecified;  I73.9-Peripheral vascular disease, unspecified; Z74.09-Other reduced  mobility; L03.115-Cellulitis of right lower limb; L03.116-Cellulitis of  left lower limb; J44.9-Chronic obstructive pulmonary disease,  unspecified. Shortness of breath.     TECHNIQUE: Multiple axial CT imaging is obtained of the chest from the  thoracic inlet through the adrenal glands following the administration  of intravenous contrast according to the CT angio protocol. 2D coronal  reformatted images were submitted to further facilitate diagnostic  accuracy and treatment planning.      The radiation dose reduction device was turned on for each scan per the  ALARA (As Low as Reasonably Achievable) protocol.     COMPARISON: None.     FINDINGS: There are small bilateral pleural effusions with elevation  identified of the right hemidiaphragm and consolidation identified at  the right lung base. Some atelectatic changes and consolidation as well  at the left lung base. There is no definite filling defect to suggest  evidence of pulmonary embolism. Extensive atherosclerotic disease seen  within the thoracic aorta. There is dilatation identified of the  descending thoracic aorta  proximally measuring 4 cm. The ascending  thoracic aorta  largest AP dimension is 4 cm. Cardiac chambers within  normal limits. There is no pericardial effusion. No bulky hilar or  axillary lymphadenopathy. Upper abdomen reveals multiple cysts seen  throughout the liver. Stones are seen in the gallbladder. The kidneys  and adrenal glands are both unremarkable. Degenerative change is seen  within the spine.       Impression:       Enlargement of the ascending and descending thoracic aorta  at 4 cm. There is no evidence of PE. There are small bilateral pleural  effusions with consolidation at the lung bases bilaterally suggesting  either infiltrate or atelectatic change. Elevation of the right  hemidiaphragm with multiple cysts seen in the liver. There are stones  seen in the gallbladder. Extensive degenerative change is seen within  the spine.     DICTATED:     03/04/2018  EDITED    :     03/04/2018      This report was finalized on 3/5/2018 8:24 AM by Dr. Nataly Whalen MD.       XR Chest 1 View [[id number]] Collected:  03/03/18 1815     Updated:  03/04/18 1136    Narrative:       EXAMINATION: XR CHEST 1 VW - 03/03/2018     INDICATION:  L03.116-Cellulitis of left lower limb; L03.115-Cellulitis  of right lower limb; N17.9-Acute kidney failure, unspecified;  I73.9-Peripheral vascular disease, unspecified;  Z74.09-Other reduced  mobility; J44.9-Chronic obstructive pulmonary disease, unspecified.     COMPARISON: 02/26/2018.     FINDINGS: Lung volumes are low. Small bilateral pleural effusions.  Increased pulmonary vascularity bilaterally. Heart is borderline  enlarged. Degenerative change is seen within the spine.           Impression:       Small bilateral pleural effusions with mild increased  markings at the lung bases. Prominence of the pulmonary vascularity. No  new focal parenchymal opacification present.     DICTATED:     03/03/2018  EDITED    :     03/03/2018      This report was finalized on 3/4/2018 11:33 AM by Dr. Nataly Whalen MD.       XR Chest 1 View  [[id number]] Collected:  02/26/18 0918     Updated:  02/26/18 1607    Narrative:       EXAMINATION: XR CHEST 1 VW-02/26/2018:      INDICATION: Fever; L03.116-Cellulitis of left lower limb;  L03.115-Cellulitis of right lower limb; N17.9-Acute kidney failure,  unspecified; I73.9-Peripheral vascular disease, unspecified;  Z74.09-Other reduced mobility.      COMPARISON: 02/20/2018.     FINDINGS: Portable chest reveals low lung volumes. The heart is  enlarged. Increased markings seen at the lung bases bilaterally. Small  left pleural effusion. Tiny right pleural effusion identified. Vascular  calcification seen within the thoracic aorta. Improvement seen in the  pulmonary vascularity.           Impression:       Improvement in the pulmonary vascularity with small  bilateral pleural effusions and mild increased markings at the lung  bases. Degenerative changes seen within the spine.     D:  02/26/2018  E:  02/26/2018     This report was finalized on 2/26/2018 4:04 PM by Dr. Nataly Whalen MD.       XR Chest 1 View [[id number]] Collected:  02/20/18 0825     Updated:  02/20/18 0949    Narrative:       EXAMINATION: XR CHEST 1 VW-      INDICATION: Hypoxia; L03.116-Cellulitis of left lower limb;  L03.115-Cellulitis of right lower limb; N17.9-Acute kidney failure,  unspecified; I73.9-Peripheral vascular disease, unspecified.      COMPARISON: 02/18/2018.     FINDINGS: Cardiac size borderline enlarged. Minimal decrease in hazy  bilateral pulmonary opacifications with persistent bibasilar atelectasis  and right basilar opacifications noted. Small right pleural effusion.             Impression:       Interval decrease in bibasilar opacities greatest on the  left with persistent right basilar opacifications and right pleural  effusion.     D:  02/20/2018  E:  02/20/2018     This report was finalized on 2/20/2018 9:47 AM by Dr. Abiodun Baker.       XR Chest 1 View [[id number]] Collected:  02/18/18 2255     Updated:  02/18/18 2342     Narrative:       EXAM:    XR Chest, 1 View    CLINICAL HISTORY:    72 years old, male; Peripheral vascular disease, unspecified; Cellulitis of   right lower limb; Cellulitis of left lower limb; Acute kidney failure,   unspecified; Signs and symptoms; Fever; Additional info: New fever    TECHNIQUE:    Frontal view of the chest.    COMPARISON:    CR - XR CHEST 1 VW 2018-02-18 12:47    FINDINGS:    Lungs:  Increased bilateral airspace disease, likely increased fluid no   pneumonia cannot be excluded.    Pleural space:  Increased pleural effusions.  No pneumothorax.    Heart:  The heart demonstrates diffuse enlargement.    Mediastinum:  Unremarkable.    Bones/joints:  Unremarkable.    Other findings:  Continued deterioration in the appearance of the chest.      Impression:         Continued deterioration in the appearance of the chest, likely secondary   predominantly to increased failure. Superimposed pneumonic infiltrate cannot be   excluded with certainty.    THIS DOCUMENT HAS BEEN ELECTRONICALLY SIGNED BY LENIN DIAZ MD    XR Chest 1 View [[id number]] Collected:  02/18/18 1556     Updated:  02/18/18 8911    Narrative:          EXAMINATION: XR CHEST, SINGLE VIEW - 02/18/2018     INDICATION:  L03.116-Cellulitis of left lower limb; L03.115-Cellulitis  of right lower limb; N17.9-Acute kidney failure, unspecified;  I73.9-Peripheral vascular disease, unspecified.     COMPARISON: 02/16/2018.     FINDINGS: Portable chest reveals patchy ill-defined opacification seen  within the right lung base worsening in the interval. Increased  pulmonary vascularity bilaterally. The heart is enlarged. Degenerative  change is seen within the spine. Vascular calcification is seen within  the thoracic aorta.           Impression:       Patchy ill-defined opacification within the right mid to  lower lung field. Increased pulmonary vascularity bilaterally. The heart  is enlarged. Degenerative change is seen within the spine.     DICTATED:      02/18/2018  EDITED    :     02/18/2018      This report was finalized on 2/18/2018 5:42 PM by Dr. Nataly Whalen MD.       XR Chest 1 View [[id number]] Collected:  02/16/18 2125     Updated:  02/16/18 2212    Narrative:       EXAM:    XR Chest, 1 View    CLINICAL HISTORY:    72 years old, male; Peripheral vascular disease, unspecified; Cellulitis of   right lower limb; Cellulitis of left lower limb; Acute kidney failure,   unspecified; Signs and symptoms; Other: Hypoxia    TECHNIQUE:    Frontal view of the chest.    COMPARISON:    CR - XR CHEST 1 VW 2017-06-25 06:32    FINDINGS:    Lungs:  There is mild prominence of the pulmonary vasculature. Chronic   scarring.    Pleural space:  Unremarkable.  No pneumothorax.    Heart:  There is an atherosclerotic cardiovascular configuration without   cardiomegaly.    Mediastinum:  Unremarkable.    Bones/joints:  There are degenerative changes of the spine.      Impression:         Mild pulmonary vascular congestion.    THIS DOCUMENT HAS BEEN ELECTRONICALLY SIGNED BY LENIN DIAZ MD          Results for orders placed during the hospital encounter of 02/16/18   Duplex Venous Lower Extremity - Bilateral CAR    Narrative · NO bilateral LE DVT in visualized vessels.  · Unable to visualize lower calf veins due to dressings bilaterally.          Results for orders placed during the hospital encounter of 02/16/18   Duplex Venous Lower Extremity - Bilateral CAR    Narrative · NO bilateral LE DVT in visualized vessels.  · Unable to visualize lower calf veins due to dressings bilaterally.          Results for orders placed during the hospital encounter of 02/16/18   Adult Transthoracic Echo Limited W/ Cont if Necessary Per Protocol    Narrative · This was a limited echocardiogram performed to assess left ventricular   ejection fraction and wall motion only  · Left ventricular systolic function is normal. Estimated EF = 65%.  · All left ventricular wall segments contract  normally.            Discharge Details      Yassine Love   Home Medication Instructions LEXA:[id number]    Printed on:03/15/18 3756   Medication Information                      acetaminophen (TYLENOL) 325 MG tablet  Take 2 tablets by mouth Every 4 (Four) Hours As Needed for Mild Pain .             albuterol (PROVENTIL) (2.5 MG/3ML) 0.083% nebulizer solution  Take 2.5 mg by nebulization Every 6 (Six) Hours As Needed for Shortness of Air.             arformoterol (BROVANA) 15 MCG/2ML nebulizer solution  Take 2 mL by nebulization 2 (Two) Times a Day.             aspirin 81 MG EC tablet  Take 1 tablet by mouth Daily.             atorvastatin (LIPITOR) 40 MG tablet  Take 1 tablet by mouth Every Night.             bisacodyl (DULCOLAX) 10 MG suppository  Insert 1 suppository into the rectum Daily As Needed for Constipation.             budesonide (PULMICORT) 0.5 MG/2ML nebulizer solution  Take 2 mL by nebulization 2 (Two) Times a Day.             bumetanide (BUMEX) 1 MG tablet  Take 1 tablet by mouth Daily.             calcium carbonate (TUMS) 500 MG chewable tablet  Chew 1,000 mg 2 (Two) Times a Day As Needed for Heartburn (do not admin 2 hours before or after critical medications please (ex abx)).             castor oil-balsam peru (VENELEX) ointment  Apply 5 g topically Every 12 (Twelve) Hours.             clopidogrel (PLAVIX) 75 MG tablet  Take 1 tablet by mouth Daily.             cyanocobalamin (VITAMIN B-12) 1000 MCG tablet  Take 1 tablet by mouth Daily.             diclofenac (VOLTAREN) 1 % gel gel  Apply 2 g topically 4 (Four) Times a Day.             docusate sodium (COLACE) 250 MG capsule  Take 250 mg by mouth Daily As Needed for Constipation.             fluticasone (FLONASE) 50 MCG/ACT nasal spray  2 sprays by Each Nare route Daily.             gabapentin (NEURONTIN) 100 MG capsule  Take 2 capsules by mouth Every 8 (Eight) Hours.             guaiFENesin (MUCINEX) 600 MG 12 hr tablet  Take 1 tablet by mouth  2 (Two) Times a Day As Needed for Congestion.             HYDROcodone-acetaminophen (NORCO) 7.5-325 MG per tablet  Take 1 tablet by mouth Every 4 (Four) Hours As Needed for Moderate Pain  for up to 9 days.             ipratropium-albuterol (DUO-NEB) 0.5-2.5 mg/mL nebulizer  Take 3 mL by nebulization 4 (Four) Times a Day.             magnesium hydroxide (MILK OF MAGNESIA) 2400 MG/10ML suspension suspension  Take 10 mL by mouth Daily As Needed (Constipation).             metoprolol tartrate (LOPRESSOR) 25 MG tablet  Take 1 tablet by mouth Every 12 (Twelve) Hours.             minocycline (MINOCIN,DYNACIN) 100 MG capsule  Take 1 capsule by mouth Every 12 (Twelve) Hours for 5 doses.             Morphine (MSIR) 30 MG tablet  Take 1 tablet by mouth Every 6 (Six) Hours As Needed for Severe Pain .             nicotine (NICODERM CQ) 14 MG/24HR patch  Place 1 patch on the skin Daily.             pantoprazole (PROTONIX) 40 MG EC tablet  Take 1 tablet by mouth Daily.             saccharomyces boulardii (FLORASTOR) 250 MG capsule  Take 1 capsule by mouth 2 (Two) Times a Day.                   Discharge Disposition:  Rehab Facility or Unit (DC - External)    Discharge Diet:      Discharge Activity:   Activity Instructions     Use assistance when walking as needed.                 Special Instructions: Unnaboots bilateral lower extremities twice weekly    No future appointments.    Additional Instructions for the Follow-ups that You Need to Schedule     Ambulatory Referral to Physical Therapy Evaluate and treat    As directed      Specialty modality needed?:  Evaluate and treat         Discharge Follow-up with PCP    As directed      Follow Up Details:  Within 1 week of discharge from rehab faciltiy         Discharge Follow-up with Specified Provider: Dr. Harvey, Cardiology, 4 weeks    As directed      To:  Dr. Harvey, Cardiology, 4 weeks         Discharge Follow-up with Specified Provider: Todd Keys, Pulmonary; 1 Month    As  directed      To:  Todd Keys, Pulmonary    Follow Up:  1 Month    Follow Up Details:  Needs repeat CT chest in 3 months               Time Spent on Discharge:  64 minutes    Electronically signed by Sheree Queen MD, 03/15/18, 9:43 AM.

## 2018-03-15 NOTE — PROGRESS NOTES
Continued Stay Note  [facility]     Patient Name: Yassine Love  MRN: [id number]  Today's Date: 3/15/2018    Admit Date: 2/16/2018          Discharge Plan     Row Name 03/15/18 1412       Plan    Final Discharge Disposition Code 62 - inpatient rehab facility    Row Name 03/15/18 1338       Plan    Final Discharge Disposition Code 62 - inpatient rehab facility              Discharge Codes    No documentation.       Expected Discharge Date and Time     Expected Discharge Date Expected Discharge Time    Mar 15, 2018             Silvana Owusu RN

## 2018-03-27 ENCOUNTER — TRANSCRIBE ORDERS (OUTPATIENT)
Dept: PAIN MEDICINE | Facility: CLINIC | Age: 73
End: 2018-03-27

## 2018-03-27 DIAGNOSIS — L03.90 CELLULITIS, UNSPECIFIED CELLULITIS SITE: Primary | ICD-10-CM

## 2018-03-30 RX ORDER — ALBUTEROL SULFATE 2.5 MG/3ML
2.5 SOLUTION RESPIRATORY (INHALATION) EVERY 6 HOURS PRN
Qty: 3 ML | Refills: 12 | Status: SHIPPED | OUTPATIENT
Start: 2018-03-30 | End: 2018-03-30 | Stop reason: SDUPTHER

## 2018-03-30 RX ORDER — ALBUTEROL SULFATE 2.5 MG/3ML
2.5 SOLUTION RESPIRATORY (INHALATION) EVERY 6 HOURS PRN
Qty: 360 ML | Refills: 2 | Status: SHIPPED | OUTPATIENT
Start: 2018-03-30 | End: 2018-08-01 | Stop reason: SDUPTHER

## 2018-03-30 NOTE — TELEPHONE ENCOUNTER
Pharm called needing correction on the  Albuterol Neb solution for the correct amount of 360 and with an icd  10 J44.9

## 2018-03-30 NOTE — TELEPHONE ENCOUNTER
Pt called and stated that he just got discharged from [facility] and is needing a new script for Albuterol Solution. Refilled per chart via fax to [facility] on [address] as pt requested. Pt notified and verbalized understanding.

## 2018-04-30 DIAGNOSIS — J44.9 CHRONIC OBSTRUCTIVE PULMONARY DISEASE, UNSPECIFIED COPD TYPE (HCC): Primary | ICD-10-CM

## 2018-04-30 DIAGNOSIS — R09.02 HYPOXIA: ICD-10-CM

## 2018-05-03 ENCOUNTER — HOSPITAL ENCOUNTER (OUTPATIENT)
Dept: CT IMAGING | Facility: HOSPITAL | Age: 73
Discharge: HOME | End: 2018-05-03
Attending: INTERNAL MEDICINE

## 2018-06-05 ENCOUNTER — TELEPHONE (OUTPATIENT)
Dept: CARDIOLOGY | Facility: CLINIC | Age: 73
End: 2018-06-05

## 2018-06-05 NOTE — TELEPHONE ENCOUNTER
Patient was ordered a ZIO to be placed after discharge from hospital in March 2018. He was transferred to [facility] and did not contact our office after discharge to set up. When I spoke to him today he said he has not had any fast heart rates and would rather not wear it at this time. He wants to wait until he sees Dr. Harvey for follow up in July to reassess the need for the monitor.

## 2018-08-01 RX ORDER — ALBUTEROL SULFATE 2.5 MG/3ML
2.5 SOLUTION RESPIRATORY (INHALATION) EVERY 6 HOURS PRN
Qty: 360 ML | Refills: 0 | Status: SHIPPED | OUTPATIENT
Start: 2018-08-01 | End: 2019-01-02 | Stop reason: HOSPADM

## 2018-08-01 NOTE — TELEPHONE ENCOUNTER
Pt LVM requesting refill on Rx Albuterol Nebulizer. Spoke with pt and advised him that he will need to schedule an apt. Refilled Rx Albuterol Nebulizer per chart via fax with no additional refills. Pt verbalized understanding.

## 2018-08-20 ENCOUNTER — HOSPITAL ENCOUNTER (INPATIENT)
Facility: HOSPITAL | Age: 73
LOS: 19 days | Discharge: NURSING, REHAB OR LONG-TERM CARE | End: 2018-09-08
Attending: EMERGENCY MEDICINE | Admitting: INTERNAL MEDICINE

## 2018-08-20 DIAGNOSIS — Z74.09 IMPAIRED FUNCTIONAL MOBILITY, BALANCE, GAIT, AND ENDURANCE: ICD-10-CM

## 2018-08-20 DIAGNOSIS — I87.2 STASIS DERMATITIS OF BOTH LEGS: ICD-10-CM

## 2018-08-20 DIAGNOSIS — G89.4 CHRONIC PAIN SYNDROME: ICD-10-CM

## 2018-08-20 DIAGNOSIS — Z86.79 HISTORY OF HYPERTENSION: ICD-10-CM

## 2018-08-20 DIAGNOSIS — Z74.09 IMPAIRED MOBILITY AND ADLS: ICD-10-CM

## 2018-08-20 DIAGNOSIS — L03.116 CELLULITIS OF LEFT LOWER LEG: Primary | ICD-10-CM

## 2018-08-20 DIAGNOSIS — Z78.9 IMPAIRED MOBILITY AND ADLS: ICD-10-CM

## 2018-08-20 DIAGNOSIS — R60.9 PERIPHERAL EDEMA: ICD-10-CM

## 2018-08-20 DIAGNOSIS — L03.119 RECURRENT CELLULITIS OF LOWER LEG: ICD-10-CM

## 2018-08-20 PROBLEM — A41.9 SEPSIS (HCC): Status: ACTIVE | Noted: 2018-08-20

## 2018-08-20 LAB
ALBUMIN SERPL-MCNC: 3.08 G/DL (ref 3.2–4.8)
ALBUMIN/GLOB SERPL: 0.7 G/DL (ref 1.5–2.5)
ALP SERPL-CCNC: 159 U/L (ref 25–100)
ALT SERPL W P-5'-P-CCNC: 23 U/L (ref 7–40)
ANION GAP SERPL CALCULATED.3IONS-SCNC: 3 MMOL/L (ref 3–11)
AST SERPL-CCNC: 20 U/L (ref 0–33)
BACTERIA UR QL AUTO: ABNORMAL /HPF
BASOPHILS # BLD AUTO: 0.03 10*3/MM3 (ref 0–0.2)
BASOPHILS NFR BLD AUTO: 0.4 % (ref 0–1)
BILIRUB SERPL-MCNC: 0.3 MG/DL (ref 0.3–1.2)
BILIRUB UR QL STRIP: NEGATIVE
BNP SERPL-MCNC: 44 PG/ML (ref 0–100)
BUN BLD-MCNC: 34 MG/DL (ref 9–23)
BUN/CREAT SERPL: 21.5 (ref 7–25)
CALCIUM SPEC-SCNC: 8.8 MG/DL (ref 8.7–10.4)
CHLORIDE SERPL-SCNC: 98 MMOL/L (ref 99–109)
CLARITY UR: CLEAR
CO2 SERPL-SCNC: 31 MMOL/L (ref 20–31)
COLOR UR: YELLOW
CREAT BLD-MCNC: 1.58 MG/DL (ref 0.6–1.3)
D-LACTATE SERPL-SCNC: 1.5 MMOL/L (ref 0.5–2)
DEPRECATED RDW RBC AUTO: 60.7 FL (ref 37–54)
EOSINOPHIL # BLD AUTO: 0.32 10*3/MM3 (ref 0–0.3)
EOSINOPHIL NFR BLD AUTO: 4.1 % (ref 0–3)
ERYTHROCYTE [DISTWIDTH] IN BLOOD BY AUTOMATED COUNT: 18.9 % (ref 11.3–14.5)
GFR SERPL CREATININE-BSD FRML MDRD: 43 ML/MIN/1.73
GLOBULIN UR ELPH-MCNC: 4.1 GM/DL
GLUCOSE BLD-MCNC: 95 MG/DL (ref 70–100)
GLUCOSE UR STRIP-MCNC: NEGATIVE MG/DL
HCT VFR BLD AUTO: 37.6 % (ref 38.9–50.9)
HGB BLD-MCNC: 11.7 G/DL (ref 13.1–17.5)
HGB UR QL STRIP.AUTO: NEGATIVE
HYALINE CASTS UR QL AUTO: ABNORMAL /LPF
IMM GRANULOCYTES # BLD: 0.02 10*3/MM3 (ref 0–0.03)
IMM GRANULOCYTES NFR BLD: 0.3 % (ref 0–0.6)
KETONES UR QL STRIP: NEGATIVE
KOH PREP NAIL: NORMAL
LEUKOCYTE ESTERASE UR QL STRIP.AUTO: ABNORMAL
LYMPHOCYTES # BLD AUTO: 0.55 10*3/MM3 (ref 0.6–4.8)
LYMPHOCYTES NFR BLD AUTO: 7.1 % (ref 24–44)
MCH RBC QN AUTO: 27.8 PG (ref 27–31)
MCHC RBC AUTO-ENTMCNC: 31.1 G/DL (ref 32–36)
MCV RBC AUTO: 89.3 FL (ref 80–99)
MONOCYTES # BLD AUTO: 0.67 10*3/MM3 (ref 0–1)
MONOCYTES NFR BLD AUTO: 8.7 % (ref 0–12)
NEUTROPHILS # BLD AUTO: 6.17 10*3/MM3 (ref 1.5–8.3)
NEUTROPHILS NFR BLD AUTO: 79.7 % (ref 41–71)
NITRITE UR QL STRIP: NEGATIVE
PH UR STRIP.AUTO: <=5 [PH] (ref 5–8)
PLATELET # BLD AUTO: 447 10*3/MM3 (ref 150–450)
PMV BLD AUTO: 9.9 FL (ref 6–12)
POTASSIUM BLD-SCNC: 4.5 MMOL/L (ref 3.5–5.5)
PROT SERPL-MCNC: 7.2 G/DL (ref 5.7–8.2)
PROT UR QL STRIP: NEGATIVE
RBC # BLD AUTO: 4.21 10*6/MM3 (ref 4.2–5.76)
RBC # UR: ABNORMAL /HPF
REF LAB TEST METHOD: ABNORMAL
SODIUM BLD-SCNC: 132 MMOL/L (ref 132–146)
SP GR UR STRIP: 1.02 (ref 1–1.03)
SQUAMOUS #/AREA URNS HPF: ABNORMAL /HPF
UROBILINOGEN UR QL STRIP: ABNORMAL
WBC NRBC COR # BLD: 7.74 10*3/MM3 (ref 3.5–10.8)
WBC UR QL AUTO: ABNORMAL /HPF

## 2018-08-20 PROCEDURE — 87147 CULTURE TYPE IMMUNOLOGIC: CPT | Performed by: FAMILY MEDICINE

## 2018-08-20 PROCEDURE — 87185 SC STD ENZYME DETCJ PER NZM: CPT | Performed by: FAMILY MEDICINE

## 2018-08-20 PROCEDURE — 87040 BLOOD CULTURE FOR BACTERIA: CPT | Performed by: EMERGENCY MEDICINE

## 2018-08-20 PROCEDURE — 94799 UNLISTED PULMONARY SVC/PX: CPT

## 2018-08-20 PROCEDURE — 25010000002 MEROPENEM: Performed by: NURSE PRACTITIONER

## 2018-08-20 PROCEDURE — 87186 SC STD MICRODIL/AGAR DIL: CPT | Performed by: FAMILY MEDICINE

## 2018-08-20 PROCEDURE — 87205 SMEAR GRAM STAIN: CPT | Performed by: FAMILY MEDICINE

## 2018-08-20 PROCEDURE — 94760 N-INVAS EAR/PLS OXIMETRY 1: CPT

## 2018-08-20 PROCEDURE — 87077 CULTURE AEROBIC IDENTIFY: CPT | Performed by: FAMILY MEDICINE

## 2018-08-20 PROCEDURE — 85025 COMPLETE CBC W/AUTO DIFF WBC: CPT | Performed by: EMERGENCY MEDICINE

## 2018-08-20 PROCEDURE — 87070 CULTURE OTHR SPECIMN AEROBIC: CPT | Performed by: FAMILY MEDICINE

## 2018-08-20 PROCEDURE — 25010000002 HEPARIN (PORCINE) PER 1000 UNITS: Performed by: NURSE PRACTITIONER

## 2018-08-20 PROCEDURE — 80053 COMPREHEN METABOLIC PANEL: CPT | Performed by: EMERGENCY MEDICINE

## 2018-08-20 PROCEDURE — 99223 1ST HOSP IP/OBS HIGH 75: CPT | Performed by: FAMILY MEDICINE

## 2018-08-20 PROCEDURE — 83605 ASSAY OF LACTIC ACID: CPT | Performed by: EMERGENCY MEDICINE

## 2018-08-20 PROCEDURE — 81001 URINALYSIS AUTO W/SCOPE: CPT | Performed by: EMERGENCY MEDICINE

## 2018-08-20 PROCEDURE — 99285 EMERGENCY DEPT VISIT HI MDM: CPT

## 2018-08-20 PROCEDURE — 25010000002 VANCOMYCIN 10 G RECONSTITUTED SOLUTION: Performed by: EMERGENCY MEDICINE

## 2018-08-20 PROCEDURE — 83880 ASSAY OF NATRIURETIC PEPTIDE: CPT | Performed by: EMERGENCY MEDICINE

## 2018-08-20 PROCEDURE — 87220 TISSUE EXAM FOR FUNGI: CPT | Performed by: EMERGENCY MEDICINE

## 2018-08-20 RX ORDER — GABAPENTIN 300 MG/1
300 CAPSULE ORAL 3 TIMES DAILY
Status: ON HOLD | COMMUNITY
End: 2018-08-20

## 2018-08-20 RX ORDER — HYDROCODONE BITARTRATE AND ACETAMINOPHEN 10; 325 MG/1; MG/1
1 TABLET ORAL 2 TIMES DAILY
COMMUNITY
End: 2018-09-08 | Stop reason: HOSPADM

## 2018-08-20 RX ORDER — HEPARIN SODIUM 5000 [USP'U]/ML
5000 INJECTION, SOLUTION INTRAVENOUS; SUBCUTANEOUS EVERY 8 HOURS SCHEDULED
Status: DISCONTINUED | OUTPATIENT
Start: 2018-08-20 | End: 2018-09-08 | Stop reason: HOSPADM

## 2018-08-20 RX ORDER — SODIUM CHLORIDE 0.9 % (FLUSH) 0.9 %
10 SYRINGE (ML) INJECTION AS NEEDED
Status: DISCONTINUED | OUTPATIENT
Start: 2018-08-20 | End: 2018-09-08 | Stop reason: HOSPADM

## 2018-08-20 RX ORDER — NICOTINE 21 MG/24HR
1 PATCH, TRANSDERMAL 24 HOURS TRANSDERMAL EVERY 24 HOURS
Status: DISCONTINUED | OUTPATIENT
Start: 2018-08-20 | End: 2018-09-08 | Stop reason: HOSPADM

## 2018-08-20 RX ORDER — ATORVASTATIN CALCIUM 40 MG/1
40 TABLET, FILM COATED ORAL NIGHTLY
Status: DISCONTINUED | OUTPATIENT
Start: 2018-08-20 | End: 2018-09-08 | Stop reason: HOSPADM

## 2018-08-20 RX ORDER — ACETAMINOPHEN 325 MG/1
325 TABLET ORAL EVERY 6 HOURS PRN
COMMUNITY
End: 2019-01-02 | Stop reason: HOSPADM

## 2018-08-20 RX ORDER — MORPHINE SULFATE 15 MG/1
15 TABLET, FILM COATED, EXTENDED RELEASE ORAL 2 TIMES DAILY
COMMUNITY
End: 2018-09-08 | Stop reason: HOSPADM

## 2018-08-20 RX ORDER — MORPHINE SULFATE 30 MG/1
30 TABLET, FILM COATED, EXTENDED RELEASE ORAL ONCE
Status: COMPLETED | OUTPATIENT
Start: 2018-08-20 | End: 2018-08-20

## 2018-08-20 RX ORDER — ASPIRIN 81 MG/1
81 TABLET ORAL DAILY
Status: DISCONTINUED | OUTPATIENT
Start: 2018-08-20 | End: 2018-09-08 | Stop reason: HOSPADM

## 2018-08-20 RX ORDER — SODIUM CHLORIDE 9 MG/ML
75 INJECTION, SOLUTION INTRAVENOUS CONTINUOUS
Status: DISCONTINUED | OUTPATIENT
Start: 2018-08-20 | End: 2018-08-23

## 2018-08-20 RX ORDER — MORPHINE SULFATE 15 MG/1
15 TABLET, FILM COATED, EXTENDED RELEASE ORAL EVERY 12 HOURS SCHEDULED
Status: DISCONTINUED | OUTPATIENT
Start: 2018-08-21 | End: 2018-08-23

## 2018-08-20 RX ORDER — ATORVASTATIN CALCIUM 40 MG/1
40 TABLET, FILM COATED ORAL NIGHTLY
COMMUNITY
End: 2019-01-02 | Stop reason: HOSPADM

## 2018-08-20 RX ORDER — FLUTICASONE PROPIONATE 50 MCG
2 SPRAY, SUSPENSION (ML) NASAL DAILY
Status: DISCONTINUED | OUTPATIENT
Start: 2018-08-20 | End: 2018-09-08 | Stop reason: HOSPADM

## 2018-08-20 RX ORDER — CLOPIDOGREL BISULFATE 75 MG/1
75 TABLET ORAL DAILY
Status: DISCONTINUED | OUTPATIENT
Start: 2018-08-20 | End: 2018-09-08 | Stop reason: HOSPADM

## 2018-08-20 RX ORDER — SODIUM CHLORIDE 0.9 % (FLUSH) 0.9 %
1-10 SYRINGE (ML) INJECTION AS NEEDED
Status: DISCONTINUED | OUTPATIENT
Start: 2018-08-20 | End: 2018-09-08 | Stop reason: HOSPADM

## 2018-08-20 RX ORDER — BUDESONIDE AND FORMOTEROL FUMARATE DIHYDRATE 160; 4.5 UG/1; UG/1
2 AEROSOL RESPIRATORY (INHALATION)
COMMUNITY
End: 2018-10-22 | Stop reason: HOSPADM

## 2018-08-20 RX ORDER — HYDROCODONE BITARTRATE AND ACETAMINOPHEN 10; 325 MG/1; MG/1
1 TABLET ORAL EVERY 6 HOURS PRN
Status: DISCONTINUED | OUTPATIENT
Start: 2018-08-20 | End: 2018-08-23

## 2018-08-20 RX ORDER — IPRATROPIUM BROMIDE AND ALBUTEROL SULFATE 2.5; .5 MG/3ML; MG/3ML
3 SOLUTION RESPIRATORY (INHALATION) EVERY 6 HOURS PRN
Status: DISCONTINUED | OUTPATIENT
Start: 2018-08-20 | End: 2018-09-08 | Stop reason: HOSPADM

## 2018-08-20 RX ORDER — BUDESONIDE AND FORMOTEROL FUMARATE DIHYDRATE 160; 4.5 UG/1; UG/1
2 AEROSOL RESPIRATORY (INHALATION)
Status: DISCONTINUED | OUTPATIENT
Start: 2018-08-20 | End: 2018-09-08 | Stop reason: HOSPADM

## 2018-08-20 RX ORDER — PANTOPRAZOLE SODIUM 40 MG/1
40 TABLET, DELAYED RELEASE ORAL
Status: DISCONTINUED | OUTPATIENT
Start: 2018-08-21 | End: 2018-09-08 | Stop reason: HOSPADM

## 2018-08-20 RX ORDER — FUROSEMIDE 40 MG/1
40 TABLET ORAL 2 TIMES DAILY
Status: ON HOLD | COMMUNITY
End: 2019-01-02 | Stop reason: SDUPTHER

## 2018-08-20 RX ADMIN — HEPARIN SODIUM 5000 UNITS: 5000 INJECTION, SOLUTION INTRAVENOUS; SUBCUTANEOUS at 20:44

## 2018-08-20 RX ADMIN — HYDROCODONE BITARTRATE AND ACETAMINOPHEN 1 TABLET: 10; 325 TABLET ORAL at 19:52

## 2018-08-20 RX ADMIN — NICOTINE 1 PATCH: 21 PATCH, EXTENDED RELEASE TRANSDERMAL at 20:44

## 2018-08-20 RX ADMIN — BUDESONIDE AND FORMOTEROL FUMARATE DIHYDRATE 2 PUFF: 160; 4.5 AEROSOL RESPIRATORY (INHALATION) at 21:23

## 2018-08-20 RX ADMIN — CLOPIDOGREL BISULFATE 75 MG: 75 TABLET ORAL at 20:43

## 2018-08-20 RX ADMIN — VANCOMYCIN HYDROCHLORIDE 1750 MG: 10 INJECTION, POWDER, LYOPHILIZED, FOR SOLUTION INTRAVENOUS at 15:01

## 2018-08-20 RX ADMIN — MORPHINE SULFATE 30 MG: 30 TABLET, EXTENDED RELEASE ORAL at 14:12

## 2018-08-20 RX ADMIN — MEROPENEM 500 MG: 500 INJECTION, POWDER, FOR SOLUTION INTRAVENOUS at 20:55

## 2018-08-20 RX ADMIN — ATORVASTATIN CALCIUM 40 MG: 40 TABLET, FILM COATED ORAL at 20:43

## 2018-08-20 RX ADMIN — ASPIRIN 81 MG: 81 TABLET, COATED ORAL at 20:43

## 2018-08-20 RX ADMIN — SODIUM CHLORIDE 75 ML/HR: 9 INJECTION, SOLUTION INTRAVENOUS at 20:55

## 2018-08-21 ENCOUNTER — APPOINTMENT (OUTPATIENT)
Dept: CARDIOLOGY | Facility: HOSPITAL | Age: 73
End: 2018-08-21
Attending: HOSPITALIST

## 2018-08-21 PROBLEM — L89.302 DECUBITUS ULCER OF BUTTOCK, STAGE 2 (HCC): Status: ACTIVE | Noted: 2018-08-21

## 2018-08-21 PROBLEM — L03.119 CELLULITIS OF LOWER EXTREMITY: Status: ACTIVE | Noted: 2018-02-16

## 2018-08-21 PROBLEM — R53.81 DEBILITY: Status: ACTIVE | Noted: 2018-08-21

## 2018-08-21 LAB
ANION GAP SERPL CALCULATED.3IONS-SCNC: 2 MMOL/L (ref 3–11)
BASOPHILS # BLD AUTO: 0.03 10*3/MM3 (ref 0–0.2)
BASOPHILS NFR BLD AUTO: 0.4 % (ref 0–1)
BUN BLD-MCNC: 25 MG/DL (ref 9–23)
BUN/CREAT SERPL: 21 (ref 7–25)
CALCIUM SPEC-SCNC: 7.8 MG/DL (ref 8.7–10.4)
CHLORIDE SERPL-SCNC: 106 MMOL/L (ref 99–109)
CO2 SERPL-SCNC: 30 MMOL/L (ref 20–31)
CREAT BLD-MCNC: 1.19 MG/DL (ref 0.6–1.3)
DEPRECATED RDW RBC AUTO: 60.4 FL (ref 37–54)
EOSINOPHIL # BLD AUTO: 0.17 10*3/MM3 (ref 0–0.3)
EOSINOPHIL NFR BLD AUTO: 2.2 % (ref 0–3)
ERYTHROCYTE [DISTWIDTH] IN BLOOD BY AUTOMATED COUNT: 18.8 % (ref 11.3–14.5)
GFR SERPL CREATININE-BSD FRML MDRD: 60 ML/MIN/1.73
GLUCOSE BLD-MCNC: 96 MG/DL (ref 70–100)
HCT VFR BLD AUTO: 32.8 % (ref 38.9–50.9)
HGB BLD-MCNC: 10.2 G/DL (ref 13.1–17.5)
IMM GRANULOCYTES # BLD: 0.02 10*3/MM3 (ref 0–0.03)
IMM GRANULOCYTES NFR BLD: 0.3 % (ref 0–0.6)
LYMPHOCYTES # BLD AUTO: 0.55 10*3/MM3 (ref 0.6–4.8)
LYMPHOCYTES NFR BLD AUTO: 7.1 % (ref 24–44)
MCH RBC QN AUTO: 27.5 PG (ref 27–31)
MCHC RBC AUTO-ENTMCNC: 31.1 G/DL (ref 32–36)
MCV RBC AUTO: 88.4 FL (ref 80–99)
MONOCYTES # BLD AUTO: 0.86 10*3/MM3 (ref 0–1)
MONOCYTES NFR BLD AUTO: 11.2 % (ref 0–12)
NEUTROPHILS # BLD AUTO: 6.09 10*3/MM3 (ref 1.5–8.3)
NEUTROPHILS NFR BLD AUTO: 79.1 % (ref 41–71)
PLATELET # BLD AUTO: 375 10*3/MM3 (ref 150–450)
PMV BLD AUTO: 9.7 FL (ref 6–12)
POTASSIUM BLD-SCNC: 4.6 MMOL/L (ref 3.5–5.5)
RBC # BLD AUTO: 3.71 10*6/MM3 (ref 4.2–5.76)
SODIUM BLD-SCNC: 138 MMOL/L (ref 132–146)
WBC NRBC COR # BLD: 7.7 10*3/MM3 (ref 3.5–10.8)

## 2018-08-21 PROCEDURE — 80048 BASIC METABOLIC PNL TOTAL CA: CPT | Performed by: NURSE PRACTITIONER

## 2018-08-21 PROCEDURE — 25010000002 HEPARIN (PORCINE) PER 1000 UNITS: Performed by: NURSE PRACTITIONER

## 2018-08-21 PROCEDURE — 94760 N-INVAS EAR/PLS OXIMETRY 1: CPT

## 2018-08-21 PROCEDURE — 25010000002 VANCOMYCIN 10 G RECONSTITUTED SOLUTION: Performed by: FAMILY MEDICINE

## 2018-08-21 PROCEDURE — 25010000002 MEROPENEM: Performed by: NURSE PRACTITIONER

## 2018-08-21 PROCEDURE — 99233 SBSQ HOSP IP/OBS HIGH 50: CPT | Performed by: HOSPITALIST

## 2018-08-21 PROCEDURE — 94640 AIRWAY INHALATION TREATMENT: CPT

## 2018-08-21 PROCEDURE — 94799 UNLISTED PULMONARY SVC/PX: CPT

## 2018-08-21 PROCEDURE — 97166 OT EVAL MOD COMPLEX 45 MIN: CPT

## 2018-08-21 PROCEDURE — 85025 COMPLETE CBC W/AUTO DIFF WBC: CPT | Performed by: NURSE PRACTITIONER

## 2018-08-21 PROCEDURE — 97162 PT EVAL MOD COMPLEX 30 MIN: CPT | Performed by: PHYSICAL THERAPIST

## 2018-08-21 RX ORDER — CASTOR OIL AND BALSAM, PERU 788; 87 MG/G; MG/G
OINTMENT TOPICAL EVERY 12 HOURS SCHEDULED
Status: DISCONTINUED | OUTPATIENT
Start: 2018-08-21 | End: 2018-09-08 | Stop reason: HOSPADM

## 2018-08-21 RX ADMIN — MEROPENEM 500 MG: 500 INJECTION, POWDER, FOR SOLUTION INTRAVENOUS at 02:43

## 2018-08-21 RX ADMIN — HEPARIN SODIUM 5000 UNITS: 5000 INJECTION, SOLUTION INTRAVENOUS; SUBCUTANEOUS at 20:16

## 2018-08-21 RX ADMIN — ATORVASTATIN CALCIUM 40 MG: 40 TABLET, FILM COATED ORAL at 20:16

## 2018-08-21 RX ADMIN — BUDESONIDE AND FORMOTEROL FUMARATE DIHYDRATE 2 PUFF: 160; 4.5 AEROSOL RESPIRATORY (INHALATION) at 19:27

## 2018-08-21 RX ADMIN — MORPHINE SULFATE 15 MG: 15 TABLET, EXTENDED RELEASE ORAL at 08:49

## 2018-08-21 RX ADMIN — BUDESONIDE AND FORMOTEROL FUMARATE DIHYDRATE 2 PUFF: 160; 4.5 AEROSOL RESPIRATORY (INHALATION) at 09:48

## 2018-08-21 RX ADMIN — HYDROCODONE BITARTRATE AND ACETAMINOPHEN 1 TABLET: 10; 325 TABLET ORAL at 19:31

## 2018-08-21 RX ADMIN — MICONAZOLE NITRATE 1 APPLICATION: 2 CREAM TOPICAL at 10:55

## 2018-08-21 RX ADMIN — CASTOR OIL AND BALSAM, PERU: 788; 87 OINTMENT TOPICAL at 10:35

## 2018-08-21 RX ADMIN — HYDROCODONE BITARTRATE AND ACETAMINOPHEN 1 TABLET: 10; 325 TABLET ORAL at 02:43

## 2018-08-21 RX ADMIN — SODIUM CHLORIDE 75 ML/HR: 9 INJECTION, SOLUTION INTRAVENOUS at 17:01

## 2018-08-21 RX ADMIN — MORPHINE SULFATE 15 MG: 15 TABLET, EXTENDED RELEASE ORAL at 20:15

## 2018-08-21 RX ADMIN — HEPARIN SODIUM 5000 UNITS: 5000 INJECTION, SOLUTION INTRAVENOUS; SUBCUTANEOUS at 06:05

## 2018-08-21 RX ADMIN — IPRATROPIUM BROMIDE AND ALBUTEROL SULFATE 3 ML: 2.5; .5 SOLUTION RESPIRATORY (INHALATION) at 17:56

## 2018-08-21 RX ADMIN — HYDROCODONE BITARTRATE AND ACETAMINOPHEN 1 TABLET: 10; 325 TABLET ORAL at 13:29

## 2018-08-21 RX ADMIN — ASPIRIN 81 MG: 81 TABLET, COATED ORAL at 08:49

## 2018-08-21 RX ADMIN — CLOPIDOGREL BISULFATE 75 MG: 75 TABLET ORAL at 08:50

## 2018-08-21 RX ADMIN — MEROPENEM 500 MG: 500 INJECTION, POWDER, FOR SOLUTION INTRAVENOUS at 17:49

## 2018-08-21 RX ADMIN — VANCOMYCIN HYDROCHLORIDE 1250 MG: 10 INJECTION, POWDER, LYOPHILIZED, FOR SOLUTION INTRAVENOUS at 13:30

## 2018-08-21 RX ADMIN — HYDROCODONE BITARTRATE AND ACETAMINOPHEN 1 TABLET: 10; 325 TABLET ORAL at 08:59

## 2018-08-21 RX ADMIN — MICONAZOLE NITRATE: 2 CREAM TOPICAL at 20:22

## 2018-08-21 RX ADMIN — CASTOR OIL AND BALSAM, PERU: 788; 87 OINTMENT TOPICAL at 20:22

## 2018-08-21 RX ADMIN — NICOTINE 1 PATCH: 21 PATCH, EXTENDED RELEASE TRANSDERMAL at 20:16

## 2018-08-21 RX ADMIN — PANTOPRAZOLE SODIUM 40 MG: 40 TABLET, DELAYED RELEASE ORAL at 06:05

## 2018-08-21 RX ADMIN — MEROPENEM 500 MG: 500 INJECTION, POWDER, FOR SOLUTION INTRAVENOUS at 10:35

## 2018-08-22 ENCOUNTER — APPOINTMENT (OUTPATIENT)
Dept: CARDIOLOGY | Facility: HOSPITAL | Age: 73
End: 2018-08-22
Attending: HOSPITALIST

## 2018-08-22 ENCOUNTER — APPOINTMENT (OUTPATIENT)
Dept: GENERAL RADIOLOGY | Facility: HOSPITAL | Age: 73
End: 2018-08-22

## 2018-08-22 LAB
ANION GAP SERPL CALCULATED.3IONS-SCNC: 5 MMOL/L (ref 3–11)
BACTERIA SPEC AEROBE CULT: ABNORMAL
BACTERIA SPEC AEROBE CULT: ABNORMAL
BASOPHILS # BLD AUTO: 0.04 10*3/MM3 (ref 0–0.2)
BASOPHILS NFR BLD AUTO: 0.6 % (ref 0–1)
BUN BLD-MCNC: 16 MG/DL (ref 9–23)
BUN/CREAT SERPL: 17.8 (ref 7–25)
CALCIUM SPEC-SCNC: 8 MG/DL (ref 8.7–10.4)
CHLORIDE SERPL-SCNC: 107 MMOL/L (ref 99–109)
CO2 SERPL-SCNC: 26 MMOL/L (ref 20–31)
CREAT BLD-MCNC: 0.9 MG/DL (ref 0.6–1.3)
DEPRECATED RDW RBC AUTO: 61.5 FL (ref 37–54)
EOSINOPHIL # BLD AUTO: 0.3 10*3/MM3 (ref 0–0.3)
EOSINOPHIL NFR BLD AUTO: 4.8 % (ref 0–3)
ERYTHROCYTE [DISTWIDTH] IN BLOOD BY AUTOMATED COUNT: 18.9 % (ref 11.3–14.5)
GFR SERPL CREATININE-BSD FRML MDRD: 83 ML/MIN/1.73
GLUCOSE BLD-MCNC: 93 MG/DL (ref 70–100)
GRAM STN SPEC: ABNORMAL
GRAM STN SPEC: ABNORMAL
HCT VFR BLD AUTO: 33 % (ref 38.9–50.9)
HGB BLD-MCNC: 10.2 G/DL (ref 13.1–17.5)
IMM GRANULOCYTES # BLD: 0.01 10*3/MM3 (ref 0–0.03)
IMM GRANULOCYTES NFR BLD: 0.2 % (ref 0–0.6)
LYMPHOCYTES # BLD AUTO: 0.62 10*3/MM3 (ref 0.6–4.8)
LYMPHOCYTES NFR BLD AUTO: 10 % (ref 24–44)
MCH RBC QN AUTO: 27.3 PG (ref 27–31)
MCHC RBC AUTO-ENTMCNC: 30.9 G/DL (ref 32–36)
MCV RBC AUTO: 88.5 FL (ref 80–99)
MONOCYTES # BLD AUTO: 0.64 10*3/MM3 (ref 0–1)
MONOCYTES NFR BLD AUTO: 10.3 % (ref 0–12)
NEUTROPHILS # BLD AUTO: 4.62 10*3/MM3 (ref 1.5–8.3)
NEUTROPHILS NFR BLD AUTO: 74.3 % (ref 41–71)
PLATELET # BLD AUTO: 381 10*3/MM3 (ref 150–450)
PMV BLD AUTO: 9.7 FL (ref 6–12)
POTASSIUM BLD-SCNC: 4.4 MMOL/L (ref 3.5–5.5)
RBC # BLD AUTO: 3.73 10*6/MM3 (ref 4.2–5.76)
SODIUM BLD-SCNC: 138 MMOL/L (ref 132–146)
STREP GROUPING: ABNORMAL
VANCOMYCIN TROUGH SERPL-MCNC: 15 MCG/ML (ref 10–20)
WBC NRBC COR # BLD: 6.22 10*3/MM3 (ref 3.5–10.8)

## 2018-08-22 PROCEDURE — 94799 UNLISTED PULMONARY SVC/PX: CPT

## 2018-08-22 PROCEDURE — 25010000002 VANCOMYCIN 10 G RECONSTITUTED SOLUTION: Performed by: FAMILY MEDICINE

## 2018-08-22 PROCEDURE — 25010000002 HEPARIN (PORCINE) PER 1000 UNITS: Performed by: NURSE PRACTITIONER

## 2018-08-22 PROCEDURE — 80202 ASSAY OF VANCOMYCIN: CPT | Performed by: FAMILY MEDICINE

## 2018-08-22 PROCEDURE — 25010000002 MEROPENEM: Performed by: NURSE PRACTITIONER

## 2018-08-22 PROCEDURE — 71045 X-RAY EXAM CHEST 1 VIEW: CPT

## 2018-08-22 PROCEDURE — 85025 COMPLETE CBC W/AUTO DIFF WBC: CPT | Performed by: HOSPITALIST

## 2018-08-22 PROCEDURE — 80048 BASIC METABOLIC PNL TOTAL CA: CPT | Performed by: HOSPITALIST

## 2018-08-22 PROCEDURE — 99233 SBSQ HOSP IP/OBS HIGH 50: CPT | Performed by: HOSPITALIST

## 2018-08-22 PROCEDURE — 94760 N-INVAS EAR/PLS OXIMETRY 1: CPT

## 2018-08-22 RX ORDER — ACETAMINOPHEN 325 MG/1
650 TABLET ORAL EVERY 6 HOURS PRN
Status: DISCONTINUED | OUTPATIENT
Start: 2018-08-22 | End: 2018-09-08 | Stop reason: HOSPADM

## 2018-08-22 RX ORDER — GABAPENTIN 400 MG/1
400 CAPSULE ORAL 3 TIMES DAILY
COMMUNITY
End: 2018-10-22 | Stop reason: HOSPADM

## 2018-08-22 RX ORDER — GABAPENTIN 400 MG/1
400 CAPSULE ORAL EVERY 8 HOURS SCHEDULED
Status: DISCONTINUED | OUTPATIENT
Start: 2018-08-22 | End: 2018-09-08 | Stop reason: HOSPADM

## 2018-08-22 RX ADMIN — GABAPENTIN 400 MG: 400 CAPSULE ORAL at 08:31

## 2018-08-22 RX ADMIN — HEPARIN SODIUM 5000 UNITS: 5000 INJECTION, SOLUTION INTRAVENOUS; SUBCUTANEOUS at 05:53

## 2018-08-22 RX ADMIN — PANTOPRAZOLE SODIUM 40 MG: 40 TABLET, DELAYED RELEASE ORAL at 05:53

## 2018-08-22 RX ADMIN — ATORVASTATIN CALCIUM 40 MG: 40 TABLET, FILM COATED ORAL at 21:09

## 2018-08-22 RX ADMIN — MORPHINE SULFATE 15 MG: 15 TABLET, EXTENDED RELEASE ORAL at 21:09

## 2018-08-22 RX ADMIN — MEROPENEM 500 MG: 500 INJECTION, POWDER, FOR SOLUTION INTRAVENOUS at 11:41

## 2018-08-22 RX ADMIN — VANCOMYCIN HYDROCHLORIDE 1250 MG: 10 INJECTION, POWDER, LYOPHILIZED, FOR SOLUTION INTRAVENOUS at 13:07

## 2018-08-22 RX ADMIN — BUDESONIDE AND FORMOTEROL FUMARATE DIHYDRATE 2 PUFF: 160; 4.5 AEROSOL RESPIRATORY (INHALATION) at 20:40

## 2018-08-22 RX ADMIN — ACETAMINOPHEN 650 MG: 325 TABLET ORAL at 14:35

## 2018-08-22 RX ADMIN — GABAPENTIN 400 MG: 400 CAPSULE ORAL at 21:09

## 2018-08-22 RX ADMIN — CASTOR OIL AND BALSAM, PERU: 788; 87 OINTMENT TOPICAL at 08:28

## 2018-08-22 RX ADMIN — HEPARIN SODIUM 5000 UNITS: 5000 INJECTION, SOLUTION INTRAVENOUS; SUBCUTANEOUS at 14:35

## 2018-08-22 RX ADMIN — HYDROCODONE BITARTRATE AND ACETAMINOPHEN 1 TABLET: 10; 325 TABLET ORAL at 18:38

## 2018-08-22 RX ADMIN — GABAPENTIN 400 MG: 400 CAPSULE ORAL at 11:39

## 2018-08-22 RX ADMIN — MEROPENEM 500 MG: 500 INJECTION, POWDER, FOR SOLUTION INTRAVENOUS at 02:31

## 2018-08-22 RX ADMIN — IPRATROPIUM BROMIDE AND ALBUTEROL SULFATE 3 ML: 2.5; .5 SOLUTION RESPIRATORY (INHALATION) at 23:33

## 2018-08-22 RX ADMIN — MEROPENEM 500 MG: 500 INJECTION, POWDER, FOR SOLUTION INTRAVENOUS at 18:39

## 2018-08-22 RX ADMIN — CASTOR OIL AND BALSAM, PERU: 788; 87 OINTMENT TOPICAL at 21:10

## 2018-08-22 RX ADMIN — HYDROCODONE BITARTRATE AND ACETAMINOPHEN 1 TABLET: 10; 325 TABLET ORAL at 02:31

## 2018-08-22 RX ADMIN — MICONAZOLE NITRATE: 2 CREAM TOPICAL at 21:10

## 2018-08-22 RX ADMIN — BUDESONIDE AND FORMOTEROL FUMARATE DIHYDRATE 2 PUFF: 160; 4.5 AEROSOL RESPIRATORY (INHALATION) at 09:24

## 2018-08-22 RX ADMIN — MICONAZOLE NITRATE: 2 CREAM TOPICAL at 08:29

## 2018-08-22 RX ADMIN — HYDROCODONE BITARTRATE AND ACETAMINOPHEN 1 TABLET: 10; 325 TABLET ORAL at 08:27

## 2018-08-22 RX ADMIN — CLOPIDOGREL BISULFATE 75 MG: 75 TABLET ORAL at 08:27

## 2018-08-22 RX ADMIN — SODIUM CHLORIDE 75 ML/HR: 9 INJECTION, SOLUTION INTRAVENOUS at 13:08

## 2018-08-22 RX ADMIN — METOPROLOL TARTRATE 25 MG: 25 TABLET ORAL at 11:39

## 2018-08-22 RX ADMIN — IPRATROPIUM BROMIDE AND ALBUTEROL SULFATE 3 ML: 2.5; .5 SOLUTION RESPIRATORY (INHALATION) at 06:10

## 2018-08-22 RX ADMIN — ASPIRIN 81 MG: 81 TABLET, COATED ORAL at 08:27

## 2018-08-22 RX ADMIN — HYDROCODONE BITARTRATE AND ACETAMINOPHEN 1 TABLET: 10; 325 TABLET ORAL at 12:32

## 2018-08-22 RX ADMIN — MORPHINE SULFATE 15 MG: 15 TABLET, EXTENDED RELEASE ORAL at 08:27

## 2018-08-22 RX ADMIN — HEPARIN SODIUM 5000 UNITS: 5000 INJECTION, SOLUTION INTRAVENOUS; SUBCUTANEOUS at 21:09

## 2018-08-22 RX ADMIN — METOPROLOL TARTRATE 25 MG: 25 TABLET ORAL at 21:09

## 2018-08-23 ENCOUNTER — APPOINTMENT (OUTPATIENT)
Dept: CARDIOLOGY | Facility: HOSPITAL | Age: 73
End: 2018-08-23
Attending: HOSPITALIST

## 2018-08-23 PROBLEM — G89.29 CHRONIC PAIN: Status: ACTIVE | Noted: 2018-08-23

## 2018-08-23 LAB
BACTERIA SPEC AEROBE CULT: ABNORMAL
GRAM STN SPEC: ABNORMAL

## 2018-08-23 PROCEDURE — 94640 AIRWAY INHALATION TREATMENT: CPT

## 2018-08-23 PROCEDURE — 94799 UNLISTED PULMONARY SVC/PX: CPT

## 2018-08-23 PROCEDURE — 94760 N-INVAS EAR/PLS OXIMETRY 1: CPT

## 2018-08-23 PROCEDURE — 25010000002 VANCOMYCIN 10 G RECONSTITUTED SOLUTION: Performed by: FAMILY MEDICINE

## 2018-08-23 PROCEDURE — 25010000002 HEPARIN (PORCINE) PER 1000 UNITS: Performed by: NURSE PRACTITIONER

## 2018-08-23 PROCEDURE — 25010000002 MEROPENEM: Performed by: NURSE PRACTITIONER

## 2018-08-23 PROCEDURE — 99233 SBSQ HOSP IP/OBS HIGH 50: CPT | Performed by: HOSPITALIST

## 2018-08-23 RX ORDER — MORPHINE SULFATE 30 MG/1
30 TABLET, FILM COATED, EXTENDED RELEASE ORAL EVERY 12 HOURS SCHEDULED
Status: DISCONTINUED | OUTPATIENT
Start: 2018-08-23 | End: 2018-09-08 | Stop reason: HOSPADM

## 2018-08-23 RX ORDER — SENNA AND DOCUSATE SODIUM 50; 8.6 MG/1; MG/1
2 TABLET, FILM COATED ORAL NIGHTLY
Status: DISCONTINUED | OUTPATIENT
Start: 2018-08-23 | End: 2018-09-08 | Stop reason: HOSPADM

## 2018-08-23 RX ORDER — BISACODYL 10 MG
10 SUPPOSITORY, RECTAL RECTAL DAILY PRN
Status: DISCONTINUED | OUTPATIENT
Start: 2018-08-23 | End: 2018-09-08 | Stop reason: HOSPADM

## 2018-08-23 RX ORDER — HYDROCODONE BITARTRATE AND ACETAMINOPHEN 10; 325 MG/1; MG/1
1 TABLET ORAL EVERY 4 HOURS PRN
Status: DISCONTINUED | OUTPATIENT
Start: 2018-08-23 | End: 2018-09-08 | Stop reason: HOSPADM

## 2018-08-23 RX ADMIN — HEPARIN SODIUM 5000 UNITS: 5000 INJECTION, SOLUTION INTRAVENOUS; SUBCUTANEOUS at 05:43

## 2018-08-23 RX ADMIN — HYDROCODONE BITARTRATE AND ACETAMINOPHEN 1 TABLET: 10; 325 TABLET ORAL at 13:20

## 2018-08-23 RX ADMIN — MICONAZOLE NITRATE: 2 CREAM TOPICAL at 08:22

## 2018-08-23 RX ADMIN — CLOPIDOGREL BISULFATE 75 MG: 75 TABLET ORAL at 08:22

## 2018-08-23 RX ADMIN — GABAPENTIN 400 MG: 400 CAPSULE ORAL at 21:44

## 2018-08-23 RX ADMIN — MEROPENEM 500 MG: 500 INJECTION, POWDER, FOR SOLUTION INTRAVENOUS at 10:49

## 2018-08-23 RX ADMIN — MEROPENEM 500 MG: 500 INJECTION, POWDER, FOR SOLUTION INTRAVENOUS at 18:28

## 2018-08-23 RX ADMIN — MICONAZOLE NITRATE: 2 CREAM TOPICAL at 21:48

## 2018-08-23 RX ADMIN — Medication 2 TABLET: at 21:44

## 2018-08-23 RX ADMIN — METOPROLOL TARTRATE 25 MG: 25 TABLET ORAL at 08:22

## 2018-08-23 RX ADMIN — HYDROCODONE BITARTRATE AND ACETAMINOPHEN 1 TABLET: 10; 325 TABLET ORAL at 09:37

## 2018-08-23 RX ADMIN — MORPHINE SULFATE 30 MG: 30 TABLET, EXTENDED RELEASE ORAL at 21:44

## 2018-08-23 RX ADMIN — BUDESONIDE AND FORMOTEROL FUMARATE DIHYDRATE 2 PUFF: 160; 4.5 AEROSOL RESPIRATORY (INHALATION) at 08:31

## 2018-08-23 RX ADMIN — HYDROCODONE BITARTRATE AND ACETAMINOPHEN 1 TABLET: 10; 325 TABLET ORAL at 17:41

## 2018-08-23 RX ADMIN — METOPROLOL TARTRATE 25 MG: 25 TABLET ORAL at 21:44

## 2018-08-23 RX ADMIN — VANCOMYCIN HYDROCHLORIDE 1250 MG: 10 INJECTION, POWDER, LYOPHILIZED, FOR SOLUTION INTRAVENOUS at 13:21

## 2018-08-23 RX ADMIN — CASTOR OIL AND BALSAM, PERU: 788; 87 OINTMENT TOPICAL at 08:22

## 2018-08-23 RX ADMIN — MORPHINE SULFATE 15 MG: 15 TABLET, EXTENDED RELEASE ORAL at 08:22

## 2018-08-23 RX ADMIN — ASPIRIN 81 MG: 81 TABLET, COATED ORAL at 08:22

## 2018-08-23 RX ADMIN — ACETAMINOPHEN 650 MG: 325 TABLET ORAL at 04:34

## 2018-08-23 RX ADMIN — BUDESONIDE AND FORMOTEROL FUMARATE DIHYDRATE 2 PUFF: 160; 4.5 AEROSOL RESPIRATORY (INHALATION) at 22:03

## 2018-08-23 RX ADMIN — CASTOR OIL AND BALSAM, PERU: 788; 87 OINTMENT TOPICAL at 21:49

## 2018-08-23 RX ADMIN — NICOTINE 1 PATCH: 21 PATCH, EXTENDED RELEASE TRANSDERMAL at 21:44

## 2018-08-23 RX ADMIN — GABAPENTIN 400 MG: 400 CAPSULE ORAL at 05:43

## 2018-08-23 RX ADMIN — GABAPENTIN 400 MG: 400 CAPSULE ORAL at 13:20

## 2018-08-23 RX ADMIN — ATORVASTATIN CALCIUM 40 MG: 40 TABLET, FILM COATED ORAL at 21:44

## 2018-08-23 RX ADMIN — MEROPENEM 500 MG: 500 INJECTION, POWDER, FOR SOLUTION INTRAVENOUS at 02:49

## 2018-08-23 RX ADMIN — PANTOPRAZOLE SODIUM 40 MG: 40 TABLET, DELAYED RELEASE ORAL at 05:43

## 2018-08-23 RX ADMIN — HYDROCODONE BITARTRATE AND ACETAMINOPHEN 1 TABLET: 10; 325 TABLET ORAL at 02:49

## 2018-08-23 RX ADMIN — ACETAMINOPHEN 650 MG: 325 TABLET ORAL at 11:06

## 2018-08-23 RX ADMIN — HYDROCODONE BITARTRATE AND ACETAMINOPHEN 1 TABLET: 10; 325 TABLET ORAL at 21:43

## 2018-08-23 RX ADMIN — HEPARIN SODIUM 5000 UNITS: 5000 INJECTION, SOLUTION INTRAVENOUS; SUBCUTANEOUS at 21:46

## 2018-08-24 ENCOUNTER — APPOINTMENT (OUTPATIENT)
Dept: CARDIOLOGY | Facility: HOSPITAL | Age: 73
End: 2018-08-24
Attending: HOSPITALIST

## 2018-08-24 LAB
ANION GAP SERPL CALCULATED.3IONS-SCNC: 0 MMOL/L (ref 3–11)
BUN BLD-MCNC: 13 MG/DL (ref 9–23)
BUN/CREAT SERPL: 13.7 (ref 7–25)
CALCIUM SPEC-SCNC: 7.8 MG/DL (ref 8.7–10.4)
CHLORIDE SERPL-SCNC: 109 MMOL/L (ref 99–109)
CO2 SERPL-SCNC: 29 MMOL/L (ref 20–31)
CREAT BLD-MCNC: 0.95 MG/DL (ref 0.6–1.3)
GFR SERPL CREATININE-BSD FRML MDRD: 78 ML/MIN/1.73
GLUCOSE BLD-MCNC: 111 MG/DL (ref 70–100)
POTASSIUM BLD-SCNC: 4.4 MMOL/L (ref 3.5–5.5)
SODIUM BLD-SCNC: 138 MMOL/L (ref 132–146)

## 2018-08-24 PROCEDURE — 94799 UNLISTED PULMONARY SVC/PX: CPT

## 2018-08-24 PROCEDURE — 25010000002 HEPARIN (PORCINE) PER 1000 UNITS: Performed by: NURSE PRACTITIONER

## 2018-08-24 PROCEDURE — 80048 BASIC METABOLIC PNL TOTAL CA: CPT | Performed by: HOSPITALIST

## 2018-08-24 PROCEDURE — 99233 SBSQ HOSP IP/OBS HIGH 50: CPT | Performed by: INTERNAL MEDICINE

## 2018-08-24 PROCEDURE — 25010000002 MEROPENEM: Performed by: NURSE PRACTITIONER

## 2018-08-24 PROCEDURE — 94760 N-INVAS EAR/PLS OXIMETRY 1: CPT

## 2018-08-24 PROCEDURE — 97110 THERAPEUTIC EXERCISES: CPT

## 2018-08-24 PROCEDURE — 94640 AIRWAY INHALATION TREATMENT: CPT

## 2018-08-24 PROCEDURE — 25010000002 MEROPENEM: Performed by: INTERNAL MEDICINE

## 2018-08-24 RX ADMIN — MICONAZOLE NITRATE: 2 CREAM TOPICAL at 08:35

## 2018-08-24 RX ADMIN — MEROPENEM 500 MG: 500 INJECTION, POWDER, FOR SOLUTION INTRAVENOUS at 11:13

## 2018-08-24 RX ADMIN — HYDROCODONE BITARTRATE AND ACETAMINOPHEN 1 TABLET: 10; 325 TABLET ORAL at 11:13

## 2018-08-24 RX ADMIN — ATORVASTATIN CALCIUM 40 MG: 40 TABLET, FILM COATED ORAL at 20:44

## 2018-08-24 RX ADMIN — MICONAZOLE NITRATE: 2 CREAM TOPICAL at 20:43

## 2018-08-24 RX ADMIN — GABAPENTIN 400 MG: 400 CAPSULE ORAL at 13:43

## 2018-08-24 RX ADMIN — Medication 2 TABLET: at 20:43

## 2018-08-24 RX ADMIN — CLOPIDOGREL BISULFATE 75 MG: 75 TABLET ORAL at 08:35

## 2018-08-24 RX ADMIN — GABAPENTIN 400 MG: 400 CAPSULE ORAL at 05:26

## 2018-08-24 RX ADMIN — CASTOR OIL AND BALSAM, PERU: 788; 87 OINTMENT TOPICAL at 08:35

## 2018-08-24 RX ADMIN — MORPHINE SULFATE 30 MG: 30 TABLET, EXTENDED RELEASE ORAL at 20:43

## 2018-08-24 RX ADMIN — HYDROCODONE BITARTRATE AND ACETAMINOPHEN 1 TABLET: 10; 325 TABLET ORAL at 22:35

## 2018-08-24 RX ADMIN — PANTOPRAZOLE SODIUM 40 MG: 40 TABLET, DELAYED RELEASE ORAL at 06:00

## 2018-08-24 RX ADMIN — HYDROCODONE BITARTRATE AND ACETAMINOPHEN 1 TABLET: 10; 325 TABLET ORAL at 02:06

## 2018-08-24 RX ADMIN — BUDESONIDE AND FORMOTEROL FUMARATE DIHYDRATE 2 PUFF: 160; 4.5 AEROSOL RESPIRATORY (INHALATION) at 22:54

## 2018-08-24 RX ADMIN — CASTOR OIL AND BALSAM, PERU: 788; 87 OINTMENT TOPICAL at 20:43

## 2018-08-24 RX ADMIN — HEPARIN SODIUM 5000 UNITS: 5000 INJECTION, SOLUTION INTRAVENOUS; SUBCUTANEOUS at 13:43

## 2018-08-24 RX ADMIN — METOPROLOL TARTRATE 25 MG: 25 TABLET ORAL at 20:43

## 2018-08-24 RX ADMIN — HYDROCODONE BITARTRATE AND ACETAMINOPHEN 1 TABLET: 10; 325 TABLET ORAL at 06:03

## 2018-08-24 RX ADMIN — ACETAMINOPHEN 650 MG: 325 TABLET ORAL at 05:27

## 2018-08-24 RX ADMIN — GABAPENTIN 400 MG: 400 CAPSULE ORAL at 20:43

## 2018-08-24 RX ADMIN — MEROPENEM 500 MG: 500 INJECTION, POWDER, FOR SOLUTION INTRAVENOUS at 20:41

## 2018-08-24 RX ADMIN — MEROPENEM 500 MG: 500 INJECTION, POWDER, FOR SOLUTION INTRAVENOUS at 02:33

## 2018-08-24 RX ADMIN — HEPARIN SODIUM 5000 UNITS: 5000 INJECTION, SOLUTION INTRAVENOUS; SUBCUTANEOUS at 05:26

## 2018-08-24 RX ADMIN — METOPROLOL TARTRATE 25 MG: 25 TABLET ORAL at 08:35

## 2018-08-24 RX ADMIN — BUDESONIDE AND FORMOTEROL FUMARATE DIHYDRATE 2 PUFF: 160; 4.5 AEROSOL RESPIRATORY (INHALATION) at 08:03

## 2018-08-24 RX ADMIN — HEPARIN SODIUM 5000 UNITS: 5000 INJECTION, SOLUTION INTRAVENOUS; SUBCUTANEOUS at 20:42

## 2018-08-24 RX ADMIN — MORPHINE SULFATE 30 MG: 30 TABLET, EXTENDED RELEASE ORAL at 09:24

## 2018-08-24 RX ADMIN — NICOTINE 1 PATCH: 21 PATCH, EXTENDED RELEASE TRANSDERMAL at 20:44

## 2018-08-24 RX ADMIN — ASPIRIN 81 MG: 81 TABLET, COATED ORAL at 08:35

## 2018-08-25 ENCOUNTER — APPOINTMENT (OUTPATIENT)
Dept: CARDIOLOGY | Facility: HOSPITAL | Age: 73
End: 2018-08-25
Attending: HOSPITALIST

## 2018-08-25 ENCOUNTER — APPOINTMENT (OUTPATIENT)
Dept: CARDIOLOGY | Facility: HOSPITAL | Age: 73
End: 2018-08-25

## 2018-08-25 LAB
ANION GAP SERPL CALCULATED.3IONS-SCNC: 2 MMOL/L (ref 3–11)
BACTERIA SPEC AEROBE CULT: NORMAL
BACTERIA SPEC AEROBE CULT: NORMAL
BH CV LEA LEFT ANT TIBIAL A DISTAL PSV: 47 CM/S
BH CV LEA LEFT ANT TIBIAL A MID PSV: 52 CM/S
BH CV LEA LEFT ANT TIBIAL A PROX PSV: 97 CM/S
BH CV LEA LEFT CFA DISTAL PSV: 170 CM/S
BH CV LEA LEFT CFA PROX PSV: 175 CM/S
BH CV LEA LEFT DFA PROX PSV: 72 CM/S
BH CV LEA LEFT POPITEAL A  DISTAL PSV: 65 CM/S
BH CV LEA LEFT POPITEAL A  MID PSV: 65 CM/S
BH CV LEA LEFT POPITEAL A  PROX PSV: 65 CM/S
BH CV LEA LEFT PTA DISTAL PSV: 85 CM/S
BH CV LEA LEFT PTA MID PSV: 86 CM/S
BH CV LEA LEFT PTA PROX PSV: 89 CM/S
BH CV LEA LEFT SFA DISTAL PSV: 117 CM/S
BH CV LEA LEFT SFA MID PSV: 95 CM/S
BH CV LEA LEFT SFA PROX PSV: 150 CM/S
BH CV LEA RIGHT ANT TIBIAL A DISTAL PSV: 81 CM/S
BH CV LEA RIGHT ANT TIBIAL A MID PSV: 85 CM/S
BH CV LEA RIGHT ANT TIBIAL A PROX PSV: 66 CM/S
BH CV LEA RIGHT CFA DISTAL PSV: 106 CM/S
BH CV LEA RIGHT CFA PROX PSV: 103 CM/S
BH CV LEA RIGHT DFA PROX PSV: 76 CM/S
BH CV LEA RIGHT PERONEAL  MID PSV: 70 CM/S
BH CV LEA RIGHT POPITEAL A  DISTAL PSV: 99 CM/S
BH CV LEA RIGHT POPITEAL A  MID PSV: 100 CM/S
BH CV LEA RIGHT POPITEAL A  PROX PSV: 90 CM/S
BH CV LEA RIGHT PTA DISTAL PSV: 82 CM/S
BH CV LEA RIGHT PTA MID PSV: 80 CM/S
BH CV LEA RIGHT PTA PROX PSV: 79 CM/S
BH CV LEA RIGHT SFA DISTAL PSV: 100 CM/S
BH CV LEA RIGHT SFA MID PSV: 118 CM/S
BH CV LEA RIGHT SFA PROX PSV: 112 CM/S
BH CV UPPER VENOUS LEFT AXILLARY AUGMENT: NORMAL
BH CV UPPER VENOUS LEFT AXILLARY COMPRESS: NORMAL
BH CV UPPER VENOUS LEFT AXILLARY PHASIC: NORMAL
BH CV UPPER VENOUS LEFT AXILLARY SPONT: NORMAL
BH CV UPPER VENOUS LEFT BASILIC FOREARM COMPRESS: NORMAL
BH CV UPPER VENOUS LEFT BASILIC UPPER COMPRESS: NORMAL
BH CV UPPER VENOUS LEFT BRACHIAL COMPRESS: NORMAL
BH CV UPPER VENOUS LEFT CEPHALIC FOREARM COLOR: 1
BH CV UPPER VENOUS LEFT CEPHALIC FOREARM COMPRESS: NORMAL
BH CV UPPER VENOUS LEFT CEPHALIC UPPER COMPRESS: NORMAL
BH CV UPPER VENOUS LEFT INTERNAL JUGULAR AUGMENT: NORMAL
BH CV UPPER VENOUS LEFT INTERNAL JUGULAR COMPRESS: NORMAL
BH CV UPPER VENOUS LEFT INTERNAL JUGULAR PHASIC: NORMAL
BH CV UPPER VENOUS LEFT INTERNAL JUGULAR SPONT: NORMAL
BH CV UPPER VENOUS LEFT RADIAL COMPRESS: NORMAL
BH CV UPPER VENOUS LEFT SUBCLAVIAN AUGMENT: NORMAL
BH CV UPPER VENOUS LEFT SUBCLAVIAN COMPRESS: NORMAL
BH CV UPPER VENOUS LEFT SUBCLAVIAN PHASIC: NORMAL
BH CV UPPER VENOUS LEFT SUBCLAVIAN SPONT: NORMAL
BH CV UPPER VENOUS LEFT ULNAR COMPRESS: NORMAL
BH CV UPPER VENOUS RIGHT SUBCLAVIAN AUGMENT: NORMAL
BH CV UPPER VENOUS RIGHT SUBCLAVIAN COMPRESS: NORMAL
BH CV UPPER VENOUS RIGHT SUBCLAVIAN PHASIC: NORMAL
BH CV UPPER VENOUS RIGHT SUBCLAVIAN SPONT: NORMAL
BUN BLD-MCNC: 12 MG/DL (ref 9–23)
BUN/CREAT SERPL: 13.6 (ref 7–25)
CALCIUM SPEC-SCNC: 7.9 MG/DL (ref 8.7–10.4)
CHLORIDE SERPL-SCNC: 106 MMOL/L (ref 99–109)
CO2 SERPL-SCNC: 28 MMOL/L (ref 20–31)
CREAT BLD-MCNC: 0.88 MG/DL (ref 0.6–1.3)
DEPRECATED RDW RBC AUTO: 64.4 FL (ref 37–54)
ERYTHROCYTE [DISTWIDTH] IN BLOOD BY AUTOMATED COUNT: 19.1 % (ref 11.3–14.5)
GFR SERPL CREATININE-BSD FRML MDRD: 85 ML/MIN/1.73
GLUCOSE BLD-MCNC: 104 MG/DL (ref 70–100)
HCT VFR BLD AUTO: 32.1 % (ref 38.9–50.9)
HGB BLD-MCNC: 9.8 G/DL (ref 13.1–17.5)
MCH RBC QN AUTO: 28 PG (ref 27–31)
MCHC RBC AUTO-ENTMCNC: 30.5 G/DL (ref 32–36)
MCV RBC AUTO: 91.7 FL (ref 80–99)
PLATELET # BLD AUTO: 316 10*3/MM3 (ref 150–450)
PMV BLD AUTO: 9.4 FL (ref 6–12)
POTASSIUM BLD-SCNC: 4.3 MMOL/L (ref 3.5–5.5)
RBC # BLD AUTO: 3.5 10*6/MM3 (ref 4.2–5.76)
SODIUM BLD-SCNC: 136 MMOL/L (ref 132–146)
WBC NRBC COR # BLD: 6.08 10*3/MM3 (ref 3.5–10.8)

## 2018-08-25 PROCEDURE — 25010000002 MEROPENEM: Performed by: INTERNAL MEDICINE

## 2018-08-25 PROCEDURE — 80048 BASIC METABOLIC PNL TOTAL CA: CPT | Performed by: INTERNAL MEDICINE

## 2018-08-25 PROCEDURE — 93925 LOWER EXTREMITY STUDY: CPT | Performed by: INTERNAL MEDICINE

## 2018-08-25 PROCEDURE — 85027 COMPLETE CBC AUTOMATED: CPT | Performed by: INTERNAL MEDICINE

## 2018-08-25 PROCEDURE — 94799 UNLISTED PULMONARY SVC/PX: CPT

## 2018-08-25 PROCEDURE — 99233 SBSQ HOSP IP/OBS HIGH 50: CPT | Performed by: INTERNAL MEDICINE

## 2018-08-25 PROCEDURE — 93925 LOWER EXTREMITY STUDY: CPT

## 2018-08-25 PROCEDURE — 94640 AIRWAY INHALATION TREATMENT: CPT

## 2018-08-25 PROCEDURE — 29581 APPL MULTLAYER CMPRN SYS LEG: CPT

## 2018-08-25 PROCEDURE — 94760 N-INVAS EAR/PLS OXIMETRY 1: CPT

## 2018-08-25 PROCEDURE — 25010000002 HEPARIN (PORCINE) PER 1000 UNITS: Performed by: NURSE PRACTITIONER

## 2018-08-25 PROCEDURE — 63710000001 DIPHENHYDRAMINE PER 50 MG: Performed by: INTERNAL MEDICINE

## 2018-08-25 PROCEDURE — 93971 EXTREMITY STUDY: CPT

## 2018-08-25 PROCEDURE — 93971 EXTREMITY STUDY: CPT | Performed by: INTERNAL MEDICINE

## 2018-08-25 PROCEDURE — 97164 PT RE-EVAL EST PLAN CARE: CPT

## 2018-08-25 RX ORDER — DIPHENHYDRAMINE HCL 25 MG
25 CAPSULE ORAL EVERY 6 HOURS PRN
Status: DISCONTINUED | OUTPATIENT
Start: 2018-08-25 | End: 2018-09-08 | Stop reason: HOSPADM

## 2018-08-25 RX ADMIN — CASTOR OIL AND BALSAM, PERU: 788; 87 OINTMENT TOPICAL at 21:23

## 2018-08-25 RX ADMIN — MORPHINE SULFATE 30 MG: 30 TABLET, EXTENDED RELEASE ORAL at 21:21

## 2018-08-25 RX ADMIN — HEPARIN SODIUM 5000 UNITS: 5000 INJECTION, SOLUTION INTRAVENOUS; SUBCUTANEOUS at 21:21

## 2018-08-25 RX ADMIN — BUDESONIDE AND FORMOTEROL FUMARATE DIHYDRATE 2 PUFF: 160; 4.5 AEROSOL RESPIRATORY (INHALATION) at 08:42

## 2018-08-25 RX ADMIN — CASTOR OIL AND BALSAM, PERU: 788; 87 OINTMENT TOPICAL at 10:56

## 2018-08-25 RX ADMIN — MICONAZOLE NITRATE: 2 CREAM TOPICAL at 07:47

## 2018-08-25 RX ADMIN — MEROPENEM 500 MG: 500 INJECTION, POWDER, FOR SOLUTION INTRAVENOUS at 19:00

## 2018-08-25 RX ADMIN — METOPROLOL TARTRATE 25 MG: 25 TABLET ORAL at 21:21

## 2018-08-25 RX ADMIN — METOPROLOL TARTRATE 25 MG: 25 TABLET ORAL at 07:45

## 2018-08-25 RX ADMIN — MEROPENEM 500 MG: 500 INJECTION, POWDER, FOR SOLUTION INTRAVENOUS at 04:07

## 2018-08-25 RX ADMIN — PANTOPRAZOLE SODIUM 40 MG: 40 TABLET, DELAYED RELEASE ORAL at 05:30

## 2018-08-25 RX ADMIN — GABAPENTIN 400 MG: 400 CAPSULE ORAL at 15:01

## 2018-08-25 RX ADMIN — NICOTINE 1 PATCH: 21 PATCH, EXTENDED RELEASE TRANSDERMAL at 21:22

## 2018-08-25 RX ADMIN — HEPARIN SODIUM 5000 UNITS: 5000 INJECTION, SOLUTION INTRAVENOUS; SUBCUTANEOUS at 05:30

## 2018-08-25 RX ADMIN — ASPIRIN 81 MG: 81 TABLET, COATED ORAL at 07:44

## 2018-08-25 RX ADMIN — MEROPENEM 500 MG: 500 INJECTION, POWDER, FOR SOLUTION INTRAVENOUS at 10:56

## 2018-08-25 RX ADMIN — Medication 2 TABLET: at 21:21

## 2018-08-25 RX ADMIN — GABAPENTIN 400 MG: 400 CAPSULE ORAL at 21:21

## 2018-08-25 RX ADMIN — MEROPENEM 500 MG: 500 INJECTION, POWDER, FOR SOLUTION INTRAVENOUS at 17:57

## 2018-08-25 RX ADMIN — MORPHINE SULFATE 30 MG: 30 TABLET, EXTENDED RELEASE ORAL at 07:45

## 2018-08-25 RX ADMIN — CLOPIDOGREL BISULFATE 75 MG: 75 TABLET ORAL at 07:45

## 2018-08-25 RX ADMIN — HYDROCODONE BITARTRATE AND ACETAMINOPHEN 1 TABLET: 10; 325 TABLET ORAL at 04:09

## 2018-08-25 RX ADMIN — ATORVASTATIN CALCIUM 40 MG: 40 TABLET, FILM COATED ORAL at 21:21

## 2018-08-25 RX ADMIN — HEPARIN SODIUM 5000 UNITS: 5000 INJECTION, SOLUTION INTRAVENOUS; SUBCUTANEOUS at 15:00

## 2018-08-25 RX ADMIN — MICONAZOLE NITRATE: 2 CREAM TOPICAL at 21:23

## 2018-08-25 RX ADMIN — MEROPENEM 500 MG: 500 INJECTION, POWDER, FOR SOLUTION INTRAVENOUS at 21:21

## 2018-08-25 RX ADMIN — BUDESONIDE AND FORMOTEROL FUMARATE DIHYDRATE 2 PUFF: 160; 4.5 AEROSOL RESPIRATORY (INHALATION) at 20:34

## 2018-08-25 RX ADMIN — DIPHENHYDRAMINE HYDROCHLORIDE 25 MG: 25 CAPSULE ORAL at 16:16

## 2018-08-25 RX ADMIN — HYDROCODONE BITARTRATE AND ACETAMINOPHEN 1 TABLET: 10; 325 TABLET ORAL at 15:01

## 2018-08-25 RX ADMIN — GABAPENTIN 400 MG: 400 CAPSULE ORAL at 05:30

## 2018-08-25 RX ADMIN — HYDROCODONE BITARTRATE AND ACETAMINOPHEN 1 TABLET: 10; 325 TABLET ORAL at 10:56

## 2018-08-26 LAB
ANION GAP SERPL CALCULATED.3IONS-SCNC: 1 MMOL/L (ref 3–11)
BUN BLD-MCNC: 13 MG/DL (ref 9–23)
BUN/CREAT SERPL: 14.1 (ref 7–25)
CALCIUM SPEC-SCNC: 7.7 MG/DL (ref 8.7–10.4)
CHLORIDE SERPL-SCNC: 106 MMOL/L (ref 99–109)
CO2 SERPL-SCNC: 31 MMOL/L (ref 20–31)
CREAT BLD-MCNC: 0.92 MG/DL (ref 0.6–1.3)
DEPRECATED RDW RBC AUTO: 64.5 FL (ref 37–54)
ERYTHROCYTE [DISTWIDTH] IN BLOOD BY AUTOMATED COUNT: 19.2 % (ref 11.3–14.5)
GFR SERPL CREATININE-BSD FRML MDRD: 81 ML/MIN/1.73
GLUCOSE BLD-MCNC: 101 MG/DL (ref 70–100)
HCT VFR BLD AUTO: 32.7 % (ref 38.9–50.9)
HGB BLD-MCNC: 9.9 G/DL (ref 13.1–17.5)
MCH RBC QN AUTO: 27.9 PG (ref 27–31)
MCHC RBC AUTO-ENTMCNC: 30.3 G/DL (ref 32–36)
MCV RBC AUTO: 92.1 FL (ref 80–99)
PLATELET # BLD AUTO: 308 10*3/MM3 (ref 150–450)
PMV BLD AUTO: 9.7 FL (ref 6–12)
POTASSIUM BLD-SCNC: 5 MMOL/L (ref 3.5–5.5)
RBC # BLD AUTO: 3.55 10*6/MM3 (ref 4.2–5.76)
SODIUM BLD-SCNC: 138 MMOL/L (ref 132–146)
WBC NRBC COR # BLD: 5.61 10*3/MM3 (ref 3.5–10.8)

## 2018-08-26 PROCEDURE — 25010000002 MEROPENEM: Performed by: INTERNAL MEDICINE

## 2018-08-26 PROCEDURE — 85027 COMPLETE CBC AUTOMATED: CPT | Performed by: INTERNAL MEDICINE

## 2018-08-26 PROCEDURE — 94799 UNLISTED PULMONARY SVC/PX: CPT

## 2018-08-26 PROCEDURE — 25010000002 HEPARIN (PORCINE) PER 1000 UNITS: Performed by: NURSE PRACTITIONER

## 2018-08-26 PROCEDURE — 80048 BASIC METABOLIC PNL TOTAL CA: CPT | Performed by: INTERNAL MEDICINE

## 2018-08-26 PROCEDURE — 94760 N-INVAS EAR/PLS OXIMETRY 1: CPT

## 2018-08-26 PROCEDURE — 63710000001 DIPHENHYDRAMINE PER 50 MG: Performed by: INTERNAL MEDICINE

## 2018-08-26 PROCEDURE — 99233 SBSQ HOSP IP/OBS HIGH 50: CPT | Performed by: INTERNAL MEDICINE

## 2018-08-26 RX ADMIN — DIPHENHYDRAMINE HYDROCHLORIDE 25 MG: 25 CAPSULE ORAL at 17:44

## 2018-08-26 RX ADMIN — IPRATROPIUM BROMIDE AND ALBUTEROL SULFATE 3 ML: 2.5; .5 SOLUTION RESPIRATORY (INHALATION) at 11:00

## 2018-08-26 RX ADMIN — HEPARIN SODIUM 5000 UNITS: 5000 INJECTION, SOLUTION INTRAVENOUS; SUBCUTANEOUS at 13:29

## 2018-08-26 RX ADMIN — MICONAZOLE NITRATE: 2 CREAM TOPICAL at 10:12

## 2018-08-26 RX ADMIN — MORPHINE SULFATE 30 MG: 30 TABLET, EXTENDED RELEASE ORAL at 20:34

## 2018-08-26 RX ADMIN — HYDROCODONE BITARTRATE AND ACETAMINOPHEN 1 TABLET: 10; 325 TABLET ORAL at 17:44

## 2018-08-26 RX ADMIN — MORPHINE SULFATE 30 MG: 30 TABLET, EXTENDED RELEASE ORAL at 09:47

## 2018-08-26 RX ADMIN — GABAPENTIN 400 MG: 400 CAPSULE ORAL at 13:29

## 2018-08-26 RX ADMIN — MEROPENEM 500 MG: 500 INJECTION, POWDER, FOR SOLUTION INTRAVENOUS at 17:44

## 2018-08-26 RX ADMIN — ATORVASTATIN CALCIUM 40 MG: 40 TABLET, FILM COATED ORAL at 20:34

## 2018-08-26 RX ADMIN — ASPIRIN 81 MG: 81 TABLET, COATED ORAL at 09:47

## 2018-08-26 RX ADMIN — CASTOR OIL AND BALSAM, PERU: 788; 87 OINTMENT TOPICAL at 21:40

## 2018-08-26 RX ADMIN — Medication 2 TABLET: at 20:35

## 2018-08-26 RX ADMIN — METOPROLOL TARTRATE 25 MG: 25 TABLET ORAL at 09:47

## 2018-08-26 RX ADMIN — MICONAZOLE NITRATE: 2 CREAM TOPICAL at 21:41

## 2018-08-26 RX ADMIN — GABAPENTIN 400 MG: 400 CAPSULE ORAL at 05:26

## 2018-08-26 RX ADMIN — HEPARIN SODIUM 5000 UNITS: 5000 INJECTION, SOLUTION INTRAVENOUS; SUBCUTANEOUS at 20:30

## 2018-08-26 RX ADMIN — NICOTINE 1 PATCH: 21 PATCH, EXTENDED RELEASE TRANSDERMAL at 21:00

## 2018-08-26 RX ADMIN — DIPHENHYDRAMINE HYDROCHLORIDE 25 MG: 25 CAPSULE ORAL at 03:28

## 2018-08-26 RX ADMIN — CLOPIDOGREL BISULFATE 75 MG: 75 TABLET ORAL at 09:47

## 2018-08-26 RX ADMIN — MEROPENEM 500 MG: 500 INJECTION, POWDER, FOR SOLUTION INTRAVENOUS at 03:58

## 2018-08-26 RX ADMIN — HYDROCODONE BITARTRATE AND ACETAMINOPHEN 1 TABLET: 10; 325 TABLET ORAL at 03:22

## 2018-08-26 RX ADMIN — HEPARIN SODIUM 5000 UNITS: 5000 INJECTION, SOLUTION INTRAVENOUS; SUBCUTANEOUS at 05:26

## 2018-08-26 RX ADMIN — PANTOPRAZOLE SODIUM 40 MG: 40 TABLET, DELAYED RELEASE ORAL at 05:26

## 2018-08-26 RX ADMIN — CASTOR OIL AND BALSAM, PERU: 788; 87 OINTMENT TOPICAL at 10:12

## 2018-08-26 RX ADMIN — HYDROCODONE BITARTRATE AND ACETAMINOPHEN 1 TABLET: 10; 325 TABLET ORAL at 13:29

## 2018-08-26 RX ADMIN — MEROPENEM 500 MG: 500 INJECTION, POWDER, FOR SOLUTION INTRAVENOUS at 10:32

## 2018-08-26 RX ADMIN — GABAPENTIN 400 MG: 400 CAPSULE ORAL at 20:35

## 2018-08-26 RX ADMIN — BISACODYL 10 MG: 10 SUPPOSITORY RECTAL at 09:47

## 2018-08-26 RX ADMIN — BUDESONIDE AND FORMOTEROL FUMARATE DIHYDRATE 2 PUFF: 160; 4.5 AEROSOL RESPIRATORY (INHALATION) at 18:59

## 2018-08-27 LAB
ANION GAP SERPL CALCULATED.3IONS-SCNC: 2 MMOL/L (ref 3–11)
BUN BLD-MCNC: 12 MG/DL (ref 9–23)
BUN/CREAT SERPL: 12.4 (ref 7–25)
CALCIUM SPEC-SCNC: 7.9 MG/DL (ref 8.7–10.4)
CHLORIDE SERPL-SCNC: 108 MMOL/L (ref 99–109)
CO2 SERPL-SCNC: 32 MMOL/L (ref 20–31)
CREAT BLD-MCNC: 0.97 MG/DL (ref 0.6–1.3)
DEPRECATED RDW RBC AUTO: 65.2 FL (ref 37–54)
ERYTHROCYTE [DISTWIDTH] IN BLOOD BY AUTOMATED COUNT: 19.2 % (ref 11.3–14.5)
GFR SERPL CREATININE-BSD FRML MDRD: 76 ML/MIN/1.73
GLUCOSE BLD-MCNC: 94 MG/DL (ref 70–100)
HCT VFR BLD AUTO: 32.1 % (ref 38.9–50.9)
HGB BLD-MCNC: 9.5 G/DL (ref 13.1–17.5)
MCH RBC QN AUTO: 27.5 PG (ref 27–31)
MCHC RBC AUTO-ENTMCNC: 29.6 G/DL (ref 32–36)
MCV RBC AUTO: 93 FL (ref 80–99)
PLATELET # BLD AUTO: 312 10*3/MM3 (ref 150–450)
PMV BLD AUTO: 9.6 FL (ref 6–12)
POTASSIUM BLD-SCNC: 4.6 MMOL/L (ref 3.5–5.5)
RBC # BLD AUTO: 3.45 10*6/MM3 (ref 4.2–5.76)
SODIUM BLD-SCNC: 142 MMOL/L (ref 132–146)
WBC NRBC COR # BLD: 7.54 10*3/MM3 (ref 3.5–10.8)

## 2018-08-27 PROCEDURE — 99232 SBSQ HOSP IP/OBS MODERATE 35: CPT | Performed by: INTERNAL MEDICINE

## 2018-08-27 PROCEDURE — 97110 THERAPEUTIC EXERCISES: CPT

## 2018-08-27 PROCEDURE — 80048 BASIC METABOLIC PNL TOTAL CA: CPT | Performed by: INTERNAL MEDICINE

## 2018-08-27 PROCEDURE — 63710000001 DIPHENHYDRAMINE PER 50 MG: Performed by: INTERNAL MEDICINE

## 2018-08-27 PROCEDURE — 94799 UNLISTED PULMONARY SVC/PX: CPT

## 2018-08-27 PROCEDURE — 85027 COMPLETE CBC AUTOMATED: CPT | Performed by: INTERNAL MEDICINE

## 2018-08-27 PROCEDURE — 94760 N-INVAS EAR/PLS OXIMETRY 1: CPT

## 2018-08-27 PROCEDURE — 25010000002 MEROPENEM: Performed by: INTERNAL MEDICINE

## 2018-08-27 PROCEDURE — 25010000002 HEPARIN (PORCINE) PER 1000 UNITS: Performed by: NURSE PRACTITIONER

## 2018-08-27 RX ORDER — DOXYCYCLINE 100 MG/1
100 CAPSULE ORAL EVERY 12 HOURS SCHEDULED
Status: DISCONTINUED | OUTPATIENT
Start: 2018-08-27 | End: 2018-09-08 | Stop reason: HOSPADM

## 2018-08-27 RX ADMIN — DOXYCYCLINE 100 MG: 100 CAPSULE ORAL at 22:22

## 2018-08-27 RX ADMIN — HEPARIN SODIUM 5000 UNITS: 5000 INJECTION, SOLUTION INTRAVENOUS; SUBCUTANEOUS at 06:48

## 2018-08-27 RX ADMIN — ATORVASTATIN CALCIUM 40 MG: 40 TABLET, FILM COATED ORAL at 22:27

## 2018-08-27 RX ADMIN — NICOTINE 1 PATCH: 21 PATCH, EXTENDED RELEASE TRANSDERMAL at 22:31

## 2018-08-27 RX ADMIN — GABAPENTIN 400 MG: 400 CAPSULE ORAL at 13:38

## 2018-08-27 RX ADMIN — Medication 5 MG: at 02:36

## 2018-08-27 RX ADMIN — PANTOPRAZOLE SODIUM 40 MG: 40 TABLET, DELAYED RELEASE ORAL at 06:48

## 2018-08-27 RX ADMIN — BUDESONIDE AND FORMOTEROL FUMARATE DIHYDRATE 2 PUFF: 160; 4.5 AEROSOL RESPIRATORY (INHALATION) at 09:23

## 2018-08-27 RX ADMIN — GABAPENTIN 400 MG: 400 CAPSULE ORAL at 22:22

## 2018-08-27 RX ADMIN — HYDROCODONE BITARTRATE AND ACETAMINOPHEN 1 TABLET: 10; 325 TABLET ORAL at 06:47

## 2018-08-27 RX ADMIN — HYDROCODONE BITARTRATE AND ACETAMINOPHEN 1 TABLET: 10; 325 TABLET ORAL at 02:37

## 2018-08-27 RX ADMIN — BUDESONIDE AND FORMOTEROL FUMARATE DIHYDRATE 2 PUFF: 160; 4.5 AEROSOL RESPIRATORY (INHALATION) at 20:55

## 2018-08-27 RX ADMIN — CASTOR OIL AND BALSAM, PERU: 788; 87 OINTMENT TOPICAL at 08:19

## 2018-08-27 RX ADMIN — MORPHINE SULFATE 30 MG: 30 TABLET, EXTENDED RELEASE ORAL at 22:22

## 2018-08-27 RX ADMIN — DOXYCYCLINE 100 MG: 100 CAPSULE ORAL at 13:38

## 2018-08-27 RX ADMIN — HEPARIN SODIUM 5000 UNITS: 5000 INJECTION, SOLUTION INTRAVENOUS; SUBCUTANEOUS at 13:38

## 2018-08-27 RX ADMIN — GABAPENTIN 400 MG: 400 CAPSULE ORAL at 06:48

## 2018-08-27 RX ADMIN — CASTOR OIL AND BALSAM, PERU: 788; 87 OINTMENT TOPICAL at 22:28

## 2018-08-27 RX ADMIN — FLUTICASONE PROPIONATE 2 SPRAY: 50 SPRAY, METERED NASAL at 08:19

## 2018-08-27 RX ADMIN — Medication 2 TABLET: at 22:23

## 2018-08-27 RX ADMIN — DIPHENHYDRAMINE HYDROCHLORIDE 25 MG: 25 CAPSULE ORAL at 08:17

## 2018-08-27 RX ADMIN — METOPROLOL TARTRATE 25 MG: 25 TABLET ORAL at 08:17

## 2018-08-27 RX ADMIN — ASPIRIN 81 MG: 81 TABLET, COATED ORAL at 08:17

## 2018-08-27 RX ADMIN — MEROPENEM 500 MG: 500 INJECTION, POWDER, FOR SOLUTION INTRAVENOUS at 17:40

## 2018-08-27 RX ADMIN — DIPHENHYDRAMINE HYDROCHLORIDE 25 MG: 25 CAPSULE ORAL at 02:36

## 2018-08-27 RX ADMIN — MEROPENEM 500 MG: 500 INJECTION, POWDER, FOR SOLUTION INTRAVENOUS at 10:23

## 2018-08-27 RX ADMIN — HEPARIN SODIUM 5000 UNITS: 5000 INJECTION, SOLUTION INTRAVENOUS; SUBCUTANEOUS at 22:23

## 2018-08-27 RX ADMIN — MORPHINE SULFATE 30 MG: 30 TABLET, EXTENDED RELEASE ORAL at 08:17

## 2018-08-27 RX ADMIN — MEROPENEM 500 MG: 500 INJECTION, POWDER, FOR SOLUTION INTRAVENOUS at 02:36

## 2018-08-27 RX ADMIN — HYDROCODONE BITARTRATE AND ACETAMINOPHEN 1 TABLET: 10; 325 TABLET ORAL at 13:38

## 2018-08-27 RX ADMIN — HYDROCODONE BITARTRATE AND ACETAMINOPHEN 1 TABLET: 10; 325 TABLET ORAL at 10:32

## 2018-08-27 RX ADMIN — HYDROCODONE BITARTRATE AND ACETAMINOPHEN 1 TABLET: 10; 325 TABLET ORAL at 17:40

## 2018-08-27 RX ADMIN — CLOPIDOGREL BISULFATE 75 MG: 75 TABLET ORAL at 08:17

## 2018-08-27 RX ADMIN — MICONAZOLE NITRATE: 2 CREAM TOPICAL at 22:28

## 2018-08-27 RX ADMIN — MICONAZOLE NITRATE: 2 CREAM TOPICAL at 09:00

## 2018-08-28 PROCEDURE — 94799 UNLISTED PULMONARY SVC/PX: CPT

## 2018-08-28 PROCEDURE — 94640 AIRWAY INHALATION TREATMENT: CPT

## 2018-08-28 PROCEDURE — 29581 APPL MULTLAYER CMPRN SYS LEG: CPT

## 2018-08-28 PROCEDURE — 97597 DBRDMT OPN WND 1ST 20 CM/<: CPT

## 2018-08-28 PROCEDURE — 97598 DBRDMT OPN WND ADDL 20CM/<: CPT

## 2018-08-28 PROCEDURE — 99233 SBSQ HOSP IP/OBS HIGH 50: CPT | Performed by: INTERNAL MEDICINE

## 2018-08-28 PROCEDURE — 25010000002 HEPARIN (PORCINE) PER 1000 UNITS: Performed by: NURSE PRACTITIONER

## 2018-08-28 PROCEDURE — 97110 THERAPEUTIC EXERCISES: CPT

## 2018-08-28 PROCEDURE — 94760 N-INVAS EAR/PLS OXIMETRY 1: CPT

## 2018-08-28 PROCEDURE — 25010000002 MEROPENEM: Performed by: INTERNAL MEDICINE

## 2018-08-28 RX ORDER — DIPHENHYDRAMINE HYDROCHLORIDE, ZINC ACETATE 2; .1 G/100G; G/100G
CREAM TOPICAL 3 TIMES DAILY PRN
Status: DISCONTINUED | OUTPATIENT
Start: 2018-08-28 | End: 2018-09-08 | Stop reason: HOSPADM

## 2018-08-28 RX ADMIN — GABAPENTIN 400 MG: 400 CAPSULE ORAL at 13:42

## 2018-08-28 RX ADMIN — GABAPENTIN 400 MG: 400 CAPSULE ORAL at 21:00

## 2018-08-28 RX ADMIN — HYDROCODONE BITARTRATE AND ACETAMINOPHEN 1 TABLET: 10; 325 TABLET ORAL at 00:11

## 2018-08-28 RX ADMIN — HYDROCODONE BITARTRATE AND ACETAMINOPHEN 1 TABLET: 10; 325 TABLET ORAL at 13:04

## 2018-08-28 RX ADMIN — GABAPENTIN 400 MG: 400 CAPSULE ORAL at 05:50

## 2018-08-28 RX ADMIN — MEROPENEM 500 MG: 500 INJECTION, POWDER, FOR SOLUTION INTRAVENOUS at 03:45

## 2018-08-28 RX ADMIN — CASTOR OIL AND BALSAM, PERU: 788; 87 OINTMENT TOPICAL at 20:56

## 2018-08-28 RX ADMIN — BUDESONIDE AND FORMOTEROL FUMARATE DIHYDRATE 2 PUFF: 160; 4.5 AEROSOL RESPIRATORY (INHALATION) at 08:49

## 2018-08-28 RX ADMIN — HEPARIN SODIUM 5000 UNITS: 5000 INJECTION, SOLUTION INTRAVENOUS; SUBCUTANEOUS at 13:42

## 2018-08-28 RX ADMIN — CASTOR OIL AND BALSAM, PERU: 788; 87 OINTMENT TOPICAL at 10:29

## 2018-08-28 RX ADMIN — HEPARIN SODIUM 5000 UNITS: 5000 INJECTION, SOLUTION INTRAVENOUS; SUBCUTANEOUS at 21:00

## 2018-08-28 RX ADMIN — METOPROLOL TARTRATE 25 MG: 25 TABLET ORAL at 20:56

## 2018-08-28 RX ADMIN — BUDESONIDE AND FORMOTEROL FUMARATE DIHYDRATE 2 PUFF: 160; 4.5 AEROSOL RESPIRATORY (INHALATION) at 19:39

## 2018-08-28 RX ADMIN — CLOPIDOGREL BISULFATE 75 MG: 75 TABLET ORAL at 10:29

## 2018-08-28 RX ADMIN — MORPHINE SULFATE 30 MG: 30 TABLET, EXTENDED RELEASE ORAL at 10:29

## 2018-08-28 RX ADMIN — DOXYCYCLINE 100 MG: 100 CAPSULE ORAL at 10:29

## 2018-08-28 RX ADMIN — PANTOPRAZOLE SODIUM 40 MG: 40 TABLET, DELAYED RELEASE ORAL at 05:50

## 2018-08-28 RX ADMIN — MICONAZOLE NITRATE 1 APPLICATION: 2 CREAM TOPICAL at 10:30

## 2018-08-28 RX ADMIN — ATORVASTATIN CALCIUM 40 MG: 40 TABLET, FILM COATED ORAL at 20:56

## 2018-08-28 RX ADMIN — MEROPENEM 500 MG: 500 INJECTION, POWDER, FOR SOLUTION INTRAVENOUS at 13:04

## 2018-08-28 RX ADMIN — HEPARIN SODIUM 5000 UNITS: 5000 INJECTION, SOLUTION INTRAVENOUS; SUBCUTANEOUS at 05:50

## 2018-08-28 RX ADMIN — METOPROLOL TARTRATE 25 MG: 25 TABLET ORAL at 10:30

## 2018-08-28 RX ADMIN — HYDROCODONE BITARTRATE AND ACETAMINOPHEN 1 TABLET: 10; 325 TABLET ORAL at 17:32

## 2018-08-28 RX ADMIN — DOXYCYCLINE 100 MG: 100 CAPSULE ORAL at 20:56

## 2018-08-28 RX ADMIN — MICONAZOLE NITRATE: 2 CREAM TOPICAL at 20:56

## 2018-08-28 RX ADMIN — NICOTINE 1 PATCH: 21 PATCH, EXTENDED RELEASE TRANSDERMAL at 20:55

## 2018-08-28 RX ADMIN — ASPIRIN 81 MG: 81 TABLET, COATED ORAL at 10:30

## 2018-08-28 RX ADMIN — Medication 2 TABLET: at 20:55

## 2018-08-28 RX ADMIN — MORPHINE SULFATE 30 MG: 30 TABLET, EXTENDED RELEASE ORAL at 20:56

## 2018-08-29 ENCOUNTER — APPOINTMENT (OUTPATIENT)
Dept: CARDIOLOGY | Facility: HOSPITAL | Age: 73
End: 2018-08-29
Attending: INTERNAL MEDICINE

## 2018-08-29 ENCOUNTER — APPOINTMENT (OUTPATIENT)
Dept: GENERAL RADIOLOGY | Facility: HOSPITAL | Age: 73
End: 2018-08-29

## 2018-08-29 LAB
ANION GAP SERPL CALCULATED.3IONS-SCNC: 0 MMOL/L (ref 3–11)
BH CV ECHO MEAS - AO ROOT AREA (BSA CORRECTED): 1.6
BH CV ECHO MEAS - AO ROOT AREA: 7.4 CM^2
BH CV ECHO MEAS - AO ROOT DIAM: 3.1 CM
BH CV ECHO MEAS - BSA(HAYCOCK): 2 M^2
BH CV ECHO MEAS - BSA: 2 M^2
BH CV ECHO MEAS - BZI_BMI: 27.4 KILOGRAMS/M^2
BH CV ECHO MEAS - BZI_METRIC_HEIGHT: 172.7 CM
BH CV ECHO MEAS - BZI_METRIC_WEIGHT: 81.6 KG
BH CV ECHO MEAS - EDV(CUBED): 154.8 ML
BH CV ECHO MEAS - EDV(TEICH): 139.5 ML
BH CV ECHO MEAS - EF(CUBED): 89.1 %
BH CV ECHO MEAS - EF(TEICH): 82.9 %
BH CV ECHO MEAS - ESV(CUBED): 16.9 ML
BH CV ECHO MEAS - ESV(TEICH): 23.8 ML
BH CV ECHO MEAS - FS: 52.2 %
BH CV ECHO MEAS - IVS/LVPW: 0.98
BH CV ECHO MEAS - IVSD: 1 CM
BH CV ECHO MEAS - LA DIMENSION: 3.5 CM
BH CV ECHO MEAS - LA/AO: 1.1
BH CV ECHO MEAS - LV MASS(C)D: 213.6 GRAMS
BH CV ECHO MEAS - LV MASS(C)DI: 109.3 GRAMS/M^2
BH CV ECHO MEAS - LVIDD: 5.4 CM
BH CV ECHO MEAS - LVIDS: 2.6 CM
BH CV ECHO MEAS - LVOT AREA (M): 4.2 CM^2
BH CV ECHO MEAS - LVOT AREA: 4.1 CM^2
BH CV ECHO MEAS - LVOT DIAM: 2.3 CM
BH CV ECHO MEAS - LVPWD: 1 CM
BH CV ECHO MEAS - MV A MAX VEL: 80.9 CM/SEC
BH CV ECHO MEAS - MV DEC SLOPE: 377.7 CM/SEC^2
BH CV ECHO MEAS - MV DEC TIME: 0.17 SEC
BH CV ECHO MEAS - MV E MAX VEL: 101.7 CM/SEC
BH CV ECHO MEAS - MV E/A: 1.3
BH CV ECHO MEAS - MV P1/2T MAX VEL: 105.6 CM/SEC
BH CV ECHO MEAS - MV P1/2T: 81.9 MSEC
BH CV ECHO MEAS - MVA P1/2T LCG: 2.1 CM^2
BH CV ECHO MEAS - MVA(P1/2T): 2.7 CM^2
BH CV ECHO MEAS - PA ACC SLOPE: 729.9 CM/SEC^2
BH CV ECHO MEAS - PA ACC TIME: 0.1 SEC
BH CV ECHO MEAS - PA PR(ACCEL): 33.1 MMHG
BH CV ECHO MEAS - RAP SYSTOLE: 3 MMHG
BH CV ECHO MEAS - RV LENGTH (<8.5) - OBSOLETE: 4.6 CM
BH CV ECHO MEAS - RV MAX PG: 1.9 MMHG
BH CV ECHO MEAS - RV V1 MAX: 68.6 CM/SEC
BH CV ECHO MEAS - RVSP: 24 MMHG
BH CV ECHO MEAS - SI(CUBED): 70.6 ML/M^2
BH CV ECHO MEAS - SI(TEICH): 59.2 ML/M^2
BH CV ECHO MEAS - SV(CUBED): 137.9 ML
BH CV ECHO MEAS - SV(TEICH): 115.7 ML
BH CV ECHO MEAS - TAPSE (>1.6): 2.6 CM2
BH CV ECHO MEAS - TR MAX VEL: 225.9 CM/SEC
BH CV XLRA - RV BASE: 4.4 CM
BH CV XLRA - RV LENGTH: 8.5 CM
BH CV XLRA - RV MID: 3.7 CM
BH CV XLRA - TDI S': 14 CM/SEC
BNP SERPL-MCNC: 297 PG/ML (ref 0–100)
BUN BLD-MCNC: 11 MG/DL (ref 9–23)
BUN/CREAT SERPL: 12.4 (ref 7–25)
CALCIUM SPEC-SCNC: 8 MG/DL (ref 8.7–10.4)
CHLORIDE SERPL-SCNC: 103 MMOL/L (ref 99–109)
CO2 SERPL-SCNC: 34 MMOL/L (ref 20–31)
CREAT BLD-MCNC: 0.89 MG/DL (ref 0.6–1.3)
DEPRECATED RDW RBC AUTO: 64.9 FL (ref 37–54)
ERYTHROCYTE [DISTWIDTH] IN BLOOD BY AUTOMATED COUNT: 19.3 % (ref 11.3–14.5)
GFR SERPL CREATININE-BSD FRML MDRD: 84 ML/MIN/1.73
GLUCOSE BLD-MCNC: 94 MG/DL (ref 70–100)
HCT VFR BLD AUTO: 34.1 % (ref 38.9–50.9)
HGB BLD-MCNC: 10.2 G/DL (ref 13.1–17.5)
LEFT ATRIUM VOLUME INDEX: 27.2 ML/M2
LEFT ATRIUM VOLUME: 53 CM3
LV EF 2D ECHO EST: 65 %
MAGNESIUM SERPL-MCNC: 1.8 MG/DL (ref 1.3–2.7)
MCH RBC QN AUTO: 27.6 PG (ref 27–31)
MCHC RBC AUTO-ENTMCNC: 29.9 G/DL (ref 32–36)
MCV RBC AUTO: 92.4 FL (ref 80–99)
PLATELET # BLD AUTO: 328 10*3/MM3 (ref 150–450)
PMV BLD AUTO: 10.1 FL (ref 6–12)
POTASSIUM BLD-SCNC: 4.7 MMOL/L (ref 3.5–5.5)
RBC # BLD AUTO: 3.69 10*6/MM3 (ref 4.2–5.76)
SODIUM BLD-SCNC: 137 MMOL/L (ref 132–146)
WBC NRBC COR # BLD: 8.53 10*3/MM3 (ref 3.5–10.8)

## 2018-08-29 PROCEDURE — 94640 AIRWAY INHALATION TREATMENT: CPT

## 2018-08-29 PROCEDURE — 93306 TTE W/DOPPLER COMPLETE: CPT | Performed by: INTERNAL MEDICINE

## 2018-08-29 PROCEDURE — 93010 ELECTROCARDIOGRAM REPORT: CPT | Performed by: INTERNAL MEDICINE

## 2018-08-29 PROCEDURE — 99233 SBSQ HOSP IP/OBS HIGH 50: CPT | Performed by: PHYSICIAN ASSISTANT

## 2018-08-29 PROCEDURE — 25010000002 HEPARIN (PORCINE) PER 1000 UNITS: Performed by: NURSE PRACTITIONER

## 2018-08-29 PROCEDURE — 63710000001 DIPHENHYDRAMINE PER 50 MG: Performed by: INTERNAL MEDICINE

## 2018-08-29 PROCEDURE — 71045 X-RAY EXAM CHEST 1 VIEW: CPT

## 2018-08-29 PROCEDURE — 94799 UNLISTED PULMONARY SVC/PX: CPT

## 2018-08-29 PROCEDURE — 93005 ELECTROCARDIOGRAM TRACING: CPT | Performed by: NURSE PRACTITIONER

## 2018-08-29 PROCEDURE — 83880 ASSAY OF NATRIURETIC PEPTIDE: CPT | Performed by: PHYSICIAN ASSISTANT

## 2018-08-29 PROCEDURE — 93306 TTE W/DOPPLER COMPLETE: CPT

## 2018-08-29 PROCEDURE — 85027 COMPLETE CBC AUTOMATED: CPT | Performed by: INTERNAL MEDICINE

## 2018-08-29 PROCEDURE — 80048 BASIC METABOLIC PNL TOTAL CA: CPT | Performed by: INTERNAL MEDICINE

## 2018-08-29 PROCEDURE — 83735 ASSAY OF MAGNESIUM: CPT | Performed by: NURSE PRACTITIONER

## 2018-08-29 PROCEDURE — 94760 N-INVAS EAR/PLS OXIMETRY 1: CPT

## 2018-08-29 RX ORDER — MIRTAZAPINE 15 MG/1
15 TABLET, ORALLY DISINTEGRATING ORAL NIGHTLY
Status: DISCONTINUED | OUTPATIENT
Start: 2018-08-29 | End: 2018-09-08 | Stop reason: HOSPADM

## 2018-08-29 RX ORDER — FUROSEMIDE 40 MG/1
40 TABLET ORAL DAILY
Status: DISCONTINUED | OUTPATIENT
Start: 2018-08-29 | End: 2018-08-29

## 2018-08-29 RX ORDER — MAGNESIUM SULFATE HEPTAHYDRATE 40 MG/ML
2 INJECTION, SOLUTION INTRAVENOUS AS NEEDED
Status: DISCONTINUED | OUTPATIENT
Start: 2018-08-29 | End: 2018-09-08 | Stop reason: HOSPADM

## 2018-08-29 RX ORDER — MAGNESIUM SULFATE HEPTAHYDRATE 40 MG/ML
4 INJECTION, SOLUTION INTRAVENOUS AS NEEDED
Status: DISCONTINUED | OUTPATIENT
Start: 2018-08-29 | End: 2018-09-08 | Stop reason: HOSPADM

## 2018-08-29 RX ORDER — FUROSEMIDE 40 MG/1
40 TABLET ORAL
Status: DISCONTINUED | OUTPATIENT
Start: 2018-08-29 | End: 2018-08-30

## 2018-08-29 RX ADMIN — MAGNESIUM SULFATE HEPTAHYDRATE 4 G: 40 INJECTION, SOLUTION INTRAVENOUS at 04:49

## 2018-08-29 RX ADMIN — MORPHINE SULFATE 30 MG: 30 TABLET, EXTENDED RELEASE ORAL at 21:37

## 2018-08-29 RX ADMIN — HYDROCODONE BITARTRATE AND ACETAMINOPHEN 1 TABLET: 10; 325 TABLET ORAL at 11:09

## 2018-08-29 RX ADMIN — GABAPENTIN 400 MG: 400 CAPSULE ORAL at 21:38

## 2018-08-29 RX ADMIN — FUROSEMIDE 40 MG: 40 TABLET ORAL at 21:37

## 2018-08-29 RX ADMIN — DOXYCYCLINE 100 MG: 100 CAPSULE ORAL at 08:27

## 2018-08-29 RX ADMIN — ATORVASTATIN CALCIUM 40 MG: 40 TABLET, FILM COATED ORAL at 21:37

## 2018-08-29 RX ADMIN — MICONAZOLE NITRATE: 2 CREAM TOPICAL at 08:30

## 2018-08-29 RX ADMIN — HEPARIN SODIUM 5000 UNITS: 5000 INJECTION, SOLUTION INTRAVENOUS; SUBCUTANEOUS at 21:37

## 2018-08-29 RX ADMIN — GABAPENTIN 400 MG: 400 CAPSULE ORAL at 06:14

## 2018-08-29 RX ADMIN — CLOPIDOGREL BISULFATE 75 MG: 75 TABLET ORAL at 08:28

## 2018-08-29 RX ADMIN — HEPARIN SODIUM 5000 UNITS: 5000 INJECTION, SOLUTION INTRAVENOUS; SUBCUTANEOUS at 15:27

## 2018-08-29 RX ADMIN — HYDROCODONE BITARTRATE AND ACETAMINOPHEN 1 TABLET: 10; 325 TABLET ORAL at 02:29

## 2018-08-29 RX ADMIN — MORPHINE SULFATE 30 MG: 30 TABLET, EXTENDED RELEASE ORAL at 08:28

## 2018-08-29 RX ADMIN — CASTOR OIL AND BALSAM, PERU: 788; 87 OINTMENT TOPICAL at 08:30

## 2018-08-29 RX ADMIN — MIRTAZAPINE 15 MG: 15 TABLET, ORALLY DISINTEGRATING ORAL at 21:38

## 2018-08-29 RX ADMIN — MICONAZOLE NITRATE: 2 CREAM TOPICAL at 21:42

## 2018-08-29 RX ADMIN — HEPARIN SODIUM 5000 UNITS: 5000 INJECTION, SOLUTION INTRAVENOUS; SUBCUTANEOUS at 06:14

## 2018-08-29 RX ADMIN — Medication 2 TABLET: at 21:37

## 2018-08-29 RX ADMIN — DOXYCYCLINE 100 MG: 100 CAPSULE ORAL at 21:37

## 2018-08-29 RX ADMIN — NICOTINE 1 PATCH: 21 PATCH, EXTENDED RELEASE TRANSDERMAL at 22:09

## 2018-08-29 RX ADMIN — IPRATROPIUM BROMIDE AND ALBUTEROL SULFATE 3 ML: 2.5; .5 SOLUTION RESPIRATORY (INHALATION) at 03:09

## 2018-08-29 RX ADMIN — ASPIRIN 81 MG: 81 TABLET, COATED ORAL at 08:28

## 2018-08-29 RX ADMIN — DIPHENHYDRAMINE HYDROCHLORIDE 25 MG: 25 CAPSULE ORAL at 02:29

## 2018-08-29 RX ADMIN — METOPROLOL TARTRATE 25 MG: 25 TABLET ORAL at 08:28

## 2018-08-29 RX ADMIN — GABAPENTIN 400 MG: 400 CAPSULE ORAL at 15:27

## 2018-08-29 RX ADMIN — PANTOPRAZOLE SODIUM 40 MG: 40 TABLET, DELAYED RELEASE ORAL at 06:14

## 2018-08-29 RX ADMIN — DIPHENHYDRAMINE HYDROCHLORIDE 25 MG: 25 CAPSULE ORAL at 11:09

## 2018-08-29 RX ADMIN — METOPROLOL TARTRATE 25 MG: 25 TABLET ORAL at 21:37

## 2018-08-29 RX ADMIN — BUDESONIDE AND FORMOTEROL FUMARATE DIHYDRATE 2 PUFF: 160; 4.5 AEROSOL RESPIRATORY (INHALATION) at 08:50

## 2018-08-29 RX ADMIN — CASTOR OIL AND BALSAM, PERU: 788; 87 OINTMENT TOPICAL at 21:41

## 2018-08-29 RX ADMIN — FUROSEMIDE 40 MG: 40 TABLET ORAL at 15:27

## 2018-08-29 RX ADMIN — BUDESONIDE AND FORMOTEROL FUMARATE DIHYDRATE 2 PUFF: 160; 4.5 AEROSOL RESPIRATORY (INHALATION) at 20:04

## 2018-08-30 LAB — MAGNESIUM SERPL-MCNC: 2.1 MG/DL (ref 1.3–2.7)

## 2018-08-30 PROCEDURE — 25010000002 FUROSEMIDE PER 20 MG: Performed by: INTERNAL MEDICINE

## 2018-08-30 PROCEDURE — 99233 SBSQ HOSP IP/OBS HIGH 50: CPT | Performed by: INTERNAL MEDICINE

## 2018-08-30 PROCEDURE — 25010000002 HEPARIN (PORCINE) PER 1000 UNITS: Performed by: NURSE PRACTITIONER

## 2018-08-30 PROCEDURE — 94640 AIRWAY INHALATION TREATMENT: CPT

## 2018-08-30 PROCEDURE — 94799 UNLISTED PULMONARY SVC/PX: CPT

## 2018-08-30 PROCEDURE — 83735 ASSAY OF MAGNESIUM: CPT | Performed by: INTERNAL MEDICINE

## 2018-08-30 PROCEDURE — 97597 DBRDMT OPN WND 1ST 20 CM/<: CPT

## 2018-08-30 PROCEDURE — 63710000001 DIPHENHYDRAMINE PER 50 MG: Performed by: INTERNAL MEDICINE

## 2018-08-30 PROCEDURE — 94760 N-INVAS EAR/PLS OXIMETRY 1: CPT

## 2018-08-30 PROCEDURE — 97598 DBRDMT OPN WND ADDL 20CM/<: CPT

## 2018-08-30 PROCEDURE — 29581 APPL MULTLAYER CMPRN SYS LEG: CPT

## 2018-08-30 PROCEDURE — 97530 THERAPEUTIC ACTIVITIES: CPT

## 2018-08-30 RX ORDER — FUROSEMIDE 10 MG/ML
40 INJECTION INTRAMUSCULAR; INTRAVENOUS ONCE
Status: COMPLETED | OUTPATIENT
Start: 2018-08-30 | End: 2018-08-30

## 2018-08-30 RX ADMIN — MORPHINE SULFATE 30 MG: 30 TABLET, EXTENDED RELEASE ORAL at 08:26

## 2018-08-30 RX ADMIN — CASTOR OIL AND BALSAM, PERU: 788; 87 OINTMENT TOPICAL at 21:04

## 2018-08-30 RX ADMIN — BUDESONIDE AND FORMOTEROL FUMARATE DIHYDRATE 2 PUFF: 160; 4.5 AEROSOL RESPIRATORY (INHALATION) at 20:10

## 2018-08-30 RX ADMIN — HYDROCODONE BITARTRATE AND ACETAMINOPHEN 1 TABLET: 10; 325 TABLET ORAL at 21:02

## 2018-08-30 RX ADMIN — NICOTINE 1 PATCH: 21 PATCH, EXTENDED RELEASE TRANSDERMAL at 22:18

## 2018-08-30 RX ADMIN — MICONAZOLE NITRATE: 2 CREAM TOPICAL at 21:04

## 2018-08-30 RX ADMIN — CASTOR OIL AND BALSAM, PERU: 788; 87 OINTMENT TOPICAL at 09:32

## 2018-08-30 RX ADMIN — HEPARIN SODIUM 5000 UNITS: 5000 INJECTION, SOLUTION INTRAVENOUS; SUBCUTANEOUS at 21:04

## 2018-08-30 RX ADMIN — ATORVASTATIN CALCIUM 40 MG: 40 TABLET, FILM COATED ORAL at 21:03

## 2018-08-30 RX ADMIN — ACETAMINOPHEN 650 MG: 325 TABLET ORAL at 08:26

## 2018-08-30 RX ADMIN — DIPHENHYDRAMINE HYDROCHLORIDE 25 MG: 25 CAPSULE ORAL at 23:43

## 2018-08-30 RX ADMIN — HYDROCODONE BITARTRATE AND ACETAMINOPHEN 1 TABLET: 10; 325 TABLET ORAL at 14:11

## 2018-08-30 RX ADMIN — GABAPENTIN 400 MG: 400 CAPSULE ORAL at 14:08

## 2018-08-30 RX ADMIN — ASPIRIN 81 MG: 81 TABLET, COATED ORAL at 08:26

## 2018-08-30 RX ADMIN — DOXYCYCLINE 100 MG: 100 CAPSULE ORAL at 08:26

## 2018-08-30 RX ADMIN — BUDESONIDE AND FORMOTEROL FUMARATE DIHYDRATE 2 PUFF: 160; 4.5 AEROSOL RESPIRATORY (INHALATION) at 09:34

## 2018-08-30 RX ADMIN — CLOPIDOGREL BISULFATE 75 MG: 75 TABLET ORAL at 08:26

## 2018-08-30 RX ADMIN — HEPARIN SODIUM 5000 UNITS: 5000 INJECTION, SOLUTION INTRAVENOUS; SUBCUTANEOUS at 14:08

## 2018-08-30 RX ADMIN — GABAPENTIN 400 MG: 400 CAPSULE ORAL at 21:03

## 2018-08-30 RX ADMIN — MIRTAZAPINE 15 MG: 15 TABLET, ORALLY DISINTEGRATING ORAL at 21:03

## 2018-08-30 RX ADMIN — GABAPENTIN 400 MG: 400 CAPSULE ORAL at 06:26

## 2018-08-30 RX ADMIN — FUROSEMIDE 40 MG: 40 TABLET ORAL at 08:26

## 2018-08-30 RX ADMIN — FUROSEMIDE 40 MG: 10 INJECTION, SOLUTION INTRAMUSCULAR; INTRAVENOUS at 16:25

## 2018-08-30 RX ADMIN — DOXYCYCLINE 100 MG: 100 CAPSULE ORAL at 21:03

## 2018-08-30 RX ADMIN — Medication 2 TABLET: at 21:03

## 2018-08-30 RX ADMIN — METOPROLOL TARTRATE 25 MG: 25 TABLET ORAL at 08:26

## 2018-08-30 RX ADMIN — MORPHINE SULFATE 30 MG: 30 TABLET, EXTENDED RELEASE ORAL at 21:03

## 2018-08-30 RX ADMIN — Medication 5 MG: at 21:03

## 2018-08-30 RX ADMIN — PANTOPRAZOLE SODIUM 40 MG: 40 TABLET, DELAYED RELEASE ORAL at 06:26

## 2018-08-30 RX ADMIN — HEPARIN SODIUM 5000 UNITS: 5000 INJECTION, SOLUTION INTRAVENOUS; SUBCUTANEOUS at 06:26

## 2018-08-30 RX ADMIN — METOPROLOL TARTRATE 25 MG: 25 TABLET ORAL at 21:05

## 2018-08-31 ENCOUNTER — APPOINTMENT (OUTPATIENT)
Dept: GENERAL RADIOLOGY | Facility: HOSPITAL | Age: 73
End: 2018-08-31

## 2018-08-31 LAB
ANION GAP SERPL CALCULATED.3IONS-SCNC: 3 MMOL/L (ref 3–11)
BUN BLD-MCNC: 18 MG/DL (ref 9–23)
BUN/CREAT SERPL: 14.9 (ref 7–25)
CALCIUM SPEC-SCNC: 8.4 MG/DL (ref 8.7–10.4)
CHLORIDE SERPL-SCNC: 97 MMOL/L (ref 99–109)
CO2 SERPL-SCNC: 38 MMOL/L (ref 20–31)
CREAT BLD-MCNC: 1.21 MG/DL (ref 0.6–1.3)
GFR SERPL CREATININE-BSD FRML MDRD: 59 ML/MIN/1.73
GLUCOSE BLD-MCNC: 107 MG/DL (ref 70–100)
POTASSIUM BLD-SCNC: 4.5 MMOL/L (ref 3.5–5.5)
SODIUM BLD-SCNC: 138 MMOL/L (ref 132–146)

## 2018-08-31 PROCEDURE — 94799 UNLISTED PULMONARY SVC/PX: CPT

## 2018-08-31 PROCEDURE — 71045 X-RAY EXAM CHEST 1 VIEW: CPT

## 2018-08-31 PROCEDURE — 94640 AIRWAY INHALATION TREATMENT: CPT

## 2018-08-31 PROCEDURE — 99232 SBSQ HOSP IP/OBS MODERATE 35: CPT | Performed by: NURSE PRACTITIONER

## 2018-08-31 PROCEDURE — 25010000002 FUROSEMIDE PER 20 MG: Performed by: NURSE PRACTITIONER

## 2018-08-31 PROCEDURE — 63710000001 DIPHENHYDRAMINE PER 50 MG: Performed by: INTERNAL MEDICINE

## 2018-08-31 PROCEDURE — 94760 N-INVAS EAR/PLS OXIMETRY 1: CPT

## 2018-08-31 PROCEDURE — 25010000002 HEPARIN (PORCINE) PER 1000 UNITS: Performed by: NURSE PRACTITIONER

## 2018-08-31 PROCEDURE — 80048 BASIC METABOLIC PNL TOTAL CA: CPT | Performed by: INTERNAL MEDICINE

## 2018-08-31 RX ORDER — FUROSEMIDE 10 MG/ML
40 INJECTION INTRAMUSCULAR; INTRAVENOUS ONCE
Status: COMPLETED | OUTPATIENT
Start: 2018-08-31 | End: 2018-08-31

## 2018-08-31 RX ORDER — IPRATROPIUM BROMIDE AND ALBUTEROL SULFATE 2.5; .5 MG/3ML; MG/3ML
3 SOLUTION RESPIRATORY (INHALATION) 3 TIMES DAILY
Status: DISCONTINUED | OUTPATIENT
Start: 2018-08-31 | End: 2018-09-08 | Stop reason: HOSPADM

## 2018-08-31 RX ORDER — FUROSEMIDE 40 MG/1
40 TABLET ORAL
Status: DISCONTINUED | OUTPATIENT
Start: 2018-09-01 | End: 2018-09-03

## 2018-08-31 RX ADMIN — MORPHINE SULFATE 30 MG: 30 TABLET, EXTENDED RELEASE ORAL at 08:14

## 2018-08-31 RX ADMIN — HEPARIN SODIUM 5000 UNITS: 5000 INJECTION, SOLUTION INTRAVENOUS; SUBCUTANEOUS at 14:14

## 2018-08-31 RX ADMIN — METOPROLOL TARTRATE 25 MG: 25 TABLET ORAL at 08:14

## 2018-08-31 RX ADMIN — IPRATROPIUM BROMIDE AND ALBUTEROL SULFATE 3 ML: 2.5; .5 SOLUTION RESPIRATORY (INHALATION) at 20:16

## 2018-08-31 RX ADMIN — DOXYCYCLINE 100 MG: 100 CAPSULE ORAL at 08:14

## 2018-08-31 RX ADMIN — CLOPIDOGREL BISULFATE 75 MG: 75 TABLET ORAL at 08:14

## 2018-08-31 RX ADMIN — DIPHENHYDRAMINE HYDROCHLORIDE 25 MG: 25 CAPSULE ORAL at 15:07

## 2018-08-31 RX ADMIN — HEPARIN SODIUM 5000 UNITS: 5000 INJECTION, SOLUTION INTRAVENOUS; SUBCUTANEOUS at 06:22

## 2018-08-31 RX ADMIN — BUDESONIDE AND FORMOTEROL FUMARATE DIHYDRATE 2 PUFF: 160; 4.5 AEROSOL RESPIRATORY (INHALATION) at 09:24

## 2018-08-31 RX ADMIN — ATORVASTATIN CALCIUM 40 MG: 40 TABLET, FILM COATED ORAL at 21:29

## 2018-08-31 RX ADMIN — GABAPENTIN 400 MG: 400 CAPSULE ORAL at 14:14

## 2018-08-31 RX ADMIN — GABAPENTIN 400 MG: 400 CAPSULE ORAL at 21:29

## 2018-08-31 RX ADMIN — Medication 2 TABLET: at 21:29

## 2018-08-31 RX ADMIN — PANTOPRAZOLE SODIUM 40 MG: 40 TABLET, DELAYED RELEASE ORAL at 06:22

## 2018-08-31 RX ADMIN — CASTOR OIL AND BALSAM, PERU: 788; 87 OINTMENT TOPICAL at 21:30

## 2018-08-31 RX ADMIN — BUDESONIDE AND FORMOTEROL FUMARATE DIHYDRATE 2 PUFF: 160; 4.5 AEROSOL RESPIRATORY (INHALATION) at 20:16

## 2018-08-31 RX ADMIN — MIRTAZAPINE 15 MG: 15 TABLET, ORALLY DISINTEGRATING ORAL at 21:29

## 2018-08-31 RX ADMIN — NICOTINE 1 PATCH: 21 PATCH, EXTENDED RELEASE TRANSDERMAL at 21:38

## 2018-08-31 RX ADMIN — HYDROCODONE BITARTRATE AND ACETAMINOPHEN 1 TABLET: 10; 325 TABLET ORAL at 17:08

## 2018-08-31 RX ADMIN — HYDROCODONE BITARTRATE AND ACETAMINOPHEN 1 TABLET: 10; 325 TABLET ORAL at 03:29

## 2018-08-31 RX ADMIN — IPRATROPIUM BROMIDE AND ALBUTEROL SULFATE 3 ML: 2.5; .5 SOLUTION RESPIRATORY (INHALATION) at 16:19

## 2018-08-31 RX ADMIN — HEPARIN SODIUM 5000 UNITS: 5000 INJECTION, SOLUTION INTRAVENOUS; SUBCUTANEOUS at 21:29

## 2018-08-31 RX ADMIN — DIPHENHYDRAMINE HYDROCHLORIDE 25 MG: 25 CAPSULE ORAL at 21:42

## 2018-08-31 RX ADMIN — DOXYCYCLINE 100 MG: 100 CAPSULE ORAL at 21:29

## 2018-08-31 RX ADMIN — FUROSEMIDE 40 MG: 10 INJECTION, SOLUTION INTRAMUSCULAR; INTRAVENOUS at 14:14

## 2018-08-31 RX ADMIN — ASPIRIN 81 MG: 81 TABLET, COATED ORAL at 08:14

## 2018-08-31 RX ADMIN — GABAPENTIN 400 MG: 400 CAPSULE ORAL at 06:22

## 2018-08-31 RX ADMIN — MICONAZOLE NITRATE: 2 CREAM TOPICAL at 21:30

## 2018-08-31 RX ADMIN — MORPHINE SULFATE 30 MG: 30 TABLET, EXTENDED RELEASE ORAL at 21:29

## 2018-09-01 PROCEDURE — 94799 UNLISTED PULMONARY SVC/PX: CPT

## 2018-09-01 PROCEDURE — 63710000001 DIPHENHYDRAMINE PER 50 MG: Performed by: INTERNAL MEDICINE

## 2018-09-01 PROCEDURE — 94640 AIRWAY INHALATION TREATMENT: CPT

## 2018-09-01 PROCEDURE — 25010000002 HEPARIN (PORCINE) PER 1000 UNITS: Performed by: NURSE PRACTITIONER

## 2018-09-01 PROCEDURE — 99232 SBSQ HOSP IP/OBS MODERATE 35: CPT | Performed by: INTERNAL MEDICINE

## 2018-09-01 PROCEDURE — 94760 N-INVAS EAR/PLS OXIMETRY 1: CPT

## 2018-09-01 RX ADMIN — HYDROCODONE BITARTRATE AND ACETAMINOPHEN 1 TABLET: 10; 325 TABLET ORAL at 05:32

## 2018-09-01 RX ADMIN — DIPHENHYDRAMINE HYDROCHLORIDE 25 MG: 25 CAPSULE ORAL at 14:22

## 2018-09-01 RX ADMIN — PANTOPRAZOLE SODIUM 40 MG: 40 TABLET, DELAYED RELEASE ORAL at 05:25

## 2018-09-01 RX ADMIN — DOXYCYCLINE 100 MG: 100 CAPSULE ORAL at 08:28

## 2018-09-01 RX ADMIN — METOPROLOL TARTRATE 25 MG: 25 TABLET ORAL at 21:24

## 2018-09-01 RX ADMIN — GABAPENTIN 400 MG: 400 CAPSULE ORAL at 21:21

## 2018-09-01 RX ADMIN — IPRATROPIUM BROMIDE AND ALBUTEROL SULFATE 3 ML: 2.5; .5 SOLUTION RESPIRATORY (INHALATION) at 08:31

## 2018-09-01 RX ADMIN — METOPROLOL TARTRATE 25 MG: 25 TABLET ORAL at 08:29

## 2018-09-01 RX ADMIN — IPRATROPIUM BROMIDE AND ALBUTEROL SULFATE 3 ML: 2.5; .5 SOLUTION RESPIRATORY (INHALATION) at 19:35

## 2018-09-01 RX ADMIN — HEPARIN SODIUM 5000 UNITS: 5000 INJECTION, SOLUTION INTRAVENOUS; SUBCUTANEOUS at 05:25

## 2018-09-01 RX ADMIN — IPRATROPIUM BROMIDE AND ALBUTEROL SULFATE 3 ML: 2.5; .5 SOLUTION RESPIRATORY (INHALATION) at 13:05

## 2018-09-01 RX ADMIN — MIRTAZAPINE 15 MG: 15 TABLET, ORALLY DISINTEGRATING ORAL at 21:21

## 2018-09-01 RX ADMIN — ATORVASTATIN CALCIUM 40 MG: 40 TABLET, FILM COATED ORAL at 21:21

## 2018-09-01 RX ADMIN — MICONAZOLE NITRATE: 2 CREAM TOPICAL at 08:29

## 2018-09-01 RX ADMIN — BUDESONIDE AND FORMOTEROL FUMARATE DIHYDRATE 2 PUFF: 160; 4.5 AEROSOL RESPIRATORY (INHALATION) at 08:31

## 2018-09-01 RX ADMIN — MORPHINE SULFATE 30 MG: 30 TABLET, EXTENDED RELEASE ORAL at 08:29

## 2018-09-01 RX ADMIN — ASPIRIN 81 MG: 81 TABLET, COATED ORAL at 08:29

## 2018-09-01 RX ADMIN — GABAPENTIN 400 MG: 400 CAPSULE ORAL at 14:22

## 2018-09-01 RX ADMIN — MORPHINE SULFATE 30 MG: 30 TABLET, EXTENDED RELEASE ORAL at 21:22

## 2018-09-01 RX ADMIN — FUROSEMIDE 40 MG: 40 TABLET ORAL at 17:14

## 2018-09-01 RX ADMIN — HEPARIN SODIUM 5000 UNITS: 5000 INJECTION, SOLUTION INTRAVENOUS; SUBCUTANEOUS at 14:22

## 2018-09-01 RX ADMIN — BUDESONIDE AND FORMOTEROL FUMARATE DIHYDRATE 2 PUFF: 160; 4.5 AEROSOL RESPIRATORY (INHALATION) at 19:35

## 2018-09-01 RX ADMIN — GABAPENTIN 400 MG: 400 CAPSULE ORAL at 05:25

## 2018-09-01 RX ADMIN — DOXYCYCLINE 100 MG: 100 CAPSULE ORAL at 21:22

## 2018-09-01 RX ADMIN — FUROSEMIDE 40 MG: 40 TABLET ORAL at 08:28

## 2018-09-01 RX ADMIN — HEPARIN SODIUM 5000 UNITS: 5000 INJECTION, SOLUTION INTRAVENOUS; SUBCUTANEOUS at 21:21

## 2018-09-01 RX ADMIN — NICOTINE 1 PATCH: 21 PATCH, EXTENDED RELEASE TRANSDERMAL at 21:24

## 2018-09-01 RX ADMIN — MICONAZOLE NITRATE: 2 CREAM TOPICAL at 21:23

## 2018-09-01 RX ADMIN — DIPHENHYDRAMINE HYDROCHLORIDE 25 MG: 25 CAPSULE ORAL at 09:19

## 2018-09-01 RX ADMIN — Medication 2 TABLET: at 21:22

## 2018-09-01 RX ADMIN — CLOPIDOGREL BISULFATE 75 MG: 75 TABLET ORAL at 08:28

## 2018-09-01 RX ADMIN — CASTOR OIL AND BALSAM, PERU: 788; 87 OINTMENT TOPICAL at 21:23

## 2018-09-01 NOTE — PLAN OF CARE
Problem: Skin Injury Risk (Adult)  Goal: Skin Health and Integrity  Outcome: Ongoing (interventions implemented as appropriate)      Problem: Fall Risk (Adult)  Goal: Absence of Fall  Outcome: Ongoing (interventions implemented as appropriate)      Problem: Patient Care Overview  Goal: Plan of Care Review  Outcome: Ongoing (interventions implemented as appropriate)   09/01/18 0348   Coping/Psychosocial   Plan of Care Reviewed With patient   Plan of Care Review   Progress no change   OTHER   Outcome Summary VSS. No complaints or concerns. Will continue to monitor.

## 2018-09-01 NOTE — PLAN OF CARE
Problem: Skin Injury Risk (Adult)  Goal: Skin Health and Integrity  Outcome: Ongoing (interventions implemented as appropriate)   09/01/18 1630   Skin Injury Risk (Adult)   Skin Health and Integrity making progress toward outcome       Problem: Fall Risk (Adult)  Goal: Absence of Fall  Outcome: Ongoing (interventions implemented as appropriate)   09/01/18 1630   Fall Risk (Adult)   Absence of Fall making progress toward outcome       Problem: Patient Care Overview  Goal: Plan of Care Review  Outcome: Ongoing (interventions implemented as appropriate)   09/01/18 1630   Coping/Psychosocial   Plan of Care Reviewed With patient   Plan of Care Review   Progress no change   OTHER   Outcome Summary VSS. No complaints or concerns. Had a large BM (incontinence) today 9/1/2018. Bed linens changed, wound care and weight shift assistance provided at that time. Benadryl given for itching on bilateral arms. Pt will not stop scratching bilateral arms and both sides of abdomen. Will continue to monitor.        Problem: Tissue Perfusion, Ineffective Peripheral (Adult)  Goal: Adequate Tissue Perfusion  Outcome: Ongoing (interventions implemented as appropriate)   09/01/18 1630   Tissue Perfusion, Ineffective Peripheral (Adult)   Adequate Tissue Perfusion making progress toward outcome

## 2018-09-01 NOTE — PROGRESS NOTES
[facility] Medicine Services  PROGRESS NOTE    Patient Name: Yassine Love  : 1945  MRN: [id number]    Date of Admission: 2018  Length of Stay: 12  Primary Care Physician: Provider, No Known    Subjective     CC: f/u LE cellulitis    HPI:  He is grumpy, very angry that I awoke him from sleep. Has no complaints and wants to go back to bed. No overnight events reported.      Review of Systems   Gen- No fevers, chills  CV- No chest pain, palpitations  GI- No N/V/D, abd pain    Otherwise ROS is negative except as mentioned in the HPI.    Objective     Vital Signs:   Temp:  [98.7 °F (37.1 °C)-99 °F (37.2 °C)] 98.7 °F (37.1 °C)  Heart Rate:  [70-89] 73  Resp:  [18-20] 18  BP: ()/(49-64) 108/64        Physical Exam:  Constitutional: No acute distress, drowsy  Respiratory: clear to ausculatation, on NC  Cardiovascular: RRR, no murmurs, rubs, or gallops, palpable pedal pulses bilaterally  Gastrointestinal: Positive bowel sounds, soft, nontender, nondistended  Musculoskeletal: BLEs with UNNA boots in place. Moderate edema to BUEs with diffuse ecchymosis.   Psychiatric: Appropriate affect, cooperative  Neurologic: drowsy but interactive, no focal deficits    Results Reviewed:  I have personally reviewed current lab, radiology, and data and agree.      Results from last 7 days  Lab Units 18  03118  0528 18  0510   WBC 10*3/mm3 8.53 7.54 5.61   HEMOGLOBIN g/dL 10.2* 9.5* 9.9*   HEMATOCRIT % 34.1* 32.1* 32.7*   PLATELETS 10*3/mm3 328 312 308       Results from last 7 days  Lab Units 18  0501 18  0310 18  0528   SODIUM mmol/L 138 137 142   POTASSIUM mmol/L 4.5 4.7 4.6   CHLORIDE mmol/L 97* 103 108   CO2 mmol/L 38.0* 34.0* 32.0*   BUN mg/dL 18 11 12   CREATININE mg/dL 1.21 0.89 0.97   GLUCOSE mg/dL 107* 94 94   CALCIUM mg/dL 8.4* 8.0* 7.9*     Estimated Creatinine Clearance: 58.5 mL/min (by C-G formula based on SCr of 1.21 mg/dL).     No results  found for: BNP    Microbiology Results Abnormal     Procedure Component Value - Date/Time    Blood Culture - Blood, [[id number]]  (Normal) Collected:  08/20/18 1445    Lab Status:  Final result Specimen:  Blood from Wrist, Right Updated:  08/25/18 1500     Blood Culture No growth at 5 days    Blood Culture - Blood, [[id number]]  (Normal) Collected:  08/20/18 1430    Lab Status:  Final result Specimen:  Blood from Arm, Right Updated:  08/25/18 1500     Blood Culture No growth at 5 days    Wound Culture - Wound, Penis [[id number]]  (Abnormal)  (Susceptibility) Collected:  08/20/18 1930    Lab Status:  Final result Specimen:  Wound from Penis Updated:  08/23/18 1427     Wound Culture Scant growth (1+) Providencia rettgeri (A)      Scant growth (1+) Staphylococcus aureus (A)      Scant growth (1+) Staphylococcus haemolyticus (A)     Comment: This isolate is presumed to be clindamyin resistant based on detection of inducible clindamycin resistance. Clindamycin may still be effective in some patients.          Gram Stain Result No WBCs or organisms seen    Susceptibility      Providencia rettgeri    Staphylococcus aureus       FAVIAN    FAVIAN       Ampicillin + Sulbactam <=8/4 ug/ml Susceptible             Aztreonam <=8 ug/ml Susceptible             Cefepime <=8 ug/ml Susceptible             Cefotaxime <=2 ug/ml Susceptible             Ceftriaxone <=8 ug/ml Susceptible    <=8 ug/ml Susceptible       Cefuroxime sodium <=4 ug/ml Susceptible             Clindamycin       <=0.5 ug/ml Susceptible       Daptomycin       <=0.5 ug/ml Susceptible       Ertapenem <=1 ug/ml Susceptible             Erythromycin       <=0.5 ug/ml Susceptible       Gentamicin <=4 ug/ml Susceptible    <=4 ug/ml Susceptible       Levofloxacin <=2 ug/ml Susceptible    <=1 ug/ml Susceptible  [1]        Linezolid       2 ug/ml Susceptible       Meropenem <=1 ug/ml Susceptible             Oxacillin       <=0.25 ug/ml Susceptible       Penicillin G       <=0.03 ug/ml  Susceptible       Piperacillin + Tazobactam <=16 ug/ml Susceptible             Quinupristin + Dalfopristin       <=0.5 ug/ml Susceptible  [2]        Rifampin       <=1 ug/ml Susceptible       Streptomycin High Level Synergy       <=0.5 ug/ml Susceptible       Tetracycline       <=4 ug/ml Susceptible       Tobramycin <=4 ug/ml Susceptible             Trimethoprim + Sulfamethoxazole <=2/38 ug/ml Susceptible    <=0.5/9.5 ug/ml Susceptible       Vancomycin       2 ug/ml Susceptible              [1]   Staphylococcus species may develop resistance during prolonged therapy with quinolones.  Isolates that are initially susceptible may become resistant within three to four days after initiation of therapy. Testing of repeat isolates may be warranted.       [2]   Appended report. These results have been appended to a previously preliminary verified report.               Susceptibility      Staphylococcus haemolyticus     FAVIAN     Ciprofloxacin >2 ug/ml Resistant     Clindamycin  Resistant     Daptomycin <=0.5 ug/ml Susceptible     Erythromycin >4 ug/ml Resistant     Gentamicin <=4 ug/ml Susceptible     Levofloxacin >4 ug/ml Resistant     Linezolid <=1 ug/ml Susceptible     Oxacillin >2 ug/ml Resistant     Penicillin G >8 ug/ml Resistant     Quinupristin + Dalfopristin <=0.5 ug/ml Susceptible     Rifampin <=1 ug/ml Susceptible     Tetracycline <=4 ug/ml Susceptible     Trimethoprim + Sulfamethoxazole <=0.5/9.5 ug/ml Susceptible     Vancomycin 2 ug/ml Susceptible                    Wound Culture - Wound, Leg, Left [[id number]]  (Abnormal)  (Susceptibility) Collected:  08/20/18 1930    Lab Status:  Final result Specimen:  Wound from Leg, Left Updated:  08/22/18 1347     Wound Culture Moderate growth (3+) Pseudomonas aeruginosa (A)      Scant growth (1+) Streptococcus, Beta Hemolytic, Group G (A)     Comment:   If Clindamycin or Erythromycin is the drug of choice, notify the laboratory within 7 days to request susceptibility  testing.        STREP GROUPING G     Gram Stain Result No WBCs seen      Many (4+) Gram negative bacilli    Susceptibility      Pseudomonas aeruginosa     FAVIAN     Aztreonam <=8 ug/ml Susceptible     Cefepime <=8 ug/ml Susceptible     Ceftazidime <=1 ug/ml Susceptible     Gentamicin <=4 ug/ml Susceptible     Levofloxacin <=2 ug/ml Susceptible     Meropenem <=1 ug/ml Susceptible     Piperacillin + Tazobactam >64 ug/ml Resistant     Tobramycin <=4 ug/ml Susceptible                    KOH Prep - Skin, Foot, Left [[id number]]  (Normal) Collected:  08/20/18 1501    Lab Status:  Final result Specimen:  Skin from Foot, Left Updated:  08/20/18 1528     KOH Prep No yeast or hyphal elements seen        Imaging Results (last 24 hours)     Procedure Component Value Units Date/Time    XR Chest 1 View [[id number]] Collected:  08/31/18 1243     Updated:  08/31/18 1534    Narrative:       EXAMINATION: XR CHEST 1 VW-08/31/2018:      INDICATION: SOA, hypoxia; L03.116-Cellulitis of left lower limb;  R60.9-Edema, unspecified; I87.2-Venous insufficiency (chronic)  (peripheral); L03.119-Cellulitis of unspecified part of limb;  G89.4-Chronic pain syndrome; Z86.79-Personal history of other diseases  of the circulatory system; Z74.09-Other reduced mobility; Z74.09-Other  reduced mobility.      COMPARISON: 08/29/2018.     FINDINGS: Portable chest reveals the heart to be borderline enlarged.  Patchy ill-defined opacification left lung base with small left pleural  effusion. Increased markings seen within the right lung base, however,  these are improved when compared to the prior study. Degenerative  changes seen within the spine. Vascular calcification seen within the  thoracic aorta.           Impression:       Ill-defined opacification within left lung base. Small  bilateral pleural effusions. Increased markings seen within the right  lung base. Improvement seen in the aeration of the right lung base in  the interval.     D:  08/31/2018  E:   08/31/2018     This report was finalized on 8/31/2018 3:32 PM by Dr. Nataly Whalen MD.           Results for orders placed during the hospital encounter of 08/20/18   Adult Transthoracic Echo Complete W/ Cont if Necessary Per Protocol    Narrative · Left ventricular systolic function is normal. Estimated EF = 65%.  · There is moderate calcification of the aortic valve mainly affecting the   non coronary cusp(s).  · Right ventricular cavity is mildly dilated.        I have reviewed the medications.    aspirin 81 mg Oral Daily   atorvastatin 40 mg Oral Nightly   budesonide-formoterol 2 puff Inhalation BID - RT   castor oil-balsam peru  Topical Q12H   clopidogrel 75 mg Oral Daily   doxycycline 100 mg Oral Q12H   fluticasone 2 spray Each Nare Daily   furosemide 40 mg Oral BID   gabapentin 400 mg Oral Q8H   heparin (porcine) 5,000 Units Subcutaneous Q8H   ipratropium-albuterol 3 mL Nebulization TID   metoprolol tartrate 25 mg Oral Q12H   miconazole  Topical Q12H   mirtazapine 15 mg Oral Nightly   Morphine 30 mg Oral Q12H   nicotine 1 patch Transdermal Q24H   pantoprazole 40 mg Oral Q AM   Pharmacy Meds to Bed Consult  Does not apply Daily   sennosides-docusate sodium 2 tablet Oral Nightly     Assessment / Plan     Hospital Problem List     * (Principal)Cellulitis of lower extremity    RAFAEL (acute kidney injury) (CMS/HCC)    PVD (peripheral vascular disease) (CMS/HCC)    Cellulitis of both lower extremities    COPD (chronic obstructive pulmonary disease) (CMS/HCC)    Essential hypertension    Non-sustained ventricular tachycardia (CMS/HCC)    Coronary artery disease involving native coronary artery without angina pectoris    Debility    Decubitus ulcer of buttock, stage 2    Chronic pain        Brief Hospital Course to date:  Yassine Love is a 72 y.o. male with PMH significant for recurring BLE cellulitis (has seen Dr. Belle in the past), PVD, prediabetes, chronic pain, HTN, CAD, COPD, and ongoing tobacco abuse.  He  presented to [facility] ER today with worsening BLE pain and redness, weeping, and open lesions (left worse than right), which he tells me have been progressively worsening over last 2 weeks.     Assessment & Plan:    - Complex medico-social issues.   - Patient lives alone. Reports that he is usually able to ambulate with a walker (but his legs had been weak and giving out on him prior to admission) and able to perform ADLs. Has neighbors and friends who help him out a lot with groceries, etc. He no longer drives.   - He has significant POAD with superimposed cellulitis, suspect L>R from physical exam. He is at risk for limb loss. Discussed with pt, he does not want any aggressive intervention/surgery, but may be open to endovascular intervention if possible. Arterial duplex showed no occlusion on either side.   - LE wound culture and penis wound culture as above, defer to ID for abx management  - ID (Dr Belle) following, have converted to Doxycycline PO   - PT wound care following, will need continued UNNA boot therapy following d/c   - LUE superficial thrombus- symptomatic mgmt  - Echocardiogram unremarkable (EF 65%),  - CXR worse, give another dose of IV lasix then resume home BID dosing tomorrow    - WOC, PT, OT to eval and treat  - CM/SW for DC planning    - tobacco cessation advised    - Have discussed with pt in past days regarding chronic pain, which he says is not well controlled now due to relative immobility/laying in bed. He is chronically on ER and IR narcotics and Gabapentin[facility]. Increased MS contin to 30mg BID and increase frequency of immediate release narcotics to q4h PRN. Pt was informed that this regimen will not be cont at discharge and that no new prescriptions will be provided, he verbalized understanding and is agreeable with treatment plans.    DVT Prophylaxis:  Hep sq    CODE STATUS:   Code Status and Medical Interventions:   Ordered at: 08/29/18 0406     Limited Support to  NOT Include:    Intubation     Level Of Support Discussed With:    Patient     Code Status:    No CPR     Medical Interventions (Level of Support Prior to Arrest):    Limited     Disposition: Rehab if he is agreeable, otherwise, home with home health.    Concerned that this patient is unable to care for himself at home successfully.     Electronically signed by Kristina Alejandre MD, 09/01/18, 2:17 PM.

## 2018-09-02 PROCEDURE — 97110 THERAPEUTIC EXERCISES: CPT

## 2018-09-02 PROCEDURE — 99232 SBSQ HOSP IP/OBS MODERATE 35: CPT | Performed by: INTERNAL MEDICINE

## 2018-09-02 PROCEDURE — 94640 AIRWAY INHALATION TREATMENT: CPT

## 2018-09-02 PROCEDURE — 25010000002 HEPARIN (PORCINE) PER 1000 UNITS: Performed by: NURSE PRACTITIONER

## 2018-09-02 PROCEDURE — 97164 PT RE-EVAL EST PLAN CARE: CPT | Performed by: PHYSICAL THERAPIST

## 2018-09-02 PROCEDURE — 94760 N-INVAS EAR/PLS OXIMETRY 1: CPT

## 2018-09-02 PROCEDURE — 94799 UNLISTED PULMONARY SVC/PX: CPT

## 2018-09-02 PROCEDURE — 97530 THERAPEUTIC ACTIVITIES: CPT

## 2018-09-02 PROCEDURE — 97530 THERAPEUTIC ACTIVITIES: CPT | Performed by: PHYSICAL THERAPIST

## 2018-09-02 RX ADMIN — GABAPENTIN 400 MG: 400 CAPSULE ORAL at 20:28

## 2018-09-02 RX ADMIN — FUROSEMIDE 40 MG: 40 TABLET ORAL at 17:03

## 2018-09-02 RX ADMIN — ASPIRIN 81 MG: 81 TABLET, COATED ORAL at 08:53

## 2018-09-02 RX ADMIN — GABAPENTIN 400 MG: 400 CAPSULE ORAL at 06:04

## 2018-09-02 RX ADMIN — DOXYCYCLINE 100 MG: 100 CAPSULE ORAL at 20:28

## 2018-09-02 RX ADMIN — MIRTAZAPINE 15 MG: 15 TABLET, ORALLY DISINTEGRATING ORAL at 20:29

## 2018-09-02 RX ADMIN — NICOTINE 1 PATCH: 21 PATCH, EXTENDED RELEASE TRANSDERMAL at 20:29

## 2018-09-02 RX ADMIN — IPRATROPIUM BROMIDE AND ALBUTEROL SULFATE 3 ML: 2.5; .5 SOLUTION RESPIRATORY (INHALATION) at 08:23

## 2018-09-02 RX ADMIN — HEPARIN SODIUM 5000 UNITS: 5000 INJECTION, SOLUTION INTRAVENOUS; SUBCUTANEOUS at 06:04

## 2018-09-02 RX ADMIN — HEPARIN SODIUM 5000 UNITS: 5000 INJECTION, SOLUTION INTRAVENOUS; SUBCUTANEOUS at 20:29

## 2018-09-02 RX ADMIN — GABAPENTIN 400 MG: 400 CAPSULE ORAL at 14:17

## 2018-09-02 RX ADMIN — DOXYCYCLINE 100 MG: 100 CAPSULE ORAL at 08:53

## 2018-09-02 RX ADMIN — HYDROCODONE BITARTRATE AND ACETAMINOPHEN 1 TABLET: 10; 325 TABLET ORAL at 01:13

## 2018-09-02 RX ADMIN — BUDESONIDE AND FORMOTEROL FUMARATE DIHYDRATE 2 PUFF: 160; 4.5 AEROSOL RESPIRATORY (INHALATION) at 20:11

## 2018-09-02 RX ADMIN — IPRATROPIUM BROMIDE AND ALBUTEROL SULFATE 3 ML: 2.5; .5 SOLUTION RESPIRATORY (INHALATION) at 05:11

## 2018-09-02 RX ADMIN — Medication 2 TABLET: at 20:28

## 2018-09-02 RX ADMIN — ATORVASTATIN CALCIUM 40 MG: 40 TABLET, FILM COATED ORAL at 20:28

## 2018-09-02 RX ADMIN — METOPROLOL TARTRATE 25 MG: 25 TABLET ORAL at 20:29

## 2018-09-02 RX ADMIN — IPRATROPIUM BROMIDE AND ALBUTEROL SULFATE 3 ML: 2.5; .5 SOLUTION RESPIRATORY (INHALATION) at 20:11

## 2018-09-02 RX ADMIN — FUROSEMIDE 40 MG: 40 TABLET ORAL at 08:53

## 2018-09-02 RX ADMIN — MORPHINE SULFATE 30 MG: 30 TABLET, EXTENDED RELEASE ORAL at 08:58

## 2018-09-02 RX ADMIN — IPRATROPIUM BROMIDE AND ALBUTEROL SULFATE 3 ML: 2.5; .5 SOLUTION RESPIRATORY (INHALATION) at 13:35

## 2018-09-02 RX ADMIN — CASTOR OIL AND BALSAM, PERU: 788; 87 OINTMENT TOPICAL at 20:26

## 2018-09-02 RX ADMIN — HEPARIN SODIUM 5000 UNITS: 5000 INJECTION, SOLUTION INTRAVENOUS; SUBCUTANEOUS at 14:17

## 2018-09-02 RX ADMIN — CASTOR OIL AND BALSAM, PERU: 788; 87 OINTMENT TOPICAL at 08:54

## 2018-09-02 RX ADMIN — CLOPIDOGREL BISULFATE 75 MG: 75 TABLET ORAL at 08:53

## 2018-09-02 RX ADMIN — PANTOPRAZOLE SODIUM 40 MG: 40 TABLET, DELAYED RELEASE ORAL at 06:04

## 2018-09-02 RX ADMIN — METOPROLOL TARTRATE 25 MG: 25 TABLET ORAL at 08:53

## 2018-09-02 RX ADMIN — MICONAZOLE NITRATE: 2 CREAM TOPICAL at 20:26

## 2018-09-02 RX ADMIN — BUDESONIDE AND FORMOTEROL FUMARATE DIHYDRATE 2 PUFF: 160; 4.5 AEROSOL RESPIRATORY (INHALATION) at 08:23

## 2018-09-02 RX ADMIN — FLUTICASONE PROPIONATE 2 SPRAY: 50 SPRAY, METERED NASAL at 08:52

## 2018-09-02 RX ADMIN — MORPHINE SULFATE 30 MG: 30 TABLET, EXTENDED RELEASE ORAL at 20:28

## 2018-09-02 RX ADMIN — MICONAZOLE NITRATE: 2 CREAM TOPICAL at 08:58
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
## 2018-09-02 NOTE — PLAN OF CARE
Problem: Skin Injury Risk (Adult)  Goal: Skin Health and Integrity  Outcome: Ongoing (interventions implemented as appropriate)   09/02/18 0502   Skin Injury Risk (Adult)   Skin Health and Integrity making progress toward outcome       Problem: Fall Risk (Adult)  Goal: Absence of Fall  Outcome: Ongoing (interventions implemented as appropriate)   09/02/18 0502   Fall Risk (Adult)   Absence of Fall making progress toward outcome       Problem: Patient Care Overview  Goal: Plan of Care Review  Outcome: Ongoing (interventions implemented as appropriate)   09/02/18 0502   Coping/Psychosocial   Plan of Care Reviewed With patient   Plan of Care Review   Progress improving   OTHER   Outcome Summary Pt. is resting in bed, dressing changed, VSS, c/o pain treated w/ PRN, pt. refuse to turn, fluid restriction education reinforced & will continue to monitor.        Problem: Tissue Perfusion, Ineffective Peripheral (Adult)  Goal: Adequate Tissue Perfusion  Outcome: Ongoing (interventions implemented as appropriate)   09/02/18 0502   Tissue Perfusion, Ineffective Peripheral (Adult)   Adequate Tissue Perfusion making progress toward outcome

## 2018-09-02 NOTE — PLAN OF CARE
Problem: Patient Care Overview  Goal: Plan of Care Review  Outcome: Ongoing (interventions implemented as appropriate)   09/02/18 2898   Coping/Psychosocial   Plan of Care Reviewed With patient   Plan of Care Review   Progress improving   OTHER   Outcome Summary Pt mod A for bed mobility rolling R and L, dependent for bed to chair transfer, tolerated BUE AROM HEP in sitting in chair 2x 10 reps. Saturation dropped to 82% with recovery to 90% with rest breaks. Cont IPOT per POC

## 2018-09-02 NOTE — PROGRESS NOTES
[facility] Medicine Services  PROGRESS NOTE    Patient Name: Yassine Love  : 1945  MRN: [id number]    Date of Admission: 2018  Length of Stay: 13  Primary Care Physician: Provider, No Known    Subjective     CC: f/u LE cellulitis    HPI:  No overnight events recorded. Desaturation recorded yesterday afternoon down to 73- placed on high flow since that time.      Review of Systems   Gen- No fevers, chills  CV- No chest pain, palpitations  GI- No N/V/D, abd pain    Otherwise ROS is negative except as mentioned in the HPI.    Objective     Vital Signs:   Temp:  [98.6 °F (37 °C)-98.9 °F (37.2 °C)] 98.9 °F (37.2 °C)  Heart Rate:  [68-83] 71  Resp:  [20-22] 20  BP: (105-109)/(47-64) 108/47        Physical Exam:  Constitutional: No acute distress, drowsy  Respiratory: clear to ausculatation, on high flow NC  Cardiovascular: RRR, no murmurs, rubs, or gallops, palpable pedal pulses bilaterally  Gastrointestinal: Positive bowel sounds, soft, nontender, nondistended  Musculoskeletal: BLEs with UNNA boots in place. Moderate edema to BUEs with diffuse ecchymosis.   Psychiatric: Appropriate affect, cooperative  Neurologic: drowsy but interactive, no focal deficits    Results Reviewed:  I have personally reviewed current lab, radiology, and data and agree.      Results from last 7 days  Lab Units 18  0310 18  0528   WBC 10*3/mm3 8.53 7.54   HEMOGLOBIN g/dL 10.2* 9.5*   HEMATOCRIT % 34.1* 32.1*   PLATELETS 10*3/mm3 328 312       Results from last 7 days  Lab Units 18  0501 18  0310 18  0528   SODIUM mmol/L 138 137 142   POTASSIUM mmol/L 4.5 4.7 4.6   CHLORIDE mmol/L 97* 103 108   CO2 mmol/L 38.0* 34.0* 32.0*   BUN mg/dL 18 11 12   CREATININE mg/dL 1.21 0.89 0.97   GLUCOSE mg/dL 107* 94 94   CALCIUM mg/dL 8.4* 8.0* 7.9*     Estimated Creatinine Clearance: 58.5 mL/min (by C-G formula based on SCr of 1.21 mg/dL).     No results found for: BNP    Microbiology  Results Abnormal     Procedure Component Value - Date/Time    Blood Culture - Blood, [[id number]]  (Normal) Collected:  08/20/18 1445    Lab Status:  Final result Specimen:  Blood from Wrist, Right Updated:  08/25/18 1500     Blood Culture No growth at 5 days    Blood Culture - Blood, [[id number]]  (Normal) Collected:  08/20/18 1430    Lab Status:  Final result Specimen:  Blood from Arm, Right Updated:  08/25/18 1500     Blood Culture No growth at 5 days    Wound Culture - Wound, Penis [[id number]]  (Abnormal)  (Susceptibility) Collected:  08/20/18 1930    Lab Status:  Final result Specimen:  Wound from Penis Updated:  08/23/18 1427     Wound Culture Scant growth (1+) Providencia rettgeri (A)      Scant growth (1+) Staphylococcus aureus (A)      Scant growth (1+) Staphylococcus haemolyticus (A)     Comment: This isolate is presumed to be clindamyin resistant based on detection of inducible clindamycin resistance. Clindamycin may still be effective in some patients.          Gram Stain Result No WBCs or organisms seen    Susceptibility      Providencia rettgeri    Staphylococcus aureus       FAVIAN    FAVIAN       Ampicillin + Sulbactam <=8/4 ug/ml Susceptible             Aztreonam <=8 ug/ml Susceptible             Cefepime <=8 ug/ml Susceptible             Cefotaxime <=2 ug/ml Susceptible             Ceftriaxone <=8 ug/ml Susceptible    <=8 ug/ml Susceptible       Cefuroxime sodium <=4 ug/ml Susceptible             Clindamycin       <=0.5 ug/ml Susceptible       Daptomycin       <=0.5 ug/ml Susceptible       Ertapenem <=1 ug/ml Susceptible             Erythromycin       <=0.5 ug/ml Susceptible       Gentamicin <=4 ug/ml Susceptible    <=4 ug/ml Susceptible       Levofloxacin <=2 ug/ml Susceptible    <=1 ug/ml Susceptible  [1]        Linezolid       2 ug/ml Susceptible       Meropenem <=1 ug/ml Susceptible             Oxacillin       <=0.25 ug/ml Susceptible       Penicillin G       <=0.03 ug/ml Susceptible       Piperacillin  + Tazobactam <=16 ug/ml Susceptible             Quinupristin + Dalfopristin       <=0.5 ug/ml Susceptible  [2]        Rifampin       <=1 ug/ml Susceptible       Streptomycin High Level Synergy       <=0.5 ug/ml Susceptible       Tetracycline       <=4 ug/ml Susceptible       Tobramycin <=4 ug/ml Susceptible             Trimethoprim + Sulfamethoxazole <=2/38 ug/ml Susceptible    <=0.5/9.5 ug/ml Susceptible       Vancomycin       2 ug/ml Susceptible              [1]   Staphylococcus species may develop resistance during prolonged therapy with quinolones.  Isolates that are initially susceptible may become resistant within three to four days after initiation of therapy. Testing of repeat isolates may be warranted.       [2]   Appended report. These results have been appended to a previously preliminary verified report.               Susceptibility      Staphylococcus haemolyticus     FAVIAN     Ciprofloxacin >2 ug/ml Resistant     Clindamycin  Resistant     Daptomycin <=0.5 ug/ml Susceptible     Erythromycin >4 ug/ml Resistant     Gentamicin <=4 ug/ml Susceptible     Levofloxacin >4 ug/ml Resistant     Linezolid <=1 ug/ml Susceptible     Oxacillin >2 ug/ml Resistant     Penicillin G >8 ug/ml Resistant     Quinupristin + Dalfopristin <=0.5 ug/ml Susceptible     Rifampin <=1 ug/ml Susceptible     Tetracycline <=4 ug/ml Susceptible     Trimethoprim + Sulfamethoxazole <=0.5/9.5 ug/ml Susceptible     Vancomycin 2 ug/ml Susceptible                    Wound Culture - Wound, Leg, Left [[id number]]  (Abnormal)  (Susceptibility) Collected:  08/20/18 1930    Lab Status:  Final result Specimen:  Wound from Leg, Left Updated:  08/22/18 1340     Wound Culture Moderate growth (3+) Pseudomonas aeruginosa (A)      Scant growth (1+) Streptococcus, Beta Hemolytic, Group G (A)     Comment:   If Clindamycin or Erythromycin is the drug of choice, notify the laboratory within 7 days to request susceptibility testing.        STREP GROUPING G      Gram Stain Result No WBCs seen      Many (4+) Gram negative bacilli    Susceptibility      Pseudomonas aeruginosa     FAVIAN     Aztreonam <=8 ug/ml Susceptible     Cefepime <=8 ug/ml Susceptible     Ceftazidime <=1 ug/ml Susceptible     Gentamicin <=4 ug/ml Susceptible     Levofloxacin <=2 ug/ml Susceptible     Meropenem <=1 ug/ml Susceptible     Piperacillin + Tazobactam >64 ug/ml Resistant     Tobramycin <=4 ug/ml Susceptible                    KOH Prep - Skin, Foot, Left [[id number]]  (Normal) Collected:  08/20/18 1501    Lab Status:  Final result Specimen:  Skin from Foot, Left Updated:  08/20/18 1528     KOH Prep No yeast or hyphal elements seen        Imaging Results (last 24 hours)     ** No results found for the last 24 hours. **        Results for orders placed during the hospital encounter of 08/20/18   Adult Transthoracic Echo Complete W/ Cont if Necessary Per Protocol    Narrative · Left ventricular systolic function is normal. Estimated EF = 65%.  · There is moderate calcification of the aortic valve mainly affecting the   non coronary cusp(s).  · Right ventricular cavity is mildly dilated.        I have reviewed the medications.    aspirin 81 mg Oral Daily   atorvastatin 40 mg Oral Nightly   budesonide-formoterol 2 puff Inhalation BID - RT   castor oil-balsam peru  Topical Q12H   clopidogrel 75 mg Oral Daily   doxycycline 100 mg Oral Q12H   fluticasone 2 spray Each Nare Daily   furosemide 40 mg Oral BID   gabapentin 400 mg Oral Q8H   heparin (porcine) 5,000 Units Subcutaneous Q8H   ipratropium-albuterol 3 mL Nebulization TID   metoprolol tartrate 25 mg Oral Q12H   miconazole  Topical Q12H   mirtazapine 15 mg Oral Nightly   Morphine 30 mg Oral Q12H   nicotine 1 patch Transdermal Q24H   pantoprazole 40 mg Oral Q AM   Pharmacy Meds to Bed Consult  Does not apply Daily   sennosides-docusate sodium 2 tablet Oral Nightly     Assessment / Plan     Hospital Problem List     * (Principal)Cellulitis of lower  extremity    RAFAEL (acute kidney injury) (CMS/[facility])    PVD (peripheral vascular disease) (CMS/[facility])    Cellulitis of both lower extremities    COPD (chronic obstructive pulmonary disease) (CMS/[facility])    Essential hypertension    Non-sustained ventricular tachycardia (CMS/[facility])    Coronary artery disease involving native coronary artery without angina pectoris    Debility    Decubitus ulcer of buttock, stage 2    Chronic pain        Brief Hospital Course to date:  Yassine Love is a 72 y.o. male with PMH significant for recurring BLE cellulitis (has seen Dr. Belle in the past), PVD, prediabetes, chronic pain, HTN, CAD, COPD, and ongoing tobacco abuse.  He presented to [facility] ER today with worsening BLE pain and redness, weeping, and open lesions (left worse than right), which he tells me have been progressively worsening over last 2 weeks.     Assessment & Plan:    - Complex medico-social issues.   - Patient lives alone. Reports that he is usually able to ambulate with a walker (but his legs had been weak and giving out on him prior to admission) and able to perform ADLs. Has neighbors and friends who help him out a lot with groceries, etc. He no longer drives.   - He has significant POAD with superimposed cellulitis, suspect L>R from physical exam. He is at risk for limb loss. Discussed with pt, he does not want any aggressive intervention/surgery, but may be open to endovascular intervention if possible. Arterial duplex showed no occlusion on either side.   - LE wound culture and penis wound culture as above, defer to ID for abx management  - ID (Dr Belle) following, have converted to Doxycycline PO   - PT wound care following, will need continued UNNA boot therapy following d/c   - LUE superficial thrombus- symptomatic mgmt  - Echocardiogram unremarkable (EF 65%),  - will plan to get CXR in AM to evaluate further diuretic needs (currently on home oral)    - WOC, PT, OT to eval and treat  - CM/SW for DC planning    - tobacco  cessation advised    - Have discussed with pt in past days regarding chronic pain, which he says is not well controlled now due to relative immobility/laying in bed. He is chronically on ER and IR narcotics and Gabapentin[facility]. Increased MS contin to 30mg BID and increase frequency of immediate release narcotics to q4h PRN. Pt was informed that this regimen will not be cont at discharge and that no new prescriptions will be provided, he verbalized understanding and is agreeable with treatment plans.    DVT Prophylaxis:  Hep sq    CODE STATUS:   Code Status and Medical Interventions:   Ordered at: 08/29/18 0406     Limited Support to NOT Include:    Intubation     Level Of Support Discussed With:    Patient     Code Status:    No CPR     Medical Interventions (Level of Support Prior to Arrest):    Limited     Disposition: Rehab if he is agreeable, otherwise, home with home health.    Concerned that this patient is unable to care for himself at home successfully.     Electronically signed by Krisitna Alejandre MD, 09/02/18, 1:07 PM.

## 2018-09-02 NOTE — PLAN OF CARE
Problem: Patient Care Overview  Goal: Plan of Care Review  Outcome: Ongoing (interventions implemented as appropriate)   09/02/18 1027   Coping/Psychosocial   Plan of Care Reviewed With patient   OTHER   Outcome Summary PT mobility evaluation completed on this date. Pt transferred supine-->sit with MinAx2, sat EOB ~10-12 minutes ranging from SBA-ModA, and attempted to stand x2 with MaxAx2 (unable to come to full stand, but did clear buttocks). O2 dropped as low as 63% when sitting EOB on high flow O2, and took ~5 minutes of PLB to increase to 90%. Skilled PT services warranted to improve mobility and safety prior to d/c. Recommend d/c to [facility].

## 2018-09-03 ENCOUNTER — APPOINTMENT (OUTPATIENT)
Dept: GENERAL RADIOLOGY | Facility: HOSPITAL | Age: 73
End: 2018-09-03

## 2018-09-03 LAB
ANION GAP SERPL CALCULATED.3IONS-SCNC: 5 MMOL/L (ref 3–11)
BUN BLD-MCNC: 19 MG/DL (ref 9–23)
BUN/CREAT SERPL: 15.2 (ref 7–25)
CALCIUM SPEC-SCNC: 8.5 MG/DL (ref 8.7–10.4)
CHLORIDE SERPL-SCNC: 96 MMOL/L (ref 99–109)
CO2 SERPL-SCNC: 38 MMOL/L (ref 20–31)
CREAT BLD-MCNC: 1.25 MG/DL (ref 0.6–1.3)
GFR SERPL CREATININE-BSD FRML MDRD: 57 ML/MIN/1.73
GLUCOSE BLD-MCNC: 100 MG/DL (ref 70–100)
POTASSIUM BLD-SCNC: 4.4 MMOL/L (ref 3.5–5.5)
SODIUM BLD-SCNC: 139 MMOL/L (ref 132–146)

## 2018-09-03 PROCEDURE — 94640 AIRWAY INHALATION TREATMENT: CPT

## 2018-09-03 PROCEDURE — 25010000002 FUROSEMIDE PER 20 MG: Performed by: INTERNAL MEDICINE

## 2018-09-03 PROCEDURE — 94760 N-INVAS EAR/PLS OXIMETRY 1: CPT

## 2018-09-03 PROCEDURE — 63710000001 DIPHENHYDRAMINE PER 50 MG: Performed by: INTERNAL MEDICINE

## 2018-09-03 PROCEDURE — 99233 SBSQ HOSP IP/OBS HIGH 50: CPT | Performed by: INTERNAL MEDICINE

## 2018-09-03 PROCEDURE — 80048 BASIC METABOLIC PNL TOTAL CA: CPT | Performed by: INTERNAL MEDICINE

## 2018-09-03 PROCEDURE — 29581 APPL MULTLAYER CMPRN SYS LEG: CPT

## 2018-09-03 PROCEDURE — 25010000002 HEPARIN (PORCINE) PER 1000 UNITS: Performed by: NURSE PRACTITIONER

## 2018-09-03 PROCEDURE — 97602 WOUND(S) CARE NON-SELECTIVE: CPT

## 2018-09-03 PROCEDURE — 94799 UNLISTED PULMONARY SVC/PX: CPT

## 2018-09-03 PROCEDURE — 71045 X-RAY EXAM CHEST 1 VIEW: CPT

## 2018-09-03 RX ORDER — FUROSEMIDE 10 MG/ML
40 INJECTION INTRAMUSCULAR; INTRAVENOUS EVERY 12 HOURS
Status: DISCONTINUED | OUTPATIENT
Start: 2018-09-03 | End: 2018-09-04

## 2018-09-03 RX ADMIN — DOXYCYCLINE 100 MG: 100 CAPSULE ORAL at 21:50

## 2018-09-03 RX ADMIN — FLUTICASONE PROPIONATE 2 SPRAY: 50 SPRAY, METERED NASAL at 08:48

## 2018-09-03 RX ADMIN — DOXYCYCLINE 100 MG: 100 CAPSULE ORAL at 08:47

## 2018-09-03 RX ADMIN — DIPHENHYDRAMINE HYDROCHLORIDE 25 MG: 25 CAPSULE ORAL at 14:40

## 2018-09-03 RX ADMIN — MICONAZOLE NITRATE: 2 CREAM TOPICAL at 08:47

## 2018-09-03 RX ADMIN — MORPHINE SULFATE 30 MG: 30 TABLET, EXTENDED RELEASE ORAL at 21:50

## 2018-09-03 RX ADMIN — HEPARIN SODIUM 5000 UNITS: 5000 INJECTION, SOLUTION INTRAVENOUS; SUBCUTANEOUS at 21:50

## 2018-09-03 RX ADMIN — GABAPENTIN 400 MG: 400 CAPSULE ORAL at 21:50

## 2018-09-03 RX ADMIN — FUROSEMIDE 40 MG: 10 INJECTION, SOLUTION INTRAMUSCULAR; INTRAVENOUS at 08:47

## 2018-09-03 RX ADMIN — NICOTINE 1 PATCH: 21 PATCH, EXTENDED RELEASE TRANSDERMAL at 21:53

## 2018-09-03 RX ADMIN — IPRATROPIUM BROMIDE AND ALBUTEROL SULFATE 3 ML: 2.5; .5 SOLUTION RESPIRATORY (INHALATION) at 13:30

## 2018-09-03 RX ADMIN — MIRTAZAPINE 15 MG: 15 TABLET, ORALLY DISINTEGRATING ORAL at 21:50

## 2018-09-03 RX ADMIN — MORPHINE SULFATE 30 MG: 30 TABLET, EXTENDED RELEASE ORAL at 08:47

## 2018-09-03 RX ADMIN — HEPARIN SODIUM 5000 UNITS: 5000 INJECTION, SOLUTION INTRAVENOUS; SUBCUTANEOUS at 14:38

## 2018-09-03 RX ADMIN — ATORVASTATIN CALCIUM 40 MG: 40 TABLET, FILM COATED ORAL at 21:50

## 2018-09-03 RX ADMIN — GABAPENTIN 400 MG: 400 CAPSULE ORAL at 14:38

## 2018-09-03 RX ADMIN — PANTOPRAZOLE SODIUM 40 MG: 40 TABLET, DELAYED RELEASE ORAL at 06:47

## 2018-09-03 RX ADMIN — BUDESONIDE AND FORMOTEROL FUMARATE DIHYDRATE 2 PUFF: 160; 4.5 AEROSOL RESPIRATORY (INHALATION) at 07:55

## 2018-09-03 RX ADMIN — BUDESONIDE AND FORMOTEROL FUMARATE DIHYDRATE 2 PUFF: 160; 4.5 AEROSOL RESPIRATORY (INHALATION) at 19:57

## 2018-09-03 RX ADMIN — HEPARIN SODIUM 5000 UNITS: 5000 INJECTION, SOLUTION INTRAVENOUS; SUBCUTANEOUS at 06:48

## 2018-09-03 RX ADMIN — CASTOR OIL AND BALSAM, PERU: 788; 87 OINTMENT TOPICAL at 08:47

## 2018-09-03 RX ADMIN — CLOPIDOGREL BISULFATE 75 MG: 75 TABLET ORAL at 08:47

## 2018-09-03 RX ADMIN — GABAPENTIN 400 MG: 400 CAPSULE ORAL at 06:48

## 2018-09-03 RX ADMIN — CASTOR OIL AND BALSAM, PERU: 788; 87 OINTMENT TOPICAL at 21:49

## 2018-09-03 RX ADMIN — FUROSEMIDE 40 MG: 10 INJECTION, SOLUTION INTRAMUSCULAR; INTRAVENOUS at 21:50

## 2018-09-03 RX ADMIN — Medication 2 TABLET: at 21:50

## 2018-09-03 RX ADMIN — IPRATROPIUM BROMIDE AND ALBUTEROL SULFATE 3 ML: 2.5; .5 SOLUTION RESPIRATORY (INHALATION) at 07:55

## 2018-09-03 RX ADMIN — ACETAMINOPHEN 650 MG: 325 TABLET ORAL at 23:19

## 2018-09-03 RX ADMIN — ASPIRIN 81 MG: 81 TABLET, COATED ORAL at 08:47

## 2018-09-03 RX ADMIN — MICONAZOLE NITRATE: 2 CREAM TOPICAL at 21:49

## 2018-09-03 RX ADMIN — ACETAMINOPHEN 650 MG: 325 TABLET ORAL at 01:12

## 2018-09-03 RX ADMIN — IPRATROPIUM BROMIDE AND ALBUTEROL SULFATE 3 ML: 2.5; .5 SOLUTION RESPIRATORY (INHALATION) at 19:57

## 2018-09-03 NOTE — PROGRESS NOTES
"Yassine Love  1945  [id number]  9/3/2018    CC: BLE wounds  Chief Complaint   Patient presents with   • Cellulitis       Yassine Love is a 72 y.o. male here for CC leg infection  History of present illness:    Yassine Love is a 72 y.o. male well known to us from prior admissions, who was sent to the [facility] ED by home health nurse due to BLE redness and drainage.  He reports the redness has been present for several weeks with increasing pain and drainage in that period. During his prior admission here at [facility] his cultures grew PSA, ecoli, proteus, enterococcus and strep from his RLE.  Patient currently denies any fever, chills, or sweats.  Patient is tolerating antibiotics.  Patient reports he needs his knees and hips replaced but can not have surgery until his wounds have healed.  Patient was admitted to the hospitalist and started on Merrem and Vancomycin.  Patient doesn't report severe pain in his feet but does report joint pain.  8/22 - co leg pain  Co SOA  No fever or rash  \"I am not losing my legs!\"  Co hip and knee pain  8/23- co leg infections, denies fever, chills, night sweats, nausea, vomiting, diarrhea, rash, and cough  8/24/18 - Patient complains of pain in his hips and knees.  Patient denies any fever, chills, or sweats.  Patient denies any nausea, vomiting, or diarrhea.     seen and agree  8/25 - co penile lesion  Co leg infection  \"They may put in a stent\"  No fever  8/27/18 - C/O swelling in BUE.  Superficial clot found on ultrasound.  \"I can't go until this fluid is taken care of\"  Denies any fever, chills, or sweats.  Patient denies any nausea, vomiting, or diarrhea.  Seen and agree  8/28/18 - C/O BUE swelling as well as scrotal edema.  Patient denies any fever, chills, or sweats.  Patient denies any nausea, vomiting, or diarrhea.  Seen and agree  8/29/18 - Still concerned about swelling.  Patient denies any fever, chills, or sweats.  Patient denies any nausea, vomiting, or diarrhea.  Seen " and agree  8/30/18 - Had trouble breathing last night.  Patient was placed on nonrebreather.  Now back on NC.  Patient states he is on 3L at home with oxygen sats running 88%.  No fever.  + chills.  No nausea, vomiting, or diarrhea.  Seen and agree  8/31 - co weakness  Co leg infection  9/3 - No hx available      Past medical history:  Past Medical History:   Diagnosis Date   • Cancer (CMS/[facility])    • Cellulitis of both lower extremities     [facility] 2016   • Chronic pain    • COPD (chronic obstructive pulmonary disease) (CMS/[facility])    • Hypertension    • Osteoarthritis cervical spine    • Osteoarthritis of both knees    • Osteoarthritis of left hip        Medications:   Current Facility-Administered Medications:   •  acetaminophen (TYLENOL) tablet 650 mg, 650 mg, Oral, Q6H PRN, Shital Hooks MD, 650 mg at 09/03/18 0112  •  aspirin EC tablet 81 mg, 81 mg, Oral, Daily, Yumiko Muhammad APRN, 81 mg at 09/03/18 0847  •  atorvastatin (LIPITOR) tablet 40 mg, 40 mg, Oral, Nightly, Yumiko Muhammad APRN, 40 mg at 09/02/18 2028  •  bisacodyl (DULCOLAX) suppository 10 mg, 10 mg, Rectal, Daily PRN, Shital Hooks MD, 10 mg at 08/26/18 0947  •  budesonide-formoterol (SYMBICORT) 160-4.5 MCG/ACT inhaler 2 puff, 2 puff, Inhalation, BID - RT, Yumiko Muhammad APRN, 2 puff at 09/03/18 0755  •  castor oil-balsam peru (VENELEX) ointment, , Topical, Q12H, Shital Hooks MD  •  clopidogrel (PLAVIX) tablet 75 mg, 75 mg, Oral, Daily, Yumiko Muhammad APRN, 75 mg at 09/03/18 0847  •  diphenhydrAMINE (BENADRYL) capsule 25 mg, 25 mg, Oral, Q6H PRN, Kia Addison MD, 25 mg at 09/01/18 1422  •  diphenhydrAMINE-zinc acetate 2-0.1 % cream, , Topical, TID PRN, Kia Addison MD  •  doxycycline (MONODOX) capsule 100 mg, 100 mg, Oral, Q12H, Parish Belle MD, 100 mg at 09/03/18 0847  •  fluticasone (FLONASE) 50 MCG/ACT nasal spray 2 spray, 2 spray, Each Nare, Daily, Yumiko Muhammad APRN, 2 spray at 09/03/18 0848  •   furosemide (LASIX) injection 40 mg, 40 mg, Intravenous, Q12H, Kristina Alejandre MD, 40 mg at 09/03/18 0847  •  gabapentin (NEURONTIN) capsule 400 mg, 400 mg, Oral, Q8H, Shital Hooks MD, 400 mg at 09/03/18 0648  •  heparin (porcine) 5000 UNIT/ML injection 5,000 Units, 5,000 Units, Subcutaneous, Q8H, Yumiko Muhammad APRN, 5,000 Units at 09/03/18 0648  •  HYDROcodone-acetaminophen (NORCO)  MG per tablet 1 tablet, 1 tablet, Oral, Q4H PRN, Shital Hooks MD, 1 tablet at 09/02/18 0113  •  ipratropium-albuterol (DUO-NEB) nebulizer solution 3 mL, 3 mL, Nebulization, Q6H PRN, Yumiko Muhammad APRN, 3 mL at 09/02/18 0511  •  ipratropium-albuterol (DUO-NEB) nebulizer solution 3 mL, 3 mL, Nebulization, TID, Belle Valdivia APRN, 3 mL at 09/03/18 0755  •  Magnesium Sulfate 2 gram Bolus, followed by 8 gram infusion (total Mg dose 10 grams)- Mg less than or equal to 1mg/dL, 2 g, Intravenous, PRN **OR** Magnesium Sulfate 2 gram / 50mL Infusion (GIVE X 3 BAGS TO EQUAL 6GM TOTAL DOSE) - Mg 1.1 - 1.5 mg/dl, 2 g, Intravenous, PRN **OR** Magnesium Sulfate 4 gram infusion- Mg 1.6-1.9 mg/dL, 4 g, Intravenous, PRN, Frannie Nugent APRN, Last Rate: 25 mL/hr at 08/29/18 0449, 4 g at 08/29/18 0449  •  melatonin sublingual tablet 5 mg, 5 mg, Sublingual, Nightly PRN, Kia Addison MD, 5 mg at 08/30/18 2103  •  metoprolol tartrate (LOPRESSOR) tablet 25 mg, 25 mg, Oral, Q12H, Shital Hooks MD, 25 mg at 09/02/18 2029  •  miconazole (MICOTIN) 2 % cream, , Topical, Q12H, Shital Hooks MD  •  mirtazapine (REMERON SOL-TAB) disintegrating tablet 15 mg, 15 mg, Oral, Nightly, Stella Garvin PA-C, 15 mg at 09/02/18 2029  •  Morphine (MS CONTIN) 12 hr tablet 30 mg, 30 mg, Oral, Q12H, Shital Hooks MD, 30 mg at 09/03/18 0847  •  nicotine (NICODERM CQ) 21 MG/24HR patch 1 patch, 1 patch, Transdermal, Q24H, Yumiko Muhammad APRN, 1 patch at 09/02/18 2029  •  pantoprazole (PROTONIX) EC tablet  "40 mg, 40 mg, Oral, Q AM, Yumiko Muhammad APRN, 40 mg at 09/03/18 0647  •  Pharmacy Meds to Bed Consult, , Does not apply, Daily, Kia Addison MD, Stopped at 08/24/18 1115  •  sennosides-docusate sodium (SENOKOT-S) 8.6-50 MG tablet 2 tablet, 2 tablet, Oral, Nightly, Shital Hooks MD, 2 tablet at 09/02/18 2028  •  sodium chloride 0.9 % flush 1-10 mL, 1-10 mL, Intravenous, PRN, Yumiko Muhammad APRN  •  Insert peripheral IV, , , Once **AND** sodium chloride 0.9 % flush 10 mL, 10 mL, Intravenous, PRN, Dakota De Paz MD  Antibiotics:  Anti-Infectives     Ordered     Dose/Rate Route Frequency Start Stop    08/27/18 1242  doxycycline (MONODOX) capsule 100 mg     Ordering Provider:  Parish Belle MD    100 mg Oral Every 12 Hours Scheduled 08/27/18 1330 09/07/18 0859    08/20/18 1914  meropenem (MERREM) 500mg/100 mL 0.9% NS IVPB ([facility])     Ordering Provider:  Yumiko Muhammad APRN    500 mg  over 30 Minutes Intravenous Once 08/20/18 2100 08/20/18 2125    08/20/18 1357  vancomycin 1750 mg/500 mL 0.9% NS IVPB ([facility])     Ordering Provider:  Dakota De Paz MD    20 mg/kg × 81.6 kg  over 2 Hours Intravenous Once 08/20/18 1359 08/20/18 1701          Allergies:  is allergic to ciprofloxacin hcl and ceftin [cefuroxime axetil].    Family History: family history includes COPD in his brother; Heart attack in his brother; Stroke in his father.    Social History:  reports that he has been smoking Cigarettes.  He has a 84.00 pack-year smoking history. He has never used smokeless tobacco. He reports that he does not drink alcohol or use drugs.    Review of Systems: All other reviewed and negative except as per HPI    Blood pressure 99/50, pulse 86, temperature 98.3 °F (36.8 °C), temperature source Oral, resp. rate 22, height 172.7 cm (68\"), weight 87.6 kg (193 lb 3.2 oz), SpO2 95 %.  GENERAL: Awake and alert   HEENT: Oropharynx without thrush.   EYES: . No conjunctival injection. No icterus.   LYMPHATICS: No " lymphadenopathy of the neck   HEART: Irregular. No murmur, gallop, or pericardial friction rub.   LUNGS: Bilateral rales. On 4L NC.  ABDOMEN: Soft, nontender, nondistended. No appreciable HSM.    SKIN: Warm and dry   PSYCHIATRIC: Mental status lucid.   EXT:  BLE with wraps in place. Improved BLE. BUE with swelling.    9/3 - NAD, resting  No rash    DIAGNOSTICS:  Lab Results   Component Value Date    WBC 8.53 08/29/2018    HGB 10.2 (L) 08/29/2018    HCT 34.1 (L) 08/29/2018     08/29/2018     Lab Results   Component Value Date    CRP 13.12 (H) 06/21/2017     Lab Results   Component Value Date    SEDRATE 56 (H) 06/20/2017     Lab Results   Component Value Date    GLUCOSE 100 09/03/2018    BUN 19 09/03/2018    CREATININE 1.25 09/03/2018    EGFRIFNONA 57 (L) 09/03/2018    BCR 15.2 09/03/2018    CO2 38.0 (H) 09/03/2018    CALCIUM 8.5 (L) 09/03/2018    ALBUMIN 3.08 (L) 08/20/2018    AST 20 08/20/2018    ALT 23 08/20/2018       Microbiology:  Microbiology Results Abnormal     Procedure Component Value - Date/Time    Blood Culture - Blood, [[id number]]  (Normal) Collected:  08/20/18 1445    Lab Status:  Final result Specimen:  Blood from Wrist, Right Updated:  08/25/18 1500     Blood Culture No growth at 5 days    Blood Culture - Blood, [[id number]]  (Normal) Collected:  08/20/18 1430    Lab Status:  Final result Specimen:  Blood from Arm, Right Updated:  08/25/18 1500     Blood Culture No growth at 5 days    Wound Culture - Wound, Penis [[id number]]  (Abnormal)  (Susceptibility) Collected:  08/20/18 1930    Lab Status:  Final result Specimen:  Wound from Penis Updated:  08/23/18 1427     Wound Culture Scant growth (1+) Providencia rettgeri (A)      Scant growth (1+) Staphylococcus aureus (A)      Scant growth (1+) Staphylococcus haemolyticus (A)     Comment: This isolate is presumed to be clindamyin resistant based on detection of inducible clindamycin resistance. Clindamycin may still be effective in some patients.           Gram Stain Result No WBCs or organisms seen    Susceptibility      Providencia rettgeri    Staphylococcus aureus       FAVIAN    FAVIAN       Ampicillin + Sulbactam <=8/4 ug/ml Susceptible             Aztreonam <=8 ug/ml Susceptible             Cefepime <=8 ug/ml Susceptible             Cefotaxime <=2 ug/ml Susceptible             Ceftriaxone <=8 ug/ml Susceptible    <=8 ug/ml Susceptible       Cefuroxime sodium <=4 ug/ml Susceptible             Clindamycin       <=0.5 ug/ml Susceptible       Daptomycin       <=0.5 ug/ml Susceptible       Ertapenem <=1 ug/ml Susceptible             Erythromycin       <=0.5 ug/ml Susceptible       Gentamicin <=4 ug/ml Susceptible    <=4 ug/ml Susceptible       Levofloxacin <=2 ug/ml Susceptible    <=1 ug/ml Susceptible  [1]        Linezolid       2 ug/ml Susceptible       Meropenem <=1 ug/ml Susceptible             Oxacillin       <=0.25 ug/ml Susceptible       Penicillin G       <=0.03 ug/ml Susceptible       Piperacillin + Tazobactam <=16 ug/ml Susceptible             Quinupristin + Dalfopristin       <=0.5 ug/ml Susceptible  [2]        Rifampin       <=1 ug/ml Susceptible       Streptomycin High Level Synergy       <=0.5 ug/ml Susceptible       Tetracycline       <=4 ug/ml Susceptible       Tobramycin <=4 ug/ml Susceptible             Trimethoprim + Sulfamethoxazole <=2/38 ug/ml Susceptible    <=0.5/9.5 ug/ml Susceptible       Vancomycin       2 ug/ml Susceptible              [1]   Staphylococcus species may develop resistance during prolonged therapy with quinolones.  Isolates that are initially susceptible may become resistant within three to four days after initiation of therapy. Testing of repeat isolates may be warranted.       [2]   Appended report. These results have been appended to a previously preliminary verified report.               Susceptibility      Staphylococcus haemolyticus     FAVIAN     Ciprofloxacin >2 ug/ml Resistant     Clindamycin  Resistant     Daptomycin  <=0.5 ug/ml Susceptible     Erythromycin >4 ug/ml Resistant     Gentamicin <=4 ug/ml Susceptible     Levofloxacin >4 ug/ml Resistant     Linezolid <=1 ug/ml Susceptible     Oxacillin >2 ug/ml Resistant     Penicillin G >8 ug/ml Resistant     Quinupristin + Dalfopristin <=0.5 ug/ml Susceptible     Rifampin <=1 ug/ml Susceptible     Tetracycline <=4 ug/ml Susceptible     Trimethoprim + Sulfamethoxazole <=0.5/9.5 ug/ml Susceptible     Vancomycin 2 ug/ml Susceptible                    Wound Culture - Wound, Leg, Left [[id number]]  (Abnormal)  (Susceptibility) Collected:  08/20/18 1930    Lab Status:  Final result Specimen:  Wound from Leg, Left Updated:  08/22/18 1347     Wound Culture Moderate growth (3+) Pseudomonas aeruginosa (A)      Scant growth (1+) Streptococcus, Beta Hemolytic, Group G (A)     Comment:   If Clindamycin or Erythromycin is the drug of choice, notify the laboratory within 7 days to request susceptibility testing.        STREP GROUPING G     Gram Stain Result No WBCs seen      Many (4+) Gram negative bacilli    Susceptibility      Pseudomonas aeruginosa     FAVIAN     Aztreonam <=8 ug/ml Susceptible     Cefepime <=8 ug/ml Susceptible     Ceftazidime <=1 ug/ml Susceptible     Gentamicin <=4 ug/ml Susceptible     Levofloxacin <=2 ug/ml Susceptible     Meropenem <=1 ug/ml Susceptible     Piperacillin + Tazobactam >64 ug/ml Resistant     Tobramycin <=4 ug/ml Susceptible                    KOH Prep - Skin, Foot, Left [[id number]]  (Normal) Collected:  08/20/18 1501    Lab Status:  Final result Specimen:  Skin from Foot, Left Updated:  08/20/18 1528     KOH Prep No yeast or hyphal elements seen            RADIOLOGY:  Imaging Results (last 72 hours)     ** No results found for the last 72 hours. **          Assessment and Plan:     BLE erythema and open wounds and edema   Wounds with GBS and PSA - prior treatment with Merrem, now on Doxycycline alone.   Acute kidney injury - improved  PVD  DJD - needs knee and  hip replaced.   COPD  Penis ulceration - culture with providencia, MSSA, and CNS  BUE edema - LUE with superficial thrombophlebitis  Volume overload - planning IV diuresis. - new issue    Patient is currently on  Doxycycline  Tolerating antibiotics.    Will need appropriate wound care outpatient, possible SNF placement.   UM - discussed with case management.      CXR seen and compared      Parish Belle MD for Dr. Parish Belle  9/3/2018

## 2018-09-03 NOTE — THERAPY WOUND CARE TREATMENT
Acute Care - Wound/Debridement Treatment Note  [facility]     Patient Name: Yassine Love  : 1945  MRN: [id number]  Today's Date: 9/3/2018  Onset of Illness/Injury or Date of Surgery: 18   Date of Referral to PT: 18   Referring Physician: BRITTNY Alvares       Admit Date: 2018    Visit Dx:    ICD-10-CM ICD-9-CM   1. Cellulitis of left lower leg L03.116 682.6   2. Peripheral edema R60.9 782.3   3. Stasis dermatitis of both legs I87.2 454.1   4. Recurrent cellulitis of lower leg L03.119 682.6   5. Chronic pain syndrome G89.4 338.4   6. History of hypertension Z86.79 V12.59   7. Impaired functional mobility, balance, gait, and endurance Z74.09 V49.89   8. Impaired mobility and ADLs Z74.09 799.89       Patient Active Problem List   Diagnosis   • Cellulitis of right lower extremity   • RAFAEL (acute kidney injury) (CMS/[facility])   • PVD (peripheral vascular disease) (CMS/[facility])   • Cellulitis of both lower extremities   • COPD (chronic obstructive pulmonary disease) (CMS/[facility])   • Essential hypertension   • Hypoxia   • Cellulitis of lower extremity   • Non-sustained ventricular tachycardia (CMS/[facility])   • Coronary artery disease involving native coronary artery without angina pectoris   • Cellulitis of left lower leg   • Debility   • Decubitus ulcer of buttock, stage 2   • Chronic pain           Rash 18 0800 perineum macular (Active)   Distribution generalized 2018  8:30 PM   Configuration/Shape asymmetric 2018  8:30 PM   Borders irregular 2018  8:30 PM   Characteristics pain/discomfort;itching;dry 2018  8:30 PM   Color red;pink 2018  8:30 PM   Care, Rash cleansed with;soap and water;antimicrobial agent applied 2018  8:30 PM       Wound 18 1748 Bilateral lower coccyx (Active)   Dressing Appearance dry;intact 2018  8:30 PM   Closure None 2018  8:30 PM   Base dry;red/granulating;purple;scab 2018  8:30 PM   Periwound pink;blanchable 2018  8:30 PM    Periwound Temperature warm 9/2/2018  8:30 PM   Periwound Skin Turgor soft 9/2/2018  8:30 PM   Edges irregular 9/2/2018  8:30 PM   Drainage Amount none 9/2/2018  8:30 PM   Care, Wound barrier applied;cleansed with;soap and water 9/2/2018  8:30 PM   Dressing Care, Wound low-adherent;dressing changed 9/2/2018  8:30 PM   Periwound Care, Wound dry periwound area maintained 9/2/2018  8:30 PM       Wound Bilateral leg blisters (Active)   Dressing Appearance dry;intact 9/3/2018  9:45 AM   Closure TERA 9/2/2018  8:30 PM   Base moist;pink;scab 9/3/2018  9:45 AM   Periwound dry;pustules;redness;swelling 9/3/2018  9:45 AM   Periwound Temperature warm 9/3/2018  9:45 AM   Periwound Skin Turgor soft 9/3/2018  9:45 AM   Edges irregular 9/3/2018  9:45 AM   Drainage Characteristics/Odor serosanguineous 9/3/2018  9:45 AM   Drainage Amount scant 9/3/2018  9:45 AM   Care, Wound cleansed with;wound cleanser;debrided 9/3/2018  9:45 AM   Dressing Care, Wound dressing applied;petroleum-based;gauze;multi-layer wrap 9/3/2018  9:45 AM   Periwound Care, Wound cleansed with pH balanced cleanser;dry periwound area maintained 9/3/2018  9:45 AM       Wound 08/20/18 1601 Other (See comments) penis other (see comments) (Active)   Dressing Appearance open to air 9/2/2018  8:30 PM   Closure None 9/2/2018  8:30 PM   Base red/granulating 9/2/2018  8:30 PM   Edges irregular 9/2/2018  8:30 PM   Drainage Amount scant 9/2/2018  8:30 PM   Care, Wound cleansed with;soap and water 9/2/2018  8:30 PM   Dressing Care, Wound open to air 9/2/2018  8:30 PM       Wound 08/21/18 0820 Right lower ischial tuberosity pressure injury (Active)   Dressing Appearance dry;intact 9/2/2018  8:30 PM   Closure None 9/2/2018  8:30 PM   Base red/granulating;pink 9/2/2018  8:30 PM   Periwound blanchable;pink;intact 9/2/2018  8:30 PM   Periwound Temperature warm 9/2/2018  8:30 PM   Periwound Skin Turgor soft 9/2/2018  8:30 PM   Edges irregular 9/2/2018  8:30 PM   Drainage Amount none  9/2/2018  8:30 PM   Care, Wound cleansed with;soap and water;barrier applied 9/2/2018  8:30 PM   Dressing Care, Wound low-adherent 9/2/2018  8:30 PM   Periwound Care, Wound dry periwound area maintained 9/2/2018  8:30 PM           Lymphedema     Row Name 09/03/18 0945             Lymphedema Edema Assessment    Ptting Edema Category By severity  -      Pitting Edema Mild  -         Skin Changes/Observations    Lower Extremity Conditions bilateral:;dry;shiny;hairless;scaly;crust;scab(s);inflamed;fragile  -      Lower Extremity Color/Pigment bilateral:;erythema;red;blanchable;hyperpigmented  -         Lymphedema Pulses/Capillary Refill    Lower Extremity Capillary Refill right:;left:;less than 3 seconds  -         Compression/Skin Care    Skin Care washed/dried;lotion applied  -      Wrapping Location lower extremity  -      Wrapping Location LE bilateral:;foot to knee  -      Wrapping Comments xeroform, kerlix, coban, spandage  -      Bandaging Comments multiple layers for graded compression  -      Bandaging Technique circumferential/spiral;light compression  -        User Key  (r) = Recorded By, (t) = Taken By, (c) = Cosigned By    Initials Name Provider Type    Jessi Grider PT Physical Therapist          WOUND DEBRIDEMENT  Debridement Site 1  Location- Site 1: BLEs  Selective Debridement- Site 1: Wound Surface <20cmsq  Instruments- Site 1: other (comment) (4x4s, cleansing cloths)  Excised Tissue Description- Site 1: moderate, other (comment) (loose, nonviable skin/scabs)  Bleeding- Site 1: none                Therapy Treatment          Rehabilitation Treatment Summary     Row Name 09/03/18 0945             Treatment Time/Intention    Discipline physical therapist  -      Document Type wound care;therapy note (daily note)  -      Subjective Information complains of;weakness;fatigue  -      Care Plan Review care plan/treatment goals reviewed;risks/benefits  reviewed;current/potential barriers reviewed;patient/other agree to care plan  -MC      Recorded by [MC] VillaMeño merazt CALLY, PT 09/03/18 1024      Row Name 09/03/18 0945             Cognitive Assessment/Intervention- PT/OT    Orientation Status (Cognition) oriented x 3  -MC      Follows Commands (Cognition) WFL  -MC      Recorded by [MC] Jessi Villa, PT 09/03/18 1024      Row Name 09/03/18 0945             Positioning and Restraints    Pre-Treatment Position in bed  -MC      Post Treatment Position bed  -MC      In Bed supine;call light within reach;encouraged to call for assist  -MC      Recorded by [MC] Jessi Villa, PT 09/03/18 1024      Row Name 09/03/18 0945             Pain Scale: Numbers Pre/Post-Treatment    Pain Scale: Numbers, Pretreatment 4/10  -MC      Pain Scale: Numbers, Post-Treatment 4/10  -MC      Recorded by [MC] Jessi Villa, PT 09/03/18 1024      Row Name                Wound 08/20/18 1748 Bilateral lower coccyx    Wound - Properties Group Date first assessed: 08/20/18 [DONNIE] Time first assessed: 1748 [DONNIE] Present On Admission : yes [DONNIE] Side: Bilateral [JO2] Orientation: lower [DONNIE] Location: coccyx [DONNIE] Stage, Pressure Injury: Stage 2 [DONNIE] Recorded by:  [DONNIE] Zhang Sy LPN 08/20/18 1749 [JO2] Zhang Sy LPN 08/20/18 1751    Row Name 09/03/18 0945             Wound Bilateral leg blisters    Wound - Properties Group Present On Admission : yes [DONNIE] Side: Bilateral [DONNIE] Location: leg [DONNIE] Type: blisters [DONNIE] Additional Comments: bilateral leg cellulitis. Sloughing  [DONNIE] Recorded by:  [DONNIE] Zhang Sy LPN 08/20/18 1750    Dressing Appearance dry;intact   slight soiling at top of R wrap per RN  -      Base moist;pink;scab   no open areas remaining, adherent scabs/crust  -      Periwound dry;pustules;redness;swelling  -      Periwound Temperature warm  -MC      Periwound Skin Turgor soft  -      Edges irregular  -      Drainage Characteristics/Odor  serosanguineous  -      Drainage Amount scant  -MC      Care, Wound cleansed with;wound cleanser;debrided  -      Dressing Care, Wound dressing applied;petroleum-based;gauze;multi-layer wrap   xeroform, kerlix, coban  -      Periwound Care, Wound cleansed with pH balanced cleanser;dry periwound area maintained  -MC      Recorded by [MC] Jessi Villa, PT 09/03/18 1024      Row Name                Wound 08/20/18 1601 Other (See comments) penis other (see comments)    Wound - Properties Group Date first assessed: 08/20/18 [DONNIE] Time first assessed: 1601 [DONNIE] Present On Admission : yes [DONNIE] Side: Other (See comments) [DONNIE] Location: penis [DONNIE] Type: other (see comments) [DONNIE] Recorded by:  [DONNIE] Zhang Sy LPN 08/20/18 1902    Row Name                Wound 08/21/18 0820 Right lower ischial tuberosity pressure injury    Wound - Properties Group Date first assessed: 08/21/18 [AS] Time first assessed: 0820 [AS] Present On Admission : yes [AS] Side: Right [AS] Orientation: lower [AS] Location: ischial tuberosity [AS] Type: pressure injury [AS] Stage, Pressure Injury: Stage 2 [AS] Additional Comments: Chronic  [AS] Recorded by:  [AS] Dhaval Cardozo RN 08/21/18 0929    Row Name 09/03/18 0945             Coping    Observed Emotional State accepting;calm;cooperative  -MC      Verbalized Emotional State acceptance  -MC      Recorded by [MC] Jessi Villa, PT 09/03/18 1024      Row Name 09/03/18 0945             Plan of Care Review    Plan of Care Reviewed With patient  -MC      Recorded by [MC] Jessi Villa, PT 09/03/18 1024      Row Name 09/03/18 0945             Outcome Summary/Treatment Plan (PT)    Daily Summary of Progress (PT) progress toward functional goals as expected  -MC      Recorded by [NORY] Jessi Villa, PT 09/03/18 1024        User Key  (r) = Recorded By, (t) = Taken By, (c) = Cosigned By    Initials Name Effective Dates Discipline     Jessi Villa, PT 04/03/18 -  PT    DONNIE  Zhang Sy LPN 03/14/16 -  Nurse    AS Dhaval Cardozo RN 06/16/16 -  Nurse              Physical Therapy Education     Title: PT OT SLP Therapies (Active)     Topic: Physical Therapy (Active)     Point: Mobility training (Active)    Learning Progress Summary     Learner Status Readiness Method Response Comment Documented by    Patient Active Acceptance E NR  LM 09/02/18 1145          Point: Precautions (Active)    Learning Progress Summary     Learner Status Readiness Method Response Comment Documented by    Patient Active Acceptance E NR  LM 09/02/18 1145                      User Key     Initials Effective Dates Name Provider Type Discipline     06/15/16 -  Tammie Raman, PT Physical Therapist PT                      PT Recommendation and Plan  Anticipated Discharge Disposition (PT): long term acute care facility  Planned Therapy Interventions (PT Eval): balance training, bed mobility training, gait training, home exercise program, neuromuscular re-education, patient/family education, postural re-education, ROM (range of motion), strengthening, stretching, transfer training, wheelchair management/propulsion training  Therapy Frequency (PT Clinical Impression): daily        Progress: improving   Outcome Summary/Treatment Plan (PT)  Daily Summary of Progress (PT): progress toward functional goals as expected  Anticipated Discharge Disposition (PT): skilled nursing facility, extended care facility  Daily Summary of Progress (PT): progress toward functional goals as expected  Progress: improving  Outcome Summary: Pt with improved BLE skin integrity. No overt open areas noted today, but BLE skin continues to be scabbed/crusted, fragile, and inflamed. Pt still with minimal BLE pitting edema as well, that will continue to benefit from management with MLW to promote venous return.   Plan of Care Reviewed With: patient          Outcome Measures     Row Name 09/02/18 1432 09/02/18 1027          How much help from  another person do you currently need...    Turning from your back to your side while in flat bed without using bedrails?  -- 2  -LM     Moving from lying on back to sitting on the side of a flat bed without bedrails?  -- 2  -LM     Moving to and from a bed to a chair (including a wheelchair)?  -- 1  -LM     Standing up from a chair using your arms (e.g., wheelchair, bedside chair)?  -- 1  -LM     Climbing 3-5 steps with a railing?  -- 1  -LM     To walk in hospital room?  -- 1  -LM     AM-PAC 6 Clicks Score  -- 8  -LM        How much help from another is currently needed...    Putting on and taking off regular lower body clothing? 1  -KF  --     Bathing (including washing, rinsing, and drying) 2  -KF  --     Toileting (which includes using toilet bed pan or urinal) 1  -KF  --     Putting on and taking off regular upper body clothing 2  -KF  --     Taking care of personal grooming (such as brushing teeth) 3  -KF  --     Eating meals 3  -KF  --     Score 12  -KF  --        Functional Assessment    Outcome Measure Options AM-PAC 6 Clicks Daily Activity (OT)  -KF AM-PAC 6 Clicks Basic Mobility (PT)  -       User Key  (r) = Recorded By, (t) = Taken By, (c) = Cosigned By    Initials Name Provider Type    Tammie Salazar, PT Physical Therapist    KF Cadence Buckley, OT Occupational Therapist              Time Calculation        PT Charges     Row Name 09/03/18 0945             Time Calculation    Start Time 0945  -      PT Goal Re-Cert Due Date 09/12/18  -        User Key  (r) = Recorded By, (t) = Taken By, (c) = Cosigned By    Initials Name Provider Type    Jessi Grider, PT Physical Therapist         Therapy Suggested Charges     Code   Minutes Charges    33937 (CPT®) Hc Pt Neuromusc Re Education Ea 15 Min      51371 (CPT®) Hc Pt Ther Proc Ea 15 Min      16079 (CPT®) Hc Gait Training Ea 15 Min      77652 (CPT®) Hc Pt Therapeutic Act Ea 15 Min 25 2    44611 (CPT®) Hc Pt Manual Therapy Ea 15 Min       35088 (CPT®) Hc Pt Iontophoresis Ea 15 Min      64425 (CPT®) Hc Pt Elec Stim Ea-Per 15 Min      39646 (CPT®) Hc Pt Ultrasound Ea 15 Min      92942 (CPT®) Hc Pt Self Care/Mgmt/Train Ea 15 Min      01996 (CPT®) Hc Pt Prosthetic (S) Train Initial Encounter, Each 15 Min      13096 (CPT®) Hc Pt Orthotic(S)/Prosthetic(S) Encounter, Each 15 Min      98364 (CPT®) Hc Orthotic(S) Mgmt/Train Initial Encounter, Each 15min      Total  25 2          Therapy Charges for Today     Code Description Service Date Service Provider Modifiers Qty    66845674526 HC NONSELECTIVE DEBRIDEMENT 9/3/2018 Jessi Villa, PT GP 1    61797491622 HC PT MULTI LAYER COMP SYS BELOW KNEE 9/3/2018 Jessi Villa, PT GP 1            PT G-Codes  Outcome Measure Options: AM-PAC 6 Clicks Daily Activity (OT)        Jessi Villa, PT  9/3/2018

## 2018-09-03 NOTE — PLAN OF CARE
Problem: Patient Care Overview  Goal: Plan of Care Review  Outcome: Ongoing (interventions implemented as appropriate)   09/03/18 7622   Coping/Psychosocial   Plan of Care Reviewed With patient   Plan of Care Review   Progress improving   OTHER   Outcome Summary Pt with improved BLE skin integrity. No overt open areas noted today, but BLE skin continues to be scabbed/crusted, fragile, and inflamed. Pt still with minimal BLE pitting edema as well, that will continue to benefit from management with MLW to promote venous return.

## 2018-09-03 NOTE — PROGRESS NOTES
[facility] Medicine Services  PROGRESS NOTE    Patient Name: Yassine Love  : 1945  MRN: [id number]    Date of Admission: 2018  Length of Stay: 14  Primary Care Physician: Provider, No Known    Subjective     CC: f/u LE cellulitis    HPI:  No overnight events recorded. Patient remains on hi-flow NC. CXR obtained this morning remains unchanged. Patient denies complaints- states he feels drowsy but otherwise feels ok. Per nursing, worked briefly with PT yesterday.     Review of Systems   Gen- No fevers, chills  CV- No chest pain, palpitations  GI- No N/V/D, abd pain    Otherwise ROS is negative except as mentioned in the HPI.    Objective     Vital Signs:   Temp:  [97.5 °F (36.4 °C)-98.9 °F (37.2 °C)] 97.5 °F (36.4 °C)  Heart Rate:  [67-77] 77  Resp:  [20-22] 20  BP: (108-111)/(47-60) 111/60        Physical Exam:  Constitutional: No acute distress, drowsy  Respiratory: clear to ausculatation, on high flow NC  Cardiovascular: RRR, no murmurs, rubs, or gallops, palpable pedal pulses bilaterally  Gastrointestinal: Positive bowel sounds, soft, nontender, nondistended  Musculoskeletal: BLEs with UNNA boots in place. Moderate edema to BUEs with diffuse ecchymosis.   Psychiatric: Appropriate affect, cooperative  Neurologic: drowsy but interactive, no focal deficits    Results Reviewed:  I have personally reviewed current lab, radiology, and data and agree.      Results from last 7 days  Lab Units 18  0310   WBC 10*3/mm3 8.53   HEMOGLOBIN g/dL 10.2*   HEMATOCRIT % 34.1*   PLATELETS 10*3/mm3 328       Results from last 7 days  Lab Units 18  0557 18  0501 18  0310   SODIUM mmol/L 139 138 137   POTASSIUM mmol/L 4.4 4.5 4.7   CHLORIDE mmol/L 96* 97* 103   CO2 mmol/L 38.0* 38.0* 34.0*   BUN mg/dL 19 18 11   CREATININE mg/dL 1.25 1.21 0.89   GLUCOSE mg/dL 100 107* 94   CALCIUM mg/dL 8.5* 8.4* 8.0*     Estimated Creatinine Clearance: 56.7 mL/min (by C-G formula based  on SCr of 1.25 mg/dL).     No results found for: BNP    Microbiology Results Abnormal     Procedure Component Value - Date/Time    Blood Culture - Blood, [[id number]]  (Normal) Collected:  08/20/18 1445    Lab Status:  Final result Specimen:  Blood from Wrist, Right Updated:  08/25/18 1500     Blood Culture No growth at 5 days    Blood Culture - Blood, [[id number]]  (Normal) Collected:  08/20/18 1430    Lab Status:  Final result Specimen:  Blood from Arm, Right Updated:  08/25/18 1500     Blood Culture No growth at 5 days    Wound Culture - Wound, Penis [[id number]]  (Abnormal)  (Susceptibility) Collected:  08/20/18 1930    Lab Status:  Final result Specimen:  Wound from Penis Updated:  08/23/18 1427     Wound Culture Scant growth (1+) Providencia rettgeri (A)      Scant growth (1+) Staphylococcus aureus (A)      Scant growth (1+) Staphylococcus haemolyticus (A)     Comment: This isolate is presumed to be clindamyin resistant based on detection of inducible clindamycin resistance. Clindamycin may still be effective in some patients.          Gram Stain Result No WBCs or organisms seen    Susceptibility      Providencia rettgeri    Staphylococcus aureus       FAVIAN    FAVIAN       Ampicillin + Sulbactam <=8/4 ug/ml Susceptible             Aztreonam <=8 ug/ml Susceptible             Cefepime <=8 ug/ml Susceptible             Cefotaxime <=2 ug/ml Susceptible             Ceftriaxone <=8 ug/ml Susceptible    <=8 ug/ml Susceptible       Cefuroxime sodium <=4 ug/ml Susceptible             Clindamycin       <=0.5 ug/ml Susceptible       Daptomycin       <=0.5 ug/ml Susceptible       Ertapenem <=1 ug/ml Susceptible             Erythromycin       <=0.5 ug/ml Susceptible       Gentamicin <=4 ug/ml Susceptible    <=4 ug/ml Susceptible       Levofloxacin <=2 ug/ml Susceptible    <=1 ug/ml Susceptible  [1]        Linezolid       2 ug/ml Susceptible       Meropenem <=1 ug/ml Susceptible             Oxacillin       <=0.25 ug/ml Susceptible        Penicillin G       <=0.03 ug/ml Susceptible       Piperacillin + Tazobactam <=16 ug/ml Susceptible             Quinupristin + Dalfopristin       <=0.5 ug/ml Susceptible  [2]        Rifampin       <=1 ug/ml Susceptible       Streptomycin High Level Synergy       <=0.5 ug/ml Susceptible       Tetracycline       <=4 ug/ml Susceptible       Tobramycin <=4 ug/ml Susceptible             Trimethoprim + Sulfamethoxazole <=2/38 ug/ml Susceptible    <=0.5/9.5 ug/ml Susceptible       Vancomycin       2 ug/ml Susceptible              [1]   Staphylococcus species may develop resistance during prolonged therapy with quinolones.  Isolates that are initially susceptible may become resistant within three to four days after initiation of therapy. Testing of repeat isolates may be warranted.       [2]   Appended report. These results have been appended to a previously preliminary verified report.               Susceptibility      Staphylococcus haemolyticus     FAVIAN     Ciprofloxacin >2 ug/ml Resistant     Clindamycin  Resistant     Daptomycin <=0.5 ug/ml Susceptible     Erythromycin >4 ug/ml Resistant     Gentamicin <=4 ug/ml Susceptible     Levofloxacin >4 ug/ml Resistant     Linezolid <=1 ug/ml Susceptible     Oxacillin >2 ug/ml Resistant     Penicillin G >8 ug/ml Resistant     Quinupristin + Dalfopristin <=0.5 ug/ml Susceptible     Rifampin <=1 ug/ml Susceptible     Tetracycline <=4 ug/ml Susceptible     Trimethoprim + Sulfamethoxazole <=0.5/9.5 ug/ml Susceptible     Vancomycin 2 ug/ml Susceptible                    Wound Culture - Wound, Leg, Left [[id number]]  (Abnormal)  (Susceptibility) Collected:  08/20/18 1930    Lab Status:  Final result Specimen:  Wound from Leg, Left Updated:  08/22/18 1347     Wound Culture Moderate growth (3+) Pseudomonas aeruginosa (A)      Scant growth (1+) Streptococcus, Beta Hemolytic, Group G (A)     Comment:   If Clindamycin or Erythromycin is the drug of choice, notify the laboratory within 7  days to request susceptibility testing.        STREP GROUPING G     Gram Stain Result No WBCs seen      Many (4+) Gram negative bacilli    Susceptibility      Pseudomonas aeruginosa     FAVIAN     Aztreonam <=8 ug/ml Susceptible     Cefepime <=8 ug/ml Susceptible     Ceftazidime <=1 ug/ml Susceptible     Gentamicin <=4 ug/ml Susceptible     Levofloxacin <=2 ug/ml Susceptible     Meropenem <=1 ug/ml Susceptible     Piperacillin + Tazobactam >64 ug/ml Resistant     Tobramycin <=4 ug/ml Susceptible                    KOH Prep - Skin, Foot, Left [[id number]]  (Normal) Collected:  08/20/18 1501    Lab Status:  Final result Specimen:  Skin from Foot, Left Updated:  08/20/18 1528     KOH Prep No yeast or hyphal elements seen        Imaging Results (last 24 hours)     ** No results found for the last 24 hours. **        Results for orders placed during the hospital encounter of 08/20/18   Adult Transthoracic Echo Complete W/ Cont if Necessary Per Protocol    Narrative · Left ventricular systolic function is normal. Estimated EF = 65%.  · There is moderate calcification of the aortic valve mainly affecting the   non coronary cusp(s).  · Right ventricular cavity is mildly dilated.        I have reviewed the medications.    aspirin 81 mg Oral Daily   atorvastatin 40 mg Oral Nightly   budesonide-formoterol 2 puff Inhalation BID - RT   castor oil-balsam peru  Topical Q12H   clopidogrel 75 mg Oral Daily   doxycycline 100 mg Oral Q12H   fluticasone 2 spray Each Nare Daily   furosemide 40 mg Oral BID   gabapentin 400 mg Oral Q8H   heparin (porcine) 5,000 Units Subcutaneous Q8H   ipratropium-albuterol 3 mL Nebulization TID   metoprolol tartrate 25 mg Oral Q12H   miconazole  Topical Q12H   mirtazapine 15 mg Oral Nightly   Morphine 30 mg Oral Q12H   nicotine 1 patch Transdermal Q24H   pantoprazole 40 mg Oral Q AM   Pharmacy Meds to Bed Consult  Does not apply Daily   sennosides-docusate sodium 2 tablet Oral Nightly     Assessment / Plan      Hospital Problem List     * (Principal)Cellulitis of lower extremity    RAFAEL (acute kidney injury) (CMS/[facility])    PVD (peripheral vascular disease) (CMS/[facility])    Cellulitis of both lower extremities    COPD (chronic obstructive pulmonary disease) (CMS/[facility])    Essential hypertension    Non-sustained ventricular tachycardia (CMS/[facility])    Coronary artery disease involving native coronary artery without angina pectoris    Debility    Decubitus ulcer of buttock, stage 2    Chronic pain        Brief Hospital Course to date:  Yassine Love is a 72 y.o. male with PMH significant for recurring BLE cellulitis (has seen Dr. Belle in the past), PVD, prediabetes, chronic pain, HTN, CAD, COPD, and ongoing tobacco abuse.  He presented to [facility] ER today with worsening BLE pain and redness, weeping, and open lesions (left worse than right), which he tells me have been progressively worsening over last 2 weeks.     Assessment & Plan:    - Complex medico-social issues.   - Patient lives alone. Reports that he is usually able to ambulate with a walker (but his legs had been weak and giving out on him prior to admission) and able to perform ADLs. Has neighbors and friends who help him out a lot with groceries, etc. He no longer drives.   - He has significant POAD with superimposed cellulitis, suspect L>R from physical exam. He is at risk for limb loss. Discussed with pt, he does not want any aggressive intervention/surgery, but may be open to endovascular intervention if possible. Arterial duplex showed no occlusion on either side.   - LE wound culture and penis wound culture as above, defer to ID for abx management  - ID (Dr Belle) following, have converted to Doxycycline PO   - PT wound care following, will need continued UNNA boot therapy following d/c   - LUE superficial thrombus- symptomatic mgmt  - Echocardiogram unremarkable (EF 65%),  - CXR this AM stable, will diurese with IV lasix today in attempts to wean O2    - WOC, PT, OT to  eval and treat  - CM/SW for DC planning- patient at this point agreeable to [facility], but will need to continue to work more with therapy before these facilities will accept him    - tobacco cessation advised    - Have discussed with pt in past days regarding chronic pain, which he says is not well controlled now due to relative immobility/laying in bed. He is chronically on ER and IR narcotics and Gabapentin[facility]. Increased MS contin to 30mg BID and increase frequency of immediate release narcotics to q4h PRN. Pt was informed that this regimen will not be cont at discharge and that no new prescriptions will be provided, he verbalized understanding and is agreeable with treatment plans.    DVT Prophylaxis:  Hep sq    CODE STATUS:   Code Status and Medical Interventions:   Ordered at: 08/29/18 0406     Limited Support to NOT Include:    Intubation     Level Of Support Discussed With:    Patient     Code Status:    No CPR     Medical Interventions (Level of Support Prior to Arrest):    Limited     Disposition: Rehab if he is agreeable, otherwise, home with home health.    Concerned that this patient is unable to care for himself at home successfully.     Electronically signed by Kristina Alejandre MD, 09/03/18, 7:35 AM.

## 2018-09-04 LAB
ANION GAP SERPL CALCULATED.3IONS-SCNC: 1 MMOL/L (ref 3–11)
BUN BLD-MCNC: 22 MG/DL (ref 9–23)
BUN/CREAT SERPL: 16.7 (ref 7–25)
CALCIUM SPEC-SCNC: 8.6 MG/DL (ref 8.7–10.4)
CHLORIDE SERPL-SCNC: 95 MMOL/L (ref 99–109)
CO2 SERPL-SCNC: 44 MMOL/L (ref 20–31)
CREAT BLD-MCNC: 1.32 MG/DL (ref 0.6–1.3)
GFR SERPL CREATININE-BSD FRML MDRD: 53 ML/MIN/1.73
GLUCOSE BLD-MCNC: 125 MG/DL (ref 70–100)
MAGNESIUM SERPL-MCNC: 1.9 MG/DL (ref 1.3–2.7)
POTASSIUM BLD-SCNC: 3.8 MMOL/L (ref 3.5–5.5)
SODIUM BLD-SCNC: 140 MMOL/L (ref 132–146)

## 2018-09-04 PROCEDURE — 97535 SELF CARE MNGMENT TRAINING: CPT

## 2018-09-04 PROCEDURE — 97110 THERAPEUTIC EXERCISES: CPT

## 2018-09-04 PROCEDURE — 25010000002 FUROSEMIDE PER 20 MG: Performed by: INTERNAL MEDICINE

## 2018-09-04 PROCEDURE — 94799 UNLISTED PULMONARY SVC/PX: CPT

## 2018-09-04 PROCEDURE — 99232 SBSQ HOSP IP/OBS MODERATE 35: CPT | Performed by: NURSE PRACTITIONER

## 2018-09-04 PROCEDURE — 93010 ELECTROCARDIOGRAM REPORT: CPT | Performed by: INTERNAL MEDICINE

## 2018-09-04 PROCEDURE — 25010000002 HEPARIN (PORCINE) PER 1000 UNITS: Performed by: NURSE PRACTITIONER

## 2018-09-04 PROCEDURE — 94640 AIRWAY INHALATION TREATMENT: CPT

## 2018-09-04 PROCEDURE — 83735 ASSAY OF MAGNESIUM: CPT | Performed by: NURSE PRACTITIONER

## 2018-09-04 PROCEDURE — 97530 THERAPEUTIC ACTIVITIES: CPT

## 2018-09-04 PROCEDURE — 80048 BASIC METABOLIC PNL TOTAL CA: CPT | Performed by: INTERNAL MEDICINE

## 2018-09-04 PROCEDURE — 93005 ELECTROCARDIOGRAM TRACING: CPT | Performed by: NURSE PRACTITIONER

## 2018-09-04 PROCEDURE — 94760 N-INVAS EAR/PLS OXIMETRY 1: CPT

## 2018-09-04 RX ORDER — FUROSEMIDE 10 MG/ML
40 INJECTION INTRAMUSCULAR; INTRAVENOUS DAILY
Status: DISCONTINUED | OUTPATIENT
Start: 2018-09-05 | End: 2018-09-08 | Stop reason: HOSPADM

## 2018-09-04 RX ADMIN — HEPARIN SODIUM 5000 UNITS: 5000 INJECTION, SOLUTION INTRAVENOUS; SUBCUTANEOUS at 21:30

## 2018-09-04 RX ADMIN — FUROSEMIDE 40 MG: 10 INJECTION, SOLUTION INTRAMUSCULAR; INTRAVENOUS at 09:38

## 2018-09-04 RX ADMIN — BUDESONIDE AND FORMOTEROL FUMARATE DIHYDRATE 2 PUFF: 160; 4.5 AEROSOL RESPIRATORY (INHALATION) at 08:23

## 2018-09-04 RX ADMIN — NICOTINE 1 PATCH: 21 PATCH, EXTENDED RELEASE TRANSDERMAL at 21:30

## 2018-09-04 RX ADMIN — IPRATROPIUM BROMIDE AND ALBUTEROL SULFATE 3 ML: 2.5; .5 SOLUTION RESPIRATORY (INHALATION) at 19:40

## 2018-09-04 RX ADMIN — IPRATROPIUM BROMIDE AND ALBUTEROL SULFATE 3 ML: 2.5; .5 SOLUTION RESPIRATORY (INHALATION) at 13:40

## 2018-09-04 RX ADMIN — CASTOR OIL AND BALSAM, PERU: 788; 87 OINTMENT TOPICAL at 09:37

## 2018-09-04 RX ADMIN — Medication 2 TABLET: at 21:30

## 2018-09-04 RX ADMIN — GABAPENTIN 400 MG: 400 CAPSULE ORAL at 21:30

## 2018-09-04 RX ADMIN — CASTOR OIL AND BALSAM, PERU: 788; 87 OINTMENT TOPICAL at 21:28

## 2018-09-04 RX ADMIN — FLUTICASONE PROPIONATE 2 SPRAY: 50 SPRAY, METERED NASAL at 09:37

## 2018-09-04 RX ADMIN — PANTOPRAZOLE SODIUM 40 MG: 40 TABLET, DELAYED RELEASE ORAL at 05:58

## 2018-09-04 RX ADMIN — MICONAZOLE NITRATE: 2 CREAM TOPICAL at 21:28

## 2018-09-04 RX ADMIN — HEPARIN SODIUM 5000 UNITS: 5000 INJECTION, SOLUTION INTRAVENOUS; SUBCUTANEOUS at 14:09

## 2018-09-04 RX ADMIN — HYDROCODONE BITARTRATE AND ACETAMINOPHEN 1 TABLET: 10; 325 TABLET ORAL at 12:53

## 2018-09-04 RX ADMIN — HEPARIN SODIUM 5000 UNITS: 5000 INJECTION, SOLUTION INTRAVENOUS; SUBCUTANEOUS at 05:59

## 2018-09-04 RX ADMIN — CLOPIDOGREL BISULFATE 75 MG: 75 TABLET ORAL at 09:38

## 2018-09-04 RX ADMIN — GABAPENTIN 400 MG: 400 CAPSULE ORAL at 14:09

## 2018-09-04 RX ADMIN — IPRATROPIUM BROMIDE AND ALBUTEROL SULFATE 3 ML: 2.5; .5 SOLUTION RESPIRATORY (INHALATION) at 08:23

## 2018-09-04 RX ADMIN — MORPHINE SULFATE 30 MG: 30 TABLET, EXTENDED RELEASE ORAL at 21:29

## 2018-09-04 RX ADMIN — MIRTAZAPINE 15 MG: 15 TABLET, ORALLY DISINTEGRATING ORAL at 21:30

## 2018-09-04 RX ADMIN — MAGNESIUM SULFATE HEPTAHYDRATE 4 G: 40 INJECTION, SOLUTION INTRAVENOUS at 06:18

## 2018-09-04 RX ADMIN — METOPROLOL TARTRATE 25 MG: 25 TABLET ORAL at 09:38

## 2018-09-04 RX ADMIN — MORPHINE SULFATE 30 MG: 30 TABLET, EXTENDED RELEASE ORAL at 09:38

## 2018-09-04 RX ADMIN — ATORVASTATIN CALCIUM 40 MG: 40 TABLET, FILM COATED ORAL at 21:29

## 2018-09-04 RX ADMIN — ASPIRIN 81 MG: 81 TABLET, COATED ORAL at 09:38

## 2018-09-04 RX ADMIN — DOXYCYCLINE 100 MG: 100 CAPSULE ORAL at 09:38

## 2018-09-04 RX ADMIN — BUDESONIDE AND FORMOTEROL FUMARATE DIHYDRATE 2 PUFF: 160; 4.5 AEROSOL RESPIRATORY (INHALATION) at 19:40

## 2018-09-04 RX ADMIN — MICONAZOLE NITRATE: 2 CREAM TOPICAL at 09:37

## 2018-09-04 RX ADMIN — GABAPENTIN 400 MG: 400 CAPSULE ORAL at 05:59

## 2018-09-04 RX ADMIN — DOXYCYCLINE 100 MG: 100 CAPSULE ORAL at 21:29

## 2018-09-04 NOTE — PROGRESS NOTES
Adult Nutrition  Assessment/PES    Patient Name:  Yassine Love  YOB: 1945  MRN: [id number]  Admit Date:  8/20/2018    Assessment Date:  9/4/2018              Reason for Assessment     Row Name 09/04/18 1141          Reason for Assessment    Reason For Assessment follow-up protocol   15 mins     Diagnosis --   stage 2 buttock wound per MD notes, and per notes this adm.                       Nutrition Prescription Ordered     Row Name 09/04/18 1144          Nutrition Prescription PO    Current PO Diet Soft Texture     Texture Chopped   with fluid restrcition of 1500ml/d.     Common Modifiers Cardiac             Evaluation of Received Nutrient/Fluid Intake     Row Name 09/04/18 1144          PO Evaluation    Number of Meals 9     % PO Intake 83             Problem/Interventions:            Problem 3     Row Name 09/04/18 1143          Nutrition Diagnoses Problem 3    Problem 3 Increased Nutrient Needs     Macronutrient Protein     Etiology (related to) --   stage 2 buttock wound per MD     Signs/Symptoms (evidenced by) --   decreased skin integrity                 Intervention Goal     Row Name 09/04/18 1143          Intervention Goal    PO Maintain intake             Nutrition Intervention     Row Name 09/04/18 1144          Nutrition Intervention    RD/Tech Action Supplement provided;Follow Tx progress   will send Boost Glucose control 2x/d               Education/Evaluation     Row Name 09/04/18 1144          Monitor/Evaluation    Monitor Per protocol         Electronically signed by:  Laura Mcginnis MS,RD,LD  09/04/18 11:45 AM

## 2018-09-04 NOTE — PROGRESS NOTES
"Yassine Love  1945  [id number]  9/4/2018    CC: BLE wounds  Chief Complaint   Patient presents with   • Cellulitis       Yassine Love is a 72 y.o. male here for CC leg infection  History of present illness:    Yassine Love is a 72 y.o. male well known to us from prior admissions, who was sent to the [facility] ED by home health nurse due to BLE redness and drainage.  He reports the redness has been present for several weeks with increasing pain and drainage in that period. During his prior admission here at [facility] his cultures grew PSA, ecoli, proteus, enterococcus and strep from his RLE.  Patient currently denies any fever, chills, or sweats.  Patient is tolerating antibiotics.  Patient reports he needs his knees and hips replaced but can not have surgery until his wounds have healed.  Patient was admitted to the hospitalist and started on Merrem and Vancomycin.  Patient doesn't report severe pain in his feet but does report joint pain.  8/22 - co leg pain  Co SOA  No fever or rash  \"I am not losing my legs!\"  Co hip and knee pain  8/23- co leg infections, denies fever, chills, night sweats, nausea, vomiting, diarrhea, rash, and cough  8/24/18 - Patient complains of pain in his hips and knees.  Patient denies any fever, chills, or sweats.  Patient denies any nausea, vomiting, or diarrhea.     seen and agree  8/25 - co penile lesion  Co leg infection  \"They may put in a stent\"  No fever  8/27/18 - C/O swelling in BUE.  Superficial clot found on ultrasound.  \"I can't go until this fluid is taken care of\"  Denies any fever, chills, or sweats.  Patient denies any nausea, vomiting, or diarrhea.  Seen and agree  8/28/18 - C/O BUE swelling as well as scrotal edema.  Patient denies any fever, chills, or sweats.  Patient denies any nausea, vomiting, or diarrhea.  Seen and agree  8/29/18 - Still concerned about swelling.  Patient denies any fever, chills, or sweats.  Patient denies any nausea, vomiting, or diarrhea.  Seen " and agree  8/30/18 - Had trouble breathing last night.  Patient was placed on nonrebreather.  Now back on NC.  Patient states he is on 3L at home with oxygen sats running 88%.  No fever.  + chills.  No nausea, vomiting, or diarrhea.  Seen and agree  8/31 - co weakness  Co leg infection  9/3 - No hx available  9/4/18 - Patient on hi emperatriz oxygen at 70%.  Concerned about fluid restriction.  Patient had two episodes of VT last night.  No fever.  Seen and agree      Past medical history:  Past Medical History:   Diagnosis Date   • Cancer (CMS/[facility])    • Cellulitis of both lower extremities     [facility] 2016   • Chronic pain    • COPD (chronic obstructive pulmonary disease) (CMS/[facility])    • Hypertension    • Osteoarthritis cervical spine    • Osteoarthritis of both knees    • Osteoarthritis of left hip        Medications:   Current Facility-Administered Medications:   •  acetaminophen (TYLENOL) tablet 650 mg, 650 mg, Oral, Q6H PRN, Shital Hooks MD, 650 mg at 09/03/18 2319  •  aspirin EC tablet 81 mg, 81 mg, Oral, Daily, Yumiko Muhammad APRN, 81 mg at 09/04/18 0938  •  atorvastatin (LIPITOR) tablet 40 mg, 40 mg, Oral, Nightly, Yumiko Muhammad APRN, 40 mg at 09/03/18 2150  •  bisacodyl (DULCOLAX) suppository 10 mg, 10 mg, Rectal, Daily PRN, Shital Hooks MD, 10 mg at 08/26/18 0947  •  budesonide-formoterol (SYMBICORT) 160-4.5 MCG/ACT inhaler 2 puff, 2 puff, Inhalation, BID - RT, Yumiko Muhammad APRN, 2 puff at 09/04/18 0823  •  castor oil-balsam peru (VENELEX) ointment, , Topical, Q12H, Shital Hooks MD  •  clopidogrel (PLAVIX) tablet 75 mg, 75 mg, Oral, Daily, Yumiko Muhammad APRN, 75 mg at 09/04/18 0938  •  diphenhydrAMINE (BENADRYL) capsule 25 mg, 25 mg, Oral, Q6H PRN, Kia Addison MD, 25 mg at 09/03/18 1440  •  diphenhydrAMINE-zinc acetate 2-0.1 % cream, , Topical, TID PRN, Kia Addison MD  •  doxycycline (MONODOX) capsule 100 mg, 100 mg, Oral, Q12H, Parish Belle MD, 100 mg at  09/04/18 0938  •  fluticasone (FLONASE) 50 MCG/ACT nasal spray 2 spray, 2 spray, Each Nare, Daily, Yumiko Muhammad APRN, 2 spray at 09/04/18 0937  •  furosemide (LASIX) injection 40 mg, 40 mg, Intravenous, Q12H, Kristina Alejandre MD, 40 mg at 09/04/18 0938  •  gabapentin (NEURONTIN) capsule 400 mg, 400 mg, Oral, Q8H, Shital Hooks MD, 400 mg at 09/04/18 0559  •  heparin (porcine) 5000 UNIT/ML injection 5,000 Units, 5,000 Units, Subcutaneous, Q8H, Yumiko Muhammad APRN, 5,000 Units at 09/04/18 0559  •  HYDROcodone-acetaminophen (NORCO)  MG per tablet 1 tablet, 1 tablet, Oral, Q4H PRN, Shital Hooks MD, 1 tablet at 09/02/18 0113  •  ipratropium-albuterol (DUO-NEB) nebulizer solution 3 mL, 3 mL, Nebulization, Q6H PRN, Yumiko Muhammad APRN, 3 mL at 09/02/18 0511  •  ipratropium-albuterol (DUO-NEB) nebulizer solution 3 mL, 3 mL, Nebulization, TID, Belle Valdivia APRN, 3 mL at 09/04/18 0823  •  Magnesium Sulfate 2 gram Bolus, followed by 8 gram infusion (total Mg dose 10 grams)- Mg less than or equal to 1mg/dL, 2 g, Intravenous, PRN **OR** Magnesium Sulfate 2 gram / 50mL Infusion (GIVE X 3 BAGS TO EQUAL 6GM TOTAL DOSE) - Mg 1.1 - 1.5 mg/dl, 2 g, Intravenous, PRN **OR** Magnesium Sulfate 4 gram infusion- Mg 1.6-1.9 mg/dL, 4 g, Intravenous, PRN, Frannie Nugent APRN, Last Rate: 25 mL/hr at 09/04/18 0618, 4 g at 09/04/18 0618  •  melatonin sublingual tablet 5 mg, 5 mg, Sublingual, Nightly PRN, Kia Addison MD, 5 mg at 08/30/18 2103  •  metoprolol tartrate (LOPRESSOR) tablet 25 mg, 25 mg, Oral, Q12H, Shital Hooks MD, 25 mg at 09/04/18 0938  •  miconazole (MICOTIN) 2 % cream, , Topical, Q12H, Shital Hooks MD  •  mirtazapine (REMERON SOL-TAB) disintegrating tablet 15 mg, 15 mg, Oral, Nightly, Stella Garvin PA-C, 15 mg at 09/03/18 2150  •  Morphine (MS CONTIN) 12 hr tablet 30 mg, 30 mg, Oral, Q12H, Shital Hooks MD, 30 mg at 09/04/18 0938  •  nicotine  "(NICODERM CQ) 21 MG/24HR patch 1 patch, 1 patch, Transdermal, Q24H, Yumiko Muhammad APRN, 1 patch at 09/03/18 2153  •  pantoprazole (PROTONIX) EC tablet 40 mg, 40 mg, Oral, Q AM, Yumiko Muhammad APRN, 40 mg at 09/04/18 0558  •  Pharmacy Meds to Bed Consult, , Does not apply, Daily, Kia Addison MD, Stopped at 08/24/18 1115  •  sennosides-docusate sodium (SENOKOT-S) 8.6-50 MG tablet 2 tablet, 2 tablet, Oral, Nightly, Shital Hooks MD, 2 tablet at 09/03/18 2150  •  sodium chloride 0.9 % flush 1-10 mL, 1-10 mL, Intravenous, PRN, Yumiko Muhammad APRN  •  Insert peripheral IV, , , Once **AND** sodium chloride 0.9 % flush 10 mL, 10 mL, Intravenous, PRN, Dakota De Paz MD  Antibiotics:  Anti-Infectives     Ordered     Dose/Rate Route Frequency Start Stop    08/27/18 1242  doxycycline (MONODOX) capsule 100 mg     Ordering Provider:  Parish Belle MD    100 mg Oral Every 12 Hours Scheduled 08/27/18 1330 09/07/18 0859    08/20/18 1914  meropenem (MERREM) 500mg/100 mL 0.9% NS IVPB ([facility])     Ordering Provider:  Yumiko Muhammad APRN    500 mg  over 30 Minutes Intravenous Once 08/20/18 2100 08/20/18 2125    08/20/18 1357  vancomycin 1750 mg/500 mL 0.9% NS IVPB ([facility])     Ordering Provider:  Dakota De Paz MD    20 mg/kg × 81.6 kg  over 2 Hours Intravenous Once 08/20/18 1359 08/20/18 1701          Allergies:  is allergic to ciprofloxacin hcl and ceftin [cefuroxime axetil].    Family History: family history includes COPD in his brother; Heart attack in his brother; Stroke in his father.    Social History:  reports that he has been smoking Cigarettes.  He has a 84.00 pack-year smoking history. He has never used smokeless tobacco. He reports that he does not drink alcohol or use drugs.    Review of Systems: All other reviewed and negative except as per HPI    Blood pressure 136/78, pulse 84, temperature 97.4 °F (36.3 °C), temperature source Oral, resp. rate 16, height 172.7 cm (68\"), weight 87.6 kg (193 lb " 3.2 oz), SpO2 100 %.  GENERAL: Awake and alert   HEENT: Oropharynx without thrush.   EYES: . No conjunctival injection. No icterus.   LYMPHATICS: No lymphadenopathy of the neck   HEART: Irregular. No murmur, gallop, or pericardial friction rub.   LUNGS: Diminished throughout on 70%   ABDOMEN: Soft, nontender, nondistended. No appreciable HSM.    SKIN: Warm and dry   PSYCHIATRIC: Mental status lucid.   EXT:  BLE with wraps in place. Improved BLE. BUE with swelling - seems better.    Seen and agree      DIAGNOSTICS:  Lab Results   Component Value Date    WBC 8.53 08/29/2018    HGB 10.2 (L) 08/29/2018    HCT 34.1 (L) 08/29/2018     08/29/2018     Lab Results   Component Value Date    CRP 13.12 (H) 06/21/2017     Lab Results   Component Value Date    SEDRATE 56 (H) 06/20/2017     Lab Results   Component Value Date    GLUCOSE 125 (H) 09/04/2018    BUN 22 09/04/2018    CREATININE 1.32 (H) 09/04/2018    EGFRIFNONA 53 (L) 09/04/2018    BCR 16.7 09/04/2018    CO2 44.0 (C) 09/04/2018    CALCIUM 8.6 (L) 09/04/2018    ALBUMIN 3.08 (L) 08/20/2018    AST 20 08/20/2018    ALT 23 08/20/2018       Microbiology:  Microbiology Results Abnormal     Procedure Component Value - Date/Time    Blood Culture - Blood, [[id number]]  (Normal) Collected:  08/20/18 1445    Lab Status:  Final result Specimen:  Blood from Wrist, Right Updated:  08/25/18 1500     Blood Culture No growth at 5 days    Blood Culture - Blood, [[id number]]  (Normal) Collected:  08/20/18 1430    Lab Status:  Final result Specimen:  Blood from Arm, Right Updated:  08/25/18 1500     Blood Culture No growth at 5 days    Wound Culture - Wound, Penis [[id number]]  (Abnormal)  (Susceptibility) Collected:  08/20/18 1930    Lab Status:  Final result Specimen:  Wound from Penis Updated:  08/23/18 1427     Wound Culture Scant growth (1+) Providencia rettgeri (A)      Scant growth (1+) Staphylococcus aureus (A)      Scant growth (1+) Staphylococcus haemolyticus (A)     Comment:  This isolate is presumed to be clindamyin resistant based on detection of inducible clindamycin resistance. Clindamycin may still be effective in some patients.          Gram Stain Result No WBCs or organisms seen    Susceptibility      Providencia rettgeri    Staphylococcus aureus       FAVIAN    FAVIAN       Ampicillin + Sulbactam <=8/4 ug/ml Susceptible             Aztreonam <=8 ug/ml Susceptible             Cefepime <=8 ug/ml Susceptible             Cefotaxime <=2 ug/ml Susceptible             Ceftriaxone <=8 ug/ml Susceptible    <=8 ug/ml Susceptible       Cefuroxime sodium <=4 ug/ml Susceptible             Clindamycin       <=0.5 ug/ml Susceptible       Daptomycin       <=0.5 ug/ml Susceptible       Ertapenem <=1 ug/ml Susceptible             Erythromycin       <=0.5 ug/ml Susceptible       Gentamicin <=4 ug/ml Susceptible    <=4 ug/ml Susceptible       Levofloxacin <=2 ug/ml Susceptible    <=1 ug/ml Susceptible  [1]        Linezolid       2 ug/ml Susceptible       Meropenem <=1 ug/ml Susceptible             Oxacillin       <=0.25 ug/ml Susceptible       Penicillin G       <=0.03 ug/ml Susceptible       Piperacillin + Tazobactam <=16 ug/ml Susceptible             Quinupristin + Dalfopristin       <=0.5 ug/ml Susceptible  [2]        Rifampin       <=1 ug/ml Susceptible       Streptomycin High Level Synergy       <=0.5 ug/ml Susceptible       Tetracycline       <=4 ug/ml Susceptible       Tobramycin <=4 ug/ml Susceptible             Trimethoprim + Sulfamethoxazole <=2/38 ug/ml Susceptible    <=0.5/9.5 ug/ml Susceptible       Vancomycin       2 ug/ml Susceptible              [1]   Staphylococcus species may develop resistance during prolonged therapy with quinolones.  Isolates that are initially susceptible may become resistant within three to four days after initiation of therapy. Testing of repeat isolates may be warranted.       [2]   Appended report. These results have been appended to a previously preliminary  verified report.               Susceptibility      Staphylococcus haemolyticus     FAVIAN     Ciprofloxacin >2 ug/ml Resistant     Clindamycin  Resistant     Daptomycin <=0.5 ug/ml Susceptible     Erythromycin >4 ug/ml Resistant     Gentamicin <=4 ug/ml Susceptible     Levofloxacin >4 ug/ml Resistant     Linezolid <=1 ug/ml Susceptible     Oxacillin >2 ug/ml Resistant     Penicillin G >8 ug/ml Resistant     Quinupristin + Dalfopristin <=0.5 ug/ml Susceptible     Rifampin <=1 ug/ml Susceptible     Tetracycline <=4 ug/ml Susceptible     Trimethoprim + Sulfamethoxazole <=0.5/9.5 ug/ml Susceptible     Vancomycin 2 ug/ml Susceptible                    Wound Culture - Wound, Leg, Left [[id number]]  (Abnormal)  (Susceptibility) Collected:  08/20/18 1930    Lab Status:  Final result Specimen:  Wound from Leg, Left Updated:  08/22/18 1347     Wound Culture Moderate growth (3+) Pseudomonas aeruginosa (A)      Scant growth (1+) Streptococcus, Beta Hemolytic, Group G (A)     Comment:   If Clindamycin or Erythromycin is the drug of choice, notify the laboratory within 7 days to request susceptibility testing.        STREP GROUPING G     Gram Stain Result No WBCs seen      Many (4+) Gram negative bacilli    Susceptibility      Pseudomonas aeruginosa     FAVIAN     Aztreonam <=8 ug/ml Susceptible     Cefepime <=8 ug/ml Susceptible     Ceftazidime <=1 ug/ml Susceptible     Gentamicin <=4 ug/ml Susceptible     Levofloxacin <=2 ug/ml Susceptible     Meropenem <=1 ug/ml Susceptible     Piperacillin + Tazobactam >64 ug/ml Resistant     Tobramycin <=4 ug/ml Susceptible                    KOH Prep - Skin, Foot, Left [[id number]]  (Normal) Collected:  08/20/18 1501    Lab Status:  Final result Specimen:  Skin from Foot, Left Updated:  08/20/18 1528     KOH Prep No yeast or hyphal elements seen            RADIOLOGY:  Imaging Results (last 72 hours)     ** No results found for the last 72 hours. **          Assessment and Plan:     BLE erythema and  open wounds and edema   Wounds with GBS and PSA - prior treatment with Merrem, now on Doxycycline alone.   Acute kidney injury - worse  PVD  DJD - needs knee and hip replaced.   COPD  Penis ulceration - culture with providencia, MSSA, and CNS  BUE edema - LUE with superficial thrombophlebitis  Volume overload   VT    Patient is currently on  Doxycycline  Tolerating antibiotics.    Will need appropriate wound care outpatient, possible SNF placement.       BRITTNY Cotto for Dr. Parish Belle  9/4/2018

## 2018-09-04 NOTE — PROGRESS NOTES
[facility] Medicine Services  PROGRESS NOTE    Patient Name: Yassine Love  : 1945  MRN: [id number]    Date of Admission: 2018  Length of Stay: 15  Primary Care Physician: Provider, No Known    Subjective     CC: f/u LE cellulitis    HPI:  2 short episodes of VT early this morning called to night shift. Patient unaware of events as he was sleeping and states he was unaware of any symptoms when woken. Patient feeling fine now, just tired.  Review of Systems   Gen- No fevers, chills  CV- No chest pain, palpitations  GI- No N/V/D, abd pain    Otherwise ROS is negative except as mentioned in the HPI.    Objective     Vital Signs:   Temp:  [97.4 °F (36.3 °C)] 97.4 °F (36.3 °C)  Heart Rate:  [] 84  Resp:  [16-20] 16  BP: (136)/(78) 136/78        Physical Exam:  Constitutional: No acute distress, drowsy  Respiratory: clear to ausculatation, on high flow NC  Cardiovascular: RRR, no murmurs, rubs, or gallops, palpable pedal pulses bilaterally  Gastrointestinal: Positive bowel sounds, soft, nontender, nondistended  Musculoskeletal: BLEs with UNNA boots in place 2+ BLE edema, chronic venous stasis with weeping open skin. Moderate edema to BUEs L>R with diffuse ecchymosis.   Psychiatric: Appropriate affect, cooperative  Neurologic: drowsy but interactive, no focal deficits    Results Reviewed:  I have personally reviewed current lab, radiology, and data and agree.      Results from last 7 days  Lab Units 18  0310   WBC 10*3/mm3 8.53   HEMOGLOBIN g/dL 10.2*   HEMATOCRIT % 34.1*   PLATELETS 10*3/mm3 328       Results from last 7 days  Lab Units 18  0503 18  0557 18  0501   SODIUM mmol/L 140 139 138   POTASSIUM mmol/L 3.8 4.4 4.5   CHLORIDE mmol/L 95* 96* 97*   CO2 mmol/L 44.0* 38.0* 38.0*   BUN mg/dL 22 19 18   CREATININE mg/dL 1.32* 1.25 1.21   GLUCOSE mg/dL 125* 100 107*   CALCIUM mg/dL 8.6* 8.5* 8.4*     Estimated Creatinine Clearance: 53.6 mL/min (A) (by  C-G formula based on SCr of 1.32 mg/dL (H)).     No results found for: BNP    Microbiology Results Abnormal     Procedure Component Value - Date/Time    Blood Culture - Blood, [[id number]]  (Normal) Collected:  08/20/18 1445    Lab Status:  Final result Specimen:  Blood from Wrist, Right Updated:  08/25/18 1500     Blood Culture No growth at 5 days    Blood Culture - Blood, [[id number]]  (Normal) Collected:  08/20/18 1430    Lab Status:  Final result Specimen:  Blood from Arm, Right Updated:  08/25/18 1500     Blood Culture No growth at 5 days    Wound Culture - Wound, Penis [[id number]]  (Abnormal)  (Susceptibility) Collected:  08/20/18 1930    Lab Status:  Final result Specimen:  Wound from Penis Updated:  08/23/18 1427     Wound Culture Scant growth (1+) Providencia rettgeri (A)      Scant growth (1+) Staphylococcus aureus (A)      Scant growth (1+) Staphylococcus haemolyticus (A)     Comment: This isolate is presumed to be clindamyin resistant based on detection of inducible clindamycin resistance. Clindamycin may still be effective in some patients.          Gram Stain Result No WBCs or organisms seen    Susceptibility      Providencia rettgeri    Staphylococcus aureus       FAVIAN    FAVIAN       Ampicillin + Sulbactam <=8/4 ug/ml Susceptible             Aztreonam <=8 ug/ml Susceptible             Cefepime <=8 ug/ml Susceptible             Cefotaxime <=2 ug/ml Susceptible             Ceftriaxone <=8 ug/ml Susceptible    <=8 ug/ml Susceptible       Cefuroxime sodium <=4 ug/ml Susceptible             Clindamycin       <=0.5 ug/ml Susceptible       Daptomycin       <=0.5 ug/ml Susceptible       Ertapenem <=1 ug/ml Susceptible             Erythromycin       <=0.5 ug/ml Susceptible       Gentamicin <=4 ug/ml Susceptible    <=4 ug/ml Susceptible       Levofloxacin <=2 ug/ml Susceptible    <=1 ug/ml Susceptible  [1]        Linezolid       2 ug/ml Susceptible       Meropenem <=1 ug/ml Susceptible             Oxacillin        <=0.25 ug/ml Susceptible       Penicillin G       <=0.03 ug/ml Susceptible       Piperacillin + Tazobactam <=16 ug/ml Susceptible             Quinupristin + Dalfopristin       <=0.5 ug/ml Susceptible  [2]        Rifampin       <=1 ug/ml Susceptible       Streptomycin High Level Synergy       <=0.5 ug/ml Susceptible       Tetracycline       <=4 ug/ml Susceptible       Tobramycin <=4 ug/ml Susceptible             Trimethoprim + Sulfamethoxazole <=2/38 ug/ml Susceptible    <=0.5/9.5 ug/ml Susceptible       Vancomycin       2 ug/ml Susceptible              [1]   Staphylococcus species may develop resistance during prolonged therapy with quinolones.  Isolates that are initially susceptible may become resistant within three to four days after initiation of therapy. Testing of repeat isolates may be warranted.       [2]   Appended report. These results have been appended to a previously preliminary verified report.               Susceptibility      Staphylococcus haemolyticus     FAVIAN     Ciprofloxacin >2 ug/ml Resistant     Clindamycin  Resistant     Daptomycin <=0.5 ug/ml Susceptible     Erythromycin >4 ug/ml Resistant     Gentamicin <=4 ug/ml Susceptible     Levofloxacin >4 ug/ml Resistant     Linezolid <=1 ug/ml Susceptible     Oxacillin >2 ug/ml Resistant     Penicillin G >8 ug/ml Resistant     Quinupristin + Dalfopristin <=0.5 ug/ml Susceptible     Rifampin <=1 ug/ml Susceptible     Tetracycline <=4 ug/ml Susceptible     Trimethoprim + Sulfamethoxazole <=0.5/9.5 ug/ml Susceptible     Vancomycin 2 ug/ml Susceptible                    Wound Culture - Wound, Leg, Left [[id number]]  (Abnormal)  (Susceptibility) Collected:  08/20/18 1930    Lab Status:  Final result Specimen:  Wound from Leg, Left Updated:  08/22/18 1347     Wound Culture Moderate growth (3+) Pseudomonas aeruginosa (A)      Scant growth (1+) Streptococcus, Beta Hemolytic, Group G (A)     Comment:   If Clindamycin or Erythromycin is the drug of choice,  notify the laboratory within 7 days to request susceptibility testing.        STREP GROUPING G     Gram Stain Result No WBCs seen      Many (4+) Gram negative bacilli    Susceptibility      Pseudomonas aeruginosa     FAVIAN     Aztreonam <=8 ug/ml Susceptible     Cefepime <=8 ug/ml Susceptible     Ceftazidime <=1 ug/ml Susceptible     Gentamicin <=4 ug/ml Susceptible     Levofloxacin <=2 ug/ml Susceptible     Meropenem <=1 ug/ml Susceptible     Piperacillin + Tazobactam >64 ug/ml Resistant     Tobramycin <=4 ug/ml Susceptible                    KOH Prep - Skin, Foot, Left [[id number]]  (Normal) Collected:  08/20/18 1501    Lab Status:  Final result Specimen:  Skin from Foot, Left Updated:  08/20/18 1528     KOH Prep No yeast or hyphal elements seen        Imaging Results (last 24 hours)     ** No results found for the last 24 hours. **        Results for orders placed during the hospital encounter of 08/20/18   Adult Transthoracic Echo Complete W/ Cont if Necessary Per Protocol    Narrative · Left ventricular systolic function is normal. Estimated EF = 65%.  · There is moderate calcification of the aortic valve mainly affecting the   non coronary cusp(s).  · Right ventricular cavity is mildly dilated.        I have reviewed the medications.    aspirin 81 mg Oral Daily   atorvastatin 40 mg Oral Nightly   budesonide-formoterol 2 puff Inhalation BID - RT   castor oil-balsam peru  Topical Q12H   clopidogrel 75 mg Oral Daily   doxycycline 100 mg Oral Q12H   fluticasone 2 spray Each Nare Daily   furosemide 40 mg Intravenous Q12H   gabapentin 400 mg Oral Q8H   heparin (porcine) 5,000 Units Subcutaneous Q8H   ipratropium-albuterol 3 mL Nebulization TID   metoprolol tartrate 25 mg Oral Q12H   miconazole  Topical Q12H   mirtazapine 15 mg Oral Nightly   Morphine 30 mg Oral Q12H   nicotine 1 patch Transdermal Q24H   pantoprazole 40 mg Oral Q AM   Pharmacy Meds to Bed Consult  Does not apply Daily   sennosides-docusate sodium 2  tablet Oral Nightly     Assessment / Plan     Hospital Problem List     * (Principal)Cellulitis of lower extremity    RAFAEL (acute kidney injury) (CMS/[facility]ly [facility])    PVD (peripheral vascular disease) (CMS/Formerly [facility])    Cellulitis of both lower extremities    COPD (chronic obstructive pulmonary disease) (CMS/Formerly [facility])    Essential hypertension    Non-sustained ventricular tachycardia (CMS/Formerly [facility])    Coronary artery disease involving native coronary artery without angina pectoris    Debility    Decubitus ulcer of buttock, stage 2    Chronic pain        Brief Hospital Course to date:  Yassine Love is a 72 y.o. male with PMH significant for recurring BLE cellulitis (has seen Dr. Belle in the past), PVD, prediabetes, chronic pain, HTN, CAD, COPD, and ongoing tobacco abuse.  He presented to [facility] ER today with worsening BLE pain and redness, weeping, and open lesions (left worse than right), which he tells me have been progressively worsening over last 2 weeks.     Assessment & Plan:    - Complex medico-social issues.   - Patient lives alone. Reports that he is usually able to ambulate with a walker (but his legs had been weak and giving out on him prior to admission) and able to perform ADLs. Has neighbors and friends who help him out a lot with groceries, etc. He no longer drives.   - He has significant POAD with superimposed cellulitis, suspect L>R from physical exam. He is at risk for limb loss. Discussed with pt, he does not want any aggressive intervention/surgery, but may be open to endovascular intervention if possible. Arterial duplex showed no occlusion on either side.   - LE wound culture and penis wound culture as above, defer to ID for abx management  - ID (Dr Belle) following, have converted to Doxycycline PO   - PT wound care following, will need continued UNNA boot therapy following d/c   - LUE superficial thrombus- symptomatic mgmt  - Echocardiogram unremarkable (EF 65%),  - CXR 9.3 stable, continue to diurese with IV lasix today  in attempts to wean O2  - am bmp  - mag replaced, continue to monitor on tele. EKG with slightly increase QT from previous. meds checked as SE. Repeat EKG in am    - WOC, PT, OT to eval and treat  - CM/SW for DC planning- patient at this point agreeable to [facility] vs [facility], but will need to continue to work more with therapy before these facilities will accept him and wean of high flow O2    - tobacco cessation advised    - Have discussed with pt in past days regarding chronic pain, which he says is not well controlled now due to relative immobility/laying in bed. He is chronically on ER and IR narcotics and Gabapentin[facility]. Increased MS contin to 30mg BID and increase frequency of immediate release narcotics to q4h PRN. Pt was informed that this regimen will not be cont at discharge and that no new prescriptions will be provided, he verbalized understanding and is agreeable with treatment plans.    DVT Prophylaxis:  Hep sq    CODE STATUS:   Code Status and Medical Interventions:   Ordered at: 08/29/18 0406     Limited Support to NOT Include:    Intubation     Level Of Support Discussed With:    Patient     Code Status:    No CPR     Medical Interventions (Level of Support Prior to Arrest):    Limited     Disposition: Rehab if he is agreeable, otherwise, home with home health.    Concerned that this patient is unable to care for himself at home successfully.     Electronically signed by BRITTNY Mancia, 09/04/18, 11:22 AM.

## 2018-09-04 NOTE — PROGRESS NOTES
Continued Stay Note  [facility]     Patient Name: Yassine Love  MRN: [id number]  Today's Date: 9/4/2018    Admit Date: 8/20/2018          Discharge Plan     Row Name 09/04/18 1344       Plan    Plan Update    Patient/Family in Agreement with Plan yes    Plan Comments The pt is currently on high-flow 02. I will f/u regarding DC planning when appropriate.              Discharge Codes    No documentation.       Expected Discharge Date and Time     Expected Discharge Date Expected Discharge Time    Sep 4, 2018             Gail Medellin RN
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
## 2018-09-04 NOTE — PLAN OF CARE
Problem: Patient Care Overview  Goal: Plan of Care Review  Outcome: Ongoing (interventions implemented as appropriate)   09/04/18 0650   Coping/Psychosocial   Plan of Care Reviewed With patient   Plan of Care Review   Progress improving   OTHER   Outcome Summary Pt. went into VT at 4:30 & VT at 5:50 and converted back into NSR once he wake up. NP Chely was informed of the event the following orders were placed (12-lead & Mg). Pt. showed no symptoms from either of the VT events. VSS throught the night.

## 2018-09-04 NOTE — PLAN OF CARE
Problem: Patient Care Overview  Goal: Plan of Care Review  Outcome: Ongoing (interventions implemented as appropriate)   09/04/18 1416   Coping/Psychosocial   Plan of Care Reviewed With patient   Plan of Care Review   Progress improving   OTHER   Outcome Summary BLE compression wraps in place. PT wound care follow. At this time continue with MICOTIN cream, should be able to discontinue in a few days. Continue with Venelex ointment to bottom. On Dolphin bed at this time. Please contact [facility] nurse if needs arise. Thanks

## 2018-09-04 NOTE — THERAPY TREATMENT NOTE
Acute Care - Physical Therapy Treatment Note  [facility]     Patient Name: Yassine Love  : 1945  MRN: [id number]  Today's Date: 2018  Onset of Illness/Injury or Date of Surgery: 18  Date of Referral to PT: 18  Referring Physician: BRITTNY Alvares    Admit Date: 2018    Visit Dx:    ICD-10-CM ICD-9-CM   1. Cellulitis of left lower leg L03.116 682.6   2. Peripheral edema R60.9 782.3   3. Stasis dermatitis of both legs I87.2 454.1   4. Recurrent cellulitis of lower leg L03.119 682.6   5. Chronic pain syndrome G89.4 338.4   6. History of hypertension Z86.79 V12.59   7. Impaired functional mobility, balance, gait, and endurance Z74.09 V49.89   8. Impaired mobility and ADLs Z74.09 799.89     Patient Active Problem List   Diagnosis   • Cellulitis of right lower extremity   • RAFAEL (acute kidney injury) (CMS/[facility])   • PVD (peripheral vascular disease) (CMS/[facility])   • Cellulitis of both lower extremities   • COPD (chronic obstructive pulmonary disease) (CMS/[facility])   • Essential hypertension   • Hypoxia   • Cellulitis of lower extremity   • Non-sustained ventricular tachycardia (CMS/[facility])   • Coronary artery disease involving native coronary artery without angina pectoris   • Cellulitis of left lower leg   • Debility   • Decubitus ulcer of buttock, stage 2   • Chronic pain       Therapy Treatment          Rehabilitation Treatment Summary     Row Name 18 1504 18 1300          Treatment Time/Intention    Discipline physical therapist  -VG occupational therapist  -     Document Type therapy note (daily note)  -VG therapy note (daily note)  -     Subjective Information complains of;fatigue  -VG complains of;weakness;fatigue;pain;dyspnea  -     Mode of Treatment individual therapy;physical therapy  -VG individual therapy;occupational therapy  -     Patient/Family Observations supplemental O2, tele, chair check, UIC; no family present  -VG No family present. Pt received supine in  bed with legs elevated, IV and high flow O2 intact.   -HK2     Care Plan Review patient/other agree to care plan  -VG care plan/treatment goals reviewed;risks/benefits reviewed;patient/other agree to care plan  -HK     Therapy Frequency (PT Clinical Impression) daily  -VG  --     Patient Effort good  -VG adequate  -HK2     Existing Precautions/Restrictions fall  -VG fall  -HK2     Treatment Considerations/Comments  -- Pt requires max encouragement for participation   -HK2     Recorded by [VG] Manjula Soria, PT 09/04/18 1533 [HK] Voilette Smith, OT 09/04/18 1354  [HK2] Violette Smith, OT 09/04/18 1402     Row Name 09/04/18 1504 09/04/18 1300          Vital Signs    Pre Systolic BP Rehab  -- --   RN cleared for tx  -HK     Pre SpO2 (%) 91  -VG 95  -HK     O2 Delivery Pre Treatment supplemental O2   6LO2NC  -VG hi-flow  -HK     Intra SpO2 (%)  -- 85  -HK     O2 Delivery Intra Treatment  -- supplemental O2  -HK     Post SpO2 (%) 90  -VG 92  -HK     O2 Delivery Post Treatment supplemental O2   6LO2NC  -VG supplemental O2  -HK     Pre Patient Position  -- Supine  -HK     Intra Patient Position  -- Supine  -HK     Post Patient Position  -- Sitting  -HK     Recorded by [VG] Manjula Soria, PT 09/04/18 1533 [HK] Violette Smith, OT 09/04/18 1402     Row Name 09/04/18 1300             Cognitive Assessment/Intervention    Additional Documentation Cognitive Assessment/Intervention (Group)  -HK      Recorded by [HK] Violette Smith, OT 09/04/18 1402      Row Name 09/04/18 1504 09/04/18 1300          Cognitive Assessment/Intervention- PT/OT    Affect/Mental Status (Cognitive) flat/blunted affect  -VG flat/blunted affect  -HK     Orientation Status (Cognition) oriented x 4  -VG oriented x 4  -HK     Follows Commands (Cognition) follows one step commands;over 90% accuracy  -VG follows one step commands;over 90% accuracy  -HK     Cognitive Function (Cognitive) safety deficit  -VG safety deficit  -HK     Safety Deficit (Cognitive)  moderate deficit;insight into deficits/self awareness  -VG safety precautions follow-through/compliance;safety precautions awareness;judgment;insight into deficits/self awareness;awareness of need for assistance;mild deficit  -HK     Personal Safety Interventions fall prevention program maintained;gait belt;nonskid shoes/slippers when out of bed;supervised activity  -VG fall prevention program maintained;gait belt;nonskid shoes/slippers when out of bed  -HK     Recorded by [VG] Manjula Soria, PT 09/04/18 1533 [HK] Violette Smith, OT 09/04/18 1402     Row Name 09/04/18 1504 09/04/18 1300          Bed Mobility Assessment/Treatment    Bed Mobility Assessment/Treatment  -- rolling left;rolling right  -HK     Rolling Left [place] (Bed Mobility)  -- moderate assist (50% patient effort);verbal cues  -HK     Rolling Right [place] (Bed Mobility)  -- moderate assist (50% patient effort);verbal cues  -HK     Comment (Bed Mobility) UIC  -VG Pt rolled L to R for sling placement.   -HK     Recorded by [VG] Manjula Soria, PT 09/04/18 1533 [HK] Violette Smith, OT 09/04/18 1402     Row Name 09/04/18 1300             Functional Mobility    Functional Mobility- Ind. Level not appropriate to assess  -HK      Recorded by [HK] Violette Smith, OT 09/04/18 1402      Row Name 09/04/18 1504 09/04/18 1300          Transfer Assessment/Treatment    Transfer Assessment/Treatment sit-stand transfer;stand-sit transfer  -VG bed-chair transfer  -HK     Comment (Transfers) Sit to/from partial stand, pt unable to complete upright position d/t pain in BLEs with wbing. Vc's for hand placement and sequencing. Good effort.  -VG pt dependent for transfers. Pt declined to attempt sit to stand  -HK     Recorded by [VG] Manjula Soria, PT 09/04/18 1533 [HK] Violette Smith, OT 09/04/18 1402     Row Name 09/04/18 1300             Bed-Chair Transfer    Bed-Chair [place] (Transfers) dependent (less than 25% patient effort);2 person  assist;verbal cues  -HK      Assistive Device (Bed-Chair Transfers) mechanical lift/aid  -HK      Recorded by [HK] Violette Smith, OT 09/04/18 1402      Row Name 09/04/18 1504 09/04/18 1300          Sit-Stand Transfer    Sit-Stand [place] (Transfers) moderate assist (50% patient effort);1 person assist;verbal cues  -VG --   pt declined to attempt   -HK     Assistive Device (Sit-Stand Transfers) walker, front-wheeled  -VG  --     Recorded by [VG] Manjula Soria, PT 09/04/18 1533 [HK] Violette Smith, OT 09/04/18 1402     Row Name 09/04/18 1504             Stand-Sit Transfer    Stand-Sit [place] (Transfers) 1 person assist;moderate assist (50% patient effort)  -VG      Assistive Device (Stand-Sit Transfers) walker, front-wheeled  -VG      Recorded by [VG] Manjula Soria, PT 09/04/18 1533      Row Name 09/04/18 1504             Gait/Stairs Assessment/Training    Comment (Gait/Stairs) Non ambulatory at this time.  -VG      Recorded by [VG] Manjula Soria, PT 09/04/18 1533      Row Name 09/04/18 1300             ADL Assessment/Intervention    64801 - OT Self Care/Mgmt Minutes 15  -HK      BADL Assessment/Intervention grooming;toileting  -HK      Recorded by [HK] Violette Smith, OT 09/04/18 1402      Row Name 09/04/18 1300             Grooming Assessment/Training    [place] Level (Grooming) oral care regimen;hair care, combing/brushing;wash face, hands;moderate assist (50% patient effort)  -HK      Grooming Position supported sitting  -HK      Comment (Grooming) Pt required modA to comb hair. Rinsed mouth and washed face with set up.    -HK      Recorded by [HK] Violette Smith, OT 09/04/18 1402      Row Name 09/04/18 1300             Toileting Assessment/Training    [place] Level (Toileting) toileting skills;set up  -HK      Assistive Devices (Toileting) urinal  -HK      Toileting Position supported sitting  -HK      Comment (Toileting) pt requires set up for use of urinal   -HK      Recorded by [HK]  Violette Smith, OT 09/04/18 1402      Row Name 09/04/18 1300             BADL Safety/Performance    Impairments, BADL Safety/Performance balance;strength;endurance/activity tolerance;shortness of breath;motor planning;motor control;trunk/postural control  -HK      Recorded by [HK] Violette Smith, OT 09/04/18 1402      Row Name 09/04/18 1504 09/04/18 1300          Therapeutic Exercise    27755 - PT Therapeutic Exercise Minutes 15  -VG  --     56822 - PT Therapeutic Activity Minutes 13  -VG  --     32460 - OT Therapeutic Exercise Minutes  -- 10  -HK     21619 - OT Therapeutic Activity Minutes  -- 7  -HK     Recorded by [VG] Manjula Soria, PT 09/04/18 1533 [HK] Violette Smith, OT 09/04/18 1402     Row Name 09/04/18 1504 09/04/18 1300          Therapeutic Exercise    Upper Extremity Range of Motion (Therapeutic Exercise)  -- shoulder flexion/extension, bilateral;shoulder abduction/adduction, bilateral;elbow flexion/extension, bilateral;wrist flexion/extension, bilateral  -HK     Hand (Therapeutic Exercise)  -- finger flexion/extension, bilateral;thumb finger opposition, bilateral  -HK     Lower Extremity (Therapeutic Exercise) LAQ (long arc quad), bilateral;marching while seated  -VG  --     Lower Extremity Range of Motion (Therapeutic Exercise) hip abduction/adduction, bilateral;ankle dorsiflexion/plantar flexion, bilateral  -VG  --     Exercise Type (Therapeutic Exercise) AROM (active range of motion);AAROM (active assistive range of motion)  -VG AROM (active range of motion)  -HK     Position (Therapeutic Exercise) seated  -VG seated  -HK     Sets/Reps (Therapeutic Exercise) 10-15  -VG 1/15  -HK     Comment (Therapeutic Exercise) Cues for technique. Increased pain with LLE ther ex. JERO LLE for LAQ.  -VG  --     Recorded by [VG] Manjula Soria, PT 09/04/18 1533 [HK] Violette Smith, OT 09/04/18 1402     Row Name 09/04/18 1504 09/04/18 1300          Positioning and Restraints    Pre-Treatment Position sitting in  chair/recliner  -VG in bed  -HK     Post Treatment Position chair  -VG chair  -HK     In Chair reclined;call light within reach;encouraged to call for assist;exit alarm on;legs elevated;on mechanical lift sling  -VG reclined;notified nsg;call light within reach;encouraged to call for assist;exit alarm on  -HK     Recorded by [VG] Manjula Soria, PT 09/04/18 1533 [HK] Violette Smith, OT 09/04/18 1402     Row Name 09/04/18 1300             Pain Assessment    Additional Documentation Pain Scale: FACES Pre/Post-Treatment (Group)  -HK      Recorded by [HK] Violette Smith, OT 09/04/18 1402      Row Name 09/04/18 1504             Pain Scale: Numbers Pre/Post-Treatment    Pain Scale: Numbers, Pretreatment 0/10 - no pain  -VG      Pain Scale: Numbers, Post-Treatment 0/10 - no pain  -VG      Recorded by [VG] Manjula Soria, PT 09/04/18 1533      Row Name 09/04/18 1300             Pain Scale: FACES Pre/Post-Treatment    Pain: FACES Scale, Pretreatment 4-->hurts little more  -HK      Pain: FACES Scale, Post-Treatment 4-->hurts little more  -HK      Recorded by [HK] Violette Smith, OT 09/04/18 1402      Row Name                Wound 08/20/18 1748 Bilateral lower coccyx    Wound - Properties Group Date first assessed: 08/20/18 [DONNIE] Time first assessed: 1748 [DONNIE] Present On Admission : yes [DONNIE] Side: Bilateral [JO2] Orientation: lower [DONNIE] Location: coccyx [DONNIE] Stage, Pressure Injury: Stage 2 [DONNIE] Recorded by:  [DONNIE] Zhang Sy LPN 08/20/18 1749 [JO2] Zhang Sy LPN 08/20/18 1751    Row Name                Wound 08/20/18 1601 Other (See comments) penis other (see comments)    Wound - Properties Group Date first assessed: 08/20/18 [DONNIE] Time first assessed: 1601 [DONNIE] Present On Admission : yes [DONNIE] Side: Other (See comments) [DONNIE] Location: penis [DONNIE] Type: other (see comments) [DONNIE] Recorded by:  [DONNIE] Zhang Sy LPN 08/20/18 1902    Row Name                Wound 08/21/18 0820 Right lower ischial tuberosity  pressure injury    Wound - Properties Group Date first assessed: 08/21/18 [AS] Time first assessed: 0820 [AS] Present On Admission : yes [AS] Side: Right [AS] Orientation: lower [AS] Location: ischial tuberosity [AS] Type: pressure injury [AS] Stage, Pressure Injury: Stage 2 [AS] Additional Comments: Chronic  [AS] Recorded by:  [AS] Dhaval Cardozo RN 08/21/18 0929    Row Name 09/04/18 1504             Coping    Observed Emotional State accepting  -VG      Verbalized Emotional State acceptance  -VG      Recorded by [VG] Manjula Soria, PT 09/04/18 1533      Row Name 09/04/18 1504 09/04/18 1300          Plan of Care Review    Plan of Care Reviewed With patient  -VG patient  -HK     Recorded by [VG] Manjula Soria, PT 09/04/18 1533 [HK] Violette Smith, OT 09/04/18 1402     Row Name 09/04/18 1300             Outcome Summary/Treatment Plan (OT)    Daily Summary of Progress (OT) progress towards functional goals is fair  -HK      Recorded by [HK] Violette Smith, OT 09/04/18 1402      Row Name 09/04/18 1504             Outcome Summary/Treatment Plan (PT)    Daily Summary of Progress (PT) progress towards functional goals is fair  -VG      Anticipated Discharge Disposition (PT) long term acute care facility  -VG      Recorded by [VG] Manjula Soria, PT 09/04/18 1533        User Key  (r) = Recorded By, (t) = Taken By, (c) = Cosigned By    Initials Name Effective Dates Discipline    Zhang Thorpe LPN 03/14/16 -  Nurse    AS Dhaval Cardozo RN 06/16/16 -  Nurse    HK Violette Smith, OT 03/07/18 -  OT    VG Manjula Soria, PT 05/29/18 -  PT          Rash 08/24/18 0800 perineum macular (Active)   Distribution generalized 9/3/2018  8:35 PM   Configuration/Shape asymmetric 9/4/2018 12:00 AM   Borders irregular 9/3/2018  8:35 PM   Characteristics pain/discomfort;itching;dry 9/3/2018  8:35 PM   Color red;pink 9/3/2018  8:35 PM   Care, Rash cleansed with;soap and water;open to air 9/3/2018  8:35 PM       Wound 08/20/18  1748 Bilateral lower coccyx (Active)   Dressing Appearance dry;intact 9/4/2018  8:00 AM   Closure None 9/3/2018  8:35 PM   Base dry;red/granulating;clean 9/3/2018  8:35 PM   Periwound blanchable;redness 9/3/2018  8:35 PM   Periwound Temperature warm 9/3/2018  8:35 PM   Periwound Skin Turgor soft 9/3/2018  8:35 PM   Edges irregular 9/3/2018  8:35 PM   Drainage Amount none 9/3/2018  8:35 PM   Care, Wound cleansed with;soap and water;barrier applied 9/3/2018  8:35 PM   Dressing Care, Wound dressing changed;low-adherent 9/3/2018  8:35 PM   Periwound Care, Wound dry periwound area maintained 9/3/2018  8:35 PM       Wound Bilateral leg blisters (Active)   Dressing Appearance dry;intact 9/4/2018  8:00 AM   Closure TERA 9/4/2018  8:00 AM   Base dressing in place, unable to visualize 9/4/2018  8:00 AM   Care, Wound marc boot 9/3/2018  8:35 PM       Wound 08/20/18 1601 Other (See comments) penis other (see comments) (Active)   Dressing Appearance open to air 9/4/2018 12:00 AM   Closure None 9/3/2018  8:35 PM   Base red/granulating 9/3/2018  8:35 PM   Edges irregular 9/3/2018  8:35 PM   Drainage Amount none 9/3/2018  8:35 PM       Wound 08/21/18 0820 Right lower ischial tuberosity pressure injury (Active)   Dressing Appearance dry;intact 9/4/2018 12:00 AM   Closure None 9/3/2018  8:35 PM   Base red/granulating;pink 9/3/2018  8:35 PM   Periwound blanchable;pink;intact 9/3/2018  8:35 PM   Periwound Temperature warm 9/3/2018  8:35 PM   Periwound Skin Turgor soft 9/3/2018  8:35 PM   Edges irregular 9/3/2018  8:35 PM   Drainage Amount none 9/3/2018  8:35 PM   Care, Wound cleansed with;soap and water;barrier applied 9/3/2018  8:35 PM   Dressing Care, Wound low-adherent;dressing changed 9/3/2018  8:35 PM             Physical Therapy Education     Title: PT OT SLP Therapies (Active)     Topic: Physical Therapy (Done)     Point: Mobility training (Done)    Learning Progress Summary     Learner Status Readiness Method Response Comment  Documented by    Patient Done Acceptance E VU Educated on HEP and correct mechanics for safe functional mobility. VG 09/04/18 1533     Active Acceptance E NR   09/02/18 1145          Point: Home exercise program (Done)    Learning Progress Summary     Learner Status Readiness Method Response Comment Documented by    Patient Done Acceptance E VU Educated on HEP and correct mechanics for safe functional mobility. VG 09/04/18 1533          Point: Precautions (Done)    Learning Progress Summary     Learner Status Readiness Method Response Comment Documented by    Patient Done Acceptance E VU Educated on HEP and correct mechanics for safe functional mobility. VG 09/04/18 1533     Active Acceptance E NR   09/02/18 1145                      User Key     Initials Effective Dates Name Provider Type Discipline     06/15/16 -  Tammie Raman, PT Physical Therapist PT     05/29/18 -  Manjula Soria PT Physical Therapist PT                    PT Recommendation and Plan  Anticipated Discharge Disposition (PT): long term acute care facility  Therapy Frequency (PT Clinical Impression): daily  Outcome Summary/Treatment Plan (PT)  Daily Summary of Progress (PT): progress towards functional goals is fair  Anticipated Discharge Disposition (PT): long term acute care facility  Plan of Care Reviewed With: patient  Progress: no change  Outcome Summary: Attempted sit to/from standing with RW, able to partially standing with MODA x 1, unable to fully stand d/t pain with wbing in BLEs. Completed LE ther ex. Will continue to progress pt as able per POC.          Outcome Measures     Row Name 09/04/18 1504 09/04/18 1300 09/02/18 1432       How much help from another person do you currently need...    Turning from your back to your side while in flat bed without using bedrails? 2  -VG  --  --    Moving from lying on back to sitting on the side of a flat bed without bedrails? 1  -VG  --  --    Moving to and from a bed to a chair  (including a wheelchair)? 1  -VG  --  --    Standing up from a chair using your arms (e.g., wheelchair, bedside chair)? 2  -VG  --  --    Climbing 3-5 steps with a railing? 1  -VG  --  --    To walk in hospital room? 1  -VG  --  --    AM-PAC 6 Clicks Score 8  -VG  --  --       How much help from another is currently needed...    Putting on and taking off regular lower body clothing?  -- 1  -HK 1  -KF    Bathing (including washing, rinsing, and drying)  -- 2  -HK 2  -KF    Toileting (which includes using toilet bed pan or urinal)  -- 1  -HK 1  -KF    Putting on and taking off regular upper body clothing  -- 2  -HK 2  -KF    Taking care of personal grooming (such as brushing teeth)  -- 2  -HK 3  -KF    Eating meals  -- 3  -HK 3  -KF    Score  -- 11  -HK 12  -KF       Functional Assessment    Outcome Measure Options AM-PAC 6 Clicks Basic Mobility (PT)  -VG AM-PAC 6 Clicks Daily Activity (OT)  -HK AM-PAC 6 Clicks Daily Activity (OT)  -KF    Row Name 09/02/18 1027             How much help from another person do you currently need...    Turning from your back to your side while in flat bed without using bedrails? 2  -LM      Moving from lying on back to sitting on the side of a flat bed without bedrails? 2  -LM      Moving to and from a bed to a chair (including a wheelchair)? 1  -LM      Standing up from a chair using your arms (e.g., wheelchair, bedside chair)? 1  -LM      Climbing 3-5 steps with a railing? 1  -LM      To walk in hospital room? 1  -LM      AM-PAC 6 Clicks Score 8  -LM         Functional Assessment    Outcome Measure Options AM-PAC 6 Clicks Basic Mobility (PT)  -LM        User Key  (r) = Recorded By, (t) = Taken By, (c) = Cosigned By    Initials Name Provider Type    LM Tammie Raman, PT Physical Therapist    HK Violette Smith, OT Occupational Therapist    KF Cadence Buckley, OT Occupational Therapist    VG Manjula Soria, PT Physical Therapist           Time Calculation:         PT Charges     Row  Name 09/04/18 1504             Time Calculation    Start Time 1504  -VG      PT Received On 09/04/18  -VG      PT Goal Re-Cert Due Date 09/12/18  -VG         Time Calculation- PT    Total Timed Code Minutes- PT 28 minute(s)  -VG         Timed Charges    45913 - PT Therapeutic Exercise Minutes 15  -VG      89321 - PT Therapeutic Activity Minutes 13  -VG        User Key  (r) = Recorded By, (t) = Taken By, (c) = Cosigned By    Initials Name Provider Type    VG Manjula Soria, PT Physical Therapist        Therapy Suggested Charges     Code   Minutes Charges    90154 (CPT®) Hc Pt Neuromusc Re Education Ea 15 Min      59262 (CPT®) Hc Pt Ther Proc Ea 15 Min 15 1    35812 (CPT®) Hc Gait Training Ea 15 Min      99587 (CPT®) Hc Pt Therapeutic Act Ea 15 Min 13 1    22536 (CPT®) Hc Pt Manual Therapy Ea 15 Min      06233 (CPT®) Hc Pt Iontophoresis Ea 15 Min      17439 (CPT®) Hc Pt Elec Stim Ea-Per 15 Min      86406 (CPT®) Hc Pt Ultrasound Ea 15 Min      62526 (CPT®) Hc Pt Self Care/Mgmt/Train Ea 15 Min      45693 (CPT®) Hc Pt Prosthetic (S) Train Initial Encounter, Each 15 Min      72194 (CPT®) Hc Pt Orthotic(S)/Prosthetic(S) Encounter, Each 15 Min      26687 (CPT®) Hc Orthotic(S) Mgmt/Train Initial Encounter, Each 15min      Total  28 2        Therapy Charges for Today     Code Description Service Date Service Provider Modifiers Qty    77575625956 HC PT THER PROC EA 15 MIN 9/4/2018 Manjula Soria, PT GP 1    07433299265 HC PT THERAPEUTIC ACT EA 15 MIN 9/4/2018 Manjula Soria, PT GP 1          PT G-Codes  Outcome Measure Options: AM-PAC 6 Clicks Basic Mobility (PT)  AM-PAC 6 Clicks Score: 8  Score: 11    Leonor Soria PT  9/4/2018

## 2018-09-04 NOTE — PLAN OF CARE
Problem: Patient Care Overview  Goal: Plan of Care Review   09/04/18 1534   Coping/Psychosocial   Plan of Care Reviewed With patient   Plan of Care Review   Progress no change   OTHER   Outcome Summary Attempted sit to/from standing with RW, able to partially standing with MODA x 1, unable to fully stand d/t pain with wbing in BLEs. Completed LE ther ex. Will continue to progress pt as able per POC.

## 2018-09-05 LAB
ANION GAP SERPL CALCULATED.3IONS-SCNC: 5 MMOL/L (ref 3–11)
BUN BLD-MCNC: 22 MG/DL (ref 9–23)
BUN/CREAT SERPL: 19 (ref 7–25)
CALCIUM SPEC-SCNC: 8.5 MG/DL (ref 8.7–10.4)
CHLORIDE SERPL-SCNC: 95 MMOL/L (ref 99–109)
CO2 SERPL-SCNC: 38 MMOL/L (ref 20–31)
CREAT BLD-MCNC: 1.16 MG/DL (ref 0.6–1.3)
GFR SERPL CREATININE-BSD FRML MDRD: 62 ML/MIN/1.73
GLUCOSE BLD-MCNC: 118 MG/DL (ref 70–100)
MAGNESIUM SERPL-MCNC: 2.2 MG/DL (ref 1.3–2.7)
POTASSIUM BLD-SCNC: 3.7 MMOL/L (ref 3.5–5.5)
SODIUM BLD-SCNC: 138 MMOL/L (ref 132–146)

## 2018-09-05 PROCEDURE — 94799 UNLISTED PULMONARY SVC/PX: CPT

## 2018-09-05 PROCEDURE — 97110 THERAPEUTIC EXERCISES: CPT

## 2018-09-05 PROCEDURE — 25010000002 HEPARIN (PORCINE) PER 1000 UNITS: Performed by: NURSE PRACTITIONER

## 2018-09-05 PROCEDURE — 29581 APPL MULTLAYER CMPRN SYS LEG: CPT

## 2018-09-05 PROCEDURE — 97597 DBRDMT OPN WND 1ST 20 CM/<: CPT

## 2018-09-05 PROCEDURE — 94760 N-INVAS EAR/PLS OXIMETRY 1: CPT

## 2018-09-05 PROCEDURE — 80048 BASIC METABOLIC PNL TOTAL CA: CPT | Performed by: NURSE PRACTITIONER

## 2018-09-05 PROCEDURE — 97530 THERAPEUTIC ACTIVITIES: CPT

## 2018-09-05 PROCEDURE — 99232 SBSQ HOSP IP/OBS MODERATE 35: CPT | Performed by: INTERNAL MEDICINE

## 2018-09-05 PROCEDURE — 94640 AIRWAY INHALATION TREATMENT: CPT

## 2018-09-05 PROCEDURE — 93010 ELECTROCARDIOGRAM REPORT: CPT | Performed by: INTERNAL MEDICINE

## 2018-09-05 PROCEDURE — 83735 ASSAY OF MAGNESIUM: CPT | Performed by: NURSE PRACTITIONER

## 2018-09-05 PROCEDURE — 25010000002 FUROSEMIDE PER 20 MG: Performed by: NURSE PRACTITIONER

## 2018-09-05 PROCEDURE — 97598 DBRDMT OPN WND ADDL 20CM/<: CPT

## 2018-09-05 PROCEDURE — 93005 ELECTROCARDIOGRAM TRACING: CPT | Performed by: NURSE PRACTITIONER

## 2018-09-05 RX ADMIN — FUROSEMIDE 40 MG: 10 INJECTION, SOLUTION INTRAMUSCULAR; INTRAVENOUS at 10:06

## 2018-09-05 RX ADMIN — IPRATROPIUM BROMIDE AND ALBUTEROL SULFATE 3 ML: 2.5; .5 SOLUTION RESPIRATORY (INHALATION) at 08:37

## 2018-09-05 RX ADMIN — MICONAZOLE NITRATE: 2 CREAM TOPICAL at 09:55

## 2018-09-05 RX ADMIN — BUDESONIDE AND FORMOTEROL FUMARATE DIHYDRATE 2 PUFF: 160; 4.5 AEROSOL RESPIRATORY (INHALATION) at 19:57

## 2018-09-05 RX ADMIN — BUDESONIDE AND FORMOTEROL FUMARATE DIHYDRATE 2 PUFF: 160; 4.5 AEROSOL RESPIRATORY (INHALATION) at 08:37

## 2018-09-05 RX ADMIN — MIRTAZAPINE 15 MG: 15 TABLET, ORALLY DISINTEGRATING ORAL at 21:19

## 2018-09-05 RX ADMIN — GABAPENTIN 400 MG: 400 CAPSULE ORAL at 21:19

## 2018-09-05 RX ADMIN — METOPROLOL TARTRATE 25 MG: 25 TABLET ORAL at 09:56

## 2018-09-05 RX ADMIN — MORPHINE SULFATE 30 MG: 30 TABLET, EXTENDED RELEASE ORAL at 09:55

## 2018-09-05 RX ADMIN — PANTOPRAZOLE SODIUM 40 MG: 40 TABLET, DELAYED RELEASE ORAL at 06:11

## 2018-09-05 RX ADMIN — IPRATROPIUM BROMIDE AND ALBUTEROL SULFATE 3 ML: 2.5; .5 SOLUTION RESPIRATORY (INHALATION) at 13:33

## 2018-09-05 RX ADMIN — CASTOR OIL AND BALSAM, PERU: 788; 87 OINTMENT TOPICAL at 21:20

## 2018-09-05 RX ADMIN — HEPARIN SODIUM 5000 UNITS: 5000 INJECTION, SOLUTION INTRAVENOUS; SUBCUTANEOUS at 13:10

## 2018-09-05 RX ADMIN — HEPARIN SODIUM 5000 UNITS: 5000 INJECTION, SOLUTION INTRAVENOUS; SUBCUTANEOUS at 21:20

## 2018-09-05 RX ADMIN — HYDROCODONE BITARTRATE AND ACETAMINOPHEN 1 TABLET: 10; 325 TABLET ORAL at 14:30

## 2018-09-05 RX ADMIN — CLOPIDOGREL BISULFATE 75 MG: 75 TABLET ORAL at 09:55

## 2018-09-05 RX ADMIN — MICONAZOLE NITRATE 1 APPLICATION: 2 CREAM TOPICAL at 21:20

## 2018-09-05 RX ADMIN — MORPHINE SULFATE 30 MG: 30 TABLET, EXTENDED RELEASE ORAL at 21:19

## 2018-09-05 RX ADMIN — IPRATROPIUM BROMIDE AND ALBUTEROL SULFATE 3 ML: 2.5; .5 SOLUTION RESPIRATORY (INHALATION) at 19:57

## 2018-09-05 RX ADMIN — GABAPENTIN 400 MG: 400 CAPSULE ORAL at 13:10

## 2018-09-05 RX ADMIN — HYDROCODONE BITARTRATE AND ACETAMINOPHEN 1 TABLET: 10; 325 TABLET ORAL at 00:00

## 2018-09-05 RX ADMIN — NICOTINE 1 PATCH: 21 PATCH, EXTENDED RELEASE TRANSDERMAL at 21:18

## 2018-09-05 RX ADMIN — ATORVASTATIN CALCIUM 40 MG: 40 TABLET, FILM COATED ORAL at 21:19

## 2018-09-05 RX ADMIN — CASTOR OIL AND BALSAM, PERU: 788; 87 OINTMENT TOPICAL at 09:55

## 2018-09-05 RX ADMIN — DOXYCYCLINE 100 MG: 100 CAPSULE ORAL at 21:19

## 2018-09-05 RX ADMIN — METOPROLOL TARTRATE 25 MG: 25 TABLET ORAL at 21:19

## 2018-09-05 RX ADMIN — DOXYCYCLINE 100 MG: 100 CAPSULE ORAL at 09:55

## 2018-09-05 RX ADMIN — HEPARIN SODIUM 5000 UNITS: 5000 INJECTION, SOLUTION INTRAVENOUS; SUBCUTANEOUS at 06:10

## 2018-09-05 RX ADMIN — ASPIRIN 81 MG: 81 TABLET, COATED ORAL at 09:55

## 2018-09-05 RX ADMIN — FLUTICASONE PROPIONATE 2 SPRAY: 50 SPRAY, METERED NASAL at 09:55

## 2018-09-05 RX ADMIN — Medication 2 TABLET: at 21:19

## 2018-09-05 RX ADMIN — GABAPENTIN 400 MG: 400 CAPSULE ORAL at 06:11

## 2018-09-05 NOTE — PLAN OF CARE
Problem: Patient Care Overview  Goal: Plan of Care Review  Outcome: Ongoing (interventions implemented as appropriate)   09/05/18 5807   Coping/Psychosocial   Plan of Care Reviewed With patient   OTHER   Outcome Summary BLEs cont to improve with most of the nonviable skin and scabbed areas removed today. Pt cont to have moderate erythema to feet and toes, but overall LE skin condition has improved tremendiously and pain is improving per pt report. Pt will benefit from cont use of gradient compression to help further reduce remaining edema and to protect fragile, newly reepithelialized areas

## 2018-09-05 NOTE — PLAN OF CARE
Problem: Patient Care Overview  Goal: Plan of Care Review   09/05/18 0083   Coping/Psychosocial   Plan of Care Reviewed With patient   Plan of Care Review   Progress no change   OTHER   Outcome Summary Completed bed mobility with MODA x 1. Attempted ARJO stand pivot and squat pivot with A x 1, unable to complete d/t inc back pain with movement. Completed stand pivot with MODA x 2 via BUE support. Mobility continues to be limited by LBP. Will continue to progress pt as able per POC.

## 2018-09-05 NOTE — PROGRESS NOTES
[facility] Medicine Services  PROGRESS NOTE    Patient Name: Yassine Love  : 1945  MRN: [id number]    Date of Admission: 2018  Length of Stay: 16  Primary Care Physician: Provider, No Known    Subjective     CC: f/u LE cellulitis    HPI:    Estimating in bed in no acute distress but still complains of severe weakness.  Had a long conversation with the patient.  Tells me that at home, where he lives alone, he is mainly wheelchair bound but is capable of moving himself from bed to wheelchair.  Also tells me occasionally he can walk with his walker.  Tells me that now he is too weak to stand on his feet.  Chest pain, no shortness of breath, no fever or chills.        Review of Systems   Gen- No fevers, chills  CV- No chest pain, palpitations  GI- No N/V/D, abd pain    Otherwise ROS is negative except as mentioned in the HPI.    Objective     Vital Signs:   Temp:  [98.3 °F (36.8 °C)-98.5 °F (36.9 °C)] 98.3 °F (36.8 °C)  Heart Rate:  [71-93] 72  Resp:  [16-17] 16  BP: ()/(60-63) 116/62        Physical Exam:  Constitutional: No acute distress, drowsy  Respiratory: clear to ausculatation, on high flow NC  Cardiovascular: RRR, no murmurs, rubs, or gallops, palpable pedal pulses bilaterally  Gastrointestinal: Positive bowel sounds, soft, nontender, nondistended  Musculoskeletal: BLEs with UNNA boots in place 2+ BLE edema, chronic venous stasis with weeping open skin. Moderate edema to BUEs L>R with diffuse ecchymosis.   Psychiatric: Appropriate affect, cooperative  Neurologic: drowsy but interactive, no focal deficits    Results Reviewed:  I have personally reviewed current lab, radiology, and data and agree.          Results from last 7 days  Lab Units 18  0704 18  0503 18  0557   SODIUM mmol/L 138 140 139   POTASSIUM mmol/L 3.7 3.8 4.4   CHLORIDE mmol/L 95* 95* 96*   CO2 mmol/L 38.0* 44.0* 38.0*   BUN mg/dL 22 22 19   CREATININE mg/dL 1.16 1.32* 1.25    GLUCOSE mg/dL 118* 125* 100   CALCIUM mg/dL 8.5* 8.6* 8.5*     Estimated Creatinine Clearance: 61 mL/min (by C-G formula based on SCr of 1.16 mg/dL).     No results found for: BNP    Microbiology Results Abnormal     Procedure Component Value - Date/Time    Blood Culture - Blood, [[id number]]  (Normal) Collected:  08/20/18 1445    Lab Status:  Final result Specimen:  Blood from Wrist, Right Updated:  08/25/18 1500     Blood Culture No growth at 5 days    Blood Culture - Blood, [[id number]]  (Normal) Collected:  08/20/18 1430    Lab Status:  Final result Specimen:  Blood from Arm, Right Updated:  08/25/18 1500     Blood Culture No growth at 5 days    Wound Culture - Wound, Penis [[id number]]  (Abnormal)  (Susceptibility) Collected:  08/20/18 1930    Lab Status:  Final result Specimen:  Wound from Penis Updated:  08/23/18 1427     Wound Culture Scant growth (1+) Providencia rettgeri (A)      Scant growth (1+) Staphylococcus aureus (A)      Scant growth (1+) Staphylococcus haemolyticus (A)     Comment: This isolate is presumed to be clindamyin resistant based on detection of inducible clindamycin resistance. Clindamycin may still be effective in some patients.          Gram Stain Result No WBCs or organisms seen    Susceptibility      Providencia rettgeri    Staphylococcus aureus       FAVIAN    FAVIAN       Ampicillin + Sulbactam <=8/4 ug/ml Susceptible             Aztreonam <=8 ug/ml Susceptible             Cefepime <=8 ug/ml Susceptible             Cefotaxime <=2 ug/ml Susceptible             Ceftriaxone <=8 ug/ml Susceptible    <=8 ug/ml Susceptible       Cefuroxime sodium <=4 ug/ml Susceptible             Clindamycin       <=0.5 ug/ml Susceptible       Daptomycin       <=0.5 ug/ml Susceptible       Ertapenem <=1 ug/ml Susceptible             Erythromycin       <=0.5 ug/ml Susceptible       Gentamicin <=4 ug/ml Susceptible    <=4 ug/ml Susceptible       Levofloxacin <=2 ug/ml Susceptible    <=1 ug/ml Susceptible  [1]         Linezolid       2 ug/ml Susceptible       Meropenem <=1 ug/ml Susceptible             Oxacillin       <=0.25 ug/ml Susceptible       Penicillin G       <=0.03 ug/ml Susceptible       Piperacillin + Tazobactam <=16 ug/ml Susceptible             Quinupristin + Dalfopristin       <=0.5 ug/ml Susceptible  [2]        Rifampin       <=1 ug/ml Susceptible       Streptomycin High Level Synergy       <=0.5 ug/ml Susceptible       Tetracycline       <=4 ug/ml Susceptible       Tobramycin <=4 ug/ml Susceptible             Trimethoprim + Sulfamethoxazole <=2/38 ug/ml Susceptible    <=0.5/9.5 ug/ml Susceptible       Vancomycin       2 ug/ml Susceptible              [1]   Staphylococcus species may develop resistance during prolonged therapy with quinolones.  Isolates that are initially susceptible may become resistant within three to four days after initiation of therapy. Testing of repeat isolates may be warranted.       [2]   Appended report. These results have been appended to a previously preliminary verified report.               Susceptibility      Staphylococcus haemolyticus     FAVIAN     Ciprofloxacin >2 ug/ml Resistant     Clindamycin  Resistant     Daptomycin <=0.5 ug/ml Susceptible     Erythromycin >4 ug/ml Resistant     Gentamicin <=4 ug/ml Susceptible     Levofloxacin >4 ug/ml Resistant     Linezolid <=1 ug/ml Susceptible     Oxacillin >2 ug/ml Resistant     Penicillin G >8 ug/ml Resistant     Quinupristin + Dalfopristin <=0.5 ug/ml Susceptible     Rifampin <=1 ug/ml Susceptible     Tetracycline <=4 ug/ml Susceptible     Trimethoprim + Sulfamethoxazole <=0.5/9.5 ug/ml Susceptible     Vancomycin 2 ug/ml Susceptible                    Wound Culture - Wound, Leg, Left [[id number]]  (Abnormal)  (Susceptibility) Collected:  08/20/18 1930    Lab Status:  Final result Specimen:  Wound from Leg, Left Updated:  08/22/18 4655     Wound Culture Moderate growth (3+) Pseudomonas aeruginosa (A)      Scant growth (1+)  Streptococcus, Beta Hemolytic, Group G (A)     Comment:   If Clindamycin or Erythromycin is the drug of choice, notify the laboratory within 7 days to request susceptibility testing.        STREP GROUPING G     Gram Stain Result No WBCs seen      Many (4+) Gram negative bacilli    Susceptibility      Pseudomonas aeruginosa     FAVIAN     Aztreonam <=8 ug/ml Susceptible     Cefepime <=8 ug/ml Susceptible     Ceftazidime <=1 ug/ml Susceptible     Gentamicin <=4 ug/ml Susceptible     Levofloxacin <=2 ug/ml Susceptible     Meropenem <=1 ug/ml Susceptible     Piperacillin + Tazobactam >64 ug/ml Resistant     Tobramycin <=4 ug/ml Susceptible                    KOH Prep - Skin, Foot, Left [[id number]]  (Normal) Collected:  08/20/18 1501    Lab Status:  Final result Specimen:  Skin from Foot, Left Updated:  08/20/18 1528     KOH Prep No yeast or hyphal elements seen        Imaging Results (last 24 hours)     ** No results found for the last 24 hours. **        Results for orders placed during the hospital encounter of 08/20/18   Adult Transthoracic Echo Complete W/ Cont if Necessary Per Protocol    Narrative · Left ventricular systolic function is normal. Estimated EF = 65%.  · There is moderate calcification of the aortic valve mainly affecting the   non coronary cusp(s).  · Right ventricular cavity is mildly dilated.        I have reviewed the medications.    aspirin 81 mg Oral Daily   atorvastatin 40 mg Oral Nightly   budesonide-formoterol 2 puff Inhalation BID - RT   castor oil-balsam peru  Topical Q12H   clopidogrel 75 mg Oral Daily   doxycycline 100 mg Oral Q12H   fluticasone 2 spray Each Nare Daily   furosemide 40 mg Intravenous Daily   gabapentin 400 mg Oral Q8H   heparin (porcine) 5,000 Units Subcutaneous Q8H   ipratropium-albuterol 3 mL Nebulization TID   metoprolol tartrate 25 mg Oral Q12H   miconazole  Topical Q12H   mirtazapine 15 mg Oral Nightly   Morphine 30 mg Oral Q12H   nicotine 1 patch Transdermal Q24H    pantoprazole 40 mg Oral Q AM   Pharmacy Meds to Bed Consult  Does not apply Daily   sennosides-docusate sodium 2 tablet Oral Nightly     Assessment / Plan     Hospital Problem List     * (Principal)Cellulitis of lower extremity    RAFAEL (acute kidney injury) (CMS/[facility])    PVD (peripheral vascular disease) (CMS/[facility])    Cellulitis of both lower extremities    COPD (chronic obstructive pulmonary disease) (CMS/[facility])    Essential hypertension    Non-sustained ventricular tachycardia (CMS/[facility])    Coronary artery disease involving native coronary artery without angina pectoris    Debility    Decubitus ulcer of buttock, stage 2    Chronic pain        Brief Hospital Course to date:  Yassine Love is a 72 y.o. male with PMH significant for recurring BLE cellulitis (has seen Dr. Belle in the past), PVD, prediabetes, chronic pain, HTN, CAD, COPD, and ongoing tobacco abuse.  He presented to [facility] ER today with worsening BLE pain and redness, weeping, and open lesions (left worse than right), which he tells me have been progressively worsening over last 2 weeks.     Assessment & Plan:    - Complex medico-social issues.   - Patient lives alone. Reports that he is usually able to ambulate with a walker (but his legs had been weak and giving out on him prior to admission) and able to perform ADLs. Has neighbors and friends who help him out a lot with groceries, etc. He no longer drives.   - He has significant POAD with superimposed cellulitis, suspect L>R from physical exam. He is at risk for limb loss. Discussed with pt, he does not want any aggressive intervention/surgery, but may be open to endovascular intervention if possible. Arterial duplex showed no occlusion on either side.   - LE wound culture and penis wound culture as above, defer to ID for abx management  - ID (Dr Belle) following, have converted to Doxycycline PO   - PT wound care following, will need continued UNNA boot therapy following d/c   - LUE superficial thrombus-  symptomatic mgmt  - Echocardiogram unremarkable (EF 65%),  - CXR 9.3 stable, continue to diurese with IV lasix today in attempts to wean O2  - am bmp  - mag replaced, continue to monitor on tele. EKG with slightly increase QT from previous. meds checked as SE. Repeat EKG in am    - WOC, PT, OT to eval and treat  - CM/SW for DC planning- patient at this point agreeable to [facility] vs [facility], but will need to continue to work more with therapy before these facilities will accept him and wean of high flow O2    - tobacco cessation advised    Plan:  - cont current care  - Had a long discussion with the patient and he agrees that besides his weakness that is at the physical therapy not match is to be done which is required hospitalization.  He told me that he will think about possibly going to rehabilitation.    DVT Prophylaxis:  Hep sq    CODE STATUS:   Code Status and Medical Interventions:   Ordered at: 08/29/18 0406     Limited Support to NOT Include:    Intubation     Level Of Support Discussed With:    Patient     Code Status:    No CPR     Medical Interventions (Level of Support Prior to Arrest):    Limited     Disposition: Rehab if he is agreeable, otherwise, home with home health.    Concerned that this patient is unable to care for himself at home successfully.     Electronically signed by Raoul Mccarthy MD, 09/05/18, 5:12 PM.

## 2018-09-05 NOTE — THERAPY TREATMENT NOTE
Acute Care - Physical Therapy Treatment Note  [facility]     Patient Name: Yassine Love  : 1945  MRN: [id number]  Today's Date: 2018  Onset of Illness/Injury or Date of Surgery: 18  Date of Referral to PT: 18  Referring Physician: BRITTNY Alvares    Admit Date: 2018    Visit Dx:    ICD-10-CM ICD-9-CM   1. Cellulitis of left lower leg L03.116 682.6   2. Peripheral edema R60.9 782.3   3. Stasis dermatitis of both legs I87.2 454.1   4. Recurrent cellulitis of lower leg L03.119 682.6   5. Chronic pain syndrome G89.4 338.4   6. History of hypertension Z86.79 V12.59   7. Impaired functional mobility, balance, gait, and endurance Z74.09 V49.89   8. Impaired mobility and ADLs Z74.09 799.89     Patient Active Problem List   Diagnosis   • Cellulitis of right lower extremity   • RAFAEL (acute kidney injury) (CMS/[facility])   • PVD (peripheral vascular disease) (CMS/[facility])   • Cellulitis of both lower extremities   • COPD (chronic obstructive pulmonary disease) (CMS/[facility])   • Essential hypertension   • Hypoxia   • Cellulitis of lower extremity   • Non-sustained ventricular tachycardia (CMS/[facility])   • Coronary artery disease involving native coronary artery without angina pectoris   • Cellulitis of left lower leg   • Debility   • Decubitus ulcer of buttock, stage 2   • Chronic pain       Therapy Treatment          Rehabilitation Treatment Summary     Row Name 18 1400 18 0845          Treatment Time/Intention    Discipline physical therapist  -VG physical therapist  -MF     Document Type therapy note (daily note);wound care  -VG therapy note (daily note);wound care  -MF     Subjective Information complains of;pain  -VG complains of;weakness;fatigue;pain  -MF     Mode of Treatment individual therapy;physical therapy  -VG  --     Patient/Family Observations In bed, supplemental O2, tele, bed check; no family present.  -VG  --     Care Plan Review patient/other agree to care plan  -VG  --      Therapy Frequency (PT Clinical Impression) daily  -VG  --     Patient Effort adequate  -VG  --     Existing Precautions/Restrictions fall;oxygen therapy device and L/min  -VG  --     Recorded by [VG] Manjula Soria, PT 09/05/18 1449 [MF] Carlos Hassan, PT 09/05/18 0957     Row Name 09/05/18 1400             Cognitive Assessment/Intervention- PT/OT    Affect/Mental Status (Cognitive) flat/blunted affect  -VG      Orientation Status (Cognition) oriented x 4  -VG      Follows Commands (Cognition) follows one step commands;75-90% accuracy  -VG      Cognitive Function (Cognitive) safety deficit  -VG      Safety Deficit (Cognitive) mild deficit;insight into deficits/self awareness;impulsivity  -VG      Personal Safety Interventions fall prevention program maintained;gait belt;nonskid shoes/slippers when out of bed;supervised activity  -VG      Recorded by [VG] Manjula Soria, PT 09/05/18 1449      Row Name 09/05/18 1400             Bed Mobility Assessment/Treatment    Bed Mobility Assessment/Treatment supine-sit  -VG      Supine-Sit [place] (Bed Mobility) moderate assist (50% patient effort)  -VG      Bed Mobility, Safety Issues decreased use of arms for pushing/pulling;decreased use of legs for bridging/pushing  -VG      Assistive Device (Bed Mobility) bed rails;head of bed elevated  -VG      Comment (Bed Mobility) Cues for sequencing. MODA at trunk d/t LBP.  -VG      Recorded by [VG] Manjula Soria, PT 09/05/18 1449      Row Name 09/05/18 1400             Transfer Assessment/Treatment    Transfer Assessment/Treatment stand pivot/stand step transfer  -VG      Comment (Transfers) Attempted stand pivot with ARJO and squat pivot with x 1, unsuccessful d/t increased LBP with movement. Completed stand pivot with MODA x 2 via BUE support. Cues for hand placement and sequencing.  -VG      Recorded by [VG] Manjula Soria, PT 09/05/18 1449      Row Name 09/05/18 1400             Stand Pivot/Stand Step Transfer     Stand Pivot/Stand Step [place] moderate assist (50% patient effort);2 person assist;verbal cues  -VG      Assistive Device (Stand Pivot Stand Step Transfer) other (see comments)   BUE support  -VG      Recorded by [VG] Manjula Soria, PT 09/05/18 1449      Row Name 09/05/18 1400             Gait/Stairs Assessment/Training    Comment (Gait/Stairs) Non ambulatory at this time.  -VG      Recorded by [VG] Manjula Soria, PT 09/05/18 1449      Row Name 09/05/18 1400             Therapeutic Exercise    13224 - PT Therapeutic Exercise Minutes 14  -VG      14575 - PT Therapeutic Activity Minutes 30  -VG      Recorded by [VG] Manjula Soria, PT 09/05/18 1449      Row Name 09/05/18 1400             Therapeutic Exercise    Lower Extremity (Therapeutic Exercise) heel slides, bilateral;SLR (straight leg raise), bilateral  -VG      Lower Extremity Range of Motion (Therapeutic Exercise) hip abduction/adduction, bilateral;ankle dorsiflexion/plantar flexion, bilateral  -VG      Exercise Type (Therapeutic Exercise) AROM (active range of motion);AAROM (active assistive range of motion)  -VG      Position (Therapeutic Exercise) seated  -VG      Sets/Reps (Therapeutic Exercise) 10  -VG      Comment (Therapeutic Exercise) Cues for technique. JERO @ LLE for SLR and hip abd/add  -VG      Recorded by [VG] Manjula Soria, PT 09/05/18 1449      Row Name 09/05/18 1400 09/05/18 0845          Positioning and Restraints    Pre-Treatment Position in bed  -VG in bed  -     Post Treatment Position chair  -VG bed  -     In Bed  -- supine;call light within reach  -     In Chair reclined;call light within reach;encouraged to call for assist;waffle cushion;on mechanical lift sling;legs elevated  -VG  --     Recorded by [VG] Manjula Soria, PT 09/05/18 1449 [] Carlos Hassan, PT 09/05/18 0957     Row Name 09/05/18 0845             Pain Assessment    Additional Documentation Pain Scale: FACES Pre/Post-Treatment (Group)  -       Recorded by [MF] Carlos Hassan, PT 09/05/18 0957      Row Name 09/05/18 1400             Pain Scale: Numbers Pre/Post-Treatment    Pain Scale: Numbers, Pretreatment 8/10  -VG      Pain Scale: Numbers, Post-Treatment 8/10  -VG      Pain Location back  -VG      Pain Intervention(s) Ambulation/increased activity;Repositioned;Other (Comment)   notified [facility] of pain med request  -VG      Recorded by [VG] Manjula Soria, PT 09/05/18 1449      Row Name 09/05/18 0845             Pain Scale: FACES Pre/Post-Treatment    Pain: FACES Scale, Pretreatment 4-->hurts little more  -MF      Pain: FACES Scale, Post-Treatment 4-->hurts little more  -MF      Recorded by [MF] Carlos Hassan, PT 09/05/18 0957      Row Name                Wound 08/20/18 1748 Bilateral lower coccyx    Wound - Properties Group Date first assessed: 08/20/18 [DONNIE] Time first assessed: 1748 [DONNIE] Present On Admission : yes [DONNIE] Side: Bilateral [JO2] Orientation: lower [DONNIE] Location: coccyx [DONNIE] Stage, Pressure Injury: Stage 2 [DONNIE] Recorded by:  [DONNIE] Zhang Sy LPN 08/20/18 1749 [JO2] Zhang Sy, LPN 08/20/18 1751    Row Name 09/05/18 0845             Wound Bilateral leg blisters    Wound - Properties Group Present On Admission : yes [DONNIE] Side: Bilateral [DONNIE] Location: leg [DONNIE] Type: blisters [DONNIE] Additional Comments: bilateral leg cellulitis. Sloughing  [DONNIE] Recorded by:  [DONNIE] Zhang Sy LPN 08/20/18 1750    Wound Image Images linked: 4  -MF      Dressing Appearance dry;intact  -MF      Base clean;pink   reepithelializing   -MF      Periwound dry;redness;swelling  -MF      Periwound Temperature warm  -MF      Periwound Skin Turgor soft  -MF      Edges irregular  -MF      Drainage Characteristics/Odor serosanguineous  -MF      Drainage Amount scant  -      Care, Wound cleansed with;soap and water;debrided  -      Dressing Care, Wound petroleum-based;multi-layer wrap  -      Recorded by [MF] Carlos Hassan, PT 09/05/18  0957      Row Name                Wound 08/20/18 1601 Other (See comments) penis other (see comments)    Wound - Properties Group Date first assessed: 08/20/18 [DONNIE] Time first assessed: 1601 [DONNIE] Present On Admission : yes [DONNIE] Side: Other (See comments) [DONNIE] Location: penis [DONNIE] Type: other (see comments) [DONNIE] Recorded by:  [DONNIE] Zhang Sy LPN 08/20/18 1902    Row Name                Wound 08/21/18 0820 Right lower ischial tuberosity pressure injury    Wound - Properties Group Date first assessed: 08/21/18 [AS] Time first assessed: 0820 [AS] Present On Admission : yes [AS] Side: Right [AS] Orientation: lower [AS] Location: ischial tuberosity [AS] Type: pressure injury [AS] Stage, Pressure Injury: Stage 2 [AS] Additional Comments: Chronic  [AS] Recorded by:  [AS] Dhaval Cardozo RN 08/21/18 0929    Row Name 09/05/18 1400 09/05/18 0845          Coping    Observed Emotional State accepting  -VG calm  -MF     Verbalized Emotional State acceptance  -VG acceptance  -MF     Recorded by [VG] Manjula Soria, PT 09/05/18 1449 [MF] Carlos Hassan, PT 09/05/18 0957     Row Name 09/05/18 1400 09/05/18 0845          Plan of Care Review    Plan of Care Reviewed With patient  -VG patient  -MF     Recorded by [VG] Manjula Soria, PT 09/05/18 1449 [MF] Carlos Hassan, PT 09/05/18 0957     Row Name 09/05/18 1400             Outcome Summary/Treatment Plan (PT)    Daily Summary of Progress (PT) progress towards functional goals is fair  -VG      Anticipated Discharge Disposition (PT) long term acute care facility  -VG      Recorded by [VG] Manjula Soria, PT 09/05/18 1449        User Key  (r) = Recorded By, (t) = Taken By, (c) = Cosigned By    Initials Name Effective Dates Discipline    Carlos Lawrence, PT 06/19/15 -  PT    Zhang Thorpe LPN 03/14/16 -  Nurse    AS Dhaval Cardozo RN 06/16/16 -  Nurse    Manjula Carias, PT 05/29/18 -  PT          Rash 08/24/18 0800 perineum macular (Active)    Distribution generalized 9/4/2018  8:00 PM   Configuration/Shape asymmetric 9/5/2018  4:00 AM   Borders irregular 9/4/2018  8:00 PM   Characteristics pain/discomfort;dry 9/4/2018  8:00 PM   Color red;pink 9/4/2018  8:00 PM   Care, Rash cleansed with;soap and water 9/4/2018  8:00 PM       Wound 08/20/18 1748 Bilateral lower coccyx (Active)   Dressing Appearance dry;intact 9/5/2018  4:00 AM   Closure None 9/4/2018  8:00 PM   Base dry;red/granulating;clean 9/4/2018  8:00 PM   Periwound blanchable;redness 9/4/2018  8:00 PM   Periwound Temperature warm 9/4/2018  8:00 PM   Periwound Skin Turgor soft 9/4/2018  8:00 PM   Edges irregular 9/4/2018  8:00 PM   Drainage Amount none 9/4/2018  8:00 PM   Care, Wound cleansed with;soap and water 9/4/2018  8:00 PM   Dressing Care, Wound dressing changed;low-adherent 9/4/2018  8:00 PM       Wound Bilateral leg blisters (Active)   Wound Image      9/5/2018  8:45 AM   Dressing Appearance dry;intact 9/5/2018  8:45 AM   Closure None 9/4/2018  8:00 PM   Base clean;pink 9/5/2018  8:45 AM   Periwound dry;redness;swelling 9/5/2018  8:45 AM   Periwound Temperature warm 9/5/2018  8:45 AM   Periwound Skin Turgor soft 9/5/2018  8:45 AM   Edges irregular 9/5/2018  8:45 AM   Drainage Characteristics/Odor serosanguineous 9/5/2018  8:45 AM   Drainage Amount scant 9/5/2018  8:45 AM   Care, Wound cleansed with;soap and water;debrided 9/5/2018  8:45 AM   Dressing Care, Wound petroleum-based;multi-layer wrap 9/5/2018  8:45 AM   Periwound Care, Wound dry periwound area maintained 9/4/2018  8:00 PM       Wound 08/20/18 1601 Other (See comments) penis other (see comments) (Active)   Dressing Appearance open to air 9/5/2018  4:00 AM   Closure None 9/4/2018  8:00 PM   Base red/granulating 9/4/2018  8:00 PM   Edges irregular 9/4/2018  8:00 PM   Drainage Amount none 9/4/2018  8:00 PM       Wound 08/21/18 0820 Right lower ischial tuberosity pressure injury (Active)   Dressing Appearance intact 9/5/2018  4:00  AM   Closure None 9/4/2018  8:00 PM   Base pink 9/4/2018  8:00 PM   Periwound blanchable;pink;intact 9/4/2018  8:00 PM   Periwound Temperature warm 9/4/2018  8:00 PM   Periwound Skin Turgor soft 9/4/2018  8:00 PM   Edges irregular 9/4/2018  8:00 PM   Drainage Amount none 9/4/2018  8:00 PM   Care, Wound cleansed with;soap and water 9/4/2018  8:00 PM   Dressing Care, Wound low-adherent 9/4/2018  8:00 PM   Periwound Care, Wound dry periwound area maintained 9/4/2018  8:00 PM             Physical Therapy Education     Title: PT OT SLP Therapies (Active)     Topic: Physical Therapy (Done)     Point: Mobility training (Done)    Learning Progress Summary     Learner Status Readiness Method Response Comment Documented by    Patient Done Acceptance E VU Educated on HEP and correct mechanics for safe functional mobility.  09/05/18 1449     Done Acceptance E VU Educated on HEP and correct mechanics for safe functional mobility.  09/04/18 1533     Active Acceptance E NR   09/02/18 1145          Point: Home exercise program (Done)    Learning Progress Summary     Learner Status Readiness Method Response Comment Documented by    Patient Done Acceptance E VU Educated on HEP and correct mechanics for safe functional mobility.  09/05/18 1449     Done Acceptance E VU Educated on HEP and correct mechanics for safe functional mobility.  09/04/18 1533          Point: Precautions (Done)    Learning Progress Summary     Learner Status Readiness Method Response Comment Documented by    Patient Done Acceptance E VU Educated on HEP and correct mechanics for safe functional mobility.  09/05/18 1449     Done Acceptance E VU Educated on HEP and correct mechanics for safe functional mobility.  09/04/18 1533     Active Acceptance E NR   09/02/18 1145                      User Key     Initials Effective Dates Name Provider Type Discipline     06/15/16 -  Tammie Raman, PT Physical Therapist PT     05/29/18 -  Manjula Soria  PT Physical Therapist PT                    PT Recommendation and Plan  Anticipated Discharge Disposition (PT): long term acute care facility  Therapy Frequency (PT Clinical Impression): daily  Outcome Summary/Treatment Plan (PT)  Daily Summary of Progress (PT): progress towards functional goals is fair  Anticipated Discharge Disposition (PT): long term acute care facility  Plan of Care Reviewed With: patient  Progress: no change  Outcome Summary: Completed bed mobility with MODA x 1. Attempted ARJO stand pivot and squat pivot with A x 1, unable to complete d/t inc back pain with movement. Completed stand pivot with MODA x 2 via BUE support. Mobility continues to be limited by LBP. Will continue to progress pt as able per POC.          Outcome Measures     Row Name 09/05/18 1400 09/04/18 1504 09/04/18 1300       How much help from another person do you currently need...    Turning from your back to your side while in flat bed without using bedrails? 3  -VG 2  -VG  --    Moving from lying on back to sitting on the side of a flat bed without bedrails? 3  -VG 1  -VG  --    Moving to and from a bed to a chair (including a wheelchair)? 2  -VG 1  -VG  --    Standing up from a chair using your arms (e.g., wheelchair, bedside chair)? 2  -VG 2  -VG  --    Climbing 3-5 steps with a railing? 1  -VG 1  -VG  --    To walk in hospital room? 1  -VG 1  -VG  --    AM-PAC 6 Clicks Score 12  -VG 8  -VG  --       How much help from another is currently needed...    Putting on and taking off regular lower body clothing?  --  -- 1  -HK    Bathing (including washing, rinsing, and drying)  --  -- 2  -HK    Toileting (which includes using toilet bed pan or urinal)  --  -- 1  -HK    Putting on and taking off regular upper body clothing  --  -- 2  -HK    Taking care of personal grooming (such as brushing teeth)  --  -- 2  -HK    Eating meals  --  -- 3  -HK    Score  --  -- 11  -HK       Functional Assessment    Outcome Measure Options AM-PAC  6 Clicks Basic Mobility (PT)  -VG AM-PAC 6 Clicks Basic Mobility (PT)  -VG AM-PAC 6 Clicks Daily Activity (OT)  -HK      User Key  (r) = Recorded By, (t) = Taken By, (c) = Cosigned By    Initials Name Provider Type     Violette Smith, OT Occupational Therapist    VG Manjula Soria, PT Physical Therapist           Time Calculation:         PT Charges     Row Name 09/05/18 1400 09/05/18 0845          Time Calculation    Start Time 1400  -VG 0845  -     PT Received On 09/05/18  -VG  --     PT Goal Re-Cert Due Date 09/12/18  -VG 09/12/18  -        Time Calculation- PT    Total Timed Code Minutes- PT 44 minute(s)  -VG  --        Timed Charges    21215 - PT Therapeutic Exercise Minutes 14  -VG  --     32779 - PT Therapeutic Activity Minutes 30  -VG  --       User Key  (r) = Recorded By, (t) = Taken By, (c) = Cosigned By    Initials Name Provider Type     Carlos Hassan, PT Physical Therapist    Manjula Carias, PT Physical Therapist        Therapy Suggested Charges     Code   Minutes Charges    13057 (CPT®) Hc Pt Neuromusc Re Education Ea 15 Min      72646 (CPT®) Hc Pt Ther Proc Ea 15 Min 14 1    94691 (CPT®) Hc Gait Training Ea 15 Min      99223 (CPT®) Hc Pt Therapeutic Act Ea 15 Min 30 2    08127 (CPT®) Hc Pt Manual Therapy Ea 15 Min      22765 (CPT®) Hc Pt Iontophoresis Ea 15 Min      66607 (CPT®) Hc Pt Elec Stim Ea-Per 15 Min      22483 (CPT®) Hc Pt Ultrasound Ea 15 Min      29654 (CPT®) Hc Pt Self Care/Mgmt/Train Ea 15 Min      32322 (CPT®) Hc Pt Prosthetic (S) Train Initial Encounter, Each 15 Min      90931 (CPT®) Hc Pt Orthotic(S)/Prosthetic(S) Encounter, Each 15 Min      17253 (CPT®) Hc Orthotic(S) Mgmt/Train Initial Encounter, Each 15min      Total  44 3        Therapy Charges for Today     Code Description Service Date Service Provider Modifiers Qty    47970159578 HC PT THER PROC EA 15 MIN 9/4/2018 Manjula Soria, PT GP 1    76074299550 HC PT THERAPEUTIC ACT EA 15 MIN 9/4/2018 Manjula Soria  FRANSICO, PT GP 1    12882791084  PT THER PROC EA 15 MIN 9/5/2018 Manjula Soria, PT GP 1    20924595958 HC PT THERAPEUTIC ACT EA 15 MIN 9/5/2018 Manjula Soria, PT GP 2    01381502986 HC PT THER SUPP EA 15 MIN 9/5/2018 Manjula Soria, PT GP 1          PT G-Codes  Outcome Measure Options: AM-PAC 6 Clicks Basic Mobility (PT)  AM-PAC 6 Clicks Score: 12  Score: 11    Leonor Soria PT  9/5/2018

## 2018-09-05 NOTE — PROGRESS NOTES
"Yassine Love  1945  [id number]  9/5/2018    CC: BLE wounds  Chief Complaint   Patient presents with   • Cellulitis       Yassine Love is a 72 y.o. male here for CC leg infection  History of present illness:    Yassine Love is a 72 y.o. male well known to us from prior admissions, who was sent to the [facility] ED by home health nurse due to BLE redness and drainage.  He reports the redness has been present for several weeks with increasing pain and drainage in that period. During his prior admission here at [facility] his cultures grew PSA, ecoli, proteus, enterococcus and strep from his RLE.  Patient currently denies any fever, chills, or sweats.  Patient is tolerating antibiotics.  Patient reports he needs his knees and hips replaced but can not have surgery until his wounds have healed.  Patient was admitted to the hospitalist and started on Merrem and Vancomycin.  Patient doesn't report severe pain in his feet but does report joint pain.  8/22 - co leg pain  Co SOA  No fever or rash  \"I am not losing my legs!\"  Co hip and knee pain  8/23- co leg infections, denies fever, chills, night sweats, nausea, vomiting, diarrhea, rash, and cough  8/24/18 - Patient complains of pain in his hips and knees.  Patient denies any fever, chills, or sweats.  Patient denies any nausea, vomiting, or diarrhea.     seen and agree  8/25 - co penile lesion  Co leg infection  \"They may put in a stent\"  No fever  8/27/18 - C/O swelling in BUE.  Superficial clot found on ultrasound.  \"I can't go until this fluid is taken care of\"  Denies any fever, chills, or sweats.  Patient denies any nausea, vomiting, or diarrhea.  Seen and agree  8/28/18 - C/O BUE swelling as well as scrotal edema.  Patient denies any fever, chills, or sweats.  Patient denies any nausea, vomiting, or diarrhea.  Seen and agree  8/29/18 - Still concerned about swelling.  Patient denies any fever, chills, or sweats.  Patient denies any nausea, vomiting, or diarrhea.  Seen " and agree  8/30/18 - Had trouble breathing last night.  Patient was placed on nonrebreather.  Now back on NC.  Patient states he is on 3L at home with oxygen sats running 88%.  No fever.  + chills.  No nausea, vomiting, or diarrhea.  Seen and agree  8/31 - co weakness  Co leg infection  9/3 - No hx available  9/4/18 - Patient on hi emperatriz oxygen at 70%.  Concerned about fluid restriction.  Patient had two episodes of VT last night.  No fever.  Seen and agree  9/5/18 - Patient is now on 4LNC.  Patient denies fever, chills, or sweats.  Patient denies any nausea, vomiting, or diarrhea.    Seen and agree    Past medical history:  Past Medical History:   Diagnosis Date   • Cancer (CMS/[facility])    • Cellulitis of both lower extremities     [facility] 2016   • Chronic pain    • COPD (chronic obstructive pulmonary disease) (CMS/[facility])    • Hypertension    • Osteoarthritis cervical spine    • Osteoarthritis of both knees    • Osteoarthritis of left hip        Medications:   Current Facility-Administered Medications:   •  acetaminophen (TYLENOL) tablet 650 mg, 650 mg, Oral, Q6H PRN, Shital Hooks MD, 650 mg at 09/03/18 2319  •  aspirin EC tablet 81 mg, 81 mg, Oral, Daily, Yumiko Muhammad APRN, 81 mg at 09/05/18 0955  •  atorvastatin (LIPITOR) tablet 40 mg, 40 mg, Oral, Nightly, Yumiko Muhammad APRN, 40 mg at 09/04/18 2129  •  bisacodyl (DULCOLAX) suppository 10 mg, 10 mg, Rectal, Daily PRN, Shital Hooks MD, 10 mg at 08/26/18 0947  •  budesonide-formoterol (SYMBICORT) 160-4.5 MCG/ACT inhaler 2 puff, 2 puff, Inhalation, BID - RT, Yumiko Muhammad APRN, 2 puff at 09/05/18 0837  •  castor oil-balsam peru (VENELEX) ointment, , Topical, Q12H, Shital Hooks MD  •  clopidogrel (PLAVIX) tablet 75 mg, 75 mg, Oral, Daily, Yumiko Muhammad APRN, 75 mg at 09/05/18 0955  •  diphenhydrAMINE (BENADRYL) capsule 25 mg, 25 mg, Oral, Q6H PRN, Kia Addison MD, 25 mg at 09/03/18 1440  •  diphenhydrAMINE-zinc acetate 2-0.1 %  cream, , Topical, TID PRN, Kia Addison MD  •  doxycycline (MONODOX) capsule 100 mg, 100 mg, Oral, Q12H, Parish Belle MD, 100 mg at 09/05/18 0955  •  fluticasone (FLONASE) 50 MCG/ACT nasal spray 2 spray, 2 spray, Each Nare, Daily, Yumiko Muhammad APRN, 2 spray at 09/05/18 0955  •  furosemide (LASIX) injection 40 mg, 40 mg, Intravenous, Daily, Danii Parra APRN, 40 mg at 09/05/18 1006  •  gabapentin (NEURONTIN) capsule 400 mg, 400 mg, Oral, Q8H, Shital Hooks MD, 400 mg at 09/05/18 1310  •  heparin (porcine) 5000 UNIT/ML injection 5,000 Units, 5,000 Units, Subcutaneous, Q8H, Yumiko Muhammad APRN, 5,000 Units at 09/05/18 1310  •  HYDROcodone-acetaminophen (NORCO)  MG per tablet 1 tablet, 1 tablet, Oral, Q4H PRN, Shital Hooks MD, 1 tablet at 09/05/18 0000  •  ipratropium-albuterol (DUO-NEB) nebulizer solution 3 mL, 3 mL, Nebulization, Q6H PRN, Yumiko Muhammad APRN, 3 mL at 09/02/18 0511  •  ipratropium-albuterol (DUO-NEB) nebulizer solution 3 mL, 3 mL, Nebulization, TID, Belle Valdivia APRN, 3 mL at 09/05/18 1333  •  Magnesium Sulfate 2 gram Bolus, followed by 8 gram infusion (total Mg dose 10 grams)- Mg less than or equal to 1mg/dL, 2 g, Intravenous, PRN **OR** Magnesium Sulfate 2 gram / 50mL Infusion (GIVE X 3 BAGS TO EQUAL 6GM TOTAL DOSE) - Mg 1.1 - 1.5 mg/dl, 2 g, Intravenous, PRN **OR** Magnesium Sulfate 4 gram infusion- Mg 1.6-1.9 mg/dL, 4 g, Intravenous, PRN, Frannie Nugent APRN, Last Rate: 25 mL/hr at 09/04/18 0618, 4 g at 09/04/18 0618  •  melatonin sublingual tablet 5 mg, 5 mg, Sublingual, Nightly PRN, Kia Addison MD, 5 mg at 08/30/18 2103  •  metoprolol tartrate (LOPRESSOR) tablet 25 mg, 25 mg, Oral, Q12H, Shital Hooks MD, 25 mg at 09/05/18 0956  •  miconazole (MICOTIN) 2 % cream, , Topical, Q12H, Shital Hooks MD  •  mirtazapine (REMERON SOL-TAB) disintegrating tablet 15 mg, 15 mg, Oral, Nightly, Stella Garvin PA-C, 15 mg  at 09/04/18 2130  •  Morphine (MS CONTIN) 12 hr tablet 30 mg, 30 mg, Oral, Q12H, Shital Hooks MD, 30 mg at 09/05/18 0955  •  nicotine (NICODERM CQ) 21 MG/24HR patch 1 patch, 1 patch, Transdermal, Q24H, Yumiko Muhammad APRN, 1 patch at 09/04/18 2130  •  pantoprazole (PROTONIX) EC tablet 40 mg, 40 mg, Oral, Q AM, Yumiko Muhammad APRN, 40 mg at 09/05/18 0611  •  Pharmacy Meds to Bed Consult, , Does not apply, Daily, Kia Addison MD, Stopped at 08/24/18 1115  •  sennosides-docusate sodium (SENOKOT-S) 8.6-50 MG tablet 2 tablet, 2 tablet, Oral, Nightly, Shital Hooks MD, 2 tablet at 09/04/18 2130  •  sodium chloride 0.9 % flush 1-10 mL, 1-10 mL, Intravenous, PRN, Yumiko Muhammad APRN  •  Insert peripheral IV, , , Once **AND** sodium chloride 0.9 % flush 10 mL, 10 mL, Intravenous, PRN, Dakota De Paz MD  Antibiotics:  Anti-Infectives     Ordered     Dose/Rate Route Frequency Start Stop    08/27/18 1242  doxycycline (MONODOX) capsule 100 mg     Ordering Provider:  Parish Belle MD    100 mg Oral Every 12 Hours Scheduled 08/27/18 1330 09/07/18 0859    08/20/18 1914  meropenem (MERREM) 500mg/100 mL 0.9% NS IVPB ([facility])     Ordering Provider:  Yumiko Muhammad APRN    500 mg  over 30 Minutes Intravenous Once 08/20/18 2100 08/20/18 2125    08/20/18 1357  vancomycin 1750 mg/500 mL 0.9% NS IVPB ([facility])     Ordering Provider:  Dakota De Paz MD    20 mg/kg × 81.6 kg  over 2 Hours Intravenous Once 08/20/18 1359 08/20/18 1701          Allergies:  is allergic to ciprofloxacin hcl and ceftin [cefuroxime axetil].    Family History: family history includes COPD in his brother; Heart attack in his brother; Stroke in his father.    Social History:  reports that he has been smoking Cigarettes.  He has a 84.00 pack-year smoking history. He has never used smokeless tobacco. He reports that he does not drink alcohol or use drugs.    Review of Systems: All other reviewed and negative except as per HPI    Blood  "pressure 116/62, pulse 72, temperature 98.3 °F (36.8 °C), temperature source Oral, resp. rate 16, height 172.7 cm (68\"), weight 87.6 kg (193 lb 3.2 oz), SpO2 92 %.  GENERAL: Awake and alert   HEENT: Oropharynx without thrush.   EYES: . No conjunctival injection. No icterus.   LYMPHATICS: No lymphadenopathy of the neck   HEART: Irregular. No murmur, gallop, or pericardial friction rub.   LUNGS: Diminished throughout on 4LNC   ABDOMEN: Soft, nontender, nondistended. No appreciable HSM.    SKIN: Warm and dry   PSYCHIATRIC: Mental status lucid.   EXT:  BLE with wraps in place. Improved BLE.   Seems more comfortable      DIAGNOSTICS:  Lab Results   Component Value Date    WBC 8.53 08/29/2018    HGB 10.2 (L) 08/29/2018    HCT 34.1 (L) 08/29/2018     08/29/2018     Lab Results   Component Value Date    CRP 13.12 (H) 06/21/2017     Lab Results   Component Value Date    SEDRATE 56 (H) 06/20/2017     Lab Results   Component Value Date    GLUCOSE 118 (H) 09/05/2018    BUN 22 09/05/2018    CREATININE 1.16 09/05/2018    EGFRIFNONA 62 09/05/2018    BCR 19.0 09/05/2018    CO2 38.0 (H) 09/05/2018    CALCIUM 8.5 (L) 09/05/2018    ALBUMIN 3.08 (L) 08/20/2018    AST 20 08/20/2018    ALT 23 08/20/2018       Microbiology:  Microbiology Results Abnormal     Procedure Component Value - Date/Time    Blood Culture - Blood, [[id number]]  (Normal) Collected:  08/20/18 1445    Lab Status:  Final result Specimen:  Blood from Wrist, Right Updated:  08/25/18 1500     Blood Culture No growth at 5 days    Blood Culture - Blood, [[id number]]  (Normal) Collected:  08/20/18 1430    Lab Status:  Final result Specimen:  Blood from Arm, Right Updated:  08/25/18 1500     Blood Culture No growth at 5 days    Wound Culture - Wound, Penis [[id number]]  (Abnormal)  (Susceptibility) Collected:  08/20/18 1930    Lab Status:  Final result Specimen:  Wound from Penis Updated:  08/23/18 1427     Wound Culture Scant growth (1+) Providencia rettgeri (A)      " Scant growth (1+) Staphylococcus aureus (A)      Scant growth (1+) Staphylococcus haemolyticus (A)     Comment: This isolate is presumed to be clindamyin resistant based on detection of inducible clindamycin resistance. Clindamycin may still be effective in some patients.          Gram Stain Result No WBCs or organisms seen    Susceptibility      Providencia rettgeri    Staphylococcus aureus       FAVIAN    FAVIAN       Ampicillin + Sulbactam <=8/4 ug/ml Susceptible             Aztreonam <=8 ug/ml Susceptible             Cefepime <=8 ug/ml Susceptible             Cefotaxime <=2 ug/ml Susceptible             Ceftriaxone <=8 ug/ml Susceptible    <=8 ug/ml Susceptible       Cefuroxime sodium <=4 ug/ml Susceptible             Clindamycin       <=0.5 ug/ml Susceptible       Daptomycin       <=0.5 ug/ml Susceptible       Ertapenem <=1 ug/ml Susceptible             Erythromycin       <=0.5 ug/ml Susceptible       Gentamicin <=4 ug/ml Susceptible    <=4 ug/ml Susceptible       Levofloxacin <=2 ug/ml Susceptible    <=1 ug/ml Susceptible  [1]        Linezolid       2 ug/ml Susceptible       Meropenem <=1 ug/ml Susceptible             Oxacillin       <=0.25 ug/ml Susceptible       Penicillin G       <=0.03 ug/ml Susceptible       Piperacillin + Tazobactam <=16 ug/ml Susceptible             Quinupristin + Dalfopristin       <=0.5 ug/ml Susceptible  [2]        Rifampin       <=1 ug/ml Susceptible       Streptomycin High Level Synergy       <=0.5 ug/ml Susceptible       Tetracycline       <=4 ug/ml Susceptible       Tobramycin <=4 ug/ml Susceptible             Trimethoprim + Sulfamethoxazole <=2/38 ug/ml Susceptible    <=0.5/9.5 ug/ml Susceptible       Vancomycin       2 ug/ml Susceptible              [1]   Staphylococcus species may develop resistance during prolonged therapy with quinolones.  Isolates that are initially susceptible may become resistant within three to four days after initiation of therapy. Testing of repeat isolates  may be warranted.       [2]   Appended report. These results have been appended to a previously preliminary verified report.               Susceptibility      Staphylococcus haemolyticus     FAVIAN     Ciprofloxacin >2 ug/ml Resistant     Clindamycin  Resistant     Daptomycin <=0.5 ug/ml Susceptible     Erythromycin >4 ug/ml Resistant     Gentamicin <=4 ug/ml Susceptible     Levofloxacin >4 ug/ml Resistant     Linezolid <=1 ug/ml Susceptible     Oxacillin >2 ug/ml Resistant     Penicillin G >8 ug/ml Resistant     Quinupristin + Dalfopristin <=0.5 ug/ml Susceptible     Rifampin <=1 ug/ml Susceptible     Tetracycline <=4 ug/ml Susceptible     Trimethoprim + Sulfamethoxazole <=0.5/9.5 ug/ml Susceptible     Vancomycin 2 ug/ml Susceptible                    Wound Culture - Wound, Leg, Left [[id number]]  (Abnormal)  (Susceptibility) Collected:  08/20/18 1930    Lab Status:  Final result Specimen:  Wound from Leg, Left Updated:  08/22/18 1347     Wound Culture Moderate growth (3+) Pseudomonas aeruginosa (A)      Scant growth (1+) Streptococcus, Beta Hemolytic, Group G (A)     Comment:   If Clindamycin or Erythromycin is the drug of choice, notify the laboratory within 7 days to request susceptibility testing.        STREP GROUPING G     Gram Stain Result No WBCs seen      Many (4+) Gram negative bacilli    Susceptibility      Pseudomonas aeruginosa     FAVIAN     Aztreonam <=8 ug/ml Susceptible     Cefepime <=8 ug/ml Susceptible     Ceftazidime <=1 ug/ml Susceptible     Gentamicin <=4 ug/ml Susceptible     Levofloxacin <=2 ug/ml Susceptible     Meropenem <=1 ug/ml Susceptible     Piperacillin + Tazobactam >64 ug/ml Resistant     Tobramycin <=4 ug/ml Susceptible                    KOH Prep - Skin, Foot, Left [[id number]]  (Normal) Collected:  08/20/18 1501    Lab Status:  Final result Specimen:  Skin from Foot, Left Updated:  08/20/18 1528     KOH Prep No yeast or hyphal elements seen            RADIOLOGY:  Imaging Results (last 72  hours)     ** No results found for the last 72 hours. **          Assessment and Plan:     BLE erythema and open wounds and edema   Wounds with GBS and PSA - prior treatment with Merrem, now on Doxycycline alone.   Acute kidney injury - worse  PVD  DJD - needs knee and hip replaced.   COPD  Penis ulceration - culture with providencia, MSSA, and CNS  BUE edema - LUE with superficial thrombophlebitis  Volume overload   VT    Patient is currently on  Doxycycline  Tolerating antibiotics.    Will need appropriate wound care outpatient, possible SNF placement.     Renewed the doxy  UM discussed with staff      BRITTNY Cotto for Dr. Parish Belle  9/5/2018

## 2018-09-06 LAB
ANION GAP SERPL CALCULATED.3IONS-SCNC: 3 MMOL/L (ref 3–11)
BUN BLD-MCNC: 24 MG/DL (ref 9–23)
BUN/CREAT SERPL: 20.7 (ref 7–25)
CALCIUM SPEC-SCNC: 8.6 MG/DL (ref 8.7–10.4)
CHLORIDE SERPL-SCNC: 95 MMOL/L (ref 99–109)
CO2 SERPL-SCNC: 39 MMOL/L (ref 20–31)
CREAT BLD-MCNC: 1.16 MG/DL (ref 0.6–1.3)
GFR SERPL CREATININE-BSD FRML MDRD: 62 ML/MIN/1.73
GLUCOSE BLD-MCNC: 115 MG/DL (ref 70–100)
MAGNESIUM SERPL-MCNC: 2.1 MG/DL (ref 1.3–2.7)
PHOSPHATE SERPL-MCNC: 4.2 MG/DL (ref 2.4–5.1)
POTASSIUM BLD-SCNC: 4.1 MMOL/L (ref 3.5–5.5)
SODIUM BLD-SCNC: 137 MMOL/L (ref 132–146)

## 2018-09-06 PROCEDURE — 94799 UNLISTED PULMONARY SVC/PX: CPT

## 2018-09-06 PROCEDURE — 63710000001 DIPHENHYDRAMINE PER 50 MG: Performed by: INTERNAL MEDICINE

## 2018-09-06 PROCEDURE — 83735 ASSAY OF MAGNESIUM: CPT | Performed by: INTERNAL MEDICINE

## 2018-09-06 PROCEDURE — 97110 THERAPEUTIC EXERCISES: CPT

## 2018-09-06 PROCEDURE — 94760 N-INVAS EAR/PLS OXIMETRY 1: CPT

## 2018-09-06 PROCEDURE — 25010000002 HEPARIN (PORCINE) PER 1000 UNITS: Performed by: NURSE PRACTITIONER

## 2018-09-06 PROCEDURE — 94640 AIRWAY INHALATION TREATMENT: CPT

## 2018-09-06 PROCEDURE — 25010000002 FUROSEMIDE PER 20 MG: Performed by: NURSE PRACTITIONER

## 2018-09-06 PROCEDURE — 99232 SBSQ HOSP IP/OBS MODERATE 35: CPT | Performed by: INTERNAL MEDICINE

## 2018-09-06 PROCEDURE — 97530 THERAPEUTIC ACTIVITIES: CPT

## 2018-09-06 PROCEDURE — 80048 BASIC METABOLIC PNL TOTAL CA: CPT | Performed by: INTERNAL MEDICINE

## 2018-09-06 PROCEDURE — 84100 ASSAY OF PHOSPHORUS: CPT | Performed by: INTERNAL MEDICINE

## 2018-09-06 RX ADMIN — IPRATROPIUM BROMIDE AND ALBUTEROL SULFATE 3 ML: 2.5; .5 SOLUTION RESPIRATORY (INHALATION) at 13:39

## 2018-09-06 RX ADMIN — FUROSEMIDE 40 MG: 10 INJECTION, SOLUTION INTRAMUSCULAR; INTRAVENOUS at 08:30

## 2018-09-06 RX ADMIN — ASPIRIN 81 MG: 81 TABLET, COATED ORAL at 08:30

## 2018-09-06 RX ADMIN — FLUTICASONE PROPIONATE 2 SPRAY: 50 SPRAY, METERED NASAL at 08:30

## 2018-09-06 RX ADMIN — HYDROCODONE BITARTRATE AND ACETAMINOPHEN 1 TABLET: 10; 325 TABLET ORAL at 13:39

## 2018-09-06 RX ADMIN — NICOTINE 1 PATCH: 21 PATCH, EXTENDED RELEASE TRANSDERMAL at 21:26

## 2018-09-06 RX ADMIN — MICONAZOLE NITRATE: 2 CREAM TOPICAL at 08:35

## 2018-09-06 RX ADMIN — ATORVASTATIN CALCIUM 40 MG: 40 TABLET, FILM COATED ORAL at 21:25

## 2018-09-06 RX ADMIN — HEPARIN SODIUM 5000 UNITS: 5000 INJECTION, SOLUTION INTRAVENOUS; SUBCUTANEOUS at 21:25

## 2018-09-06 RX ADMIN — MORPHINE SULFATE 30 MG: 30 TABLET, EXTENDED RELEASE ORAL at 21:25

## 2018-09-06 RX ADMIN — GABAPENTIN 400 MG: 400 CAPSULE ORAL at 05:20

## 2018-09-06 RX ADMIN — MICONAZOLE NITRATE: 2 CREAM TOPICAL at 21:25

## 2018-09-06 RX ADMIN — Medication 2 TABLET: at 21:25

## 2018-09-06 RX ADMIN — DOXYCYCLINE 100 MG: 100 CAPSULE ORAL at 08:30

## 2018-09-06 RX ADMIN — CLOPIDOGREL BISULFATE 75 MG: 75 TABLET ORAL at 08:30

## 2018-09-06 RX ADMIN — CASTOR OIL AND BALSAM, PERU: 788; 87 OINTMENT TOPICAL at 21:27

## 2018-09-06 RX ADMIN — BUDESONIDE AND FORMOTEROL FUMARATE DIHYDRATE 2 PUFF: 160; 4.5 AEROSOL RESPIRATORY (INHALATION) at 19:05

## 2018-09-06 RX ADMIN — GABAPENTIN 400 MG: 400 CAPSULE ORAL at 13:39

## 2018-09-06 RX ADMIN — CASTOR OIL AND BALSAM, PERU: 788; 87 OINTMENT TOPICAL at 08:35

## 2018-09-06 RX ADMIN — MORPHINE SULFATE 30 MG: 30 TABLET, EXTENDED RELEASE ORAL at 08:30

## 2018-09-06 RX ADMIN — HEPARIN SODIUM 5000 UNITS: 5000 INJECTION, SOLUTION INTRAVENOUS; SUBCUTANEOUS at 05:19

## 2018-09-06 RX ADMIN — METOPROLOL TARTRATE 25 MG: 25 TABLET ORAL at 08:30

## 2018-09-06 RX ADMIN — METOPROLOL TARTRATE 25 MG: 25 TABLET ORAL at 21:25

## 2018-09-06 RX ADMIN — PANTOPRAZOLE SODIUM 40 MG: 40 TABLET, DELAYED RELEASE ORAL at 05:20

## 2018-09-06 RX ADMIN — GABAPENTIN 400 MG: 400 CAPSULE ORAL at 21:25

## 2018-09-06 RX ADMIN — DIPHENHYDRAMINE HYDROCHLORIDE 25 MG: 25 CAPSULE ORAL at 08:34

## 2018-09-06 RX ADMIN — BUDESONIDE AND FORMOTEROL FUMARATE DIHYDRATE 2 PUFF: 160; 4.5 AEROSOL RESPIRATORY (INHALATION) at 07:20

## 2018-09-06 RX ADMIN — HEPARIN SODIUM 5000 UNITS: 5000 INJECTION, SOLUTION INTRAVENOUS; SUBCUTANEOUS at 13:40

## 2018-09-06 RX ADMIN — MIRTAZAPINE 15 MG: 15 TABLET, ORALLY DISINTEGRATING ORAL at 21:27

## 2018-09-06 RX ADMIN — IPRATROPIUM BROMIDE AND ALBUTEROL SULFATE 3 ML: 2.5; .5 SOLUTION RESPIRATORY (INHALATION) at 07:20

## 2018-09-06 RX ADMIN — IPRATROPIUM BROMIDE AND ALBUTEROL SULFATE 3 ML: 2.5; .5 SOLUTION RESPIRATORY (INHALATION) at 19:05

## 2018-09-06 RX ADMIN — DOXYCYCLINE 100 MG: 100 CAPSULE ORAL at 21:25

## 2018-09-06 NOTE — PROGRESS NOTES
[facility] Medicine Services  PROGRESS NOTE    Patient Name: Yassine Love  : 1945  MRN: [id number]    Date of Admission: 2018  Length of Stay: 17  Primary Care Physician: Provider, No Known    Subjective     CC: f/u LE cellulitis    HPI:    Resting in bed in no acute distress and no change since yesterday.  Try to have a conversation with him about going to a rehabilitation facility however he was very abrupt and told me that he can't walk before he got to the hospital and he will do the hospital until he can walk again.  Chest pain, no shortness of breath, no fever or chills.        Review of Systems   Gen- No fevers, chills  CV- No chest pain, palpitations  GI- No N/V/D, abd pain    Otherwise ROS is negative except as mentioned in the HPI.    Objective     Vital Signs:   Temp:  [98.7 °F (37.1 °C)] 98.7 °F (37.1 °C)  Heart Rate:  [74-83] 77  Resp:  [16-18] 18  BP: ()/(60-61) 97/60        Physical Exam:  Constitutional: No acute distress, drowsy  Respiratory: clear to ausculatation, on high flow NC  Cardiovascular: RRR, no murmurs, rubs, or gallops, palpable pedal pulses bilaterally  Gastrointestinal: Positive bowel sounds, soft, nontender, nondistended  Musculoskeletal: BLEs with UNNA boots in place 2+ BLE edema, chronic venous stasis with weeping open skin. Moderate edema to BUEs L>R with diffuse ecchymosis.   Psychiatric: Appropriate affect, cooperative  Neurologic: drowsy but interactive, no focal deficits    Results Reviewed:  I have personally reviewed current lab, radiology, and data and agree.          Results from last 7 days  Lab Units 18  0413 18  0704 18  0503   SODIUM mmol/L 137 138 140   POTASSIUM mmol/L 4.1 3.7 3.8   CHLORIDE mmol/L 95* 95* 95*   CO2 mmol/L 39.0* 38.0* 44.0*   BUN mg/dL 24* 22 22   CREATININE mg/dL 1.16 1.16 1.32*   GLUCOSE mg/dL 115* 118* 125*   CALCIUM mg/dL 8.6* 8.5* 8.6*     Estimated Creatinine Clearance: 61 mL/min  (by C-G formula based on SCr of 1.16 mg/dL).     No results found for: BNP    Microbiology Results Abnormal     Procedure Component Value - Date/Time    Blood Culture - Blood, [[id number]]  (Normal) Collected:  08/20/18 1445    Lab Status:  Final result Specimen:  Blood from Wrist, Right Updated:  08/25/18 1500     Blood Culture No growth at 5 days    Blood Culture - Blood, [[id number]]  (Normal) Collected:  08/20/18 1430    Lab Status:  Final result Specimen:  Blood from Arm, Right Updated:  08/25/18 1500     Blood Culture No growth at 5 days    Wound Culture - Wound, Penis [[id number]]  (Abnormal)  (Susceptibility) Collected:  08/20/18 1930    Lab Status:  Final result Specimen:  Wound from Penis Updated:  08/23/18 1427     Wound Culture Scant growth (1+) Providencia rettgeri (A)      Scant growth (1+) Staphylococcus aureus (A)      Scant growth (1+) Staphylococcus haemolyticus (A)     Comment: This isolate is presumed to be clindamyin resistant based on detection of inducible clindamycin resistance. Clindamycin may still be effective in some patients.          Gram Stain Result No WBCs or organisms seen    Susceptibility      Providencia rettgeri    Staphylococcus aureus       FAVIAN    FAVIAN       Ampicillin + Sulbactam <=8/4 ug/ml Susceptible             Aztreonam <=8 ug/ml Susceptible             Cefepime <=8 ug/ml Susceptible             Cefotaxime <=2 ug/ml Susceptible             Ceftriaxone <=8 ug/ml Susceptible    <=8 ug/ml Susceptible       Cefuroxime sodium <=4 ug/ml Susceptible             Clindamycin       <=0.5 ug/ml Susceptible       Daptomycin       <=0.5 ug/ml Susceptible       Ertapenem <=1 ug/ml Susceptible             Erythromycin       <=0.5 ug/ml Susceptible       Gentamicin <=4 ug/ml Susceptible    <=4 ug/ml Susceptible       Levofloxacin <=2 ug/ml Susceptible    <=1 ug/ml Susceptible  [1]        Linezolid       2 ug/ml Susceptible       Meropenem <=1 ug/ml Susceptible             Oxacillin        <=0.25 ug/ml Susceptible       Penicillin G       <=0.03 ug/ml Susceptible       Piperacillin + Tazobactam <=16 ug/ml Susceptible             Quinupristin + Dalfopristin       <=0.5 ug/ml Susceptible  [2]        Rifampin       <=1 ug/ml Susceptible       Streptomycin High Level Synergy       <=0.5 ug/ml Susceptible       Tetracycline       <=4 ug/ml Susceptible       Tobramycin <=4 ug/ml Susceptible             Trimethoprim + Sulfamethoxazole <=2/38 ug/ml Susceptible    <=0.5/9.5 ug/ml Susceptible       Vancomycin       2 ug/ml Susceptible              [1]   Staphylococcus species may develop resistance during prolonged therapy with quinolones.  Isolates that are initially susceptible may become resistant within three to four days after initiation of therapy. Testing of repeat isolates may be warranted.       [2]   Appended report. These results have been appended to a previously preliminary verified report.               Susceptibility      Staphylococcus haemolyticus     FAVIAN     Ciprofloxacin >2 ug/ml Resistant     Clindamycin  Resistant     Daptomycin <=0.5 ug/ml Susceptible     Erythromycin >4 ug/ml Resistant     Gentamicin <=4 ug/ml Susceptible     Levofloxacin >4 ug/ml Resistant     Linezolid <=1 ug/ml Susceptible     Oxacillin >2 ug/ml Resistant     Penicillin G >8 ug/ml Resistant     Quinupristin + Dalfopristin <=0.5 ug/ml Susceptible     Rifampin <=1 ug/ml Susceptible     Tetracycline <=4 ug/ml Susceptible     Trimethoprim + Sulfamethoxazole <=0.5/9.5 ug/ml Susceptible     Vancomycin 2 ug/ml Susceptible                    Wound Culture - Wound, Leg, Left [[id number]]  (Abnormal)  (Susceptibility) Collected:  08/20/18 1930    Lab Status:  Final result Specimen:  Wound from Leg, Left Updated:  08/22/18 1347     Wound Culture Moderate growth (3+) Pseudomonas aeruginosa (A)      Scant growth (1+) Streptococcus, Beta Hemolytic, Group G (A)     Comment:   If Clindamycin or Erythromycin is the drug of choice,  notify the laboratory within 7 days to request susceptibility testing.        STREP GROUPING G     Gram Stain Result No WBCs seen      Many (4+) Gram negative bacilli    Susceptibility      Pseudomonas aeruginosa     FAVIAN     Aztreonam <=8 ug/ml Susceptible     Cefepime <=8 ug/ml Susceptible     Ceftazidime <=1 ug/ml Susceptible     Gentamicin <=4 ug/ml Susceptible     Levofloxacin <=2 ug/ml Susceptible     Meropenem <=1 ug/ml Susceptible     Piperacillin + Tazobactam >64 ug/ml Resistant     Tobramycin <=4 ug/ml Susceptible                    KOH Prep - Skin, Foot, Left [[id number]]  (Normal) Collected:  08/20/18 1501    Lab Status:  Final result Specimen:  Skin from Foot, Left Updated:  08/20/18 1528     KOH Prep No yeast or hyphal elements seen        Imaging Results (last 24 hours)     ** No results found for the last 24 hours. **        Results for orders placed during the hospital encounter of 08/20/18   Adult Transthoracic Echo Complete W/ Cont if Necessary Per Protocol    Narrative · Left ventricular systolic function is normal. Estimated EF = 65%.  · There is moderate calcification of the aortic valve mainly affecting the   non coronary cusp(s).  · Right ventricular cavity is mildly dilated.        I have reviewed the medications.    aspirin 81 mg Oral Daily   atorvastatin 40 mg Oral Nightly   budesonide-formoterol 2 puff Inhalation BID - RT   castor oil-balsam peru  Topical Q12H   clopidogrel 75 mg Oral Daily   doxycycline 100 mg Oral Q12H   fluticasone 2 spray Each Nare Daily   furosemide 40 mg Intravenous Daily   gabapentin 400 mg Oral Q8H   heparin (porcine) 5,000 Units Subcutaneous Q8H   ipratropium-albuterol 3 mL Nebulization TID   metoprolol tartrate 25 mg Oral Q12H   miconazole  Topical Q12H   mirtazapine 15 mg Oral Nightly   Morphine 30 mg Oral Q12H   nicotine 1 patch Transdermal Q24H   pantoprazole 40 mg Oral Q AM   Pharmacy Meds to Bed Consult  Does not apply Daily   sennosides-docusate sodium 2  tablet Oral Nightly     Assessment / Plan     Hospital Problem List     * (Principal)Cellulitis of lower extremity    RAFEAL (acute kidney injury) (CMS/[facility])    PVD (peripheral vascular disease) (CMS/[facility])    Cellulitis of both lower extremities    COPD (chronic obstructive pulmonary disease) (CMS/[facility])    Essential hypertension    Non-sustained ventricular tachycardia (CMS/[facility])    Coronary artery disease involving native coronary artery without angina pectoris    Debility    Decubitus ulcer of buttock, stage 2    Chronic pain        Brief Hospital Course to date:  Yassine Love is a 72 y.o. male with PMH significant for recurring BLE cellulitis (has seen Dr. Belle in the past), PVD, prediabetes, chronic pain, HTN, CAD, COPD, and ongoing tobacco abuse.  He presented to [facility] ER today with worsening BLE pain and redness, weeping, and open lesions (left worse than right), which he tells me have been progressively worsening over last 2 weeks.     Assessment & Plan:    - Complex medico-social issues.   - Patient lives alone. Reports that he is usually able to ambulate with a walker (but his legs had been weak and giving out on him prior to admission) and able to perform ADLs. Has neighbors and friends who help him out a lot with groceries, etc. He no longer drives.   - He has significant POAD with superimposed cellulitis, suspect L>R from physical exam. He is at risk for limb loss. Discussed with pt, he does not want any aggressive intervention/surgery, but may be open to endovascular intervention if possible. Arterial duplex showed no occlusion on either side.   - LE wound culture and penis wound culture as above, defer to ID for abx management  - ID (Dr Belle) following, have converted to Doxycycline PO   - PT wound care following, will need continued UNNA boot therapy following d/c   - LUE superficial thrombus- symptomatic mgmt  - Echocardiogram unremarkable (EF 65%),  - CXR 9.3 stable, continue to diurese with IV lasix today  in attempts to wean O2  - am bmp  - mag replaced, continue to monitor on tele. EKG with slightly increase QT from previous. meds checked as SE. Repeat EKG in am    - WOC, PT, OT to eval and treat  - CM/SW for DC planning- [facility], but will need to continue to work more with therapy before these facilities will accept him and wean of high flow O2    - tobacco cessation advised    Plan:  - cont current care  - I discussed the case with the .  We are trying to provide him with a placement AND that is acceptable to him.    DVT Prophylaxis:  Hep sq    CODE STATUS:   Code Status and Medical Interventions:   Ordered at: 08/29/18 0406     Limited Support to NOT Include:    Intubation     Level Of Support Discussed With:    Patient     Code Status:    No CPR     Medical Interventions (Level of Support Prior to Arrest):    Limited     Disposition: Rehab if he is agreeable, otherwise, home with home health.    Concerned that this patient is unable to care for himself at home successfully.     Electronically signed by Raoul Mccarthy MD, 09/06/18, 5:02 PM.

## 2018-09-06 NOTE — PLAN OF CARE
Problem: Patient Care Overview  Goal: Plan of Care Review   09/06/18 1546   Coping/Psychosocial   Plan of Care Reviewed With patient   Plan of Care Review   Progress no change   OTHER   Outcome Summary Pt refused OOB activity this date d/t fatigue and generalized muscle pain. Pt agreed to bed level ther ex. Will continue to progress pt as able per POC.
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
## 2018-09-06 NOTE — PROGRESS NOTES
Continued Stay Note  [facility]     Patient Name: Yassine Love  MRN: [id number]  Today's Date: 9/6/2018    Admit Date: 8/20/2018          Discharge Plan     Row Name 09/06/18 6544       Plan    Plan Update    Patient/Family in Agreement with Plan yes    Plan Comments I spoke with the pt. He still doesnt feel ready for DC even though the MD has mentioned to him he is ready for rehab. The pt still wants [facility] or [facility]. [facility] has no beds. I did ask Laura at [facility] to re-eval but I do not think the pt can tolerate [facility]. He states he will just go home. I mentioned he is MOD A X 2 and unable to take steps. I have asked Zohra Rider to [facility], [facility] and [facility].              Discharge Codes    No documentation.       Expected Discharge Date and Time     Expected Discharge Date Expected Discharge Time    Sep 4, 2018             Gail Medellin RN

## 2018-09-06 NOTE — PLAN OF CARE
Problem: Skin Injury Risk (Adult)  Goal: Skin Health and Integrity  Outcome: Ongoing (interventions implemented as appropriate)   09/06/18 0533   Skin Injury Risk (Adult)   Skin Health and Integrity making progress toward outcome       Problem: Fall Risk (Adult)  Goal: Absence of Fall  Outcome: Ongoing (interventions implemented as appropriate)   09/06/18 0533   Fall Risk (Adult)   Absence of Fall making progress toward outcome       Problem: Patient Care Overview  Goal: Plan of Care Review  Outcome: Ongoing (interventions implemented as appropriate)   09/06/18 0533   Coping/Psychosocial   Plan of Care Reviewed With patient   Plan of Care Review   Progress no change   OTHER   Outcome Summary Allowed dressing change on second attempt. Up to chair at beginning of shift utilized lift. Will continue to monitor.       Problem: Tissue Perfusion, Ineffective Peripheral (Adult)  Goal: Adequate Tissue Perfusion  Outcome: Ongoing (interventions implemented as appropriate)   09/06/18 0533   Tissue Perfusion, Ineffective Peripheral (Adult)   Adequate Tissue Perfusion making progress toward outcome

## 2018-09-06 NOTE — THERAPY TREATMENT NOTE
Acute Care - Physical Therapy Treatment Note  [facility]     Patient Name: Yassine Love  : 1945  MRN: [id number]  Today's Date: 2018  Onset of Illness/Injury or Date of Surgery: 18  Date of Referral to PT: 18  Referring Physician: BRITTNY Alvares    Admit Date: 2018    Visit Dx:    ICD-10-CM ICD-9-CM   1. Cellulitis of left lower leg L03.116 682.6   2. Peripheral edema R60.9 782.3   3. Stasis dermatitis of both legs I87.2 454.1   4. Recurrent cellulitis of lower leg L03.119 682.6   5. Chronic pain syndrome G89.4 338.4   6. History of hypertension Z86.79 V12.59   7. Impaired functional mobility, balance, gait, and endurance Z74.09 V49.89   8. Impaired mobility and ADLs Z74.09 799.89     Patient Active Problem List   Diagnosis   • Cellulitis of right lower extremity   • RAFAEL (acute kidney injury) (CMS/[facility])   • PVD (peripheral vascular disease) (CMS/[facility])   • Cellulitis of both lower extremities   • COPD (chronic obstructive pulmonary disease) (CMS/[facility])   • Essential hypertension   • Hypoxia   • Cellulitis of lower extremity   • Non-sustained ventricular tachycardia (CMS/[facility])   • Coronary artery disease involving native coronary artery without angina pectoris   • Cellulitis of left lower leg   • Debility   • Decubitus ulcer of buttock, stage 2   • Chronic pain       Therapy Treatment          Rehabilitation Treatment Summary     Row Name 18 1533 18 0805          Treatment Time/Intention    Discipline physical therapist  -VG occupational therapist  -     Document Type therapy note (daily note)  -VG therapy note (daily note)  -     Subjective Information complains of;fatigue;pain  -VG  --     Mode of Treatment individual therapy;physical therapy  -VG individual therapy;occupational therapy  -HK     Patient/Family Observations In bed, supplemental O2, tele, bed check; no family present.  -VG Pt received supine in bed with O2 via nasal canula and no family present.    -HK     Care Plan Review patient/other agree to care plan  -VG care plan/treatment goals reviewed;risks/benefits reviewed;patient/other agree to care plan  -HK     Therapy Frequency (PT Clinical Impression) daily  -VG  --     Patient Effort adequate  -VG adequate  -HK     Existing Precautions/Restrictions fall;oxygen therapy device and L/min  -VG fall;oxygen therapy device and L/min  -HK     Recorded by [VG] Manjula Soria, PT 09/06/18 1546 [HK] Violette Smith, OT 09/06/18 0909     Row Name 09/06/18 0805             Vital Signs    Pre Systolic BP Rehab 97  -HK      Pre Treatment Diastolic BP 60  -HK      Post Systolic BP Rehab 96  -HK      Post Treatment Diastolic BP 53  -HK      Pretreatment Heart Rate (beats/min) 80  -HK      Posttreatment Heart Rate (beats/min) 88  -HK      Pre Patient Position Supine  -HK      Intra Patient Position Standing  -HK      Post Patient Position Sitting  -HK      Recorded by [HK] Violette Smith, OT 09/06/18 0909      Row Name 09/06/18 0805             Cognitive Assessment/Intervention    Additional Documentation Cognitive Assessment/Intervention (Group)  -HK      Recorded by [HK] Violette Smith, OT 09/06/18 0909      Row Name 09/06/18 1533 09/06/18 0805          Cognitive Assessment/Intervention- PT/OT    Affect/Mental Status (Cognitive) flat/blunted affect  -VG flat/blunted affect  -HK     Orientation Status (Cognition) oriented x 4  -VG oriented x 4  -HK     Follows Commands (Cognition) follows one step commands;over 90% accuracy  -VG follows one step commands;over 90% accuracy  -HK     Cognitive Function (Cognitive) safety deficit  -VG  --     Safety Deficit (Cognitive) mild deficit;insight into deficits/self awareness  -VG mild deficit;safety precautions awareness;safety precautions follow-through/compliance  -HK     Personal Safety Interventions fall prevention program maintained;supervised activity  -VG fall prevention program maintained;gait belt;nonskid shoes/slippers when out  of bed  -HK     Recorded by [VG] Manjula Soria, PT 09/06/18 1546 [HK] Violette Smith, OT 09/06/18 0909     Row Name 09/06/18 0805             Safety Issues, Functional Mobility    Safety Issues Affecting Function (Mobility) safety precautions follow-through/compliance;safety precaution awareness  -HK      Recorded by [HK] Violette Smith, OT 09/06/18 0909      Row Name 09/06/18 1533 09/06/18 0805          Bed Mobility Assessment/Treatment    Bed Mobility Assessment/Treatment  -- supine-sit;scooting/bridging  -HK     Supine-Sit [place] (Bed Mobility)  -- moderate assist (50% patient effort);verbal cues;2 person assist  -HK     Sit-Supine [place] (Bed Mobility)  -- moderate assist (50% patient effort);verbal cues;2 person assist  -HK     Bed Mobility, Safety Issues  -- decreased use of arms for pushing/pulling;decreased use of legs for bridging/pushing  -HK     Assistive Device (Bed Mobility)  -- draw sheet;bed rails;head of bed elevated  -HK     Comment (Bed Mobility) Pt refused OOB therapy d/t fatigue and pain.  -VG Pt requires modAx2 to advance to EOB.   -HK     Recorded by [VG] Manjula Soria, PT 09/06/18 1546 [HK] Violette Smith, OT 09/06/18 0917     Row Name 09/06/18 0805             Functional Mobility    Functional Mobility- Ind. Level not appropriate to assess  -HK      Recorded by [HK] Violette Smith, OT 09/06/18 0917      Row Name 09/06/18 1533 09/06/18 0805          Transfer Assessment/Treatment    Transfer Assessment/Treatment  -- sit-stand transfer;stand-sit transfer;squat pivot transfer  -HK     Comment (Transfers) Pt refused OOB therapy this date d/t fatigue and pain.  -VG Pt unable to fully flex knees or reach full standing position. Pt stands with knees bent and flexed posture due to sevee hip and knee pain.   -HK     Recorded by [VG] Manjula Soria, PT 09/06/18 1546 [HK] Violette Smith, OT 09/06/18 0917     Row Name 09/06/18 0805             Sit-Stand Transfer    Sit-Stand [place]  (Transfers) moderate assist (50% patient effort);2 person assist;verbal cues  -HK      Assistive Device (Sit-Stand Transfers) other (see comments)  -HK      Recorded by [HK] Violette Smith, OT 09/06/18 0917      Row Name 09/06/18 0805             Stand-Sit Transfer    Stand-Sit [place] (Transfers) moderate assist (50% patient effort);2 person assist;verbal cues  -HK      Assistive Device (Stand-Sit Transfers) other (see comments)  -HK      Recorded by [HK] Violette Smith, OT 09/06/18 0917      Row Name 09/06/18 0805             Stand Pivot/Stand Step Transfer    Stand Pivot/Stand Step [place] moderate assist (50% patient effort);2 person assist  -HK      Recorded by [HK] Violette Smith, OT 09/06/18 0917      Row Name 09/06/18 1533             Gait/Stairs Assessment/Training    Comment (Gait/Stairs) Non ambulatory at this time.  -VG      Recorded by [VG] Manjula Soria, PT 09/06/18 1546      Row Name 09/06/18 0805             ADL Assessment/Intervention    15611 - OT Self Care/Mgmt Minutes 5  -HK      BADL Assessment/Intervention grooming  -HK      Recorded by [HK] Violette Smith, OT 09/06/18 0917      Row Name 09/06/18 0805             Grooming Assessment/Training    [place] Level (Grooming) hair care, combing/brushing;maximum assist (25% patient effort)  -HK      Grooming Position supported sitting  -HK      Recorded by [HK] Violette Smith, OT 09/06/18 0917      Row Name 09/06/18 0805             Motor Skills Assessment/Interventions    Additional Documentation Balance (Group);Therapeutic Exercise (Group)  -HK      Recorded by [HK] Violette Smith, OT 09/06/18 0917      Row Name 09/06/18 1533 09/06/18 0805          Therapeutic Exercise    52338 - PT Therapeutic Exercise Minutes 9  -VG  --     10765 - OT Therapeutic Exercise Minutes  -- 9  -HK     04504 - OT Therapeutic Activity Minutes  -- 9  -HK     Recorded by [VG] Manjula Soria, PT 09/06/18 1546 [HK] Violette Smith, OT 09/06/18 0917     Row Name  09/06/18 1533 09/06/18 0805          Therapeutic Exercise    Upper Extremity Range of Motion (Therapeutic Exercise)  -- shoulder flexion/extension, bilateral;shoulder abduction/adduction, bilateral;elbow flexion/extension, bilateral;wrist flexion/extension, bilateral  -HK     Hand (Therapeutic Exercise)  -- finger flexion/extension, bilateral;thumb finger opposition, bilateral  -HK     Lower Extremity (Therapeutic Exercise) SAQ (short arc quad), bilateral;SLR (straight leg raise), bilateral  -VG  --     Lower Extremity Range of Motion (Therapeutic Exercise) hip abduction/adduction, bilateral;ankle dorsiflexion/plantar flexion, bilateral  -VG  --     Exercise Type (Therapeutic Exercise) AAROM (active assistive range of motion);AROM (active range of motion)  -VG AROM (active range of motion)  -HK     Position (Therapeutic Exercise) supine  -VG seated  -HK     Sets/Reps (Therapeutic Exercise) 15x  -VG 1/15  -HK     Comment (Therapeutic Exercise) Cues for technique. JERO for SLR and hip abd/add BLEs. Inc pain in L hip with movement.  -VG  --     Recorded by [VG] Manjula Soria, PT 09/06/18 1546 [HK] Violette Smith, OT 09/06/18 0917     Row Name 09/06/18 0805             Balance    Balance static sitting balance;static standing balance  -HK      Recorded by [HK] Violette Smith, OT 09/06/18 0917      Row Name 09/06/18 0805             Static Sitting Balance    Level of [place] (Unsupported Sitting, Static Balance) independent  -HK      Sitting Position (Unsupported Sitting, Static Balance) sitting on edge of bed  -HK      Time Able to Maintain Position (Unsupported Sitting, Static Balance) 1 to 2 minutes  -HK      Recorded by [HK] Violette Smith, OT 09/06/18 0917      Row Name 09/06/18 0805             Static Standing Balance    Level of [place] (Supported Standing, Static Balance) moderate assist, 50 to 74% patient effort  -HK      Time Able to Maintain Position (Supported Standing, Static Balance) 30 to 45  seconds  -HK      Recorded by [HK] Violette Smith, OT 09/06/18 0917      Row Name 09/06/18 1533 09/06/18 0805          Positioning and Restraints    Pre-Treatment Position in bed  -VG in bed  -HK     Post Treatment Position bed  -VG chair  -HK     In Bed supine;call light within reach;encouraged to call for assist;exit alarm on;side rails up x2  -VG  --     In Chair  -- notified nsg;reclined;call light within reach;encouraged to call for assist  -HK     Recorded by [VG] Manjula Soria, PT 09/06/18 1546 [HK] Violette Smith, OT 09/06/18 0917     Row Name 09/06/18 0805             Pain Assessment    Additional Documentation Pain Scale: Numbers Pre/Post-Treatment (Group)  -HK      Recorded by [HK] Violette Smith, OT 09/06/18 0917      Row Name 09/06/18 0805             Pain Scale: Numbers Pre/Post-Treatment    Pain Scale: Numbers, Pretreatment 7/10  -HK      Pain Scale: Numbers, Post-Treatment 7/10  -HK      Pain Location - Side Bilateral  -HK      Pain Location - Orientation lower  -HK      Pain Location extremity  -HK      Recorded by [HK] Violette Smith, OT 09/06/18 0917      Row Name 09/06/18 1533             Pain Scale: Word Pre/Post-Treatment    Pain: Word Scale, Pretreatment 4 - moderate pain  -VG      Pain: Word Scale, Post-Treatment 4 - moderate pain  -VG      Recorded by [VG] Manjula Soria, PT 09/06/18 1546      Row Name                Wound 08/20/18 1748 Bilateral lower coccyx    Wound - Properties Group Date first assessed: 08/20/18 [DONNIE] Time first assessed: 1748 [DONNIE] Present On Admission : yes [DONNIE] Side: Bilateral [JO2] Orientation: lower [DONNIE] Location: coccyx [DONNIE] Stage, Pressure Injury: Stage 2 [DONNIE] Recorded by:  [DONNIE] Zhang Sy LPN 08/20/18 1749 [JO2] Zhang Sy LPN 08/20/18 1751    Row Name                Wound 08/20/18 1601 Other (See comments) penis other (see comments)    Wound - Properties Group Date first assessed: 08/20/18 [DONNIE] Time first assessed: 1601 [DONNIE] Present On Admission :  yes [DONNIE] Side: Other (See comments) [DONNIE] Location: penis [DONNIE] Type: other (see comments) [DONNIE] Recorded by:  [DONNIE] Zhang Sy LPN 08/20/18 1902    Row Name                Wound 08/21/18 0820 Right lower ischial tuberosity pressure injury    Wound - Properties Group Date first assessed: 08/21/18 [AS] Time first assessed: 0820 [AS] Present On Admission : yes [AS] Side: Right [AS] Orientation: lower [AS] Location: ischial tuberosity [AS] Type: pressure injury [AS] Stage, Pressure Injury: Stage 2 [AS] Additional Comments: Chronic  [AS] Recorded by:  [AS] Dhaval Cardozo RN 08/21/18 0929    Row Name 09/06/18 1533             Coping    Observed Emotional State accepting  -VG      Verbalized Emotional State acceptance  -VG      Recorded by [VG] Manjula Soria, PT 09/06/18 1546      Row Name 09/06/18 1533 09/06/18 0805          Plan of Care Review    Plan of Care Reviewed With patient  -VG patient  -HK     Recorded by [VG] Manjula Soria, PT 09/06/18 1546 [HK] Violette Smith, OT 09/06/18 0917     Row Name 09/06/18 0805             Outcome Summary/Treatment Plan (OT)    Daily Summary of Progress (OT) progress toward functional goals is gradual  -HK      Recorded by [HK] Violette Smith, OT 09/06/18 0917      Row Name 09/06/18 1533             Outcome Summary/Treatment Plan (PT)    Daily Summary of Progress (PT) progress towards functional goals is fair  -VG      Anticipated Discharge Disposition (PT) skilled nursing facility  -VG      Recorded by [VG] Manjula Soria, PT 09/06/18 1546        User Key  (r) = Recorded By, (t) = Taken By, (c) = Cosigned By    Initials Name Effective Dates Discipline    Zhang Thorpe LPN 03/14/16 -  Nurse    AS Dhaval Cardozo RN 06/16/16 -  Nurse    Violette Irwin, OT 03/07/18 -  OT    VG Manjula Soria, PT 05/29/18 -  PT          Rash 08/24/18 0800 perineum macular (Active)   Distribution generalized 9/6/2018  8:20 AM   Configuration/Shape asymmetric 9/6/2018  8:20 AM    Borders irregular 9/6/2018  8:20 AM   Characteristics pain/discomfort;dry 9/6/2018  8:20 AM   Color pink 9/6/2018  8:20 AM   Care, Rash soap and water;cleansed with 9/5/2018  8:00 PM       Wound 08/20/18 1748 Bilateral lower coccyx (Active)   Dressing Appearance dry;intact 9/6/2018  8:20 AM   Closure None 9/6/2018  8:20 AM   Base dry;clean;red/granulating 9/6/2018  8:20 AM   Periwound blanchable 9/6/2018  8:20 AM   Periwound Temperature warm 9/6/2018  8:20 AM   Periwound Skin Turgor soft 9/6/2018  8:20 AM   Edges irregular 9/6/2018  8:20 AM   Dressing Care, Wound dressing changed 9/6/2018  5:33 AM   Periwound Care, Wound dry periwound area maintained 9/6/2018  5:33 AM       Wound Bilateral leg blisters (Active)   Dressing Appearance dry;intact 9/6/2018  5:33 AM       Wound 08/20/18 1601 Other (See comments) penis other (see comments) (Active)   Dressing Appearance open to air 9/6/2018  8:20 AM   Closure None 9/6/2018  8:20 AM   Base red/granulating 9/6/2018  8:20 AM   Periwound pink 9/6/2018  8:20 AM   Periwound Temperature warm 9/6/2018  8:20 AM   Periwound Skin Turgor soft 9/6/2018  8:20 AM   Edges irregular 9/6/2018  8:20 AM   Drainage Amount none 9/6/2018  5:33 AM   Care, Wound cleansed with;soap and water 9/6/2018  5:33 AM   Dressing Care, Wound open to air 9/6/2018  5:33 AM       Wound 08/21/18 0820 Right lower ischial tuberosity pressure injury (Active)   Dressing Appearance dry;intact 9/6/2018  5:33 AM   Closure None 9/6/2018  5:33 AM   Base pink 9/6/2018  5:33 AM   Periwound blanchable 9/6/2018  5:33 AM   Periwound Temperature warm 9/6/2018  5:33 AM   Periwound Skin Turgor soft 9/6/2018  5:33 AM   Edges irregular 9/6/2018  5:33 AM   Drainage Amount none 9/6/2018  5:33 AM   Care, Wound cleansed with 9/6/2018  5:33 AM   Dressing Care, Wound low-adherent 9/6/2018  5:33 AM   Periwound Care, Wound dry periwound area maintained 9/6/2018  5:33 AM             Physical Therapy Education     Title: PT OT SLP  Therapies (Done)     Topic: Physical Therapy (Done)     Point: Mobility training (Done)    Learning Progress Summary     Learner Status Readiness Method Response Comment Documented by    Patient Done Acceptance E VU Educated on HEP and correct mechanics for safe functional mobility. VG 09/05/18 1449     Done Acceptance E VU Educated on HEP and correct mechanics for safe functional mobility.  09/04/18 1533     Active Acceptance E NR   09/02/18 1145          Point: Home exercise program (Done)    Learning Progress Summary     Learner Status Readiness Method Response Comment Documented by    Patient Done Acceptance E VU Educated on HEP this date. VG 09/06/18 1546     Done Acceptance E VU Educated on HEP and correct mechanics for safe functional mobility. VG 09/05/18 1449     Done Acceptance E VU Educated on HEP and correct mechanics for safe functional mobility. VG 09/04/18 1533          Point: Precautions (Done)    Learning Progress Summary     Learner Status Readiness Method Response Comment Documented by    Patient Done Acceptance E VU Educated on HEP and correct mechanics for safe functional mobility. VG 09/05/18 1449     Done Acceptance E VU Educated on HEP and correct mechanics for safe functional mobility.  09/04/18 1533     Active Acceptance E NR   09/02/18 1145                      User Key     Initials Effective Dates Name Provider Type Discipline     06/15/16 -  Tammie Raman, PT Physical Therapist PT     05/29/18 -  Manjula Soria, PT Physical Therapist PT                    PT Recommendation and Plan  Anticipated Discharge Disposition (PT): skilled nursing facility  Therapy Frequency (PT Clinical Impression): daily  Outcome Summary/Treatment Plan (PT)  Daily Summary of Progress (PT): progress towards functional goals is fair  Anticipated Discharge Disposition (PT): skilled nursing facility  Plan of Care Reviewed With: patient  Progress: no change  Outcome Summary: Pt refused OOB activity this  date d/t fatigue and generalized muscle pain. Pt agreed to bed level ther ex. Will continue to progress pt as able per POC.          Outcome Measures     Row Name 09/06/18 1533 09/06/18 0805 09/05/18 1400       How much help from another person do you currently need...    Turning from your back to your side while in flat bed without using bedrails? 3  -VG  -- 3  -VG    Moving from lying on back to sitting on the side of a flat bed without bedrails? 3  -VG  -- 3  -VG    Moving to and from a bed to a chair (including a wheelchair)? 2  -VG  -- 2  -VG    Standing up from a chair using your arms (e.g., wheelchair, bedside chair)? 2  -VG  -- 2  -VG    Climbing 3-5 steps with a railing? 1  -VG  -- 1  -VG    To walk in hospital room? 1  -VG  -- 1  -VG    AM-PAC 6 Clicks Score 12  -VG  -- 12  -VG       How much help from another is currently needed...    Putting on and taking off regular lower body clothing?  -- 1  -HK  --    Bathing (including washing, rinsing, and drying)  -- 2  -HK  --    Toileting (which includes using toilet bed pan or urinal)  -- 2  -HK  --    Putting on and taking off regular upper body clothing  -- 3  -HK  --    Taking care of personal grooming (such as brushing teeth)  -- 2  -HK  --    Eating meals  -- 3  -HK  --    Score  -- 13  -HK  --       Functional Assessment    Outcome Measure Options AM-PAC 6 Clicks Basic Mobility (PT)  -VG AM-PAC 6 Clicks Daily Activity (OT)  -HK AM-PAC 6 Clicks Basic Mobility (PT)  -VG    Row Name 09/04/18 1504 09/04/18 1300          How much help from another person do you currently need...    Turning from your back to your side while in flat bed without using bedrails? 2  -VG  --     Moving from lying on back to sitting on the side of a flat bed without bedrails? 1  -VG  --     Moving to and from a bed to a chair (including a wheelchair)? 1  -VG  --     Standing up from a chair using your arms (e.g., wheelchair, bedside chair)? 2  -VG  --     Climbing 3-5 steps with a  railing? 1  -VG  --     To walk in hospital room? 1  -VG  --     AM-PAC 6 Clicks Score 8  -VG  --        How much help from another is currently needed...    Putting on and taking off regular lower body clothing?  -- 1  -HK     Bathing (including washing, rinsing, and drying)  -- 2  -HK     Toileting (which includes using toilet bed pan or urinal)  -- 1  -HK     Putting on and taking off regular upper body clothing  -- 2  -HK     Taking care of personal grooming (such as brushing teeth)  -- 2  -HK     Eating meals  -- 3  -HK     Score  -- 11  -HK        Functional Assessment    Outcome Measure Options AM-PAC 6 Clicks Basic Mobility (PT)  -VG AM-PAC 6 Clicks Daily Activity (OT)  -HK       User Key  (r) = Recorded By, (t) = Taken By, (c) = Cosigned By    Initials Name Provider Type     Violette Smith, OT Occupational Therapist    VG Manjula Soria, PT Physical Therapist           Time Calculation:         PT Charges     Row Name 09/06/18 1533             Time Calculation    Start Time 1533  -VG      PT Received On 09/06/18  -VG      PT Goal Re-Cert Due Date 09/12/18  -VG         Time Calculation- PT    Total Timed Code Minutes- PT 9 minute(s)  -VG         Timed Charges    73598 - PT Therapeutic Exercise Minutes 9  -VG        User Key  (r) = Recorded By, (t) = Taken By, (c) = Cosigned By    Initials Name Provider Type    VG Manjula Soria, PT Physical Therapist        Therapy Suggested Charges     Code   Minutes Charges    55626 (CPT®) Hc Pt Neuromusc Re Education Ea 15 Min      68504 (CPT®) Hc Pt Ther Proc Ea 15 Min 9 1    20628 (CPT®) Hc Gait Training Ea 15 Min      58617 (CPT®) Hc Pt Therapeutic Act Ea 15 Min      40085 (CPT®) Hc Pt Manual Therapy Ea 15 Min      21856 (CPT®) Hc Pt Iontophoresis Ea 15 Min      20094 (CPT®) Hc Pt Elec Stim Ea-Per 15 Min      01700 (CPT®) Hc Pt Ultrasound Ea 15 Min      39410 (CPT®) Hc Pt Self Care/Mgmt/Train Ea 15 Min      26292 (CPT®) Hc Pt Prosthetic (S) Train Initial  Encounter, Each 15 Min      85505 (CPT®) Hc Pt Orthotic(S)/Prosthetic(S) Encounter, Each 15 Min      95295 (CPT®) Hc Orthotic(S) Mgmt/Train Initial Encounter, Each 15min      Total  9 1        Therapy Charges for Today     Code Description Service Date Service Provider Modifiers Qty    51077833932 HC PT THER PROC EA 15 MIN 9/5/2018 Manjula Soria, PT GP 1    80213019194 HC PT THERAPEUTIC ACT EA 15 MIN 9/5/2018 Manjula Soria, PT GP 2    44716348711 HC PT THER SUPP EA 15 MIN 9/5/2018 Manjula Soria, PT GP 1    17565373330 HC PT THER PROC EA 15 MIN 9/6/2018 Manjula Soria, PT GP 1          PT G-Codes  Outcome Measure Options: AM-PAC 6 Clicks Basic Mobility (PT)  AM-PAC 6 Clicks Score: 12  Score: 13    Leonor Soria PT  9/6/2018

## 2018-09-06 NOTE — PLAN OF CARE
Problem: Skin Injury Risk (Adult)  Goal: Skin Health and Integrity  Outcome: Ongoing (interventions implemented as appropriate)   09/06/18 1538   Skin Injury Risk (Adult)   Skin Health and Integrity making progress toward outcome       Problem: Fall Risk (Adult)  Goal: Absence of Fall  Outcome: Ongoing (interventions implemented as appropriate)   09/06/18 1538   Fall Risk (Adult)   Absence of Fall making progress toward outcome       Problem: Patient Care Overview  Goal: Plan of Care Review  Outcome: Ongoing (interventions implemented as appropriate)   09/06/18 1538   Coping/Psychosocial   Plan of Care Reviewed With patient   Plan of Care Review   Progress improving     Goal: Discharge Needs Assessment  Outcome: Ongoing (interventions implemented as appropriate)   08/21/18 0030 08/21/18 0638 08/21/18 1034   Discharge Needs Assessment   Readmission Within the Last 30 Days --  --  --    Concerns to be Addressed --  --  --    Patient/Family Anticipates Transition to home --  --    Patient/Family Anticipated Services at Transition --  ;home health care;rehabilitation services --    Transportation Anticipated family or friend will provide --  --    Equipment Needed After Discharge --  --  --    Disability   Equipment Currently Used at Home --  --  wheelchair;walker, rolling;oxygen    08/26/18 1538 08/28/18 1458   Discharge Needs Assessment   Readmission Within the Last 30 Days no previous admission in last 30 days --    Concerns to be Addressed denies needs/concerns at this time --    Patient/Family Anticipates Transition to --  --    Patient/Family Anticipated Services at Transition --  --    Transportation Anticipated --  --    Equipment Needed After Discharge --  none   Disability   Equipment Currently Used at Home --  --        Problem: Tissue Perfusion, Ineffective Peripheral (Adult)  Goal: Adequate Tissue Perfusion  Outcome: Ongoing (interventions implemented as appropriate)   09/06/18 1538   Tissue  Perfusion, Ineffective Peripheral (Adult)   Adequate Tissue Perfusion making progress toward outcome

## 2018-09-06 NOTE — PLAN OF CARE
Problem: Patient Care Overview  Goal: Plan of Care Review  Outcome: Ongoing (interventions implemented as appropriate)   09/06/18 0918   Coping/Psychosocial   Plan of Care Reviewed With patient   Plan of Care Review   Progress improving   OTHER   Outcome Summary Pt completed bed mobility with modAx2 and stand pivot transfer with modAx2. Pt unable to reach full standing position due to flexed posture and knees. Pt completed BUE AROM x15 reps and required maxA to comb hair. Continue IP OT per POC.

## 2018-09-06 NOTE — PROGRESS NOTES
"Yassine Love  1945  [id number]  9/6/2018    CC: BLE wounds  Chief Complaint   Patient presents with   • Cellulitis       Yassine Love is a 72 y.o. male here for CC leg infection  History of present illness:    Yassine Love is a 72 y.o. male well known to us from prior admissions, who was sent to the [facility] ED by home health nurse due to BLE redness and drainage.  He reports the redness has been present for several weeks with increasing pain and drainage in that period. During his prior admission here at [facility] his cultures grew PSA, ecoli, proteus, enterococcus and strep from his RLE.  Patient currently denies any fever, chills, or sweats.  Patient is tolerating antibiotics.  Patient reports he needs his knees and hips replaced but can not have surgery until his wounds have healed.  Patient was admitted to the hospitalist and started on Merrem and Vancomycin.  Patient doesn't report severe pain in his feet but does report joint pain.  8/22 - co leg pain  Co SOA  No fever or rash  \"I am not losing my legs!\"  Co hip and knee pain  8/23- co leg infections, denies fever, chills, night sweats, nausea, vomiting, diarrhea, rash, and cough  8/24/18 - Patient complains of pain in his hips and knees.  Patient denies any fever, chills, or sweats.  Patient denies any nausea, vomiting, or diarrhea.     seen and agree  8/25 - co penile lesion  Co leg infection  \"They may put in a stent\"  No fever  8/27/18 - C/O swelling in BUE.  Superficial clot found on ultrasound.  \"I can't go until this fluid is taken care of\"  Denies any fever, chills, or sweats.  Patient denies any nausea, vomiting, or diarrhea.  Seen and agree  8/28/18 - C/O BUE swelling as well as scrotal edema.  Patient denies any fever, chills, or sweats.  Patient denies any nausea, vomiting, or diarrhea.  Seen and agree  8/29/18 - Still concerned about swelling.  Patient denies any fever, chills, or sweats.  Patient denies any nausea, vomiting, or diarrhea.  Seen " and agree  8/30/18 - Had trouble breathing last night.  Patient was placed on nonrebreather.  Now back on NC.  Patient states he is on 3L at home with oxygen sats running 88%.  No fever.  + chills.  No nausea, vomiting, or diarrhea.  Seen and agree  8/31 - co weakness  Co leg infection  9/3 - No hx available  9/4/18 - Patient on hi emperatriz oxygen at 70%.  Concerned about fluid restriction.  Patient had two episodes of VT last night.  No fever.  Seen and agree  9/5/18 - Patient is now on 4LNC.  Patient denies fever, chills, or sweats.  Patient denies any nausea, vomiting, or diarrhea.    Seen and agree  9/6/18 - Concerned about SNF placement.  No fever.  No nausea, vomiting, or diarrhea.    Seen and agree    Past medical history:  Past Medical History:   Diagnosis Date   • Cancer (CMS/[facility])    • Cellulitis of both lower extremities     [facility] 2016   • Chronic pain    • COPD (chronic obstructive pulmonary disease) (CMS/[facility])    • Hypertension    • Osteoarthritis cervical spine    • Osteoarthritis of both knees    • Osteoarthritis of left hip        Medications:   Current Facility-Administered Medications:   •  acetaminophen (TYLENOL) tablet 650 mg, 650 mg, Oral, Q6H PRN, Shital Hooks MD, 650 mg at 09/03/18 2319  •  aspirin EC tablet 81 mg, 81 mg, Oral, Daily, Yumiko Muhammad APRN, 81 mg at 09/06/18 0830  •  atorvastatin (LIPITOR) tablet 40 mg, 40 mg, Oral, Nightly, Yumiko Muhammad APRN, 40 mg at 09/05/18 2119  •  bisacodyl (DULCOLAX) suppository 10 mg, 10 mg, Rectal, Daily PRN, Shital Hooks MD, 10 mg at 08/26/18 0947  •  budesonide-formoterol (SYMBICORT) 160-4.5 MCG/ACT inhaler 2 puff, 2 puff, Inhalation, BID - RT, Yumiko Muhammad APRN, 2 puff at 09/06/18 0720  •  castor oil-balsam peru (VENELEX) ointment, , Topical, Q12H, Shital Hooks MD  •  clopidogrel (PLAVIX) tablet 75 mg, 75 mg, Oral, Daily, Yumiko Muhammad APRN, 75 mg at 09/06/18 0830  •  diphenhydrAMINE (BENADRYL) capsule 25 mg, 25 mg,  Oral, Q6H PRN, Kia Addison MD, 25 mg at 09/06/18 0834  •  diphenhydrAMINE-zinc acetate 2-0.1 % cream, , Topical, TID PRN, Kia Addison MD  •  doxycycline (MONODOX) capsule 100 mg, 100 mg, Oral, Q12H, Parish Belle MD, 100 mg at 09/06/18 0830  •  fluticasone (FLONASE) 50 MCG/ACT nasal spray 2 spray, 2 spray, Each Nare, Daily, Yumiko Muhammad APRN, 2 spray at 09/06/18 0830  •  furosemide (LASIX) injection 40 mg, 40 mg, Intravenous, Daily, Danii Parra APRN, 40 mg at 09/06/18 0830  •  gabapentin (NEURONTIN) capsule 400 mg, 400 mg, Oral, Q8H, Shital Hooks MD, 400 mg at 09/06/18 1339  •  heparin (porcine) 5000 UNIT/ML injection 5,000 Units, 5,000 Units, Subcutaneous, Q8H, Yumiko Muhammad APRN, 5,000 Units at 09/06/18 1340  •  HYDROcodone-acetaminophen (NORCO)  MG per tablet 1 tablet, 1 tablet, Oral, Q4H PRN, Shital Hooks MD, 1 tablet at 09/06/18 1339  •  ipratropium-albuterol (DUO-NEB) nebulizer solution 3 mL, 3 mL, Nebulization, Q6H PRN, Yumiko Muhammad APRN, 3 mL at 09/02/18 0511  •  ipratropium-albuterol (DUO-NEB) nebulizer solution 3 mL, 3 mL, Nebulization, TID, Belle Valdivia APRN, 3 mL at 09/06/18 1339  •  Magnesium Sulfate 2 gram Bolus, followed by 8 gram infusion (total Mg dose 10 grams)- Mg less than or equal to 1mg/dL, 2 g, Intravenous, PRN **OR** Magnesium Sulfate 2 gram / 50mL Infusion (GIVE X 3 BAGS TO EQUAL 6GM TOTAL DOSE) - Mg 1.1 - 1.5 mg/dl, 2 g, Intravenous, PRN **OR** Magnesium Sulfate 4 gram infusion- Mg 1.6-1.9 mg/dL, 4 g, Intravenous, PRN, Frannie Nugent APRN, Last Rate: 25 mL/hr at 09/04/18 0618, 4 g at 09/04/18 0618  •  melatonin sublingual tablet 5 mg, 5 mg, Sublingual, Nightly PRN, Kia Addison MD, 5 mg at 08/30/18 2103  •  metoprolol tartrate (LOPRESSOR) tablet 25 mg, 25 mg, Oral, Q12H, Shital Hooks MD, 25 mg at 09/06/18 0830  •  miconazole (MICOTIN) 2 % cream, , Topical, Q12H, Shital Hooks MD  •   mirtazapine (REMERON SOL-TAB) disintegrating tablet 15 mg, 15 mg, Oral, Nightly, Stella Garvin PA-C, 15 mg at 09/05/18 2119  •  Morphine (MS CONTIN) 12 hr tablet 30 mg, 30 mg, Oral, Q12H, Shital Hooks MD, 30 mg at 09/06/18 0830  •  nicotine (NICODERM CQ) 21 MG/24HR patch 1 patch, 1 patch, Transdermal, Q24H, Yumiko Muhammad APRN, 1 patch at 09/05/18 2118  •  pantoprazole (PROTONIX) EC tablet 40 mg, 40 mg, Oral, Q AM, Yumiko Muhammad APRN, 40 mg at 09/06/18 0520  •  Pharmacy Meds to Bed Consult, , Does not apply, Daily, Kia Addison MD, Stopped at 08/24/18 1115  •  sennosides-docusate sodium (SENOKOT-S) 8.6-50 MG tablet 2 tablet, 2 tablet, Oral, Nightly, Shital Hooks MD, 2 tablet at 09/05/18 2119  •  sodium chloride 0.9 % flush 1-10 mL, 1-10 mL, Intravenous, PRN, Yumiko Muhammad APRN  •  Insert peripheral IV, , , Once **AND** sodium chloride 0.9 % flush 10 mL, 10 mL, Intravenous, PRN, Dakota De Paz MD  Antibiotics:  Anti-Infectives     Ordered     Dose/Rate Route Frequency Start Stop    08/27/18 1242  doxycycline (MONODOX) capsule 100 mg     Parish Belle MD reviewed the order on 09/05/18 1410.   Ordering Provider:  Parish Belle MD    100 mg Oral Every 12 Hours Scheduled 08/27/18 1330 09/16/18 1409    08/20/18 1914  meropenem (MERREM) 500mg/100 mL 0.9% NS IVPB ([facility])     Ordering Provider:  Yumiko Muhammad APRN    500 mg  over 30 Minutes Intravenous Once 08/20/18 2100 08/20/18 2125    08/20/18 1357  vancomycin 1750 mg/500 mL 0.9% NS IVPB ([facility])     Ordering Provider:  Dakota De Paz MD    20 mg/kg × 81.6 kg  over 2 Hours Intravenous Once 08/20/18 1359 08/20/18 1701          Allergies:  is allergic to ciprofloxacin hcl and ceftin [cefuroxime axetil].    Family History: family history includes COPD in his brother; Heart attack in his brother; Stroke in his father.    Social History:  reports that he has been smoking Cigarettes.  He has a 84.00 pack-year smoking history. He  "has never used smokeless tobacco. He reports that he does not drink alcohol or use drugs.    Review of Systems: All other reviewed and negative except as per HPI    Blood pressure 97/60, pulse 77, temperature 98.7 °F (37.1 °C), temperature source Oral, resp. rate 18, height 172.7 cm (68\"), weight 87.6 kg (193 lb 3.2 oz), SpO2 90 %.  GENERAL: Awake and alert   HEENT: Oropharynx without thrush.   EYES: . No conjunctival injection. No icterus.   LYMPHATICS: No lymphadenopathy of the neck   HEART: Irregular. No murmur, gallop, or pericardial friction rub.   LUNGS: Diminished throughout on 4LNC   ABDOMEN: Soft, nontender, nondistended. No appreciable HSM.    SKIN: Warm and dry   PSYCHIATRIC: Mental status lucid.   EXT:  BLE with wraps in place.   Seen and agree    DIAGNOSTICS:  Lab Results   Component Value Date    WBC 8.53 08/29/2018    HGB 10.2 (L) 08/29/2018    HCT 34.1 (L) 08/29/2018     08/29/2018     Lab Results   Component Value Date    CRP 13.12 (H) 06/21/2017     Lab Results   Component Value Date    SEDRATE 56 (H) 06/20/2017     Lab Results   Component Value Date    GLUCOSE 115 (H) 09/06/2018    BUN 24 (H) 09/06/2018    CREATININE 1.16 09/06/2018    EGFRIFNONA 62 09/06/2018    BCR 20.7 09/06/2018    CO2 39.0 (H) 09/06/2018    CALCIUM 8.6 (L) 09/06/2018    ALBUMIN 3.08 (L) 08/20/2018    AST 20 08/20/2018    ALT 23 08/20/2018       Microbiology:  Microbiology Results Abnormal     Procedure Component Value - Date/Time    Blood Culture - Blood, [[id number]]  (Normal) Collected:  08/20/18 1445    Lab Status:  Final result Specimen:  Blood from Wrist, Right Updated:  08/25/18 1500     Blood Culture No growth at 5 days    Blood Culture - Blood, [[id number]]  (Normal) Collected:  08/20/18 1430    Lab Status:  Final result Specimen:  Blood from Arm, Right Updated:  08/25/18 1500     Blood Culture No growth at 5 days    Wound Culture - Wound, Penis [[id number]]  (Abnormal)  (Susceptibility) Collected:  08/20/18 1930 "    Lab Status:  Final result Specimen:  Wound from Penis Updated:  08/23/18 1427     Wound Culture Scant growth (1+) Providencia rettgeri (A)      Scant growth (1+) Staphylococcus aureus (A)      Scant growth (1+) Staphylococcus haemolyticus (A)     Comment: This isolate is presumed to be clindamyin resistant based on detection of inducible clindamycin resistance. Clindamycin may still be effective in some patients.          Gram Stain Result No WBCs or organisms seen    Susceptibility      Providencia rettgeri    Staphylococcus aureus       FAVIAN    FAVIAN       Ampicillin + Sulbactam <=8/4 ug/ml Susceptible             Aztreonam <=8 ug/ml Susceptible             Cefepime <=8 ug/ml Susceptible             Cefotaxime <=2 ug/ml Susceptible             Ceftriaxone <=8 ug/ml Susceptible    <=8 ug/ml Susceptible       Cefuroxime sodium <=4 ug/ml Susceptible             Clindamycin       <=0.5 ug/ml Susceptible       Daptomycin       <=0.5 ug/ml Susceptible       Ertapenem <=1 ug/ml Susceptible             Erythromycin       <=0.5 ug/ml Susceptible       Gentamicin <=4 ug/ml Susceptible    <=4 ug/ml Susceptible       Levofloxacin <=2 ug/ml Susceptible    <=1 ug/ml Susceptible  [1]        Linezolid       2 ug/ml Susceptible       Meropenem <=1 ug/ml Susceptible             Oxacillin       <=0.25 ug/ml Susceptible       Penicillin G       <=0.03 ug/ml Susceptible       Piperacillin + Tazobactam <=16 ug/ml Susceptible             Quinupristin + Dalfopristin       <=0.5 ug/ml Susceptible  [2]        Rifampin       <=1 ug/ml Susceptible       Streptomycin High Level Synergy       <=0.5 ug/ml Susceptible       Tetracycline       <=4 ug/ml Susceptible       Tobramycin <=4 ug/ml Susceptible             Trimethoprim + Sulfamethoxazole <=2/38 ug/ml Susceptible    <=0.5/9.5 ug/ml Susceptible       Vancomycin       2 ug/ml Susceptible              [1]   Staphylococcus species may develop resistance during prolonged therapy with  quinolones.  Isolates that are initially susceptible may become resistant within three to four days after initiation of therapy. Testing of repeat isolates may be warranted.       [2]   Appended report. These results have been appended to a previously preliminary verified report.               Susceptibility      Staphylococcus haemolyticus     FAVIAN     Ciprofloxacin >2 ug/ml Resistant     Clindamycin  Resistant     Daptomycin <=0.5 ug/ml Susceptible     Erythromycin >4 ug/ml Resistant     Gentamicin <=4 ug/ml Susceptible     Levofloxacin >4 ug/ml Resistant     Linezolid <=1 ug/ml Susceptible     Oxacillin >2 ug/ml Resistant     Penicillin G >8 ug/ml Resistant     Quinupristin + Dalfopristin <=0.5 ug/ml Susceptible     Rifampin <=1 ug/ml Susceptible     Tetracycline <=4 ug/ml Susceptible     Trimethoprim + Sulfamethoxazole <=0.5/9.5 ug/ml Susceptible     Vancomycin 2 ug/ml Susceptible                    Wound Culture - Wound, Leg, Left [[id number]]  (Abnormal)  (Susceptibility) Collected:  08/20/18 1930    Lab Status:  Final result Specimen:  Wound from Leg, Left Updated:  08/22/18 1347     Wound Culture Moderate growth (3+) Pseudomonas aeruginosa (A)      Scant growth (1+) Streptococcus, Beta Hemolytic, Group G (A)     Comment:   If Clindamycin or Erythromycin is the drug of choice, notify the laboratory within 7 days to request susceptibility testing.        STREP GROUPING G     Gram Stain Result No WBCs seen      Many (4+) Gram negative bacilli    Susceptibility      Pseudomonas aeruginosa     FAVIAN     Aztreonam <=8 ug/ml Susceptible     Cefepime <=8 ug/ml Susceptible     Ceftazidime <=1 ug/ml Susceptible     Gentamicin <=4 ug/ml Susceptible     Levofloxacin <=2 ug/ml Susceptible     Meropenem <=1 ug/ml Susceptible     Piperacillin + Tazobactam >64 ug/ml Resistant     Tobramycin <=4 ug/ml Susceptible                    KOH Prep - Skin, Foot, Left [[id number]]  (Normal) Collected:  08/20/18 1501    Lab Status:  Final  result Specimen:  Skin from Foot, Left Updated:  08/20/18 1528     KOH Prep No yeast or hyphal elements seen            RADIOLOGY:  Imaging Results (last 72 hours)     ** No results found for the last 72 hours. **          Assessment and Plan:     BLE erythema and open wounds and edema   Wounds with GBS and PSA - prior treatment with Merrem, now on Doxycycline alone.   Acute kidney injury - worse  PVD  DJD - needs knee and hip replaced.   COPD  Penis ulceration - culture with providencia, MSSA, and CNS  BUE edema - LUE with superficial thrombophlebitis  Volume overload   VT    Patient is currently on Doxycycline  Tolerating antibiotics.    Will need appropriate wound care outpatient, possible SNF placement.     Renewed the doxy  UM discussed with     BRITTNY Cotto for Dr. Parish Belle  9/6/2018

## 2018-09-07 PROCEDURE — 97602 WOUND(S) CARE NON-SELECTIVE: CPT

## 2018-09-07 PROCEDURE — 29581 APPL MULTLAYER CMPRN SYS LEG: CPT

## 2018-09-07 PROCEDURE — 25010000002 HEPARIN (PORCINE) PER 1000 UNITS: Performed by: NURSE PRACTITIONER

## 2018-09-07 PROCEDURE — 94640 AIRWAY INHALATION TREATMENT: CPT

## 2018-09-07 PROCEDURE — 97110 THERAPEUTIC EXERCISES: CPT

## 2018-09-07 PROCEDURE — 94799 UNLISTED PULMONARY SVC/PX: CPT

## 2018-09-07 PROCEDURE — 99232 SBSQ HOSP IP/OBS MODERATE 35: CPT | Performed by: INTERNAL MEDICINE

## 2018-09-07 PROCEDURE — 25010000002 FUROSEMIDE PER 20 MG: Performed by: NURSE PRACTITIONER

## 2018-09-07 RX ADMIN — GABAPENTIN 400 MG: 400 CAPSULE ORAL at 13:51

## 2018-09-07 RX ADMIN — METOPROLOL TARTRATE 25 MG: 25 TABLET ORAL at 20:33

## 2018-09-07 RX ADMIN — NICOTINE 1 PATCH: 21 PATCH, EXTENDED RELEASE TRANSDERMAL at 20:33

## 2018-09-07 RX ADMIN — DOXYCYCLINE 100 MG: 100 CAPSULE ORAL at 20:33

## 2018-09-07 RX ADMIN — DOXYCYCLINE 100 MG: 100 CAPSULE ORAL at 08:06

## 2018-09-07 RX ADMIN — FUROSEMIDE 40 MG: 10 INJECTION, SOLUTION INTRAMUSCULAR; INTRAVENOUS at 08:06

## 2018-09-07 RX ADMIN — Medication 2 TABLET: at 20:33

## 2018-09-07 RX ADMIN — ASPIRIN 81 MG: 81 TABLET, COATED ORAL at 08:06

## 2018-09-07 RX ADMIN — MICONAZOLE NITRATE: 2 CREAM TOPICAL at 20:33

## 2018-09-07 RX ADMIN — MORPHINE SULFATE 30 MG: 30 TABLET, EXTENDED RELEASE ORAL at 20:33

## 2018-09-07 RX ADMIN — METOPROLOL TARTRATE 25 MG: 25 TABLET ORAL at 08:06

## 2018-09-07 RX ADMIN — IPRATROPIUM BROMIDE AND ALBUTEROL SULFATE 3 ML: 2.5; .5 SOLUTION RESPIRATORY (INHALATION) at 07:42

## 2018-09-07 RX ADMIN — ATORVASTATIN CALCIUM 40 MG: 40 TABLET, FILM COATED ORAL at 20:33

## 2018-09-07 RX ADMIN — IPRATROPIUM BROMIDE AND ALBUTEROL SULFATE 3 ML: 2.5; .5 SOLUTION RESPIRATORY (INHALATION) at 19:50

## 2018-09-07 RX ADMIN — GABAPENTIN 400 MG: 400 CAPSULE ORAL at 05:22

## 2018-09-07 RX ADMIN — HEPARIN SODIUM 5000 UNITS: 5000 INJECTION, SOLUTION INTRAVENOUS; SUBCUTANEOUS at 20:32

## 2018-09-07 RX ADMIN — CASTOR OIL AND BALSAM, PERU: 788; 87 OINTMENT TOPICAL at 08:12

## 2018-09-07 RX ADMIN — MIRTAZAPINE 15 MG: 15 TABLET, ORALLY DISINTEGRATING ORAL at 20:34

## 2018-09-07 RX ADMIN — CLOPIDOGREL BISULFATE 75 MG: 75 TABLET ORAL at 08:06

## 2018-09-07 RX ADMIN — PANTOPRAZOLE SODIUM 40 MG: 40 TABLET, DELAYED RELEASE ORAL at 05:22

## 2018-09-07 RX ADMIN — HEPARIN SODIUM 5000 UNITS: 5000 INJECTION, SOLUTION INTRAVENOUS; SUBCUTANEOUS at 13:51

## 2018-09-07 RX ADMIN — FLUTICASONE PROPIONATE 2 SPRAY: 50 SPRAY, METERED NASAL at 08:09

## 2018-09-07 RX ADMIN — MORPHINE SULFATE 30 MG: 30 TABLET, EXTENDED RELEASE ORAL at 08:06

## 2018-09-07 RX ADMIN — GABAPENTIN 400 MG: 400 CAPSULE ORAL at 20:33

## 2018-09-07 RX ADMIN — IPRATROPIUM BROMIDE AND ALBUTEROL SULFATE 3 ML: 2.5; .5 SOLUTION RESPIRATORY (INHALATION) at 14:10

## 2018-09-07 RX ADMIN — CASTOR OIL AND BALSAM, PERU: 788; 87 OINTMENT TOPICAL at 20:34

## 2018-09-07 RX ADMIN — BUDESONIDE AND FORMOTEROL FUMARATE DIHYDRATE 2 PUFF: 160; 4.5 AEROSOL RESPIRATORY (INHALATION) at 07:42

## 2018-09-07 RX ADMIN — MICONAZOLE NITRATE: 2 CREAM TOPICAL at 08:13

## 2018-09-07 RX ADMIN — HEPARIN SODIUM 5000 UNITS: 5000 INJECTION, SOLUTION INTRAVENOUS; SUBCUTANEOUS at 05:23

## 2018-09-07 RX ADMIN — BUDESONIDE AND FORMOTEROL FUMARATE DIHYDRATE 2 PUFF: 160; 4.5 AEROSOL RESPIRATORY (INHALATION) at 19:51

## 2018-09-07 NOTE — THERAPY TREATMENT NOTE
Acute Care - Physical Therapy Treatment Note  [facility]     Patient Name: Yassine Love  : 1945  MRN: [id number]  Today's Date: 2018  Onset of Illness/Injury or Date of Surgery: 18  Date of Referral to PT: 18  Referring Physician: BRITTNY Alvares    Admit Date: 2018    Visit Dx:    ICD-10-CM ICD-9-CM   1. Cellulitis of left lower leg L03.116 682.6   2. Peripheral edema R60.9 782.3   3. Stasis dermatitis of both legs I87.2 454.1   4. Recurrent cellulitis of lower leg L03.119 682.6   5. Chronic pain syndrome G89.4 338.4   6. History of hypertension Z86.79 V12.59   7. Impaired functional mobility, balance, gait, and endurance Z74.09 V49.89   8. Impaired mobility and ADLs Z74.09 799.89     Patient Active Problem List   Diagnosis   • Cellulitis of right lower extremity   • RAFAEL (acute kidney injury) (CMS/[facility])   • PVD (peripheral vascular disease) (CMS/[facility])   • Cellulitis of both lower extremities   • COPD (chronic obstructive pulmonary disease) (CMS/[facility])   • Essential hypertension   • Hypoxia   • Cellulitis of lower extremity   • Non-sustained ventricular tachycardia (CMS/[facility])   • Coronary artery disease involving native coronary artery without angina pectoris   • Cellulitis of left lower leg   • Debility   • Decubitus ulcer of buttock, stage 2   • Chronic pain       Therapy Treatment          Rehabilitation Treatment Summary     Row Name 18 1000             Treatment Time/Intention    Discipline physical therapy assistant  -AS      Document Type therapy note (daily note)  -AS      Subjective Information complains of;pain   pt reports incr pain in feet after wound care debridment  -AS      Mode of Treatment physical therapy  -AS      Patient/Family Observations in bed, O2 per NC, no family present  -AS      Patient Effort adequate  -AS      Existing Precautions/Restrictions fall;oxygen therapy device and L/min  -AS      Recorded by [AS] Yasmine Fuller, PTA 18 1102       Row Name 09/07/18 1000             Cognitive Assessment/Intervention- PT/OT    Affect/Mental Status (Cognitive) flat/blunted affect  -AS      Orientation Status (Cognition) oriented x 4  -AS      Follows Commands (Cognition) follows one step commands;over 90% accuracy  -AS      Safety Deficit (Cognitive) mild deficit;insight into deficits/self awareness  -AS      Personal Safety Interventions fall prevention program maintained;gait belt;nonskid shoes/slippers when out of bed;other (see comments)   bed alarm  -AS      Recorded by [AS] Yasmine Fuller, [facility] 09/07/18 1103      Row Name 09/07/18 1000             Bed Mobility Assessment/Treatment    Comment (Bed Mobility) patient declined EOB/OOB activity  -AS      Recorded by [AS] Yasmine Fuller, [facility] 09/07/18 1103      Row Name 09/07/18 1000             Therapeutic Exercise    07211 - PT Therapeutic Exercise Minutes 23  -AS      Recorded by [AS] Yasmine Fuller, [facility] 09/07/18 1103      Row Name 09/07/18 1000             Therapeutic Exercise    Lower Extremity (Therapeutic Exercise) gluteal sets;quad sets, bilateral   limited ROM in left hip do to pain  -AS      Lower Extremity Range of Motion (Therapeutic Exercise) hip flexion/extension, bilateral;hip abduction/adduction, bilateral;hip internal/external rotation, bilateral;knee flexion/extension, bilateral;ankle dorsiflexion/plantar flexion, bilateral  -AS      Recorded by [AS] Yasmine Fuller, [facility] 09/07/18 1103      Row Name 09/07/18 1000             Positioning and Restraints    Pre-Treatment Position in bed  -AS      Post Treatment Position bed  -AS      In Bed supine;call light within reach;encouraged to call for assist;exit alarm on  -AS      Recorded by [AS] Yasmine Fuller, [facility] 09/07/18 1103      Row Name 09/07/18 1000             Pain Scale: Numbers Pre/Post-Treatment    Pain Scale: Numbers, Pretreatment 4/10  -AS      Pain Scale: Numbers, Post-Treatment 5/10  -AS      Pain Location - Side  Left  -AS      Pain Location hip  -AS      Pain Intervention(s) Repositioned;Ambulation/increased activity  -AS      Recorded by [AS] Yasmine Fuller, [facility] 09/07/18 1103      Row Name                Wound 08/20/18 1748 Bilateral lower coccyx    Wound - Properties Group Date first assessed: 08/20/18 [DONNIE] Time first assessed: 1748 [DONNIE] Present On Admission : yes [DONNIE] Side: Bilateral [JO2] Orientation: lower [DONNIE] Location: coccyx [DONNIE] Stage, Pressure Injury: Stage 2 [DONNIE] Recorded by:  [DONNIE] Zhang Sy LPN 08/20/18 1749 [JO2] Zhang Sy, LPN 08/20/18 1751    Row Name                Wound 08/20/18 1601 Other (See comments) penis other (see comments)    Wound - Properties Group Date first assessed: 08/20/18 [DONNIE] Time first assessed: 1601 [DONNIE] Present On Admission : yes [DONNIE] Side: Other (See comments) [DONNIE] Location: penis [DONNIE] Type: other (see comments) [DONNIE] Recorded by:  [DONNIE] Zhang Sy LPN 08/20/18 1902    Row Name                Wound 08/21/18 0820 Right lower ischial tuberosity pressure injury    Wound - Properties Group Date first assessed: 08/21/18 [ASA] Time first assessed: 0820 [ASA] Present On Admission : yes [ASA] Side: Right [ASA] Orientation: lower [ASA] Location: ischial tuberosity [ASA] Type: pressure injury [ASA] Stage, Pressure Injury: Stage 2 [ASA] Additional Comments: Chronic  [ASA] Recorded by:  [ASA] Dhaval Cardozo RN 08/21/18 0929      User Key  (r) = Recorded By, (t) = Taken By, (c) = Cosigned By    Initials Name Effective Dates Discipline    AS Yasmine Fuller, [facility] 06/22/15 -  PT    Zhang Thorpe, LPN 03/14/16 -  Nurse    Dhaval Castillo RN 06/16/16 -  Nurse          Rash 08/24/18 0800 perineum macular (Active)   Distribution generalized 9/7/2018  8:10 AM   Configuration/Shape asymmetric 9/7/2018  8:10 AM   Borders irregular 9/7/2018  8:10 AM   Characteristics dry 9/7/2018  8:10 AM   Color pink 9/7/2018  8:10 AM       Wound 08/20/18 1748 Bilateral lower coccyx  (Active)   Dressing Appearance dry;intact 9/7/2018 10:00 AM   Closure None 9/7/2018 10:00 AM   Base dry;clean;red/granulating 9/7/2018 10:00 AM   Periwound pink;blanchable 9/7/2018 10:00 AM   Periwound Temperature warm 9/7/2018 10:00 AM   Periwound Skin Turgor soft 9/7/2018 10:00 AM   Edges irregular 9/7/2018 10:00 AM   Drainage Amount none 9/7/2018  8:10 AM   Periwound Care, Wound barrier ointment applied 9/6/2018  7:50 PM       Wound Bilateral leg blisters (Active)   Closure TERA;Other (Comment) 9/7/2018  8:10 AM       Wound 08/20/18 1601 Other (See comments) penis other (see comments) (Active)   Dressing Appearance open to air 9/7/2018  8:10 AM   Closure None 9/7/2018  8:10 AM   Base red/granulating 9/7/2018 10:00 AM   Periwound pink 9/7/2018 10:00 AM   Periwound Temperature warm 9/7/2018 10:00 AM   Periwound Skin Turgor soft 9/7/2018 10:00 AM   Edges irregular 9/7/2018  8:10 AM   Drainage Amount none 9/7/2018  8:10 AM   Care, Wound cleansed with;sterile normal saline 9/6/2018  7:50 PM   Dressing Care, Wound open to air 9/6/2018  7:50 PM   Periwound Care, Wound barrier film applied 9/7/2018  2:00 AM       Wound 08/21/18 0820 Right lower ischial tuberosity pressure injury (Active)   Dressing Appearance dry;intact 9/7/2018 10:00 AM   Closure None 9/7/2018 10:00 AM   Base pink 9/7/2018 10:00 AM   Periwound blanchable 9/7/2018  8:10 AM   Periwound Temperature warm 9/7/2018  8:10 AM   Drainage Amount none 9/7/2018  8:10 AM   Dressing Care, Wound low-adherent 9/7/2018  2:00 AM             Physical Therapy Education     Title: PT OT SLP Therapies (Active)     Topic: Physical Therapy (Active)     Point: Mobility training (Active)    Learning Progress Summary     Learner Status Readiness Method Response Comment Documented by    Patient Active Acceptance E NR  AS 09/07/18 1103     Done Acceptance E VU Educated on HEP and correct mechanics for safe functional mobility. VG 09/05/18 1449     Done Acceptance E VU Educated on  HEP and correct mechanics for safe functional mobility. VG 09/04/18 1533     Active Acceptance E NR  LM 09/02/18 1145          Point: Home exercise program (Active)    Learning Progress Summary     Learner Status Readiness Method Response Comment Documented by    Patient Active Acceptance E NR  AS 09/07/18 1103     Done Acceptance E VU Educated on HEP this date. VG 09/06/18 1546     Done Acceptance E VU Educated on HEP and correct mechanics for safe functional mobility. VG 09/05/18 1449     Done Acceptance E VU Educated on HEP and correct mechanics for safe functional mobility. VG 09/04/18 1533          Point: Precautions (Active)    Learning Progress Summary     Learner Status Readiness Method Response Comment Documented by    Patient Active Acceptance E NR  AS 09/07/18 1103     Done Acceptance E VU Educated on HEP and correct mechanics for safe functional mobility. VG 09/05/18 1449     Done Acceptance E VU Educated on HEP and correct mechanics for safe functional mobility. VG 09/04/18 1533     Active Acceptance E NR   09/02/18 1145                      User Key     Initials Effective Dates Name Provider Type Discipline    AS 06/22/15 -  Yasmine Fuller, PTA Physical Therapy Assistant PT     06/15/16 -  Tammie Raman, PT Physical Therapist PT     05/29/18 -  Manjula Soria, PT Physical Therapist PT                    PT Recommendation and Plan     Plan of Care Reviewed With: patient  Progress: no change  Outcome Summary: patient refused EOB/OOB activity due to painful feet from recent debridement and wrap. Performed B LE exercise in supine.          Outcome Measures     Row Name 09/07/18 1000 09/06/18 1533 09/06/18 0805       How much help from another person do you currently need...    Turning from your back to your side while in flat bed without using bedrails? 2  -AS 3  -VG  --    Moving from lying on back to sitting on the side of a flat bed without bedrails? 2  -AS 3  -VG  --    Moving to and from  a bed to a chair (including a wheelchair)? 2  -AS 2  -VG  --    Standing up from a chair using your arms (e.g., wheelchair, bedside chair)? 2  -AS 2  -VG  --    Climbing 3-5 steps with a railing? 1  -AS 1  -VG  --    To walk in hospital room? 1  -AS 1  -VG  --    AM-PAC 6 Clicks Score 10  -AS 12  -VG  --       How much help from another is currently needed...    Putting on and taking off regular lower body clothing?  --  -- 1  -HK    Bathing (including washing, rinsing, and drying)  --  -- 2  -HK    Toileting (which includes using toilet bed pan or urinal)  --  -- 2  -HK    Putting on and taking off regular upper body clothing  --  -- 3  -HK    Taking care of personal grooming (such as brushing teeth)  --  -- 2  -HK    Eating meals  --  -- 3  -HK    Score  --  -- 13  -HK       Functional Assessment    Outcome Measure Options AM-PAC 6 Clicks Basic Mobility (PT)  -AS AM-PAC 6 Clicks Basic Mobility (PT)  -VG AM-PAC 6 Clicks Daily Activity (OT)  -HK    Row Name 09/05/18 1400 09/04/18 1504 09/04/18 1300       How much help from another person do you currently need...    Turning from your back to your side while in flat bed without using bedrails? 3  -VG 2  -VG  --    Moving from lying on back to sitting on the side of a flat bed without bedrails? 3  -VG 1  -VG  --    Moving to and from a bed to a chair (including a wheelchair)? 2  -VG 1  -VG  --    Standing up from a chair using your arms (e.g., wheelchair, bedside chair)? 2  -VG 2  -VG  --    Climbing 3-5 steps with a railing? 1  -VG 1  -VG  --    To walk in hospital room? 1  -VG 1  -VG  --    AM-PAC 6 Clicks Score 12  -VG 8  -VG  --       How much help from another is currently needed...    Putting on and taking off regular lower body clothing?  --  -- 1  -HK    Bathing (including washing, rinsing, and drying)  --  -- 2  -HK    Toileting (which includes using toilet bed pan or urinal)  --  -- 1  -HK    Putting on and taking off regular upper body clothing  --  -- 2   -HK    Taking care of personal grooming (such as brushing teeth)  --  -- 2  -HK    Eating meals  --  -- 3  -HK    Score  --  -- 11  -HK       Functional Assessment    Outcome Measure Options AM-PAC 6 Clicks Basic Mobility (PT)  -VG AM-PAC 6 Clicks Basic Mobility (PT)  -VG AM-PAC 6 Clicks Daily Activity (OT)  -HK      User Key  (r) = Recorded By, (t) = Taken By, (c) = Cosigned By    Initials Name Provider Type    AS Yasmine Fuller PTA Physical Therapy Assistant    HK Violette Smith, OT Occupational Therapist    VG Manjula Soria, PT Physical Therapist           Time Calculation:         PT Charges     Row Name 09/07/18 1000             Time Calculation    Start Time 1000  -AS      PT Received On 09/07/18  -AS      PT Goal Re-Cert Due Date 09/12/18  -AS         Timed Charges    48717 - PT Therapeutic Exercise Minutes 23  -AS        User Key  (r) = Recorded By, (t) = Taken By, (c) = Cosigned By    Initials Name Provider Type    AS Yasmine Fuller PTA Physical Therapy Assistant        Therapy Suggested Charges     Code   Minutes Charges    61607 (CPT®) Hc Pt Neuromusc Re Education Ea 15 Min      60823 (CPT®) Hc Pt Ther Proc Ea 15 Min 23 2    06881 (CPT®) Hc Gait Training Ea 15 Min      01822 (CPT®) Hc Pt Therapeutic Act Ea 15 Min      81153 (CPT®) Hc Pt Manual Therapy Ea 15 Min      00129 (CPT®) Hc Pt Iontophoresis Ea 15 Min      30839 (CPT®) Hc Pt Elec Stim Ea-Per 15 Min      72917 (CPT®) Hc Pt Ultrasound Ea 15 Min      67536 (CPT®) Hc Pt Self Care/Mgmt/Train Ea 15 Min      10073 (CPT®) Hc Pt Prosthetic (S) Train Initial Encounter, Each 15 Min      76094 (CPT®) Hc Pt Orthotic(S)/Prosthetic(S) Encounter, Each 15 Min      13514 (CPT®) Hc Orthotic(S) Mgmt/Train Initial Encounter, Each 15min      Total  23 2        Therapy Charges for Today     Code Description Service Date Service Provider Modifiers Qty    [id number] HC PT THER PROC EA 15 MIN 9/7/2018 Yasmine Fuller PTA GP 2          PT G-Codes  Outcome  Measure Options: AM-PAC 6 Clicks Basic Mobility (PT)  -Located within [facility] 6 Clicks Score: 10  Score: 13    Yasmine Fuller, PTA  9/7/2018

## 2018-09-07 NOTE — PROGRESS NOTES
[facility] Medicine Services  PROGRESS NOTE    Patient Name: Yassine Love  : 1945  MRN: [id number]    Date of Admission: 2018  Length of Stay: 18  Primary Care Physician: Provider, No Known    Subjective     CC: f/u LE cellulitis    HPI:    Resting in bed in no acute distress and no change since yesterday. No specific complaint except weakness.      Review of Systems   Gen- No fevers, chills  CV- No chest pain, palpitations  GI- No N/V/D, abd pain    Otherwise ROS is negative except as mentioned in the HPI.    Objective     Vital Signs:   Temp:  [98.5 °F (36.9 °C)-98.7 °F (37.1 °C)] 98.7 °F (37.1 °C)  Heart Rate:  [72-84] 84  Resp:  [17-18] 17  BP: ()/(54-60) 90/54        Physical Exam:  Constitutional: No acute distress, drowsy  Respiratory: clear to ausculatation, on high flow NC  Cardiovascular: RRR, no murmurs, rubs, or gallops, palpable pedal pulses bilaterally  Gastrointestinal: Positive bowel sounds, soft, nontender, nondistended  Musculoskeletal: BLEs with UNNA boots in place 2+ BLE edema, chronic venous stasis with weeping open skin. Moderate edema to BUEs L>R with diffuse ecchymosis.   Psychiatric: Appropriate affect, cooperative  Neurologic: drowsy but interactive, no focal deficits    Results Reviewed:  I have personally reviewed current lab, radiology, and data and agree.          Results from last 7 days  Lab Units 18  0413 18  0704 18  0503   SODIUM mmol/L 137 138 140   POTASSIUM mmol/L 4.1 3.7 3.8   CHLORIDE mmol/L 95* 95* 95*   CO2 mmol/L 39.0* 38.0* 44.0*   BUN mg/dL 24* 22 22   CREATININE mg/dL 1.16 1.16 1.32*   GLUCOSE mg/dL 115* 118* 125*   CALCIUM mg/dL 8.6* 8.5* 8.6*     Estimated Creatinine Clearance: 61 mL/min (by C-G formula based on SCr of 1.16 mg/dL).     No results found for: BNP    Microbiology Results Abnormal     Procedure Component Value - Date/Time    Blood Culture - Blood, [[id number]]  (Normal) Collected:  18  1445    Lab Status:  Final result Specimen:  Blood from Wrist, Right Updated:  08/25/18 1500     Blood Culture No growth at 5 days    Blood Culture - Blood, [[id number]]  (Normal) Collected:  08/20/18 1430    Lab Status:  Final result Specimen:  Blood from Arm, Right Updated:  08/25/18 1500     Blood Culture No growth at 5 days    Wound Culture - Wound, Penis [[id number]]  (Abnormal)  (Susceptibility) Collected:  08/20/18 1930    Lab Status:  Final result Specimen:  Wound from Penis Updated:  08/23/18 1427     Wound Culture Scant growth (1+) Providencia rettgeri (A)      Scant growth (1+) Staphylococcus aureus (A)      Scant growth (1+) Staphylococcus haemolyticus (A)     Comment: This isolate is presumed to be clindamyin resistant based on detection of inducible clindamycin resistance. Clindamycin may still be effective in some patients.          Gram Stain Result No WBCs or organisms seen    Susceptibility      Providencia rettgeri    Staphylococcus aureus       FAVIAN    FAVIAN       Ampicillin + Sulbactam <=8/4 ug/ml Susceptible             Aztreonam <=8 ug/ml Susceptible             Cefepime <=8 ug/ml Susceptible             Cefotaxime <=2 ug/ml Susceptible             Ceftriaxone <=8 ug/ml Susceptible    <=8 ug/ml Susceptible       Cefuroxime sodium <=4 ug/ml Susceptible             Clindamycin       <=0.5 ug/ml Susceptible       Daptomycin       <=0.5 ug/ml Susceptible       Ertapenem <=1 ug/ml Susceptible             Erythromycin       <=0.5 ug/ml Susceptible       Gentamicin <=4 ug/ml Susceptible    <=4 ug/ml Susceptible       Levofloxacin <=2 ug/ml Susceptible    <=1 ug/ml Susceptible  [1]        Linezolid       2 ug/ml Susceptible       Meropenem <=1 ug/ml Susceptible             Oxacillin       <=0.25 ug/ml Susceptible       Penicillin G       <=0.03 ug/ml Susceptible       Piperacillin + Tazobactam <=16 ug/ml Susceptible             Quinupristin + Dalfopristin       <=0.5 ug/ml Susceptible  [2]        Rifampin        <=1 ug/ml Susceptible       Streptomycin High Level Synergy       <=0.5 ug/ml Susceptible       Tetracycline       <=4 ug/ml Susceptible       Tobramycin <=4 ug/ml Susceptible             Trimethoprim + Sulfamethoxazole <=2/38 ug/ml Susceptible    <=0.5/9.5 ug/ml Susceptible       Vancomycin       2 ug/ml Susceptible              [1]   Staphylococcus species may develop resistance during prolonged therapy with quinolones.  Isolates that are initially susceptible may become resistant within three to four days after initiation of therapy. Testing of repeat isolates may be warranted.       [2]   Appended report. These results have been appended to a previously preliminary verified report.               Susceptibility      Staphylococcus haemolyticus     FAVIAN     Ciprofloxacin >2 ug/ml Resistant     Clindamycin  Resistant     Daptomycin <=0.5 ug/ml Susceptible     Erythromycin >4 ug/ml Resistant     Gentamicin <=4 ug/ml Susceptible     Levofloxacin >4 ug/ml Resistant     Linezolid <=1 ug/ml Susceptible     Oxacillin >2 ug/ml Resistant     Penicillin G >8 ug/ml Resistant     Quinupristin + Dalfopristin <=0.5 ug/ml Susceptible     Rifampin <=1 ug/ml Susceptible     Tetracycline <=4 ug/ml Susceptible     Trimethoprim + Sulfamethoxazole <=0.5/9.5 ug/ml Susceptible     Vancomycin 2 ug/ml Susceptible                    Wound Culture - Wound, Leg, Left [[id number]]  (Abnormal)  (Susceptibility) Collected:  08/20/18 1930    Lab Status:  Final result Specimen:  Wound from Leg, Left Updated:  08/22/18 1347     Wound Culture Moderate growth (3+) Pseudomonas aeruginosa (A)      Scant growth (1+) Streptococcus, Beta Hemolytic, Group G (A)     Comment:   If Clindamycin or Erythromycin is the drug of choice, notify the laboratory within 7 days to request susceptibility testing.        STREP GROUPING G     Gram Stain Result No WBCs seen      Many (4+) Gram negative bacilli    Susceptibility      Pseudomonas aeruginosa     FAVIAN      Aztreonam <=8 ug/ml Susceptible     Cefepime <=8 ug/ml Susceptible     Ceftazidime <=1 ug/ml Susceptible     Gentamicin <=4 ug/ml Susceptible     Levofloxacin <=2 ug/ml Susceptible     Meropenem <=1 ug/ml Susceptible     Piperacillin + Tazobactam >64 ug/ml Resistant     Tobramycin <=4 ug/ml Susceptible                    KOH Prep - Skin, Foot, Left [[id number]]  (Normal) Collected:  08/20/18 1501    Lab Status:  Final result Specimen:  Skin from Foot, Left Updated:  08/20/18 1528     KOH Prep No yeast or hyphal elements seen        Imaging Results (last 24 hours)     ** No results found for the last 24 hours. **        Results for orders placed during the hospital encounter of 08/20/18   Adult Transthoracic Echo Complete W/ Cont if Necessary Per Protocol    Narrative · Left ventricular systolic function is normal. Estimated EF = 65%.  · There is moderate calcification of the aortic valve mainly affecting the   non coronary cusp(s).  · Right ventricular cavity is mildly dilated.        I have reviewed the medications.    aspirin 81 mg Oral Daily   atorvastatin 40 mg Oral Nightly   budesonide-formoterol 2 puff Inhalation BID - RT   castor oil-balsam peru  Topical Q12H   clopidogrel 75 mg Oral Daily   doxycycline 100 mg Oral Q12H   fluticasone 2 spray Each Nare Daily   furosemide 40 mg Intravenous Daily   gabapentin 400 mg Oral Q8H   heparin (porcine) 5,000 Units Subcutaneous Q8H   ipratropium-albuterol 3 mL Nebulization TID   metoprolol tartrate 25 mg Oral Q12H   miconazole  Topical Q12H   mirtazapine 15 mg Oral Nightly   Morphine 30 mg Oral Q12H   nicotine 1 patch Transdermal Q24H   pantoprazole 40 mg Oral Q AM   Pharmacy Meds to Bed Consult  Does not apply Daily   sennosides-docusate sodium 2 tablet Oral Nightly     Assessment / Plan     Hospital Problem List     * (Principal)Cellulitis of lower extremity    RAFAEL (acute kidney injury) (CMS/HCC)    PVD (peripheral vascular disease) (CMS/HCC)    Cellulitis of both lower  extremities    COPD (chronic obstructive pulmonary disease) (CMS/HCC)    Essential hypertension    Non-sustained ventricular tachycardia (CMS/HCC)    Coronary artery disease involving native coronary artery without angina pectoris    Debility    Decubitus ulcer of buttock, stage 2    Chronic pain        Brief Hospital Course to date:  Yassine Love is a 72 y.o. male with PMH significant for recurring BLE cellulitis (has seen Dr. Belle in the past), PVD, prediabetes, chronic pain, HTN, CAD, COPD, and ongoing tobacco abuse.  He presented to [facility] ER today with worsening BLE pain and redness, weeping, and open lesions (left worse than right), which he tells me have been progressively worsening over last 2 weeks.     Assessment & Plan:    - Complex medico-social issues.   - Patient lives alone. Reports that he is usually able to ambulate with a walker (but his legs had been weak and giving out on him prior to admission) and able to perform ADLs. Has neighbors and friends who help him out a lot with groceries, etc. He no longer drives.   - He has significant POAD with superimposed cellulitis, suspect L>R from physical exam. He is at risk for limb loss. Discussed with pt, he does not want any aggressive intervention/surgery, but may be open to endovascular intervention if possible. Arterial duplex showed no occlusion on either side.   - LE wound culture and penis wound culture as above, defer to ID for abx management  - ID (Dr Belle) following, have converted to Doxycycline PO   - PT wound care following, will need continued UNNA boot therapy following d/c   - LUE superficial thrombus- symptomatic mgmt  - Echocardiogram unremarkable (EF 65%),  - CXR 9.3 stable, continue to diurese with IV lasix today in attempts to wean O2  - am bmp  - mag replaced, continue to monitor on tele. EKG with slightly increase QT from previous. meds checked as SE. Repeat EKG in am    - WOC, PT, OT to eval and treat  - CM/SW for DC planning-  patient at this point agreeable to [facility] vs [facility], but will need to continue to work more with therapy before these facilities will accept him and wean of high flow O2    - tobacco cessation advised    Plan:  - cont current care  -had a long conversation with the patient about rehabilitation.  Patient is agreeable to go to rehabilitation tomorrow.  Discussed this with case management also.  Hence 10 tentatively patient will be transferred to rehabilitation tomorrow afternoon.  DVT Prophylaxis:  Hep sq    CODE STATUS:   Code Status and Medical Interventions:   Ordered at: 08/29/18 0406     Limited Support to NOT Include:    Intubation     Level Of Support Discussed With:    Patient     Code Status:    No CPR     Medical Interventions (Level of Support Prior to Arrest):    Limited     Disposition: tomorrow.    Electronically signed by Raoul Mccarthy MD, 09/07/18, 4:54 PM.

## 2018-09-07 NOTE — PROGRESS NOTES
"Yassine Love  1945  [id number]  9/7/2018    CC: BLE wounds  Chief Complaint   Patient presents with   • Cellulitis       Yassine Love is a 72 y.o. male here for CC leg infection  History of present illness:    Yassine Love is a 72 y.o. male well known to us from prior admissions, who was sent to the [facility] ED by home health nurse due to BLE redness and drainage.  He reports the redness has been present for several weeks with increasing pain and drainage in that period. During his prior admission here at [facility] his cultures grew PSA, ecoli, proteus, enterococcus and strep from his RLE.  Patient currently denies any fever, chills, or sweats.  Patient is tolerating antibiotics.  Patient reports he needs his knees and hips replaced but can not have surgery until his wounds have healed.  Patient was admitted to the hospitalist and started on Merrem and Vancomycin.  Patient doesn't report severe pain in his feet but does report joint pain.  8/22 - co leg pain  Co SOA  No fever or rash  \"I am not losing my legs!\"  Co hip and knee pain  8/23- co leg infections, denies fever, chills, night sweats, nausea, vomiting, diarrhea, rash, and cough  8/24/18 - Patient complains of pain in his hips and knees.  Patient denies any fever, chills, or sweats.  Patient denies any nausea, vomiting, or diarrhea.     seen and agree  8/25 - co penile lesion  Co leg infection  \"They may put in a stent\"  No fever  8/27/18 - C/O swelling in BUE.  Superficial clot found on ultrasound.  \"I can't go until this fluid is taken care of\"  Denies any fever, chills, or sweats.  Patient denies any nausea, vomiting, or diarrhea.  Seen and agree  8/28/18 - C/O BUE swelling as well as scrotal edema.  Patient denies any fever, chills, or sweats.  Patient denies any nausea, vomiting, or diarrhea.  Seen and agree  8/29/18 - Still concerned about swelling.  Patient denies any fever, chills, or sweats.  Patient denies any nausea, vomiting, or diarrhea.  Seen " and agree  8/30/18 - Had trouble breathing last night.  Patient was placed on nonrebreather.  Now back on NC.  Patient states he is on 3L at home with oxygen sats running 88%.  No fever.  + chills.  No nausea, vomiting, or diarrhea.  Seen and agree  8/31 - co weakness  Co leg infection  9/3 - No hx available  9/4/18 - Patient on hi emperatriz oxygen at 70%.  Concerned about fluid restriction.  Patient had two episodes of VT last night.  No fever.  Seen and agree  9/5/18 - Patient is now on 4LNC.  Patient denies fever, chills, or sweats.  Patient denies any nausea, vomiting, or diarrhea.    Seen and agree  9/6/18 - Concerned about SNF placement.  No fever.  No nausea, vomiting, or diarrhea.    Seen and agree  9/7 - co leg lesions  No fever or chills    Past medical history:  Past Medical History:   Diagnosis Date   • Cancer (CMS/[facility]ly [facility])    • Cellulitis of both lower extremities     [facility] 2016   • Chronic pain    • COPD (chronic obstructive pulmonary disease) (CMS/[facility])    • Hypertension    • Osteoarthritis cervical spine    • Osteoarthritis of both knees    • Osteoarthritis of left hip        Medications:   Current Facility-Administered Medications:   •  acetaminophen (TYLENOL) tablet 650 mg, 650 mg, Oral, Q6H PRN, Shital Hooks MD, 650 mg at 09/03/18 2319  •  aspirin EC tablet 81 mg, 81 mg, Oral, Daily, Yumiko Muhammad APRN, 81 mg at 09/07/18 0806  •  atorvastatin (LIPITOR) tablet 40 mg, 40 mg, Oral, Nightly, Yumiko Muhammad APRN, 40 mg at 09/06/18 2125  •  bisacodyl (DULCOLAX) suppository 10 mg, 10 mg, Rectal, Daily PRN, Shital Hooks MD, 10 mg at 08/26/18 0947  •  budesonide-formoterol (SYMBICORT) 160-4.5 MCG/ACT inhaler 2 puff, 2 puff, Inhalation, BID - RT, Yumiko Muhammad APRN, 2 puff at 09/07/18 0742  •  castor oil-balsam peru (VENELEX) ointment, , Topical, Q12H, Shital Hooks MD  •  clopidogrel (PLAVIX) tablet 75 mg, 75 mg, Oral, Daily, Yumiko Muhammad APRN, 75 mg at 09/07/18 0806  •   diphenhydrAMINE (BENADRYL) capsule 25 mg, 25 mg, Oral, Q6H PRN, Kia Addison MD, 25 mg at 09/06/18 0834  •  diphenhydrAMINE-zinc acetate 2-0.1 % cream, , Topical, TID PRN, Kia Addison MD  •  doxycycline (MONODOX) capsule 100 mg, 100 mg, Oral, Q12H, Parish Belle MD, 100 mg at 09/07/18 0806  •  fluticasone (FLONASE) 50 MCG/ACT nasal spray 2 spray, 2 spray, Each Nare, Daily, Yumiko Muhammad APRN, 2 spray at 09/07/18 0809  •  furosemide (LASIX) injection 40 mg, 40 mg, Intravenous, Daily, Danii Parra APRN, 40 mg at 09/07/18 0806  •  gabapentin (NEURONTIN) capsule 400 mg, 400 mg, Oral, Q8H, Shital Hooks MD, 400 mg at 09/07/18 0522  •  heparin (porcine) 5000 UNIT/ML injection 5,000 Units, 5,000 Units, Subcutaneous, Q8H, Yumiko Muhammad APRN, 5,000 Units at 09/07/18 0523  •  HYDROcodone-acetaminophen (NORCO)  MG per tablet 1 tablet, 1 tablet, Oral, Q4H PRN, Shital Hooks MD, 1 tablet at 09/06/18 1339  •  ipratropium-albuterol (DUO-NEB) nebulizer solution 3 mL, 3 mL, Nebulization, Q6H PRN, Yumiko Muhammad APRN, 3 mL at 09/02/18 0511  •  ipratropium-albuterol (DUO-NEB) nebulizer solution 3 mL, 3 mL, Nebulization, TID, Belle Valdivia APRN, 3 mL at 09/07/18 0742  •  Magnesium Sulfate 2 gram Bolus, followed by 8 gram infusion (total Mg dose 10 grams)- Mg less than or equal to 1mg/dL, 2 g, Intravenous, PRN **OR** Magnesium Sulfate 2 gram / 50mL Infusion (GIVE X 3 BAGS TO EQUAL 6GM TOTAL DOSE) - Mg 1.1 - 1.5 mg/dl, 2 g, Intravenous, PRN **OR** Magnesium Sulfate 4 gram infusion- Mg 1.6-1.9 mg/dL, 4 g, Intravenous, PRN, Frannie Nugent APRN, Last Rate: 25 mL/hr at 09/04/18 0618, 4 g at 09/04/18 0618  •  melatonin sublingual tablet 5 mg, 5 mg, Sublingual, Nightly PRN, Kia Addison MD, 5 mg at 08/30/18 2103  •  metoprolol tartrate (LOPRESSOR) tablet 25 mg, 25 mg, Oral, Q12H, Shital Hooks MD, 25 mg at 09/07/18 0806  •  miconazole (MICOTIN) 2 % cream, ,  Topical, Q12H, Shital Hooks MD  •  mirtazapine (REMERON SOL-TAB) disintegrating tablet 15 mg, 15 mg, Oral, Nightly, Stella Garvin PA-C, 15 mg at 09/06/18 2127  •  Morphine (MS CONTIN) 12 hr tablet 30 mg, 30 mg, Oral, Q12H, Shital Hooks MD, 30 mg at 09/07/18 0806  •  nicotine (NICODERM CQ) 21 MG/24HR patch 1 patch, 1 patch, Transdermal, Q24H, Yumiko Muhammad APRN, 1 patch at 09/06/18 2126  •  pantoprazole (PROTONIX) EC tablet 40 mg, 40 mg, Oral, Q AM, Yumiko Muhammad APRN, 40 mg at 09/07/18 0522  •  Pharmacy Meds to Bed Consult, , Does not apply, Daily, Kia Addison MD, Stopped at 08/24/18 1115  •  sennosides-docusate sodium (SENOKOT-S) 8.6-50 MG tablet 2 tablet, 2 tablet, Oral, Nightly, Shital Hooks MD, 2 tablet at 09/06/18 2125  •  sodium chloride 0.9 % flush 1-10 mL, 1-10 mL, Intravenous, PRN, Yumiko Muhammad APRN  •  Insert peripheral IV, , , Once **AND** sodium chloride 0.9 % flush 10 mL, 10 mL, Intravenous, PRN, Dakota De Paz MD  Antibiotics:  Anti-Infectives     Ordered     Dose/Rate Route Frequency Start Stop    08/27/18 1242  doxycycline (MONODOX) capsule 100 mg     Parish Belle MD reviewed the order on 09/05/18 1410.   Ordering Provider:  Parish Belle MD    100 mg Oral Every 12 Hours Scheduled 08/27/18 1330 09/16/18 1409    08/20/18 1914  meropenem (MERREM) 500mg/100 mL 0.9% NS IVPB ([facility])     Ordering Provider:  Yumiko Muhammad APRN    500 mg  over 30 Minutes Intravenous Once 08/20/18 2100 08/20/18 2125    08/20/18 1357  vancomycin 1750 mg/500 mL 0.9% NS IVPB ([facility])     Ordering Provider:  Dakota De Paz MD    20 mg/kg × 81.6 kg  over 2 Hours Intravenous Once 08/20/18 1359 08/20/18 1701          Allergies:  is allergic to ciprofloxacin hcl and ceftin [cefuroxime axetil].    Family History: family history includes COPD in his brother; Heart attack in his brother; Stroke in his father.    Social History:  reports that he has been smoking Cigarettes.   "He has a 84.00 pack-year smoking history. He has never used smokeless tobacco. He reports that he does not drink alcohol or use drugs.    Review of Systems: All other reviewed and negative except as per HPI    Blood pressure 90/54, pulse 72, temperature 98.7 °F (37.1 °C), temperature source Axillary, resp. rate 17, height 172.7 cm (68\"), weight 87.6 kg (193 lb 3.2 oz), SpO2 (!) 84 %.  GENERAL: Awake and alert   HEENT: Oropharynx without thrush.   EYES: . No conjunctival injection. No icterus.   LYMPHATICS: No lymphadenopathy of the neck   HEART: Irregular. No murmur, gallop, or pericardial friction rub.   LUNGS: Diminished throughout on 4LNC   ABDOMEN: Soft, nontender, nondistended.   SKIN: Warm and dry   PSYCHIATRIC: Mental status lucid.   EXT:  BLE with wraps in place.     DIAGNOSTICS:  Lab Results   Component Value Date    WBC 8.53 08/29/2018    HGB 10.2 (L) 08/29/2018    HCT 34.1 (L) 08/29/2018     08/29/2018     Lab Results   Component Value Date    CRP 13.12 (H) 06/21/2017     Lab Results   Component Value Date    SEDRATE 56 (H) 06/20/2017     Lab Results   Component Value Date    GLUCOSE 115 (H) 09/06/2018    BUN 24 (H) 09/06/2018    CREATININE 1.16 09/06/2018    EGFRIFNONA 62 09/06/2018    BCR 20.7 09/06/2018    CO2 39.0 (H) 09/06/2018    CALCIUM 8.6 (L) 09/06/2018    ALBUMIN 3.08 (L) 08/20/2018    AST 20 08/20/2018    ALT 23 08/20/2018       Microbiology:  Microbiology Results Abnormal     Procedure Component Value - Date/Time    Blood Culture - Blood, [[id number]]  (Normal) Collected:  08/20/18 1445    Lab Status:  Final result Specimen:  Blood from Wrist, Right Updated:  08/25/18 1500     Blood Culture No growth at 5 days    Blood Culture - Blood, [[id number]]  (Normal) Collected:  08/20/18 1430    Lab Status:  Final result Specimen:  Blood from Arm, Right Updated:  08/25/18 1500     Blood Culture No growth at 5 days    Wound Culture - Wound, Penis [[id number]]  (Abnormal)  (Susceptibility) Collected: "  08/20/18 1930    Lab Status:  Final result Specimen:  Wound from Penis Updated:  08/23/18 1427     Wound Culture Scant growth (1+) Providencia rettgeri (A)      Scant growth (1+) Staphylococcus aureus (A)      Scant growth (1+) Staphylococcus haemolyticus (A)     Comment: This isolate is presumed to be clindamyin resistant based on detection of inducible clindamycin resistance. Clindamycin may still be effective in some patients.          Gram Stain Result No WBCs or organisms seen    Susceptibility      Providencia rettgeri    Staphylococcus aureus       FAVIAN    FAVIAN       Ampicillin + Sulbactam <=8/4 ug/ml Susceptible             Aztreonam <=8 ug/ml Susceptible             Cefepime <=8 ug/ml Susceptible             Cefotaxime <=2 ug/ml Susceptible             Ceftriaxone <=8 ug/ml Susceptible    <=8 ug/ml Susceptible       Cefuroxime sodium <=4 ug/ml Susceptible             Clindamycin       <=0.5 ug/ml Susceptible       Daptomycin       <=0.5 ug/ml Susceptible       Ertapenem <=1 ug/ml Susceptible             Erythromycin       <=0.5 ug/ml Susceptible       Gentamicin <=4 ug/ml Susceptible    <=4 ug/ml Susceptible       Levofloxacin <=2 ug/ml Susceptible    <=1 ug/ml Susceptible  [1]        Linezolid       2 ug/ml Susceptible       Meropenem <=1 ug/ml Susceptible             Oxacillin       <=0.25 ug/ml Susceptible       Penicillin G       <=0.03 ug/ml Susceptible       Piperacillin + Tazobactam <=16 ug/ml Susceptible             Quinupristin + Dalfopristin       <=0.5 ug/ml Susceptible  [2]        Rifampin       <=1 ug/ml Susceptible       Streptomycin High Level Synergy       <=0.5 ug/ml Susceptible       Tetracycline       <=4 ug/ml Susceptible       Tobramycin <=4 ug/ml Susceptible             Trimethoprim + Sulfamethoxazole <=2/38 ug/ml Susceptible    <=0.5/9.5 ug/ml Susceptible       Vancomycin       2 ug/ml Susceptible              [1]   Staphylococcus species may develop resistance during prolonged therapy  with quinolones.  Isolates that are initially susceptible may become resistant within three to four days after initiation of therapy. Testing of repeat isolates may be warranted.       [2]   Appended report. These results have been appended to a previously preliminary verified report.               Susceptibility      Staphylococcus haemolyticus     FAVIAN     Ciprofloxacin >2 ug/ml Resistant     Clindamycin  Resistant     Daptomycin <=0.5 ug/ml Susceptible     Erythromycin >4 ug/ml Resistant     Gentamicin <=4 ug/ml Susceptible     Levofloxacin >4 ug/ml Resistant     Linezolid <=1 ug/ml Susceptible     Oxacillin >2 ug/ml Resistant     Penicillin G >8 ug/ml Resistant     Quinupristin + Dalfopristin <=0.5 ug/ml Susceptible     Rifampin <=1 ug/ml Susceptible     Tetracycline <=4 ug/ml Susceptible     Trimethoprim + Sulfamethoxazole <=0.5/9.5 ug/ml Susceptible     Vancomycin 2 ug/ml Susceptible                    Wound Culture - Wound, Leg, Left [[id number]]  (Abnormal)  (Susceptibility) Collected:  08/20/18 1930    Lab Status:  Final result Specimen:  Wound from Leg, Left Updated:  08/22/18 1347     Wound Culture Moderate growth (3+) Pseudomonas aeruginosa (A)      Scant growth (1+) Streptococcus, Beta Hemolytic, Group G (A)     Comment:   If Clindamycin or Erythromycin is the drug of choice, notify the laboratory within 7 days to request susceptibility testing.        STREP GROUPING G     Gram Stain Result No WBCs seen      Many (4+) Gram negative bacilli    Susceptibility      Pseudomonas aeruginosa     FAVIAN     Aztreonam <=8 ug/ml Susceptible     Cefepime <=8 ug/ml Susceptible     Ceftazidime <=1 ug/ml Susceptible     Gentamicin <=4 ug/ml Susceptible     Levofloxacin <=2 ug/ml Susceptible     Meropenem <=1 ug/ml Susceptible     Piperacillin + Tazobactam >64 ug/ml Resistant     Tobramycin <=4 ug/ml Susceptible                    KOH Prep - Skin, Foot, Left [[id number]]  (Normal) Collected:  08/20/18 1501    Lab Status:   Final result Specimen:  Skin from Foot, Left Updated:  08/20/18 1528     KOH Prep No yeast or hyphal elements seen            RADIOLOGY:  Imaging Results (last 72 hours)     ** No results found for the last 72 hours. **          Assessment and Plan:     BLE erythema and open wounds and edema   Wounds with GBS and PSA - prior treatment with Merrem, now on Doxycycline alone.   Acute kidney injury - worse  PVD  DJD - needs knee and hip replaced.   COPD  Penis ulceration - culture with providencia, MSSA, and CNS  BUE edema - LUE with superficial thrombophlebitis  Volume overload   VT    Patient is currently on Doxycycline  Tolerating antibiotics.    Will need appropriate wound care outpatient, possible SNF placement.     Renewed the doxy - stop it Monday  UM discussed with     Parish Belle MD for Dr. Parish Belle  9/7/2018

## 2018-09-07 NOTE — PROGRESS NOTES
Continued Stay Note  [facility]     Patient Name: Yassine Love  MRN: [id number]  Today's Date: 9/7/2018    Admit Date: 8/20/2018          Discharge Plan     Row Name 09/07/18 1517       Plan    Plan SNF    Patient/Family in Agreement with Plan yes    Plan Comments The pt states he has been told he is not ready for DC. I confirmed with Dr Belle that the pt is ready for DC from an ID standpoint. He states the oral doxy can be discontinued. I spoke with Dr Mccarthy about this and he will speak with the pt. [facility] offered a bed.              Discharge Codes    No documentation.       Expected Discharge Date and Time     Expected Discharge Date Expected Discharge Time    Sep 4, 2018             Gail Medellin RN

## 2018-09-07 NOTE — PLAN OF CARE
Problem: Patient Care Overview  Goal: Plan of Care Review  Outcome: Ongoing (interventions implemented as appropriate)   09/07/18 0394   Coping/Psychosocial   Plan of Care Reviewed With patient   OTHER   Outcome Summary BLE edema improved with no open ulcerations to LEs noted. Pt cont to have mild erythema to toes and distal portion of foot, but improved from IE. Pt is making excellent progress with therapy and will benefit from cont compression wrapping to help maintain gains and cont to improve skin integrity.

## 2018-09-07 NOTE — PLAN OF CARE
Problem: Patient Care Overview  Goal: Plan of Care Review  Outcome: Ongoing (interventions implemented as appropriate)   09/07/18 1103   Coping/Psychosocial   Plan of Care Reviewed With patient   Plan of Care Review   Progress no change   OTHER   Outcome Summary patient refused EOB/OOB activity due to painful feet from recent debridement and wrap. Performed B LE exercise in supine.

## 2018-09-07 NOTE — THERAPY WOUND CARE TREATMENT
Acute Care - Wound/Debridement Treatment Note  [facility]     Patient Name: Yassine Love  : 1945  MRN: [id number]  Today's Date: 2018  Onset of Illness/Injury or Date of Surgery: 18   Date of Referral to PT: 18   Referring Physician: BRITTNY Alvares       Admit Date: 2018    Visit Dx:    ICD-10-CM ICD-9-CM   1. Cellulitis of left lower leg L03.116 682.6   2. Peripheral edema R60.9 782.3   3. Stasis dermatitis of both legs I87.2 454.1   4. Recurrent cellulitis of lower leg L03.119 682.6   5. Chronic pain syndrome G89.4 338.4   6. History of hypertension Z86.79 V12.59   7. Impaired functional mobility, balance, gait, and endurance Z74.09 V49.89   8. Impaired mobility and ADLs Z74.09 799.89       Patient Active Problem List   Diagnosis   • Cellulitis of right lower extremity   • RAFAEL (acute kidney injury) (CMS/[facility])   • PVD (peripheral vascular disease) (CMS/[facility])   • Cellulitis of both lower extremities   • COPD (chronic obstructive pulmonary disease) (CMS/[facility])   • Essential hypertension   • Hypoxia   • Cellulitis of lower extremity   • Non-sustained ventricular tachycardia (CMS/[facility])   • Coronary artery disease involving native coronary artery without angina pectoris   • Cellulitis of left lower leg   • Debility   • Decubitus ulcer of buttock, stage 2   • Chronic pain           Rash 18 0800 perineum macular (Active)   Distribution generalized 2018  8:10 AM   Configuration/Shape asymmetric 2018  8:10 AM   Borders irregular 2018  8:10 AM   Characteristics dry 2018  8:10 AM   Color pink 2018  8:10 AM       Wound 18 1748 Bilateral lower coccyx (Active)   Dressing Appearance dry;intact 2018 10:00 AM   Closure None 2018 10:00 AM   Base dry;clean;red/granulating 2018 10:00 AM   Periwound pink;blanchable 2018 10:00 AM   Periwound Temperature warm 2018 10:00 AM   Periwound Skin Turgor soft 2018 10:00 AM   Edges irregular 2018 10:00  AM   Drainage Amount none 9/7/2018  8:10 AM   Periwound Care, Wound barrier ointment applied 9/6/2018  7:50 PM       Wound Bilateral leg blisters (Active)   Dressing Appearance dry;intact 9/7/2018  8:45 AM   Closure TERA;Other (Comment) 9/7/2018  8:10 AM   Base clean;pink;closed/resurfaced 9/7/2018  8:45 AM   Periwound dry;redness;swelling 9/7/2018  8:45 AM   Periwound Temperature warm 9/7/2018  8:45 AM   Periwound Skin Turgor soft 9/7/2018  8:45 AM   Drainage Characteristics/Odor serosanguineous 9/7/2018  8:45 AM   Drainage Amount scant 9/7/2018  8:45 AM   Care, Wound cleansed with;soap and water;debrided 9/7/2018  8:45 AM   Dressing Care, Wound multi-layer wrap 9/7/2018  8:45 AM       Wound 08/20/18 1601 Other (See comments) penis other (see comments) (Active)   Dressing Appearance open to air 9/7/2018  8:10 AM   Closure None 9/7/2018  8:10 AM   Base red/granulating 9/7/2018 10:00 AM   Periwound pink 9/7/2018 10:00 AM   Periwound Temperature warm 9/7/2018 10:00 AM   Periwound Skin Turgor soft 9/7/2018 10:00 AM   Edges irregular 9/7/2018  8:10 AM   Drainage Amount none 9/7/2018  8:10 AM   Care, Wound cleansed with;sterile normal saline 9/6/2018  7:50 PM   Dressing Care, Wound open to air 9/6/2018  7:50 PM   Periwound Care, Wound barrier film applied 9/7/2018  2:00 AM       Wound 08/21/18 0820 Right lower ischial tuberosity pressure injury (Active)   Dressing Appearance dry;intact 9/7/2018 10:00 AM   Closure None 9/7/2018 10:00 AM   Base pink 9/7/2018 10:00 AM   Periwound blanchable 9/7/2018  8:10 AM   Periwound Temperature warm 9/7/2018  8:10 AM   Drainage Amount none 9/7/2018  8:10 AM   Dressing Care, Wound low-adherent 9/7/2018  2:00 AM           Lymphedema     Row Name 09/07/18 0845             Lymphedema Edema Assessment    Ptting Edema Category By severity  -MF      Pitting Edema Mild  -MF         Skin Changes/Observations    Lower Extremity Conditions bilateral:;intact;dry;scaly  -MF      Lower Extremity  "Color/Pigment bilateral:;erythema   only in toes  -         Lymphedema Pulses/Capillary Refill    Lymphedema Pulses/Capillary Refill capillary refill  -      Capillary Refill lower extremity capillary refill  -      Lower Extremity Capillary Refill right:;left:;less than 3 seconds  -         Compression/Skin Care    Compression/Skin Care skin care;wrapping location;compression garment;bandaging  -      Skin Care washed/dried;lotion applied  -      Wrapping Location lower extremity  -      Wrapping Location LE bilateral:;foot to knee  -      Wrapping Comments kerlix with 4\"coban and spandage in multiple layers for gradient compression   -        User Key  (r) = Recorded By, (t) = Taken By, (c) = Cosigned By    Initials Name Provider Type    MF Carlos Hassan, PT Physical Therapist          WOUND DEBRIDEMENT  Debridement Site 1  Location- Site 1: BLEs  Selective Debridement- Site 1: Wound Surface >20cmsq  Instruments- Site 1: other (comment) (wash cloth )  Excised Tissue Description- Site 1: minimum, other (comment) (dry, flaking, skin)  Bleeding- Site 1: none                Therapy Treatment          Rehabilitation Treatment Summary     Row Name 09/07/18 1000 09/07/18 0845          Treatment Time/Intention    Discipline physical therapy assistant  -AS physical therapist  -MF     Document Type therapy note (daily note)  -AS therapy note (daily note);wound care  -     Subjective Information complains of;pain   pt reports incr pain in feet after wound care debridment  -AS complains of;weakness;fatigue;pain  -MF     Mode of Treatment physical therapy  -AS  --     Patient/Family Observations in bed, O2 per NC, no family present  -AS  --     Patient Effort adequate  -AS  --     Existing Precautions/Restrictions fall;oxygen therapy device and L/min  -AS  --     Recorded by [AS] Yasmine Fuller, PTA 09/07/18 1103 [MF] Carlos Hassan, PT 09/07/18 1129     Row Name 09/07/18 1000             " Cognitive Assessment/Intervention- PT/OT    Affect/Mental Status (Cognitive) flat/blunted affect  -AS      Orientation Status (Cognition) oriented x 4  -AS      Follows Commands (Cognition) follows one step commands;over 90% accuracy  -AS      Safety Deficit (Cognitive) mild deficit;insight into deficits/self awareness  -AS      Personal Safety Interventions fall prevention program maintained;gait belt;nonskid shoes/slippers when out of bed;other (see comments)   bed alarm  -AS      Recorded by [AS] Yasmine Fuller, PTA 09/07/18 1103      Row Name 09/07/18 1000             Bed Mobility Assessment/Treatment    Comment (Bed Mobility) patient declined EOB/OOB activity  -AS      Recorded by [AS] Yasmine Fuller, [facility] 09/07/18 1103      Row Name 09/07/18 1000             Therapeutic Exercise    84424 - PT Therapeutic Exercise Minutes 23  -AS      Recorded by [AS] Yasmine Fuller, PTA 09/07/18 1103      Row Name 09/07/18 1000             Therapeutic Exercise    Lower Extremity (Therapeutic Exercise) gluteal sets;quad sets, bilateral   limited ROM in left hip do to pain  -AS      Lower Extremity Range of Motion (Therapeutic Exercise) hip flexion/extension, bilateral;hip abduction/adduction, bilateral;hip internal/external rotation, bilateral;knee flexion/extension, bilateral;ankle dorsiflexion/plantar flexion, bilateral  -AS      Recorded by [AS] Yasmine Fuller, PTA 09/07/18 1103      Row Name 09/07/18 1000 09/07/18 0845          Positioning and Restraints    Pre-Treatment Position in bed  -AS in bed  -     Post Treatment Position bed  -AS bed  -     In Bed supine;call light within reach;encouraged to call for assist;exit alarm on  -AS supine;call light within reach  -     Recorded by [AS] Yasmine Fuller, PTA 09/07/18 1103 [MF] Carlos Hassan, PT 09/07/18 1129     Row Name 09/07/18 0845             Pain Assessment    Additional Documentation Pain Scale: FACES Pre/Post-Treatment (Group)  -       Recorded by [MF] Carlos Hassan, PT 09/07/18 1129      Row Name 09/07/18 1000 09/07/18 0845          Pain Scale: Numbers Pre/Post-Treatment    Pain Scale: Numbers, Pretreatment 4/10  -AS  --     Pain Scale: Numbers, Post-Treatment 5/10  -AS  --     Pain Location - Side Left  -AS Bilateral  -MF     Pain Location - Orientation  -- lower  -MF     Pain Location hip  -AS extremity  -MF     Pain Intervention(s) Repositioned;Ambulation/increased activity  -AS  --     Recorded by [AS] Yasmine Fuller, PTA 09/07/18 1103 [MF] Carlos Hassan, PT 09/07/18 1129     Row Name 09/07/18 0845             Pain Scale: Word Pre/Post-Treatment    Pain: Word Scale, Pretreatment 4 - moderate pain  -MF      Pain: Word Scale, Post-Treatment 4 - moderate pain  -MF      Recorded by [MF] Carlos Hassan, PT 09/07/18 1129      Row Name                Wound 08/20/18 1748 Bilateral lower coccyx    Wound - Properties Group Date first assessed: 08/20/18 [DONNIE] Time first assessed: 1748 [DONINE] Present On Admission : yes [DONNIE] Side: Bilateral [JO2] Orientation: lower [DONNIE] Location: coccyx [DONNIE] Stage, Pressure Injury: Stage 2 [DONNIE] Recorded by:  [DONNIE] Zhang Sy LPN 08/20/18 1749 [JO2] Zhang Sy LPN 08/20/18 1751    Row Name 09/07/18 0845             Wound Bilateral leg blisters    Wound - Properties Group Present On Admission : yes [DONNIE] Side: Bilateral [DONNIE] Location: leg [DONNIE] Type: blisters [DONNIE] Additional Comments: bilateral leg cellulitis. Sloughing  [DONNIE] Recorded by:  [DONNIE] Zhang Sy LPN 08/20/18 1750    Dressing Appearance dry;intact  -MF      Base clean;pink;closed/resurfaced  -MF      Periwound dry;redness;swelling  -MF      Periwound Temperature warm  -      Periwound Skin Turgor soft  -MF      Drainage Characteristics/Odor serosanguineous  -MF      Drainage Amount scant  -MF      Care, Wound cleansed with;soap and water;debrided   nonselective debridement of LEs  -MF      Dressing Care, Wound multi-layer  wrap  -MF      Recorded by [MF] Carlos Hassan, PT 09/07/18 1129      Row Name                Wound 08/20/18 1601 Other (See comments) penis other (see comments)    Wound - Properties Group Date first assessed: 08/20/18 [DONNIE] Time first assessed: 1601 [DONNIE] Present On Admission : yes [DONNIE] Side: Other (See comments) [DONNIE] Location: penis [DONNIE] Type: other (see comments) [DONNIE] Recorded by:  [DONNIE] Zhang Sy LPN 08/20/18 1902    Row Name                Wound 08/21/18 0820 Right lower ischial tuberosity pressure injury    Wound - Properties Group Date first assessed: 08/21/18 [ASA] Time first assessed: 0820 [ASA] Present On Admission : yes [ASA] Side: Right [ASA] Orientation: lower [ASA] Location: ischial tuberosity [ASA] Type: pressure injury [ASA] Stage, Pressure Injury: Stage 2 [ASA] Additional Comments: Chronic  [ASA] Recorded by:  [ASA] Dhaval Cardozo RN 08/21/18 0929    Row Name 09/07/18 0845             Coping    Observed Emotional State calm;cooperative  -MF      Verbalized Emotional State acceptance  -MF      Recorded by [MF] Carlos Hassan, PT 09/07/18 1129      Row Name 09/07/18 0845             Plan of Care Review    Plan of Care Reviewed With patient  -MF      Recorded by [] Carlos Hassan, PT 09/07/18 1129        User Key  (r) = Recorded By, (t) = Taken By, (c) = Cosigned By    Initials Name Effective Dates Discipline     Carlos Hassan, PT 06/19/15 -  PT    Yasmine Bush, PTA 06/22/15 -  PT    Zhang Thorpe LPN 03/14/16 -  Nurse    ASA Dhaval Cardozo RN 06/16/16 -  Nurse              Physical Therapy Education     Title: PT OT SLP Therapies (Active)     Topic: Physical Therapy (Active)     Point: Mobility training (Active)    Learning Progress Summary     Learner Status Readiness Method Response Comment Documented by    Patient Active Acceptance E NR  AS 09/07/18 1103     Done Acceptance E VU Educated on HEP and correct mechanics for safe functional mobility. VG  09/05/18 1449     Done Acceptance E VU Educated on HEP and correct mechanics for safe functional mobility. VG 09/04/18 1533     Active Acceptance E NR  LM 09/02/18 1145          Point: Home exercise program (Active)    Learning Progress Summary     Learner Status Readiness Method Response Comment Documented by    Patient Active Acceptance E NR  AS 09/07/18 1103     Done Acceptance E VU Educated on HEP this date. VG 09/06/18 1546     Done Acceptance E VU Educated on HEP and correct mechanics for safe functional mobility. VG 09/05/18 1449     Done Acceptance E VU Educated on HEP and correct mechanics for safe functional mobility. VG 09/04/18 1533          Point: Precautions (Active)    Learning Progress Summary     Learner Status Readiness Method Response Comment Documented by    Patient Active Acceptance E NR  AS 09/07/18 1103     Done Acceptance E VU Educated on HEP and correct mechanics for safe functional mobility. VG 09/05/18 1449     Done Acceptance E VU Educated on HEP and correct mechanics for safe functional mobility. VG 09/04/18 1533     Active Acceptance E NR   09/02/18 1145                      User Key     Initials Effective Dates Name Provider Type Discipline    AS 06/22/15 -  Yasmine Fuller, PTA Physical Therapy Assistant PT     06/15/16 -  Tammie Raman, PT Physical Therapist PT     05/29/18 -  Manjula Soria, PT Physical Therapist PT                      PT Recommendation and Plan  Anticipated Discharge Disposition (PT): skilled nursing facility  Planned Therapy Interventions (PT Eval): balance training, bed mobility training, gait training, home exercise program, neuromuscular re-education, patient/family education, postural re-education, ROM (range of motion), strengthening, stretching, transfer training, wheelchair management/propulsion training  Therapy Frequency (PT Clinical Impression): daily            Outcome Summary/Treatment Plan (PT)  Daily Summary of Progress (PT): progress  toward functional goals is good  Daily Summary of Progress (PT): progress toward functional goals is good  Outcome Summary: BLE edema improved with no open ulcerations to LEs noted. Pt cont to have mild erythema to toes and distal portion of foot, but improved from IE. Pt is making excellent progress with therapy and will benefit from cont compression wrapping to help maintain gains and cont to improve skin integrity.   Plan of Care Reviewed With: patient          Outcome Measures     Row Name 09/07/18 1000 09/06/18 1533 09/06/18 0805       How much help from another person do you currently need...    Turning from your back to your side while in flat bed without using bedrails? 2  -AS 3  -VG  --    Moving from lying on back to sitting on the side of a flat bed without bedrails? 2  -AS 3  -VG  --    Moving to and from a bed to a chair (including a wheelchair)? 2  -AS 2  -VG  --    Standing up from a chair using your arms (e.g., wheelchair, bedside chair)? 2  -AS 2  -VG  --    Climbing 3-5 steps with a railing? 1  -AS 1  -VG  --    To walk in hospital room? 1  -AS 1  -VG  --    AM-PAC 6 Clicks Score 10  -AS 12  -VG  --       How much help from another is currently needed...    Putting on and taking off regular lower body clothing?  --  -- 1  -HK    Bathing (including washing, rinsing, and drying)  --  -- 2  -HK    Toileting (which includes using toilet bed pan or urinal)  --  -- 2  -HK    Putting on and taking off regular upper body clothing  --  -- 3  -HK    Taking care of personal grooming (such as brushing teeth)  --  -- 2  -HK    Eating meals  --  -- 3  -HK    Score  --  -- 13  -HK       Functional Assessment    Outcome Measure Options AM-PAC 6 Clicks Basic Mobility (PT)  -AS AM-PAC 6 Clicks Basic Mobility (PT)  -VG AM-PAC 6 Clicks Daily Activity (OT)  -HK    Row Name 09/05/18 1400 09/04/18 1504 09/04/18 1300       How much help from another person do you currently need...    Turning from your back to your side  while in flat bed without using bedrails? 3  -VG 2  -VG  --    Moving from lying on back to sitting on the side of a flat bed without bedrails? 3  -VG 1  -VG  --    Moving to and from a bed to a chair (including a wheelchair)? 2  -VG 1  -VG  --    Standing up from a chair using your arms (e.g., wheelchair, bedside chair)? 2  -VG 2  -VG  --    Climbing 3-5 steps with a railing? 1  -VG 1  -VG  --    To walk in hospital room? 1  -VG 1  -VG  --    AM-PAC 6 Clicks Score 12  -VG 8  -VG  --       How much help from another is currently needed...    Putting on and taking off regular lower body clothing?  --  -- 1  -HK    Bathing (including washing, rinsing, and drying)  --  -- 2  -HK    Toileting (which includes using toilet bed pan or urinal)  --  -- 1  -HK    Putting on and taking off regular upper body clothing  --  -- 2  -HK    Taking care of personal grooming (such as brushing teeth)  --  -- 2  -HK    Eating meals  --  -- 3  -HK    Score  --  -- 11  -HK       Functional Assessment    Outcome Measure Options AM-PAC 6 Clicks Basic Mobility (PT)  -VG AM-PAC 6 Clicks Basic Mobility (PT)  -VG AM-PAC 6 Clicks Daily Activity (OT)  -HK      User Key  (r) = Recorded By, (t) = Taken By, (c) = Cosigned By    Initials Name Provider Type    AS Yasmine Fuller, CRIST[facility]BAL Physical Therapy Assistant     Violette Smith, OT Occupational Therapist     Manjula Soria, PT Physical Therapist              Time Calculation        PT Charges     Row Name 09/07/18 1000 09/07/18 0845          Time Calculation    Start Time 1000  -AS 0845  -MF     PT Received On 09/07/18  -AS  --     PT Goal Re-Cert Due Date 09/12/18  -AS 09/12/18  -        Timed Charges    75170 - PT Therapeutic Exercise Minutes 23  -AS  --       User Key  (r) = Recorded By, (t) = Taken By, (c) = Cosigned By    Initials Name Provider Type     Carlos Hassan, PT Physical Therapist    AS Yasmine Fuller, CRISTÓBAL Physical Therapy Assistant         Therapy Suggested  Charges     Code   Minutes Charges    42512 (CPT®) Hc Pt Neuromusc Re Education Ea 15 Min      91462 (CPT®) Hc Pt Ther Proc Ea 15 Min 23 2    50818 (CPT®) Hc Gait Training Ea 15 Min      41869 (CPT®) Hc Pt Therapeutic Act Ea 15 Min      16270 (CPT®) Hc Pt Manual Therapy Ea 15 Min      74005 (CPT®) Hc Pt Iontophoresis Ea 15 Min      32098 (CPT®) Hc Pt Elec Stim Ea-Per 15 Min      02220 (CPT®) Hc Pt Ultrasound Ea 15 Min      62935 (CPT®) Hc Pt Self Care/Mgmt/Train Ea 15 Min      03114 (CPT®) Hc Pt Prosthetic (S) Train Initial Encounter, Each 15 Min      26886 (CPT®) Hc Pt Orthotic(S)/Prosthetic(S) Encounter, Each 15 Min      34259 (CPT®) Hc Orthotic(S) Mgmt/Train Initial Encounter, Each 15min      Total  23 2          Therapy Charges for Today     Code Description Service Date Service Provider Modifiers Qty    54286685318 HC NONSELECTIVE DEBRIDEMENT 9/7/2018 Carlos Hassan, PT GP 1    13125233315 HC PT MULTI LAYER COMP SYS BELOW KNEE 9/7/2018 Carlos Hassan, PT GP 1            PT G-Codes  Outcome Measure Options: AM-PAC 6 Clicks Basic Mobility (PT)  AM-PAC 6 Clicks Score: 10  Score: 13        Carlos Hassan, PT  9/7/2018

## 2018-09-07 NOTE — PROGRESS NOTES
Continued Stay Note  [facility]     Patient Name: Yassine Love  MRN: [id number]  Today's Date: 9/7/2018    Admit Date: 8/20/2018          Discharge Plan     Row Name 09/07/18 1527       Plan    Plan [facility]    Patient/Family in Agreement with Plan yes    Plan Comments I spoke with the pt again this afternoon and he is agreeable to SNF rehab and is aware he is ready for DC. He wants to go to [facility] and is aware he can not smoke there. Per Brea at [facility] they can accept the pt to a skilled bed on 9-8-18. Please call report to [phone] and send copied chart, summary and any hard scripts for controlled meds with the pt. The summary needs to be faxed to [phone]. [facility] ambulance [phone] to transport 1300. Brea is aware the pt is on 4l 02. He did not want me to notify any family or friends regarding the planned transfer and states he has notified his friends.    Row Name 09/07/18 1517       Plan    Plan SNF    Patient/Family in Agreement with Plan yes    Plan Comments I confirmed with Dr Belle that the pt is ready for DC from an ID standpoint. He states the oral doxy van be discontinued. I spoke with Dr Mccarthy about this and he will speak with the pt. [facility] and [facility] have offered a bed.              Discharge Codes    No documentation.       Expected Discharge Date and Time     Expected Discharge Date Expected Discharge Time    Sep 4, 2018             Gail Medellin RN

## 2018-09-08 VITALS
RESPIRATION RATE: 19 BRPM | BODY MASS INDEX: 29.28 KG/M2 | HEIGHT: 68 IN | WEIGHT: 193.2 LBS | HEART RATE: 72 BPM | TEMPERATURE: 98.7 F | DIASTOLIC BLOOD PRESSURE: 58 MMHG | OXYGEN SATURATION: 93 % | SYSTOLIC BLOOD PRESSURE: 110 MMHG

## 2018-09-08 PROBLEM — N17.9 AKI (ACUTE KIDNEY INJURY) (HCC): Status: RESOLVED | Noted: 2017-06-20 | Resolved: 2018-09-08

## 2018-09-08 LAB
ANION GAP SERPL CALCULATED.3IONS-SCNC: 4 MMOL/L (ref 3–11)
BUN BLD-MCNC: 33 MG/DL (ref 9–23)
BUN/CREAT SERPL: 28.7 (ref 7–25)
CALCIUM SPEC-SCNC: 8.5 MG/DL (ref 8.7–10.4)
CHLORIDE SERPL-SCNC: 96 MMOL/L (ref 99–109)
CO2 SERPL-SCNC: 39 MMOL/L (ref 20–31)
CREAT BLD-MCNC: 1.15 MG/DL (ref 0.6–1.3)
DEPRECATED RDW RBC AUTO: 69 FL (ref 37–54)
ERYTHROCYTE [DISTWIDTH] IN BLOOD BY AUTOMATED COUNT: 20.2 % (ref 11.3–14.5)
GFR SERPL CREATININE-BSD FRML MDRD: 62 ML/MIN/1.73
GLUCOSE BLD-MCNC: 100 MG/DL (ref 70–100)
HCT VFR BLD AUTO: 30.9 % (ref 38.9–50.9)
HGB BLD-MCNC: 9.2 G/DL (ref 13.1–17.5)
MCH RBC QN AUTO: 27.9 PG (ref 27–31)
MCHC RBC AUTO-ENTMCNC: 29.8 G/DL (ref 32–36)
MCV RBC AUTO: 93.6 FL (ref 80–99)
PLATELET # BLD AUTO: 285 10*3/MM3 (ref 150–450)
PMV BLD AUTO: 10 FL (ref 6–12)
POTASSIUM BLD-SCNC: 4.5 MMOL/L (ref 3.5–5.5)
RBC # BLD AUTO: 3.3 10*6/MM3 (ref 4.2–5.76)
SODIUM BLD-SCNC: 139 MMOL/L (ref 132–146)
WBC NRBC COR # BLD: 6.67 10*3/MM3 (ref 3.5–10.8)

## 2018-09-08 PROCEDURE — 94640 AIRWAY INHALATION TREATMENT: CPT

## 2018-09-08 PROCEDURE — 99239 HOSP IP/OBS DSCHRG MGMT >30: CPT | Performed by: NURSE PRACTITIONER

## 2018-09-08 PROCEDURE — 25010000002 HEPARIN (PORCINE) PER 1000 UNITS: Performed by: NURSE PRACTITIONER

## 2018-09-08 PROCEDURE — 94799 UNLISTED PULMONARY SVC/PX: CPT

## 2018-09-08 PROCEDURE — 85027 COMPLETE CBC AUTOMATED: CPT | Performed by: INTERNAL MEDICINE

## 2018-09-08 PROCEDURE — 25010000002 FUROSEMIDE PER 20 MG: Performed by: NURSE PRACTITIONER

## 2018-09-08 PROCEDURE — 80048 BASIC METABOLIC PNL TOTAL CA: CPT | Performed by: INTERNAL MEDICINE

## 2018-09-08 RX ORDER — SENNA AND DOCUSATE SODIUM 50; 8.6 MG/1; MG/1
2 TABLET, FILM COATED ORAL NIGHTLY
Start: 2018-09-08

## 2018-09-08 RX ORDER — DOXYCYCLINE 100 MG/1
100 CAPSULE ORAL EVERY 12 HOURS SCHEDULED
Qty: 4 CAPSULE | Refills: 0
Start: 2018-09-08 | End: 2018-09-10

## 2018-09-08 RX ORDER — MORPHINE SULFATE 30 MG/1
30 TABLET, FILM COATED, EXTENDED RELEASE ORAL EVERY 12 HOURS SCHEDULED
Start: 2018-09-08 | End: 2018-10-22 | Stop reason: HOSPADM

## 2018-09-08 RX ORDER — NICOTINE 21 MG/24HR
1 PATCH, TRANSDERMAL 24 HOURS TRANSDERMAL EVERY 24 HOURS
Start: 2018-09-08 | End: 2019-01-02 | Stop reason: HOSPADM

## 2018-09-08 RX ORDER — MIRTAZAPINE 15 MG/1
15 TABLET, ORALLY DISINTEGRATING ORAL NIGHTLY
Start: 2018-09-08

## 2018-09-08 RX ORDER — HYDROCODONE BITARTRATE AND ACETAMINOPHEN 10; 325 MG/1; MG/1
1 TABLET ORAL EVERY 4 HOURS PRN
Start: 2018-09-08 | End: 2018-10-22 | Stop reason: HOSPADM

## 2018-09-08 RX ADMIN — IPRATROPIUM BROMIDE AND ALBUTEROL SULFATE 3 ML: 2.5; .5 SOLUTION RESPIRATORY (INHALATION) at 08:43

## 2018-09-08 RX ADMIN — FUROSEMIDE 40 MG: 10 INJECTION, SOLUTION INTRAMUSCULAR; INTRAVENOUS at 09:17

## 2018-09-08 RX ADMIN — CLOPIDOGREL BISULFATE 75 MG: 75 TABLET ORAL at 09:17

## 2018-09-08 RX ADMIN — FLUTICASONE PROPIONATE 2 SPRAY: 50 SPRAY, METERED NASAL at 09:20

## 2018-09-08 RX ADMIN — DOXYCYCLINE 100 MG: 100 CAPSULE ORAL at 09:17

## 2018-09-08 RX ADMIN — METOPROLOL TARTRATE 25 MG: 25 TABLET ORAL at 09:17

## 2018-09-08 RX ADMIN — MORPHINE SULFATE 30 MG: 30 TABLET, EXTENDED RELEASE ORAL at 09:17

## 2018-09-08 RX ADMIN — HEPARIN SODIUM 5000 UNITS: 5000 INJECTION, SOLUTION INTRAVENOUS; SUBCUTANEOUS at 05:22

## 2018-09-08 RX ADMIN — ASPIRIN 81 MG: 81 TABLET, COATED ORAL at 09:17

## 2018-09-08 RX ADMIN — BUDESONIDE AND FORMOTEROL FUMARATE DIHYDRATE 2 PUFF: 160; 4.5 AEROSOL RESPIRATORY (INHALATION) at 08:43

## 2018-09-08 RX ADMIN — CASTOR OIL AND BALSAM, PERU: 788; 87 OINTMENT TOPICAL at 09:20

## 2018-09-08 RX ADMIN — MICONAZOLE NITRATE: 2 CREAM TOPICAL at 09:19

## 2018-09-08 RX ADMIN — IPRATROPIUM BROMIDE AND ALBUTEROL SULFATE 3 ML: 2.5; .5 SOLUTION RESPIRATORY (INHALATION) at 13:11

## 2018-09-08 RX ADMIN — PANTOPRAZOLE SODIUM 40 MG: 40 TABLET, DELAYED RELEASE ORAL at 05:22

## 2018-09-08 RX ADMIN — HYDROCODONE BITARTRATE AND ACETAMINOPHEN 1 TABLET: 10; 325 TABLET ORAL at 13:58

## 2018-09-08 RX ADMIN — GABAPENTIN 400 MG: 400 CAPSULE ORAL at 05:22

## 2018-09-08 NOTE — PROGRESS NOTES
Case Management Discharge Note    Final Note: FAXED DC SUMMARY TO [facility].  [facility] TO TRANSPORT AT 1300.    Destination - Selection Complete     Service Request Status Selected Specialties Address Phone Number Fax Number    [facility] Selected [facility]illed Nursing Facility [address] [id number] [phone]      Durable Medical Equipment     No service has been selected for the patient.      Dialysis/Infusion     No service has been selected for the patient.      Home Medical Care     No service has been selected for the patient.      Social Care     No service has been selected for the patient.        Ambulance: [facility]    Final Discharge Disposition Code: 03 - skilled nursing facility (SNF)

## 2018-09-08 NOTE — DISCHARGE SUMMARY
[facility] Medicine Services  DISCHARGE SUMMARY    Patient Name: Yassine Love  : 1945  MRN: [id number]    Date of Admission: 2018  Date of Discharge:  2018  Primary Care Physician: Provider, No Known    Consults     Date and Time Order Name Status Description    2018 0830 Inpatient Vascular Surgery Consult      2018 1914 Inpatient Infectious Diseases Consult Completed         Hospital Course     Presenting Problem:   Cellulitis of left lower leg [L03.116]    Active Hospital Problems    Diagnosis Date Noted   • **Cellulitis of lower extremity [L03.119] 2018   • Chronic pain [G89.29] 2018   • Debility [R53.81] 2018   • Decubitus ulcer of buttock, stage 2 [L89.302] 2018   • Coronary artery disease involving native coronary artery without angina pectoris [I25.10] 2018   • Non-sustained ventricular tachycardia (CMS/[facility]) [I47.2] 2018   • COPD (chronic obstructive pulmonary disease) (CMS/[facility]) [J44.9] 2018   • Essential hypertension [I10] 2018   • Cellulitis of both lower extremities [L03.115, L03.116] 2018   • PVD (peripheral vascular disease) (CMS/[facility]) [I73.9] 2017      Resolved Hospital Problems    Diagnosis Date Noted Date Resolved   • RAFAEL (acute kidney injury) (CMS/[facility]) [N17.9] 2017      Hospital Course:  Yassine Love is a 72 y.o. male with past medical history of recurring BLE cellulitis, PVD, prediabetes, chronic pain, hypertension, CAD, COPD, and ongoing tobacco abuse.  He presented to [facility] ED with worsening BLE pain and redness, weeping, and open lesions.  Patient was admitted to the hospital medicine service further further evaluation and management.  Patient was started on Merrem and Vancomycin.  Infectious disease was consulted and has followed during hospitalization for BLE cellulitis and penis ulceration.  Patient has significant POAD with superimposed cellulitis.  He is at risk  for limb loss.  This was discussed with patient and he does not want any aggressive intervention or surgery but may be open to endovascular intervention if possible.  Arterial duplex showed no occlusion on either side.  Antibiotics have been changed to by mouth doxycycline and he will continue through 9/10/2018.  See culture results below.  Echo on 8/29/18 showed normal left ventricular systolic function with EF 65%.  CXR stable on 9/3/18, patient has received IV lasix and will transition to home dose at discharge.  Patient is medically stable and ready for transfer today.  Discharge instructions were reviewed with patient and he verbalized an understanding.        Day of Discharge     HPI:   Sitting up in bed without any new complaints or issues.  States he feels ok and is ready to go to [place].  Denies chest pain or shortness of breath.  Last BM was 2 days ago which he states is normal for him.      Review of Systems  Gen- No fevers, chills  CV- No chest pain, palpitations  Resp- No cough, dyspnea  GI- No N/V/D, abd pain    Otherwise ROS is negative except as mentioned in the HPI.    Vital Signs:   Temp:  [98.7 °F (37.1 °C)-99.6 °F (37.6 °C)] 98.7 °F (37.1 °C)  Heart Rate:  [72-84] 72  Resp:  [18-19] 19  BP: (101-110)/(55-58) 110/58     Physical Exam:  Constitutional: No acute distress, resting in bed  Respiratory: clear to ausculatation, respirations unlabored on 3-4 liters NC  Cardiovascular: RRR, no murmurs, rubs, or gallops, palpable pedal pulses bilaterally  Gastrointestinal: Positive bowel sounds, soft, nontender, nondistended  Musculoskeletal: BLE with compression wraps   Psychiatric: Appropriate affect, cooperative  Neurologic: No focal deficits, speech clear    Pertinent  and/or Most Recent Results       Results from last 7 days  Lab Units 09/08/18  0406 09/06/18  0413 09/05/18  0704 09/04/18  0503 09/03/18  0557   WBC 10*3/mm3 6.67  --   --   --   --    HEMOGLOBIN g/dL 9.2*  --   --   --   --     HEMATOCRIT % 30.9*  --   --   --   --    PLATELETS 10*3/mm3 285  --   --   --   --    SODIUM mmol/L 139 137 138 140 139   POTASSIUM mmol/L 4.5 4.1 3.7 3.8 4.4   CHLORIDE mmol/L 96* 95* 95* 95* 96*   CO2 mmol/L 39.0* 39.0* 38.0* 44.0* 38.0*   BUN mg/dL 33* 24* 22 22 19   CREATININE mg/dL 1.15 1.16 1.16 1.32* 1.25   GLUCOSE mg/dL 100 115* 118* 125* 100   CALCIUM mg/dL 8.5* 8.6* 8.5* 8.6* 8.5*         Brief Urine Lab Results  (Last result in the past 365 days)      Color   Clarity   Blood   Leuk Est   Nitrite   Protein   CREAT   Urine HCG        08/20/18 1442 Yellow Clear Negative Trace(A) Negative Negative             Microbiology Results Abnormal     Procedure Component Value - Date/Time    Blood Culture - Blood, [[id number]]  (Normal) Collected:  08/20/18 1445    Lab Status:  Final result Specimen:  Blood from Wrist, Right Updated:  08/25/18 1500     Blood Culture No growth at 5 days    Blood Culture - Blood, [[id number]]  (Normal) Collected:  08/20/18 1430    Lab Status:  Final result Specimen:  Blood from Arm, Right Updated:  08/25/18 1500     Blood Culture No growth at 5 days    Wound Culture - Wound, Penis [[id number]]  (Abnormal)  (Susceptibility) Collected:  08/20/18 1930    Lab Status:  Final result Specimen:  Wound from Penis Updated:  08/23/18 1427     Wound Culture Scant growth (1+) Providencia rettgeri (A)      Scant growth (1+) Staphylococcus aureus (A)      Scant growth (1+) Staphylococcus haemolyticus (A)     Comment: This isolate is presumed to be clindamyin resistant based on detection of inducible clindamycin resistance. Clindamycin may still be effective in some patients.          Gram Stain Result No WBCs or organisms seen    Susceptibility      Providencia rettgeri    Staphylococcus aureus       FAVIAN    FAVIAN       Ampicillin + Sulbactam <=8/4 ug/ml Susceptible             Aztreonam <=8 ug/ml Susceptible             Cefepime <=8 ug/ml Susceptible             Cefotaxime <=2 ug/ml Susceptible              Ceftriaxone <=8 ug/ml Susceptible    <=8 ug/ml Susceptible       Cefuroxime sodium <=4 ug/ml Susceptible             Clindamycin       <=0.5 ug/ml Susceptible       Daptomycin       <=0.5 ug/ml Susceptible       Ertapenem <=1 ug/ml Susceptible             Erythromycin       <=0.5 ug/ml Susceptible       Gentamicin <=4 ug/ml Susceptible    <=4 ug/ml Susceptible       Levofloxacin <=2 ug/ml Susceptible    <=1 ug/ml Susceptible  [1]        Linezolid       2 ug/ml Susceptible       Meropenem <=1 ug/ml Susceptible             Oxacillin       <=0.25 ug/ml Susceptible       Penicillin G       <=0.03 ug/ml Susceptible       Piperacillin + Tazobactam <=16 ug/ml Susceptible             Quinupristin + Dalfopristin       <=0.5 ug/ml Susceptible  [2]        Rifampin       <=1 ug/ml Susceptible       Streptomycin High Level Synergy       <=0.5 ug/ml Susceptible       Tetracycline       <=4 ug/ml Susceptible       Tobramycin <=4 ug/ml Susceptible             Trimethoprim + Sulfamethoxazole <=2/38 ug/ml Susceptible    <=0.5/9.5 ug/ml Susceptible       Vancomycin       2 ug/ml Susceptible              [1]   Staphylococcus species may develop resistance during prolonged therapy with quinolones.  Isolates that are initially susceptible may become resistant within three to four days after initiation of therapy. Testing of repeat isolates may be warranted.       [2]   Appended report. These results have been appended to a previously preliminary verified report.               Susceptibility      Staphylococcus haemolyticus     FAVIAN     Ciprofloxacin >2 ug/ml Resistant     Clindamycin  Resistant     Daptomycin <=0.5 ug/ml Susceptible     Erythromycin >4 ug/ml Resistant     Gentamicin <=4 ug/ml Susceptible     Levofloxacin >4 ug/ml Resistant     Linezolid <=1 ug/ml Susceptible     Oxacillin >2 ug/ml Resistant     Penicillin G >8 ug/ml Resistant     Quinupristin + Dalfopristin <=0.5 ug/ml Susceptible     Rifampin <=1 ug/ml Susceptible      Tetracycline <=4 ug/ml Susceptible     Trimethoprim + Sulfamethoxazole <=0.5/9.5 ug/ml Susceptible     Vancomycin 2 ug/ml Susceptible                    Wound Culture - Wound, Leg, Left [[id number]]  (Abnormal)  (Susceptibility) Collected:  08/20/18 1930    Lab Status:  Final result Specimen:  Wound from Leg, Left Updated:  08/22/18 1347     Wound Culture Moderate growth (3+) Pseudomonas aeruginosa (A)      Scant growth (1+) Streptococcus, Beta Hemolytic, Group G (A)     Comment:   If Clindamycin or Erythromycin is the drug of choice, notify the laboratory within 7 days to request susceptibility testing.        STREP GROUPING G     Gram Stain Result No WBCs seen      Many (4+) Gram negative bacilli    Susceptibility      Pseudomonas aeruginosa     FAVIAN     Aztreonam <=8 ug/ml Susceptible     Cefepime <=8 ug/ml Susceptible     Ceftazidime <=1 ug/ml Susceptible     Gentamicin <=4 ug/ml Susceptible     Levofloxacin <=2 ug/ml Susceptible     Meropenem <=1 ug/ml Susceptible     Piperacillin + Tazobactam >64 ug/ml Resistant     Tobramycin <=4 ug/ml Susceptible                    KOH Prep - Skin, Foot, Left [[id number]]  (Normal) Collected:  08/20/18 1501    Lab Status:  Final result Specimen:  Skin from Foot, Left Updated:  08/20/18 1528     KOH Prep No yeast or hyphal elements seen        Imaging Results (all)     Procedure Component Value Units Date/Time    XR Chest 1 View [[id number]] Collected:  09/03/18 1004     Updated:  09/03/18 1109    Narrative:       EXAMINATION: XR CHEST 1 VW-      INDICATION: Volume overload; L03.116-Cellulitis of left lower limb;  R60.9-Edema, unspecified; I87.2-Venous insufficiency (chronic)  (peripheral); G89.4-Chronic pain syndrome; Z86.79-Personal history of  other diseases of the circulatory system; Z74.09-Other reduced mobility.     TECHNIQUE:  Single view frontal chest.     COMPARISONS: 08/31/2018.     FINDINGS:  Unchanged interstitial prominence and left pleural effusion  with adjacent  opacity. Scarring right lung base and trace effusion.  Atherosclerosis aorta knob. Unchanged cardiomediastinal silhouette.       Impression:       Stable exam.     D:  09/03/2018  E:  09/03/2018     This report was finalized on 9/3/2018 11:06 AM by Eliezer Hale.       XR Chest 1 View [[id number]] Collected:  08/31/18 1243     Updated:  08/31/18 1534    Narrative:       EXAMINATION: XR CHEST 1 VW-08/31/2018:      INDICATION: SOA, hypoxia; L03.116-Cellulitis of left lower limb;  R60.9-Edema, unspecified; I87.2-Venous insufficiency (chronic)  (peripheral); L03.119-Cellulitis of unspecified part of limb;  G89.4-Chronic pain syndrome; Z86.79-Personal history of other diseases  of the circulatory system; Z74.09-Other reduced mobility; Z74.09-Other  reduced mobility.      COMPARISON: 08/29/2018.     FINDINGS: Portable chest reveals the heart to be borderline enlarged.  Patchy ill-defined opacification left lung base with small left pleural  effusion. Increased markings seen within the right lung base, however,  these are improved when compared to the prior study. Degenerative  changes seen within the spine. Vascular calcification seen within the  thoracic aorta.           Impression:       Ill-defined opacification within left lung base. Small  bilateral pleural effusions. Increased markings seen within the right  lung base. Improvement seen in the aeration of the right lung base in  the interval.     D:  08/31/2018  E:  08/31/2018     This report was finalized on 8/31/2018 3:32 PM by Dr. Nataly Whalen MD.       XR Chest 1 View [[id number]] Collected:  08/29/18 1539     Updated:  08/29/18 1556    Narrative:       EXAMINATION: XR CHEST 1 VW-08/29/2018:      INDICATION: Hypoxia; L03.116-Cellulitis of left lower limb; R60.9-Edema,  unspecified; I87.2-Venous insufficiency (chronic) (peripheral);  L03.119-Cellulitis of unspecified part of limb; G89.4-Chronic pain  syndrome; Z86.79-Personal history of other diseases of the  circulatory  system; Z74.09-Other reduced mobility; Z74.09-Other reduced mobility.      COMPARISON: 08/22/2018.     FINDINGS: Portable chest reveals slight worsening of the markings at the  mid and lower lung fields bilaterally. The heart is enlarged with small  bilateral pleural effusions. Slight prominence seen of the pulmonary  vascularity bilaterally.           Impression:       Slight worsening of the pulmonary vascularity bilaterally  with some increased markings in the mid and lower lung fields  bilaterally.     D:  08/29/2018  E:  08/29/2018     This report was finalized on 8/29/2018 3:54 PM by Dr. Nataly Whalen MD.       XR Chest 1 View [[id number]] Collected:  08/22/18 1128     Updated:  08/22/18 1318    Narrative:       EXAMINATION: XR CHEST 1 VW-08/22/2018:      INDICATION: Cough; L03.116-Cellulitis of left lower limb; R60.9-Edema,  unspecified; I87.2-Venous insufficiency (chronic) (peripheral);  L03.119-Cellulitis of unspecified part of limb; G89.4-Chronic pain  syndrome; Z86.79-Personal history of other diseases of the circulatory  system; Z74.09-Other reduced mobility; Z74.09-Other reduced mobility.      COMPARISON: 03/6/2018.     FINDINGS: The heart is at the upper limits of normal. There is patchy  airspace disease in a right perihilar and bibasilar distribution without  consolidation. There is minimal blunting of the costophrenic angles  which is chronic.           Impression:       Minimal patchy bibasilar airspace disease without  consolidation, new when compared with 03/06/2018.     D:  08/22/2018  E:  08/22/2018     This report was finalized on 8/22/2018 1:16 PM by Dr. Nehemias Nye MD.           Results for orders placed during the hospital encounter of 08/20/18   Duplex Venous Upper Extremity - Left CAR    Narrative · Sub-acute left upper extremity superficial thrombophlebitis noted in the   cephalic (forearm).  · All other left sided vessels appear normal.     Dr. Azael mcleod.         Results for orders placed during the hospital encounter of 08/20/18   Duplex Venous Upper Extremity - Left CAR    Narrative · Sub-acute left upper extremity superficial thrombophlebitis noted in the   cephalic (forearm).  · All other left sided vessels appear normal.     Dr. Azael mcleod.        Results for orders placed during the hospital encounter of 08/20/18   Adult Transthoracic Echo Complete W/ Cont if Necessary Per Protocol    Narrative · Left ventricular systolic function is normal. Estimated EF = 65%.  · There is moderate calcification of the aortic valve mainly affecting the   non coronary cusp(s).  · Right ventricular cavity is mildly dilated.          Discharge Details        Discharge Medications      New Medications      Instructions Start Date   doxycycline 100 MG capsule  Commonly known as:  MONODOX   100 mg, Oral, Every 12 Hours Scheduled      mirtazapine 15 MG disintegrating tablet  Commonly known as:  REMERON SOL-TAB   15 mg, Oral, Nightly      nicotine 21 MG/24HR patch  Commonly known as:  NICODERM CQ   1 patch, Transdermal, Every 24 Hours      sennosides-docusate sodium 8.6-50 MG tablet  Commonly known as:  SENOKOT-S   2 tablets, Oral, Nightly         Changes to Medications      Instructions Start Date   HYDROcodone-acetaminophen  MG per tablet  Commonly known as:  NORCO  What changed:  · when to take this  · reasons to take this   1 tablet, Oral, Every 4 Hours PRN      Morphine 30 MG 12 hr tablet  Commonly known as:  MS CONTIN  What changed:  · medication strength  · how much to take  · when to take this   30 mg, Oral, Every 12 Hours Scheduled         Continue These Medications      Instructions Start Date   acetaminophen 325 MG tablet  Commonly known as:  TYLENOL   325 mg, Oral, As Needed      albuterol (2.5 MG/3ML) 0.083% nebulizer solution  Commonly known as:  PROVENTIL   2.5 mg, Nebulization, Every 6 Hours PRN      aspirin 81 MG EC tablet   81 mg, Oral, Daily      atorvastatin 80  MG tablet  Commonly known as:  LIPITOR   40 mg, Oral, Nightly      budesonide-formoterol 160-4.5 MCG/ACT inhaler  Commonly known as:  SYMBICORT   2 puffs, Inhalation, 2 Times Daily - RT      calcium carbonate 500 MG chewable tablet  Commonly known as:  TUMS   2 tablets, Oral, 2 Times Daily PRN      clopidogrel 75 MG tablet  Commonly known as:  PLAVIX   75 mg, Oral, Daily      cyanocobalamin 1000 MCG tablet  Commonly known as:  VITAMIN B-12   1,000 mcg, Oral, Daily      fluticasone 50 MCG/ACT nasal spray  Commonly known as:  FLONASE   2 sprays, Each Nare, Daily      furosemide 40 MG tablet  Commonly known as:  LASIX   40 mg, Oral, 2 Times Daily      gabapentin 400 MG capsule  Commonly known as:  NEURONTIN   400 mg, Oral, 3 Times Daily      metoprolol tartrate 50 MG tablet  Commonly known as:  LOPRESSOR   25 mg, Oral, 2 Times Daily, 1/2 tab BID       pantoprazole 40 MG EC tablet  Commonly known as:  PROTONIX   40 mg, Oral, Daily      tiotropium 18 MCG per inhalation capsule  Commonly known as:  SPIRIVA   1 capsule, Inhalation, Daily - RT           Discharge Disposition:  Skilled Nursing Facility (DC - External)    Discharge Diet:  Diet Instructions     Diet: Soft Texture; Thin Liquids, No Restrictions; Chopped       Discharge Diet:  Soft Texture    Fluid Consistency:  Thin Liquids, No Restrictions    Soft Options:  Chopped        Discharge Activity:   Activity Instructions     Activity as Tolerated           Code Status/Level of Support:  Code Status and Medical Interventions:   Ordered at: 08/29/18 0406     Limited Support to NOT Include:    Intubation     Level Of Support Discussed With:    Patient     Code Status:    No CPR     Medical Interventions (Level of Support Prior to Arrest):    Limited       Additional Instructions for the Follow-ups that You Need to Schedule     Discharge Follow-up with PCP    As directed      Currently Documented PCP:  Provider, No Known  PCP Phone Number:  None    Follow Up Details:   within 1 week of discharge from [place]               Time Spent on Discharge:  45 minutes    Electronically signed by BRITTNY Fisher, 09/08/18, 10:55 AM.

## 2018-09-08 NOTE — DISCHARGE PLACEMENT REQUEST
"TERRENCE TAYLOR, RN    [phone]          Chris Dyson  (72 y.o. Male)     Date of Birth Social Security Number Address Home Phone MRN    1945  [facility] [facility] [address] [phone] [id number]    Taoism Marital Status          Unknown        Admission Date Admission Type Admitting Provider Attending Provider Department, Room/Bed    18 Emergency Raoul Mccarthy MD Kal[place]ar, Mas[place], MD [facility] 5G, S552/1    Discharge Date Discharge Disposition Discharge Destination         Skilled Nursing Facility (DC - External)              Attending Provider:  Raoul Mccarthy MD    Allergies:  Ciprofloxacin Hcl, Ceftin [Cefuroxime Axetil]    Isolation:  None   Infection:  None   Code Status:  No CPR    Ht:  172.7 cm (68\")   Wt:  87.6 kg (193 lb 3.2 oz)    Admission Cmt:  None   Principal Problem:  Cellulitis of lower extremity [L03.119]                 Active Insurance as of 2018     Primary Coverage     Payor Plan Insurance Group Employer/Plan Group    MEDICARE MEDICARE A & B      Payor Plan Address Payor Plan Phone Number Effective From Effective To    [address] [phone] [id number]     [facility] [id number]       Subscriber Name Subscriber Birth Date Member ID       CHRIS DYSON 1945 [id number]                 Emergency Contacts      (Rel.) Home Phone Work Phone Mobile Phone    Lilian Hui (Friend) [phone] -- --    Giovana Yi -- -- [phone]               Discharge Summary      Shruthi Harrison, APRN at 2018 10:55 AM              [facility]  DISCHARGE SUMMARY    Patient Name: Chris Dyson  : 1945  MRN: [id number]    Date of Admission: 2018  Date of Discharge:  2018  Primary Care Physician: Provider, No Known    Consults     Date and Time Order Name Status Description    2018 0830 Inpatient Vascular Surgery Consult      2018 1914 " Inpatient Infectious Diseases Consult Completed         Hospital Course     Presenting Problem:   Cellulitis of left lower leg [L03.116]    Active Hospital Problems    Diagnosis Date Noted   • **Cellulitis of lower extremity [L03.119] 02/16/2018   • Chronic pain [G89.29] 08/23/2018   • Debility [R53.81] 08/21/2018   • Decubitus ulcer of buttock, stage 2 [L89.302] 08/21/2018   • Coronary artery disease involving native coronary artery without angina pectoris [I25.10] 03/12/2018   • Non-sustained ventricular tachycardia (CMS/[facility]) [I47.2] 03/08/2018   • COPD (chronic obstructive pulmonary disease) (CMS/[facility]) [J44.9] 02/16/2018   • Essential hypertension [I10] 02/16/2018   • Cellulitis of both lower extremities [L03.115, L03.116] 02/16/2018   • PVD (peripheral vascular disease) (CMS/[facility]) [I73.9] 06/20/2017      Resolved Hospital Problems    Diagnosis Date Noted Date Resolved   • RAFAEL (acute kidney injury) (CMS/[facility]) [N17.9] 06/20/2017 09/08/2018      Hospital Course:  Yassine Love is a 72 y.o. male with past medical history of recurring BLE cellulitis, PVD, prediabetes, chronic pain, hypertension, CAD, COPD, and ongoing tobacco abuse.  He presented to [facility] ED with worsening BLE pain and redness, weeping, and open lesions.  Patient was admitted to the hospital medicine service further further evaluation and management.  Patient was started on Merrem and Vancomycin.  Infectious disease was consulted and has followed during hospitalization for BLE cellulitis and penis ulceration.  Patient has significant POAD with superimposed cellulitis.  He is at risk for limb loss.  This was discussed with patient and he does not want any aggressive intervention or surgery but may be open to endovascular intervention if possible.  Arterial duplex showed no occlusion on either side.  Antibiotics have been changed to by mouth doxycycline and he will continue through 9/10/2018.  See culture results below.  Echo on 8/29/18 showed normal left  ventricular systolic function with EF 65%.  CXR stable on 9/3/18, patient has received IV lasix and will transition to home dose at discharge.  Patient is medically stable and ready for transfer today.  Discharge instructions were reviewed with patient and he verbalized an understanding.        Day of Discharge     HPI:   Sitting up in bed without any new complaints or issues.  States he feels ok and is ready to go to [place].  Denies chest pain or shortness of breath.  Last BM was 2 days ago which he states is normal for him.      Review of Systems  Gen- No fevers, chills  CV- No chest pain, palpitations  Resp- No cough, dyspnea  GI- No N/V/D, abd pain    Otherwise ROS is negative except as mentioned in the HPI.    Vital Signs:   Temp:  [98.7 °F (37.1 °C)-99.6 °F (37.6 °C)] 98.7 °F (37.1 °C)  Heart Rate:  [72-84] 72  Resp:  [18-19] 19  BP: (101-110)/(55-58) 110/58     Physical Exam:  Constitutional: No acute distress, resting in bed  Respiratory: clear to ausculatation, respirations unlabored on 3-4 liters NC  Cardiovascular: RRR, no murmurs, rubs, or gallops, palpable pedal pulses bilaterally  Gastrointestinal: Positive bowel sounds, soft, nontender, nondistended  Musculoskeletal: BLE with compression wraps   Psychiatric: Appropriate affect, cooperative  Neurologic: No focal deficits, speech clear    Pertinent  and/or Most Recent Results       Results from last 7 days  Lab Units 09/08/18  0406 09/06/18  0413 09/05/18  0704 09/04/18  0503 09/03/18  0557   WBC 10*3/mm3 6.67  --   --   --   --    HEMOGLOBIN g/dL 9.2*  --   --   --   --    HEMATOCRIT % 30.9*  --   --   --   --    PLATELETS 10*3/mm3 285  --   --   --   --    SODIUM mmol/L 139 137 138 140 139   POTASSIUM mmol/L 4.5 4.1 3.7 3.8 4.4   CHLORIDE mmol/L 96* 95* 95* 95* 96*   CO2 mmol/L 39.0* 39.0* 38.0* 44.0* 38.0*   BUN mg/dL 33* 24* 22 22 19   CREATININE mg/dL 1.15 1.16 1.16 1.32* 1.25   GLUCOSE mg/dL 100 115* 118* 125* 100   CALCIUM mg/dL 8.5* 8.6* 8.5*  8.6* 8.5*         Brief Urine Lab Results  (Last result in the past 365 days)      Color   Clarity   Blood   Leuk Est   Nitrite   Protein   CREAT   Urine HCG        08/20/18 1442 Yellow Clear Negative Trace(A) Negative Negative             Microbiology Results Abnormal     Procedure Component Value - Date/Time    Blood Culture - Blood, [[id number]]  (Normal) Collected:  08/20/18 1445    Lab Status:  Final result Specimen:  Blood from Wrist, Right Updated:  08/25/18 1500     Blood Culture No growth at 5 days    Blood Culture - Blood, [[id number]]  (Normal) Collected:  08/20/18 1430    Lab Status:  Final result Specimen:  Blood from Arm, Right Updated:  08/25/18 1500     Blood Culture No growth at 5 days    Wound Culture - Wound, Penis [[id number]]  (Abnormal)  (Susceptibility) Collected:  08/20/18 1930    Lab Status:  Final result Specimen:  Wound from Penis Updated:  08/23/18 1427     Wound Culture Scant growth (1+) Providencia rettgeri (A)      Scant growth (1+) Staphylococcus aureus (A)      Scant growth (1+) Staphylococcus haemolyticus (A)     Comment: This isolate is presumed to be clindamyin resistant based on detection of inducible clindamycin resistance. Clindamycin may still be effective in some patients.          Gram Stain Result No WBCs or organisms seen    Susceptibility      Providencia rettgeri    Staphylococcus aureus       FAVIAN    FAVIAN       Ampicillin + Sulbactam <=8/4 ug/ml Susceptible             Aztreonam <=8 ug/ml Susceptible             Cefepime <=8 ug/ml Susceptible             Cefotaxime <=2 ug/ml Susceptible             Ceftriaxone <=8 ug/ml Susceptible    <=8 ug/ml Susceptible       Cefuroxime sodium <=4 ug/ml Susceptible             Clindamycin       <=0.5 ug/ml Susceptible       Daptomycin       <=0.5 ug/ml Susceptible       Ertapenem <=1 ug/ml Susceptible             Erythromycin       <=0.5 ug/ml Susceptible       Gentamicin <=4 ug/ml Susceptible    <=4 ug/ml Susceptible       Levofloxacin  <=2 ug/ml Susceptible    <=1 ug/ml Susceptible  [1]        Linezolid       2 ug/ml Susceptible       Meropenem <=1 ug/ml Susceptible             Oxacillin       <=0.25 ug/ml Susceptible       Penicillin G       <=0.03 ug/ml Susceptible       Piperacillin + Tazobactam <=16 ug/ml Susceptible             Quinupristin + Dalfopristin       <=0.5 ug/ml Susceptible  [2]        Rifampin       <=1 ug/ml Susceptible       Streptomycin High Level Synergy       <=0.5 ug/ml Susceptible       Tetracycline       <=4 ug/ml Susceptible       Tobramycin <=4 ug/ml Susceptible             Trimethoprim + Sulfamethoxazole <=2/38 ug/ml Susceptible    <=0.5/9.5 ug/ml Susceptible       Vancomycin       2 ug/ml Susceptible              [1]   Staphylococcus species may develop resistance during prolonged therapy with quinolones.  Isolates that are initially susceptible may become resistant within three to four days after initiation of therapy. Testing of repeat isolates may be warranted.       [2]   Appended report. These results have been appended to a previously preliminary verified report.               Susceptibility      Staphylococcus haemolyticus     FAVIAN     Ciprofloxacin >2 ug/ml Resistant     Clindamycin  Resistant     Daptomycin <=0.5 ug/ml Susceptible     Erythromycin >4 ug/ml Resistant     Gentamicin <=4 ug/ml Susceptible     Levofloxacin >4 ug/ml Resistant     Linezolid <=1 ug/ml Susceptible     Oxacillin >2 ug/ml Resistant     Penicillin G >8 ug/ml Resistant     Quinupristin + Dalfopristin <=0.5 ug/ml Susceptible     Rifampin <=1 ug/ml Susceptible     Tetracycline <=4 ug/ml Susceptible     Trimethoprim + Sulfamethoxazole <=0.5/9.5 ug/ml Susceptible     Vancomycin 2 ug/ml Susceptible                    Wound Culture - Wound, Leg, Left [[id number]]  (Abnormal)  (Susceptibility) Collected:  08/20/18 1930    Lab Status:  Final result Specimen:  Wound from Leg, Left Updated:  08/22/18 7748     Wound Culture Moderate growth (3+)  Pseudomonas aeruginosa (A)      Scant growth (1+) Streptococcus, Beta Hemolytic, Group G (A)     Comment:   If Clindamycin or Erythromycin is the drug of choice, notify the laboratory within 7 days to request susceptibility testing.        STREP GROUPING G     Gram Stain Result No WBCs seen      Many (4+) Gram negative bacilli    Susceptibility      Pseudomonas aeruginosa     FAVIAN     Aztreonam <=8 ug/ml Susceptible     Cefepime <=8 ug/ml Susceptible     Ceftazidime <=1 ug/ml Susceptible     Gentamicin <=4 ug/ml Susceptible     Levofloxacin <=2 ug/ml Susceptible     Meropenem <=1 ug/ml Susceptible     Piperacillin + Tazobactam >64 ug/ml Resistant     Tobramycin <=4 ug/ml Susceptible                    KOH Prep - Skin, Foot, Left [[id number]]  (Normal) Collected:  08/20/18 1501    Lab Status:  Final result Specimen:  Skin from Foot, Left Updated:  08/20/18 1528     KOH Prep No yeast or hyphal elements seen        Imaging Results (all)     Procedure Component Value Units Date/Time    XR Chest 1 View [[id number]] Collected:  09/03/18 1004     Updated:  09/03/18 1109    Narrative:       EXAMINATION: XR CHEST 1 VW-      INDICATION: Volume overload; L03.116-Cellulitis of left lower limb;  R60.9-Edema, unspecified; I87.2-Venous insufficiency (chronic)  (peripheral); G89.4-Chronic pain syndrome; Z86.79-Personal history of  other diseases of the circulatory system; Z74.09-Other reduced mobility.     TECHNIQUE:  Single view frontal chest.     COMPARISONS: 08/31/2018.     FINDINGS:  Unchanged interstitial prominence and left pleural effusion  with adjacent opacity. Scarring right lung base and trace effusion.  Atherosclerosis aorta knob. Unchanged cardiomediastinal silhouette.       Impression:       Stable exam.     D:  09/03/2018  E:  09/03/2018     This report was finalized on 9/3/2018 11:06 AM by Eliezer Hale.       XR Chest 1 View [[id number]] Collected:  08/31/18 1243     Updated:  08/31/18 1534    Narrative:        EXAMINATION: XR CHEST 1 VW-08/31/2018:      INDICATION: SOA, hypoxia; L03.116-Cellulitis of left lower limb;  R60.9-Edema, unspecified; I87.2-Venous insufficiency (chronic)  (peripheral); L03.119-Cellulitis of unspecified part of limb;  G89.4-Chronic pain syndrome; Z86.79-Personal history of other diseases  of the circulatory system; Z74.09-Other reduced mobility; Z74.09-Other  reduced mobility.      COMPARISON: 08/29/2018.     FINDINGS: Portable chest reveals the heart to be borderline enlarged.  Patchy ill-defined opacification left lung base with small left pleural  effusion. Increased markings seen within the right lung base, however,  these are improved when compared to the prior study. Degenerative  changes seen within the spine. Vascular calcification seen within the  thoracic aorta.           Impression:       Ill-defined opacification within left lung base. Small  bilateral pleural effusions. Increased markings seen within the right  lung base. Improvement seen in the aeration of the right lung base in  the interval.     D:  08/31/2018  E:  08/31/2018     This report was finalized on 8/31/2018 3:32 PM by Dr. Nataly Whalen MD.       XR Chest 1 View [[id number]] Collected:  08/29/18 1539     Updated:  08/29/18 1556    Narrative:       EXAMINATION: XR CHEST 1 VW-08/29/2018:      INDICATION: Hypoxia; L03.116-Cellulitis of left lower limb; R60.9-Edema,  unspecified; I87.2-Venous insufficiency (chronic) (peripheral);  L03.119-Cellulitis of unspecified part of limb; G89.4-Chronic pain  syndrome; Z86.79-Personal history of other diseases of the circulatory  system; Z74.09-Other reduced mobility; Z74.09-Other reduced mobility.      COMPARISON: 08/22/2018.     FINDINGS: Portable chest reveals slight worsening of the markings at the  mid and lower lung fields bilaterally. The heart is enlarged with small  bilateral pleural effusions. Slight prominence seen of the pulmonary  vascularity bilaterally.            Impression:       Slight worsening of the pulmonary vascularity bilaterally  with some increased markings in the mid and lower lung fields  bilaterally.     D:  08/29/2018  E:  08/29/2018     This report was finalized on 8/29/2018 3:54 PM by Dr. Nataly Whalen MD.       XR Chest 1 View [[id number]] Collected:  08/22/18 1128     Updated:  08/22/18 1318    Narrative:       EXAMINATION: XR CHEST 1 VW-08/22/2018:      INDICATION: Cough; L03.116-Cellulitis of left lower limb; R60.9-Edema,  unspecified; I87.2-Venous insufficiency (chronic) (peripheral);  L03.119-Cellulitis of unspecified part of limb; G89.4-Chronic pain  syndrome; Z86.79-Personal history of other diseases of the circulatory  system; Z74.09-Other reduced mobility; Z74.09-Other reduced mobility.      COMPARISON: 03/6/2018.     FINDINGS: The heart is at the upper limits of normal. There is patchy  airspace disease in a right perihilar and bibasilar distribution without  consolidation. There is minimal blunting of the costophrenic angles  which is chronic.           Impression:       Minimal patchy bibasilar airspace disease without  consolidation, new when compared with 03/06/2018.     D:  08/22/2018  E:  08/22/2018     This report was finalized on 8/22/2018 1:16 PM by Dr. Nehemias Nye MD.           Results for orders placed during the hospital encounter of 08/20/18   Duplex Venous Upper Extremity - Left CAR    Narrative · Sub-acute left upper extremity superficial thrombophlebitis noted in the   cephalic (forearm).  · All other left sided vessels appear normal.     Dr. Addison aware.        Results for orders placed during the hospital encounter of 08/20/18   Duplex Venous Upper Extremity - Left CAR    Narrative · Sub-acute left upper extremity superficial thrombophlebitis noted in the   cephalic (forearm).  · All other left sided vessels appear normal.     Dr. Addison aware.        Results for orders placed during the hospital encounter of 08/20/18    Adult Transthoracic Echo Complete W/ Cont if Necessary Per Protocol    Narrative · Left ventricular systolic function is normal. Estimated EF = 65%.  · There is moderate calcification of the aortic valve mainly affecting the   non coronary cusp(s).  · Right ventricular cavity is mildly dilated.          Discharge Details        Discharge Medications      New Medications      Instructions Start Date   doxycycline 100 MG capsule  Commonly known as:  MONODOX   100 mg, Oral, Every 12 Hours Scheduled      mirtazapine 15 MG disintegrating tablet  Commonly known as:  REMERON SOL-TAB   15 mg, Oral, Nightly      nicotine 21 MG/24HR patch  Commonly known as:  NICODERM CQ   1 patch, Transdermal, Every 24 Hours      sennosides-docusate sodium 8.6-50 MG tablet  Commonly known as:  SENOKOT-S   2 tablets, Oral, Nightly         Changes to Medications      Instructions Start Date   HYDROcodone-acetaminophen  MG per tablet  Commonly known as:  NORCO  What changed:  · when to take this  · reasons to take this   1 tablet, Oral, Every 4 Hours PRN      Morphine 30 MG 12 hr tablet  Commonly known as:  MS CONTIN  What changed:  · medication strength  · how much to take  · when to take this   30 mg, Oral, Every 12 Hours Scheduled         Continue These Medications      Instructions Start Date   acetaminophen 325 MG tablet  Commonly known as:  TYLENOL   325 mg, Oral, As Needed      albuterol (2.5 MG/3ML) 0.083% nebulizer solution  Commonly known as:  PROVENTIL   2.5 mg, Nebulization, Every 6 Hours PRN      aspirin 81 MG EC tablet   81 mg, Oral, Daily      atorvastatin 80 MG tablet  Commonly known as:  LIPITOR   40 mg, Oral, Nightly      budesonide-formoterol 160-4.5 MCG/ACT inhaler  Commonly known as:  SYMBICORT   2 puffs, Inhalation, 2 Times Daily - RT      calcium carbonate 500 MG chewable tablet  Commonly known as:  TUMS   2 tablets, Oral, 2 Times Daily PRN      clopidogrel 75 MG tablet  Commonly known as:  PLAVIX   75 mg, Oral,  Daily      cyanocobalamin 1000 MCG tablet  Commonly known as:  VITAMIN B-12   1,000 mcg, Oral, Daily      fluticasone 50 MCG/ACT nasal spray  Commonly known as:  FLONASE   2 sprays, Each Nare, Daily      furosemide 40 MG tablet  Commonly known as:  LASIX   40 mg, Oral, 2 Times Daily      gabapentin 400 MG capsule  Commonly known as:  NEURONTIN   400 mg, Oral, 3 Times Daily      metoprolol tartrate 50 MG tablet  Commonly known as:  LOPRESSOR   25 mg, Oral, 2 Times Daily, 1/2 tab BID       pantoprazole 40 MG EC tablet  Commonly known as:  PROTONIX   40 mg, Oral, Daily      tiotropium 18 MCG per inhalation capsule  Commonly known as:  SPIRIVA   1 capsule, Inhalation, Daily - RT           Discharge Disposition:  Skilled Nursing Facility (DC - External)    Discharge Diet:  Diet Instructions     Diet: Soft Texture; Thin Liquids, No Restrictions; Chopped       Discharge Diet:  Soft Texture    Fluid Consistency:  Thin Liquids, No Restrictions    Soft Options:  Chopped        Discharge Activity:   Activity Instructions     Activity as Tolerated           Code Status/Level of Support:  Code Status and Medical Interventions:   Ordered at: 08/29/18 0406     Limited Support to NOT Include:    Intubation     Level Of Support Discussed With:    Patient     Code Status:    No CPR     Medical Interventions (Level of Support Prior to Arrest):    Limited       Additional Instructions for the Follow-ups that You Need to Schedule     Discharge Follow-up with PCP    As directed      Currently Documented PCP:  Provider, No Known  PCP Phone Number:  None    Follow Up Details:  within 1 week of discharge from [place]               Time Spent on Discharge:  45 minutes    Electronically signed by BRITTNY Fisher, 09/08/18, 10:55 AM.      Electronically signed by Shruthi Harrison APRN at 9/8/2018 12:10 PM

## 2018-10-02 ENCOUNTER — APPOINTMENT (OUTPATIENT)
Dept: GENERAL RADIOLOGY | Facility: HOSPITAL | Age: 73
End: 2018-10-02

## 2018-10-02 ENCOUNTER — HOSPITAL ENCOUNTER (INPATIENT)
Facility: HOSPITAL | Age: 73
LOS: 20 days | Discharge: NURSING, REHAB OR LONG-TERM CARE | End: 2018-10-22
Attending: EMERGENCY MEDICINE | Admitting: HOSPITALIST

## 2018-10-02 ENCOUNTER — APPOINTMENT (OUTPATIENT)
Dept: CT IMAGING | Facility: HOSPITAL | Age: 73
End: 2018-10-02

## 2018-10-02 DIAGNOSIS — J41.1 MUCOPURULENT CHRONIC BRONCHITIS (HCC): ICD-10-CM

## 2018-10-02 DIAGNOSIS — J96.22 ACUTE ON CHRONIC RESPIRATORY FAILURE WITH HYPOXIA AND HYPERCAPNIA (HCC): Primary | ICD-10-CM

## 2018-10-02 DIAGNOSIS — J18.9 PNEUMONIA DUE TO INFECTIOUS ORGANISM, UNSPECIFIED LATERALITY, UNSPECIFIED PART OF LUNG: ICD-10-CM

## 2018-10-02 DIAGNOSIS — I25.119 CORONARY ARTERY DISEASE INVOLVING NATIVE HEART WITH ANGINA PECTORIS, UNSPECIFIED VESSEL OR LESION TYPE (HCC): ICD-10-CM

## 2018-10-02 DIAGNOSIS — L89.302 PRESSURE INJURY OF BUTTOCK, STAGE 2, UNSPECIFIED LATERALITY (HCC): ICD-10-CM

## 2018-10-02 DIAGNOSIS — E66.3 OVERWEIGHT (BMI 25.0-29.9): ICD-10-CM

## 2018-10-02 DIAGNOSIS — Z78.9 IMPAIRED MOBILITY AND ADLS: ICD-10-CM

## 2018-10-02 DIAGNOSIS — I47.1 SVT (SUPRAVENTRICULAR TACHYCARDIA) (HCC): ICD-10-CM

## 2018-10-02 DIAGNOSIS — Z74.09 IMPAIRED FUNCTIONAL MOBILITY, BALANCE, GAIT, AND ENDURANCE: ICD-10-CM

## 2018-10-02 DIAGNOSIS — J96.21 ACUTE ON CHRONIC RESPIRATORY FAILURE WITH HYPOXIA AND HYPERCAPNIA (HCC): Primary | ICD-10-CM

## 2018-10-02 DIAGNOSIS — I73.9 PVD (PERIPHERAL VASCULAR DISEASE) (HCC): ICD-10-CM

## 2018-10-02 DIAGNOSIS — Z74.09 IMPAIRED MOBILITY AND ADLS: ICD-10-CM

## 2018-10-02 DIAGNOSIS — G47.30 SLEEP APNEA, UNSPECIFIED TYPE: ICD-10-CM

## 2018-10-02 DIAGNOSIS — R53.81 DEBILITY: ICD-10-CM

## 2018-10-02 PROBLEM — Z87.891 HISTORY OF TOBACCO USE: Status: ACTIVE | Noted: 2018-10-02

## 2018-10-02 PROBLEM — I87.2 CHRONIC VENOUS STASIS DERMATITIS OF BOTH LOWER EXTREMITIES: Status: ACTIVE | Noted: 2018-10-02

## 2018-10-02 PROBLEM — J96.01 ACUTE HYPOXEMIC RESPIRATORY FAILURE (HCC): Status: ACTIVE | Noted: 2018-10-02

## 2018-10-02 PROBLEM — J96.02 ACUTE HYPERCAPNIC RESPIRATORY FAILURE (HCC): Status: ACTIVE | Noted: 2018-10-02

## 2018-10-02 PROBLEM — J96.11 CHRONIC HYPOXEMIC RESPIRATORY FAILURE (HCC): Status: ACTIVE | Noted: 2018-10-02

## 2018-10-02 PROBLEM — J44.1 COPD EXACERBATION (HCC): Status: ACTIVE | Noted: 2018-10-02

## 2018-10-02 LAB
ALBUMIN SERPL-MCNC: 3.21 G/DL (ref 3.2–4.8)
ALBUMIN/GLOB SERPL: 0.9 G/DL (ref 1.5–2.5)
ALP SERPL-CCNC: 128 U/L (ref 25–100)
ALT SERPL W P-5'-P-CCNC: 18 U/L (ref 7–40)
ANION GAP SERPL CALCULATED.3IONS-SCNC: 5 MMOL/L (ref 3–11)
ARTERIAL PATENCY WRIST A: ABNORMAL
ARTERIAL PATENCY WRIST A: ABNORMAL
AST SERPL-CCNC: 17 U/L (ref 0–33)
ATMOSPHERIC PRESS: ABNORMAL MMHG
ATMOSPHERIC PRESS: ABNORMAL MMHG
BASE EXCESS BLDA CALC-SCNC: 17.6 MMOL/L (ref 0–2)
BASE EXCESS BLDA CALC-SCNC: 18.2 MMOL/L (ref 0–2)
BASOPHILS # BLD AUTO: 0.03 10*3/MM3 (ref 0–0.2)
BASOPHILS NFR BLD AUTO: 0.4 % (ref 0–1)
BDY SITE: ABNORMAL
BDY SITE: ABNORMAL
BILIRUB SERPL-MCNC: 0.6 MG/DL (ref 0.3–1.2)
BNP SERPL-MCNC: 899 PG/ML (ref 0–100)
BUN BLD-MCNC: 22 MG/DL (ref 9–23)
BUN/CREAT SERPL: 16.3 (ref 7–25)
CALCIUM SPEC-SCNC: 8.6 MG/DL (ref 8.7–10.4)
CHLORIDE SERPL-SCNC: 91 MMOL/L (ref 99–109)
CO2 BLDA-SCNC: 48.1 MMOL/L (ref 22–33)
CO2 BLDA-SCNC: 49.1 MMOL/L (ref 22–33)
CO2 SERPL-SCNC: 42 MMOL/L (ref 20–31)
COHGB MFR BLD: 2.9 % (ref 0–2)
COHGB MFR BLD: 3.1 % (ref 0–2)
CREAT BLD-MCNC: 1.35 MG/DL (ref 0.6–1.3)
D DIMER PPP FEU-MCNC: 1.11 MG/L (FEU) (ref 0–0.5)
D-LACTATE SERPL-SCNC: 0.9 MMOL/L (ref 0.5–2)
DEPRECATED RDW RBC AUTO: 71.8 FL (ref 37–54)
EOSINOPHIL # BLD AUTO: 0.1 10*3/MM3 (ref 0–0.3)
EOSINOPHIL NFR BLD AUTO: 1.3 % (ref 0–3)
ERYTHROCYTE [DISTWIDTH] IN BLOOD BY AUTOMATED COUNT: 19.8 % (ref 11.3–14.5)
GFR SERPL CREATININE-BSD FRML MDRD: 52 ML/MIN/1.73
GLOBULIN UR ELPH-MCNC: 3.4 GM/DL
GLUCOSE BLD-MCNC: 102 MG/DL (ref 70–100)
HCO3 BLDA-SCNC: 45.8 MMOL/L (ref 20–26)
HCO3 BLDA-SCNC: 46.4 MMOL/L (ref 20–26)
HCT VFR BLD AUTO: 34.3 % (ref 38.9–50.9)
HCT VFR BLD CALC: 27.9 %
HCT VFR BLD CALC: 28.7 %
HGB BLD-MCNC: 9.7 G/DL (ref 13.1–17.5)
HGB BLDA-MCNC: 9.1 G/DL (ref 13.5–17.5)
HGB BLDA-MCNC: 9.4 G/DL (ref 13.5–17.5)
HOLD SPECIMEN: NORMAL
HOLD SPECIMEN: NORMAL
HOROWITZ INDEX BLD+IHG-RTO: 40 %
HOROWITZ INDEX BLD+IHG-RTO: 80 %
IMM GRANULOCYTES # BLD: 0.01 10*3/MM3 (ref 0–0.03)
IMM GRANULOCYTES NFR BLD: 0.1 % (ref 0–0.6)
LYMPHOCYTES # BLD AUTO: 0.56 10*3/MM3 (ref 0.6–4.8)
LYMPHOCYTES NFR BLD AUTO: 7.3 % (ref 24–44)
MCH RBC QN AUTO: 28.3 PG (ref 27–31)
MCHC RBC AUTO-ENTMCNC: 28.3 G/DL (ref 32–36)
MCV RBC AUTO: 100 FL (ref 80–99)
METHGB BLD QL: 0.9 % (ref 0–1.5)
METHGB BLD QL: 1 % (ref 0–1.5)
MODALITY: ABNORMAL
MODALITY: ABNORMAL
MONOCYTES # BLD AUTO: 0.7 10*3/MM3 (ref 0–1)
MONOCYTES NFR BLD AUTO: 9.2 % (ref 0–12)
NEUTROPHILS # BLD AUTO: 6.26 10*3/MM3 (ref 1.5–8.3)
NEUTROPHILS NFR BLD AUTO: 81.8 % (ref 41–71)
OXYHGB MFR BLDV: 87.9 % (ref 94–99)
OXYHGB MFR BLDV: 88.7 % (ref 94–99)
PCO2 BLDA: 74.9 MM HG (ref 35–48)
PCO2 BLDA: 87.9 MM HG (ref 35–48)
PH BLDA: 7.33 PH UNITS (ref 7.35–7.45)
PH BLDA: 7.39 PH UNITS (ref 7.35–7.45)
PLATELET # BLD AUTO: 288 10*3/MM3 (ref 150–450)
PMV BLD AUTO: 10.3 FL (ref 6–12)
PO2 BLDA: 66.1 MM HG (ref 83–108)
PO2 BLDA: 68.2 MM HG (ref 83–108)
POTASSIUM BLD-SCNC: 4.2 MMOL/L (ref 3.5–5.5)
PROCALCITONIN SERPL-MCNC: <0.05 NG/ML
PROT SERPL-MCNC: 6.6 G/DL (ref 5.7–8.2)
RBC # BLD AUTO: 3.43 10*6/MM3 (ref 4.2–5.76)
SAO2 % BLDCOA: 88.7 %
SODIUM BLD-SCNC: 138 MMOL/L (ref 132–146)
TROPONIN I SERPL-MCNC: 0.04 NG/ML (ref 0–0.07)
TROPONIN I SERPL-MCNC: 0.05 NG/ML (ref 0–0.07)
WBC NRBC COR # BLD: 7.65 10*3/MM3 (ref 3.5–10.8)
WHOLE BLOOD HOLD SPECIMEN: NORMAL
WHOLE BLOOD HOLD SPECIMEN: NORMAL

## 2018-10-02 PROCEDURE — 94799 UNLISTED PULMONARY SVC/PX: CPT

## 2018-10-02 PROCEDURE — 84484 ASSAY OF TROPONIN QUANT: CPT

## 2018-10-02 PROCEDURE — 83880 ASSAY OF NATRIURETIC PEPTIDE: CPT | Performed by: EMERGENCY MEDICINE

## 2018-10-02 PROCEDURE — 71275 CT ANGIOGRAPHY CHEST: CPT

## 2018-10-02 PROCEDURE — 93005 ELECTROCARDIOGRAM TRACING: CPT | Performed by: EMERGENCY MEDICINE

## 2018-10-02 PROCEDURE — 85025 COMPLETE CBC W/AUTO DIFF WBC: CPT | Performed by: EMERGENCY MEDICINE

## 2018-10-02 PROCEDURE — 84145 PROCALCITONIN (PCT): CPT | Performed by: INTERNAL MEDICINE

## 2018-10-02 PROCEDURE — 87040 BLOOD CULTURE FOR BACTERIA: CPT | Performed by: EMERGENCY MEDICINE

## 2018-10-02 PROCEDURE — 82805 BLOOD GASES W/O2 SATURATION: CPT | Performed by: EMERGENCY MEDICINE

## 2018-10-02 PROCEDURE — 85379 FIBRIN DEGRADATION QUANT: CPT | Performed by: INTERNAL MEDICINE

## 2018-10-02 PROCEDURE — 80053 COMPREHEN METABOLIC PANEL: CPT | Performed by: EMERGENCY MEDICINE

## 2018-10-02 PROCEDURE — 94660 CPAP INITIATION&MGMT: CPT

## 2018-10-02 PROCEDURE — 25010000002 METHYLPREDNISOLONE PER 125 MG: Performed by: EMERGENCY MEDICINE

## 2018-10-02 PROCEDURE — 0 IOPAMIDOL PER 1 ML: Performed by: INTERNAL MEDICINE

## 2018-10-02 PROCEDURE — 71045 X-RAY EXAM CHEST 1 VIEW: CPT

## 2018-10-02 PROCEDURE — 94640 AIRWAY INHALATION TREATMENT: CPT

## 2018-10-02 PROCEDURE — 83605 ASSAY OF LACTIC ACID: CPT | Performed by: EMERGENCY MEDICINE

## 2018-10-02 PROCEDURE — 99223 1ST HOSP IP/OBS HIGH 75: CPT | Performed by: INTERNAL MEDICINE

## 2018-10-02 PROCEDURE — 36415 COLL VENOUS BLD VENIPUNCTURE: CPT

## 2018-10-02 PROCEDURE — 94760 N-INVAS EAR/PLS OXIMETRY 1: CPT

## 2018-10-02 PROCEDURE — 99285 EMERGENCY DEPT VISIT HI MDM: CPT

## 2018-10-02 PROCEDURE — 36600 WITHDRAWAL OF ARTERIAL BLOOD: CPT | Performed by: EMERGENCY MEDICINE

## 2018-10-02 RX ORDER — ASPIRIN 81 MG/1
81 TABLET ORAL DAILY
Status: DISCONTINUED | OUTPATIENT
Start: 2018-10-03 | End: 2018-10-03

## 2018-10-02 RX ORDER — CLOPIDOGREL BISULFATE 75 MG/1
75 TABLET ORAL DAILY
Status: DISCONTINUED | OUTPATIENT
Start: 2018-10-03 | End: 2018-10-22 | Stop reason: HOSPADM

## 2018-10-02 RX ORDER — BUDESONIDE AND FORMOTEROL FUMARATE DIHYDRATE 160; 4.5 UG/1; UG/1
2 AEROSOL RESPIRATORY (INHALATION)
Status: DISCONTINUED | OUTPATIENT
Start: 2018-10-02 | End: 2018-10-03

## 2018-10-02 RX ORDER — FLUTICASONE PROPIONATE 50 MCG
2 SPRAY, SUSPENSION (ML) NASAL DAILY
Status: DISCONTINUED | OUTPATIENT
Start: 2018-10-03 | End: 2018-10-03

## 2018-10-02 RX ORDER — ACETAMINOPHEN 325 MG/1
325 TABLET ORAL EVERY 6 HOURS PRN
Status: DISCONTINUED | OUTPATIENT
Start: 2018-10-02 | End: 2018-10-22 | Stop reason: HOSPADM

## 2018-10-02 RX ORDER — GUAIFENESIN 600 MG/1
600 TABLET, EXTENDED RELEASE ORAL EVERY 12 HOURS SCHEDULED
Status: DISCONTINUED | OUTPATIENT
Start: 2018-10-02 | End: 2018-10-22 | Stop reason: HOSPADM

## 2018-10-02 RX ORDER — LANOLIN ALCOHOL/MO/W.PET/CERES
1000 CREAM (GRAM) TOPICAL DAILY
Status: DISCONTINUED | OUTPATIENT
Start: 2018-10-03 | End: 2018-10-22 | Stop reason: HOSPADM

## 2018-10-02 RX ORDER — SODIUM CHLORIDE 0.9 % (FLUSH) 0.9 %
10 SYRINGE (ML) INJECTION AS NEEDED
Status: DISCONTINUED | OUTPATIENT
Start: 2018-10-02 | End: 2018-10-22 | Stop reason: HOSPADM

## 2018-10-02 RX ORDER — NICOTINE 21 MG/24HR
1 PATCH, TRANSDERMAL 24 HOURS TRANSDERMAL
Status: DISCONTINUED | OUTPATIENT
Start: 2018-10-03 | End: 2018-10-03

## 2018-10-02 RX ORDER — CALCIUM CARBONATE 200(500)MG
2 TABLET,CHEWABLE ORAL 2 TIMES DAILY PRN
Status: DISCONTINUED | OUTPATIENT
Start: 2018-10-02 | End: 2018-10-22 | Stop reason: HOSPADM

## 2018-10-02 RX ORDER — SODIUM CHLORIDE 0.9 % (FLUSH) 0.9 %
3-10 SYRINGE (ML) INJECTION AS NEEDED
Status: DISCONTINUED | OUTPATIENT
Start: 2018-10-02 | End: 2018-10-05

## 2018-10-02 RX ORDER — IPRATROPIUM BROMIDE AND ALBUTEROL SULFATE 2.5; .5 MG/3ML; MG/3ML
3 SOLUTION RESPIRATORY (INHALATION) EVERY 4 HOURS PRN
Status: DISCONTINUED | OUTPATIENT
Start: 2018-10-02 | End: 2018-10-11

## 2018-10-02 RX ORDER — IPRATROPIUM BROMIDE AND ALBUTEROL SULFATE 2.5; .5 MG/3ML; MG/3ML
3 SOLUTION RESPIRATORY (INHALATION)
Status: DISCONTINUED | OUTPATIENT
Start: 2018-10-02 | End: 2018-10-03

## 2018-10-02 RX ORDER — METHYLPREDNISOLONE SODIUM SUCCINATE 125 MG/2ML
80 INJECTION, POWDER, LYOPHILIZED, FOR SOLUTION INTRAMUSCULAR; INTRAVENOUS EVERY 8 HOURS SCHEDULED
Status: DISCONTINUED | OUTPATIENT
Start: 2018-10-02 | End: 2018-10-02

## 2018-10-02 RX ORDER — PANTOPRAZOLE SODIUM 40 MG/1
40 TABLET, DELAYED RELEASE ORAL DAILY
Status: DISCONTINUED | OUTPATIENT
Start: 2018-10-03 | End: 2018-10-03

## 2018-10-02 RX ORDER — SENNA AND DOCUSATE SODIUM 50; 8.6 MG/1; MG/1
2 TABLET, FILM COATED ORAL NIGHTLY
Status: DISCONTINUED | OUTPATIENT
Start: 2018-10-02 | End: 2018-10-08

## 2018-10-02 RX ORDER — SODIUM CHLORIDE 0.9 % (FLUSH) 0.9 %
3 SYRINGE (ML) INJECTION EVERY 12 HOURS SCHEDULED
Status: DISCONTINUED | OUTPATIENT
Start: 2018-10-02 | End: 2018-10-22 | Stop reason: HOSPADM

## 2018-10-02 RX ORDER — FUROSEMIDE 40 MG/1
40 TABLET ORAL
Status: DISCONTINUED | OUTPATIENT
Start: 2018-10-02 | End: 2018-10-04

## 2018-10-02 RX ORDER — MIRTAZAPINE 15 MG/1
15 TABLET, ORALLY DISINTEGRATING ORAL NIGHTLY
Status: DISCONTINUED | OUTPATIENT
Start: 2018-10-02 | End: 2018-10-22 | Stop reason: HOSPADM

## 2018-10-02 RX ORDER — ATORVASTATIN CALCIUM 40 MG/1
40 TABLET, FILM COATED ORAL NIGHTLY
Status: DISCONTINUED | OUTPATIENT
Start: 2018-10-02 | End: 2018-10-22 | Stop reason: HOSPADM

## 2018-10-02 RX ORDER — MORPHINE SULFATE 15 MG/1
15 TABLET, FILM COATED, EXTENDED RELEASE ORAL EVERY 12 HOURS SCHEDULED
Status: DISCONTINUED | OUTPATIENT
Start: 2018-10-02 | End: 2018-10-03

## 2018-10-02 RX ORDER — IPRATROPIUM BROMIDE AND ALBUTEROL SULFATE 2.5; .5 MG/3ML; MG/3ML
3 SOLUTION RESPIRATORY (INHALATION) ONCE
Status: COMPLETED | OUTPATIENT
Start: 2018-10-02 | End: 2018-10-02

## 2018-10-02 RX ORDER — METHYLPREDNISOLONE SODIUM SUCCINATE 125 MG/2ML
80 INJECTION, POWDER, LYOPHILIZED, FOR SOLUTION INTRAMUSCULAR; INTRAVENOUS EVERY 8 HOURS SCHEDULED
Status: DISCONTINUED | OUTPATIENT
Start: 2018-10-03 | End: 2018-10-03

## 2018-10-02 RX ORDER — METHYLPREDNISOLONE SODIUM SUCCINATE 125 MG/2ML
125 INJECTION, POWDER, LYOPHILIZED, FOR SOLUTION INTRAMUSCULAR; INTRAVENOUS ONCE
Status: COMPLETED | OUTPATIENT
Start: 2018-10-02 | End: 2018-10-02

## 2018-10-02 RX ORDER — HEPARIN SODIUM 5000 [USP'U]/ML
5000 INJECTION, SOLUTION INTRAVENOUS; SUBCUTANEOUS EVERY 8 HOURS SCHEDULED
Status: DISCONTINUED | OUTPATIENT
Start: 2018-10-02 | End: 2018-10-22 | Stop reason: HOSPADM

## 2018-10-02 RX ORDER — GABAPENTIN 100 MG/1
200 CAPSULE ORAL EVERY 8 HOURS SCHEDULED
Status: DISCONTINUED | OUTPATIENT
Start: 2018-10-02 | End: 2018-10-22 | Stop reason: HOSPADM

## 2018-10-02 RX ADMIN — IOPAMIDOL 75 ML: 755 INJECTION, SOLUTION INTRAVENOUS at 22:15

## 2018-10-02 RX ADMIN — IPRATROPIUM BROMIDE AND ALBUTEROL SULFATE 3 ML: 2.5; .5 SOLUTION RESPIRATORY (INHALATION) at 23:06

## 2018-10-02 RX ADMIN — BUDESONIDE AND FORMOTEROL FUMARATE DIHYDRATE 2 PUFF: 160; 4.5 AEROSOL RESPIRATORY (INHALATION) at 23:06

## 2018-10-02 RX ADMIN — METHYLPREDNISOLONE SODIUM SUCCINATE 125 MG: 125 INJECTION, POWDER, FOR SOLUTION INTRAMUSCULAR; INTRAVENOUS at 18:14

## 2018-10-02 RX ADMIN — IPRATROPIUM BROMIDE AND ALBUTEROL SULFATE 3 ML: 2.5; .5 SOLUTION RESPIRATORY (INHALATION) at 18:15
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
## 2018-10-03 ENCOUNTER — APPOINTMENT (OUTPATIENT)
Dept: GENERAL RADIOLOGY | Facility: HOSPITAL | Age: 73
End: 2018-10-03

## 2018-10-03 PROBLEM — L03.116 CELLULITIS OF LEFT LOWER LEG: Status: RESOLVED | Noted: 2018-08-20 | Resolved: 2018-10-03

## 2018-10-03 PROBLEM — J96.11 CHRONIC HYPOXEMIC RESPIRATORY FAILURE (HCC): Status: RESOLVED | Noted: 2018-10-02 | Resolved: 2018-10-03

## 2018-10-03 PROBLEM — J96.01 ACUTE RESPIRATORY FAILURE WITH HYPOXIA AND HYPERCAPNIA (HCC): Status: ACTIVE | Noted: 2018-10-02

## 2018-10-03 PROBLEM — L03.119 CELLULITIS OF LOWER EXTREMITY: Status: RESOLVED | Noted: 2018-02-16 | Resolved: 2018-10-03

## 2018-10-03 PROBLEM — J96.01 ACUTE HYPOXEMIC RESPIRATORY FAILURE (HCC): Status: RESOLVED | Noted: 2018-10-02 | Resolved: 2018-10-03

## 2018-10-03 PROBLEM — J18.9 PNEUMONIA DUE TO INFECTIOUS ORGANISM: Status: ACTIVE | Noted: 2018-10-03

## 2018-10-03 PROBLEM — B95.8 BACTEREMIA DUE TO STAPHYLOCOCCUS: Status: ACTIVE | Noted: 2018-10-03

## 2018-10-03 PROBLEM — I47.1 SVT (SUPRAVENTRICULAR TACHYCARDIA) (HCC): Status: RESOLVED | Noted: 2018-10-03 | Resolved: 2018-10-03

## 2018-10-03 PROBLEM — F11.90 OPIOID USE: Status: ACTIVE | Noted: 2018-10-03

## 2018-10-03 PROBLEM — E66.3 OVERWEIGHT (BMI 25.0-29.9): Status: ACTIVE | Noted: 2018-10-03

## 2018-10-03 PROBLEM — R78.81 BACTEREMIA DUE TO STAPHYLOCOCCUS: Status: ACTIVE | Noted: 2018-10-03

## 2018-10-03 PROBLEM — R09.02 HYPOXIA: Status: RESOLVED | Noted: 2018-02-16 | Resolved: 2018-10-03

## 2018-10-03 PROBLEM — J44.1 COPD EXACERBATION (HCC): Status: RESOLVED | Noted: 2018-10-02 | Resolved: 2018-10-03

## 2018-10-03 PROBLEM — N28.9 ACUTE RENAL INSUFFICIENCY: Status: ACTIVE | Noted: 2018-10-03

## 2018-10-03 PROBLEM — I47.1 SVT (SUPRAVENTRICULAR TACHYCARDIA) (HCC): Status: ACTIVE | Noted: 2018-10-03

## 2018-10-03 PROBLEM — L03.115 CELLULITIS OF RIGHT LOWER EXTREMITY: Status: RESOLVED | Noted: 2017-06-20 | Resolved: 2018-10-03

## 2018-10-03 LAB
ANION GAP SERPL CALCULATED.3IONS-SCNC: 4 MMOL/L (ref 3–11)
ARTERIAL PATENCY WRIST A: ABNORMAL
ARTERIAL PATENCY WRIST A: ABNORMAL
ATMOSPHERIC PRESS: ABNORMAL MMHG
ATMOSPHERIC PRESS: ABNORMAL MMHG
BASE EXCESS BLDA CALC-SCNC: 16.3 MMOL/L (ref 0–2)
BASE EXCESS BLDA CALC-SCNC: 18.3 MMOL/L (ref 0–2)
BASOPHILS # BLD AUTO: 0 10*3/MM3 (ref 0–0.2)
BASOPHILS NFR BLD AUTO: 0 % (ref 0–1)
BDY SITE: ABNORMAL
BDY SITE: ABNORMAL
BUN BLD-MCNC: 23 MG/DL (ref 9–23)
BUN/CREAT SERPL: 18.3 (ref 7–25)
CALCIUM SPEC-SCNC: 8.4 MG/DL (ref 8.7–10.4)
CHLORIDE SERPL-SCNC: 94 MMOL/L (ref 99–109)
CO2 BLDA-SCNC: 44.3 MMOL/L (ref 22–33)
CO2 BLDA-SCNC: 47.2 MMOL/L (ref 22–33)
CO2 SERPL-SCNC: 39 MMOL/L (ref 20–31)
COHGB MFR BLD: 2.7 % (ref 0–2)
COHGB MFR BLD: 2.8 % (ref 0–2)
CREAT BLD-MCNC: 1.26 MG/DL (ref 0.6–1.3)
D-LACTATE SERPL-SCNC: 0.8 MMOL/L (ref 0.5–2)
DEPRECATED RDW RBC AUTO: 67.7 FL (ref 37–54)
EOSINOPHIL # BLD AUTO: 0 10*3/MM3 (ref 0–0.3)
EOSINOPHIL NFR BLD AUTO: 0 % (ref 0–3)
ERYTHROCYTE [DISTWIDTH] IN BLOOD BY AUTOMATED COUNT: 19.4 % (ref 11.3–14.5)
GFR SERPL CREATININE-BSD FRML MDRD: 56 ML/MIN/1.73
GLUCOSE BLD-MCNC: 142 MG/DL (ref 70–100)
GLUCOSE BLDC GLUCOMTR-MCNC: 134 MG/DL (ref 70–130)
GLUCOSE BLDC GLUCOMTR-MCNC: 140 MG/DL (ref 70–130)
GLUCOSE BLDC GLUCOMTR-MCNC: 144 MG/DL (ref 70–130)
GLUCOSE BLDC GLUCOMTR-MCNC: 144 MG/DL (ref 70–130)
GLUCOSE BLDC GLUCOMTR-MCNC: 148 MG/DL (ref 70–130)
GLUCOSE BLDC GLUCOMTR-MCNC: 152 MG/DL (ref 70–130)
HCO3 BLDA-SCNC: 42.5 MMOL/L (ref 20–26)
HCO3 BLDA-SCNC: 45.1 MMOL/L (ref 20–26)
HCT VFR BLD AUTO: 33.5 % (ref 38.9–50.9)
HCT VFR BLD CALC: 28.3 %
HCT VFR BLD CALC: 29.7 %
HGB BLD-MCNC: 9.9 G/DL (ref 13.1–17.5)
HGB BLDA-MCNC: 9.2 G/DL (ref 13.5–17.5)
HGB BLDA-MCNC: 9.7 G/DL (ref 13.5–17.5)
HOROWITZ INDEX BLD+IHG-RTO: 100 %
HOROWITZ INDEX BLD+IHG-RTO: 50 %
IMM GRANULOCYTES # BLD: 0.01 10*3/MM3 (ref 0–0.03)
IMM GRANULOCYTES NFR BLD: 0.2 % (ref 0–0.6)
LYMPHOCYTES # BLD AUTO: 0.24 10*3/MM3 (ref 0.6–4.8)
LYMPHOCYTES NFR BLD AUTO: 5.8 % (ref 24–44)
MCH RBC QN AUTO: 28.2 PG (ref 27–31)
MCHC RBC AUTO-ENTMCNC: 29.6 G/DL (ref 32–36)
MCV RBC AUTO: 95.4 FL (ref 80–99)
METHGB BLD QL: 0.6 % (ref 0–1.5)
METHGB BLD QL: 0.8 % (ref 0–1.5)
MODALITY: ABNORMAL
MODALITY: ABNORMAL
MONOCYTES # BLD AUTO: 0.05 10*3/MM3 (ref 0–1)
MONOCYTES NFR BLD AUTO: 1.2 % (ref 0–12)
NEUTROPHILS # BLD AUTO: 3.84 10*3/MM3 (ref 1.5–8.3)
NEUTROPHILS NFR BLD AUTO: 93 % (ref 41–71)
OXYHGB MFR BLDV: 89 % (ref 94–99)
OXYHGB MFR BLDV: 96 % (ref 94–99)
PCO2 BLDA: 60.9 MM HG (ref 35–48)
PCO2 BLDA: 67.3 MM HG (ref 35–48)
PH BLDA: 7.43 PH UNITS (ref 7.35–7.45)
PH BLDA: 7.45 PH UNITS (ref 7.35–7.45)
PLATELET # BLD AUTO: 248 10*3/MM3 (ref 150–450)
PMV BLD AUTO: 11.1 FL (ref 6–12)
PO2 BLDA: 113 MM HG (ref 83–108)
PO2 BLDA: 64 MM HG (ref 83–108)
POTASSIUM BLD-SCNC: 4.5 MMOL/L (ref 3.5–5.5)
RBC # BLD AUTO: 3.51 10*6/MM3 (ref 4.2–5.76)
SODIUM BLD-SCNC: 137 MMOL/L (ref 132–146)
WBC NRBC COR # BLD: 4.13 10*3/MM3 (ref 3.5–10.8)

## 2018-10-03 PROCEDURE — 87070 CULTURE OTHR SPECIMN AEROBIC: CPT | Performed by: INTERNAL MEDICINE

## 2018-10-03 PROCEDURE — 94003 VENT MGMT INPAT SUBQ DAY: CPT

## 2018-10-03 PROCEDURE — 94799 UNLISTED PULMONARY SVC/PX: CPT

## 2018-10-03 PROCEDURE — 80048 BASIC METABOLIC PNL TOTAL CA: CPT | Performed by: INTERNAL MEDICINE

## 2018-10-03 PROCEDURE — 94760 N-INVAS EAR/PLS OXIMETRY 1: CPT

## 2018-10-03 PROCEDURE — 99292 CRITICAL CARE ADDL 30 MIN: CPT | Performed by: INTERNAL MEDICINE

## 2018-10-03 PROCEDURE — 87205 SMEAR GRAM STAIN: CPT | Performed by: INTERNAL MEDICINE

## 2018-10-03 PROCEDURE — 25010000002 VANCOMYCIN 10 G RECONSTITUTED SOLUTION: Performed by: INTERNAL MEDICINE

## 2018-10-03 PROCEDURE — 63710000001 INSULIN LISPRO (HUMAN) PER 5 UNITS: Performed by: NURSE PRACTITIONER

## 2018-10-03 PROCEDURE — C1751 CATH, INF, PER/CENT/MIDLINE: HCPCS

## 2018-10-03 PROCEDURE — 25010000002 METHYLPREDNISOLONE PER 40 MG: Performed by: INTERNAL MEDICINE

## 2018-10-03 PROCEDURE — 31500 INSERT EMERGENCY AIRWAY: CPT

## 2018-10-03 PROCEDURE — 5A1935Z RESPIRATORY VENTILATION, LESS THAN 24 CONSECUTIVE HOURS: ICD-10-PCS | Performed by: HOSPITALIST

## 2018-10-03 PROCEDURE — 94640 AIRWAY INHALATION TREATMENT: CPT

## 2018-10-03 PROCEDURE — 36600 WITHDRAWAL OF ARTERIAL BLOOD: CPT | Performed by: INTERNAL MEDICINE

## 2018-10-03 PROCEDURE — 87077 CULTURE AEROBIC IDENTIFY: CPT | Performed by: INTERNAL MEDICINE

## 2018-10-03 PROCEDURE — 99291 CRITICAL CARE FIRST HOUR: CPT | Performed by: INTERNAL MEDICINE

## 2018-10-03 PROCEDURE — 25010000002 FENTANYL CITRATE (PF) 2500 MCG/50ML SOLUTION: Performed by: INTERNAL MEDICINE

## 2018-10-03 PROCEDURE — 87185 SC STD ENZYME DETCJ PER NZM: CPT | Performed by: INTERNAL MEDICINE

## 2018-10-03 PROCEDURE — 74018 RADEX ABDOMEN 1 VIEW: CPT

## 2018-10-03 PROCEDURE — 94002 VENT MGMT INPAT INIT DAY: CPT

## 2018-10-03 PROCEDURE — 85025 COMPLETE CBC W/AUTO DIFF WBC: CPT | Performed by: INTERNAL MEDICINE

## 2018-10-03 PROCEDURE — 02HV33Z INSERTION OF INFUSION DEVICE INTO SUPERIOR VENA CAVA, PERCUTANEOUS APPROACH: ICD-10-PCS | Performed by: HOSPITALIST

## 2018-10-03 PROCEDURE — 82805 BLOOD GASES W/O2 SATURATION: CPT | Performed by: INTERNAL MEDICINE

## 2018-10-03 PROCEDURE — 0BH17EZ INSERTION OF ENDOTRACHEAL AIRWAY INTO TRACHEA, VIA NATURAL OR ARTIFICIAL OPENING: ICD-10-PCS | Performed by: HOSPITALIST

## 2018-10-03 PROCEDURE — 71045 X-RAY EXAM CHEST 1 VIEW: CPT

## 2018-10-03 PROCEDURE — B548ZZA ULTRASONOGRAPHY OF SUPERIOR VENA CAVA, GUIDANCE: ICD-10-PCS | Performed by: HOSPITALIST

## 2018-10-03 PROCEDURE — 82962 GLUCOSE BLOOD TEST: CPT

## 2018-10-03 PROCEDURE — C1894 INTRO/SHEATH, NON-LASER: HCPCS

## 2018-10-03 PROCEDURE — 25010000002 MEROPENEM: Performed by: NURSE PRACTITIONER

## 2018-10-03 PROCEDURE — 87186 SC STD MICRODIL/AGAR DIL: CPT | Performed by: INTERNAL MEDICINE

## 2018-10-03 PROCEDURE — 25010000002 HEPARIN (PORCINE) PER 1000 UNITS: Performed by: INTERNAL MEDICINE

## 2018-10-03 PROCEDURE — 83605 ASSAY OF LACTIC ACID: CPT | Performed by: INTERNAL MEDICINE

## 2018-10-03 PROCEDURE — 87147 CULTURE TYPE IMMUNOLOGIC: CPT | Performed by: INTERNAL MEDICINE

## 2018-10-03 PROCEDURE — 25010000002 METHYLPREDNISOLONE PER 125 MG: Performed by: INTERNAL MEDICINE

## 2018-10-03 PROCEDURE — 25010000002 PIPERACILLIN SOD-TAZOBACTAM PER 1 G: Performed by: INTERNAL MEDICINE

## 2018-10-03 RX ORDER — SODIUM CHLORIDE 0.9 % (FLUSH) 0.9 %
10 SYRINGE (ML) INJECTION EVERY 12 HOURS SCHEDULED
Status: DISCONTINUED | OUTPATIENT
Start: 2018-10-03 | End: 2018-10-06 | Stop reason: SDUPTHER

## 2018-10-03 RX ORDER — SODIUM CHLORIDE 0.9 % (FLUSH) 0.9 %
10 SYRINGE (ML) INJECTION AS NEEDED
Status: DISCONTINUED | OUTPATIENT
Start: 2018-10-03 | End: 2018-10-05

## 2018-10-03 RX ORDER — ASPIRIN 81 MG/1
81 TABLET, CHEWABLE ORAL DAILY
Status: DISCONTINUED | OUTPATIENT
Start: 2018-10-03 | End: 2018-10-22 | Stop reason: HOSPADM

## 2018-10-03 RX ORDER — SODIUM CHLORIDE 0.9 % (FLUSH) 0.9 %
10 SYRINGE (ML) INJECTION EVERY 12 HOURS SCHEDULED
Status: DISCONTINUED | OUTPATIENT
Start: 2018-10-03 | End: 2018-10-22 | Stop reason: HOSPADM

## 2018-10-03 RX ORDER — CHLORHEXIDINE GLUCONATE 0.12 MG/ML
15 RINSE ORAL EVERY 12 HOURS SCHEDULED
Status: DISCONTINUED | OUTPATIENT
Start: 2018-10-03 | End: 2018-10-04

## 2018-10-03 RX ORDER — CALCIUM CARBONATE 200(500)MG
2 TABLET,CHEWABLE ORAL 2 TIMES DAILY PRN
COMMUNITY

## 2018-10-03 RX ORDER — IPRATROPIUM BROMIDE AND ALBUTEROL SULFATE 2.5; .5 MG/3ML; MG/3ML
3 SOLUTION RESPIRATORY (INHALATION)
Status: DISCONTINUED | OUTPATIENT
Start: 2018-10-03 | End: 2018-10-10

## 2018-10-03 RX ORDER — FAMOTIDINE 10 MG/ML
20 INJECTION, SOLUTION INTRAVENOUS EVERY 12 HOURS SCHEDULED
Status: DISCONTINUED | OUTPATIENT
Start: 2018-10-03 | End: 2018-10-04

## 2018-10-03 RX ORDER — DEXTROSE MONOHYDRATE 25 G/50ML
25 INJECTION, SOLUTION INTRAVENOUS
Status: DISCONTINUED | OUTPATIENT
Start: 2018-10-03 | End: 2018-10-22 | Stop reason: HOSPADM

## 2018-10-03 RX ORDER — METHYLPREDNISOLONE SODIUM SUCCINATE 40 MG/ML
20 INJECTION, POWDER, LYOPHILIZED, FOR SOLUTION INTRAMUSCULAR; INTRAVENOUS EVERY 8 HOURS SCHEDULED
Status: DISCONTINUED | OUTPATIENT
Start: 2018-10-03 | End: 2018-10-03

## 2018-10-03 RX ORDER — SODIUM CHLORIDE 0.9 % (FLUSH) 0.9 %
20 SYRINGE (ML) INJECTION AS NEEDED
Status: DISCONTINUED | OUTPATIENT
Start: 2018-10-03 | End: 2018-10-05

## 2018-10-03 RX ORDER — METOPROLOL TARTRATE 5 MG/5ML
2.5 INJECTION INTRAVENOUS ONCE
Status: DISCONTINUED | OUTPATIENT
Start: 2018-10-03 | End: 2018-10-03

## 2018-10-03 RX ORDER — CASTOR OIL AND BALSAM, PERU 788; 87 MG/G; MG/G
OINTMENT TOPICAL EVERY 12 HOURS SCHEDULED
Status: DISCONTINUED | OUTPATIENT
Start: 2018-10-03 | End: 2018-10-17

## 2018-10-03 RX ORDER — METHYLPREDNISOLONE SODIUM SUCCINATE 40 MG/ML
20 INJECTION, POWDER, LYOPHILIZED, FOR SOLUTION INTRAMUSCULAR; INTRAVENOUS EVERY 12 HOURS
Status: DISCONTINUED | OUTPATIENT
Start: 2018-10-04 | End: 2018-10-05

## 2018-10-03 RX ORDER — NICOTINE POLACRILEX 4 MG
15 LOZENGE BUCCAL
Status: DISCONTINUED | OUTPATIENT
Start: 2018-10-03 | End: 2018-10-22 | Stop reason: HOSPADM

## 2018-10-03 RX ORDER — CHLORHEXIDINE GLUCONATE 0.12 MG/ML
15 RINSE ORAL EVERY 12 HOURS SCHEDULED
Status: DISCONTINUED | OUTPATIENT
Start: 2018-10-03 | End: 2018-10-03

## 2018-10-03 RX ORDER — DEXMEDETOMIDINE HYDROCHLORIDE 100 UG/ML
INJECTION, SOLUTION, CONCENTRATE INTRAVENOUS
Status: DISPENSED
Start: 2018-10-03 | End: 2018-10-03

## 2018-10-03 RX ADMIN — FENTANYL CITRATE 100 MCG/HR: 50 INJECTION, SOLUTION INTRAMUSCULAR; INTRAVENOUS at 02:09

## 2018-10-03 RX ADMIN — GABAPENTIN 200 MG: 100 CAPSULE ORAL at 13:50

## 2018-10-03 RX ADMIN — DOCUSATE SODIUM,SENNOSIDES 2 TABLET: 50; 8.6 TABLET, FILM COATED ORAL at 20:44

## 2018-10-03 RX ADMIN — HEPARIN SODIUM 5000 UNITS: 5000 INJECTION, SOLUTION INTRAVENOUS; SUBCUTANEOUS at 21:09

## 2018-10-03 RX ADMIN — MIRTAZAPINE 15 MG: 15 TABLET, ORALLY DISINTEGRATING ORAL at 20:43

## 2018-10-03 RX ADMIN — TAZOBACTAM SODIUM AND PIPERACILLIN SODIUM 4.5 G: 500; 4 INJECTION, SOLUTION INTRAVENOUS at 00:44

## 2018-10-03 RX ADMIN — DEXMEDETOMIDINE HYDROCHLORIDE 0.6 MCG/KG/HR: 100 INJECTION, SOLUTION, CONCENTRATE INTRAVENOUS at 15:04

## 2018-10-03 RX ADMIN — CHLORHEXIDINE GLUCONATE 15 ML: 1.2 RINSE ORAL at 20:44

## 2018-10-03 RX ADMIN — VANCOMYCIN HYDROCHLORIDE 1750 MG: 10 INJECTION, POWDER, LYOPHILIZED, FOR SOLUTION INTRAVENOUS at 10:03

## 2018-10-03 RX ADMIN — HEPARIN SODIUM 5000 UNITS: 5000 INJECTION, SOLUTION INTRAVENOUS; SUBCUTANEOUS at 00:46

## 2018-10-03 RX ADMIN — FAMOTIDINE 20 MG: 10 INJECTION, SOLUTION INTRAVENOUS at 09:44

## 2018-10-03 RX ADMIN — IPRATROPIUM BROMIDE AND ALBUTEROL SULFATE 3 ML: 2.5; .5 SOLUTION RESPIRATORY (INHALATION) at 12:19

## 2018-10-03 RX ADMIN — METOPROLOL TARTRATE 25 MG: 25 TABLET ORAL at 09:12

## 2018-10-03 RX ADMIN — GABAPENTIN 200 MG: 100 CAPSULE ORAL at 21:11

## 2018-10-03 RX ADMIN — Medication 3 ML: at 03:20

## 2018-10-03 RX ADMIN — METHYLPREDNISOLONE SODIUM SUCCINATE 80 MG: 125 INJECTION, POWDER, FOR SOLUTION INTRAMUSCULAR; INTRAVENOUS at 00:46

## 2018-10-03 RX ADMIN — MEROPENEM 1 G: 1 INJECTION, POWDER, FOR SOLUTION INTRAVENOUS at 18:13

## 2018-10-03 RX ADMIN — HEPARIN SODIUM 5000 UNITS: 5000 INJECTION, SOLUTION INTRAVENOUS; SUBCUTANEOUS at 13:50

## 2018-10-03 RX ADMIN — ATORVASTATIN CALCIUM 40 MG: 40 TABLET, FILM COATED ORAL at 20:43

## 2018-10-03 RX ADMIN — BUDESONIDE AND FORMOTEROL FUMARATE DIHYDRATE 2 PUFF: 160; 4.5 AEROSOL RESPIRATORY (INHALATION) at 12:37

## 2018-10-03 RX ADMIN — SODIUM CHLORIDE 1000 ML: 9 INJECTION, SOLUTION INTRAVENOUS at 01:21

## 2018-10-03 RX ADMIN — FAMOTIDINE 20 MG: 10 INJECTION, SOLUTION INTRAVENOUS at 20:44

## 2018-10-03 RX ADMIN — METHYLPREDNISOLONE SODIUM SUCCINATE 80 MG: 125 INJECTION, POWDER, FOR SOLUTION INTRAMUSCULAR; INTRAVENOUS at 05:52

## 2018-10-03 RX ADMIN — CLOPIDOGREL BISULFATE 75 MG: 75 TABLET ORAL at 09:44

## 2018-10-03 RX ADMIN — IPRATROPIUM BROMIDE AND ALBUTEROL SULFATE 3 ML: 2.5; .5 SOLUTION RESPIRATORY (INHALATION) at 15:52

## 2018-10-03 RX ADMIN — CYANOCOBALAMIN TAB 1000 MCG 1000 MCG: 1000 TAB at 09:44

## 2018-10-03 RX ADMIN — NICOTINE 1 PATCH: 21 PATCH, EXTENDED RELEASE TRANSDERMAL at 09:44

## 2018-10-03 RX ADMIN — FENTANYL CITRATE 50 MCG/HR: 50 INJECTION, SOLUTION INTRAMUSCULAR; INTRAVENOUS at 22:15

## 2018-10-03 RX ADMIN — MICONAZOLE NITRATE: 2 CREAM TOPICAL at 20:44

## 2018-10-03 RX ADMIN — FUROSEMIDE 40 MG: 40 TABLET ORAL at 09:44

## 2018-10-03 RX ADMIN — CASTOR OIL AND BALSAM, PERU: 788; 87 OINTMENT TOPICAL at 20:43

## 2018-10-03 RX ADMIN — IPRATROPIUM BROMIDE AND ALBUTEROL SULFATE 3 ML: 2.5; .5 SOLUTION RESPIRATORY (INHALATION) at 18:56

## 2018-10-03 RX ADMIN — CHLORHEXIDINE GLUCONATE 15 ML: 1.2 RINSE ORAL at 03:19

## 2018-10-03 RX ADMIN — METHYLPREDNISOLONE SODIUM SUCCINATE 20 MG: 40 INJECTION, POWDER, FOR SOLUTION INTRAMUSCULAR; INTRAVENOUS at 13:50

## 2018-10-03 RX ADMIN — DEXMEDETOMIDINE HYDROCHLORIDE 0.6 MCG/KG/HR: 100 INJECTION, SOLUTION, CONCENTRATE INTRAVENOUS at 07:38

## 2018-10-03 RX ADMIN — METOPROLOL TARTRATE 25 MG: 25 TABLET ORAL at 20:44

## 2018-10-03 RX ADMIN — FUROSEMIDE 40 MG: 40 TABLET ORAL at 18:13

## 2018-10-03 RX ADMIN — ASPIRIN 81 MG CHEWABLE TABLET 81 MG: 81 TABLET CHEWABLE at 09:43

## 2018-10-03 RX ADMIN — Medication 10 ML: at 20:45

## 2018-10-03 RX ADMIN — DEXMEDETOMIDINE HYDROCHLORIDE 0.2 MCG/KG/HR: 100 INJECTION, SOLUTION, CONCENTRATE INTRAVENOUS at 02:08

## 2018-10-03 RX ADMIN — GABAPENTIN 200 MG: 100 CAPSULE ORAL at 05:52

## 2018-10-03 RX ADMIN — INSULIN LISPRO 2 UNITS: 100 INJECTION, SOLUTION INTRAVENOUS; SUBCUTANEOUS at 12:15

## 2018-10-03 RX ADMIN — GUAIFENESIN 600 MG: 600 TABLET, EXTENDED RELEASE ORAL at 20:44

## 2018-10-03 RX ADMIN — HEPARIN SODIUM 5000 UNITS: 5000 INJECTION, SOLUTION INTRAVENOUS; SUBCUTANEOUS at 05:52

## 2018-10-03 RX ADMIN — MEROPENEM 1 G: 1 INJECTION, POWDER, FOR SOLUTION INTRAVENOUS at 02:43

## 2018-10-03 RX ADMIN — IPRATROPIUM BROMIDE AND ALBUTEROL SULFATE 3 ML: 2.5; .5 SOLUTION RESPIRATORY (INHALATION) at 22:36

## 2018-10-03 RX ADMIN — MEROPENEM 1 G: 1 INJECTION, POWDER, FOR SOLUTION INTRAVENOUS at 09:57

## 2018-10-03 NOTE — PLAN OF CARE
Problem: Patient Care Overview  Goal: Plan of Care Review  Outcome: Ongoing (interventions implemented as appropriate)   10/03/18 5834   Coping/Psychosocial   Plan of Care Reviewed With patient;family   Plan of Care Review   Progress no change   OTHER   Outcome Summary Patient consult for wound to coccyx-area presents with significant friction shearing, MASD, yeast in groin and on posterior thighs-small area on left medial glute that is slow to minh-Venelex and Miconazole ordered-BLE slightly edematous but do not think compression necessary at this time-please notify if increased swelling-patient on IsoFlex bed-all interventions in place and implemented-see wound care orders and LDA's-WOC will continue to follow.        Problem: Wound (Includes Pressure Injury) (Adult)  Goal: Signs and Symptoms of Listed Potential Problems Will be Absent, Minimized or Managed (Wound)  Outcome: Ongoing (interventions implemented as appropriate)

## 2018-10-03 NOTE — PLAN OF CARE
Problem: Patient Care Overview  Goal: Plan of Care Review  Outcome: Ongoing (interventions implemented as appropriate)    Goal: Individualization and Mutuality  Outcome: Ongoing (interventions implemented as appropriate)    Goal: Discharge Needs Assessment  Outcome: Ongoing (interventions implemented as appropriate)    Goal: Interprofessional Rounds/Family Conf  Outcome: Ongoing (interventions implemented as appropriate)      Problem: Breathing Pattern Ineffective (Adult)  Goal: Identify Related Risk Factors and Signs and Symptoms  Outcome: Ongoing (interventions implemented as appropriate)    Goal: Effective Oxygenation/Ventilation  Outcome: Ongoing (interventions implemented as appropriate)    Goal: Anxiety/Fear Reduction  Outcome: Ongoing (interventions implemented as appropriate)      Problem: Wound (Includes Pressure Injury) (Adult)  Goal: Signs and Symptoms of Listed Potential Problems Will be Absent, Minimized or Managed (Wound)  Outcome: Ongoing (interventions implemented as appropriate)
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
## 2018-10-03 NOTE — PROGRESS NOTES
"Pharmacy Consult-Vancomycin Dosing  Yassine Love is a  72 y.o. male receiving vancomycin therapy for pneumonia.     Indication: Pneumonia   Consulting Provider: pulmonology    Goal Trough: 15-20 mcg/L    Current Antimicrobial Therapy  Meropenem  - Day 1  Vancomycin - Day 1      Allergies  Allergies as of 10/02/2018 - Reviewed 10/02/2018   Allergen Reaction Noted   • Ciprofloxacin hcl Anaphylaxis 02/16/2018   • Ceftin [cefuroxime axetil] Angioedema 06/20/2017       Labs      Results from last 7 days     Lab Units 10/03/18  0705 10/02/18  1750   BUN mg/dL 23 22   CREATININE mg/dL 1.26 1.35*         Results from last 7 days     Lab Units 10/03/18  0705 10/02/18  1750   WBC 10*3/mm3 4.13 7.65       Evaluation of Dosing       Ht - 172.7 cm (68\")  Wt - 86.8 kg (191 lb 5.8 oz)    Estimated Creatinine Clearance: 56 mL/min (by C-G formula based on SCr of 1.26 mg/dL).    Intake & Output (last 3 days)         09/30 0701 - 10/01 0700 10/01 0701 - 10/02 0700 10/02 0701 - 10/03 0700 10/03 0701 - 10/04 0700    I.V. (mL/kg)   645.4 (7.4)     Other    30    NG/GT    100    IV Piggyback   110     Total Intake(mL/kg)   755.4 (8.7) 130 (1.5)    Urine (mL/kg/hr)   500 150 (0.3)    Total Output     500 150    Net     +255.4 -20                    Microbiology and Radiology  Microbiology Results (last 10 days)       Procedure Component Value - Date/Time    Blood Culture - Blood, [[id number]]  (Normal) Collected:  10/02/18 1804    Lab Status:  Preliminary result Specimen:  Blood from Hand, Left Updated:  10/03/18 1001     Blood Culture No growth at less than 24 hours    Narrative:       Aerobic bottle only     Blood Culture - Blood, [[id number]]  (Normal) Collected:  10/02/18 1720    Lab Status:  Preliminary result Specimen:  Blood from Arm, Right Updated:  10/03/18 0631     Blood Culture No growth at less than 24 hours            Evaluation of Level  Level due 10/5 @ 0930--will schedule on same regimen as pt was on when here in August " with similar renal function that produced a trough of 15    Assessment/Plan:   1. Vancomycin loading dose of 1750 mg IV (~20 mg/kg).   2. Scheduled vancomycin 1250 mg IV q24h. Trough level ordered prior to 3rd dose - 10/5 @ 09:30.  3. Note: pt was receiving zosyn, but switched to meropenem d/t wound cx in Aug. grew pseudomonas resistant to zosyn.    4. Pharmacy will continue to monitor renal function and adjust dosing accordingly.     Thank you,  Skylar Fernando, PharmD Candidate 2019  Naa Del Cid, PharmD, BCPS  10/3/2018  1:08 PM

## 2018-10-03 NOTE — PROGRESS NOTES
"Intensivist Note     10/3/2018  Hospital Day: 1  * No surgery found *      Mr. Yassine Love, 72 y.o. male is followed for:  Principal Problem:    Acute on chronic mixed hypercarbic/hypoxemic respiratory failure requiring intubation and ventilatory support  Active Problems:    End stage COPD in an active smoker on home O2 with chronic hypercarbia    Pneumonia due to infectious organism    PVD (peripheral vascular disease)     CAD status post GIGI ×2 to LAD and RCA 3/12/18    Acute kidney injury    Decubitus ulcer of buttock, stage 2    H/O cellulitis of both lower extremities    Essential hypertension    H/O bacteremia due to Staphylococcus    Opioid use       SUBJECTIVE     71-year-old white male active smoker with a history of COPD (on home oxygen) admitted 10/2/18 for exacerbation associated with severe hypoxemia. He was in the skilled nursing facility for rehabilitation but 10/1/18 developed some visual hallucinations. Oxygen saturations were subsequently noted to drop into the 60s on 4 L on 10/2/18 and he was brought to [facility] ER. Because of an elevated d-dimer a CT angiogram was obtained which was negative for PE. Both it and his chest x-ray however revealed pulmonary edema with diffuse interstitial infiltrates and pleural effusions. In addition BNP was 899. He was given his usual dose of home Lasix, placed on empiric antimicrobial coverage with Zosyn, given bronchodilators, Solu-Medrol, placed on BiPAP, and admitted to the hospitalist service. I should note that the patient lives by himself. His son had discussed his situation earlier this year and felt that he was not able to care for himself in his own home as he had been doing. He recommended the patient go into a facility, angering the patient who basically \"cut him off\" and wanted nothing further to do with him. He developed severe cellulitis of his lower extremities and was hospitalized 8/20/18 through 9/8/18 at [facility]. Was sent to a skilled nursing facility " "for rehabilitation where the above occurred.    At approximately 2 AM he was noted to become unresponsive with decreased respiratory drive. Apparently he had been a DNR but this was reversed by the patient on admission. Her cousin this Dr. Jacobsen and respiratory therapy intubated the patient at the bedside. There were large amounts of thick purulent yellow secretions obtained at the time of intubation and he was transferred to the ICU on ventilatory support. He remains on high FiO2's of 60% but respiratory rate is controlled and ABGs indicate FiO2 can be weaned. He is on appropriate broad-spectrum antimicrobial coverage and cultures from his sputum are pending. He will arouse to stimuli when his sedation is reduced and is cooperative. Heart rate is usually in the 60s but he periodically develops an SVT up to 160 that resolves spontaneously within a minute.    The patient's relevant past medical, surgical and social history were reviewed and updated in Epic as appropriate.    OBJECTIVE     /76   Pulse 53   Temp 97.5 °F (36.4 °C) (Axillary)   Resp 15   Ht 172.7 cm (68\")   Wt 86.8 kg (191 lb 5.8 oz)   SpO2 95%   BMI 29.10 kg/m²   Oxygen Concentration (%): 50      Flowsheet Rows      First Filed Value   Admission Height  172.7 cm (68\") Documented at 10/02/2018 1724   Admission Weight  83.9 kg (185 lb) Documented at 10/02/2018 1724        Intake & Output (last day)       10/02 0701 - 10/03 0700 10/03 0701 - 10/04 0700    I.V. (mL/kg) 645.4 (7.4)     Other  30    NG/GT  100    IV Piggyback 110     Total Intake(mL/kg) 755.4 (8.7) 130 (1.5)    Urine (mL/kg/hr) 500 290 (0.5)    Total Output 500 290    Net +255.4 -160                Exam:  General Exam:  Elderly debilitated white male intubated and on ventilatory support  HEENT: Pupils equal and reactive. Nose and throat clear.  Neck:                          Supple, no JVD, thyromegaly, or adenopathy  Lungs: Coarse bilateral rhonchi  Cardiovascular: Regular " rate and rhythm without murmurs or gallops.  Abdomen: Soft nontender without organomegaly or masses.   and rectal: Deferred.  Extremities: 2+ edema. No cyanosis or clubbing  Neurologic:                 Stuporous but arousable. Can move all extremities to command  Skin:                            Stage II decubitus of coccyx    Chest X-Ray 10/3/18: Appears to have congestive failure with some pleural effusions left greater than right. Impossible to determine if he also has pneumonia admixed in the infiltrates.    INFUSIONS    dexmedetomidine 0.2-1.5 mcg/kg/hr Last Rate: 0.6 mcg/kg/hr (10/03/18 0738)   fentanyl 10 mcg/mL  mcg/hr Last Rate: 50 mcg/hr (10/03/18 0557)         Results from last 7 days  Lab Units 10/03/18  0705 10/02/18  1750   WBC 10*3/mm3 4.13 7.65   HEMOGLOBIN g/dL 9.9* 9.7*   HEMATOCRIT % 33.5* 34.3*   PLATELETS 10*3/mm3 248 288       Results from last 7 days  Lab Units 10/03/18  0705 10/02/18  1750   SODIUM mmol/L 137 138   POTASSIUM mmol/L 4.5 4.2   CHLORIDE mmol/L 94* 91*   CO2 mmol/L 39.0* 42.0*   BUN mg/dL 23 22   CREATININE mg/dL 1.26 1.35*   GLUCOSE mg/dL 142* 102*   CALCIUM mg/dL 8.4* 8.6*           Results from last 7 days  Lab Units 10/02/18  1750   ALK PHOS U/L 128*   BILIRUBIN mg/dL 0.6   ALT (SGPT) U/L 18   AST (SGOT) U/L 17       Lab Results   Component Value Date    SEDRATE 56 (H) 06/20/2017     Lab Results   Component Value Date    .0 (H) 10/02/2018     Lab Results   Component Value Date    CKTOTAL 598 (H) 06/20/2017     Lab Results   Component Value Date    TSH 1.841 03/08/2018     Lab Results   Component Value Date    LACTATE 0.8 10/03/2018     No results found for: CORTISOL      Results from last 7 days  Lab Units 10/03/18  0301 10/03/18  0105 10/02/18  2014 10/02/18  1808   PH, ARTERIAL pH units 7.452* 7.435 7.395 7.331*   PCO2, ARTERIAL mm Hg 60.9* 67.3* 74.9* 87.9*   PO2 ART mm Hg 113.0* 64.0* 66.1* 68.2*   HCO3 ART mmol/L 42.5* 45.1* 45.8* 46.4*   FIO2 % 100 50 40  80       Vent Settings:  Assist control  Vt (Set, L): 0.5 L  Resp Rate (Set): 14  Pressure Support (cm H2O): 0 cm H20  FiO2 (%): (S) 50 % (rn aware)  PEEP/CPAP (cm H2O): 5 cm H20    Minute Ventilation (L/min) (Obs): 11.9 L/min  Resp Rate (Observed) Vent: 14  I:E Ratio (Set): 1:3.77  I:E Ratio (Obs): 1:3.80    PIP Observed (cm H2O): 18 cm H2O  Plateau Pressure (cm H2O): 15 cm H2O      I reviewed the patient's results, images and medication.    Assessment/Plan   ASSESSMENT      Principal Problem:    Acute on chronic mixed hypercarbic/hypoxemic respiratory failure requiring intubation and ventilatory support  Active Problems:    End stage COPD in an active smoker on home O2 with chronic hypercarbia    Pneumonia due to infectious organism    PVD (peripheral vascular disease)     CAD status post GIGI ×2 to LAD and RCA 3/12/18    Acute kidney injury    Decubitus ulcer of buttock, stage 2    H/O cellulitis of both lower extremities    Essential hypertension    H/O bacteremia due to Staphylococcus    Opioid use      DISCUSSION: Unfortunate situation. It is obvious he has end-stage lung disease and that he has chronically and severely hypercarbic (significantly elevated serum bicarbonate indicates chronic severe hypercarbia). Judging from his purulent secretions I assume he has pneumonia but he also has congestive heart failure. He has known CAD and has had previous stents this year and previously had preserved LV function. His BNP however has increased in some of his chest x-ray changes are consistent with volume overload. On top of this he appears to have suffered a mild acute kidney injury on hesitant about aggressively diuresing him. It is quite unlikely we will to rapidly extubate the patient (if at all).    PLAN     1. Continue to wean FiO2 as tolerated, but he is not a candidate for aggressive ventilator weaning at this time  2. PICC line  3. Wound care  4. Continue his po metoprolol  5. Continue his Lasix and  follow-up echocardiogram to see what his EF is  6. Begin enteral feeds early  7. Continue vancomycin and Merrem (has previously had Pseudomonas in his wounds so I want to cover for possible pneumonia)    Long-term prognosis poor    Plan of care and goals reviewed with mulitdisciplinary team at daily rounds.    I discussed the patient's findings and my recommendations with patient and nursing staff    Time spent Critical care 45 min (It does not include procedure time).    Parish Donato MD  Intensive Care Medicine  10/03/18 2:24 PM
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
## 2018-10-03 NOTE — SIGNIFICANT NOTE
2230 -  Pt. Arrived from ED on BiPAP. Pt. Was alert and oriented. When asked about having an advanced directive, pt stated that he wanted to be a full code despite having signed a DNR order at [facility]. Dr. Caldwell notified and patient will remain a full code now.     0016  -  tech came to report to RN that pt blood pressure was 90's systolic, his RR was 10 BPM and had previously been 28, and he was difficult to arouse. His temp was 97.4.    0030 - pt would arouse with stimulus, but immediately go back to sleep. SBP was still in the 90's upon recheck, temp had increased to 99.8 axillary, RR was 12, and O2 sat was between 89-90% on 40% O2 on BiPAP. Dr. Caldwell was paged and she ordered labs, ABG's, and a rectal temp.     0045 - Rectal temp was 100.1. Pt then became completely unresponsive to painful stimulus, SBP dropped into the 80's, and pedal pulses could only be found with a doppler. Dr. Caldwell paged and her and Dr. Jacobsen came to assess the patient. Pt was intubated at bedside and taken to the ICU.

## 2018-10-03 NOTE — CONSULTS
Adult Nutrition  Assessment/PES    Patient Name:  Yassine Love  YOB: 1945  MRN: [id number]  Admit Date:  10/2/2018    Assessment Date:  10/3/2018    Comments:  Nutrition assessment completed for EN support; order set initiated. NG feeding to begin at 20 ml/hr w/ Impact Peptide 1.5 for both RAFAEL and wounds w/ restricted volume. Goal rate is 60 ml/hr calculated on anticipated delivery of 20 hr; daily goal volume is 1200 ml. Free water at 30 ml every 2 hrs. Rd will monitor EN for adequacy and tolerance.          Reason for Assessment     Row Name 10/03/18 1351          Reason for Assessment    Reason For Assessment TF/PN;physician consult;per organizational policy   MDR; nutrition assessment ofr EN support - 60 mins     Diagnosis pulmonary disease;renal disease;cardiac disease   Pt adm w/ ARF -hypercapnia, hypoxia, intubated, pulm edema, r/o pna: RAFAEL, Chiki LE edema, decubitus- stage 2 -coccyx Hx: COPD home O2, PVD, HTN, CAD     Identified At Risk by Screening Criteria tube feeding or parenteral nutrition;large or nonhealing wound, burn or pressure injury             Nutrition/Diet History     Row Name 10/03/18 9225          Nutrition/Diet History    Factors Affecting Nutritional Intake impaired cognitive status/motor control   RN reports pt came w/ for acute respiratory failure from rehab facility ; adm there for chr venous stasis wounds, has chiki LE edema;in and out of SVT's; MD use NG and begin feedings               Labs/Tests/Procedures/Meds     Row Name 10/03/18 7222       Labs/Procedures/Meds    Lab Results Reviewed reviewed, pertinent    Lab Results Comments per MDR, elev pCO2       Diagnostic Tests/Procedures    Diagnostic Test/Procedure Reviewed reviewed, pertinent    Diagnostic Test/Procedures Comments KUB       Medications    Pertinent Medications Reviewed reviewed, pertinent    Pertinent Medications Comments prednisone dosage reduced per MDR            Physical Findings     Row Name 10/03/18  1405          Physical Findings    Overall Physical Appearance on ventilator support     Tubes nasogastric tube     Skin pressure injury;non-healing wound(s)   wounds present on adm WOCN consulted             Estimated/Assessed Needs     Row Name 10/03/18 1406          Calculation Measurements    Weight Used For Calculations 86.8 kg (191 lb 5.8 oz)        Estimated/Assessed Needs    Additional Documentation Calorie Requirements (Group);Modified [place] Equation (Group);KCAL/KG (Group);Protein Requirements (Group);Fluid Requirements (Group)        Calorie Requirements    Weight Used For Calorie Calculations 86.8 kg (191 lb 5.8 oz)     Estimated Calorie Requirement (kcal/day) 1800     Estimated Calorie Requirement Comment 2974-6766 kcal/d range per predictive equations        KCAL/KG    20 Kcal/Kg (kcal) 1736     25 Kcal/Kg (kcal) 2170     30 Kcal/Kg (kcal) 2604      kcal/kg (Specify) 25        [place]anch[place]-St. Jeor Equation    RMR ([place]anch[place]-St. Jeor Equation) 1587.5 x 1.25 =1984        Protein Requirements    Weight Used For Protein Calculations 86.8 kg (191 lb 5.8 oz)     Est Protein Requirement Amount (gms/kg) 1.2 gm protein   104-130 range 1.2-1.5 ml/kg     Estimated Protein Requirements (gms/day) 104.16        Fluid Requirements    Estimated Fluid Requirements (mL/day) 1800     Estimated Fluid Requirement Method RDA Method     RDA Method (mL) 1800     Alberto-Lan Method (over 20 kg) 3236             Nutrition Prescription Ordered     Row Name 10/03/18 1408          Nutrition Prescription PO    Current PO Diet NPO             Evaluation of Received Nutrient/Fluid Intake     Row Name 10/03/18 1406          Calculation Measurements    Weight Used For Calculations 86.8 kg (191 lb 5.8 oz)             Evaluation of Prescribed Nutrient/Fluid Intake     Row Name 10/03/18 1406          Calculation Measurements    Weight Used For Calculations 86.8 kg (191 lb 5.8 oz)           Problem/Interventions:        Problem 1     Row  Name 10/03/18 1408          Nutrition Diagnoses Problem 1    Problem 1 Inadequate Intake/Infusion     Inadequate Intake Type Oral     Macronutrient Kcal;Protein;Fluid     Micronutrient Vitamin;Mineral     Etiology (related to) Medical Diagnosis     Pulmonary/Critical Care Acute respiratory failure;Ventilator     Signs/Symptoms (evidenced by) NPO                     Intervention Goal     Row Name 10/03/18 1410          Intervention Goal    General Nutrition support treatment;Meet nutritional needs for age/condition     TF/PN Inititiate TF/PN;Establish TF tolerance             Nutrition Intervention     Row Name 10/03/18 1411          Nutrition Intervention    RD/Tech Action Follow Tx progress;Care plan reviewd;Recommend/ordered     Recommended/Ordered EN             Nutrition Prescription     Row Name 10/03/18 1411          Nutrition Prescription EN    Enteral Prescription Enteral begin/change;Enteral to supply     Enteral Route NG     Product Impact Peptide 1.5 gary     TF Delivery Method Continuous     Continuous TF Goal Rate (mL/hr) 60 mL/hr     Continuous TF Starting Rate (mL/hr) 20 mL/hr     Continuous TF Goal Volume (mL) 1000 mL     Continuous TF Starting Volume (mL) 480 mL     Water flush (mL)  30 mL     Water Flush Frequency Every 2 hours     New EN Prescription Ordered? Yes        EN to Supply    Kcal/Day 1800 Kcal/Day     Kcal/Kg 21 Kcal/Kg     Protein (gm/day) 112 gm/day     Meet Estimated Kcal Need (%) 100 %     Meet Estimated Protein Need (%) 100 %   1.3 gm/kg     TF Free H2O (mL) 924 mL     Total Free H2O (mL/day) 1284 mL/day     Fiber Per Day (gm/day) 0 gm/day        Other Orders    Labs NUT Panel;PreAlb;CRP     Labs Ordered? Yes             Education/Evaluation     Row Name 10/03/18 1414          Monitor/Evaluation    Monitor Per protocol;I&O;Pertinent labs;TF delivery/tolerance;Symptoms;Skin status         Electronically signed by:  Virginia Arevalo, MS,RD,LD  10/03/18 2:15 PM

## 2018-10-03 NOTE — PROGRESS NOTES
[facility] Medicine Services  CRITICAL CARE NOTE    Patient Name: Yassine Love  : 1945  MRN: [id number]    Date of Admission: 10/2/2018  Length of Stay: 1    Subjective     Event/HPI:  Received a call from nursing staff regarding patient's unresponsiveness.  I went to the patient's bedside and noted that he was not responsive to verbal or painful stimuli.  Ordered a stat ABG, lactic acid and called Dr. Jacobsen with the intensivist service.  Due to the patient's inability to protect his airway and need for BiPAP he was transferred to the intensive care unit where he was urgently intubated.    Objective     Vital Signs:   Temp:  [97.2 °F (36.2 °C)-100.1 °F (37.8 °C)] 100.1 °F (37.8 °C)  Heart Rate:  [] 101  Resp:  [10-28] 14  BP: ()/(45-79) 136/79  FiO2 (%):  [40 %-100 %] 100 %       Pertinent Physical Exam:  Constitutional: Nonresponsive  Eyes: PERRLA, Eyes closed  HENT: NCAT, mouth open, BiPAP in place  Neck: Supple, no thyromegaly, no lymphadenopathy, trachea midline  Respiratory: Poor aeration  Cardiovascular: RRR, no murmurs, rubs, or gallops, palpable pedal pulses bilaterally  Psychiatric: Non-responsive  Neurologic: Not responsive to verbal or painful stimuli  Skin: Skin pale    Results Reviewed:  I have personally reviewed current lab, radiology, and data and agree.      Results from last 7 days  Lab Units 10/02/18  1750   WBC 10*3/mm3 7.65   HEMOGLOBIN g/dL 9.7*   HEMATOCRIT % 34.3*   PLATELETS 10*3/mm3 288       Results from last 7 days  Lab Units 10/02/18  1750   SODIUM mmol/L 138   POTASSIUM mmol/L 4.2   CHLORIDE mmol/L 91*   CO2 mmol/L 42.0*   BUN mg/dL 22   CREATININE mg/dL 1.35*   GLUCOSE mg/dL 102*   CALCIUM mg/dL 8.6*   ALT (SGPT) U/L 18   AST (SGOT) U/L 17     BNP   Date Value Ref Range Status   10/02/2018 899.0 (H) 0.0 - 100.0 pg/mL Final     Comment:     Results may be falsely decreased if patient taking Biotin.     pH, Arterial   Date Value Ref  Range Status   10/03/2018 7.452 (H) 7.350 - 7.450 pH units Final       Microbiology Results Abnormal     None          Imaging Results (last 24 hours)     Procedure Component Value Units Date/Time    XR Abdomen KUB [[id number]] Updated:  10/03/18 0253    XR Chest 1 View [[id number]] Updated:  10/03/18 0216    CT Angiogram Chest With & Without Contrast [[id number]] Collected:  10/02/18 2140     Updated:  10/02/18 2230    Narrative:       EXAM:  CT Angiography Chest With Intravenous Contrast    EXAM DATE/TIME:  10/2/2018 9:40 PM    CLINICAL HISTORY:  72 years old, male; Acute and chronic respiratory failure   with hypoxia; Acute and chronic respiratory failure with hypercapnia; Signs and   symptoms; Shortness of breath; Additional info: SOB, evaluate for pe    TECHNIQUE:  Axial computed tomographic angiography images of the chest with   intravenous contrast using CT angiography protocol.  All CT scans at this facility use at least one of these dose optimization   techniques: automated exposure control; mA and/or kV adjustment per patient   size (includes targeted exams where dose is matched to clinical indication); or   iterative reconstruction.  MIP reconstructed images were created and reviewed.    COMPARISON:  CT ANGIOGRAM CHEST W WO CONTRAST 3/4/2018 3:10 PM    FINDINGS:    Peripheral vessel evaluation limited by cardiac and respiratory motion   artifact.   Central pulmonary arteries show no intraluminal defect suggestive of clot.     Thoracic aorta is ectatic and tortuous. Ascending portion measures 4 cm.   Proximal descending portion measures 4.4 cm. These measurements are unchanged   since the prior CT from 03/04/2018. No evidence of acute aortic dissection.    Moderately large dependent bilateral pleural effusions are present with   adjacent compressive atelectasis. Interstitial parenchymal lungs does not   suggest underlying pulmonary edema. Diffuse emphysematous changes are present   within the lungs with  apical predominance and there are ill-defined nodular   densities in the left upper lobe which could represent focal alveolitis. These   were not present on the March 2018 CT.    Gallstones are present within the lumen of the gallbladder. Probable cyst in   the posterior right hepatic lobe, measuring 11 mm. No change in appearance.   Second 11 millimeter periportal cyst, also unchanged.  Multi-level, age-related thoracic degenerative disc disease is present.       Impression:       No evidence of acute, central pulmonary embolus.     Large dependent bilateral pleural effusions with adjacent atelectasis and   possible non-consolidated left upper lobe airspace process.    Centrilobular emphysema.    Ectasia and dilatation of the thoracic aorta, unchanged. No acute complication.    Cholelithiasis        THIS DOCUMENT HAS BEEN ELECTRONICALLY SIGNED BY TOYIN ALBARRAN MD    XR Chest 1 View [[id number]] Collected:  10/02/18 1722     Updated:  10/02/18 1923    Narrative:       EXAM:  XR Chest, 1 View    EXAM DATE/TIME:  10/2/2018 5:22 PM    CLINICAL HISTORY:  72 years old, male; Signs and symptoms; Dyspnea; Additional   info: SOA triage protocol    TECHNIQUE:  XR of the chest, 1 view.    COMPARISON:  CR XR CHEST 1 VW 9/3/2018 9:22 AM    FINDINGS:      Cardiac enlargement and interstitial pattern suggests acute pulmonary edema.    Probable associated layering pleural effusions.   No concerning parenchymal lung mass.    No evidence of pneumothorax.      Bony structures and extrathoracic soft tissues are unremarkable.       Impression:         Pulmonary edema with diffuse interstitial infiltrates and layering pleural   effusions.    Associated retrocardiac atelectasis or infiltrate     THIS DOCUMENT HAS BEEN ELECTRONICALLY SIGNED BY TOYIN ALBARRAN MD        Results for orders placed during the hospital encounter of 08/20/18   Adult Transthoracic Echo Complete W/ Cont if Necessary Per Protocol    Narrative · Left ventricular  systolic function is normal. Estimated EF = 65%.  · There is moderate calcification of the aortic valve mainly affecting the   non coronary cusp(s).  · Right ventricular cavity is mildly dilated.          I have reviewed the medications.    Assessment / Plan     Hospital Problem List     * (Principal)Acute respiratory failure with hypoxia and hypercapnia     PVD (peripheral vascular disease)     H/O cellulitis of both lower extremities    Overview Signed 10/3/2018  1:47 AM by Perla Jones APRN     Previously managed by Infectious Disease          COPD (chronic obstructive pulmonary disease)     Overview Signed 10/3/2018  1:46 AM by Perla Jones APRN     Managed by [facility]          Essential hypertension    Coronary artery disease involving native heart    Overview Signed 10/3/2018  1:45 AM by Perla Jones APRN     S/P LHC per Dr. Harvey on 3/12/18 with PCI revealing severe 2-vessel disease.    GIGI x 2 to LAD and RCA         Decubitus ulcer of buttock, stage 2    History of tobacco use    Acute renal insufficiency    Overview Signed 10/3/2018  1:39 AM by Perla Jones APRN     Cr 1.35 increased from 9/8/18 1.15          H/O bacteremia due to Staphylococcus    Opioid use    Overweight (BMI 25.0-29.9)          Pertinent Assessment & Plan:   Pt transferred to ICU under the care of Dr. Jacobsen    Critical Care time spent in direct patient care:    38 minutes (excluding procedure time, if applicable) including high complexity decision making to assess and treat vital organ system failure in this individual who has impairment of one or more vital organ systems such that there is a high probability of imminent or life threatening deterioration in the patient’s condition. Failure to initiate the above interventions on an urgent basis would likely result in sudden, clinically significant or life threatening deterioration in the patient's condition.    Electronically signed by Pastora Caldwell DO,  10/03/18, 3:22 AM.

## 2018-10-03 NOTE — PROGRESS NOTES
Discharge Planning Assessment  [facility]     Patient Name: Yassine Love  MRN: [id number]  Today's Date: 10/3/2018    Admit Date: 10/2/2018          Discharge Needs Assessment     Row Name 10/03/18 1031       Living Environment    Lives With facility resident    Current Living Arrangements extended care facility   He has been at [facility] for 3 weeks for rehab.    Primary Care Provided by other (see comments)    Provides Primary Care For no one, unable/limited ability to care for self    Family Caregiver if Needed child(allison), adult    Quality of Family Relationships supportive       Resource/Environmental Concerns    Resource/Environmental Concerns none       Transition Planning    Patient/Family Anticipates Transition to long term care facility    Transportation Anticipated family or friend will provide       Discharge Needs Assessment    Readmission Within the Last 30 Days no previous admission in last 30 days    Concerns to be Addressed adjustment to diagnosis/illness;basic needs;discharge planning;decision making    Equipment Currently Used at Home cane, straight;walker, rolling    Anticipated Changes Related to Illness inability to care for self            Discharge Plan     Row Name 10/03/18 1033       Plan    Plan TBD    Patient/Family in Agreement with Plan yes    Plan Comments Mr. Love is sedated and intubated on mech. vent.  He has been a patient at [facility] for last 3 weeks for rehab.  Talked to his son at .  He uses a RW there as needed.  He sees a MD at [facility] for his PCP.  He gets his Rx filled at [facility] on [address].  Discharge plan is to be determined.  CM will follow for needs.        Destination     No service coordination in this encounter.      Durable Medical Equipment     No service coordination in this encounter.      Dialysis/Infusion     No service coordination in this encounter.      Home Medical Care     No service coordination in this encounter.       Social Care     No service coordination in this encounter.        Expected Discharge Date and Time     Expected Discharge Date Expected Discharge Time    Oct 7, 2018               Demographic Summary     Row Name 10/03/18 1030       General Information    General Information Comments His PCP is a  at [facility].  His son does not know his name.    Row Name 10/03/18 0742       General Information    Admission Type inpatient    Arrived From emergency department    Referral Source admission list    Reason for Consult discharge planning    Preferred Language English       Contact Information    Permission Granted to Share Info With             Functional Status     Row Name 10/03/18 1030       Functional Status    Usual Activity Tolerance poor    Current Activity Tolerance other (see comments)    Functional Status Comments --   Sedated and intubated on [facility] vent.       Functional Status, IADL    Medications assistive person    Meal Preparation assistive person    Housekeeping assistive person    Laundry assistive person    Shopping assistive person       Mental Status Summary    Recent Changes in Mental Status/Cognitive Functioning unable to assess       Employment/    Employment Status disabled            Psychosocial    No documentation.           Abuse/Neglect    No documentation.           Legal    No documentation.           Substance Abuse    No documentation.           Patient Forms    No documentation.         Ashley De León RN

## 2018-10-04 ENCOUNTER — APPOINTMENT (OUTPATIENT)
Dept: GENERAL RADIOLOGY | Facility: HOSPITAL | Age: 73
End: 2018-10-04

## 2018-10-04 LAB
ALBUMIN SERPL-MCNC: 3.06 G/DL (ref 3.2–4.8)
ALBUMIN/GLOB SERPL: 1 G/DL (ref 1.5–2.5)
ALP SERPL-CCNC: 102 U/L (ref 25–100)
ALT SERPL W P-5'-P-CCNC: 12 U/L (ref 7–40)
ANION GAP SERPL CALCULATED.3IONS-SCNC: 6 MMOL/L (ref 3–11)
ARTERIAL PATENCY WRIST A: ABNORMAL
AST SERPL-CCNC: 15 U/L (ref 0–33)
ATMOSPHERIC PRESS: ABNORMAL MMHG
BASE EXCESS BLDA CALC-SCNC: 16.8 MMOL/L (ref 0–2)
BASOPHILS # BLD AUTO: 0 10*3/MM3 (ref 0–0.2)
BASOPHILS NFR BLD AUTO: 0 % (ref 0–1)
BDY SITE: ABNORMAL
BILIRUB SERPL-MCNC: 0.5 MG/DL (ref 0.3–1.2)
BUN BLD-MCNC: 39 MG/DL (ref 9–23)
BUN/CREAT SERPL: 28.5 (ref 7–25)
CALCIUM SPEC-SCNC: 8.5 MG/DL (ref 8.7–10.4)
CHLORIDE SERPL-SCNC: 95 MMOL/L (ref 99–109)
CO2 BLDA-SCNC: 44.6 MMOL/L (ref 22–33)
CO2 SERPL-SCNC: 40 MMOL/L (ref 20–31)
COHGB MFR BLD: 2.1 % (ref 0–2)
CREAT BLD-MCNC: 1.37 MG/DL (ref 0.6–1.3)
DEPRECATED RDW RBC AUTO: 67.8 FL (ref 37–54)
EOSINOPHIL # BLD AUTO: 0 10*3/MM3 (ref 0–0.3)
EOSINOPHIL NFR BLD AUTO: 0 % (ref 0–3)
ERYTHROCYTE [DISTWIDTH] IN BLOOD BY AUTOMATED COUNT: 19.7 % (ref 11.3–14.5)
GFR SERPL CREATININE-BSD FRML MDRD: 51 ML/MIN/1.73
GLOBULIN UR ELPH-MCNC: 3 GM/DL
GLUCOSE BLD-MCNC: 99 MG/DL (ref 70–100)
GLUCOSE BLDC GLUCOMTR-MCNC: 124 MG/DL (ref 70–130)
GLUCOSE BLDC GLUCOMTR-MCNC: 124 MG/DL (ref 70–130)
GLUCOSE BLDC GLUCOMTR-MCNC: 172 MG/DL (ref 70–130)
HCO3 BLDA-SCNC: 42.7 MMOL/L (ref 20–26)
HCT VFR BLD AUTO: 33.7 % (ref 38.9–50.9)
HCT VFR BLD CALC: 29.2 %
HGB BLD-MCNC: 10 G/DL (ref 13.1–17.5)
HGB BLDA-MCNC: 9.5 G/DL (ref 13.5–17.5)
HOROWITZ INDEX BLD+IHG-RTO: 60 %
IMM GRANULOCYTES # BLD: 0.01 10*3/MM3 (ref 0–0.03)
IMM GRANULOCYTES NFR BLD: 0.2 % (ref 0–0.6)
LYMPHOCYTES # BLD AUTO: 0.31 10*3/MM3 (ref 0.6–4.8)
LYMPHOCYTES NFR BLD AUTO: 5.2 % (ref 24–44)
MAGNESIUM SERPL-MCNC: 2.1 MG/DL (ref 1.3–2.7)
MCH RBC QN AUTO: 28.1 PG (ref 27–31)
MCHC RBC AUTO-ENTMCNC: 29.7 G/DL (ref 32–36)
MCV RBC AUTO: 94.7 FL (ref 80–99)
METHGB BLD QL: 0.1 % (ref 0–1.5)
MODALITY: ABNORMAL
MONOCYTES # BLD AUTO: 0.58 10*3/MM3 (ref 0–1)
MONOCYTES NFR BLD AUTO: 9.7 % (ref 0–12)
MRSA DNA SPEC QL NAA+PROBE: POSITIVE
NEUTROPHILS # BLD AUTO: 5.11 10*3/MM3 (ref 1.5–8.3)
NEUTROPHILS NFR BLD AUTO: 85.1 % (ref 41–71)
OXYHGB MFR BLDV: 89.1 % (ref 94–99)
PCO2 BLDA: 59.3 MM HG (ref 35–48)
PH BLDA: 7.47 PH UNITS (ref 7.35–7.45)
PHOSPHATE SERPL-MCNC: 3.9 MG/DL (ref 2.4–5.1)
PLATELET # BLD AUTO: 305 10*3/MM3 (ref 150–450)
PMV BLD AUTO: 11.1 FL (ref 6–12)
PO2 BLDA: 61.9 MM HG (ref 83–108)
POTASSIUM BLD-SCNC: 4.8 MMOL/L (ref 3.5–5.5)
PROCALCITONIN SERPL-MCNC: <0.05 NG/ML
PROT SERPL-MCNC: 6.1 G/DL (ref 5.7–8.2)
RBC # BLD AUTO: 3.56 10*6/MM3 (ref 4.2–5.76)
SAO2 % BLDCOA: 89 %
SODIUM BLD-SCNC: 141 MMOL/L (ref 132–146)
WBC NRBC COR # BLD: 6 10*3/MM3 (ref 3.5–10.8)

## 2018-10-04 PROCEDURE — 85025 COMPLETE CBC W/AUTO DIFF WBC: CPT | Performed by: INTERNAL MEDICINE

## 2018-10-04 PROCEDURE — 99291 CRITICAL CARE FIRST HOUR: CPT | Performed by: INTERNAL MEDICINE

## 2018-10-04 PROCEDURE — 71045 X-RAY EXAM CHEST 1 VIEW: CPT

## 2018-10-04 PROCEDURE — 94799 UNLISTED PULMONARY SVC/PX: CPT

## 2018-10-04 PROCEDURE — 36600 WITHDRAWAL OF ARTERIAL BLOOD: CPT | Performed by: INTERNAL MEDICINE

## 2018-10-04 PROCEDURE — 94640 AIRWAY INHALATION TREATMENT: CPT

## 2018-10-04 PROCEDURE — 94760 N-INVAS EAR/PLS OXIMETRY 1: CPT

## 2018-10-04 PROCEDURE — 83735 ASSAY OF MAGNESIUM: CPT | Performed by: INTERNAL MEDICINE

## 2018-10-04 PROCEDURE — 63710000001 INSULIN LISPRO (HUMAN) PER 5 UNITS: Performed by: INTERNAL MEDICINE

## 2018-10-04 PROCEDURE — 80053 COMPREHEN METABOLIC PANEL: CPT | Performed by: INTERNAL MEDICINE

## 2018-10-04 PROCEDURE — 25010000002 HEPARIN (PORCINE) PER 1000 UNITS: Performed by: INTERNAL MEDICINE

## 2018-10-04 PROCEDURE — 87641 MR-STAPH DNA AMP PROBE: CPT | Performed by: INTERNAL MEDICINE

## 2018-10-04 PROCEDURE — 25010000002 ACETAZOLAMIDE PER 500 MG: Performed by: INTERNAL MEDICINE

## 2018-10-04 PROCEDURE — 25010000002 LINEZOLID 600 MG/300ML SOLUTION: Performed by: INTERNAL MEDICINE

## 2018-10-04 PROCEDURE — 84145 PROCALCITONIN (PCT): CPT | Performed by: INTERNAL MEDICINE

## 2018-10-04 PROCEDURE — 97530 THERAPEUTIC ACTIVITIES: CPT

## 2018-10-04 PROCEDURE — 25010000002 MEROPENEM: Performed by: NURSE PRACTITIONER

## 2018-10-04 PROCEDURE — 97163 PT EVAL HIGH COMPLEX 45 MIN: CPT

## 2018-10-04 PROCEDURE — 25010000002 METHYLPREDNISOLONE PER 40 MG: Performed by: INTERNAL MEDICINE

## 2018-10-04 PROCEDURE — 82962 GLUCOSE BLOOD TEST: CPT

## 2018-10-04 PROCEDURE — 84100 ASSAY OF PHOSPHORUS: CPT | Performed by: INTERNAL MEDICINE

## 2018-10-04 PROCEDURE — 82805 BLOOD GASES W/O2 SATURATION: CPT | Performed by: INTERNAL MEDICINE

## 2018-10-04 PROCEDURE — 97165 OT EVAL LOW COMPLEX 30 MIN: CPT

## 2018-10-04 PROCEDURE — 25010000002 VANCOMYCIN 10 G RECONSTITUTED SOLUTION

## 2018-10-04 RX ORDER — ACETAZOLAMIDE SODIUM 500 MG/5ML
500 INJECTION, POWDER, LYOPHILIZED, FOR SOLUTION INTRAVENOUS ONCE
Status: COMPLETED | OUTPATIENT
Start: 2018-10-04 | End: 2018-10-04

## 2018-10-04 RX ORDER — FAMOTIDINE 20 MG/1
20 TABLET, FILM COATED ORAL 2 TIMES DAILY
Status: DISCONTINUED | OUTPATIENT
Start: 2018-10-04 | End: 2018-10-22 | Stop reason: HOSPADM

## 2018-10-04 RX ORDER — LINEZOLID 2 MG/ML
600 INJECTION, SOLUTION INTRAVENOUS EVERY 12 HOURS
Status: COMPLETED | OUTPATIENT
Start: 2018-10-04 | End: 2018-10-13

## 2018-10-04 RX ORDER — FUROSEMIDE 40 MG/1
40 TABLET ORAL DAILY
Status: DISCONTINUED | OUTPATIENT
Start: 2018-10-05 | End: 2018-10-06

## 2018-10-04 RX ADMIN — MEROPENEM 1 G: 1 INJECTION, POWDER, FOR SOLUTION INTRAVENOUS at 17:59

## 2018-10-04 RX ADMIN — GABAPENTIN 200 MG: 100 CAPSULE ORAL at 13:56

## 2018-10-04 RX ADMIN — HEPARIN SODIUM 5000 UNITS: 5000 INJECTION, SOLUTION INTRAVENOUS; SUBCUTANEOUS at 13:56

## 2018-10-04 RX ADMIN — GABAPENTIN 200 MG: 100 CAPSULE ORAL at 05:47

## 2018-10-04 RX ADMIN — IPRATROPIUM BROMIDE AND ALBUTEROL SULFATE 3 ML: 2.5; .5 SOLUTION RESPIRATORY (INHALATION) at 19:18

## 2018-10-04 RX ADMIN — FAMOTIDINE 20 MG: 20 TABLET ORAL at 21:49

## 2018-10-04 RX ADMIN — CLOPIDOGREL BISULFATE 75 MG: 75 TABLET ORAL at 08:32

## 2018-10-04 RX ADMIN — FAMOTIDINE 20 MG: 10 INJECTION, SOLUTION INTRAVENOUS at 08:31

## 2018-10-04 RX ADMIN — Medication 10 ML: at 09:00

## 2018-10-04 RX ADMIN — GUAIFENESIN 600 MG: 600 TABLET, EXTENDED RELEASE ORAL at 21:49

## 2018-10-04 RX ADMIN — CASTOR OIL AND BALSAM, PERU: 788; 87 OINTMENT TOPICAL at 21:50

## 2018-10-04 RX ADMIN — MEROPENEM 1 G: 1 INJECTION, POWDER, FOR SOLUTION INTRAVENOUS at 10:16

## 2018-10-04 RX ADMIN — INSULIN LISPRO 5 UNITS: 100 INJECTION, SOLUTION INTRAVENOUS; SUBCUTANEOUS at 21:51

## 2018-10-04 RX ADMIN — ACETAZOLAMIDE 500 MG: 500 INJECTION, POWDER, LYOPHILIZED, FOR SOLUTION INTRAVENOUS at 18:00

## 2018-10-04 RX ADMIN — MIRTAZAPINE 15 MG: 15 TABLET, ORALLY DISINTEGRATING ORAL at 21:51

## 2018-10-04 RX ADMIN — IPRATROPIUM BROMIDE AND ALBUTEROL SULFATE 3 ML: 2.5; .5 SOLUTION RESPIRATORY (INHALATION) at 12:45

## 2018-10-04 RX ADMIN — LINEZOLID 600 MG: 600 INJECTION, SOLUTION INTRAVENOUS at 17:59

## 2018-10-04 RX ADMIN — HEPARIN SODIUM 5000 UNITS: 5000 INJECTION, SOLUTION INTRAVENOUS; SUBCUTANEOUS at 21:49

## 2018-10-04 RX ADMIN — METOPROLOL TARTRATE 25 MG: 25 TABLET ORAL at 21:49

## 2018-10-04 RX ADMIN — Medication 3 ML: at 22:17

## 2018-10-04 RX ADMIN — METHYLPREDNISOLONE SODIUM SUCCINATE 20 MG: 40 INJECTION, POWDER, FOR SOLUTION INTRAMUSCULAR; INTRAVENOUS at 13:55

## 2018-10-04 RX ADMIN — IPRATROPIUM BROMIDE AND ALBUTEROL SULFATE 3 ML: 2.5; .5 SOLUTION RESPIRATORY (INHALATION) at 15:24

## 2018-10-04 RX ADMIN — IPRATROPIUM BROMIDE AND ALBUTEROL SULFATE 3 ML: 2.5; .5 SOLUTION RESPIRATORY (INHALATION) at 23:34

## 2018-10-04 RX ADMIN — HEPARIN SODIUM 5000 UNITS: 5000 INJECTION, SOLUTION INTRAVENOUS; SUBCUTANEOUS at 05:48

## 2018-10-04 RX ADMIN — Medication 10 ML: at 22:16

## 2018-10-04 RX ADMIN — GUAIFENESIN 600 MG: 600 TABLET, EXTENDED RELEASE ORAL at 08:31

## 2018-10-04 RX ADMIN — SODIUM CHLORIDE 500 ML: 9 INJECTION, SOLUTION INTRAVENOUS at 21:49

## 2018-10-04 RX ADMIN — ASPIRIN 81 MG CHEWABLE TABLET 81 MG: 81 TABLET CHEWABLE at 08:32

## 2018-10-04 RX ADMIN — CASTOR OIL AND BALSAM, PERU: 788; 87 OINTMENT TOPICAL at 08:41

## 2018-10-04 RX ADMIN — MICONAZOLE NITRATE: 2 CREAM TOPICAL at 08:30

## 2018-10-04 RX ADMIN — GABAPENTIN 200 MG: 100 CAPSULE ORAL at 21:49

## 2018-10-04 RX ADMIN — IPRATROPIUM BROMIDE AND ALBUTEROL SULFATE 3 ML: 2.5; .5 SOLUTION RESPIRATORY (INHALATION) at 07:19

## 2018-10-04 RX ADMIN — MEROPENEM 1 G: 1 INJECTION, POWDER, FOR SOLUTION INTRAVENOUS at 01:38

## 2018-10-04 RX ADMIN — CYANOCOBALAMIN TAB 1000 MCG 1000 MCG: 1000 TAB at 08:32

## 2018-10-04 RX ADMIN — DOCUSATE SODIUM,SENNOSIDES 2 TABLET: 50; 8.6 TABLET, FILM COATED ORAL at 21:49

## 2018-10-04 RX ADMIN — METHYLPREDNISOLONE SODIUM SUCCINATE 20 MG: 40 INJECTION, POWDER, FOR SOLUTION INTRAMUSCULAR; INTRAVENOUS at 01:38

## 2018-10-04 RX ADMIN — ATORVASTATIN CALCIUM 40 MG: 40 TABLET, FILM COATED ORAL at 21:49

## 2018-10-04 RX ADMIN — IPRATROPIUM BROMIDE AND ALBUTEROL SULFATE 3 ML: 2.5; .5 SOLUTION RESPIRATORY (INHALATION) at 03:49

## 2018-10-04 RX ADMIN — INSULIN LISPRO 2 UNITS: 100 INJECTION, SOLUTION INTRAVENOUS; SUBCUTANEOUS at 12:50

## 2018-10-04 RX ADMIN — VANCOMYCIN HYDROCHLORIDE 1250 MG: 10 INJECTION, POWDER, LYOPHILIZED, FOR SOLUTION INTRAVENOUS at 10:16

## 2018-10-04 NOTE — PLAN OF CARE
Problem: Patient Care Overview  Goal: Plan of Care Review  Outcome: Ongoing (interventions implemented as appropriate)   10/04/18 7052   Plan of Care Review   Progress improving   OTHER   Outcome Summary patient self extubated around 2300, tolerated partial nonrebreather mask, was not able to tolerate venturi mask though. other vitals remain stable, will continue to monitor.        Problem: Breathing Pattern Ineffective (Adult)  Goal: Identify Related Risk Factors and Signs and Symptoms  Outcome: Ongoing (interventions implemented as appropriate)      Problem: Wound (Includes Pressure Injury) (Adult)  Goal: Signs and Symptoms of Listed Potential Problems Will be Absent, Minimized or Managed (Wound)  Outcome: Ongoing (interventions implemented as appropriate)

## 2018-10-04 NOTE — PROGRESS NOTES
"Intensivist Note     10/4/2018  Hospital Day: 2  * No surgery found *      Mr. Yassine Love, 72 y.o. male is followed for:  Principal Problem:    Acute on chronic mixed hypercarbic/hypoxemic respiratory failure requiring intubation and ventilatory support  Active Problems:    End stage COPD in an active smoker on home O2 with chronic hypercarbia    Pneumonia due to infectious organism    PVD (peripheral vascular disease)     CAD status post GIGI ×2 to LAD and RCA 3/12/18    Acute kidney injury    Brief runs of SVT (supraventricular tachycardia)     Decubitus ulcer of buttock, stage 2    H/O cellulitis of both lower extremities    Essential hypertension    H/O bacteremia due to Staphylococcus    Opioid use       SUBJECTIVE     71-year-old white male active smoker with a history of COPD (on home oxygen) admitted 10/2/18 for exacerbation associated with severe hypoxemia. He was in the skilled nursing facility for rehabilitation but 10/1/18 developed some visual hallucinations. Oxygen saturations were subsequently noted to drop into the 60s on 4 L on 10/2/18 and he was brought to [facility] ER. Because of an elevated d-dimer a CT angiogram was obtained which was negative for PE. Both CT scan and chest x-ray however revealed it appeared to be pulmonary edema with diffuse interstitial infiltrates and pleural effusions. In addition BNP was 899. He was given his usual dose of home Lasix, placed on empiric antimicrobial coverage with Zosyn, given bronchodilators, Solu-Medrol, placed on BiPAP, and admitted to the hospitalist service. I should note that the patient lives by himself. His son had discussed his situation earlier this year and felt that he was not able to care for himself in his own home as he had been doing. He recommended the patient go into a facility, angering the patient who basically \"cut him off\" and wanted nothing further to do with him. He developed severe cellulitis of his lower extremities and was " "hospitalized 8/20/18 through 9/8/18 at [facility]. Neri was sent to a skilled nursing facility for rehabilitation where the above occurred.     At approximately 2 AM 10/3/18 he was noted to become unresponsive with decreased respiratory drive. Apparently he had been a DNR but this was reversed by the patient on admission. Because of this Dr. Jacobsen and respiratory therapy intubated the patient at the bedside. There were large amounts of thick purulent yellow secretions obtained at the time of intubation and he was transferred to the ICU on ventilatory support. He initially required high FiO2's of 60% but respiratory rate is controlled and ABGs indicated FiO2 can be weaned. Over the course of the evening he continued to improve and subsequently was allowed to self extubate the evening of 10/3/18 at 11 PM. At the time my arrival this morning he was awake and alert and on a 60% partial rebreather. He appears oriented and we discussed what his wishes were regarding CODE STATUS again. He wants to think about it.    He is on empiric vancomycin and Merrem and cultures from his sputum and blood remain negative. Heart rate is usually in the 60s and in fact his metoprolol was held this morning because his rate was in the 60s with a systolic blood pressure in the 90s. Unfortunately this afternoon he has had recurrent brief episodes of SVT up to 160 seated yesterday so metoprolol will be resumed. Difficult to define how much of this is volume versus infection but his Procalcitonin is low and his BNP was elevated at 899. Hard to diurese him however as blood pressure is on the low side     The patient's relevant past medical, surgical and social history were reviewed and updated in Epic as appropriate.    OBJECTIVE     BP 95/53   Pulse 68   Temp 99.1 °F (37.3 °C) (Oral)   Resp 22   Ht 172.7 cm (67.99\")   Wt 86.8 kg (191 lb 5.8 oz)   SpO2 96%   BMI 29.10 kg/m²   Oxygen Concentration (%): 45      Flowsheet Rows      First " "Filed Value   Admission Height  172.7 cm (68\") Documented at 10/02/2018 1724   Admission Weight  83.9 kg (185 lb) Documented at 10/02/2018 1724        Intake & Output (last day)       10/03 0701 - 10/04 0700 10/04 0701 - 10/05 0700    I.V. (mL/kg) 353 (4.1) 153 (1.8)    Other 60     NG/     IV Piggyback 806 350    Total Intake(mL/kg) 1399 (16.1) 503 (5.8)    Urine (mL/kg/hr) 1530 (0.7) 305 (0.4)    Total Output 1530 305    Net -131 +198                Exam:  General Exam:  Elderly debilitated white male intubated and on ventilatory support  HEENT: Pupils equal and reactive. Nose and throat clear.  Neck:                          Supple, no JVD, thyromegaly, or adenopathy  Lungs: Only a few scattered rhonchi. Diminished in the bases posteriorly  Cardiovascular: RRR without murmurs or gallops. HR 64  Abdomen: Soft nontender without organomegaly or masses.   and rectal: Moore catheter in place  Extremities: No cyanosis or clubbing but lower extremity edema and venous stasis changes.  Neurologic:                 Symmetric strength but diffusely weak. No focal deficits. Appears oriented to person place and time    Chest X-Ray 10/4/18: Right arm PICC in place. Endotracheal tube removed. Same bilateral lower lobe airspace disease with some effusions left greater than right. Difficult to define congestive heart failure versus basilar atelectasis and infiltrate (but pro-calcitonin low and BNP elevated)      Results from last 7 days  Lab Units 10/04/18  0431 10/03/18  0705 10/02/18  1750   WBC 10*3/mm3 6.00 4.13 7.65   HEMOGLOBIN g/dL 10.0* 9.9* 9.7*   HEMATOCRIT % 33.7* 33.5* 34.3*   PLATELETS 10*3/mm3 305 248 288       Results from last 7 days  Lab Units 10/04/18  0431 10/03/18  0705   SODIUM mmol/L 141 137   POTASSIUM mmol/L 4.8 4.5   CHLORIDE mmol/L 95* 94*   CO2 mmol/L 40.0* 39.0*   BUN mg/dL 39* 23   CREATININE mg/dL 1.37* 1.26   GLUCOSE mg/dL 99 142*   CALCIUM mg/dL 8.5* 8.4*       Results from last 7 days  Lab " Units 10/04/18  0431   MAGNESIUM mg/dL 2.1   PHOSPHORUS mg/dL 3.9       Results from last 7 days  Lab Units 10/04/18  0431 10/02/18  1750   ALK PHOS U/L 102* 128*   BILIRUBIN mg/dL 0.5 0.6   ALT (SGPT) U/L 12 18   AST (SGOT) U/L 15 17       Lab Results   Component Value Date    SEDRATE 56 (H) 06/20/2017     Lab Results   Component Value Date    .0 (H) 10/02/2018     Lab Results   Component Value Date    CKTOTAL 598 (H) 06/20/2017     Lab Results   Component Value Date    TSH 1.841 03/08/2018     Lab Results   Component Value Date    LACTATE 0.8 10/03/2018     No results found for: CORTISOL      Results from last 7 days  Lab Units 10/04/18  0414 10/03/18  0301 10/03/18  0105 10/02/18  2014 10/02/18  1808   PH, ARTERIAL pH units 7.466* 7.452* 7.435 7.395 7.331*   PCO2, ARTERIAL mm Hg 59.3* 60.9* 67.3* 74.9* 87.9*   PO2 ART mm Hg 61.9* 113.0* 64.0* 66.1* 68.2*   HCO3 ART mmol/L 42.7* 42.5* 45.1* 45.8* 46.4*   FIO2 % 60 100 50 40 80       I reviewed the patient's results, images and medication.    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 He has end-stage COPD but since extubation has been able to maintain himself with PCO2's in the 60 range. BNP was elevated and his chest x-ray looks like heart failure and yet is azotemic after diuresis. Sputum was purulent and yet all cultures are  negative and Procalcitonin is normal so it seems unlikely what I am seeing as pneumonia. It may be primarily just be atelectasis. For now we will just continue to mobilize, continue aggressive pulmonary toilet and bronchodilators, minimize his steroids since he is not bronchospastic, continue antibiotics, and try to keep his intake and output even. Because his BUN and creatinine have increased I am going to discontinue vancomycin and switched to linezolid.     I do not want to over diurese him as his echocardiogram showed normal LV systolic function and his BUN and creatinine have increased with twice daily Lasix. We will back off to once daily Lasix    PLAN     1. Mobilize  2. Decrease Lasix to once every morning  3. Change vancomycin to linezolid because of increased BUN and creatinine  4. Follow-up chest x-ray and renal labs in a.m.  5. Recheck BNP in a.m.  6. He is quite debilitated and would like him to make a decision regarding CODE STATUS. I would hate to keep reintubating him only to have  him pull his tube.  7. If no wheezing in a.m. will again decrease his steroids  8. One dose of Diamox    Plan of care and goals reviewed with mulitdisciplinary team at daily rounds.    I discussed the patient's findings and my recommendations with patient and nursing staff    Time spent Critical care 35 min (It does not include procedure time).    Parish Donato MD  Intensive Care Medicine  10/04/18 4:39 PM
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
## 2018-10-04 NOTE — PLAN OF CARE
Problem: Patient Care Overview  Goal: Plan of Care Review  Outcome: Ongoing (interventions implemented as appropriate)   10/04/18 1750   Coping/Psychosocial   Plan of Care Reviewed With patient   Plan of Care Review   Progress improving   OTHER   Outcome Summary Patient tolerated HiFlo NC at 55%. Regular diet ordered. Hypotensive today. NS at 50ml/hr ordered per Dr. Donato. Lasix and Lopressor held. Run of SVT this afternoon. Dr. Donato notified. Ordered dose of Diamox this evening. Per Dr. Jacobsen, give diamox. Will give more fluid if becomes hypotensive. Decreased urine output. Removed hollis today. Will continue to monitor.        Problem: Breathing Pattern Ineffective (Adult)  Goal: Identify Related Risk Factors and Signs and Symptoms  Outcome: Ongoing (interventions implemented as appropriate)   10/04/18 1750   Breathing Pattern Ineffective (Adult)   Related Risk Factors (Breathing Pattern Ineffective) advanced age;deconditioning;fatigue;immobility;infection;pain;smoking   Signs and Symptoms (Breathing Pattern Ineffective) accessory muscle use;activity intolerance;anxiousness;breath sounds abnormal;breathing pattern altered;confusion     Goal: Effective Oxygenation/Ventilation  Outcome: Ongoing (interventions implemented as appropriate)   10/04/18 1750   Breathing Pattern Ineffective (Adult)   Effective Oxygenation/Ventilation making progress toward outcome     Goal: Anxiety/Fear Reduction  Outcome: Ongoing (interventions implemented as appropriate)   10/04/18 1750   Breathing Pattern Ineffective (Adult)   Anxiety/Fear Reduction making progress toward outcome       Problem: Wound (Includes Pressure Injury) (Adult)  Goal: Signs and Symptoms of Listed Potential Problems Will be Absent, Minimized or Managed (Wound)  Outcome: Ongoing (interventions implemented as appropriate)   10/04/18 1750   Goal/Outcome Evaluation   Problems Assessed (Wound) all   Problems Present (Wound) delayed wound healing;pain

## 2018-10-04 NOTE — PLAN OF CARE
Problem: Patient Care Overview  Goal: Plan of Care Review  Outcome: Ongoing (interventions implemented as appropriate)   10/04/18 1119   Coping/Psychosocial   Plan of Care Reviewed With patient;family   OTHER   Outcome Summary PT evaluation completed. pt demonstrates generalized BLE weakness and decreased indep re: functional mobility with unstable signs/symptoms, warranting further skilled PT services to promote PLOF. Pt declined EOB activity today due to generalized pain and BLE hypersensitivity to touch. Will plan to progress mobility as clinically warranted. Recommend SNF at d/c.
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
## 2018-10-04 NOTE — PROGRESS NOTES
Adult Nutrition  Assessment/PES    Patient Name:  Yassine Love  YOB: 1945  MRN: [id number]  Admit Date:  10/2/2018    Assessment Date:  10/4/2018    Comments:  Pt EN support dc'd w/ self-extubation; Regular diet ordered, pt eating slowly; pt declined nutritional supplement at this time. RD will monitor adequacy of intake closely.          Reason for Assessment     Row Name 10/04/18 1112          Reason for Assessment    Reason For Assessment follow-up protocol;TF/PN;per organizational policy   MDR; EN - po f/u 30 mins             Nutrition/Diet History     Row Name 10/04/18 1120          Nutrition/Diet History    Factors Affecting Nutritional Intake other (see comments)   RN reports pt self-extubated, good sats on high flow NC; eating regular diet; takes a while to eat                Labs/Tests/Procedures/Meds     Row Name 10/04/18 1126          Labs/Procedures/Meds    Lab Results Reviewed reviewed, pertinent        Diagnostic Tests/Procedures    Diagnostic Test/Procedure Reviewed reviewed, pertinent        Medications    Pertinent Medications Reviewed reviewed, pertinent             Physical Findings     Row Name 10/04/18 1126          Physical Findings    Overall Physical Appearance on oxygen therapy   self-extubated     Skin pressure injury;non-healing wound(s)               Nutrition Prescription Ordered     Row Name 10/04/18 1127          Nutrition Prescription PO    Current PO Diet NPO;Regular        Nutrition Prescription EN    Product Impact Peptide 1.5 (Pivot 1.5)             Evaluation of Received Nutrient/Fluid Intake     Row Name 10/04/18 1128          EN Evaluation    Number of Days EN Intake Evaluated 1 day     EN Average Volume Delivered (mL/day) 180 mL/day     TF Changes Discontinued per patient;Other (comment)   pt self-extubated             Problem/Interventions:        Problem 1     Row Name 10/04/18 1130 10/04/18 1129       Nutrition Diagnoses Problem 1    Problem 1 Predicted  Suboptimal Intake Predicted Suboptimal Intake    Inadequate Intake Type Oral Oral    Macronutrient Kcal;Protein  --    Micronutrient Vitamin;Mineral  --    Pulmonary/Critical Care A/C respiratory failure A/C respiratory failure    Signs/Symptoms (evidenced by) Report of Mnimal PO Intake Report of Mnimal PO Intake                    Intervention Goal     Row Name 10/04/18 1130          Intervention Goal    General Meet nutritional needs for age/condition     PO Establish PO;Tolerate PO             Nutrition Intervention     Row Name 10/04/18 1131          Nutrition Intervention    RD/Tech Action Follow Tx progress;Care plan reviewd;Advise alternate selection;Menu provided;Supplement offered/refused   P{t advised he could request Boost supplement at later time             Nutrition Prescription     Row Name 10/04/18 1131          Nutrition Prescription EN    Enteral Prescription Discontinue enteral feeding             Education/Evaluation     Row Name 10/04/18 1132          Monitor/Evaluation    Monitor Per protocol;I&O;Pertinent labs;Symptoms;Skin status;PO intake         Electronically signed by:  Virginia Arevalo MS,RD,LD  10/04/18 11:33 AM

## 2018-10-04 NOTE — PLAN OF CARE
Problem: Patient Care Overview  Goal: Plan of Care Review  Outcome: Ongoing (interventions implemented as appropriate)   10/04/18 2059   Coping/Psychosocial   Plan of Care Reviewed With patient   Plan of Care Review   Progress improving   OTHER   Outcome Summary OT completed a brief chart review relating to presenting physical/cognitive deficits. Pt session limited d/t pt declining any EOB activity or ADL performance d/t c/o fatigue and pain. Recommend cont skilled IPOT POC 3x/wk pending improved participation. Recommend pt DC to SNF.

## 2018-10-04 NOTE — PLAN OF CARE
Problem: Patient Care Overview  Goal: Interprofessional Rounds/Family Conf  Outcome: Ongoing (interventions implemented as appropriate)   10/04/18 1044   Interdisciplinary Rounds/Family Conf   Summary Discussed plan of care with Dr. Donato in rounds. Hold Lasix and Lopressor this am due to hypotension. [place]low Nasal Canula to maintain 02 sats >91%. Start NS at 50ml/hr. Continue to monitor.

## 2018-10-05 ENCOUNTER — APPOINTMENT (OUTPATIENT)
Dept: GENERAL RADIOLOGY | Facility: HOSPITAL | Age: 73
End: 2018-10-05

## 2018-10-05 ENCOUNTER — APPOINTMENT (OUTPATIENT)
Dept: CARDIOLOGY | Facility: HOSPITAL | Age: 73
End: 2018-10-05

## 2018-10-05 LAB
ALBUMIN SERPL-MCNC: 2.69 G/DL (ref 3.2–4.8)
ALBUMIN/GLOB SERPL: 1.1 G/DL (ref 1.5–2.5)
ALP SERPL-CCNC: 92 U/L (ref 25–100)
ALT SERPL W P-5'-P-CCNC: 12 U/L (ref 7–40)
ANION GAP SERPL CALCULATED.3IONS-SCNC: 3 MMOL/L (ref 3–11)
ARTERIAL PATENCY WRIST A: ABNORMAL
AST SERPL-CCNC: 18 U/L (ref 0–33)
ATMOSPHERIC PRESS: ABNORMAL MMHG
BASE EXCESS BLDA CALC-SCNC: 10.3 MMOL/L (ref 0–2)
BASOPHILS # BLD AUTO: 0.01 10*3/MM3 (ref 0–0.2)
BASOPHILS NFR BLD AUTO: 0.1 % (ref 0–1)
BDY SITE: ABNORMAL
BH CV ECHO MEAS - AO ROOT AREA (BSA CORRECTED): 1.8
BH CV ECHO MEAS - AO ROOT AREA: 9.9 CM^2
BH CV ECHO MEAS - AO ROOT DIAM: 3.5 CM
BH CV ECHO MEAS - BSA(HAYCOCK): 2 M^2
BH CV ECHO MEAS - BSA: 2 M^2
BH CV ECHO MEAS - BZI_BMI: 29.9 KILOGRAMS/M^2
BH CV ECHO MEAS - BZI_METRIC_HEIGHT: 170.2 CM
BH CV ECHO MEAS - BZI_METRIC_WEIGHT: 86.6 KG
BH CV ECHO MEAS - EDV(CUBED): 54.6 ML
BH CV ECHO MEAS - EDV(MOD-SP2): 114 ML
BH CV ECHO MEAS - EDV(MOD-SP4): 113 ML
BH CV ECHO MEAS - EDV(TEICH): 61.7 ML
BH CV ECHO MEAS - EF(CUBED): 63.3 %
BH CV ECHO MEAS - EF(MOD-SP2): 64 %
BH CV ECHO MEAS - EF(MOD-SP4): 66.4 %
BH CV ECHO MEAS - EF(TEICH): 55.6 %
BH CV ECHO MEAS - ESV(CUBED): 20 ML
BH CV ECHO MEAS - ESV(MOD-SP2): 41 ML
BH CV ECHO MEAS - ESV(MOD-SP4): 38 ML
BH CV ECHO MEAS - ESV(TEICH): 27.4 ML
BH CV ECHO MEAS - FS: 28.4 %
BH CV ECHO MEAS - IVS/LVPW: 1.4
BH CV ECHO MEAS - IVSD: 1.1 CM
BH CV ECHO MEAS - LA DIMENSION: 4.1 CM
BH CV ECHO MEAS - LA/AO: 1.2
BH CV ECHO MEAS - LAD MAJOR: 5.2 CM
BH CV ECHO MEAS - LAT PEAK E' VEL: 10.6 CM/SEC
BH CV ECHO MEAS - LATERAL E/E' RATIO: 9.9
BH CV ECHO MEAS - LV DIASTOLIC VOL/BSA (35-75): 57 ML/M^2
BH CV ECHO MEAS - LV MASS(C)D: 132.6 GRAMS
BH CV ECHO MEAS - LV MASS(C)DI: 66.9 GRAMS/M^2
BH CV ECHO MEAS - LV MAX PG: 4.1 MMHG
BH CV ECHO MEAS - LV MEAN PG: 1.9 MMHG
BH CV ECHO MEAS - LV SYSTOLIC VOL/BSA (12-30): 19.2 ML/M^2
BH CV ECHO MEAS - LV V1 MAX: 101.7 CM/SEC
BH CV ECHO MEAS - LV V1 MEAN: 64.6 CM/SEC
BH CV ECHO MEAS - LV V1 VTI: 21.7 CM
BH CV ECHO MEAS - LVIDD: 3.8 CM
BH CV ECHO MEAS - LVIDS: 2.7 CM
BH CV ECHO MEAS - LVLD AP2: 8.1 CM
BH CV ECHO MEAS - LVLD AP4: 8.1 CM
BH CV ECHO MEAS - LVLS AP2: 7 CM
BH CV ECHO MEAS - LVLS AP4: 6.7 CM
BH CV ECHO MEAS - LVOT AREA (M): 3.1 CM^2
BH CV ECHO MEAS - LVOT AREA: 3.1 CM^2
BH CV ECHO MEAS - LVOT DIAM: 2 CM
BH CV ECHO MEAS - LVPWD: 0.9 CM
BH CV ECHO MEAS - MED PEAK E' VEL: 6.9 CM/SEC
BH CV ECHO MEAS - MEDIAL E/E' RATIO: 15.1
BH CV ECHO MEAS - MV A MAX VEL: 75.5 CM/SEC
BH CV ECHO MEAS - MV E MAX VEL: 106.6 CM/SEC
BH CV ECHO MEAS - MV E/A: 1.4
BH CV ECHO MEAS - PA ACC SLOPE: 1094 CM/SEC^2
BH CV ECHO MEAS - PA ACC TIME: 0.09 SEC
BH CV ECHO MEAS - PA PR(ACCEL): 38.6 MMHG
BH CV ECHO MEAS - RVDD: 3 CM
BH CV ECHO MEAS - SI(CUBED): 17.4 ML/M^2
BH CV ECHO MEAS - SI(LVOT): 33.9 ML/M^2
BH CV ECHO MEAS - SI(MOD-SP2): 36.8 ML/M^2
BH CV ECHO MEAS - SI(MOD-SP4): 37.8 ML/M^2
BH CV ECHO MEAS - SI(TEICH): 17.3 ML/M^2
BH CV ECHO MEAS - SV(CUBED): 34.6 ML
BH CV ECHO MEAS - SV(LVOT): 67.2 ML
BH CV ECHO MEAS - SV(MOD-SP2): 73 ML
BH CV ECHO MEAS - SV(MOD-SP4): 75 ML
BH CV ECHO MEAS - SV(TEICH): 34.3 ML
BH CV ECHO MEAS - TAPSE (>1.6): 1.5 CM2
BH CV ECHO MEASUREMENTS AVERAGE E/E' RATIO: 12.18
BH CV VAS BP LEFT ARM: NORMAL MMHG
BH CV XLRA - RV BASE: 4.7 CM
BH CV XLRA - RV LENGTH: 7.2 CM
BH CV XLRA - RV MID: 3.5 CM
BH CV XLRA - TDI S': 12.1 CM/SEC
BILIRUB SERPL-MCNC: 0.4 MG/DL (ref 0.3–1.2)
BNP SERPL-MCNC: 401 PG/ML (ref 0–100)
BUN BLD-MCNC: 38 MG/DL (ref 9–23)
BUN/CREAT SERPL: 29.9 (ref 7–25)
CALCIUM SPEC-SCNC: 7.8 MG/DL (ref 8.7–10.4)
CHLORIDE SERPL-SCNC: 99 MMOL/L (ref 99–109)
CO2 BLDA-SCNC: 38.3 MMOL/L (ref 22–33)
CO2 SERPL-SCNC: 36 MMOL/L (ref 20–31)
COHGB MFR BLD: 1.9 % (ref 0–2)
CREAT BLD-MCNC: 1.27 MG/DL (ref 0.6–1.3)
DEPRECATED RDW RBC AUTO: 70.2 FL (ref 37–54)
EOSINOPHIL # BLD AUTO: 0.01 10*3/MM3 (ref 0–0.3)
EOSINOPHIL NFR BLD AUTO: 0.1 % (ref 0–3)
ERYTHROCYTE [DISTWIDTH] IN BLOOD BY AUTOMATED COUNT: 19.8 % (ref 11.3–14.5)
GFR SERPL CREATININE-BSD FRML MDRD: 56 ML/MIN/1.73
GLOBULIN UR ELPH-MCNC: 2.5 GM/DL
GLUCOSE BLD-MCNC: 83 MG/DL (ref 70–100)
GLUCOSE BLDC GLUCOMTR-MCNC: 108 MG/DL (ref 70–130)
GLUCOSE BLDC GLUCOMTR-MCNC: 120 MG/DL (ref 70–130)
GLUCOSE BLDC GLUCOMTR-MCNC: 125 MG/DL (ref 70–130)
GLUCOSE BLDC GLUCOMTR-MCNC: 163 MG/DL (ref 70–130)
HCO3 BLDA-SCNC: 36.5 MMOL/L (ref 20–26)
HCT VFR BLD AUTO: 30.7 % (ref 38.9–50.9)
HCT VFR BLD CALC: 28 %
HGB BLD-MCNC: 9.1 G/DL (ref 13.1–17.5)
HGB BLDA-MCNC: 9.1 G/DL (ref 13.5–17.5)
HOROWITZ INDEX BLD+IHG-RTO: 50 %
IMM GRANULOCYTES # BLD: 0.01 10*3/MM3 (ref 0–0.03)
IMM GRANULOCYTES NFR BLD: 0.1 % (ref 0–0.6)
LEFT ATRIUM VOLUME INDEX: 31.3 ML/M^2
LV EF 2D ECHO EST: 60 %
LYMPHOCYTES # BLD AUTO: 0.84 10*3/MM3 (ref 0.6–4.8)
LYMPHOCYTES NFR BLD AUTO: 11.4 % (ref 24–44)
MAGNESIUM SERPL-MCNC: 2.2 MG/DL (ref 1.3–2.7)
MAXIMAL PREDICTED HEART RATE: 147 BPM
MCH RBC QN AUTO: 28.5 PG (ref 27–31)
MCHC RBC AUTO-ENTMCNC: 29.6 G/DL (ref 32–36)
MCV RBC AUTO: 96.2 FL (ref 80–99)
METHGB BLD QL: 0.4 % (ref 0–1.5)
MODALITY: ABNORMAL
MONOCYTES # BLD AUTO: 1.04 10*3/MM3 (ref 0–1)
MONOCYTES NFR BLD AUTO: 14.1 % (ref 0–12)
NEUTROPHILS # BLD AUTO: 5.48 10*3/MM3 (ref 1.5–8.3)
NEUTROPHILS NFR BLD AUTO: 74.3 % (ref 41–71)
NRBC BLD MANUAL-RTO: 0 /100 WBC (ref 0–0)
OXYHGB MFR BLDV: 92.4 % (ref 94–99)
PCO2 BLDA: 58.1 MM HG (ref 35–48)
PH BLDA: 7.41 PH UNITS (ref 7.35–7.45)
PHOSPHATE SERPL-MCNC: 3.7 MG/DL (ref 2.4–5.1)
PLATELET # BLD AUTO: 297 10*3/MM3 (ref 150–450)
PMV BLD AUTO: 11 FL (ref 6–12)
PO2 BLDA: 73.7 MM HG (ref 83–108)
POTASSIUM BLD-SCNC: 4 MMOL/L (ref 3.5–5.5)
PROT SERPL-MCNC: 5.2 G/DL (ref 5.7–8.2)
RBC # BLD AUTO: 3.19 10*6/MM3 (ref 4.2–5.76)
SAO2 % BLDCOA: 92.4 %
SAO2 % BLDCOA: 92.4 %
SODIUM BLD-SCNC: 138 MMOL/L (ref 132–146)
STRESS TARGET HR: 125 BPM
WBC NRBC COR # BLD: 7.38 10*3/MM3 (ref 3.5–10.8)

## 2018-10-05 PROCEDURE — 94799 UNLISTED PULMONARY SVC/PX: CPT

## 2018-10-05 PROCEDURE — 71045 X-RAY EXAM CHEST 1 VIEW: CPT

## 2018-10-05 PROCEDURE — 25010000002 LINEZOLID 600 MG/300ML SOLUTION: Performed by: INTERNAL MEDICINE

## 2018-10-05 PROCEDURE — 99291 CRITICAL CARE FIRST HOUR: CPT | Performed by: INTERNAL MEDICINE

## 2018-10-05 PROCEDURE — 80053 COMPREHEN METABOLIC PANEL: CPT | Performed by: INTERNAL MEDICINE

## 2018-10-05 PROCEDURE — 36600 WITHDRAWAL OF ARTERIAL BLOOD: CPT | Performed by: INTERNAL MEDICINE

## 2018-10-05 PROCEDURE — 83735 ASSAY OF MAGNESIUM: CPT | Performed by: INTERNAL MEDICINE

## 2018-10-05 PROCEDURE — 93306 TTE W/DOPPLER COMPLETE: CPT | Performed by: INTERNAL MEDICINE

## 2018-10-05 PROCEDURE — 25010000002 HEPARIN (PORCINE) PER 1000 UNITS: Performed by: INTERNAL MEDICINE

## 2018-10-05 PROCEDURE — 25010000002 METHYLPREDNISOLONE PER 40 MG: Performed by: INTERNAL MEDICINE

## 2018-10-05 PROCEDURE — 93306 TTE W/DOPPLER COMPLETE: CPT

## 2018-10-05 PROCEDURE — 84100 ASSAY OF PHOSPHORUS: CPT | Performed by: INTERNAL MEDICINE

## 2018-10-05 PROCEDURE — 25010000002 MEROPENEM: Performed by: NURSE PRACTITIONER

## 2018-10-05 PROCEDURE — 82805 BLOOD GASES W/O2 SATURATION: CPT | Performed by: INTERNAL MEDICINE

## 2018-10-05 PROCEDURE — 83880 ASSAY OF NATRIURETIC PEPTIDE: CPT | Performed by: INTERNAL MEDICINE

## 2018-10-05 PROCEDURE — 85025 COMPLETE CBC W/AUTO DIFF WBC: CPT | Performed by: INTERNAL MEDICINE

## 2018-10-05 PROCEDURE — 25010000002 FUROSEMIDE PER 20 MG: Performed by: INTERNAL MEDICINE

## 2018-10-05 PROCEDURE — 94760 N-INVAS EAR/PLS OXIMETRY 1: CPT

## 2018-10-05 PROCEDURE — 97530 THERAPEUTIC ACTIVITIES: CPT

## 2018-10-05 PROCEDURE — 97110 THERAPEUTIC EXERCISES: CPT

## 2018-10-05 PROCEDURE — 94640 AIRWAY INHALATION TREATMENT: CPT

## 2018-10-05 PROCEDURE — 25010000002 ACETAZOLAMIDE PER 500 MG: Performed by: INTERNAL MEDICINE

## 2018-10-05 PROCEDURE — 63710000001 PREDNISONE PER 1 MG: Performed by: INTERNAL MEDICINE

## 2018-10-05 PROCEDURE — 82962 GLUCOSE BLOOD TEST: CPT

## 2018-10-05 RX ORDER — ACETAZOLAMIDE SODIUM 500 MG/5ML
500 INJECTION, POWDER, LYOPHILIZED, FOR SOLUTION INTRAVENOUS ONCE
Status: COMPLETED | OUTPATIENT
Start: 2018-10-05 | End: 2018-10-05

## 2018-10-05 RX ORDER — FUROSEMIDE 10 MG/ML
40 INJECTION INTRAMUSCULAR; INTRAVENOUS ONCE
Status: COMPLETED | OUTPATIENT
Start: 2018-10-05 | End: 2018-10-05

## 2018-10-05 RX ORDER — POTASSIUM CHLORIDE 750 MG/1
30 CAPSULE, EXTENDED RELEASE ORAL EVERY 4 HOURS
Status: COMPLETED | OUTPATIENT
Start: 2018-10-05 | End: 2018-10-05

## 2018-10-05 RX ORDER — PREDNISONE 20 MG/1
20 TABLET ORAL EVERY 12 HOURS SCHEDULED
Status: DISCONTINUED | OUTPATIENT
Start: 2018-10-05 | End: 2018-10-08

## 2018-10-05 RX ADMIN — ACETAMINOPHEN 325 MG: 325 TABLET ORAL at 22:51

## 2018-10-05 RX ADMIN — Medication 10 ML: at 01:15

## 2018-10-05 RX ADMIN — IPRATROPIUM BROMIDE AND ALBUTEROL SULFATE 3 ML: 2.5; .5 SOLUTION RESPIRATORY (INHALATION) at 02:58

## 2018-10-05 RX ADMIN — MEROPENEM 1 G: 1 INJECTION, POWDER, FOR SOLUTION INTRAVENOUS at 04:50

## 2018-10-05 RX ADMIN — Medication 3 ML: at 22:50

## 2018-10-05 RX ADMIN — GUAIFENESIN 600 MG: 600 TABLET, EXTENDED RELEASE ORAL at 10:25

## 2018-10-05 RX ADMIN — ACETAMINOPHEN 325 MG: 325 TABLET ORAL at 13:25

## 2018-10-05 RX ADMIN — MIRTAZAPINE 15 MG: 15 TABLET, ORALLY DISINTEGRATING ORAL at 23:01

## 2018-10-05 RX ADMIN — MICONAZOLE NITRATE: 2 CREAM TOPICAL at 01:15

## 2018-10-05 RX ADMIN — INSULIN LISPRO 2 UNITS: 100 INJECTION, SOLUTION INTRAVENOUS; SUBCUTANEOUS at 22:53

## 2018-10-05 RX ADMIN — FUROSEMIDE 40 MG: 40 TABLET ORAL at 10:24

## 2018-10-05 RX ADMIN — DOCUSATE SODIUM,SENNOSIDES 2 TABLET: 50; 8.6 TABLET, FILM COATED ORAL at 22:52

## 2018-10-05 RX ADMIN — METOPROLOL TARTRATE 25 MG: 25 TABLET ORAL at 22:52

## 2018-10-05 RX ADMIN — CASTOR OIL AND BALSAM, PERU: 788; 87 OINTMENT TOPICAL at 10:23

## 2018-10-05 RX ADMIN — MICONAZOLE NITRATE: 2 CREAM TOPICAL at 23:39

## 2018-10-05 RX ADMIN — Medication 10 ML: at 22:48

## 2018-10-05 RX ADMIN — HEPARIN SODIUM 5000 UNITS: 5000 INJECTION, SOLUTION INTRAVENOUS; SUBCUTANEOUS at 13:31

## 2018-10-05 RX ADMIN — GUAIFENESIN 600 MG: 600 TABLET, EXTENDED RELEASE ORAL at 22:52

## 2018-10-05 RX ADMIN — ATORVASTATIN CALCIUM 40 MG: 40 TABLET, FILM COATED ORAL at 22:51

## 2018-10-05 RX ADMIN — LINEZOLID 600 MG: 600 INJECTION, SOLUTION INTRAVENOUS at 19:55

## 2018-10-05 RX ADMIN — FAMOTIDINE 20 MG: 20 TABLET ORAL at 10:24

## 2018-10-05 RX ADMIN — METHYLPREDNISOLONE SODIUM SUCCINATE 20 MG: 40 INJECTION, POWDER, FOR SOLUTION INTRAMUSCULAR; INTRAVENOUS at 13:31

## 2018-10-05 RX ADMIN — IPRATROPIUM BROMIDE AND ALBUTEROL SULFATE 3 ML: 2.5; .5 SOLUTION RESPIRATORY (INHALATION) at 23:17

## 2018-10-05 RX ADMIN — CASTOR OIL AND BALSAM, PERU: 788; 87 OINTMENT TOPICAL at 22:51

## 2018-10-05 RX ADMIN — FUROSEMIDE 40 MG: 10 INJECTION, SOLUTION INTRAMUSCULAR; INTRAVENOUS at 16:32

## 2018-10-05 RX ADMIN — MEROPENEM 1 G: 1 INJECTION, POWDER, FOR SOLUTION INTRAVENOUS at 23:01

## 2018-10-05 RX ADMIN — ASPIRIN 81 MG CHEWABLE TABLET 81 MG: 81 TABLET CHEWABLE at 10:28

## 2018-10-05 RX ADMIN — IPRATROPIUM BROMIDE AND ALBUTEROL SULFATE 3 ML: 2.5; .5 SOLUTION RESPIRATORY (INHALATION) at 06:54

## 2018-10-05 RX ADMIN — PREDNISONE 20 MG: 20 TABLET ORAL at 22:51

## 2018-10-05 RX ADMIN — POTASSIUM CHLORIDE 30 MEQ: 750 CAPSULE, EXTENDED RELEASE ORAL at 16:42

## 2018-10-05 RX ADMIN — METOPROLOL TARTRATE 25 MG: 25 TABLET ORAL at 10:24

## 2018-10-05 RX ADMIN — Medication 10 ML: at 10:31

## 2018-10-05 RX ADMIN — HEPARIN SODIUM 5000 UNITS: 5000 INJECTION, SOLUTION INTRAVENOUS; SUBCUTANEOUS at 05:39

## 2018-10-05 RX ADMIN — Medication 10 ML: at 22:50

## 2018-10-05 RX ADMIN — CLOPIDOGREL BISULFATE 75 MG: 75 TABLET ORAL at 10:24

## 2018-10-05 RX ADMIN — GABAPENTIN 200 MG: 100 CAPSULE ORAL at 13:30

## 2018-10-05 RX ADMIN — IPRATROPIUM BROMIDE AND ALBUTEROL SULFATE 3 ML: 2.5; .5 SOLUTION RESPIRATORY (INHALATION) at 11:37

## 2018-10-05 RX ADMIN — Medication 10 ML: at 01:16

## 2018-10-05 RX ADMIN — LINEZOLID 600 MG: 600 INJECTION, SOLUTION INTRAVENOUS at 05:39

## 2018-10-05 RX ADMIN — POTASSIUM CHLORIDE 30 MEQ: 750 CAPSULE, EXTENDED RELEASE ORAL at 22:52

## 2018-10-05 RX ADMIN — METHYLPREDNISOLONE SODIUM SUCCINATE 20 MG: 40 INJECTION, POWDER, FOR SOLUTION INTRAMUSCULAR; INTRAVENOUS at 04:50

## 2018-10-05 RX ADMIN — IPRATROPIUM BROMIDE AND ALBUTEROL SULFATE 3 ML: 2.5; .5 SOLUTION RESPIRATORY (INHALATION) at 20:18

## 2018-10-05 RX ADMIN — GABAPENTIN 200 MG: 100 CAPSULE ORAL at 05:39

## 2018-10-05 RX ADMIN — MICONAZOLE NITRATE: 2 CREAM TOPICAL at 10:23

## 2018-10-05 RX ADMIN — MEROPENEM 1 G: 1 INJECTION, POWDER, FOR SOLUTION INTRAVENOUS at 10:28

## 2018-10-05 RX ADMIN — WATER 500 MG: 1 INJECTION INTRAMUSCULAR; INTRAVENOUS; SUBCUTANEOUS at 16:33

## 2018-10-05 RX ADMIN — GABAPENTIN 200 MG: 100 CAPSULE ORAL at 22:51

## 2018-10-05 RX ADMIN — Medication 10 ML: at 22:49

## 2018-10-05 RX ADMIN — IPRATROPIUM BROMIDE AND ALBUTEROL SULFATE 3 ML: 2.5; .5 SOLUTION RESPIRATORY (INHALATION) at 16:14

## 2018-10-05 RX ADMIN — HEPARIN SODIUM 5000 UNITS: 5000 INJECTION, SOLUTION INTRAVENOUS; SUBCUTANEOUS at 22:52

## 2018-10-05 RX ADMIN — CYANOCOBALAMIN TAB 1000 MCG 1000 MCG: 1000 TAB at 10:24

## 2018-10-05 RX ADMIN — FAMOTIDINE 20 MG: 20 TABLET ORAL at 22:51

## 2018-10-05 NOTE — DISCHARGE PLACEMENT REQUEST
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 hallucination. Pt found to have a/c resp failure 2/2 to COPD exacerbation. Pt required intubation on 10/03, self extubated on 10/04. Pt recently admitted 08/20-09/08 2/2 to BLE cellulitis, DC'd to SNF.   -CL        Existing Precautions/Restrictions fall;oxygen therapy device and L/min;other (see comments)   HiFlo, BLE sensitivity  -CL        Limitations/Impairments safety/cognitive  -CL        Risks Reviewed  patient:;LOB;nausea/vomiting;increased discomfort;dizziness;change in vital signs;lines disloged  -CL        Benefits Reviewed patient:;improve function;increase independence;increase strength;increase balance;decrease pain;increase knowledge  -CL        Barriers to Rehab medically complex;cognitive status;previous functional deficit  -CL           Relationship/Environment    Lives With alone   Prior to recent admit  -CL           Resource/Environmental Concerns    Current Living Arrangements home/apartment/condo   Pt questionnable historian, no family present  -CL           Cognitive Assessment/Intervention- PT/OT    Affect/Mental Status (Cognitive) confused  -CL        Orientation Status (Cognition) oriented to;person  -CL        Follows Commands (Cognition) follows one step commands;75-90% accuracy;verbal cues/prompting required;repetition of directions required  -CL        Cognitive Function (Cognitive) safety deficit  -CL        Safety Deficit (Cognitive) moderate deficit;awareness of need for assistance;safety precautions awareness  -CL        Personal Safety Interventions fall prevention program maintained;nonskid shoes/slippers when out of bed;supervised activity  -CL           Bed Mobility Assessment/Treatment    Comment (Bed Mobility) Pt declined any EOB activity.   -CL           Transfer Assessment/Treatment    Comment (Transfers) Pt declined.   -CL           ADL Assessment/Intervention    BADL Assessment/Intervention grooming  -CL           Grooming Assessment/Training    [place] Level (Grooming) wash face, hands;dependent (less than 25% patient effort)  -CL        Grooming Position sitting up in bed  -CL        Comment (Grooming) Pt declined to self perform.  Noted significant B hand tremors w/ pill taking w/ nursing.   -CL           General ROM    GENERAL ROM COMMENTS BUE grossly WFL.   -CL           MMT (Manual Muscle Testing)    General MMT Comments BUE grossly 3+/5.   -CL           Sensory  Assessment/Intervention    Sensory General Assessment no sensation deficits identified  -CL        Additional Documentation --   Unable to formally assess d/t limited eye opening  -CL           Positioning and Restraints    Pre-Treatment Position in bed  -CL        Post Treatment Position bed  -CL        In Bed notified nsg;fowlers;call light within reach;encouraged to call for assist;exit alarm on;with nsg;side rails up x3;RUE elevated;LUE elevated;legs elevated;heels elevated  -CL           Pain Assessment    Additional Documentation Pain Scale 2: FACES Pre/Post-Treatment (Group)  -CL           Pain Scale 2: FACES Pre/Post-Treatment    Pain 2: FACES Scale, Pretreatment 2-->hurts little bit  -CL        Pain 2: FACES Scale, Post-Treatment 2-->hurts little bit  -CL        Pain Location 2 - Orientation generalized  -CL        Pre/Post Treatment Pain 2 Comment Tolerated.   -CL        Pain Intervention(s) 2 Repositioned;Ambulation/increased activity  -CL           [REMOVED] Wound 10/02/18 2230 Bilateral lower;upper;anterior;posterior;medial coccyx pressure injury    Wound - Properties Group Date first assessed: 10/02/18  -HM Time first assessed: 2230  -HM Present On Admission : yes  -HM Side: Bilateral  -HM Orientation: lower;upper;anterior;posterior;medial  -HM Location: coccyx  -HM Type: pressure injury  -HM Stage, Pressure Injury: Stage 2  -HM Resolution Date: 10/03/18  -MR Resolution Time: 1335  -MR       Wound 10/02/18 2230 Bilateral medial gluteal MASD (moisture associated skin damage)    Wound - Properties Group Date first assessed: 10/02/18  -MR Time first assessed: 2230  -MR Present On Admission : yes  -MR Side: Bilateral  -MR Orientation: medial  -MR Location: gluteal  -MR Type: MASD (moisture associated skin damage)  -MR, friction shearing        Clinical Impression (OT)    Date of Referral to OT 10/03/18  -CL        OT Diagnosis Decreased independence in ADLs  -CL        Patient/Family Goals Statement (OT  Eval) Return to PLOF.   -CL        Criteria for Skilled Therapeutic Interventions Met (OT Eval) yes;treatment indicated  -CL        Rehab Potential (OT Eval) fair, will monitor progress closely  -CL        Therapy Frequency (OT Eval) 3 times/wk  -CL        Anticipated Equipment Needs at Discharge (OT) --   TBA further  -CL        Anticipated Discharge Disposition (OT) skilled nursing facility  -CL           Vital Signs    Pre Systolic BP Rehab --   VSS, RN cleared for tx.   -CL           OT Goals    Transfer Goal Selection (OT) transfer, OT goal 1  -CL        Grooming Goal Selection (OT) grooming, OT goal 1  -CL        Strength Goal Selection (OT) strength, OT goal 1  -CL        Balance Goal Selection (OT) balance, OT goal 1  -CL        Additional Documentation Balance Goal Selection (OT) (Row);Grooming Goal Selection (OT) (Row);Strength Goal Selection (OT) (Row)  -CL           Transfer Goal 1 (OT)    Activity/Assistive Device (Transfer Goal 1, OT) sit-to-stand/stand-to-sit  -CL        [place] Level/Cues Needed (Transfer Goal 1, OT) maximum assist (25-49% patient effort)  -CL        Time Frame (Transfer Goal 1, OT) by discharge;long term goal (LTG)  -CL        Progress/Outcome (Transfer Goal 1, OT) goal ongoing  -CL           Grooming Goal 1 (OT)    Activity/Device (Grooming Goal 1, OT) wash face, hands  -CL        [place] (Grooming Goal 1, OT) set-up required  -CL        Time Frame (Grooming Goal 1, OT) by discharge;long term goal (LTG)  -CL        Progress/Outcome (Grooming Goal 1, OT) goal ongoing  -CL           Strength Goal 1 (OT)    Strength Goal 1 (OT) Pt will tolerate BUE AROM HEP x10 reps to promote ADL performance.   -CL        Time Frame (Strength Goal 1, OT) by discharge;long term goal (LTG)  -CL        Progress/Outcome (Strength Goal 1, OT) goal ongoing  -CL           Balance Goal 1 (OT)    Activity/Assistive Device (Balance Goal 1, OT) sitting, static  -CL        [place] Level/Cues  Needed (Balance Goal 1, OT) minimum assist (75% or more patient effort);verbal cues required  -CL        Time Frame (Balance Goal 1, OT) by discharge;long term goal (LTG)  -CL        Progress/Outcomes (Balance Goal 1, OT) goal ongoing  -CL           Living Environment    Home Accessibility --   walkin shower  -CL          User Key  (r) = Recorded By, (t) = Taken By, (c) = Cosigned By    Initials Name Effective Dates    Brittany Azar NAHID ZEPEDA 06/16/16 -     CL Caitlin Robles OT 04/03/18 -     HM Sheree Gallardo RN 07/03/17 -            Occupational Therapy Education     Title: PT OT SLP Therapies (Active)     Topic: Occupational Therapy (Active)     Point: ADL training (Active)     Description: Instruct learner(s) on proper safety adaptation and remediation techniques during self care or transfers.   Instruct in proper use of assistive devices.   Learning Progress Summary     Learner Status Readiness Method Response Comment Documented by    Patient Active Acceptance E NR Pt educated on appropriate safety precautions, positioning, role of OT, and benefits of therapy. CL 10/04/18 1559     Done Acceptance E,TB VU  MT 10/03/18 1851    Family Done Acceptance E,TB VU  MT 10/03/18 1851          Point: Home exercise program (Done)     Description: Instruct learner(s) on appropriate technique for monitoring, assisting and/or progressing therapeutic exercises/activities.   Learning Progress Summary     Learner Status Readiness Method Response Comment Documented by    Patient Done Acceptance E,TB VU  MT 10/03/18 1851    Family Done Acceptance E,TB VU  MT 10/03/18 1851          Point: Precautions (Active)     Description: Instruct learner(s) on prescribed precautions during self-care and functional transfers.   Learning Progress Summary     Learner Status Readiness Method Response Comment Documented by    Patient Active Acceptance E NR Pt educated on appropriate safety precautions, positioning, role of OT, and benefits of  therapy.  10/04/18 1559     Done Acceptance E,TB VU  MT 10/03/18 1851    Family Done Acceptance E,TB VU  MT 10/03/18 1851          Point: Body mechanics (Done)     Description: Instruct learner(s) on proper positioning and spine alignment during self-care, functional mobility activities and/or exercises.   Learning Progress Summary     Learner Status Readiness Method Response Comment Documented by    Patient Done Acceptance E,TB VU  MT 10/03/18 1851    Family Done Acceptance E,TB VU  MT 10/03/18 1851                      User Key     Initials Effective Dates Name Provider Type Discipline    MT 06/16/16 -  Noemi Fish RN Registered Nurse Nurse     04/03/18 -  Caitlin Robles OT Occupational Therapist OT                  OT Recommendation and Plan  Outcome Summary/Treatment Plan (OT)  Anticipated Equipment Needs at Discharge (OT):  (TBA further)  Anticipated Discharge Disposition (OT): skilled nursing facility  Therapy Frequency (OT Eval): 3 times/wk  Plan of Care Review  Plan of Care Reviewed With: patient  Plan of Care Reviewed With: patient  Outcome Summary: OT completed a brief chart review relating to presenting physical/cognitive deficits. Pt session limited d/t pt declining any EOB activity or ADL performance d/t c/o fatigue and pain. Recommend cont skilled IPOT POC 3x/wk pending improved participation. Recommend pt DC to SNF.           Outcome Measures     Row Name 10/04/18 1345 10/04/18 1119          How much help from another person do you currently need...    Turning from your back to your side while in flat bed without using bedrails?  -- 2  -LS     Moving from lying on back to sitting on the side of a flat bed without bedrails?  -- 2  -LS     Moving to and from a bed to a chair (including a wheelchair)?  -- 1  -LS     Standing up from a chair using your arms (e.g., wheelchair, bedside chair)?  -- 1  -LS     Climbing 3-5 steps with a railing?  -- 1  -LS     To walk in hospital room?  -- 1  -LS      AM-PAC 6 Clicks Score  -- 8  -LS        How much help from another is currently needed...    Putting on and taking off regular lower body clothing? 1  -CL  --     Bathing (including washing, rinsing, and drying) 1  -CL  --     Toileting (which includes using toilet bed pan or urinal) 1  -CL  --     Putting on and taking off regular upper body clothing 1  -CL  --     Taking care of personal grooming (such as brushing teeth) 2  -CL  --     Eating meals 2  -CL  --     Score 8  -CL  --        Functional Assessment    Outcome Measure Options AM-PAC 6 Clicks Daily Activity (OT)  -CL AM-PAC 6 Clicks Basic Mobility (PT)  -LS       User Key  (r) = Recorded By, (t) = Taken By, (c) = Cosigned By    Initials Name Provider Type    LS Zari Rai, PT Physical Therapist    CL Caitlin Robles OT Occupational Therapist          Time Calculation:         Time Calculation- OT     Row Name 10/04/18 1601             Time Calculation- OT    OT Start Time 1345  -CL      OT Received On 10/04/18  -CL      OT Goal Re-Cert Due Date 10/14/18  -CL        User Key  (r) = Recorded By, (t) = Taken By, (c) = Cosigned By    Initials Name Provider Type    CL Caitlin Robles OT Occupational Therapist        Therapy Suggested Charges     Code   Minutes Charges    None           Therapy Charges for Today     Code Description Service Date Service Provider Modifiers Qty    [id number]  OT EVAL LOW COMPLEXITY 3 10/4/2018 Caitlin Robles OT GO 1               Caitlin Robles OT  10/4/2018    Electronically signed by Caitlin Robles OT at 10/4/2018  4:01 PM     Caitlin Robles OT at 10/4/2018  4:00 PM          Problem: Patient Care Overview  Goal: Plan of Care Review  Outcome: Ongoing (interventions implemented as appropriate)   10/04/18 1559   Coping/Psychosocial   Plan of Care Reviewed With patient   Plan of Care Review   Progress improving   OTHER   Outcome Summary OT completed a brief chart review relating to presenting physical/cognitive deficits. Pt session  limited d/t pt declining any EOB activity or ADL performance d/t c/o fatigue and pain. Recommend cont skilled IPOT POC 3x/wk pending improved participation. Recommend pt DC to SNF.            Electronically signed by Caitlin Robles OT at 10/4/2018  4:00 PM       Speech Language Pathology Notes (last 24 hours) (Notes from 10/4/2018 12:38 PM through 10/5/2018 12:38 PM)     No notes of this type exist for this encounter.        Respiratory Therapy Notes (last 24 hours) (Notes from 10/4/2018 12:38 PM through 10/5/2018 12:38 PM)     No notes of this type exist for this encounter.

## 2018-10-05 NOTE — PROGRESS NOTES
"Intensivist Note     10/5/2018  Hospital Day: 3  * No surgery found *      Mr. Yassine Love, 72 y.o. male is followed for:  Principal Problem:    Acute on chronic mixed hypercarbic/hypoxemic respiratory failure requiring intubation and ventilatory support  Active Problems:    End stage COPD in an active smoker on home O2 with chronic hypercarbia    Pneumonia due to infectious organism    PVD (peripheral vascular disease)     CAD status post GIGI ×2 to LAD and RCA 3/12/18    Acute kidney injury    Brief runs of SVT (supraventricular tachycardia)     Decubitus ulcer of buttock, stage 2    H/O cellulitis of both lower extremities    Essential hypertension    H/O bacteremia due to Staphylococcus    Opioid use       SUBJECTIVE     71-year-old white male active smoker with a history of COPD (on home oxygen) admitted 10/2/18 for exacerbation associated with severe hypoxemia. He was in the skilled nursing facility for rehabilitation but 10/1/18 developed some visual hallucinations. Oxygen saturations were subsequently noted to drop into the 60s on 4 L on 10/2/18 and he was brought to [facility] ER. Because of an elevated d-dimer a CT angiogram was obtained which was negative for PE. Both CT scan and chest x-ray however revealed it appeared to be pulmonary edema with diffuse interstitial infiltrates and pleural effusions. In addition BNP was 899. He was given his usual dose of home Lasix, placed on empiric antimicrobial coverage with Zosyn, given bronchodilators, Solu-Medrol, placed on BiPAP, and admitted to the hospitalist service. I should note that the patient lives by himself. His son had discussed his situation earlier this year and felt that he was not able to care for himself in his own home as he had been doing. He recommended the patient go into a facility, angering the patient who basically \"cut him off\" and wanted nothing further to do with him. He developed severe cellulitis of his lower extremities and was " "hospitalized 8/20/18 through 9/8/18 at [facility]. Neri was sent to a skilled nursing facility for rehabilitation where the above occurred.     At approximately 2 AM 10/3/18 he was noted to become unresponsive with decreased respiratory drive. Apparently he had been a DNR but this was reversed by the patient on admission. Because of this Dr. Jacobsen and respiratory therapy intubated the patient at the bedside. There were large amounts of thick purulent yellow secretions obtained at the time of intubation and he was transferred to the ICU on ventilatory support. He initially required high FiO2's of 60% but respiratory rate and ABGs rapidly improved. Over the course of the evening he continued to improve. He subsequently self extubated the evening of 10/3/18 at 11 PM. He was able to tolerate self extubation but has required high flow nasal oxygen in order to maintain his oxygen saturations. He was alert and oriented so additional discussions were held regarding CODE STATUS. He wanted to think about it yesterday and when I question him again today he stated he would like to think about it\" some more\".      He was on empiric vancomycin and Merrem and cultures from blood and sputum were negative as of 10/4/18. He however had had a staph aureus in a wound in August and I wanted to continue him on coverage for MRSA as well as gram negatives. Because of his renal insufficiency however I wanted him off vancomycin and switched him to linezolid on 10/4/18. Today his sputum is growing a staph aureus that was not identified and nasal swabs are growing MRSA. He remains on Merrem and linezolid.     Heart rate is usually in the 60s, but has had recurrent brief episodes of SVT up to 160  both yesterday and today. His metoprolol was held once yesterday and that led to SVT that resolved with resumption of beta blocker. Today his blood pressure is good and he has not required any pressors. He remains on normal saline at 50 mL an hour. " "Note that his pro-calcitonin has consistently been low and his BNP was elevated at 899.    The patient's relevant past medical, surgical and social history were reviewed and updated in Epic as appropriate.    OBJECTIVE     BP 98/59   Pulse 58   Temp 98.7 °F (37.1 °C) (Oral)   Resp 20   Ht 170.2 cm (67\")   Wt 86.6 kg (191 lb)   SpO2 95%   BMI 29.91 kg/m²   Oxygen Concentration (%): 45 on high flow nasal oxygen      Flowsheet Rows      First Filed Value   Admission Height  172.7 cm (68\") Documented at 10/02/2018 1724   Admission Weight  83.9 kg (185 lb) Documented at 10/02/2018 1724        Intake & Output (last day)       10/04 0701 - 10/05 0700 10/05 0701 - 10/06 0700    P.O.  360    I.V. (mL/kg) 1735.3 (20)     IV Piggyback 350     Total Intake(mL/kg) 2085.3 (24) 360 (4.2)    Urine (mL/kg/hr) 1505 (0.7) 400 (0.5)    Total Output 1505 400    Net +580.3 -40                Exam:  General Exam:  Elderly chronically ill white male in NAD. Numerous complaints about his bed etc.  HEENT: Pupils equal and reactive. Nose and throat clear.  Neck:                          Supple, no JVD, thyromegaly, or adenopathy  Lungs: Clear but diminished breath sounds  Cardiovascular: Regular rate and rhythm without murmurs or gallops. HR 68  Abdomen: Soft nontender without organomegaly or masses.   and rectal: Deferred.  Extremities: No cyanosis or clubbing. Chronic lower extremity mild edema with associated venous stasis changes. Left arm PICC in place  Neurologic:                 Symmetric strength. No focal deficits.    Chest X-Ray 10/5/18: Persistent cardiomegaly with bilateral lower lobe infiltrates and effusion right greater than left. This looks more like heart failure than pneumonia and his white count is normal    INFUSIONS         Results from last 7 days  Lab Units 10/05/18  0353 10/04/18  0431 10/03/18  0705   WBC 10*3/mm3 7.38 6.00 4.13   HEMOGLOBIN g/dL 9.1* 10.0* 9.9*   HEMATOCRIT % 30.7* 33.7* 33.5*   PLATELETS " 10*3/mm3 297 305 248       Results from last 7 days  Lab Units 10/05/18  0353 10/04/18  0431   SODIUM mmol/L 138 141   POTASSIUM mmol/L 4.0 4.8   CHLORIDE mmol/L 99 95*   CO2 mmol/L 36.0* 40.0*   BUN mg/dL 38* 39*   CREATININE mg/dL 1.27 1.37*   GLUCOSE mg/dL 83 99   CALCIUM mg/dL 7.8* 8.5*       Results from last 7 days  Lab Units 10/05/18  0353 10/04/18  0431   MAGNESIUM mg/dL 2.2 2.1   PHOSPHORUS mg/dL 3.7 3.9       Results from last 7 days  Lab Units 10/05/18  0353 10/04/18  0431 10/02/18  1750   ALK PHOS U/L 92 102* 128*   BILIRUBIN mg/dL 0.4 0.5 0.6   ALT (SGPT) U/L 12 12 18   AST (SGOT) U/L 18 15 17       Lab Results   Component Value Date    SEDRATE 56 (H) 06/20/2017     Lab Results   Component Value Date    .0 (H) 10/05/2018     Lab Results   Component Value Date    CKTOTAL 598 (H) 06/20/2017     Lab Results   Component Value Date    TSH 1.841 03/08/2018     Lab Results   Component Value Date    LACTATE 0.8 10/03/2018     No results found for: CORTISOL      Results from last 7 days  Lab Units 10/05/18  0346 10/04/18  0414 10/03/18  0301 10/03/18  0105 10/02/18  2014   PH, ARTERIAL pH units 7.406 7.466* 7.452* 7.435 7.395   PCO2, ARTERIAL mm Hg 58.1* 59.3* 60.9* 67.3* 74.9*   PO2 ART mm Hg 73.7* 61.9* 113.0* 64.0* 66.1*   HCO3 ART mmol/L 36.5* 42.7* 42.5* 45.1* 45.8*   FIO2 % 50 60 100 50 40       I reviewed the patient's results, images and medication.    Assessment/Plan   ASSESSMENT      Principal Problem:    Acute on chronic mixed hypercarbic/hypoxemic respiratory failure requiring intubation and ventilatory support  Active Problems:    End stage COPD in an active smoker on home O2 with chronic hypercarbia    Pneumonia due to infectious organism    PVD (peripheral vascular disease)     CAD status post GIGI ×2 to LAD and RCA 3/12/18    Acute kidney injury    Brief runs of SVT (supraventricular tachycardia)     Decubitus ulcer of buttock, stage 2    H/O cellulitis of both lower extremities     Essential hypertension    H/O bacteremia due to Staphylococcus    Opioid use      DISCUSSION: Patient has end-stage COPD but ABGs reveal stable mixed chronic hypercarbic/hypoxemic. Unfortunately he is on enough oxygen that he will not be able to leave the hospital until his overall status improves. His chest x-ray still looks more like congestive heart failure than pneumonia. Since his renal function has improved I am going to continue gentle diureses.    PLAN     1. Continue linezolid and Merrem and complete 7-10 days  2. Avoid sedating agents  3. Keep his potassium greater than or equal to 4.5 to avoid the development of metabolic alkalosis that will reduce respiratory  4. IV Lasix and Diamox today  5. Follow-up renal function in a.m.    Plan of care and goals reviewed with mulitdisciplinary team at daily rounds.    I discussed the patient's findings and my recommendations with patient and nursing staff    Time spent Critical care 35 min (It does not include procedure time).    Parish Donato MD  Intensive Care Medicine  10/05/18 3:49 PM

## 2018-10-05 NOTE — THERAPY TREATMENT NOTE
Acute Care - Physical Therapy Treatment Note  [facility]     Patient Name: Yassine Love  : 1945  MRN: [id number]  Today's Date: 10/5/2018  Onset of Illness/Injury or Date of Surgery: 10/02/18  Date of Referral to PT: 10/02/18  Referring Physician: DO Javi    Admit Date: 10/2/2018    Visit Dx:    ICD-10-CM ICD-9-CM   1. Acute on chronic respiratory failure with hypoxia and hypercapnia (CMS/HCC) J96.21 518.84    J96.22 786.09     799.02   2. Impaired functional mobility, balance, gait, and endurance Z74.09 V49.89   3. 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 O2 with chronic hypercarbia   • Essential hypertension   • Non-sustained ventricular tachycardia (CMS/HCC)   • CAD status post GIGI ×2 to LAD and RCA 3/12/18   • Debility   • Decubitus ulcer of buttock, stage 2   • Chronic pain   • Chronic venous stasis dermatitis of both lower extremities   • Acute on chronic mixed hypercarbic/hypoxemic respiratory failure requiring intubation and ventilatory support   • History of tobacco use   • Acute kidney injury   • Brief runs of SVT (supraventricular tachycardia)    • H/O bacteremia due to Staphylococcus   • Opioid use   • Overweight (BMI 25.0-29.9)   • Pneumonia due to infectious organism       Therapy Treatment          Rehabilitation Treatment Summary     Row Name 10/05/18 1324             Treatment Time/Intention    Discipline physical therapist  -LS      Document Type therapy note (daily note)  -LS      Subjective Information no complaints  -LS      Mode of Treatment physical therapy  -LS      Existing Precautions/Restrictions fall;oxygen therapy device and L/min  -LS      Treatment Considerations/Comments Improved cooperation and tolerance to LE mobility today.  -LS      Recorded by [LS] Zari Rai, PT 10/05/18 8908      Row  Name 10/05/18 1324             Vital Signs    Pre Systolic BP Rehab 98  -LS      Pre Treatment Diastolic BP 59  -LS      Pretreatment Heart Rate (beats/min) 60  -LS      Posttreatment Heart Rate (beats/min) 68  -LS      O2 Delivery Pre Treatment hi-flow  -LS      Post SpO2 (%) 93  -LS      O2 Delivery Post Treatment supplemental O2  -LS      Pre Patient Position Supine  -LS      Intra Patient Position Standing  -LS      Post Patient Position Sitting  -LS      Recorded by [LS] Zari Rai, PT 10/05/18 1448      Row Name 10/05/18 1324             Cognitive Assessment/Intervention- PT/OT    Affect/Mental Status (Cognitive) low arousal/lethargic  -LS      Orientation Status (Cognition) oriented to;person;place;verbal cues/prompts needed for orientation   cues for month  -LS      Follows Commands (Cognition) follows one step commands;75-90% accuracy;physical/tactile prompts required;repetition of directions required;verbal cues/prompting required  -LS      Cognitive Function (Cognitive) safety deficit  -LS      Safety Deficit (Cognitive) mild deficit;ability to follow commands;awareness of need for assistance;insight into deficits/self awareness  -LS      Personal Safety Interventions fall prevention program maintained;gait belt;nonskid shoes/slippers when out of bed  -LS      Recorded by [LS] Zari Rai, PT 10/05/18 1448      Row Name 10/05/18 1324             Bed Mobility Assessment/Treatment    Bed Mobility Assessment/Treatment rolling left;rolling right  -LS      Rolling Left [place] (Bed Mobility) moderate assist (50% patient effort);2 person assist;verbal cues  -LS      Rolling Right [place] (Bed Mobility) moderate assist (50% patient effort);2 person assist;verbal cues  -LS      Assistive Device (Bed Mobility) draw sheet;bed rails  -LS      Comment (Bed Mobility) rolling for placement of mech lift sling  -LS      Recorded by [LS] Zari Rai, PT 10/05/18 1448      Row Name 10/05/18 1327              Transfer Assessment/Treatment    Transfer Assessment/Treatment bed-chair transfer;sit-stand transfer;stand-sit transfer  -LS      Comment (Transfers) Performed STS x2 from recliner; PT/tech on either side of pt for blocking knees and BUE support.   -LS      Recorded by [LS] Zari Rai, PT 10/05/18 1448      Row Name 10/05/18 1324             Bed-Chair Transfer    Bed-Chair [place] (Transfers) dependent (less than 25% patient effort);2 person assist;verbal cues  -LS      Assistive Device (Bed-Chair Transfers) mechanical lift/aid  -LS      Recorded by [LS] Zari Rai, PT 10/05/18 1448      Row Name 10/05/18 1324             Sit-Stand Transfer    Sit-Stand [place] (Transfers) maximum assist (25% patient effort);2 person assist;verbal cues  -LS      Recorded by [LS] Zari Rai, PT 10/05/18 1448      Row Name 10/05/18 1324             Stand-Sit Transfer    Stand-Sit [place] (Transfers) maximum assist (25% patient effort);2 person assist;verbal cues  -LS      Recorded by [LS] Zari Rai, PT 10/05/18 1448      Row Name 10/05/18 1324             Gait/Stairs Assessment/Training    [place] Level (Gait) unable to assess  -LS      Recorded by [LS] Zari Rai, PT 10/05/18 1448      Row Name 10/05/18 1324             Motor Skills Assessment/Interventions    Additional Documentation Balance (Group);Therapeutic Exercise (Group)  -LS      Recorded by [LS] Zari Rai, PT 10/05/18 1448      Row Name 10/05/18 1324             Therapeutic Exercise    Therapeutic Exercise seated, lower extremities  -LS      16490 - PT Therapeutic Exercise Minutes 5  -LS      05179 - PT Therapeutic Activity Minutes 19  -LS      Recorded by [LS] Zari Rai, PT 10/05/18 1448      Row Name 10/05/18 1324             Lower Extremity Seated Therapeutic Exercise    Performed, Seated Lower Extremity (Therapeutic Exercise) ankle dorsiflexion/plantarflexion;LAQ (long arc quad), knee extension  -LS      Exercise  Type, Seated Lower Extremity (Therapeutic Exercise) AROM (active range of motion)  -LS      Sets/Reps Detail, Seated Lower Extremity (Therapeutic Exercise) 1/10  -LS      Recorded by [LS] Zari Rai, PT 10/05/18 1448      Row Name 10/05/18 1324             Balance    Balance static sitting balance;static standing balance  -LS      Recorded by [LS] Zari Rai, PT 10/05/18 1448      Row Name 10/05/18 1324             Static Sitting Balance    Level of [place] (Unsupported Sitting, Static Balance) moderate assist, 50 to 74% patient effort  -LS      Sitting Position (Unsupported Sitting, Static Balance) sitting in chair  -LS      Comment (Unsupported Sitting, Static Balance) initial posterior lean; cues for upright  -LS      Recorded by [LS] Zari Rai, PT 10/05/18 1448      Row Name 10/05/18 1324             Static Standing Balance    Level of [place] (Supported Standing, Static Balance) maximal assist, 25 to 49% patient effort   x2  -LS      Time Able to Maintain Position (Supported Standing, Static Balance) 30 to 45 seconds   total; seated rest break after initial 15 seconds  -LS      Comment (Supported Standing, Static Balance) Initially mod x2 assist; req'd increased assist with fatigue.   -LS      Recorded by [LS] Zari Rai, PT 10/05/18 1448      Row Name 10/05/18 1324             Positioning and Restraints    Pre-Treatment Position in bed  -LS      Post Treatment Position chair  -LS      In Chair notified nsg;reclined;call light within reach;encouraged to call for assist;exit alarm on;waffle cushion;on mechanical lift sling;legs elevated;heels elevated  -LS      Recorded by [LS] Zari Rai, PT 10/05/18 1448      Row Name 10/05/18 1324             Pain Assessment    Additional Documentation Pain Scale: Numbers Pre/Post-Treatment (Group)  -LS      Recorded by [LS] Zari Rai, PT 10/05/18 1448      Row Name 10/05/18 1324             Pain Scale: Numbers Pre/Post-Treatment    Pain  Scale: Numbers, Pretreatment 0/10 - no pain  -LS      Pain Scale: Numbers, Post-Treatment 7/10  -LS      Pain Location - Side Left  -LS      Pain Location hip  -LS      Pre/Post Treatment Pain Comment tolerated  -LS      Pain Intervention(s) Ambulation/increased activity;Repositioned  -LS      Recorded by [LS] Zari Rai, PT 10/05/18 1448      Row Name                Wound 10/02/18 2230 Bilateral medial gluteal MASD (moisture associated skin damage)    Wound - Properties Group Date first assessed: 10/02/18 [MR] Time first assessed: 2230 [MR] Present On Admission : yes [MR] Side: Bilateral [MR] Orientation: medial [MR] Location: gluteal [MR] Type: MASD (moisture associated skin damage) [MR], friction shearing  Recorded by:  [MR] Brittany Evans, RN 10/03/18 1505    Row Name 10/05/18 1324             Plan of Care Review    Plan of Care Reviewed With patient  -LS      Recorded by [LS] Zari Rai, PT 10/05/18 1448      Row Name 10/05/18 1324             Outcome Summary/Treatment Plan (PT)    Daily Summary of Progress (PT) progress toward functional goals is good  -LS      Recorded by [LS] Zari Rai, PT 10/05/18 1448        User Key  (r) = Recorded By, (t) = Taken By, (c) = Cosigned By    Initials Name Effective Dates Discipline    Zari Sanches, PT 06/19/15 -  PT     EvansBrittany, RN 06/16/16 -  Nurse          Rash 10/03/18 1335 groin patch (Active)   Distribution localized 10/5/2018  2:00 PM   Configuration/Shape asymmetric 10/5/2018  2:00 PM   Borders irregular 10/5/2018  2:00 PM   Characteristics moist 10/5/2018  2:00 PM   Color red 10/5/2018  2:00 PM   Care, Rash open to air 10/5/2018 12:00 PM       Wound 10/02/18 2230 Bilateral medial gluteal MASD (moisture associated skin damage) (Active)   Dressing Appearance open to air 10/5/2018  2:00 PM   Base red/granulating;purple;bleeding;moist 10/5/2018  2:00 PM   Periwound ecchymotic 10/5/2018  2:00 PM   Dressing Care, Wound open to air 10/4/2018   6:00 PM             Physical Therapy Education     Title: PT OT SLP Therapies (Active)     Topic: Physical Therapy (Active)     Point: Mobility training (Active)    Learning Progress Summary     Learner Status Readiness Method Response Comment Documented by    Patient Active Acceptance E,D NR  LS 10/05/18 1324     Active Acceptance E,D NR  LS 10/04/18 1119     Done Acceptance E,TB VU  MT 10/03/18 1851    Family Active Acceptance E,D NR  LS 10/04/18 1119     Done Acceptance E,TB VU  MT 10/03/18 1851          Point: Home exercise program (Active)    Learning Progress Summary     Learner Status Readiness Method Response Comment Documented by    Patient Active Acceptance E,D NR  LS 10/05/18 1324     Active Acceptance E,D NR  LS 10/04/18 1119     Done Acceptance E,TB VU  MT 10/03/18 1851    Family Active Acceptance E,D NR  LS 10/04/18 1119     Done Acceptance E,TB VU  MT 10/03/18 1851          Point: Body mechanics (Active)    Learning Progress Summary     Learner Status Readiness Method Response Comment Documented by    Patient Active Acceptance E,D NR  LS 10/05/18 1324     Active Acceptance E,D NR  LS 10/04/18 1119    Family Active Acceptance E,D NR  LS 10/04/18 1119          Point: Precautions (Active)    Learning Progress Summary     Learner Status Readiness Method Response Comment Documented by    Patient Active Acceptance E,D NR  LS 10/05/18 1324     Active Acceptance E,D NR  LS 10/04/18 1119     Done Acceptance E,TB VU  MT 10/03/18 1851    Family Active Acceptance E,D NR  LS 10/04/18 1119     Done Acceptance E,TB VU  MT 10/03/18 1851                      User Key     Initials Effective Dates Name Provider Type Discipline     06/19/15 -  Zari Rai, PT Physical Therapist PT    MT 06/16/16 -  Noemi Fish RN Registered Nurse Nurse                    PT Recommendation and Plan  Anticipated Discharge Disposition (PT): skilled nursing facility  Therapy Frequency (PT Clinical Impression): daily  Outcome  Summary/Treatment Plan (PT)  Daily Summary of Progress (PT): progress toward functional goals is good  Anticipated Discharge Disposition (PT): skilled nursing facility  Plan of Care Reviewed With: patient  Progress: improving  Outcome Summary: Pt demonstrated significant improvement in willingness to participate. Progressed to STS x2 from recliner with max x2 assist. Cont to be limited by generalized weakness. Will cont PT POC as clinically warranted.           Outcome Measures     Row Name 10/05/18 1324 10/04/18 1345 10/04/18 1119       How much help from another person do you currently need...    Turning from your back to your side while in flat bed without using bedrails? 2  -LS  -- 2  -LS    Moving from lying on back to sitting on the side of a flat bed without bedrails? 2  -LS  -- 2  -LS    Moving to and from a bed to a chair (including a wheelchair)? 1  -LS  -- 1  -LS    Standing up from a chair using your arms (e.g., wheelchair, bedside chair)? 2  -LS  -- 1  -LS    Climbing 3-5 steps with a railing? 1  -LS  -- 1  -LS    To walk in hospital room? 1  -LS  -- 1  -LS    AM-PAC 6 Clicks Score 9  -LS  -- 8  -LS       How much help from another is currently needed...    Putting on and taking off regular lower body clothing?  -- 1  -CL  --    Bathing (including washing, rinsing, and drying)  -- 1  -CL  --    Toileting (which includes using toilet bed pan or urinal)  -- 1  -CL  --    Putting on and taking off regular upper body clothing  -- 1  -CL  --    Taking care of personal grooming (such as brushing teeth)  -- 2  -CL  --    Eating meals  -- 2  -CL  --    Score  -- 8  -CL  --       Functional Assessment    Outcome Measure Options AM-PAC 6 Clicks Basic Mobility (PT)  -LS AM-PAC 6 Clicks Daily Activity (OT)  -CL AM-PAC 6 Clicks Basic Mobility (PT)  -LS      User Key  (r) = Recorded By, (t) = Taken By, (c) = Cosigned By    Initials Name Provider Type    Zari Sanches, PT Physical Therapist    CL Caitlin Robles, OT  Occupational Therapist           Time Calculation:         PT Charges     Row Name 10/05/18 1324             Time Calculation    Start Time 1324  -LS      PT Received On 10/05/18  -LS         Time Calculation- PT    Total Timed Code Minutes- PT 24 minute(s)  -LS         Timed Charges    83990 - PT Therapeutic Exercise Minutes 5  -LS      75986 - PT Therapeutic Activity Minutes 19  -LS        User Key  (r) = Recorded By, (t) = Taken By, (c) = Cosigned By    Initials Name Provider Type    LS Zari Rai, PT Physical Therapist        Therapy Suggested Charges     Code   Minutes Charges    10647 (CPT®) Hc Pt Neuromusc Re Education Ea 15 Min      03433 (CPT®) Hc Pt Ther Proc Ea 15 Min 5 1    10862 (CPT®) Hc Gait Training Ea 15 Min      17400 (CPT®) Hc Pt Therapeutic Act Ea 15 Min 19 1    49099 (CPT®) Hc Pt Manual Therapy Ea 15 Min      80815 (CPT®) Hc Pt Iontophoresis Ea 15 Min      24210 (CPT®) Hc Pt Elec Stim Ea-Per 15 Min      43684 (CPT®) Hc Pt Ultrasound Ea 15 Min      56749 (CPT®) Hc Pt Self Care/Mgmt/Train Ea 15 Min      31463 (CPT®) Hc Pt Prosthetic (S) Train Initial Encounter, Each 15 Min      18613 (CPT®) Hc Pt Orthotic(S)/Prosthetic(S) Encounter, Each 15 Min      89874 (CPT®) Hc Orthotic(S) Mgmt/Train Initial Encounter, Each 15min      Total  24 2        Therapy Charges for Today     Code Description Service Date Service Provider Modifiers Qty    23845396160 HC PT EVAL HIGH COMPLEXITY 3 10/4/2018 Zari Rai, PT GP 1    83658388242 HC PT THERAPEUTIC ACT EA 15 MIN 10/4/2018 Zari Rai, PT GP 1    87837899614 HC PT THER SUPP EA 15 MIN 10/4/2018 Zari Rai, PT GP 1    41832757577 HC PT THER PROC EA 15 MIN 10/5/2018 Zari Rai, PT GP 1    54374542366 HC PT THER SUPP EA 15 MIN 10/5/2018 Zari Rai, PT GP 2    35240417358 HC PT THERAPEUTIC ACT EA 15 MIN 10/5/2018 Zari Rai, PT GP 1          PT G-Codes  Outcome Measure Options: AM-PAC 6 Clicks Basic Mobility (PT)  AM-PAC 6 Clicks Score:  9  Score: 8    Zari Rai, PT  10/5/2018

## 2018-10-05 NOTE — PROGRESS NOTES
Continued Stay Note  [facility]     Patient Name: Yassien Love  MRN: [id number]  Today's Date: 10/5/2018    Admit Date: 10/2/2018          Discharge Plan     Row Name 10/05/18 1036       Plan    Plan Comments Called [facility] and spoke c Nydia moctezuma Admissions.  She is going to check to see if he can return there.  Mr. Love's son is going to go there and apply for Medicaid for his father.  [facility] is going to check to make sure he can return there.   Goal is to return to [facility] when medically ready.      Row Name 10/05/18 0951       Plan    Plan Comments Called [facility] and left VM with Admission Director to call me back.  Waiting on her call.              Discharge Codes    No documentation.       Expected Discharge Date and Time     Expected Discharge Date Expected Discharge Time    Oct 7, 2018             Ashley De León RN

## 2018-10-05 NOTE — PLAN OF CARE
Problem: Patient Care Overview  Goal: Plan of Care Review  Outcome: Ongoing (interventions implemented as appropriate)   10/05/18 1324   Coping/Psychosocial   Plan of Care Reviewed With patient   Plan of Care Review   Progress improving   OTHER   Outcome Summary Pt demonstrated significant improvement in willingness to participate. Progressed to STS x2 from recliner with max x2 assist. Cont to be limited by generalized weakness. Will cont PT POC as clinically warranted.

## 2018-10-05 NOTE — PLAN OF CARE
Problem: Patient Care Overview  Goal: Plan of Care Review  Outcome: Ongoing (interventions implemented as appropriate)    Goal: Individualization and Mutuality  Outcome: Ongoing (interventions implemented as appropriate)    Goal: Interprofessional Rounds/Family Conf  Outcome: Outcome(s) achieved Date Met: 10/05/18      Problem: Breathing Pattern Ineffective (Adult)  Goal: Identify Related Risk Factors and Signs and Symptoms  Outcome: Outcome(s) achieved Date Met: 10/05/18    Goal: Effective Oxygenation/Ventilation  Outcome: Ongoing (interventions implemented as appropriate)    Goal: Anxiety/Fear Reduction  Outcome: Ongoing (interventions implemented as appropriate)      Problem: Wound (Includes Pressure Injury) (Adult)  Goal: Signs and Symptoms of Listed Potential Problems Will be Absent, Minimized or Managed (Wound)  Outcome: Ongoing (interventions implemented as appropriate)

## 2018-10-05 NOTE — PROGRESS NOTES
Continued Stay Note  [facility]     Patient Name: Yassine Love  MRN: [id number]  Today's Date: 10/5/2018    Admit Date: 10/2/2018          Discharge Plan     Row Name 10/05/18 1239       Plan    Plan Comments Talked to his son and he asked if I would refer him to [facility] [place] in addition to [facility].  Faxed packet to [facility].     Row Name 10/05/18 1036       Plan    Plan Comments Called [facility] and spoke c Nydia Marcano.  She is going to check to see if he can return there.  Mr. Love's son is going to go there and apply for Medicaid for his father.  [facility] is going to check to make sure he can return there.   Goal is to return to [facility] when medically ready.      Row Name 10/05/18 0951       Plan    Plan Comments Called [facility] and left VM with Admission Director to call me back.  Waiting on her call.              Discharge Codes    No documentation.       Expected Discharge Date and Time     Expected Discharge Date Expected Discharge Time    Oct 7, 2018             Ashley De León RN

## 2018-10-05 NOTE — PROGRESS NOTES
Clinical Nutrition Note      Patient Name: Yassine Love  MRN: [id number]  Admission date: 10/2/2018      Reason for Assessment:  Multidisciplinary Rounds    Additional information obtained during MDR: RN reports pt will not let her change dressings on LE extremities , refuses PT. Pt basically wheelchair bound, pt w/ decubitus on adm.  RN reports he ate 100% of breakfast.    Current diet: Diet Regular    Pertinent medical data reviewed    Intervention:  Plan of care and goals reviewed    Monitor:  RD to follow per protocol      Virginia Arevalo MS,RD,LD  10/05/18 1:56 PM  Time: 20min

## 2018-10-06 ENCOUNTER — APPOINTMENT (OUTPATIENT)
Dept: GENERAL RADIOLOGY | Facility: HOSPITAL | Age: 73
End: 2018-10-06

## 2018-10-06 LAB
ALBUMIN SERPL-MCNC: 2.97 G/DL (ref 3.2–4.8)
ANION GAP SERPL CALCULATED.3IONS-SCNC: 5 MMOL/L (ref 3–11)
BASOPHILS # BLD AUTO: 0.01 10*3/MM3 (ref 0–0.2)
BASOPHILS NFR BLD AUTO: 0.2 % (ref 0–1)
BUN BLD-MCNC: 37 MG/DL (ref 9–23)
BUN/CREAT SERPL: 31.9 (ref 7–25)
CALCIUM SPEC-SCNC: 8.2 MG/DL (ref 8.7–10.4)
CHLORIDE SERPL-SCNC: 104 MMOL/L (ref 99–109)
CO2 SERPL-SCNC: 32 MMOL/L (ref 20–31)
CREAT BLD-MCNC: 1.16 MG/DL (ref 0.6–1.3)
DEPRECATED RDW RBC AUTO: 68.8 FL (ref 37–54)
EOSINOPHIL # BLD AUTO: 0.01 10*3/MM3 (ref 0–0.3)
EOSINOPHIL NFR BLD AUTO: 0.2 % (ref 0–3)
ERYTHROCYTE [DISTWIDTH] IN BLOOD BY AUTOMATED COUNT: 19.2 % (ref 11.3–14.5)
GFR SERPL CREATININE-BSD FRML MDRD: 62 ML/MIN/1.73
GLUCOSE BLD-MCNC: 103 MG/DL (ref 70–100)
GLUCOSE BLDC GLUCOMTR-MCNC: 118 MG/DL (ref 70–130)
GLUCOSE BLDC GLUCOMTR-MCNC: 122 MG/DL (ref 70–130)
GLUCOSE BLDC GLUCOMTR-MCNC: 141 MG/DL (ref 70–130)
GLUCOSE BLDC GLUCOMTR-MCNC: 158 MG/DL (ref 70–130)
HCT VFR BLD AUTO: 35.3 % (ref 38.9–50.9)
HGB BLD-MCNC: 10.2 G/DL (ref 13.1–17.5)
IMM GRANULOCYTES # BLD: 0.01 10*3/MM3 (ref 0–0.03)
IMM GRANULOCYTES NFR BLD: 0.2 % (ref 0–0.6)
LYMPHOCYTES # BLD AUTO: 0.47 10*3/MM3 (ref 0.6–4.8)
LYMPHOCYTES NFR BLD AUTO: 8.9 % (ref 24–44)
MAGNESIUM SERPL-MCNC: 2.4 MG/DL (ref 1.3–2.7)
MCH RBC QN AUTO: 28.3 PG (ref 27–31)
MCHC RBC AUTO-ENTMCNC: 28.9 G/DL (ref 32–36)
MCV RBC AUTO: 97.8 FL (ref 80–99)
MONOCYTES # BLD AUTO: 0.39 10*3/MM3 (ref 0–1)
MONOCYTES NFR BLD AUTO: 7.4 % (ref 0–12)
NEUTROPHILS # BLD AUTO: 4.37 10*3/MM3 (ref 1.5–8.3)
NEUTROPHILS NFR BLD AUTO: 83.1 % (ref 41–71)
PHOSPHATE SERPL-MCNC: 3.9 MG/DL (ref 2.4–5.1)
PLATELET # BLD AUTO: 287 10*3/MM3 (ref 150–450)
PMV BLD AUTO: 10.8 FL (ref 6–12)
POTASSIUM BLD-SCNC: 5 MMOL/L (ref 3.5–5.5)
RBC # BLD AUTO: 3.61 10*6/MM3 (ref 4.2–5.76)
SODIUM BLD-SCNC: 141 MMOL/L (ref 132–146)
WBC NRBC COR # BLD: 5.26 10*3/MM3 (ref 3.5–10.8)

## 2018-10-06 PROCEDURE — 94640 AIRWAY INHALATION TREATMENT: CPT

## 2018-10-06 PROCEDURE — 25010000002 MEROPENEM: Performed by: NURSE PRACTITIONER

## 2018-10-06 PROCEDURE — 94799 UNLISTED PULMONARY SVC/PX: CPT

## 2018-10-06 PROCEDURE — 25010000002 LINEZOLID 600 MG/300ML SOLUTION: Performed by: INTERNAL MEDICINE

## 2018-10-06 PROCEDURE — 80069 RENAL FUNCTION PANEL: CPT | Performed by: INTERNAL MEDICINE

## 2018-10-06 PROCEDURE — 71045 X-RAY EXAM CHEST 1 VIEW: CPT

## 2018-10-06 PROCEDURE — 82962 GLUCOSE BLOOD TEST: CPT

## 2018-10-06 PROCEDURE — 25010000002 HEPARIN (PORCINE) PER 1000 UNITS: Performed by: INTERNAL MEDICINE

## 2018-10-06 PROCEDURE — 94760 N-INVAS EAR/PLS OXIMETRY 1: CPT

## 2018-10-06 PROCEDURE — 63710000001 PREDNISONE PER 1 MG: Performed by: INTERNAL MEDICINE

## 2018-10-06 PROCEDURE — 85025 COMPLETE CBC W/AUTO DIFF WBC: CPT | Performed by: INTERNAL MEDICINE

## 2018-10-06 PROCEDURE — 99233 SBSQ HOSP IP/OBS HIGH 50: CPT | Performed by: INTERNAL MEDICINE

## 2018-10-06 PROCEDURE — 83735 ASSAY OF MAGNESIUM: CPT | Performed by: INTERNAL MEDICINE

## 2018-10-06 RX ORDER — FUROSEMIDE 40 MG/1
40 TABLET ORAL
Status: DISCONTINUED | OUTPATIENT
Start: 2018-10-06 | End: 2018-10-07

## 2018-10-06 RX ORDER — ALPRAZOLAM 0.5 MG/1
0.5 TABLET ORAL NIGHTLY PRN
Status: DISPENSED | OUTPATIENT
Start: 2018-10-06 | End: 2018-10-16

## 2018-10-06 RX ORDER — NICOTINE 21 MG/24HR
1 PATCH, TRANSDERMAL 24 HOURS TRANSDERMAL
Status: DISCONTINUED | OUTPATIENT
Start: 2018-10-06 | End: 2018-10-22 | Stop reason: HOSPADM

## 2018-10-06 RX ADMIN — IPRATROPIUM BROMIDE AND ALBUTEROL SULFATE 3 ML: 2.5; .5 SOLUTION RESPIRATORY (INHALATION) at 15:47

## 2018-10-06 RX ADMIN — Medication 10 ML: at 09:54

## 2018-10-06 RX ADMIN — ACETAMINOPHEN 325 MG: 325 TABLET ORAL at 21:40

## 2018-10-06 RX ADMIN — MEROPENEM 1 G: 1 INJECTION, POWDER, FOR SOLUTION INTRAVENOUS at 18:58

## 2018-10-06 RX ADMIN — GUAIFENESIN 600 MG: 600 TABLET, EXTENDED RELEASE ORAL at 09:49

## 2018-10-06 RX ADMIN — PREDNISONE 20 MG: 20 TABLET ORAL at 21:41

## 2018-10-06 RX ADMIN — Medication 3 ML: at 21:47

## 2018-10-06 RX ADMIN — MICONAZOLE NITRATE: 2 CREAM TOPICAL at 21:48

## 2018-10-06 RX ADMIN — FUROSEMIDE 40 MG: 40 TABLET ORAL at 18:51

## 2018-10-06 RX ADMIN — ALPRAZOLAM 0.5 MG: 0.5 TABLET ORAL at 21:40

## 2018-10-06 RX ADMIN — FAMOTIDINE 20 MG: 20 TABLET ORAL at 09:49

## 2018-10-06 RX ADMIN — IPRATROPIUM BROMIDE AND ALBUTEROL SULFATE 3 ML: 2.5; .5 SOLUTION RESPIRATORY (INHALATION) at 10:58

## 2018-10-06 RX ADMIN — METOPROLOL TARTRATE 25 MG: 25 TABLET ORAL at 21:41

## 2018-10-06 RX ADMIN — ATORVASTATIN CALCIUM 40 MG: 40 TABLET, FILM COATED ORAL at 21:41

## 2018-10-06 RX ADMIN — IPRATROPIUM BROMIDE AND ALBUTEROL SULFATE 3 ML: 2.5; .5 SOLUTION RESPIRATORY (INHALATION) at 07:27

## 2018-10-06 RX ADMIN — METOPROLOL TARTRATE 25 MG: 25 TABLET ORAL at 09:49

## 2018-10-06 RX ADMIN — PREDNISONE 20 MG: 20 TABLET ORAL at 09:49

## 2018-10-06 RX ADMIN — MEROPENEM 1 G: 1 INJECTION, POWDER, FOR SOLUTION INTRAVENOUS at 09:49

## 2018-10-06 RX ADMIN — IPRATROPIUM BROMIDE AND ALBUTEROL SULFATE 3 ML: 2.5; .5 SOLUTION RESPIRATORY (INHALATION) at 03:57

## 2018-10-06 RX ADMIN — IPRATROPIUM BROMIDE AND ALBUTEROL SULFATE 3 ML: 2.5; .5 SOLUTION RESPIRATORY (INHALATION) at 19:42

## 2018-10-06 RX ADMIN — GUAIFENESIN 600 MG: 600 TABLET, EXTENDED RELEASE ORAL at 21:41

## 2018-10-06 RX ADMIN — Medication 10 ML: at 21:46

## 2018-10-06 RX ADMIN — CASTOR OIL AND BALSAM, PERU: 788; 87 OINTMENT TOPICAL at 21:45

## 2018-10-06 RX ADMIN — ASPIRIN 81 MG CHEWABLE TABLET 81 MG: 81 TABLET CHEWABLE at 09:49

## 2018-10-06 RX ADMIN — FUROSEMIDE 40 MG: 40 TABLET ORAL at 09:49

## 2018-10-06 RX ADMIN — MICONAZOLE NITRATE: 2 CREAM TOPICAL at 09:49

## 2018-10-06 RX ADMIN — LINEZOLID 600 MG: 600 INJECTION, SOLUTION INTRAVENOUS at 18:51

## 2018-10-06 RX ADMIN — HEPARIN SODIUM 5000 UNITS: 5000 INJECTION, SOLUTION INTRAVENOUS; SUBCUTANEOUS at 06:13

## 2018-10-06 RX ADMIN — MIRTAZAPINE 15 MG: 15 TABLET, ORALLY DISINTEGRATING ORAL at 21:41

## 2018-10-06 RX ADMIN — NICOTINE 1 PATCH: 21 PATCH, EXTENDED RELEASE TRANSDERMAL at 20:01

## 2018-10-06 RX ADMIN — MEROPENEM 1 G: 1 INJECTION, POWDER, FOR SOLUTION INTRAVENOUS at 03:00

## 2018-10-06 RX ADMIN — INSULIN LISPRO 2 UNITS: 100 INJECTION, SOLUTION INTRAVENOUS; SUBCUTANEOUS at 08:11

## 2018-10-06 RX ADMIN — GABAPENTIN 200 MG: 100 CAPSULE ORAL at 21:41

## 2018-10-06 RX ADMIN — FAMOTIDINE 20 MG: 20 TABLET ORAL at 21:41

## 2018-10-06 RX ADMIN — GABAPENTIN 200 MG: 100 CAPSULE ORAL at 13:13

## 2018-10-06 RX ADMIN — DOCUSATE SODIUM,SENNOSIDES 2 TABLET: 50; 8.6 TABLET, FILM COATED ORAL at 21:40

## 2018-10-06 RX ADMIN — LINEZOLID 600 MG: 600 INJECTION, SOLUTION INTRAVENOUS at 06:14

## 2018-10-06 RX ADMIN — CYANOCOBALAMIN TAB 1000 MCG 1000 MCG: 1000 TAB at 09:49

## 2018-10-06 RX ADMIN — CLOPIDOGREL BISULFATE 75 MG: 75 TABLET ORAL at 09:49

## 2018-10-06 RX ADMIN — GABAPENTIN 200 MG: 100 CAPSULE ORAL at 06:14

## 2018-10-06 RX ADMIN — HEPARIN SODIUM 5000 UNITS: 5000 INJECTION, SOLUTION INTRAVENOUS; SUBCUTANEOUS at 21:40

## 2018-10-06 RX ADMIN — CASTOR OIL AND BALSAM, PERU: 788; 87 OINTMENT TOPICAL at 09:50

## 2018-10-06 RX ADMIN — Medication 10 ML: at 21:47

## 2018-10-06 RX ADMIN — HEPARIN SODIUM 5000 UNITS: 5000 INJECTION, SOLUTION INTRAVENOUS; SUBCUTANEOUS at 13:14

## 2018-10-06 NOTE — PROGRESS NOTES
INTENSIVIST   PROGRESS NOTE     Hospital:  LOS: 4 days     Mr. Yassine Love, 72 y.o. male is followed for a Chief Complaint of: Acute on Chronic Respiratory Failure, End-stage COPD      Subjective   S   Mr. Love is a 73yo M with a history of severe COPD and chronic respiratory failure on home O2 who was admitted on 10/2/18 for a COPD exacerbation associated with severe hypoxemia. CT scan of the chest was negative for PE but did reveal pulmonary edema with diffuse interstitial infiltrates and pleural effusions. BNP was elevated at 899. He was given his home dose of Lasix and placed on Zosyn, Solumedrol and bronchodilators. He was admitted to the Hospital Medicine Service on Bipap. On 10/3/18, he was noted to become unresponsive. He was previously a DNR but this was reversed by the patient at the time of admission. He was intubated at the bedside with copious thick yellow secretions noted. He was transferred to the ICU. He initially required high FiO2 of 60% but this rapidly improved and he self-extubated on 10/3/18 at 11PM. Since that time, he has required HFNC to maintain his oxygen saturations. He is still deciding regarding his CODE STATUS. He remains on Linezolid and Meropenem for pneumonia.     Interval History:  No events overnight. He remains on HFNC. Nursing reports desaturation with any exertion or activity.        The patient's relevant past medical, surgical and social history were reviewed and updated in Epic as appropriate.      ROS:   Constitutional: Negative for fever.   Respiratory: Positive for dyspnea.   Cardiovascular: Negative for chest pain.   Gastrointestinal: Negative for  nausea, vomiting and diarrhea.     Objective   O     Vitals:  Temp  Min: 97.9 °F (36.6 °C)  Max: 98.4 °F (36.9 °C)  BP  Min: 96/55  Max: 142/69  Pulse  Min: 51  Max: 73  Resp  Min: 20  Max: 24  SpO2  Min: 75 %  Max: 97 % Flow (L/min)  Min: 40  Max: 50    Intake/Ouptut 24 hrs (7:00AM - 6:59 AM)  Intake & Output (last 3  days)       10/03 0701 - 10/04 0700 10/04 0701 - 10/05 0700 10/05 0701 - 10/06 0700 10/06 0701 - 10/07 0700    P.O.   700 480    I.V. (mL/kg) 353 (4.1) 1735.3 (20)      Other 60       NG/       IV Piggyback 806 350 100     Total Intake(mL/kg) 1399 (16.1) 2085.3 (24) 800 (9.2) 480 (5.5)    Urine (mL/kg/hr) 1530 (0.7) 1505 (0.7) 3450 (1.7) 950 (2.8)    Total Output 1530 1505 3450 950    Net -131 +580.3 -2650 -470                      Physical Examination  Telemetry:  Normal sinus rhythm.    Constitutional:  No acute distress.  On HFNC.    Cardiovascular: Normal rate, regular and rhythm. Normal heart sounds.  No murmurs, gallop or rub.   Respiratory: No respiratory distress. Normal respiratory effort.  Diminished bilaterally. No wheezing.     Abdominal:  Soft. No masses. Non-tender. No distension. No HSM.   Extremities: No digital cyanosis. No clubbing.  No peripheral edema.   Neurological:   Alert and Oriented to person, place, and time.             Results from last 7 days  Lab Units 10/06/18  0535 10/05/18  0353 10/04/18  0431   WBC 10*3/mm3 5.26 7.38 6.00   HEMOGLOBIN g/dL 10.2* 9.1* 10.0*   MCV fL 97.8 96.2 94.7   PLATELETS 10*3/mm3 287 297 305       Results from last 7 days  Lab Units 10/06/18  0535 10/05/18  0353 10/04/18  0431   SODIUM mmol/L 141 138 141   POTASSIUM mmol/L 5.0 4.0 4.8   CO2 mmol/L 32.0* 36.0* 40.0*   CREATININE mg/dL 1.16 1.27 1.37*   GLUCOSE mg/dL 103* 83 99   MAGNESIUM mg/dL 2.4 2.2 2.1   PHOSPHORUS mg/dL 3.9 3.7 3.9     Estimated Creatinine Clearance: 59.6 mL/min (by C-G formula based on SCr of 1.16 mg/dL).    Results from last 7 days  Lab Units 10/05/18  0353 10/04/18  0431 10/02/18  1750   ALK PHOS U/L 92 102* 128*   BILIRUBIN mg/dL 0.4 0.5 0.6   ALT (SGPT) U/L 12 12 18   AST (SGOT) U/L 18 15 17         Results from last 7 days  Lab Units 10/05/18  0346 10/04/18  0414 10/03/18  0301 10/03/18  0105 10/02/18  2014   PH, ARTERIAL pH units 7.406 7.466* 7.452* 7.435 7.395   PCO2, ARTERIAL  mm Hg 58.1* 59.3* 60.9* 67.3* 74.9*   PO2 ART mm Hg 73.7* 61.9* 113.0* 64.0* 66.1*   FIO2 % 50 60 100 50 40       Images:  Imaging Results (last 24 hours)     Procedure Component Value Units Date/Time    XR Chest 1 View [[id number]] Updated:  10/06/18 0354            Results: Reviewed.  I reviewed the patient's new laboratory and imaging results.  I independently reviewed the patient's new images.    Medications: Reviewed.    Assessment/Plan   A / P     Mr. Love is a 73yo M with a history of End-stage COPD who was admitted with acute on chronic respiratory failure. He remains on HFNC. He has evidence of pulmonary edema on imaging and is being diuresed.     Nutrition:   Diet Regular  Advance Directives:   Code Status and Medical Interventions:   Ordered at: 10/02/18 2100     Level Of Support Discussed With:    Patient     Code Status:    CPR     Medical Interventions (Level of Support Prior to Arrest):    Full       Hospital Problem List     * (Principal)Acute on chronic mixed hypercarbic/hypoxemic respiratory failure requiring intubation and ventilatory support    PVD (peripheral vascular disease)     H/O cellulitis of both lower extremities    Overview Signed 10/3/2018  1:47 AM by Perla Jones APRN     Previously managed by Infectious Disease          End stage COPD in an active smoker on home O2 with chronic hypercarbia    Overview Signed 10/3/2018  1:46 AM by Perla Jones APRN     Managed by [facility]          Essential hypertension    CAD status post GIGI ×2 to LAD and RCA 3/12/18    Overview Signed 10/3/2018  1:45 AM by Perla Jones APRN     S/P [facility] per Dr. Harvey on 3/12/18 with PCI revealing severe 2-vessel disease.    GIGI x 2 to LAD and RCA         Decubitus ulcer of buttock, stage 2    Acute kidney injury    Overview Signed 10/3/2018  1:39 AM by Perla Jones APRN     Cr 1.35 increased from 9/8/18 1.15          Brief runs of SVT (supraventricular tachycardia)     H/O bacteremia due  to Staphylococcus    Opioid use    Pneumonia due to infectious organism          Assessment / Plan:    1. Continue diuresis. Increase Lasix to 40mg PO BID.   2. Wean HFNC as tolerated. Goal O2 saturation 88-90%  3. Replace electrolytes as needed.   4. Continue to address goals of care. May need a palliative care consult if he cannot decide.   5. AM labs  6. Okay to transfer to telemetry when a bed is available.       I discussed the patient's findings and my recommendations with patient and nursing staff    Time: was greater than 35 minutes.      Pinky George, DO    Intensive Care Medicine and Pulmonary Medicine

## 2018-10-06 NOTE — PLAN OF CARE
Problem: Patient Care Overview  Goal: Plan of Care Review   10/06/18 0116   OTHER   Outcome Summary Awakw for majority of day. Up in chair with mechanical lift. Remains on high flow nasal cannula but Oxygen decreased to 35%. Voiding per urinal. Eating with good appetite. Remains on transfer to telemetry.       Problem: Breathing Pattern Ineffective (Adult)  Goal: Effective Oxygenation/Ventilation  Outcome: Ongoing (interventions implemented as appropriate)

## 2018-10-06 NOTE — PLAN OF CARE
Problem: Patient Care Overview  Goal: Plan of Care Review  Outcome: Ongoing (interventions implemented as appropriate)  PT RESTING BETWEEN CARE.  VSS.  NO COMPLAINTS   REFER TO FLOWSHEET

## 2018-10-06 NOTE — PLAN OF CARE
Problem: Breathing Pattern Ineffective (Adult)  Goal: Anxiety/Fear Reduction  Outcome: Ongoing (interventions implemented as appropriate)   10/06/18 4810   Breathing Pattern Ineffective (Adult)   Anxiety/Fear Reduction making progress toward outcome

## 2018-10-07 LAB
ANION GAP SERPL CALCULATED.3IONS-SCNC: 5 MMOL/L (ref 3–11)
BACTERIA SPEC AEROBE CULT: NORMAL
BACTERIA SPEC AEROBE CULT: NORMAL
BACTERIA SPEC RESP CULT: ABNORMAL
BACTERIA SPEC RESP CULT: ABNORMAL
BUN BLD-MCNC: 33 MG/DL (ref 9–23)
BUN/CREAT SERPL: 24.8 (ref 7–25)
CALCIUM SPEC-SCNC: 8.3 MG/DL (ref 8.7–10.4)
CHLORIDE SERPL-SCNC: 104 MMOL/L (ref 99–109)
CO2 SERPL-SCNC: 29 MMOL/L (ref 20–31)
CREAT BLD-MCNC: 1.33 MG/DL (ref 0.6–1.3)
GFR SERPL CREATININE-BSD FRML MDRD: 53 ML/MIN/1.73
GLUCOSE BLD-MCNC: 128 MG/DL (ref 70–100)
GLUCOSE BLDC GLUCOMTR-MCNC: 107 MG/DL (ref 70–130)
GLUCOSE BLDC GLUCOMTR-MCNC: 146 MG/DL (ref 70–130)
GLUCOSE BLDC GLUCOMTR-MCNC: 148 MG/DL (ref 70–130)
GLUCOSE BLDC GLUCOMTR-MCNC: 167 MG/DL (ref 70–130)
GRAM STN SPEC: ABNORMAL
MAGNESIUM SERPL-MCNC: 2.4 MG/DL (ref 1.3–2.7)
PHOSPHATE SERPL-MCNC: 4 MG/DL (ref 2.4–5.1)
POTASSIUM BLD-SCNC: 4.3 MMOL/L (ref 3.5–5.5)
SODIUM BLD-SCNC: 138 MMOL/L (ref 132–146)

## 2018-10-07 PROCEDURE — 25010000002 HEPARIN (PORCINE) PER 1000 UNITS: Performed by: INTERNAL MEDICINE

## 2018-10-07 PROCEDURE — 83735 ASSAY OF MAGNESIUM: CPT | Performed by: INTERNAL MEDICINE

## 2018-10-07 PROCEDURE — 25010000002 MEROPENEM: Performed by: NURSE PRACTITIONER

## 2018-10-07 PROCEDURE — 84100 ASSAY OF PHOSPHORUS: CPT | Performed by: INTERNAL MEDICINE

## 2018-10-07 PROCEDURE — 94760 N-INVAS EAR/PLS OXIMETRY 1: CPT

## 2018-10-07 PROCEDURE — 94799 UNLISTED PULMONARY SVC/PX: CPT

## 2018-10-07 PROCEDURE — 80048 BASIC METABOLIC PNL TOTAL CA: CPT | Performed by: INTERNAL MEDICINE

## 2018-10-07 PROCEDURE — 82962 GLUCOSE BLOOD TEST: CPT

## 2018-10-07 PROCEDURE — 99232 SBSQ HOSP IP/OBS MODERATE 35: CPT | Performed by: INTERNAL MEDICINE

## 2018-10-07 PROCEDURE — 63710000001 INSULIN LISPRO (HUMAN) PER 5 UNITS: Performed by: INTERNAL MEDICINE

## 2018-10-07 PROCEDURE — 63710000001 PREDNISONE PER 1 MG: Performed by: INTERNAL MEDICINE

## 2018-10-07 PROCEDURE — 25010000002 LINEZOLID 600 MG/300ML SOLUTION: Performed by: INTERNAL MEDICINE

## 2018-10-07 PROCEDURE — 94640 AIRWAY INHALATION TREATMENT: CPT

## 2018-10-07 RX ORDER — FUROSEMIDE 40 MG/1
40 TABLET ORAL DAILY
Status: DISCONTINUED | OUTPATIENT
Start: 2018-10-07 | End: 2018-10-20

## 2018-10-07 RX ADMIN — METOPROLOL TARTRATE 25 MG: 25 TABLET ORAL at 08:04

## 2018-10-07 RX ADMIN — ATORVASTATIN CALCIUM 40 MG: 40 TABLET, FILM COATED ORAL at 22:09

## 2018-10-07 RX ADMIN — MEROPENEM 1 G: 1 INJECTION, POWDER, FOR SOLUTION INTRAVENOUS at 17:26

## 2018-10-07 RX ADMIN — FAMOTIDINE 20 MG: 20 TABLET ORAL at 22:09

## 2018-10-07 RX ADMIN — ALPRAZOLAM 0.5 MG: 0.5 TABLET ORAL at 19:47

## 2018-10-07 RX ADMIN — IPRATROPIUM BROMIDE AND ALBUTEROL SULFATE 3 ML: 2.5; .5 SOLUTION RESPIRATORY (INHALATION) at 10:53

## 2018-10-07 RX ADMIN — CYANOCOBALAMIN TAB 1000 MCG 1000 MCG: 1000 TAB at 08:05

## 2018-10-07 RX ADMIN — IPRATROPIUM BROMIDE AND ALBUTEROL SULFATE 3 ML: 2.5; .5 SOLUTION RESPIRATORY (INHALATION) at 23:56

## 2018-10-07 RX ADMIN — CLOPIDOGREL BISULFATE 75 MG: 75 TABLET ORAL at 08:04

## 2018-10-07 RX ADMIN — HEPARIN SODIUM 5000 UNITS: 5000 INJECTION, SOLUTION INTRAVENOUS; SUBCUTANEOUS at 06:51

## 2018-10-07 RX ADMIN — HEPARIN SODIUM 5000 UNITS: 5000 INJECTION, SOLUTION INTRAVENOUS; SUBCUTANEOUS at 15:45

## 2018-10-07 RX ADMIN — GABAPENTIN 200 MG: 100 CAPSULE ORAL at 06:51

## 2018-10-07 RX ADMIN — MEROPENEM 1 G: 1 INJECTION, POWDER, FOR SOLUTION INTRAVENOUS at 01:51

## 2018-10-07 RX ADMIN — LINEZOLID 600 MG: 600 INJECTION, SOLUTION INTRAVENOUS at 17:25

## 2018-10-07 RX ADMIN — MICONAZOLE NITRATE: 2 CREAM TOPICAL at 08:05

## 2018-10-07 RX ADMIN — FUROSEMIDE 40 MG: 40 TABLET ORAL at 08:04

## 2018-10-07 RX ADMIN — PREDNISONE 20 MG: 20 TABLET ORAL at 08:04

## 2018-10-07 RX ADMIN — NICOTINE 1 PATCH: 21 PATCH, EXTENDED RELEASE TRANSDERMAL at 08:06

## 2018-10-07 RX ADMIN — IPRATROPIUM BROMIDE AND ALBUTEROL SULFATE 3 ML: 2.5; .5 SOLUTION RESPIRATORY (INHALATION) at 00:00

## 2018-10-07 RX ADMIN — ACETAMINOPHEN 325 MG: 325 TABLET ORAL at 01:51

## 2018-10-07 RX ADMIN — IPRATROPIUM BROMIDE AND ALBUTEROL SULFATE 3 ML: 2.5; .5 SOLUTION RESPIRATORY (INHALATION) at 16:38

## 2018-10-07 RX ADMIN — IPRATROPIUM BROMIDE AND ALBUTEROL SULFATE 3 ML: 2.5; .5 SOLUTION RESPIRATORY (INHALATION) at 20:22

## 2018-10-07 RX ADMIN — Medication 3 ML: at 22:16

## 2018-10-07 RX ADMIN — Medication 10 ML: at 22:16

## 2018-10-07 RX ADMIN — INSULIN LISPRO 2 UNITS: 100 INJECTION, SOLUTION INTRAVENOUS; SUBCUTANEOUS at 22:23

## 2018-10-07 RX ADMIN — MIRTAZAPINE 15 MG: 15 TABLET, ORALLY DISINTEGRATING ORAL at 22:22

## 2018-10-07 RX ADMIN — PREDNISONE 20 MG: 20 TABLET ORAL at 22:10

## 2018-10-07 RX ADMIN — GUAIFENESIN 600 MG: 600 TABLET, EXTENDED RELEASE ORAL at 22:10

## 2018-10-07 RX ADMIN — DOCUSATE SODIUM,SENNOSIDES 2 TABLET: 50; 8.6 TABLET, FILM COATED ORAL at 22:11

## 2018-10-07 RX ADMIN — GUAIFENESIN 600 MG: 600 TABLET, EXTENDED RELEASE ORAL at 08:04

## 2018-10-07 RX ADMIN — MICONAZOLE NITRATE: 2 CREAM TOPICAL at 22:08

## 2018-10-07 RX ADMIN — IPRATROPIUM BROMIDE AND ALBUTEROL SULFATE 3 ML: 2.5; .5 SOLUTION RESPIRATORY (INHALATION) at 03:03

## 2018-10-07 RX ADMIN — FAMOTIDINE 20 MG: 20 TABLET ORAL at 08:04

## 2018-10-07 RX ADMIN — LINEZOLID 600 MG: 600 INJECTION, SOLUTION INTRAVENOUS at 06:52

## 2018-10-07 RX ADMIN — CASTOR OIL AND BALSAM, PERU: 788; 87 OINTMENT TOPICAL at 08:04

## 2018-10-07 RX ADMIN — CASTOR OIL AND BALSAM, PERU: 788; 87 OINTMENT TOPICAL at 22:08

## 2018-10-07 RX ADMIN — IPRATROPIUM BROMIDE AND ALBUTEROL SULFATE 3 ML: 2.5; .5 SOLUTION RESPIRATORY (INHALATION) at 07:01

## 2018-10-07 RX ADMIN — MEROPENEM 1 G: 1 INJECTION, POWDER, FOR SOLUTION INTRAVENOUS at 10:23

## 2018-10-07 RX ADMIN — ASPIRIN 81 MG CHEWABLE TABLET 81 MG: 81 TABLET CHEWABLE at 08:04

## 2018-10-07 RX ADMIN — GABAPENTIN 200 MG: 100 CAPSULE ORAL at 15:45

## 2018-10-07 RX ADMIN — GABAPENTIN 200 MG: 100 CAPSULE ORAL at 22:10

## 2018-10-07 RX ADMIN — HEPARIN SODIUM 5000 UNITS: 5000 INJECTION, SOLUTION INTRAVENOUS; SUBCUTANEOUS at 22:10

## 2018-10-07 RX ADMIN — METOPROLOL TARTRATE 25 MG: 25 TABLET ORAL at 22:09

## 2018-10-07 NOTE — PROGRESS NOTES
INTENSIVIST   PROGRESS NOTE     Hospital:  LOS: 5 days     Mr. Yassine Love, 72 y.o. male is followed for a Chief Complaint of: Acute on Chronic Respiratory Failure, End-stage COPD      Subjective   S   Mr. Love is a 71yo M with a history of severe COPD and chronic respiratory failure on home O2 who was admitted on 10/2/18 for a COPD exacerbation associated with severe hypoxemia. CT scan of the chest was negative for PE but did reveal pulmonary edema with diffuse interstitial infiltrates and pleural effusions. BNP was elevated at 899. He was given his home dose of Lasix and placed on Zosyn, Solumedrol and bronchodilators. He was admitted to the Hospital Medicine Service on Bipap. On 10/3/18, he was noted to become unresponsive. He was previously a DNR but this was reversed by the patient at the time of admission. He was intubated at the bedside with copious thick yellow secretions noted. He was transferred to the ICU. He initially required high FiO2 of 60% but this rapidly improved and he self-extubated on 10/3/18 at 11PM. Since that time, he has required HFNC to maintain his oxygen saturations. He is still deciding regarding his CODE STATUS. He remains on Linezolid and Meropenem for pneumonia.     Interval History:  No events overnight. He remains on HFNC.        The patient's relevant past medical, surgical and social history were reviewed and updated in Epic as appropriate.      ROS:   Constitutional: Negative for fever.   Respiratory: Positive for dyspnea.   Cardiovascular: Negative for chest pain.   Gastrointestinal: Negative for  nausea, vomiting and diarrhea.     Objective   O     Vitals:  Temp  Min: 97.1 °F (36.2 °C)  Max: 98.1 °F (36.7 °C)  BP  Min: 85/48  Max: 129/88  Pulse  Min: 48  Max: 77  Resp  Min: 16  Max: 20  SpO2  Min: 82 %  Max: 96 % Flow (L/min)  Min: 35  Max: 45    Intake/Ouptut 24 hrs (7:00AM - 6:59 AM)  Intake & Output (last 3 days)       10/04 0701 - 10/05 0700 10/05 0701 - 10/06 0700  10/06 0701 - 10/07 0700 10/07 0701 - 10/08 0700    P.O.  700 720     I.V. (mL/kg) 1735.3 (20)  598 (6.9)     IV Piggyback 350 100 500 100    Total Intake(mL/kg) 2085.3 (24) 800 (9.2) 1818 (21) 100 (1.2)    Urine (mL/kg/hr) 1505 (0.7) 3450 (1.7) 4475 (2.2)     Total Output 1505 3450 4475      Net +580.3 -2650 -2657 +100                      Physical Examination  Telemetry:  Normal sinus rhythm.    Constitutional:  No acute distress.  On HFNC.    Cardiovascular: Normal rate, regular and rhythm. Normal heart sounds.  No murmurs, gallop or rub.   Respiratory: No respiratory distress. Normal respiratory effort.  Diminished bilaterally. No wheezing.     Abdominal:  Soft. No masses. Non-tender. No distension. No HSM.   Extremities: No digital cyanosis. No clubbing.  No peripheral edema.   Neurological:   Alert and Oriented to person, place, and time.             Results from last 7 days  Lab Units 10/06/18  0535 10/05/18  0353 10/04/18  0431   WBC 10*3/mm3 5.26 7.38 6.00   HEMOGLOBIN g/dL 10.2* 9.1* 10.0*   MCV fL 97.8 96.2 94.7   PLATELETS 10*3/mm3 287 297 305       Results from last 7 days  Lab Units 10/07/18  0516 10/06/18  0535 10/05/18  0353   SODIUM mmol/L 138 141 138   POTASSIUM mmol/L 4.3 5.0 4.0   CO2 mmol/L 29.0 32.0* 36.0*   CREATININE mg/dL 1.33* 1.16 1.27   GLUCOSE mg/dL 128* 103* 83   MAGNESIUM mg/dL 2.4 2.4 2.2   PHOSPHORUS mg/dL 4.0 3.9 3.7     Estimated Creatinine Clearance: 52 mL/min (A) (by C-G formula based on SCr of 1.33 mg/dL (H)).    Results from last 7 days  Lab Units 10/05/18  0353 10/04/18  0431 10/02/18  1750   ALK PHOS U/L 92 102* 128*   BILIRUBIN mg/dL 0.4 0.5 0.6   ALT (SGPT) U/L 12 12 18   AST (SGOT) U/L 18 15 17         Results from last 7 days  Lab Units 10/05/18  0346 10/04/18  0414 10/03/18  0301 10/03/18  0105 10/02/18  2014   PH, ARTERIAL pH units 7.406 7.466* 7.452* 7.435 7.395   PCO2, ARTERIAL mm Hg 58.1* 59.3* 60.9* 67.3* 74.9*   PO2 ART mm Hg 73.7* 61.9* 113.0* 64.0* 66.1*   FIO2 %  50 60 100 50 40       Images:  Imaging Results (last 24 hours)     Procedure Component Value Units Date/Time    XR Chest 1 View [[id number]] Collected:  10/06/18 1115     Updated:  10/06/18 1355    Narrative:       EXAMINATION: XR CHEST 1 VW - 10/06/2018     INDICATION: J96.21-Acute and chronic respiratory failure with hypoxia;  J96.22-Acute and chronic respiratory failure with hypercapnia;  Z74.09-Other reduced mobility.     TECHNIQUE:  Single view frontal chest.     COMPARISONS: 10/5/2018.     FINDINGS:  Stable support apparatus. Small bilateral pleural effusions  and adjacent atelectasis are unchanged. No pneumothorax. Calcification  aortic knob. Cardiomediastinal silhouette is unchanged.        Impression:       Stable exam.     DICTATED:   10/06/2018  EDITED/ls :   10/06/2018      This report was finalized on 10/6/2018 1:53 PM by Eliezer Hale.               Results: Reviewed.  I reviewed the patient's new laboratory and imaging results.  I independently reviewed the patient's new images.    Medications: Reviewed.    Assessment/Plan   A / P     Mr. Love is a 73yo M with a history of End-stage COPD who was admitted with acute on chronic respiratory failure. He remains on HFNC. He has evidence of pulmonary edema on imaging and is being diuresed.     Nutrition:   Diet Regular  Advance Directives:   Code Status and Medical Interventions:   Ordered at: 10/02/18 2100     Level Of Support Discussed With:    Patient     Code Status:    CPR     Medical Interventions (Level of Support Prior to Arrest):    Full       Hospital Problem List     * (Principal)Acute on chronic mixed hypercarbic/hypoxemic respiratory failure requiring intubation and ventilatory support    PVD (peripheral vascular disease)     H/O cellulitis of both lower extremities    Overview Signed 10/3/2018  1:47 AM by Perla Jones APRN     Previously managed by Infectious Disease          End stage COPD in an active smoker on home O2 with chronic  hypercarbia    Overview Signed 10/3/2018  1:46 AM by Perla Jones APRN     Managed by [facility]          Essential hypertension    CAD status post GIGI ×2 to LAD and RCA 3/12/18    Overview Signed 10/3/2018  1:45 AM by Perla Jones APRN     S/P [facility] per Dr. Harvey on 3/12/18 with PCI revealing severe 2-vessel disease.    GIGI x 2 to LAD and RCA         Decubitus ulcer of buttock, stage 2    Acute kidney injury    Overview Signed 10/3/2018  1:39 AM by Perla Jones APRN     Cr 1.35 increased from 9/8/18 1.15          Brief runs of SVT (supraventricular tachycardia)     H/O bacteremia due to Staphylococcus    Opioid use    Pneumonia due to infectious organism          Assessment / Plan:    1. Continue diuresis. Slight bump in creatinine today. Decrease Lasix back to once daily.   2. Wean HFNC as tolerated. Goal O2 saturation 88-90%  3. Replace electrolytes as needed.   4. Continue to address goals of care. May need a palliative care consult if he cannot decide.   5. AM labs  6. Okay to transfer to telemetry when a bed is available.       I discussed the patient's findings and my recommendations with patient and nursing staff    Time: was greater than 25 minutes.      Pinky George, DO    Intensive Care Medicine and Pulmonary Medicine

## 2018-10-07 NOTE — PLAN OF CARE
Problem: Patient Care Overview  Goal: Plan of Care Review  Outcome: Ongoing (interventions implemented as appropriate)   10/07/18 0970   Coping/Psychosocial   Plan of Care Reviewed With patient   Plan of Care Review   Progress improving   OTHER   Outcome Summary Arrived to floor from unit at approximately 1215. No c/o pain or discomfort. Will continue to monitor       Problem: Wound (Includes Pressure Injury) (Adult)  Goal: Signs and Symptoms of Listed Potential Problems Will be Absent, Minimized or Managed (Wound)  Outcome: Ongoing (interventions implemented as appropriate)

## 2018-10-08 LAB
ANION GAP SERPL CALCULATED.3IONS-SCNC: 3 MMOL/L (ref 3–11)
BUN BLD-MCNC: 36 MG/DL (ref 9–23)
BUN/CREAT SERPL: 28.6 (ref 7–25)
CALCIUM SPEC-SCNC: 8.3 MG/DL (ref 8.7–10.4)
CHLORIDE SERPL-SCNC: 106 MMOL/L (ref 99–109)
CO2 SERPL-SCNC: 31 MMOL/L (ref 20–31)
CREAT BLD-MCNC: 1.26 MG/DL (ref 0.6–1.3)
GFR SERPL CREATININE-BSD FRML MDRD: 56 ML/MIN/1.73
GLUCOSE BLD-MCNC: 106 MG/DL (ref 70–100)
GLUCOSE BLDC GLUCOMTR-MCNC: 119 MG/DL (ref 70–130)
GLUCOSE BLDC GLUCOMTR-MCNC: 139 MG/DL (ref 70–130)
GLUCOSE BLDC GLUCOMTR-MCNC: 147 MG/DL (ref 70–130)
GLUCOSE BLDC GLUCOMTR-MCNC: 168 MG/DL (ref 70–130)
MAGNESIUM SERPL-MCNC: 2.3 MG/DL (ref 1.3–2.7)
PHOSPHATE SERPL-MCNC: 3.7 MG/DL (ref 2.4–5.1)
POTASSIUM BLD-SCNC: 4.6 MMOL/L (ref 3.5–5.5)
SODIUM BLD-SCNC: 140 MMOL/L (ref 132–146)

## 2018-10-08 PROCEDURE — 99233 SBSQ HOSP IP/OBS HIGH 50: CPT | Performed by: INTERNAL MEDICINE

## 2018-10-08 PROCEDURE — 25010000002 MEROPENEM: Performed by: NURSE PRACTITIONER

## 2018-10-08 PROCEDURE — 94799 UNLISTED PULMONARY SVC/PX: CPT

## 2018-10-08 PROCEDURE — 97530 THERAPEUTIC ACTIVITIES: CPT | Performed by: OCCUPATIONAL THERAPIST

## 2018-10-08 PROCEDURE — 80048 BASIC METABOLIC PNL TOTAL CA: CPT | Performed by: INTERNAL MEDICINE

## 2018-10-08 PROCEDURE — 82962 GLUCOSE BLOOD TEST: CPT

## 2018-10-08 PROCEDURE — 84100 ASSAY OF PHOSPHORUS: CPT | Performed by: INTERNAL MEDICINE

## 2018-10-08 PROCEDURE — 25010000002 LINEZOLID 600 MG/300ML SOLUTION: Performed by: INTERNAL MEDICINE

## 2018-10-08 PROCEDURE — 97116 GAIT TRAINING THERAPY: CPT

## 2018-10-08 PROCEDURE — 94640 AIRWAY INHALATION TREATMENT: CPT

## 2018-10-08 PROCEDURE — 97110 THERAPEUTIC EXERCISES: CPT

## 2018-10-08 PROCEDURE — 25010000002 MORPHINE PER 10 MG: Performed by: INTERNAL MEDICINE

## 2018-10-08 PROCEDURE — 99233 SBSQ HOSP IP/OBS HIGH 50: CPT | Performed by: FAMILY MEDICINE

## 2018-10-08 PROCEDURE — 94760 N-INVAS EAR/PLS OXIMETRY 1: CPT

## 2018-10-08 PROCEDURE — 63710000001 PREDNISONE PER 1 MG: Performed by: INTERNAL MEDICINE

## 2018-10-08 PROCEDURE — 25010000002 HEPARIN (PORCINE) PER 1000 UNITS: Performed by: INTERNAL MEDICINE

## 2018-10-08 PROCEDURE — 97110 THERAPEUTIC EXERCISES: CPT | Performed by: OCCUPATIONAL THERAPIST

## 2018-10-08 PROCEDURE — 83735 ASSAY OF MAGNESIUM: CPT | Performed by: INTERNAL MEDICINE

## 2018-10-08 RX ORDER — SENNA AND DOCUSATE SODIUM 50; 8.6 MG/1; MG/1
2 TABLET, FILM COATED ORAL 2 TIMES DAILY
Status: DISCONTINUED | OUTPATIENT
Start: 2018-10-08 | End: 2018-10-22 | Stop reason: HOSPADM

## 2018-10-08 RX ORDER — LACTULOSE 10 G/15ML
20 SOLUTION ORAL 2 TIMES DAILY PRN
Status: DISCONTINUED | OUTPATIENT
Start: 2018-10-08 | End: 2018-10-22 | Stop reason: HOSPADM

## 2018-10-08 RX ORDER — MORPHINE SULFATE 2 MG/ML
2 INJECTION, SOLUTION INTRAMUSCULAR; INTRAVENOUS ONCE
Status: COMPLETED | OUTPATIENT
Start: 2018-10-08 | End: 2018-10-08

## 2018-10-08 RX ORDER — HYDROCODONE BITARTRATE AND ACETAMINOPHEN 10; 325 MG/1; MG/1
1 TABLET ORAL EVERY 4 HOURS PRN
Status: DISPENSED | OUTPATIENT
Start: 2018-10-08 | End: 2018-10-18

## 2018-10-08 RX ORDER — L.ACID,PARA/B.BIFIDUM/S.THERM 8B CELL
1 CAPSULE ORAL DAILY
Status: DISCONTINUED | OUTPATIENT
Start: 2018-10-08 | End: 2018-10-22 | Stop reason: HOSPADM

## 2018-10-08 RX ORDER — MORPHINE SULFATE 15 MG/1
15 TABLET, FILM COATED, EXTENDED RELEASE ORAL EVERY 12 HOURS SCHEDULED
Status: COMPLETED | OUTPATIENT
Start: 2018-10-08 | End: 2018-10-18

## 2018-10-08 RX ORDER — BISACODYL 10 MG
10 SUPPOSITORY, RECTAL RECTAL DAILY PRN
Status: DISCONTINUED | OUTPATIENT
Start: 2018-10-08 | End: 2018-10-22 | Stop reason: HOSPADM

## 2018-10-08 RX ORDER — PREDNISONE 20 MG/1
20 TABLET ORAL
Status: COMPLETED | OUTPATIENT
Start: 2018-10-09 | End: 2018-10-11

## 2018-10-08 RX ADMIN — HYDROCODONE BITARTRATE AND ACETAMINOPHEN 1 TABLET: 10; 325 TABLET ORAL at 18:46

## 2018-10-08 RX ADMIN — FAMOTIDINE 20 MG: 20 TABLET ORAL at 20:18

## 2018-10-08 RX ADMIN — CASTOR OIL AND BALSAM, PERU: 788; 87 OINTMENT TOPICAL at 10:01

## 2018-10-08 RX ADMIN — IPRATROPIUM BROMIDE AND ALBUTEROL SULFATE 3 ML: 2.5; .5 SOLUTION RESPIRATORY (INHALATION) at 04:03

## 2018-10-08 RX ADMIN — ATORVASTATIN CALCIUM 40 MG: 40 TABLET, FILM COATED ORAL at 20:18

## 2018-10-08 RX ADMIN — MICONAZOLE NITRATE: 2 CREAM TOPICAL at 20:09

## 2018-10-08 RX ADMIN — MIRTAZAPINE 15 MG: 15 TABLET, ORALLY DISINTEGRATING ORAL at 20:19

## 2018-10-08 RX ADMIN — INSULIN LISPRO 2 UNITS: 100 INJECTION, SOLUTION INTRAVENOUS; SUBCUTANEOUS at 20:23

## 2018-10-08 RX ADMIN — Medication 3 ML: at 10:01

## 2018-10-08 RX ADMIN — LINEZOLID 600 MG: 600 INJECTION, SOLUTION INTRAVENOUS at 16:49

## 2018-10-08 RX ADMIN — HYDROCODONE BITARTRATE AND ACETAMINOPHEN 1 TABLET: 10; 325 TABLET ORAL at 23:02

## 2018-10-08 RX ADMIN — Medication 3 ML: at 20:28

## 2018-10-08 RX ADMIN — CLOPIDOGREL BISULFATE 75 MG: 75 TABLET ORAL at 09:50

## 2018-10-08 RX ADMIN — METOPROLOL TARTRATE 25 MG: 25 TABLET ORAL at 20:19

## 2018-10-08 RX ADMIN — FUROSEMIDE 40 MG: 40 TABLET ORAL at 09:49

## 2018-10-08 RX ADMIN — GUAIFENESIN 600 MG: 600 TABLET, EXTENDED RELEASE ORAL at 09:49

## 2018-10-08 RX ADMIN — ASPIRIN 81 MG CHEWABLE TABLET 81 MG: 81 TABLET CHEWABLE at 09:50

## 2018-10-08 RX ADMIN — METOPROLOL TARTRATE 25 MG: 25 TABLET ORAL at 09:49

## 2018-10-08 RX ADMIN — SENNOSIDES AND DOCUSATE SODIUM 2 TABLET: 8.6; 5 TABLET ORAL at 20:18

## 2018-10-08 RX ADMIN — GABAPENTIN 200 MG: 100 CAPSULE ORAL at 14:29

## 2018-10-08 RX ADMIN — LINEZOLID 600 MG: 600 INJECTION, SOLUTION INTRAVENOUS at 05:48

## 2018-10-08 RX ADMIN — PREDNISONE 20 MG: 20 TABLET ORAL at 09:49

## 2018-10-08 RX ADMIN — HEPARIN SODIUM 5000 UNITS: 5000 INJECTION, SOLUTION INTRAVENOUS; SUBCUTANEOUS at 05:49

## 2018-10-08 RX ADMIN — Medication 10 ML: at 10:02

## 2018-10-08 RX ADMIN — CYANOCOBALAMIN TAB 1000 MCG 1000 MCG: 1000 TAB at 09:49

## 2018-10-08 RX ADMIN — GUAIFENESIN 600 MG: 600 TABLET, EXTENDED RELEASE ORAL at 20:19

## 2018-10-08 RX ADMIN — MEROPENEM 1 G: 1 INJECTION, POWDER, FOR SOLUTION INTRAVENOUS at 02:03

## 2018-10-08 RX ADMIN — Medication 1 CAPSULE: at 16:41

## 2018-10-08 RX ADMIN — MEROPENEM 1 G: 1 INJECTION, POWDER, FOR SOLUTION INTRAVENOUS at 09:49

## 2018-10-08 RX ADMIN — GABAPENTIN 200 MG: 100 CAPSULE ORAL at 20:19

## 2018-10-08 RX ADMIN — MEROPENEM 1 G: 1 INJECTION, POWDER, FOR SOLUTION INTRAVENOUS at 17:35

## 2018-10-08 RX ADMIN — GABAPENTIN 200 MG: 100 CAPSULE ORAL at 05:49

## 2018-10-08 RX ADMIN — IPRATROPIUM BROMIDE AND ALBUTEROL SULFATE 3 ML: 2.5; .5 SOLUTION RESPIRATORY (INHALATION) at 12:30

## 2018-10-08 RX ADMIN — IPRATROPIUM BROMIDE AND ALBUTEROL SULFATE 3 ML: 2.5; .5 SOLUTION RESPIRATORY (INHALATION) at 16:14

## 2018-10-08 RX ADMIN — MORPHINE SULFATE 15 MG: 15 TABLET, EXTENDED RELEASE ORAL at 20:19

## 2018-10-08 RX ADMIN — HEPARIN SODIUM 5000 UNITS: 5000 INJECTION, SOLUTION INTRAVENOUS; SUBCUTANEOUS at 20:19

## 2018-10-08 RX ADMIN — CASTOR OIL AND BALSAM, PERU: 788; 87 OINTMENT TOPICAL at 20:09

## 2018-10-08 RX ADMIN — ACETAMINOPHEN 325 MG: 325 TABLET ORAL at 10:34

## 2018-10-08 RX ADMIN — MICONAZOLE NITRATE: 2 CREAM TOPICAL at 10:03

## 2018-10-08 RX ADMIN — IPRATROPIUM BROMIDE AND ALBUTEROL SULFATE 3 ML: 2.5; .5 SOLUTION RESPIRATORY (INHALATION) at 20:19

## 2018-10-08 RX ADMIN — MORPHINE SULFATE 2 MG: 2 INJECTION, SOLUTION INTRAMUSCULAR; INTRAVENOUS at 14:29

## 2018-10-08 RX ADMIN — HEPARIN SODIUM 5000 UNITS: 5000 INJECTION, SOLUTION INTRAVENOUS; SUBCUTANEOUS at 14:29

## 2018-10-08 RX ADMIN — IPRATROPIUM BROMIDE AND ALBUTEROL SULFATE 3 ML: 2.5; .5 SOLUTION RESPIRATORY (INHALATION) at 08:37

## 2018-10-08 RX ADMIN — FAMOTIDINE 20 MG: 20 TABLET ORAL at 09:49

## 2018-10-08 RX ADMIN — IPRATROPIUM BROMIDE AND ALBUTEROL SULFATE 3 ML: 2.5; .5 SOLUTION RESPIRATORY (INHALATION) at 23:24

## 2018-10-08 RX ADMIN — NICOTINE 1 PATCH: 21 PATCH, EXTENDED RELEASE TRANSDERMAL at 10:03

## 2018-10-08 RX ADMIN — POLYETHYLENE GLYCOL 3350 17 G: 17 POWDER, FOR SOLUTION ORAL at 16:41

## 2018-10-08 NOTE — PLAN OF CARE
Problem: Patient Care Overview  Goal: Plan of Care Review  Outcome: Ongoing (interventions implemented as appropriate)   10/08/18 0985   Coping/Psychosocial   Plan of Care Reviewed With patient   Plan of Care Review   Progress improving   OTHER   Outcome Summary Pt. demonstrated improved safety and independence w/ mobility, performing bed mobility w/ mod A, sit to stand transfers w/ RW w/ mod A x 2, and ambulating 2ft. w/ RW w/ mod A x 2. Pt. very motivated, w/ very good effort. Pt. desaturated to 84% (on 2 L) w/ exertion, recovered to >90% in 1 min. Pt. will continue to benefit from skilled IPPT to improve safety and ind. w/ mobility prior to D/C.

## 2018-10-08 NOTE — PROGRESS NOTES
INPATIENT PULMONARY SERVICE  PROGRESS NOTE     Hospital LOS: 6 days    Mr. Yassine Love, is followed for a Chief Complaint of:  Chief Complaint   Patient presents with   • Shortness of Breath     Principal Problem:    Acute on chronic mixed hypercarbic/hypoxemic respiratory failure requiring intubation and ventilatory support  Active Problems:    PVD (peripheral vascular disease)     H/O cellulitis of both lower extremities    End stage COPD in an active smoker on home O2 with chronic hypercarbia    Essential hypertension    CAD status post GIGI ×2 to LAD and RCA 3/12/18    Decubitus ulcer of buttock, stage 2    Acute kidney injury    Brief runs of SVT (supraventricular tachycardia)     H/O bacteremia due to Staphylococcus    Opioid use    Pneumonia due to infectious organism      Subjective   S   Mr. Love is a 71yo M with a history of severe COPD and chronic respiratory failure on home O2 who was admitted on 10/2/18 for a COPD exacerbation associated with severe hypoxemia. CT scan of the chest was negative for PE but did reveal pulmonary edema with diffuse interstitial infiltrates and pleural effusions. BNP was elevated at 899. He was given his home dose of Lasix and placed on Zosyn, Solumedrol and bronchodilators. He was admitted to the Hospital Medicine Service on Bipap. On 10/3/18, he was noted to become unresponsive. He was previously a DNR but this was reversed by the patient at the time of admission. He was intubated at the bedside with copious thick yellow secretions noted. He was transferred to the ICU. He initially required high FiO2 of 60% but this rapidly improved and he self-extubated on 10/3/18 at 11PM. Since that time, he has required HFNC to maintain his oxygen saturations. He is still deciding regarding his CODE STATUS. He remains on Linezolid and Meropenem for pneumonia.     Interval History:  No events overnight. Transferred out of the ICU yesterday. Down to 2L nasal cannula.        The  patient's relevant past medical, surgical and social history were reviewed and updated in Epic as appropriate.      ROS:   Constitutional: Negative for fever.   Respiratory: Positive for dyspnea.   Cardiovascular: Negative for chest pain.   Gastrointestinal: Negative for  nausea, vomiting and diarrhea.     Objective   O     Vitals  Temp  Min: 96.5 °F (35.8 °C)  Max: 98.3 °F (36.8 °C)  BP  Min: 111/73  Max: 138/88  Pulse  Min: 54  Max: 67  Resp  Min: 16  Max: 22  SpO2  Min: 88 %  Max: 98 % Flow (L/min)  Min: 1.5  Max: 40    I/O 24 HR (7:00 AM-6:59AM):  Intake/Output       10/07/18 0700 - 10/08/18 0659 10/08/18 0700 - 10/09/18 0659    Intake (ml) 100 100    Output (ml) 1225 200    Net (ml) -1125 -100    Last Weight  82.5 kg (181 lb 12.8 oz)  --            Physical Examination    Telemetry: Normal sinus rhythm.    Constitutional:  No acute distress.  Conversant.  Sitting up in a chair.    Cardiovascular: Regular rate and rhythm.  No murmurs, rub or gallop.   Respiratory: Normal symmetric chest expansion.  Diminished bilaterally.   Clear to ascultation.   Abdominal:  Soft. No masses.   Non-tender. No distension.   No hepatosplenomegaly.   Extremities: No digital cyanosis or clubbing.  1+ peripheral edema.   Neurological:   Alert and Oriented to person, place, and time.   Moves all extremities.              Results from last 7 days  Lab Units 10/06/18  0535 10/05/18  0353 10/04/18  0431   WBC 10*3/mm3 5.26 7.38 6.00   HEMOGLOBIN g/dL 10.2* 9.1* 10.0*   MCV fL 97.8 96.2 94.7   PLATELETS 10*3/mm3 287 297 305       Results from last 7 days  Lab Units 10/08/18  0534 10/07/18  0516 10/06/18  0535   SODIUM mmol/L 140 138 141   POTASSIUM mmol/L 4.6 4.3 5.0   CO2 mmol/L 31.0 29.0 32.0*   CREATININE mg/dL 1.26 1.33* 1.16   MAGNESIUM mg/dL 2.3 2.4 2.4   PHOSPHORUS mg/dL 3.7 4.0 3.9     Serum creatinine: 1.26 mg/dL 10/08/18 0534  Estimated creatinine clearance: 53.7 mL/min    Results from last 7 days  Lab Units 10/05/18  1179  10/04/18  0431 10/02/18  1750   ALK PHOS U/L 92 102* 128*   BILIRUBIN mg/dL 0.4 0.5 0.6   ALT (SGPT) U/L 12 12 18   AST (SGOT) U/L 18 15 17         Results from last 7 days  Lab Units 10/05/18  0346 10/04/18  0414 10/03/18  0301 10/03/18  0105 10/02/18  2014   PH, ARTERIAL pH units 7.406 7.466* 7.452* 7.435 7.395   PCO2, ARTERIAL mm Hg 58.1* 59.3* 60.9* 67.3* 74.9*   PO2 ART mm Hg 73.7* 61.9* 113.0* 64.0* 66.1*   FIO2 % 50 60 100 50 40       Images:     Imaging Results (last 24 hours)     ** No results found for the last 24 hours. **          I reviewed the patient's new clinical results.  I reviewed the patient's new imaging results and agree with the interpretation.    Assessment/Plan   A / P     Mr. Love is a 73yo M with a history of End-stage COPD who was admitted with acute on chronic respiratory failure. He has been transitioned from HFNC to 2L. He has evidence of pulmonary edema on imaging and is being diuresed.     Diet Regular  Code Status and Medical Interventions:   Ordered at: 10/02/18 2100     Level Of Support Discussed With:    Patient     Code Status:    CPR     Medical Interventions (Level of Support Prior to Arrest):    Full       Hospital Problem List     * (Principal)Acute on chronic mixed hypercarbic/hypoxemic respiratory failure requiring intubation and ventilatory support    PVD (peripheral vascular disease)     H/O cellulitis of both lower extremities    Overview Signed 10/3/2018  1:47 AM by Perla Jones APRN     Previously managed by Infectious Disease          End stage COPD in an active smoker on home O2 with chronic hypercarbia    Overview Signed 10/3/2018  1:46 AM by Perla Jones APRN     Managed by [facility]          Essential hypertension    CAD status post GIGI ×2 to LAD and RCA 3/12/18    Overview Signed 10/3/2018  1:45 AM by Perla Jones APRN     S/P [facility] per Dr. Harvey on 3/12/18 with PCI revealing severe 2-vessel disease.    GIGI x 2 to LAD and RCA          Decubitus ulcer of buttock, stage 2    Acute kidney injury    Overview Signed 10/3/2018  1:39 AM by Perla Jones, APRN     Cr 1.35 increased from 9/8/18 1.15          Brief runs of SVT (supraventricular tachycardia)     H/O bacteremia due to Staphylococcus    Opioid use    Pneumonia due to infectious organism          Assessment / Plan:  1. Change Prednisone to 20mg daily  2. Complete course of Linezolid and Meropenem.   3. Continue daily diuresis. Continue to monitor renal function.   4. May need rehab placement after discharge.     I discussed the patient's findings and my recommendations with patient    Time:  I spent 35 minutes, face to face, with the patient or on the gilbert coordinating care with other health care providers.   I spent > 50% percent of this time, counseling and discussing diagnosis, current status and management .     Pinky George, DO  Pulmonary and Critical Care Medicine

## 2018-10-08 NOTE — PLAN OF CARE
Problem: Breathing Pattern Ineffective (Adult)  Goal: Effective Oxygenation/Ventilation  Outcome: Ongoing (interventions implemented as appropriate)    Goal: Anxiety/Fear Reduction  Outcome: Ongoing (interventions implemented as appropriate)

## 2018-10-08 NOTE — PROGRESS NOTES
Clinical Nutrition   Reason For Visit: Follow-up protocol    Patient Name: Yassine Love  YOB: 1945  MRN: [id number]  Date of Encounter: 10/08/18 10:08 AM  Admission date: 10/2/2018        Nutrition Assessment     Hospital Problem List  Principal Problem:    Acute on chronic mixed hypercarbic/hypoxemic respiratory failure requiring intubation and ventilatory support  Active Problems:    PVD (peripheral vascular disease)     H/O cellulitis of both lower extremities    End stage COPD in an active smoker on home O2 with chronic hypercarbia    Essential hypertension    CAD status post GIGI ×2 to LAD and RCA 3/12/18    Decubitus ulcer of buttock, stage 2    Acute kidney injury    Brief runs of SVT (supraventricular tachycardia)     H/O bacteremia due to Staphylococcus    Opioid use    Pneumonia due to infectious organism          PMH: He  has a past medical history of Cancer (CMS/HCC); Cellulitis of both lower extremities; Chronic pain; COPD (chronic obstructive pulmonary disease) (CMS/HCC); Hypertension; Osteoarthritis cervical spine; Osteoarthritis of both knees; and Osteoarthritis of left hip.   PSxH: He  has a past surgical history that includes Neck surgery; Leg Surgery; Eye surgery; Cardiac catheterization (N/A, 3/9/2018); and pr rt/lt heart catheters (N/A, 3/13/2018).         Anthropometrics        10/8/2018 82.464 kg 181 lb 12.8 oz Bed scale   10/5/2018 86.637 kg 191 lb -   10/3/2018 86.8 kg 191 lb 5.8 oz Bed scale   10/2/2018 83.915 kg 185 lb Stated   9/1/2018 87.635 kg 193 lb 3.2 oz Bed scale   8/31/2018 88.587 kg 195 lb 4.8 oz Bed scale   8/30/2018 85.775 kg 189 lb 1.6 oz Bed scale   8/20/2018 81.647 kg 180 lb -   3/9/2018 82.101 kg 181 lb -   2/23/2018 82.237 kg 181 lb 4.8 oz -   2/16/2018 82.237 kg 181 lb 4.8 oz -   2/16/2018 81.647 kg 180 lb -   6/26/2017 85.3 kg 188 lb 0.8 oz -   6/21/2017 85.276 kg 188 lb -   6/20/2017 85.7 kg 188 lb 15 oz           Labs reviewed   Labs reviewed:  Yes    Results from last 7 days  Lab Units 10/08/18  0534 10/07/18  0516 10/06/18  0535   SODIUM mmol/L 140 138 141   POTASSIUM mmol/L 4.6 4.3 5.0   CHLORIDE mmol/L 106 104 104   CO2 mmol/L 31.0 29.0 32.0*   BUN mg/dL 36* 33* 37*   CREATININE mg/dL 1.26 1.33* 1.16   GLUCOSE mg/dL 106* 128* 103*   CALCIUM mg/dL 8.3* 8.3* 8.2*   PHOSPHORUS mg/dL 3.7 4.0 3.9   MAGNESIUM mg/dL 2.3 2.4 2.4     Medications reviewed   Medications reviewed: Yes      Current Nutrition Prescription   PO: Diet Regular      Evaluation of Received Nutrient/Fluid Intake:  3 Days:78% of 8 meals       Nutrition Diagnosis     Problem No nutrition diagnosis at this time   Etiology    Signs/Symptoms          Intervention   Intervention: Follow treatment progress, Care plan reviewed      Goal:   General: Maintain nutrition, Nutrition support treatment  PO: Maintain intake        Monitoring/Evaluation:       Monitoring/Evaluation: Per protocol  Will Continue to follow per protocol  Alva Baker RD  Time Spent: 15 min

## 2018-10-08 NOTE — PROGRESS NOTES
Continued Stay Note  [facility]     Patient Name: Yassine Love  MRN: [id number]  Today's Date: 10/8/2018    Admit Date: 10/2/2018          Discharge Plan     Row Name 10/08/18 1324       Plan    Plan Rehab    Patient/Family in Agreement with Plan yes    Plan Comments Spoke to pt at  and he states he does not want to go back to [place]. Pt states for CM to call his son, that he is looking at other facilities at this time. Called Kristina at the [place] [facility] [place] and left  to follow up on referral that was made last week. Called and left  for pt's son Chris for referrals. Pt's plan is rehab and then home.               Discharge Codes    No documentation.       Expected Discharge Date and Time     Expected Discharge Date Expected Discharge Time    Oct 9, 2018             Yumiko Hull
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
## 2018-10-08 NOTE — PLAN OF CARE
Problem: Patient Care Overview  Goal: Interprofessional Rounds/Family Conf  Outcome: Ongoing (interventions implemented as appropriate)  Palliative Team Conference: JOSE Villalobos RN, CHPN; HENRY Iraheta, HAYL[place], [facility]; JAYCOB Arroyo, RN, CHPN; ENRIQUETA La, APRN; GISELLE Herrera MD; ROYA Messina MDiv   10/08/18 1437   Interdisciplinary Rounds/Family Conf   Summary Palliative consult for GOC/ACP. Pt reported pain 8/10 at initial RN encounter; had previously been on long- and short-acting opioid for chronic pains, and currently with increased pain in lower legs and feet. Meds adjusted per Dr. Herrera, and brief GOC discussion; code status changed to no CPR, no intbuation. Palliative following for continuing sx mgt and GOC discussion.       Problem: Palliative Care (Adult)  Intervention: Minimize Discomfort   10/08/18 1437   Promote Oxygenation/Perfusion   Pain Management Interventions pain management plan reviewed with patient/caregiver;other (see comments)  (pain management orders per Dr. Herrera)     Intervention: Optimize Function   10/08/18 1437   Musculoskeletal Interventions   Fatigue Management fatigue-related activity identified;frequent rest breaks encouraged;paced activity encouraged     Intervention: Promote Informed Decision Making and Goal Setting   10/08/18 1437   Coping/Psychosocial Interventions   Life Transition/Adjustment palliative care discussed;palliative care initiated     Intervention: Support/Optimize Psychosocial Response   10/08/18 1437   Coping/Psychosocial Interventions   Supportive Measures decision-making supported;self-reflection promoted;self-responsibility promoted;verbalization of feelings encouraged       Goal: Identify Related Risk Factors and Signs and Symptoms  Outcome: Ongoing (interventions implemented as appropriate)   10/08/18 1437   Palliative Care (Adult)   Palliative Care: Related Risk Factors advanced age;chronic illness;condition is progressive;end-stage disease;uncontrolled pain;worsening  symptoms   Palliative Care: Signs and Symptoms anxiety;breathlessness;constipation;fatigue;pain     Goal: Maximized Comfort  Outcome: Ongoing (interventions implemented as appropriate)   10/08/18 1437   Palliative Care (Adult)   Maximized Comfort making progress toward outcome     Goal: Enhanced Quality of Life  Outcome: Ongoing (interventions implemented as appropriate)   10/08/18 1437   Palliative Care (Adult)   Enhanced Quality of Life making progress toward outcome

## 2018-10-08 NOTE — PLAN OF CARE
Problem: Patient Care Overview  Goal: Plan of Care Review  Outcome: Ongoing (interventions implemented as appropriate)   10/08/18 1200   Coping/Psychosocial   Plan of Care Reviewed With patient   Plan of Care Review   Progress improving   OTHER   Outcome Summary Pt. highly motivated to participate in therapy. Pt. stated that he needed to have a BM. Pt. sit to stand from chair with mod-max A. Pt. t/f'ed chair to C with mod A x2. Pt. required max A for cleaning on standing with mod A for standing using rolling wx. T/f'ed back to chair with mod A x 2. Pt. participated in B UE/LE exercises.

## 2018-10-08 NOTE — PROGRESS NOTES
[facility] Medicine Services  PROGRESS NOTE    Patient Name: Yassine Love  : 1945  MRN: [id number]    Date of Admission: 10/2/2018  Length of Stay: 6  Primary Care Physician: Provider, No Known    Subjective   Subjective     CC:  sob    HPI:  Pt sitting up in bed, down to 3L NC, says he is feeling better, asking for something for pain in lower ext and feet. .     Review of Systems  Gen- No fevers, chills  CV- No chest pain, palpitations  Resp- No cough, dyspnea  GI- No N/V/D, abd pain      Otherwise ROS is negative except as mentioned in the HPI.    Objective   Objective     Vital Signs:   Temp:  [97.8 °F (36.6 °C)-98.3 °F (36.8 °C)] 97.8 °F (36.6 °C)  Heart Rate:  [53-67] 59  Resp:  [16-22] 20  BP: ()/(56-88) 120/68        Physical Exam:  Constitutional: No acute distress, awake, alert, no family at bedside.   HENT: NCAT, mucous membranes moist  Respiratory: Clear to auscultation bilaterally, respiratory effort normal   Cardiovascular: RRR, no murmurs, rubs, or gallops, palpable pedal pulses bilaterally  Gastrointestinal: Positive bowel sounds, soft, nontender, nondistended  Musculoskeletal: 1+ bilateral ankle edema with b/l erythema lower ext mid shin.   Psychiatric: Appropriate affect, cooperative  Neurologic: Oriented x 3, strength symmetric in all extremities, Cranial Nerves grossly intact to confrontation, speech clear  Skin: otherwise no rashes      Results Reviewed:  I have personally reviewed current lab, radiology, and data and agree.      Results from last 7 days  Lab Units 10/06/18  0535 10/05/18  0353 10/04/18  0431   WBC 10*3/mm3 5.26 7.38 6.00   HEMOGLOBIN g/dL 10.2* 9.1* 10.0*   HEMATOCRIT % 35.3* 30.7* 33.7*   PLATELETS 10*3/mm3 287 297 305       Results from last 7 days  Lab Units 10/08/18  0534 10/07/18  0516 10/06/18  0535 10/05/18  0353 10/04/18  0431  10/02/18  1750   SODIUM mmol/L 140 138 141 138 141  < > 138   POTASSIUM mmol/L 4.6 4.3 5.0 4.0 4.8  <  > 4.2   CHLORIDE mmol/L 106 104 104 99 95*  < > 91*   CO2 mmol/L 31.0 29.0 32.0* 36.0* 40.0*  < > 42.0*   BUN mg/dL 36* 33* 37* 38* 39*  < > 22   CREATININE mg/dL 1.26 1.33* 1.16 1.27 1.37*  < > 1.35*   GLUCOSE mg/dL 106* 128* 103* 83 99  < > 102*   CALCIUM mg/dL 8.3* 8.3* 8.2* 7.8* 8.5*  < > 8.6*   ALT (SGPT) U/L  --   --   --  12 12  --  18   AST (SGOT) U/L  --   --   --  18 15  --  17   < > = values in this interval not displayed.  Estimated Creatinine Clearance: 53.7 mL/min (by C-G formula based on SCr of 1.26 mg/dL).  No results found for: BNP    Microbiology Results Abnormal     Procedure Component Value - Date/Time    Blood Culture - Blood, [[id number]]  (Normal) Collected:  10/02/18 1804    Lab Status:  Final result Specimen:  Blood from Hand, Left Updated:  10/07/18 2201     Blood Culture No growth at 5 days    Narrative:       Aerobic bottle only     Blood Culture - Blood, [[id number]]  (Normal) Collected:  10/02/18 1720    Lab Status:  Final result Specimen:  Blood from Arm, Right Updated:  10/07/18 1830     Blood Culture No growth at 5 days    Respiratory Culture - Sputum, Cough [[id number]]  (Abnormal)  (Susceptibility) Collected:  10/03/18 0430    Lab Status:  Final result Specimen:  Sputum from Cough Updated:  10/07/18 1137     Respiratory Culture Light growth (2+) Staphylococcus aureus (A)      Light growth (2+) Normal Respiratory Nataliia     Gram Stain Result Few (2+) WBCs per low power field      No Epithelial cells per low power field      Few (2+) Gram positive cocci    Susceptibility      Staphylococcus aureus     FAVIAN     Ceftriaxone <=8 ug/ml Susceptible     Clindamycin >4 ug/ml Resistant     Erythromycin >4 ug/ml Resistant     Gentamicin <=4 ug/ml Susceptible     Levofloxacin <=1 ug/ml Susceptible  [1]      Linezolid <=1 ug/ml Susceptible     Oxacillin <=0.25 ug/ml Susceptible     Penicillin G <=0.03 ug/ml Susceptible     Quinupristin + Dalfopristin <=0.5 ug/ml Susceptible     Rifampin <=1 ug/ml  Susceptible     Tetracycline >8 ug/ml Resistant     Trimethoprim + Sulfamethoxazole <=0.5/9.5 ug/ml Susceptible     Vancomycin 2 ug/ml Susceptible            [1]   Staphylococcus species may develop resistance during prolonged therapy with quinolones.  Isolates that are initially susceptible may become resistant within three to four days after initiation of therapy. Testing of repeat isolates may be warranted.                   MRSA Screen, PCR - Swab, Nares [[id number]]  (Abnormal) Collected:  10/04/18 1623    Lab Status:  Final result Specimen:  Swab from Nares Updated:  10/04/18 1818     MRSA, PCR Positive (C)          Imaging Results (last 24 hours)     ** No results found for the last 24 hours. **        Results for orders placed during the hospital encounter of 10/02/18   Adult Transthoracic Echo Complete W/ Cont if Necessary Per Protocol    Narrative · Left ventricular systolic function is normal. Estimated EF = 60%.  · Trace-to-mild mitral valve regurgitation is present.          I have reviewed the medications.      aspirin 81 mg Oral Daily   atorvastatin 40 mg Oral Nightly   castor oil-balsam peru  Topical Q12H   clopidogrel 75 mg Oral Daily   famotidine 20 mg Oral BID   furosemide 40 mg Oral Daily   gabapentin 200 mg Oral Q8H   guaiFENesin 600 mg Oral Q12H   heparin (porcine) 5,000 Units Subcutaneous Q8H   insulin lispro 0-7 Units Subcutaneous 4x Daily With Meals & Nightly   ipratropium-albuterol 3 mL Nebulization Q4H - RT   Linezolid 600 mg Intravenous Q12H   meropenem 1 g Intravenous Q8H   metoprolol tartrate 25 mg Oral Q12H   miconazole  Topical Q12H   mirtazapine 15 mg Oral Nightly   nicotine 1 patch Transdermal Q24H   predniSONE 20 mg Oral Q12H   sennosides-docusate sodium 2 tablet Oral Nightly   sodium chloride 10 mL Intravenous Q12H   sodium chloride 3 mL Intravenous Q12H   cyanocobalamin 1,000 mcg Oral Daily         Assessment/Plan   Assessment / Plan     Hospital Problem List     *  (Principal)Acute on chronic mixed hypercarbic/hypoxemic respiratory failure requiring intubation and ventilatory support    PVD (peripheral vascular disease)     H/O cellulitis of both lower extremities    Overview Signed 10/3/2018  1:47 AM by Perla Jones APRN     Previously managed by Infectious Disease          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 Harvey on 3/12/18 with PCI revealing severe 2-vessel disease.    GIGI x 2 to LAD and RCA         Decubitus ulcer of buttock, stage 2    Acute kidney injury    Overview Signed 10/3/2018  1:39 AM by Perla Jones APRN     Cr 1.35 increased from 9/8/18 1.15          Brief runs of SVT (supraventricular tachycardia)     H/O bacteremia due to Staphylococcus    Opioid use    Pneumonia due to infectious organism             Brief Hospital Course to date:  Yassine Love is a 72 y.o. male active smoker with a history of COPD (on home oxygen) admitted 10/2/18 for exacerbation associated with severe hypoxemia. He was in the skilled nursing facility for rehabilitation but 10/1/18 developed some visual hallucinations. Oxygen saturations were subsequently noted to drop into the 60s on 4 L on 10/2/18 and he was brought to [facility] ER. Because of an elevated d-dimer a CT angiogram was obtained which was negative for PE. Both CT scan and chest x-ray however revealed it appeared to be pulmonary edema with diffuse interstitial infiltrates and pleural effusions. In addition BNP was 899. He was given his usual dose of home Lasix, placed on empiric antimicrobial coverage with Zosyn, given bronchodilators, Solu-Medrol, placed on BiPAP, and admitted to the hospitalist service.The patient lives by himself.  "His son had discussed his situation earlier this year and felt that he was not able to care for himself in his own home as he had been doing. He recommended the patient go into a facility, angering the patient who basically \"cut him off\" and wanted nothing further to do with him. He developed severe cellulitis of his lower extremities and was hospitalized 8/20/18 through 9/8/18 at [facility]. Afterwords was sent to a skilled nursing facility for rehabilitation where the above occurred.     At approximately 2 AM 10/3/18 he was noted to become unresponsive with decreased respiratory drive. Apparently he had been a DNR but this was reversed by the patient on admission. Because of this Dr. Jacobsen and respiratory therapy intubated the patient at the bedside. There were large amounts of thick purulent yellow secretions obtained at the time of intubation and he was transferred to the ICU on ventilatory support. He initially required high FiO2's of 60% but respiratory rate and ABGs rapidly improved. Over the course of the evening he continued to improve. He subsequently self extubated the evening of 10/3/18 at 11 PM. He was able to tolerate self extubation but has required high flow nasal oxygen in order to maintain his oxygen saturations. He was alert and oriented so additional discussions were held regarding CODE STATUS. He wanted to think about it yesterday and when I question him again today he stated he would like to think about it\" some more\".      He was on empiric vancomycin and Merrem and cultures from blood and sputum were negative as of 10/4/18. He however had had a staph aureus in a wound in August and I wanted to continue him on coverage for MRSA as well as gram negatives. Because of his renal insufficiency however he was switched him to linezolid on 10/4/18. Today his sputum is growing a staph aureus that was not identified and nasal swabs are growing MRSA. He remains on Merrem and linezolid.      Heart rate is " usually in the 60s, but has had recurrent brief episodes of SVT up to 160  both yesterday and today. His metoprolol was held once and that led to SVT that resolved with resumption of beta blocker.. He was admitted 10/2 and transferred out of the ICU 10/7      Assessment & Plan:  1. Continue linezolid and Merrem and complete 7-10 days total, I have asked ID to assist with abx regiment  2. Avoid sedating agents  3. Keep his potassium greater than or equal to 4.5 to avoid the development of metabolic alkalosis that will reduce respiratory  4. IV Lasix and Diamox, Pulm following, Lasix decreased today to 40mg po daily   5. Follow-up renal function in a.m.  6. Wean from high flow down to 3L NC today as he tolerates  7.Palliative to see to assist with code status and goals of care     DVT Prophylaxis:  Sq heparin    CODE STATUS:   Code Status and Medical Interventions:   Ordered at: 10/02/18 2100     Level Of Support Discussed With:    Patient     Code Status:    CPR     Medical Interventions (Level of Support Prior to Arrest):    Full       Disposition: I expect the patient to be discharged tbd      Electronically signed by Danii Tabor DO, 10/08/18, 9:21 AM.

## 2018-10-08 NOTE — PLAN OF CARE
Problem: Patient Care Overview  Goal: Plan of Care Review  Outcome: Ongoing (interventions implemented as appropriate)   10/08/18 0492   Coping/Psychosocial   Plan of Care Reviewed With patient   Plan of Care Review   Progress improving   OTHER   Outcome Summary Patent was up in chair today. Waas seen by pallative today and pain meds ordered. Will continue to monitor

## 2018-10-08 NOTE — PROGRESS NOTES
Continued Stay Note  [facility]     Patient Name: Yassine Love  MRN: [id number]  Today's Date: 10/8/2018    Admit Date: 10/2/2018          Discharge Plan     Row Name 10/08/18 1342       Plan    Plan Rehab    Patient/Family in Agreement with Plan yes    Plan Comments Spoke to pt's son and at this time he wants to look at the [facility]. He is agreeable to all [facility], but prefers [facility] due to it's close to his house. He is meeting with an  on Wednesday to assist him and his dad with [employer] benefits as well. Spoke to Kristina at the [facility] and she will see pt tomorrow. CM will cont to follow.     Row Name 10/08/18 1324       Plan    Plan Rehab    Patient/Family in Agreement with Plan yes    Plan Comments Spoke to pt at  and he states he does not want to go back to [facility]. Pt states for CM to call his son, that he is looking at other facilities at this time. Called Kristina at the [facility] and left  to follow up on referral that was made last week. Called and left  for pt's son Chris for referrals. Pt's plan is rehab and then home.               Discharge Codes    No documentation.       Expected Discharge Date and Time     Expected Discharge Date Expected Discharge Time    Oct 9, 2018             Yumiko Hull

## 2018-10-08 NOTE — THERAPY TREATMENT NOTE
Acute Care - Physical Therapy Treatment Note  [facility]     Patient Name: Yassine Love  : 1945  MRN: [id number]  Today's Date: 10/8/2018  Onset of Illness/Injury or Date of Surgery: 10/02/18  Date of Referral to PT: 10/02/18  Referring Physician: DO Javi    Admit Date: 10/2/2018    Visit Dx:    ICD-10-CM ICD-9-CM   1. Acute on chronic respiratory failure with hypoxia and hypercapnia (CMS/HCC) J96.21 518.84    J96.22 786.09     799.02   2. Impaired functional mobility, balance, gait, and endurance Z74.09 V49.89   3. 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 O2 with chronic hypercarbia   • Essential hypertension   • Non-sustained ventricular tachycardia (CMS/HCC)   • CAD status post GIGI ×2 to LAD and RCA 3/12/18   • Debility   • Decubitus ulcer of buttock, stage 2   • Chronic pain   • Chronic venous stasis dermatitis of both lower extremities   • Acute on chronic mixed hypercarbic/hypoxemic respiratory failure requiring intubation and ventilatory support   • History of tobacco use   • Acute kidney injury   • Brief runs of SVT (supraventricular tachycardia)    • H/O bacteremia due to Staphylococcus   • Opioid use   • Overweight (BMI 25.0-29.9)   • Pneumonia due to infectious organism       Therapy Treatment          Rehabilitation Treatment Summary     Row Name 10/08/18 0830             Treatment Time/Intention    Discipline physical therapist  -MB      Document Type therapy note (daily note)  -MB      Subjective Information complains of;pain  -MB      Mode of Treatment physical therapy;individual therapy  -MB      Patient/Family Observations Pt. supine, on 2 L/min O2 per NC, telemetry, IV intact R UE.  RN consent for PT.  -MB      Care Plan Review care plan/treatment goals reviewed;risks/benefits reviewed;current/potential barriers  reviewed;patient/other agree to care plan  -MB      Therapy Frequency (PT Clinical Impression) daily  -MB      Patient Effort excellent  -MB      Existing Precautions/Restrictions fall;oxygen therapy device and L/min   hypersensitive BLE  -MB      Recorded by [MB] Linda Marcelino, PT 10/08/18 0927      Row Name 10/08/18 0830             Vital Signs    Pre Systolic BP Rehab 120  -MB      Pre Treatment Diastolic BP 68  -MB      Pre SpO2 (%) 93  -MB      O2 Delivery Pre Treatment supplemental O2  -MB      Intra SpO2 (%) 84  -MB      O2 Delivery Intra Treatment supplemental O2  -MB      Post SpO2 (%) 93  -MB      O2 Delivery Post Treatment supplemental O2  -MB      Pre Patient Position Supine  -MB      Intra Patient Position Standing  -MB      Post Patient Position Sitting  -MB      Recorded by [MB] Linda Marcelino, PT 10/08/18 0937      Row Name 10/08/18 0830             Cognitive Assessment/Intervention- PT/OT    Orientation Status (Cognition) oriented x 3  -MB      Follows Commands (Cognition) follows one step commands;75-90% accuracy;physical/tactile prompts required;repetition of directions required;verbal cues/prompting required  -MB      Cognitive Function (Cognitive) safety deficit  -MB      Safety Deficit (Cognitive) mild deficit;insight into deficits/self awareness;safety precautions awareness  -MB      Personal Safety Interventions fall prevention program maintained;gait belt;muscle strengthening facilitated;nonskid shoes/slippers when out of bed  -MB      Recorded by [MB] Linda Marcelino, PT 10/08/18 0937      Row Name 10/08/18 0830             Bed Mobility Assessment/Treatment    Rolling Left [place] (Bed Mobility) minimum assist (75% patient effort)  -MB      Rolling Right [place] (Bed Mobility) moderate assist (50% patient effort)  -MB      Bed Mobility, Safety Issues decreased use of arms for pushing/pulling;decreased use of legs for bridging/pushing  -MB      Assistive Device (Bed  Mobility) draw sheet;bed rails  -MB      Comment (Bed Mobility) Pt. required increased assist rolling R d/t L hip pain/dysfunction.  -MB      Recorded by [MB] Linda Marcelino, PT 10/08/18 0937      Row Name 10/08/18 0830             Transfer Assessment/Treatment    Transfer Assessment/Treatment bed-chair transfer  -MB      Recorded by [MB] Linda Marcelino, PT 10/08/18 0937      Row Name 10/08/18 0830             Bed-Chair Transfer    Bed-Chair [place] (Transfers) dependent (less than 25% patient effort);2 person assist  -MB      Assistive Device (Bed-Chair Transfers) mechanical lift/aid  -MB      Recorded by [MB] Linda Marcelino, PT 10/08/18 0937      Row Name 10/08/18 0830             Sit-Stand Transfer    Sit-Stand [place] (Transfers) moderate assist (50% patient effort);2 person assist;verbal cues;nonverbal cues (demo/gesture)  -MB      Assistive Device (Sit-Stand Transfers) walker, front-wheeled  -MB      Recorded by [MB] Linda Marcelino, PT 10/08/18 0937      Row Name 10/08/18 0830             Stand-Sit Transfer    Stand-Sit [place] (Transfers) moderate assist (50% patient effort);2 person assist;verbal cues;nonverbal cues (demo/gesture)  -MB      Assistive Device (Stand-Sit Transfers) walker, front-wheeled  -MB      Recorded by [MB] Linda Marcelino, PT 10/08/18 0937      Row Name 10/08/18 0830             Gait/Stairs Assessment/Training    [place] Level (Gait) moderate assist (50% patient effort);2 person assist;verbal cues  -MB      Assistive Device (Gait) walker, front-wheeled  -MB      Distance in Feet (Gait) 2  -MB      Pattern (Gait) step-to  -MB      Deviations/Abnormal Patterns (Gait) base of support, wide;vidhi decreased;gait speed decreased;stride length decreased  -MB      Bilateral Gait Deviations forward flexed posture;heel strike decreased;knee buckling, right side;weight shift ability decreased  -MB      Comment (Gait/Stairs) Pt. amb 2 steps x 2 reps w/  VCs for upright posture, sequencing, and increased B step length.  Pt unable to fully exted R knee (maintains in flexion; reports needs TKR).  Pt unable to stand fully upright.  Followed closely w/ chair.   -MB      Recorded by [MB] Linda Marcelino, PT 10/08/18 0937      Row Name 10/08/18 0830             Motor Skills Assessment/Interventions    Additional Documentation Balance (Group)  -MB      Recorded by [MB] Linda Marcelino, PT 10/08/18 0937      Row Name 10/08/18 0830             Lower Extremity Seated Therapeutic Exercise    Performed, Seated Lower Extremity (Therapeutic Exercise) hip flexion/extension;hip abduction/adduction;ankle dorsiflexion/plantarflexion;LAQ (long arc quad), knee extension;other (see comments)   glut sets  -MB      Exercise Type, Seated Lower Extremity (Therapeutic Exercise) AROM (active range of motion)  -MB      Sets/Reps Detail, Seated Lower Extremity (Therapeutic Exercise) 2 sets/10 reps, BLEs  -MB      Recorded by [MB] Linda Marcelino, PT 10/08/18 0937      Row Name 10/08/18 0830             Static Standing Balance    Level of [place] (Supported Standing, Static Balance) moderate assist, 50 to 74% patient effort   x2  -MB      Time Able to Maintain Position (Supported Standing, Static Balance) 45 to 60 seconds  -MB      Assistive Device Utilized (Supported Standing, Static Balance) rolling walker  -MB      Recorded by [MB] Linda Marcelino, PT 10/08/18 0937      Row Name 10/08/18 0830             Positioning and Restraints    Pre-Treatment Position in bed  -MB      Post Treatment Position chair  -MB      In Chair notified nsg;reclined;call light within reach;encouraged to call for assist;exit alarm on;on mechanical lift sling;legs elevated  -MB      Recorded by [MB] Linda Marcelino, PT 10/08/18 0937      Row Name 10/08/18 0830             Pain Scale: Numbers Pre/Post-Treatment    Pain Scale: Numbers, Pretreatment 0/10 - no pain  -MB      Pain Scale: Numbers,  Post-Treatment 3/10  -MB      Pain Location - Side Left  -MB      Pain Location hip  -MB      Recorded by [MB] Linda Marcelino, PT 10/08/18 0937      Row Name                Wound 10/02/18 2230 Bilateral medial gluteal MASD (moisture associated skin damage)    Wound - Properties Group Date first assessed: 10/02/18 [MR] Time first assessed: 2230 [MR] Present On Admission : yes [MR] Side: Bilateral [MR] Orientation: medial [MR] Location: gluteal [MR] Type: MASD (moisture associated skin damage) [MR], friction shearing  Recorded by:  [MR] Brittany Evans, RN 10/03/18 1505    Row Name 10/08/18 0830             Plan of Care Review    Plan of Care Reviewed With patient  -MB      Recorded by [MB] Linda Marcelino, PT 10/08/18 0937      Row Name 10/08/18 0830             Outcome Summary/Treatment Plan (PT)    Daily Summary of Progress (PT) progress toward functional goals is good  -MB      Plan for Continued Treatment (PT) Cont IPPT per POC.  -MB      Anticipated Discharge Disposition (PT) skilled nursing facility  -MB      Recorded by [MB] Linda Marcelino, PT 10/08/18 0937        User Key  (r) = Recorded By, (t) = Taken By, (c) = Cosigned By    Initials Name Effective Dates Discipline    Linda Longo, PT 03/14/16 -  PT    MR EvansBrittany, RN 06/16/16 -  Nurse          Rash 10/03/18 1335 groin patch (Active)   Distribution localized 10/8/2018  5:53 AM   Configuration/Shape asymmetric 10/8/2018  5:53 AM   Borders irregular 10/8/2018  5:53 AM   Characteristics moist 10/8/2018  5:53 AM   Color red 10/8/2018  5:53 AM       Wound 10/02/18 2230 Bilateral medial gluteal MASD (moisture associated skin damage) (Active)   Dressing Appearance open to air 10/8/2018  5:53 AM   Base red/granulating;purple;bleeding;moist 10/7/2018 12:00 PM   Periwound ecchymotic 10/8/2018  5:53 AM   Drainage Characteristics/Odor serosanguineous 10/7/2018 12:00 PM   Drainage Amount scant 10/7/2018 12:00 PM             Physical  Therapy Education     Title: PT OT SLP Therapies (Active)     Topic: Physical Therapy (Active)     Point: Mobility training (Active)    Learning Progress Summary     Learner Status Readiness Method Response Comment Documented by    Patient Active Acceptance E,D NR  LS 10/05/18 1324     Active Acceptance E,D NR  LS 10/04/18 1119     Done Acceptance E,TB VU  MT 10/03/18 1851    Family Active Acceptance E,D NR  LS 10/04/18 1119     Done Acceptance E,TB VU  MT 10/03/18 1851          Point: Home exercise program (Active)    Learning Progress Summary     Learner Status Readiness Method Response Comment Documented by    Patient Active Acceptance E,D NR  LS 10/05/18 1324     Active Acceptance E,D NR  LS 10/04/18 1119     Done Acceptance E,TB VU  MT 10/03/18 1851    Family Active Acceptance E,D NR  LS 10/04/18 1119     Done Acceptance E,TB VU  MT 10/03/18 1851          Point: Body mechanics (Active)    Learning Progress Summary     Learner Status Readiness Method Response Comment Documented by    Patient Active Acceptance E,D NR  LS 10/05/18 1324     Active Acceptance E,D NR  LS 10/04/18 1119    Family Active Acceptance E,D NR  LS 10/04/18 1119          Point: Precautions (Active)    Learning Progress Summary     Learner Status Readiness Method Response Comment Documented by    Patient Active Acceptance E,D NR  LS 10/05/18 1324     Active Acceptance E,D NR  LS 10/04/18 1119     Done Acceptance E,TB VU  MT 10/03/18 1851    Family Active Acceptance E,D NR  LS 10/04/18 1119     Done Acceptance E,TB VU  MT 10/03/18 1851                      User Key     Initials Effective Dates Name Provider Type Discipline     06/19/15 -  Zari Rai, PT Physical Therapist PT    MT 06/16/16 -  Noemi Fish RN Registered Nurse Nurse                    PT Recommendation and Plan  Anticipated Discharge Disposition (PT): skilled nursing facility  Therapy Frequency (PT Clinical Impression): daily  Outcome Summary/Treatment Plan  (PT)  Daily Summary of Progress (PT): progress toward functional goals is good  Plan for Continued Treatment (PT): Cont IPPT per POC.  Anticipated Discharge Disposition (PT): skilled nursing facility  Plan of Care Reviewed With: patient  Progress: improving  Outcome Summary: Pt. demonstrated improved safety and independence w/ mobility, performing bed mobility w/ mod A, sit to stand transfers w/ RW w/ mod A x 2, and ambulating 2ft. w/ RW w/ mod A x 2.  Pt. very motivated, w/ very good effort.  Pt. desaturated to 84% (on 2 L) w/ exertion, recovered to >90% in 1 min.  Pt. will continue to benefit from skilled IPPT to improve safety and ind. w/ mobility prior to D/C.          Outcome Measures     Row Name 10/08/18 0830 10/05/18 1324          How much help from another person do you currently need...    Turning from your back to your side while in flat bed without using bedrails? 2  -MB 2  -LS     Moving from lying on back to sitting on the side of a flat bed without bedrails? 2  -MB 2  -LS     Moving to and from a bed to a chair (including a wheelchair)? 1  -MB 1  -LS     Standing up from a chair using your arms (e.g., wheelchair, bedside chair)? 2  -MB 2  -LS     Climbing 3-5 steps with a railing? 1  -MB 1  -LS     To walk in hospital room? 2  -MB 1  -LS     AM-PAC 6 Clicks Score 10  -MB 9  -LS        Functional Assessment    Outcome Measure Options AM-PAC 6 Clicks Basic Mobility (PT)  -MB AM-PAC 6 Clicks Basic Mobility (PT)  -LS       User Key  (r) = Recorded By, (t) = Taken By, (c) = Cosigned By    Initials Name Provider Type    LS Zari Rai, PT Physical Therapist    Linda Longo, PT Physical Therapist           Time Calculation:         PT Charges     Row Name 10/08/18 0830             Time Calculation    Start Time 0830  -MB      PT Received On 10/08/18  -MB      PT Goal Re-Cert Due Date 10/14/18  -MB         Time Calculation- PT    Total Timed Code Minutes- PT 45 minute(s)  -MB         Timed  Charges    19618 - PT Therapeutic Exercise Minutes 30  -MB      16623 - Gait Training Minutes  15  -MB        User Key  (r) = Recorded By, (t) = Taken By, (c) = Cosigned By    Initials Name Provider Type    Linda Longo, PT Physical Therapist        Therapy Suggested Charges     Code   Minutes Charges    03257 (CPT®) Hc Pt Neuromusc Re Education Ea 15 Min      11389 (CPT®) Hc Pt Ther Proc Ea 15 Min 30 2    37074 (CPT®) Hc Gait Training Ea 15 Min 15 1    91843 (CPT®) Hc Pt Therapeutic Act Ea 15 Min      35314 (CPT®) Hc Pt Manual Therapy Ea 15 Min      11907 (CPT®) Hc Pt Iontophoresis Ea 15 Min      28666 (CPT®) Hc Pt Elec Stim Ea-Per 15 Min      56790 (CPT®) Hc Pt Ultrasound Ea 15 Min      68365 (CPT®) Hc Pt Self Care/Mgmt/Train Ea 15 Min      86786 (CPT®) Hc Pt Prosthetic (S) Train Initial Encounter, Each 15 Min      16609 (CPT®) Hc Pt Orthotic(S)/Prosthetic(S) Encounter, Each 15 Min      28951 (CPT®) Hc Orthotic(S) Mgmt/Train Initial Encounter, Each 15min      Total  45 3        Therapy Charges for Today     Code Description Service Date Service Provider Modifiers Qty    52835871763 HC PT THER PROC EA 15 MIN 10/8/2018 Linda Marcelino, PT GP 2    99624768199 HC GAIT TRAINING EA 15 MIN 10/8/2018 Linda Marcelino, PT GP 1    77859483672 HC PT THER SUPP EA 15 MIN 10/8/2018 Linda Marcelino, PT GP 2          PT G-Codes  Outcome Measure Options: AM-PAC 6 Clicks Basic Mobility (PT)  AM-PAC 6 Clicks Score: 10  Score: 8    Linda Marcelino PT  10/8/2018
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
## 2018-10-08 NOTE — PLAN OF CARE
Problem: Patient Care Overview  Goal: Plan of Care Review  Outcome: Ongoing (interventions implemented as appropriate)   10/08/18 0540   Coping/Psychosocial   Plan of Care Reviewed With patient   Plan of Care Review   Progress no change   OTHER   Outcome Summary O2 weaned to 2L most of the night with sats 92-96% loose nonproductive cough IV abx as ordered no c/o pain

## 2018-10-09 ENCOUNTER — APPOINTMENT (OUTPATIENT)
Dept: GENERAL RADIOLOGY | Facility: HOSPITAL | Age: 73
End: 2018-10-09

## 2018-10-09 LAB
ANION GAP SERPL CALCULATED.3IONS-SCNC: 4 MMOL/L (ref 3–11)
BUN BLD-MCNC: 36 MG/DL (ref 9–23)
BUN/CREAT SERPL: 29 (ref 7–25)
CALCIUM SPEC-SCNC: 8.2 MG/DL (ref 8.7–10.4)
CHLORIDE SERPL-SCNC: 101 MMOL/L (ref 99–109)
CO2 SERPL-SCNC: 33 MMOL/L (ref 20–31)
CREAT BLD-MCNC: 1.24 MG/DL (ref 0.6–1.3)
DEPRECATED RDW RBC AUTO: 65.7 FL (ref 37–54)
ERYTHROCYTE [DISTWIDTH] IN BLOOD BY AUTOMATED COUNT: 18.8 % (ref 11.3–14.5)
GFR SERPL CREATININE-BSD FRML MDRD: 57 ML/MIN/1.73
GLUCOSE BLD-MCNC: 86 MG/DL (ref 70–100)
GLUCOSE BLDC GLUCOMTR-MCNC: 136 MG/DL (ref 70–130)
GLUCOSE BLDC GLUCOMTR-MCNC: 187 MG/DL (ref 70–130)
GLUCOSE BLDC GLUCOMTR-MCNC: 91 MG/DL (ref 70–130)
GLUCOSE BLDC GLUCOMTR-MCNC: 97 MG/DL (ref 70–130)
HCT VFR BLD AUTO: 33.6 % (ref 38.9–50.9)
HGB BLD-MCNC: 10 G/DL (ref 13.1–17.5)
MAGNESIUM SERPL-MCNC: 2.3 MG/DL (ref 1.3–2.7)
MCH RBC QN AUTO: 28.1 PG (ref 27–31)
MCHC RBC AUTO-ENTMCNC: 29.8 G/DL (ref 32–36)
MCV RBC AUTO: 94.4 FL (ref 80–99)
PLATELET # BLD AUTO: 302 10*3/MM3 (ref 150–450)
PMV BLD AUTO: 10.3 FL (ref 6–12)
POTASSIUM BLD-SCNC: 3.7 MMOL/L (ref 3.5–5.5)
RBC # BLD AUTO: 3.56 10*6/MM3 (ref 4.2–5.76)
SODIUM BLD-SCNC: 138 MMOL/L (ref 132–146)
WBC NRBC COR # BLD: 6.27 10*3/MM3 (ref 3.5–10.8)

## 2018-10-09 PROCEDURE — 99222 1ST HOSP IP/OBS MODERATE 55: CPT | Performed by: INTERNAL MEDICINE

## 2018-10-09 PROCEDURE — 82962 GLUCOSE BLOOD TEST: CPT

## 2018-10-09 PROCEDURE — 25010000002 MEROPENEM: Performed by: NURSE PRACTITIONER

## 2018-10-09 PROCEDURE — 99233 SBSQ HOSP IP/OBS HIGH 50: CPT | Performed by: FAMILY MEDICINE

## 2018-10-09 PROCEDURE — 63710000001 PREDNISONE PER 1 MG: Performed by: INTERNAL MEDICINE

## 2018-10-09 PROCEDURE — 94799 UNLISTED PULMONARY SVC/PX: CPT

## 2018-10-09 PROCEDURE — 25010000002 HEPARIN (PORCINE) PER 1000 UNITS: Performed by: INTERNAL MEDICINE

## 2018-10-09 PROCEDURE — 99233 SBSQ HOSP IP/OBS HIGH 50: CPT | Performed by: INTERNAL MEDICINE

## 2018-10-09 PROCEDURE — 94640 AIRWAY INHALATION TREATMENT: CPT

## 2018-10-09 PROCEDURE — 80048 BASIC METABOLIC PNL TOTAL CA: CPT | Performed by: FAMILY MEDICINE

## 2018-10-09 PROCEDURE — 85027 COMPLETE CBC AUTOMATED: CPT | Performed by: FAMILY MEDICINE

## 2018-10-09 PROCEDURE — 94760 N-INVAS EAR/PLS OXIMETRY 1: CPT

## 2018-10-09 PROCEDURE — 71045 X-RAY EXAM CHEST 1 VIEW: CPT

## 2018-10-09 PROCEDURE — 83735 ASSAY OF MAGNESIUM: CPT | Performed by: FAMILY MEDICINE

## 2018-10-09 PROCEDURE — 25010000002 LINEZOLID 600 MG/300ML SOLUTION: Performed by: INTERNAL MEDICINE

## 2018-10-09 RX ORDER — MAGNESIUM SULFATE HEPTAHYDRATE 40 MG/ML
4 INJECTION, SOLUTION INTRAVENOUS AS NEEDED
Status: DISCONTINUED | OUTPATIENT
Start: 2018-10-09 | End: 2018-10-22 | Stop reason: HOSPADM

## 2018-10-09 RX ORDER — POTASSIUM CHLORIDE 750 MG/1
40 CAPSULE, EXTENDED RELEASE ORAL AS NEEDED
Status: DISCONTINUED | OUTPATIENT
Start: 2018-10-09 | End: 2018-10-22 | Stop reason: HOSPADM

## 2018-10-09 RX ORDER — METOPROLOL TARTRATE 50 MG/1
50 TABLET, FILM COATED ORAL EVERY 12 HOURS SCHEDULED
Status: DISCONTINUED | OUTPATIENT
Start: 2018-10-09 | End: 2018-10-10

## 2018-10-09 RX ORDER — MAGNESIUM SULFATE HEPTAHYDRATE 40 MG/ML
2 INJECTION, SOLUTION INTRAVENOUS AS NEEDED
Status: DISCONTINUED | OUTPATIENT
Start: 2018-10-09 | End: 2018-10-22 | Stop reason: HOSPADM

## 2018-10-09 RX ORDER — PREDNISONE 1 MG/1
5 TABLET ORAL
Status: COMPLETED | OUTPATIENT
Start: 2018-10-15 | End: 2018-10-17

## 2018-10-09 RX ORDER — POTASSIUM CHLORIDE 1.5 G/1.77G
40 POWDER, FOR SOLUTION ORAL AS NEEDED
Status: DISCONTINUED | OUTPATIENT
Start: 2018-10-09 | End: 2018-10-22 | Stop reason: HOSPADM

## 2018-10-09 RX ORDER — POTASSIUM CHLORIDE 750 MG/1
20 CAPSULE, EXTENDED RELEASE ORAL ONCE
Status: DISCONTINUED | OUTPATIENT
Start: 2018-10-09 | End: 2018-10-09

## 2018-10-09 RX ORDER — POTASSIUM CHLORIDE 750 MG/1
30 CAPSULE, EXTENDED RELEASE ORAL 2 TIMES DAILY WITH MEALS
Status: DISCONTINUED | OUTPATIENT
Start: 2018-10-09 | End: 2018-10-22 | Stop reason: HOSPADM

## 2018-10-09 RX ORDER — PREDNISONE 10 MG/1
10 TABLET ORAL
Status: COMPLETED | OUTPATIENT
Start: 2018-10-12 | End: 2018-10-14

## 2018-10-09 RX ADMIN — FUROSEMIDE 40 MG: 40 TABLET ORAL at 08:48

## 2018-10-09 RX ADMIN — MEROPENEM 1 G: 1 INJECTION, POWDER, FOR SOLUTION INTRAVENOUS at 10:47

## 2018-10-09 RX ADMIN — POTASSIUM CHLORIDE 30 MEQ: 750 CAPSULE, EXTENDED RELEASE ORAL at 17:34

## 2018-10-09 RX ADMIN — MIRTAZAPINE 15 MG: 15 TABLET, ORALLY DISINTEGRATING ORAL at 22:08

## 2018-10-09 RX ADMIN — GABAPENTIN 200 MG: 100 CAPSULE ORAL at 08:48

## 2018-10-09 RX ADMIN — CASTOR OIL AND BALSAM, PERU: 788; 87 OINTMENT TOPICAL at 08:50

## 2018-10-09 RX ADMIN — HEPARIN SODIUM 5000 UNITS: 5000 INJECTION, SOLUTION INTRAVENOUS; SUBCUTANEOUS at 06:03

## 2018-10-09 RX ADMIN — Medication 10 ML: at 08:56

## 2018-10-09 RX ADMIN — HEPARIN SODIUM 5000 UNITS: 5000 INJECTION, SOLUTION INTRAVENOUS; SUBCUTANEOUS at 14:17

## 2018-10-09 RX ADMIN — IPRATROPIUM BROMIDE AND ALBUTEROL SULFATE 3 ML: 2.5; .5 SOLUTION RESPIRATORY (INHALATION) at 04:01

## 2018-10-09 RX ADMIN — ASPIRIN 81 MG CHEWABLE TABLET 81 MG: 81 TABLET CHEWABLE at 08:48

## 2018-10-09 RX ADMIN — PREDNISONE 20 MG: 20 TABLET ORAL at 08:48

## 2018-10-09 RX ADMIN — GUAIFENESIN 600 MG: 600 TABLET, EXTENDED RELEASE ORAL at 08:49

## 2018-10-09 RX ADMIN — GABAPENTIN 200 MG: 100 CAPSULE ORAL at 14:17

## 2018-10-09 RX ADMIN — MEROPENEM 1 G: 1 INJECTION, POWDER, FOR SOLUTION INTRAVENOUS at 17:34

## 2018-10-09 RX ADMIN — Medication 1 CAPSULE: at 08:48

## 2018-10-09 RX ADMIN — MORPHINE SULFATE 15 MG: 15 TABLET, EXTENDED RELEASE ORAL at 22:08

## 2018-10-09 RX ADMIN — ATORVASTATIN CALCIUM 40 MG: 40 TABLET, FILM COATED ORAL at 22:07

## 2018-10-09 RX ADMIN — METOPROLOL TARTRATE 25 MG: 25 TABLET ORAL at 08:49

## 2018-10-09 RX ADMIN — CASTOR OIL AND BALSAM, PERU: 788; 87 OINTMENT TOPICAL at 22:11

## 2018-10-09 RX ADMIN — GABAPENTIN 200 MG: 100 CAPSULE ORAL at 22:08

## 2018-10-09 RX ADMIN — HEPARIN SODIUM 5000 UNITS: 5000 INJECTION, SOLUTION INTRAVENOUS; SUBCUTANEOUS at 22:07

## 2018-10-09 RX ADMIN — CYANOCOBALAMIN TAB 1000 MCG 1000 MCG: 1000 TAB at 08:48

## 2018-10-09 RX ADMIN — FAMOTIDINE 20 MG: 20 TABLET ORAL at 08:49

## 2018-10-09 RX ADMIN — CLOPIDOGREL BISULFATE 75 MG: 75 TABLET ORAL at 08:48

## 2018-10-09 RX ADMIN — IPRATROPIUM BROMIDE AND ALBUTEROL SULFATE 3 ML: 2.5; .5 SOLUTION RESPIRATORY (INHALATION) at 12:30

## 2018-10-09 RX ADMIN — HYDROCODONE BITARTRATE AND ACETAMINOPHEN 1 TABLET: 10; 325 TABLET ORAL at 19:37

## 2018-10-09 RX ADMIN — LINEZOLID 600 MG: 600 INJECTION, SOLUTION INTRAVENOUS at 17:34

## 2018-10-09 RX ADMIN — GUAIFENESIN 600 MG: 600 TABLET, EXTENDED RELEASE ORAL at 22:08

## 2018-10-09 RX ADMIN — MEROPENEM 1 G: 1 INJECTION, POWDER, FOR SOLUTION INTRAVENOUS at 02:12

## 2018-10-09 RX ADMIN — LINEZOLID 600 MG: 600 INJECTION, SOLUTION INTRAVENOUS at 05:59

## 2018-10-09 RX ADMIN — IPRATROPIUM BROMIDE AND ALBUTEROL SULFATE 3 ML: 2.5; .5 SOLUTION RESPIRATORY (INHALATION) at 08:02

## 2018-10-09 RX ADMIN — NICOTINE 1 PATCH: 21 PATCH, EXTENDED RELEASE TRANSDERMAL at 08:50

## 2018-10-09 RX ADMIN — FAMOTIDINE 20 MG: 20 TABLET ORAL at 22:07

## 2018-10-09 RX ADMIN — METOPROLOL TARTRATE 50 MG: 50 TABLET ORAL at 22:07

## 2018-10-09 RX ADMIN — MICONAZOLE NITRATE: 2 CREAM TOPICAL at 08:50

## 2018-10-09 RX ADMIN — INSULIN LISPRO 2 UNITS: 100 INJECTION, SOLUTION INTRAVENOUS; SUBCUTANEOUS at 17:35

## 2018-10-09 RX ADMIN — IPRATROPIUM BROMIDE AND ALBUTEROL SULFATE 3 ML: 2.5; .5 SOLUTION RESPIRATORY (INHALATION) at 20:04

## 2018-10-09 RX ADMIN — IPRATROPIUM BROMIDE AND ALBUTEROL SULFATE 3 ML: 2.5; .5 SOLUTION RESPIRATORY (INHALATION) at 16:30

## 2018-10-09 RX ADMIN — MORPHINE SULFATE 15 MG: 15 TABLET, EXTENDED RELEASE ORAL at 08:49

## 2018-10-09 NOTE — PROGRESS NOTES
Continued Stay Note  [facility]     Patient Name: Yassine Love  MRN: [id number]  Today's Date: 10/9/2018    Admit Date: 10/2/2018          Discharge Plan     Row Name 10/09/18 1416       Plan    Plan Rehab    Patient/Family in Agreement with Plan yes    Plan Comments Called and left vm for Kristina at [facility] to follow up on referral and verify pt's copays for rehab. He would possibly be in his copay days when he returns to rehab and pt has concerns regarding fees. CM will cont to follow.               Discharge Codes    No documentation.       Expected Discharge Date and Time     Expected Discharge Date Expected Discharge Time    Oct 12, 2018             Yumiko Hull

## 2018-10-09 NOTE — PROGRESS NOTES
INPATIENT PULMONARY SERVICE  PROGRESS NOTE     Hospital LOS: 7 days    Mr. Yassine Love, is followed for a Chief Complaint of:  Chief Complaint   Patient presents with   • Shortness of Breath       Acute on chronic mixed hypercarbic/hypoxemic respiratory failure requiring intubation and ventilatory support    PVD (peripheral vascular disease)     H/O cellulitis of both lower extremities    End stage COPD in an active smoker on home O2 with chronic hypercarbia    Essential hypertension    CAD status post GIGI ×2 to LAD and RCA 3/12/18    Decubitus ulcer of buttock, stage 2    Acute kidney injury    Brief runs of SVT (supraventricular tachycardia)     H/O bacteremia due to Staphylococcus    Opioid use    Pneumonia due to infectious organism      Subjective   S   Mr. Love is a 71yo M with a history of severe COPD and chronic respiratory failure on home O2 who was admitted on 10/2/18 for a COPD exacerbation associated with severe hypoxemia. CT scan of the chest was negative for PE but did reveal pulmonary edema with diffuse interstitial infiltrates and pleural effusions. BNP was elevated at 899. He was given his home dose of Lasix and placed on Zosyn, Solumedrol and bronchodilators. He was admitted to the Hospital Medicine Service on Bipap. On 10/3/18, he was noted to become unresponsive. He was previously a DNR but this was reversed by the patient at the time of admission. He was intubated at the bedside with copious thick yellow secretions noted. He was transferred to the ICU. He initially required high FiO2 of 60% but this rapidly improved and he self-extubated on 10/3/18 at 11PM. After that, he required HFNC to maintain his oxygen saturations but has since been transitioned to 2L NC. He remains on Linezolid and Meropenem for pneumonia.     Interval History:  No events overnight. Feeling improved. Reports that he is still having difficulty with ambulation and standing.         The patient's relevant past  medical, surgical and social history were reviewed and updated in Epic as appropriate.      ROS:   Constitutional: Negative for fever.   Respiratory: Positive for dyspnea.   Cardiovascular: Negative for chest pain.   Gastrointestinal: Negative for  nausea, vomiting and diarrhea.     Objective   O     Vitals  Temp  Min: 97.9 °F (36.6 °C)  Max: 98.1 °F (36.7 °C)  BP  Min: 98/56  Max: 142/72  Pulse  Min: 58  Max: 77  Resp  Min: 16  Max: 20  SpO2  Min: 91 %  Max: 94 % Flow (L/min)  Min: 2  Max: 2    I/O 24 HR (7:00 AM-6:59AM):  Intake/Output       10/08/18 0700 - 10/09/18 0659 10/09/18 0700 - 10/10/18 0659    Intake (ml) 340 --    Output (ml) 600 300    Net (ml) -260 -300    Last Weight  83.9 kg (184 lb 14.4 oz)  --            Physical Examination    Telemetry: Normal sinus rhythm.    Constitutional:  No acute distress.  Conversant.  Sitting up in bed eating breakfast.    Cardiovascular: Regular rate and rhythm.  No murmurs, rub or gallop.   Respiratory: Normal symmetric chest expansion.  Diminished bilaterally.   Clear to ascultation.   Abdominal:  Soft. No masses.   Non-tender. No distension.   No hepatosplenomegaly.   Extremities: No digital cyanosis or clubbing.  1+ peripheral edema.   Neurological:   Alert and Oriented to person, place, and time.   Moves all extremities.              Results from last 7 days  Lab Units 10/09/18  0751 10/06/18  0535 10/05/18  0353   WBC 10*3/mm3 6.27 5.26 7.38   HEMOGLOBIN g/dL 10.0* 10.2* 9.1*   MCV fL 94.4 97.8 96.2   PLATELETS 10*3/mm3 302 287 297       Results from last 7 days  Lab Units 10/08/18  0534 10/07/18  0516 10/06/18  0535   SODIUM mmol/L 140 138 141   POTASSIUM mmol/L 4.6 4.3 5.0   CO2 mmol/L 31.0 29.0 32.0*   CREATININE mg/dL 1.26 1.33* 1.16   MAGNESIUM mg/dL 2.3 2.4 2.4   PHOSPHORUS mg/dL 3.7 4.0 3.9     Serum creatinine: 1.26 mg/dL 10/08/18 0534  Estimated creatinine clearance: 54.1 mL/min    Results from last 7 days  Lab Units 10/05/18  0353 10/04/18  0435  10/02/18  1750   ALK PHOS U/L 92 102* 128*   BILIRUBIN mg/dL 0.4 0.5 0.6   ALT (SGPT) U/L 12 12 18   AST (SGOT) U/L 18 15 17         Results from last 7 days  Lab Units 10/05/18  0346 10/04/18  0414 10/03/18  0301 10/03/18  0105 10/02/18  2014   PH, ARTERIAL pH units 7.406 7.466* 7.452* 7.435 7.395   PCO2, ARTERIAL mm Hg 58.1* 59.3* 60.9* 67.3* 74.9*   PO2 ART mm Hg 73.7* 61.9* 113.0* 64.0* 66.1*   FIO2 % 50 60 100 50 40       Images:     Imaging Results (last 24 hours)     ** No results found for the last 24 hours. **          I reviewed the patient's new clinical results.  I reviewed the patient's new imaging results and agree with the interpretation.    Assessment/Plan   A / P     Mr. Love is a 73yo M with a history of End-stage COPD who was admitted with acute on chronic respiratory failure. He has been transitioned from HFNC to 2L. He has continued to improve and will likely need rehab after discharge.     Diet Regular  Code Status and Medical Interventions:   Ordered at: 10/08/18 1413     Limited Support to NOT Include:    Intubation     Level Of Support Discussed With:    Patient     Code Status:    No CPR     Medical Interventions (Level of Support Prior to Arrest):    Limited       Active Hospital Problems    Diagnosis   • **Acute on chronic mixed hypercarbic/hypoxemic respiratory failure requiring intubation and ventilatory support   • Acute kidney injury     Cr 1.35 increased from 9/8/18 1.15      • H/O bacteremia due to Staphylococcus   • Opioid use   • Pneumonia due to infectious organism   • Brief runs of SVT (supraventricular tachycardia)    • Decubitus ulcer of buttock, stage 2   • CAD status post GIGI ×2 to LAD and RCA 3/12/18     S/P C per Dr. Harvey on 3/12/18 with PCI revealing severe 2-vessel disease.    GIGI x 2 to LAD and RCA     • Essential hypertension   • End stage COPD in an active smoker on home O2 with chronic hypercarbia     Managed by [facility]      • H/O cellulitis of  both lower extremities     Previously managed by Infectious Disease      • PVD (peripheral vascular disease)        Assessment / Plan:  1. Continue Prednisone to 20mg daily for 3 days and then taper. Orders placed.   2. Complete course of Linezolid and Meropenem.   3. Continue daily diuresis. Continue to monitor renal function.   4. May need rehab placement after discharge.     I discussed the patient's findings and my recommendations with patient    Time:  I spent 35 minutes, face to face, with the patient or on the gilbert coordinating care with other health care providers.   I spent > 50% percent of this time, counseling and discussing diagnosis, current status and management .     Pinky V Case, DO  Pulmonary and Critical Care Medicine

## 2018-10-09 NOTE — CONSULTS
[facility] at [facility]   Consultation Note    Yassine Love  1945    There is no work phone number on file.      10/09/18    DATE OF ADMISSION: 10/2/2018  [facility] 6A    Provider, No Known  [facility] [id number]    Chief Complaint/Reason for Consultation: SVT           Patient Active Problem List   Diagnosis   • PVD (peripheral vascular disease)    • H/O cellulitis of both lower extremities   • End stage COPD in an active smoker on home O2 with chronic hypercarbia   • Essential hypertension   • Non-sustained ventricular tachycardia (CMS/HCC)   • CAD status post GIGI ×2 to LAD and RCA 3/12/18   • Debility   • Decubitus ulcer of buttock, stage 2   • Chronic pain   • Chronic venous stasis dermatitis of both lower extremities   • Acute on chronic mixed hypercarbic/hypoxemic respiratory failure requiring intubation and ventilatory support   • History of tobacco use   • Acute kidney injury   • Brief runs of SVT (supraventricular tachycardia)    • H/O bacteremia due to Staphylococcus   • Opioid use   • Overweight (BMI 25.0-29.9)   • Pneumonia due to infectious organism       History of Present Illness:   Patient is a 72 year old  male with the above noted past medical history who cardiology is asked to see today in consultation for further evaluation of SVT.  The patient was admitted to [facility] on 10/2/18 for COPD exacerbation and hypoxemia requiring intubated and ICU stay.  He self extubated on 10/3 and has been transferred back to the regular nursing floor.  Palliative care has been consulted as the patient does not wish to be re-intubated.  His code status has been changed to DNR/DNI and palliative continues to follow for symptom management.      We did see the patient during his hospitalization in March for episodes of NSVT versus SVT with aberrancy.  He had a positive stress test at that time so subsequently underwent left heart catheterization  and is s/p GIGI to his LAD and RCA.  He was discharged home with an order for a Holter monitor to assess for any recurrent arrhythmias but this does not seem to have been worn.  During this hospitalization, he has had more episodes of a wide complex tachycardia, initially at 1912 yesterday evening.  He had more episodes this morning at 0902, 0913, and 0919.  He states he felt his heart racing but was otherwise asymptomatic.  He denied any c/o CP, worsening SOB, dizziness, lightheadedness, or near syncope.  Episodes are abrupt in onset and termination.  He currently is on metoprolol 25 mg bid.     Allergies   Allergen Reactions   • Ciprofloxacin Hcl Anaphylaxis   • Ceftin [Cefuroxime Axetil] Angioedema     Tolerated Zosyn in past       Prior to Admission Medications     Prescriptions Last Dose Informant Patient Reported? Taking?    acetaminophen (TYLENOL) 325 MG tablet   Yes Yes    Take 325 mg by mouth Every 6 (Six) Hours As Needed for Mild Pain  or Fever.    albuterol (PROVENTIL) (2.5 MG/3ML) 0.083% nebulizer solution   No Yes    Take 2.5 mg by nebulization Every 6 (Six) Hours As Needed for Shortness of Air.    aspirin 81 MG EC tablet   No Yes    Take 1 tablet by mouth Daily.    atorvastatin (LIPITOR) 40 MG tablet   Yes Yes    Take 40 mg by mouth Every Night.    budesonide-formoterol (SYMBICORT) 160-4.5 MCG/ACT inhaler   Yes Yes    Inhale 2 puffs 2 (Two) Times a Day.    calcium carbonate (TUMS) 500 MG chewable tablet   Yes Yes    Chew 2 tablets 2 (Two) Times a Day As Needed for Heartburn.    clopidogrel (PLAVIX) 75 MG tablet   No Yes    Take 1 tablet by mouth Daily.    cyanocobalamin (VITAMIN B-12) 1000 MCG tablet   No Yes    Take 1 tablet by mouth Daily.    fluticasone (FLONASE) 50 MCG/ACT nasal spray   No Yes    2 sprays by Each Nare route Daily.    furosemide (LASIX) 40 MG tablet   Yes Yes    Take 40 mg by mouth 2 (Two) Times a Day.    gabapentin (NEURONTIN) 400 MG capsule  Self Yes Yes    Take 400 mg by mouth 3  (Three) Times a Day.    HYDROcodone-acetaminophen (NORCO)  MG per tablet   No Yes    Take 1 tablet by mouth Every 4 (Four) Hours As Needed for Moderate Pain .    metoprolol tartrate (LOPRESSOR) 25 MG tablet   Yes Yes    Take 25 mg by mouth 2 (Two) Times a Day.    mirtazapine (REMERON SOL-TAB) 15 MG disintegrating tablet   No Yes    Take 1 tablet by mouth Every Night.    Morphine (MS CONTIN) 30 MG 12 hr tablet   No Yes    Take 1 tablet by mouth Every 12 (Twelve) Hours.    nicotine (NICODERM CQ) 21 MG/24HR patch   No Yes    Place 1 patch on the skin as directed by provider Daily.    pantoprazole (PROTONIX) 40 MG EC tablet   No Yes    Take 1 tablet by mouth Daily.    sennosides-docusate sodium (SENOKOT-S) 8.6-50 MG tablet   No Yes    Take 2 tablets by mouth Every Night.    tiotropium (SPIRIVA) 18 MCG per inhalation capsule   Yes Yes    Place 1 capsule into inhaler and inhale Daily.            Current Facility-Administered Medications:   •  acetaminophen (TYLENOL) tablet 325 mg, 325 mg, Oral, Q6H PRN, Pastora Caldwell, DO, 325 mg at 10/08/18 1034  •  ALPRAZolam (XANAX) tablet 0.5 mg, 0.5 mg, Oral, Nightly PRN, Joon Hernandez, APRN, 0.5 mg at 10/07/18 1947  •  aspirin chewable tablet 81 mg, 81 mg, Oral, Daily, Perla Jones, APRN, 81 mg at 10/09/18 0848  •  atorvastatin (LIPITOR) tablet 40 mg, 40 mg, Oral, Nightly, Pastora Caldwell G, DO, 40 mg at 10/08/18 2018  •  bisacodyl (DULCOLAX) suppository 10 mg, 10 mg, Rectal, Daily PRN, Maira Herrera MD  •  calcium carbonate (TUMS) chewable tablet 500 mg (200 mg elemental), 2 tablet, Oral, BID PRN, Pastora Caldwell G, DO  •  castor oil-balsam peru (VENELEX) ointment, , Topical, Q12H, Daini Tabor DO  •  clopidogrel (PLAVIX) tablet 75 mg, 75 mg, Oral, Daily, Pastora Caldwell, DO, 75 mg at 10/09/18 0848  •  dextrose (D50W) 25 g/ 50mL Intravenous Solution 25 g, 25 g, Intravenous, Q15 Min PRN, Perla Jones W, APRN  •  dextrose (GLUTOSE) oral gel 15 g, 15 g, Oral, Q15  Min PRN, Perla Jones, APRN  •  famotidine (PEPCID) tablet 20 mg, 20 mg, Oral, BID, Parish Donato MD, 20 mg at 10/09/18 0849  •  furosemide (LASIX) tablet 40 mg, 40 mg, Oral, Daily, Case, Pinky V., DO, 40 mg at 10/09/18 0848  •  gabapentin (NEURONTIN) capsule 200 mg, 200 mg, Oral, Q8H, Javi, Pastora G, DO, 200 mg at 10/09/18 0848  •  glucagon (human recombinant) (GLUCAGEN DIAGNOSTIC) injection 1 mg, 1 mg, Subcutaneous, PRN, Perla Jones, APRN  •  guaiFENesin (MUCINEX) 12 hr tablet 600 mg, 600 mg, Oral, Q12H, Javi, Pastora G, DO, 600 mg at 10/09/18 0849  •  heparin (porcine) 5000 UNIT/ML injection 5,000 Units, 5,000 Units, Subcutaneous, Q8H, Javi, Pastora G, DO, 5,000 Units at 10/09/18 0603  •  HYDROcodone-acetaminophen (NORCO)  MG per tablet 1 tablet, 1 tablet, Oral, Q4H PRN, Maira Herrera MD, 1 tablet at 10/08/18 2302  •  insulin lispro (humaLOG) injection 0-7 Units, 0-7 Units, Subcutaneous, 4x Daily With Meals & Nightly, Parish Donato MD, 2 Units at 10/08/18 2023  •  ipratropium-albuterol (DUO-NEB) nebulizer solution 3 mL, 3 mL, Nebulization, Q4H PRN, Javi Pastora G, DO  •  ipratropium-albuterol (DUO-NEB) nebulizer solution 3 mL, 3 mL, Nebulization, Q4H - RT, Parish Donato MD, 3 mL at 10/09/18 0802  •  lactobacillus acidophilus (RISAQUAD) capsule 1 capsule, 1 capsule, Oral, Daily, Mary Kate Rahman MD, 1 capsule at 10/09/18 0848  •  lactulose (CHRONULAC) 10 GM/15ML solution 20 g, 20 g, Oral, BID PRN, Maira Herrera MD  •  Linezolid (ZYVOX) 600 mg 300 mL, 600 mg, Intravenous, Q12H, Mary Kate Rahman MD, Last Rate: 300 mL/hr at 10/09/18 0559, 600 mg at 10/09/18 0559  •  Magnesium Sulfate 2 gram Bolus, followed by 8 gram infusion (total Mg dose 10 grams)- Mg less than or equal to 1mg/dL, 2 g, Intravenous, PRN **OR** Magnesium Sulfate 2 gram / 50mL Infusion (GIVE X 3 BAGS TO EQUAL 6GM TOTAL DOSE) - Mg 1.1 - 1.5 mg/dl, 2 g, Intravenous, PRN **OR** Magnesium Sulfate 4 gram infusion-  Mg 1.6-1.9 mg/dL, 4 g, Intravenous, PRN, Atkins, Danii L, DO  •  meropenem (MERREM) 1 g/100 mL 0.9% NS VTB (mbp), 1 g, Intravenous, Q8H, Perla Jones, APRN, 1 g at 10/09/18 0212  •  metoprolol tartrate (LOPRESSOR) tablet 25 mg, 25 mg, Oral, Q12H, Javi, Pastora G, DO, 25 mg at 10/09/18 0849  •  mirtazapine (REMERON SOL-TAB) disintegrating tablet 15 mg, 15 mg, Oral, Nightly, Javi, Pastora G, DO, 15 mg at 10/08/18 2019  •  Morphine (MS CONTIN) 12 hr tablet 15 mg, 15 mg, Oral, Q12H, Maira Herrera MD, 15 mg at 10/09/18 0849  •  nicotine (NICODERM CQ) 21 MG/24HR patch 1 patch, 1 patch, Transdermal, Q24H, Joon Hernandez, APRN, 1 patch at 10/09/18 0850  •  Pharmacy Consult - [facility], , Does not apply, Daily, Mansoor Mckee, LTAC, located within [facility]  •  polyethylene glycol 3350 powder (packet), 17 g, Oral, BID, Maira Herrera MD  •  [START ON 10/11/2018] polyethylene glycol 3350 powder (packet), 17 g, Oral, Daily, Maira Herrera MD  •  potassium chloride (KLOR-CON) packet 40 mEq, 40 mEq, Oral, PRN, AtNasreen rhoadesecca L, DO  •  potassium chloride (MICRO-K) CR capsule 40 mEq, 40 mEq, Oral, PRN, Atkins, Danii L, DO  •  [START ON 10/12/2018] predniSONE (DELTASONE) tablet 10 mg, 10 mg, Oral, Daily With Breakfast **FOLLOWED BY** [START ON 10/15/2018] predniSONE (DELTASONE) tablet 5 mg, 5 mg, Oral, Daily With Breakfast, Case, Pinky V., DO  •  predniSONE (DELTASONE) tablet 20 mg, 20 mg, Oral, Daily With Breakfast, Case, Pinky V., DO, 20 mg at 10/09/18 0848  •  sennosides-docusate sodium (SENOKOT-S) 8.6-50 MG tablet 2 tablet, 2 tablet, Oral, BID, Maira Herrera MD, 2 tablet at 10/08/18 2018  •  sodium chloride 0.9 % flush 10 mL, 10 mL, Intravenous, PRN, Ruben Herrera MD  •  sodium chloride 0.9 % flush 10 mL, 10 mL, Intravenous, Q12H, Parish Donato MD, 10 mL at 10/09/18 0856  •  sodium chloride 0.9 % flush 3 mL, 3 mL, Intravenous, Q12H, Pastora Caldwell DO, 10 mL at 10/09/18 0856  •  vitamin B-12 (CYANOCOBALAMIN)  tablet 1,000 mcg, 1,000 mcg, Oral, Daily, JaviPastora DO, 1,000 mcg at 10/09/18 0848    Social History     Social History   • Marital status:    • Number of children: 1     Occupational History   • retired       Social History Main Topics   • Smoking status: Current Every Day Smoker     Packs/day: 1.50     Years: 56.00     Types: Cigarettes   • Smokeless tobacco: Never Used   • Alcohol use No   • Drug use: No   • Sexual activity: Defer     Other Topics Concern   • Not on file     Social History Narrative    Moved to [place] 17 yr ago to be near son.  Lives alone. Minimally ambulatory with walker       Family History   Problem Relation Age of Onset   • Stroke Father    • Heart attack Brother    • COPD Brother        REVIEW OF SYSTEMS:   CONST:  No weight loss, fever, chills, + weakness, + fatigue.   HEENT:  No visual loss, blurred vision, double vision, yellow sclerae.                   No hearing loss, congestion, sore throat.   SKIN:      No rashes, urticaria, ulcers, sores.     RESP:     + shortness of breath, hemoptysis, + cough, sputum.   GI:           No anorexia, nausea, vomiting, diarrhea. No abdominal pain, melena.   :         No burning on urination, hematuria or increased frequency.  ENDO:    No diaphoresis, cold or heat intolerance. No polyuria or polydipsia.   NEURO:  No headache, dizziness, syncope, paralysis, ataxia, or parasthesias.                  No change in bowel or bladder control. No history of CVA/TIA  MUSC:    No muscle, back pain, joint pain or stiffness.   HEME:    No anemia, bleeding, bruising. No history of DVT/PE.  PSYCH:  No history of depression, anxiety         OBJECTIVE:    Vitals:    10/09/18 0533 10/09/18 0755 10/09/18 0802 10/09/18 0848   BP:  130/84  119/68   BP Location:       Patient Position:       Pulse:  63 58 69   Resp:  18 20    Temp:  98 °F (36.7 °C)     TempSrc:  Oral     SpO2:  92% 94%    Weight: 83.9 kg (184 lb 14.4 oz)      Height:              Vital Sign Min/Max for last 24 hours  Temp  Min: 97.9 °F (36.6 °C)  Max: 98.1 °F (36.7 °C)   BP  Min: 98/56  Max: 142/72   Pulse  Min: 58  Max: 77   Resp  Min: 16  Max: 20   SpO2  Min: 91 %  Max: 94 %   Flow (L/min)  Min: 2  Max: 2      Intake/Output Summary (Last 24 hours) at 10/09/18 1034  Last data filed at 10/09/18 1001   Gross per 24 hour   Intake              480 ml   Output              700 ml   Net             -220 ml             Physical Exam:  GEN: Ill-appearing male, well nourished, well-developed, no acute distress  HEENT: Normocephalic, atraumatic, PERRLA, moist mucous membranes  NECK: Supple, No JVD, no thyromegaly, no lymphadenopathy  CARD: S1S2, RRR, no murmur, gallop, or rub  LUNGS: Diminished breath sounds bilaterally, normal respiratory effort  ABDOMEN: Soft, nontender, normal bowel sounds  EXTREMITIES: No gross deformities, no clubbing, cyanosis, 1+ LE edema  SKIN: Warm, dry  NEURO: No focal deficits, alert and oriented x 3  PSYCHIATRIC: Normal affect and mood      Data:     Results from last 7 days  Lab Units 10/09/18  0751 10/06/18  0535 10/05/18  0353   WBC 10*3/mm3 6.27 5.26 7.38   HEMOGLOBIN g/dL 10.0* 10.2* 9.1*   HEMATOCRIT % 33.6* 35.3* 30.7*   PLATELETS 10*3/mm3 302 287 297       Results from last 7 days  Lab Units 10/09/18  0751 10/08/18  0534 10/07/18  0516   SODIUM mmol/L 138 140 138   POTASSIUM mmol/L 3.7 4.6 4.3   CHLORIDE mmol/L 101 106 104   CO2 mmol/L 33.0* 31.0 29.0   BUN mg/dL 36* 36* 33*   CREATININE mg/dL 1.24 1.26 1.33*   GLUCOSE mg/dL 86 106* 128*                                    Intake/Output Summary (Last 24 hours) at 10/09/18 1034  Last data filed at 10/09/18 1001   Gross per 24 hour   Intake              480 ml   Output              700 ml   Net             -220 ml       Chest X-Ray:  Imaging Results (last 24 hours)     Procedure Component Value Units Date/Time    XR Chest 1 View [[id number]] Updated:  10/09/18 1026          Telemetry: NSR, HR 58-77 bpm  EKG on  10/2/18: NSR, incomplete RBBB, T wave inversion V1-V4, HR 77 bpm          Assessment:   1. SVT:  - Rhythm strips consistent with SVT, both narrow complex and with aberrancy (wide complex) at times, all self converting  - Patient mildly symptomatic during episodes    2. CAD:  - S/p GIGI to LAD/RCA 03/2018  - On DAPT/statin    3. COPD, end stage/acute hypercarbic/hypoxemic respiratory failure:  - Has been made DNR/DNI as patient does not wish to be re-intubated  - Pulm and palliative care following, on prednisone, antibiotics      Plan:  - Will plan to treat medically at this time as the patient is aiming toward less aggressive therapies (now working with palliative care for COPD and also is now DNR/DNI)  - Increase metoprolol to 50 mg bid  - Continue other cardiac meds for CAD      Scribed for Carlos Harvey MD by Zohra Griggs, APRN. 10/9/2018  10:34 AM    I, Carlos Harvey MD, personally performed the services as scribed by the above named individual. I have made any necessary edits and it is both accurate and complete.     Carlos Harvey MD, MSc, FACC  Interventional Cardiology  [facility] at [facility]

## 2018-10-09 NOTE — PROGRESS NOTES
[facility] Medicine Services  PROGRESS NOTE    Patient Name: Yassine Love  : 1945  MRN: [id number]    Date of Admission: 10/2/2018  Length of Stay: 7  Primary Care Physician: Provider, No Known    Subjective   Subjective     CC:  sob    HPI:  Pt sitting up in bed, down to 3L NC, says he is feeling better, denies heart palpitations     Review of Systems  Gen- No fevers, chills  CV- No chest pain, palpitations  Resp- No cough, dyspnea  GI- No N/V/D, abd pain      Otherwise ROS is negative except as mentioned in the HPI.    Objective   Objective     Vital Signs:   Temp:  [98 °F (36.7 °C)-99.2 °F (37.3 °C)] 99.2 °F (37.3 °C)  Heart Rate:  [58-77] 67  Resp:  [18-20] 20  BP: ()/(56-84) 102/58        Physical Exam:  Constitutional: No acute distress, awake, alert, no family at bedside.   HENT: NCAT, mucous membranes moist  Respiratory: Clear to auscultation bilaterally, respiratory effort normal   Cardiovascular: RRR, no murmurs, rubs, or gallops, palpable pedal pulses bilaterally  Gastrointestinal: Positive bowel sounds, soft, nontender, nondistended  Musculoskeletal: 1+ bilateral ankle edema with b/l erythema lower ext mid shin.   Psychiatric: Appropriate affect, cooperative  Neurologic: Oriented x 3, strength symmetric in all extremities, Cranial Nerves grossly intact to confrontation, speech clear  Skin: otherwise no rashes      Results Reviewed:  I have personally reviewed current lab, radiology, and data and agree.      Results from last 7 days  Lab Units 10/09/18  0751 10/06/18  0535 10/05/18  0353   WBC 10*3/mm3 6.27 5.26 7.38   HEMOGLOBIN g/dL 10.0* 10.2* 9.1*   HEMATOCRIT % 33.6* 35.3* 30.7*   PLATELETS 10*3/mm3 302 287 297       Results from last 7 days  Lab Units 10/09/18  0751 10/08/18  0534 10/07/18  0516  10/05/18  0353 10/04/18  0431  10/02/18  1750   SODIUM mmol/L 138 140 138  < > 138 141  < > 138   POTASSIUM mmol/L 3.7 4.6 4.3  < > 4.0 4.8  < > 4.2   CHLORIDE  mmol/L 101 106 104  < > 99 95*  < > 91*   CO2 mmol/L 33.0* 31.0 29.0  < > 36.0* 40.0*  < > 42.0*   BUN mg/dL 36* 36* 33*  < > 38* 39*  < > 22   CREATININE mg/dL 1.24 1.26 1.33*  < > 1.27 1.37*  < > 1.35*   GLUCOSE mg/dL 86 106* 128*  < > 83 99  < > 102*   CALCIUM mg/dL 8.2* 8.3* 8.3*  < > 7.8* 8.5*  < > 8.6*   ALT (SGPT) U/L  --   --   --   --  12 12  --  18   AST (SGOT) U/L  --   --   --   --  18 15  --  17   < > = values in this interval not displayed.  Estimated Creatinine Clearance: 54.9 mL/min (by C-G formula based on SCr of 1.24 mg/dL).  No results found for: BNP    Microbiology Results Abnormal     Procedure Component Value - Date/Time    Blood Culture - Blood, [[id number]]  (Normal) Collected:  10/02/18 1804    Lab Status:  Final result Specimen:  Blood from Hand, Left Updated:  10/07/18 2201     Blood Culture No growth at 5 days    Narrative:       Aerobic bottle only     Blood Culture - Blood, [[id number]]  (Normal) Collected:  10/02/18 1720    Lab Status:  Final result Specimen:  Blood from Arm, Right Updated:  10/07/18 1830     Blood Culture No growth at 5 days    Respiratory Culture - Sputum, Cough [[id number]]  (Abnormal)  (Susceptibility) Collected:  10/03/18 0430    Lab Status:  Final result Specimen:  Sputum from Cough Updated:  10/07/18 1137     Respiratory Culture Light growth (2+) Staphylococcus aureus (A)      Light growth (2+) Normal Respiratory Nataliia     Gram Stain Result Few (2+) WBCs per low power field      No Epithelial cells per low power field      Few (2+) Gram positive cocci    Susceptibility      Staphylococcus aureus     FAVIAN     Ceftriaxone <=8 ug/ml Susceptible     Clindamycin >4 ug/ml Resistant     Erythromycin >4 ug/ml Resistant     Gentamicin <=4 ug/ml Susceptible     Levofloxacin <=1 ug/ml Susceptible  [1]      Linezolid <=1 ug/ml Susceptible     Oxacillin <=0.25 ug/ml Susceptible     Penicillin G <=0.03 ug/ml Susceptible     Quinupristin + Dalfopristin <=0.5 ug/ml Susceptible      Rifampin <=1 ug/ml Susceptible     Tetracycline >8 ug/ml Resistant     Trimethoprim + Sulfamethoxazole <=0.5/9.5 ug/ml Susceptible     Vancomycin 2 ug/ml Susceptible            [1]   Staphylococcus species may develop resistance during prolonged therapy with quinolones.  Isolates that are initially susceptible may become resistant within three to four days after initiation of therapy. Testing of repeat isolates may be warranted.                   MRSA Screen, PCR - Swab, Nares [[id number]]  (Abnormal) Collected:  10/04/18 1623    Lab Status:  Final result Specimen:  Swab from Nares Updated:  10/04/18 1818     MRSA, PCR Positive (C)          Imaging Results (last 24 hours)     Procedure Component Value Units Date/Time    XR Chest 1 View [[id number]] Collected:  10/09/18 1222     Updated:  10/09/18 1321    Narrative:       EXAMINATION: XR CHEST 1 VW-      INDICATION: Pneumonia; J96.21-Acute and chronic respiratory failure with  hypoxia; J96.22-Acute and chronic respiratory failure with hypercapnia;  Z74.09-Other reduced mobility.     COMPARISON: 10/06/2018.     FINDINGS: The heart is at the upper limits of normal. There is bibasilar  airspace disease and bilateral pleural effusions. The overall appearance  chest is slightly improved when compared with 10/06/2018. A PICC is well  positioned.           Impression:       Bibasilar airspace disease and bilateral pleural effusions,  slightly improved when compared with 10/06/2018.     D:  10/09/2018  E:  10/09/2018     This report was finalized on 10/9/2018 1:19 PM by Dr. Nehemias Nye MD.           Results for orders placed during the hospital encounter of 10/02/18   Adult Transthoracic Echo Complete W/ Cont if Necessary Per Protocol    Narrative · Left ventricular systolic function is normal. Estimated EF = 60%.  · Trace-to-mild mitral valve regurgitation is present.          I have reviewed the medications.      aspirin 81 mg Oral Daily   atorvastatin 40 mg Oral Nightly    castor oil-balsam peru  Topical Q12H   clopidogrel 75 mg Oral Daily   famotidine 20 mg Oral BID   furosemide 40 mg Oral Daily   gabapentin 200 mg Oral Q8H   guaiFENesin 600 mg Oral Q12H   heparin (porcine) 5,000 Units Subcutaneous Q8H   insulin lispro 0-7 Units Subcutaneous 4x Daily With Meals & Nightly   ipratropium-albuterol 3 mL Nebulization Q4H - RT   lactobacillus acidophilus 1 capsule Oral Daily   Linezolid 600 mg Intravenous Q12H   meropenem 1 g Intravenous Q8H   metoprolol tartrate 50 mg Oral Q12H   mirtazapine 15 mg Oral Nightly   Morphine 15 mg Oral Q12H   nicotine 1 patch Transdermal Q24H   pharmacy consult - MTM  Does not apply Daily   polyethylene glycol 17 g Oral BID   [START ON 10/11/2018] polyethylene glycol 17 g Oral Daily   [START ON 10/12/2018] predniSONE 10 mg Oral Daily With Breakfast   Followed by      [START ON 10/15/2018] predniSONE 5 mg Oral Daily With Breakfast   predniSONE 20 mg Oral Daily With Breakfast   sennosides-docusate sodium 2 tablet Oral BID   sodium chloride 10 mL Intravenous Q12H   sodium chloride 3 mL Intravenous Q12H   cyanocobalamin 1,000 mcg Oral Daily         Assessment/Plan   Assessment / Plan     Active Hospital Problems    Diagnosis   • **Acute on chronic mixed hypercarbic/hypoxemic respiratory failure requiring intubation and ventilatory support   • Acute kidney injury     Cr 1.35 increased from 9/8/18 1.15      • H/O bacteremia due to Staphylococcus   • Opioid use   • Pneumonia due to infectious organism   • Brief runs of SVT (supraventricular tachycardia)    • Decubitus ulcer of buttock, stage 2   • CAD status post GIGI ×2 to LAD and RCA 3/12/18     S/P LHC per Dr. Harvey on 3/12/18 with PCI revealing severe 2-vessel disease.    GIGI x 2 to LAD and RCA     • Essential hypertension   • End stage COPD in an active smoker on home O2 with chronic hypercarbia     Managed by [facility]      • H/O cellulitis of both lower extremities     Previously managed by  "Infectious Disease      • PVD (peripheral vascular disease)           Brief Hospital Course to date:  Yassine Love is a 72 y.o. male active smoker with a history of COPD (on home oxygen) admitted 10/2/18 for exacerbation associated with severe hypoxemia. He was in the skilled nursing facility for rehabilitation but 10/1/18 developed some visual hallucinations. Oxygen saturations were subsequently noted to drop into the 60s on 4 L on 10/2/18 and he was brought to [facility] ER. Because of an elevated d-dimer a CT angiogram was obtained which was negative for PE. Both CT scan and chest x-ray however revealed it appeared to be pulmonary edema with diffuse interstitial infiltrates and pleural effusions. In addition BNP was 899. He was given his usual dose of home Lasix, placed on empiric antimicrobial coverage with Zosyn, given bronchodilators, Solu-Medrol, placed on BiPAP, and admitted to the hospitalist service.The patient lives by himself. His son had discussed his situation earlier this year and felt that he was not able to care for himself in his own home as he had been doing. He recommended the patient go into a facility, angering the patient who basically \"cut him off\" and wanted nothing further to do with him. He developed severe cellulitis of his lower extremities and was hospitalized 8/20/18 through 9/8/18 at [facility]. Afterwords was sent to a skilled nursing facility for rehabilitation where the above occurred.     At approximately 2 AM 10/3/18 he was noted to become unresponsive with decreased respiratory drive. Apparently he had been a DNR but this was reversed by the patient on admission. Because of this Dr. Jacobsen and respiratory therapy intubated the patient at the bedside. There were large amounts of thick purulent yellow secretions obtained at the time of intubation and he was transferred to the ICU on ventilatory support. He initially required high FiO2's of 60% but respiratory rate and ABGs rapidly " "improved. Over the course of the evening he continued to improve. He subsequently self extubated the evening of 10/3/18 at 11 PM. He was able to tolerate self extubation but has required high flow nasal oxygen in order to maintain his oxygen saturations. He was alert and oriented so additional discussions were held regarding CODE STATUS. He wanted to think about it yesterday and when I question him again today he stated he would like to think about it\" some more\".      He was on empiric vancomycin and Merrem and cultures from blood and sputum were negative as of 10/4/18. He however had had a staph aureus in a wound in August and I wanted to continue him on coverage for MRSA as well as gram negatives. Because of his renal insufficiency however he was switched him to linezolid on 10/4/18. Today his sputum is growing a staph aureus that was not identified and nasal swabs are growing MRSA. He remains on Merrem and linezolid.      Heart rate is usually in the 60s, but has had recurrent brief episodes of SVT up to 160  both yesterday and today. His metoprolol was held once and that led to SVT that resolved with resumption of beta blocker.. He was admitted 10/2 and transferred out of the ICU 10/7      Assessment & Plan:  1. Continue linezolid and Merrem and complete 7-10 days total,ID to assist with abx regimen  2. Avoid sedating agents  3. Keep his potassium greater than or equal to 4.5 to avoid the development of metabolic alkalosis that will reduce respiratory, give add'l potassium today and schedule   4. IV Lasix and Diamox initially, Lasix decreased today to 40mg po daily   5. Follow-up renal function in a.m.  6. Weaned from high flow down to 3L NC, continue to taper as he tolerates  7.Palliative following, status changed to DNR/DNI   8. Recurrent episodes of SVT - pt follows with [facility], will consult, follow mag, continue BB   9. Taper steroids per pulm     DVT Prophylaxis:  Sq heparin    CODE STATUS:   Code Status and " Medical Interventions:   Ordered at: 10/08/18 1413     Limited Support to NOT Include:    Intubation     Level Of Support Discussed With:    Patient     Code Status:    No CPR     Medical Interventions (Level of Support Prior to Arrest):    Limited       Disposition: I expect the patient to be discharged tbd      Electronically signed by Danii Tabor DO, 10/09/18, 4:57 PM.

## 2018-10-09 NOTE — PLAN OF CARE
Problem: Patient Care Overview  Goal: Interprofessional Rounds/Family Conf  Outcome: Ongoing (interventions implemented as appropriate)  Palliative Team Attendance 1300:  Dr. Maira Herrera, Jimena La APRN, APRN student, Zari Arroyo RN, CHPN, Apryl Iraheta, HAYLIEW, and Matrha Messina. [facility]   10/09/18 1300   Interdisciplinary Rounds/Family Conf   Summary Martha Messina, [facility] saw patient and he reports to the Pallitive Team that patient reports his pain has improved with medication adjustment. He rated his pain a 5 today and a 8 yesterday. Anxiety is much better now that his pain has improved. Patient reports that he is eating and drinking and is not SOB. Medical staff reports that they have not seen yet.   Palliative will contine to follow   Participants nursing;advanced practice nurse;pastoral care;social work/services;physician

## 2018-10-09 NOTE — PLAN OF CARE
Problem: Patient Care Overview  Goal: Plan of Care Review  Outcome: Ongoing (interventions implemented as appropriate)   10/09/18 5414   Coping/Psychosocial   Plan of Care Reviewed With patient   Plan of Care Review   Progress improving   OTHER   Outcome Summary WOC follow-up for MASD to bottom/groin, fungal rash, and left luteal friction areas. At this time fungal rash has resolved on groin and bottom. Will discontinue MICOTIN. MASD is resolving as well. Left gluteal friction areas continue. Patient turns well. Continue with Venelex ointment to friction areas BID per MED order. WOC nurse will continue to follow. Please contact WOC nurse if needs arise. Thanks

## 2018-10-09 NOTE — PROGRESS NOTES
[facility] Progress Note    Admission Date: 10/2/2018    Yassine Love  1945  [id number]    Date: 10/9/2018    Antibiotics:  Anti-Infectives     Ordered     Dose/Rate Route Frequency Start Stop    10/04/18 1629  Linezolid (ZYVOX) 600 mg 300 mL     Mary Kate Rahman MD reviewed the order on 10/08/18 1301.   Ordering Provider:  Mary Kate Rahman MD    600 mg  300 mL/hr over 60 Minutes Intravenous Every 12 Hours 10/04/18 1800 10/13/18 1300    10/03/18 0229  meropenem (MERREM) 1 g/100 mL 0.9% NS VTB (mbp)     Ordering Provider:  Perla Jones APRN    1 g  over 3 Hours Intravenous Every 8 Hours 10/03/18 1000 10/13/18 0959    10/03/18 0914  vancomycin 1750 mg/500 mL 0.9% NS IVPB ([facility])     Ordering Provider:  Parish Donato MD    1,750 mg  over 120 Minutes Intravenous Once 10/03/18 1000 10/03/18 1203    10/03/18 0229  meropenem (MERREM) 1 g/100 mL 0.9% NS VTB (mbp)     Ordering Provider:  Perla Jones APRN    1 g  over 30 Minutes Intravenous Once 10/03/18 0315 10/03/18 0313    10/02/18 2108  piperacillin-tazobactam (ZOSYN) 4.5 g in iso-osmotic dextrose 100 mL IVPB (premix)     Ordering Provider:  Pastora Caldwell DO    4.5 g  over 30 Minutes Intravenous Once 10/02/18 2108 10/03/18 0114           Reason for Consultation: bilateral leg cellulitis,  bronchitis      History of present illness:    Patient is a 72 y.o. male well known to Dr Belle from prior admissions. Dr Belle has primarily followed for cellulitis  He was hospitalized 8/21 to 9/7 and Dr Belle followed for cellulitis complicating bilateral leg ulcers   This admission on  10/2/18 is for  for COPD exacerbation and severe hypoxia. He iwas transferred from a  skilled nursing facility with hypoxemia and visual hallucinations.[place]. EMS was called and Mr. Love was  CTA was negative for pulmonary embolism but  pulmonary edema with diffuse interstitial infiltrates and pleural effusions. BNP was 899 and procalcitonin normal on 10/2 and 10/4. He required  mechanical ventilation  Sputum culture grew MSSA  He has been treated with zyvox and merrrem  He improved after diuresis and was extubated  The leg wounds present 2 months ago have healed He has some residual erythema and states that his legs are painful at times  He is known to have severe PVD     10/9  More alert this am; intermittent cough perisists  Denies leg pain             ROS:  No f/c/s. No n/v/d. No rash. No new ADR to Abx.       PE:  Vital Signs  Temp  Min: 97.9 °F (36.6 °C)  Max: 98.1 °F (36.7 °C)  BP  Min: 98/56  Max: 142/72  Pulse  Min: 58  Max: 77  Resp  Min: 16  Max: 20  SpO2  Min: 91 %  Max: 94 %  GENERAL: alert  HEENT: Normocephalic, atraumatic.  PERRL.. No conjunctival injection. No icterus. Oropharynx not visualized.   NECK: Supple without nuchal rigidity. No mass.  LYMPH: No cervical lymphadenopathy.  HEART: heart sounds distant  LUNGS: poor inspiratory effort, no wheezes or rhonchi  ABDOMEN: Soft, nontender, nondistended. Positive bowel sounds. No rebound or guarding. NO mass or HSM.  EXT:  1+  Edema bilateral legs with faded erythema, mild tenderness to palpation. No cords.  : Genitalia generally unremarkable.  Without Moore catheter.  MSK: FROM without joint effusions noted arms/legs.    SKIN: Warm and dry without cutaneous eruptions on Inspection/palpation.    NEURO: Oriented to PPT. No focal deficits on motor/sensory exam at arms/legs.  PSYCHIATRIC: Normal insight and judgement. Cooperative with PE     Laboratory Data      Results from last 7 days  Lab Units 10/09/18  0751 10/06/18  0535 10/05/18  0353   WBC 10*3/mm3 6.27 5.26 7.38   HEMOGLOBIN g/dL 10.0* 10.2* 9.1*   HEMATOCRIT % 33.6* 35.3* 30.7*   PLATELETS 10*3/mm3 302 287 297       Results from last 7 days  Lab Units 10/08/18  0534   SODIUM mmol/L 140   POTASSIUM mmol/L 4.6   CHLORIDE mmol/L 106   CO2 mmol/L 31.0   BUN mg/dL 36*   CREATININE mg/dL 1.26   GLUCOSE mg/dL 106*   CALCIUM mg/dL 8.3*       Results from last 7 days  Lab  Units 10/05/18  0353   ALK PHOS U/L 92   BILIRUBIN mg/dL 0.4   ALT (SGPT) U/L 12   AST (SGOT) U/L 18               Estimated Creatinine Clearance: 54.1 mL/min (by C-G formula based on SCr of 1.26 mg/dL).      Microbiology:  mssa from sputum;  mrsa from nares swab    Radiology:  Imaging Results (last 72 hours)     Procedure Component Value Units Date/Time    XR Chest 1 View [[id number]] Collected:  10/06/18 1115     Updated:  10/06/18 1355    Narrative:       EXAMINATION: XR CHEST 1 VW - 10/06/2018     INDICATION: J96.21-Acute and chronic respiratory failure with hypoxia;  J96.22-Acute and chronic respiratory failure with hypercapnia;  Z74.09-Other reduced mobility.     TECHNIQUE:  Single view frontal chest.     COMPARISONS: 10/5/2018.     FINDINGS:  Stable support apparatus. Small bilateral pleural effusions  and adjacent atelectasis are unchanged. No pneumothorax. Calcification  aortic knob. Cardiomediastinal silhouette is unchanged.        Impression:       Stable exam.     DICTATED:   10/06/2018  EDITED/ls :   10/06/2018      This report was finalized on 10/6/2018 1:53 PM by Eliezer Hale.             I personally reviewed the radiographic studies     IMPRESSION:    -- Probable MSSA bronchitis  Vs pneumonia     -- Acute on chronic mixed respiratory failure  S/p intubation, now extubated     -- Pulmonary edema- improved     -- Chronic lymphedema and venous stasis dermatitis     -- Bilateral leg cellulitis essentially resolved     -- Severe COPD with ongoing tobacco abuse     -- RAFAEL     -- Decubitus ulcer of buttock stage 2     --MRSA colonization       RECOMMENDATIONS:      -- continue current regimen of merrem and zyvox   It may be reasonable to taper antibiotics soon by stopping merrem and treating with oral zyvox     -- probiotic         Mary Kate Rahman MD  10/9/2018

## 2018-10-10 PROBLEM — G47.30 SLEEP APNEA: Status: ACTIVE | Noted: 2018-10-10

## 2018-10-10 LAB
ANION GAP SERPL CALCULATED.3IONS-SCNC: 3 MMOL/L (ref 3–11)
BUN BLD-MCNC: 36 MG/DL (ref 9–23)
BUN/CREAT SERPL: 31.3 (ref 7–25)
CALCIUM SPEC-SCNC: 8.2 MG/DL (ref 8.7–10.4)
CHLORIDE SERPL-SCNC: 101 MMOL/L (ref 99–109)
CO2 SERPL-SCNC: 33 MMOL/L (ref 20–31)
CREAT BLD-MCNC: 1.15 MG/DL (ref 0.6–1.3)
DEPRECATED RDW RBC AUTO: 66 FL (ref 37–54)
ERYTHROCYTE [DISTWIDTH] IN BLOOD BY AUTOMATED COUNT: 19.3 % (ref 11.3–14.5)
GFR SERPL CREATININE-BSD FRML MDRD: 62 ML/MIN/1.73
GLUCOSE BLD-MCNC: 91 MG/DL (ref 70–100)
GLUCOSE BLDC GLUCOMTR-MCNC: 108 MG/DL (ref 70–130)
GLUCOSE BLDC GLUCOMTR-MCNC: 154 MG/DL (ref 70–130)
HCT VFR BLD AUTO: 32.4 % (ref 38.9–50.9)
HGB BLD-MCNC: 9.8 G/DL (ref 13.1–17.5)
MAGNESIUM SERPL-MCNC: 2.3 MG/DL (ref 1.3–2.7)
MCH RBC QN AUTO: 28.2 PG (ref 27–31)
MCHC RBC AUTO-ENTMCNC: 30.2 G/DL (ref 32–36)
MCV RBC AUTO: 93.1 FL (ref 80–99)
PLATELET # BLD AUTO: 351 10*3/MM3 (ref 150–450)
PMV BLD AUTO: 10.2 FL (ref 6–12)
POTASSIUM BLD-SCNC: 4.6 MMOL/L (ref 3.5–5.5)
RBC # BLD AUTO: 3.48 10*6/MM3 (ref 4.2–5.76)
SODIUM BLD-SCNC: 137 MMOL/L (ref 132–146)
WBC NRBC COR # BLD: 6.63 10*3/MM3 (ref 3.5–10.8)

## 2018-10-10 PROCEDURE — 97530 THERAPEUTIC ACTIVITIES: CPT

## 2018-10-10 PROCEDURE — 94640 AIRWAY INHALATION TREATMENT: CPT

## 2018-10-10 PROCEDURE — 99232 SBSQ HOSP IP/OBS MODERATE 35: CPT | Performed by: INTERNAL MEDICINE

## 2018-10-10 PROCEDURE — 63710000001 PREDNISONE PER 1 MG: Performed by: INTERNAL MEDICINE

## 2018-10-10 PROCEDURE — 94799 UNLISTED PULMONARY SVC/PX: CPT

## 2018-10-10 PROCEDURE — 97110 THERAPEUTIC EXERCISES: CPT

## 2018-10-10 PROCEDURE — 83735 ASSAY OF MAGNESIUM: CPT | Performed by: FAMILY MEDICINE

## 2018-10-10 PROCEDURE — 80048 BASIC METABOLIC PNL TOTAL CA: CPT | Performed by: FAMILY MEDICINE

## 2018-10-10 PROCEDURE — 25010000002 MEROPENEM: Performed by: NURSE PRACTITIONER

## 2018-10-10 PROCEDURE — 99233 SBSQ HOSP IP/OBS HIGH 50: CPT | Performed by: HOSPITALIST

## 2018-10-10 PROCEDURE — 85027 COMPLETE CBC AUTOMATED: CPT | Performed by: FAMILY MEDICINE

## 2018-10-10 PROCEDURE — 25010000002 HEPARIN (PORCINE) PER 1000 UNITS: Performed by: INTERNAL MEDICINE

## 2018-10-10 PROCEDURE — 82962 GLUCOSE BLOOD TEST: CPT

## 2018-10-10 PROCEDURE — 97116 GAIT TRAINING THERAPY: CPT

## 2018-10-10 PROCEDURE — 25010000002 LINEZOLID 600 MG/300ML SOLUTION: Performed by: INTERNAL MEDICINE

## 2018-10-10 PROCEDURE — 94760 N-INVAS EAR/PLS OXIMETRY 1: CPT

## 2018-10-10 RX ADMIN — GUAIFENESIN 600 MG: 600 TABLET, EXTENDED RELEASE ORAL at 20:31

## 2018-10-10 RX ADMIN — HEPARIN SODIUM 5000 UNITS: 5000 INJECTION, SOLUTION INTRAVENOUS; SUBCUTANEOUS at 20:31

## 2018-10-10 RX ADMIN — Medication 3 ML: at 08:49

## 2018-10-10 RX ADMIN — FAMOTIDINE 20 MG: 20 TABLET ORAL at 08:24

## 2018-10-10 RX ADMIN — GABAPENTIN 200 MG: 100 CAPSULE ORAL at 20:31

## 2018-10-10 RX ADMIN — POTASSIUM CHLORIDE 30 MEQ: 750 CAPSULE, EXTENDED RELEASE ORAL at 17:34

## 2018-10-10 RX ADMIN — IPRATROPIUM BROMIDE AND ALBUTEROL SULFATE 3 ML: 2.5; .5 SOLUTION RESPIRATORY (INHALATION) at 07:47

## 2018-10-10 RX ADMIN — CASTOR OIL AND BALSAM, PERU: 788; 87 OINTMENT TOPICAL at 20:35

## 2018-10-10 RX ADMIN — IPRATROPIUM BROMIDE AND ALBUTEROL SULFATE 3 ML: 2.5; .5 SOLUTION RESPIRATORY (INHALATION) at 03:41

## 2018-10-10 RX ADMIN — MEROPENEM 1 G: 1 INJECTION, POWDER, FOR SOLUTION INTRAVENOUS at 17:39

## 2018-10-10 RX ADMIN — POTASSIUM CHLORIDE 30 MEQ: 750 CAPSULE, EXTENDED RELEASE ORAL at 08:24

## 2018-10-10 RX ADMIN — MIRTAZAPINE 15 MG: 15 TABLET, ORALLY DISINTEGRATING ORAL at 20:36

## 2018-10-10 RX ADMIN — CYANOCOBALAMIN TAB 1000 MCG 1000 MCG: 1000 TAB at 08:24

## 2018-10-10 RX ADMIN — ACETAMINOPHEN 325 MG: 325 TABLET ORAL at 10:20

## 2018-10-10 RX ADMIN — Medication 10 ML: at 20:34

## 2018-10-10 RX ADMIN — METOPROLOL TARTRATE 25 MG: 25 TABLET ORAL at 17:35

## 2018-10-10 RX ADMIN — MORPHINE SULFATE 15 MG: 15 TABLET, EXTENDED RELEASE ORAL at 20:31

## 2018-10-10 RX ADMIN — POLYETHYLENE GLYCOL 3350 17 G: 17 POWDER, FOR SOLUTION ORAL at 08:35

## 2018-10-10 RX ADMIN — CLOPIDOGREL BISULFATE 75 MG: 75 TABLET ORAL at 08:25

## 2018-10-10 RX ADMIN — HEPARIN SODIUM 5000 UNITS: 5000 INJECTION, SOLUTION INTRAVENOUS; SUBCUTANEOUS at 14:20

## 2018-10-10 RX ADMIN — Medication 3 ML: at 20:34

## 2018-10-10 RX ADMIN — SENNOSIDES AND DOCUSATE SODIUM 2 TABLET: 8.6; 5 TABLET ORAL at 20:30

## 2018-10-10 RX ADMIN — HEPARIN SODIUM 5000 UNITS: 5000 INJECTION, SOLUTION INTRAVENOUS; SUBCUTANEOUS at 08:36

## 2018-10-10 RX ADMIN — PREDNISONE 20 MG: 20 TABLET ORAL at 08:25

## 2018-10-10 RX ADMIN — ATORVASTATIN CALCIUM 40 MG: 40 TABLET, FILM COATED ORAL at 20:31

## 2018-10-10 RX ADMIN — CASTOR OIL AND BALSAM, PERU: 788; 87 OINTMENT TOPICAL at 08:35

## 2018-10-10 RX ADMIN — INSULIN LISPRO 2 UNITS: 100 INJECTION, SOLUTION INTRAVENOUS; SUBCUTANEOUS at 21:08

## 2018-10-10 RX ADMIN — POLYETHYLENE GLYCOL 3350 17 G: 17 POWDER, FOR SOLUTION ORAL at 17:39

## 2018-10-10 RX ADMIN — IPRATROPIUM BROMIDE AND ALBUTEROL SULFATE 3 ML: 2.5; .5 SOLUTION RESPIRATORY (INHALATION) at 00:11

## 2018-10-10 RX ADMIN — HYDROCODONE BITARTRATE AND ACETAMINOPHEN 1 TABLET: 10; 325 TABLET ORAL at 15:07

## 2018-10-10 RX ADMIN — IPRATROPIUM BROMIDE AND ALBUTEROL SULFATE 3 ML: 2.5; .5 SOLUTION RESPIRATORY (INHALATION) at 12:05

## 2018-10-10 RX ADMIN — LINEZOLID 600 MG: 600 INJECTION, SOLUTION INTRAVENOUS at 17:39

## 2018-10-10 RX ADMIN — FAMOTIDINE 20 MG: 20 TABLET ORAL at 20:31

## 2018-10-10 RX ADMIN — GABAPENTIN 200 MG: 100 CAPSULE ORAL at 14:20

## 2018-10-10 RX ADMIN — NICOTINE 1 PATCH: 21 PATCH, EXTENDED RELEASE TRANSDERMAL at 08:36

## 2018-10-10 RX ADMIN — ASPIRIN 81 MG CHEWABLE TABLET 81 MG: 81 TABLET CHEWABLE at 08:25

## 2018-10-10 RX ADMIN — Medication 1 CAPSULE: at 08:24

## 2018-10-10 RX ADMIN — MORPHINE SULFATE 15 MG: 15 TABLET, EXTENDED RELEASE ORAL at 08:25

## 2018-10-10 RX ADMIN — Medication 10 ML: at 08:50

## 2018-10-10 RX ADMIN — MEROPENEM 1 G: 1 INJECTION, POWDER, FOR SOLUTION INTRAVENOUS at 10:25

## 2018-10-10 RX ADMIN — GUAIFENESIN 600 MG: 600 TABLET, EXTENDED RELEASE ORAL at 08:24

## 2018-10-10 RX ADMIN — FUROSEMIDE 40 MG: 40 TABLET ORAL at 08:25

## 2018-10-10 RX ADMIN — IPRATROPIUM BROMIDE AND ALBUTEROL SULFATE 3 ML: 2.5; .5 SOLUTION RESPIRATORY (INHALATION) at 15:48

## 2018-10-10 RX ADMIN — LINEZOLID 600 MG: 600 INJECTION, SOLUTION INTRAVENOUS at 09:41

## 2018-10-10 RX ADMIN — MEROPENEM 1 G: 1 INJECTION, POWDER, FOR SOLUTION INTRAVENOUS at 03:19

## 2018-10-10 NOTE — PLAN OF CARE
Problem: Patient Care Overview  Goal: Interprofessional Rounds/Family Conf  Outcome: Ongoing (interventions implemented as appropriate)  Palliative Team Attendance 1300.Maira Herrera MD, Armaan Barlow RN CHPN, Apryl Iraheta LCSW, Gail Boucher,    10/10/18 1300   Interdisciplinary Rounds/Family Conf   Summary pt is up in the chair. pt finished working with OT. more dyspneic today. using PLB. can use prn lortab for dyspnea. Pt plans for snf/rehab and palliative care follow. no intubation or cpr.

## 2018-10-10 NOTE — THERAPY TREATMENT NOTE
Acute Care - Physical Therapy Treatment Note  [facility]     Patient Name: Yassine Love  : 1945  MRN: [id number]  Today's Date: 10/10/2018  Onset of Illness/Injury or Date of Surgery: 10/02/18  Date of Referral to PT: 10/02/18  Referring Physician: DO Javi    Admit Date: 10/2/2018    Visit Dx:    ICD-10-CM ICD-9-CM   1. Acute on chronic respiratory failure with hypoxia and hypercapnia (CMS/HCC) J96.21 518.84    J96.22 786.09     799.02   2. Impaired functional mobility, balance, gait, and endurance Z74.09 V49.89   3. 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 O2 with chronic hypercarbia   • Essential hypertension   • Non-sustained ventricular tachycardia (CMS/HCC)   • CAD status post GIGI ×2 to LAD and RCA 3/12/18   • Debility   • Decubitus ulcer of buttock, stage 2   • Chronic pain   • Chronic venous stasis dermatitis of both lower extremities   • Acute on chronic mixed hypercarbic/hypoxemic respiratory failure requiring intubation and ventilatory support   • History of tobacco use   • Acute kidney injury   • Brief runs of SVT (supraventricular tachycardia)    • H/O bacteremia due to Staphylococcus   • Opioid use   • Overweight (BMI 25.0-29.9)   • Pneumonia due to infectious organism       Therapy Treatment          Rehabilitation Treatment Summary     Row Name 10/10/18 0930             Treatment Time/Intention    Discipline physical therapist  -KM      Document Type therapy note (daily note)  -KM      Subjective Information complains of   needing to use urinal  -KM      Mode of Treatment physical therapy  -KM      Care Plan Review care plan/treatment goals reviewed;risks/benefits reviewed;patient/other agree to care plan  -KM      Therapy Frequency (PT Clinical Impression) daily  -KM      Patient Effort good  -KM      Existing  Precautions/Restrictions fall;oxygen therapy device and L/min  -KM      Patient Response to Treatment --   O2 sats drop  -KM      Recorded by [KM] Carmela Washington, PT 10/10/18 1052      Row Name 10/10/18 0930             Vital Signs    Pre Systolic BP Rehab 116  -KM      Pre Treatment Diastolic BP 62  -KM      Pre SpO2 (%) 93  -KM      O2 Delivery Pre Treatment supplemental O2  -KM      Intra SpO2 (%) 77  -KM      O2 Delivery Intra Treatment supplemental O2  -KM      Post SpO2 (%) 90  -KM      O2 Delivery Post Treatment supplemental O2  -KM      Recorded by [KM] Carmela Washington, PT 10/10/18 1052      Row Name 10/10/18 0930             Cognitive Assessment/Intervention- PT/OT    Orientation Status (Cognition) oriented x 3  -KM      Follows Commands (Cognition) follows one step commands;over 90% accuracy  -KM      Cognitive Function (Cognitive) safety deficit  -KM      Safety Deficit (Cognitive) mild deficit  -KM      Personal Safety Interventions fall prevention program maintained  -KM      Recorded by [KM] Carmela Washington, PT 10/10/18 1052      Row Name 10/10/18 0930             Bed Mobility Assessment/Treatment    Bed Mobility Assessment/Treatment supine-sit;scooting/bridging  -KM      Scooting/Bridging [place] (Bed Mobility) contact guard  -KM      Supine-Sit [place] (Bed Mobility) minimum assist (75% patient effort)  -KM      Bed Mobility, Safety Issues decreased use of arms for pushing/pulling;decreased use of legs for bridging/pushing  -KM      Assistive Device (Bed Mobility) bed rails;draw sheet;head of bed elevated  -KM      Recorded by [KM] Carmela Washington, PT 10/10/18 1052      Row Name 10/10/18 0930             Sit-Stand Transfer    Sit-Stand [place] (Transfers) minimum assist (75% patient effort);2 person assist   elevated bed surface  -KM      Assistive Device (Sit-Stand Transfers) walker, front-wheeled  -KM      Recorded by [KM] Carmela Washington, PT  10/10/18 1052      Row Name 10/10/18 0930             Stand-Sit Transfer    Stand-Sit [place] (Transfers) moderate assist (50% patient effort)  -KM      Assistive Device (Stand-Sit Transfers) walker, front-wheeled  -KM      Recorded by [KM] Carmela Washington, PT 10/10/18 1052      Row Name 10/10/18 0930             Gait/Stairs Assessment/Training    12846 - Gait Training Minutes  15  -KM      [place] Level (Gait) moderate assist (50% patient effort);2 person assist   follow with chair, assist for iv and lines  -KM      Assistive Device (Gait) walker, front-wheeled  -KM      Distance in Feet (Gait) 15  -KM      Pattern (Gait) step-to  -KM      Deviations/Abnormal Patterns (Gait) base of support, wide;gait speed decreased;stride length decreased  -KM      Bilateral Gait Deviations forward flexed posture  -KM      Recorded by [KM] Carmela Washington, PT 10/10/18 1052      Row Name 10/10/18 0930             Therapeutic Exercise    Therapeutic Exercise seated, lower extremities  -KM      87016 - PT Therapeutic Exercise Minutes 8  -KM      Recorded by [KM] Carmela Washington, PT 10/10/18 1052      Row Name 10/10/18 0930             Lower Extremity Seated Therapeutic Exercise    Performed, Seated Lower Extremity (Therapeutic Exercise) hip flexion/extension;hip abduction/adduction;knee flexion/extension;ankle dorsiflexion/plantarflexion;LAQ (long arc quad), knee extension;hamstring stretch  -KM      Exercise Type, Seated Lower Extremity (Therapeutic Exercise) AAROM (active assistive range of motion)  -KM      Sets/Reps Detail, Seated Lower Extremity (Therapeutic Exercise) 1/10  -KM      Recorded by [KM] Carmela Washington, PT 10/10/18 1052      Row Name 10/10/18 0930             Static Sitting Balance    Level of [place] (Unsupported Sitting, Static Balance) supervision  -KM      Sitting Position (Unsupported Sitting, Static Balance) sitting on edge of bed  -KM      Recorded by [KM]  Carmela Washington, PT 10/10/18 1052      Row Name 10/10/18 0930             Static Standing Balance    Level of [place] (Supported Standing, Static Balance) minimal assist, 75% patient effort  -KM      Assistive Device Utilized (Supported Standing, Static Balance) rolling walker  -KM      Recorded by [KM] Carmela Washington, PT 10/10/18 1052      Row Name 10/10/18 0930             Positioning and Restraints    Pre-Treatment Position in bed  -KM      Post Treatment Position chair  -KM      In Chair reclined;call light within reach;encouraged to call for assist;exit alarm on  -KM      Recorded by [GERALDO] Carmela Washington, PT 10/10/18 1052      Row Name 10/10/18 0930             Pain Scale: Numbers Pre/Post-Treatment    Pain Scale: Numbers, Pretreatment 4/10  -KM      Pain Scale: Numbers, Post-Treatment 4/10  -KM      Pain Location - Side Bilateral  -KM      Pain Location - Orientation generalized  -KM      Pain Intervention(s) Repositioned;Ambulation/increased activity  -KM      Recorded by [GERALDO] Carmela Washington, PT 10/10/18 1052      Row Name                Wound 10/02/18 2230 Bilateral medial gluteal MASD (moisture associated skin damage)    Wound - Properties Group Date first assessed: 10/02/18 [MR] Time first assessed: 2230 [MR] Present On Admission : yes [MR] Side: Bilateral [MR] Orientation: medial [MR] Location: gluteal [MR] Type: MASD (moisture associated skin damage) [MR], friction shearing  Recorded by:  [MR] Brittany Evans, RN 10/03/18 1505    Row Name 10/10/18 0930             Plan of Care Review    Plan of Care Reviewed With patient  -KM      Recorded by [KM] Carmela Washington, PT 10/10/18 1052        User Key  (r) = Recorded By, (t) = Taken By, (c) = Cosigned By    Initials Name Effective Dates Discipline    Carmela Hernandez, PT 06/19/15 -  PT     Brittany Evans, RN 06/16/16 -  Nurse          Wound 10/02/18 2230 Bilateral medial gluteal MASD (moisture  associated skin damage) (Active)   Dressing Appearance no drainage 10/9/2018 10:30 PM   Base red/granulating 10/9/2018 10:30 PM   Periwound pink 10/9/2018 10:30 PM   Drainage Amount none 10/9/2018 10:30 PM   Care, Wound other (see comments) 10/9/2018 10:30 PM   Dressing Care, Wound foam;low-adherent;border dressing 10/9/2018 10:30 PM             Physical Therapy Education     Title: PT OT SLP Therapies (Active)     Topic: Physical Therapy (Active)     Point: Mobility training (Active)    Learning Progress Summary     Learner Status Readiness Method Response Comment Documented by    Patient Done Donyer E VU  KM 10/10/18 0930     Active Acceptance E,D NR  LS 10/05/18 1324     Active Acceptance E,D NR  LS 10/04/18 1119     Done Acceptance E,TB VU  MT 10/03/18 1851    Family Active Acceptance E,D NR  LS 10/04/18 1119     Done Acceptance E,TB VU  MT 10/03/18 1851          Point: Home exercise program (Active)    Learning Progress Summary     Learner Status Readiness Method Response Comment Documented by    Patient Done Donyer E VU  KM 10/10/18 0930     Active Acceptance E,D NR  LS 10/05/18 1324     Active Acceptance E,D NR  LS 10/04/18 1119     Done Acceptance E,TB VU  MT 10/03/18 1851    Family Active Acceptance E,D NR  LS 10/04/18 1119     Done Acceptance E,TB VU  MT 10/03/18 1851          Point: Body mechanics (Active)    Learning Progress Summary     Learner Status Readiness Method Response Comment Documented by    Patient Done Donyer E VU  KM 10/10/18 0930     Active Acceptance E,D NR  LS 10/05/18 1324     Active Acceptance E,D NR  LS 10/04/18 1119    Family Active Acceptance E,D NR  LS 10/04/18 1119          Point: Precautions (Active)    Learning Progress Summary     Learner Status Readiness Method Response Comment Documented by    Patient Done Eager E VU  KM 10/10/18 0930     Active Acceptance E,D NR  LS 10/05/18 1324     Active Acceptance E,D NR  LS 10/04/18 1119     Done Acceptance E,TB VU  MT 10/03/18 1851     Family Active Acceptance E,D NR   10/04/18 1119     Done Acceptance E,TB VU  MT 10/03/18 8776                      User Key     Initials Effective Dates Name Provider Type Discipline     06/19/15 -  Carmela Washington, PT Physical Therapist PT     06/19/15 -  Zari Rai, PT Physical Therapist PT    MT 06/16/16 -  Noemi Fish RN Registered Nurse Nurse                    PT Recommendation and Plan  Therapy Frequency (PT Clinical Impression): daily  Plan of Care Reviewed With: patient  Outcome Summary: Pt improved mobility distance with r wx, limited by fatigue and O2 sat drop/dyspnea. Motivated to participate, recommend snf for rehab          Outcome Measures     Row Name 10/10/18 0930 10/08/18 1114 10/08/18 0830       How much help from another person do you currently need...    Turning from your back to your side while in flat bed without using bedrails? 2  -KM  -- 2  -MB    Moving from lying on back to sitting on the side of a flat bed without bedrails? 2  -KM  -- 2  -MB    Moving to and from a bed to a chair (including a wheelchair)? 2  -KM  -- 1  -MB    Standing up from a chair using your arms (e.g., wheelchair, bedside chair)? 2  -KM  -- 2  -MB    Climbing 3-5 steps with a railing? 1  -KM  -- 1  -MB    To walk in hospital room? 2  -KM  -- 2  -MB    AM-PAC 6 Clicks Score 11  -KM  -- 10  -MB       How much help from another is currently needed...    Putting on and taking off regular lower body clothing?  -- 2  -SD  --    Bathing (including washing, rinsing, and drying)  -- 2  -SD  --    Toileting (which includes using toilet bed pan or urinal)  -- 2  -SD  --    Putting on and taking off regular upper body clothing  -- 2  -SD  --    Taking care of personal grooming (such as brushing teeth)  -- 3  -SD  --    Eating meals  -- 3  -SD  --    Score  -- 14  -SD  --       Functional Assessment    Outcome Measure Options AM-PAC 6 Clicks Basic Mobility (PT)  -KM AM-PAC 6 Clicks Daily Activity (OT)  -SD  AM-PAC 6 Clicks Basic Mobility (PT)  -MB      User Key  (r) = Recorded By, (t) = Taken By, (c) = Cosigned By    Initials Name Provider Type    Michelle Levine, OT Occupational Therapist    Carmela Hernandez, PT Physical Therapist    Linda Longo, PT Physical Therapist           Time Calculation:         PT Charges     Row Name 10/10/18 0930             Time Calculation    Start Time 0930  -KM      PT Received On 10/10/18  -KM      PT Goal Re-Cert Due Date 10/14/18  -KM         Time Calculation- PT    Total Timed Code Minutes- PT 23 minute(s)  -KM         Timed Charges    44106 - PT Therapeutic Exercise Minutes 8  -KM      14473 - Gait Training Minutes  15  -KM        User Key  (r) = Recorded By, (t) = Taken By, (c) = Cosigned By    Initials Name Provider Type    Carmela Hernandez, PT Physical Therapist        Therapy Suggested Charges     Code   Minutes Charges    35877 (CPT®) Hc Pt Neuromusc Re Education Ea 15 Min      70209 (CPT®) Hc Pt Ther Proc Ea 15 Min 8 1    25307 (CPT®) Hc Gait Training Ea 15 Min 15 1    72170 (CPT®) Hc Pt Therapeutic Act Ea 15 Min      32724 (CPT®) Hc Pt Manual Therapy Ea 15 Min      25633 (CPT®) Hc Pt Iontophoresis Ea 15 Min      13124 (CPT®) Hc Pt Elec Stim Ea-Per 15 Min      42776 (CPT®) Hc Pt Ultrasound Ea 15 Min      63132 (CPT®) Hc Pt Self Care/Mgmt/Train Ea 15 Min      81018 (CPT®) Hc Pt Prosthetic (S) Train Initial Encounter, Each 15 Min      88251 (CPT®) Hc Pt Orthotic(S)/Prosthetic(S) Encounter, Each 15 Min      47729 (CPT®) Hc Orthotic(S) Mgmt/Train Initial Encounter, Each 15min      Total  23 2        Therapy Charges for Today     Code Description Service Date Service Provider Modifiers Qty    88416384240 HC PT THER PROC EA 15 MIN 10/10/2018 Carmela Washington, PT GP 1    50170058477 HC GAIT TRAINING EA 15 MIN 10/10/2018 Carmela Washington, PT GP 1    07302450062 HC PT THER SUPP EA 15 MIN 10/10/2018 Carmela Washington, PT GP 2           PT G-Codes  Outcome Measure Options: AM-PAC 6 Clicks Basic Mobility (PT)  AM-PAC 6 Clicks Score: 11  Score: 14    Carmela Washington, PT  10/10/2018
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
## 2018-10-10 NOTE — PROGRESS NOTES
Continued Stay Note  [facility]     Patient Name: Yassine Love  MRN: [id number]  Today's Date: 10/10/2018    Admit Date: 10/2/2018          Discharge Plan     Row Name 10/10/18 1145       Plan    Plan Rehab    Patient/Family in Agreement with Plan yes    Plan Comments Received call from Kristina at [facility] and pt is in his copay days at rehab with a copay of $167.50 per day. [facility] would ask that pt pay up front for 30 days which would be approx. $5,000. Discussed with pt and he is agreeable and states he will have to take a loan out for that. He asked CM to call his son to discuss. Called Chris and he is agreeable to pay the $5,000 upfront. Notified Kristina and she will see pt and discuss Thursday to discuss.               Discharge Codes    No documentation.       Expected Discharge Date and Time     Expected Discharge Date Expected Discharge Time    Oct 12, 2018             Yumiko Hull

## 2018-10-10 NOTE — PLAN OF CARE
Problem: Patient Care Overview  Goal: Plan of Care Review  Outcome: Ongoing (interventions implemented as appropriate)   10/10/18 1015   Coping/Psychosocial   Plan of Care Reviewed With patient   Plan of Care Review   Progress improving   OTHER   Outcome Summary Pt setup with grooming. Completed 2 sets x 10 BUE AROM with 4 restbreaks. Pt mod A STS. Pt's O2 sats 84-88% throughout tx and RN raised O2 to 4L and O2 sat went up to 94%. Pt limited by weakness and decreased activity tolerance. Pt is very motivated; frequency changed from 3x/wk to daily. Recommend SNF for rehab upon d/c.

## 2018-10-10 NOTE — PROGRESS NOTES
[facility] Medicine Services  PROGRESS NOTE    Patient Name: Yassine Love  : 1945  MRN: [id number]    Date of Admission: 10/2/2018  Length of Stay: 8  Primary Care Physician: Provider, No Known    Subjective   Subjective     CC:  sob    HPI:  Metoprolol 50 mg held this morning.  Says he has sleep apnea but is not using a machine.  No family today.  Getting long acting Morphine which may be adding central apnea    Review of Systems  Gen- No fevers, chills  CV- No chest pain, palpitations  Resp- No cough, dyspnea  GI- No N/V/D, abd pain      Otherwise ROS is negative except as mentioned in the HPI.    Objective   Objective     Vital Signs:   Temp:  [96.8 °F (36 °C)-99.1 °F (37.3 °C)] 99.1 °F (37.3 °C)  Heart Rate:  [49-76] 64  Resp:  [18-24] 20  BP: (103-120)/(59-65) 111/59        Physical Exam:  Constitutional: No acute distress, awake, alert, no family at bedside.   HENT: NCAT, mucous membranes moist  Respiratory: Clear to auscultation bilaterally, respiratory effort normal   Cardiovascular: RRR, no murmurs, rubs, or gallops, palpable pedal pulses bilaterally  Gastrointestinal: Positive bowel sounds, soft, nontender, nondistended  Musculoskeletal: 1+ bilateral ankle edema with b/l erythema lower ext mid shin.   Psychiatric: Appropriate affect, cooperative  Neurologic: Oriented x 3, strength symmetric in all extremities, Cranial Nerves grossly intact to confrontation, speech clear  Skin: otherwise no rashes      Results Reviewed:  I have personally reviewed current lab, radiology, and data and agree.      Results from last 7 days  Lab Units 10/10/18  0507 10/09/18  0751 10/06/18  0535   WBC 10*3/mm3 6.63 6.27 5.26   HEMOGLOBIN g/dL 9.8* 10.0* 10.2*   HEMATOCRIT % 32.4* 33.6* 35.3*   PLATELETS 10*3/mm3 351 302 287       Results from last 7 days  Lab Units 10/10/18  0507 10/09/18  0751 10/08/18  0534  10/05/18  0353 10/04/18  0431   SODIUM mmol/L 137 138 140  < > 138 141   POTASSIUM  mmol/L 4.6 3.7 4.6  < > 4.0 4.8   CHLORIDE mmol/L 101 101 106  < > 99 95*   CO2 mmol/L 33.0* 33.0* 31.0  < > 36.0* 40.0*   BUN mg/dL 36* 36* 36*  < > 38* 39*   CREATININE mg/dL 1.15 1.24 1.26  < > 1.27 1.37*   GLUCOSE mg/dL 91 86 106*  < > 83 99   CALCIUM mg/dL 8.2* 8.2* 8.3*  < > 7.8* 8.5*   ALT (SGPT) U/L  --   --   --   --  12 12   AST (SGOT) U/L  --   --   --   --  18 15   < > = values in this interval not displayed.  Estimated Creatinine Clearance: 59.2 mL/min (by C-G formula based on SCr of 1.15 mg/dL).  No results found for: BNP    Microbiology Results Abnormal     Procedure Component Value - Date/Time    Blood Culture - Blood, [[id number]]  (Normal) Collected:  10/02/18 1804    Lab Status:  Final result Specimen:  Blood from Hand, Left Updated:  10/07/18 2201     Blood Culture No growth at 5 days    Narrative:       Aerobic bottle only     Blood Culture - Blood, [[id number]]  (Normal) Collected:  10/02/18 1720    Lab Status:  Final result Specimen:  Blood from Arm, Right Updated:  10/07/18 1830     Blood Culture No growth at 5 days    Respiratory Culture - Sputum, Cough [[id number]]  (Abnormal)  (Susceptibility) Collected:  10/03/18 0430    Lab Status:  Final result Specimen:  Sputum from Cough Updated:  10/07/18 1137     Respiratory Culture Light growth (2+) Staphylococcus aureus (A)      Light growth (2+) Normal Respiratory Nataliia     Gram Stain Result Few (2+) WBCs per low power field      No Epithelial cells per low power field      Few (2+) Gram positive cocci    Susceptibility      Staphylococcus aureus     FAVIAN     Ceftriaxone <=8 ug/ml Susceptible     Clindamycin >4 ug/ml Resistant     Erythromycin >4 ug/ml Resistant     Gentamicin <=4 ug/ml Susceptible     Levofloxacin <=1 ug/ml Susceptible  [1]      Linezolid <=1 ug/ml Susceptible     Oxacillin <=0.25 ug/ml Susceptible     Penicillin G <=0.03 ug/ml Susceptible     Quinupristin + Dalfopristin <=0.5 ug/ml Susceptible     Rifampin <=1 ug/ml Susceptible      Tetracycline >8 ug/ml Resistant     Trimethoprim + Sulfamethoxazole <=0.5/9.5 ug/ml Susceptible     Vancomycin 2 ug/ml Susceptible            [1]   Staphylococcus species may develop resistance during prolonged therapy with quinolones.  Isolates that are initially susceptible may become resistant within three to four days after initiation of therapy. Testing of repeat isolates may be warranted.                   MRSA Screen, PCR - Swab, Nares [[id number]]  (Abnormal) Collected:  10/04/18 1623    Lab Status:  Final result Specimen:  Swab from Nares Updated:  10/04/18 1818     MRSA, PCR Positive (C)          Imaging Results (last 24 hours)     ** No results found for the last 24 hours. **        Results for orders placed during the hospital encounter of 10/02/18   Adult Transthoracic Echo Complete W/ Cont if Necessary Per Protocol    Narrative · Left ventricular systolic function is normal. Estimated EF = 60%.  · Trace-to-mild mitral valve regurgitation is present.          I have reviewed the medications.      aspirin 81 mg Oral Daily   atorvastatin 40 mg Oral Nightly   castor oil-balsam peru  Topical Q12H   clopidogrel 75 mg Oral Daily   famotidine 20 mg Oral BID   furosemide 40 mg Oral Daily   gabapentin 200 mg Oral Q8H   guaiFENesin 600 mg Oral Q12H   heparin (porcine) 5,000 Units Subcutaneous Q8H   insulin lispro 0-7 Units Subcutaneous 4x Daily With Meals & Nightly   ipratropium-albuterol 3 mL Nebulization Q4H - RT   lactobacillus acidophilus 1 capsule Oral Daily   Linezolid 600 mg Intravenous Q12H   meropenem 1 g Intravenous Q8H   metoprolol tartrate 25 mg Oral Q8H   mirtazapine 15 mg Oral Nightly   Morphine 15 mg Oral Q12H   nicotine 1 patch Transdermal Q24H   pharmacy consult - MTM  Does not apply Daily   polyethylene glycol 17 g Oral BID   [START ON 10/11/2018] polyethylene glycol 17 g Oral Daily   potassium chloride 30 mEq Oral BID With Meals   [START ON 10/12/2018] predniSONE 10 mg Oral Daily With Breakfast    Followed by      [START ON 10/15/2018] predniSONE 5 mg Oral Daily With Breakfast   predniSONE 20 mg Oral Daily With Breakfast   sennosides-docusate sodium 2 tablet Oral BID   sodium chloride 10 mL Intravenous Q12H   sodium chloride 3 mL Intravenous Q12H   cyanocobalamin 1,000 mcg Oral Daily         Assessment/Plan   Assessment / Plan     Active Hospital Problems    Diagnosis   • **Acute on chronic mixed hypercarbic/hypoxemic respiratory failure requiring intubation and ventilatory support   • Sleep apnea   • Acute kidney injury     Cr 1.35 increased from 9/8/18 1.15      • H/O bacteremia due to Staphylococcus   • Opioid use   • Pneumonia due to infectious organism   • Brief runs of SVT (supraventricular tachycardia)    • Decubitus ulcer of buttock, stage 2   • CAD status post GIGI ×2 to LAD and RCA 3/12/18     S/P LHC per Dr. Harvey on 3/12/18 with PCI revealing severe 2-vessel disease.    GIGI x 2 to LAD and RCA     • Essential hypertension   • End stage COPD in an active smoker on home O2 with chronic hypercarbia     Managed by [facility]      • H/O cellulitis of both lower extremities     Previously managed by Infectious Disease      • PVD (peripheral vascular disease)           Brief Hospital Course to date:  Yassine Love is a 72 y.o. male active smoker with a history of COPD (on home oxygen) admitted 10/2/18 for exacerbation associated with severe hypoxemia. He was in the skilled nursing facility for rehabilitation but 10/1/18 developed some visual hallucinations. Oxygen saturations were subsequently noted to drop into the 60s on 4 L on 10/2/18 and he was brought to [facility] ER. Because of an elevated d-dimer a CT angiogram was obtained which was negative for PE. Both CT scan and chest x-ray however revealed it appeared to be pulmonary edema with diffuse interstitial infiltrates and pleural effusions. In addition BNP was 899. He was given his usual dose of home Lasix, placed on empiric antimicrobial  "coverage with Zosyn, given bronchodilators, Solu-Medrol, placed on BiPAP, and admitted to the hospitalist service.The patient lives by himself. His son had discussed his situation earlier this year and felt that he was not able to care for himself in his own home as he had been doing. He recommended the patient go into a facility, angering the patient who basically \"cut him off\" and wanted nothing further to do with him. He developed severe cellulitis of his lower extremities and was hospitalized 8/20/18 through 9/8/18 at [facility]. Afterwords was sent to a skilled nursing facility for rehabilitation where the above occurred.     At approximately 2 AM 10/3/18 he was noted to become unresponsive with decreased respiratory drive. Apparently he had been a DNR but this was reversed by the patient on admission. Because of this Dr. Jacobsen and respiratory therapy intubated the patient at the bedside. There were large amounts of thick purulent yellow secretions obtained at the time of intubation and he was transferred to the ICU on ventilatory support. He initially required high FiO2's of 60% but respiratory rate and ABGs rapidly improved. Over the course of the evening he continued to improve. He subsequently self extubated the evening of 10/3/18 at 11 PM. He was able to tolerate self extubation but has required high flow nasal oxygen in order to maintain his oxygen saturations. He was alert and oriented so additional discussions were held regarding CODE STATUS. He wanted to think about it yesterday and when I question him again today he stated he would like to think about it\" some more\".      He was on empiric vancomycin and Merrem and cultures from blood and sputum were negative as of 10/4/18. He however had had a staph aureus in a wound in August and I wanted to continue him on coverage for MRSA as well as gram negatives. Because of his renal insufficiency however he was switched him to linezolid on 10/4/18. Today his sputum " is growing a staph aureus that was not identified and nasal swabs are growing MRSA. He remains on Merrem and linezolid.      Heart rate is usually in the 60s, but has had recurrent brief episodes of SVT up to 160  both yesterday and today. His metoprolol was held once and that led to SVT that resolved with resumption of beta blocker.. He was admitted 10/2 and transferred out of the ICU 10/7      Assessment & Plan:  1. Continue linezolid and Merrem and complete 7-10 days total,ID to assist with abx regimen  2. Avoid sedating agents  3. Keep his potassium greater than or equal to 4.5 to avoid the development of metabolic alkalosis that will reduce respiratory, give add'l potassium today and schedule   4. IV Lasix and Diamox initially, Lasix decreased today to 40mg po daily   5. Follow-up renal function in a.m.  6. Weaned from high flow down to 3L NC, continue to taper as he tolerates  7.Palliative following, status changed to DNR/DNI   8. Recurrent episodes of SVT - pt follows with [facility], will consult, follow mag, continue BB   9. Taper steroids per pulm   10. Untreated Sleep Apnea - likely complex apnea (combination of obstructive + central) sleep apnea - AutoBiPAP at night and as needed for sleep - nasal interface    Change metoprolol from 50 mg q 12 to 25 mg q 8 hours  Add AutoBiPAP for sleep - may be having central apneas due to MS Contin q12h + Norco q4 h PRN  Discussed case with Dr. Harvey today face to face    DVT Prophylaxis:  Sq heparin    CODE STATUS:   Code Status and Medical Interventions:   Ordered at: 10/08/18 1413     Limited Support to NOT Include:    Intubation     Level Of Support Discussed With:    Patient     Code Status:    No CPR     Medical Interventions (Level of Support Prior to Arrest):    Limited       Disposition: I expect the patient to be discharged tbd      Electronically signed by Dhaval Carter MD, 10/10/18, 2:02 PM.

## 2018-10-10 NOTE — PROGRESS NOTES
[facility] Progress Note    Admission Date: 10/2/2018    Yassine Love  1945  [id number]    Date: 10/10/2018    Antibiotics:  Anti-Infectives     Ordered     Dose/Rate Route Frequency Start Stop    10/04/18 1629  Linezolid (ZYVOX) 600 mg 300 mL     Mary Kate Rahman MD reviewed the order on 10/08/18 1301.   Ordering Provider:  Mary Kate Rahman MD    600 mg  300 mL/hr over 60 Minutes Intravenous Every 12 Hours 10/04/18 1800 10/13/18 1300    10/03/18 0229  meropenem (MERREM) 1 g/100 mL 0.9% NS VTB ([facility]p)     Ordering Provider:  Perla Jones APRN    1 g  over 3 Hours Intravenous Every 8 Hours 10/03/18 1000 10/13/18 0959    10/03/18 0914  vancomycin 1750 mg/500 mL 0.9% NS IVPB ([facility])     Ordering Provider:  Parish Donato MD    1,750 mg  over 120 Minutes Intravenous Once 10/03/18 1000 10/03/18 1203    10/03/18 0229  meropenem (MERREM) 1 g/100 mL 0.9% NS VTB ([facility])     Ordering Provider:  Perla Jones APRN    1 g  over 30 Minutes Intravenous Once 10/03/18 0315 10/03/18 0313    10/02/18 2108  piperacillin-tazobactam (ZOSYN) 4.5 g in iso-osmotic dextrose 100 mL IVPB (premix)     Ordering Provider:  Pastora Caldwell DO    4.5 g  over 30 Minutes Intravenous Once 10/02/18 2108 10/03/18 0114           Reason for Consultation: bilateral leg cellulitis,  bronchitis      History of present illness:    Patient is a 72 y.o. male well known to Dr Belle from prior admissions. Dr Belle has primarily followed for cellulitis  He was hospitalized 8/21 to 9/7 and Dr Belle followed for cellulitis complicating bilateral leg ulcers   This admission on  10/2/18 is for  for COPD exacerbation and severe hypoxia. He iwas transferred from a  skilled nursing facility with hypoxemia and visual hallucinations.[facility]. EMS was called and Mr. Love was  CTA was negative for pulmonary embolism but  pulmonary edema with diffuse interstitial infiltrates and pleural effusions. BNP was 899 and procalcitonin normal on 10/2 and 10/4. He required  mechanical ventilation  Sputum culture grew MSSA  He has been treated with zyvox and merrrem  He improved after diuresis and was extubated  The leg wounds present 2 months ago have healed He has some residual erythema and states that his legs are painful at times  He is known to have severe PVD     10/9  More alert this am; intermittent cough perisists  Denies leg pain             ROS:  No f/c/s. No n/v/d. No rash. No new ADR to Abx.       PE:  Vital Signs  Temp  Min: 96.8 °F (36 °C)  Max: 99.1 °F (37.3 °C)  BP  Min: 103/62  Max: 120/65  Pulse  Min: 49  Max: 76  Resp  Min: 18  Max: 24  SpO2  Min: 87 %  Max: 97 %  GENERAL: alert  HEENT: Normocephalic, atraumatic.  PERRL.. No conjunctival injection. No icterus. Oropharynx not visualized.   NECK: Supple without nuchal rigidity. No mass.  LYMPH: No cervical lymphadenopathy.  HEART: heart sounds distant  LUNGS: poor inspiratory effort, no wheezes or rhonchi  ABDOMEN: Soft, nontender, nondistended. Positive bowel sounds. No rebound or guarding. NO mass or HSM.  EXT:  1+  Edema bilateral legs with faded erythema, mild tenderness to palpation. No cords.  : Genitalia generally unremarkable.  Without Moore catheter.  MSK: FROM without joint effusions noted arms/legs.    SKIN: Warm and dry without cutaneous eruptions on Inspection/palpation.    NEURO: Oriented to PPT. No focal deficits on motor/sensory exam at arms/legs.  PSYCHIATRIC: Normal insight and judgement. Cooperative with PE     Laboratory Data      Results from last 7 days  Lab Units 10/10/18  0507 10/09/18  0751 10/06/18  0535   WBC 10*3/mm3 6.63 6.27 5.26   HEMOGLOBIN g/dL 9.8* 10.0* 10.2*   HEMATOCRIT % 32.4* 33.6* 35.3*   PLATELETS 10*3/mm3 351 302 287       Results from last 7 days  Lab Units 10/10/18  0507   SODIUM mmol/L 137   POTASSIUM mmol/L 4.6   CHLORIDE mmol/L 101   CO2 mmol/L 33.0*   BUN mg/dL 36*   CREATININE mg/dL 1.15   GLUCOSE mg/dL 91   CALCIUM mg/dL 8.2*       Results from last 7 days  Lab Units  10/05/18  0353   ALK PHOS U/L 92   BILIRUBIN mg/dL 0.4   ALT (SGPT) U/L 12   AST (SGOT) U/L 18               Estimated Creatinine Clearance: 59.2 mL/min (by C-G formula based on SCr of 1.15 mg/dL).      Microbiology:  mssa from sputum;  mrsa from nares swab    Radiology:  Imaging Results (last 72 hours)     Procedure Component Value Units Date/Time    XR Chest 1 View [[id number]] Collected:  10/06/18 1115     Updated:  10/06/18 1355    Narrative:       EXAMINATION: XR CHEST 1 VW - 10/06/2018     INDICATION: J96.21-Acute and chronic respiratory failure with hypoxia;  J96.22-Acute and chronic respiratory failure with hypercapnia;  Z74.09-Other reduced mobility.     TECHNIQUE:  Single view frontal chest.     COMPARISONS: 10/5/2018.     FINDINGS:  Stable support apparatus. Small bilateral pleural effusions  and adjacent atelectasis are unchanged. No pneumothorax. Calcification  aortic knob. Cardiomediastinal silhouette is unchanged.        Impression:       Stable exam.     DICTATED:   10/06/2018  EDITED/ls :   10/06/2018      This report was finalized on 10/6/2018 1:53 PM by Eliezer Hale.             I personally reviewed the radiographic studies     IMPRESSION:    -- Probable MSSA bronchitis  Vs pneumonia     -- Acute on chronic mixed respiratory failure  S/p intubation, now extubated     -- Pulmonary edema- improved     -- Chronic lymphedema and venous stasis dermatitis     -- Bilateral leg cellulitis essentially resolved     -- Severe COPD with ongoing tobacco abuse, poor pulmonary toilet     -- RAFAEL     -- Decubitus ulcer of buttock stage 2     --MRSA colonization       RECOMMENDATIONS:      -- continue current regimen of merrem and zyvox   It may be reasonable to taper antibiotics soon by stopping merrem and treating with oral zyvox    -- improve pulmonary toilet if he will cooperate     -- probiotic         Mary Kate Rahman MD  10/10/2018

## 2018-10-10 NOTE — PROGRESS NOTES
"[facility] at [facility]    Inpatient Progress Note      Chief Complaint/Reason for service:    · SVT         Subjective:       Patient resting in bed.  Denies any recurrence of \"heart racing\" overnight, chest pain, or shortness of breath on 2L/NC.  Patient to ambulate with PT today.    Past medical, surgical, social and family history reviewed in the patient's electronic medical record.    Review of Systems:   Negative for exertional chest pain, shortness of breath,  palpitations.    Problem List  Active Hospital Problems    Diagnosis Date Noted   • **Acute on chronic mixed hypercarbic/hypoxemic respiratory failure requiring intubation and ventilatory support [J96.01, J96.02] 10/02/2018   • Acute kidney injury [N28.9] 10/03/2018   • H/O bacteremia due to Staphylococcus [R78.81] 10/03/2018   • Opioid use [F11.90] 10/03/2018   • Pneumonia due to infectious organism [J18.9] 10/03/2018   • Brief runs of SVT (supraventricular tachycardia)  [I47.1] 10/03/2018   • Decubitus ulcer of buttock, stage 2 [L89.302] 08/21/2018   • CAD status post GIGI ×2 to LAD and RCA 3/12/18 [I25.10] 03/12/2018   • Essential hypertension [I10] 02/16/2018   • End stage COPD in an active smoker on home O2 with chronic hypercarbia [J44.9] 02/16/2018   • H/O cellulitis of both lower extremities [L03.115, L03.116] 02/16/2018   • PVD (peripheral vascular disease)  [I73.9] 06/20/2017      Resolved Hospital Problems    Diagnosis Date Noted Date Resolved   No resolved problems to display.            Objective:      Current Facility-Administered Medications:   •  acetaminophen (TYLENOL) tablet 325 mg, 325 mg, Oral, Q6H PRN, Pastora Caldwell DO, 325 mg at 10/08/18 1034  •  ALPRAZolam (XANAX) tablet 0.5 mg, 0.5 mg, Oral, Nightly PRN, Joon Hernandez, APRN, 0.5 mg at 10/07/18 1947  •  aspirin chewable tablet 81 mg, 81 mg, Oral, Daily, Perla Jones APRLISA, 81 mg at 10/10/18 0813  •  atorvastatin (LIPITOR) tablet 40 mg, 40 mg, " Oral, Nightly, Javi, Pastora G, DO, 40 mg at 10/09/18 2207  •  bisacodyl (DULCOLAX) suppository 10 mg, 10 mg, Rectal, Daily PRN, Maira Herrera MD  •  calcium carbonate (TUMS) chewable tablet 500 mg (200 mg elemental), 2 tablet, Oral, BID PRN, Javi, Pastora G, DO  •  castor oil-balsam peru (VENELEX) ointment, , Topical, Q12H, Danii Tabor DO  •  clopidogrel (PLAVIX) tablet 75 mg, 75 mg, Oral, Daily, Javi, Pastora G, DO, 75 mg at 10/10/18 0825  •  dextrose (D50W) 25 g/ 50mL Intravenous Solution 25 g, 25 g, Intravenous, Q15 Min PRN, Perla Jones, APRN  •  dextrose (GLUTOSE) oral gel 15 g, 15 g, Oral, Q15 Min PRN, Perla Jones, APRN  •  famotidine (PEPCID) tablet 20 mg, 20 mg, Oral, BID, Parish Donato MD, 20 mg at 10/10/18 0824  •  furosemide (LASIX) tablet 40 mg, 40 mg, Oral, Daily, Case, Pinky V., DO, 40 mg at 10/10/18 0825  •  gabapentin (NEURONTIN) capsule 200 mg, 200 mg, Oral, Q8H, Javi, Pastora G, DO, 200 mg at 10/09/18 2208  •  glucagon (human recombinant) (GLUCAGEN DIAGNOSTIC) injection 1 mg, 1 mg, Subcutaneous, PRN, Perla Jones, APRN  •  guaiFENesin (MUCINEX) 12 hr tablet 600 mg, 600 mg, Oral, Q12H, Javi, Pastora G, DO, 600 mg at 10/10/18 0824  •  heparin (porcine) 5000 UNIT/ML injection 5,000 Units, 5,000 Units, Subcutaneous, Q8H, Javi, Pastora G, DO, 5,000 Units at 10/10/18 0836  •  HYDROcodone-acetaminophen (NORCO)  MG per tablet 1 tablet, 1 tablet, Oral, Q4H PRN, Maira Herrera MD, 1 tablet at 10/09/18 1937  •  insulin lispro (humaLOG) injection 0-7 Units, 0-7 Units, Subcutaneous, 4x Daily With Meals & Nightly, Parish Donato MD, 2 Units at 10/09/18 1735  •  ipratropium-albuterol (DUO-NEB) nebulizer solution 3 mL, 3 mL, Nebulization, Q4H PRN, Pastora Caldwell, DO  •  ipratropium-albuterol (DUO-NEB) nebulizer solution 3 mL, 3 mL, Nebulization, Q4H - RT, Parish Donato MD, 3 mL at 10/10/18 0763  •  lactobacillus acidophilus (RISAQUAD) capsule 1 capsule, 1 capsule, Oral,  Daily, Mary Kate Rahman MD, 1 capsule at 10/10/18 0824  •  lactulose (CHRONULAC) 10 GM/15ML solution 20 g, 20 g, Oral, BID PRN, Maira Herrera MD  •  Linezolid (ZYVOX) 600 mg 300 mL, 600 mg, Intravenous, Q12H, Mary Kate Rahman MD, Last Rate: 300 mL/hr at 10/09/18 1734, 600 mg at 10/09/18 1734  •  Magnesium Sulfate 2 gram Bolus, followed by 8 gram infusion (total Mg dose 10 grams)- Mg less than or equal to 1mg/dL, 2 g, Intravenous, PRN **OR** Magnesium Sulfate 2 gram / 50mL Infusion (GIVE X 3 BAGS TO EQUAL 6GM TOTAL DOSE) - Mg 1.1 - 1.5 mg/dl, 2 g, Intravenous, PRN **OR** Magnesium Sulfate 4 gram infusion- Mg 1.6-1.9 mg/dL, 4 g, Intravenous, PRN, Danii Tabor DO  •  meropenem (MERREM) 1 g/100 mL 0.9% NS VTB (mbp), 1 g, Intravenous, Q8H, Perla Jones, APRN, 1 g at 10/10/18 0319  •  metoprolol tartrate (LOPRESSOR) tablet 50 mg, 50 mg, Oral, Q12H, Zohra Griggs, APRN, 50 mg at 10/09/18 2207  •  mirtazapine (REMERON SOL-TAB) disintegrating tablet 15 mg, 15 mg, Oral, Nightly, Pastora Caldwell DO, 15 mg at 10/09/18 2208  •  Morphine (MS CONTIN) 12 hr tablet 15 mg, 15 mg, Oral, Q12H, Maira Herrera MD, 15 mg at 10/10/18 0825  •  nicotine (NICODERM CQ) 21 MG/24HR patch 1 patch, 1 patch, Transdermal, Q24H, Joon Hernandez APRN, 1 patch at 10/10/18 0836  •  Pharmacy Consult - MTM, , Does not apply, Daily, Mansoor Mckee, [facility]  •  polyethylene glycol 3350 powder (packet), 17 g, Oral, BID, Maira Herrera MD, 17 g at 10/10/18 0835  •  [START ON 10/11/2018] polyethylene glycol 3350 powder (packet), 17 g, Oral, Daily, Maira Herrera MD  •  potassium chloride (KLOR-CON) packet 40 mEq, 40 mEq, Oral, PRN, Atkins, Danii L, DO  •  potassium chloride (MICRO-K) CR capsule 30 mEq, 30 mEq, Oral, BID With Meals, AtNasreen rhoadesecca L, DO, 30 mEq at 10/10/18 0824  •  potassium chloride (MICRO-K) CR capsule 40 mEq, 40 mEq, Oral, PRN, Atkins, Danii L, DO  •  [START ON 10/12/2018] predniSONE  (DELTASONE) tablet 10 mg, 10 mg, Oral, Daily With Breakfast **FOLLOWED BY** [START ON 10/15/2018] predniSONE (DELTASONE) tablet 5 mg, 5 mg, Oral, Daily With Breakfast, Case, Pinky V., DO  •  predniSONE (DELTASONE) tablet 20 mg, 20 mg, Oral, Daily With Breakfast, Case, Pinky V., DO, 20 mg at 10/10/18 0825  •  sennosides-docusate sodium (SENOKOT-S) 8.6-50 MG tablet 2 tablet, 2 tablet, Oral, BID, Maira Herrera MD, 2 tablet at 10/08/18 2018  •  sodium chloride 0.9 % flush 10 mL, 10 mL, Intravenous, PRN, Ruben Herrera MD  •  sodium chloride 0.9 % flush 10 mL, 10 mL, Intravenous, Q12H, Parish Donato MD, 10 mL at 10/10/18 0850  •  sodium chloride 0.9 % flush 3 mL, 3 mL, Intravenous, Q12H, Pastora Caldwell G, DO, 3 mL at 10/10/18 0849  •  vitamin B-12 (CYANOCOBALAMIN) tablet 1,000 mcg, 1,000 mcg, Oral, Daily, Javi, Pastora G, DO, 1,000 mcg at 10/10/18 0824    Vital Sign Min/Max for last 24 hours  Temp  Min: 96.8 °F (36 °C)  Max: 99.2 °F (37.3 °C)   BP  Min: 102/58  Max: 120/65   Pulse  Min: 49  Max: 76   Resp  Min: 18  Max: 24   SpO2  Min: 87 %  Max: 97 %   No Data Recorded      Intake/Output Summary (Last 24 hours) at 10/10/18 0931  Last data filed at 10/10/18 0912   Gross per 24 hour   Intake             1920 ml   Output             2625 ml   Net             -705 ml           CONSTITUTIONAL: No acute distress, normal affect  RESPIRATORY: Normal effort. Diminished breath sounds bilaterally.   CARDIOVASCULAR: Regular rate and rhythm with normal S1 and S2. Without murmur, gallop or rub.  PERIPHERAL VASCULAR: Normal radial pulses bilaterally. There is 1+ peripheral edema bilaterally.    Results Review:   No results found for: TROPONINI, TROPONINT    BUN   Date Value Ref Range Status   10/10/2018 36 (H) 9 - 23 mg/dL Final     Creatinine   Date Value Ref Range Status   10/10/2018 1.15 0.60 - 1.30 mg/dL Final     Potassium   Date Value Ref Range Status   10/10/2018 4.6 3.5 - 5.5 mmol/L Final       Lab Results    Component Value Date    CHOL 215 (H) 03/10/2018     Lab Results   Component Value Date    TRIG 117 03/10/2018     Lab Results   Component Value Date    HDL 70 (H) 03/10/2018     No components found for: LDLCALC  No results found for: VLDL  No results found for: LDLHDL    Tele:  Sinus bradycardia, no recurrent SVT overnight, ocassional ectopic beats         Assessment/Plan:     ASSESSMENT:  -SVT, both narrow and with aberrancy at times, spontaneous resolution.   -CAD, s/p GIGI to LAD and RCA 3/2018. ON DAPT, statin, and betablocker  -COPD, end-stage, pulmonology and palliative care following.     PLAN:  -Change metoprolol to 25 mg po Q8H, hold for SBP< 100 and HR <50.  -Continue telemetry monitoring  -Discussed risk/benefits of an electrophysiology study for SVT and possible need for intubation during such a procedure given his poor pulmonary function. The patient does not wish to proceed with such an evaluation at this time and wishes to proceed with attempts at medical therapy      BRITTNY Quinones obtained past medical, family history, social history, review of systems and functioned as a scribe for the remainder of the dictation for Dr. Harvey.   10/10/2018    I, Carlos Harvey MD, personally performed the services as scribed by the above named individual. I have made any necessary edits and it is both accurate and complete.     Carlos Harvey MD, MSc, [facility]  Interventional Cardiology  [facility]

## 2018-10-10 NOTE — PROGRESS NOTES
"Palliative Care Progress Note    Date of Admission: 10/2/2018    Subjective:    Not feeling as good today.  Just more tired, worn out.  Did work with therapy.  Got pretty dyspneic with it.  Says pain control is good.    Meds reviewed.  Getting MSER Q12.  AM neurontin held today \" for lethargy.)  Got PRN norco x2 yesterday.         Questions for Current Code Status     Question Answer Comment    Code Status No CPR     Medical Interventions (Level of Support Prior to Arrest) Limited     Limited Support to NOT Include Intubation     Level Of Support Discussed With Patient         No current facility-administered medications on file prior to encounter.      Current Outpatient Prescriptions on File Prior to Encounter   Medication Sig Dispense Refill   • acetaminophen (TYLENOL) 325 MG tablet Take 325 mg by mouth Every 6 (Six) Hours As Needed for Mild Pain  or Fever.     • albuterol (PROVENTIL) (2.5 MG/3ML) 0.083% nebulizer solution Take 2.5 mg by nebulization Every 6 (Six) Hours As Needed for Shortness of Air. 360 mL 0   • aspirin 81 MG EC tablet Take 1 tablet by mouth Daily.     • atorvastatin (LIPITOR) 40 MG tablet Take 40 mg by mouth Every Night.     • budesonide-formoterol (SYMBICORT) 160-4.5 MCG/ACT inhaler Inhale 2 puffs 2 (Two) Times a Day.     • clopidogrel (PLAVIX) 75 MG tablet Take 1 tablet by mouth Daily. 30 tablet    • cyanocobalamin (VITAMIN B-12) 1000 MCG tablet Take 1 tablet by mouth Daily.     • fluticasone (FLONASE) 50 MCG/ACT nasal spray 2 sprays by Each Nare route Daily.     • furosemide (LASIX) 40 MG tablet Take 40 mg by mouth 2 (Two) Times a Day.     • gabapentin (NEURONTIN) 400 MG capsule Take 400 mg by mouth 3 (Three) Times a Day.     • HYDROcodone-acetaminophen (NORCO)  MG per tablet Take 1 tablet by mouth Every 4 (Four) Hours As Needed for Moderate Pain .     • metoprolol tartrate (LOPRESSOR) 25 MG tablet Take 25 mg by mouth 2 (Two) Times a Day.     • mirtazapine (REMERON SOL-TAB) 15 MG " "disintegrating tablet Take 1 tablet by mouth Every Night.     • Morphine (MS CONTIN) 30 MG 12 hr tablet Take 1 tablet by mouth Every 12 (Twelve) Hours.     • nicotine (NICODERM CQ) 21 MG/24HR patch Place 1 patch on the skin as directed by provider Daily.     • pantoprazole (PROTONIX) 40 MG EC tablet Take 1 tablet by mouth Daily.     • sennosides-docusate sodium (SENOKOT-S) 8.6-50 MG tablet Take 2 tablets by mouth Every Night.     • tiotropium (SPIRIVA) 18 MCG per inhalation capsule Place 1 capsule into inhaler and inhale Daily.          •  acetaminophen  •  ALPRAZolam  •  bisacodyl  •  calcium carbonate  •  dextrose  •  dextrose  •  glucagon (human recombinant)  •  HYDROcodone-acetaminophen  •  ipratropium-albuterol  •  lactulose  •  magnesium sulfate **OR** magnesium sulfate **OR** magnesium sulfate  •  potassium chloride  •  potassium chloride  •  sodium chloride    Objective: /59   Pulse 64   Temp 99.1 °F (37.3 °C) (Oral)   Resp 20   Ht 170.2 cm (67\")   Wt 83.9 kg (184 lb 14.4 oz)   SpO2 91%   BMI 28.96 kg/m²      Intake/Output Summary (Last 24 hours) at 10/10/18 1520  Last data filed at 10/10/18 1511   Gross per 24 hour   Intake             2080 ml   Output             2900 ml   Net             -820 ml     Physical Exam:  Gen - elderly, ill appearing male, in bed  Head/Neck - NC/AT, trachea midline  Eyes - EOMI, no scleral icterus  ENT - patent nares with NC in place, oral mucosa moist  Heart - RRR, S1S2  Lungs - diminished bases, clear  Abd - soft, NT, ND  Ext - edema improved, no cyanosis  Skin - LEs discolored, wrinkled from resolving edema, warm, dry  Neuro - awake, appropriate, no focal deficit, no myoclonus  Psych - calm, pleasant but looks whipped     Results from last 7 days  Lab Units 10/10/18  0507   WBC 10*3/mm3 6.63   HEMOGLOBIN g/dL 9.8*   HEMATOCRIT % 32.4*   PLATELETS 10*3/mm3 351       Results from last 7 days  Lab Units 10/10/18  0507  10/05/18  0353   SODIUM mmol/L 137  < > 138 "   POTASSIUM mmol/L 4.6  < > 4.0   CHLORIDE mmol/L 101  < > 99   CO2 mmol/L 33.0*  < > 36.0*   BUN mg/dL 36*  < > 38*   CREATININE mg/dL 1.15  < > 1.27   CALCIUM mg/dL 8.2*  < > 7.8*   BILIRUBIN mg/dL  --   --  0.4   ALK PHOS U/L  --   --  92   ALT (SGPT) U/L  --   --  12   AST (SGOT) U/L  --   --  18   GLUCOSE mg/dL 91  < > 83   < > = values in this interval not displayed.    Hospital Problem List      * (Principal)Acute on chronic mixed hypercarbic/hypoxemic respiratory failure requiring intubation and ventilatory support     PVD (peripheral vascular disease)      H/O cellulitis of both lower extremities     Overview Signed 10/3/2018  1:47 AM by Perla Jones APRN       Previously managed by Infectious Disease            End stage COPD in an active smoker on home O2 with chronic hypercarbia     Overview Signed 10/3/2018  1:46 AM by Perla Jones APRN       Managed by [facility]            Essential hypertension     CAD status post GIGI ×2 to LAD and RCA 3/12/18     Overview Signed 10/3/2018  1:45 AM by Perla Jones APRN       S/P LHC per Dr. Harvey on 3/12/18 with PCI revealing severe 2-vessel disease.    GIGI x 2 to LAD and RCA           Decubitus ulcer of buttock, stage 2     Acute kidney injury     Overview Signed 10/3/2018  1:39 AM by Perla Jones APRN       Cr 1.35 increased from 9/8/18 1.15            Brief runs of SVT (supraventricular tachycardia)      H/O bacteremia due to Staphylococcus     Opioid use     Pneumonia due to infectious organism             Impression:   Mr. Love is a 73yo M with a history of End-stage COPD who was admitted with acute on chronic respiratory failure. He was very transiently intubated but self extubated.  He has been transitioned from HFNC to 2L. He had evidence of pulmonary edema on imaging and tx has been diuresis, as well as given IV abx, steroid, and nebs.     Symptoms:  Hypoxia  Debility  Cough  Constipation  Acute on chronic MSK pain     Plan:    -Goals is back to SNF rehab.  Motivated per PT/OT.  -DNR/DNI.  -Continue current opiate regimen.  -Bowel regimen initiated.  Monitor BMs.      Maira Herrera MD  10/10/18  3:20 PM

## 2018-10-10 NOTE — PLAN OF CARE
Problem: Patient Care Overview  Goal: Plan of Care Review  Outcome: Ongoing (interventions implemented as appropriate)   10/10/18 6340   Coping/Psychosocial   Plan of Care Reviewed With patient   OTHER   Outcome Summary Pt improved mobility distance with r wx, limited by fatigue and O2 sat drop/dyspnea. Motivated to participate, recommend snf for rehab

## 2018-10-11 ENCOUNTER — APPOINTMENT (OUTPATIENT)
Dept: GENERAL RADIOLOGY | Facility: HOSPITAL | Age: 73
End: 2018-10-11

## 2018-10-11 LAB
GLUCOSE BLDC GLUCOMTR-MCNC: 104 MG/DL (ref 70–130)
GLUCOSE BLDC GLUCOMTR-MCNC: 140 MG/DL (ref 70–130)
GLUCOSE BLDC GLUCOMTR-MCNC: 168 MG/DL (ref 70–130)
MAGNESIUM SERPL-MCNC: 2.3 MG/DL (ref 1.3–2.7)

## 2018-10-11 PROCEDURE — 25010000002 LINEZOLID 600 MG/300ML SOLUTION: Performed by: INTERNAL MEDICINE

## 2018-10-11 PROCEDURE — 94640 AIRWAY INHALATION TREATMENT: CPT

## 2018-10-11 PROCEDURE — 25010000002 HEPARIN (PORCINE) PER 1000 UNITS: Performed by: INTERNAL MEDICINE

## 2018-10-11 PROCEDURE — 99232 SBSQ HOSP IP/OBS MODERATE 35: CPT | Performed by: INTERNAL MEDICINE

## 2018-10-11 PROCEDURE — 94760 N-INVAS EAR/PLS OXIMETRY 1: CPT

## 2018-10-11 PROCEDURE — 25010000002 MEROPENEM: Performed by: NURSE PRACTITIONER

## 2018-10-11 PROCEDURE — 63710000001 PREDNISONE PER 1 MG: Performed by: INTERNAL MEDICINE

## 2018-10-11 PROCEDURE — 82962 GLUCOSE BLOOD TEST: CPT

## 2018-10-11 PROCEDURE — 94799 UNLISTED PULMONARY SVC/PX: CPT

## 2018-10-11 PROCEDURE — 99232 SBSQ HOSP IP/OBS MODERATE 35: CPT | Performed by: HOSPITALIST

## 2018-10-11 PROCEDURE — 83735 ASSAY OF MAGNESIUM: CPT | Performed by: FAMILY MEDICINE

## 2018-10-11 PROCEDURE — 71045 X-RAY EXAM CHEST 1 VIEW: CPT

## 2018-10-11 RX ORDER — IPRATROPIUM BROMIDE AND ALBUTEROL SULFATE 2.5; .5 MG/3ML; MG/3ML
3 SOLUTION RESPIRATORY (INHALATION)
Status: DISCONTINUED | OUTPATIENT
Start: 2018-10-11 | End: 2018-10-22 | Stop reason: HOSPADM

## 2018-10-11 RX ADMIN — POTASSIUM CHLORIDE 30 MEQ: 750 CAPSULE, EXTENDED RELEASE ORAL at 17:03

## 2018-10-11 RX ADMIN — GABAPENTIN 200 MG: 100 CAPSULE ORAL at 22:51

## 2018-10-11 RX ADMIN — Medication 1 CAPSULE: at 10:23

## 2018-10-11 RX ADMIN — HEPARIN SODIUM 5000 UNITS: 5000 INJECTION, SOLUTION INTRAVENOUS; SUBCUTANEOUS at 22:51

## 2018-10-11 RX ADMIN — HYDROCODONE BITARTRATE AND ACETAMINOPHEN 1 TABLET: 10; 325 TABLET ORAL at 17:04

## 2018-10-11 RX ADMIN — GABAPENTIN 200 MG: 100 CAPSULE ORAL at 14:24

## 2018-10-11 RX ADMIN — LINEZOLID 600 MG: 600 INJECTION, SOLUTION INTRAVENOUS at 05:57

## 2018-10-11 RX ADMIN — HYDROCODONE BITARTRATE AND ACETAMINOPHEN 1 TABLET: 10; 325 TABLET ORAL at 23:45

## 2018-10-11 RX ADMIN — IPRATROPIUM BROMIDE AND ALBUTEROL SULFATE 3 ML: 2.5; .5 SOLUTION RESPIRATORY (INHALATION) at 23:13

## 2018-10-11 RX ADMIN — CYANOCOBALAMIN TAB 1000 MCG 1000 MCG: 1000 TAB at 10:23

## 2018-10-11 RX ADMIN — NICOTINE 1 PATCH: 21 PATCH, EXTENDED RELEASE TRANSDERMAL at 10:27

## 2018-10-11 RX ADMIN — MORPHINE SULFATE 15 MG: 15 TABLET, EXTENDED RELEASE ORAL at 20:14

## 2018-10-11 RX ADMIN — ATORVASTATIN CALCIUM 40 MG: 40 TABLET, FILM COATED ORAL at 20:14

## 2018-10-11 RX ADMIN — LINEZOLID 600 MG: 600 INJECTION, SOLUTION INTRAVENOUS at 17:17

## 2018-10-11 RX ADMIN — INSULIN LISPRO 2 UNITS: 100 INJECTION, SOLUTION INTRAVENOUS; SUBCUTANEOUS at 17:08

## 2018-10-11 RX ADMIN — SENNOSIDES AND DOCUSATE SODIUM 2 TABLET: 8.6; 5 TABLET ORAL at 10:24

## 2018-10-11 RX ADMIN — CASTOR OIL AND BALSAM, PERU: 788; 87 OINTMENT TOPICAL at 20:16

## 2018-10-11 RX ADMIN — Medication 3 ML: at 20:16

## 2018-10-11 RX ADMIN — MORPHINE SULFATE 15 MG: 15 TABLET, EXTENDED RELEASE ORAL at 10:25

## 2018-10-11 RX ADMIN — FAMOTIDINE 20 MG: 20 TABLET ORAL at 10:24

## 2018-10-11 RX ADMIN — SENNOSIDES AND DOCUSATE SODIUM 2 TABLET: 8.6; 5 TABLET ORAL at 20:15

## 2018-10-11 RX ADMIN — CASTOR OIL AND BALSAM, PERU: 788; 87 OINTMENT TOPICAL at 10:35

## 2018-10-11 RX ADMIN — GUAIFENESIN 600 MG: 600 TABLET, EXTENDED RELEASE ORAL at 20:14

## 2018-10-11 RX ADMIN — IPRATROPIUM BROMIDE AND ALBUTEROL SULFATE 3 ML: 2.5; .5 SOLUTION RESPIRATORY (INHALATION) at 12:39

## 2018-10-11 RX ADMIN — Medication 10 ML: at 10:26

## 2018-10-11 RX ADMIN — GABAPENTIN 200 MG: 100 CAPSULE ORAL at 05:57

## 2018-10-11 RX ADMIN — MEROPENEM 1 G: 1 INJECTION, POWDER, FOR SOLUTION INTRAVENOUS at 10:22

## 2018-10-11 RX ADMIN — HEPARIN SODIUM 5000 UNITS: 5000 INJECTION, SOLUTION INTRAVENOUS; SUBCUTANEOUS at 14:25

## 2018-10-11 RX ADMIN — MIRTAZAPINE 15 MG: 15 TABLET, ORALLY DISINTEGRATING ORAL at 20:15

## 2018-10-11 RX ADMIN — Medication 3 ML: at 10:26

## 2018-10-11 RX ADMIN — METOPROLOL TARTRATE 25 MG: 25 TABLET ORAL at 17:03

## 2018-10-11 RX ADMIN — PREDNISONE 20 MG: 20 TABLET ORAL at 10:24

## 2018-10-11 RX ADMIN — FAMOTIDINE 20 MG: 20 TABLET ORAL at 20:14

## 2018-10-11 RX ADMIN — MEROPENEM 1 G: 1 INJECTION, POWDER, FOR SOLUTION INTRAVENOUS at 18:56

## 2018-10-11 RX ADMIN — FUROSEMIDE 40 MG: 40 TABLET ORAL at 10:25

## 2018-10-11 RX ADMIN — GUAIFENESIN 600 MG: 600 TABLET, EXTENDED RELEASE ORAL at 10:24

## 2018-10-11 RX ADMIN — Medication 10 ML: at 20:15

## 2018-10-11 RX ADMIN — METOPROLOL TARTRATE 25 MG: 25 TABLET ORAL at 10:24

## 2018-10-11 RX ADMIN — MEROPENEM 1 G: 1 INJECTION, POWDER, FOR SOLUTION INTRAVENOUS at 02:15

## 2018-10-11 RX ADMIN — POTASSIUM CHLORIDE 30 MEQ: 750 CAPSULE, EXTENDED RELEASE ORAL at 10:23

## 2018-10-11 RX ADMIN — ASPIRIN 81 MG CHEWABLE TABLET 81 MG: 81 TABLET CHEWABLE at 10:24

## 2018-10-11 RX ADMIN — CLOPIDOGREL BISULFATE 75 MG: 75 TABLET ORAL at 10:24

## 2018-10-11 NOTE — PROGRESS NOTES
[facility] at [facility]    Inpatient Progress Note      Chief Complaint/Reason for service:    · SVT         Subjective:       Patient up resting in bed. Denies any chest pain, palpitations, or shortness of breath on nasal cannula.    Past medical, surgical, social and family history reviewed in the patient's electronic medical record.    Review of Systems:   Negative for exertional chest pain, shortness of breath,  palpitations.    Problem List  Active Hospital Problems    Diagnosis Date Noted   • **Acute on chronic mixed hypercarbic/hypoxemic respiratory failure requiring intubation and ventilatory support [J96.01, J96.02] 10/02/2018   • Sleep apnea [G47.30] 10/10/2018   • Acute kidney injury [N28.9] 10/03/2018   • H/O bacteremia due to Staphylococcus [R78.81] 10/03/2018   • Opioid use [F11.90] 10/03/2018   • Pneumonia due to infectious organism [J18.9] 10/03/2018   • Brief runs of SVT (supraventricular tachycardia)  [I47.1] 10/03/2018   • Decubitus ulcer of buttock, stage 2 [L89.302] 08/21/2018   • CAD status post GIGI ×2 to LAD and RCA 3/12/18 [I25.10] 03/12/2018   • Essential hypertension [I10] 02/16/2018   • End stage COPD in an active smoker on home O2 with chronic hypercarbia [J44.9] 02/16/2018   • H/O cellulitis of both lower extremities [L03.115, L03.116] 02/16/2018   • PVD (peripheral vascular disease)  [I73.9] 06/20/2017      Resolved Hospital Problems    Diagnosis Date Noted Date Resolved   No resolved problems to display.            Objective:      Current Facility-Administered Medications:   •  acetaminophen (TYLENOL) tablet 325 mg, 325 mg, Oral, Q6H PRN, Pastora Caldwell DO, 325 mg at 10/10/18 1020  •  ALPRAZolam (XANAX) tablet 0.5 mg, 0.5 mg, Oral, Nightly PRN, Joon Hernandez, APRN, 0.5 mg at 10/07/18 1947  •  aspirin chewable tablet 81 mg, 81 mg, Oral, Daily, Perla Jones APRN, 81 mg at 10/10/18 0825  •  atorvastatin (LIPITOR) tablet 40 mg, 40 mg, Oral, Nightly,  Javi, Pastora G, DO, 40 mg at 10/10/18 2031  •  bisacodyl (DULCOLAX) suppository 10 mg, 10 mg, Rectal, Daily PRN, Maira Herrera MD  •  calcium carbonate (TUMS) chewable tablet 500 mg (200 mg elemental), 2 tablet, Oral, BID PRN, Javi, Pastora G, DO  •  castor oil-balsam peru (VENELEX) ointment, , Topical, Q12H, Danii Tabor DO  •  clopidogrel (PLAVIX) tablet 75 mg, 75 mg, Oral, Daily, Javi, Pastora G, DO, 75 mg at 10/10/18 0825  •  dextrose (D50W) 25 g/ 50mL Intravenous Solution 25 g, 25 g, Intravenous, Q15 Min PRN, Perla Jones, APRN  •  dextrose (GLUTOSE) oral gel 15 g, 15 g, Oral, Q15 Min PRN, Perla Jones, APRN  •  famotidine (PEPCID) tablet 20 mg, 20 mg, Oral, BID, Parish Donato MD, 20 mg at 10/10/18 2031  •  furosemide (LASIX) tablet 40 mg, 40 mg, Oral, Daily, Case, Pinky V., DO, 40 mg at 10/10/18 0825  •  gabapentin (NEURONTIN) capsule 200 mg, 200 mg, Oral, Q8H, Javi, Pastora G, DO, 200 mg at 10/11/18 0557  •  glucagon (human recombinant) (GLUCAGEN DIAGNOSTIC) injection 1 mg, 1 mg, Subcutaneous, PRN, Perla Jones, APRN  •  guaiFENesin (MUCINEX) 12 hr tablet 600 mg, 600 mg, Oral, Q12H, Javi, Pastora G, DO, 600 mg at 10/10/18 2031  •  heparin (porcine) 5000 UNIT/ML injection 5,000 Units, 5,000 Units, Subcutaneous, Q8H, Javi, Pastora G, DO, 5,000 Units at 10/10/18 2031  •  HYDROcodone-acetaminophen (NORCO)  MG per tablet 1 tablet, 1 tablet, Oral, Q4H PRN, Maira Herrera MD, 1 tablet at 10/10/18 1507  •  insulin lispro (humaLOG) injection 0-7 Units, 0-7 Units, Subcutaneous, 4x Daily With Meals & Nightly, Parish Donato MD, 2 Units at 10/10/18 2108  •  ipratropium-albuterol (DUO-NEB) nebulizer solution 3 mL, 3 mL, Nebulization, Q4H PRN, Pastora Caldwell DO, 3 mL at 10/10/18 1548  •  lactobacillus acidophilus (RISAQUAD) capsule 1 capsule, 1 capsule, Oral, Daily, Mary Kate Rahman MD, 1 capsule at 10/10/18 0824  •  lactulose (CHRONULAC) 10 GM/15ML solution 20 g, 20 g, Oral, BID  PRN, Maira Herrera MD  •  Linezolid (ZYVOX) 600 mg 300 mL, 600 mg, Intravenous, Q12H, Mary Kate Rahman MD, Last Rate: 300 mL/hr at 10/11/18 0557, 600 mg at 10/11/18 0557  •  Magnesium Sulfate 2 gram Bolus, followed by 8 gram infusion (total Mg dose 10 grams)- Mg less than or equal to 1mg/dL, 2 g, Intravenous, PRN **OR** Magnesium Sulfate 2 gram / 50mL Infusion (GIVE X 3 BAGS TO EQUAL 6GM TOTAL DOSE) - Mg 1.1 - 1.5 mg/dl, 2 g, Intravenous, PRN **OR** Magnesium Sulfate 4 gram infusion- Mg 1.6-1.9 mg/dL, 4 g, Intravenous, PRN, At[place]ona, Danii L, DO  •  meropenem (MERREM) 1 g/100 mL 0.9% NS VTB (mbp), 1 g, Intravenous, Q8H, Perla Jones, APRN, 1 g at 10/11/18 0215  •  metoprolol tartrate (LOPRESSOR) tablet 25 mg, 25 mg, Oral, Q8H, Patricia Guzman APRN, 25 mg at 10/10/18 1735  •  mirtazapine (REMERON SOL-TAB) disintegrating tablet 15 mg, 15 mg, Oral, Nightly, Pastora Caldwell, DO, 15 mg at 10/10/18 2036  •  Morphine (MS CONTIN) 12 hr tablet 15 mg, 15 mg, Oral, Q12H, Maira Herrera MD, 15 mg at 10/10/18 2031  •  nicotine (NICODERM CQ) 21 MG/24HR patch 1 patch, 1 patch, Transdermal, Q24H, Joon Hernandez APRN, 1 patch at 10/10/18 0836  •  Pharmacy Consult - [facility], , Does not apply, Daily, Mansoor Mckee, [facility]  •  polyethylene glycol 3350 powder (packet), 17 g, Oral, Daily, Maira Herrera MD  •  potassium chloride (KLOR-CON) packet 40 mEq, 40 mEq, Oral, PRN, [place], Danii L, DO  •  potassium chloride (MICRO-K) CR capsule 30 mEq, 30 mEq, Oral, BID With Meals, AtDanii rhoades L, DO, 30 mEq at 10/10/18 1734  •  potassium chloride (MICRO-K) CR capsule 40 mEq, 40 mEq, Oral, PRN, Danii Tabor L, DO  •  [START ON 10/12/2018] predniSONE (DELTASONE) tablet 10 mg, 10 mg, Oral, Daily With Breakfast **FOLLOWED BY** [START ON 10/15/2018] predniSONE (DELTASONE) tablet 5 mg, 5 mg, Oral, Daily With Breakfast, Pinky George VEileen, DO  •  predniSONE (DELTASONE) tablet 20 mg, 20 mg, Oral, Daily With Breakfast, Case,  Pinky V., DO, 20 mg at 10/10/18 0825  •  sennosides-docusate sodium (SENOKOT-S) 8.6-50 MG tablet 2 tablet, 2 tablet, Oral, BID, Maira Herrera MD, 2 tablet at 10/10/18 2030  •  sodium chloride 0.9 % flush 10 mL, 10 mL, Intravenous, PRN, Ruben Herrera MD  •  sodium chloride 0.9 % flush 10 mL, 10 mL, Intravenous, Q12H, Parish Donato MD, 10 mL at 10/10/18 2034  •  sodium chloride 0.9 % flush 3 mL, 3 mL, Intravenous, Q12H, Javi, Pastora G, DO, 3 mL at 10/10/18 2034  •  vitamin B-12 (CYANOCOBALAMIN) tablet 1,000 mcg, 1,000 mcg, Oral, Daily, Javi, Pastora G, DO, 1,000 mcg at 10/10/18 0824    Vital Sign Min/Max for last 24 hours  Temp  Min: 97.6 °F (36.4 °C)  Max: 99.1 °F (37.3 °C)   BP  Min: 100/53  Max: 133/74   Pulse  Min: 56  Max: 76   Resp  Min: 16  Max: 20   SpO2  Min: 91 %  Max: 97 %   No Data Recorded      Intake/Output Summary (Last 24 hours) at 10/11/18 0903  Last data filed at 10/11/18 0330   Gross per 24 hour   Intake             1420 ml   Output             1850 ml   Net             -430 ml           CONSTITUTIONAL: No acute distress, normal affect  RESPIRATORY: Normal effort. Diminished breath sounds bilaterally.   CARDIOVASCULAR: Regular rate and rhythm with normal S1 and S2. Without murmur, gallop or rub.  PERIPHERAL VASCULAR: Normal radial pulses bilaterally. There is 1+ peripheral edema bilaterally.     Results Review:   No results found for: TROPONINI, TROPONINT    BUN   Date Value Ref Range Status   10/10/2018 36 (H) 9 - 23 mg/dL Final     Creatinine   Date Value Ref Range Status   10/10/2018 1.15 0.60 - 1.30 mg/dL Final     Potassium   Date Value Ref Range Status   10/10/2018 4.6 3.5 - 5.5 mmol/L Final       Lab Results   Component Value Date    CHOL 215 (H) 03/10/2018     Lab Results   Component Value Date    TRIG 117 03/10/2018     Lab Results   Component Value Date    HDL 70 (H) 03/10/2018     No components found for: LDLCALC  No results found for: VLDL  No results found for:  [facility]    Tele:  NSR, no recurrent SVT overnight, ocassional ectopic beats         Assessment/Plan:     ASSESSMENT:  -SVT, both narrow and with aberrancy at times, spontaneous resolution. Patient does not wish to proceed with an electrophysiology study, wishes for medical management only at this time.   -CAD, s/p GIGI to LAD and RCA 3/2018. ON DAPT, statin, and betablocker  -COPD, end-stage, pulmonology and palliative care following.     PLAN:  -Continue metoprolol 25 mg po Q8H, hold for SBP< 100 and HR <50.  -Continue with medical therapy at this time.   -Will sign off for now. Please feel free to call with any questions or concerns  -Follow up with cardiology upon discharge in 6-8 weeks        BRITTNY Quinones obtained past medical, family history, social history, review of systems and functioned as a scribe for the remainder of the dictation for Dr. Harvey.   10/11/2018    I, Carlos Harvey MD, personally performed the services as scribed by the above named individual. I have made any necessary edits and it is both accurate and complete.     Carlos Harvey MD, MSc, [facility]  Interventional Cardiology  [facility] at [facility]

## 2018-10-11 NOTE — PLAN OF CARE
Problem: Fall Risk (Adult)  Goal: Identify Related Risk Factors and Signs and Symptoms   10/11/18 0440   Fall Risk (Adult)   Related Risk Factors (Fall Risk) age-related changes;fatigue/slow reaction;gait/mobility problems   Signs and Symptoms (Fall Risk) presence of risk factors     Goal: Absence of Fall  Outcome: Ongoing (interventions implemented as appropriate)   10/11/18 0440   Fall Risk (Adult)   Absence of Fall making progress toward outcome       Problem: Patient Care Overview  Goal: Plan of Care Review  Outcome: Ongoing (interventions implemented as appropriate)   10/11/18 0440   Coping/Psychosocial   Plan of Care Reviewed With patient   Plan of Care Review   Progress improving   OTHER   Outcome Summary VSS. Pt denies pain. Pt still receiving IV abx. Pt wanting to work with PT in order to get up and walking more. Will continue to monitor.

## 2018-10-11 NOTE — SIGNIFICANT NOTE
Discussed CPAP/BIPAP w/ pt. Pt states he is claustrophobic and does not wish to wear. Informed pt that if he would like to try or changes his mind to let us know.    Thanks

## 2018-10-11 NOTE — PROGRESS NOTES
[facility] Medicine Services  PROGRESS NOTE    Patient Name: Yassine Love  : 1945  MRN: [id number]    Date of Admission: 10/2/2018  Length of Stay: 9  Primary Care Physician: Provider, No Known    Subjective   Subjective     CC:  sob    HPI:  Did not use machine even though on high risk, systemic medication, MS contin (standing)    Review of Systems  Gen- No fevers, chills  CV- No chest pain, palpitations  Resp- No cough, dyspnea  GI- No N/V/D, abd pain      Otherwise ROS is negative except as mentioned in the HPI.    Objective   Objective     Vital Signs:   Temp:  [97.6 °F (36.4 °C)-98.2 °F (36.8 °C)] 98 °F (36.7 °C)  Heart Rate:  [56-76] 61  Resp:  [16-20] 18  BP: (100-133)/(53-74) 133/74        Physical Exam:  Constitutional: No acute distress, awake, alert, no family at bedside.   HENT: NCAT, mucous membranes moist  Respiratory: Clear to auscultation bilaterally, respiratory effort normal   Cardiovascular: RRR, no murmurs, rubs, or gallops, palpable pedal pulses bilaterally  Gastrointestinal: Positive bowel sounds, soft, nontender, nondistended  Musculoskeletal: 1+ bilateral ankle edema with b/l erythema lower ext mid shin.   Psychiatric: Appropriate affect, cooperative  Neurologic: Oriented x 3, strength symmetric in all extremities, Cranial Nerves grossly intact to confrontation, speech clear  Skin: otherwise no rashes      Results Reviewed:  I have personally reviewed current lab, radiology, and data and agree.      Results from last 7 days  Lab Units 10/10/18  0507 10/09/18  0751 10/06/18  0535   WBC 10*3/mm3 6.63 6.27 5.26   HEMOGLOBIN g/dL 9.8* 10.0* 10.2*   HEMATOCRIT % 32.4* 33.6* 35.3*   PLATELETS 10*3/mm3 351 302 287       Results from last 7 days  Lab Units 10/10/18  0507 10/09/18  0751 10/08/18  0534  10/05/18  0353   SODIUM mmol/L 137 138 140  < > 138   POTASSIUM mmol/L 4.6 3.7 4.6  < > 4.0   CHLORIDE mmol/L 101 101 106  < > 99   CO2 mmol/L 33.0* 33.0* 31.0  < >  36.0*   BUN mg/dL 36* 36* 36*  < > 38*   CREATININE mg/dL 1.15 1.24 1.26  < > 1.27   GLUCOSE mg/dL 91 86 106*  < > 83   CALCIUM mg/dL 8.2* 8.2* 8.3*  < > 7.8*   ALT (SGPT) U/L  --   --   --   --  12   AST (SGOT) U/L  --   --   --   --  18   < > = values in this interval not displayed.  Estimated Creatinine Clearance: 59.2 mL/min (by C-G formula based on SCr of 1.15 mg/dL).  No results found for: BNP    Microbiology Results Abnormal     Procedure Component Value - Date/Time    Blood Culture - Blood, [[id number]]  (Normal) Collected:  10/02/18 1804    Lab Status:  Final result Specimen:  Blood from Hand, Left Updated:  10/07/18 2201     Blood Culture No growth at 5 days    Narrative:       Aerobic bottle only     Blood Culture - Blood, [[id number]]  (Normal) Collected:  10/02/18 1720    Lab Status:  Final result Specimen:  Blood from Arm, Right Updated:  10/07/18 1830     Blood Culture No growth at 5 days    Respiratory Culture - Sputum, Cough [[id number]]  (Abnormal)  (Susceptibility) Collected:  10/03/18 0430    Lab Status:  Final result Specimen:  Sputum from Cough Updated:  10/07/18 1137     Respiratory Culture Light growth (2+) Staphylococcus aureus (A)      Light growth (2+) Normal Respiratory Nataliia     Gram Stain Result Few (2+) WBCs per low power field      No Epithelial cells per low power field      Few (2+) Gram positive cocci    Susceptibility      Staphylococcus aureus     FAVIAN     Ceftriaxone <=8 ug/ml Susceptible     Clindamycin >4 ug/ml Resistant     Erythromycin >4 ug/ml Resistant     Gentamicin <=4 ug/ml Susceptible     Levofloxacin <=1 ug/ml Susceptible  [1]      Linezolid <=1 ug/ml Susceptible     Oxacillin <=0.25 ug/ml Susceptible     Penicillin G <=0.03 ug/ml Susceptible     Quinupristin + Dalfopristin <=0.5 ug/ml Susceptible     Rifampin <=1 ug/ml Susceptible     Tetracycline >8 ug/ml Resistant     Trimethoprim + Sulfamethoxazole <=0.5/9.5 ug/ml Susceptible     Vancomycin 2 ug/ml Susceptible             [1]   Staphylococcus species may develop resistance during prolonged therapy with quinolones.  Isolates that are initially susceptible may become resistant within three to four days after initiation of therapy. Testing of repeat isolates may be warranted.                   MRSA Screen, PCR - Swab, Nares [[id number]]  (Abnormal) Collected:  10/04/18 1623    Lab Status:  Final result Specimen:  Swab from Nares Updated:  10/04/18 1818     MRSA, PCR Positive (C)          Imaging Results (last 24 hours)     Procedure Component Value Units Date/Time    XR Chest 1 View [[id number]] Collected:  10/11/18 0928     Updated:  10/11/18 0928    Narrative:       EXAMINATION: XR CHEST 1 VW-10/11/2018:      INDICATION: Pneumonia; J96.21-Acute and chronic respiratory failure with  hypoxia; J96.22-Acute and chronic respiratory failure with hypercapnia;  Z74.09-Other reduced mobility; Z74.09-Other reduced mobility.      COMPARISON: 10/09/2018.     FINDINGS: Portable chest reveals the heart to be borderline enlarged.  Small bilateral pleural effusions with ill-defined opacification at the  lung bases. PICC line catheter identified on the right with tip in the  SVC. Degenerative changes seen within the spine. Vascular calcification  seen within the thoracic aorta. Prominence seen in the pulmonary  vascularity.           Impression:       Small bilateral pleural effusions unchanged in the interval  with ill-defined opacification at the lung bases. The heart is enlarged.  PICC line catheter identified on the right with tip in the SVC. The  findings are stable.     D:  10/11/2018  E:  10/11/2018                 Results for orders placed during the hospital encounter of 10/02/18   Adult Transthoracic Echo Complete W/ Cont if Necessary Per Protocol    Narrative · Left ventricular systolic function is normal. Estimated EF = 60%.  · Trace-to-mild mitral valve regurgitation is present.          I have reviewed the medications.      aspirin 81  mg Oral Daily   atorvastatin 40 mg Oral Nightly   castor oil-balsam peru  Topical Q12H   clopidogrel 75 mg Oral Daily   famotidine 20 mg Oral BID   furosemide 40 mg Oral Daily   gabapentin 200 mg Oral Q8H   guaiFENesin 600 mg Oral Q12H   heparin (porcine) 5,000 Units Subcutaneous Q8H   insulin lispro 0-7 Units Subcutaneous 4x Daily With Meals & Nightly   ipratropium-albuterol 3 mL Nebulization Q8H - RT   lactobacillus acidophilus 1 capsule Oral Daily   Linezolid 600 mg Intravenous Q12H   meropenem 1 g Intravenous Q8H   metoprolol tartrate 25 mg Oral Q8H   mirtazapine 15 mg Oral Nightly   Morphine 15 mg Oral Q12H   nicotine 1 patch Transdermal Q24H   pharmacy consult - MTM  Does not apply Daily   polyethylene glycol 17 g Oral Daily   potassium chloride 30 mEq Oral BID With Meals   [START ON 10/12/2018] predniSONE 10 mg Oral Daily With Breakfast   Followed by      [START ON 10/15/2018] predniSONE 5 mg Oral Daily With Breakfast   sennosides-docusate sodium 2 tablet Oral BID   sodium chloride 10 mL Intravenous Q12H   sodium chloride 3 mL Intravenous Q12H   cyanocobalamin 1,000 mcg Oral Daily         Assessment/Plan   Assessment / Plan     Active Hospital Problems    Diagnosis   • **Acute on chronic mixed hypercarbic/hypoxemic respiratory failure requiring intubation and ventilatory support   • Sleep apnea   • Acute kidney injury     Cr 1.35 increased from 9/8/18 1.15      • H/O bacteremia due to Staphylococcus   • Opioid use   • Pneumonia due to infectious organism   • Brief runs of SVT (supraventricular tachycardia)    • Decubitus ulcer of buttock, stage 2   • CAD status post GIGI ×2 to LAD and RCA 3/12/18     S/P LHC per Dr. Harvey on 3/12/18 with PCI revealing severe 2-vessel disease.    GIGI x 2 to LAD and RCA     • Essential hypertension   • End stage COPD in an active smoker on home O2 with chronic hypercarbia     Managed by [facility]      • H/O cellulitis of both lower extremities     Previously managed  "by Infectious Disease      • PVD (peripheral vascular disease)           Brief Hospital Course to date:  Yassine Love is a 72 y.o. male active smoker with a history of COPD (on home oxygen) admitted 10/2/18 for exacerbation associated with severe hypoxemia. He was in the skilled nursing facility for rehabilitation but 10/1/18 developed some visual hallucinations. Oxygen saturations were subsequently noted to drop into the 60s on 4 L on 10/2/18 and he was brought to [facility] ER. Because of an elevated d-dimer a CT angiogram was obtained which was negative for PE. Both CT scan and chest x-ray however revealed it appeared to be pulmonary edema with diffuse interstitial infiltrates and pleural effusions. In addition BNP was 899. He was given his usual dose of home Lasix, placed on empiric antimicrobial coverage with Zosyn, given bronchodilators, Solu-Medrol, placed on BiPAP, and admitted to the hospitalist service.The patient lives by himself. His son had discussed his situation earlier this year and felt that he was not able to care for himself in his own home as he had been doing. He recommended the patient go into a facility, angering the patient who basically \"cut him off\" and wanted nothing further to do with him. He developed severe cellulitis of his lower extremities and was hospitalized 8/20/18 through 9/8/18 at [facility]. Afterwords was sent to a skilled nursing facility for rehabilitation where the above occurred.     At approximately 2 AM 10/3/18 he was noted to become unresponsive with decreased respiratory drive. Apparently he had been a DNR but this was reversed by the patient on admission. Because of this Dr. Jacobsen and respiratory therapy intubated the patient at the bedside. There were large amounts of thick purulent yellow secretions obtained at the time of intubation and he was transferred to the ICU on ventilatory support. He initially required high FiO2's of 60% but respiratory rate and ABGs rapidly " "improved. Over the course of the evening he continued to improve. He subsequently self extubated the evening of 10/3/18 at 11 PM. He was able to tolerate self extubation but has required high flow nasal oxygen in order to maintain his oxygen saturations. He was alert and oriented so additional discussions were held regarding CODE STATUS. He wanted to think about it yesterday and when I question him again today he stated he would like to think about it\" some more\".      He was on empiric vancomycin and Merrem and cultures from blood and sputum were negative as of 10/4/18. He however had had a staph aureus in a wound in August and I wanted to continue him on coverage for MRSA as well as gram negatives. Because of his renal insufficiency however he was switched him to linezolid on 10/4/18. Today his sputum is growing a staph aureus that was not identified and nasal swabs are growing MRSA. He remains on Merrem and linezolid.      Heart rate is usually in the 60s, but has had recurrent brief episodes of SVT up to 160  both yesterday and today. His metoprolol was held once and that led to SVT that resolved with resumption of beta blocker.. He was admitted 10/2 and transferred out of the ICU 10/7      Assessment & Plan:  1. Continue linezolid and Merrem and complete 7-10 days total,ID to assist with abx regimen  2. Avoid sedating agents  3. Keep his potassium greater than or equal to 4.5 to avoid the development of metabolic alkalosis that will reduce respiratory, give add'l potassium today and schedule   4. IV Lasix and Diamox initially, Lasix decreased today to 40mg po daily   5. Follow-up renal function in a.m.  6. Weaned from high flow down to 3L NC, continue to taper as he tolerates  7.Palliative following, status changed to DNR/DNI   8. Recurrent episodes of SVT - pt follows with [facility], will consult, follow mag, continue BB   9. Taper steroids per pulm   10. Untreated Sleep Apnea - likely complex apnea (combination of " obstructive + central) sleep apnea - AutoBiPAP at night and as needed for sleep - nasal interface    AutoBiPAP at night  Son in room - convinced patient to use machine  Son asking that maybe someone could help at night to facilitate NIPPV use (night shift nursing / RT)    DVT Prophylaxis:  Sq heparin    CODE STATUS:   Code Status and Medical Interventions:   Ordered at: 10/08/18 1413     Limited Support to NOT Include:    Intubation     Level Of Support Discussed With:    Patient     Code Status:    No CPR     Medical Interventions (Level of Support Prior to Arrest):    Limited       Disposition: I expect the patient to be discharged tbd      Electronically signed by Dhaval Carter MD, 10/11/18, 2:15 PM.

## 2018-10-11 NOTE — PROGRESS NOTES
[facility] Progress Note    Admission Date: 10/2/2018    Yassine Love  1945  [id number]    Date: 10/11/2018    Antibiotics:  Anti-Infectives     Ordered     Dose/Rate Route Frequency Start Stop    10/04/18 1629  Linezolid (ZYVOX) 600 mg 300 mL     Mary Kate Rahman MD reviewed the order on 10/08/18 1301.   Ordering Provider:  Mary Kate Rahman MD    600 mg  300 mL/hr over 60 Minutes Intravenous Every 12 Hours 10/04/18 1800 10/13/18 1300    10/03/18 0229  meropenem (MERREM) 1 g/100 mL 0.9% NS VTB ([facility])     Ordering Provider:  Perla Jones APRN    1 g  over 3 Hours Intravenous Every 8 Hours 10/03/18 1000 10/13/18 0959    10/03/18 0914  vancomycin 1750 mg/500 mL 0.9% NS IVPB ([facility])     Ordering Provider:  Parish Donato MD    1,750 mg  over 120 Minutes Intravenous Once 10/03/18 1000 10/03/18 1203    10/03/18 0229  meropenem (MERREM) 1 g/100 mL 0.9% NS VTB ([facility])     Ordering Provider:  Perla Jones APRN    1 g  over 30 Minutes Intravenous Once 10/03/18 0315 10/03/18 0313    10/02/18 2108  piperacillin-tazobactam (ZOSYN) 4.5 g in iso-osmotic dextrose 100 mL IVPB (premix)     Ordering Provider:  Pastora Caldwell DO    4.5 g  over 30 Minutes Intravenous Once 10/02/18 2108 10/03/18 0114           Reason for Consultation: bilateral leg cellulitis,  bronchitis      History of present illness:    Patient is a 72 y.o. male well known to Dr Belle from prior admissions. Dr Belle has primarily followed for cellulitis  He was hospitalized 8/21 to 9/7 and Dr Belle followed for cellulitis complicating bilateral leg ulcers   This admission on  10/2/18 is for  for COPD exacerbation and severe hypoxia. He iwas transferred from a  skilled nursing facility with hypoxemia and visual hallucinations.NC. EMS was called and Mr. Love was  CTA was negative for pulmonary embolism but  pulmonary edema with diffuse interstitial infiltrates and pleural effusions. BNP was 899 and procalcitonin normal on 10/2 and 10/4. He required  mechanical ventilation  Sputum culture grew MSSA  He has been treated with zyvox and merrrem  He improved after diuresis and was extubated  The leg wounds present 2 months ago have healed He has some residual erythema and states that his legs are painful at times  He is known to have severe PVD     10/10  More alert this am; intermittent cough perisists  Denies leg pain  10/11 alert, no new c/o, denies leg pain             ROS:  No f/c/s. No n/v/d. No rash. No new ADR to Abx.       PE:  Vital Signs  Temp  Min: 97.6 °F (36.4 °C)  Max: 99.1 °F (37.3 °C)  BP  Min: 100/53  Max: 133/74  Pulse  Min: 56  Max: 76  Resp  Min: 16  Max: 20  SpO2  Min: 91 %  Max: 97 %  GENERAL: alert  HEENT: Normocephalic, atraumatic.  PERRL.. No conjunctival injection. No icterus. Oropharynx not visualized.   NECK: Supple without nuchal rigidity. No mass.  LYMPH: No cervical lymphadenopathy.  HEART: heart sounds distant  LUNGS: poor inspiratory effort, no wheezes or rhonchi  ABDOMEN: Soft, nontender, nondistended. Positive bowel sounds. No rebound or guarding. NO mass or HSM.  EXT:  1+  Edema bilateral legs with faded erythema, mild tenderness to palpation. No cords.  : Genitalia generally unremarkable.  Without Moore catheter.  MSK: FROM without joint effusions noted arms/legs.    SKIN: Warm and dry without cutaneous eruptions on Inspection/palpation.    NEURO: Oriented to PPT. No focal deficits on motor/sensory exam at arms/legs.  PSYCHIATRIC: Normal insight and judgement. Cooperative with PE     Laboratory Data      Results from last 7 days  Lab Units 10/10/18  0507 10/09/18  0751 10/06/18  0535   WBC 10*3/mm3 6.63 6.27 5.26   HEMOGLOBIN g/dL 9.8* 10.0* 10.2*   HEMATOCRIT % 32.4* 33.6* 35.3*   PLATELETS 10*3/mm3 351 302 287       Results from last 7 days  Lab Units 10/10/18  0507   SODIUM mmol/L 137   POTASSIUM mmol/L 4.6   CHLORIDE mmol/L 101   CO2 mmol/L 33.0*   BUN mg/dL 36*   CREATININE mg/dL 1.15   GLUCOSE mg/dL 91   CALCIUM mg/dL  8.2*       Results from last 7 days  Lab Units 10/05/18  0353   ALK PHOS U/L 92   BILIRUBIN mg/dL 0.4   ALT (SGPT) U/L 12   AST (SGOT) U/L 18               Estimated Creatinine Clearance: 59.2 mL/min (by C-G formula based on SCr of 1.15 mg/dL).      Microbiology:  mssa from sputum;  mrsa from nares swab    Radiology:  Imaging Results (last 72 hours)     Procedure Component Value Units Date/Time    XR Chest 1 View [[id number]] Collected:  10/06/18 1115     Updated:  10/06/18 1355    Narrative:       EXAMINATION: XR CHEST 1 VW - 10/06/2018     INDICATION: J96.21-Acute and chronic respiratory failure with hypoxia;  J96.22-Acute and chronic respiratory failure with hypercapnia;  Z74.09-Other reduced mobility.     TECHNIQUE:  Single view frontal chest.     COMPARISONS: 10/5/2018.     FINDINGS:  Stable support apparatus. Small bilateral pleural effusions  and adjacent atelectasis are unchanged. No pneumothorax. Calcification  aortic knob. Cardiomediastinal silhouette is unchanged.        Impression:       Stable exam.     DICTATED:   10/06/2018  EDITED/ls :   10/06/2018      This report was finalized on 10/6/2018 1:53 PM by Eliezer Hale.             I personally reviewed the radiographic studies     IMPRESSION:    -- Probable MSSA bronchitis  Vs pneumonia     -- Acute on chronic mixed respiratory failure  S/p intubation, now extubated     -- Pulmonary edema- improved     -- Chronic lymphedema and venous stasis dermatitis     -- Bilateral leg cellulitis essentially resolved     -- Severe COPD with ongoing tobacco abuse, poor pulmonary toilet     -- RAFAEL     -- Decubitus ulcer of buttock stage 2     --MRSA colonization       RECOMMENDATIONS:      -- continue current regimen of merrem and zyvox   It may be reasonable to taper antibiotics by stopping merrem 10/12 and treating with oral zyvox    -- improve pulmonary toilet if he will cooperate     -- probiotic         Mary Kate Rahman MD  10/11/2018

## 2018-10-11 NOTE — PROGRESS NOTES
Palliative Care Progress Note    Date of Admission: 10/2/2018    Subjective:  Patient with no complaints at this point in time.  Current Code Status     Date Active Code Status Order ID Comments User Context       10/8/2018  2:13 PM No CPR [id number]  Maira Herrera MD Inpatient       Questions for Current Code Status     Question Answer Comment    Code Status No CPR     Medical Interventions (Level of Support Prior to Arrest) Limited     Limited Support to NOT Include Intubation     Level Of Support Discussed With Patient         No current facility-administered medications on file prior to encounter.      Current Outpatient Prescriptions on File Prior to Encounter   Medication Sig Dispense Refill   • acetaminophen (TYLENOL) 325 MG tablet Take 325 mg by mouth Every 6 (Six) Hours As Needed for Mild Pain  or Fever.     • albuterol (PROVENTIL) (2.5 MG/3ML) 0.083% nebulizer solution Take 2.5 mg by nebulization Every 6 (Six) Hours As Needed for Shortness of Air. 360 mL 0   • aspirin 81 MG EC tablet Take 1 tablet by mouth Daily.     • atorvastatin (LIPITOR) 40 MG tablet Take 40 mg by mouth Every Night.     • budesonide-formoterol (SYMBICORT) 160-4.5 MCG/ACT inhaler Inhale 2 puffs 2 (Two) Times a Day.     • clopidogrel (PLAVIX) 75 MG tablet Take 1 tablet by mouth Daily. 30 tablet    • cyanocobalamin (VITAMIN B-12) 1000 MCG tablet Take 1 tablet by mouth Daily.     • fluticasone (FLONASE) 50 MCG/ACT nasal spray 2 sprays by Each Nare route Daily.     • furosemide (LASIX) 40 MG tablet Take 40 mg by mouth 2 (Two) Times a Day.     • gabapentin (NEURONTIN) 400 MG capsule Take 400 mg by mouth 3 (Three) Times a Day.     • HYDROcodone-acetaminophen (NORCO)  MG per tablet Take 1 tablet by mouth Every 4 (Four) Hours As Needed for Moderate Pain .     • metoprolol tartrate (LOPRESSOR) 25 MG tablet Take 25 mg by mouth 2 (Two) Times a Day.     • mirtazapine (REMERON SOL-TAB) 15 MG disintegrating tablet Take 1 tablet by mouth  "Every Night.     • Morphine (MS CONTIN) 30 MG 12 hr tablet Take 1 tablet by mouth Every 12 (Twelve) Hours.     • nicotine (NICODERM CQ) 21 MG/24HR patch Place 1 patch on the skin as directed by provider Daily.     • pantoprazole (PROTONIX) 40 MG EC tablet Take 1 tablet by mouth Daily.     • sennosides-docusate sodium (SENOKOT-S) 8.6-50 MG tablet Take 2 tablets by mouth Every Night.     • tiotropium (SPIRIVA) 18 MCG per inhalation capsule Place 1 capsule into inhaler and inhale Daily.          •  acetaminophen  •  ALPRAZolam  •  bisacodyl  •  calcium carbonate  •  dextrose  •  dextrose  •  glucagon (human recombinant)  •  HYDROcodone-acetaminophen  •  lactulose  •  magnesium sulfate **OR** magnesium sulfate **OR** magnesium sulfate  •  potassium chloride  •  potassium chloride  •  sodium chloride    Objective: /74   Pulse 61   Temp 98 °F (36.7 °C) (Oral)   Resp 18   Ht 170.2 cm (67\")   Wt 83.9 kg (184 lb 14.4 oz)   SpO2 94%   BMI 28.96 kg/m²      Intake/Output Summary (Last 24 hours) at 10/11/18 1354  Last data filed at 10/11/18 0330   Gross per 24 hour   Intake              540 ml   Output             1600 ml   Net            -1060 ml     Physical Exam:      General Appearance:    Alert, cooperative, in no acute distress   Head:    Normocephalic, without obvious abnormality, atraumatic   Eyes:            Lids and lashes normal, conjunctivae and sclerae normal, no   icterus, no pallor, corneas clear, PERRLA   Ears:    Ears appear intact with no abnormalities noted   Throat:   No oral lesions, no thrush, oral mucosa moist   Neck:   No adenopathy, supple, trachea midline, no thyromegaly, no   carotid bruit, no JVD   Back:     No kyphosis present, no scoliosis present, no skin lesions,      erythema or scars, no tenderness to percussion or                   palpation,   range of motion normal   Lungs:     Clear to auscultation,respirations regular, even and                  unlabored    Heart:    Regular " rhythm and normal rate, normal S1 and S2, no            murmur, no gallop, no rub, no click   Chest Wall:    No abnormalities observed   Abdomen:     Normal bowel sounds, no masses, no organomegaly, soft        non-tender, non-distended, no guarding, no rebound                tenderness   Rectal:     Deferred   Extremities:   Moves all extremities well, no edema, no cyanosis, no             redness   Pulses:   Pulses palpable and equal bilaterally   Skin:   No bleeding, bruising or rash   Lymph nodes:   No palpable adenopathy   Neurologic:   Cranial nerves 2 - 12 grossly intact, sensation intact, DTR       present and equal bilaterally       Results from last 7 days  Lab Units 10/10/18  0507   WBC 10*3/mm3 6.63   HEMOGLOBIN g/dL 9.8*   HEMATOCRIT % 32.4*   PLATELETS 10*3/mm3 351       Results from last 7 days  Lab Units 10/10/18  0507  10/05/18  0353   SODIUM mmol/L 137  < > 138   POTASSIUM mmol/L 4.6  < > 4.0   CHLORIDE mmol/L 101  < > 99   CO2 mmol/L 33.0*  < > 36.0*   BUN mg/dL 36*  < > 38*   CREATININE mg/dL 1.15  < > 1.27   CALCIUM mg/dL 8.2*  < > 7.8*   BILIRUBIN mg/dL  --   --  0.4   ALK PHOS U/L  --   --  92   ALT (SGPT) U/L  --   --  12   AST (SGOT) U/L  --   --  18   GLUCOSE mg/dL 91  < > 83   < > = values in this interval not displayed.    Impression: COPD  Cough  Constipation    Plan: Plan is for the patient to go to nursing facility for rehabilitation.  Symptoms are managed and plan is in place so palliative medicine will sign off and would recommend palliative consult at the rehabilitation facility.        Oziel León DO  10/11/18  1:54 PM

## 2018-10-11 NOTE — PLAN OF CARE
Problem: Patient Care Overview  Goal: Interprofessional Rounds/Family Conf  Outcome: Ongoing (interventions implemented as appropriate)  Palliative Team Attendance 1300. Leeann LEE, Armaan Barlow RN [facility], , Martha Messina MDIV, Zari Arroyo RN , PN   10/11/18 1300   Interdisciplinary Rounds/Family Conf   Summary pt is planning to go to snf. some dyspnea and pain. pt is on ms contin, prn lortab for dyspnea/pain. scheduled nebs for dyspnea. palliative care will sign off. provide info palliative care at snf.

## 2018-10-11 NOTE — PROGRESS NOTES
Continued Stay Note   Yvon     Patient Name: Yassine Love  MRN: [id number]  Today's Date: 10/11/2018    Admit Date: 10/2/2018          Discharge Plan     Row Name 10/11/18 1447       Plan    Plan Goal is [facility]    Patient/Family in Agreement with Plan yes    Plan Comments SALMA spoke with Kristina with [facility] and she asked how long patient would be on IV antibiotics. Awaiting final recommendations from ID.  Palliative recommends that palliative follow at SNF as well. Yarely Addison RN [phone]    Update @ 1615: SALMA spoke with Kristina with [facility] and she now states they do not have any beds to offer Mr. Love. Please notify SALMA when he will be medically ready as we working on different placement options. Yarely Addison RN [phone]              Discharge Codes    No documentation.       Expected Discharge Date and Time     Expected Discharge Date Expected Discharge Time    Oct 12, 2018             Yarely Addison RN

## 2018-10-12 LAB — MAGNESIUM SERPL-MCNC: 2.3 MG/DL (ref 1.3–2.7)

## 2018-10-12 PROCEDURE — 25010000002 LINEZOLID 600 MG/300ML SOLUTION: Performed by: INTERNAL MEDICINE

## 2018-10-12 PROCEDURE — 63710000001 PREDNISONE PER 5 MG: Performed by: INTERNAL MEDICINE

## 2018-10-12 PROCEDURE — 97530 THERAPEUTIC ACTIVITIES: CPT

## 2018-10-12 PROCEDURE — 25010000002 MEROPENEM: Performed by: NURSE PRACTITIONER

## 2018-10-12 PROCEDURE — 99233 SBSQ HOSP IP/OBS HIGH 50: CPT | Performed by: HOSPITALIST

## 2018-10-12 PROCEDURE — 94640 AIRWAY INHALATION TREATMENT: CPT

## 2018-10-12 PROCEDURE — 83735 ASSAY OF MAGNESIUM: CPT | Performed by: FAMILY MEDICINE

## 2018-10-12 PROCEDURE — 97116 GAIT TRAINING THERAPY: CPT

## 2018-10-12 PROCEDURE — 94799 UNLISTED PULMONARY SVC/PX: CPT

## 2018-10-12 PROCEDURE — 25010000002 HEPARIN (PORCINE) PER 1000 UNITS: Performed by: INTERNAL MEDICINE

## 2018-10-12 PROCEDURE — 94660 CPAP INITIATION&MGMT: CPT

## 2018-10-12 PROCEDURE — 94760 N-INVAS EAR/PLS OXIMETRY 1: CPT

## 2018-10-12 RX ADMIN — IPRATROPIUM BROMIDE AND ALBUTEROL SULFATE 3 ML: 2.5; .5 SOLUTION RESPIRATORY (INHALATION) at 07:59

## 2018-10-12 RX ADMIN — GABAPENTIN 200 MG: 100 CAPSULE ORAL at 06:02

## 2018-10-12 RX ADMIN — LINEZOLID 600 MG: 600 INJECTION, SOLUTION INTRAVENOUS at 17:51

## 2018-10-12 RX ADMIN — Medication 10 ML: at 21:16

## 2018-10-12 RX ADMIN — POTASSIUM CHLORIDE 30 MEQ: 750 CAPSULE, EXTENDED RELEASE ORAL at 09:18

## 2018-10-12 RX ADMIN — LINEZOLID 600 MG: 600 INJECTION, SOLUTION INTRAVENOUS at 05:34

## 2018-10-12 RX ADMIN — Medication 1 CAPSULE: at 09:18

## 2018-10-12 RX ADMIN — GABAPENTIN 200 MG: 100 CAPSULE ORAL at 21:13

## 2018-10-12 RX ADMIN — ATORVASTATIN CALCIUM 40 MG: 40 TABLET, FILM COATED ORAL at 21:13

## 2018-10-12 RX ADMIN — FUROSEMIDE 40 MG: 40 TABLET ORAL at 09:18

## 2018-10-12 RX ADMIN — Medication 10 ML: at 09:00

## 2018-10-12 RX ADMIN — Medication 3 ML: at 22:39

## 2018-10-12 RX ADMIN — CYANOCOBALAMIN TAB 1000 MCG 1000 MCG: 1000 TAB at 09:17

## 2018-10-12 RX ADMIN — IPRATROPIUM BROMIDE AND ALBUTEROL SULFATE 3 ML: 2.5; .5 SOLUTION RESPIRATORY (INHALATION) at 15:31

## 2018-10-12 RX ADMIN — FAMOTIDINE 20 MG: 20 TABLET ORAL at 21:14

## 2018-10-12 RX ADMIN — CASTOR OIL AND BALSAM, PERU: 788; 87 OINTMENT TOPICAL at 21:15

## 2018-10-12 RX ADMIN — METOPROLOL TARTRATE 25 MG: 25 TABLET ORAL at 15:28

## 2018-10-12 RX ADMIN — NICOTINE 1 PATCH: 21 PATCH, EXTENDED RELEASE TRANSDERMAL at 09:17

## 2018-10-12 RX ADMIN — MEROPENEM 1 G: 1 INJECTION, POWDER, FOR SOLUTION INTRAVENOUS at 10:34

## 2018-10-12 RX ADMIN — MORPHINE SULFATE 15 MG: 15 TABLET, EXTENDED RELEASE ORAL at 21:14

## 2018-10-12 RX ADMIN — SENNOSIDES AND DOCUSATE SODIUM 2 TABLET: 8.6; 5 TABLET ORAL at 09:18

## 2018-10-12 RX ADMIN — FAMOTIDINE 20 MG: 20 TABLET ORAL at 09:18

## 2018-10-12 RX ADMIN — GABAPENTIN 200 MG: 100 CAPSULE ORAL at 15:28

## 2018-10-12 RX ADMIN — MIRTAZAPINE 15 MG: 15 TABLET, ORALLY DISINTEGRATING ORAL at 21:15

## 2018-10-12 RX ADMIN — POTASSIUM CHLORIDE 30 MEQ: 750 CAPSULE, EXTENDED RELEASE ORAL at 17:51

## 2018-10-12 RX ADMIN — GUAIFENESIN 600 MG: 600 TABLET, EXTENDED RELEASE ORAL at 09:17

## 2018-10-12 RX ADMIN — ACETAMINOPHEN 325 MG: 325 TABLET ORAL at 06:02

## 2018-10-12 RX ADMIN — CLOPIDOGREL BISULFATE 75 MG: 75 TABLET ORAL at 09:19

## 2018-10-12 RX ADMIN — POLYETHYLENE GLYCOL 3350 17 G: 17 POWDER, FOR SOLUTION ORAL at 09:18

## 2018-10-12 RX ADMIN — METOPROLOL TARTRATE 25 MG: 25 TABLET ORAL at 09:17

## 2018-10-12 RX ADMIN — CASTOR OIL AND BALSAM, PERU: 788; 87 OINTMENT TOPICAL at 09:19

## 2018-10-12 RX ADMIN — MEROPENEM 1 G: 1 INJECTION, POWDER, FOR SOLUTION INTRAVENOUS at 01:56

## 2018-10-12 RX ADMIN — GUAIFENESIN 600 MG: 600 TABLET, EXTENDED RELEASE ORAL at 21:14

## 2018-10-12 RX ADMIN — HEPARIN SODIUM 5000 UNITS: 5000 INJECTION, SOLUTION INTRAVENOUS; SUBCUTANEOUS at 21:14

## 2018-10-12 RX ADMIN — METOPROLOL TARTRATE 25 MG: 25 TABLET ORAL at 00:22

## 2018-10-12 RX ADMIN — HEPARIN SODIUM 5000 UNITS: 5000 INJECTION, SOLUTION INTRAVENOUS; SUBCUTANEOUS at 06:02

## 2018-10-12 RX ADMIN — PREDNISONE 10 MG: 10 TABLET ORAL at 09:17

## 2018-10-12 RX ADMIN — MEROPENEM 1 G: 1 INJECTION, POWDER, FOR SOLUTION INTRAVENOUS at 17:51

## 2018-10-12 RX ADMIN — MORPHINE SULFATE 15 MG: 15 TABLET, EXTENDED RELEASE ORAL at 09:17

## 2018-10-12 RX ADMIN — HEPARIN SODIUM 5000 UNITS: 5000 INJECTION, SOLUTION INTRAVENOUS; SUBCUTANEOUS at 15:27

## 2018-10-12 RX ADMIN — IPRATROPIUM BROMIDE AND ALBUTEROL SULFATE 3 ML: 2.5; .5 SOLUTION RESPIRATORY (INHALATION) at 22:59

## 2018-10-12 RX ADMIN — ASPIRIN 81 MG CHEWABLE TABLET 81 MG: 81 TABLET CHEWABLE at 09:18

## 2018-10-12 NOTE — PLAN OF CARE
Problem: Patient Care Overview  Goal: Plan of Care Review  Outcome: Ongoing (interventions implemented as appropriate)   10/12/18 9090   Coping/Psychosocial   Plan of Care Reviewed With patient   Plan of Care Review   Progress no change   OTHER   Outcome Summary 3L oxygen used NIPV for 3 hours IV abx as ordered pressure area buttocks with scabs and purple areas bilateral buttocks sijnus with PAC's medicated once with PRN paink pill and given scheduled pain med as ordered awaiting DC decisions

## 2018-10-12 NOTE — PLAN OF CARE
Problem: Patient Care Overview  Goal: Plan of Care Review  Outcome: Ongoing (interventions implemented as appropriate)   10/12/18 1117   Coping/Psychosocial   Plan of Care Reviewed With patient   Plan of Care Review   Progress improving   OTHER   Outcome Summary Pt ambulated 8' x 3 reps with RW with mod A x 2 and close follow with chair.

## 2018-10-12 NOTE — THERAPY TREATMENT NOTE
Acute Care - Physical Therapy Treatment Note  [facility]     Patient Name: Yassine Love  : 1945  MRN: [id number]  Today's Date: 10/12/2018  Onset of Illness/Injury or Date of Surgery: 10/02/18  Date of Referral to PT: 10/02/18  Referring Physician: DO Javi    Admit Date: 10/2/2018    Visit Dx:    ICD-10-CM ICD-9-CM   1. Acute on chronic respiratory failure with hypoxia and hypercapnia (CMS/HCC) J96.21 518.84    J96.22 786.09     799.02   2. Impaired functional mobility, balance, gait, and endurance Z74.09 V49.89   3. 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 O2 with chronic hypercarbia   • Essential hypertension   • Non-sustained ventricular tachycardia (CMS/HCC)   • CAD status post GIGI ×2 to LAD and RCA 3/12/18   • Debility   • Decubitus ulcer of buttock, stage 2   • Chronic pain   • Chronic venous stasis dermatitis of both lower extremities   • Acute on chronic mixed hypercarbic/hypoxemic respiratory failure requiring intubation and ventilatory support   • History of tobacco use   • Acute kidney injury   • Brief runs of SVT (supraventricular tachycardia)    • H/O bacteremia due to Staphylococcus   • Opioid use   • Overweight (BMI 25.0-29.9)   • Pneumonia due to infectious organism   • Sleep apnea       Therapy Treatment          Rehabilitation Treatment Summary     Row Name 10/12/18 1117             Treatment Time/Intention    Discipline physical therapist  -      Document Type therapy note (daily note)  -      Subjective Information no complaints  -      Mode of Treatment physical therapy  -      Patient/Family Observations No family present.  -      Patient Effort good  -      Comment Pt fatigues quickly.  -      Existing Precautions/Restrictions fall;oxygen therapy device and L/min  -      Recorded by []  Alva Castellanos DUANE, PT 10/12/18 1313      Row Name 10/12/18 1117             Cognitive Assessment/Intervention- PT/OT    Affect/Mental Status (Cognitive) confused  -      Orientation Status (Cognition) oriented to;person  -SH      Follows Commands (Cognition) follows one step commands;75-90% accuracy;increased processing time needed;repetition of directions required  -      Cognitive Function (Cognitive) safety deficit  -      Safety Deficit (Cognitive) moderate deficit;awareness of need for assistance;impulsivity;insight into deficits/self awareness;judgment;safety precautions awareness  -      Personal Safety Interventions fall prevention program maintained;gait belt;nonskid shoes/slippers when out of bed  -      Recorded by [SH] Emanuel, Alva E, PT 10/12/18 1313      Row Name 10/12/18 1117             Safety Issues, Functional Mobility    Safety Issues Affecting Function (Mobility) awareness of need for assistance;impulsivity;insight into deficits/self awareness;judgment;safety precaution awareness;positioning of assistive device  -      Impairments Affecting Function (Mobility) balance;coordination;endurance/activity tolerance;motor control;pain;range of motion (ROM);strength  -      Recorded by [SH] Alva Castellanos, PT 10/12/18 1313      Row Name 10/12/18 1117             Bed Mobility Assessment/Treatment    Bed Mobility Assessment/Treatment supine-sit;sit-supine  -      Rolling Right [place] (Bed Mobility) supervision  -      Supine-Sit [place] (Bed Mobility) supervision;verbal cues  -      Sit-Supine [place] (Bed Mobility) verbal cues;moderate assist (50% patient effort);2 person assist  -      Bed Mobility, Safety Issues decreased use of arms for pushing/pulling;decreased use of legs for bridging/pushing  -      Assistive Device (Bed Mobility) bed rails  -      Comment (Bed Mobility) Increased assist to get back into bed due to fatigue.  -      Recorded by [SH]  Alva Castellanos, PT 10/12/18 1313      Row Name 10/12/18 1117             Transfer Assessment/Treatment    Transfer Assessment/Treatment stand-sit transfer;sit-stand transfer;toilet transfer  -SH      Recorded by [SH] Alva Castellanos, PT 10/12/18 1313      Row Name 10/12/18 1117             Sit-Stand Transfer    Sit-Stand [place] (Transfers) minimum assist (75% patient effort);2 person assist;verbal cues  -      Assistive Device (Sit-Stand Transfers) walker, front-wheeled  -SH      Recorded by [SH] Alva Castellanos, PT 10/12/18 1313      Row Name 10/12/18 1117             Stand-Sit Transfer    Stand-Sit [place] (Transfers) verbal cues;minimum assist (75% patient effort);2 person assist  -      Assistive Device (Stand-Sit Transfers) walker, front-wheeled  -SH      Recorded by [SH] Alva Castellanos, PT 10/12/18 1313      Row Name 10/12/18 1117             Toilet Transfer    Type (Toilet Transfer) sit-stand;stand-sit  -      [place] Level (Toilet Transfer) moderate assist (50% patient effort);2 person assist;verbal cues  -      Assistive Device (Toilet Transfer) walker, front-wheeled  -SH      Recorded by [] Alva Castellanos, PT 10/12/18 1313      Row Name 10/12/18 1117             Gait/Stairs Assessment/Training    72829 - Gait Training Minutes  23  -      Gait/Stairs Assessment/Training gait/ambulation independence;gait/ambulation assistive device;distance ambulated;gait deviations;gait pattern  -      [place] Level (Gait) minimum assist (75% patient effort);2 person assist;verbal cues  -      Assistive Device (Gait) walker, front-wheeled  -SH      Distance in Feet (Gait) 8   8' x 4 reps to bathroom; 12' from commode to bed.  -      Pattern (Gait) step-to  -      Deviations/Abnormal Patterns (Gait) base of support, narrow;vidhi decreased;gait speed decreased  -      Bilateral Gait Deviations heel strike decreased;forward flexed posture  -      Comment (Gait/Stairs)  Close follow with chair, fatigues quickly.  -SH2      Recorded by [SH] Alva Castellanos, PT 10/12/18 1313  [SH2] Alva Castellanos, PT 10/12/18 1343      Row Name 10/12/18 1117             Therapeutic Exercise    83711 - PT Therapeutic Activity Minutes 15  -SH      Recorded by [SH] Alva Castellanos, PT 10/12/18 1343      Row Name 10/12/18 1117             Positioning and Restraints    Pre-Treatment Position in bed  -SH      Post Treatment Position bed  -SH      In Bed notified nsg;supine;call light within reach;encouraged to call for assist;exit alarm on  -SH      Recorded by [SH] Alva Castellanos, PT 10/12/18 1343      Row Name 10/12/18 1117             Pain Scale: FACES Pre/Post-Treatment    Pain: FACES Scale, Pretreatment 0-->no hurt  -SH      Pain: FACES Scale, Post-Treatment 0-->no hurt  -SH      Recorded by [SH] Alva Castellanos, PT 10/12/18 1343      Row Name                Wound 10/02/18 2230 Bilateral medial gluteal MASD (moisture associated skin damage)    Wound - Properties Group Date first assessed: 10/02/18 [MR] Time first assessed: 2230 [MR] Present On Admission : yes [MR] Side: Bilateral [MR] Orientation: medial [MR] Location: gluteal [MR] Type: MASD (moisture associated skin damage) [MR], friction shearing  Recorded by:  [MR] Brittany Evans RN 10/03/18 1505    Row Name 10/12/18 1117             Coping    Observed Emotional State calm  -SH      Recorded by [SH] Alva Castellanos, PT 10/12/18 1343      Row Name 10/12/18 1117             Plan of Care Review    Plan of Care Reviewed With patient  -SH      Recorded by [SH] Alva Castellanos, PT 10/12/18 1343      Row Name 10/12/18 1117             Outcome Summary/Treatment Plan (PT)    Daily Summary of Progress (PT) progress toward functional goals is good  -SH      Anticipated Discharge Disposition (PT) skilled nursing facility  -SH      Recorded by [SH] Alva Castellanos, PT 10/12/18 1343        User Key  (r) = Recorded By, (t) = Taken By, (c) =  Cosigned By    Initials Name Effective Dates Discipline     Alva Castellanos DUANE, PT 06/19/15 -  PT    Brittany Azar E, RN 06/16/16 -  Nurse          Wound 10/02/18 2230 Bilateral medial gluteal MASD (moisture associated skin damage) (Active)   Dressing Appearance no drainage 10/12/2018  9:31 AM   Closure Other (Comment) 10/12/2018  9:31 AM   Periwound ecchymotic 10/11/2018 10:45 PM   Drainage Characteristics/Odor serosanguineous 10/11/2018  7:10 PM   Drainage Amount none 10/11/2018 10:45 PM   Dressing Care, Wound dressing changed 10/11/2018 10:45 PM   Periwound Care, Wound barrier ointment applied 10/11/2018 10:45 PM             Physical Therapy Education     Title: PT OT SLP Therapies (Active)     Topic: Physical Therapy (Active)     Point: Mobility training (Active)    Learning Progress Summary     Learner Status Readiness Method Response Comment Documented by    Patient Active Acceptance E,D NR Safety  10/12/18 1117     Done Eager E VU   10/10/18 0930     Active Acceptance E,D NR  LS 10/05/18 1324     Active Acceptance E,D NR  LS 10/04/18 1119     Done Acceptance E,TB VU  MT 10/03/18 1851    Family Active Acceptance E,D NR  LS 10/04/18 1119     Done Acceptance E,TB VU  MT 10/03/18 1851          Point: Home exercise program (Active)    Learning Progress Summary     Learner Status Readiness Method Response Comment Documented by    Patient Active Acceptance E,D NR Safety  10/12/18 1117     Done Eager E VU  KM 10/10/18 0930     Active Acceptance E,D NR  LS 10/05/18 1324     Active Acceptance E,D NR  LS 10/04/18 1119     Done Acceptance E,TB VU  MT 10/03/18 1851    Family Active Acceptance E,D NR   10/04/18 1119     Done Acceptance E,TB VU  MT 10/03/18 1851          Point: Body mechanics (Active)    Learning Progress Summary     Learner Status Readiness Method Response Comment Documented by    Patient Active Acceptance E,D NR Safety  10/12/18 1117     Done Eager E VU  KM 10/10/18 0930     Active  Acceptance E,D NR   10/05/18 1324     Active Acceptance E,D NR  LS 10/04/18 1119    Family Active Acceptance E,D NR   10/04/18 1119          Point: Precautions (Active)    Learning Progress Summary     Learner Status Readiness Method Response Comment Documented by    Patient Active Acceptance E,D NR Safety  10/12/18 1117     Done Eager E VU   10/10/18 0930     Active Acceptance E,D NR   10/05/18 1324     Active Acceptance E,D NR  LS 10/04/18 1119     Done Acceptance E,TB VU  MT 10/03/18 1851    Family Active Acceptance E,D NR  LS 10/04/18 1119     Done Acceptance E,TB VU  MT 10/03/18 1851                      User Key     Initials Effective Dates Name Provider Type Discipline     06/19/15 -  Alva Castellanos, PT Physical Therapist PT     06/19/15 -  Carmela Washington, PT Physical Therapist PT     06/19/15 -  Zari Rai, PT Physical Therapist PT    MT 06/16/16 -  Noemi Fish RN Registered Nurse Nurse                    PT Recommendation and Plan  Anticipated Discharge Disposition (PT): skilled nursing facility  Outcome Summary/Treatment Plan (PT)  Daily Summary of Progress (PT): progress toward functional goals is good  Anticipated Discharge Disposition (PT): skilled nursing facility  Plan of Care Reviewed With: patient  Progress: improving  Outcome Summary: Pt ambulated 8' x 3 reps with RW with mod A x 2 and close follow with chair.          Outcome Measures     Row Name 10/12/18 1117 10/10/18 1015 10/10/18 0930       How much help from another person do you currently need...    Turning from your back to your side while in flat bed without using bedrails? 2  -SH  -- 2  -KM    Moving from lying on back to sitting on the side of a flat bed without bedrails? 2  -SH  -- 2  -KM    Moving to and from a bed to a chair (including a wheelchair)? 2  -SH  -- 2  -KM    Standing up from a chair using your arms (e.g., wheelchair, bedside chair)? 2  -SH  -- 2  -KM    Climbing 3-5 steps with a  railing? 1  -SH  -- 1  -KM    To walk in hospital room? 2  -SH  -- 2  -KM    AM-PAC 6 Clicks Score 11  -SH  -- 11  -KM       How much help from another is currently needed...    Putting on and taking off regular lower body clothing?  -- 2  -AC  --    Bathing (including washing, rinsing, and drying)  -- 2  -AC  --    Toileting (which includes using toilet bed pan or urinal)  -- 2  -AC  --    Putting on and taking off regular upper body clothing  -- 2  -AC  --    Taking care of personal grooming (such as brushing teeth)  -- 3  -AC  --    Eating meals  -- 3  -AC  --    Score  -- 14  -AC  --       Functional Assessment    Outcome Measure Options AM-PAC 6 Clicks Basic Mobility (PT)  - AM-PAC 6 Clicks Daily Activity (OT)  -[facility]-PAC 6 Clicks Basic Mobility (PT)  -      User Key  (r) = Recorded By, (t) = Taken By, (c) = Cosigned By    Initials Name Provider Type     Jodi Zelaya, OT Occupational Therapist     Alva Castellanos, PT Physical Therapist     Carmela Washington, PT Physical Therapist           Time Calculation:         PT Charges     Row Name 10/12/18 1117             Time Calculation    Start Time 1117  -SH      PT Received On 10/12/18  -      PT Goal Re-Cert Due Date 10/14/18  -         Time Calculation- PT    Total Timed Code Minutes- PT 38 minute(s)  -         Timed Charges    49608 - Gait Training Minutes  23  -SH      06797 - PT Therapeutic Activity Minutes 15  -SH        User Key  (r) = Recorded By, (t) = Taken By, (c) = Cosigned By    Initials Name Provider Type     Alva Castellanos, PT Physical Therapist        Therapy Suggested Charges     Code   Minutes Charges    82398 (CPT®) Hc Pt Neuromusc Re Education Ea 15 Min      54275 (CPT®) Hc Pt Ther Proc Ea 15 Min      47171 (CPT®) Hc Gait Training Ea 15 Min 23 2    44041 (CPT®) Hc Pt Therapeutic Act Ea 15 Min 15 1    86504 (CPT®) Hc Pt Manual Therapy Ea 15 Min      19388 (CPT®) Hc Pt Iontophoresis Ea 15 Min      54684 (CPT®) Hc Pt  Elec Stim Ea-Per 15 Min      31577 (CPT®) Hc Pt Ultrasound Ea 15 Min      63640 (CPT®) Hc Pt Self Care/Mgmt/Train Ea 15 Min      60454 (CPT®) Hc Pt Prosthetic (S) Train Initial Encounter, Each 15 Min      09552 (CPT®) Hc Pt Orthotic(S)/Prosthetic(S) Encounter, Each 15 Min      32670 (CPT®) Hc Orthotic(S) Mgmt/Train Initial Encounter, Each 15min      Total  38 3        Therapy Charges for Today     Code Description Service Date Service Provider Modifiers Qty    74559167753 HC GAIT TRAINING EA 15 MIN 10/12/2018 Alva Castellanos, PT GP 2    42113260046 HC PT THERAPEUTIC ACT EA 15 MIN 10/12/2018 Alva Castellanos, PT GP 1    [id number]  PT THER SUPP EA 15 MIN 10/12/2018 Alva Castellanos, PT GP 2          PT G-Codes  Outcome Measure Options: AM-PAC 6 Clicks Basic Mobility (PT)  AM-PAC 6 Clicks Score: 11  Score: 14    Alva Castellanos PT  10/12/2018

## 2018-10-12 NOTE — PROGRESS NOTES
Continued Stay Note  [facility]     Patient Name: Yassine Love  MRN: [id number]  Today's Date: 10/12/2018    Admit Date: 10/2/2018          Discharge Plan     Row Name 10/12/18 1101       Plan    Plan [facility] offer    Patient/Family in Agreement with Plan yes    Plan Comments Spoke to Kristina at [facility] and they had concerns regarding PO Zyvox.Spoke to Dr. Rahman and she plans to continue Zyvox for another week. Notified Kristina at [facility] and they will possibly have a male semi private on available Monday. Kristina will call CM back later today to notify CM if they can take pt.      Final Discharge Disposition Code 03 - skilled nursing facility (SNF)              Discharge Codes    No documentation.       Expected Discharge Date and Time     Expected Discharge Date Expected Discharge Time    Oct 12, 2018             Yumiko Hull

## 2018-10-12 NOTE — PROGRESS NOTES
[facility] Medicine Services  PROGRESS NOTE    Patient Name: Yassine Love  : 1945  MRN: [id number]    Date of Admission: 10/2/2018  Length of Stay: 10  Primary Care Physician: Provider, No Known    Subjective   Subjective     CC:  sob    HPI:  Used machine for 3 hours.  Has a lot more energy today.  Says it was cold but willing to use again tonight.  No family in room today.      Review of Systems  Gen- No fevers, chills  CV- No chest pain, palpitations  Resp- No cough, dyspnea  GI- No N/V/D, abd pain      Otherwise ROS is negative except as mentioned in the HPI.    Objective   Objective     Vital Signs:   Temp:  [98 °F (36.7 °C)-98.2 °F (36.8 °C)] 98 °F (36.7 °C)  Heart Rate:  [58-77] 74  Resp:  [18-22] 18  BP: (112-121)/(59-67) 120/67  FiO2 (%):  [28 %] 28 %        Physical Exam:  Constitutional: No acute distress, awake, alert, no family at bedside.   HENT: NCAT, mucous membranes moist  Respiratory: Clear to auscultation bilaterally, respiratory effort normal   Cardiovascular: RRR, no murmurs, rubs, or gallops, palpable pedal pulses bilaterally  Gastrointestinal: Positive bowel sounds, soft, nontender, nondistended  Musculoskeletal: 1+ bilateral ankle edema with b/l erythema lower ext mid shin.   Psychiatric: Appropriate affect, cooperative  Neurologic: Oriented x 3, strength symmetric in all extremities, Cranial Nerves grossly intact to confrontation, speech clear  Skin: otherwise no rashes      Results Reviewed:  I have personally reviewed current lab, radiology, and data and agree.      Results from last 7 days  Lab Units 10/10/18  0507 10/09/18  0751 10/06/18  0535   WBC 10*3/mm3 6.63 6.27 5.26   HEMOGLOBIN g/dL 9.8* 10.0* 10.2*   HEMATOCRIT % 32.4* 33.6* 35.3*   PLATELETS 10*3/mm3 351 302 287       Results from last 7 days  Lab Units 10/10/18  0507 10/09/18  0751 10/08/18  0534   SODIUM mmol/L 137 138 140   POTASSIUM mmol/L 4.6 3.7 4.6   CHLORIDE mmol/L 101 101 106   CO2  mmol/L 33.0* 33.0* 31.0   BUN mg/dL 36* 36* 36*   CREATININE mg/dL 1.15 1.24 1.26   GLUCOSE mg/dL 91 86 106*   CALCIUM mg/dL 8.2* 8.2* 8.3*     Estimated Creatinine Clearance: 59.2 mL/min (by C-G formula based on SCr of 1.15 mg/dL).  No results found for: BNP    Microbiology Results Abnormal     Procedure Component Value - Date/Time    Blood Culture - Blood, [[id number]]  (Normal) Collected:  10/02/18 1804    Lab Status:  Final result Specimen:  Blood from Hand, Left Updated:  10/07/18 2201     Blood Culture No growth at 5 days    Narrative:       Aerobic bottle only     Blood Culture - Blood, [[id number]]  (Normal) Collected:  10/02/18 1720    Lab Status:  Final result Specimen:  Blood from Arm, Right Updated:  10/07/18 1830     Blood Culture No growth at 5 days    Respiratory Culture - Sputum, Cough [[id number]]  (Abnormal)  (Susceptibility) Collected:  10/03/18 0430    Lab Status:  Final result Specimen:  Sputum from Cough Updated:  10/07/18 1137     Respiratory Culture Light growth (2+) Staphylococcus aureus (A)      Light growth (2+) Normal Respiratory Nataliia     Gram Stain Result Few (2+) WBCs per low power field      No Epithelial cells per low power field      Few (2+) Gram positive cocci    Susceptibility      Staphylococcus aureus     FAVIAN     Ceftriaxone <=8 ug/ml Susceptible     Clindamycin >4 ug/ml Resistant     Erythromycin >4 ug/ml Resistant     Gentamicin <=4 ug/ml Susceptible     Levofloxacin <=1 ug/ml Susceptible  [1]      Linezolid <=1 ug/ml Susceptible     Oxacillin <=0.25 ug/ml Susceptible     Penicillin G <=0.03 ug/ml Susceptible     Quinupristin + Dalfopristin <=0.5 ug/ml Susceptible     Rifampin <=1 ug/ml Susceptible     Tetracycline >8 ug/ml Resistant     Trimethoprim + Sulfamethoxazole <=0.5/9.5 ug/ml Susceptible     Vancomycin 2 ug/ml Susceptible            [1]   Staphylococcus species may develop resistance during prolonged therapy with quinolones.  Isolates that are initially susceptible may  become resistant within three to four days after initiation of therapy. Testing of repeat isolates may be warranted.                   MRSA Screen, PCR - Swab, Nares [[id number]]  (Abnormal) Collected:  10/04/18 1623    Lab Status:  Final result Specimen:  Swab from Nares Updated:  10/04/18 1818     MRSA, PCR Positive (C)          Imaging Results (last 24 hours)     ** No results found for the last 24 hours. **        Results for orders placed during the hospital encounter of 10/02/18   Adult Transthoracic Echo Complete W/ Cont if Necessary Per Protocol    Narrative · Left ventricular systolic function is normal. Estimated EF = 60%.  · Trace-to-mild mitral valve regurgitation is present.          I have reviewed the medications.      aspirin 81 mg Oral Daily   atorvastatin 40 mg Oral Nightly   castor oil-balsam peru  Topical Q12H   clopidogrel 75 mg Oral Daily   famotidine 20 mg Oral BID   furosemide 40 mg Oral Daily   gabapentin 200 mg Oral Q8H   guaiFENesin 600 mg Oral Q12H   heparin (porcine) 5,000 Units Subcutaneous Q8H   ipratropium-albuterol 3 mL Nebulization Q8H - RT   lactobacillus acidophilus 1 capsule Oral Daily   Linezolid 600 mg Intravenous Q12H   meropenem 1 g Intravenous Q8H   metoprolol tartrate 25 mg Oral Q8H   mirtazapine 15 mg Oral Nightly   Morphine 15 mg Oral Q12H   nicotine 1 patch Transdermal Q24H   pharmacy consult - MTM  Does not apply Daily   polyethylene glycol 17 g Oral Daily   potassium chloride 30 mEq Oral BID With Meals   predniSONE 10 mg Oral Daily With Breakfast   Followed by      [START ON 10/15/2018] predniSONE 5 mg Oral Daily With Breakfast   sennosides-docusate sodium 2 tablet Oral BID   sodium chloride 10 mL Intravenous Q12H   sodium chloride 3 mL Intravenous Q12H   cyanocobalamin 1,000 mcg Oral Daily         Assessment/Plan   Assessment / Plan     Active Hospital Problems    Diagnosis   • **Acute on chronic mixed hypercarbic/hypoxemic respiratory failure requiring intubation and  "ventilatory support   • Sleep apnea   • Acute kidney injury     Cr 1.35 increased from 9/8/18 1.15      • H/O bacteremia due to Staphylococcus   • Opioid use   • Pneumonia due to infectious organism   • Brief runs of SVT (supraventricular tachycardia)    • Decubitus ulcer of buttock, stage 2   • CAD status post GIGI ×2 to LAD and RCA 3/12/18     S/P LHC per Dr. Harvey on 3/12/18 with PCI revealing severe 2-vessel disease.    GIGI x 2 to LAD and RCA     • Essential hypertension   • End stage COPD in an active smoker on home O2 with chronic hypercarbia     Managed by [facility]      • H/O cellulitis of both lower extremities     Previously managed by Infectious Disease      • PVD (peripheral vascular disease)           Brief Hospital Course to date:  Yassine Love is a 72 y.o. male active smoker with a history of COPD (on home oxygen) admitted 10/2/18 for exacerbation associated with severe hypoxemia. He was in the skilled nursing facility for rehabilitation but 10/1/18 developed some visual hallucinations. Oxygen saturations were subsequently noted to drop into the 60s on 4 L on 10/2/18 and he was brought to [facility] ER. Because of an elevated d-dimer a CT angiogram was obtained which was negative for PE. Both CT scan and chest x-ray however revealed it appeared to be pulmonary edema with diffuse interstitial infiltrates and pleural effusions. In addition BNP was 899. He was given his usual dose of home Lasix, placed on empiric antimicrobial coverage with Zosyn, given bronchodilators, Solu-Medrol, placed on BiPAP, and admitted to the hospitalist service.The patient lives by himself. His son had discussed his situation earlier this year and felt that he was not able to care for himself in his own home as he had been doing. He recommended the patient go into a facility, angering the patient who basically \"cut him off\" and wanted nothing further to do with him. He developed severe cellulitis of his lower extremities " "and [facility] Neri was sent to a skilled nursing facility for rehabilitation where the above occurred.     At approximately 2 AM 10/3/18 he was noted to become unresponsive with decreased respiratory drive. Apparently he had been a DNR but this was reversed by the patient on admission. Because of this Dr. Jacobsen and respiratory therapy intubated the patient at the bedside. There were large amounts of thick purulent yellow secretions obtained at the time of intubation and he was transferred to the ICU on ventilatory support. He initially required high FiO2's of 60% but respiratory rate and ABGs rapidly improved. Over the course of the evening he continued to improve. He subsequently self extubated the evening of 10/3/18 at 11 PM. He was able to tolerate self extubation but has required high flow nasal oxygen in order to maintain his oxygen saturations. He was alert and oriented so additional discussions were held regarding CODE STATUS. He wanted to think about it yesterday and when I question him again today he stated he would like to think about it\" some more\".      He was on empiric vancomycin and Merrem and cultures from blood and sputum were negative as of 10/4/18. He however had had a staph aureus in a wound in August and I wanted to continue him on coverage for MRSA as well as gram negatives. Because of his renal insufficiency however he was switched him to linezolid on 10/4/18. Today his sputum is growing a staph aureus that was not identified and nasal swabs are growing MRSA. He remains on Merrem and linezolid.      Heart rate is usually in the 60s, but has had recurrent brief episodes of SVT up to 160  both yesterday and today. His metoprolol was held once and that led to SVT that resolved with resumption of beta blocker.. He was admitted 10/2 and transferred out of the ICU 10/7      Assessment & Plan:  1. Continue linezolid and Merrem and complete 7-10 days " total,ID to assist with abx regimen  2. Avoid sedating agents  3. Keep his potassium greater than or equal to 4.5 to avoid the development of metabolic alkalosis that will reduce respiratory, give add'l potassium today and schedule   4. IV Lasix and Diamox initially, Lasix decreased today to 40mg po daily   5. Follow-up renal function in a.m.  6. Weaned from high flow down to 3L NC, continue to taper as he tolerates  7.Palliative following, status changed to DNR/DNI   8. Recurrent episodes of SVT - pt follows with [facility], will consult, follow mag, continue BB   9. Taper steroids per pulm   10. Untreated Sleep Apnea - likely complex apnea (combination of obstructive + central) sleep apnea - AutoBiPAP at night and as needed for sleep - nasal interface    AutoBiPAP at night  Son in room - convinced patient to use machine  Son asking that maybe someone could help at night to facilitate NIPPV use (night shift nursing / RT)    Will be transitioning to oral abx in next 24 hours    DVT Prophylaxis:  Sq heparin    CODE STATUS:   Code Status and Medical Interventions:   Ordered at: 10/08/18 1413     Limited Support to NOT Include:    Intubation     Level Of Support Discussed With:    Patient     Code Status:    No CPR     Medical Interventions (Level of Support Prior to Arrest):    Limited       Disposition: I expect the patient to be discharged tbd      Electronically signed by Dhaval Carter MD, 10/12/18, 6:42 PM.

## 2018-10-12 NOTE — PLAN OF CARE
Problem: Patient Care Overview  Goal: Plan of Care Review   10/12/18 0937   Coping/Psychosocial   Plan of Care Reviewed With patient   Plan of Care Review   Progress improving

## 2018-10-12 NOTE — PROGRESS NOTES
[facility] Progress Note    Admission Date: 10/2/2018    Yassine Love  1945  [id number]    Date: 10/12/2018    Antibiotics:  Anti-Infectives     Ordered     Dose/Rate Route Frequency Start Stop    10/04/18 1629  Linezolid (ZYVOX) 600 mg 300 mL     Mary Kate Rahman MD let the order  on 10/08/18 1301.   Ordering Provider:  Mary Kate Rahman MD    600 mg  300 mL/hr over 60 Minutes Intravenous Every 12 Hours 10/04/18 1800 10/13/18 1300    10/03/18 0229  meropenem (MERREM) 1 g/100 mL 0.9% NS VTB (mbp)     Ordering Provider:  Perla Jones APRN    1 g  over 3 Hours Intravenous Every 8 Hours 10/03/18 1000 10/13/18 0959    10/03/18 0914  vancomycin 1750 mg/500 mL 0.9% NS IVPB ([facility])     Ordering Provider:  Parish Donato MD    1,750 mg  over 120 Minutes Intravenous Once 10/03/18 1000 10/03/18 1203    10/03/18 0229  meropenem (MERREM) 1 g/100 mL 0.9% NS VTB (mbp)     Ordering Provider:  Perla Jones APRN    1 g  over 30 Minutes Intravenous Once 10/03/18 0315 10/03/18 0313    10/02/18 2108  piperacillin-tazobactam (ZOSYN) 4.5 g in iso-osmotic dextrose 100 mL IVPB (premix)     Ordering Provider:  Pastora Caldwell DO    4.5 g  over 30 Minutes Intravenous Once 10/02/18 2108 10/03/18 0114           Reason for Consultation: bilateral leg cellulitis,  bronchitis      History of present illness:    Patient is a 72 y.o. male well known to Dr Belle from prior admissions. Dr Belle has primarily followed for cellulitis  He was hospitalized  to  and Dr Belle followed for cellulitis complicating bilateral leg ulcers   This admission on  10/2/18 is for  for COPD exacerbation and severe hypoxia. He iwas transferred from a  skilled nursing facility with hypoxemia and visual hallucinations.NC. EMS was called and Mr. Love was  CTA was negative for pulmonary embolism but  pulmonary edema with diffuse interstitial infiltrates and pleural effusions. BNP was 899 and procalcitonin normal on 10/2 and 10/4. He required  mechanical ventilation  Sputum culture grew MSSA  He has been treated with zyvox and merrrem  He improved after diuresis and was extubated  The leg wounds present 2 months ago have healed He has some residual erythema and states that his legs are painful at times  He is known to have severe PVD     10/10  More alert this am; intermittent cough perisists  Denies leg pain  10/11 alert, no new c/o, denies leg pain             ROS:  No f/c/s. No n/v/d. No rash. No new ADR to Abx.       PE:  Vital Signs  Temp  Min: 98.1 °F (36.7 °C)  Max: 98.2 °F (36.8 °C)  BP  Min: 107/61  Max: 121/67  Pulse  Min: 58  Max: 77  Resp  Min: 18  Max: 22  SpO2  Min: 93 %  Max: 97 %  GENERAL: alert  HEENT: Normocephalic, atraumatic.  PERRL.. No conjunctival injection. No icterus. Oropharynx not visualized.   NECK: Supple without nuchal rigidity. No mass.  LYMPH: No cervical lymphadenopathy.  HEART: heart sounds distant  LUNGS: poor inspiratory effort, no wheezes or rhonchi  ABDOMEN: Soft, nontender, nondistended. Positive bowel sounds. No rebound or guarding. NO mass or HSM.  EXT:  1+  Edema bilateral legs with faded erythema, mild tenderness to palpation. No cords.  : Genitalia generally unremarkable.  Without Moore catheter.  MSK: FROM without joint effusions noted arms/legs.    SKIN: Warm and dry without cutaneous eruptions on Inspection/palpation.    NEURO: Oriented to PPT. No focal deficits on motor/sensory exam at arms/legs.  PSYCHIATRIC: Normal insight and judgement. Cooperative with PE     Laboratory Data      Results from last 7 days  Lab Units 10/10/18  0507 10/09/18  0751 10/06/18  0535   WBC 10*3/mm3 6.63 6.27 5.26   HEMOGLOBIN g/dL 9.8* 10.0* 10.2*   HEMATOCRIT % 32.4* 33.6* 35.3*   PLATELETS 10*3/mm3 351 302 287       Results from last 7 days  Lab Units 10/10/18  0507   SODIUM mmol/L 137   POTASSIUM mmol/L 4.6   CHLORIDE mmol/L 101   CO2 mmol/L 33.0*   BUN mg/dL 36*   CREATININE mg/dL 1.15   GLUCOSE mg/dL 91   CALCIUM mg/dL  8.2*                   Estimated Creatinine Clearance: 59.2 mL/min (by C-G formula based on SCr of 1.15 mg/dL).      Microbiology:  mssa from sputum;  mrsa from nares swab    Radiology:  Imaging Results (last 72 hours)     Procedure Component Value Units Date/Time    XR Chest 1 View [[id number]] Collected:  10/06/18 1115     Updated:  10/06/18 1355    Narrative:       EXAMINATION: XR CHEST 1 VW - 10/06/2018     INDICATION: J96.21-Acute and chronic respiratory failure with hypoxia;  J96.22-Acute and chronic respiratory failure with hypercapnia;  Z74.09-Other reduced mobility.     TECHNIQUE:  Single view frontal chest.     COMPARISONS: 10/5/2018.     FINDINGS:  Stable support apparatus. Small bilateral pleural effusions  and adjacent atelectasis are unchanged. No pneumothorax. Calcification  aortic knob. Cardiomediastinal silhouette is unchanged.        Impression:       Stable exam.     DICTATED:   10/06/2018  EDITED/ls :   10/06/2018      This report was finalized on 10/6/2018 1:53 PM by Eliezer Hale.             I personally reviewed the radiographic studies     IMPRESSION:    -- Probable MSSA bronchitis  Vs pneumonia     -- Acute on chronic mixed respiratory failure  S/p intubation, now extubated     -- Pulmonary edema- improved     -- Chronic lymphedema and venous stasis dermatitis     -- Bilateral leg cellulitis essentially resolved     -- Severe COPD with ongoing tobacco abuse, poor pulmonary toilet     -- RAFAEL     -- Decubitus ulcer of buttock stage 2     --MRSA colonization       RECOMMENDATIONS:      -- continue current regimen of merrem and zyvox; stop merrem 10/15     -- change to oral zyvox- anticipate rx to 10/19    -- improve pulmonary toilet if he will cooperate     -- probiotic         Mary Kate Rahman MD  10/12/2018

## 2018-10-12 NOTE — PROGRESS NOTES
Continued Stay Note  [facility]     Patient Name: Yassine Love  MRN: [id number]  Today's Date: 10/12/2018    Admit Date: 10/2/2018          Discharge Plan     Row Name 10/12/18 1302       Plan    Plan [facility] pending bed offer    Patient/Family in Agreement with Plan yes    Plan Comments Spoke to Kristina at [facility] and she has reviewed referral with the director at [facility]. She will not know until Monday if pt will have a bed. Pt states he wants a private room at [facility] but there is a waiting list for private rooms at this time.  Spoke to pt and his son Chris via phone. Pt is agreeable to whatever choices his son makes.  If [facility] is not able to accept pt, Chris only wants referrals to [facility] in [place]. Encouraged pt's son to begin paperwork with [facility] and be placed on their waiting list, because it is an extensive waiting list and can take months to be approved. Called and efaxed a referral to [facility] at [facility]. They do not have any beds at this time. CM will f/u with [facility] on Monday regarding bed availability.    Row Name 10/12/18 1101       Plan    Plan [facility] penidng bed offer    Patient/Family in Agreement with Plan yes    Plan Comments Spoke to Kristina at [facility] and they had concerns regarding how long pt would be on PO Zyvox. They do not have any beds at this time. Paged Dr. Rahman to verify stop date of Zyvox.     Final Discharge Disposition Code 03 - skilled nursing facility (SNF)              Discharge Codes    No documentation.       Expected Discharge Date and Time     Expected Discharge Date Expected Discharge Time    Oct 12, 2018             Yumiko Hull

## 2018-10-12 NOTE — DISCHARGE PLACEMENT REQUEST
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 if Necessary Per Protocol    Narrative · Left ventricular systolic function is normal. Estimated EF = 60%.  · Trace-to-mild mitral valve regurgitation is present.          I have reviewed the medications.      aspirin 81 mg Oral Daily   atorvastatin 40 mg Oral Nightly   castor oil-balsam peru  Topical Q12H   clopidogrel 75 mg Oral Daily   famotidine 20 mg Oral BID   furosemide 40 mg Oral Daily   gabapentin 200 mg Oral Q8H   guaiFENesin 600 mg Oral Q12H   heparin (porcine) 5,000 Units Subcutaneous Q8H   insulin lispro 0-7 Units Subcutaneous 4x Daily With Meals & Nightly   ipratropium-albuterol 3 mL Nebulization Q8H - RT   lactobacillus acidophilus 1 capsule Oral Daily   Linezolid 600 mg Intravenous Q12H   meropenem 1 g Intravenous Q8H   metoprolol tartrate 25 mg Oral Q8H   mirtazapine 15 mg Oral Nightly   Morphine 15 mg Oral Q12H   nicotine 1 patch Transdermal Q24H   pharmacy consult - MTM  Does not apply Daily   polyethylene glycol 17 g Oral Daily   potassium chloride 30 mEq Oral BID With Meals   [START ON 10/12/2018] predniSONE 10 mg Oral Daily With Breakfast   Followed by      [START ON 10/15/2018] predniSONE 5 mg Oral Daily With Breakfast   sennosides-docusate sodium 2 tablet Oral BID    sodium chloride 10 mL Intravenous Q12H   sodium chloride 3 mL Intravenous Q12H   cyanocobalamin 1,000 mcg Oral Daily         Assessment/Plan   Assessment / Plan     Active Hospital Problems    Diagnosis   • **Acute on chronic mixed hypercarbic/hypoxemic respiratory failure requiring intubation and ventilatory support   • Sleep apnea   • Acute kidney injury     Cr 1.35 increased from 9/8/18 1.15      • H/O bacteremia due to Staphylococcus   • Opioid use   • Pneumonia due to infectious organism   • Brief runs of SVT (supraventricular tachycardia)    • Decubitus ulcer of buttock, stage 2   • CAD status post GIGI ×2 to LAD and RCA 3/12/18     S/P LHC per Dr. Harvey on 3/12/18 with PCI revealing severe 2-vessel disease.    GIGI x 2 to LAD and RCA     • Essential hypertension   • End stage COPD in an active smoker on home O2 with chronic hypercarbia     Managed by [facility]      • H/O cellulitis of both lower extremities     Previously managed by Infectious Disease      • PVD (peripheral vascular disease)           Brief Hospital Course to date:  Yassine Love is a 72 y.o. male active smoker with a history of COPD (on home oxygen) admitted 10/2/18 for exacerbation associated with severe hypoxemia. He was in the skilled nursing facility for rehabilitation but 10/1/18 developed some visual hallucinations. Oxygen saturations were subsequently noted to drop into the 60s on 4 L on 10/2/18 and he was brought to [facility] ER. Because of an elevated d-dimer a CT angiogram was obtained which was negative for PE. Both CT scan and chest x-ray however revealed it appeared to be pulmonary edema with diffuse interstitial infiltrates and pleural effusions. In addition BNP was 899. He was given his usual dose of home Lasix, placed on empiric antimicrobial coverage with Zosyn, given bronchodilators, Solu-Medrol, placed on BiPAP, and admitted to the hospitalist service.The patient lives by himself. His son had discussed his situation  "earlier this year and felt that he was not able to care for himself in his own home as he had been doing. He recommended the patient go into a facility, angering the patient who basically \"cut him off\" and wanted nothing further to do with him. He developed severe cellulitis of his lower extremities and was hospitalized 8/20/18 through 9/8/18 at [facility]. Afterwords was sent to a skilled nursing facility for rehabilitation where the above occurred.     At approximately 2 AM 10/3/18 he was noted to become unresponsive with decreased respiratory drive. Apparently he had been a DNR but this was reversed by the patient on admission. Because of this Dr. Jacobsen and respiratory therapy intubated the patient at the bedside. There were large amounts of thick purulent yellow secretions obtained at the time of intubation and he was transferred to the ICU on ventilatory support. He initially required high FiO2's of 60% but respiratory rate and ABGs rapidly improved. Over the course of the evening he continued to improve. He subsequently self extubated the evening of 10/3/18 at 11 PM. He was able to tolerate self extubation but has required high flow nasal oxygen in order to maintain his oxygen saturations. He was alert and oriented so additional discussions were held regarding CODE STATUS. He wanted to think about it yesterday and when I question him again today he stated he would like to think about it\" some more\".      He was on empiric vancomycin and Merrem and cultures from blood and sputum were negative as of 10/4/18. He however had had a staph aureus in a wound in August and I wanted to continue him on coverage for MRSA as well as gram negatives. Because of his renal insufficiency however he was switched him to linezolid on 10/4/18. Today his sputum is growing a staph aureus that was not identified and nasal swabs are growing MRSA. He remains on Merrem and linezolid.      Heart rate is usually in the 60s, but has had " recurrent brief episodes of SVT up to 160  both yesterday and today. His metoprolol was held once and that led to SVT that resolved with resumption of beta blocker.. He was admitted 10/2 and transferred out of the ICU 10/7      Assessment & Plan:  1. Continue linezolid and Merrem and complete 7-10 days total,ID to assist with abx regimen  2. Avoid sedating agents  3. Keep his potassium greater than or equal to 4.5 to avoid the development of metabolic alkalosis that will reduce respiratory, give add'l potassium today and schedule   4. IV Lasix and Diamox initially, Lasix decreased today to 40mg po daily   5. Follow-up renal function in a.m.  6. Weaned from high flow down to 3L NC, continue to taper as he tolerates  7.Palliative following, status changed to DNR/DNI   8. Recurrent episodes of SVT - pt follows with [facility], will consult, follow mag, continue BB   9. Taper steroids per pulm   10. Untreated Sleep Apnea - likely complex apnea (combination of obstructive + central) sleep apnea - AutoBiPAP at night and as needed for sleep - nasal interface    AutoBiPAP at night  Son in room - convinced patient to use machine  Son asking that maybe someone could help at night to facilitate NIPPV use (night shift nursing / RT)    DVT Prophylaxis:  Sq heparin    CODE STATUS:   Code Status and Medical Interventions:   Ordered at: 10/08/18 1413     Limited Support to NOT Include:    Intubation     Level Of Support Discussed With:    Patient     Code Status:    No CPR     Medical Interventions (Level of Support Prior to Arrest):    Limited       Disposition: I expect the patient to be discharged tbd      Electronically signed by Dhaval Carter MD, 10/11/18, 2:15 PM.        Electronically signed by Dhaval Carter MD at 10/11/2018  2:17 PM          Consult Notes (most recent note)      Carlos Harvey MD at 10/9/2018 10:34 AM      Consult Orders:    1. Inpatient Cardiology Consult [[id number]] ordered by Danii Tabor DO at  10/09/18 [address] [place] [address] Cardiology at [facility]   Consultation Note    Yassine Love  1945    There is no work phone number on file.      10/09/18    DATE OF ADMISSION: 10/2/2018  [facility] 6A    Provider, No Known  [facility] [id number]    Chief Complaint/Reason for Consultation: SVT    Subjective       Patient Active Problem List   Diagnosis   • PVD (peripheral vascular disease)    • H/O cellulitis of both lower extremities   • End stage COPD in an active smoker on home O2 with chronic hypercarbia   • Essential hypertension   • Non-sustained ventricular tachycardia (CMS/HCC)   • CAD status post GIGI ×2 to LAD and RCA 3/12/18   • Debility   • Decubitus ulcer of buttock, stage 2   • Chronic pain   • Chronic venous stasis dermatitis of both lower extremities   • Acute on chronic mixed hypercarbic/hypoxemic respiratory failure requiring intubation and ventilatory support   • History of tobacco use   • Acute kidney injury   • Brief runs of SVT (supraventricular tachycardia)    • H/O bacteremia due to Staphylococcus   • Opioid use   • Overweight (BMI 25.0-29.9)   • Pneumonia due to infectious organism       History of Present Illness:   Patient is a 72 year old  male with the above noted past medical history who cardiology is asked to see today in consultation for further evaluation of SVT.  The patient was admitted to [facility] on 10/2/18 for COPD exacerbation and hypoxemia requiring intubated and ICU stay.  He self extubated on 10/3 and has been transferred back to the regular nursing floor.  Palliative care has been consulted as the patient does not wish to be re-intubated.  His code status has been changed to DNR/DNI and palliative continues to follow for symptom management.      We did see the patient during his hospitalization in March for episodes of NSVT versus SVT with aberrancy.  He had a positive stress test at that time so subsequently  underwent left heart catheterization and is s/p GIGI to his LAD and RCA.  He was discharged home with an order for a Holter monitor to assess for any recurrent arrhythmias but this does not seem to have been worn.  During this hospitalization, he has had more episodes of a wide complex tachycardia, initially at 1912 yesterday evening.  He had more episodes this morning at 0902, 0913, and 0919.  He states he felt his heart racing but was otherwise asymptomatic.  He denied any c/o CP, worsening SOB, dizziness, lightheadedness, or near syncope.  Episodes are abrupt in onset and termination.  He currently is on metoprolol 25 mg bid.     Allergies   Allergen Reactions   • Ciprofloxacin Hcl Anaphylaxis   • Ceftin [Cefuroxime Axetil] Angioedema     Tolerated Zosyn in past       Prior to Admission Medications     Prescriptions Last Dose Informant Patient Reported? Taking?    acetaminophen (TYLENOL) 325 MG tablet   Yes Yes    Take 325 mg by mouth Every 6 (Six) Hours As Needed for Mild Pain  or Fever.    albuterol (PROVENTIL) (2.5 MG/3ML) 0.083% nebulizer solution   No Yes    Take 2.5 mg by nebulization Every 6 (Six) Hours As Needed for Shortness of Air.    aspirin 81 MG EC tablet   No Yes    Take 1 tablet by mouth Daily.    atorvastatin (LIPITOR) 40 MG tablet   Yes Yes    Take 40 mg by mouth Every Night.    budesonide-formoterol (SYMBICORT) 160-4.5 MCG/ACT inhaler   Yes Yes    Inhale 2 puffs 2 (Two) Times a Day.    calcium carbonate (TUMS) 500 MG chewable tablet   Yes Yes    Chew 2 tablets 2 (Two) Times a Day As Needed for Heartburn.    clopidogrel (PLAVIX) 75 MG tablet   No Yes    Take 1 tablet by mouth Daily.    cyanocobalamin (VITAMIN B-12) 1000 MCG tablet   No Yes    Take 1 tablet by mouth Daily.    fluticasone (FLONASE) 50 MCG/ACT nasal spray   No Yes    2 sprays by Each Nare route Daily.    furosemide (LASIX) 40 MG tablet   Yes Yes    Take 40 mg by mouth 2 (Two) Times a Day.    gabapentin (NEURONTIN) 400 MG capsule   Self Yes Yes    Take 400 mg by mouth 3 (Three) Times a Day.    HYDROcodone-acetaminophen (NORCO)  MG per tablet   No Yes    Take 1 tablet by mouth Every 4 (Four) Hours As Needed for Moderate Pain .    metoprolol tartrate (LOPRESSOR) 25 MG tablet   Yes Yes    Take 25 mg by mouth 2 (Two) Times a Day.    mirtazapine (REMERON SOL-TAB) 15 MG disintegrating tablet   No Yes    Take 1 tablet by mouth Every Night.    Morphine (MS CONTIN) 30 MG 12 hr tablet   No Yes    Take 1 tablet by mouth Every 12 (Twelve) Hours.    nicotine (NICODERM CQ) 21 MG/24HR patch   No Yes    Place 1 patch on the skin as directed by provider Daily.    pantoprazole (PROTONIX) 40 MG EC tablet   No Yes    Take 1 tablet by mouth Daily.    sennosides-docusate sodium (SENOKOT-S) 8.6-50 MG tablet   No Yes    Take 2 tablets by mouth Every Night.    tiotropium (SPIRIVA) 18 MCG per inhalation capsule   Yes Yes    Place 1 capsule into inhaler and inhale Daily.            Current Facility-Administered Medications:   •  acetaminophen (TYLENOL) tablet 325 mg, 325 mg, Oral, Q6H PRN, Javi, Pastora G, DO, 325 mg at 10/08/18 1034  •  ALPRAZolam (XANAX) tablet 0.5 mg, 0.5 mg, Oral, Nightly PRN, Joon Hernandez, APRN, 0.5 mg at 10/07/18 1947  •  aspirin chewable tablet 81 mg, 81 mg, Oral, Daily, Perla Jones, APRN, 81 mg at 10/09/18 0848  •  atorvastatin (LIPITOR) tablet 40 mg, 40 mg, Oral, Nightly, Javi, Pastora G, DO, 40 mg at 10/08/18 2018  •  bisacodyl (DULCOLAX) suppository 10 mg, 10 mg, Rectal, Daily PRN, Maira Herrera MD  •  calcium carbonate (TUMS) chewable tablet 500 mg (200 mg elemental), 2 tablet, Oral, BID PRN, Javi, Pastora G, DO  •  castor oil-balsam peru (VENELEX) ointment, , Topical, Q12H, Atkins, Danii L, DO  •  clopidogrel (PLAVIX) tablet 75 mg, 75 mg, Oral, Daily, Pastora Caldwell, DO, 75 mg at 10/09/18 0848  •  dextrose (D50W) 25 g/ 50mL Intravenous Solution 25 g, 25 g, Intravenous, Q15 Min PRN, Perla Jones W, APRN  •  dextrose  (GLUTOSE) oral gel 15 g, 15 g, Oral, Q15 Min PRN, Perla Jones, APRN  •  famotidine (PEPCID) tablet 20 mg, 20 mg, Oral, BID, Parish Donato MD, 20 mg at 10/09/18 0849  •  furosemide (LASIX) tablet 40 mg, 40 mg, Oral, Daily, Case, Pinky V., DO, 40 mg at 10/09/18 0848  •  gabapentin (NEURONTIN) capsule 200 mg, 200 mg, Oral, Q8H, Javi, Pastora G, DO, 200 mg at 10/09/18 0848  •  glucagon (human recombinant) (GLUCAGEN DIAGNOSTIC) injection 1 mg, 1 mg, Subcutaneous, PRN, Perla Jones, APRN  •  guaiFENesin (MUCINEX) 12 hr tablet 600 mg, 600 mg, Oral, Q12H, Javi, Pastora G, DO, 600 mg at 10/09/18 0849  •  heparin (porcine) 5000 UNIT/ML injection 5,000 Units, 5,000 Units, Subcutaneous, Q8H, Javi, Pastora G, DO, 5,000 Units at 10/09/18 0603  •  HYDROcodone-acetaminophen (NORCO)  MG per tablet 1 tablet, 1 tablet, Oral, Q4H PRN, Maira Herrera MD, 1 tablet at 10/08/18 2302  •  insulin lispro (humaLOG) injection 0-7 Units, 0-7 Units, Subcutaneous, 4x Daily With Meals & Nightly, Parish Donato MD, 2 Units at 10/08/18 2023  •  ipratropium-albuterol (DUO-NEB) nebulizer solution 3 mL, 3 mL, Nebulization, Q4H PRN, Sierra Caldwellsie G, DO  •  ipratropium-albuterol (DUO-NEB) nebulizer solution 3 mL, 3 mL, Nebulization, Q4H - RT, Parish Donato MD, 3 mL at 10/09/18 0802  •  lactobacillus acidophilus (RISAQUAD) capsule 1 capsule, 1 capsule, Oral, Daily, Mary Kate Rahman MD, 1 capsule at 10/09/18 0848  •  lactulose (CHRONULAC) 10 GM/15ML solution 20 g, 20 g, Oral, BID PRN, Maira Herrera MD  •  Linezolid (ZYVOX) 600 mg 300 mL, 600 mg, Intravenous, Q12H, Mary Kate Rahman MD, Last Rate: 300 mL/hr at 10/09/18 0559, 600 mg at 10/09/18 0559  •  Magnesium Sulfate 2 gram Bolus, followed by 8 gram infusion (total Mg dose 10 grams)- Mg less than or equal to 1mg/dL, 2 g, Intravenous, PRN **OR** Magnesium Sulfate 2 gram / 50mL Infusion (GIVE X 3 BAGS TO EQUAL 6GM TOTAL DOSE) - Mg 1.1 - 1.5 mg/dl, 2 g, Intravenous, PRN  **OR** Magnesium Sulfate 4 gram infusion- Mg 1.6-1.9 mg/dL, 4 g, Intravenous, PRN, Danii Tabor L, DO  •  meropenem (MERREM) 1 g/100 mL 0.9% NS VTB (mbp), 1 g, Intravenous, Q8H, Perla Jones, APRN, 1 g at 10/09/18 0212  •  metoprolol tartrate (LOPRESSOR) tablet 25 mg, 25 mg, Oral, Q12H, JaviPastora rodriguez G, DO, 25 mg at 10/09/18 0849  •  mirtazapine (REMERON SOL-TAB) disintegrating tablet 15 mg, 15 mg, Oral, Nightly, JaviSierra rodriguezsie G, DO, 15 mg at 10/08/18 2019  •  Morphine (MS CONTIN) 12 hr tablet 15 mg, 15 mg, Oral, Q12H, Maira Herrera MD, 15 mg at 10/09/18 0849  •  nicotine (NICODERM CQ) 21 MG/24HR patch 1 patch, 1 patch, Transdermal, Q24H, Joon Hernandez, APRN, 1 patch at 10/09/18 0850  •  Pharmacy Consult - MT, , Does not apply, Daily, Mansoor Mckee, [facility]  •  polyethylene glycol 3350 powder (packet), 17 g, Oral, BID, Maira Herrera MD  •  [START ON 10/11/2018] polyethylene glycol 3350 powder (packet), 17 g, Oral, Daily, Maira Herrera MD  •  potassium chloride (KLOR-CON) packet 40 mEq, 40 mEq, Oral, PRN, Danii Tabor L, DO  •  potassium chloride (MICRO-K) CR capsule 40 mEq, 40 mEq, Oral, PRN, Atkins, Danii L, DO  •  [START ON 10/12/2018] predniSONE (DELTASONE) tablet 10 mg, 10 mg, Oral, Daily With Breakfast **FOLLOWED BY** [START ON 10/15/2018] predniSONE (DELTASONE) tablet 5 mg, 5 mg, Oral, Daily With Breakfast, Case, Ipnky V., DO  •  predniSONE (DELTASONE) tablet 20 mg, 20 mg, Oral, Daily With Breakfast, Case, Pinky V., DO, 20 mg at 10/09/18 0848  •  sennosides-docusate sodium (SENOKOT-S) 8.6-50 MG tablet 2 tablet, 2 tablet, Oral, BID, Maira Herrera MD, 2 tablet at 10/08/18 2018  •  sodium chloride 0.9 % flush 10 mL, 10 mL, Intravenous, PRN, Ruben Herrera MD  •  sodium chloride 0.9 % flush 10 mL, 10 mL, Intravenous, Q12H, Parish Donato MD, 10 mL at 10/09/18 0856  •  sodium chloride 0.9 % flush 3 mL, 3 mL, Intravenous, Q12H, Pastora Caldwell DO, 10 mL at 10/09/18  0856  •  vitamin B-12 (CYANOCOBALAMIN) tablet 1,000 mcg, 1,000 mcg, Oral, Daily, Pastora Caldwell DO, 1,000 mcg at 10/09/18 0848    Social History     Social History   • Marital status:    • Number of children: 1     Occupational History   • retired       Social History Main Topics   • Smoking status: Current Every Day Smoker     Packs/day: 1.50     Years: 56.00     Types: Cigarettes   • Smokeless tobacco: Never Used   • Alcohol use No   • Drug use: No   • Sexual activity: Defer     Other Topics Concern   • Not on file     Social History Narrative    Moved to [place] 17 yr ago to be near son.  Lives alone. Minimally ambulatory with walker       Family History   Problem Relation Age of Onset   • Stroke Father    • Heart attack Brother    • COPD Brother        REVIEW OF SYSTEMS:   CONST:  No weight loss, fever, chills, + weakness, + fatigue.   HEENT:  No visual loss, blurred vision, double vision, yellow sclerae.                   No hearing loss, congestion, sore throat.   SKIN:      No rashes, urticaria, ulcers, sores.     RESP:     + shortness of breath, hemoptysis, + cough, sputum.   GI:           No anorexia, nausea, vomiting, diarrhea. No abdominal pain, melena.   :         No burning on urination, hematuria or increased frequency.  ENDO:    No diaphoresis, cold or heat intolerance. No polyuria or polydipsia.   NEURO:  No headache, dizziness, syncope, paralysis, ataxia, or parasthesias.                  No change in bowel or bladder control. No history of CVA/TIA  MUSC:    No muscle, back pain, joint pain or stiffness.   HEME:    No anemia, bleeding, bruising. No history of DVT/PE.  PSYCH:  No history of depression, anxiety    Objective     OBJECTIVE:    Vitals:    10/09/18 0533 10/09/18 0755 10/09/18 0802 10/09/18 0848   BP:  130/84  119/68   BP Location:       Patient Position:       Pulse:  63 58 69   Resp:  18 20    Temp:  98 °F (36.7 °C)     TempSrc:  Oral     SpO2:  92% 94%    Weight: 83.9  kg (184 lb 14.4 oz)      Height:             Vital Sign Min/Max for last 24 hours  Temp  Min: 97.9 °F (36.6 °C)  Max: 98.1 °F (36.7 °C)   BP  Min: 98/56  Max: 142/72   Pulse  Min: 58  Max: 77   Resp  Min: 16  Max: 20   SpO2  Min: 91 %  Max: 94 %   Flow (L/min)  Min: 2  Max: 2      Intake/Output Summary (Last 24 hours) at 10/09/18 1034  Last data filed at 10/09/18 1001   Gross per 24 hour   Intake              480 ml   Output              700 ml   Net             -220 ml             Physical Exam:  GEN: Ill-appearing male, well nourished, well-developed, no acute distress  HEENT: Normocephalic, atraumatic, PERRLA, moist mucous membranes  NECK: Supple, No JVD, no thyromegaly, no lymphadenopathy  CARD: S1S2, RRR, no murmur, gallop, or rub  LUNGS: Diminished breath sounds bilaterally, normal respiratory effort  ABDOMEN: Soft, nontender, normal bowel sounds  EXTREMITIES: No gross deformities, no clubbing, cyanosis, 1+ LE edema  SKIN: Warm, dry  NEURO: No focal deficits, alert and oriented x 3  PSYCHIATRIC: Normal affect and mood      Data:     Results from last 7 days  Lab Units 10/09/18  0751 10/06/18  0535 10/05/18  0353   WBC 10*3/mm3 6.27 5.26 7.38   HEMOGLOBIN g/dL 10.0* 10.2* 9.1*   HEMATOCRIT % 33.6* 35.3* 30.7*   PLATELETS 10*3/mm3 302 287 297       Results from last 7 days  Lab Units 10/09/18  0751 10/08/18  0534 10/07/18  0516   SODIUM mmol/L 138 140 138   POTASSIUM mmol/L 3.7 4.6 4.3   CHLORIDE mmol/L 101 106 104   CO2 mmol/L 33.0* 31.0 29.0   BUN mg/dL 36* 36* 33*   CREATININE mg/dL 1.24 1.26 1.33*   GLUCOSE mg/dL 86 106* 128*                                    Intake/Output Summary (Last 24 hours) at 10/09/18 1034  Last data filed at 10/09/18 1001   Gross per 24 hour   Intake              480 ml   Output              700 ml   Net             -220 ml       Chest X-Ray:  Imaging Results (last 24 hours)     Procedure Component Value Units Date/Time    XR Chest 1 View [[id number]] Updated:  10/09/18 1026           Telemetry: NSR, HR 58-77 bpm  EKG on 10/2/18: NSR, incomplete RBBB, T wave inversion V1-V4, HR 77 bpm     Assessment     Assessment:   1. SVT:  - Rhythm strips consistent with SVT, both narrow complex and with aberrancy (wide complex) at times, all self converting  - Patient mildly symptomatic during episodes    2. CAD:  - S/p GIGI to LAD/RCA 2018  - On DAPT/statin    3. COPD, end stage/acute hypercarbic/hypoxemic respiratory failure:  - Has been made DNR/DNI as patient does not wish to be re-intubated  - Pulm and palliative care following, on prednisone, antibiotics      Plan:  - Will plan to treat medically at this time as the patient is aiming toward less aggressive therapies (now working with palliative care for COPD and also is now DNR/DNI)  - Increase metoprolol to 50 mg bid  - Continue other cardiac meds for CAD      Scribed for Carlos Harvey MD by Zohra Griggs, APRN. 10/9/2018  10:34 AM    I, Carlos Harvey MD, personally performed the services as scribed by the above named individual. I have made any necessary edits and it is both accurate and complete.     Carlos Harvey MD, MSc, [facility]  Interventional Cardiology  [facility]              Electronically signed by Carlos Harvey MD at 10/9/2018  7:28 PM          Physical Therapy Notes (most recent note)      Carmela Washington, PT at 10/10/2018 10:56 AM  Version 1 of 1         Acute Care - Physical Therapy Treatment Note  [facility]     Patient Name: Yassine Love  : 1945  MRN: [id number]  Today's Date: 10/10/2018  Onset of Illness/Injury or Date of Surgery: 10/02/18  Date of Referral to PT: 10/02/18  Referring Physician: DO Javi    Admit Date: 10/2/2018    Visit Dx:    ICD-10-CM ICD-9-CM   1. Acute on chronic respiratory failure with hypoxia and hypercapnia (CMS/HCC) J96.21 518.84    J96.22 786.09     799.02   2. Impaired functional mobility, balance, gait, and endurance Z74.09 V49.89   3.  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 O2 with chronic hypercarbia   • Essential hypertension   • Non-sustained ventricular tachycardia (CMS/HCC)   • CAD status post GIGI ×2 to LAD and RCA 3/12/18   • Debility   • Decubitus ulcer of buttock, stage 2   • Chronic pain   • Chronic venous stasis dermatitis of both lower extremities   • Acute on chronic mixed hypercarbic/hypoxemic respiratory failure requiring intubation and ventilatory support   • History of tobacco use   • Acute kidney injury   • Brief runs of SVT (supraventricular tachycardia)    • H/O bacteremia due to Staphylococcus   • Opioid use   • Overweight (BMI 25.0-29.9)   • Pneumonia due to infectious organism       Therapy Treatment          Rehabilitation Treatment Summary     Row Name 10/10/18 0930             Treatment Time/Intention    Discipline physical therapist  -KM      Document Type therapy note (daily note)  -KM      Subjective Information complains of   needing to use urinal  -KM      Mode of Treatment physical therapy  -KM      Care Plan Review care plan/treatment goals reviewed;risks/benefits reviewed;patient/other agree to care plan  -KM      Therapy Frequency (PT Clinical Impression) daily  -KM      Patient Effort good  -KM      Existing Precautions/Restrictions fall;oxygen therapy device and L/min  -KM      Patient Response to Treatment --   O2 sats drop  -KM      Recorded by [KM] Carmela Washington, PT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 in proper use of assistive devices.   Learning Progress Summary     Learner Status Readiness Method Response Comment Documented by    Patient Done Acceptance E VU,NR Adaptive breathing AC 10/10/18 1147     Active Acceptance E NR Pt educated on appropriate safety precautions, positioning, role of OT, and benefits of therapy. CL 10/04/18 1559     Done Acceptance E,TB VU  MT 10/03/18 1851    Family Done Acceptance E,TB VU  MT 10/03/18 1851          Point: Home exercise program (Done)     Description: Instruct learner(s) on appropriate technique for monitoring, assisting and/or progressing therapeutic exercises/activities.   Learning Progress Summary     Learner Status Readiness Method Response Comment Documented by    Patient Done Acceptance E,TB VU  MT 10/03/18 1851    Family Done Acceptance E,TB VU  MT 10/03/18 1851          Point: Precautions (Active)     Description: Instruct learner(s) on prescribed precautions during self-care and functional transfers.   Learning Progress Summary     Learner Status Readiness Method Response Comment Documented by    Patient Active Acceptance E NR Pt educated on appropriate safety precautions, positioning, role of OT, and benefits of therapy. CL 10/04/18 1559     Done Acceptance E,TB VU  MT 10/03/18 1851    Family Done Acceptance E,TB VU  MT 10/03/18 1851          Point: Body mechanics (Done)     Description: Instruct  learner(s) on proper positioning and spine alignment during self-care, functional mobility activities and/or exercises.   Learning Progress Summary     Learner Status Readiness Method Response Comment Documented by    Patient Done Acceptance E,LINNEA ISAAC  MT 10/03/18 1851    Family Done Acceptance E,LINNEA [facility] 10/03/18 1851                      User Key     Initials Effective Dates Name Provider Type Discipline    AC 06/23/15 -  Jodi Zelaya, OT Occupational Therapist OT    MT 06/16/16 -  Noemi Fish RN Registered Nurse Nurse     04/03/18 -  Caitlin Robles, RAOUL Occupational Therapist OT                OT Recommendation and Plan  Therapy Frequency (OT Eval): daily  Plan of Care Review  Plan of Care Reviewed With: patient  Plan of Care Reviewed With: patient  Outcome Summary: Pt setup with grooming.  Completed 2 sets x 10 BUE AROM with 4 restbreaks.  Pt mod A STS.  Pt's O2 sats 84-88% throughout tx and RN raised O2 to 4L and O2 sat went up to 94%.  Pt limited by weakness and decreased activity tolerance. Pt is very motivated;  frequency changed from 3x/wk to daily.   Recommend SNF for rehab upon d/c.         Outcome Measures     Row Name 10/10/18 1015 10/10/18 0930 10/08/18 1114       How much help from another person do you currently need...    Turning from your back to your side while in flat bed without using bedrails?  -- 2  -KM  --    Moving from lying on back to sitting on the side of a flat bed without bedrails?  -- 2  -KM  --    Moving to and from a bed to a chair (including a wheelchair)?  -- 2  -KM  --    Standing up from a chair using your arms (e.g., wheelchair, bedside chair)?  -- 2  -KM  --    Climbing 3-5 steps with a railing?  -- 1  -KM  --    To walk in hospital room?  -- 2  -KM  --    AM-PAC 6 Clicks Score  -- 11  -KM  --       How much help from another is currently needed...    Putting on and taking off regular lower body clothing? 2  -AC  -- 2  -SD    Bathing (including washing, rinsing, and  drying) 2  -AC  -- 2  -SD    Toileting (which includes using toilet bed pan or urinal) 2  -AC  -- 2  -SD    Putting on and taking off regular upper body clothing 2  -AC  -- 2  -SD    Taking care of personal grooming (such as brushing teeth) 3  -AC  -- 3  -SD    Eating meals 3  -AC  -- 3  -SD    Score 14  -AC  -- 14  -SD       Functional Assessment    Outcome Measure Options AM-[facility] 6 Clicks Daily Activity (OT)  - AM-[facility] 6 Clicks Basic Mobility (PT)  -KM AM-[facility] 6 Clicks Daily Activity (OT)  -SD    Row Name 10/08/18 0830             How much help from another person do you currently need...    Turning from your back to your side while in flat bed without using bedrails? 2  -MB      Moving from lying on back to sitting on the side of a flat bed without bedrails? 2  -MB      Moving to and from a bed to a chair (including a wheelchair)? 1  -MB      Standing up from a chair using your arms (e.g., wheelchair, bedside chair)? 2  -MB      Climbing 3-5 steps with a railing? 1  -MB      To walk in hospital room? 2  -MB      AM-PAC 6 Clicks Score 10  -MB         Functional Assessment    Outcome Measure Options AM-[facility] 6 Clicks Basic Mobility (PT)  -MB        User Key  (r) = Recorded By, (t) = Taken By, (c) = Cosigned By    Initials Name Provider Type    AC Jodi Zelaya, OT Occupational Therapist    Michelle Levine, OT Occupational Therapist    Carmela Hernandez, PT Physical Therapist    MB Linda Marcelino, PT Physical Therapist           Time Calculation:         Time Calculation- OT     Row Name 10/10/18 1015 10/10/18 0930          Time Calculation- OT    OT Start Time 1015  -  --     Total Timed Code Minutes- OT 25 minute(s)  -  --     OT Received On 10/10/18  -  --     OT Goal Re-Cert Due Date 10/14/18  -  --        Timed Charges    57883 - OT Therapeutic Exercise Minutes 10  -  --     28755 - Gait Training Minutes   -- 15  -KM     73857 - OT Therapeutic Activity Minutes 10  -  --      55312 - OT Self Care/Mgmt Minutes 5   declined bathing  -AC  --       User Key  (r) = Recorded By, (t) = Taken By, (c) = Cosigned By    Initials Name Provider Type    AC Jodi Zelaya, OT Occupational Therapist    KM Carmela Washington, PT Physical Therapist           Therapy Suggested Charges     Code   Minutes Charges    95690 (CPT®) Hc Ot Neuromusc Re Education Ea 15 Min      44746 (CPT®) Hc Ot Ther Proc Ea 15 Min 10 1    60856 (CPT®) Hc Ot Therapeutic Act Ea 15 Min 10 1    75400 (CPT®) Hc Ot Manual Therapy Ea 15 Min      33459 (CPT®) Hc Ot Iontophoresis Ea 15 Min      30032 (CPT®) Hc Ot Elec Stim Ea-Per 15 Min      18837 (CPT®) Hc Ot Ultrasound Ea 15 Min      52257 (CPT®) Hc Ot Self Care/Mgmt/Train Ea 15 Min 5     Total  25 2        Therapy Charges for Today     Code Description Service Date Service Provider Modifiers Qty    85977269214 HC OT THER PROC EA 15 MIN 10/10/2018 Jodi Zelaya OT GO 1    20166783436 HC OT THERAPEUTIC ACT EA 15 MIN 10/10/2018 Jodi Zelaya OT GO 1               Jodi Zelaya OT  10/10/2018    Electronically signed by Jodi Zelaya OT at 10/10/2018 11:52 AM

## 2018-10-13 LAB — MAGNESIUM SERPL-MCNC: 2.3 MG/DL (ref 1.3–2.7)

## 2018-10-13 PROCEDURE — 94660 CPAP INITIATION&MGMT: CPT

## 2018-10-13 PROCEDURE — 99232 SBSQ HOSP IP/OBS MODERATE 35: CPT | Performed by: HOSPITALIST

## 2018-10-13 PROCEDURE — 25010000002 MEROPENEM: Performed by: NURSE PRACTITIONER

## 2018-10-13 PROCEDURE — 94799 UNLISTED PULMONARY SVC/PX: CPT

## 2018-10-13 PROCEDURE — 25010000002 LINEZOLID 600 MG/300ML SOLUTION: Performed by: INTERNAL MEDICINE

## 2018-10-13 PROCEDURE — 25010000002 HEPARIN (PORCINE) PER 1000 UNITS: Performed by: INTERNAL MEDICINE

## 2018-10-13 PROCEDURE — 83735 ASSAY OF MAGNESIUM: CPT | Performed by: FAMILY MEDICINE

## 2018-10-13 PROCEDURE — 94640 AIRWAY INHALATION TREATMENT: CPT

## 2018-10-13 PROCEDURE — 94760 N-INVAS EAR/PLS OXIMETRY 1: CPT

## 2018-10-13 PROCEDURE — 25010000002 MEROPENEM: Performed by: INTERNAL MEDICINE

## 2018-10-13 PROCEDURE — 63710000001 PREDNISONE PER 5 MG: Performed by: INTERNAL MEDICINE

## 2018-10-13 RX ORDER — LINEZOLID 600 MG/1
600 TABLET, FILM COATED ORAL EVERY 12 HOURS SCHEDULED
Status: DISCONTINUED | OUTPATIENT
Start: 2018-10-13 | End: 2018-10-18

## 2018-10-13 RX ADMIN — MORPHINE SULFATE 15 MG: 15 TABLET, EXTENDED RELEASE ORAL at 20:30

## 2018-10-13 RX ADMIN — SENNOSIDES AND DOCUSATE SODIUM 2 TABLET: 8.6; 5 TABLET ORAL at 09:36

## 2018-10-13 RX ADMIN — CASTOR OIL AND BALSAM, PERU: 788; 87 OINTMENT TOPICAL at 20:40

## 2018-10-13 RX ADMIN — GABAPENTIN 200 MG: 100 CAPSULE ORAL at 20:30

## 2018-10-13 RX ADMIN — PREDNISONE 10 MG: 10 TABLET ORAL at 09:35

## 2018-10-13 RX ADMIN — CLOPIDOGREL BISULFATE 75 MG: 75 TABLET ORAL at 09:34

## 2018-10-13 RX ADMIN — IPRATROPIUM BROMIDE AND ALBUTEROL SULFATE 3 ML: 2.5; .5 SOLUTION RESPIRATORY (INHALATION) at 23:25

## 2018-10-13 RX ADMIN — HEPARIN SODIUM 5000 UNITS: 5000 INJECTION, SOLUTION INTRAVENOUS; SUBCUTANEOUS at 05:22

## 2018-10-13 RX ADMIN — Medication 1 CAPSULE: at 09:34

## 2018-10-13 RX ADMIN — HEPARIN SODIUM 5000 UNITS: 5000 INJECTION, SOLUTION INTRAVENOUS; SUBCUTANEOUS at 20:34

## 2018-10-13 RX ADMIN — NICOTINE 1 PATCH: 21 PATCH, EXTENDED RELEASE TRANSDERMAL at 09:00

## 2018-10-13 RX ADMIN — LINEZOLID 600 MG: 600 INJECTION, SOLUTION INTRAVENOUS at 05:23

## 2018-10-13 RX ADMIN — MORPHINE SULFATE 15 MG: 15 TABLET, EXTENDED RELEASE ORAL at 09:35

## 2018-10-13 RX ADMIN — MEROPENEM 1 G: 1 INJECTION, POWDER, FOR SOLUTION INTRAVENOUS at 20:34

## 2018-10-13 RX ADMIN — ATORVASTATIN CALCIUM 40 MG: 40 TABLET, FILM COATED ORAL at 20:31

## 2018-10-13 RX ADMIN — FAMOTIDINE 20 MG: 20 TABLET ORAL at 09:34

## 2018-10-13 RX ADMIN — FAMOTIDINE 20 MG: 20 TABLET ORAL at 20:30

## 2018-10-13 RX ADMIN — IPRATROPIUM BROMIDE AND ALBUTEROL SULFATE 3 ML: 2.5; .5 SOLUTION RESPIRATORY (INHALATION) at 16:18

## 2018-10-13 RX ADMIN — METOPROLOL TARTRATE 25 MG: 25 TABLET ORAL at 09:35

## 2018-10-13 RX ADMIN — Medication 10 ML: at 09:00

## 2018-10-13 RX ADMIN — METOPROLOL TARTRATE 25 MG: 25 TABLET ORAL at 00:44

## 2018-10-13 RX ADMIN — HEPARIN SODIUM 5000 UNITS: 5000 INJECTION, SOLUTION INTRAVENOUS; SUBCUTANEOUS at 15:08

## 2018-10-13 RX ADMIN — LINEZOLID 600 MG: 600 TABLET, FILM COATED ORAL at 20:30

## 2018-10-13 RX ADMIN — GUAIFENESIN 600 MG: 600 TABLET, EXTENDED RELEASE ORAL at 20:30

## 2018-10-13 RX ADMIN — GABAPENTIN 200 MG: 100 CAPSULE ORAL at 15:07

## 2018-10-13 RX ADMIN — GABAPENTIN 200 MG: 100 CAPSULE ORAL at 05:22

## 2018-10-13 RX ADMIN — MEROPENEM 1 G: 1 INJECTION, POWDER, FOR SOLUTION INTRAVENOUS at 15:08

## 2018-10-13 RX ADMIN — ASPIRIN 81 MG CHEWABLE TABLET 81 MG: 81 TABLET CHEWABLE at 09:34

## 2018-10-13 RX ADMIN — MEROPENEM 1 G: 1 INJECTION, POWDER, FOR SOLUTION INTRAVENOUS at 02:56

## 2018-10-13 RX ADMIN — IPRATROPIUM BROMIDE AND ALBUTEROL SULFATE 3 ML: 2.5; .5 SOLUTION RESPIRATORY (INHALATION) at 08:51

## 2018-10-13 RX ADMIN — CASTOR OIL AND BALSAM, PERU: 788; 87 OINTMENT TOPICAL at 14:00

## 2018-10-13 RX ADMIN — POLYETHYLENE GLYCOL 3350 17 G: 17 POWDER, FOR SOLUTION ORAL at 09:34

## 2018-10-13 RX ADMIN — METOPROLOL TARTRATE 25 MG: 25 TABLET ORAL at 16:51

## 2018-10-13 RX ADMIN — CYANOCOBALAMIN TAB 1000 MCG 1000 MCG: 1000 TAB at 09:35

## 2018-10-13 RX ADMIN — GUAIFENESIN 600 MG: 600 TABLET, EXTENDED RELEASE ORAL at 09:00

## 2018-10-13 RX ADMIN — POTASSIUM CHLORIDE 30 MEQ: 750 CAPSULE, EXTENDED RELEASE ORAL at 09:40

## 2018-10-13 RX ADMIN — POTASSIUM CHLORIDE 30 MEQ: 750 CAPSULE, EXTENDED RELEASE ORAL at 18:00

## 2018-10-13 RX ADMIN — MIRTAZAPINE 15 MG: 15 TABLET, ORALLY DISINTEGRATING ORAL at 20:30

## 2018-10-13 RX ADMIN — Medication 10 ML: at 20:41

## 2018-10-13 RX ADMIN — FUROSEMIDE 40 MG: 40 TABLET ORAL at 09:35

## 2018-10-13 RX ADMIN — Medication 3 ML: at 09:00

## 2018-10-13 NOTE — PLAN OF CARE
Problem: Palliative Care (Adult)  Intervention: Support/Optimize Psychosocial Response   10/13/18 0453   Coping/Psychosocial Interventions   Supportive Measures active listening utilized       Goal: Maximized Comfort  Outcome: Ongoing (interventions implemented as appropriate)    Goal: Enhanced Quality of Life  Outcome: Ongoing (interventions implemented as appropriate)

## 2018-10-13 NOTE — PROGRESS NOTES
[facility] Medicine Services  PROGRESS NOTE    Patient Name: Yassine Love  : 1945  MRN: [id number]    Date of Admission: 10/2/2018  Length of Stay: 11  Primary Care Physician: Provider, No Known    Subjective   Subjective     CC:  sob    HPI:  No family today.  Says he wants to get up an walk.  Says he used machine last night for 4 hours.  No overnight events reported.      Review of Systems  Gen- No fevers, chills  CV- No chest pain, palpitations  Resp- No cough, dyspnea  GI- No N/V/D, abd pain      Otherwise ROS is negative except as mentioned in the HPI.    Objective   Objective     Vital Signs:   Temp:  [98 °F (36.7 °C)-98.4 °F (36.9 °C)] 98 °F (36.7 °C)  Heart Rate:  [56-69] 68  Resp:  [18-20] 20  BP: (111-138)/(63-71) 138/71  FiO2 (%):  [36 %] 36 %        Physical Exam:  Constitutional: No acute distress, awake, alert, no family at bedside.   HENT: NCAT, mucous membranes moist  Respiratory: Clear to auscultation bilaterally, respiratory effort normal   Cardiovascular: RRR, no murmurs, rubs, or gallops, palpable pedal pulses bilaterally  Gastrointestinal: Positive bowel sounds, soft, nontender, nondistended  Musculoskeletal: 1+ bilateral ankle edema with b/l erythema lower ext mid shin.   Psychiatric: Appropriate affect, cooperative  Neurologic: Oriented x 3, strength symmetric in all extremities, Cranial Nerves grossly intact to confrontation, speech clear  Skin: otherwise no rashes      Results Reviewed:  I have personally reviewed current lab, radiology, and data and agree.      Results from last 7 days  Lab Units 10/10/18  0507 10/09/18  0751   WBC 10*3/mm3 6.63 6.27   HEMOGLOBIN g/dL 9.8* 10.0*   HEMATOCRIT % 32.4* 33.6*   PLATELETS 10*3/mm3 351 302       Results from last 7 days  Lab Units 10/10/18  0507 10/09/18  0751 10/08/18  0534   SODIUM mmol/L 137 138 140   POTASSIUM mmol/L 4.6 3.7 4.6   CHLORIDE mmol/L 101 101 106   CO2 mmol/L 33.0* 33.0* 31.0   BUN mg/dL 36*  36* 36*   CREATININE mg/dL 1.15 1.24 1.26   GLUCOSE mg/dL 91 86 106*   CALCIUM mg/dL 8.2* 8.2* 8.3*     Estimated Creatinine Clearance: 59.2 mL/min (by C-G formula based on SCr of 1.15 mg/dL).  No results found for: BNP    Microbiology Results Abnormal     Procedure Component Value - Date/Time    Blood Culture - Blood, [[id number]]  (Normal) Collected:  10/02/18 1804    Lab Status:  Final result Specimen:  Blood from Hand, Left Updated:  10/07/18 2201     Blood Culture No growth at 5 days    Narrative:       Aerobic bottle only     Blood Culture - Blood, [[id number]]  (Normal) Collected:  10/02/18 1720    Lab Status:  Final result Specimen:  Blood from Arm, Right Updated:  10/07/18 1830     Blood Culture No growth at 5 days    Respiratory Culture - Sputum, Cough [[id number]]  (Abnormal)  (Susceptibility) Collected:  10/03/18 0430    Lab Status:  Final result Specimen:  Sputum from Cough Updated:  10/07/18 1137     Respiratory Culture Light growth (2+) Staphylococcus aureus (A)      Light growth (2+) Normal Respiratory Nataliia     Gram Stain Result Few (2+) WBCs per low power field      No Epithelial cells per low power field      Few (2+) Gram positive cocci    Susceptibility      Staphylococcus aureus     FAVIAN     Ceftriaxone <=8 ug/ml Susceptible     Clindamycin >4 ug/ml Resistant     Erythromycin >4 ug/ml Resistant     Gentamicin <=4 ug/ml Susceptible     Levofloxacin <=1 ug/ml Susceptible  [1]      Linezolid <=1 ug/ml Susceptible     Oxacillin <=0.25 ug/ml Susceptible     Penicillin G <=0.03 ug/ml Susceptible     Quinupristin + Dalfopristin <=0.5 ug/ml Susceptible     Rifampin <=1 ug/ml Susceptible     Tetracycline >8 ug/ml Resistant     Trimethoprim + Sulfamethoxazole <=0.5/9.5 ug/ml Susceptible     Vancomycin 2 ug/ml Susceptible            [1]   Staphylococcus species may develop resistance during prolonged therapy with quinolones.  Isolates that are initially susceptible may become resistant within three to four  days after initiation of therapy. Testing of repeat isolates may be warranted.                   MRSA Screen, PCR - Swab, Nares [[id number]]  (Abnormal) Collected:  10/04/18 1623    Lab Status:  Final result Specimen:  Swab from Nares Updated:  10/04/18 1818     MRSA, PCR Positive (C)          Imaging Results (last 24 hours)     ** No results found for the last 24 hours. **        Results for orders placed during the hospital encounter of 10/02/18   Adult Transthoracic Echo Complete W/ Cont if Necessary Per Protocol    Narrative · Left ventricular systolic function is normal. Estimated EF = 60%.  · Trace-to-mild mitral valve regurgitation is present.          I have reviewed the medications.      aspirin 81 mg Oral Daily   atorvastatin 40 mg Oral Nightly   castor oil-balsam peru  Topical Q12H   clopidogrel 75 mg Oral Daily   famotidine 20 mg Oral BID   furosemide 40 mg Oral Daily   gabapentin 200 mg Oral Q8H   guaiFENesin 600 mg Oral Q12H   heparin (porcine) 5,000 Units Subcutaneous Q8H   ipratropium-albuterol 3 mL Nebulization Q8H - RT   lactobacillus acidophilus 1 capsule Oral Daily   linezolid 600 mg Oral Q12H   meropenem 1 g Intravenous Q8H   metoprolol tartrate 25 mg Oral Q8H   mirtazapine 15 mg Oral Nightly   Morphine 15 mg Oral Q12H   nicotine 1 patch Transdermal Q24H   pharmacy consult - MTM  Does not apply Daily   polyethylene glycol 17 g Oral Daily   potassium chloride 30 mEq Oral BID With Meals   predniSONE 10 mg Oral Daily With Breakfast   Followed by      [START ON 10/15/2018] predniSONE 5 mg Oral Daily With Breakfast   sennosides-docusate sodium 2 tablet Oral BID   sodium chloride 10 mL Intravenous Q12H   sodium chloride 3 mL Intravenous Q12H   cyanocobalamin 1,000 mcg Oral Daily         Assessment/Plan   Assessment / Plan     Active Hospital Problems    Diagnosis   • **Acute on chronic mixed hypercarbic/hypoxemic respiratory failure requiring intubation and ventilatory support   • Sleep apnea   • Acute  "kidney injury     Cr 1.35 increased from 9/8/18 1.15      • H/O bacteremia due to Staphylococcus   • Opioid use   • Pneumonia due to infectious organism   • Brief runs of SVT (supraventricular tachycardia)    • Decubitus ulcer of buttock, stage 2   • CAD status post GIGI ×2 to LAD and RCA 3/12/18     S/P C per Dr. Harvey on 3/12/18 with PCI revealing severe 2-vessel disease.    GIGI x 2 to LAD and RCA     • Essential hypertension   • End stage COPD in an active smoker on home O2 with chronic hypercarbia     Managed by [facility]      • H/O cellulitis of both lower extremities     Previously managed by Infectious Disease      • PVD (peripheral vascular disease)           Brief Hospital Course to date:  Yassine Love is a 72 y.o. male active smoker with a history of COPD (on home oxygen) admitted 10/2/18 for exacerbation associated with severe hypoxemia. He was in the skilled nursing facility for rehabilitation but 10/1/18 developed some visual hallucinations. Oxygen saturations were subsequently noted to drop into the 60s on 4 L on 10/2/18 and he was brought to [facility] ER. Because of an elevated d-dimer a CT angiogram was obtained which was negative for PE. Both CT scan and chest x-ray however revealed it appeared to be pulmonary edema with diffuse interstitial infiltrates and pleural effusions. In addition BNP was 899. He was given his usual dose of home Lasix, placed on empiric antimicrobial coverage with Zosyn, given bronchodilators, Solu-Medrol, placed on BiPAP, and admitted to the hospitalist service.The patient lives by himself. His son had discussed his situation earlier this year and felt that he was not able to care for himself in his own home as he had been doing. He recommended the patient go into a facility, angering the patient who basically \"cut him off\" and wanted nothing further to do with him. He developed severe cellulitis of his lower extremities and was hospitalized 8/20/18 through 9/8/18 " "at [facility]. Neri was sent to a skilled nursing facility for rehabilitation where the above occurred.     At approximately 2 AM 10/3/18 he was noted to become unresponsive with decreased respiratory drive. Apparently he had been a DNR but this was reversed by the patient on admission. Because of this Dr. Jacobsen and respiratory therapy intubated the patient at the bedside. There were large amounts of thick purulent yellow secretions obtained at the time of intubation and he was transferred to the ICU on ventilatory support. He initially required high FiO2's of 60% but respiratory rate and ABGs rapidly improved. Over the course of the evening he continued to improve. He subsequently self extubated the evening of 10/3/18 at 11 PM. He was able to tolerate self extubation but has required high flow nasal oxygen in order to maintain his oxygen saturations. He was alert and oriented so additional discussions were held regarding CODE STATUS. He wanted to think about it yesterday and when I question him again today he stated he would like to think about it\" some more\".      He was on empiric vancomycin and Merrem and cultures from blood and sputum were negative as of 10/4/18. He however had had a staph aureus in a wound in August and I wanted to continue him on coverage for MRSA as well as gram negatives. Because of his renal insufficiency however he was switched him to linezolid on 10/4/18. Today his sputum is growing a staph aureus that was not identified and nasal swabs are growing MRSA. He remains on Merrem and linezolid.      Heart rate is usually in the 60s, but has had recurrent brief episodes of SVT up to 160  both yesterday and today. His metoprolol was held once and that led to SVT that resolved with resumption of beta blocker.. He was admitted 10/2 and transferred out of the ICU 10/7      Assessment & Plan:  1. Continue linezolid and Merrem and complete 7-10 days total,ID to assist with abx regimen  2. Avoid " sedating agents  3. Keep his potassium greater than or equal to 4.5 to avoid the development of metabolic alkalosis that will reduce respiratory, give add'l potassium today and schedule   4. IV Lasix and Diamox initially, Lasix decreased today to 40mg po daily   5. Follow-up renal function in a.m.  6. Weaned from high flow down to 3L NC, continue to taper as he tolerates  7.Palliative following, status changed to DNR/DNI   8. Recurrent episodes of SVT - pt follows with [facility], will consult, follow mag, continue BB   9. Taper steroids per pulm   10. Untreated Sleep Apnea - likely complex apnea (combination of obstructive + central) sleep apnea - AutoBiPAP at night and as needed for sleep - nasal interface    AutoBiPAP at night  Son in room - convinced patient to use machine  Son asking that maybe someone could help at night to facilitate NIPPV use (night shift nursing / RT)    Transition to oral zyvox  PT to walk with patient    DVT Prophylaxis:  Sq heparin    CODE STATUS:   Code Status and Medical Interventions:   Ordered at: 10/08/18 1413     Limited Support to NOT Include:    Intubation     Level Of Support Discussed With:    Patient     Code Status:    No CPR     Medical Interventions (Level of Support Prior to Arrest):    Limited       Disposition: I expect the patient to be discharged tbd      Electronically signed by Dhaval Carter MD, 10/13/18, 7:34 PM.

## 2018-10-13 NOTE — PLAN OF CARE
Problem: Patient Care Overview  Goal: Plan of Care Review  Outcome: Ongoing (interventions implemented as appropriate)   10/13/18 1524   Coping/Psychosocial   Plan of Care Reviewed With patient   Plan of Care Review   Progress no change   OTHER   Outcome Summary wore his nipv 4 hours tonight still with crackles LLL 3L oxygen IV abx as ordered awaiting dc decision   Will continue to monitor

## 2018-10-14 LAB — MAGNESIUM SERPL-MCNC: 2.2 MG/DL (ref 1.3–2.7)

## 2018-10-14 PROCEDURE — 25010000002 MEROPENEM: Performed by: INTERNAL MEDICINE

## 2018-10-14 PROCEDURE — 94660 CPAP INITIATION&MGMT: CPT

## 2018-10-14 PROCEDURE — 83735 ASSAY OF MAGNESIUM: CPT | Performed by: FAMILY MEDICINE

## 2018-10-14 PROCEDURE — 63710000001 PREDNISONE PER 5 MG: Performed by: INTERNAL MEDICINE

## 2018-10-14 PROCEDURE — 99232 SBSQ HOSP IP/OBS MODERATE 35: CPT | Performed by: HOSPITALIST

## 2018-10-14 PROCEDURE — 94799 UNLISTED PULMONARY SVC/PX: CPT

## 2018-10-14 PROCEDURE — 25010000002 HEPARIN (PORCINE) PER 1000 UNITS: Performed by: INTERNAL MEDICINE

## 2018-10-14 PROCEDURE — 94760 N-INVAS EAR/PLS OXIMETRY 1: CPT

## 2018-10-14 PROCEDURE — 94640 AIRWAY INHALATION TREATMENT: CPT

## 2018-10-14 RX ADMIN — HYDROCODONE BITARTRATE AND ACETAMINOPHEN 1 TABLET: 10; 325 TABLET ORAL at 15:21

## 2018-10-14 RX ADMIN — FUROSEMIDE 40 MG: 40 TABLET ORAL at 08:55

## 2018-10-14 RX ADMIN — SENNOSIDES AND DOCUSATE SODIUM 2 TABLET: 8.6; 5 TABLET ORAL at 08:54

## 2018-10-14 RX ADMIN — HEPARIN SODIUM 5000 UNITS: 5000 INJECTION, SOLUTION INTRAVENOUS; SUBCUTANEOUS at 20:05

## 2018-10-14 RX ADMIN — MORPHINE SULFATE 15 MG: 15 TABLET, EXTENDED RELEASE ORAL at 08:54

## 2018-10-14 RX ADMIN — HYDROCODONE BITARTRATE AND ACETAMINOPHEN 1 TABLET: 10; 325 TABLET ORAL at 19:14

## 2018-10-14 RX ADMIN — PREDNISONE 10 MG: 10 TABLET ORAL at 08:55

## 2018-10-14 RX ADMIN — GUAIFENESIN 600 MG: 600 TABLET, EXTENDED RELEASE ORAL at 20:04

## 2018-10-14 RX ADMIN — CASTOR OIL AND BALSAM, PERU: 788; 87 OINTMENT TOPICAL at 10:30

## 2018-10-14 RX ADMIN — IPRATROPIUM BROMIDE AND ALBUTEROL SULFATE 3 ML: 2.5; .5 SOLUTION RESPIRATORY (INHALATION) at 08:06

## 2018-10-14 RX ADMIN — Medication 3 ML: at 09:01

## 2018-10-14 RX ADMIN — FAMOTIDINE 20 MG: 20 TABLET ORAL at 20:04

## 2018-10-14 RX ADMIN — ACETAMINOPHEN 325 MG: 325 TABLET ORAL at 04:43

## 2018-10-14 RX ADMIN — GUAIFENESIN 600 MG: 600 TABLET, EXTENDED RELEASE ORAL at 08:55

## 2018-10-14 RX ADMIN — MEROPENEM 1 G: 1 INJECTION, POWDER, FOR SOLUTION INTRAVENOUS at 23:07

## 2018-10-14 RX ADMIN — POTASSIUM CHLORIDE 30 MEQ: 750 CAPSULE, EXTENDED RELEASE ORAL at 08:54

## 2018-10-14 RX ADMIN — GABAPENTIN 200 MG: 100 CAPSULE ORAL at 14:14

## 2018-10-14 RX ADMIN — IPRATROPIUM BROMIDE AND ALBUTEROL SULFATE 3 ML: 2.5; .5 SOLUTION RESPIRATORY (INHALATION) at 16:15

## 2018-10-14 RX ADMIN — ASPIRIN 81 MG CHEWABLE TABLET 81 MG: 81 TABLET CHEWABLE at 08:55

## 2018-10-14 RX ADMIN — CASTOR OIL AND BALSAM, PERU: 788; 87 OINTMENT TOPICAL at 23:07

## 2018-10-14 RX ADMIN — NICOTINE 1 PATCH: 21 PATCH, EXTENDED RELEASE TRANSDERMAL at 08:55

## 2018-10-14 RX ADMIN — FAMOTIDINE 20 MG: 20 TABLET ORAL at 08:55

## 2018-10-14 RX ADMIN — LINEZOLID 600 MG: 600 TABLET, FILM COATED ORAL at 20:04

## 2018-10-14 RX ADMIN — METOPROLOL TARTRATE 25 MG: 25 TABLET ORAL at 08:55

## 2018-10-14 RX ADMIN — Medication 10 ML: at 09:01

## 2018-10-14 RX ADMIN — METOPROLOL TARTRATE 25 MG: 25 TABLET ORAL at 00:51

## 2018-10-14 RX ADMIN — ACETAMINOPHEN 325 MG: 325 TABLET ORAL at 16:44

## 2018-10-14 RX ADMIN — LINEZOLID 600 MG: 600 TABLET, FILM COATED ORAL at 08:54

## 2018-10-14 RX ADMIN — GABAPENTIN 200 MG: 100 CAPSULE ORAL at 05:20

## 2018-10-14 RX ADMIN — CLOPIDOGREL BISULFATE 75 MG: 75 TABLET ORAL at 08:55

## 2018-10-14 RX ADMIN — GABAPENTIN 200 MG: 100 CAPSULE ORAL at 20:05

## 2018-10-14 RX ADMIN — ATORVASTATIN CALCIUM 40 MG: 40 TABLET, FILM COATED ORAL at 20:04

## 2018-10-14 RX ADMIN — MORPHINE SULFATE 15 MG: 15 TABLET, EXTENDED RELEASE ORAL at 20:04

## 2018-10-14 RX ADMIN — HEPARIN SODIUM 5000 UNITS: 5000 INJECTION, SOLUTION INTRAVENOUS; SUBCUTANEOUS at 05:20

## 2018-10-14 RX ADMIN — MEROPENEM 1 G: 1 INJECTION, POWDER, FOR SOLUTION INTRAVENOUS at 05:19

## 2018-10-14 RX ADMIN — Medication 1 CAPSULE: at 08:55

## 2018-10-14 RX ADMIN — MIRTAZAPINE 15 MG: 15 TABLET, ORALLY DISINTEGRATING ORAL at 20:04

## 2018-10-14 RX ADMIN — MEROPENEM 1 G: 1 INJECTION, POWDER, FOR SOLUTION INTRAVENOUS at 14:14

## 2018-10-14 RX ADMIN — HEPARIN SODIUM 5000 UNITS: 5000 INJECTION, SOLUTION INTRAVENOUS; SUBCUTANEOUS at 14:14

## 2018-10-14 RX ADMIN — CYANOCOBALAMIN TAB 1000 MCG 1000 MCG: 1000 TAB at 08:54

## 2018-10-14 RX ADMIN — METOPROLOL TARTRATE 25 MG: 25 TABLET ORAL at 16:44

## 2018-10-14 RX ADMIN — POTASSIUM CHLORIDE 30 MEQ: 750 CAPSULE, EXTENDED RELEASE ORAL at 16:44

## 2018-10-14 RX ADMIN — METOPROLOL TARTRATE 25 MG: 25 TABLET ORAL at 23:07

## 2018-10-14 NOTE — PLAN OF CARE
Problem: Patient Care Overview  Goal: Plan of Care Review  Pt requested pain med today for legs, had a little episode of SVT early this am briefly, remained NS rest of the day. 3L all day remained saturated, slept more today

## 2018-10-14 NOTE — PROGRESS NOTES
[facility] Medicine Services  PROGRESS NOTE    Patient Name: Yassine Love  : 1945  MRN: [id number]    Date of Admission: 10/2/2018  Length of Stay: 12  Primary Care Physician: Provider, No Known    Subjective   Subjective     CC:  sob    HPI:  Reportedly used CPAP only 2 hrs last night.  SVT reported overnight.  No family in room.  Patient was sleeping on arrival today during the daytime not on machine.  No chest pain.  No shortness of breath.      Review of Systems  Gen- No fevers, chills  CV- No chest pain, palpitations  Resp- No cough, dyspnea  GI- No N/V/D, abd pain      Otherwise ROS is negative except as mentioned in the HPI.    Objective   Objective     Vital Signs:   Temp:  [98 °F (36.7 °C)-98.3 °F (36.8 °C)] 98.1 °F (36.7 °C)  Heart Rate:  [59-74] 74  Resp:  [16-20] 16  BP: (110-127)/(60-68) 110/64        Physical Exam:  Constitutional: No acute distress, awake, alert, no family at bedside.   HENT: NCAT, mucous membranes moist  Respiratory: Clear to auscultation bilaterally, respiratory effort normal   Cardiovascular: RRR, s1 and s2  Gastrointestinal: Positive bowel sounds, soft, nontender, nondistended  Musculoskeletal: 1+ bilateral ankle edema with b/l erythema lower ext mid shin.   Psychiatric: Appropriate affect, cooperative  Neurologic: Oriented x 3, strength symmetric in all extremities, Cranial Nerves grossly intact to confrontation, speech clear  Skin: otherwise no rashes      Results Reviewed:  I have personally reviewed current lab, radiology, and data and agree.      Results from last 7 days  Lab Units 10/10/18  0507 10/09/18  0751   WBC 10*3/mm3 6.63 6.27   HEMOGLOBIN g/dL 9.8* 10.0*   HEMATOCRIT % 32.4* 33.6*   PLATELETS 10*3/mm3 351 302       Results from last 7 days  Lab Units 10/10/18  0507 10/09/18  0751 10/08/18  0534   SODIUM mmol/L 137 138 140   POTASSIUM mmol/L 4.6 3.7 4.6   CHLORIDE mmol/L 101 101 106   CO2 mmol/L 33.0* 33.0* 31.0   BUN mg/dL 36*  36* 36*   CREATININE mg/dL 1.15 1.24 1.26   GLUCOSE mg/dL 91 86 106*   CALCIUM mg/dL 8.2* 8.2* 8.3*     Estimated Creatinine Clearance: 59.2 mL/min (by C-G formula based on SCr of 1.15 mg/dL).  No results found for: BNP    Microbiology Results Abnormal     Procedure Component Value - Date/Time    Blood Culture - Blood, [[id number]]  (Normal) Collected:  10/02/18 1804    Lab Status:  Final result Specimen:  Blood from Hand, Left Updated:  10/07/18 2201     Blood Culture No growth at 5 days    Narrative:       Aerobic bottle only     Blood Culture - Blood, [[id number]]  (Normal) Collected:  10/02/18 1720    Lab Status:  Final result Specimen:  Blood from Arm, Right Updated:  10/07/18 1830     Blood Culture No growth at 5 days    Respiratory Culture - Sputum, Cough [[id number]]  (Abnormal)  (Susceptibility) Collected:  10/03/18 0430    Lab Status:  Final result Specimen:  Sputum from Cough Updated:  10/07/18 1137     Respiratory Culture Light growth (2+) Staphylococcus aureus (A)      Light growth (2+) Normal Respiratory Nataliia     Gram Stain Result Few (2+) WBCs per low power field      No Epithelial cells per low power field      Few (2+) Gram positive cocci    Susceptibility      Staphylococcus aureus     FAVIAN     Ceftriaxone <=8 ug/ml Susceptible     Clindamycin >4 ug/ml Resistant     Erythromycin >4 ug/ml Resistant     Gentamicin <=4 ug/ml Susceptible     Levofloxacin <=1 ug/ml Susceptible  [1]      Linezolid <=1 ug/ml Susceptible     Oxacillin <=0.25 ug/ml Susceptible     Penicillin G <=0.03 ug/ml Susceptible     Quinupristin + Dalfopristin <=0.5 ug/ml Susceptible     Rifampin <=1 ug/ml Susceptible     Tetracycline >8 ug/ml Resistant     Trimethoprim + Sulfamethoxazole <=0.5/9.5 ug/ml Susceptible     Vancomycin 2 ug/ml Susceptible            [1]   Staphylococcus species may develop resistance during prolonged therapy with quinolones.  Isolates that are initially susceptible may become resistant within three to four  days after initiation of therapy. Testing of repeat isolates may be warranted.                   MRSA Screen, PCR - Swab, Nares [[id number]]  (Abnormal) Collected:  10/04/18 1623    Lab Status:  Final result Specimen:  Swab from Nares Updated:  10/04/18 1818     MRSA, PCR Positive (C)          Imaging Results (last 24 hours)     ** No results found for the last 24 hours. **        Results for orders placed during the hospital encounter of 10/02/18   Adult Transthoracic Echo Complete W/ Cont if Necessary Per Protocol    Narrative · Left ventricular systolic function is normal. Estimated EF = 60%.  · Trace-to-mild mitral valve regurgitation is present.          I have reviewed the medications.      aspirin 81 mg Oral Daily   atorvastatin 40 mg Oral Nightly   castor oil-balsam peru  Topical Q12H   clopidogrel 75 mg Oral Daily   famotidine 20 mg Oral BID   furosemide 40 mg Oral Daily   gabapentin 200 mg Oral Q8H   guaiFENesin 600 mg Oral Q12H   heparin (porcine) 5,000 Units Subcutaneous Q8H   ipratropium-albuterol 3 mL Nebulization Q8H - RT   lactobacillus acidophilus 1 capsule Oral Daily   linezolid 600 mg Oral Q12H   meropenem 1 g Intravenous Q8H   metoprolol tartrate 25 mg Oral Q8H   mirtazapine 15 mg Oral Nightly   Morphine 15 mg Oral Q12H   nicotine 1 patch Transdermal Q24H   pharmacy consult - MTM  Does not apply Daily   polyethylene glycol 17 g Oral Daily   potassium chloride 30 mEq Oral BID With Meals   [START ON 10/15/2018] predniSONE 5 mg Oral Daily With Breakfast   sennosides-docusate sodium 2 tablet Oral BID   sodium chloride 10 mL Intravenous Q12H   sodium chloride 3 mL Intravenous Q12H   cyanocobalamin 1,000 mcg Oral Daily         Assessment/Plan   Assessment / Plan     Active Hospital Problems    Diagnosis   • **Acute on chronic mixed hypercarbic/hypoxemic respiratory failure requiring intubation and ventilatory support   • Sleep apnea   • Acute kidney injury     Cr 1.35 increased from 9/8/18 1.15      •  "H/O bacteremia due to Staphylococcus   • Opioid use   • Pneumonia due to infectious organism   • Brief runs of SVT (supraventricular tachycardia)    • Decubitus ulcer of buttock, stage 2   • CAD status post GIGI ×2 to LAD and RCA 3/12/18     S/P C per Dr. Harvey on 3/12/18 with PCI revealing severe 2-vessel disease.    GIGI x 2 to LAD and RCA     • Essential hypertension   • End stage COPD in an active smoker on home O2 with chronic hypercarbia     Managed by [facility]      • H/O cellulitis of both lower extremities     Previously managed by [facility]      • PVD (peripheral vascular disease)           Brief Hospital Course to date:  Yassine Love is a 72 y.o. male active smoker with a history of COPD (on home oxygen) admitted 10/2/18 for exacerbation associated with severe hypoxemia. He was in the skilled nursing facility for rehabilitation but 10/1/18 developed some visual hallucinations. Oxygen saturations were subsequently noted to drop into the 60s on 4 L on 10/2/18 and he was brought to [facility] ER. Because of an elevated d-dimer a CT angiogram was obtained which was negative for PE. Both CT scan and chest x-ray however revealed it appeared to be pulmonary edema with diffuse interstitial infiltrates and pleural effusions. In addition BNP was 899. He was given his usual dose of home Lasix, placed on empiric antimicrobial coverage with Zosyn, given bronchodilators, Solu-Medrol, placed on BiPAP, and admitted to the hospitalist service.The patient lives by himself. His son had discussed his situation earlier this year and felt that he was not able to care for himself in his own home as he had been doing. He recommended the patient go into a facility, angering the patient who basically \"cut him off\" and wanted nothing further to do with him. He developed severe cellulitis of his lower extremities and was hospitalized 8/20/18 through 9/8/18 at [facility]. Afterwords was sent to a skilled nursing facility for " "rehabilitation where the above occurred.     At approximately 2 AM 10/3/18 he was noted to become unresponsive with decreased respiratory drive. Apparently he had been a DNR but this was reversed by the patient on admission. Because of this Dr. Jacobsen and respiratory therapy intubated the patient at the bedside. There were large amounts of thick purulent yellow secretions obtained at the time of intubation and he was transferred to the ICU on ventilatory support. He initially required high FiO2's of 60% but respiratory rate and ABGs rapidly improved. Over the course of the evening he continued to improve. He subsequently self extubated the evening of 10/3/18 at 11 PM. He was able to tolerate self extubation but has required high flow nasal oxygen in order to maintain his oxygen saturations. He was alert and oriented so additional discussions were held regarding CODE STATUS. He wanted to think about it yesterday and when I question him again today he stated he would like to think about it\" some more\".      He was on empiric vancomycin and Merrem and cultures from blood and sputum were negative as of 10/4/18. He however had had a staph aureus in a wound in August and I wanted to continue him on coverage for MRSA as well as gram negatives. Because of his renal insufficiency however he was switched him to linezolid on 10/4/18. Today his sputum is growing a staph aureus that was not identified and nasal swabs are growing MRSA. He remains on Merrem and linezolid.      Heart rate is usually in the 60s, but has had recurrent brief episodes of SVT up to 160  both yesterday and today. His metoprolol was held once and that led to SVT that resolved with resumption of beta blocker.. He was admitted 10/2 and transferred out of the ICU 10/7      Assessment & Plan:  1. Continue linezolid and Merrem and complete 7-10 days total,ID to assist with abx regimen  2. Avoid sedating agents  3. Keep his potassium greater than or equal to " 4.5 to avoid the development of metabolic alkalosis that will reduce respiratory, give add'l potassium today and schedule   4. IV Lasix and Diamox initially, Lasix decreased today to 40mg po daily   5. Follow-up renal function in a.m.  6. Weaned from high flow down to 3L NC, continue to taper as he tolerates  7.Palliative following, status changed to DNR/DNI   8. Recurrent episodes of SVT - pt follows with [facility], will consult, follow mag, continue BB   9. Taper steroids per pulm   10. Untreated Sleep Apnea - likely complex apnea (combination of obstructive + central) sleep apnea - AutoBiPAP at night and as needed for sleep - nasal interface    AutoBiPAP at night    Didn't use for very long overnight  CO2 retention seems improved based on trend    Transition to oral zyvox  PT to walk with patient    DVT Prophylaxis:  Sq heparin    CODE STATUS:   Code Status and Medical Interventions:   Ordered at: 10/08/18 1413     Limited Support to NOT Include:    Intubation     Level Of Support Discussed With:    Patient     Code Status:    No CPR     Medical Interventions (Level of Support Prior to Arrest):    Limited       Disposition: I expect the patient to be discharged tbd      Electronically signed by Dhaval Carter MD, 10/14/18, 5:39 PM.

## 2018-10-14 NOTE — PLAN OF CARE
Problem: Patient Care Overview  Goal: Plan of Care Review  Outcome: Ongoing (interventions implemented as appropriate)   10/14/18 0619   Coping/Psychosocial   Plan of Care Reviewed With patient   OTHER   Outcome Summary Pt rested on and off. Wore cpap for 2 hours. C/o headache, relieved with tylenol. 3L per NC.

## 2018-10-15 LAB — MAGNESIUM SERPL-MCNC: 2.3 MG/DL (ref 1.3–2.7)

## 2018-10-15 PROCEDURE — 94760 N-INVAS EAR/PLS OXIMETRY 1: CPT

## 2018-10-15 PROCEDURE — 99232 SBSQ HOSP IP/OBS MODERATE 35: CPT | Performed by: INTERNAL MEDICINE

## 2018-10-15 PROCEDURE — 97110 THERAPEUTIC EXERCISES: CPT | Performed by: OCCUPATIONAL THERAPIST

## 2018-10-15 PROCEDURE — 97530 THERAPEUTIC ACTIVITIES: CPT

## 2018-10-15 PROCEDURE — 63710000001 PREDNISONE PER 5 MG: Performed by: INTERNAL MEDICINE

## 2018-10-15 PROCEDURE — 94799 UNLISTED PULMONARY SVC/PX: CPT

## 2018-10-15 PROCEDURE — 83735 ASSAY OF MAGNESIUM: CPT | Performed by: FAMILY MEDICINE

## 2018-10-15 PROCEDURE — 25010000002 MEROPENEM: Performed by: INTERNAL MEDICINE

## 2018-10-15 PROCEDURE — 97530 THERAPEUTIC ACTIVITIES: CPT | Performed by: OCCUPATIONAL THERAPIST

## 2018-10-15 PROCEDURE — 25010000002 HEPARIN (PORCINE) PER 1000 UNITS: Performed by: INTERNAL MEDICINE

## 2018-10-15 RX ADMIN — IPRATROPIUM BROMIDE AND ALBUTEROL SULFATE 3 ML: 2.5; .5 SOLUTION RESPIRATORY (INHALATION) at 00:37

## 2018-10-15 RX ADMIN — CASTOR OIL AND BALSAM, PERU: 788; 87 OINTMENT TOPICAL at 21:23

## 2018-10-15 RX ADMIN — GABAPENTIN 200 MG: 100 CAPSULE ORAL at 05:14

## 2018-10-15 RX ADMIN — CLOPIDOGREL BISULFATE 75 MG: 75 TABLET ORAL at 09:08

## 2018-10-15 RX ADMIN — NICOTINE 1 PATCH: 21 PATCH, EXTENDED RELEASE TRANSDERMAL at 09:00

## 2018-10-15 RX ADMIN — MIRTAZAPINE 15 MG: 15 TABLET, ORALLY DISINTEGRATING ORAL at 21:22

## 2018-10-15 RX ADMIN — MORPHINE SULFATE 15 MG: 15 TABLET, EXTENDED RELEASE ORAL at 21:21

## 2018-10-15 RX ADMIN — FAMOTIDINE 20 MG: 20 TABLET ORAL at 09:07

## 2018-10-15 RX ADMIN — IPRATROPIUM BROMIDE AND ALBUTEROL SULFATE 3 ML: 2.5; .5 SOLUTION RESPIRATORY (INHALATION) at 23:28

## 2018-10-15 RX ADMIN — METOPROLOL TARTRATE 25 MG: 25 TABLET ORAL at 09:07

## 2018-10-15 RX ADMIN — POTASSIUM CHLORIDE 30 MEQ: 750 CAPSULE, EXTENDED RELEASE ORAL at 09:07

## 2018-10-15 RX ADMIN — FAMOTIDINE 20 MG: 20 TABLET ORAL at 21:21

## 2018-10-15 RX ADMIN — LINEZOLID 600 MG: 600 TABLET, FILM COATED ORAL at 09:07

## 2018-10-15 RX ADMIN — LINEZOLID 600 MG: 600 TABLET, FILM COATED ORAL at 21:22

## 2018-10-15 RX ADMIN — PREDNISONE 5 MG: 5 TABLET ORAL at 09:08

## 2018-10-15 RX ADMIN — HEPARIN SODIUM 5000 UNITS: 5000 INJECTION, SOLUTION INTRAVENOUS; SUBCUTANEOUS at 05:14

## 2018-10-15 RX ADMIN — Medication 1 CAPSULE: at 09:07

## 2018-10-15 RX ADMIN — HEPARIN SODIUM 5000 UNITS: 5000 INJECTION, SOLUTION INTRAVENOUS; SUBCUTANEOUS at 21:21

## 2018-10-15 RX ADMIN — MORPHINE SULFATE 15 MG: 15 TABLET, EXTENDED RELEASE ORAL at 09:08

## 2018-10-15 RX ADMIN — GABAPENTIN 200 MG: 100 CAPSULE ORAL at 14:45

## 2018-10-15 RX ADMIN — MEROPENEM 1 G: 1 INJECTION, POWDER, FOR SOLUTION INTRAVENOUS at 05:13

## 2018-10-15 RX ADMIN — METOPROLOL TARTRATE 25 MG: 25 TABLET ORAL at 17:19

## 2018-10-15 RX ADMIN — CASTOR OIL AND BALSAM, PERU: 788; 87 OINTMENT TOPICAL at 14:00

## 2018-10-15 RX ADMIN — HEPARIN SODIUM 5000 UNITS: 5000 INJECTION, SOLUTION INTRAVENOUS; SUBCUTANEOUS at 14:45

## 2018-10-15 RX ADMIN — Medication 10 ML: at 09:09

## 2018-10-15 RX ADMIN — Medication 3 ML: at 09:09

## 2018-10-15 RX ADMIN — Medication 10 ML: at 21:22

## 2018-10-15 RX ADMIN — GABAPENTIN 200 MG: 100 CAPSULE ORAL at 21:21

## 2018-10-15 RX ADMIN — HYDROCODONE BITARTRATE AND ACETAMINOPHEN 1 TABLET: 10; 325 TABLET ORAL at 13:06

## 2018-10-15 RX ADMIN — ATORVASTATIN CALCIUM 40 MG: 40 TABLET, FILM COATED ORAL at 21:21

## 2018-10-15 RX ADMIN — GUAIFENESIN 600 MG: 600 TABLET, EXTENDED RELEASE ORAL at 09:08

## 2018-10-15 RX ADMIN — POTASSIUM CHLORIDE 30 MEQ: 750 CAPSULE, EXTENDED RELEASE ORAL at 17:19

## 2018-10-15 RX ADMIN — CYANOCOBALAMIN TAB 1000 MCG 1000 MCG: 1000 TAB at 09:08

## 2018-10-15 RX ADMIN — IPRATROPIUM BROMIDE AND ALBUTEROL SULFATE 3 ML: 2.5; .5 SOLUTION RESPIRATORY (INHALATION) at 15:44

## 2018-10-15 RX ADMIN — ASPIRIN 81 MG CHEWABLE TABLET 81 MG: 81 TABLET CHEWABLE at 09:07

## 2018-10-15 RX ADMIN — ACETAMINOPHEN 325 MG: 325 TABLET ORAL at 05:14

## 2018-10-15 RX ADMIN — GUAIFENESIN 600 MG: 600 TABLET, EXTENDED RELEASE ORAL at 21:22

## 2018-10-15 RX ADMIN — FUROSEMIDE 40 MG: 40 TABLET ORAL at 09:08

## 2018-10-15 NOTE — PROGRESS NOTES
Continued Stay Note  [facility]     Patient Name: Yassine Love  MRN: [id number]  Today's Date: 10/15/2018    Admit Date: 10/2/2018    Consent obtained for the participation in the [facility] Transitions Program. Yessi Dumont RN        Discharge Plan    No documentation.             Discharge Codes    No documentation.       Expected Discharge Date and Time     Expected Discharge Date Expected Discharge Time    Oct 12, 2018             Yessi Dumont RN

## 2018-10-15 NOTE — PROGRESS NOTES
[facility] Medicine Services  PROGRESS NOTE    Patient Name: Yassine Love  : 1945  MRN: [id number]    Date of Admission: 10/2/2018  Length of Stay: 13  Primary Care Physician: Provider, No Known    Subjective   Subjective     CC:  sob    HPI:  Feeling better. Dyspnea improved. No cough. Strength still less than baseline.      Review of Systems  Gen- No fevers, chills  CV- No chest pain, palpitations  Resp- No cough, dyspnea  GI- No N/V/D, abd pain      Otherwise ROS is negative except as mentioned in the HPI.    Objective   Objective     Vital Signs:   Temp:  [98.1 °F (36.7 °C)-99.2 °F (37.3 °C)] 99.2 °F (37.3 °C)  Heart Rate:  [64-77] 69  Resp:  [16-20] 18  BP: (113-120)/(63-97) 113/63        Physical Exam:  LINES: PICC RUE  Constitutional -no acute distress, non toxic, in bed, asleep, awakens easily to voice  HEENT-NCAT, mucous membranes moist  CV-RRR, S1 S2 normal, no m/r/g  Resp-diminished bilaterally, but no wheezes, rhonchi or rales.  Abd-soft, non-tender, non-distended, normo active bowel sounds  Ext-1+ LE Edema bilaterally  Neuro-alert and oriented, speech clear, moves all extremities   Psych-normal affect   Skin- No rash on exposed UE or LE bilaterally        Results Reviewed:  I have personally reviewed current lab, radiology, and data and agree.      Results from last 7 days  Lab Units 10/10/18  0507 10/09/18  0751   WBC 10*3/mm3 6.63 6.27   HEMOGLOBIN g/dL 9.8* 10.0*   HEMATOCRIT % 32.4* 33.6*   PLATELETS 10*3/mm3 351 302       Results from last 7 days  Lab Units 10/10/18  0507 10/09/18  0751   SODIUM mmol/L 137 138   POTASSIUM mmol/L 4.6 3.7   CHLORIDE mmol/L 101 101   CO2 mmol/L 33.0* 33.0*   BUN mg/dL 36* 36*   CREATININE mg/dL 1.15 1.24   GLUCOSE mg/dL 91 86   CALCIUM mg/dL 8.2* 8.2*     Estimated Creatinine Clearance: 60.3 mL/min (by C-G formula based on SCr of 1.15 mg/dL).  No results found for: BNP    Microbiology Results Abnormal     Procedure Component Value  - Date/Time    Blood Culture - Blood, [[id number]]  (Normal) Collected:  10/02/18 1804    Lab Status:  Final result Specimen:  Blood from Hand, Left Updated:  10/07/18 2201     Blood Culture No growth at 5 days    Narrative:       Aerobic bottle only     Blood Culture - Blood, [[id number]]  (Normal) Collected:  10/02/18 1720    Lab Status:  Final result Specimen:  Blood from Arm, Right Updated:  10/07/18 1830     Blood Culture No growth at 5 days    Respiratory Culture - Sputum, Cough [[id number]]  (Abnormal)  (Susceptibility) Collected:  10/03/18 0430    Lab Status:  Final result Specimen:  Sputum from Cough Updated:  10/07/18 1137     Respiratory Culture Light growth (2+) Staphylococcus aureus (A)      Light growth (2+) Normal Respiratory Nataliia     Gram Stain Result Few (2+) WBCs per low power field      No Epithelial cells per low power field      Few (2+) Gram positive cocci    Susceptibility      Staphylococcus aureus     FAVIAN     Ceftriaxone <=8 ug/ml Susceptible     Clindamycin >4 ug/ml Resistant     Erythromycin >4 ug/ml Resistant     Gentamicin <=4 ug/ml Susceptible     Levofloxacin <=1 ug/ml Susceptible  [1]      Linezolid <=1 ug/ml Susceptible     Oxacillin <=0.25 ug/ml Susceptible     Penicillin G <=0.03 ug/ml Susceptible     Quinupristin + Dalfopristin <=0.5 ug/ml Susceptible     Rifampin <=1 ug/ml Susceptible     Tetracycline >8 ug/ml Resistant     Trimethoprim + Sulfamethoxazole <=0.5/9.5 ug/ml Susceptible     Vancomycin 2 ug/ml Susceptible            [1]   Staphylococcus species may develop resistance during prolonged therapy with quinolones.  Isolates that are initially susceptible may become resistant within three to four days after initiation of therapy. Testing of repeat isolates may be warranted.                   MRSA Screen, PCR - Swab, Nares [[id number]]  (Abnormal) Collected:  10/04/18 1623    Lab Status:  Final result Specimen:  Swab from Nares Updated:  10/04/18 1818     MRSA, PCR Positive (C)           Imaging Results (last 24 hours)     ** No results found for the last 24 hours. **        Results for orders placed during the hospital encounter of 10/02/18   Adult Transthoracic Echo Complete W/ Cont if Necessary Per Protocol    Narrative · Left ventricular systolic function is normal. Estimated EF = 60%.  · Trace-to-mild mitral valve regurgitation is present.          I have reviewed the medications.      aspirin 81 mg Oral Daily   atorvastatin 40 mg Oral Nightly   castor oil-balsam peru  Topical Q12H   clopidogrel 75 mg Oral Daily   famotidine 20 mg Oral BID   furosemide 40 mg Oral Daily   gabapentin 200 mg Oral Q8H   guaiFENesin 600 mg Oral Q12H   heparin (porcine) 5,000 Units Subcutaneous Q8H   ipratropium-albuterol 3 mL Nebulization Q8H - RT   lactobacillus acidophilus 1 capsule Oral Daily   linezolid 600 mg Oral Q12H   metoprolol tartrate 25 mg Oral Q8H   mirtazapine 15 mg Oral Nightly   Morphine 15 mg Oral Q12H   nicotine 1 patch Transdermal Q24H   pharmacy consult - MTM  Does not apply Daily   polyethylene glycol 17 g Oral Daily   potassium chloride 30 mEq Oral BID With Meals   predniSONE 5 mg Oral Daily With Breakfast   sennosides-docusate sodium 2 tablet Oral BID   sodium chloride 10 mL Intravenous Q12H   sodium chloride 3 mL Intravenous Q12H   cyanocobalamin 1,000 mcg Oral Daily         Assessment/Plan   Assessment / Plan     Active Hospital Problems    Diagnosis   • **Acute on chronic mixed hypercarbic/hypoxemic respiratory failure requiring intubation and ventilatory support   • Sleep apnea   • Acute kidney injury     Cr 1.35 increased from 9/8/18 1.15      • H/O bacteremia due to Staphylococcus   • Opioid use   • Pneumonia due to infectious organism   • Brief runs of SVT (supraventricular tachycardia)    • Decubitus ulcer of buttock, stage 2   • CAD status post GIGI ×2 to LAD and RCA 3/12/18     S/P LHC per Dr. Harvey on 3/12/18 with PCI revealing severe 2-vessel disease.    GIGI x 2 to LAD and  RCA     • Essential hypertension   • End stage COPD in an active smoker on home O2 with chronic hypercarbia     Managed by [facility]      • H/O cellulitis of both lower extremities     Previously managed by [facility]      • PVD (peripheral vascular disease)           Brief Hospital Course to date:  Yassine Love is a 72 y.o. male active smoker with a history of COPD (on home oxygen) admitted 10/2/18 for exacerbation associated with severe hypoxemia. He was in the skilled nursing facility for rehabilitation but 10/1/18 developed some visual hallucinations. Oxygen saturations were subsequently noted to drop into the 60s on 4 L on 10/2/18 and he was brought to [facility] ER. Because of an elevated d-dimer a CT angiogram was obtained which was negative for PE. Both CT scan and chest x-ray however revealed it appeared to be pulmonary edema with diffuse interstitial infiltrates and pleural effusions. In addition BNP was 899. He was given his usual dose of home Lasix, placed on empiric antimicrobial coverage with Zosyn, given bronchodilators, Solu-Medrol, placed on BiPAP, and admitted to the hospitalist service.     At approximately 2 AM 10/3/18 he was noted to become unresponsive with decreased respiratory drive. Apparently he had been a DNR but this was reversed by the patient on admission. Because of this Dr. Jacobsen and respiratory therapy intubated the patient at the bedside. There were large amounts of thick purulent yellow secretions obtained at the time of intubation and he was transferred to the ICU on ventilatory support. He initially required high FiO2's of 60% but respiratory rate and ABGs rapidly improved. Over the course of the evening he continued to improve. He subsequently self extubated the evening of 10/3/18 at 11 PM. He was able to tolerate self extubation but has required high flow nasal oxygen in order to maintain his oxygen saturations.     Heart rate is usually in the 60s, but has  had recurrent brief episodes of SVT up to 160  during hospitalization His metoprolol was held once and that led to SVT that resolved with resumption of beta blocker.. He was admitted 10/2 and transferred out of the ICU 10/7      Assessment & Plan:   Continue zyvox, check CXR   Avoid sedating agents   IV Lasix and Diamox initially, Lasix decreased today to 40mg po daily    Weaned from high flow down to 3L NC, continue to taper as he tolerates   Palliative following, status changed to DNR/DNI    Recurrent episodes of SVT - better on metoprolol. Follow up Dr Cosme in 6-8 weeks.   Taper steroids   Untreated Sleep Apnea - likely complex apnea (combination of obstructive + central) sleep apnea - AutoBiPAP at night and as needed for sleep - nasal interface   Continue PT/OT - Rehab planned, appears medically ready for transfer.    DVT Prophylaxis:  Sq heparin    CODE STATUS:   Code Status and Medical Interventions:   Ordered at: 10/08/18 1413     Limited Support to NOT Include:    Intubation     Level Of Support Discussed With:    Patient     Code Status:    No CPR     Medical Interventions (Level of Support Prior to Arrest):    Limited       Disposition: I expect the patient to be discharged to rehab in 1-2 days      Electronically signed by Richard Escalante MD, 10/15/18, 4:31 PM.

## 2018-10-15 NOTE — PLAN OF CARE
Problem: Patient Care Overview  Goal: Plan of Care Review  Outcome: Ongoing (interventions implemented as appropriate)   10/15/18 3529   Coping/Psychosocial   Plan of Care Reviewed With patient   Plan of Care Review   Progress no change   OTHER   Outcome Summary Able to perform STS trials x 2 & SPT Chair to bed w/ max 3 A, but limited by intract. LBP S/P UIC 3 1/2 hrs;unable to initiate gt.; partially met gt. goal on 10-10 w/ max 2 A; remaining goals ongoing; will cont. to premed p/t gt. attempts

## 2018-10-15 NOTE — THERAPY PROGRESS REPORT/RE-CERT
"Acute Care - Physical Therapy Progress Note  [facility]     Patient Name: Yassine Love  : 1945  MRN: [id number]  Today's Date: 10/15/2018  Onset of Illness/Injury or Date of Surgery: 10/02/18  Date of Referral to PT: 10/02/18  Referring Physician: DO Javi    Admit Date: 10/2/2018    Visit Dx:    ICD-10-CM ICD-9-CM   1. Acute on chronic respiratory failure with hypoxia and hypercapnia (CMS/HCC) J96.21 518.84    J96.22 786.09     799.02   2. Impaired functional mobility, balance, gait, and endurance Z74.09 V49.89   3. 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 O2 with chronic hypercarbia   • Essential hypertension   • Non-sustained ventricular tachycardia (CMS/HCC)   • CAD status post GIGI ×2 to LAD and RCA 3/12/18   • Debility   • Decubitus ulcer of buttock, stage 2   • Chronic pain   • Chronic venous stasis dermatitis of both lower extremities   • Acute on chronic mixed hypercarbic/hypoxemic respiratory failure requiring intubation and ventilatory support   • History of tobacco use   • Acute kidney injury   • Brief runs of SVT (supraventricular tachycardia)    • H/O bacteremia due to Staphylococcus   • Opioid use   • Overweight (BMI 25.0-29.9)   • Pneumonia due to infectious organism   • Sleep apnea       Therapy Treatment          Rehabilitation Treatment Summary     Row Name 10/15/18 1243 10/15/18 0819          Treatment Time/Intention    Discipline physical therapist  -DM occupational therapist  -SD     Document Type progress note/recertification  -DM therapy note (daily note)  -SD     Subjective Information complains of;weakness;pain  -DM complains of;weakness  -SD     Mode of Treatment physical therapy  -DM occupational therapy  -SD     Patient/Family Observations [facility] per OT rx.;\"had sharp Pain upon Standing; no gt attempted by " "OT\"; on tele& o2;kyphotic posture,w/ periodic dozing off;IV hep locked;exit alarm  -DM Pt. supine in bed, 3L O2 via NC, tele, & IV. No family present.  -SD     Care Plan Review care plan/treatment goals reviewed;patient/other agree to care plan  -DM2 care plan/treatment goals reviewed;risks/benefits reviewed;current/potential barriers reviewed;patient/other agree to care plan  -SD     Therapy Frequency (PT Clinical Impression) daily  -DM  --     Total Minutes, Occupational Therapy Treatment  -- 39  -SD     Therapy Frequency (OT Eval)  -- daily  -SD     Patient Effort good  -DM good  -SD     Existing Precautions/Restrictions fall;oxygen therapy device and L/min   MRSA;RAFAEL;SVT @ admit  -DM  --     Recorded by [DM] Rubia Amador, PT 10/15/18 1253  [DM2] Rubia Amador, PT 10/15/18 1352 [SD] Michelle Milligan, OT 10/15/18 0913     Row Name 10/15/18 1243 10/15/18 0819          Vital Signs    Pre Systolic BP Rehab 120  -DM  --     Pre Treatment Diastolic BP 68  -DM  --     Intra Systolic BP Rehab  -- 126  -SD     Intra Treatment Diastolic BP  -- 68  -SD     Post Systolic BP Rehab 113  -DM2  --     Post Treatment Diastolic BP 63  -DM2  --     Pretreatment Heart Rate (beats/min) 66  -DM  --     Intratreatment Heart Rate (beats/min) 73  -DM2  --     Posttreatment Heart Rate (beats/min) 69  -DM2  --     Pre SpO2 (%) 96  -DM2 87  -SD     O2 Delivery Pre Treatment nasal cannula  -DM2 supplemental O2  -SD     Intra SpO2 (%) 89  -DM2 79   fluctuated & accuracy of reading up and down (60's-90's)  -SD     O2 Delivery Intra Treatment supplemental O2  -DM2 supplemental O2  -SD     Post SpO2 (%) 95  -DM2 94  -SD     O2 Delivery Post Treatment supplemental O2  -DM2 supplemental O2  -SD     Pre Patient Position Sitting  -DM2 Supine  -SD     Intra Patient Position Standing  -DM2 Standing  -SD     Post Patient Position Side Lying   ref. attempting Respirex  -DM2 Sitting  -SD     Recorded by [DM] Rubia Amador, PT 10/15/18 " 1253  [DM2] Rubia Amador, PT 10/15/18 1352 [SD] Michelle Milligan, OT 10/15/18 0913     Row Name 10/15/18 1243             Cognitive Assessment/Intervention    Additional Documentation Cognitive Assessment/Intervention (Group)  -DM      Recorded by [DM] Rubia Amador, PT 10/15/18 1352      Row Name 10/15/18 1243 10/15/18 0819          Cognitive Assessment/Intervention- PT/OT    Affect/Mental Status (Cognitive) anxious   occ.anx/moaning loudly d/t back pain,then dozes off  -DM WFL  -SD     Orientation Status (Cognition) oriented to;person;place  -DM oriented to;person;place;situation  -SD     Follows Commands (Cognition) follows one step commands;50-74% accuracy;increased processing time needed;repetition of directions required  -DM follows one step commands;over 90% accuracy;verbal cues/prompting required  -SD     Cognitive Function (Cognitive) safety deficit  -DM2 safety deficit  -SD     Safety Deficit (Cognitive) mild deficit;impulsivity;insight into deficits/self awareness;safety precautions follow-through/compliance  -DM mild deficit;awareness of need for assistance;insight into deficits/self awareness;safety precautions awareness;safety precautions follow-through/compliance  -SD     Personal Safety Interventions fall prevention program maintained;gait belt;nonskid shoes/slippers when out of bed  -DM fall prevention program maintained;gait belt;muscle strengthening facilitated;nonskid shoes/slippers when out of bed  -SD     Recorded by [DM] Rubia Amador, PT 10/15/18 1352  [DM2] Rubia Amador, PT 10/15/18 1253 [SD] Michelle Milligan, OT 10/15/18 0913     Row Name 10/15/18 1243             Safety Issues, Functional Mobility    Impairments Affecting Function (Mobility) balance;cognition;coordination;endurance/activity tolerance;motor control;pain;postural/trunk control;range of motion (ROM);strength  -DM      Recorded by [DM] Rubia Amador, PT 10/15/18 1352      Row Name 10/15/18 1243  10/15/18 0819          Bed Mobility Assessment/Treatment    Bed Mobility Assessment/Treatment rolling left;scooting/bridging;sit-supine  -DM  --     Rolling Left [place] (Bed Mobility) verbal cues;moderate assist (50% patient effort)   cues to reach for L bedrail;reposn d/tLBP;propped w/pillows  -DM  --     Rolling Right [place] (Bed Mobility) --  -DM contact guard  -SD     Scooting/Bridging [place] (Bed Mobility) verbal cues;dependent (less than 25% patient effort);2 person assist   not rachell. hook-lying(unable to Bridge/ asst w/reposn to HOB)  -DM contact guard  -SD     Supine-Sit [place] (Bed Mobility) --  -DM contact guard  -SD     Sit-Supine [place] (Bed Mobility) verbal cues;maximum assist (25% patient effort);2 person assist  -DM  --     Bed Mobility, Safety Issues decreased use of arms for pushing/pulling;decreased use of legs for bridging/pushing;impaired trunk control for bed mobility  -DM  --     Assistive Device (Bed Mobility) bed rails;draw sheet  -DM bed rails  -SD     Comment (Bed Mobility) NOT rachell SUP.;Reposn to sidelying;nsg brought pain med  -DM  --     Recorded by [DM] Rubia Amador, PT 10/15/18 1352 [SD] Michelle Milligan, OT 10/15/18 0913     Row Name 10/15/18 1243             Transfer Assessment/Treatment    Transfer Assessment/Treatment chair-bed transfer;sit-stand transfer;stand-sit transfer;stand pivot/stand step transfer  -DM      Recorded by [DM] Rubia Amador, PT 10/15/18 1352      Row Name 10/15/18 0819             Bed-Chair Transfer    Bed-Chair [place] (Transfers) moderate assist (50% patient effort)  -SD      Assistive Device (Bed-Chair Transfers) walker, front-wheeled  -SD      Recorded by [SD] Michelle Milligan OT 10/15/18 0913      Row Name 10/15/18 1243 10/15/18 0819          Sit-Stand Transfer    Sit-Stand [place] (Transfers) verbal cues;maximum assist (25% patient effort);2 person assist   2 trials(w/Rwx,then pt.demanded  "removeAD;rocked STS max 3 A)  -DM moderate assist (50% patient effort)  -SD     Assistive Device (Sit-Stand Transfers) walker, front-wheeled  -DM2 walker, front-wheeled  -SD     Recorded by [DM] Rubia Amador, PT 10/15/18 1352  [DM2] Rubia Amador, PT 10/15/18 1253 [SD] Michelle Milligan, OT 10/15/18 0913     Row Name 10/15/18 1243 10/15/18 0819          Stand-Sit Transfer    Stand-Sit [place] (Transfers) verbal cues;maximum assist (25% patient effort);2 person assist   knees buckled;sat quickly;boosted R Buttock back onEOB  -DM minimum assist (75% patient effort)  -SD     Assistive Device (Stand-Sit Transfers) walker, front-wheeled  -DM2 walker, front-wheeled  -SD     Recorded by [DM] Rubia Amador, PT 10/15/18 1352  [DM2] Rubia Amador, PT 10/15/18 1253 [SD] Michelle Milligan, OT 10/15/18 0913     Row Name 10/15/18 1243             Stand Pivot/Stand Step Transfer    Stand Pivot/Stand Step [place] verbal cues;maximum assist (25% patient effort);2 person assist   max 3A(nsg & PCT assistingPT);SPT to L,toEOB  -DM      Assistive Device (Stand Pivot Stand Step Transfer) other (see comments)   REF. R wx(\"it's in my way\");req.Gt.belt,UE supp.only  -DM      Recorded by [DM] Rubia Amador, PT 10/15/18 1352      Row Name 10/15/18 1243             Toilet Transfer    Type (Toilet Transfer) sit-stand;stand-sit  -DM      [place] Level (Toilet Transfer) moderate assist (50% patient effort);2 person assist;verbal cues  -DM      Assistive Device (Toilet Transfer) walker, front-wheeled  -DM      Recorded by [DM] Rubia Amador, PT 10/15/18 1253      Row Name 10/15/18 1243             Gait/Stairs Assessment/Training    Gait/Stairs Assessment/Training --  -DM      [place] Level (Gait) --  -DM      Assistive Device (Gait) --  -DM      Pattern (Gait) --  -DM      Deviations/Abnormal Patterns (Gait) --  -DM      Bilateral Gait Deviations --  -DM      Comment (Gait/Stairs) decl. to attempt " d/t severe back pain;crying out loudly during pre-gt  -DM      Recorded by [DM] Rubia Amador, PT 10/15/18 1352      Row Name 10/15/18 0819             ADL Assessment/Intervention    03076 - OT Self Care/Mgmt Minutes 5  -SD      Recorded by [SD] Michelle Milligan, OT 10/15/18 0913      Row Name 10/15/18 0819             Grooming Assessment/Training    [place] Level (Grooming) wash face, hands;set up  -SD      Grooming Position long sitting  -SD      Recorded by [SD] Michelle Milligan, OT 10/15/18 0913      Row Name 10/15/18 1243             Motor Skills Assessment/Interventions    Additional Documentation Balance (Group);Balance Interventions (Group);Therapeutic Exercise (Group);Therapeutic Exercise Interventions (Group)  -DM      Recorded by [DM] Rubia Amador, PT 10/15/18 1352      Row Name 10/15/18 1243 10/15/18 0819          Therapeutic Exercise    14685 - PT Therapeutic Activity Minutes 40  -DM  --     71848 - OT Therapeutic Exercise Minutes  -- 10  -SD     65377 - OT Therapeutic Activity Minutes  -- 24  -SD     Recorded by [DM] Rubia Amador, PT 10/15/18 1352 [SD] Michelle Milligan, OT 10/15/18 0913     Row Name 10/15/18 0819             Upper Extremity Seated Therapeutic Exercise    Performed, Seated Upper Extremity (Therapeutic Exercise) shoulder flexion/extension;scapular protraction/retraction;elbow flexion/extension  -SD      Exercise Type, Seated Upper Extremity (Therapeutic Exercise) AROM (active range of motion);other (see comments)   focus on pursed lip breathing technique  -SD      Sets/Reps Detail, Seated Upper Extremity (Therapeutic Exercise) 4 sets  -SD      Recorded by [SD] Michelle Milligan, OT 10/15/18 0913      Row Name 10/15/18 0819             Lower Extremity Seated Therapeutic Exercise    Performed, Seated Lower Extremity (Therapeutic Exercise) hip flexion/extension;knee flexion/extension;ankle dorsiflexion/plantarflexion  -SD      Exercise Type, Seated  "Lower Extremity (Therapeutic Exercise) AROM (active range of motion)  -SD      Sets/Reps Detail, Seated Lower Extremity (Therapeutic Exercise) 4 sets per LE  -SD      Recorded by [SD] Michelle Milligan, OT 10/15/18 0913      Row Name 10/15/18 1243             Therapeutic Exercise    Lower Extremity Range of Motion (Therapeutic Exercise) hip flexion/extension, bilateral;hip abduction/adduction, bilateral;ankle dorsiflexion/plantar flexion, bilateral   attempted hook lying to facil.reposn;protesting\"I need TKR's  -DM      Exercise Type (Therapeutic Exercise) AAROM (active assistive range of motion);PROM (passive range of motion)  -DM      Position (Therapeutic Exercise) seated;side lying;supine  -DM      Sets/Reps (Therapeutic Exercise) 1/5  -DM      Comment (Therapeutic Exercise) very reluctant to perform ther ex;rested after @1-2 reps  -DM      Recorded by [DM] Rubia Amador, PT 10/15/18 1352      Row Name 10/15/18 1243             Balance    Balance static sitting balance;static standing balance;dynamic sitting balance  -DM      Recorded by [DM] Rubia Amador, PT 10/15/18 1352      Row Name 10/15/18 1243 10/15/18 0819          Static Sitting Balance    Level of [place] (Unsupported Sitting, Static Balance) contact guard assist  -DM standby assist  -SD     Sitting Position (Unsupported Sitting, Static Balance) sitting on edge of bed  -DM sitting on edge of bed  -SD     Time Able to Maintain Position (Unsupported Sitting, Static Balance) 15 to 30 seconds  -DM more than 5 minutes  -SD     Comment (Unsupported Sitting, Static Balance) worked on trunk/neck ext  -DM  --     Recorded by [DM] Rubia Amador, PT 10/15/18 1352 [SD] Michelle Milligan, OT 10/15/18 0913     Row Name 10/15/18 1243             Dynamic Sitting Balance    Level of [place], Reaches Outside Midline (Sitting, Dynamic Balance) maximal assist, 25 to 49% patient effort  -DM      Sitting Position, Reaches Outside Midline " (Sitting, Dynamic Balance) sitting on edge of bed  -DM      Comment, Reaches Outside Midline (Sitting, Dynamic Balance) scooting hips F/B (chair,then EOB to boost back)  -DM      Recorded by [DM] Rubia Amador, PT 10/15/18 1352      Row Name 10/15/18 1243 10/15/18 0819          Static Standing Balance    Level of [place] (Supported Standing, Static Balance) maximal assist, 25 to 49% patient effort   max 3 A (PT & PCT in front;nsg behind pt)  -DM moderate assist, 50 to 74% patient effort  -SD     Time Able to Maintain Position (Supported Standing, Static Balance) less than 15 seconds  -DM 45 to 60 seconds  -SD     Assistive Device Utilized (Supported Standing, Static Balance) rolling walker   1st trial w/R wx;2nd w/o AD(pt req.removeR wx)  -DM rolling walker  -SD     Comment (Supported Standing, Static Balance) knees buckling;inc.back pain;flexed trunk  -DM  --     Recorded by [DM] Rubia Amador, PT 10/15/18 1352 [SD] Michelle Milligan, OT 10/15/18 0913     Row Name 10/15/18 1243 10/15/18 0819          Positioning and Restraints    Pre-Treatment Position sitting in chair/recliner  -DM in bed  -SD     Post Treatment Position bed  -DM chair  -SD     In Bed notified nsg;side lying left;call light within reach;encouraged to call for assist;exit alarm on;RUE elevated;pillow between legs   pillows propped to back for comfort;oxim.sensor replaced  -DM  --     In Chair  -- reclined;notified nsg;call light within reach;encouraged to call for assist;exit alarm on;legs elevated  -SD     Recorded by [DM] Rubia Amador, PT 10/15/18 1352 [SD] Michelle Milligan, OT 10/15/18 0913     Row Name 10/15/18 124             Pain Assessment    Additional Documentation Pain Scale: FACES Pre/Post-Treatment (Group)  -DM      Recorded by [DM] Rubia Amador, PT 10/15/18 1352      Row Name 10/15/18 0819             Pain Scale: Numbers Pre/Post-Treatment    Pain Scale: Numbers, Pretreatment 0/10 - no pain  -SD      Pain  Scale: Numbers, Post-Treatment 7/10  -SD      Pain Location - Side Bilateral  -SD      Pain Location - Orientation lower  -SD      Pain Location back  -SD      Pre/Post Treatment Pain Comment pt. without pain until he stood, then 9/10 pain in lower back  -SD      Pain Intervention(s) Repositioned  -SD      Recorded by [SD] Michelle Milligan, OT 10/15/18 0913      Row Name 10/15/18 1243             Pain Scale: FACES Pre/Post-Treatment    Pain: FACES Scale, Pretreatment 8-->hurts whole lot  -DM      Pain: FACES Scale, Post-Treatment 10-->hurts worst  -DM      Pre/Post Treatment Pain Comment nsg brought pain med  -DM      Recorded by [DM] Rubia Amador, PT 10/15/18 1352      Row Name                Wound 10/02/18 2230 Bilateral medial gluteal MASD (moisture associated skin damage)    Wound - Properties Group Date first assessed: 10/02/18 [MR] Time first assessed: 2230 [MR] Present On Admission : yes [MR] Side: Bilateral [MR] Orientation: medial [MR] Location: gluteal [MR] Type: MASD (moisture associated skin damage) [MR], friction shearing  Recorded by:  [MR] Brittany Evans RN 10/03/18 1505    Row Name 10/15/18 1243 10/15/18 0819          Coping    Observed Emotional State --  -DM frustrated;calm;cooperative  -SD     Verbalized Emotional State  -- frustration  -SD     Recorded by [DM] Rubia Amador, PT 10/15/18 1352 [SD] Michelle Milligan, OT 10/15/18 0913     Row Name 10/15/18 1243 10/15/18 0819          Plan of Care Review    Plan of Care Reviewed With patient  -DM patient  -SD     Recorded by [DM] Rubia Amador, PT 10/15/18 1352 [SD] Michelle Milligan, OT 10/15/18 0913     Row Name 10/15/18 0819             Outcome Summary/Treatment Plan (OT)    Daily Summary of Progress (OT) progress toward functional goals is gradual  -SD      Barriers to Overall Progress (OT) pain & weakness  -SD      Plan for Continued Treatment (OT) cont OT POC  -SD      Anticipated Discharge Disposition (OT) skilled  nursing facility  -SD      Recorded by [SD] Michelle Milligan Mary Kate, OT 10/15/18 0913      Row Name 10/15/18 1243             Outcome Summary/Treatment Plan (PT)    Daily Summary of Progress (PT) progress toward functional goals is gradual  -DM      Anticipated Discharge Disposition (PT) skilled nursing facility  -DM2      Recorded by [DM] Rubia Amador, PT 10/15/18 1352  [DM2] Rubia Amador, PT 10/15/18 1253        User Key  (r) = Recorded By, (t) = Taken By, (c) = Cosigned By    Initials Name Effective Dates Discipline    SD Michelle Milligan, OT 06/08/18 -  OT    DM Rubia Amador, PT 06/19/15 -  PT    Brittany Azar RN 06/16/16 -  Nurse          Wound 10/02/18 5248 Bilateral medial gluteal MASD (moisture associated skin damage) (Active)   Base maroon/purple 10/14/2018  8:04 PM   Periwound ecchymotic 10/14/2018  8:04 PM   Drainage Amount none 10/14/2018  6:00 PM   Dressing Care, Wound open to air 10/14/2018  8:04 PM   Periwound Care, Wound barrier ointment applied 10/14/2018  8:04 PM               PT Rehab Goals     Row Name 10/15/18 1243             Bed Mobility Goal 1 (PT)    Activity/Assistive Device (Bed Mobility Goal 1, PT) sit to supine/supine to sit  -DM      [place] Level/Cues Needed (Bed Mobility Goal 1, PT) moderate assist (50-74% patient effort)  -DM      Time Frame (Bed Mobility Goal 1, PT) 2 weeks  -DM      Progress/Outcomes (Bed Mobility Goal 1, PT) goal ongoing  -DM         Transfer Goal 1 (PT)    Activity/Assistive Device (Transfer Goal 1, PT) sit-to-stand/stand-to-sit;walker, rolling  -DM      [place] Level/Cues Needed (Transfer Goal 1, PT) moderate assist (50-74% patient effort)  -DM      Time Frame (Transfer Goal 1, PT) 2 weeks  -DM      Progress/Outcome (Transfer Goal 1, PT) goal ongoing  -DM         Gait Training Goal 1 (PT)    Activity/Assistive Device (Gait Training Goal 1, PT) gait (walking locomotion);walker, rolling  -DM      [place] Level (Gait Training  Goal 1, PT) maximum assist (25-49% patient effort)  -DM      Distance (Gait Goal 1, PT) 5  -DM      Time Frame (Gait Training Goal 1, PT) 2 weeks  -DM      Progress/Outcome (Gait Training Goal 1, PT) goal partially met   amb 5 ft w/ max 2 A on 10-10  -DM        User Key  (r) = Recorded By, (t) = Taken By, (c) = Cosigned By    Initials Name Provider Type Discipline    Rubia Kerr, PT Physical Therapist PT          Physical Therapy Education     Title: PT OT SLP Therapies (Active)     Topic: Physical Therapy (Active)     Point: Mobility training (Active)    Learning Progress Summary     Learner Status Readiness Method Response Comment Documented by    Patient Active Acceptance E,D NR  DM 10/15/18 1355     Active Acceptance E,D NR Safety  10/12/18 1117     Done Eager E VU  KM 10/10/18 0930     Active Acceptance E,D NR  LS 10/05/18 1324     Active Acceptance E,D NR  LS 10/04/18 1119     Done Acceptance E,TB VU  MT 10/03/18 1851    Family Active Acceptance E,D NR  LS 10/04/18 1119     Done Acceptance E,TB VU  MT 10/03/18 1851          Point: Home exercise program (Active)    Learning Progress Summary     Learner Status Readiness Method Response Comment Documented by    Patient Active Acceptance E,D NR  DM 10/15/18 1355     Active Acceptance E,D NR Safety  10/12/18 1117     Done Eager E VU  KM 10/10/18 0930     Active Acceptance E,D NR  LS 10/05/18 1324     Active Acceptance E,D NR  LS 10/04/18 1119     Done Acceptance E,TB VU  MT 10/03/18 1851    Family Active Acceptance E,D NR  LS 10/04/18 1119     Done Acceptance E,TB VU  MT 10/03/18 1851          Point: Body mechanics (Active)    Learning Progress Summary     Learner Status Readiness Method Response Comment Documented by    Patient Active Acceptance E,D NR  DM 10/15/18 1355     Active Acceptance E,D NR Safety  10/12/18 1117     Done Eager E VU  KM 10/10/18 0930     Active Acceptance E,D NR  LS 10/05/18 1324     Active Acceptance E,D NR  LS 10/04/18 1119     Family Active Acceptance E,D NR  LS 10/04/18 1119          Point: Precautions (Active)    Learning Progress Summary     Learner Status Readiness Method Response Comment Documented by    Patient Active Acceptance E,D NR   10/15/18 1355     Active Acceptance E,D NR Safety  10/12/18 1117     Done Eager E VU  KM 10/10/18 0930     Active Acceptance E,D NR  LS 10/05/18 1324     Active Acceptance E,D NR  LS 10/04/18 1119     Done Acceptance E,TB VU  MT 10/03/18 1851    Family Active Acceptance E,D NR  LS 10/04/18 1119     Done Acceptance E,TB VU  MT 10/03/18 1851                      User Key     Initials Effective Dates Name Provider Type Discipline     06/19/15 -  Alva Castellanos, PT Physical Therapist PT     06/19/15 -  Carmela Washington, PT Physical Therapist PT     06/19/15 -  Rubia Amador, PT Physical Therapist PT     06/19/15 -  Zari Rai, PT Physical Therapist PT    MT 06/16/16 -  Noemi Fish RN Registered Nurse Nurse                    PT Recommendation and Plan  Anticipated Discharge Disposition (PT): skilled nursing facility  Therapy Frequency (PT Clinical Impression): daily  Outcome Summary/Treatment Plan (PT)  Daily Summary of Progress (PT): progress toward functional goals is gradual  Anticipated Discharge Disposition (PT): skilled nursing facility  Plan of Care Reviewed With: patient  Progress: no change  Outcome Summary: Able to perform STS trials x 2 & SPT Chair to bed w/ max 3 A, but limited by intract. LBP S/P [facility] 3 1/2 hrs;unable to initiate gt.; partially met gt. goal on 10-10 w/ max 2 A; remaining goals ongoing; will cont. to premed p/t gt. attempts           Outcome Measures     Row Name 10/15/18 1243 10/15/18 0819          How much help from another person do you currently need...    Turning from your back to your side while in flat bed without using bedrails? 2  -DM  --     Moving from lying on back to sitting on the side of a flat bed without bedrails? 2  -DM   --     Moving to and from a bed to a chair (including a wheelchair)? 2  -DM  --     Standing up from a chair using your arms (e.g., wheelchair, bedside chair)? 2  -DM  --     Climbing 3-5 steps with a railing? 1  -DM  --     To walk in hospital room? 1  -DM  --     AM-PAC 6 Clicks Score 10  -DM  --        How much help from another is currently needed...    Putting on and taking off regular lower body clothing?  -- 2  -SD     Bathing (including washing, rinsing, and drying)  -- 2  -SD     Toileting (which includes using toilet bed pan or urinal)  -- 2  -SD     Putting on and taking off regular upper body clothing  -- 3  -SD     Taking care of personal grooming (such as brushing teeth)  -- 3  -SD     Eating meals  -- 3  -SD     Score  -- 15  -SD        Functional Assessment    Outcome Measure Options AM-PAC 6 Clicks Basic Mobility (PT)  -DM AM-PAC 6 Clicks Daily Activity (OT)  -SD       User Key  (r) = Recorded By, (t) = Taken By, (c) = Cosigned By    Initials Name Provider Type    Michelle Levine, OT Occupational Therapist    DM Rubia Amador, PT Physical Therapist           Time Calculation:         PT Charges     Row Name 10/15/18 1400 10/15/18 1243          Time Calculation    Start Time 1243  -DM  --     PT Received On 10/15/18  -DM  --     PT Goal Re-Cert Due Date 10/25/18  -DM  --        Time Calculation- PT    Total Timed Code Minutes- PT 40 minute(s)  -DM  --        Timed Charges    48375 - PT Therapeutic Activity Minutes  -- 40  -DM       User Key  (r) = Recorded By, (t) = Taken By, (c) = Cosigned By    Initials Name Provider Type    Rubia Kerr, PT Physical Therapist        Therapy Suggested Charges     Code   Minutes Charges    49205 (CPT®) Hc Pt Neuromusc Re Education Ea 15 Min      03153 (CPT®) Hc Pt Ther Proc Ea 15 Min      70186 (CPT®) Hc Gait Training Ea 15 Min      10495 (CPT®) Hc Pt Therapeutic Act Ea 15 Min 40 3    14448 (CPT®) Hc Pt Manual Therapy Ea 15 Min      20163 (CPT®)  Hc Pt Iontophoresis Ea 15 Min      34487 (CPT®) Hc Pt Elec Stim Ea-Per 15 Min      28111 (CPT®) Hc Pt Ultrasound Ea 15 Min      85636 (CPT®) Hc Pt Self Care/Mgmt/Train Ea 15 Min      18754 (CPT®) Hc Pt Prosthetic (S) Train Initial Encounter, Each 15 Min      46091 (CPT®) Hc Pt Orthotic(S)/Prosthetic(S) Encounter, Each 15 Min      72794 (CPT®) Hc Orthotic(S) Mgmt/Train Initial Encounter, Each 15min      Total  40 3        Therapy Charges for Today     Code Description Service Date Service Provider Modifiers Qty    87522228319 HC PT THERAPEUTIC ACT EA 15 MIN 10/15/2018 Rubia Amador, PT GP 3          PT G-Codes  Outcome Measure Options: AM-PAC 6 Clicks Basic Mobility (PT)  AM-PAC 6 Clicks Score: 10  Score: 15    Rubia Amador, PT  10/15/2018

## 2018-10-15 NOTE — PROGRESS NOTES
[facility] Progress Note    Admission Date: 10/2/2018    Yassine Love  1945  [id number]    Date: 10/15/2018    Antibiotics:  Anti-Infectives     Ordered     Dose/Rate Route Frequency Start Stop    10/13/18 1318  linezolid (ZYVOX) tablet 600 mg     Ordering Provider:  Mary Kate Rahman MD    600 mg Oral Every 12 Hours Scheduled 10/13/18 2100 10/19/18 2059    10/13/18 1318  meropenem (MERREM) 1 g/100 mL 0.9% NS VTB (mbp)     Mary Kate Rahman MD reviewed the order on 10/13/18 1440.   Ordering Provider:  Mary Kate Rahman MD    1 g  over 3 Hours Intravenous Every 8 Hours Scheduled 10/13/18 1400 10/15/18 0813    10/04/18 1629  Linezolid (ZYVOX) 600 mg 300 mL     Mary Kate Rahman MD reviewed the order on 10/08/18 1301.   Ordering Provider:  Mary Kate Rahman MD    600 mg  300 mL/hr over 60 Minutes Intravenous Every 12 Hours 10/04/18 1800 10/13/18 0623    10/03/18 0229  meropenem (MERREM) 1 g/100 mL 0.9% NS VTB (mbp)     Ordering Provider:  Perla Jones APRN    1 g  over 3 Hours Intravenous Every 8 Hours 10/03/18 1000 10/13/18 0556    10/03/18 0914  vancomycin 1750 mg/500 mL 0.9% NS IVPB ([facility])     Ordering Provider:  Parish Donato MD    1,750 mg  over 120 Minutes Intravenous Once 10/03/18 1000 10/03/18 1203    10/03/18 0229  meropenem (MERREM) 1 g/100 mL 0.9% NS VTB (mbp)     Ordering Provider:  Perla Jones APRN    1 g  over 30 Minutes Intravenous Once 10/03/18 0315 10/03/18 0313    10/02/18 2108  piperacillin-tazobactam (ZOSYN) 4.5 g in iso-osmotic dextrose 100 mL IVPB (premix)     Ordering Provider:  Pastora Caldwell DO    4.5 g  over 30 Minutes Intravenous Once 10/02/18 2108 10/03/18 0114           Reason for Consultation: bilateral leg cellulitis,  bronchitis      History of present illness:    Patient is a 72 y.o. male well known to Dr Belle from prior admissions. Dr Belle has primarily followed for cellulitis  He was hospitalized 8/21 to 9/7 and Dr Belle followed for cellulitis complicating  bilateral leg ulcers   This admission on  10/2/18 is for  for COPD exacerbation and severe hypoxia. He iwas transferred from a  skilled nursing facility with hypoxemia and visual hallucinations.NC. EMS was called and Mr. Love was  CTA was negative for pulmonary embolism but  pulmonary edema with diffuse interstitial infiltrates and pleural effusions. BNP was 899 and procalcitonin normal on 10/2 and 10/4. He required mechanical ventilation  Sputum culture grew MSSA  He has been treated with zyvox and merrrem  He improved after diuresis and was extubated  The leg wounds present 2 months ago have healed He has some residual erythema and states that his legs are painful at times  He is known to have severe PVD     10/10  More alert this am; intermittent cough perisists  Denies leg pain  10/11 alert, no new c/o, denies leg pain  11/15/18 - Patient is sleeping.  ROS discussed with staff at bedside.  Currently on 3LNC.  Co weakness  No fever or chills  Co dyspnea  Co cough      ROS:  Unobtainable.  See above      PE:  Vital Signs  Temp  Min: 98.1 °F (36.7 °C)  Max: 99.2 °F (37.3 °C)  BP  Min: 113/63  Max: 120/68  Pulse  Min: 64  Max: 77  Resp  Min: 16  Max: 20  SpO2  Min: 86 %  Max: 91 %  GENERAL: sleeping, NAD  HEENT: Normocephalic, atraumatic.  PERRL.  Oropharynx not visualized.   NECK: Supple without nuchal rigidity. No mass.  LYMPH: No cervical lymphadenopathy.  HEART: Regular S1S2  LUNGS: Diminished throughout  ABDOMEN: Soft, nontender, nondistended. Positive bowel sounds.   EXT:  Mild edema bilateral legs ulcerations are much improved.  SKIN: Warm and dry without cutaneous eruptions on Inspection/palpation.    NEURO: Oriented to PPT. No focal deficits on motor/sensory exam at arms/legs.  PSYCHIATRIC: Normal insight and judgement. Cooperative with PE  Seen and agree, crackles in bases       Laboratory Data      Results from last 7 days  Lab Units 10/10/18  0507 10/09/18  0751   WBC 10*3/mm3 6.63 6.27   HEMOGLOBIN  g/dL 9.8* 10.0*   HEMATOCRIT % 32.4* 33.6*   PLATELETS 10*3/mm3 351 302       Results from last 7 days  Lab Units 10/10/18  0507   SODIUM mmol/L 137   POTASSIUM mmol/L 4.6   CHLORIDE mmol/L 101   CO2 mmol/L 33.0*   BUN mg/dL 36*   CREATININE mg/dL 1.15   GLUCOSE mg/dL 91   CALCIUM mg/dL 8.2*                   Estimated Creatinine Clearance: 60.3 mL/min (by C-G formula based on SCr of 1.15 mg/dL).      Microbiology:  mssa from sputum;  mrsa from nares swab    Radiology:  Imaging Results (last 72 hours)     Procedure Component Value Units Date/Time    XR Chest 1 View [[id number]] Collected:  10/06/18 1115     Updated:  10/06/18 1355    Narrative:       EXAMINATION: XR CHEST 1 VW - 10/06/2018     INDICATION: J96.21-Acute and chronic respiratory failure with hypoxia;  J96.22-Acute and chronic respiratory failure with hypercapnia;  Z74.09-Other reduced mobility.     TECHNIQUE:  Single view frontal chest.     COMPARISONS: 10/5/2018.     FINDINGS:  Stable support apparatus. Small bilateral pleural effusions  and adjacent atelectasis are unchanged. No pneumothorax. Calcification  aortic knob. Cardiomediastinal silhouette is unchanged.        Impression:       Stable exam.     DICTATED:   10/06/2018  EDITED/ls :   10/06/2018      This report was finalized on 10/6/2018 1:53 PM by Eliezer Hale.             I personally reviewed the radiographic studies     IMPRESSION:    -- Probable MSSA bronchitis  Vs pneumonia     -- Acute on chronic mixed respiratory failure  S/p intubation, now extubated     -- Pulmonary edema- improved     -- Chronic lymphedema and venous stasis dermatitis     -- Bilateral leg cellulitis essentially resolved     -- Severe COPD with ongoing tobacco abuse, poor pulmonary toilet     -- RAFAEL     -- Decubitus ulcer of buttock stage 2     --MRSA colonization       RECOMMENDATIONS:      -- continue current regimen of zyvox; s/p merrem 10/15     -- change to oral zyvox- anticipate rx to 10/19, will check a  CXR    Stop BRITTNY Lopez for Dr. Parish Belle  10/15/2018

## 2018-10-15 NOTE — PROGRESS NOTES
Continued Stay Note  [facility]     Patient Name: Yassine Love  MRN: [id number]  Today's Date: 10/15/2018    Admit Date: 10/2/2018          Discharge Plan     Row Name 10/15/18 1211       Plan    Plan Rehab    Patient/Family in Agreement with Plan yes    Plan Comments Per MD in rounds, pt nearing medical readiness. Spoke to Kristina at [facility] and she does not have a bed today and unsure when she will have a bed available. Spoke to pt at bedside. His neighbors are with him and discussed that [facility] may be a good option for rehab. Pt agreeable for referrals to [facility] and [facility] as well. Referral called to Zohra at [facility] and left referral via vm with Amarilis at [facility]. Updated pt's son via phone per pt's request.     Row Name 10/15/18 1692       Plan    Plan Rehab    Patient/Family in Agreement with Plan yes    Plan Comments Called and left vm for Kristina at [facility] to see if any beds are available. Called [facility] and they do not have any beds. Pt does not want any other referrals sent other than to [facility]. CM will cont to follow.               Discharge Codes    No documentation.       Expected Discharge Date and Time     Expected Discharge Date Expected Discharge Time    Oct 19, 2018             Yumiko Hull

## 2018-10-15 NOTE — PROGRESS NOTES
Clinical Nutrition   Reason For Visit: Follow-up protocol    Patient Name: Yassine Love  YOB: 1945  MRN: [id number]  Date of Encounter: 10/15/18 12:55 PM  Admission date: 10/2/2018        Nutrition Assessment     Hospital Problem List    Acute on chronic mixed hypercarbic/hypoxemic respiratory failure requiring intubation and ventilatory support    PVD (peripheral vascular disease)     H/O cellulitis of both lower extremities    End stage COPD in an active smoker on home O2 with chronic hypercarbia    Essential hypertension    CAD status post GIGI ×2 to LAD and RCA 3/12/18    Decubitus ulcer of buttock, stage 2    Acute kidney injury    Brief runs of SVT (supraventricular tachycardia)     H/O bacteremia due to Staphylococcus    Opioid use    Pneumonia due to infectious organism    Sleep apnea          PMH: He  has a past medical history of Cancer (CMS/HCC); Cellulitis of both lower extremities; Chronic pain; COPD (chronic obstructive pulmonary disease) (CMS/HCC); Hypertension; Osteoarthritis cervical spine; Osteoarthritis of both knees; and Osteoarthritis of left hip.   PSxH: He  has a past surgical history that includes Neck surgery; Leg Surgery; Eye surgery; Cardiac catheterization (N/A, 3/9/2018); and pr rt/lt heart catheters (N/A, 3/13/2018).         Anthropometrics     10/15/2018       87.0     Kg           191 lb 11.2 oz         Bed scale      10/8/2018 82.464 kg 181 lb 12.8 oz Bed scale   10/5/2018 86.637 kg 191 lb -   10/3/2018 86.8 kg 191 lb 5.8 oz Bed scale   10/2/2018 83.915 kg 185 lb Stated   9/1/2018 87.635 kg 193 lb 3.2 oz Bed scale   8/31/2018 88.587 kg 195 lb 4.8 oz Bed scale   8/30/2018 85.775 kg 189 lb 1.6 oz Bed scale   8/20/2018 81.647 kg 180 lb -   3/9/2018 82.101 kg 181 lb -   2/23/2018 82.237 kg 181 lb 4.8 oz -   2/16/2018 82.237 kg 181 lb 4.8 oz -   2/16/2018 81.647 kg 180 lb -   6/26/2017 85.3 kg 188 lb 0.8 oz -   6/21/2017 85.276 kg 188 lb -   6/20/2017 85.7 kg 188 lb 15 oz            Labs reviewed   Labs reviewed: Yes    Results from last 7 days  Lab Units 10/15/18  0607 10/14/18  0453 10/13/18  0555  10/10/18  0507 10/09/18  0751   SODIUM mmol/L  --   --   --   --  137 138   POTASSIUM mmol/L  --   --   --   --  4.6 3.7   CHLORIDE mmol/L  --   --   --   --  101 101   CO2 mmol/L  --   --   --   --  33.0* 33.0*   BUN mg/dL  --   --   --   --  36* 36*   CREATININE mg/dL  --   --   --   --  1.15 1.24   GLUCOSE mg/dL  --   --   --   --  91 86   CALCIUM mg/dL  --   --   --   --  8.2* 8.2*   MAGNESIUM mg/dL 2.3 2.2 2.3  < > 2.3 2.3   < > = values in this interval not displayed.  Medications reviewed   Medications reviewed: Yes      Current Nutrition Prescription   PO: Diet Regular      Evaluation of Received Nutrient/Fluid Intake:  3 Days:78% of 8 meals       Nutrition Diagnosis     Problem No nutrition diagnosis at this time   Etiology    Signs/Symptoms          Intervention   Intervention: Follow treatment progress, Care plan reviewed      Goal:   General: Maintain nutrition, Nutrition support treatment  PO: Maintain intake        Monitoring/Evaluation:       Monitoring/Evaluation: Per protocol  Will Continue to follow per protocol  Alva Baker RD  Time Spent: 15 min
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
## 2018-10-15 NOTE — PLAN OF CARE
Problem: Patient Care Overview  Goal: Plan of Care Review  Outcome: Ongoing (interventions implemented as appropriate)   10/15/18 1044   Coping/Psychosocial   Plan of Care Reviewed With patient   Plan of Care Review   Progress no change   OTHER   Outcome Summary Pt rested well at times. Did not sleep much. Wore cpap minimally, maybe an hour. 3L per NC. C/o headache, relieved with tylenol. VSS. NSR.

## 2018-10-15 NOTE — PROGRESS NOTES
Continued Stay Note  [facility]     Patient Name: Yassine Love  MRN: [id number]  Today's Date: 10/15/2018    Admit Date: 10/2/2018          Discharge Plan     Row Name 10/15/18 0938       Plan    Plan Rehab    Patient/Family in Agreement with Plan yes    Plan Comments Called and left vm for Kristina at [facility] to see if any beds are available. Called [facility] and they do not have any beds. Pt does not want any other referrals sent other than to [facility]. CM will cont to follow.               Discharge Codes    No documentation.       Expected Discharge Date and Time     Expected Discharge Date Expected Discharge Time    Oct 19, 2018             Yumiko Hull

## 2018-10-15 NOTE — PLAN OF CARE
Problem: Patient Care Overview  Goal: Plan of Care Review  Outcome: Ongoing (interventions implemented as appropriate)   10/15/18 0924   Coping/Psychosocial   Plan of Care Reviewed With patient   Plan of Care Review   Progress improving   OTHER   Outcome Summary Pt. supine to sit on EOB with CGA. Pt. sit to stand from EOB with mod A x1. Pt. with immediate pain in lower back. Pt. sat back on EOB. Pt. transferred EOB to chair with mod A x 1 using rolling wx. Pt. participated in UE/LE exercises with focus on pursed lip breathing technique.

## 2018-10-16 ENCOUNTER — APPOINTMENT (OUTPATIENT)
Dept: GENERAL RADIOLOGY | Facility: HOSPITAL | Age: 73
End: 2018-10-16

## 2018-10-16 LAB
GLUCOSE BLDC GLUCOMTR-MCNC: 110 MG/DL (ref 70–130)
MAGNESIUM SERPL-MCNC: 2.2 MG/DL (ref 1.3–2.7)

## 2018-10-16 PROCEDURE — 94799 UNLISTED PULMONARY SVC/PX: CPT

## 2018-10-16 PROCEDURE — 71045 X-RAY EXAM CHEST 1 VIEW: CPT

## 2018-10-16 PROCEDURE — 94660 CPAP INITIATION&MGMT: CPT

## 2018-10-16 PROCEDURE — 83735 ASSAY OF MAGNESIUM: CPT | Performed by: FAMILY MEDICINE

## 2018-10-16 PROCEDURE — 25010000002 HEPARIN (PORCINE) PER 1000 UNITS: Performed by: INTERNAL MEDICINE

## 2018-10-16 PROCEDURE — 63710000001 PREDNISONE PER 5 MG: Performed by: INTERNAL MEDICINE

## 2018-10-16 PROCEDURE — 82962 GLUCOSE BLOOD TEST: CPT

## 2018-10-16 PROCEDURE — 94760 N-INVAS EAR/PLS OXIMETRY 1: CPT

## 2018-10-16 PROCEDURE — 94640 AIRWAY INHALATION TREATMENT: CPT

## 2018-10-16 PROCEDURE — 99232 SBSQ HOSP IP/OBS MODERATE 35: CPT | Performed by: INTERNAL MEDICINE

## 2018-10-16 RX ADMIN — Medication 10 ML: at 09:16

## 2018-10-16 RX ADMIN — HEPARIN SODIUM 5000 UNITS: 5000 INJECTION, SOLUTION INTRAVENOUS; SUBCUTANEOUS at 14:01

## 2018-10-16 RX ADMIN — METOPROLOL TARTRATE 25 MG: 25 TABLET ORAL at 00:11

## 2018-10-16 RX ADMIN — FUROSEMIDE 40 MG: 40 TABLET ORAL at 09:14

## 2018-10-16 RX ADMIN — GUAIFENESIN 600 MG: 600 TABLET, EXTENDED RELEASE ORAL at 09:14

## 2018-10-16 RX ADMIN — MIRTAZAPINE 15 MG: 15 TABLET, ORALLY DISINTEGRATING ORAL at 20:02

## 2018-10-16 RX ADMIN — ASPIRIN 81 MG CHEWABLE TABLET 81 MG: 81 TABLET CHEWABLE at 09:14

## 2018-10-16 RX ADMIN — POTASSIUM CHLORIDE 30 MEQ: 750 CAPSULE, EXTENDED RELEASE ORAL at 09:13

## 2018-10-16 RX ADMIN — GABAPENTIN 200 MG: 100 CAPSULE ORAL at 06:57

## 2018-10-16 RX ADMIN — ATORVASTATIN CALCIUM 40 MG: 40 TABLET, FILM COATED ORAL at 20:02

## 2018-10-16 RX ADMIN — CASTOR OIL AND BALSAM, PERU: 788; 87 OINTMENT TOPICAL at 09:19

## 2018-10-16 RX ADMIN — Medication 10 ML: at 09:18

## 2018-10-16 RX ADMIN — HEPARIN SODIUM 5000 UNITS: 5000 INJECTION, SOLUTION INTRAVENOUS; SUBCUTANEOUS at 06:57

## 2018-10-16 RX ADMIN — Medication 10 ML: at 20:03

## 2018-10-16 RX ADMIN — HEPARIN SODIUM 5000 UNITS: 5000 INJECTION, SOLUTION INTRAVENOUS; SUBCUTANEOUS at 20:02

## 2018-10-16 RX ADMIN — IPRATROPIUM BROMIDE AND ALBUTEROL SULFATE 3 ML: 2.5; .5 SOLUTION RESPIRATORY (INHALATION) at 23:21

## 2018-10-16 RX ADMIN — LINEZOLID 600 MG: 600 TABLET, FILM COATED ORAL at 20:02

## 2018-10-16 RX ADMIN — FAMOTIDINE 20 MG: 20 TABLET ORAL at 20:02

## 2018-10-16 RX ADMIN — POTASSIUM CHLORIDE 30 MEQ: 750 CAPSULE, EXTENDED RELEASE ORAL at 17:15

## 2018-10-16 RX ADMIN — METOPROLOL TARTRATE 25 MG: 25 TABLET ORAL at 17:15

## 2018-10-16 RX ADMIN — CASTOR OIL AND BALSAM, PERU: 788; 87 OINTMENT TOPICAL at 20:03

## 2018-10-16 RX ADMIN — GABAPENTIN 200 MG: 100 CAPSULE ORAL at 14:01

## 2018-10-16 RX ADMIN — GUAIFENESIN 600 MG: 600 TABLET, EXTENDED RELEASE ORAL at 20:02

## 2018-10-16 RX ADMIN — CLOPIDOGREL BISULFATE 75 MG: 75 TABLET ORAL at 09:14

## 2018-10-16 RX ADMIN — LINEZOLID 600 MG: 600 TABLET, FILM COATED ORAL at 09:14

## 2018-10-16 RX ADMIN — PREDNISONE 5 MG: 5 TABLET ORAL at 09:14

## 2018-10-16 RX ADMIN — IPRATROPIUM BROMIDE AND ALBUTEROL SULFATE 3 ML: 2.5; .5 SOLUTION RESPIRATORY (INHALATION) at 06:49

## 2018-10-16 RX ADMIN — CYANOCOBALAMIN TAB 1000 MCG 1000 MCG: 1000 TAB at 09:14

## 2018-10-16 RX ADMIN — FAMOTIDINE 20 MG: 20 TABLET ORAL at 09:14

## 2018-10-16 RX ADMIN — METOPROLOL TARTRATE 25 MG: 25 TABLET ORAL at 09:14

## 2018-10-16 RX ADMIN — MORPHINE SULFATE 15 MG: 15 TABLET, EXTENDED RELEASE ORAL at 09:12

## 2018-10-16 RX ADMIN — MORPHINE SULFATE 15 MG: 15 TABLET, EXTENDED RELEASE ORAL at 20:02

## 2018-10-16 RX ADMIN — NICOTINE 1 PATCH: 21 PATCH, EXTENDED RELEASE TRANSDERMAL at 09:16

## 2018-10-16 RX ADMIN — IPRATROPIUM BROMIDE AND ALBUTEROL SULFATE 3 ML: 2.5; .5 SOLUTION RESPIRATORY (INHALATION) at 15:49

## 2018-10-16 RX ADMIN — GABAPENTIN 200 MG: 100 CAPSULE ORAL at 20:02

## 2018-10-16 RX ADMIN — Medication 1 CAPSULE: at 09:14

## 2018-10-16 NOTE — PROGRESS NOTES
Continued Stay Note  [facility]     Patient Name: Yassine Love  MRN: [id number]  Today's Date: 10/16/2018    Admit Date: 10/2/2018          Discharge Plan     Row Name 10/16/18 1443       Plan    Plan Comments CM spoke with Zohra from [facility], they currently do not have any beds. CM spoke with Keyonna at [facility] and they also do not have any beds available. Cm spoke with pt at bedside and updated on bed status at SNF's. CM reviewed list of additional SNF's and pt would like referral made to [facility], SALMA left message with Tammie asking for return call for new referral. CM will continue to follow.               Discharge Codes    No documentation.       Expected Discharge Date and Time     Expected Discharge Date Expected Discharge Time    Oct 19, 2018             Yolanda Rutledge

## 2018-10-16 NOTE — PROGRESS NOTES
[facility] Medicine Services  PROGRESS NOTE    Patient Name: Yassine Love  : 1945  MRN: [id number]    Date of Admission: 10/2/2018  Length of Stay: 14  Primary Care Physician: Provider, No Known    Subjective   Subjective     CC:  sob    HPI:  No cough. Feeling stronger. Looking forward to rehab.      Review of Systems  Gen- No fevers, chills  CV- No chest pain, palpitations  Resp- No cough, dyspnea  GI- No N/V/D, abd pain      Otherwise ROS is negative except as mentioned in the HPI.    Objective   Objective     Vital Signs:   Temp:  [97.8 °F (36.6 °C)-98.5 °F (36.9 °C)] 97.8 °F (36.6 °C)  Heart Rate:  [59-76] 72  Resp:  [16-18] 18  BP: (104-118)/(57-66) 104/57        Physical Exam:  LINES: PICC RUE  Constitutional - no acute distress, in bed, asleep, awakens easily  HEENT-NCAT, mucous membranes moist  CV-RRR, S1 S2 normal, no m/r/g  Resp-diminished bilaterally, but no wheezes, rhonchi or rales.  Abd-soft, non-tender, non-distended, normo active bowel sounds  Ext-Trace Edema LE bilat  Neuro-alert and oriented, speech clear, moves all extremities   Psych-normal affect   Skin- No rash on exposed UE or LE bilaterally        Results Reviewed:  I have personally reviewed current lab, radiology, and data and agree.      Results from last 7 days  Lab Units 10/10/18  0507   WBC 10*3/mm3 6.63   HEMOGLOBIN g/dL 9.8*   HEMATOCRIT % 32.4*   PLATELETS 10*3/mm3 351       Results from last 7 days  Lab Units 10/10/18  0507   SODIUM mmol/L 137   POTASSIUM mmol/L 4.6   CHLORIDE mmol/L 101   CO2 mmol/L 33.0*   BUN mg/dL 36*   CREATININE mg/dL 1.15   GLUCOSE mg/dL 91   CALCIUM mg/dL 8.2*     Estimated Creatinine Clearance: 60.2 mL/min (by C-G formula based on SCr of 1.15 mg/dL).  No results found for: BNP    Microbiology Results Abnormal     Procedure Component Value - Date/Time    Blood Culture - Blood, [[id number]]  (Normal) Collected:  10/02/18 0228    Lab Status:  Final result Specimen:  Blood  from Hand, Left Updated:  10/07/18 2201     Blood Culture No growth at 5 days    Narrative:       Aerobic bottle only     Blood Culture - Blood, [[id number]]  (Normal) Collected:  10/02/18 1720    Lab Status:  Final result Specimen:  Blood from Arm, Right Updated:  10/07/18 1830     Blood Culture No growth at 5 days    Respiratory Culture - Sputum, Cough [[id number]]  (Abnormal)  (Susceptibility) Collected:  10/03/18 0430    Lab Status:  Final result Specimen:  Sputum from Cough Updated:  10/07/18 1137     Respiratory Culture Light growth (2+) Staphylococcus aureus (A)      Light growth (2+) Normal Respiratory Nataliia     Gram Stain Result Few (2+) WBCs per low power field      No Epithelial cells per low power field      Few (2+) Gram positive cocci    Susceptibility      Staphylococcus aureus     FAVIAN     Ceftriaxone <=8 ug/ml Susceptible     Clindamycin >4 ug/ml Resistant     Erythromycin >4 ug/ml Resistant     Gentamicin <=4 ug/ml Susceptible     Levofloxacin <=1 ug/ml Susceptible  [1]      Linezolid <=1 ug/ml Susceptible     Oxacillin <=0.25 ug/ml Susceptible     Penicillin G <=0.03 ug/ml Susceptible     Quinupristin + Dalfopristin <=0.5 ug/ml Susceptible     Rifampin <=1 ug/ml Susceptible     Tetracycline >8 ug/ml Resistant     Trimethoprim + Sulfamethoxazole <=0.5/9.5 ug/ml Susceptible     Vancomycin 2 ug/ml Susceptible            [1]   Staphylococcus species may develop resistance during prolonged therapy with quinolones.  Isolates that are initially susceptible may become resistant within three to four days after initiation of therapy. Testing of repeat isolates may be warranted.                   MRSA Screen, PCR - Swab, Nares [[id number]]  (Abnormal) Collected:  10/04/18 1623    Lab Status:  Final result Specimen:  Swab from Nares Updated:  10/04/18 1818     MRSA, PCR Positive (C)          Imaging Results (last 24 hours)     Procedure Component Value Units Date/Time    XR Chest 1 View [[id number]] Collected:   10/16/18 0823     Updated:  10/16/18 0909    Narrative:       EXAMINATION: XR CHEST 1 VW- 10/16/2018     INDICATION: F/U pneumonia; J96.21-Acute and chronic respiratory failure  with hypoxia; J96.22-Acute and chronic respiratory failure with  hypercapnia; Z74.09-Other reduced mobility      COMPARISON: Chest x-ray 10/11/2018     FINDINGS: Right arm PICC remains in place terminating at the cavoatrial  junction. Cardiac silhouette borderline enlarged and unchanged.  Persistent bibasilar opacifications and blunting of the costophrenic  sulci similar to prior may represent atelectasis versus airspace disease  with probable small effusion components. No new parenchymal disease. No  pneumothorax.           Impression:       No significant interval change with persistent bibasilar  opacifications and effusions.     D:  10/16/2018  E:  10/16/2018     This report was finalized on 10/16/2018 9:07 AM by Dr. Abiodun Baker.           Results for orders placed during the hospital encounter of 10/02/18   Adult Transthoracic Echo Complete W/ Cont if Necessary Per Protocol    Narrative · Left ventricular systolic function is normal. Estimated EF = 60%.  · Trace-to-mild mitral valve regurgitation is present.          I have reviewed the medications.      aspirin 81 mg Oral Daily   atorvastatin 40 mg Oral Nightly   castor oil-balsam peru  Topical Q12H   clopidogrel 75 mg Oral Daily   famotidine 20 mg Oral BID   furosemide 40 mg Oral Daily   gabapentin 200 mg Oral Q8H   guaiFENesin 600 mg Oral Q12H   heparin (porcine) 5,000 Units Subcutaneous Q8H   ipratropium-albuterol 3 mL Nebulization Q8H - RT   lactobacillus acidophilus 1 capsule Oral Daily   linezolid 600 mg Oral Q12H   metoprolol tartrate 25 mg Oral Q8H   mirtazapine 15 mg Oral Nightly   Morphine 15 mg Oral Q12H   nicotine 1 patch Transdermal Q24H   pharmacy consult - MTM  Does not apply Daily   polyethylene glycol 17 g Oral Daily   potassium chloride 30 mEq Oral BID With Meals    predniSONE 5 mg Oral Daily With Breakfast   sennosides-docusate sodium 2 tablet Oral BID   sodium chloride 10 mL Intravenous Q12H   sodium chloride 3 mL Intravenous Q12H   cyanocobalamin 1,000 mcg Oral Daily         Assessment/Plan   Assessment / Plan     Active Hospital Problems    Diagnosis   • **Acute on chronic mixed hypercarbic/hypoxemic respiratory failure requiring intubation and ventilatory support   • Sleep apnea   • Acute kidney injury     Cr 1.35 increased from 9/8/18 1.15      • H/O bacteremia due to Staphylococcus   • Opioid use   • Pneumonia due to infectious organism   • Brief runs of SVT (supraventricular tachycardia)    • Decubitus ulcer of buttock, stage 2   • CAD status post GIGI ×2 to LAD and RCA 3/12/18     S/P LHC per Dr. Harvey on 3/12/18 with PCI revealing severe 2-vessel disease.    GIGI x 2 to LAD and RCA     • Essential hypertension   • End stage COPD in an active smoker on home O2 with chronic hypercarbia     Managed by [facility]      • H/O cellulitis of both lower extremities     Previously managed by Infectious Disease      • PVD (peripheral vascular disease)           Brief Hospital Course to date:  Yassine Love is a 72 y.o. male active smoker with a history of COPD (on home oxygen) admitted 10/2/18 for exacerbation associated with severe hypoxemia. He was in the skilled nursing facility for rehabilitation but 10/1/18 developed some visual hallucinations. Oxygen saturations were subsequently noted to drop into the 60s on 4 L on 10/2/18 and he was brought to [facility] ER. Because of an elevated d-dimer a CT angiogram was obtained which was negative for PE. Both CT scan and chest x-ray however revealed it appeared to be pulmonary edema with diffuse interstitial infiltrates and pleural effusions. In addition BNP was 899. He was given his usual dose of home Lasix, placed on empiric antimicrobial coverage with Zosyn, given bronchodilators, Solu-Medrol, placed on BiPAP, and admitted  to the hospitalist service.     At approximately 2 AM 10/3/18 he was noted to become unresponsive with decreased respiratory drive. Apparently he had been a DNR but this was reversed by the patient on admission. Because of this Dr. Jacobsen and respiratory therapy intubated the patient at the bedside. There were large amounts of thick purulent yellow secretions obtained at the time of intubation and he was transferred to the ICU on ventilatory support. He initially required high FiO2's of 60% but respiratory rate and ABGs rapidly improved. Over the course of the evening he continued to improve. He subsequently self extubated the evening of 10/3/18 at 11 PM. He was able to tolerate self extubation but has required high flow nasal oxygen in order to maintain his oxygen saturations.     Heart rate is usually in the 60s, but has had recurrent brief episodes of SVT up to 160  during hospitalization His metoprolol was held once and that led to SVT that resolved with resumption of beta blocker.. He was admitted 10/2 and transferred out of the ICU 10/7      Assessment & Plan:   Continue zyvox, CXR today personally reviewed with persistent right hilar/lower lobe infiltrate - however patient remains afebrile with no cough.   Avoid sedating agents   IV Lasix and Diamox initially, Lasix decreased to 40mg po daily    Weaned from high flow down to 3L NC, continue to taper as he tolerates   Palliative following, status changed to DNR/DNI    Recurrent episodes of SVT - better on metoprolol. Follow up Dr Harvey in 6-8 weeks.   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    DVT Prophylaxis:  Sq heparin    CODE STATUS:   Code Status and Medical Interventions:   Ordered at: 10/08/18 1413     Limited Support to  NOT Include:    Intubation     Level Of Support Discussed With:    Patient     Code Status:    No CPR     Medical Interventions (Level of Support Prior to Arrest):    Limited       Disposition: I expect the patient to be discharged to rehab in 1-2 days      Electronically signed by Richard Escalante MD, 10/16/18, 3:58 PM.

## 2018-10-16 NOTE — PROGRESS NOTES
Continued Stay Note  [facility]     Patient Name: Yassine Love  MRN: [id number]  Today's Date: 10/16/2018    Admit Date: 10/2/2018          Discharge Plan     Row Name 10/16/18 1509       Plan    Plan Comments CM spoke with Tammie at [facility], referral made. CM will cont to follow.    Row Name 10/16/18 1443       Plan    Plan Comments CM spoke with Zohra from [facility], they currently do not have any beds. CM spoke with Keyonna at [facility] and they also do not have any beds available. Cm spoke with pt at bedside and updated on bed status at SNF's. CM reviewed list of additional SNF's and pt would like referral made to [facility], CM left message with Tammie asking for return call for new referral. CM will continue to follow.               Discharge Codes    No documentation.       Expected Discharge Date and Time     Expected Discharge Date Expected Discharge Time    Oct 19, 2018             Yolanda Rutledge

## 2018-10-16 NOTE — PLAN OF CARE
Problem: Fall Risk (Adult)  Goal: Absence of Fall  Outcome: Ongoing (interventions implemented as appropriate)      Problem: Patient Care Overview  Goal: Plan of Care Review  Outcome: Ongoing (interventions implemented as appropriate)   10/16/18 0502   Coping/Psychosocial   Plan of Care Reviewed With patient   Plan of Care Review   Progress no change   OTHER   Outcome Summary VSS. Did complain of pain; scheduled pain meds were given. No other complaints noted. Awaiting for transfer to rehab when accepted.        Problem: Breathing Pattern Ineffective (Adult)  Goal: Effective Oxygenation/Ventilation  Outcome: Ongoing (interventions implemented as appropriate)    Goal: Anxiety/Fear Reduction  Outcome: Ongoing (interventions implemented as appropriate)      Problem: Wound (Includes Pressure Injury) (Adult)  Goal: Signs and Symptoms of Listed Potential Problems Will be Absent, Minimized or Managed (Wound)  Outcome: Ongoing (interventions implemented as appropriate)      Problem: Palliative Care (Adult)  Goal: Maximized Comfort  Outcome: Ongoing (interventions implemented as appropriate)    Goal: Enhanced Quality of Life  Outcome: Ongoing (interventions implemented as appropriate)

## 2018-10-16 NOTE — PLAN OF CARE
Problem: Patient Care Overview  Goal: Plan of Care Review  Outcome: Ongoing (interventions implemented as appropriate)   10/16/18 6121   Coping/Psychosocial   Plan of Care Reviewed With patient   Plan of Care Review   Progress no change   OTHER   Outcome Summary VSS. Pt has been on 3 L o2. SR with occasional PVC on the monitor. No SVT's noted on this shift. No prn pain meds given. Pt c/o some loose stool, noted to have soft BM's today, no c/o abdominal pain pr discomfort. will monitor. Awaiting for transfer to rehab..

## 2018-10-16 NOTE — PROGRESS NOTES
[facility] Progress Note    Admission Date: 10/2/2018    Yassine Love  1945  [id number]    Date: 10/16/2018    Antibiotics:  Anti-Infectives     Ordered     Dose/Rate Route Frequency Start Stop    10/13/18 1318  linezolid (ZYVOX) tablet 600 mg     Ordering Provider:  Mary Kate Rahman MD    600 mg Oral Every 12 Hours Scheduled 10/13/18 2100 10/19/18 2059    10/13/18 1318  meropenem (MERREM) 1 g/100 mL 0.9% NS VTB (mbp)     Mary Kate Rahman MD reviewed the order on 10/13/18 1440.   Ordering Provider:  Mary Kate Rahman MD    1 g  over 3 Hours Intravenous Every 8 Hours Scheduled 10/13/18 1400 10/15/18 0813    10/04/18 1629  Linezolid (ZYVOX) 600 mg 300 mL     Mary Kate Rahman MD reviewed the order on 10/08/18 1301.   Ordering Provider:  Mary Kate Rahman MD    600 mg  300 mL/hr over 60 Minutes Intravenous Every 12 Hours 10/04/18 1800 10/13/18 0623    10/03/18 0229  meropenem (MERREM) 1 g/100 mL 0.9% NS VTB (mbp)     Ordering Provider:  Perla Jones APRN    1 g  over 3 Hours Intravenous Every 8 Hours 10/03/18 1000 10/13/18 0556    10/03/18 0914  vancomycin 1750 mg/500 mL 0.9% NS IVPB ([facility])     Ordering Provider:  Parish Donato MD    1,750 mg  over 120 Minutes Intravenous Once 10/03/18 1000 10/03/18 1203    10/03/18 0229  meropenem (MERREM) 1 g/100 mL 0.9% NS VTB (mbp)     Ordering Provider:  Perla Jones APRN    1 g  over 30 Minutes Intravenous Once 10/03/18 0315 10/03/18 0313    10/02/18 2108  piperacillin-tazobactam (ZOSYN) 4.5 g in iso-osmotic dextrose 100 mL IVPB (premix)     Ordering Provider:  Pastora Caldwell DO    4.5 g  over 30 Minutes Intravenous Once 10/02/18 2108 10/03/18 0114           Reason for Consultation: bilateral leg cellulitis,  bronchitis      History of present illness:    Patient is a 72 y.o. male well known to Dr Belle from prior admissions. Dr Belle has primarily followed for cellulitis  He was hospitalized 8/21 to 9/7 and Dr Belle followed for cellulitis complicating  bilateral leg ulcers   This admission on  10/2/18 is for  for COPD exacerbation and severe hypoxia. He iwas transferred from a  skilled nursing facility with hypoxemia and visual hallucinations.NC. EMS was called and Mr. Love was  CTA was negative for pulmonary embolism but  pulmonary edema with diffuse interstitial infiltrates and pleural effusions. BNP was 899 and procalcitonin normal on 10/2 and 10/4. He required mechanical ventilation  Sputum culture grew MSSA  He has been treated with zyvox and merrrem  He improved after diuresis and was extubated  The leg wounds present 2 months ago have healed He has some residual erythema and states that his legs are painful at times  He is known to have severe PVD     10/10  More alert this am; intermittent cough perisists  Denies leg pain  10/11 alert, no new c/o, denies leg pain  10/15/18 - Patient is sleeping.  ROS discussed with staff at bedside.  Currently on 3LNC.  Co weakness  No fever or chills  Co dyspnea  Co cough  10/16/18 - No history per patient.  ROS discussed with staff.    Seen and agree      ROS:  Unobtainable.  See above      PE:  Vital Signs  Temp  Min: 98.4 °F (36.9 °C)  Max: 99.2 °F (37.3 °C)  BP  Min: 107/61  Max: 118/66  Pulse  Min: 59  Max: 76  Resp  Min: 16  Max: 18  SpO2  Min: 92 %  Max: 97 %  GENERAL: sleeping, NAD  HEENT: Normocephalic, atraumatic.  PERRL.    LYMPH: No cervical lymphadenopathy.  HEART: Regular S1S2  LUNGS: Diminished throughout, remains on 3L NC  ABDOMEN: Soft, nontender, nondistended. Positive bowel sounds.   EXT:  Mild edema bilateral legs ulcerations are much improved.  SKIN: Warm and dry without cutaneous eruptions on Inspection/palpation.    NEURO: unable to evaluate  Seen and agree         Laboratory Data      Results from last 7 days  Lab Units 10/10/18  0507   WBC 10*3/mm3 6.63   HEMOGLOBIN g/dL 9.8*   HEMATOCRIT % 32.4*   PLATELETS 10*3/mm3 351       Results from last 7 days  Lab Units 10/10/18  0507   SODIUM mmol/L  137   POTASSIUM mmol/L 4.6   CHLORIDE mmol/L 101   CO2 mmol/L 33.0*   BUN mg/dL 36*   CREATININE mg/dL 1.15   GLUCOSE mg/dL 91   CALCIUM mg/dL 8.2*                   Estimated Creatinine Clearance: 60.2 mL/min (by C-G formula based on SCr of 1.15 mg/dL).      Microbiology:  mssa from sputum;  mrsa from nares swab    Radiology:  Imaging Results (last 72 hours)     Procedure Component Value Units Date/Time    XR Chest 1 View [[id number]] Collected:  10/06/18 1115     Updated:  10/06/18 1355    Narrative:       EXAMINATION: XR CHEST 1 VW - 10/06/2018     INDICATION: J96.21-Acute and chronic respiratory failure with hypoxia;  J96.22-Acute and chronic respiratory failure with hypercapnia;  Z74.09-Other reduced mobility.     TECHNIQUE:  Single view frontal chest.     COMPARISONS: 10/5/2018.     FINDINGS:  Stable support apparatus. Small bilateral pleural effusions  and adjacent atelectasis are unchanged. No pneumothorax. Calcification  aortic knob. Cardiomediastinal silhouette is unchanged.        Impression:       Stable exam.     DICTATED:   10/06/2018  EDITED/ls :   10/06/2018      This report was finalized on 10/6/2018 1:53 PM by Eliezer Hale.             I personally reviewed the radiographic studies     IMPRESSION:    -- Probable MSSA bronchitis  Vs pneumonia, CXR 10/16/18 - with persistent bibasilar opacities.       -- Acute on chronic mixed respiratory failure  S/p intubation, now extubated     -- Pulmonary edema- improved     -- Chronic lymphedema and venous stasis dermatitis     -- Bilateral leg cellulitis essentially resolved     -- Severe COPD with ongoing tobacco abuse, poor pulmonary toilet     -- RAFEAL     -- Decubitus ulcer of buttock stage 2     --MRSA colonization    --DNR/DNI       RECOMMENDATIONS:      -- continue current regimen of zyvox - scheduled until 10/19  Planning on transfer to rehab at some point  UM discussed with staff        BRITTNY Cotto for Dr. Parish Belle  10/16/2018

## 2018-10-17 LAB
ANION GAP SERPL CALCULATED.3IONS-SCNC: 2 MMOL/L (ref 3–11)
BUN BLD-MCNC: 38 MG/DL (ref 9–23)
BUN/CREAT SERPL: 32.5 (ref 7–25)
CALCIUM SPEC-SCNC: 8.2 MG/DL (ref 8.7–10.4)
CHLORIDE SERPL-SCNC: 102 MMOL/L (ref 99–109)
CO2 SERPL-SCNC: 35 MMOL/L (ref 20–31)
CREAT BLD-MCNC: 1.17 MG/DL (ref 0.6–1.3)
GFR SERPL CREATININE-BSD FRML MDRD: 61 ML/MIN/1.73
GLUCOSE BLD-MCNC: 91 MG/DL (ref 70–100)
GLUCOSE BLDC GLUCOMTR-MCNC: 105 MG/DL (ref 70–130)
GLUCOSE BLDC GLUCOMTR-MCNC: 116 MG/DL (ref 70–130)
GLUCOSE BLDC GLUCOMTR-MCNC: 147 MG/DL (ref 70–130)
MAGNESIUM SERPL-MCNC: 2.2 MG/DL (ref 1.3–2.7)
POTASSIUM BLD-SCNC: 4.9 MMOL/L (ref 3.5–5.5)
SODIUM BLD-SCNC: 139 MMOL/L (ref 132–146)

## 2018-10-17 PROCEDURE — 82962 GLUCOSE BLOOD TEST: CPT

## 2018-10-17 PROCEDURE — 83735 ASSAY OF MAGNESIUM: CPT | Performed by: FAMILY MEDICINE

## 2018-10-17 PROCEDURE — 94799 UNLISTED PULMONARY SVC/PX: CPT

## 2018-10-17 PROCEDURE — 97530 THERAPEUTIC ACTIVITIES: CPT

## 2018-10-17 PROCEDURE — 80048 BASIC METABOLIC PNL TOTAL CA: CPT | Performed by: INTERNAL MEDICINE

## 2018-10-17 PROCEDURE — 94640 AIRWAY INHALATION TREATMENT: CPT

## 2018-10-17 PROCEDURE — 63710000001 PREDNISONE PER 5 MG: Performed by: INTERNAL MEDICINE

## 2018-10-17 PROCEDURE — 94760 N-INVAS EAR/PLS OXIMETRY 1: CPT

## 2018-10-17 PROCEDURE — 97535 SELF CARE MNGMENT TRAINING: CPT

## 2018-10-17 PROCEDURE — 99232 SBSQ HOSP IP/OBS MODERATE 35: CPT | Performed by: NURSE PRACTITIONER

## 2018-10-17 PROCEDURE — 25010000002 HEPARIN (PORCINE) PER 1000 UNITS: Performed by: INTERNAL MEDICINE

## 2018-10-17 RX ORDER — ALBUTEROL SULFATE 2.5 MG/3ML
2.5 SOLUTION RESPIRATORY (INHALATION) 4 TIMES DAILY PRN
Status: DISCONTINUED | OUTPATIENT
Start: 2018-10-17 | End: 2018-10-22 | Stop reason: HOSPADM

## 2018-10-17 RX ADMIN — IPRATROPIUM BROMIDE AND ALBUTEROL SULFATE 3 ML: 2.5; .5 SOLUTION RESPIRATORY (INHALATION) at 06:58

## 2018-10-17 RX ADMIN — Medication 1 CAPSULE: at 09:42

## 2018-10-17 RX ADMIN — ALBUTEROL SULFATE 2.5 MG: 2.5 SOLUTION RESPIRATORY (INHALATION) at 10:57

## 2018-10-17 RX ADMIN — HYDROCODONE BITARTRATE AND ACETAMINOPHEN 1 TABLET: 10; 325 TABLET ORAL at 12:23

## 2018-10-17 RX ADMIN — MORPHINE SULFATE 15 MG: 15 TABLET, EXTENDED RELEASE ORAL at 08:37

## 2018-10-17 RX ADMIN — MORPHINE SULFATE 15 MG: 15 TABLET, EXTENDED RELEASE ORAL at 22:02

## 2018-10-17 RX ADMIN — CLOPIDOGREL BISULFATE 75 MG: 75 TABLET ORAL at 09:42

## 2018-10-17 RX ADMIN — METOPROLOL TARTRATE 25 MG: 25 TABLET ORAL at 00:16

## 2018-10-17 RX ADMIN — FAMOTIDINE 20 MG: 20 TABLET ORAL at 22:02

## 2018-10-17 RX ADMIN — Medication 10 ML: at 22:03

## 2018-10-17 RX ADMIN — HEPARIN SODIUM 5000 UNITS: 5000 INJECTION, SOLUTION INTRAVENOUS; SUBCUTANEOUS at 22:02

## 2018-10-17 RX ADMIN — Medication 3 ML: at 22:03

## 2018-10-17 RX ADMIN — Medication 10 ML: at 09:48

## 2018-10-17 RX ADMIN — ALBUTEROL SULFATE 2.5 MG: 2.5 SOLUTION RESPIRATORY (INHALATION) at 21:07

## 2018-10-17 RX ADMIN — FAMOTIDINE 20 MG: 20 TABLET ORAL at 09:42

## 2018-10-17 RX ADMIN — FUROSEMIDE 40 MG: 40 TABLET ORAL at 09:41

## 2018-10-17 RX ADMIN — CASTOR OIL AND BALSAM, PERU: 788; 87 OINTMENT TOPICAL at 09:48

## 2018-10-17 RX ADMIN — POTASSIUM CHLORIDE 30 MEQ: 750 CAPSULE, EXTENDED RELEASE ORAL at 18:15

## 2018-10-17 RX ADMIN — GUAIFENESIN 600 MG: 600 TABLET, EXTENDED RELEASE ORAL at 22:02

## 2018-10-17 RX ADMIN — CYANOCOBALAMIN TAB 1000 MCG 1000 MCG: 1000 TAB at 09:42

## 2018-10-17 RX ADMIN — ATORVASTATIN CALCIUM 40 MG: 40 TABLET, FILM COATED ORAL at 22:02

## 2018-10-17 RX ADMIN — HEPARIN SODIUM 5000 UNITS: 5000 INJECTION, SOLUTION INTRAVENOUS; SUBCUTANEOUS at 06:13

## 2018-10-17 RX ADMIN — IPRATROPIUM BROMIDE AND ALBUTEROL SULFATE 3 ML: 2.5; .5 SOLUTION RESPIRATORY (INHALATION) at 15:26

## 2018-10-17 RX ADMIN — HEPARIN SODIUM 5000 UNITS: 5000 INJECTION, SOLUTION INTRAVENOUS; SUBCUTANEOUS at 15:10

## 2018-10-17 RX ADMIN — METOPROLOL TARTRATE 25 MG: 25 TABLET ORAL at 15:10

## 2018-10-17 RX ADMIN — PREDNISONE 5 MG: 5 TABLET ORAL at 09:42

## 2018-10-17 RX ADMIN — LINEZOLID 600 MG: 600 TABLET, FILM COATED ORAL at 22:02

## 2018-10-17 RX ADMIN — ASPIRIN 81 MG CHEWABLE TABLET 81 MG: 81 TABLET CHEWABLE at 09:42

## 2018-10-17 RX ADMIN — METOPROLOL TARTRATE 25 MG: 25 TABLET ORAL at 09:42

## 2018-10-17 RX ADMIN — GUAIFENESIN 600 MG: 600 TABLET, EXTENDED RELEASE ORAL at 09:42

## 2018-10-17 RX ADMIN — MIRTAZAPINE 15 MG: 15 TABLET, ORALLY DISINTEGRATING ORAL at 22:05

## 2018-10-17 RX ADMIN — ALBUTEROL SULFATE 2.5 MG: 2.5 SOLUTION RESPIRATORY (INHALATION) at 04:50

## 2018-10-17 RX ADMIN — GABAPENTIN 200 MG: 100 CAPSULE ORAL at 15:09

## 2018-10-17 RX ADMIN — GABAPENTIN 200 MG: 100 CAPSULE ORAL at 06:13

## 2018-10-17 RX ADMIN — HYDROCODONE BITARTRATE AND ACETAMINOPHEN 1 TABLET: 10; 325 TABLET ORAL at 00:16

## 2018-10-17 RX ADMIN — NICOTINE 1 PATCH: 21 PATCH, EXTENDED RELEASE TRANSDERMAL at 09:43

## 2018-10-17 RX ADMIN — LINEZOLID 600 MG: 600 TABLET, FILM COATED ORAL at 09:42

## 2018-10-17 RX ADMIN — POTASSIUM CHLORIDE 30 MEQ: 750 CAPSULE, EXTENDED RELEASE ORAL at 09:41

## 2018-10-17 RX ADMIN — GABAPENTIN 200 MG: 100 CAPSULE ORAL at 22:02

## 2018-10-17 NOTE — PROGRESS NOTES
[facility]  PROGRESS NOTE    Patient Name: Yassine Love  : 1945  MRN: [id number]    Date of Admission: 10/2/2018  Length of Stay: 15  Primary Care Physician: Provider, No Known    Subjective   Subjective     CC:  Shortness of air    HPI:      Complaining of some increase shortness of air this afternoon.  Neb treatments ordered last night.  Denies increasing cough or wheezing.        Review of Systems  Gen- No fevers, chills  CV- No chest pain, palpitations  Resp- No cough, dyspnea  GI- No N/V/D, abd pain      Otherwise ROS is negative except as mentioned in the HPI.    Objective   Objective     Vital Signs:   Temp:  [97.3 °F (36.3 °C)-99 °F (37.2 °C)] 97.3 °F (36.3 °C)  Heart Rate:  [64-77] 69  Resp:  [15-18] 16  BP: (108-139)/() 112/68        Physical Exam:  LINES: PICC RUE  Constitutional - no acute distress, in bed, alert  HEENT-NCAT, mucous membranes moist  CV-RRR, S1 S2 normal, no m/r/g  Resp-diminished bilaterally, left lower lobe crackles noted, unlabored on 02.  Abd-soft, non-tender, non-distended, normo active bowel sounds  Ext-Trace Edema LE bilat  Neuro-alert and oriented, speech clear, moves all extremities   Psych-normal affect   Skin- No rash on exposed UE or LE bilaterally        Results Reviewed:  I have personally reviewed current lab, radiology, and data and agree.          Results from last 7 days  Lab Units 10/17/18  0507   SODIUM mmol/L 139   POTASSIUM mmol/L 4.9   CHLORIDE mmol/L 102   CO2 mmol/L 35.0*   BUN mg/dL 38*   CREATININE mg/dL 1.17   GLUCOSE mg/dL 91   CALCIUM mg/dL 8.2*     Estimated Creatinine Clearance: 59.2 mL/min (by C-G formula based on SCr of 1.17 mg/dL).  No results found for: BNP    Microbiology Results Abnormal     Procedure Component Value - Date/Time    Blood Culture - Blood, [[id number]]  (Normal) Collected:  10/02/18 1804    Lab Status:  Final result Specimen:  Blood from Hand, Left Updated:  10/07/18 1946      Blood Culture No growth at 5 days    Narrative:       Aerobic bottle only     Blood Culture - Blood, [[id number]]  (Normal) Collected:  10/02/18 1720    Lab Status:  Final result Specimen:  Blood from Arm, Right Updated:  10/07/18 1830     Blood Culture No growth at 5 days    Respiratory Culture - Sputum, Cough [[id number]]  (Abnormal)  (Susceptibility) Collected:  10/03/18 0430    Lab Status:  Final result Specimen:  Sputum from Cough Updated:  10/07/18 1137     Respiratory Culture Light growth (2+) Staphylococcus aureus (A)      Light growth (2+) Normal Respiratory Nataliia     Gram Stain Result Few (2+) WBCs per low power field      No Epithelial cells per low power field      Few (2+) Gram positive cocci    Susceptibility      Staphylococcus aureus     FAVIAN     Ceftriaxone <=8 ug/ml Susceptible     Clindamycin >4 ug/ml Resistant     Erythromycin >4 ug/ml Resistant     Gentamicin <=4 ug/ml Susceptible     Levofloxacin <=1 ug/ml Susceptible  [1]      Linezolid <=1 ug/ml Susceptible     Oxacillin <=0.25 ug/ml Susceptible     Penicillin G <=0.03 ug/ml Susceptible     Quinupristin + Dalfopristin <=0.5 ug/ml Susceptible     Rifampin <=1 ug/ml Susceptible     Tetracycline >8 ug/ml Resistant     Trimethoprim + Sulfamethoxazole <=0.5/9.5 ug/ml Susceptible     Vancomycin 2 ug/ml Susceptible            [1]   Staphylococcus species may develop resistance during prolonged therapy with quinolones.  Isolates that are initially susceptible may become resistant within three to four days after initiation of therapy. Testing of repeat isolates may be warranted.                   MRSA Screen, PCR - Swab, Nares [[id number]]  (Abnormal) Collected:  10/04/18 1623    Lab Status:  Final result Specimen:  Swab from Nares Updated:  10/04/18 1818     MRSA, PCR Positive (C)          Imaging Results (last 24 hours)     ** No results found for the last 24 hours. **        Results for orders placed during the hospital encounter of 10/02/18   Adult  Transthoracic Echo Complete W/ Cont if Necessary Per Protocol    Narrative · Left ventricular systolic function is normal. Estimated EF = 60%.  · Trace-to-mild mitral valve regurgitation is present.          I have reviewed the medications.      aspirin 81 mg Oral Daily   atorvastatin 40 mg Oral Nightly   clopidogrel 75 mg Oral Daily   famotidine 20 mg Oral BID   furosemide 40 mg Oral Daily   gabapentin 200 mg Oral Q8H   guaiFENesin 600 mg Oral Q12H   heparin (porcine) 5,000 Units Subcutaneous Q8H   ipratropium-albuterol 3 mL Nebulization Q8H - RT   lactobacillus acidophilus 1 capsule Oral Daily   linezolid 600 mg Oral Q12H   metoprolol tartrate 25 mg Oral Q8H   mirtazapine 15 mg Oral Nightly   Morphine 15 mg Oral Q12H   nicotine 1 patch Transdermal Q24H   pharmacy consult - MTM  Does not apply Daily   polyethylene glycol 17 g Oral Daily   potassium chloride 30 mEq Oral BID With Meals   sennosides-docusate sodium 2 tablet Oral BID   sodium chloride 10 mL Intravenous Q12H   sodium chloride 3 mL Intravenous Q12H   cyanocobalamin 1,000 mcg Oral Daily         Assessment/Plan   Assessment / Plan     Active Hospital Problems    Diagnosis   • **Acute on chronic mixed hypercarbic/hypoxemic respiratory failure requiring intubation and ventilatory support   • Sleep apnea   • Acute kidney injury     Cr 1.35 increased from 9/8/18 1.15      • H/O bacteremia due to Staphylococcus   • Opioid use   • Pneumonia due to infectious organism   • Brief runs of SVT (supraventricular tachycardia)    • Decubitus ulcer of buttock, stage 2   • CAD status post GIGI ×2 to LAD and RCA 3/12/18     S/P LHC per Dr. Harvey on 3/12/18 with PCI revealing severe 2-vessel disease.    GIGI x 2 to LAD and RCA     • Essential hypertension   • End stage COPD in an active smoker on home O2 with chronic hypercarbia     Managed by [facility]      • H/O cellulitis of both lower extremities     Previously managed by Infectious Disease      • PVD  (peripheral vascular disease)           Brief Hospital Course to date:  Yassine Love is a 72 y.o. male active smoker with a history of COPD (on home oxygen) admitted 10/2/18 for exacerbation associated with severe hypoxemia. He was in the skilled nursing facility for rehabilitation but 10/1/18 developed some visual hallucinations. Oxygen saturations were subsequently noted to drop into the 60s on 4 L on 10/2/18 and he was brought to [facility] ER. Because of an elevated d-dimer a CT angiogram was obtained which was negative for PE. Both CT scan and chest x-ray however revealed it appeared to be pulmonary edema with diffuse interstitial infiltrates and pleural effusions. In addition BNP was 899. He was given his usual dose of home Lasix, placed on empiric antimicrobial coverage with Zosyn, given bronchodilators, Solu-Medrol, placed on BiPAP, and admitted to the hospitalist service.     At approximately 2 AM 10/3/18 he was noted to become unresponsive with decreased respiratory drive. Apparently he had been a DNR but this was reversed by the patient on admission. Because of this Dr. Jacobsen and respiratory therapy intubated the patient at the bedside. There were large amounts of thick purulent yellow secretions obtained at the time of intubation and he was transferred to the ICU on ventilatory support. He initially required high FiO2's of 60% but respiratory rate and ABGs rapidly improved. Over the course of the evening he continued to improve. He subsequently self extubated the evening of 10/3/18 at 11 PM. He was able to tolerate self extubation but has required high flow nasal oxygen in order to maintain his oxygen saturations.     Heart rate is usually in the 60s, but has had recurrent brief episodes of SVT up to 160  during hospitalization His metoprolol was held once and that led to SVT that resolved with resumption of beta blocker.. He was admitted 10/2 and transferred out of the ICU 10/7      Assessment & Plan:    Continue zyvox, CXR today from 10/16 with persistent right hilar/lower lobe infiltrate - however patient remains afebrile with no cough.   Avoid sedating agents   IV Lasix and Diamox initially, Lasix decreased to 40mg po daily    Weaned from high flow down to 3L NC, continue to taper as he tolerates   Palliative following, status changed to DNR/DNI    Recurrent episodes of SVT - better on metoprolol. Follow up Dr Harvey in 6-8 weeks.   S/P prednisone taper.    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    DVT Prophylaxis:  Sq heparin    CODE STATUS:   Code Status and Medical Interventions:   Ordered at: 10/08/18 1413     Limited Support to NOT Include:    Intubation     Level Of Support Discussed With:    Patient     Code Status:    No CPR     Medical Interventions (Level of Support Prior to Arrest):    Limited       Disposition: I expect the patient to be discharged to rehab in 1-2 days      Electronically signed by BRITTNY Pineda, 10/17/18, 3:45 PM.
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
## 2018-10-17 NOTE — PLAN OF CARE
Problem: Patient Care Overview  Goal: Plan of Care Review  Outcome: Ongoing (interventions implemented as appropriate)   10/17/18 0912   Coping/Psychosocial   Plan of Care Reviewed With patient   OTHER   Outcome Summary Pt agreeable to therapy; requires encouragement and increased time all tasks. Max A x2 STS x3 reps. Max Ax2 step EOB to [facility]. Grooming tasks completed [facility]. OT to follow 3x/week MWF. Pt awaiting rehab bed.

## 2018-10-17 NOTE — PROGRESS NOTES
[facility] Progress Note    Admission Date: 10/2/2018    Yassine Love  1945  [id number]    Date: 10/17/2018    Antibiotics:  Anti-Infectives     Ordered     Dose/Rate Route Frequency Start Stop    10/13/18 1318  linezolid (ZYVOX) tablet 600 mg     Ordering Provider:  Mary Kate Rahman MD    600 mg Oral Every 12 Hours Scheduled 10/13/18 2100 10/19/18 2059    10/13/18 1318  meropenem (MERREM) 1 g/100 mL 0.9% NS VTB (mbp)     Mary Kate Rahman MD reviewed the order on 10/13/18 1440.   Ordering Provider:  Mary Kate Rahman MD    1 g  over 3 Hours Intravenous Every 8 Hours Scheduled 10/13/18 1400 10/15/18 0813    10/04/18 1629  Linezolid (ZYVOX) 600 mg 300 mL     Mary Kate Rahman MD reviewed the order on 10/08/18 1301.   Ordering Provider:  Mary Kate Rahman MD    600 mg  300 mL/hr over 60 Minutes Intravenous Every 12 Hours 10/04/18 1800 10/13/18 0623    10/03/18 0229  meropenem (MERREM) 1 g/100 mL 0.9% NS VTB (mbp)     Ordering Provider:  Perla Jones APRN    1 g  over 3 Hours Intravenous Every 8 Hours 10/03/18 1000 10/13/18 0556    10/03/18 0914  vancomycin 1750 mg/500 mL 0.9% NS IVPB ([facility])     Ordering Provider:  Parish Donato MD    1,750 mg  over 120 Minutes Intravenous Once 10/03/18 1000 10/03/18 1203    10/03/18 0229  meropenem (MERREM) 1 g/100 mL 0.9% NS VTB (mbp)     Ordering Provider:  Perla Jones APRN    1 g  over 30 Minutes Intravenous Once 10/03/18 0315 10/03/18 0313    10/02/18 2108  piperacillin-tazobactam (ZOSYN) 4.5 g in iso-osmotic dextrose 100 mL IVPB (premix)     Ordering Provider:  Pastora Caldwell DO    4.5 g  over 30 Minutes Intravenous Once 10/02/18 2108 10/03/18 0114           Reason for Consultation: bilateral leg cellulitis,  bronchitis      History of present illness:    Patient is a 72 y.o. male well known to Dr Belle from prior admissions. Dr Belle has primarily followed for cellulitis  He was hospitalized 8/21 to 9/7 and Dr Belle followed for cellulitis complicating  bilateral leg ulcers   This admission on  10/2/18 is for  for COPD exacerbation and severe hypoxia. He iwas transferred from a  skilled nursing facility with hypoxemia and visual hallucinations.NC. EMS was called and Mr. Love was  CTA was negative for pulmonary embolism but  pulmonary edema with diffuse interstitial infiltrates and pleural effusions. BNP was 899 and procalcitonin normal on 10/2 and 10/4. He required mechanical ventilation  Sputum culture grew MSSA  He has been treated with zyvox and merrrem  He improved after diuresis and was extubated  The leg wounds present 2 months ago have healed He has some residual erythema and states that his legs are painful at times  He is known to have severe PVD     10/10  More alert this am; intermittent cough perisists  Denies leg pain  10/11 alert, no new c/o, denies leg pain  10/15/18 - Patient is sleeping.  ROS discussed with staff at bedside.  Currently on 3LNC.  Co weakness  No fever or chills  Co dyspnea  Co cough  10/16/18 - No history per patient.  ROS discussed with staff.    Seen and agree  10/17/18 - Patient is sleeping.  ROS discussed with RN.  Bumped oxygen to 4L due to patient complaining of mild shortness of air.  No fever.  Seen and agree        ROS:  Unobtainable.  See above      PE:  Vital Signs  Temp  Min: 97.8 °F (36.6 °C)  Max: 99 °F (37.2 °C)  BP  Min: 104/57  Max: 139/101  Pulse  Min: 64  Max: 77  Resp  Min: 15  Max: 18  SpO2  Min: 91 %  Max: 99 %  GENERAL: sleeping, NAD  HEENT: Normocephalic, atraumatic.  PERRL.    LYMPH: No cervical lymphadenopathy.  HEART: Regular S1S2  LUNGS: Diminished throughout today on exam, oxygen now on 4LNC.   ABDOMEN: Soft, nontender, nondistended. Positive bowel sounds.   EXT:  Mild edema bilateral legs ulcerations are much improved.  SKIN: Warm and dry without cutaneous eruptions on Inspection/palpation.    NEURO: unable to evaluate  Seen and agree         Laboratory Data          Results from last 7 days  Lab  Units 10/17/18  0507   SODIUM mmol/L 139   POTASSIUM mmol/L 4.9   CHLORIDE mmol/L 102   CO2 mmol/L 35.0*   BUN mg/dL 38*   CREATININE mg/dL 1.17   GLUCOSE mg/dL 91   CALCIUM mg/dL 8.2*                   Estimated Creatinine Clearance: 59.2 mL/min (by C-G formula based on SCr of 1.17 mg/dL).      Microbiology:  mssa from sputum;  mrsa from nares swab    Radiology:  Imaging Results (last 72 hours)     Procedure Component Value Units Date/Time    XR Chest 1 View [[id number]] Collected:  10/06/18 1115     Updated:  10/06/18 1355    Narrative:       EXAMINATION: XR CHEST 1 VW - 10/06/2018     INDICATION: J96.21-Acute and chronic respiratory failure with hypoxia;  J96.22-Acute and chronic respiratory failure with hypercapnia;  Z74.09-Other reduced mobility.     TECHNIQUE:  Single view frontal chest.     COMPARISONS: 10/5/2018.     FINDINGS:  Stable support apparatus. Small bilateral pleural effusions  and adjacent atelectasis are unchanged. No pneumothorax. Calcification  aortic knob. Cardiomediastinal silhouette is unchanged.        Impression:       Stable exam.     DICTATED:   10/06/2018  EDITED/ls :   10/06/2018      This report was finalized on 10/6/2018 1:53 PM by Eliezer Hale.             I personally reviewed the radiographic studies     IMPRESSION:    -- Probable MSSA bronchitis  Vs pneumonia, CXR 10/16/18 - with persistent bibasilar opacities.       -- Acute on chronic mixed respiratory failure  S/p intubation, now extubated     -- Pulmonary edema- improved     -- Chronic lymphedema and venous stasis dermatitis     -- Bilateral leg cellulitis essentially resolved     -- Severe COPD with ongoing tobacco abuse, poor pulmonary toilet     -- RAFAEL     -- Decubitus ulcer of buttock stage 2     --MRSA colonization    --DNR/DNI       RECOMMENDATIONS:      -- continue current regimen of zyvox - scheduled until 10/19  Planning on transfer to rehab at some point  UM discussed with nursing staff    CXR looks a bit worse to  BRITTNY Zurita for Dr. Parish Belle  10/17/2018

## 2018-10-17 NOTE — THERAPY TREATMENT NOTE
Acute Care - Physical Therapy Treatment Note  [facility]     Patient Name: Yassine Love  : 1945  MRN: [id number]  Today's Date: 10/17/2018  Onset of Illness/Injury or Date of Surgery: 10/02/18  Date of Referral to PT: 10/02/18  Referring Physician: DO Javi    Admit Date: 10/2/2018    Visit Dx:    ICD-10-CM ICD-9-CM   1. Acute on chronic respiratory failure with hypoxia and hypercapnia (CMS/HCC) J96.21 518.84    J96.22 786.09     799.02   2. Impaired functional mobility, balance, gait, and endurance Z74.09 V49.89   3. 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 O2 with chronic hypercarbia   • Essential hypertension   • Non-sustained ventricular tachycardia (CMS/HCC)   • CAD status post GIGI ×2 to LAD and RCA 3/12/18   • Debility   • Decubitus ulcer of buttock, stage 2   • Chronic pain   • Chronic venous stasis dermatitis of both lower extremities   • Acute on chronic mixed hypercarbic/hypoxemic respiratory failure requiring intubation and ventilatory support   • History of tobacco use   • Acute kidney injury   • Brief runs of SVT (supraventricular tachycardia)    • H/O bacteremia due to Staphylococcus   • Opioid use   • Overweight (BMI 25.0-29.9)   • Pneumonia due to infectious organism   • Sleep apnea       Therapy Treatment          Rehabilitation Treatment Summary     Row Name 10/17/18 0912 10/17/18 0846          Treatment Time/Intention    Discipline occupational therapist  -TB physical therapist  -     Document Type therapy note (daily note)  -TB therapy note (daily note)  -     Subjective Information complains of;weakness;pain;dizziness  -TB complains of;weakness;fatigue;pain  -     Mode of Treatment  -- individual therapy  -     Patient/Family Observations No family present. Pt in bed, supplemental O2; exit alarms  -TB  Pt in bed, awake, has O2nc, tele. Pre-medicated for PT session  -SJ     Care Plan Review  -- care plan/treatment goals reviewed;risks/benefits reviewed;current/potential barriers reviewed;patient/other agree to care plan  -SJ     Therapy Frequency (PT Clinical Impression)  -- daily  -SJ     Therapy Frequency (OT Eval) 3 times/wk   MWF  -TB  --     Patient Effort good  -TB adequate  -SJ     Existing Precautions/Restrictions fall;oxygen therapy device and L/min   exit alarms  -TB fall;oxygen therapy device and L/min  -SJ     Treatment Considerations/Comments  -- desats with activity  -SJ     Patient Response to Treatment Fair activity tolerance  -TB  --     Recorded by [TB] Gladys Ddoge, OT 10/17/18 1003 [SJ] Alva Mcdonnell, PT 10/17/18 1008     Row Name 10/17/18 0912 10/17/18 0846          Vital Signs    Pre Systolic BP Rehab --   RN cleared OT; VSS per tele  -  -SJ     Pre Treatment Diastolic BP  -- 62  -SJ     Intra SpO2 (%)  -- 84  -SJ     O2 Delivery Intra Treatment  -- supplemental O2  -SJ     Post SpO2 (%)  -- 92  -SJ     O2 Delivery Post Treatment supplemental O2  -TB supplemental O2  -SJ     Pre Patient Position Supine  -TB Supine  -SJ     Intra Patient Position Standing  -TB Standing  -SJ     Post Patient Position Sitting  -TB Sitting  -SJ     Recorded by [TB] Gladys Dodge, OT 10/17/18 1003 [SJ] Alva Mcdonnell, PT 10/17/18 1008     Row Name 10/17/18 0912 10/17/18 0846          Cognitive Assessment/Intervention- PT/OT    Affect/Mental Status (Cognitive)  -- agitated  -SJ     Orientation Status (Cognition) oriented to;person;place;situation  -TB oriented x 3  -SJ     Follows Commands (Cognition) follows one step commands;verbal cues/prompting required;repetition of directions required;physical/tactile prompts required;75-90% accuracy  -TB follows one step commands;75-90% accuracy;physical/tactile prompts required  -SJ     Cognitive Function (Cognitive)  -- safety deficit  -SJ      Safety Deficit (Cognitive) insight into deficits/self awareness;awareness of need for assistance;judgment;problem solving  -TB awareness of need for assistance;insight into deficits/self awareness  -SJ     Personal Safety Interventions fall prevention program maintained;gait belt;nonskid shoes/slippers when out of bed   exit alarms  -TB fall prevention program maintained;gait belt;muscle strengthening facilitated;nonskid shoes/slippers when out of bed  -SJ     Recorded by [TB] Gladys Dodge, OT 10/17/18 1003 [SJ] Alva Mcdonnell, PT 10/17/18 1008     Row Name 10/17/18 0912 10/17/18 0846          Safety Issues, Functional Mobility    Safety Issues Affecting Function (Mobility) awareness of need for assistance;insight into deficits/self awareness;judgment;problem solving  -TB insight into deficits/self awareness;awareness of need for assistance;sequencing abilities;safety precaution awareness  -SJ     Impairments Affecting Function (Mobility) balance;cognition;endurance/activity tolerance;pain;range of motion (ROM);strength  -TB balance;cognition;coordination;endurance/activity tolerance;motor control;pain;postural/trunk control;range of motion (ROM);strength  -SJ     Recorded by [TB] Gladys Dodge, OT 10/17/18 1003 [SJ] Alva Mcdonnell, PT 10/17/18 1008     Row Name 10/17/18 0912 10/17/18 0846          Bed Mobility Assessment/Treatment    Supine-Sit [place] (Bed Mobility)  -- contact guard  -SJ     Bed Mobility, Safety Issues  -- decreased use of arms for pushing/pulling;decreased use of legs for bridging/pushing;impaired trunk control for bed mobility  -SJ     Assistive Device (Bed Mobility)  -- bed rails;draw sheet  -SJ     Comment (Bed Mobility) Pt sitting EOB  -TB assist to scoot hips to EOB  -SJ     Recorded by [TB] Gladys Dodge, OT 10/17/18 1003 [SJ] Alva Mcdonnell, PT 10/17/18 1008     Row Name 10/17/18 0912             Functional Mobility    Functional Mobility- Ind. Level  maximum assist (25% patient effort);2 person assist required  -TB      Functional Mobility- Device --   gait belt and B UE supported  -TB      Functional Mobility-Distance (Feet) 3  -TB      Functional Mobility- Safety Issues step length decreased;weight-shifting ability decreased;sequencing ability decreased;balance decreased during turns  -TB      Functional Mobility- Comment EOB to [facility], small shuffling steps and posterior lean  -TB      Recorded by [TB] Gladys Dodge, OT 10/17/18 1003      Row Name 10/17/18 0912 10/17/18 0846          Transfer Assessment/Treatment    Transfer Assessment/Treatment bed-chair transfer  -TB  --     Comment (Transfers)  -- STS t/f training x3 reps, then stand pivot t/f  -SJ     Recorded by [TB] Gladys Dodge, OT 10/17/18 1003 [SJ] Alva Mcdonnell, PT 10/17/18 1008     Row Name 10/17/18 0912             Bed-Chair Transfer    Bed-Chair [place] (Transfers) maximum assist (25% patient effort);2 person assist  -TB      Recorded by [TB] Gladys Dodge, OT 10/17/18 1003      Row Name 10/17/18 0912 10/17/18 0846          Sit-Stand Transfer    Sit-Stand [place] (Transfers) maximum assist (25% patient effort);2 person assist;verbal cues   x3 reps to support transfer training and ADLs  -TB maximum assist (25% patient effort);2 person assist;verbal cues  -SJ     Assistive Device (Sit-Stand Transfers)  -- other (see comments)   support through UE's and gait belt  -SJ     Recorded by [TB] Gladys Dodge, OT 10/17/18 1003 [SJ] Alva Mcdonnell, PT 10/17/18 1008     Row Name 10/17/18 0846             Stand-Sit Transfer    Stand-Sit [place] (Transfers) maximum assist (25% patient effort);2 person assist;verbal cues  -SJ      Recorded by [SJ] Alva Mcdonnell, PT 10/17/18 1008      Row Name 10/17/18 0846             Stand Pivot/Stand Step Transfer    Stand Pivot/Stand Step [place] maximum assist (25% patient effort);2 person assist;verbal cues  -SJ       Assistive Device (Stand Pivot Stand Step Transfer) other (see comments)   UE support and gait belt  -SJ      Recorded by [SJ] Alva Mcdonnell, PT 10/17/18 1008      Row Name 10/17/18 0846             Gait/Stairs Assessment/Training    Comment (Gait/Stairs) not attempted due to difficulty with sit to stand t/f  -SJ      Recorded by [SJ] Alva Mcdonnell, PT 10/17/18 1008      Row Name 10/17/18 0912             ADL Assessment/Intervention    51544 - OT Self Care/Mgmt Minutes 15  -TB      BADL Assessment/Intervention grooming;feeding;lower body dressing;toileting;upper body dressing  -TB      Recorded by [TB] Gladys Dodge, OT 10/17/18 1003      Row Name 10/17/18 0912             Upper Body Dressing Assessment/Training    Upper Body Dressing [place] Level don;pajama/robe;moderate assist (50% patient effort)  -TB      Upper Body Dressing Position edge of bed sitting  -TB      Recorded by [TB] Gladys Dodge OT 10/17/18 1003      Row Name 10/17/18 0912             Lower Body Dressing Assessment/Training    Lower Body Dressing [place] Level don;socks;dependent (less than 25% patient effort)  -TB      Recorded by [TB] Gladys Dodge, OT 10/17/18 1003      Row Name 10/17/18 0912             Grooming Assessment/Training    [place] Level (Grooming) wash face, hands;hair care, combing/brushing;set up  -TB      Grooming Position supported sitting  -TB      Recorded by [TB] Gladys Dodge OT 10/17/18 1003      Row Name 10/17/18 0912             Self-Feeding Assessment/Training    [place] Level (Feeding) set up;feeding skills;liquids to mouth  -TB      Self-Feeding Assess/Train, Spillage Amount minimal  -TB      Position (Self-Feeding) sitting up in bed  -TB      Recorded by [TB] Gladys Dodge OT 10/17/18 1003      Row Name 10/17/18 0912             Toileting Assessment/Training    [place] Level (Toileting) set up  -TB      Assistive Devices (Toileting)  urinal  -TB      Toileting Position unsupported sitting  -TB      Comment (Toileting) at EOB  -TB      Recorded by [TB] Gladys Dodge, OT 10/17/18 1003      Row Name 10/17/18 0912             BADL Safety/Performance    Impairments, BADL Safety/Performance endurance/activity tolerance;pain;range of motion;strength  -TB      Recorded by [TB] Gladys Dodge, OT 10/17/18 1003      Row Name 10/17/18 0912             Motor Skills Assessment/Interventions    Additional Documentation Balance (Group);Therapeutic Exercise (Group)  -TB      Recorded by [TB] Gladys Dodge, OT 10/17/18 1003      Row Name 10/17/18 0846             Therapeutic Exercise    76290 - PT Therapeutic Exercise Minutes 5  -SJ      50387 - PT Therapeutic Activity Minutes 12  -SJ      Recorded by [SJ] Alva Mcdonnell, PT 10/17/18 1008      Row Name 10/17/18 0846             Lower Extremity Seated Therapeutic Exercise    Performed, Seated Lower Extremity (Therapeutic Exercise) hip flexion/extension  -SJ      Exercise Type, Seated Lower Extremity (Therapeutic Exercise) AAROM (active assistive range of motion);eccentric contraction  -SJ      Sets/Reps Detail, Seated Lower Extremity (Therapeutic Exercise) 5  -SJ      Recorded by [SJ] Alva Mcdonnell, PT 10/17/18 1008      Row Name 10/17/18 0912             Balance    Balance static sitting balance;static standing balance;dynamic standing balance  -TB      Recorded by [TB] Gladys Dodge, OT 10/17/18 1003      Row Name 10/17/18 0912 10/17/18 0846          Static Sitting Balance    Level of [place] (Unsupported Sitting, Static Balance) standby assist  -TB supervision  -SJ     Sitting Position (Unsupported Sitting, Static Balance) sitting on edge of bed  -TB sitting on edge of bed  -SJ     Time Able to Maintain Position (Unsupported Sitting, Static Balance) 4 to 5 minutes  -TB 4 to 5 minutes  -SJ     Comment (Unsupported Sitting, Static Balance) ADLs  -TB  --     Recorded  by [TB] Gladys Dodge, OT 10/17/18 1003 [SJ] Alva Mcdonnell, PT 10/17/18 1008     Row Name 10/17/18 0912 10/17/18 0846          Static Standing Balance    Level of Hand (Supported Standing, Static Balance) maximal assist, 25 to 49% patient effort  -TB maximal assist, 25 to 49% patient effort  -SJ     Time Able to Maintain Position (Supported Standing, Static Balance) 15 to 30 seconds  -TB less than 15 seconds  -SJ     Assistive Device Utilized (Supported Standing, Static Balance) --   gait belt and B UE supported  -TB other (see comments)   gait belt  -SJ     Comment (Supported Standing, Static Balance) B knees blocked for safety/fall prevention  -TB cues for posture  -SJ     Recorded by [TB] Gladys Dodge, OT 10/17/18 1003 [SJ] Alva Mcdonnell, PT 10/17/18 1008     Row Name 10/17/18 0912 10/17/18 0846          Positioning and Restraints    Pre-Treatment Position in bed  -TB in bed  -SJ     Post Treatment Position chair  -TB chair  -SJ     In Chair reclined;call light within reach;encouraged to call for assist;exit alarm on;legs elevated  -TB reclined;call light within reach;encouraged to call for assist;waffle cushion;on mechanical lift sling;heels elevated  -SJ     Recorded by [TB] Gladys Dodge, OT 10/17/18 1003 [SJ] Alva Mcdonnell, PT 10/17/18 1008     Row Name 10/17/18 0912 10/17/18 0846          Pain Scale: Numbers Pre/Post-Treatment    Pain Location - Side Bilateral  -TB  --     Pain Location - Orientation lower  -TB lower  -SJ     Pain Location back  -TB back  -SJ     Pre/Post Treatment Pain Comment acute on chronic back pain  -TB premedicated for session  -SJ     Pain Intervention(s) Ambulation/increased activity;Repositioned;Medication (See MAR)   pre-medicated for therapy  -TB  --     Recorded by [TB] Gladys Dodge, OT 10/17/18 1003 [SJ] Alva Mcdonnell, PT 10/17/18 1008     Row Name 10/17/18 0912 10/17/18 0846          Pain Scale: FACES Pre/Post-Treatment     Pain: FACES Scale, Pretreatment 4-->hurts little more  -TB 6-->hurts even more  -SJ     Pain: FACES Scale, Post-Treatment 6-->hurts even more  -TB 6-->hurts even more  -SJ     Pre/Post Treatment Pain Comment increased pain with trasfer to [facility] - tolerated  -TB  --     Recorded by [TB] Gladys Dodge, OT 10/17/18 1003 [SJ] Alva Mcdonnell, PT 10/17/18 1008     Row Name                Wound 10/02/18 2230 Bilateral medial gluteal MASD (moisture associated skin damage)    Wound - Properties Group Date first assessed: 10/02/18 [MR] Time first assessed: 2230 [MR] Present On Admission : yes [MR] Side: Bilateral [MR] Orientation: medial [MR] Location: gluteal [MR] Type: MASD (moisture associated skin damage) [MR], friction shearing  Recorded by:  [MR] Brittany Evans RN 10/03/18 1505    Row Name 10/17/18 0912 10/17/18 0846          Coping    Observed Emotional State accepting;irritable  -TB cooperative;irritable  -SJ     Verbalized Emotional State acceptance  -TB acceptance  -SJ     Recorded by [TB] Gladys Dodge, OT 10/17/18 1003 [SJ] Alva Mcdonnell, PT 10/17/18 1008     Row Name 10/17/18 0912 10/17/18 0846          Plan of Care Review    Plan of Care Reviewed With patient  -TB patient  -SJ     Recorded by [TB] Gladys Dodge, OT 10/17/18 1003 [SJ] Alva Mcdonnell, PT 10/17/18 1008     Row Name 10/17/18 0912             Outcome Summary/Treatment Plan (OT)    Daily Summary of Progress (OT) progress toward functional goals is gradual  -TB      Barriers to Overall Progress (OT) pain, strength  -TB      Plan for Continued Treatment (OT) per POC  -TB      Anticipated Discharge Disposition (OT) skilled nursing facility  -TB      Recorded by [TB] Gladys Dodge, OT 10/17/18 1003      Row Name 10/17/18 0846             Outcome Summary/Treatment Plan (PT)    Daily Summary of Progress (PT) progress towards functional goals is fair  -SJ      Recorded by [SJ] Alva Mcdonnell, PT 10/17/18 7433         User Key  (r) = Recorded By, (t) = Taken By, (c) = Cosigned By    Initials Name Effective Dates Discipline    TB Gladys Dodge, OT 06/08/18 -  OT    Alva Wyatt, PT 06/19/15 -  PT    Brittany Azar RN 06/16/16 -  Nurse          Wound 10/02/18 2230 Bilateral medial gluteal MASD (moisture associated skin damage) (Active)   Dressing Appearance dry;intact;no drainage 10/16/2018  7:56 PM   Closure TERA 10/16/2018  7:56 PM   Base dressing in place, unable to visualize 10/16/2018  7:56 PM   Dressing Care, Wound low-adherent;foam 10/16/2018  7:56 PM             Physical Therapy Education     Title: PT OT SLP Therapies (Active)     Topic: Physical Therapy (Active)     Point: Mobility training (Active)    Learning Progress Summary     Learner Status Readiness Method Response Comment Documented by    Patient Active Nonacceptance E,D NR  SJ 10/17/18 1011     Active Acceptance E,D NR  DM 10/15/18 1355     Active Acceptance E,D NR Safety  10/12/18 1117     Done Eager E VU  KM 10/10/18 0930     Active Acceptance E,D NR  LS 10/05/18 1324     Active Acceptance E,D NR  LS 10/04/18 1119     Done Acceptance E,TB VU  MT 10/03/18 1851    Family Active Acceptance E,D NR  LS 10/04/18 1119     Done Acceptance E,TB VU  MT 10/03/18 1851          Point: Home exercise program (Active)    Learning Progress Summary     Learner Status Readiness Method Response Comment Documented by    Patient Active Nonacceptance E,D NR  SJ 10/17/18 1011     Active Acceptance E,D NR  DM 10/15/18 1355     Active Acceptance E,D NR Safety  10/12/18 1117     Done Eager E VU  KM 10/10/18 0930     Active Acceptance E,D NR  LS 10/05/18 1324     Active Acceptance E,D NR  LS 10/04/18 1119     Done Acceptance E,TB VU  MT 10/03/18 1851    Family Active Acceptance E,D NR  LS 10/04/18 1119     Done Acceptance E,TB VU  MT 10/03/18 1851          Point: Body mechanics (Active)    Learning Progress Summary     Learner Status Readiness Method  Response Comment Documented by    Patient Active Nonacceptance E,D NR   10/17/18 1011     Active Acceptance E,D NR   10/15/18 1355     Active Acceptance E,D NR Safety  10/12/18 1117     Done Eager E VU  KM 10/10/18 0930     Active Acceptance E,D NR  LS 10/05/18 1324     Active Acceptance E,D NR  LS 10/04/18 1119    Family Active Acceptance E,D NR  LS 10/04/18 1119          Point: Precautions (Active)    Learning Progress Summary     Learner Status Readiness Method Response Comment Documented by    Patient Active Nonacceptance E,D NR   10/17/18 1011     Active Acceptance E,D NR   10/15/18 1355     Active Acceptance E,D NR Safety  10/12/18 1117     Done Eager E VU   10/10/18 0930     Active Acceptance E,D NR  LS 10/05/18 1324     Active Acceptance E,D NR  LS 10/04/18 1119     Done Acceptance E,TB VU  MT 10/03/18 1851    Family Active Acceptance E,D NR   10/04/18 1119     Done Acceptance E,TB VU  MT 10/03/18 1851                      User Key     Initials Effective Dates Name Provider Type Discipline     06/19/15 -  Alva Castellanos, PT Physical Therapist PT     06/19/15 -  Alva Mcdonnell, PT Physical Therapist PT     06/19/15 -  Carmela Washington, PT Physical Therapist PT     06/19/15 -  Rubia Amador, PT Physical Therapist PT     06/19/15 -  Zari Rai, PT Physical Therapist PT    MT 06/16/16 -  Noemi Fish RN Registered Nurse Nurse                    PT Recommendation and Plan  Therapy Frequency (PT Clinical Impression): daily  Outcome Summary/Treatment Plan (PT)  Daily Summary of Progress (PT): progress towards functional goals is fair  Plan of Care Reviewed With: patient  Progress: no change  Outcome Summary: Pt practiced sit <> stand t/f's with maxA x2, required cues for posture and safety. Pt limited by c/o of severe back pain with mobility and c/o hip pain during therex. Pt requires encouragement for participating and required extra time to complete tasks. Pt  desats with mobility and req cues for PLB and education on nasal canula. Continue POC.          Outcome Measures     Row Name 10/17/18 0912 10/17/18 0846 10/15/18 1243       How much help from another person do you currently need...    Turning from your back to your side while in flat bed without using bedrails?  -- 2  -SJ 2  -DM    Moving from lying on back to sitting on the side of a flat bed without bedrails?  -- 2  -SJ 2  -DM    Moving to and from a bed to a chair (including a wheelchair)?  -- 2  -SJ 2  -DM    Standing up from a chair using your arms (e.g., wheelchair, bedside chair)?  -- 2  -SJ 2  -DM    Climbing 3-5 steps with a railing?  -- 1  -SJ 1  -DM    To walk in hospital room?  -- 1  -SJ 1  -DM    AM-PAC 6 Clicks Score  -- 10  - 10  -DM       How much help from another is currently needed...    Putting on and taking off regular lower body clothing? 1  -TB  --  --    Bathing (including washing, rinsing, and drying) 2  -TB  --  --    Toileting (which includes using toilet bed pan or urinal) 2  -TB  --  --    Putting on and taking off regular upper body clothing 3  -TB  --  --    Taking care of personal grooming (such as brushing teeth) 3  -TB  --  --    Eating meals 3  -TB  --  --    Score 14  -TB  --  --       Functional Assessment    Outcome Measure Options AM-PAC 6 Clicks Daily Activity (OT)  -TB AM-PAC 6 Clicks Basic Mobility (PT)  -[facility] 6 Clicks Basic Mobility (PT)  -DM    Row Name 10/15/18 0819             How much help from another is currently needed...    Putting on and taking off regular lower body clothing? 2  -SD      Bathing (including washing, rinsing, and drying) 2  -SD      Toileting (which includes using toilet bed pan or urinal) 2  -SD      Putting on and taking off regular upper body clothing 3  -SD      Taking care of personal grooming (such as brushing teeth) 3  -SD      Eating meals 3  -SD      Score 15  -SD         Functional Assessment    Outcome Measure Options AM-PAC 6  Clicks Daily Activity (OT)  -SD        User Key  (r) = Recorded By, (t) = Taken By, (c) = Cosigned By    Initials Name Provider Type    TB Gladys Dodge, OT Occupational Therapist    Michelle Levine, OT Occupational Therapist    SJ Alva Mcdonnell, PT Physical Therapist    Rubia Kerr, PT Physical Therapist           Time Calculation:         PT Charges     Row Name 10/17/18 1011 10/17/18 0846          Time Calculation    Start Time 0846  -  --     PT Received On 10/17/18  -  --     PT Goal Re-Cert Due Date 10/25/18  -  --        Time Calculation- PT    Total Timed Code Minutes- PT 17 minute(s)  -  --        Timed Charges    51051 - PT Therapeutic Exercise Minutes  -- 5  -SJ     89286 - PT Therapeutic Activity Minutes  -- 12  -SJ       User Key  (r) = Recorded By, (t) = Taken By, (c) = Cosigned By    Initials Name Provider Type    SJ Alva Mcdonnell, PT Physical Therapist        Therapy Suggested Charges     Code   Minutes Charges    73714 (CPT®) Hc Pt Neuromusc Re Education Ea 15 Min      43888 (CPT®) Hc Pt Ther Proc Ea 15 Min 5     62671 (CPT®) Hc Gait Training Ea 15 Min      89272 (CPT®) Hc Pt Therapeutic Act Ea 15 Min 12 1    71829 (CPT®) Hc Pt Manual Therapy Ea 15 Min      08199 (CPT®) Hc Pt Iontophoresis Ea 15 Min      21879 (CPT®) Hc Pt Elec Stim Ea-Per 15 Min      91357 (CPT®) Hc Pt Ultrasound Ea 15 Min      09590 (CPT®) Hc Pt Self Care/Mgmt/Train Ea 15 Min      14017 (CPT®) Hc Pt Prosthetic (S) Train Initial Encounter, Each 15 Min      39419 (CPT®) Hc Pt Orthotic(S)/Prosthetic(S) Encounter, Each 15 Min      38753 (CPT®) Hc Orthotic(S) Mgmt/Train Initial Encounter, Each 15min      Total  17 1        Therapy Charges for Today     Code Description Service Date Service Provider Modifiers Qty    17063344464 HC PT THERAPEUTIC ACT EA 15 MIN 10/17/2018 Alva Mcdonnell, PT GP 1          PT G-Codes  Outcome Measure Options: AM-PAC 6 Clicks Daily Activity (OT)  AM-PAC 6 Clicks  Score: 10  Score: 14    Alva Mcdonnell, PT  10/17/2018

## 2018-10-17 NOTE — PLAN OF CARE
Problem: Patient Care Overview  Goal: Plan of Care Review  Outcome: Ongoing (interventions implemented as appropriate)   10/17/18    Coping/Psychosocial   Plan of Care Reviewed With patient   Plan of Care Review   Progress improving   OTHER   Outcome Summary Follow-up for bottom MASD and friction damage. At this time all friction areas have resolved and are intact, pink, and blanching. MASD has resolved and no fungal rash can be seen. Will discontinue Venelex at this time. Continue with barrier cream to bottom for moisture damage prevention, and use dry flow pads only. No other identifiable issues or concerns at this time. Will sign off. Please contact [facility] nurse if future needs arise. Thanks

## 2018-10-17 NOTE — PLAN OF CARE
Problem: Patient Care Overview  Goal: Plan of Care Review  Outcome: Ongoing (interventions implemented as appropriate)   10/17/18 0581   Coping/Psychosocial   Plan of Care Reviewed With patient   Plan of Care Review   Progress no change   OTHER   Outcome Summary VSS. Has maintained O2 sats on 3L NC. Did complain of pain; scheduled and PRN pain meds given. Did complain of SOA this AM, called APRN to request order for PRN nebs. Did inform APRN of left lung sounds (see Yue APRN note).        Problem: Breathing Pattern Ineffective (Adult)  Goal: Anxiety/Fear Reduction  Outcome: Ongoing (interventions implemented as appropriate)

## 2018-10-17 NOTE — PROGRESS NOTES
Continued Stay Note  [facility]     Patient Name: Yassine Love  MRN: [id number]  Today's Date: 10/17/2018    Admit Date: 10/2/2018          Discharge Plan     Row Name 10/17/18 8579       Plan    Plan Rehab    Plan Comments Spoke to pt's son and pt at . Pt is becoming frustrated with unable to find a rehab and states he would like to just go home with HH, but that is not a safe discharge plan due to he lives alone. Spoke to pt's son Chris and he would like referrals to [facility], [facility] in [place], [facility], [facility] in [place], [facility] and [facility] in [place]. Referrals efaxed to all chosen facilities and will f/u in AM on them. Spoke to Kristina at [facility] and they may have a skilled bed [facility] 10/21 at [facility] if pt has not found a rehab bed.  Yumiko Hull RN, CM ext. [phone]    Row Name 10/17/18 0138       Plan    Plan Comments Received call from Tammie at [facility] and they do not have any STR beds at this time but may have later this week. Called Kristina at [facility] to see if any STR beds have opened up. Kristina will call CM if a bed is available. Called pt's son and discussed that [facility], [facility] do not have beds. Discussed that if he did not want further referrals to [facility], they would need to look at facilities outside [facility]. He will call CM back with options. Notified pt as well.               Discharge Codes    No documentation.       Expected Discharge Date and Time     Expected Discharge Date Expected Discharge Time    Oct 19, 2018             Yumiko Hull

## 2018-10-17 NOTE — SIGNIFICANT NOTE
Pt reporting SOA and requesting neb. Not more hypoxic (still on 3L), may be a little more coarse/crackly on left on assessment per RN, waiting on rehab bed. CXR just repeated 10/16 AM.    Still on zyvox for infiltrate. PO diuretic. Prednisone still going.    Added albuterol neb for PRN.    Please f/u when rounding. Otherwise NNO for now.

## 2018-10-17 NOTE — PLAN OF CARE
Problem: Patient Care Overview  Goal: Plan of Care Review  Outcome: Ongoing (interventions implemented as appropriate)   10/17/18 1008   Coping/Psychosocial   Plan of Care Reviewed With patient   Plan of Care Review   Progress no change   OTHER   Outcome Summary Pt practiced sit <> stand t/f's with maxA x2, required cues for posture and safety. Pt limited by c/o of severe back pain with mobility and c/o hip pain during therex. Pt requires encouragement for participating and required extra time to complete tasks. Pt desats with mobility and req cues for PLB and education on nasal canula. Continue POC.

## 2018-10-17 NOTE — DISCHARGE PLACEMENT REQUEST
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 Essential hypertension   • Non-sustained ventricular tachycardia (CMS/HCC)   • CAD status post GIGI ×2 to LAD and RCA 3/12/18   • Debility   • Decubitus ulcer of buttock, stage 2   • Chronic pain   • Chronic venous stasis dermatitis of both lower extremities   • Acute on chronic mixed hypercarbic/hypoxemic respiratory failure requiring intubation and ventilatory support   • History of tobacco use   • Acute kidney injury   • Brief runs of SVT (supraventricular tachycardia)    • H/O bacteremia due to Staphylococcus   • Opioid use   • Overweight (BMI 25.0-29.9)   • Pneumonia due to infectious organism       History of Present Illness:   Patient is a 72 year old  male with the above noted past medical history who cardiology is asked to see today in consultation for further evaluation of SVT.  The patient was admitted to [facility] on 10/2/18 for COPD exacerbation and hypoxemia requiring intubated and ICU stay.  He self extubated on 10/3 and has been transferred back to the regular nursing floor.  Palliative care has been consulted as the patient does not wish to be re-intubated.  His code status has been changed to DNR/DNI and palliative continues to follow for symptom  management.      We did see the patient during his hospitalization in March for episodes of NSVT versus SVT with aberrancy.  He had a positive stress test at that time so subsequently underwent left heart catheterization and is s/p GIGI to his LAD and RCA.  He was discharged home with an order for a Holter monitor to assess for any recurrent arrhythmias but this does not seem to have been worn.  During this hospitalization, he has had more episodes of a wide complex tachycardia, initially at 1912 yesterday evening.  He had more episodes this morning at 0902, 0913, and 0919.  He states he felt his heart racing but was otherwise asymptomatic.  He denied any c/o CP, worsening SOB, dizziness, lightheadedness, or near syncope.  Episodes are abrupt in onset and termination.  He currently is on metoprolol 25 mg bid.     Allergies   Allergen Reactions   • Ciprofloxacin Hcl Anaphylaxis   • Ceftin [Cefuroxime Axetil] Angioedema     Tolerated Zosyn in past       Prior to Admission Medications     Prescriptions Last Dose Informant Patient Reported? Taking?    acetaminophen (TYLENOL) 325 MG tablet   Yes Yes    Take 325 mg by mouth Every 6 (Six) Hours As Needed for Mild Pain  or Fever.    albuterol (PROVENTIL) (2.5 MG/3ML) 0.083% nebulizer solution   No Yes    Take 2.5 mg by nebulization Every 6 (Six) Hours As Needed for Shortness of Air.    aspirin 81 MG EC tablet   No Yes    Take 1 tablet by mouth Daily.    atorvastatin (LIPITOR) 40 MG tablet   Yes Yes    Take 40 mg by mouth Every Night.    budesonide-formoterol (SYMBICORT) 160-4.5 MCG/ACT inhaler   Yes Yes    Inhale 2 puffs 2 (Two) Times a Day.    calcium carbonate (TUMS) 500 MG chewable tablet   Yes Yes    Chew 2 tablets 2 (Two) Times a Day As Needed for Heartburn.    clopidogrel (PLAVIX) 75 MG tablet   No Yes    Take 1 tablet by mouth Daily.    cyanocobalamin (VITAMIN B-12) 1000 MCG tablet   No Yes    Take 1 tablet by mouth Daily.    fluticasone (FLONASE) 50 MCG/ACT nasal  spray   No Yes    2 sprays by Each Nare route Daily.    furosemide (LASIX) 40 MG tablet   Yes Yes    Take 40 mg by mouth 2 (Two) Times a Day.    gabapentin (NEURONTIN) 400 MG capsule  Self Yes Yes    Take 400 mg by mouth 3 (Three) Times a Day.    HYDROcodone-acetaminophen (NORCO)  MG per tablet   No Yes    Take 1 tablet by mouth Every 4 (Four) Hours As Needed for Moderate Pain .    metoprolol tartrate (LOPRESSOR) 25 MG tablet   Yes Yes    Take 25 mg by mouth 2 (Two) Times a Day.    mirtazapine (REMERON SOL-TAB) 15 MG disintegrating tablet   No Yes    Take 1 tablet by mouth Every Night.    Morphine (MS CONTIN) 30 MG 12 hr tablet   No Yes    Take 1 tablet by mouth Every 12 (Twelve) Hours.    nicotine (NICODERM CQ) 21 MG/24HR patch   No Yes    Place 1 patch on the skin as directed by provider Daily.    pantoprazole (PROTONIX) 40 MG EC tablet   No Yes    Take 1 tablet by mouth Daily.    sennosides-docusate sodium (SENOKOT-S) 8.6-50 MG tablet   No Yes    Take 2 tablets by mouth Every Night.    tiotropium (SPIRIVA) 18 MCG per inhalation capsule   Yes Yes    Place 1 capsule into inhaler and inhale Daily.            Current Facility-Administered Medications:   •  acetaminophen (TYLENOL) tablet 325 mg, 325 mg, Oral, Q6H PRN, Pastora Caldwell G, DO, 325 mg at 10/08/18 1034  •  ALPRAZolam (XANAX) tablet 0.5 mg, 0.5 mg, Oral, Nightly PRN, Joon Hernandez, APRN, 0.5 mg at 10/07/18 1947  •  aspirin chewable tablet 81 mg, 81 mg, Oral, Daily, Perla Jones APRN, 81 mg at 10/09/18 0848  •  atorvastatin (LIPITOR) tablet 40 mg, 40 mg, Oral, Nightly, Pastora Caldwell G, DO, 40 mg at 10/08/18 2018  •  bisacodyl (DULCOLAX) suppository 10 mg, 10 mg, Rectal, Daily PRN, Maira Herrera MD  •  calcium carbonate (TUMS) chewable tablet 500 mg (200 mg elemental), 2 tablet, Oral, BID PRN, Pastora Caldwell, DO  •  castor oil-balsam peru (VENELEX) ointment, , Topical, Q12H, Danii Tabor, DO  •  clopidogrel (PLAVIX) tablet 75 mg, 75  mg, Oral, Daily, Javi, Pastora G, DO, 75 mg at 10/09/18 0848  •  dextrose (D50W) 25 g/ 50mL Intravenous Solution 25 g, 25 g, Intravenous, Q15 Min PRN, Perla Jones, APRN  •  dextrose (GLUTOSE) oral gel 15 g, 15 g, Oral, Q15 Min PRN, Perla Jones, APRN  •  famotidine (PEPCID) tablet 20 mg, 20 mg, Oral, BID, Parish Donato MD, 20 mg at 10/09/18 0849  •  furosemide (LASIX) tablet 40 mg, 40 mg, Oral, Daily, Case, Pinky V., DO, 40 mg at 10/09/18 0848  •  gabapentin (NEURONTIN) capsule 200 mg, 200 mg, Oral, Q8H, Javi, Pastora G, DO, 200 mg at 10/09/18 0848  •  glucagon (human recombinant) (GLUCAGEN DIAGNOSTIC) injection 1 mg, 1 mg, Subcutaneous, PRN, Perla Jones, APRN  •  guaiFENesin (MUCINEX) 12 hr tablet 600 mg, 600 mg, Oral, Q12H, Javi, Pastora G, DO, 600 mg at 10/09/18 0849  •  heparin (porcine) 5000 UNIT/ML injection 5,000 Units, 5,000 Units, Subcutaneous, Q8H, Javi, Pastora G, DO, 5,000 Units at 10/09/18 0603  •  HYDROcodone-acetaminophen (NORCO)  MG per tablet 1 tablet, 1 tablet, Oral, Q4H PRN, Maira Herrera MD, 1 tablet at 10/08/18 2302  •  insulin lispro (humaLOG) injection 0-7 Units, 0-7 Units, Subcutaneous, 4x Daily With Meals & Nightly, Parish Donato MD, 2 Units at 10/08/18 2023  •  ipratropium-albuterol (DUO-NEB) nebulizer solution 3 mL, 3 mL, Nebulization, Q4H PRN, Javi Pastora G, DO  •  ipratropium-albuterol (DUO-NEB) nebulizer solution 3 mL, 3 mL, Nebulization, Q4H - RT, Parish Donato MD, 3 mL at 10/09/18 0802  •  lactobacillus acidophilus (RISAQUAD) capsule 1 capsule, 1 capsule, Oral, Daily, Mary Kate Rahman MD, 1 capsule at 10/09/18 0848  •  lactulose (CHRONULAC) 10 GM/15ML solution 20 g, 20 g, Oral, BID PRN, Maira Herrera MD  •  Linezolid (ZYVOX) 600 mg 300 mL, 600 mg, Intravenous, Q12H, Mary Kate Rahman MD, Last Rate: 300 mL/hr at 10/09/18 0559, 600 mg at 10/09/18 0559  •  Magnesium Sulfate 2 gram Bolus, followed by 8 gram infusion (total Mg dose 10 grams)- Mg  less than or equal to 1mg/dL, 2 g, Intravenous, PRN **OR** Magnesium Sulfate 2 gram / 50mL Infusion (GIVE X 3 BAGS TO EQUAL 6GM TOTAL DOSE) - Mg 1.1 - 1.5 mg/dl, 2 g, Intravenous, PRN **OR** Magnesium Sulfate 4 gram infusion- Mg 1.6-1.9 mg/dL, 4 g, Intravenous, PRN, Atkins, Danii L, DO  •  meropenem (MERREM) 1 g/100 mL 0.9% NS VTB (mbp), 1 g, Intravenous, Q8H, Perla Jones, APRN, 1 g at 10/09/18 0212  •  metoprolol tartrate (LOPRESSOR) tablet 25 mg, 25 mg, Oral, Q12H, Pastora Caldwell, DO, 25 mg at 10/09/18 0849  •  mirtazapine (REMERON SOL-TAB) disintegrating tablet 15 mg, 15 mg, Oral, Nightly, Pastora Caldwell, DO, 15 mg at 10/08/18 2019  •  Morphine (MS CONTIN) 12 hr tablet 15 mg, 15 mg, Oral, Q12H, Maira Herrera MD, 15 mg at 10/09/18 0849  •  nicotine (NICODERM CQ) 21 MG/24HR patch 1 patch, 1 patch, Transdermal, Q24H, Joon Hernandez, APRN, 1 patch at 10/09/18 0850  •  Pharmacy Consult - [facility], , Does not apply, Daily, Mansoor Mckee, [facility]  •  polyethylene glycol 3350 powder (packet), 17 g, Oral, BID, Maira Herrera MD  •  [START ON 10/11/2018] polyethylene glycol 3350 powder (packet), 17 g, Oral, Daily, Maira Herrera MD  •  potassium chloride (KLOR-CON) packet 40 mEq, 40 mEq, Oral, PRN, Atkins, Danii L, DO  •  potassium chloride (MICRO-K) CR capsule 40 mEq, 40 mEq, Oral, PRN, Atkins, Danii L, DO  •  [START ON 10/12/2018] predniSONE (DELTASONE) tablet 10 mg, 10 mg, Oral, Daily With Breakfast **FOLLOWED BY** [START ON 10/15/2018] predniSONE (DELTASONE) tablet 5 mg, 5 mg, Oral, Daily With Breakfast, Case, Pinky V., DO  •  predniSONE (DELTASONE) tablet 20 mg, 20 mg, Oral, Daily With Breakfast, Case, Pinky V., DO, 20 mg at 10/09/18 0848  •  sennosides-docusate sodium (SENOKOT-S) 8.6-50 MG tablet 2 tablet, 2 tablet, Oral, BID, Maira Herrera MD, 2 tablet at 10/08/18 2018  •  sodium chloride 0.9 % flush 10 mL, 10 mL, Intravenous, PRN, Ruben Herrera MD  •  sodium chloride 0.9 %  flush 10 mL, 10 mL, Intravenous, Q12H, Parish Donato MD, 10 mL at 10/09/18 0856  •  sodium chloride 0.9 % flush 3 mL, 3 mL, Intravenous, Q12H, Sierra Caldwellsie G, DO, 10 mL at 10/09/18 0856  •  vitamin B-12 (CYANOCOBALAMIN) tablet 1,000 mcg, 1,000 mcg, Oral, Daily, Javi, Pastora G, DO, 1,000 mcg at 10/09/18 0848    Social History     Social History   • Marital status:    • Number of children: 1     Occupational History   • retired       Social History Main Topics   • Smoking status: Current Every Day Smoker     Packs/day: 1.50     Years: 56.00     Types: Cigarettes   • Smokeless tobacco: Never Used   • Alcohol use No   • Drug use: No   • Sexual activity: Defer     Other Topics Concern   • Not on file     Social History Narrative    Moved to [place] 17 yr ago to be near son.  Lives alone. Minimally ambulatory with walker       Family History   Problem Relation Age of Onset   • Stroke Father    • Heart attack Brother    • COPD Brother        REVIEW OF SYSTEMS:   CONST:  No weight loss, fever, chills, + weakness, + fatigue.   HEENT:  No visual loss, blurred vision, double vision, yellow sclerae.                   No hearing loss, congestion, sore throat.   SKIN:      No rashes, urticaria, ulcers, sores.     RESP:     + shortness of breath, hemoptysis, + cough, sputum.   GI:           No anorexia, nausea, vomiting, diarrhea. No abdominal pain, melena.   :         No burning on urination, hematuria or increased frequency.  ENDO:    No diaphoresis, cold or heat intolerance. No polyuria or polydipsia.   NEURO:  No headache, dizziness, syncope, paralysis, ataxia, or parasthesias.                  No change in bowel or bladder control. No history of CVA/TIA  MUSC:    No muscle, back pain, joint pain or stiffness.   HEME:    No anemia, bleeding, bruising. No history of DVT/PE.  PSYCH:  No history of depression, anxiety    Objective     OBJECTIVE:    Vitals:    10/09/18 0533 10/09/18 0755 10/09/18 0802  10/09/18 0848   BP:  130/84  119/68   BP Location:       Patient Position:       Pulse:  63 58 69   Resp:  18 20    Temp:  98 °F (36.7 °C)     TempSrc:  Oral     SpO2:  92% 94%    Weight: 83.9 kg (184 lb 14.4 oz)      Height:             Vital Sign Min/Max for last 24 hours  Temp  Min: 97.9 °F (36.6 °C)  Max: 98.1 °F (36.7 °C)   BP  Min: 98/56  Max: 142/72   Pulse  Min: 58  Max: 77   Resp  Min: 16  Max: 20   SpO2  Min: 91 %  Max: 94 %   Flow (L/min)  Min: 2  Max: 2      Intake/Output Summary (Last 24 hours) at 10/09/18 1034  Last data filed at 10/09/18 1001   Gross per 24 hour   Intake              480 ml   Output              700 ml   Net             -220 ml             Physical Exam:  GEN: Ill-appearing male, well nourished, well-developed, no acute distress  HEENT: Normocephalic, atraumatic, PERRLA, moist mucous membranes  NECK: Supple, No JVD, no thyromegaly, no lymphadenopathy  CARD: S1S2, RRR, no murmur, gallop, or rub  LUNGS: Diminished breath sounds bilaterally, normal respiratory effort  ABDOMEN: Soft, nontender, normal bowel sounds  EXTREMITIES: No gross deformities, no clubbing, cyanosis, 1+ LE edema  SKIN: Warm, dry  NEURO: No focal deficits, alert and oriented x 3  PSYCHIATRIC: Normal affect and mood      Data:     Results from last 7 days  Lab Units 10/09/18  0751 10/06/18  0535 10/05/18  0353   WBC 10*3/mm3 6.27 5.26 7.38   HEMOGLOBIN g/dL 10.0* 10.2* 9.1*   HEMATOCRIT % 33.6* 35.3* 30.7*   PLATELETS 10*3/mm3 302 287 297       Results from last 7 days  Lab Units 10/09/18  0751 10/08/18  0534 10/07/18  0516   SODIUM mmol/L 138 140 138   POTASSIUM mmol/L 3.7 4.6 4.3   CHLORIDE mmol/L 101 106 104   CO2 mmol/L 33.0* 31.0 29.0   BUN mg/dL 36* 36* 33*   CREATININE mg/dL 1.24 1.26 1.33*   GLUCOSE mg/dL 86 106* 128*                                    Intake/Output Summary (Last 24 hours) at 10/09/18 1034  Last data filed at 10/09/18 1001   Gross per 24 hour   Intake              480 ml   Output               700 ml   Net             -220 ml       Chest X-Ray:  Imaging Results (last 24 hours)     Procedure Component Value Units Date/Time    XR Chest 1 View [[id number]] Updated:  10/09/18 1026          Telemetry: NSR, HR 58-77 bpm  EKG on 10/2/18: NSR, incomplete RBBB, T wave inversion V1-V4, HR 77 bpm     Assessment     Assessment:   1. SVT:  - Rhythm strips consistent with SVT, both narrow complex and with aberrancy (wide complex) at times, all self converting  - Patient mildly symptomatic during episodes    2. CAD:  - S/p GIGI to LAD/RCA 2018  - On DAPT/statin    3. COPD, end stage/acute hypercarbic/hypoxemic respiratory failure:  - Has been made DNR/DNI as patient does not wish to be re-intubated  - Pulm and palliative care following, on prednisone, antibiotics      Plan:  - Will plan to treat medically at this time as the patient is aiming toward less aggressive therapies (now working with palliative care for COPD and also is now DNR/DNI)  - Increase metoprolol to 50 mg bid  - Continue other cardiac meds for CAD      Scribed for Carlos Harvey MD by Zohra Griggs, APRN. 10/9/2018  10:34 AM    I, Carlos Harvey MD, personally performed the services as scribed by the above named individual. I have made any necessary edits and it is both accurate and complete.     Carlos Harvey MD, MSc, [facility]  Interventional Cardiology  [facility]              Electronically signed by Carlos Harvey MD at 10/9/2018  7:28 PM          Physical Therapy Notes (most recent note)      Alva Mcdonnell, PT at 10/17/2018 10:12 AM  Version 1 of 1         Acute Care - Physical Therapy Treatment Note  [facility]     Patient Name: Yassine Love  : 1945  MRN: [id number]  Today's Date: 10/17/2018  Onset of Illness/Injury or Date of Surgery: 10/02/18  Date of Referral to PT: 10/02/18  Referring Physician: DO Javi    Admit Date: 10/2/2018    Visit Dx:    ICD-10-CM ICD-9-CM   1. Acute on chronic  respiratory failure with hypoxia and hypercapnia (CMS/HCC) J96.21 518.84    J96.22 786.09     799.02   2. Impaired functional mobility, balance, gait, and endurance Z74.09 V49.89   3. 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 O2 with chronic hypercarbia   • Essential hypertension   • Non-sustained ventricular tachycardia (CMS/HCC)   • CAD status post GIGI ×2 to LAD and RCA 3/12/18   • Debility   • Decubitus ulcer of buttock, stage 2   • Chronic pain   • Chronic venous stasis dermatitis of both lower extremities   • Acute on chronic mixed hypercarbic/hypoxemic respiratory failure requiring intubation and ventilatory support   • History of tobacco use   • Acute kidney injury   • Brief runs of SVT (supraventricular tachycardia)    • H/O bacteremia due to Staphylococcus   • Opioid use   • Overweight (BMI 25.0-29.9)   • Pneumonia due to infectious organism   • Sleep apnea       Therapy Treatment          Rehabilitation Treatment Summary     Row Name 10/17/18 0912 10/17/18 0846          Treatment Time/Intention    Discipline occupational therapist  -TB physical therapist  -SJ     Document Type therapy note (daily note)  -TB therapy note (daily note)  -     Subjective Information complains of;weakness;pain;dizziness  -TB complains of;weakness;fatigue;pain  -     Mode of Treatment  -- individual therapy  -     Patient/Family Observations No family present. Pt in bed, supplemental O2; exit alarms  -TB Pt in bed, awake, has O2nc, tele. Pre-medicated for PT session  -     Care Plan Review  -- care plan/treatment goals reviewed;risks/benefits reviewed;current/potential barriers reviewed;patient/other agree to care plan  -SJ     Therapy Frequency (PT Clinical Impression)  -- daily  -SJ     Therapy Frequency (OT Eval) 3 times/wk   MWF  -TB  --      Patient Effort good  -TB adequate  -SJ     Existing Precautions/Restrictions fall;oxygen therapy device and L/min   exit alarms  -TB fall;oxygen therapy device and L/min  -SJ     Treatment Considerations/Comments  -- desats with activity  -SJ     Patient Response to Treatment Fair activity tolerance  -TB  --     Recorded by [TB] Gladys Dodge, OT 10/17/18 1003 [SJ] Alva Mcdonnell, PT 10/17/18 1008     Row Name 10/17/18 0912 10/17/18 0846          Vital Signs    Pre Systolic BP Rehab --   RN cleared OT; VSS per tele  -  -SJ     Pre Treatment Diastolic BP  -- 62  -SJ     Intra SpO2 (%)  -- 84  -SJ     O2 Delivery Intra Treatment  -- supplemental O2  -SJ     Post SpO2 (%)  -- 92  -SJ     O2 Delivery Post Treatment supplemental O2  -TB supplemental O2  -SJ     Pre Patient Position Supine  -TB Supine  -SJ     Intra Patient Position Standing  -TB Standing  -SJ     Post Patient Position Sitting  -TB Sitting  -SJ     Recorded by [TB] Gladys Dodge, OT 10/17/18 1003 [SJ] Alva Mcdonnell, PT 10/17/18 1008     Row Name 10/17/18 0912 10/17/18 0846          Cognitive Assessment/Intervention- PT/OT    Affect/Mental Status (Cognitive)  -- agitated  -SJ     Orientation Status (Cognition) oriented to;person;place;situation  -TB oriented x 3  -SJ     Follows Commands (Cognition) follows one step commands;verbal cues/prompting required;repetition of directions required;physical/tactile prompts required;75-90% accuracy  -TB follows one step commands;75-90% accuracy;physical/tactile prompts required  -SJ     Cognitive Function (Cognitive)  -- safety deficit  -SJ     Safety Deficit (Cognitive) insight into deficits/self awareness;awareness of need for assistance;judgment;problem solving  -TB awareness of need for assistance;insight into deficits/self awareness  -SJ     Personal Safety Interventions fall prevention program maintained;gait belt;nonskid shoes/slippers when out of bed   exit alarms  -TB fall  prevention program maintained;gait belt;muscle strengthening facilitated;nonskid shoes/slippers when out of bed  -SJ     Recorded by [TB] Gladys Dodge, OT 10/17/18 1003 [SJ] Alva Mcdonnell, PT 10/17/18 1008     Row Name 10/17/18 0912 10/17/18 0846          Safety Issues, Functional Mobility    Safety Issues Affecting Function (Mobility) awareness of need for assistance;insight into deficits/self awareness;judgment;problem solving  -TB insight into deficits/self awareness;awareness of need for assistance;sequencing abilities;safety precaution awareness  -SJ     Impairments Affecting Function (Mobility) balance;cognition;endurance/activity tolerance;pain;range of motion (ROM);strength  -TB balance;cognition;coordination;endurance/activity tolerance;motor control;pain;postural/trunk control;range of motion (ROM);strength  -SJ     Recorded by [TB] Gladys Dodge, OT 10/17/18 1003 [SJ] Alva Mcdonnell, PT 10/17/18 1008     Row Name 10/17/18 0912 10/17/18 0846          Bed Mobility Assessment/Treatment    Supine-Sit [place] (Bed Mobility)  -- contact guard  -SJ     Bed Mobility, Safety Issues  -- decreased use of arms for pushing/pulling;decreased use of legs for bridging/pushing;impaired trunk control for bed mobility  -SJ     Assistive Device (Bed Mobility)  -- bed rails;draw sheet  -SJ     Comment (Bed Mobility) Pt sitting EOB  -TB assist to scoot hips to EOB  -SJ     Recorded by [TB] Gladys Dodge, OT 10/17/18 1003 [SJ] Alva Mcdonnell, PT 10/17/18 1008     Row Name 10/17/18 0912             Functional Mobility    Functional Mobility- Ind. Level maximum assist (25% patient effort);2 person assist required  -TB      Functional Mobility- Device --   gait belt and B UE supported  -TB      Functional Mobility-Distance (Feet) 3  -TB      Functional Mobility- Safety Issues step length decreased;weight-shifting ability decreased;sequencing ability decreased;balance decreased during turns   -TB      Functional Mobility- Comment EOB to [facility], small shuffling steps and posterior lean  -TB      Recorded by [TB] Gladys Dodge, OT 10/17/18 1003      Row Name 10/17/18 0912 10/17/18 0846          Transfer Assessment/Treatment    Transfer Assessment/Treatment bed-chair transfer  -TB  --     Comment (Transfers)  -- STS t/f training x3 reps, then stand pivot t/f  -SJ     Recorded by [TB] Gladys Dodge, OT 10/17/18 1003 [SJ] Alva Mcdonnell, PT 10/17/18 1008     Row Name 10/17/18 0912             Bed-Chair Transfer    Bed-Chair [place] (Transfers) maximum assist (25% patient effort);2 person assist  -TB      Recorded by [TB] Gladys Dodge, OT 10/17/18 1003      Row Name 10/17/18 0912 10/17/18 0846          Sit-Stand Transfer    Sit-Stand [place] (Transfers) maximum assist (25% patient effort);2 person assist;verbal cues   x3 reps to support transfer training and ADLs  -TB maximum assist (25% patient effort);2 person assist;verbal cues  -SJ     Assistive Device (Sit-Stand Transfers)  -- other (see comments)   support through UE's and gait belt  -SJ     Recorded by [TB] Gladys Dodge, OT 10/17/18 1003 [SJ] Alva Mcdonnell, PT 10/17/18 1008     Row Name 10/17/18 0846             Stand-Sit Transfer    Stand-Sit [place] (Transfers) maximum assist (25% patient effort);2 person assist;verbal cues  -SJ      Recorded by [SJ] Alva Mcdonnell, PT 10/17/18 1008      Row Name 10/17/18 0846             Stand Pivot/Stand Step Transfer    Stand Pivot/Stand Step [place] maximum assist (25% patient effort);2 person assist;verbal cues  -SJ      Assistive Device (Stand Pivot Stand Step Transfer) other (see comments)   UE support and gait belt  -SJ      Recorded by [SJ] Alva Mcdonnell, PT 10/17/18 1008      Row Name 10/17/18 0846             Gait/Stairs Assessment/Training    Comment (Gait/Stairs) not attempted due to difficulty with sit to stand t/f  -SJ      Recorded by  [SJ] Kecia Alva, PT 10/17/18 1008      Row Name 10/17/18 0912             ADL Assessment/Intervention    30806 - OT Self Care/Mgmt Minutes 15  -TB      BADL Assessment/Intervention grooming;feeding;lower body dressing;toileting;upper body dressing  -TB      Recorded by [TB] Gladys Dodge, OT 10/17/18 1003      Row Name 10/17/18 0912             Upper Body Dressing Assessment/Training    Upper Body Dressing [place] Level don;pajama/robe;moderate assist (50% patient effort)  -TB      Upper Body Dressing Position edge of bed sitting  -TB      Recorded by [TB] Gladys Dodge, OT 10/17/18 1003      Row Name 10/17/18 0912             Lower Body Dressing Assessment/Training    Lower Body Dressing [place] Level don;socks;dependent (less than 25% patient effort)  -TB      Recorded by [TB] Gladys Dodge, OT 10/17/18 1003      Row Name 10/17/18 0912             Grooming Assessment/Training    [place] Level (Grooming) wash face, hands;hair care, combing/brushing;set up  -TB      Grooming Position supported sitting  -TB      Recorded by [TB] Gladys Dodge, OT 10/17/18 1003      Row Name 10/17/18 0912             Self-Feeding Assessment/Training    [place] Level (Feeding) set up;feeding skills;liquids to mouth  -TB      Self-Feeding Assess/Train, Spillage Amount minimal  -TB      Position (Self-Feeding) sitting up in bed  -TB      Recorded by [TB] Gladys Dodge, OT 10/17/18 1003      Row Name 10/17/18 0912             Toileting Assessment/Training    [place] Level (Toileting) set up  -TB      Assistive Devices (Toileting) urinal  -TB      Toileting Position unsupported sitting  -TB      Comment (Toileting) at EOB  -TB      Recorded by [TB] Gladys Dodge, OT 10/17/18 1003      Row Name 10/17/18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      Recorded by [TB] Gladys Dodge, OT 10/17/18 1003      Row Name 10/17/18 0912             Plan of Care Review    Plan of Care Reviewed With patient  -TB      Recorded by [TB] Gladys Dodge, OT 10/17/18 1003      Row Name 10/17/18 0912             Outcome Summary/Treatment Plan (OT)    Daily Summary of Progress (OT) progress toward functional goals is gradual  -TB      Barriers to Overall Progress (OT) pain, strength  -TB      Plan for Continued Treatment (OT) per POC  -TB      Anticipated Discharge Disposition (OT) skilled nursing facility  -TB      Recorded by [TB] Gladys Dodge, OT 10/17/18 1003        User Key  (r) = Recorded By, (t) = Taken By, (c) = Cosigned By    Initials Name Effective Dates Discipline    TB Gladys Dodge, OT 06/08/18 -  OT     Brittany Evans RN 06/16/16 -  Nurse        Wound 10/02/18 2230 Bilateral medial gluteal MASD (moisture  associated skin damage) (Active)   Dressing Appearance dry;intact;no drainage 10/16/2018  7:56 PM   Closure TERA 10/16/2018  7:56 PM   Base dressing in place, unable to visualize 10/16/2018  7:56 PM   Dressing Care, Wound low-adherent;foam 10/16/2018  7:56 PM         Occupational Therapy Education     Title: PT OT SLP Therapies (Active)     Topic: Occupational Therapy (Active)     Point: ADL training (Done)     Description: Instruct learner(s) on proper safety adaptation and remediation techniques during self care or transfers.   Instruct in proper use of assistive devices.   Learning Progress Summary     Learner Status Readiness Method Response Comment Documented by    Patient Done Acceptance E VU,NR Education reinforced for benefits of OOB activity/therapy and the necessary building blocks (strength, balance, endurance) for mobility (we have to stand before we can walk). TB 10/17/18 1004     Done Acceptance E VU,NR Adaptive breathing AC 10/10/18 1147     Active Acceptance E NR Pt educated on appropriate safety precautions, positioning, role of OT, and benefits of therapy. CL 10/04/18 1559     Done Acceptance E,TB VU  MT 10/03/18 1851    Family Done Acceptance E,TB VU  MT 10/03/18 1851          Point: Home exercise program (Done)     Description: Instruct learner(s) on appropriate technique for monitoring, assisting and/or progressing therapeutic exercises/activities.   Learning Progress Summary     Learner Status Readiness Method Response Comment Documented by    Patient Done Acceptance E,TB VU  MT 10/03/18 1851    Family Done Acceptance E,TB VU  MT 10/03/18 1851          Point: Precautions (Active)     Description: Instruct learner(s) on prescribed precautions during self-care and functional transfers.   Learning Progress Summary     Learner Status Readiness Method Response Comment Documented by    Patient Active Acceptance E NR Pt educated on appropriate safety precautions, positioning, role of OT, and benefits  of therapy.  10/04/18 1559     Done Acceptance E, VU  MT 10/03/18 1851    Family Done Acceptance E,[facility] 10/03/18 1851          Point: Body mechanics (Done)     Description: Instruct learner(s) on proper positioning and spine alignment during self-care, functional mobility activities and/or exercises.   Learning Progress Summary     Learner Status Readiness Method Response Comment Documented by    Patient Done Acceptance E,[facility] 10/03/18 1851    Family Done Acceptance E,[facility] 10/03/18 1851                      User Key     Initials Effective Dates Name Provider Type Discipline     06/08/18 -  Gladys Dodge, OT Occupational Therapist OT     06/23/15 -  Jodi Zelaya, OT Occupational Therapist OT    MT 06/16/16 -  Noemi Fish RN Registered Nurse Nurse     04/03/18 -  Caitlin Robles, OT Occupational Therapist OT                OT Recommendation and Plan  Outcome Summary/Treatment Plan (OT)  Daily Summary of Progress (OT): progress toward functional goals is gradual  Barriers to Overall Progress (OT): pain, strength  Plan for Continued Treatment (OT): per POC  Anticipated Discharge Disposition (OT): skilled nursing facility  Therapy Frequency (OT Eval): 3 times/wk (MWF)  Daily Summary of Progress (OT): progress toward functional goals is gradual  Plan of Care Review  Plan of Care Reviewed With: patient  Plan of Care Reviewed With: patient  Outcome Summary: Pt agreeable to therapy; requires encouragement and increased time all tasks. Max A x2 STS x3 reps. Max Ax2 step EOB to [facility]. Grooming tasks completed UI. OT to follow 3x/week MWF. Pt awaiting rehab bed.         Outcome Measures     Row Name 10/17/18 0912 10/15/18 1243 10/15/18 0819       How much help from another person do you currently need...    Turning from your back to your side while in flat bed without using bedrails?  -- 2  -DM  --    Moving from lying on back to sitting on the side of a flat bed without bedrails?  -- 2   -DM  --    Moving to and from a bed to a chair (including a wheelchair)?  -- 2  -DM  --    Standing up from a chair using your arms (e.g., wheelchair, bedside chair)?  -- 2  -DM  --    Climbing 3-5 steps with a railing?  -- 1  -DM  --    To walk in hospital room?  -- 1  -DM  --    AM-PAC 6 Clicks Score  -- 10  -DM  --       How much help from another is currently needed...    Putting on and taking off regular lower body clothing? 1  -TB  -- 2  -SD    Bathing (including washing, rinsing, and drying) 2  -TB  -- 2  -SD    Toileting (which includes using toilet bed pan or urinal) 2  -TB  -- 2  -SD    Putting on and taking off regular upper body clothing 3  -TB  -- 3  -SD    Taking care of personal grooming (such as brushing teeth) 3  -TB  -- 3  -SD    Eating meals 3  -TB  -- 3  -SD    Score 14  -TB  -- 15  -SD       Functional Assessment    Outcome Measure Options AM-PAC 6 Clicks Daily Activity (OT)  -TB AM-PAC 6 Clicks Basic Mobility (PT)  -DM AM-PAC 6 Clicks Daily Activity (OT)  -SD      User Key  (r) = Recorded By, (t) = Taken By, (c) = Cosigned By    Initials Name Provider Type    Gladys Baum, OT Occupational Therapist    Michelle Levine, OT Occupational Therapist    Rubia Kerr, PT Physical Therapist           Time Calculation:         Time Calculation- OT     Row Name 10/17/18 1007 10/17/18 0912          Time Calculation- OT    OT Start Time 0912 -TB  --     OT Received On 10/17/18  -TB  --     OT Goal Re-Cert Due Date 10/25/18  -TB  --        Timed Charges    32269 - OT Self Care/Mgmt Minutes  -- 15  -TB       User Key  (r) = Recorded By, (t) = Taken By, (c) = Cosigned By    Initials Name Provider Type    Gladys Baum, OT Occupational Therapist           Therapy Suggested Charges     Code   Minutes Charges    24166 (CPT®) Hc Ot Neuromusc Re Education Ea 15 Min      09554 (CPT®) Hc Ot Ther Proc Ea 15 Min      29850 (CPT®) Hc Ot Therapeutic Act Ea 15 Min      19985  (CPT®) Hc Ot Manual Therapy Ea 15 Min      77388 (CPT®) Hc Ot Iontophoresis Ea 15 Min      10608 (CPT®) Hc Ot Elec Stim Ea-Per 15 Min      29736 (CPT®) Hc Ot Ultrasound Ea 15 Min      22328 (CPT®) Hc Ot Self Care/Mgmt/Train Ea 15 Min 15 1    Total  15 1        Therapy Charges for Today     Code Description Service Date Service Provider Modifiers Qty    [id number] HC OT SELF CARE/MGMT/TRAIN EA 15 MIN 10/17/2018 Gladys Dodge OT GO 1               Gladys Dodge OT  10/17/2018    Electronically signed by Gladys Dodge OT at 10/17/2018 10:07 AM

## 2018-10-17 NOTE — PROGRESS NOTES
Continued Stay Note  Psychiatric     Patient Name: Yassine Love  MRN: [id number]  Today's Date: 10/17/2018    Admit Date: 10/2/2018          Discharge Plan     Row Name 10/17/18 1259       Plan    Plan Comments Received call from Tammie at [facility] and they do not have any STR beds at this time but may have later this week. Called Kristina at [facility] to see if any STR beds have opened up. Kristina will call CM if a bed is available. Called pt's son and discussed that [facility], [facility] do not have beds. Discussed that if he did not want further referrals to [facility], they would need to look at facilities outside [facility]. He will call CM back with options. Notified pt as well.               Discharge Codes    No documentation.       Expected Discharge Date and Time     Expected Discharge Date Expected Discharge Time    Oct 19, 2018             Yumiko Hull

## 2018-10-18 LAB — MAGNESIUM SERPL-MCNC: 2.1 MG/DL (ref 1.3–2.7)

## 2018-10-18 PROCEDURE — 94640 AIRWAY INHALATION TREATMENT: CPT

## 2018-10-18 PROCEDURE — 83735 ASSAY OF MAGNESIUM: CPT | Performed by: FAMILY MEDICINE

## 2018-10-18 PROCEDURE — 99232 SBSQ HOSP IP/OBS MODERATE 35: CPT | Performed by: INTERNAL MEDICINE

## 2018-10-18 PROCEDURE — 25010000002 HEPARIN (PORCINE) PER 1000 UNITS: Performed by: INTERNAL MEDICINE

## 2018-10-18 PROCEDURE — 94799 UNLISTED PULMONARY SVC/PX: CPT

## 2018-10-18 PROCEDURE — 97530 THERAPEUTIC ACTIVITIES: CPT

## 2018-10-18 RX ORDER — AMOXICILLIN AND CLAVULANATE POTASSIUM 500; 125 MG/1; MG/1
500 TABLET, FILM COATED ORAL EVERY 12 HOURS SCHEDULED
Status: DISCONTINUED | OUTPATIENT
Start: 2018-10-18 | End: 2018-10-18

## 2018-10-18 RX ORDER — AMOXICILLIN 250 MG/1
1000 CAPSULE ORAL EVERY 12 HOURS SCHEDULED
Status: DISCONTINUED | OUTPATIENT
Start: 2018-10-18 | End: 2018-10-22 | Stop reason: HOSPADM

## 2018-10-18 RX ADMIN — Medication 1 CAPSULE: at 08:40

## 2018-10-18 RX ADMIN — GUAIFENESIN 600 MG: 600 TABLET, EXTENDED RELEASE ORAL at 08:40

## 2018-10-18 RX ADMIN — HEPARIN SODIUM 5000 UNITS: 5000 INJECTION, SOLUTION INTRAVENOUS; SUBCUTANEOUS at 23:02

## 2018-10-18 RX ADMIN — IPRATROPIUM BROMIDE AND ALBUTEROL SULFATE 3 ML: 2.5; .5 SOLUTION RESPIRATORY (INHALATION) at 22:49

## 2018-10-18 RX ADMIN — MORPHINE SULFATE 15 MG: 15 TABLET, EXTENDED RELEASE ORAL at 08:40

## 2018-10-18 RX ADMIN — NICOTINE 1 PATCH: 21 PATCH, EXTENDED RELEASE TRANSDERMAL at 08:39

## 2018-10-18 RX ADMIN — GABAPENTIN 200 MG: 100 CAPSULE ORAL at 13:23

## 2018-10-18 RX ADMIN — LINEZOLID 600 MG: 600 TABLET, FILM COATED ORAL at 08:40

## 2018-10-18 RX ADMIN — AMOXICILLIN 1000 MG: 250 CAPSULE ORAL at 20:10

## 2018-10-18 RX ADMIN — SENNOSIDES AND DOCUSATE SODIUM 2 TABLET: 8.6; 5 TABLET ORAL at 20:11

## 2018-10-18 RX ADMIN — METOPROLOL TARTRATE 25 MG: 25 TABLET ORAL at 08:39

## 2018-10-18 RX ADMIN — HYDROCODONE BITARTRATE AND ACETAMINOPHEN 1 TABLET: 10; 325 TABLET ORAL at 02:45

## 2018-10-18 RX ADMIN — FAMOTIDINE 20 MG: 20 TABLET ORAL at 20:11

## 2018-10-18 RX ADMIN — METOPROLOL TARTRATE 25 MG: 25 TABLET ORAL at 17:09

## 2018-10-18 RX ADMIN — GABAPENTIN 200 MG: 100 CAPSULE ORAL at 05:48

## 2018-10-18 RX ADMIN — FAMOTIDINE 20 MG: 20 TABLET ORAL at 08:40

## 2018-10-18 RX ADMIN — MIRTAZAPINE 15 MG: 15 TABLET, ORALLY DISINTEGRATING ORAL at 20:11

## 2018-10-18 RX ADMIN — ACETAMINOPHEN 325 MG: 325 TABLET ORAL at 10:38

## 2018-10-18 RX ADMIN — POTASSIUM CHLORIDE 30 MEQ: 750 CAPSULE, EXTENDED RELEASE ORAL at 17:09

## 2018-10-18 RX ADMIN — POTASSIUM CHLORIDE 30 MEQ: 750 CAPSULE, EXTENDED RELEASE ORAL at 08:40

## 2018-10-18 RX ADMIN — Medication 10 ML: at 22:02

## 2018-10-18 RX ADMIN — METOPROLOL TARTRATE 25 MG: 25 TABLET ORAL at 23:01

## 2018-10-18 RX ADMIN — FUROSEMIDE 40 MG: 40 TABLET ORAL at 08:40

## 2018-10-18 RX ADMIN — CYANOCOBALAMIN TAB 1000 MCG 1000 MCG: 1000 TAB at 08:39

## 2018-10-18 RX ADMIN — Medication 10 ML: at 08:43

## 2018-10-18 RX ADMIN — IPRATROPIUM BROMIDE AND ALBUTEROL SULFATE 3 ML: 2.5; .5 SOLUTION RESPIRATORY (INHALATION) at 07:04

## 2018-10-18 RX ADMIN — IPRATROPIUM BROMIDE AND ALBUTEROL SULFATE 3 ML: 2.5; .5 SOLUTION RESPIRATORY (INHALATION) at 15:36

## 2018-10-18 RX ADMIN — Medication 3 ML: at 08:41

## 2018-10-18 RX ADMIN — GUAIFENESIN 600 MG: 600 TABLET, EXTENDED RELEASE ORAL at 20:11

## 2018-10-18 RX ADMIN — ATORVASTATIN CALCIUM 40 MG: 40 TABLET, FILM COATED ORAL at 20:11

## 2018-10-18 RX ADMIN — CLOPIDOGREL BISULFATE 75 MG: 75 TABLET ORAL at 08:40

## 2018-10-18 RX ADMIN — ACETAMINOPHEN 325 MG: 325 TABLET ORAL at 19:55

## 2018-10-18 RX ADMIN — HEPARIN SODIUM 5000 UNITS: 5000 INJECTION, SOLUTION INTRAVENOUS; SUBCUTANEOUS at 13:23

## 2018-10-18 RX ADMIN — ASPIRIN 81 MG CHEWABLE TABLET 81 MG: 81 TABLET CHEWABLE at 08:40

## 2018-10-18 RX ADMIN — GABAPENTIN 200 MG: 100 CAPSULE ORAL at 23:01

## 2018-10-18 RX ADMIN — HEPARIN SODIUM 5000 UNITS: 5000 INJECTION, SOLUTION INTRAVENOUS; SUBCUTANEOUS at 05:48

## 2018-10-18 RX ADMIN — Medication 3 ML: at 22:02

## 2018-10-18 NOTE — PROGRESS NOTES
Continued Stay Note  [facility]     Patient Name: Yassine Love  MRN: [id number]  Today's Date: 10/18/2018    Admit Date: 10/2/2018          Discharge Plan     Row Name 10/18/18 1418       Plan    Plan [facility]    Plan Comments Spoke with Flaquita with [facility] at [phone].  [facility] ambulance scheduled for [place] 10/21/18 at 1200.  Trip # [id number].  PCS form in chartlet with copy in CM office.    Row Name 10/18/18 8839       Plan    Plan [facility].     Patient/Family in Agreement with Plan yes    Plan Comments Received call from Kristina at [facility] and they can accept pt to a skilled bed Sunday. Discussed with pt and his son at  and he states they would like that bed. Pt's son is going to [facility] to arrange payment of the $5,000 fee that [facility] requires for admission since he is in his copay days. Pt's son feels he cannot safely transport his dad in a vehicle. Pt's son requests that pt transport via ambulance and aware that if he doesn't qualify for an ambulance he can be billed and is agreeable. CM will arrange [facility] for Sunday.               Discharge Codes    No documentation.       Expected Discharge Date and Time     Expected Discharge Date Expected Discharge Time    Oct 22, 2018             Nory Lackey RN

## 2018-10-18 NOTE — PROGRESS NOTES
[facility] Progress Note    Admission Date: 10/2/2018    Yassine Love  1945  [id number]    Date: 10/18/2018    Antibiotics:  Anti-Infectives     Ordered     Dose/Rate Route Frequency Start Stop    10/18/18 1702  amoxicillin (AMOXIL) capsule 1,000 mg     Ordering Provider:  Parish Belle MD    1,000 mg Oral Every 12 Hours Scheduled 10/18/18 2100 10/25/18 2059    10/13/18 1318  meropenem (MERREM) 1 g/100 mL 0.9% NS VTB (mbp)     Mary Kate Rahman MD reviewed the order on 10/13/18 1440.   Ordering Provider:  Mary Kate Rahman MD    1 g  over 3 Hours Intravenous Every 8 Hours Scheduled 10/13/18 1400 10/15/18 0813    10/04/18 1629  Linezolid (ZYVOX) 600 mg 300 mL     Mary Kate Rahman MD reviewed the order on 10/08/18 1301.   Ordering Provider:  Mary Kate Rahman MD    600 mg  300 mL/hr over 60 Minutes Intravenous Every 12 Hours 10/04/18 1800 10/13/18 0623    10/03/18 0229  meropenem (MERREM) 1 g/100 mL 0.9% NS VTB (mbp)     Ordering Provider:  Perla Jones APRN    1 g  over 3 Hours Intravenous Every 8 Hours 10/03/18 1000 10/13/18 0556    10/03/18 0914  vancomycin 1750 mg/500 mL 0.9% NS IVPB ([facility])     Ordering Provider:  Parish Donato MD    1,750 mg  over 120 Minutes Intravenous Once 10/03/18 1000 10/03/18 1203    10/03/18 0229  meropenem (MERREM) 1 g/100 mL 0.9% NS VTB (mbp)     Ordering Provider:  Perla Jones APRN    1 g  over 30 Minutes Intravenous Once 10/03/18 0315 10/03/18 0313    10/02/18 2108  piperacillin-tazobactam (ZOSYN) 4.5 g in iso-osmotic dextrose 100 mL IVPB (premix)     Ordering Provider:  Pastora Caldwell DO    4.5 g  over 30 Minutes Intravenous Once 10/02/18 2108 10/03/18 0114           Reason for Consultation: bilateral leg cellulitis,  bronchitis      History of present illness:    Patient is a 72 y.o. male well known to Dr Belle from prior admissions. Dr Belle has primarily followed for cellulitis  He was hospitalized 8/21 to 9/7 and Dr Belle followed for cellulitis complicating  bilateral leg ulcers   This admission on  10/2/18 is for  for COPD exacerbation and severe hypoxia. He iwas transferred from a  skilled nursing facility with hypoxemia and visual hallucinations.NC. EMS was called and Mr. Love was  CTA was negative for pulmonary embolism but  pulmonary edema with diffuse interstitial infiltrates and pleural effusions. BNP was 899 and procalcitonin normal on 10/2 and 10/4. He required mechanical ventilation  Sputum culture grew MSSA  He has been treated with zyvox and merrrem  He improved after diuresis and was extubated  The leg wounds present 2 months ago have healed He has some residual erythema and states that his legs are painful at times  He is known to have severe PVD     10/10  More alert this am; intermittent cough perisists  Denies leg pain  10/11 alert, no new c/o, denies leg pain  10/15/18 - Patient is sleeping.  ROS discussed with staff at bedside.  Currently on 3LNC.  Co weakness  No fever or chills  Co dyspnea  Co cough  10/16/18 - No history per patient.  ROS discussed with staff.    Seen and agree  10/17/18 - Patient is sleeping.  ROS discussed with RN.  Bumped oxygen to 4L due to patient complaining of mild shortness of air.  No fever.  Seen and agree    10/18/18 - Complains of weakness, back pain, joint pain. No fevers or chills. Complains of shortness of breath.       ROS:  Unobtainable.  See above      PE:  Vital Signs  Temp  Min: 97.9 °F (36.6 °C)  Max: 98.4 °F (36.9 °C)  BP  Min: 121/64  Max: 142/78  Pulse  Min: 67  Max: 85  Resp  Min: 16  Max: 20  SpO2  Min: 92 %  Max: 98 %  GENERAL: alert, NAD  HEENT: Normocephalic, atraumatic.  PERRL.    LYMPH: No cervical lymphadenopathy.  HEART: Regular S1S2  LUNGS: Diminished throughout today on exam, oxygen now on 4LNC.   ABDOMEN: Soft, nontender, nondistended. Positive bowel sounds.   EXT:  Moderate edema bilateral legs ulcerations are much improved.  SKIN: Warm and dry without cutaneous eruptions on  Inspection/palpation.    NEURO: unable to evaluate           Laboratory Data          Results from last 7 days  Lab Units 10/17/18  0507   SODIUM mmol/L 139   POTASSIUM mmol/L 4.9   CHLORIDE mmol/L 102   CO2 mmol/L 35.0*   BUN mg/dL 38*   CREATININE mg/dL 1.17   GLUCOSE mg/dL 91   CALCIUM mg/dL 8.2*                   Estimated Creatinine Clearance: 59.2 mL/min (by C-G formula based on SCr of 1.17 mg/dL).      Microbiology:  mssa from sputum;  mrsa from nares swab    Radiology:  Imaging Results (last 72 hours)     Procedure Component Value Units Date/Time    XR Chest 1 View [[id number]] Collected:  10/06/18 1115     Updated:  10/06/18 1355    Narrative:       EXAMINATION: XR CHEST 1 VW - 10/06/2018     INDICATION: J96.21-Acute and chronic respiratory failure with hypoxia;  J96.22-Acute and chronic respiratory failure with hypercapnia;  Z74.09-Other reduced mobility.     TECHNIQUE:  Single view frontal chest.     COMPARISONS: 10/5/2018.     FINDINGS:  Stable support apparatus. Small bilateral pleural effusions  and adjacent atelectasis are unchanged. No pneumothorax. Calcification  aortic knob. Cardiomediastinal silhouette is unchanged.        Impression:       Stable exam.     DICTATED:   10/06/2018  EDITED/ls :   10/06/2018      This report was finalized on 10/6/2018 1:53 PM by Eliezer Hale.             I personally reviewed the radiographic studies     IMPRESSION:    -- Probable MSSA bronchitis  Vs pneumonia, CXR 10/16/18 - with persistent bibasilar opacities.       -- Acute on chronic mixed respiratory failure  S/p intubation, now extubated     -- Pulmonary edema- improved     -- Chronic lymphedema and venous stasis dermatitis     -- Bilateral leg cellulitis essentially resolved     -- Severe COPD with ongoing tobacco abuse, poor pulmonary toilet     -- RAFAEL     -- Decubitus ulcer of buttock stage 2     --MRSA colonization    --DNR/DNI       RECOMMENDATIONS:      -- change  zyvox to amoxicillin    Long discussion  with patient  UM discussed with staff        Parish Belle MD for Dr. Parish Belle  10/18/2018

## 2018-10-18 NOTE — PROGRESS NOTES
[facility] Medicine Services  PROGRESS NOTE    Patient Name: Yassine Love  : 1945  MRN: [id number]    Date of Admission: 10/2/2018  Length of Stay: 16  Primary Care Physician: Provider, No Known    Subjective   Subjective     CC:  Shortness of air    HPI:      Some cough, slightly productive. Denies fever. Still feels weak in general    Review of Systems  Gen- No fevers, chills  CV- No chest pain, palpitations  Resp- as above  GI- No N/V/D, abd pain      Otherwise ROS is negative except as mentioned in the HPI.    Objective   Objective     Vital Signs:   Temp:  [97.9 °F (36.6 °C)-98.4 °F (36.9 °C)] 98.4 °F (36.9 °C)  Heart Rate:  [67-85] 71  Resp:  [16-20] 16  BP: (121-142)/(64-78) 137/64        Physical Exam:  LINES: PICC RUE  Constitutional - no acute distress, up in chair  HEENT-NCAT, mucous membranes moist  CV-RRR, S1 S2 normal, no m/r/g  Resp-diminished bilaterally, no rhonchi or rales  Abd-soft, non-tender, non-distended, normo active bowel sounds  Ext-Trace Edema LE bilat  Neuro-alert and oriented, speech clear, moves all extremities   Psych-normal affect   Skin- No rash on exposed UE or LE bilaterally        Results Reviewed:  I have personally reviewed current lab, radiology, and data and agree.          Results from last 7 days  Lab Units 10/17/18  0507   SODIUM mmol/L 139   POTASSIUM mmol/L 4.9   CHLORIDE mmol/L 102   CO2 mmol/L 35.0*   BUN mg/dL 38*   CREATININE mg/dL 1.17   GLUCOSE mg/dL 91   CALCIUM mg/dL 8.2*     Estimated Creatinine Clearance: 59.2 mL/min (by C-G formula based on SCr of 1.17 mg/dL).  No results found for: BNP    Microbiology Results Abnormal     Procedure Component Value - Date/Time    Blood Culture - Blood, [[id number]]  (Normal) Collected:  10/02/18 180    Lab Status:  Final result Specimen:  Blood from Hand, Left Updated:  10/07/18 2201     Blood Culture No growth at 5 days    Narrative:       Aerobic bottle only     Blood Culture - Blood,  [[id number]]  (Normal) Collected:  10/02/18 1720    Lab Status:  Final result Specimen:  Blood from Arm, Right Updated:  10/07/18 1830     Blood Culture No growth at 5 days    Respiratory Culture - Sputum, Cough [[id number]]  (Abnormal)  (Susceptibility) Collected:  10/03/18 0430    Lab Status:  Final result Specimen:  Sputum from Cough Updated:  10/07/18 1137     Respiratory Culture Light growth (2+) Staphylococcus aureus (A)      Light growth (2+) Normal Respiratory Nataliia     Gram Stain Result Few (2+) WBCs per low power field      No Epithelial cells per low power field      Few (2+) Gram positive cocci    Susceptibility      Staphylococcus aureus     FAVIAN     Ceftriaxone <=8 ug/ml Susceptible     Clindamycin >4 ug/ml Resistant     Erythromycin >4 ug/ml Resistant     Gentamicin <=4 ug/ml Susceptible     Levofloxacin <=1 ug/ml Susceptible  [1]      Linezolid <=1 ug/ml Susceptible     Oxacillin <=0.25 ug/ml Susceptible     Penicillin G <=0.03 ug/ml Susceptible     Quinupristin + Dalfopristin <=0.5 ug/ml Susceptible     Rifampin <=1 ug/ml Susceptible     Tetracycline >8 ug/ml Resistant     Trimethoprim + Sulfamethoxazole <=0.5/9.5 ug/ml Susceptible     Vancomycin 2 ug/ml Susceptible            [1]   Staphylococcus species may develop resistance during prolonged therapy with quinolones.  Isolates that are initially susceptible may become resistant within three to four days after initiation of therapy. Testing of repeat isolates may be warranted.                   MRSA Screen, PCR - Swab, Nares [[id number]]  (Abnormal) Collected:  10/04/18 1623    Lab Status:  Final result Specimen:  Swab from Nares Updated:  10/04/18 1818     MRSA, PCR Positive (C)          Imaging Results (last 24 hours)     ** No results found for the last 24 hours. **        Results for orders placed during the hospital encounter of 10/02/18   Adult Transthoracic Echo Complete W/ Cont if Necessary Per Protocol    Narrative · Left ventricular systolic  function is normal. Estimated EF = 60%.  · Trace-to-mild mitral valve regurgitation is present.          I have reviewed the medications.      aspirin 81 mg Oral Daily   atorvastatin 40 mg Oral Nightly   clopidogrel 75 mg Oral Daily   famotidine 20 mg Oral BID   furosemide 40 mg Oral Daily   gabapentin 200 mg Oral Q8H   guaiFENesin 600 mg Oral Q12H   heparin (porcine) 5,000 Units Subcutaneous Q8H   ipratropium-albuterol 3 mL Nebulization Q8H - RT   lactobacillus acidophilus 1 capsule Oral Daily   linezolid 600 mg Oral Q12H   metoprolol tartrate 25 mg Oral Q8H   mirtazapine 15 mg Oral Nightly   nicotine 1 patch Transdermal Q24H   pharmacy consult - MTM  Does not apply Daily   polyethylene glycol 17 g Oral Daily   potassium chloride 30 mEq Oral BID With Meals   sennosides-docusate sodium 2 tablet Oral BID   sodium chloride 10 mL Intravenous Q12H   sodium chloride 3 mL Intravenous Q12H   cyanocobalamin 1,000 mcg Oral Daily         Assessment/Plan   Assessment / Plan     Active Hospital Problems    Diagnosis   • **Acute on chronic mixed hypercarbic/hypoxemic respiratory failure requiring intubation and ventilatory support   • Sleep apnea   • Acute kidney injury     Cr 1.35 increased from 9/8/18 1.15      • H/O bacteremia due to Staphylococcus   • Opioid use   • Pneumonia due to infectious organism   • Brief runs of SVT (supraventricular tachycardia)    • Decubitus ulcer of buttock, stage 2   • CAD status post GIGI ×2 to LAD and RCA 3/12/18     S/P LHC per Dr. Harvey on 3/12/18 with PCI revealing severe 2-vessel disease.    GIGI x 2 to LAD and RCA     • Essential hypertension   • End stage COPD in an active smoker on home O2 with chronic hypercarbia     Managed by [facility]      • H/O cellulitis of both lower extremities     Previously managed by Infectious Disease      • PVD (peripheral vascular disease)           Brief Hospital Course to date:  Yassine Love is a 72 y.o. male active smoker with a history of  COPD (on home oxygen) admitted 10/2/18 for exacerbation associated with severe hypoxemia. He was in the skilled nursing facility for rehabilitation but 10/1/18 developed some visual hallucinations. Oxygen saturations were subsequently noted to drop into the 60s on 4 L on 10/2/18 and he was brought to [facility] ER. Because of an elevated d-dimer a CT angiogram was obtained which was negative for PE. Both CT scan and chest x-ray however revealed it appeared to be pulmonary edema with diffuse interstitial infiltrates and pleural effusions. In addition BNP was 899. He was given his usual dose of home Lasix, placed on empiric antimicrobial coverage with Zosyn, given bronchodilators, Solu-Medrol, placed on BiPAP, and admitted to the hospitalist service.     At approximately 2 AM 10/3/18 he was noted to become unresponsive with decreased respiratory drive. Apparently he had been a DNR but this was reversed by the patient on admission. Because of this Dr. Jacobsen and respiratory therapy intubated the patient at the bedside. There were large amounts of thick purulent yellow secretions obtained at the time of intubation and he was transferred to the ICU on ventilatory support. He initially required high FiO2's of 60% but respiratory rate and ABGs rapidly improved. Over the course of the evening he continued to improve. He subsequently self extubated the evening of 10/3/18 at 11 PM. He was able to tolerate self extubation but has required high flow nasal oxygen in order to maintain his oxygen saturations.     Heart rate is usually in the 60s, but has had recurrent brief episodes of SVT up to 160  during hospitalization His metoprolol was held once and that led to SVT that resolved with resumption of beta blocker.. He was admitted 10/2 and transferred out of the ICU 10/7      Assessment & Plan:   Continue zyvox, CXR  from 10/16 with persistent right hilar/lower lobe infiltrate - however patient remains afebrile -- will repeat CXR in  am   Avoid sedating agents   IV Lasix and Diamox initially, Lasix decreased to 40mg po daily    Weaned from high flow down to 3L NC, continue to taper as he tolerates   Palliative following, status changed to DNR/DNI    Recurrent episodes of SVT - better on metoprolol. Follow up Dr Harvey in 6-8 weeks.   S/P prednisone taper.    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    DVT Prophylaxis:  Sq heparin    CODE STATUS:   Code Status and Medical Interventions:   Ordered at: 10/08/18 1413     Limited Support to NOT Include:    Intubation     Level Of Support Discussed With:    Patient     Code Status:    No CPR     Medical Interventions (Level of Support Prior to Arrest):    Limited       Disposition: I expect the patient to be discharged to rehab in 1-2 days      Electronically signed by Richard Escalante MD, 10/18/18, 4:22 PM.

## 2018-10-18 NOTE — PLAN OF CARE
Problem: Patient Care Overview  Goal: Plan of Care Review  Outcome: Ongoing (interventions implemented as appropriate)   10/18/18 8678   Coping/Psychosocial   Plan of Care Reviewed With patient   Plan of Care Review   Progress no change   OTHER   Outcome Summary Pt medically ready for discharge but waiting for rehab placement. A bed available at [facility] on Sunday pt to be transported by ambulance. 4L NC. VSS. Will continue to monitor.

## 2018-10-18 NOTE — PROGRESS NOTES
Continued Stay Note  [facility]     Patient Name: Yassine Love  MRN: [id number]  Today's Date: 10/18/2018    Admit Date: 10/2/2018          Discharge Plan     Row Name 10/18/18 1357       Plan    Plan [facility].     Patient/Family in Agreement with Plan yes    Plan Comments Received call from Kristina at [facility] and they can accept pt to a skilled bed Sunday. Discussed with pt and his son at  and he states they would like that bed. Pt's son is going to [facility] to arrange payment of the $5,000 fee that [facility] requires for admission since he is in his copay days. Pt's son feels he cannot safely transport his dad in a vehicle. Pt's son requests that pt transport via ambulance and aware that if he doesn't qualify for an ambulance he can be billed and is agreeable. CM will arrange [facility] for Sunday.               Discharge Codes    No documentation.       Expected Discharge Date and Time     Expected Discharge Date Expected Discharge Time    Oct 22, 2018             Yumiko Hull

## 2018-10-18 NOTE — PLAN OF CARE
Problem: Patient Care Overview  Goal: Plan of Care Review  Outcome: Ongoing (interventions implemented as appropriate)   10/18/18 6485   Coping/Psychosocial   Plan of Care Reviewed With patient   Plan of Care Review   Progress no change

## 2018-10-18 NOTE — PLAN OF CARE
Problem: Patient Care Overview  Goal: Plan of Care Review  Outcome: Ongoing (interventions implemented as appropriate)   10/18/18 9684   Coping/Psychosocial   Plan of Care Reviewed With patient   Plan of Care Review   Progress improving   OTHER   Outcome Summary Pt required modA x2 for STS transfers and maxA x2 to take five steps from bed to chair. Pt required B UE support for increased stability. Pt c/o back pain with mobility. Continue to progress as appropriate.

## 2018-10-18 NOTE — THERAPY TREATMENT NOTE
Acute Care - Physical Therapy Treatment Note  [facility]     Patient Name: Yassine Love  : 1945  MRN: [id number]  Today's Date: 10/18/2018  Onset of Illness/Injury or Date of Surgery: 10/02/18  Date of Referral to PT: 10/02/18  Referring Physician: DO Javi    Admit Date: 10/2/2018    Visit Dx:    ICD-10-CM ICD-9-CM   1. Acute on chronic respiratory failure with hypoxia and hypercapnia (CMS/HCC) J96.21 518.84    J96.22 786.09     799.02   2. Impaired functional mobility, balance, gait, and endurance Z74.09 V49.89   3. 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 O2 with chronic hypercarbia   • Essential hypertension   • Non-sustained ventricular tachycardia (CMS/HCC)   • CAD status post GIGI ×2 to LAD and RCA 3/12/18   • Debility   • Decubitus ulcer of buttock, stage 2   • Chronic pain   • Chronic venous stasis dermatitis of both lower extremities   • Acute on chronic mixed hypercarbic/hypoxemic respiratory failure requiring intubation and ventilatory support   • History of tobacco use   • Acute kidney injury   • Brief runs of SVT (supraventricular tachycardia)    • H/O bacteremia due to Staphylococcus   • Opioid use   • Overweight (BMI 25.0-29.9)   • Pneumonia due to infectious organism   • Sleep apnea       Therapy Treatment          Rehabilitation Treatment Summary     Row Name 10/18/18 1327             Treatment Time/Intention    Discipline physical therapist  -KR      Document Type therapy note (daily note)  -KR      Subjective Information complains of;pain  -KR      Mode of Treatment physical therapy  -KR      Care Plan Review care plan/treatment goals reviewed;risks/benefits reviewed;patient/other agree to care plan  -KR      Care Plan Review, Other Participant(s) son  -KR      Therapy Frequency (PT Clinical Impression) daily   -KR      Patient Effort good  -KR      Existing Precautions/Restrictions fall;oxygen therapy device and L/min  -KR      Recorded by [KR] Debbie Damon, PT 10/18/18 1621      Row Name 10/18/18 1327             Vital Signs    Pre Systolic BP Rehab 105  -KR      Pre Treatment Diastolic BP 77  -KR      Pretreatment Heart Rate (beats/min) 74  -KR      Posttreatment Heart Rate (beats/min) 76  -KR      Pre SpO2 (%) 93  -KR      O2 Delivery Pre Treatment supplemental O2  -KR      Post SpO2 (%) 92  -KR      O2 Delivery Post Treatment supplemental O2  -KR      Pre Patient Position Supine  -KR      Intra Patient Position Standing  -KR      Post Patient Position Sitting  -KR      Recorded by [KR] Debbie Damon, PT 10/18/18 1621      Row Name 10/18/18 1327             Cognitive Assessment/Intervention    Additional Documentation Cognitive Assessment/Intervention (Group)  -KR      Recorded by [KR] Debbie Damon, PT 10/18/18 1621      Row Name 10/18/18 1327             Cognitive Assessment/Intervention- PT/OT    Affect/Mental Status (Cognitive) WFL  -KR      Orientation Status (Cognition) oriented to;person;place;situation  -KR      Follows Commands (Cognition) follows one step commands;75-90% accuracy;repetition of directions required;verbal cues/prompting required  -KR      Cognitive Function (Cognitive) safety deficit  -KR      Safety Deficit (Cognitive) mild deficit;ability to follow commands;awareness of need for assistance;insight into deficits/self awareness;safety precautions awareness;safety precautions follow-through/compliance  -KR      Personal Safety Interventions fall prevention program maintained;gait belt;nonskid shoes/slippers when out of bed  -KR      Recorded by [KR] Debbie Damon, PT 10/18/18 1621      Row Name 10/18/18 1327             Safety Issues, Functional Mobility    Safety Issues Affecting Function (Mobility) awareness of need for assistance;insight into deficits/self awareness;safety precaution  awareness;safety precautions follow-through/compliance  -KR      Impairments Affecting Function (Mobility) balance;cognition;coordination;endurance/activity tolerance;strength;shortness of breath  -KR      Recorded by [KR] Debbie Damon, PT 10/18/18 1621      Row Name 10/18/18 1327             Bed Mobility Assessment/Treatment    Bed Mobility Assessment/Treatment supine-sit  -KR      Supine-Sit [place] (Bed Mobility) contact guard;verbal cues  -KR      Bed Mobility, Safety Issues decreased use of arms for pushing/pulling;decreased use of legs for bridging/pushing  -KR      Assistive Device (Bed Mobility) bed rails;head of bed elevated  -KR      Comment (Bed Mobility) VC's for sequencing. Pt slightly impulsive; attempted to sit EOB prior to instruction. Once seated pt c/o increased dizziness.  -KR      Recorded by [KR] Debbie Damon, PT 10/18/18 1621      Row Name 10/18/18 1327             Transfer Assessment/Treatment    Transfer Assessment/Treatment bed-chair transfer;sit-stand transfer;stand-sit transfer  -KR      Comment (Transfers) VC's for sequencing. Pt able to take steps from bed to chair.   -KR      Recorded by [KR] Debbie Damon, PT 10/18/18 1621      Row Name 10/18/18 1327             Bed-Chair Transfer    Bed-Chair [place] (Transfers) maximum assist (25% patient effort);2 person assist;verbal cues  -KR      Assistive Device (Bed-Chair Transfers) other (see comments)   B UE support  -KR      Recorded by [KR] Debbie Damon, PT 10/18/18 1621      Row Name 10/18/18 1327             Sit-Stand Transfer    Sit-Stand [place] (Transfers) moderate assist (50% patient effort);2 person assist;verbal cues  -KR      Assistive Device (Sit-Stand Transfers) other (see comments)   B UE support  -KR      Recorded by [KR] Debbie Damon, PT 10/18/18 1621      Row Name 10/18/18 1327             Stand-Sit Transfer    Stand-Sit [place] (Transfers) moderate assist (50% patient effort);2 person  assist;verbal cues  -KR      Assistive Device (Stand-Sit Transfers) other (see comments)   B UE support  -KR      Recorded by [KR] Debbie Damon, PT 10/18/18 1621      Row Name 10/18/18 1327             Gait/Stairs Assessment/Training    Gait/Stairs Assessment/Training gait/ambulation independence  -KR      [place] Level (Gait) maximum assist (25% patient effort);2 person assist;verbal cues  -KR      Assistive Device (Gait) other (see comments)   B UE support  -KR      Distance in Feet (Gait) --   5 steps from bed to chair  -KR      Pattern (Gait) step-to  -KR      Deviations/Abnormal Patterns (Gait) base of support, narrow;vidhi decreased;other (see comments)   decreased step length  -KR      Bilateral Gait Deviations forward flexed posture;heel strike decreased  -KR      Comment (Gait/Stairs) Pt able to take steps from bed to chair using B UE support for increased stability. Pt demonstrated slow vidhi with forward flexed posture and required increased encouragement to continue. Pt c/o back pain during ambulation.   -KR      Recorded by [KR] Debbie Damon, PT 10/18/18 1621      Row Name 10/18/18 1327             Motor Skills Assessment/Interventions    Additional Documentation Balance (Group);Therapeutic Exercise (Group)  -KR      Recorded by [KR] Debbie Damon, PT 10/18/18 1621      Row Name 10/18/18 1327             Therapeutic Exercise    Therapeutic Exercise supine, upper extremities;supine, lower extremities  -KR      Additional Documentation Therapeutic Exercise (Row)  -KR      52385 - PT Therapeutic Exercise Minutes 5  -KR      32835 - PT Therapeutic Activity Minutes 10  -KR      Recorded by [KR] Debbie Damon, PT 10/18/18 1621      Row Name 10/18/18 1327             Upper Extremity Supine Therapeutic Exercise    Performed, Supine Upper Extremity (Therapeutic Exercise) shoulder flexion/extension;shoulder abduction/adduction;elbow flexion/extension  -KR      Exercise Type, Supine Upper  Extremity (Therapeutic Exercise) AROM (active range of motion)  -KR      Sets/Reps Detail, Supine Upper Extremity (Therapeutic Exercise) BUE 1/10  -KR      Recorded by [KR] Debbie Damon, PT 10/18/18 1621      Row Name 10/18/18 1327             Lower Extremity Supine Therapeutic Exercise    Performed, Supine Lower Extremity (Therapeutic Exercise) ankle dorsiflexion/plantarflexion;SAQ (short arc quad) over bolster;heel slides  -KR      Exercise Type, Supine Lower Extremity (Therapeutic Exercise) AROM (active range of motion)  -KR      Sets/Reps Detail, Supine Lower Extremity (Therapeutic Exercise) BLE 1/10  -KR      Recorded by [KR] Debbie Damon, PT 10/18/18 1621      Row Name 10/18/18 1327             Balance    Balance static sitting balance;static standing balance  -KR      Recorded by [KR] Debbie Damon, PT 10/18/18 1621      Row Name 10/18/18 1327             Static Sitting Balance    Level of [place] (Unsupported Sitting, Static Balance) supervision  -KR      Sitting Position (Unsupported Sitting, Static Balance) sitting on edge of bed  -KR      Recorded by [KR] Debbie Damon, PT 10/18/18 1621      Row Name 10/18/18 1327             Static Standing Balance    Level of [place] (Supported Standing, Static Balance) moderate assist, 50 to 74% patient effort;other (see comments)   modA x2  -KR      Recorded by [KR] Debbie Damon, PT 10/18/18 1621      Row Name 10/18/18 1327             Positioning and Restraints    Pre-Treatment Position in bed  -KR      Post Treatment Position chair  -KR      In Chair notified nsg;reclined;call light within reach;encouraged to call for assist;exit alarm on;with family/caregiver;waffle cushion;on mechanical lift sling;legs elevated  -KR      Recorded by [KR] Debbie Damon, PT 10/18/18 1621      Row Name 10/18/18 1327             Pain Assessment    Additional Documentation Pain Scale: Numbers Pre/Post-Treatment (Group)  -KR      Recorded by [KR] Debbie Damon  N, PT 10/18/18 1621      Row Name 10/18/18 1327             Pain Scale: Numbers Pre/Post-Treatment    Pain Scale: Numbers, Pretreatment 5/10  -KR      Pain Scale: Numbers, Post-Treatment 5/10  -KR      Pain Location back  -KR      Pain Intervention(s) Repositioned;Ambulation/increased activity  -KR      Recorded by [KR] Debbie Damon, PT 10/18/18 1621      Row Name 10/18/18 1327             Outcome Summary/Treatment Plan (PT)    Daily Summary of Progress (PT) progress towards functional goals is fair  -KR      Recorded by [KR] Debbie Damon, PT 10/18/18 1621        User Key  (r) = Recorded By, (t) = Taken By, (c) = Cosigned By    Initials Name Effective Dates Discipline    Debbie Zhang, PT 04/03/18 -  PT                     Physical Therapy Education     Title: PT OT SLP Therapies (Active)     Topic: Physical Therapy (Active)     Point: Mobility training (Active)    Learning Progress Summary     Learner Status Readiness Method Response Comment Documented by    Patient Active Acceptance E NR  KR 10/18/18 1327     Active Nonacceptance E,D NR  SJ 10/17/18 1011     Active Acceptance E,D NR  DM 10/15/18 1355     Active Acceptance E,D NR Safety SH 10/12/18 1117     Done Eager E VU  KM 10/10/18 0930     Active Acceptance E,D NR  LS 10/05/18 1324     Active Acceptance E,D NR  LS 10/04/18 1119     Done Acceptance E,TB VU  MT 10/03/18 1851    Family Active Acceptance E NR  KR 10/18/18 1327     Active Acceptance E,D NR  LS 10/04/18 1119     Done Acceptance E,TB VU  MT 10/03/18 1851          Point: Home exercise program (Active)    Learning Progress Summary     Learner Status Readiness Method Response Comment Documented by    Patient Active Acceptance E NR  KR 10/18/18 1327     Active Acceptance E,TB NR  CM 10/18/18 0406     Active Nonacceptance E,D NR  SJ 10/17/18 1011     Active Acceptance E,D NR  DM 10/15/18 1355     Active Acceptance E,D NR Safety SH 10/12/18 1117     Done Eager E VU  KM 10/10/18 0930     Active  Acceptance E,D NR  LS 10/05/18 1324     Active Acceptance E,D NR  LS 10/04/18 1119     Done Acceptance E,TB VU  MT 10/03/18 1851    Family Active Acceptance E NR  KR 10/18/18 1327     Active Acceptance E,D NR  LS 10/04/18 1119     Done Acceptance E,TB VU  MT 10/03/18 1851          Point: Body mechanics (Active)    Learning Progress Summary     Learner Status Readiness Method Response Comment Documented by    Patient Active Acceptance E NR  KR 10/18/18 1327     Active Nonacceptance E,D NR  SJ 10/17/18 1011     Active Acceptance E,D NR  DM 10/15/18 1355     Active Acceptance E,D NR Safety  10/12/18 1117     Done Eager E VU  KM 10/10/18 0930     Active Acceptance E,D NR  LS 10/05/18 1324     Active Acceptance E,D NR  LS 10/04/18 1119    Family Active Acceptance E NR  KR 10/18/18 1327     Active Acceptance E,D NR  LS 10/04/18 1119          Point: Precautions (Active)    Learning Progress Summary     Learner Status Readiness Method Response Comment Documented by    Patient Active Acceptance E NR  KR 10/18/18 1327     Active Nonacceptance E,D NR  SJ 10/17/18 1011     Active Acceptance E,D NR  DM 10/15/18 1355     Active Acceptance E,D NR Safety  10/12/18 1117     Done Eager E VU  KM 10/10/18 0930     Active Acceptance E,D NR  LS 10/05/18 1324     Active Acceptance E,D NR  LS 10/04/18 1119     Done Acceptance E,TB VU  MT 10/03/18 1851    Family Active Acceptance E NR  KR 10/18/18 1327     Active Acceptance E,D NR  LS 10/04/18 1119     Done Acceptance E,TB VU  MT 10/03/18 1851                      User Key     Initials Effective Dates Name Provider Type Discipline     06/19/15 -  Alva Castellanos, PT Physical Therapist PT    SJ 06/19/15 -  Alva Mcdonnell, PT Physical Therapist PT    KM 06/19/15 -  Carmela Washington, PT Physical Therapist PT    DM 06/19/15 -  Rubia Amador, PT Physical Therapist PT    LS 06/19/15 -  Zari Rai, PT Physical Therapist PT    CM 02/22/17 -  Anum Rose RN Registered  Nurse Nurse    MT 06/16/16 -  Noemi Fish, RN Registered Nurse Nurse    KR 04/03/18 -  Debbie Damon, PT Physical Therapist PT                    PT Recommendation and Plan  Therapy Frequency (PT Clinical Impression): daily  Outcome Summary/Treatment Plan (PT)  Daily Summary of Progress (PT): progress towards functional goals is fair  Plan of Care Reviewed With: patient  Progress: improving  Outcome Summary: Pt required modA x2 for STS transfers and maxA x2 to take five steps from bed to chair. Pt required B UE support for increased stability. Pt c/o back pain with mobility. Continue to progress as appropriate.           Outcome Measures     Row Name 10/18/18 1327 10/17/18 0912 10/17/18 0846       How much help from another person do you currently need...    Turning from your back to your side while in flat bed without using bedrails? 2  -KR  -- 2  -SJ    Moving from lying on back to sitting on the side of a flat bed without bedrails? 2  -KR  -- 2  -SJ    Moving to and from a bed to a chair (including a wheelchair)? 2  -KR  -- 2  -SJ    Standing up from a chair using your arms (e.g., wheelchair, bedside chair)? 2  -KR  -- 2  -SJ    Climbing 3-5 steps with a railing? 1  -KR  -- 1  -SJ    To walk in hospital room? 2  -KR  -- 1  -SJ    AM-PAC 6 Clicks Score 11  -KR  -- 10  -SJ       How much help from another is currently needed...    Putting on and taking off regular lower body clothing?  -- 1  -TB  --    Bathing (including washing, rinsing, and drying)  -- 2  -TB  --    Toileting (which includes using toilet bed pan or urinal)  -- 2  -TB  --    Putting on and taking off regular upper body clothing  -- 3  -TB  --    Taking care of personal grooming (such as brushing teeth)  -- 3  -TB  --    Eating meals  -- 3  -TB  --    Score  -- 14  -TB  --       Functional Assessment    Outcome Measure Options AM-PAC 6 Clicks Basic Mobility (PT)  -KR AM-PAC 6 Clicks Daily Activity (OT)  -TB AM-PAC 6 Clicks Basic Mobility  (PT)  -SJ      User Key  (r) = Recorded By, (t) = Taken By, (c) = Cosigned By    Initials Name Provider Type    TB Gladys Dodge, OT Occupational Therapist    SJ Alva Mcdonnell, PT Physical Therapist    KR Debbie Damon, PT Physical Therapist           Time Calculation:         PT Charges     Row Name 10/18/18 1327             Time Calculation    Start Time 1327  -KR      PT Received On 10/18/18  -KR      PT Goal Re-Cert Due Date 10/25/18  -KR         Time Calculation- PT    Total Timed Code Minutes- PT 15 minute(s)  -KR         Timed Charges    95759 - PT Therapeutic Exercise Minutes 5  -KR      45096 - PT Therapeutic Activity Minutes 10  -KR        User Key  (r) = Recorded By, (t) = Taken By, (c) = Cosigned By    Initials Name Provider Type    Debbie Zhang, PT Physical Therapist        Therapy Suggested Charges     Code   Minutes Charges    11363 (CPT®) Hc Pt Neuromusc Re Education Ea 15 Min      39164 (CPT®) Hc Pt Ther Proc Ea 15 Min 5     76266 (CPT®) Hc Gait Training Ea 15 Min      20668 (CPT®) Hc Pt Therapeutic Act Ea 15 Min 10 1    24256 (CPT®) Hc Pt Manual Therapy Ea 15 Min      10536 (CPT®) Hc Pt Iontophoresis Ea 15 Min      17910 (CPT®) Hc Pt Elec Stim Ea-Per 15 Min      31935 (CPT®) Hc Pt Ultrasound Ea 15 Min      22915 (CPT®) Hc Pt Self Care/Mgmt/Train Ea 15 Min      41665 (CPT®) Hc Pt Prosthetic (S) Train Initial Encounter, Each 15 Min      01029 (CPT®) Hc Pt Orthotic(S)/Prosthetic(S) Encounter, Each 15 Min      50146 (CPT®) Hc Orthotic(S) Mgmt/Train Initial Encounter, Each 15min      Total  15 1        Therapy Charges for Today     Code Description Service Date Service Provider Modifiers Qty    49429373109 HC PT THERAPEUTIC ACT EA 15 MIN 10/18/2018 Debbie Damon, PT GP 1    75195502738 HC PT THER SUPP EA 15 MIN 10/18/2018 Debbie Damon, PT GP 1          PT G-Codes  Outcome Measure Options: AM-PAC 6 Clicks Basic Mobility (PT)  AM-PAC 6 Clicks Score: 11  Score: 14    Jasmin GONZALEZ  Nelson, PT  10/18/2018

## 2018-10-19 ENCOUNTER — APPOINTMENT (OUTPATIENT)
Dept: GENERAL RADIOLOGY | Facility: HOSPITAL | Age: 73
End: 2018-10-19

## 2018-10-19 LAB — MAGNESIUM SERPL-MCNC: 2.2 MG/DL (ref 1.3–2.7)

## 2018-10-19 PROCEDURE — 94799 UNLISTED PULMONARY SVC/PX: CPT

## 2018-10-19 PROCEDURE — 94760 N-INVAS EAR/PLS OXIMETRY 1: CPT

## 2018-10-19 PROCEDURE — 71045 X-RAY EXAM CHEST 1 VIEW: CPT

## 2018-10-19 PROCEDURE — 25010000002 HEPARIN (PORCINE) PER 1000 UNITS: Performed by: INTERNAL MEDICINE

## 2018-10-19 PROCEDURE — 94640 AIRWAY INHALATION TREATMENT: CPT

## 2018-10-19 PROCEDURE — 83735 ASSAY OF MAGNESIUM: CPT | Performed by: FAMILY MEDICINE

## 2018-10-19 PROCEDURE — 99232 SBSQ HOSP IP/OBS MODERATE 35: CPT | Performed by: INTERNAL MEDICINE

## 2018-10-19 RX ORDER — MORPHINE SULFATE 15 MG/1
15 TABLET, FILM COATED, EXTENDED RELEASE ORAL EVERY 12 HOURS SCHEDULED
Status: DISCONTINUED | OUTPATIENT
Start: 2018-10-19 | End: 2018-10-22 | Stop reason: HOSPADM

## 2018-10-19 RX ADMIN — IPRATROPIUM BROMIDE AND ALBUTEROL SULFATE 3 ML: 2.5; .5 SOLUTION RESPIRATORY (INHALATION) at 14:53

## 2018-10-19 RX ADMIN — ASPIRIN 81 MG CHEWABLE TABLET 81 MG: 81 TABLET CHEWABLE at 08:39

## 2018-10-19 RX ADMIN — ATORVASTATIN CALCIUM 40 MG: 40 TABLET, FILM COATED ORAL at 21:43

## 2018-10-19 RX ADMIN — Medication 1 CAPSULE: at 08:39

## 2018-10-19 RX ADMIN — MORPHINE SULFATE 15 MG: 15 TABLET, EXTENDED RELEASE ORAL at 13:18

## 2018-10-19 RX ADMIN — NICOTINE 1 PATCH: 21 PATCH, EXTENDED RELEASE TRANSDERMAL at 08:40

## 2018-10-19 RX ADMIN — HEPARIN SODIUM 5000 UNITS: 5000 INJECTION, SOLUTION INTRAVENOUS; SUBCUTANEOUS at 13:18

## 2018-10-19 RX ADMIN — AMOXICILLIN 1000 MG: 250 CAPSULE ORAL at 21:43

## 2018-10-19 RX ADMIN — FUROSEMIDE 40 MG: 40 TABLET ORAL at 08:40

## 2018-10-19 RX ADMIN — HEPARIN SODIUM 5000 UNITS: 5000 INJECTION, SOLUTION INTRAVENOUS; SUBCUTANEOUS at 06:58

## 2018-10-19 RX ADMIN — IPRATROPIUM BROMIDE AND ALBUTEROL SULFATE 3 ML: 2.5; .5 SOLUTION RESPIRATORY (INHALATION) at 07:52

## 2018-10-19 RX ADMIN — AMOXICILLIN 1000 MG: 250 CAPSULE ORAL at 08:45

## 2018-10-19 RX ADMIN — CYANOCOBALAMIN TAB 1000 MCG 1000 MCG: 1000 TAB at 08:40

## 2018-10-19 RX ADMIN — FAMOTIDINE 20 MG: 20 TABLET ORAL at 21:43

## 2018-10-19 RX ADMIN — GABAPENTIN 200 MG: 100 CAPSULE ORAL at 13:18

## 2018-10-19 RX ADMIN — METOPROLOL TARTRATE 25 MG: 25 TABLET ORAL at 08:40

## 2018-10-19 RX ADMIN — GUAIFENESIN 600 MG: 600 TABLET, EXTENDED RELEASE ORAL at 21:43

## 2018-10-19 RX ADMIN — POTASSIUM CHLORIDE 30 MEQ: 750 CAPSULE, EXTENDED RELEASE ORAL at 17:13

## 2018-10-19 RX ADMIN — FAMOTIDINE 20 MG: 20 TABLET ORAL at 08:40

## 2018-10-19 RX ADMIN — Medication 10 ML: at 21:45

## 2018-10-19 RX ADMIN — Medication 3 ML: at 08:43

## 2018-10-19 RX ADMIN — IPRATROPIUM BROMIDE AND ALBUTEROL SULFATE 3 ML: 2.5; .5 SOLUTION RESPIRATORY (INHALATION) at 23:38

## 2018-10-19 RX ADMIN — CLOPIDOGREL BISULFATE 75 MG: 75 TABLET ORAL at 08:40

## 2018-10-19 RX ADMIN — GABAPENTIN 200 MG: 100 CAPSULE ORAL at 21:43

## 2018-10-19 RX ADMIN — GABAPENTIN 200 MG: 100 CAPSULE ORAL at 06:59

## 2018-10-19 RX ADMIN — Medication 3 ML: at 21:45

## 2018-10-19 RX ADMIN — METOPROLOL TARTRATE 25 MG: 25 TABLET ORAL at 17:13

## 2018-10-19 RX ADMIN — MIRTAZAPINE 15 MG: 15 TABLET, ORALLY DISINTEGRATING ORAL at 21:43

## 2018-10-19 RX ADMIN — HEPARIN SODIUM 5000 UNITS: 5000 INJECTION, SOLUTION INTRAVENOUS; SUBCUTANEOUS at 21:43

## 2018-10-19 RX ADMIN — POTASSIUM CHLORIDE 30 MEQ: 750 CAPSULE, EXTENDED RELEASE ORAL at 08:39

## 2018-10-19 RX ADMIN — ACETAMINOPHEN 325 MG: 325 TABLET ORAL at 21:43

## 2018-10-19 RX ADMIN — GUAIFENESIN 600 MG: 600 TABLET, EXTENDED RELEASE ORAL at 08:40

## 2018-10-19 RX ADMIN — SENNOSIDES AND DOCUSATE SODIUM 2 TABLET: 8.6; 5 TABLET ORAL at 21:43

## 2018-10-19 RX ADMIN — Medication 10 ML: at 08:43

## 2018-10-19 NOTE — PROGRESS NOTES
[facility] Medicine Services  PROGRESS NOTE    Patient Name: Yassine Love  : 1945  MRN: [id number]    Date of Admission: 10/2/2018  Length of Stay: 17  Primary Care Physician: Provider, No Known    Subjective   Subjective     CC:  Shortness of air    HPI:      Slight cough, less productive today. Looking forward to rehab    Review of Systems  Gen- No fevers, chills  CV- No chest pain, palpitations  Resp- as above  GI- No N/V/D, abd pain      Otherwise ROS is negative except as mentioned in the HPI.    Objective   Objective     Vital Signs:   Temp:  [97.5 °F (36.4 °C)-98.3 °F (36.8 °C)] 98.3 °F (36.8 °C)  Heart Rate:  [67-80] 73  Resp:  [16-24] 20  BP: (112-131)/(59-73) 120/66        Physical Exam:  LINES: PICC RUE  Constitutional - no acute distress, up in chair, asleep but awakens easily to voice  HEENT-NCAT, mucous membranes moist  CV-RRR, S1 S2 normal, no m/r/g  Resp-diminished bilaterally, no wheezes, rhonchi or rales  Abd-soft, non-tender, non-distended, normo active bowel sounds  Ext-Trace Edema LE bilat  Neuro-alert and oriented, speech clear, moves all extremities   Psych-normal affect   Skin- No rash on exposed UE or LE bilaterally        Results Reviewed:  I have personally reviewed current lab, radiology, and data and agree.          Results from last 7 days  Lab Units 10/17/18  0507   SODIUM mmol/L 139   POTASSIUM mmol/L 4.9   CHLORIDE mmol/L 102   CO2 mmol/L 35.0*   BUN mg/dL 38*   CREATININE mg/dL 1.17   GLUCOSE mg/dL 91   CALCIUM mg/dL 8.2*     Estimated Creatinine Clearance: 59.2 mL/min (by C-G formula based on SCr of 1.17 mg/dL).  No results found for: BNP    Microbiology Results Abnormal     Procedure Component Value - Date/Time    Blood Culture - Blood, [[id number]]  (Normal) Collected:  10/02/18 330    Lab Status:  Final result Specimen:  Blood from Hand, Left Updated:  10/07/18 2201     Blood Culture No growth at 5 days    Narrative:       Aerobic bottle only      Blood Culture - Blood, [[id number]]  (Normal) Collected:  10/02/18 1720    Lab Status:  Final result Specimen:  Blood from Arm, Right Updated:  10/07/18 1830     Blood Culture No growth at 5 days    Respiratory Culture - Sputum, Cough [[id number]]  (Abnormal)  (Susceptibility) Collected:  10/03/18 0430    Lab Status:  Final result Specimen:  Sputum from Cough Updated:  10/07/18 1137     Respiratory Culture Light growth (2+) Staphylococcus aureus (A)      Light growth (2+) Normal Respiratory Nataliia     Gram Stain Result Few (2+) WBCs per low power field      No Epithelial cells per low power field      Few (2+) Gram positive cocci    Susceptibility      Staphylococcus aureus     FAVIAN     Ceftriaxone <=8 ug/ml Susceptible     Clindamycin >4 ug/ml Resistant     Erythromycin >4 ug/ml Resistant     Gentamicin <=4 ug/ml Susceptible     Levofloxacin <=1 ug/ml Susceptible  [1]      Linezolid <=1 ug/ml Susceptible     Oxacillin <=0.25 ug/ml Susceptible     Penicillin G <=0.03 ug/ml Susceptible     Quinupristin + Dalfopristin <=0.5 ug/ml Susceptible     Rifampin <=1 ug/ml Susceptible     Tetracycline >8 ug/ml Resistant     Trimethoprim + Sulfamethoxazole <=0.5/9.5 ug/ml Susceptible     Vancomycin 2 ug/ml Susceptible            [1]   Staphylococcus species may develop resistance during prolonged therapy with quinolones.  Isolates that are initially susceptible may become resistant within three to four days after initiation of therapy. Testing of repeat isolates may be warranted.                   MRSA Screen, PCR - Swab, Nares [[id number]]  (Abnormal) Collected:  10/04/18 1623    Lab Status:  Final result Specimen:  Swab from Nares Updated:  10/04/18 1818     MRSA, PCR Positive (C)          Imaging Results (last 24 hours)     Procedure Component Value Units Date/Time    XR Chest 1 View [[id number]] Collected:  10/19/18 0853     Updated:  10/19/18 1527    Narrative:       EXAMINATION: XR CHEST 1 VW-10/19/2018:      INDICATION:  RLL infiltrate; J96.21-Acute and chronic respiratory failure  with hypoxia; J96.22-Acute and chronic respiratory failure with  hypercapnia; Z74.09-Other reduced mobility; Z74.09-Other reduced  mobility.      COMPARISON: 10/16/2018.     FINDINGS: Portable chest reveals PICC line catheter on the right with  tip in the SVC. Increased markings seen within the upper lung fields  bilaterally unchanged in the interval. Slight improvement seen in the  aeration of the lung bases. Mild increased markings remain. Degenerative  changes seen within the spine. Vascular calcification seen within the  thoracic aorta. Prominence of the pulmonary vascularity bilaterally.  Probable small bilateral pleural effusions.           Impression:       Slight improvement seen in the aeration of the lung bases in  the interval. The remainder of the chest is stable.     D:  10/19/2018  E:  10/19/2018     This report was finalized on 10/19/2018 3:25 PM by Dr. Nataly Whalen MD.           Results for orders placed during the hospital encounter of 10/02/18   Adult Transthoracic Echo Complete W/ Cont if Necessary Per Protocol    Narrative · Left ventricular systolic function is normal. Estimated EF = 60%.  · Trace-to-mild mitral valve regurgitation is present.          I have reviewed the medications.      amoxicillin 1,000 mg Oral Q12H   aspirin 81 mg Oral Daily   atorvastatin 40 mg Oral Nightly   clopidogrel 75 mg Oral Daily   famotidine 20 mg Oral BID   furosemide 40 mg Oral Daily   gabapentin 200 mg Oral Q8H   guaiFENesin 600 mg Oral Q12H   heparin (porcine) 5,000 Units Subcutaneous Q8H   ipratropium-albuterol 3 mL Nebulization Q8H - RT   lactobacillus acidophilus 1 capsule Oral Daily   metoprolol tartrate 25 mg Oral Q8H   mirtazapine 15 mg Oral Nightly   Morphine 15 mg Oral Q12H   nicotine 1 patch Transdermal Q24H   pharmacy consult - MTM  Does not apply Daily   polyethylene glycol 17 g Oral Daily   potassium chloride 30 mEq Oral BID With  Meals   sennosides-docusate sodium 2 tablet Oral BID   sodium chloride 10 mL Intravenous Q12H   sodium chloride 3 mL Intravenous Q12H   cyanocobalamin 1,000 mcg Oral Daily         Assessment/Plan   Assessment / Plan     Active Hospital Problems    Diagnosis   • **Acute on chronic mixed hypercarbic/hypoxemic respiratory failure requiring intubation and ventilatory support   • Sleep apnea   • Acute kidney injury     Cr 1.35 increased from 9/8/18 1.15      • H/O bacteremia due to Staphylococcus   • Opioid use   • Pneumonia due to infectious organism   • Brief runs of SVT (supraventricular tachycardia)    • Decubitus ulcer of buttock, stage 2   • CAD status post GIGI ×2 to LAD and RCA 3/12/18     S/P LHC per Dr. Harvey on 3/12/18 with PCI revealing severe 2-vessel disease.    GIGI x 2 to LAD and RCA     • Essential hypertension   • End stage COPD in an active smoker on home O2 with chronic hypercarbia     Managed by [facility]      • H/O cellulitis of both lower extremities     Previously managed by Infectious Disease      • PVD (peripheral vascular disease)           Brief Hospital Course to date:  Yassine Love is a 72 y.o. male active smoker with a history of COPD (on home oxygen) admitted 10/2/18 for exacerbation associated with severe hypoxemia. He was in the skilled nursing facility for rehabilitation but 10/1/18 developed some visual hallucinations. Oxygen saturations were subsequently noted to drop into the 60s on 4 L on 10/2/18 and he was brought to [facility] ER. Because of an elevated d-dimer a CT angiogram was obtained which was negative for PE. Both CT scan and chest x-ray however revealed it appeared to be pulmonary edema with diffuse interstitial infiltrates and pleural effusions. In addition BNP was 899. He was given his usual dose of home Lasix, placed on empiric antimicrobial coverage with Zosyn, given bronchodilators, Solu-Medrol, placed on BiPAP, and admitted to the hospitalist service.     At  approximately 2 AM 10/3/18 he was noted to become unresponsive with decreased respiratory drive. Apparently he had been a DNR but this was reversed by the patient on admission. Because of this Dr. Jacobsen and respiratory therapy intubated the patient at the bedside. There were large amounts of thick purulent yellow secretions obtained at the time of intubation and he was transferred to the ICU on ventilatory support. He initially required high FiO2's of 60% but respiratory rate and ABGs rapidly improved. Over the course of the evening he continued to improve. He subsequently self extubated the evening of 10/3/18 at 11 PM. He was able to tolerate self extubation but has required high flow nasal oxygen in order to maintain his oxygen saturations.     Heart rate is usually in the 60s, but has had recurrent brief episodes of SVT up to 160  during hospitalization His metoprolol was held once and that led to SVT that resolved with resumption of beta blocker.. He was admitted 10/2 and transferred out of the ICU 10/7      Assessment & Plan:   Continue amoxil   Avoid sedating agents   IV Lasix and Diamox initially, Lasix decreased to 40mg po daily    Weaned from high flow down to 3L NC, continue to taper as he tolerates   Palliative following, status changed to DNR/DNI - will need palliative to follow at SNF.  PT on MS contin for pain   Recurrent episodes of SVT - better on metoprolol. Follow up Dr Harvey in 6-8 weeks.   S/P prednisone taper.    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    DVT Prophylaxis:  Sq heparin    CODE STATUS:   Code Status and Medical Interventions:   Ordered at: 10/08/18 1413     Limited Support to NOT Include:    Intubation     Level Of Support Discussed With:    Patient     Code Status:    No CPR     Medical  Interventions (Level of Support Prior to Arrest):    Limited       Disposition: I expect the patient to be discharged to rehab on sunday      Electronically signed by Richard Escalante MD, 10/19/18, 5:37 PM.

## 2018-10-19 NOTE — PROGRESS NOTES
[facility] Progress Note    Admission Date: 10/2/2018    Yassine Love  1945  [id number]    Date: 10/19/2018    Antibiotics:  Anti-Infectives     Ordered     Dose/Rate Route Frequency Start Stop    10/18/18 1702  amoxicillin (AMOXIL) capsule 1,000 mg     Ordering Provider:  Parish Belle MD    1,000 mg Oral Every 12 Hours Scheduled 10/18/18 2100 10/25/18 2059    10/13/18 1318  meropenem (MERREM) 1 g/100 mL 0.9% NS VTB (mbp)     Mary Kate Rahman MD reviewed the order on 10/13/18 1440.   Ordering Provider:  Mary Kate Rahman MD    1 g  over 3 Hours Intravenous Every 8 Hours Scheduled 10/13/18 1400 10/15/18 0813    10/04/18 1629  Linezolid (ZYVOX) 600 mg 300 mL     Mary Kate Rahman MD reviewed the order on 10/08/18 1301.   Ordering Provider:  Mary Kate Rahman MD    600 mg  300 mL/hr over 60 Minutes Intravenous Every 12 Hours 10/04/18 1800 10/13/18 0623    10/03/18 0229  meropenem (MERREM) 1 g/100 mL 0.9% NS VTB (mbp)     Ordering Provider:  Perla Jones APRN    1 g  over 3 Hours Intravenous Every 8 Hours 10/03/18 1000 10/13/18 0556    10/03/18 0914  vancomycin 1750 mg/500 mL 0.9% NS IVPB ([facility])     Ordering Provider:  Parish Donato MD    1,750 mg  over 120 Minutes Intravenous Once 10/03/18 1000 10/03/18 1203    10/03/18 0229  meropenem (MERREM) 1 g/100 mL 0.9% NS VTB (mbp)     Ordering Provider:  Perla Jones APRN    1 g  over 30 Minutes Intravenous Once 10/03/18 0315 10/03/18 0313    10/02/18 2108  piperacillin-tazobactam (ZOSYN) 4.5 g in iso-osmotic dextrose 100 mL IVPB (premix)     Ordering Provider:  Pastora Caldwell DO    4.5 g  over 30 Minutes Intravenous Once 10/02/18 2108 10/03/18 0114           Reason for Consultation: bilateral leg cellulitis,  bronchitis      History of present illness:    Patient is a 72 y.o. male well known to Dr Belle from prior admissions. Dr Belle has primarily followed for cellulitis  He was hospitalized 8/21 to 9/7 and Dr Belle followed for cellulitis complicating  bilateral leg ulcers   This admission on  10/2/18 is for  for COPD exacerbation and severe hypoxia. He iwas transferred from a  skilled nursing facility with hypoxemia and visual hallucinations.NC. EMS was called and Mr. Love was  CTA was negative for pulmonary embolism but  pulmonary edema with diffuse interstitial infiltrates and pleural effusions. BNP was 899 and procalcitonin normal on 10/2 and 10/4. He required mechanical ventilation  Sputum culture grew MSSA  He has been treated with zyvox and merrrem  He improved after diuresis and was extubated  The leg wounds present 2 months ago have healed He has some residual erythema and states that his legs are painful at times  He is known to have severe PVD     10/10  More alert this am; intermittent cough perisists  Denies leg pain  10/11 alert, no new c/o, denies leg pain  10/15/18 - Patient is sleeping.  ROS discussed with staff at bedside.  Currently on 3LNC.  Co weakness  No fever or chills  Co dyspnea  Co cough  10/16/18 - No history per patient.  ROS discussed with staff.    Seen and agree  10/17/18 - Patient is sleeping.  ROS discussed with RN.  Bumped oxygen to 4L due to patient complaining of mild shortness of air.  No fever.  Seen and agree    10/18/18 - Complains of weakness, back pain, joint pain. No fevers or chills. Complains of shortness of breath.   10/19 - No Hx available  ROS discussed with staff      ROS:  Unobtainable.  See above      PE:  Vital Signs  Temp  Min: 97.5 °F (36.4 °C)  Max: 98.1 °F (36.7 °C)  BP  Min: 109/56  Max: 126/66  Pulse  Min: 69  Max: 81  Resp  Min: 16  Max: 24  SpO2  Min: 92 %  Max: 97 %  GENERAL: groggy, NAD  HEENT: Normocephalic, atraumatic.  PERRL.    LYMPH: No cervical lymphadenopathy.  HEART: Regular S1S2  LUNGS: Diminished throughout today on exam, oxygen now on 4LNC.   ABDOMEN: Soft, nontender, nondistended. Positive bowel sounds.   EXT:  Moderate edema bilateral legs ulcerations are much improved.  SKIN: Warm and  dry without cutaneous eruptions on Inspection/palpation.    NEURO: unable to evaluate           Laboratory Data          Results from last 7 days  Lab Units 10/17/18  0507   SODIUM mmol/L 139   POTASSIUM mmol/L 4.9   CHLORIDE mmol/L 102   CO2 mmol/L 35.0*   BUN mg/dL 38*   CREATININE mg/dL 1.17   GLUCOSE mg/dL 91   CALCIUM mg/dL 8.2*                   Estimated Creatinine Clearance: 59.2 mL/min (by C-G formula based on SCr of 1.17 mg/dL).      Microbiology:  mssa from sputum;  mrsa from nares swab    Radiology:  Imaging Results (last 72 hours)     Procedure Component Value Units Date/Time    XR Chest 1 View [[id number]] Collected:  10/06/18 1115     Updated:  10/06/18 1355    Narrative:       EXAMINATION: XR CHEST 1 VW - 10/06/2018     INDICATION: J96.21-Acute and chronic respiratory failure with hypoxia;  J96.22-Acute and chronic respiratory failure with hypercapnia;  Z74.09-Other reduced mobility.     TECHNIQUE:  Single view frontal chest.     COMPARISONS: 10/5/2018.     FINDINGS:  Stable support apparatus. Small bilateral pleural effusions  and adjacent atelectasis are unchanged. No pneumothorax. Calcification  aortic knob. Cardiomediastinal silhouette is unchanged.        Impression:       Stable exam.     DICTATED:   10/06/2018  EDITED/ls :   10/06/2018      This report was finalized on 10/6/2018 1:53 PM by Eliezer Hale.             I personally reviewed the radiographic studies     IMPRESSION:    -- Probable MSSA bronchitis  Vs pneumonia, CXR 10/16/18 - with persistent bibasilar opacities.       -- Acute on chronic mixed respiratory failure  S/p intubation, now extubated     -- Pulmonary edema- improved     -- Chronic lymphedema and venous stasis dermatitis     -- Bilateral leg cellulitis essentially resolved     -- Severe COPD with ongoing tobacco abuse, poor pulmonary toilet     -- RAFAEL     -- Decubitus ulcer of buttock stage 2     --MRSA colonization    --DNR/DNI       RECOMMENDATIONS:      -- continue   amoxicillin      UM discussed with staff    Call any questions this weekend  CXR better        Parish Belle MD for Dr. Parish Belle  10/19/2018

## 2018-10-19 NOTE — PLAN OF CARE
Problem: Patient Care Overview  Goal: Plan of Care Review  Outcome: Ongoing (interventions implemented as appropriate)   10/19/18 9920   Coping/Psychosocial   Plan of Care Reviewed With patient   Plan of Care Review   Progress no change   OTHER   Outcome Summary Pt has not felt very good today, tired from not sleeping last night. Has not been in pain today. Had an episode of SOB, breathing treatment resolved. 4L NC. VSS. Pt has bed at [facility] on Sunday. To be taken by ambulance. Will continue to monitor until then.

## 2018-10-19 NOTE — PROGRESS NOTES
Case Management Discharge Note    Final Note: Spoke to Kristina at [facility] and she confirmed pt has a bed Sunday at [facility] if medically ready. Pt is agreeable and is paying $5,000 up front to go to [facility] as requested since he is in his copay days. His son does not feel his father can get in a vehicle. He does have poor trunk control and up with max assist x2. Pt's son aware they could be billed for ambulance and are agreeable. [facility] scheduled for [facility] 10/21 at 1200. PCS on chartlet.  Nurse to call report to [phone] fax summary to [phone]. CM will cont to follow.     Destination - Selection Complete     Service Request Status Selected Specialties Address Phone Number Fax Number    [facility] [address] [facility] [address] [phone]      Durable Medical Equipment     No service has been selected for the patient.      Dialysis/Infusion     No service has been selected for the patient.      Home Medical Care     No service has been selected for the patient.      Social Care     No service has been selected for the patient.             Final Discharge Disposition Code: 03 - skilled nursing facility (SNF)

## 2018-10-20 ENCOUNTER — APPOINTMENT (OUTPATIENT)
Dept: GENERAL RADIOLOGY | Facility: HOSPITAL | Age: 73
End: 2018-10-20

## 2018-10-20 LAB
ALBUMIN SERPL-MCNC: 3.14 G/DL (ref 3.2–4.8)
ALBUMIN/GLOB SERPL: 1.3 G/DL (ref 1.5–2.5)
ALP SERPL-CCNC: 98 U/L (ref 25–100)
ALT SERPL W P-5'-P-CCNC: 24 U/L (ref 7–40)
ANION GAP SERPL CALCULATED.3IONS-SCNC: 4 MMOL/L (ref 3–11)
AST SERPL-CCNC: 18 U/L (ref 0–33)
BASOPHILS # BLD AUTO: 0.03 10*3/MM3 (ref 0–0.2)
BASOPHILS NFR BLD AUTO: 0.4 % (ref 0–1)
BILIRUB SERPL-MCNC: 0.6 MG/DL (ref 0.3–1.2)
BUN BLD-MCNC: 33 MG/DL (ref 9–23)
BUN/CREAT SERPL: 32 (ref 7–25)
CALCIUM SPEC-SCNC: 8.5 MG/DL (ref 8.7–10.4)
CHLORIDE SERPL-SCNC: 105 MMOL/L (ref 99–109)
CO2 SERPL-SCNC: 34 MMOL/L (ref 20–31)
CREAT BLD-MCNC: 1.03 MG/DL (ref 0.6–1.3)
DEPRECATED RDW RBC AUTO: 64.3 FL (ref 37–54)
EOSINOPHIL # BLD AUTO: 0.47 10*3/MM3 (ref 0–0.3)
EOSINOPHIL NFR BLD AUTO: 6.3 % (ref 0–3)
ERYTHROCYTE [DISTWIDTH] IN BLOOD BY AUTOMATED COUNT: 18.7 % (ref 11.3–14.5)
GFR SERPL CREATININE-BSD FRML MDRD: 71 ML/MIN/1.73
GLOBULIN UR ELPH-MCNC: 2.5 GM/DL
GLUCOSE BLD-MCNC: 88 MG/DL (ref 70–100)
HCT VFR BLD AUTO: 29.7 % (ref 38.9–50.9)
HGB BLD-MCNC: 8.9 G/DL (ref 13.1–17.5)
IMM GRANULOCYTES # BLD: 0.01 10*3/MM3 (ref 0–0.03)
IMM GRANULOCYTES NFR BLD: 0.1 % (ref 0–0.6)
LYMPHOCYTES # BLD AUTO: 0.76 10*3/MM3 (ref 0.6–4.8)
LYMPHOCYTES NFR BLD AUTO: 10.2 % (ref 24–44)
MAGNESIUM SERPL-MCNC: 2.1 MG/DL (ref 1.3–2.7)
MCH RBC QN AUTO: 28.4 PG (ref 27–31)
MCHC RBC AUTO-ENTMCNC: 30 G/DL (ref 32–36)
MCV RBC AUTO: 94.9 FL (ref 80–99)
MONOCYTES # BLD AUTO: 0.89 10*3/MM3 (ref 0–1)
MONOCYTES NFR BLD AUTO: 11.9 % (ref 0–12)
NEUTROPHILS # BLD AUTO: 5.33 10*3/MM3 (ref 1.5–8.3)
NEUTROPHILS NFR BLD AUTO: 71.2 % (ref 41–71)
PLATELET # BLD AUTO: 163 10*3/MM3 (ref 150–450)
PMV BLD AUTO: 10.7 FL (ref 6–12)
POTASSIUM BLD-SCNC: 5.1 MMOL/L (ref 3.5–5.5)
PROT SERPL-MCNC: 5.6 G/DL (ref 5.7–8.2)
RBC # BLD AUTO: 3.13 10*6/MM3 (ref 4.2–5.76)
SODIUM BLD-SCNC: 143 MMOL/L (ref 132–146)
TROPONIN I SERPL-MCNC: 0.01 NG/ML
WBC NRBC COR # BLD: 7.48 10*3/MM3 (ref 3.5–10.8)

## 2018-10-20 PROCEDURE — 80053 COMPREHEN METABOLIC PANEL: CPT | Performed by: PHYSICIAN ASSISTANT

## 2018-10-20 PROCEDURE — 71045 X-RAY EXAM CHEST 1 VIEW: CPT

## 2018-10-20 PROCEDURE — 94760 N-INVAS EAR/PLS OXIMETRY 1: CPT

## 2018-10-20 PROCEDURE — 94799 UNLISTED PULMONARY SVC/PX: CPT

## 2018-10-20 PROCEDURE — 93005 ELECTROCARDIOGRAM TRACING: CPT | Performed by: PHYSICIAN ASSISTANT

## 2018-10-20 PROCEDURE — 85025 COMPLETE CBC W/AUTO DIFF WBC: CPT | Performed by: PHYSICIAN ASSISTANT

## 2018-10-20 PROCEDURE — 83735 ASSAY OF MAGNESIUM: CPT | Performed by: FAMILY MEDICINE

## 2018-10-20 PROCEDURE — 25010000002 FUROSEMIDE PER 20 MG: Performed by: PHYSICIAN ASSISTANT

## 2018-10-20 PROCEDURE — 93010 ELECTROCARDIOGRAM REPORT: CPT | Performed by: INTERNAL MEDICINE

## 2018-10-20 PROCEDURE — 97530 THERAPEUTIC ACTIVITIES: CPT

## 2018-10-20 PROCEDURE — 97110 THERAPEUTIC EXERCISES: CPT

## 2018-10-20 PROCEDURE — 94640 AIRWAY INHALATION TREATMENT: CPT

## 2018-10-20 PROCEDURE — 99232 SBSQ HOSP IP/OBS MODERATE 35: CPT | Performed by: PHYSICIAN ASSISTANT

## 2018-10-20 PROCEDURE — 25010000002 HEPARIN (PORCINE) PER 1000 UNITS: Performed by: INTERNAL MEDICINE

## 2018-10-20 PROCEDURE — 84484 ASSAY OF TROPONIN QUANT: CPT | Performed by: PHYSICIAN ASSISTANT

## 2018-10-20 RX ORDER — FUROSEMIDE 10 MG/ML
40 INJECTION INTRAMUSCULAR; INTRAVENOUS ONCE
Status: COMPLETED | OUTPATIENT
Start: 2018-10-20 | End: 2018-10-20

## 2018-10-20 RX ORDER — FUROSEMIDE 40 MG/1
40 TABLET ORAL
Status: DISCONTINUED | OUTPATIENT
Start: 2018-10-21 | End: 2018-10-22 | Stop reason: HOSPADM

## 2018-10-20 RX ADMIN — GABAPENTIN 200 MG: 100 CAPSULE ORAL at 05:31

## 2018-10-20 RX ADMIN — Medication 1 CAPSULE: at 09:03

## 2018-10-20 RX ADMIN — ACETAMINOPHEN 325 MG: 325 TABLET ORAL at 10:57

## 2018-10-20 RX ADMIN — POTASSIUM CHLORIDE 30 MEQ: 750 CAPSULE, EXTENDED RELEASE ORAL at 09:03

## 2018-10-20 RX ADMIN — IPRATROPIUM BROMIDE AND ALBUTEROL SULFATE 3 ML: 2.5; .5 SOLUTION RESPIRATORY (INHALATION) at 07:21

## 2018-10-20 RX ADMIN — SENNOSIDES AND DOCUSATE SODIUM 2 TABLET: 8.6; 5 TABLET ORAL at 09:02

## 2018-10-20 RX ADMIN — FUROSEMIDE 40 MG: 10 INJECTION, SOLUTION INTRAMUSCULAR; INTRAVENOUS at 15:25

## 2018-10-20 RX ADMIN — CYANOCOBALAMIN TAB 1000 MCG 1000 MCG: 1000 TAB at 09:03

## 2018-10-20 RX ADMIN — GUAIFENESIN 600 MG: 600 TABLET, EXTENDED RELEASE ORAL at 21:29

## 2018-10-20 RX ADMIN — MORPHINE SULFATE 15 MG: 15 TABLET, EXTENDED RELEASE ORAL at 21:29

## 2018-10-20 RX ADMIN — GABAPENTIN 200 MG: 100 CAPSULE ORAL at 21:29

## 2018-10-20 RX ADMIN — ALBUTEROL SULFATE 2.5 MG: 2.5 SOLUTION RESPIRATORY (INHALATION) at 13:17

## 2018-10-20 RX ADMIN — METOPROLOL TARTRATE 25 MG: 25 TABLET ORAL at 09:04

## 2018-10-20 RX ADMIN — HEPARIN SODIUM 5000 UNITS: 5000 INJECTION, SOLUTION INTRAVENOUS; SUBCUTANEOUS at 05:32

## 2018-10-20 RX ADMIN — HEPARIN SODIUM 5000 UNITS: 5000 INJECTION, SOLUTION INTRAVENOUS; SUBCUTANEOUS at 21:30

## 2018-10-20 RX ADMIN — Medication 3 ML: at 09:09

## 2018-10-20 RX ADMIN — Medication 3 ML: at 21:30

## 2018-10-20 RX ADMIN — SENNOSIDES AND DOCUSATE SODIUM 2 TABLET: 8.6; 5 TABLET ORAL at 21:29

## 2018-10-20 RX ADMIN — ACETAMINOPHEN 325 MG: 325 TABLET ORAL at 05:32

## 2018-10-20 RX ADMIN — FUROSEMIDE 40 MG: 40 TABLET ORAL at 09:04

## 2018-10-20 RX ADMIN — GUAIFENESIN 600 MG: 600 TABLET, EXTENDED RELEASE ORAL at 09:04

## 2018-10-20 RX ADMIN — ATORVASTATIN CALCIUM 40 MG: 40 TABLET, FILM COATED ORAL at 21:29

## 2018-10-20 RX ADMIN — MORPHINE SULFATE 15 MG: 15 TABLET, EXTENDED RELEASE ORAL at 09:03

## 2018-10-20 RX ADMIN — METOPROLOL TARTRATE 25 MG: 25 TABLET ORAL at 00:47

## 2018-10-20 RX ADMIN — FAMOTIDINE 20 MG: 20 TABLET ORAL at 21:29

## 2018-10-20 RX ADMIN — FAMOTIDINE 20 MG: 20 TABLET ORAL at 09:04

## 2018-10-20 RX ADMIN — MORPHINE SULFATE 15 MG: 15 TABLET, EXTENDED RELEASE ORAL at 00:47

## 2018-10-20 RX ADMIN — ASPIRIN 81 MG CHEWABLE TABLET 81 MG: 81 TABLET CHEWABLE at 09:03

## 2018-10-20 RX ADMIN — AMOXICILLIN 1000 MG: 250 CAPSULE ORAL at 21:29

## 2018-10-20 RX ADMIN — CLOPIDOGREL BISULFATE 75 MG: 75 TABLET ORAL at 09:03

## 2018-10-20 RX ADMIN — NICOTINE 1 PATCH: 21 PATCH, EXTENDED RELEASE TRANSDERMAL at 09:02

## 2018-10-20 RX ADMIN — IPRATROPIUM BROMIDE AND ALBUTEROL SULFATE 3 ML: 2.5; .5 SOLUTION RESPIRATORY (INHALATION) at 23:29

## 2018-10-20 RX ADMIN — HEPARIN SODIUM 5000 UNITS: 5000 INJECTION, SOLUTION INTRAVENOUS; SUBCUTANEOUS at 15:25

## 2018-10-20 RX ADMIN — GABAPENTIN 200 MG: 100 CAPSULE ORAL at 15:25

## 2018-10-20 RX ADMIN — METOPROLOL TARTRATE 25 MG: 25 TABLET ORAL at 15:33

## 2018-10-20 RX ADMIN — Medication 10 ML: at 21:30

## 2018-10-20 RX ADMIN — POTASSIUM CHLORIDE 30 MEQ: 750 CAPSULE, EXTENDED RELEASE ORAL at 17:47

## 2018-10-20 RX ADMIN — MIRTAZAPINE 15 MG: 15 TABLET, ORALLY DISINTEGRATING ORAL at 21:30

## 2018-10-20 RX ADMIN — AMOXICILLIN 1000 MG: 250 CAPSULE ORAL at 09:08

## 2018-10-20 RX ADMIN — Medication 10 ML: at 09:08

## 2018-10-20 NOTE — THERAPY TREATMENT NOTE
Acute Care - Physical Therapy Treatment Note  [facility]     Patient Name: Yassine Love  : 1945  MRN: [id number]  Today's Date: 10/20/2018  Onset of Illness/Injury or Date of Surgery: 10/02/18  Date of Referral to PT: 10/02/18  Referring Physician: DO Javi    Admit Date: 10/2/2018    Visit Dx:    ICD-10-CM ICD-9-CM   1. Acute on chronic respiratory failure with hypoxia and hypercapnia (CMS/HCC) J96.21 518.84    J96.22 786.09     799.02   2. Impaired functional mobility, balance, gait, and endurance Z74.09 V49.89   3. 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 O2 with chronic hypercarbia   • Essential hypertension   • Non-sustained ventricular tachycardia (CMS/HCC)   • CAD status post GIGI ×2 to LAD and RCA 3/12/18   • Debility   • Decubitus ulcer of buttock, stage 2   • Chronic pain   • Chronic venous stasis dermatitis of both lower extremities   • Acute on chronic mixed hypercarbic/hypoxemic respiratory failure requiring intubation and ventilatory support   • History of tobacco use   • Acute kidney injury   • Brief runs of SVT (supraventricular tachycardia)    • H/O bacteremia due to Staphylococcus   • Opioid use   • Overweight (BMI 25.0-29.9)   • Pneumonia due to infectious organism   • Sleep apnea       Therapy Treatment          Rehabilitation Treatment Summary     Row Name 10/20/18 1450             Treatment Time/Intention    Discipline physical therapist  -SR      Document Type therapy note (daily note)  -SR      Subjective Information complains of;pain;weakness  -SR      Mode of Treatment physical therapy  -SR      Patient/Family Observations no family present, pt is supine with O2  -SR      Care Plan Review patient/other agree to care plan  -SR      Therapy Frequency (PT Clinical Impression) daily  -SR      Patient Effort  good  -SR      Existing Precautions/Restrictions fall;oxygen therapy device and L/min  -SR      Recorded by [SR] Perla Scott, PT 10/20/18 1621      Row Name 10/20/18 1450             Vital Signs    Pre Systolic BP Rehab --   VSS  -SR      Recorded by [SR] Perla Scott, PT 10/20/18 1621      Row Name 10/20/18 1450             Cognitive Assessment/Intervention- PT/OT    Affect/Mental Status (Cognitive) WFL  -SR      Orientation Status (Cognition) oriented x 3  -SR      Follows Commands (Cognition) follows one step commands;75-90% accuracy;initiation impaired;physical/tactile prompts required;increased processing time needed;repetition of directions required  -SR      Cognitive Function (Cognitive) safety deficit  -SR      Safety Deficit (Cognitive) moderate deficit  -SR      Personal Safety Interventions fall prevention program maintained;gait belt;nonskid shoes/slippers when out of bed  -SR      Recorded by [SR] Perla Scott, PT 10/20/18 1621      Row Name 10/20/18 1450             Safety Issues, Functional Mobility    Safety Issues Affecting Function (Mobility) awareness of need for assistance;insight into deficits/self awareness;sequencing abilities  -SR      Impairments Affecting Function (Mobility) balance;endurance/activity tolerance;postural/trunk control;pain;shortness of breath;strength  -SR      Recorded by [SR] Perla Scott, PT 10/20/18 1621      Row Name 10/20/18 1450             Bed Mobility Assessment/Treatment    Bed Mobility Assessment/Treatment supine-sit  -SR      Supine-Sit [place] (Bed Mobility) moderate assist (50% patient effort);2 person assist;verbal cues  -SR      Recorded by [SR] Perla Scott, PT 10/20/18 1621      Row Name 10/20/18 1450             Transfer Assessment/Treatment    Transfer Assessment/Treatment bed-chair transfer;stand-sit transfer;sit-stand transfer  -SR      Comment (Transfers) cues for mechanics and safety awareness  -SR      Recorded by [SR] Tyler  Perla WOO, PT 10/20/18 1621      Row Name 10/20/18 1450             Bed-Chair Transfer    Bed-Chair [place] (Transfers) maximum assist (25% patient effort);2 person assist;verbal cues  -SR      Assistive Device (Bed-Chair Transfers) --   BUE support  -SR      Recorded by [SR] Perla Scott, PT 10/20/18 1621      Row Name 10/20/18 1450             Sit-Stand Transfer    Sit-Stand [place] (Transfers) maximum assist (25% patient effort);2 person assist  -SR      Assistive Device (Sit-Stand Transfers) --   BUE support  -SR      Recorded by [SR] Perla Scott, PT 10/20/18 1621      Row Name 10/20/18 1450             Stand-Sit Transfer    Stand-Sit [place] (Transfers) moderate assist (50% patient effort);2 person assist  -SR      Assistive Device (Stand-Sit Transfers) --   BUE support  -SR      Recorded by [SR] Perla Scott, PT 10/20/18 1621      Row Name 10/20/18 1450             Stand Pivot/Stand Step Transfer    Stand Pivot/Stand Step [place] maximum assist (25% patient effort);2 person assist  -SR      Assistive Device (Stand Pivot Stand Step Transfer) --   BUE support  -SR      Recorded by [SR] Perla Scott, PT 10/20/18 1621      Row Name 10/20/18 1450             Gait/Stairs Assessment/Training    [place] Level (Gait) not tested  -SR      Recorded by [SR] Perla Scott, PT 10/20/18 1621      Row Name 10/20/18 1450             Therapeutic Exercise    57715 - PT Therapeutic Exercise Minutes 10  -SR      40525 - PT Therapeutic Activity Minutes 15  -SR      Recorded by [SR] Perla Scott, PT 10/20/18 1621      Row Name 10/20/18 1450             Upper Extremity Seated Therapeutic Exercise    Performed, Seated Upper Extremity (Therapeutic Exercise) shoulder flexion/extension;shoulder abduction/adduction;elbow flexion/extension  -SR      Exercise Type, Seated Upper Extremity (Therapeutic Exercise) AROM (active range of motion)  -SR      Sets/Reps Detail, Seated Upper Extremity  (Therapeutic Exercise) 1/10  -SR      Recorded by [SR] Perla Scott, PT 10/20/18 1621      Row Name 10/20/18 1450             Therapeutic Exercise    Lower Extremity (Therapeutic Exercise) LAQ (long arc quad), bilateral;marching while seated  -SR      Lower Extremity Range of Motion (Therapeutic Exercise) ankle dorsiflexion/plantar flexion, bilateral  -SR      Sets/Reps (Therapeutic Exercise) 1/10  -SR      Comment (Therapeutic Exercise) chin tucks, neck extension ther ex  -SR      Recorded by [SR] Perla Scott, PT 10/20/18 1621      Row Name 10/20/18 1450             Static Sitting Balance    Level of [place] (Unsupported Sitting, Static Balance) contact guard assist  -SR      Sitting Position (Unsupported Sitting, Static Balance) sitting on edge of bed  -SR      Time Able to Maintain Position (Unsupported Sitting, Static Balance) more than 5 minutes  -SR      Comment (Unsupported Sitting, Static Balance) focused on posture, neck extension  -SR      Recorded by [SR] Perla Scott, PT 10/20/18 1621      Row Name 10/20/18 1450             Static Standing Balance    Level of [place] (Supported Standing, Static Balance) maximal assist, 25 to 49% patient effort  -SR      Time Able to Maintain Position (Supported Standing, Static Balance) less than 15 seconds  -SR      Assistive Device Utilized (Supported Standing, Static Balance) other (see comments)   BUE support  -SR      Comment (Supported Standing, Static Balance) x 4 reps  -SR      Recorded by [SR] Perla Scott, PT 10/20/18 1621      Row Name 10/20/18 1450             Positioning and Restraints    Pre-Treatment Position in bed  -SR      Post Treatment Position chair  -SR      In Chair notified nsg;sitting;call light within reach;encouraged to call for assist;exit alarm on;waffle cushion   pt declined to recline legs  -SR      Recorded by [SR] Perla Scott, PT 10/20/18 1621      Row Name 10/20/18 1450             Pain Scale: Numbers  Pre/Post-Treatment    Pain Scale: Numbers, Pretreatment 3/10  -SR      Pain Scale: Numbers, Post-Treatment 5/10  -SR      Pain Location - Orientation generalized  -SR      Pain Location back  -SR      Pain Intervention(s) Repositioned;Ambulation/increased activity  -SR      Recorded by [SR] Perla Scott, PT 10/20/18 1621      Row Name 10/20/18 1450             Coping    Observed Emotional State cooperative  -SR      Verbalized Emotional State acceptance  -SR      Recorded by [SR] Perla Scott, PT 10/20/18 1621      Row Name 10/20/18 1450             Plan of Care Review    Plan of Care Reviewed With patient  -SR      Recorded by [SR] Perla Scott, PT 10/20/18 1621      Row Name 10/20/18 1450             Outcome Summary/Treatment Plan (PT)    Daily Summary of Progress (PT) progress toward functional goals is gradual  -SR      Anticipated Discharge Disposition (PT) skilled nursing facility  -SR      Recorded by [SR] Perla Scott, PT 10/20/18 1621        User Key  (r) = Recorded By, (t) = Taken By, (c) = Cosigned By    Initials Name Effective Dates Discipline    SR Perla Scott, PT 06/19/15 -  PT                     Physical Therapy Education     Title: PT OT SLP Therapies (Active)     Topic: Physical Therapy (Active)     Point: Mobility training (Active)    Learning Progress Summary     Learner Status Readiness Method Response Comment Documented by    Patient Active Acceptance E,TB NR  SR 10/20/18 1621     Active Acceptance E NR  KR 10/18/18 1327     Active Nonacceptance E,D NR  SJ 10/17/18 1011     Active Acceptance E,D NR  DM 10/15/18 1355     Active Acceptance E,D NR Safety SH 10/12/18 1117     Done Eager E VU  KM 10/10/18 0930     Active Acceptance E,D NR  LS 10/05/18 1324     Active Acceptance E,D NR  LS 10/04/18 1119     Done Acceptance E,TB VU  MT 10/03/18 1851    Family Active Acceptance E NR  KR 10/18/18 1327     Active Acceptance E,D NR  LS 10/04/18 1119     Done Acceptance E,TB VU  MT 10/03/18  1851          Point: Home exercise program (Active)    Learning Progress Summary     Learner Status Readiness Method Response Comment Documented by    Patient Active Acceptance E,TB NR  SR 10/20/18 1621     Active Acceptance E NR  KR 10/18/18 1327     Active Acceptance E,TB NR  CM 10/18/18 0406     Active Nonacceptance E,D NR  SJ 10/17/18 1011     Active Acceptance E,D NR  DM 10/15/18 1355     Active Acceptance E,D NR Safety SH 10/12/18 1117     Done Eager E VU  KM 10/10/18 0930     Active Acceptance E,D NR  LS 10/05/18 1324     Active Acceptance E,D NR  LS 10/04/18 1119     Done Acceptance E,TB VU  MT 10/03/18 1851    Family Active Acceptance E NR  KR 10/18/18 1327     Active Acceptance E,D NR  LS 10/04/18 1119     Done Acceptance E,TB VU  MT 10/03/18 1851          Point: Body mechanics (Active)    Learning Progress Summary     Learner Status Readiness Method Response Comment Documented by    Patient Active Acceptance E,TB NR  SR 10/20/18 1621     Active Acceptance E NR  KR 10/18/18 1327     Active Nonacceptance E,D NR  SJ 10/17/18 1011     Active Acceptance E,D NR  DM 10/15/18 1355     Active Acceptance E,D NR Safety SH 10/12/18 1117     Done Eager E VU  KM 10/10/18 0930     Active Acceptance E,D NR  LS 10/05/18 1324     Active Acceptance E,D NR  LS 10/04/18 1119    Family Active Acceptance E NR  KR 10/18/18 1327     Active Acceptance E,D NR  LS 10/04/18 1119          Point: Precautions (Active)    Learning Progress Summary     Learner Status Readiness Method Response Comment Documented by    Patient Active Acceptance E,TB NR  SR 10/20/18 1621     Active Acceptance E NR  KR 10/18/18 1327     Active Nonacceptance E,D NR  SJ 10/17/18 1011     Active Acceptance E,D NR  DM 10/15/18 1355     Active Acceptance E,D NR Safety SH 10/12/18 1117     Done Eager E VU  KM 10/10/18 0930     Active Acceptance E,D NR  LS 10/05/18 1324     Active Acceptance E,D NR  LS 10/04/18 1119     Done Acceptance E,TB VU  MT 10/03/18 1851     Family Active Acceptance E NR  KR 10/18/18 1327     Active Acceptance E,D NR  LS 10/04/18 1119     Done Acceptance E,TB VU  MT 10/03/18 1423                      User Key     Initials Effective Dates Name Provider Type Discipline     06/19/15 -  Alva Castellanos, PT Physical Therapist PT    SJ 06/19/15 -  Alva Mcdonnell, PT Physical Therapist PT    KM 06/19/15 -  Carmela Washington, PT Physical Therapist PT    DM 06/19/15 -  Rubia Amador, PT Physical Therapist PT    SR 06/19/15 -  Perla Scott, PT Physical Therapist PT    LS 06/19/15 -  Zari Rai, PT Physical Therapist PT    CM 02/22/17 -  Anum Rose, RN Registered Nurse Nurse    MT 06/16/16 -  Noemi Fish RN Registered Nurse Nurse    KR 04/03/18 -  Debbie Damon, PT Physical Therapist PT                    PT Recommendation and Plan  Anticipated Discharge Disposition (PT): skilled nursing facility  Therapy Frequency (PT Clinical Impression): daily  Outcome Summary/Treatment Plan (PT)  Daily Summary of Progress (PT): progress toward functional goals is gradual  Anticipated Discharge Disposition (PT): skilled nursing facility  Plan of Care Reviewed With: patient  Progress: no change  Outcome Summary: Pt requires max A x 2 to stand for max of 15 seconds and tolerated 4 reps before he states that his back was hurting too badly to attempt standing again. Gait training not an option today d/t weakness and back pain. Focused on neck extension, ROM,  BUE/LE strengthening ther ex. Recommend SNF.           Outcome Measures     Row Name 10/20/18 1450 10/18/18 1327          How much help from another person do you currently need...    Turning from your back to your side while in flat bed without using bedrails? 2  -SR 2  -KR     Moving from lying on back to sitting on the side of a flat bed without bedrails? 2  -SR 2  -KR     Moving to and from a bed to a chair (including a wheelchair)? 2  -SR 2  -KR     Standing up from a chair using your  arms (e.g., wheelchair, bedside chair)? 2  -SR 2  -KR     Climbing 3-5 steps with a railing? 1  -SR 1  -KR     To walk in hospital room? 1  -SR 2  -KR     AM-PAC 6 Clicks Score 10  -SR 11  -KR        Functional Assessment    Outcome Measure Options AM-PAC 6 Clicks Basic Mobility (PT)  -SR AM-PAC 6 Clicks Basic Mobility (PT)  -KR       User Key  (r) = Recorded By, (t) = Taken By, (c) = Cosigned By    Initials Name Provider Type    SR Perla Scott, PT Physical Therapist    KR Debbie Damon, PT Physical Therapist           Time Calculation:         PT Charges     Row Name 10/20/18 1624 10/20/18 1450          Time Calculation    Start Time 1450  -SR  --     PT Received On 10/20/18  -SR  --        Time Calculation- PT    Total Timed Code Minutes- PT 25 minute(s)  -SR  --        Timed Charges    99679 - PT Therapeutic Exercise Minutes  -- 10  -SR     50424 - PT Therapeutic Activity Minutes  -- 15  -SR       User Key  (r) = Recorded By, (t) = Taken By, (c) = Cosigned By    Initials Name Provider Type    SR Perla Scott, PT Physical Therapist        Therapy Suggested Charges     Code   Minutes Charges    90565 (CPT®) Hc Pt Neuromusc Re Education Ea 15 Min      85483 (CPT®) Hc Pt Ther Proc Ea 15 Min 10 1    07051 (CPT®) Hc Gait Training Ea 15 Min      97612 (CPT®) Hc Pt Therapeutic Act Ea 15 Min 15 1    58756 (CPT®) Hc Pt Manual Therapy Ea 15 Min      19802 (CPT®) Hc Pt Iontophoresis Ea 15 Min      68905 (CPT®) Hc Pt Elec Stim Ea-Per 15 Min      66047 (CPT®) Hc Pt Ultrasound Ea 15 Min      84866 (CPT®) Hc Pt Self Care/Mgmt/Train Ea 15 Min      92579 (CPT®) Hc Pt Prosthetic (S) Train Initial Encounter, Each 15 Min      69051 (CPT®) Hc Pt Orthotic(S)/Prosthetic(S) Encounter, Each 15 Min      98343 (CPT®) Hc Orthotic(S) Mgmt/Train Initial Encounter, Each 15min      Total  25 2        Therapy Charges for Today     Code Description Service Date Service Provider Modifiers Qty    84568557049 HC PT THER PROC EA 15 MIN  10/20/2018 Perla Scott, PT GP 1    38691129609 HC PT THERAPEUTIC ACT EA 15 MIN 10/20/2018 Perla Scott, PT GP 1    00046364151 HC PT THER SUPP EA 15 MIN 10/20/2018 Perla Scott, PT GP 2          PT G-Codes  Outcome Measure Options: AM-PAC 6 Clicks Basic Mobility (PT)  AM-PAC 6 Clicks Score: 10  Score: 14    Perla Scott, PT  10/20/2018

## 2018-10-20 NOTE — PLAN OF CARE
Problem: Patient Care Overview  Goal: Plan of Care Review  Outcome: Ongoing (interventions implemented as appropriate)   10/20/18 3882   Coping/Psychosocial   Plan of Care Reviewed With patient   Plan of Care Review   Progress no change   OTHER   Outcome Summary Pt requires max A x 2 to stand for max of 15 seconds and tolerated 4 reps before he states that his back was hurting too badly to attempt standing again. Gait training not an option today d/t weakness and back pain. Focused on neck extension, ROM, BUE/LE strengthening ther ex. Recommend SNF.

## 2018-10-20 NOTE — PROGRESS NOTES
[facility] Medicine Services  PROGRESS NOTE    Patient Name: Yassine Love  : 1945  MRN: [id number]    Date of Admission: 10/2/2018  Length of Stay: 18  Primary Care Physician: Provider, No Known    Subjective   Subjective     CC:  Shortness of air    HPI:      Patient resting in bed, reports feeling poorly today with generalized malaise. He reports intermittent chest discomfort in bilat lower chest region described as dull ache w/o radiation, no chest pressure, not worsened by cough. Reports symptoms began yesterday evening. Reports dyspnea is stable, currently satting in mid 90s on 4LNC with NSR on telemetry and HR wnl. Patient denies fever, chills, palpitations, dyspepsia or N/V. Reports last BM yesterday.    Nursing reports no changes on telemetry, O2 sats have remained stable, O2 needs unchanged.    Review of Systems  Gen- No fevers, chills  CV- chest pain, denies palpitations  Resp- stable dyspnea, occasional cough  GI- No N/V/D, abd pain      Otherwise ROS is negative except as mentioned in the HPI.    Objective   Objective     Vital Signs:   Temp:  [97.9 °F (36.6 °C)-98.8 °F (37.1 °C)] 98.7 °F (37.1 °C)  Heart Rate:  [66-84] 72  Resp:  [14-28] 18  BP: ()/(57-70) 94/58 repeat /58        Physical Exam:  LINES: PICC RUE without surrounding erythema  Constitutional - no acute cardiopulmonary distress, lying in bed appears comfortable  HEENT-NCAT, mucous membranes moist  CV-RRR, S1 S2 normal, no m/r/g  Resp-diminished bilaterally, no wheezes, rhonchi or rales, respirations nonlabored on 4 L NC   Abd-soft, mild LLQ tenderness to palpation without guarding or rebound, non-distended, normo active bowel sounds  Ext-Trace Edema LE bilat  Neuro-alert and oriented, speech clear, strength symmetric Bilat UE/LE  Psych-flat affect, calm and cooperative  Skin- No rash on exposed UE or LE bilaterally        Results Reviewed:  I have personally reviewed current lab, radiology,  and data and agree.          Results from last 7 days  Lab Units 10/17/18  0507   SODIUM mmol/L 139   POTASSIUM mmol/L 4.9   CHLORIDE mmol/L 102   CO2 mmol/L 35.0*   BUN mg/dL 38*   CREATININE mg/dL 1.17   GLUCOSE mg/dL 91   CALCIUM mg/dL 8.2*     Estimated Creatinine Clearance: 59.2 mL/min (by C-G formula based on SCr of 1.17 mg/dL).  No results found for: BNP    Microbiology Results Abnormal     Procedure Component Value - Date/Time    Blood Culture - Blood, [[id number]]  (Normal) Collected:  10/02/18 1804    Lab Status:  Final result Specimen:  Blood from Hand, Left Updated:  10/07/18 2201     Blood Culture No growth at 5 days    Narrative:       Aerobic bottle only     Blood Culture - Blood, [[id number]]  (Normal) Collected:  10/02/18 1720    Lab Status:  Final result Specimen:  Blood from Arm, Right Updated:  10/07/18 1830     Blood Culture No growth at 5 days    Respiratory Culture - Sputum, Cough [[id number]]  (Abnormal)  (Susceptibility) Collected:  10/03/18 0430    Lab Status:  Final result Specimen:  Sputum from Cough Updated:  10/07/18 1137     Respiratory Culture Light growth (2+) Staphylococcus aureus (A)      Light growth (2+) Normal Respiratory Nataliia     Gram Stain Result Few (2+) WBCs per low power field      No Epithelial cells per low power field      Few (2+) Gram positive cocci    Susceptibility      Staphylococcus aureus     FAVIAN     Ceftriaxone <=8 ug/ml Susceptible     Clindamycin >4 ug/ml Resistant     Erythromycin >4 ug/ml Resistant     Gentamicin <=4 ug/ml Susceptible     Levofloxacin <=1 ug/ml Susceptible  [1]      Linezolid <=1 ug/ml Susceptible     Oxacillin <=0.25 ug/ml Susceptible     Penicillin G <=0.03 ug/ml Susceptible     Quinupristin + Dalfopristin <=0.5 ug/ml Susceptible     Rifampin <=1 ug/ml Susceptible     Tetracycline >8 ug/ml Resistant     Trimethoprim + Sulfamethoxazole <=0.5/9.5 ug/ml Susceptible     Vancomycin 2 ug/ml Susceptible            [1]   Staphylococcus species may  develop resistance during prolonged therapy with quinolones.  Isolates that are initially susceptible may become resistant within three to four days after initiation of therapy. Testing of repeat isolates may be warranted.                   MRSA Screen, PCR - Swab, Nares [[id number]]  (Abnormal) Collected:  10/04/18 1623    Lab Status:  Final result Specimen:  Swab from Nares Updated:  10/04/18 1818     MRSA, PCR Positive (C)          Imaging Results (last 24 hours)     Procedure Component Value Units Date/Time    XR Chest 1 View [[id number]] Collected:  10/19/18 0853     Updated:  10/19/18 1527    Narrative:       EXAMINATION: XR CHEST 1 VW-10/19/2018:      INDICATION: RLL infiltrate; J96.21-Acute and chronic respiratory failure  with hypoxia; J96.22-Acute and chronic respiratory failure with  hypercapnia; Z74.09-Other reduced mobility; Z74.09-Other reduced  mobility.      COMPARISON: 10/16/2018.     FINDINGS: Portable chest reveals PICC line catheter on the right with  tip in the SVC. Increased markings seen within the upper lung fields  bilaterally unchanged in the interval. Slight improvement seen in the  aeration of the lung bases. Mild increased markings remain. Degenerative  changes seen within the spine. Vascular calcification seen within the  thoracic aorta. Prominence of the pulmonary vascularity bilaterally.  Probable small bilateral pleural effusions.           Impression:       Slight improvement seen in the aeration of the lung bases in  the interval. The remainder of the chest is stable.     D:  10/19/2018  E:  10/19/2018     This report was finalized on 10/19/2018 3:25 PM by Dr. Nataly Whalen MD.           Results for orders placed during the hospital encounter of 10/02/18   Adult Transthoracic Echo Complete W/ Cont if Necessary Per Protocol    Narrative · Left ventricular systolic function is normal. Estimated EF = 60%.  · Trace-to-mild mitral valve regurgitation is present.          I have  reviewed the medications.      amoxicillin 1,000 mg Oral Q12H   aspirin 81 mg Oral Daily   atorvastatin 40 mg Oral Nightly   clopidogrel 75 mg Oral Daily   famotidine 20 mg Oral BID   furosemide 40 mg Oral Daily   gabapentin 200 mg Oral Q8H   guaiFENesin 600 mg Oral Q12H   heparin (porcine) 5,000 Units Subcutaneous Q8H   ipratropium-albuterol 3 mL Nebulization Q8H - RT   lactobacillus acidophilus 1 capsule Oral Daily   metoprolol tartrate 25 mg Oral Q8H   mirtazapine 15 mg Oral Nightly   Morphine 15 mg Oral Q12H   nicotine 1 patch Transdermal Q24H   pharmacy consult - MTM  Does not apply Daily   polyethylene glycol 17 g Oral Daily   potassium chloride 30 mEq Oral BID With Meals   sennosides-docusate sodium 2 tablet Oral BID   sodium chloride 10 mL Intravenous Q12H   sodium chloride 3 mL Intravenous Q12H   cyanocobalamin 1,000 mcg Oral Daily         Assessment/Plan   Assessment / Plan     Active Hospital Problems    Diagnosis   • **Acute on chronic mixed hypercarbic/hypoxemic respiratory failure requiring intubation and ventilatory support   • Sleep apnea   • Acute kidney injury     Cr 1.35 increased from 9/8/18 1.15      • H/O bacteremia due to Staphylococcus   • Opioid use   • Pneumonia due to infectious organism   • Brief runs of SVT (supraventricular tachycardia)    • Decubitus ulcer of buttock, stage 2   • CAD status post GIGI ×2 to LAD and RCA 3/12/18     S/P LHC per Dr. Harvey on 3/12/18 with PCI revealing severe 2-vessel disease.    GIGI x 2 to LAD and RCA     • Essential hypertension   • End stage COPD in an active smoker on home O2 with chronic hypercarbia     Managed by [facility]      • H/O cellulitis of both lower extremities     Previously managed by Infectious Disease      • PVD (peripheral vascular disease)           Brief Hospital Course to date:  Yassine Love is a 72 y.o. male active smoker with a history of COPD (on home oxygen) admitted 10/2/18 for exacerbation associated with severe  hypoxemia. He was in the skilled nursing facility for rehabilitation but 10/1/18 developed some visual hallucinations. Oxygen saturations were subsequently noted to drop into the 60s on 4 L on 10/2/18 and he was brought to [facility] ER. Because of an elevated d-dimer a CT angiogram was obtained which was negative for PE. Both CT scan and chest x-ray however revealed it appeared to be pulmonary edema with diffuse interstitial infiltrates and pleural effusions. In addition BNP was 899. He was given his usual dose of home Lasix, placed on empiric antimicrobial coverage with Zosyn, given bronchodilators, Solu-Medrol, placed on BiPAP, and admitted to the hospitalist service.Hx of severe PVD hx of bilat leg ulcerations, treated for cellulitis 1 month ago during prior admit.     At approximately 2 AM 10/3/18 he was noted to become unresponsive with decreased respiratory drive. Apparently he had been a DNR but this was reversed by the patient on admission. Because of this Dr. Jacobsen and respiratory therapy intubated the patient at the bedside. There were large amounts of thick purulent yellow secretions obtained at the time of intubation and he was transferred to the ICU on ventilatory support. He initially required high FiO2's of 60% but respiratory rate and ABGs rapidly improved. Over the course of the evening he continued to improve. He subsequently self extubated the evening of 10/3/18 at 11 PM. He was able to tolerate self extubation but has required high flow nasal oxygen in order to maintain his oxygen saturations.     Heart rate is usually in the 60s, but has had recurrent brief episodes of SVT up to 160  during hospitalization His metoprolol was held once and that led to SVT that resolved with resumption of beta blocker.. He was admitted 10/2 and transferred out of the ICU 10/7      Assessment & Plan:   MSSA bronchitis vs pna s/p merrem and zyvox. Continue amoxicillin per ID   Avoid sedating agents   IV Lasix and Diamox  initially, Lasix decreased to 40mg po daily (may need to increase if CXR suggestive of pulm edema)  -Given c/o of intermittent dull chest pain and malaise will check CMP, CBC w/diff, troponin CXR and EKG. Patient is in no acute cardiopulmonary distress, NSR on tele, VS stable.   Weaned from high flow down to 4L NC, continue to taper as he tolerates   Palliative following, status changed to DNR/DNI - will need palliative to follow at SNF.  PT on MS contin for pain   Recurrent episodes of SVT - better on metoprolol. Follow up Dr Harvey in 6-8 weeks.   S/P prednisone taper.    Untreated Sleep Apnea - likely complex apnea (combination of obstructive + central) sleep apnea - AutoBiPAP at night and as needed for sleep - nasal interface  -DC PICC prior to dc   Continue PT/OT - Rehab planned, appears medically ready for transfer. Has a bed at [facility] on Sunday if medically appropriate    DVT Prophylaxis:  Sq heparin    CODE STATUS:   Code Status and Medical Interventions:   Ordered at: 10/08/18 1413     Limited Support to NOT Include:    Intubation     Level Of Support Discussed With:    Patient     Code Status:    No CPR     Medical Interventions (Level of Support Prior to Arrest):    Limited       Disposition: I expect the patient to be discharged to rehab on Sunday if medically appropriate      Electronically signed by Casie M Mayne, PA-C, 10/20/18, 12:10 PM.

## 2018-10-21 LAB
ANION GAP SERPL CALCULATED.3IONS-SCNC: 4 MMOL/L (ref 3–11)
BUN BLD-MCNC: 33 MG/DL (ref 9–23)
BUN/CREAT SERPL: 28.9 (ref 7–25)
CALCIUM SPEC-SCNC: 8.5 MG/DL (ref 8.7–10.4)
CHLORIDE SERPL-SCNC: 101 MMOL/L (ref 99–109)
CO2 SERPL-SCNC: 35 MMOL/L (ref 20–31)
CREAT BLD-MCNC: 1.14 MG/DL (ref 0.6–1.3)
GFR SERPL CREATININE-BSD FRML MDRD: 63 ML/MIN/1.73
GLUCOSE BLD-MCNC: 136 MG/DL (ref 70–100)
POTASSIUM BLD-SCNC: 4.3 MMOL/L (ref 3.5–5.5)
SODIUM BLD-SCNC: 140 MMOL/L (ref 132–146)

## 2018-10-21 PROCEDURE — 25010000002 HEPARIN (PORCINE) PER 1000 UNITS: Performed by: INTERNAL MEDICINE

## 2018-10-21 PROCEDURE — 97110 THERAPEUTIC EXERCISES: CPT

## 2018-10-21 PROCEDURE — 94760 N-INVAS EAR/PLS OXIMETRY 1: CPT

## 2018-10-21 PROCEDURE — 99232 SBSQ HOSP IP/OBS MODERATE 35: CPT | Performed by: NURSE PRACTITIONER

## 2018-10-21 PROCEDURE — 94799 UNLISTED PULMONARY SVC/PX: CPT

## 2018-10-21 PROCEDURE — 94640 AIRWAY INHALATION TREATMENT: CPT

## 2018-10-21 PROCEDURE — 80048 BASIC METABOLIC PNL TOTAL CA: CPT | Performed by: PHYSICIAN ASSISTANT

## 2018-10-21 RX ADMIN — AMOXICILLIN 1000 MG: 250 CAPSULE ORAL at 22:20

## 2018-10-21 RX ADMIN — AMOXICILLIN 1000 MG: 250 CAPSULE ORAL at 09:01

## 2018-10-21 RX ADMIN — MORPHINE SULFATE 15 MG: 15 TABLET, EXTENDED RELEASE ORAL at 22:21

## 2018-10-21 RX ADMIN — GABAPENTIN 200 MG: 100 CAPSULE ORAL at 22:20

## 2018-10-21 RX ADMIN — POTASSIUM CHLORIDE 30 MEQ: 750 CAPSULE, EXTENDED RELEASE ORAL at 17:53

## 2018-10-21 RX ADMIN — ATORVASTATIN CALCIUM 40 MG: 40 TABLET, FILM COATED ORAL at 22:20

## 2018-10-21 RX ADMIN — FAMOTIDINE 20 MG: 20 TABLET ORAL at 22:21

## 2018-10-21 RX ADMIN — CYANOCOBALAMIN TAB 1000 MCG 1000 MCG: 1000 TAB at 09:01

## 2018-10-21 RX ADMIN — GUAIFENESIN 600 MG: 600 TABLET, EXTENDED RELEASE ORAL at 09:01

## 2018-10-21 RX ADMIN — POTASSIUM CHLORIDE 30 MEQ: 750 CAPSULE, EXTENDED RELEASE ORAL at 09:01

## 2018-10-21 RX ADMIN — IPRATROPIUM BROMIDE AND ALBUTEROL SULFATE 3 ML: 2.5; .5 SOLUTION RESPIRATORY (INHALATION) at 07:59

## 2018-10-21 RX ADMIN — GABAPENTIN 200 MG: 100 CAPSULE ORAL at 13:39

## 2018-10-21 RX ADMIN — IPRATROPIUM BROMIDE AND ALBUTEROL SULFATE 3 ML: 2.5; .5 SOLUTION RESPIRATORY (INHALATION) at 22:44

## 2018-10-21 RX ADMIN — GABAPENTIN 200 MG: 100 CAPSULE ORAL at 07:26

## 2018-10-21 RX ADMIN — HEPARIN SODIUM 5000 UNITS: 5000 INJECTION, SOLUTION INTRAVENOUS; SUBCUTANEOUS at 07:26

## 2018-10-21 RX ADMIN — FUROSEMIDE 40 MG: 40 TABLET ORAL at 08:58

## 2018-10-21 RX ADMIN — METOPROLOL TARTRATE 25 MG: 25 TABLET ORAL at 17:53

## 2018-10-21 RX ADMIN — NICOTINE 1 PATCH: 21 PATCH, EXTENDED RELEASE TRANSDERMAL at 09:00

## 2018-10-21 RX ADMIN — MIRTAZAPINE 15 MG: 15 TABLET, ORALLY DISINTEGRATING ORAL at 23:55

## 2018-10-21 RX ADMIN — IPRATROPIUM BROMIDE AND ALBUTEROL SULFATE 3 ML: 2.5; .5 SOLUTION RESPIRATORY (INHALATION) at 15:57

## 2018-10-21 RX ADMIN — METOPROLOL TARTRATE 25 MG: 25 TABLET ORAL at 01:13

## 2018-10-21 RX ADMIN — FUROSEMIDE 40 MG: 40 TABLET ORAL at 17:53

## 2018-10-21 RX ADMIN — GUAIFENESIN 600 MG: 600 TABLET, EXTENDED RELEASE ORAL at 22:31

## 2018-10-21 RX ADMIN — ACETAMINOPHEN 325 MG: 325 TABLET ORAL at 15:38

## 2018-10-21 RX ADMIN — Medication 1 CAPSULE: at 09:00

## 2018-10-21 RX ADMIN — Medication 3 ML: at 22:21

## 2018-10-21 RX ADMIN — MORPHINE SULFATE 15 MG: 15 TABLET, EXTENDED RELEASE ORAL at 09:01

## 2018-10-21 RX ADMIN — CLOPIDOGREL BISULFATE 75 MG: 75 TABLET ORAL at 09:00

## 2018-10-21 RX ADMIN — FAMOTIDINE 20 MG: 20 TABLET ORAL at 09:01

## 2018-10-21 RX ADMIN — ASPIRIN 81 MG CHEWABLE TABLET 81 MG: 81 TABLET CHEWABLE at 09:01

## 2018-10-21 RX ADMIN — METOPROLOL TARTRATE 25 MG: 25 TABLET ORAL at 09:00

## 2018-10-21 RX ADMIN — HEPARIN SODIUM 5000 UNITS: 5000 INJECTION, SOLUTION INTRAVENOUS; SUBCUTANEOUS at 13:39

## 2018-10-21 RX ADMIN — ALBUTEROL SULFATE 2.5 MG: 2.5 SOLUTION RESPIRATORY (INHALATION) at 01:14

## 2018-10-21 NOTE — PROGRESS NOTES
Continued Stay Note  [facility]     Patient Name: Yassine Love  MRN: [id number]  Today's Date: 10/21/2018    Admit Date: 10/2/2018          Discharge Plan     Row Name 10/21/18 1022       Plan    Plan Comments Spoke with Tammie at [facility] and informed her that patient will not transfer today.  Plan for transfer on Monday afternoon.  She will give nurse the message.  She said there is no one there today for me to speak with about bed status/hold.  I don't foresee bed availability being a problem and I can find no documentation stating so.  I also left voicemail with liaison (Kristina) and informed her that discharge is on hold until Monday.   will follow up first thing Monday morning with Enrrique or Kristina.  [facility] rescheduled for 2:00 pm Monday.                Discharge Codes    No documentation.       Expected Discharge Date and Time     Expected Discharge Date Expected Discharge Time    Oct 22, 2018             Micki Wilson

## 2018-10-21 NOTE — PLAN OF CARE
Problem: Patient Care Overview  Goal: Plan of Care Review  Outcome: Ongoing (interventions implemented as appropriate)   10/21/18 9041   Plan of Care Review   Progress improving   OTHER   Outcome Summary Pt. partially met several goals today with improved sup to sit and sit to stand to chair. AS fatigued LE's did want to buckle. Less rest periods required today. Pt. appropriate for cont. skilled OT services to progress to PLOF.
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
## 2018-10-21 NOTE — PLAN OF CARE
Problem: Patient Care Overview  Goal: Plan of Care Review  Outcome: Ongoing (interventions implemented as appropriate)   10/21/18 0508   Coping/Psychosocial   Plan of Care Reviewed With patient   Plan of Care Review   Progress no change   OTHER   Outcome Summary VSS. NSR on monitor, 5L NC, c/o of SOA x1, resolved with breathing treatment. no other complaints, no other complaints. will contiinue to monitor.

## 2018-10-21 NOTE — PLAN OF CARE
Problem: Fall Risk (Adult)  Goal: Identify Related Risk Factors and Signs and Symptoms  Outcome: Ongoing (interventions implemented as appropriate)    Goal: Absence of Fall  Outcome: Ongoing (interventions implemented as appropriate)      Problem: Patient Care Overview  Goal: Plan of Care Review  Outcome: Ongoing (interventions implemented as appropriate)      Problem: Breathing Pattern Ineffective (Adult)  Goal: Effective Oxygenation/Ventilation  Outcome: Ongoing (interventions implemented as appropriate)    Goal: Anxiety/Fear Reduction  Outcome: Ongoing (interventions implemented as appropriate)      Problem: Wound (Includes Pressure Injury) (Adult)  Goal: Signs and Symptoms of Listed Potential Problems Will be Absent, Minimized or Managed (Wound)  Outcome: Ongoing (interventions implemented as appropriate)      Problem: Palliative Care (Adult)  Goal: Identify Related Risk Factors and Signs and Symptoms  Outcome: Ongoing (interventions implemented as appropriate)    Goal: Maximized Comfort  Outcome: Ongoing (interventions implemented as appropriate)    Goal: Enhanced Quality of Life  Outcome: Ongoing (interventions implemented as appropriate)

## 2018-10-21 NOTE — PROGRESS NOTES
"    [facility] Medicine Services  PROGRESS NOTE    Patient Name: Yassine Love  : 1945  MRN: [id number]    Date of Admission: 10/2/2018  Length of Stay: 19  Primary Care Physician: Provider, No Known    Subjective   Subjective     CC:  Shortness of air    HPI:    Resting up in bed in no acute distress.  No visitors at bedside.  He is awake and alert.  Conversant.  States \"not feeling as good today and don't think I'm ready to go home\".  Denies nausea or vomiting.  Mild continued abdominal aching.  Lower extremity edema continues.  Mild shortness of air and cough still.  On 5 LNC oxygen with sats 93%. Denies palpitations or chest pain.    Review of Systems  Gen- No fevers, chills.  + fatigue  CV- chest pain, denies palpitations  Resp- dyspnea, occasional cough  GI- No N/V/D, mild aching abd pain (stable c/t last few days)      Otherwise ROS is negative except as mentioned in the HPI.    Objective   Objective     Vital Signs:   Temp:  [97.8 °F (36.6 °C)-98.9 °F (37.2 °C)] 97.8 °F (36.6 °C)  Heart Rate:  [68-81] 70  Resp:  [16-24] 16  BP: (103-116)/(55-65) 110/65 repeat /58        Physical Exam:  LINES: PICC RUE without surrounding erythema, dressing dry and intact.  Constitutional - no acute cardiopulmonary distress, sitting up in bed and appears comfortable  HEENT-NCAT, mucous membranes moist  CV-RRR, S1 S2 normal, no m/r/g  Resp-diminished bilaterally, no wheezes, rhonchi or rales, respirations nonlabored on 5 L NC   Abd-soft, mild LLQ tenderness to palpation without guarding or rebound (stable), non-distended, normo active bowel sounds  Ext--moderate bilateral lower extremity/feet and ankle Edema   Neuro-alert and oriented, speech clear, strength symmetric Bilat UE/LE  Psych-flat affect, calm and cooperative  Skin- No rash on exposed UE or LE bilaterally        Results Reviewed:  I have personally reviewed current lab, radiology, and data and agree.      Results from last 7 " days  Lab Units 10/20/18  1313   WBC 10*3/mm3 7.48   HEMOGLOBIN g/dL 8.9*   HEMATOCRIT % 29.7*   PLATELETS 10*3/mm3 163       Results from last 7 days  Lab Units 10/21/18  0505 10/20/18  1313 10/20/18  0922 10/17/18  0507   SODIUM mmol/L 140  --  143 139   POTASSIUM mmol/L 4.3  --  5.1 4.9   CHLORIDE mmol/L 101  --  105 102   CO2 mmol/L 35.0*  --  34.0* 35.0*   BUN mg/dL 33*  --  33* 38*   CREATININE mg/dL 1.14  --  1.03 1.17   GLUCOSE mg/dL 136*  --  88 91   CALCIUM mg/dL 8.5*  --  8.5* 8.2*   ALT (SGPT) U/L  --   --  24  --    AST (SGOT) U/L  --   --  18  --    TROPONIN I ng/mL  --  0.007  --   --      Estimated Creatinine Clearance: 60.2 mL/min (by C-G formula based on SCr of 1.14 mg/dL).  No results found for: BNP    Microbiology Results Abnormal     Procedure Component Value - Date/Time    Blood Culture - Blood, [[id number]]  (Normal) Collected:  10/02/18 1804    Lab Status:  Final result Specimen:  Blood from Hand, Left Updated:  10/07/18 2201     Blood Culture No growth at 5 days    Narrative:       Aerobic bottle only     Blood Culture - Blood, [[id number]]  (Normal) Collected:  10/02/18 1720    Lab Status:  Final result Specimen:  Blood from Arm, Right Updated:  10/07/18 1830     Blood Culture No growth at 5 days    Respiratory Culture - Sputum, Cough [[id number]]  (Abnormal)  (Susceptibility) Collected:  10/03/18 0430    Lab Status:  Final result Specimen:  Sputum from Cough Updated:  10/07/18 1137     Respiratory Culture Light growth (2+) Staphylococcus aureus (A)      Light growth (2+) Normal Respiratory Nataliia     Gram Stain Result Few (2+) WBCs per low power field      No Epithelial cells per low power field      Few (2+) Gram positive cocci    Susceptibility      Staphylococcus aureus     FAVIAN     Ceftriaxone <=8 ug/ml Susceptible     Clindamycin >4 ug/ml Resistant     Erythromycin >4 ug/ml Resistant     Gentamicin <=4 ug/ml Susceptible     Levofloxacin <=1 ug/ml Susceptible  [1]      Linezolid <=1 ug/ml  Susceptible     Oxacillin <=0.25 ug/ml Susceptible     Penicillin G <=0.03 ug/ml Susceptible     Quinupristin + Dalfopristin <=0.5 ug/ml Susceptible     Rifampin <=1 ug/ml Susceptible     Tetracycline >8 ug/ml Resistant     Trimethoprim + Sulfamethoxazole <=0.5/9.5 ug/ml Susceptible     Vancomycin 2 ug/ml Susceptible            [1]   Staphylococcus species may develop resistance during prolonged therapy with quinolones.  Isolates that are initially susceptible may become resistant within three to four days after initiation of therapy. Testing of repeat isolates may be warranted.                   MRSA Screen, PCR - Swab, Nares [[id number]]  (Abnormal) Collected:  10/04/18 1623    Lab Status:  Final result Specimen:  Swab from Nares Updated:  10/04/18 1818     MRSA, PCR Positive (C)          Imaging Results (last 24 hours)     Procedure Component Value Units Date/Time    XR Chest 1 View [[id number]] Collected:  10/20/18 1427     Updated:  10/20/18 1708    Narrative:       EXAMINATION: XR CHEST 1 VW - 10/20/2018     INDICATION: J96.21-Acute and chronic respiratory failure with hypoxia;  J96.22-Acute and chronic respiratory failure with hypercapnia;  Z74.09-Other reduced mobility.     TECHNIQUE:  Single view frontal chest.     COMPARISONS: 10/19/2018.     FINDINGS:  Background emphysema and small bilateral effusions,  unchanged. Right arm PICC in place. Unchanged cardiomediastinal  silhouette. No pneumothorax.       Impression:       Stable exam.     DICTATED:   10/20/2018  EDITED/ls :   10/20/2018      This report was finalized on 10/20/2018 5:06 PM by Eliezer Hale.           Results for orders placed during the hospital encounter of 10/02/18   Adult Transthoracic Echo Complete W/ Cont if Necessary Per Protocol    Narrative · Left ventricular systolic function is normal. Estimated EF = 60%.  · Trace-to-mild mitral valve regurgitation is present.          I have reviewed the medications.      amoxicillin 1,000 mg Oral  Q12H   aspirin 81 mg Oral Daily   atorvastatin 40 mg Oral Nightly   clopidogrel 75 mg Oral Daily   famotidine 20 mg Oral BID   furosemide 40 mg Oral BID   gabapentin 200 mg Oral Q8H   guaiFENesin 600 mg Oral Q12H   heparin (porcine) 5,000 Units Subcutaneous Q8H   ipratropium-albuterol 3 mL Nebulization Q8H - RT   lactobacillus acidophilus 1 capsule Oral Daily   metoprolol tartrate 25 mg Oral Q8H   mirtazapine 15 mg Oral Nightly   Morphine 15 mg Oral Q12H   nicotine 1 patch Transdermal Q24H   pharmacy consult - MTM  Does not apply Daily   polyethylene glycol 17 g Oral Daily   potassium chloride 30 mEq Oral BID With Meals   sennosides-docusate sodium 2 tablet Oral BID   sodium chloride 10 mL Intravenous Q12H   sodium chloride 3 mL Intravenous Q12H   cyanocobalamin 1,000 mcg Oral Daily         Assessment/Plan   Assessment / Plan     Active Hospital Problems    Diagnosis   • **Acute on chronic mixed hypercarbic/hypoxemic respiratory failure requiring intubation and ventilatory support   • Sleep apnea   • Acute kidney injury     Cr 1.35 increased from 9/8/18 1.15      • H/O bacteremia due to Staphylococcus   • Opioid use   • Pneumonia due to infectious organism   • Brief runs of SVT (supraventricular tachycardia)    • Decubitus ulcer of buttock, stage 2   • CAD status post GIGI ×2 to LAD and RCA 3/12/18     S/P LHC per Dr. Harvey on 3/12/18 with PCI revealing severe 2-vessel disease.    GIGI x 2 to LAD and RCA     • Essential hypertension   • End stage COPD in an active smoker on home O2 with chronic hypercarbia     Managed by [facility]      • H/O cellulitis of both lower extremities     Previously managed by Infectious Disease      • PVD (peripheral vascular disease)           Brief Hospital Course to date:  Yassine Love is a 72 y.o. male active smoker with a history of COPD (on home oxygen) admitted 10/2/18 for exacerbation associated with severe hypoxemia. He was in the skilled nursing facility for  rehabilitation but 10/1/18 developed some visual hallucinations. Oxygen saturations were subsequently noted to drop into the 60s on 4 L on 10/2/18 and he was brought to [facility] ER. Because of an elevated d-dimer a CT angiogram was obtained which was negative for PE. Both CT scan and chest x-ray however revealed it appeared to be pulmonary edema with diffuse interstitial infiltrates and pleural effusions. In addition BNP was 899. He was given his usual dose of home Lasix, placed on empiric antimicrobial coverage with Zosyn, given bronchodilators, Solu-Medrol, placed on BiPAP, and admitted to the hospitalist service.Hx of severe PVD hx of bilat leg ulcerations, treated for cellulitis 1 month ago during prior admit.     At approximately 2 AM 10/3/18 he was noted to become unresponsive with decreased respiratory drive. Apparently he had been a DNR but this was reversed by the patient on admission. Because of this Dr. Jacobsen and respiratory therapy intubated the patient at the bedside. There were large amounts of thick purulent yellow secretions obtained at the time of intubation and he was transferred to the ICU on ventilatory support. He initially required high FiO2's of 60% but respiratory rate and ABGs rapidly improved. Over the course of the evening he continued to improve. He subsequently self extubated the evening of 10/3/18 at 11 PM. He was able to tolerate self extubation but has required high flow nasal oxygen in order to maintain his oxygen saturations.     Heart rate is usually in the 60s, but has had recurrent brief episodes of SVT up to 160  during hospitalization His metoprolol was held once and that led to SVT that resolved with resumption of beta blocker.. He was admitted 10/2 and transferred out of the ICU 10/7      Assessment & Plan:   MSSA bronchitis vs pna s/p merrem and zyvox. Continue amoxicillin per ID.  As PICC line to right upper extremity.  Will need removed prior to discharge.   Avoid sedating  agents   IV Lasix and Diamox initially, Lasix decreased to 40mg po daily then increased to 40 mg twice a day beginning this morning   -Given c/o of intermittent dull chest pain and malaise checked CMP, CBC w/diff, troponin CXR and EKG yesterday. Patient is in no acute cardiopulmonary distress, NSR on tele, VS stable.   Weaned from high flow down to 4L NC yesterday however back up to 5 LNC today with sats 93% and patient complaining of mild shortness of air and cough. continue to taper as he tolerates   Palliative following, status changed to DNR/DNI - will need palliative to follow at SNF.  PT on MS contin for pain   Recurrent episodes of SVT - better on metoprolol. Follow up Dr Harvey in 6-8 weeks.   S/P prednisone taper.    Untreated Sleep Apnea - likely complex apnea (combination of obstructive + central) sleep apnea - AutoBiPAP at night and as needed for sleep - nasal interface  -DC PICC prior to dc   Continue PT/OT - Rehab planned, appears close to medically ready for transfer. Has a bed at [facility] for today but not ready today,  Will reschedule amb for tomorrow and plan for dc then if medically appropriate    DVT Prophylaxis:  Sq heparin    CODE STATUS:   Code Status and Medical Interventions:   Ordered at: 10/08/18 1413     Limited Support to NOT Include:    Intubation     Level Of Support Discussed With:    Patient     Code Status:    No CPR     Medical Interventions (Level of Support Prior to Arrest):    Limited       Disposition: I expect the patient to be discharged to rehab on Monday afternoon via amb at 1400 if medically appropriate      Electronically signed by BRITTNY Reynolds, 10/21/18, 1:13 PM.

## 2018-10-22 ENCOUNTER — APPOINTMENT (OUTPATIENT)
Dept: GENERAL RADIOLOGY | Facility: HOSPITAL | Age: 73
End: 2018-10-22

## 2018-10-22 VITALS
HEART RATE: 76 BPM | WEIGHT: 187.8 LBS | TEMPERATURE: 98.5 F | BODY MASS INDEX: 29.47 KG/M2 | DIASTOLIC BLOOD PRESSURE: 63 MMHG | HEIGHT: 67 IN | RESPIRATION RATE: 18 BRPM | SYSTOLIC BLOOD PRESSURE: 104 MMHG | OXYGEN SATURATION: 96 %

## 2018-10-22 PROBLEM — J96.01 ACUTE RESPIRATORY FAILURE WITH HYPOXIA AND HYPERCAPNIA (HCC): Status: RESOLVED | Noted: 2018-10-02 | Resolved: 2018-10-22

## 2018-10-22 PROBLEM — I47.1 SVT (SUPRAVENTRICULAR TACHYCARDIA) (HCC): Status: RESOLVED | Noted: 2018-10-03 | Resolved: 2018-10-22

## 2018-10-22 PROBLEM — J96.02 ACUTE RESPIRATORY FAILURE WITH HYPOXIA AND HYPERCAPNIA (HCC): Status: RESOLVED | Noted: 2018-10-02 | Resolved: 2018-10-22

## 2018-10-22 LAB
ANION GAP SERPL CALCULATED.3IONS-SCNC: 2 MMOL/L (ref 3–11)
BASOPHILS # BLD AUTO: 0.02 10*3/MM3 (ref 0–0.2)
BASOPHILS NFR BLD AUTO: 0.2 % (ref 0–1)
BUN BLD-MCNC: 30 MG/DL (ref 9–23)
BUN/CREAT SERPL: 25.2 (ref 7–25)
CALCIUM SPEC-SCNC: 8.9 MG/DL (ref 8.7–10.4)
CHLORIDE SERPL-SCNC: 100 MMOL/L (ref 99–109)
CO2 SERPL-SCNC: 40 MMOL/L (ref 20–31)
CREAT BLD-MCNC: 1.19 MG/DL (ref 0.6–1.3)
DEPRECATED RDW RBC AUTO: 64.9 FL (ref 37–54)
EOSINOPHIL # BLD AUTO: 0.41 10*3/MM3 (ref 0–0.3)
EOSINOPHIL NFR BLD AUTO: 4.5 % (ref 0–3)
ERYTHROCYTE [DISTWIDTH] IN BLOOD BY AUTOMATED COUNT: 18.6 % (ref 11.3–14.5)
GFR SERPL CREATININE-BSD FRML MDRD: 60 ML/MIN/1.73
GLUCOSE BLD-MCNC: 112 MG/DL (ref 70–100)
HCT VFR BLD AUTO: 29.4 % (ref 38.9–50.9)
HGB BLD-MCNC: 8.7 G/DL (ref 13.1–17.5)
HYPOCHROMIA BLD QL: NORMAL
IMM GRANULOCYTES # BLD: 0.02 10*3/MM3 (ref 0–0.03)
IMM GRANULOCYTES NFR BLD: 0.2 % (ref 0–0.6)
LYMPHOCYTES # BLD AUTO: 0.44 10*3/MM3 (ref 0.6–4.8)
LYMPHOCYTES NFR BLD AUTO: 4.8 % (ref 24–44)
MCH RBC QN AUTO: 28.6 PG (ref 27–31)
MCHC RBC AUTO-ENTMCNC: 29.6 G/DL (ref 32–36)
MCV RBC AUTO: 96.7 FL (ref 80–99)
MONOCYTES # BLD AUTO: 1.14 10*3/MM3 (ref 0–1)
MONOCYTES NFR BLD AUTO: 12.4 % (ref 0–12)
NEUTROPHILS # BLD AUTO: 7.18 10*3/MM3 (ref 1.5–8.3)
NEUTROPHILS NFR BLD AUTO: 78.1 % (ref 41–71)
NRBC BLD MANUAL-RTO: 0 /100 WBC (ref 0–0)
PLAT MORPH BLD: NORMAL
PLATELET # BLD AUTO: 139 10*3/MM3 (ref 150–450)
PMV BLD AUTO: 10.5 FL (ref 6–12)
POTASSIUM BLD-SCNC: 4.7 MMOL/L (ref 3.5–5.5)
RBC # BLD AUTO: 3.04 10*6/MM3 (ref 4.2–5.76)
SODIUM BLD-SCNC: 142 MMOL/L (ref 132–146)
WBC MORPH BLD: NORMAL
WBC NRBC COR # BLD: 9.19 10*3/MM3 (ref 3.5–10.8)

## 2018-10-22 PROCEDURE — 71045 X-RAY EXAM CHEST 1 VIEW: CPT

## 2018-10-22 PROCEDURE — 97116 GAIT TRAINING THERAPY: CPT

## 2018-10-22 PROCEDURE — 85025 COMPLETE CBC W/AUTO DIFF WBC: CPT | Performed by: NURSE PRACTITIONER

## 2018-10-22 PROCEDURE — 99239 HOSP IP/OBS DSCHRG MGMT >30: CPT | Performed by: NURSE PRACTITIONER

## 2018-10-22 PROCEDURE — 97110 THERAPEUTIC EXERCISES: CPT

## 2018-10-22 PROCEDURE — 94799 UNLISTED PULMONARY SVC/PX: CPT

## 2018-10-22 PROCEDURE — 25010000002 HEPARIN (PORCINE) PER 1000 UNITS: Performed by: INTERNAL MEDICINE

## 2018-10-22 PROCEDURE — 85007 BL SMEAR W/DIFF WBC COUNT: CPT | Performed by: NURSE PRACTITIONER

## 2018-10-22 PROCEDURE — 80048 BASIC METABOLIC PNL TOTAL CA: CPT | Performed by: NURSE PRACTITIONER

## 2018-10-22 PROCEDURE — 94640 AIRWAY INHALATION TREATMENT: CPT

## 2018-10-22 RX ORDER — GUAIFENESIN 600 MG/1
600 TABLET, EXTENDED RELEASE ORAL EVERY 12 HOURS SCHEDULED
Qty: 14 TABLET | Refills: 0 | Status: ON HOLD
Start: 2018-10-22 | End: 2018-11-02

## 2018-10-22 RX ORDER — BISACODYL 10 MG
10 SUPPOSITORY, RECTAL RECTAL DAILY PRN
Start: 2018-10-22 | End: 2019-01-02 | Stop reason: HOSPADM

## 2018-10-22 RX ORDER — AMOXICILLIN 500 MG/1
1000 CAPSULE ORAL EVERY 12 HOURS SCHEDULED
Qty: 12 CAPSULE | Refills: 0 | Status: ON HOLD
Start: 2018-10-22 | End: 2018-10-27

## 2018-10-22 RX ORDER — LACTULOSE 10 G/15ML
20 SOLUTION ORAL 2 TIMES DAILY PRN
Start: 2018-10-22

## 2018-10-22 RX ORDER — IPRATROPIUM BROMIDE AND ALBUTEROL SULFATE 2.5; .5 MG/3ML; MG/3ML
3 SOLUTION RESPIRATORY (INHALATION)
Qty: 360 ML
Start: 2018-10-22 | End: 2019-01-02 | Stop reason: HOSPADM

## 2018-10-22 RX ORDER — MORPHINE SULFATE 15 MG/1
15 TABLET, FILM COATED, EXTENDED RELEASE ORAL EVERY 12 HOURS SCHEDULED
Qty: 6 TABLET | Refills: 0 | Status: SHIPPED | OUTPATIENT
Start: 2018-10-22 | End: 2019-01-02 | Stop reason: HOSPADM

## 2018-10-22 RX ORDER — L.ACID,PARA/B.BIFIDUM/S.THERM 8B CELL
1 CAPSULE ORAL DAILY
Start: 2018-10-23 | End: 2019-01-02 | Stop reason: HOSPADM

## 2018-10-22 RX ORDER — FAMOTIDINE 20 MG/1
20 TABLET, FILM COATED ORAL 2 TIMES DAILY
Start: 2018-10-22

## 2018-10-22 RX ORDER — GABAPENTIN 100 MG/1
200 CAPSULE ORAL EVERY 8 HOURS SCHEDULED
Qty: 9 CAPSULE | Refills: 0 | Status: SHIPPED | OUTPATIENT
Start: 2018-10-22

## 2018-10-22 RX ADMIN — FUROSEMIDE 40 MG: 40 TABLET ORAL at 09:21

## 2018-10-22 RX ADMIN — NICOTINE 1 PATCH: 21 PATCH, EXTENDED RELEASE TRANSDERMAL at 09:23

## 2018-10-22 RX ADMIN — MORPHINE SULFATE 15 MG: 15 TABLET, EXTENDED RELEASE ORAL at 09:22

## 2018-10-22 RX ADMIN — SENNOSIDES AND DOCUSATE SODIUM 2 TABLET: 8.6; 5 TABLET ORAL at 09:22

## 2018-10-22 RX ADMIN — GABAPENTIN 200 MG: 100 CAPSULE ORAL at 06:20

## 2018-10-22 RX ADMIN — GUAIFENESIN 600 MG: 600 TABLET, EXTENDED RELEASE ORAL at 09:22

## 2018-10-22 RX ADMIN — CLOPIDOGREL BISULFATE 75 MG: 75 TABLET ORAL at 09:22

## 2018-10-22 RX ADMIN — HEPARIN SODIUM 5000 UNITS: 5000 INJECTION, SOLUTION INTRAVENOUS; SUBCUTANEOUS at 13:03

## 2018-10-22 RX ADMIN — CYANOCOBALAMIN TAB 1000 MCG 1000 MCG: 1000 TAB at 09:22

## 2018-10-22 RX ADMIN — METOPROLOL TARTRATE 25 MG: 25 TABLET ORAL at 01:09

## 2018-10-22 RX ADMIN — AMOXICILLIN 1000 MG: 250 CAPSULE ORAL at 09:22

## 2018-10-22 RX ADMIN — METOPROLOL TARTRATE 25 MG: 25 TABLET ORAL at 09:22

## 2018-10-22 RX ADMIN — Medication 1 CAPSULE: at 09:22

## 2018-10-22 RX ADMIN — Medication 3 ML: at 09:23

## 2018-10-22 RX ADMIN — ASPIRIN 81 MG CHEWABLE TABLET 81 MG: 81 TABLET CHEWABLE at 09:22

## 2018-10-22 RX ADMIN — FAMOTIDINE 20 MG: 20 TABLET ORAL at 09:22

## 2018-10-22 RX ADMIN — POTASSIUM CHLORIDE 30 MEQ: 750 CAPSULE, EXTENDED RELEASE ORAL at 09:29

## 2018-10-22 RX ADMIN — IPRATROPIUM BROMIDE AND ALBUTEROL SULFATE 3 ML: 2.5; .5 SOLUTION RESPIRATORY (INHALATION) at 13:24

## 2018-10-22 RX ADMIN — GABAPENTIN 200 MG: 100 CAPSULE ORAL at 13:03

## 2018-10-22 RX ADMIN — IPRATROPIUM BROMIDE AND ALBUTEROL SULFATE 3 ML: 2.5; .5 SOLUTION RESPIRATORY (INHALATION) at 08:56

## 2018-10-22 NOTE — PLAN OF CARE
Problem: Patient Care Overview  Goal: Plan of Care Review  Outcome: Ongoing (interventions implemented as appropriate)   10/22/18 7833   Coping/Psychosocial   Plan of Care Reviewed With patient;son   Plan of Care Review   Progress improving   OTHER   Outcome Summary Pt. demonstrates steady improvement w/ mobility and activity tolerance. Pt. performed bed mobility and transfers w/ mod A x 2, and ambulated bed to chair w/ B UE support and max A x 2. Pt. w/ minimal desaturation w/ mobility, to 86% on supplemental oxygen, quickly recovering to >90% w/in 1 minute of adaptive breathing. Pt. will benefit from SNF rehab at D/C to further improve safety and independence w/ mobility.

## 2018-10-22 NOTE — PLAN OF CARE
Problem: Patient Care Overview  Goal: Plan of Care Review  Outcome: Ongoing (interventions implemented as appropriate)   10/22/18 0626   Coping/Psychosocial   Plan of Care Reviewed With patient   Plan of Care Review   Progress no change   OTHER   Outcome Summary VSS. NSR with PVCs. 5L NC. Pt c/o back pain during the night. No other complaints. Pt to be [facility] in the [facility]. Will continue to monitor.      Goal: Individualization and Mutuality  Outcome: Ongoing (interventions implemented as appropriate)    Goal: Discharge Needs Assessment  Outcome: Ongoing (interventions implemented as appropriate)

## 2018-10-22 NOTE — PROGRESS NOTES
Continued Stay Note  [facility]     Patient Name: Yassine Love  MRN: [id number]  Today's Date: 10/22/2018    Admit Date: 10/2/2018          Discharge Plan     Row Name 10/22/18 0941       Plan    Plan TBD    Patient/Family in Agreement with Plan yes    Plan Comments Received vm from Kristina at [facility] with concerns that pt is only oriented x1 and on O2 at 5L. [facility] will have to approve acceptance on 5L due to that is the max amount of liter flow they can accept. CM will discuss in rounds and speak to Kristina after.               Discharge Codes    No documentation.       Expected Discharge Date and Time     Expected Discharge Date Expected Discharge Time    Oct 22, 2018             Yumiko Hull

## 2018-10-22 NOTE — PLAN OF CARE
Problem: Patient Care Overview  Goal: Plan of Care Review   10/22/18 0934   Coping/Psychosocial   Plan of Care Reviewed With patient   Plan of Care Review   Progress improving

## 2018-10-22 NOTE — THERAPY TREATMENT NOTE
Acute Care - Physical Therapy Treatment Note  [facility]     Patient Name: Yassine Love  : 1945  MRN: [id number]  Today's Date: 10/22/2018  Onset of Illness/Injury or Date of Surgery: 10/02/18  Date of Referral to PT: 10/02/18  Referring Physician: DO Javi    Admit Date: 10/2/2018    Visit Dx:    ICD-10-CM ICD-9-CM   1. Acute on chronic respiratory failure with hypoxia and hypercapnia (CMS/HCC) J96.21 518.84    J96.22 786.09     799.02   2. Impaired functional mobility, balance, gait, and endurance Z74.09 V49.89   3. 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 O2 with chronic hypercarbia   • Essential hypertension   • Non-sustained ventricular tachycardia (CMS/HCC)   • CAD status post GIGI ×2 to LAD and RCA 3/12/18   • Debility   • Decubitus ulcer of buttock, stage 2   • Chronic pain   • Chronic venous stasis dermatitis of both lower extremities   • Acute on chronic mixed hypercarbic/hypoxemic respiratory failure requiring intubation and ventilatory support   • History of tobacco use   • Acute kidney injury   • Brief runs of SVT (supraventricular tachycardia)    • H/O bacteremia due to Staphylococcus   • Opioid use   • Overweight (BMI 25.0-29.9)   • Pneumonia due to infectious organism   • Sleep apnea       Therapy Treatment          Rehabilitation Treatment Summary     Row Name 10/22/18 1050             Treatment Time/Intention    Discipline physical therapist  -MB      Document Type therapy note (daily note)  -MB      Subjective Information complains of;weakness;pain  -MB      Mode of Treatment physical therapy;individual therapy  -MB      Patient/Family Observations Pt. supine, on 4L/min O2 per NC, telemetry, IV heplocked.  RN consent for PT.  Pt's son present at bedside.  -MB2      Care Plan Review care plan/treatment goals  reviewed;risks/benefits reviewed;current/potential barriers reviewed;patient/other agree to care plan  -MB2      Care Plan Review, Other Participant(s) son  -MB2      Therapy Frequency (PT Clinical Impression) daily  -MB2      Patient Effort good  -MB2      Existing Precautions/Restrictions fall;oxygen therapy device and L/min   BLE weakness, h/o knees buckling  -MB2      Recorded by [MB] Linda Marcelino, PT 10/22/18 1141  [MB2] Linad Marcelino, PT 10/22/18 1159      Row Name 10/22/18 1050             Vital Signs    Pre Systolic BP Rehab 115  -MB      Pre Treatment Diastolic BP 65  -MB      Pre SpO2 (%) 95  -MB      O2 Delivery Pre Treatment supplemental O2  -MB      Intra SpO2 (%) 86   recovered to >90% in 1 min  -MB2      O2 Delivery Intra Treatment supplemental O2  -MB      Post SpO2 (%) 94  -MB      O2 Delivery Post Treatment supplemental O2  -MB      Pre Patient Position Supine  -MB      Intra Patient Position Standing  -MB      Post Patient Position Sitting  -MB      Recorded by [MB] Linda Marcelino, PT 10/22/18 1159  [MB2] Linda Marcelino, PT 10/22/18 1201      Row Name 10/22/18 1050             Cognitive Assessment/Intervention- PT/OT    Affect/Mental Status (Cognitive) WFL  -MB      Orientation Status (Cognition) oriented x 3  -MB      Follows Commands (Cognition) follows one step commands;75-90% accuracy;physical/tactile prompts required;repetition of directions required;verbal cues/prompting required  -MB      Cognitive Function (Cognitive) safety deficit  -MB      Safety Deficit (Cognitive) mild deficit;insight into deficits/self awareness;safety precautions awareness  -MB      Personal Safety Interventions fall prevention program maintained;gait belt;muscle strengthening facilitated;nonskid shoes/slippers when out of bed  -MB      Recorded by [MB] Linda Marcelino, PT 10/22/18 1159      Row Name 10/22/18 1050             Safety Issues, Functional Mobility    Impairments Affecting  Function (Mobility) balance;endurance/activity tolerance;pain;shortness of breath;strength  -MB      Recorded by [MB] Linda Marcelino, PT 10/22/18 1159      Row Name 10/22/18 1050             Bed Mobility Assessment/Treatment    Supine-Sit [place] (Bed Mobility) moderate assist (50% patient effort);nonverbal cues (demo/gesture);verbal cues  -MB      Sit-Supine [place] (Bed Mobility) not tested  -MB      Bed Mobility, Safety Issues decreased use of arms for pushing/pulling;decreased use of legs for bridging/pushing  -MB      Assistive Device (Bed Mobility) bed rails;draw sheet;head of bed elevated  -MB      Comment (Bed Mobility) Pt. required assist to move BLEs, raise trunk/shoulders to upright sitting, and scoot hips forward to EOB.    -MB      Recorded by [MB] Linda Marcelino, PT 10/22/18 1159      Row Name 10/22/18 1050             Transfer Assessment/Treatment    Transfer Assessment/Treatment sit-stand transfer;stand-sit transfer  -MB      Comment (Transfers) Pt. required assist for set up, sequencing, and safe hand placement.  Initially, sit to stand transfer attempted w/ RW; pt. demo difficulty w/ upright posture, B knees remained flexed.  2nd attempt, pt transferred sit to stand w/ PT/tech on both sides of pt, offering B UE support once upright.  Pt. required VCs for sequencing to take steps to chair.  -MB      Recorded by [MB] Linda Marcelino, PT 10/22/18 1159      Row Name 10/22/18 1050             Sit-Stand Transfer    Sit-Stand [place] (Transfers) moderate assist (50% patient effort);2 person assist;verbal cues;nonverbal cues (demo/gesture)  -MB      Assistive Device (Sit-Stand Transfers) walker, front-wheeled;other (see comments)   BUE support  -MB      Recorded by [MB] Linda Marcelino, PT 10/22/18 1159      Row Name 10/22/18 1051             Stand-Sit Transfer    Stand-Sit [place] (Transfers) moderate assist (50% patient effort);2 person assist;verbal cues;nonverbal  cues (demo/gesture)  -MB      Assistive Device (Stand-Sit Transfers) other (see comments)   BUE support  -MB      Recorded by [MB] Linda Marcelino, PT 10/22/18 1159      Row Name 10/22/18 1050             Stand Pivot/Stand Step Transfer    Stand Pivot/Stand Step [place] moderate assist (50% patient effort);2 person assist;verbal cues;nonverbal cues (demo/gesture)  -MB      Assistive Device (Stand Pivot Stand Step Transfer) other (see comments)   BUE support  -MB      Recorded by [MB] Linda Marcelino, PT 10/22/18 1159      Row Name 10/22/18 1050             Gait/Stairs Assessment/Training    [place] Level (Gait) maximum assist (25% patient effort);2 person assist  -MB      Assistive Device (Gait) other (see comments)   BUE support  -MB      Distance in Feet (Gait) 3  -MB      Pattern (Gait) step-to  -MB      Deviations/Abnormal Patterns (Gait) base of support, wide;vidhi decreased;gait speed decreased;stride length decreased  -MB      Bilateral Gait Deviations forward flexed posture;heel strike decreased;knee buckling, bilateral  -MB      Comment (Gait/Stairs) Pt. demo step to gait to chair w/ increased forward flexion and dec B step length/heelstrike.  Pt. required consistent VCs for sequencing.  Pt. demo difficulty w/ upright posture/ remains in increased trunk/B knee flexion.  -MB      Recorded by [MB] Linda Marcelino, PT 10/22/18 1159      Row Name 10/22/18 1050             Lower Extremity Seated Therapeutic Exercise    Performed, Seated Lower Extremity (Therapeutic Exercise) hip flexion/extension;hip abduction/adduction;ankle dorsiflexion/plantarflexion;LAQ (long arc quad), knee extension   glut sets, scap. retraction  -MB      Exercise Type, Seated Lower Extremity (Therapeutic Exercise) AROM (active range of motion)  -MB      Sets/Reps Detail, Seated Lower Extremity (Therapeutic Exercise) 1 set / 10 reps  -MB      Recorded by [MB] Linda Marcelino, PT 10/22/18 1159      Row Name  10/22/18 1050             Static Standing Balance    Level of [place] (Supported Standing, Static Balance) moderate assist, 50 to 74% patient effort   x2  -MB      Time Able to Maintain Position (Supported Standing, Static Balance) 30 to 45 seconds  -MB      Assistive Device Utilized (Supported Standing, Static Balance) rolling walker  -MB      Recorded by [MB] Linda Marcelino, PT 10/22/18 1159      Row Name 10/22/18 1050             Positioning and Restraints    Pre-Treatment Position in bed  -MB      Post Treatment Position chair  -MB      In Chair notified nsg;reclined;call light within reach;encouraged to call for assist;exit alarm on;waffle cushion;on mechanical lift sling;legs elevated;heels elevated  -MB      Recorded by [MB] Linda Marcelino, PT 10/22/18 1159      Row Name 10/22/18 1050             Pain Scale: Numbers Pre/Post-Treatment    Pain Scale: Numbers, Pretreatment 4/10  -MB      Pain Scale: Numbers, Post-Treatment 4/10  -MB      Pain Location - Orientation lower  -MB      Pain Location back  -MB      Recorded by [MB] Linda Marcelino, PT 10/22/18 1159      Row Name 10/22/18 1050             Plan of Care Review    Plan of Care Reviewed With patient;son  -MB      Recorded by [MB] Linda Marcelino, PT 10/22/18 1159      Row Name 10/22/18 1050             Outcome Summary/Treatment Plan (PT)    Daily Summary of Progress (PT) progress toward functional goals as expected  -MB      Anticipated Discharge Disposition (PT) skilled nursing facility  -MB      Recorded by [MB] Linda Marcelino, PT 10/22/18 1154        User Key  (r) = Recorded By, (t) = Taken By, (c) = Cosigned By    Initials Name Effective Dates Discipline    Linda Longo, PT 03/14/16 -  PT                     Physical Therapy Education     Title: PT OT SLP Therapies (Active)     Topic: Physical Therapy (Active)     Point: Mobility training (Active)    Learning Progress Summary     Learner Status Readiness Method  Response Comment Documented by    Patient Active Acceptance E,TB NR  SR 10/20/18 1621     Active Acceptance E NR  KR 10/18/18 1327     Active Nonacceptance E,D NR  SJ 10/17/18 1011     Active Acceptance E,D NR  DM 10/15/18 1355     Active Acceptance E,D NR Safety SH 10/12/18 1117     Done Eager E VU  KM 10/10/18 0930     Active Acceptance E,D NR  LS 10/05/18 1324     Active Acceptance E,D NR  LS 10/04/18 1119     Done Acceptance E,TB VU  MT 10/03/18 1851    Family Active Acceptance E NR  KR 10/18/18 1327     Active Acceptance E,D NR  LS 10/04/18 1119     Done Acceptance E,TB VU  MT 10/03/18 1851          Point: Home exercise program (Active)    Learning Progress Summary     Learner Status Readiness Method Response Comment Documented by    Patient Active Acceptance E,TB NR  SR 10/20/18 1621     Active Acceptance E NR  KR 10/18/18 1327     Active Acceptance E,TB NR  CM 10/18/18 0406     Active Nonacceptance E,D NR  SJ 10/17/18 1011     Active Acceptance E,D NR  DM 10/15/18 1355     Active Acceptance E,D NR Safety SH 10/12/18 1117     Done Eager E VU  KM 10/10/18 0930     Active Acceptance E,D NR  LS 10/05/18 1324     Active Acceptance E,D NR  LS 10/04/18 1119     Done Acceptance E,TB VU  MT 10/03/18 1851    Family Active Acceptance E NR  KR 10/18/18 1327     Active Acceptance E,D NR  LS 10/04/18 1119     Done Acceptance E,TB VU  MT 10/03/18 1851          Point: Body mechanics (Active)    Learning Progress Summary     Learner Status Readiness Method Response Comment Documented by    Patient Active Acceptance E,TB NR  SR 10/20/18 1621     Active Acceptance E NR  KR 10/18/18 1327     Active Nonacceptance E,D NR  SJ 10/17/18 1011     Active Acceptance E,D NR  DM 10/15/18 1355     Active Acceptance E,D NR Safety SH 10/12/18 1117     Done Eager E VU  KM 10/10/18 0930     Active Acceptance E,D NR  LS 10/05/18 1324     Active Acceptance E,D NR  LS 10/04/18 1119    Family Active Acceptance E NR  KR 10/18/18 1327     Active  Acceptance E,D NR  LS 10/04/18 1119          Point: Precautions (Active)    Learning Progress Summary     Learner Status Readiness Method Response Comment Documented by    Patient Active Acceptance E,TB NR  SR 10/20/18 1621     Active Acceptance E NR  KR 10/18/18 1327     Active Nonacceptance E,D NR  SJ 10/17/18 1011     Active Acceptance E,D NR  DM 10/15/18 1355     Active Acceptance E,D NR Safety SH 10/12/18 1117     Done Eager E VU  KM 10/10/18 0930     Active Acceptance E,D NR  LS 10/05/18 1324     Active Acceptance E,D NR  LS 10/04/18 1119     Done Acceptance E,TB VU  MT 10/03/18 1851    Family Active Acceptance E NR  KR 10/18/18 1327     Active Acceptance E,D NR  LS 10/04/18 1119     Done Acceptance E,TB VU  MT 10/03/18 1851                      User Key     Initials Effective Dates Name Provider Type Discipline     06/19/15 -  Alva Castellanos, PT Physical Therapist PT    SJ 06/19/15 -  Alva Mcdonnell, PT Physical Therapist PT    KM 06/19/15 -  Carmela Washington, PT Physical Therapist PT    DM 06/19/15 -  Rubia Amador, PT Physical Therapist PT    SR 06/19/15 -  Perla Scott, PT Physical Therapist PT    LS 06/19/15 -  Zari Rai, PT Physical Therapist PT    CM 02/22/17 -  Anum Rose, RN Registered Nurse Nurse    MT 06/16/16 -  Noemi Fish RN Registered Nurse Nurse     04/03/18 -  Debbie Damon, PT Physical Therapist PT                    PT Recommendation and Plan  Anticipated Discharge Disposition (PT): skilled nursing facility  Therapy Frequency (PT Clinical Impression): daily  Outcome Summary/Treatment Plan (PT)  Daily Summary of Progress (PT): progress toward functional goals as expected  Plan for Continued Treatment (PT): Cont IPPT per POC.  Anticipated Discharge Disposition (PT): skilled nursing facility  Plan of Care Reviewed With: patient, son  Progress: improving  Outcome Summary: Pt. demonstrates steady improvement w/ mobility and activity tolerance.  Pt.  performed bed mobility and transfers w/ mod A x 2, and ambulated bed to chair w/ B UE support and max A x 2.  Pt. w/ minimal desaturation w/ mobility, to 86% on supplemental oxygen, quickly recovering to >90% w/in 1 minute of adaptive breathing.  Pt. will benefit from SNF rehab at D/C to further improve safety and independence w/ mobility.           Outcome Measures     Row Name 10/22/18 1050 10/21/18 1532 10/20/18 1450       How much help from another person do you currently need...    Turning from your back to your side while in flat bed without using bedrails? 3  -MB  -- 2  -SR    Moving from lying on back to sitting on the side of a flat bed without bedrails? 2  -MB  -- 2  -SR    Moving to and from a bed to a chair (including a wheelchair)? 2  -MB  -- 2  -SR    Standing up from a chair using your arms (e.g., wheelchair, bedside chair)? 2  -MB  -- 2  -SR    Climbing 3-5 steps with a railing? 1  -MB  -- 1  -SR    To walk in hospital room? 1  -MB  -- 1  -SR    AM-PAC 6 Clicks Score 11  -MB  -- 10  -SR       How much help from another is currently needed...    Putting on and taking off regular lower body clothing?  -- 1  -ROSEANNE  --    Bathing (including washing, rinsing, and drying)  -- 2  -ROSEANNE  --    Toileting (which includes using toilet bed pan or urinal)  -- 2  -ROSEANNE  --    Putting on and taking off regular upper body clothing  -- 3  -ROSEANNE  --    Taking care of personal grooming (such as brushing teeth)  -- 3  -ROSEANNE  --    Eating meals  -- 3  -ROSEANNE  --    Score  -- 14  -ROSEANNE  --       Functional Assessment    Outcome Measure Options AM-PAC 6 Clicks Basic Mobility (PT)  -MB AM-PAC 6 Clicks Daily Activity (OT)  -ROSEANNE AM-PAC 6 Clicks Basic Mobility (PT)  -SR      User Key  (r) = Recorded By, (t) = Taken By, (c) = Cosigned By    Initials Name Provider Type    Marichuy Sanon, OT Occupational Therapist    SR Perla Scott, PT Physical Therapist    Linda Longo, PT Physical Therapist           Time Calculation:          PT Charges     Row Name 10/22/18 1204             Time Calculation    Start Time 1050  -MB      PT Received On 10/22/18  -MB      PT Goal Re-Cert Due Date 10/25/18  -MB         Time Calculation- PT    Total Timed Code Minutes- PT 40 minute(s)  -MB         Timed Charges    47718 - PT Therapeutic Exercise Minutes 30  -MB      24312 - Gait Training Minutes  10  -MB        User Key  (r) = Recorded By, (t) = Taken By, (c) = Cosigned By    Initials Name Provider Type    Linda Longo, PT Physical Therapist        Therapy Suggested Charges     Code   Minutes Charges    94578 (CPT®) Hc Pt Neuromusc Re Education Ea 15 Min      51623 (CPT®) Hc Pt Ther Proc Ea 15 Min 30 2    99612 (CPT®) Hc Gait Training Ea 15 Min 10 1    64202 (CPT®) Hc Pt Therapeutic Act Ea 15 Min      87848 (CPT®) Hc Pt Manual Therapy Ea 15 Min      29812 (CPT®) Hc Pt Iontophoresis Ea 15 Min      79067 (CPT®) Hc Pt Elec Stim Ea-Per 15 Min      34875 (CPT®) Hc Pt Ultrasound Ea 15 Min      35727 (CPT®) Hc Pt Self Care/Mgmt/Train Ea 15 Min      69600 (CPT®) Hc Pt Prosthetic (S) Train Initial Encounter, Each 15 Min      54083 (CPT®) Hc Pt Orthotic(S)/Prosthetic(S) Encounter, Each 15 Min      31260 (CPT®) Hc Orthotic(S) Mgmt/Train Initial Encounter, Each 15min      Total  40 3        Therapy Charges for Today     Code Description Service Date Service Provider Modifiers Qty    50651307033 HC PT THER PROC EA 15 MIN 10/22/2018 Linda Marcelino, PT GP 2    62172972359 HC GAIT TRAINING EA 15 MIN 10/22/2018 Linda Marcelino, PT GP 1    16485447814 HC PT THER SUPP EA 15 MIN 10/22/2018 Linda Marcelino, PT GP 2          PT G-Codes  Outcome Measure Options: AM-PAC 6 Clicks Basic Mobility (PT)  AM-PAC 6 Clicks Score: 11  Score: 14    Linda Marcelino PT  10/22/2018

## 2018-10-22 NOTE — NURSING NOTE
Nurse to nurse called to madie WHITFIELD This patient has had an endometrial ablation.  No LMP information is available.

## 2018-10-22 NOTE — DISCHARGE SUMMARY
[facility]  TRANSFER SUMMARY    Patient Name: Yassine Love  : 1945  MRN: [id number]    Date of Admission: 10/2/2018  Date of Discharge:  10/22/2018  Length of Stay: 20  Primary Care Physician: Provider, No Known    Consults     Date and Time Order Name Status Description    10/10/2018 0030 Inpatient Cardiology Consult      10/9/2018 1007 Inpatient Cardiology Consult Completed     10/8/2018 1023 Inpatient Infectious Diseases Consult Completed     10/8/2018 0930 Inpatient Palliative Care MD Consult Completed     2018 1914 Inpatient Infectious Diseases Consult Completed           Hospital Course     Presenting Problem:   COPD exacerbation (CMS/HCC) [J44.1]      Active Hospital Problems    Diagnosis Date Noted   • Sleep apnea [G47.30] 10/10/2018   • Acute kidney injury [N28.9] 10/03/2018   • H/O bacteremia due to Staphylococcus [R78.81] 10/03/2018   • Opioid use [F11.90] 10/03/2018   • Pneumonia due to infectious organism [J18.9] 10/03/2018   • Decubitus ulcer of buttock, stage 2 [L89.302] 2018   • CAD status post GIGI ×2 to LAD and RCA 3/12/18 [I25.10] 2018   • Essential hypertension [I10] 2018   • End stage COPD in an active smoker on home O2 with chronic hypercarbia [J44.9] 2018   • H/O cellulitis of both lower extremities [L03.115, L03.116] 2018   • PVD (peripheral vascular disease)  [I73.9] 2017      Resolved Hospital Problems    Diagnosis Date Noted Date Resolved   • **Acute on chronic mixed hypercarbic/hypoxemic respiratory failure requiring intubation and ventilatory support [J96.01, J96.02] 10/02/2018 10/22/2018   • Brief runs of SVT (supraventricular tachycardia)  [I47.1] 10/03/2018 10/22/2018          Hospital Course:  Yassine Love is a 72 y.o. male active smoker with a history of COPD (on home oxygen) admitted 10/2/18 for exacerbation associated with severe hypoxemia. He was in the skilled nursing facility for  rehabilitation but 10/1/18 developed some visual hallucinations. Oxygen saturations were subsequently noted to drop into the 60s on 4 L on 10/2/18 and he was brought to [facility] ER. Because of an elevated d-dimer a CT angiogram was obtained which was negative for PE. Both CT scan and chest x-ray however revealed it appeared to be pulmonary edema with diffuse interstitial infiltrates and pleural effusions. In addition BNP was 899. He was given his usual dose of home Lasix, placed on empiric antimicrobial coverage with Zosyn, given bronchodilators, Solu-Medrol, placed on BiPAP, and admitted to the hospitalist service. Hx of severe PVD hx of bilat leg ulcerations, treated for cellulitis 1 month ago during prior admit.     At approximately 2 AM 10/3/18 he was noted to become unresponsive with decreased respiratory drive. Apparently he had been a DNR but this was reversed by the patient on admission. Because of this Dr. Jacobsen and respiratory therapy intubated the patient at the bedside. There were large amounts of thick purulent yellow secretions obtained at the time of intubation and he was transferred to the ICU on ventilatory support. He initially required high FiO2's of 60% but respiratory rate and ABGs rapidly improved. Over the course of the evening he continued to improve. He subsequently self extubated the evening of 10/3/18 at 11 PM. He was able to tolerate self extubation but has required high flow nasal oxygen in order to maintain his oxygen saturations.     Heart rate is usually in the 60s, but has had recurrent brief episodes of SVT up to 160  during hospitalization His metoprolol was held once and that led to SVT that resolved with resumption of beta blocker.  Consulted and followed.  They have since signed off and will follow-up outpatient.  Patient is to continue current metoprolol with hold parameters.  Patient has elected to only do medical management and no interventions at this time.  He was admitted  10/2 and transferred out of the ICU 10/7 to telemetry floor.  Hospital medicine service resumed care.  In T need on IV antibiotics per infectious disease.  Ultimately transitioned to oral amoxicillin.  He did have a right upper extremity PICC line placed throughout hospital stay which will be discontinued prior to transfer.  His breath sounds are now improved and edema is improved on twice daily Lasix.  Oxygen has now been weaned down to 4 LNC with sats 93% currently.  His cough and shortness of air have improved and almost are at baseline.  Due to the care followed during hospital stay and recommend palliative to follow at SNF.  We'll continue on MS Contin as current dosing for pain.  Also with prior untreated sleep apnea felt likely complex apnea with combination of obstructive and central.  Auto BiPAP at night was used and as needed for sleep with improvement.  He continued working with PT/OT and has remained weak and somewhat debilitated.  RAFAEL resolved.  Tolerating diet.    Today patient is seen resting upright in bed in no acute distress.  No visitors at bedside.  He is awake and alert.  Oriented.  On 4 LNC oxygen with sats 93% at rest.  His baseline oxygen is 3-3 0.5 L at all times.  Breath sounds are improved.  He remains weak but is slowly working with PT.  He has received a bed at the [facility] for rehabilitation with plans to transfer today via ambulance.  He's been cleared by all services.  He'll continue on oral amoxicillin to complete course per ID recommendations.  He should be seen by provider at SNF in 44-48 hours, PCP 1 week of discharge.  Palliative care to evaluate and follow at SNF.  Follow up with cardiology in 6 weeks per their recommendations.  Follow up with infectious disease, Dr. Belle per his recommendations.    Discharge Follow Up Recommendations for labs/diagnostics:   Echo and recheck of a BMP and CBC in 1-2 days of transfer.    Day of Discharge     HPI:   Pt is seen resting up in bed in  NAD.  No visitors at bs.  Wearing 4LNC oxygen and sats 93%.  States he feels better today.  Is worried he will lose his SNF bed if he doesn't go there today.  Tolerating diet.  No n/v, abd pain, cp.  Soa Almost back to his baseline.  Wears 3-3.5 LNC oxygen outpt chronically due to ES COPD.  C/o weak and hopes SNF/PT will help.  No new issues.  To rehab today pending final ID recs.     Review of Systems  Gen- No fevers, chills  CV- No chest pain, occ palpitations  Resp- No cough, mild dyspnea (basically at baseline)  GI- No N/V/D, abd pain      Otherwise complete ROS is negative except as mentioned in the HPI.    Vital Signs:   Temp:  [97.4 °F (36.3 °C)-98.5 °F (36.9 °C)] 98.5 °F (36.9 °C)  Heart Rate:  [67-84] 84  Resp:  [16-20] 18  BP: (100-115)/(56-65) 104/63     Physical Exam:  LINES: PICC RUE without surrounding erythema, dressing dry and intact.  Constitutional - no acute cardiopulmonary distress, sitting up in bed and appears comfortable on 4LNC and sats 93%.  Speaking in full sentences.   HEENT-NCAT, mucous membranes moist  CV-RRR, S1 S2 normal, no m/r/g  Resp-diminished bilaterally, no wheezes, rhonchi or rales, respirations nonlabored on 4 L NC   Abd-soft, no abd pain, non-distended, normo active bowel sounds.   Ext--mild bilateral lower extremity/feet and ankle Edema (much improved back on bid lasix)  Neuro-alert and oriented, speech clear, strength symmetric Bilat UE/LE.  Follows commands   Psych-appropriate affect, calm and cooperative  Skin- No rash on exposed UE or LE bilaterally      Pertinent Results       Results from last 7 days  Lab Units 10/22/18  1001 10/21/18  0505 10/20/18  1313 10/20/18  0922 10/17/18  0507   WBC 10*3/mm3 9.19  --  7.48  --   --    HEMOGLOBIN g/dL 8.7*  --  8.9*  --   --    HEMATOCRIT % 29.4*  --  29.7*  --   --    PLATELETS 10*3/mm3 139*  --  163  --   --    SODIUM mmol/L 142 140  --  143 139   POTASSIUM mmol/L 4.7 4.3  --  5.1 4.9   CHLORIDE mmol/L 100 101  --  105 102    CO2 mmol/L 40.0* 35.0*  --  34.0* 35.0*   BUN mg/dL 30* 33*  --  33* 38*   CREATININE mg/dL 1.19 1.14  --  1.03 1.17   GLUCOSE mg/dL 112* 136*  --  88 91   CALCIUM mg/dL 8.9 8.5*  --  8.5* 8.2*       Results from last 7 days  Lab Units 10/20/18  0922   BILIRUBIN mg/dL 0.6   ALK PHOS U/L 98   ALT (SGPT) U/L 24   AST (SGOT) U/L 18           Invalid input(s): TG, LDLCALC, LDLREALC    Results from last 7 days  Lab Units 10/20/18  1313   TROPONIN I ng/mL 0.007     Brief Urine Lab Results  (Last result in the past 365 days)      Color   Clarity   Blood   Leuk Est   Nitrite   Protein   CREAT   Urine HCG        08/20/18 1442 Yellow Clear Negative Trace(A) Negative Negative               Microbiology Results Abnormal     Procedure Component Value - Date/Time    Blood Culture - Blood, [[id number]]  (Normal) Collected:  10/02/18 1804    Lab Status:  Final result Specimen:  Blood from Hand, Left Updated:  10/07/18 2201     Blood Culture No growth at 5 days    Narrative:       Aerobic bottle only     Blood Culture - Blood, [[id number]]  (Normal) Collected:  10/02/18 1720    Lab Status:  Final result Specimen:  Blood from Arm, Right Updated:  10/07/18 1830     Blood Culture No growth at 5 days    Respiratory Culture - Sputum, Cough [[id number]]  (Abnormal)  (Susceptibility) Collected:  10/03/18 0430    Lab Status:  Final result Specimen:  Sputum from Cough Updated:  10/07/18 1137     Respiratory Culture Light growth (2+) Staphylococcus aureus (A)      Light growth (2+) Normal Respiratory Nataliia     Gram Stain Result Few (2+) WBCs per low power field      No Epithelial cells per low power field      Few (2+) Gram positive cocci    Susceptibility      Staphylococcus aureus     FAVIAN     Ceftriaxone <=8 ug/ml Susceptible     Clindamycin >4 ug/ml Resistant     Erythromycin >4 ug/ml Resistant     Gentamicin <=4 ug/ml Susceptible     Levofloxacin <=1 ug/ml Susceptible  [1]      Linezolid <=1 ug/ml Susceptible     Oxacillin <=0.25 ug/ml  Susceptible     Penicillin G <=0.03 ug/ml Susceptible     Quinupristin + Dalfopristin <=0.5 ug/ml Susceptible     Rifampin <=1 ug/ml Susceptible     Tetracycline >8 ug/ml Resistant     Trimethoprim + Sulfamethoxazole <=0.5/9.5 ug/ml Susceptible     Vancomycin 2 ug/ml Susceptible            [1]   Staphylococcus species may develop resistance during prolonged therapy with quinolones.  Isolates that are initially susceptible may become resistant within three to four days after initiation of therapy. Testing of repeat isolates may be warranted.                   MRSA Screen, PCR - Swab, Nares [[id number]]  (Abnormal) Collected:  10/04/18 1623    Lab Status:  Final result Specimen:  Swab from Nares Updated:  10/04/18 1818     MRSA, PCR Positive (C)          Imaging Results (all)     Procedure Component Value Units Date/Time    XR Chest 1 View [[id number]] Collected:  10/22/18 1019     Updated:  10/22/18 1019    Narrative:       EXAMINATION: XR CHEST 1 VW-10/22/2018:      INDICATION: Increased oxygen demand/f/u pna; J96.21-Acute and chronic  respiratory failure with hypoxia; J96.22-Acute and chronic respiratory  failure with hypercapnia; Z74.09-Other reduced mobility; Z74.09-Other  reduced mobility.      COMPARISON: 10/20/2018.     FINDINGS: Portable chest reveals PICC line catheter identified on the  right with tip in the SVC. There is improvement seen in the aeration of  the right lung base. Degenerative changes seen within the spine.  Vascular calcification seen within the thoracic aorta. Small left  pleural effusion with mild increased markings identified at the left  lung base.           Impression:       Improvement seen in the aeration of the right lung base.  Improvement seen in the right pleural effusion. No change seen in the  left lung base.     D:  10/22/2018  E:  10/22/2018             XR Chest 1 View [[id number]] Collected:  10/20/18 1427     Updated:  10/20/18 1708    Narrative:       EXAMINATION: XR CHEST 1  VW - 10/20/2018     INDICATION: J96.21-Acute and chronic respiratory failure with hypoxia;  J96.22-Acute and chronic respiratory failure with hypercapnia;  Z74.09-Other reduced mobility.     TECHNIQUE:  Single view frontal chest.     COMPARISONS: 10/19/2018.     FINDINGS:  Background emphysema and small bilateral effusions,  unchanged. Right arm PICC in place. Unchanged cardiomediastinal  silhouette. No pneumothorax.       Impression:       Stable exam.     DICTATED:   10/20/2018  EDITED/ls :   10/20/2018      This report was finalized on 10/20/2018 5:06 PM by Eliezer Hale.       XR Chest 1 View [[id number]] Collected:  10/19/18 0853     Updated:  10/19/18 1527    Narrative:       EXAMINATION: XR CHEST 1 VW-10/19/2018:      INDICATION: RLL infiltrate; J96.21-Acute and chronic respiratory failure  with hypoxia; J96.22-Acute and chronic respiratory failure with  hypercapnia; Z74.09-Other reduced mobility; Z74.09-Other reduced  mobility.      COMPARISON: 10/16/2018.     FINDINGS: Portable chest reveals PICC line catheter on the right with  tip in the SVC. Increased markings seen within the upper lung fields  bilaterally unchanged in the interval. Slight improvement seen in the  aeration of the lung bases. Mild increased markings remain. Degenerative  changes seen within the spine. Vascular calcification seen within the  thoracic aorta. Prominence of the pulmonary vascularity bilaterally.  Probable small bilateral pleural effusions.           Impression:       Slight improvement seen in the aeration of the lung bases in  the interval. The remainder of the chest is stable.     D:  10/19/2018  E:  10/19/2018     This report was finalized on 10/19/2018 3:25 PM by Dr. Nataly Whalen MD.       XR Chest 1 View [[id number]] Collected:  10/16/18 0823     Updated:  10/16/18 0909    Narrative:       EXAMINATION: XR CHEST 1 VW- 10/16/2018     INDICATION: F/U pneumonia; J96.21-Acute and chronic respiratory failure  with hypoxia;  J96.22-Acute and chronic respiratory failure with  hypercapnia; Z74.09-Other reduced mobility      COMPARISON: Chest x-ray 10/11/2018     FINDINGS: Right arm PICC remains in place terminating at the cavoatrial  junction. Cardiac silhouette borderline enlarged and unchanged.  Persistent bibasilar opacifications and blunting of the costophrenic  sulci similar to prior may represent atelectasis versus airspace disease  with probable small effusion components. No new parenchymal disease. No  pneumothorax.           Impression:       No significant interval change with persistent bibasilar  opacifications and effusions.     D:  10/16/2018  E:  10/16/2018     This report was finalized on 10/16/2018 9:07 AM by Dr. Abiodun Baker.       XR Chest 1 View [[id number]] Collected:  10/11/18 0928     Updated:  10/11/18 1419    Narrative:       EXAMINATION: XR CHEST 1 VW-10/11/2018:      INDICATION: Pneumonia; J96.21-Acute and chronic respiratory failure with  hypoxia; J96.22-Acute and chronic respiratory failure with hypercapnia;  Z74.09-Other reduced mobility; Z74.09-Other reduced mobility.      COMPARISON: 10/09/2018.     FINDINGS: Portable chest reveals the heart to be borderline enlarged.  Small bilateral pleural effusions with ill-defined opacification at the  lung bases. PICC line catheter identified on the right with tip in the  SVC. Degenerative changes seen within the spine. Vascular calcification  seen within the thoracic aorta. Prominence seen in the pulmonary  vascularity.           Impression:       Small bilateral pleural effusions unchanged in the interval  with ill-defined opacification at the lung bases. The heart is enlarged.  PICC line catheter identified on the right with tip in the SVC. The  findings are stable.     D:  10/11/2018  E:  10/11/2018     This report was finalized on 10/11/2018 2:17 PM by Dr. Nataly Whalen MD.       XR Chest 1 View [[id number]] Collected:  10/09/18 1222     Updated:  10/09/18  1321    Narrative:       EXAMINATION: XR CHEST 1 VW-      INDICATION: Pneumonia; J96.21-Acute and chronic respiratory failure with  hypoxia; J96.22-Acute and chronic respiratory failure with hypercapnia;  Z74.09-Other reduced mobility.     COMPARISON: 10/06/2018.     FINDINGS: The heart is at the upper limits of normal. There is bibasilar  airspace disease and bilateral pleural effusions. The overall appearance  chest is slightly improved when compared with 10/06/2018. A PICC is well  positioned.           Impression:       Bibasilar airspace disease and bilateral pleural effusions,  slightly improved when compared with 10/06/2018.     D:  10/09/2018  E:  10/09/2018     This report was finalized on 10/9/2018 1:19 PM by Dr. Nehemias Nye MD.       XR Chest 1 View [[id number]] Collected:  10/06/18 1115     Updated:  10/06/18 1355    Narrative:       EXAMINATION: XR CHEST 1 VW - 10/06/2018     INDICATION: J96.21-Acute and chronic respiratory failure with hypoxia;  J96.22-Acute and chronic respiratory failure with hypercapnia;  Z74.09-Other reduced mobility.     TECHNIQUE:  Single view frontal chest.     COMPARISONS: 10/5/2018.     FINDINGS:  Stable support apparatus. Small bilateral pleural effusions  and adjacent atelectasis are unchanged. No pneumothorax. Calcification  aortic knob. Cardiomediastinal silhouette is unchanged.        Impression:       Stable exam.     DICTATED:   10/06/2018  EDITED/ls :   10/06/2018      This report was finalized on 10/6/2018 1:53 PM by Eliezer Hale.       XR Chest 1 View [[id number]] Collected:  10/05/18 0931     Updated:  10/05/18 0954    Narrative:       EXAMINATION: XR CHEST 1 VW- 10/05/2018     INDICATION: Respiratory failure; J96.21-Acute and chronic respiratory  failure with hypoxia; J96.22-Acute and chronic respiratory failure with  hypercapnia; Z74.09-Other reduced mobility      COMPARISON: 10/04/2018     FINDINGS: The heart is large. There is bilateral lower lobe  airspace  disease and there are bilateral pleural effusions. A PICC is well  positioned. The overall appearance of the chest is unchanged.           Impression:       There has been no interval improvement in radiographic  appearance of the chest when compared to the previous examination of the  previous day.     D:  10/05/2018  E:  10/05/2018     This report was finalized on 10/5/2018 9:52 AM by Dr. Nehemias Nye MD.       XR Chest 1 View [[id number]] Collected:  10/04/18 0859     Updated:  10/04/18 0931    Narrative:       EXAMINATION: XR CHEST 1 VW-      INDICATION: J96.21-Acute and chronic respiratory failure with hypoxia;  J96.22-Acute and chronic respiratory failure with hypercapnia.     TECHNIQUE:  Single view frontal chest.     COMPARISONS: 10/03/2018.     FINDINGS:  Right arm PICC is now in place with terminus in good position  at the superior cavoatrial junction. Endotracheal tube has been removed.   Small bilateral pleural effusions are stable.  Unchanged bilateral  airspace disease. No pneumothorax. Cardiomediastinal silhouette is  unchanged.        Impression:       Endotracheal tube removed. Right arm PICC in good position.  Otherwise, stable exam.     D:  10/04/2018  E:  10/04/2018     This report was finalized on 10/4/2018 9:29 AM by Eliezer Hale.       XR Chest 1 View [[id number]] Collected:  10/03/18 0731     Updated:  10/03/18 1021    Narrative:       EXAMINATION: XR CHEST 1 VW-10/03/2018:      INDICATION: Confirm ET Tube Placement; J96.21-Acute and chronic  respiratory failure with hypoxia; J96.22-Acute and chronic respiratory  failure with hypercapnia.      COMPARISON: 10/02/2018.     FINDINGS: Interval intubation with endotracheal tube well positioned  above the level of the efraín. Cardiac silhouette borderline enlarged  with increased central pulmonary vascularity and interval increase in  perihilar opacifications as well as bibasilar opacifications from prior  comparison consistent with  worsening airspace disease. Probable  trace-to-small bilateral pleural effusions. No pneumothorax.        Impression:       Interval intubation with endotracheal tube in satisfactory  positioning. Increasing perihilar opacifications along with increased  pulmonary vascularity from prior comparison. Trace-to-small bilateral  pleural effusions.     D:  10/03/2018  E:  10/03/2018     This report was finalized on 10/3/2018 10:19 AM by Dr. Abiodun Baker.       XR Abdomen KUB [[id number]] Collected:  10/03/18 0746     Updated:  10/03/18 0900    Narrative:       EXAMINATION: XR ABDOMEN KUB- 10/03/2018     INDICATION: NG placement verification; J96.21-Acute and chronic  respiratory failure with hypoxia; J96.22-Acute and chronic respiratory  failure with hypercapnia      COMPARISON: NONE     FINDINGS: Nasogastric tube terminates within the proximal stomach.  Gaseous distended bowel overlies the abdomen.           Impression:       Nasogastric tube terminates within the proximal stomach.     D:  10/03/2018  E:  10/03/2018     This report was finalized on 10/3/2018 8:57 AM by Dr. Abiodun Baker.       CT Angiogram Chest With & Without Contrast [[id number]] Collected:  10/02/18 2140     Updated:  10/02/18 2230    Narrative:       EXAM:  CT Angiography Chest With Intravenous Contrast    EXAM DATE/TIME:  10/2/2018 9:40 PM    CLINICAL HISTORY:  72 years old, male; Acute and chronic respiratory failure   with hypoxia; Acute and chronic respiratory failure with hypercapnia; Signs and   symptoms; Shortness of breath; Additional info: SOB, evaluate for pe    TECHNIQUE:  Axial computed tomographic angiography images of the chest with   intravenous contrast using CT angiography protocol.  All CT scans at this facility use at least one of these dose optimization   techniques: automated exposure control; mA and/or kV adjustment per patient   size (includes targeted exams where dose is matched to clinical indication); or   iterative  reconstruction.  MIP reconstructed images were created and reviewed.    COMPARISON:  CT ANGIOGRAM CHEST W WO CONTRAST 3/4/2018 3:10 PM    FINDINGS:    Peripheral vessel evaluation limited by cardiac and respiratory motion   artifact.   Central pulmonary arteries show no intraluminal defect suggestive of clot.     Thoracic aorta is ectatic and tortuous. Ascending portion measures 4 cm.   Proximal descending portion measures 4.4 cm. These measurements are unchanged   since the prior CT from 03/04/2018. No evidence of acute aortic dissection.    Moderately large dependent bilateral pleural effusions are present with   adjacent compressive atelectasis. Interstitial parenchymal lungs does not   suggest underlying pulmonary edema. Diffuse emphysematous changes are present   within the lungs with apical predominance and there are ill-defined nodular   densities in the left upper lobe which could represent focal alveolitis. These   were not present on the March 2018 CT.    Gallstones are present within the lumen of the gallbladder. Probable cyst in   the posterior right hepatic lobe, measuring 11 mm. No change in appearance.   Second 11 millimeter periportal cyst, also unchanged.  Multi-level, age-related thoracic degenerative disc disease is present.       Impression:       No evidence of acute, central pulmonary embolus.     Large dependent bilateral pleural effusions with adjacent atelectasis and   possible non-consolidated left upper lobe airspace process.    Centrilobular emphysema.    Ectasia and dilatation of the thoracic aorta, unchanged. No acute complication.    Cholelithiasis        THIS DOCUMENT HAS BEEN ELECTRONICALLY SIGNED BY TOYIN ALBARRAN MD    XR Chest 1 View [[id number]] Collected:  10/02/18 1722     Updated:  10/02/18 1923    Narrative:       EXAM:  XR Chest, 1 View    EXAM DATE/TIME:  10/2/2018 5:22 PM    CLINICAL HISTORY:  72 years old, male; Signs and symptoms; Dyspnea; Additional   info: SOA triage  protocol    TECHNIQUE:  XR of the chest, 1 view.    COMPARISON:  CR XR CHEST 1 VW 9/3/2018 9:22 AM    FINDINGS:      Cardiac enlargement and interstitial pattern suggests acute pulmonary edema.    Probable associated layering pleural effusions.   No concerning parenchymal lung mass.    No evidence of pneumothorax.      Bony structures and extrathoracic soft tissues are unremarkable.       Impression:         Pulmonary edema with diffuse interstitial infiltrates and layering pleural   effusions.    Associated retrocardiac atelectasis or infiltrate     THIS DOCUMENT HAS BEEN ELECTRONICALLY SIGNED BY TOYIN ALBARRAN MD          Results for orders placed during the hospital encounter of 10/02/18   Adult Transthoracic Echo Complete W/ Cont if Necessary Per Protocol    Narrative · Left ventricular systolic function is normal. Estimated EF = 60%.  · Trace-to-mild mitral valve regurgitation is present.              Discharge Details        Discharge Medications      New Medications      Instructions Start Date   amoxicillin 500 MG capsule  Commonly known as:  AMOXIL   1,000 mg, Oral, Every 12 Hours Scheduled      bisacodyl 10 MG suppository  Commonly known as:  DULCOLAX   10 mg, Rectal, Daily PRN      famotidine 20 MG tablet  Commonly known as:  PEPCID   20 mg, Oral, 2 Times Daily      guaiFENesin 600 MG 12 hr tablet  Commonly known as:  MUCINEX   600 mg, Oral, Every 12 Hours Scheduled      ipratropium-albuterol 0.5-2.5 mg/3 ml nebulizer  Commonly known as:  DUO-NEB   3 mL, Nebulization, Every 8 Hours - RT      lactobacillus acidophilus capsule capsule   1 capsule, Oral, Daily      lactulose 10 GM/15ML solution  Commonly known as:  CHRONULAC   20 g, Oral, 2 Times Daily PRN      polyethylene glycol pack packet  Commonly known as:  MIRALAX   17 g, Oral, Daily         Changes to Medications      Instructions Start Date   gabapentin 100 MG capsule  Commonly known as:  NEURONTIN  What changed:  · medication strength  · how much  to take  · when to take this   200 mg, Oral, Every 8 Hours Scheduled      metoprolol tartrate 25 MG tablet  Commonly known as:  LOPRESSOR  What changed:  · when to take this  · additional instructions   25 mg, Oral, Every 8 Hours, Hold SBP <100 or HR <50      Morphine 15 MG 12 hr tablet  Commonly known as:  MS CONTIN  What changed:  · medication strength  · how much to take   15 mg, Oral, Every 12 Hours Scheduled         Continue These Medications      Instructions Start Date   acetaminophen 325 MG tablet  Commonly known as:  TYLENOL   325 mg, Oral, Every 6 Hours PRN      albuterol (2.5 MG/3ML) 0.083% nebulizer solution  Commonly known as:  PROVENTIL   2.5 mg, Nebulization, Every 6 Hours PRN      aspirin 81 MG EC tablet   81 mg, Oral, Daily      atorvastatin 40 MG tablet  Commonly known as:  LIPITOR   40 mg, Oral, Nightly      calcium carbonate 500 MG chewable tablet  Commonly known as:  TUMS   2 tablets, Oral, 2 Times Daily PRN      clopidogrel 75 MG tablet  Commonly known as:  PLAVIX   75 mg, Oral, Daily      cyanocobalamin 1000 MCG tablet  Commonly known as:  VITAMIN B-12   1,000 mcg, Oral, Daily      fluticasone 50 MCG/ACT nasal spray  Commonly known as:  FLONASE   2 sprays, Each Nare, Daily      furosemide 40 MG tablet  Commonly known as:  LASIX   40 mg, Oral, 2 Times Daily      mirtazapine 15 MG disintegrating tablet  Commonly known as:  REMERON SOL-TAB   15 mg, Oral, Nightly      nicotine 21 MG/24HR patch  Commonly known as:  NICODERM CQ   1 patch, Transdermal, Every 24 Hours      sennosides-docusate sodium 8.6-50 MG tablet  Commonly known as:  SENOKOT-S   2 tablets, Oral, Nightly         Stop These Medications    budesonide-formoterol 160-4.5 MCG/ACT inhaler  Commonly known as:  SYMBICORT     HYDROcodone-acetaminophen  MG per tablet  Commonly known as:  NORCO     pantoprazole 40 MG EC tablet  Commonly known as:  PROTONIX     tiotropium 18 MCG per inhalation capsule  Commonly known as:  SPIRIVA               Discharge Disposition:  Skilled Nursing Facility (DC - External)    Discharge Diet:  Diet Instructions     Diet: Regular, Cardiac; Thin       Discharge Diet:   Regular  Cardiac       Fluid Consistency:  Thin          Discharge Activity:  Activity Instructions     Activity as Tolerated       Measure Blood Pressure             No future appointments.    Additional Instructions for the Follow-ups that You Need to Schedule     Discharge Follow-up with PCP    As directed      Currently Documented PCP:  Provider, No Known  PCP Phone Number:  [phone]    Follow Up Details:  To be seen by provider at SNF in 24-48 hrs, PCP 1 week of dc         Discharge Follow-up with Specialty: Cardiology in 6 weeks per their recs (please call for appt today prior to dc)    As directed      Specialty:  Cardiology in 6 weeks per their recs (please call for appt today prior to dc)         Discharge Follow-up with Specified Provider: Dr Belle per his recs (please call for appt prior to dc today)    As directed      To:  Dr Belle per his recs (please call for appt prior to dc today)         Discharge Follow-up with Specified Provider: Palliative care to eval and follow at SNF    As directed      To:  Palliative care to eval and follow at SNF                 Time Spent on Discharge:  55 minutes    Electronically signed by Steffanie Rice, BRITTNY, 10/22/18, 12:51 PM.

## 2018-10-22 NOTE — PROGRESS NOTES
[facility] Progress Note    Admission Date: 10/2/2018    Yassine Love  1945  [id number]    Date: 10/22/2018    Antibiotics:  Anti-Infectives     Ordered     Dose/Rate Route Frequency Start Stop    10/18/18 1702  amoxicillin (AMOXIL) capsule 1,000 mg     Ordering Provider:  Parish Belle MD    1,000 mg Oral Every 12 Hours Scheduled 10/18/18 2100 10/25/18 2059    10/13/18 1318  meropenem (MERREM) 1 g/100 mL 0.9% NS VTB (mbp)     Mary Kate Rahman MD reviewed the order on 10/13/18 1440.   Ordering Provider:  Mary Kate Rahman MD    1 g  over 3 Hours Intravenous Every 8 Hours Scheduled 10/13/18 1400 10/15/18 0813    10/04/18 1629  Linezolid (ZYVOX) 600 mg 300 mL     Mary Kate Rahman MD reviewed the order on 10/08/18 1301.   Ordering Provider:  Mary Kate Rahman MD    600 mg  300 mL/hr over 60 Minutes Intravenous Every 12 Hours 10/04/18 1800 10/13/18 0623    10/03/18 0229  meropenem (MERREM) 1 g/100 mL 0.9% NS VTB (mbp)     Ordering Provider:  Perla Jones APRN    1 g  over 3 Hours Intravenous Every 8 Hours 10/03/18 1000 10/13/18 0556    10/03/18 0914  vancomycin 1750 mg/500 mL 0.9% NS IVPB ([facility])     Ordering Provider:  Parish Donato MD    1,750 mg  over 120 Minutes Intravenous Once 10/03/18 1000 10/03/18 1203    10/03/18 0229  meropenem (MERREM) 1 g/100 mL 0.9% NS VTB (mbp)     Ordering Provider:  Perla Jones APRN    1 g  over 30 Minutes Intravenous Once 10/03/18 0315 10/03/18 0313    10/02/18 2108  piperacillin-tazobactam (ZOSYN) 4.5 g in iso-osmotic dextrose 100 mL IVPB (premix)     Ordering Provider:  Pastora Caldwell DO    4.5 g  over 30 Minutes Intravenous Once 10/02/18 2108 10/03/18 0114           Reason for Consultation: bilateral leg cellulitis,  bronchitis      History of present illness:    Patient is a 72 y.o. male well known to Dr Belle from prior admissions. Dr Belle has primarily followed for cellulitis  He was hospitalized 8/21 to 9/7 and Dr Belle followed for cellulitis complicating  bilateral leg ulcers   This admission on  10/2/18 is for  for COPD exacerbation and severe hypoxia. He iwas transferred from a  skilled nursing facility with hypoxemia and visual hallucinations.NC. EMS was called and Mr. Love was  CTA was negative for pulmonary embolism but  pulmonary edema with diffuse interstitial infiltrates and pleural effusions. BNP was 899 and procalcitonin normal on 10/2 and 10/4. He required mechanical ventilation  Sputum culture grew MSSA  He has been treated with zyvox and merrrem  He improved after diuresis and was extubated  The leg wounds present 2 months ago have healed He has some residual erythema and states that his legs are painful at times  He is known to have severe PVD     10/10  More alert this am; intermittent cough perisists  Denies leg pain  10/11 alert, no new c/o, denies leg pain  10/15/18 - Patient is sleeping.  ROS discussed with staff at bedside.  Currently on 3LNC.  Co weakness  No fever or chills  Co dyspnea  Co cough  10/16/18 - No history per patient.  ROS discussed with staff.    Seen and agree  10/17/18 - Patient is sleeping.  ROS discussed with RN.  Bumped oxygen to 4L due to patient complaining of mild shortness of air.  No fever.  Seen and agree    10/18/18 - Complains of weakness, back pain, joint pain. No fevers or chills. Complains of shortness of breath.   10/19 - No Hx available  ROS discussed with staff  10/22/18 - Patient is concerned about desaturation with ambulation.  Possible transfer to SNF.  No fever.  No nausea, vomiting, or diarrhea.   Seen and agree    ROS: See above      PE:  Vital Signs  Temp  Min: 97.4 °F (36.3 °C)  Max: 98.5 °F (36.9 °C)  BP  Min: 100/57  Max: 115/65  Pulse  Min: 67  Max: 84  Resp  Min: 16  Max: 20  SpO2  Min: 90 %  Max: 96 %  GENERAL:Alert, OOB in chair, NAD  HEENT: Normocephalic, atraumatic.  PERRL.    LYMPH: No cervical lymphadenopathy.  HEART: Regular S1S2  LUNGS: Diminished throughout today on exam, on 4LNC.    ABDOMEN: Soft, nontender, nondistended. Positive bowel sounds.   EXT:  Moderate edema bilateral legs ulcerations are much improved.  SKIN: Warm and dry without cutaneous eruptions on Inspection/palpation.    NEURO: A&O today on exam  Seen and agree         Laboratory Data      Results from last 7 days  Lab Units 10/22/18  1001 10/20/18  1313   WBC 10*3/mm3 9.19 7.48   HEMOGLOBIN g/dL 8.7* 8.9*   HEMATOCRIT % 29.4* 29.7*   PLATELETS 10*3/mm3 139* 163       Results from last 7 days  Lab Units 10/22/18  1001   SODIUM mmol/L 142   POTASSIUM mmol/L 4.7   CHLORIDE mmol/L 100   CO2 mmol/L 40.0*   BUN mg/dL 30*   CREATININE mg/dL 1.19   GLUCOSE mg/dL 112*   CALCIUM mg/dL 8.9       Results from last 7 days  Lab Units 10/20/18  0922   ALK PHOS U/L 98   BILIRUBIN mg/dL 0.6   ALT (SGPT) U/L 24   AST (SGOT) U/L 18               Estimated Creatinine Clearance: 57.6 mL/min (by C-G formula based on SCr of 1.19 mg/dL).      Microbiology:  mssa from sputum;  mrsa from nares swab    Radiology:  Imaging Results (last 72 hours)     Procedure Component Value Units Date/Time    XR Chest 1 View [[id number]] Collected:  10/06/18 1115     Updated:  10/06/18 1355    Narrative:       EXAMINATION: XR CHEST 1 VW - 10/06/2018     INDICATION: J96.21-Acute and chronic respiratory failure with hypoxia;  J96.22-Acute and chronic respiratory failure with hypercapnia;  Z74.09-Other reduced mobility.     TECHNIQUE:  Single view frontal chest.     COMPARISONS: 10/5/2018.     FINDINGS:  Stable support apparatus. Small bilateral pleural effusions  and adjacent atelectasis are unchanged. No pneumothorax. Calcification  aortic knob. Cardiomediastinal silhouette is unchanged.        Impression:       Stable exam.     DICTATED:   10/06/2018  EDITED/ls :   10/06/2018      This report was finalized on 10/6/2018 1:53 PM by Eliezer Hale.             I personally reviewed the radiographic studies     IMPRESSION:    -- Probable MSSA bronchitis  Vs pneumonia, CXR  10/16/18 - with persistent bibasilar opacities.       -- Acute on chronic mixed respiratory failure  S/p intubation, now extubated.       -- Pulmonary edema- improved     -- Chronic lymphedema and venous stasis dermatitis     -- Bilateral leg cellulitis essentially resolved     -- Severe COPD with ongoing tobacco abuse, poor pulmonary toilet     -- RAFAEL     -- Decubitus ulcer of buttock stage 2     --MRSA colonization    --DNR/DNI       RECOMMENDATIONS:      -- continue  Amoxicillin through scheduled date     UM awaiting placement options.        BRITTNY Cotto for Dr. Parish Belle  10/22/2018

## 2018-10-22 NOTE — PROGRESS NOTES
Continued Stay Note  [facility]     Patient Name: Yassine Love  MRN: [id number]  Today's Date: 10/22/2018    Admit Date: 10/2/2018          Discharge Plan     Row Name 10/22/18 1038       Plan    Plan TBD    Patient/Family in Agreement with Plan yes    Plan Comments Spoke to Kristina at [facility] and she would like to verify pt's oxygen needs with therapy to make sure he does not go above 5L before they can accept.  When referral was made pt was on 3L.  Pt has been on 4L oxygen since 10/17. PT to work with pt this AM and obtain O2 sats with PT. Pt has ambulance today at 1400. CM will cont to follow.     Row Name 10/22/18 0941       Plan    Plan TBD    Patient/Family in Agreement with Plan yes    Plan Comments Received vm from Kristina at [facility] with concerns that pt is only oriented x1 and on O2 at 5L. [facility] will have to approve acceptance on 5L due to that is the max amount of liter flow they can accept. CM will discuss in rounds and speak to Kristina after.               Discharge Codes    No documentation.       Expected Discharge Date and Time     Expected Discharge Date Expected Discharge Time    Oct 22, 2018             Yumiko Hull

## 2018-10-26 ENCOUNTER — APPOINTMENT (OUTPATIENT)
Dept: GENERAL RADIOLOGY | Facility: HOSPITAL | Age: 73
End: 2018-10-26

## 2018-10-26 ENCOUNTER — HOSPITAL ENCOUNTER (INPATIENT)
Facility: HOSPITAL | Age: 73
LOS: 7 days | Discharge: NURSING, REHAB OR LONG-TERM CARE | End: 2018-11-02
Attending: EMERGENCY MEDICINE | Admitting: HOSPITALIST

## 2018-10-26 DIAGNOSIS — Z74.09 IMPAIRED FUNCTIONAL MOBILITY, BALANCE, GAIT, AND ENDURANCE: ICD-10-CM

## 2018-10-26 DIAGNOSIS — J96.21 ACUTE AND CHRONIC RESPIRATORY FAILURE WITH HYPOXIA (HCC): Primary | ICD-10-CM

## 2018-10-26 DIAGNOSIS — J44.1 COPD EXACERBATION (HCC): ICD-10-CM

## 2018-10-26 PROBLEM — N28.9 RENAL INSUFFICIENCY: Status: ACTIVE | Noted: 2018-10-26

## 2018-10-26 PROBLEM — D63.8 ANEMIA, CHRONIC DISEASE: Status: ACTIVE | Noted: 2018-10-26

## 2018-10-26 LAB
ALBUMIN SERPL-MCNC: 3.46 G/DL (ref 3.2–4.8)
ALBUMIN/GLOB SERPL: 1.3 G/DL (ref 1.5–2.5)
ALP SERPL-CCNC: 114 U/L (ref 25–100)
ALT SERPL W P-5'-P-CCNC: 20 U/L (ref 7–40)
ANION GAP SERPL CALCULATED.3IONS-SCNC: 7 MMOL/L (ref 3–11)
ARTERIAL PATENCY WRIST A: ABNORMAL
AST SERPL-CCNC: 19 U/L (ref 0–33)
ATMOSPHERIC PRESS: ABNORMAL MMHG
BASE EXCESS BLDA CALC-SCNC: 14.4 MMOL/L (ref 0–2)
BASOPHILS # BLD AUTO: 0.01 10*3/MM3 (ref 0–0.2)
BASOPHILS NFR BLD AUTO: 0.2 % (ref 0–1)
BDY SITE: ABNORMAL
BILIRUB SERPL-MCNC: 0.4 MG/DL (ref 0.3–1.2)
BNP SERPL-MCNC: 114 PG/ML (ref 0–100)
BODY TEMPERATURE: 37 C
BUN BLD-MCNC: 22 MG/DL (ref 9–23)
BUN/CREAT SERPL: 17.5 (ref 7–25)
CALCIUM SPEC-SCNC: 8.7 MG/DL (ref 8.7–10.4)
CHLORIDE SERPL-SCNC: 96 MMOL/L (ref 99–109)
CO2 SERPL-SCNC: 35 MMOL/L (ref 20–31)
COHGB MFR BLD: 2.2 % (ref 0–2)
CREAT BLD-MCNC: 1.26 MG/DL (ref 0.6–1.3)
DEPRECATED RDW RBC AUTO: 64 FL (ref 37–54)
EOSINOPHIL # BLD AUTO: 0.58 10*3/MM3 (ref 0–0.3)
EOSINOPHIL NFR BLD AUTO: 9.6 % (ref 0–3)
ERYTHROCYTE [DISTWIDTH] IN BLOOD BY AUTOMATED COUNT: 18.6 % (ref 11.3–14.5)
GFR SERPL CREATININE-BSD FRML MDRD: 56 ML/MIN/1.73
GLOBULIN UR ELPH-MCNC: 2.7 GM/DL
GLUCOSE BLD-MCNC: 104 MG/DL (ref 70–100)
HCO3 BLDA-SCNC: 40.6 MMOL/L (ref 20–26)
HCT VFR BLD AUTO: 29 % (ref 38.9–50.9)
HCT VFR BLD CALC: 25 %
HGB BLD-MCNC: 8.8 G/DL (ref 13.1–17.5)
HGB BLDA-MCNC: 8.1 G/DL (ref 13.5–17.5)
HOROWITZ INDEX BLD+IHG-RTO: 36 %
IMM GRANULOCYTES # BLD: 0.02 10*3/MM3 (ref 0–0.03)
IMM GRANULOCYTES NFR BLD: 0.3 % (ref 0–0.6)
LYMPHOCYTES # BLD AUTO: 0.64 10*3/MM3 (ref 0.6–4.8)
LYMPHOCYTES NFR BLD AUTO: 10.6 % (ref 24–44)
MCH RBC QN AUTO: 29.3 PG (ref 27–31)
MCHC RBC AUTO-ENTMCNC: 30.3 G/DL (ref 32–36)
MCV RBC AUTO: 96.7 FL (ref 80–99)
METHGB BLD QL: 1.1 % (ref 0–1.5)
MODALITY: ABNORMAL
MONOCYTES # BLD AUTO: 0.51 10*3/MM3 (ref 0–1)
MONOCYTES NFR BLD AUTO: 8.4 % (ref 0–12)
NEUTROPHILS # BLD AUTO: 4.3 10*3/MM3 (ref 1.5–8.3)
NEUTROPHILS NFR BLD AUTO: 70.9 % (ref 41–71)
NOTE: ABNORMAL
OXYHGB MFR BLDV: 84.3 % (ref 94–99)
PCO2 BLDA: 62.1 MM HG
PCO2 TEMP ADJ BLD: 62.1 MM HG (ref 35–48)
PH BLDA: 7.42 PH UNITS (ref 7.35–7.45)
PH, TEMP CORRECTED: 7.42 PH UNITS
PLATELET # BLD AUTO: 221 10*3/MM3 (ref 150–450)
PMV BLD AUTO: 10.2 FL (ref 6–12)
PO2 BLDA: 56.4 MM HG (ref 83–108)
PO2 TEMP ADJ BLD: 56.4 MM HG (ref 83–108)
POTASSIUM BLD-SCNC: 3.9 MMOL/L (ref 3.5–5.5)
PROT SERPL-MCNC: 6.2 G/DL (ref 5.7–8.2)
RBC # BLD AUTO: 3 10*6/MM3 (ref 4.2–5.76)
SODIUM BLD-SCNC: 138 MMOL/L (ref 132–146)
TROPONIN I SERPL-MCNC: 0.01 NG/ML
WBC NRBC COR # BLD: 6.06 10*3/MM3 (ref 3.5–10.8)

## 2018-10-26 PROCEDURE — 25010000002 HEPARIN (PORCINE) PER 1000 UNITS: Performed by: NURSE PRACTITIONER

## 2018-10-26 PROCEDURE — 63710000001 MIRTAZAPINE 15 MG TABLET DISPERSIBLE: Performed by: NURSE PRACTITIONER

## 2018-10-26 PROCEDURE — 25010000002 PIPERACILLIN SOD-TAZOBACTAM PER 1 G: Performed by: EMERGENCY MEDICINE

## 2018-10-26 PROCEDURE — A9270 NON-COVERED ITEM OR SERVICE: HCPCS | Performed by: NURSE PRACTITIONER

## 2018-10-26 PROCEDURE — 25010000002 VANCOMYCIN 10 G RECONSTITUTED SOLUTION: Performed by: EMERGENCY MEDICINE

## 2018-10-26 PROCEDURE — 63710000001 GUAIFENESIN 600 MG TABLET SUSTAINED-RELEASE 12 HOUR: Performed by: NURSE PRACTITIONER

## 2018-10-26 PROCEDURE — 25010000002 METHYLPREDNISOLONE PER 125 MG: Performed by: EMERGENCY MEDICINE

## 2018-10-26 PROCEDURE — 99223 1ST HOSP IP/OBS HIGH 75: CPT | Performed by: HOSPITALIST

## 2018-10-26 PROCEDURE — 63710000001 NICOTINE 21 MG/24HR PATCH 24 HOUR: Performed by: NURSE PRACTITIONER

## 2018-10-26 PROCEDURE — 94660 CPAP INITIATION&MGMT: CPT

## 2018-10-26 PROCEDURE — 94640 AIRWAY INHALATION TREATMENT: CPT

## 2018-10-26 PROCEDURE — 63710000001 FAMOTIDINE 20 MG TABLET: Performed by: NURSE PRACTITIONER

## 2018-10-26 PROCEDURE — 25010000002 FUROSEMIDE PER 20 MG: Performed by: NURSE PRACTITIONER

## 2018-10-26 PROCEDURE — 63710000001 SENNOSIDES-DOCUSATE SODIUM 8.6-50 MG TABLET: Performed by: NURSE PRACTITIONER

## 2018-10-26 PROCEDURE — 85025 COMPLETE CBC W/AUTO DIFF WBC: CPT | Performed by: EMERGENCY MEDICINE

## 2018-10-26 PROCEDURE — 36600 WITHDRAWAL OF ARTERIAL BLOOD: CPT

## 2018-10-26 PROCEDURE — 87040 BLOOD CULTURE FOR BACTERIA: CPT | Performed by: EMERGENCY MEDICINE

## 2018-10-26 PROCEDURE — 94760 N-INVAS EAR/PLS OXIMETRY 1: CPT

## 2018-10-26 PROCEDURE — 82805 BLOOD GASES W/O2 SATURATION: CPT

## 2018-10-26 PROCEDURE — 83880 ASSAY OF NATRIURETIC PEPTIDE: CPT | Performed by: EMERGENCY MEDICINE

## 2018-10-26 PROCEDURE — 80053 COMPREHEN METABOLIC PANEL: CPT | Performed by: EMERGENCY MEDICINE

## 2018-10-26 PROCEDURE — 94799 UNLISTED PULMONARY SVC/PX: CPT

## 2018-10-26 PROCEDURE — 63710000001 MORPHINE 15 MG TABLET CONTROLLED-RELEASE: Performed by: NURSE PRACTITIONER

## 2018-10-26 PROCEDURE — 63710000001 BUDESONIDE-FORMOTEROL 80-4.5 MCG/ACT AEROSOL 6.9 G INHALER: Performed by: NURSE PRACTITIONER

## 2018-10-26 PROCEDURE — 99285 EMERGENCY DEPT VISIT HI MDM: CPT

## 2018-10-26 PROCEDURE — 63710000001 GABAPENTIN 100 MG CAPSULE: Performed by: NURSE PRACTITIONER

## 2018-10-26 PROCEDURE — 63710000001 ATORVASTATIN 40 MG TABLET: Performed by: NURSE PRACTITIONER

## 2018-10-26 PROCEDURE — 84484 ASSAY OF TROPONIN QUANT: CPT | Performed by: EMERGENCY MEDICINE

## 2018-10-26 PROCEDURE — 71045 X-RAY EXAM CHEST 1 VIEW: CPT

## 2018-10-26 PROCEDURE — 63710000001 METOPROLOL TARTRATE 25 MG TABLET: Performed by: NURSE PRACTITIONER

## 2018-10-26 PROCEDURE — 93005 ELECTROCARDIOGRAM TRACING: CPT | Performed by: EMERGENCY MEDICINE

## 2018-10-26 RX ORDER — ACETAMINOPHEN 325 MG/1
325 TABLET ORAL EVERY 6 HOURS PRN
Status: DISCONTINUED | OUTPATIENT
Start: 2018-10-26 | End: 2018-11-02 | Stop reason: HOSPADM

## 2018-10-26 RX ORDER — BISACODYL 10 MG
10 SUPPOSITORY, RECTAL RECTAL DAILY PRN
Status: DISCONTINUED | OUTPATIENT
Start: 2018-10-26 | End: 2018-11-02 | Stop reason: HOSPADM

## 2018-10-26 RX ORDER — FUROSEMIDE 40 MG/1
40 TABLET ORAL
Status: DISCONTINUED | OUTPATIENT
Start: 2018-10-27 | End: 2018-11-02 | Stop reason: HOSPADM

## 2018-10-26 RX ORDER — GUAIFENESIN 600 MG/1
1200 TABLET, EXTENDED RELEASE ORAL EVERY 12 HOURS SCHEDULED
Status: COMPLETED | OUTPATIENT
Start: 2018-10-26 | End: 2018-10-29

## 2018-10-26 RX ORDER — BUDESONIDE AND FORMOTEROL FUMARATE DIHYDRATE 80; 4.5 UG/1; UG/1
2 AEROSOL RESPIRATORY (INHALATION)
Status: DISCONTINUED | OUTPATIENT
Start: 2018-10-26 | End: 2018-11-01

## 2018-10-26 RX ORDER — IPRATROPIUM BROMIDE AND ALBUTEROL SULFATE 2.5; .5 MG/3ML; MG/3ML
3 SOLUTION RESPIRATORY (INHALATION)
Status: DISCONTINUED | OUTPATIENT
Start: 2018-10-26 | End: 2018-11-02 | Stop reason: HOSPADM

## 2018-10-26 RX ORDER — CALCIUM CARBONATE 200(500)MG
2 TABLET,CHEWABLE ORAL 2 TIMES DAILY PRN
Status: DISCONTINUED | OUTPATIENT
Start: 2018-10-26 | End: 2018-11-02 | Stop reason: HOSPADM

## 2018-10-26 RX ORDER — FLUTICASONE PROPIONATE 50 MCG
2 SPRAY, SUSPENSION (ML) NASAL DAILY
Status: DISCONTINUED | OUTPATIENT
Start: 2018-10-27 | End: 2018-11-02 | Stop reason: HOSPADM

## 2018-10-26 RX ORDER — BENZONATATE 100 MG/1
200 CAPSULE ORAL 3 TIMES DAILY PRN
Status: DISCONTINUED | OUTPATIENT
Start: 2018-10-26 | End: 2018-11-02 | Stop reason: HOSPADM

## 2018-10-26 RX ORDER — LANOLIN ALCOHOL/MO/W.PET/CERES
1000 CREAM (GRAM) TOPICAL DAILY
Status: DISCONTINUED | OUTPATIENT
Start: 2018-10-27 | End: 2018-11-02 | Stop reason: HOSPADM

## 2018-10-26 RX ORDER — MORPHINE SULFATE 15 MG/1
15 TABLET, FILM COATED, EXTENDED RELEASE ORAL EVERY 12 HOURS SCHEDULED
Status: DISCONTINUED | OUTPATIENT
Start: 2018-10-26 | End: 2018-11-02 | Stop reason: HOSPADM

## 2018-10-26 RX ORDER — L.ACID,PARA/B.BIFIDUM/S.THERM 8B CELL
1 CAPSULE ORAL DAILY
Status: DISCONTINUED | OUTPATIENT
Start: 2018-10-27 | End: 2018-11-02 | Stop reason: HOSPADM

## 2018-10-26 RX ORDER — ONDANSETRON 2 MG/ML
4 INJECTION INTRAMUSCULAR; INTRAVENOUS EVERY 6 HOURS PRN
Status: DISCONTINUED | OUTPATIENT
Start: 2018-10-26 | End: 2018-11-02 | Stop reason: HOSPADM

## 2018-10-26 RX ORDER — NICOTINE 21 MG/24HR
1 PATCH, TRANSDERMAL 24 HOURS TRANSDERMAL EVERY 24 HOURS
Status: DISCONTINUED | OUTPATIENT
Start: 2018-10-26 | End: 2018-11-02 | Stop reason: HOSPADM

## 2018-10-26 RX ORDER — SODIUM CHLORIDE 0.9 % (FLUSH) 0.9 %
3 SYRINGE (ML) INJECTION EVERY 12 HOURS SCHEDULED
Status: DISCONTINUED | OUTPATIENT
Start: 2018-10-26 | End: 2018-11-02 | Stop reason: HOSPADM

## 2018-10-26 RX ORDER — FAMOTIDINE 20 MG/1
20 TABLET, FILM COATED ORAL 2 TIMES DAILY
Status: DISCONTINUED | OUTPATIENT
Start: 2018-10-26 | End: 2018-11-02 | Stop reason: HOSPADM

## 2018-10-26 RX ORDER — ASPIRIN 81 MG/1
81 TABLET ORAL DAILY
Status: DISCONTINUED | OUTPATIENT
Start: 2018-10-27 | End: 2018-11-02 | Stop reason: HOSPADM

## 2018-10-26 RX ORDER — SENNA AND DOCUSATE SODIUM 50; 8.6 MG/1; MG/1
2 TABLET, FILM COATED ORAL NIGHTLY
Status: DISCONTINUED | OUTPATIENT
Start: 2018-10-26 | End: 2018-11-02 | Stop reason: HOSPADM

## 2018-10-26 RX ORDER — ALBUTEROL SULFATE 2.5 MG/3ML
2.5 SOLUTION RESPIRATORY (INHALATION) EVERY 6 HOURS PRN
Status: DISCONTINUED | OUTPATIENT
Start: 2018-10-26 | End: 2018-11-02 | Stop reason: HOSPADM

## 2018-10-26 RX ORDER — SODIUM CHLORIDE 0.9 % (FLUSH) 0.9 %
3-10 SYRINGE (ML) INJECTION AS NEEDED
Status: DISCONTINUED | OUTPATIENT
Start: 2018-10-26 | End: 2018-11-02 | Stop reason: HOSPADM

## 2018-10-26 RX ORDER — GABAPENTIN 100 MG/1
200 CAPSULE ORAL EVERY 8 HOURS SCHEDULED
Status: DISCONTINUED | OUTPATIENT
Start: 2018-10-26 | End: 2018-11-02 | Stop reason: HOSPADM

## 2018-10-26 RX ORDER — ALBUTEROL SULFATE 2.5 MG/3ML
2.5 SOLUTION RESPIRATORY (INHALATION) ONCE
Status: COMPLETED | OUTPATIENT
Start: 2018-10-26 | End: 2018-10-26

## 2018-10-26 RX ORDER — GUAIFENESIN 600 MG/1
1200 TABLET, EXTENDED RELEASE ORAL ONCE
Status: COMPLETED | OUTPATIENT
Start: 2018-10-26 | End: 2018-10-26

## 2018-10-26 RX ORDER — HEPARIN SODIUM 5000 [USP'U]/ML
5000 INJECTION, SOLUTION INTRAVENOUS; SUBCUTANEOUS EVERY 8 HOURS SCHEDULED
Status: DISCONTINUED | OUTPATIENT
Start: 2018-10-26 | End: 2018-10-27

## 2018-10-26 RX ORDER — ATORVASTATIN CALCIUM 40 MG/1
40 TABLET, FILM COATED ORAL NIGHTLY
Status: DISCONTINUED | OUTPATIENT
Start: 2018-10-26 | End: 2018-11-02 | Stop reason: HOSPADM

## 2018-10-26 RX ORDER — MIRTAZAPINE 15 MG/1
15 TABLET, ORALLY DISINTEGRATING ORAL NIGHTLY
Status: DISCONTINUED | OUTPATIENT
Start: 2018-10-26 | End: 2018-11-02 | Stop reason: HOSPADM

## 2018-10-26 RX ORDER — METHYLPREDNISOLONE SODIUM SUCCINATE 40 MG/ML
30 INJECTION, POWDER, LYOPHILIZED, FOR SOLUTION INTRAMUSCULAR; INTRAVENOUS EVERY 12 HOURS
Status: DISCONTINUED | OUTPATIENT
Start: 2018-10-27 | End: 2018-10-28

## 2018-10-26 RX ORDER — FUROSEMIDE 10 MG/ML
40 INJECTION INTRAMUSCULAR; INTRAVENOUS ONCE
Status: COMPLETED | OUTPATIENT
Start: 2018-10-26 | End: 2018-10-26

## 2018-10-26 RX ORDER — LACTULOSE 10 G/15ML
20 SOLUTION ORAL 2 TIMES DAILY PRN
Status: DISCONTINUED | OUTPATIENT
Start: 2018-10-26 | End: 2018-11-02 | Stop reason: HOSPADM

## 2018-10-26 RX ORDER — METHYLPREDNISOLONE SODIUM SUCCINATE 125 MG/2ML
125 INJECTION, POWDER, LYOPHILIZED, FOR SOLUTION INTRAMUSCULAR; INTRAVENOUS ONCE
Status: COMPLETED | OUTPATIENT
Start: 2018-10-26 | End: 2018-10-26

## 2018-10-26 RX ORDER — CLOPIDOGREL BISULFATE 75 MG/1
75 TABLET ORAL DAILY
Status: DISCONTINUED | OUTPATIENT
Start: 2018-10-27 | End: 2018-11-02 | Stop reason: HOSPADM

## 2018-10-26 RX ADMIN — ALBUTEROL SULFATE 2.5 MG: 2.5 SOLUTION RESPIRATORY (INHALATION) at 14:09

## 2018-10-26 RX ADMIN — METHYLPREDNISOLONE SODIUM SUCCINATE 125 MG: 125 INJECTION, POWDER, FOR SOLUTION INTRAMUSCULAR; INTRAVENOUS at 16:25

## 2018-10-26 RX ADMIN — GUAIFENESIN 1200 MG: 600 TABLET, EXTENDED RELEASE ORAL at 16:24

## 2018-10-26 RX ADMIN — ATORVASTATIN CALCIUM 40 MG: 40 TABLET, FILM COATED ORAL at 20:54

## 2018-10-26 RX ADMIN — METOPROLOL TARTRATE 25 MG: 25 TABLET ORAL at 20:55

## 2018-10-26 RX ADMIN — BUDESONIDE AND FORMOTEROL FUMARATE DIHYDRATE 2 PUFF: 80; 4.5 AEROSOL RESPIRATORY (INHALATION) at 20:41

## 2018-10-26 RX ADMIN — MIRTAZAPINE 15 MG: 15 TABLET, ORALLY DISINTEGRATING ORAL at 20:57

## 2018-10-26 RX ADMIN — NICOTINE 1 PATCH: 21 PATCH, EXTENDED RELEASE TRANSDERMAL at 20:54

## 2018-10-26 RX ADMIN — Medication 3 ML: at 20:35

## 2018-10-26 RX ADMIN — IPRATROPIUM BROMIDE AND ALBUTEROL SULFATE 3 ML: 2.5; .5 SOLUTION RESPIRATORY (INHALATION) at 20:45

## 2018-10-26 RX ADMIN — GUAIFENESIN 1200 MG: 600 TABLET, EXTENDED RELEASE ORAL at 20:52

## 2018-10-26 RX ADMIN — SENNOSIDES AND DOCUSATE SODIUM 2 TABLET: 8.6; 5 TABLET ORAL at 20:52

## 2018-10-26 RX ADMIN — GABAPENTIN 200 MG: 100 CAPSULE ORAL at 20:54

## 2018-10-26 RX ADMIN — VANCOMYCIN HYDROCHLORIDE 1750 MG: 10 INJECTION, POWDER, LYOPHILIZED, FOR SOLUTION INTRAVENOUS at 17:58

## 2018-10-26 RX ADMIN — FAMOTIDINE 20 MG: 20 TABLET ORAL at 20:52

## 2018-10-26 RX ADMIN — FUROSEMIDE 40 MG: 10 INJECTION, SOLUTION INTRAMUSCULAR; INTRAVENOUS at 20:51

## 2018-10-26 RX ADMIN — HEPARIN SODIUM 5000 UNITS: 5000 INJECTION, SOLUTION INTRAVENOUS; SUBCUTANEOUS at 20:54

## 2018-10-26 RX ADMIN — TAZOBACTAM SODIUM AND PIPERACILLIN SODIUM 3.38 G: 375; 3 INJECTION, SOLUTION INTRAVENOUS at 16:35

## 2018-10-26 RX ADMIN — MORPHINE SULFATE 15 MG: 15 TABLET, EXTENDED RELEASE ORAL at 20:52

## 2018-10-27 LAB
BNP SERPL-MCNC: 225 PG/ML (ref 0–100)
DEPRECATED RDW RBC AUTO: 62 FL (ref 37–54)
ERYTHROCYTE [DISTWIDTH] IN BLOOD BY AUTOMATED COUNT: 18.3 % (ref 11.3–14.5)
HCT VFR BLD AUTO: 30.8 % (ref 38.9–50.9)
HGB BLD-MCNC: 9 G/DL (ref 13.1–17.5)
MAGNESIUM SERPL-MCNC: 1.9 MG/DL (ref 1.3–2.7)
MCH RBC QN AUTO: 27.7 PG (ref 27–31)
MCHC RBC AUTO-ENTMCNC: 29.2 G/DL (ref 32–36)
MCV RBC AUTO: 94.8 FL (ref 80–99)
PLATELET # BLD AUTO: 275 10*3/MM3 (ref 150–450)
PMV BLD AUTO: 10.7 FL (ref 6–12)
RBC # BLD AUTO: 3.25 10*6/MM3 (ref 4.2–5.76)
WBC NRBC COR # BLD: 3.67 10*3/MM3 (ref 3.5–10.8)

## 2018-10-27 PROCEDURE — 97162 PT EVAL MOD COMPLEX 30 MIN: CPT

## 2018-10-27 PROCEDURE — 63710000001 ASPIRIN 81 MG TABLET DELAYED-RELEASE: Performed by: NURSE PRACTITIONER

## 2018-10-27 PROCEDURE — 83735 ASSAY OF MAGNESIUM: CPT | Performed by: NURSE PRACTITIONER

## 2018-10-27 PROCEDURE — G8978 MOBILITY CURRENT STATUS: HCPCS

## 2018-10-27 PROCEDURE — 63710000001 MICONAZOLE 2 % POWDER 85 G BOTTLE: Performed by: INTERNAL MEDICINE

## 2018-10-27 PROCEDURE — 63710000001 ATORVASTATIN 40 MG TABLET: Performed by: NURSE PRACTITIONER

## 2018-10-27 PROCEDURE — 63710000001 GUAIFENESIN 600 MG TABLET SUSTAINED-RELEASE 12 HOUR: Performed by: NURSE PRACTITIONER

## 2018-10-27 PROCEDURE — 94799 UNLISTED PULMONARY SVC/PX: CPT

## 2018-10-27 PROCEDURE — 63710000001 CASTOR OIL-BALSAM PERU OINTMENT 60 G TUBE: Performed by: INTERNAL MEDICINE

## 2018-10-27 PROCEDURE — 63710000001 POLYETHYLENE GLYCOL PACK: Performed by: NURSE PRACTITIONER

## 2018-10-27 PROCEDURE — 63710000001 FLUTICASONE 50 MCG/ACT SUSPENSION 16 G BOTTLE: Performed by: NURSE PRACTITIONER

## 2018-10-27 PROCEDURE — 63710000001 GABAPENTIN 100 MG CAPSULE: Performed by: NURSE PRACTITIONER

## 2018-10-27 PROCEDURE — A9270 NON-COVERED ITEM OR SERVICE: HCPCS | Performed by: NURSE PRACTITIONER

## 2018-10-27 PROCEDURE — G8979 MOBILITY GOAL STATUS: HCPCS

## 2018-10-27 PROCEDURE — 63710000001 VITAMIN B-12 1000 MCG TABLET: Performed by: NURSE PRACTITIONER

## 2018-10-27 PROCEDURE — 63710000001 METOPROLOL TARTRATE 25 MG TABLET: Performed by: NURSE PRACTITIONER

## 2018-10-27 PROCEDURE — 63710000001 MIRTAZAPINE 15 MG TABLET DISPERSIBLE: Performed by: NURSE PRACTITIONER

## 2018-10-27 PROCEDURE — 63710000001 CLOPIDOGREL 75 MG TABLET: Performed by: NURSE PRACTITIONER

## 2018-10-27 PROCEDURE — A9270 NON-COVERED ITEM OR SERVICE: HCPCS | Performed by: INTERNAL MEDICINE

## 2018-10-27 PROCEDURE — 94660 CPAP INITIATION&MGMT: CPT

## 2018-10-27 PROCEDURE — 63710000001 LACTOBACILLUS ACIDOPHILUS CAPSULE: Performed by: NURSE PRACTITIONER

## 2018-10-27 PROCEDURE — 99233 SBSQ HOSP IP/OBS HIGH 50: CPT | Performed by: INTERNAL MEDICINE

## 2018-10-27 PROCEDURE — 63710000001 MORPHINE 15 MG TABLET CONTROLLED-RELEASE: Performed by: NURSE PRACTITIONER

## 2018-10-27 PROCEDURE — 63710000001 FUROSEMIDE 40 MG TABLET: Performed by: NURSE PRACTITIONER

## 2018-10-27 PROCEDURE — 94760 N-INVAS EAR/PLS OXIMETRY 1: CPT

## 2018-10-27 PROCEDURE — 25010000002 HEPARIN (PORCINE) PER 1000 UNITS: Performed by: INTERNAL MEDICINE

## 2018-10-27 PROCEDURE — 25010000002 METHYLPREDNISOLONE PER 40 MG: Performed by: NURSE PRACTITIONER

## 2018-10-27 PROCEDURE — 94640 AIRWAY INHALATION TREATMENT: CPT

## 2018-10-27 PROCEDURE — 63710000001 FAMOTIDINE 20 MG TABLET: Performed by: NURSE PRACTITIONER

## 2018-10-27 PROCEDURE — 97530 THERAPEUTIC ACTIVITIES: CPT

## 2018-10-27 PROCEDURE — 25010000002 HEPARIN (PORCINE) PER 1000 UNITS: Performed by: NURSE PRACTITIONER

## 2018-10-27 PROCEDURE — 83880 ASSAY OF NATRIURETIC PEPTIDE: CPT | Performed by: NURSE PRACTITIONER

## 2018-10-27 PROCEDURE — 63710000001 NICOTINE 21 MG/24HR PATCH 24 HOUR: Performed by: NURSE PRACTITIONER

## 2018-10-27 PROCEDURE — 85027 COMPLETE CBC AUTOMATED: CPT | Performed by: NURSE PRACTITIONER

## 2018-10-27 RX ORDER — CASTOR OIL AND BALSAM, PERU 788; 87 MG/G; MG/G
OINTMENT TOPICAL EVERY 12 HOURS SCHEDULED
Status: DISCONTINUED | OUTPATIENT
Start: 2018-10-27 | End: 2018-11-02 | Stop reason: HOSPADM

## 2018-10-27 RX ORDER — HEPARIN SODIUM 5000 [USP'U]/ML
5000 INJECTION, SOLUTION INTRAVENOUS; SUBCUTANEOUS EVERY 12 HOURS SCHEDULED
Status: DISCONTINUED | OUTPATIENT
Start: 2018-10-27 | End: 2018-11-02 | Stop reason: HOSPADM

## 2018-10-27 RX ORDER — BUMETANIDE 0.25 MG/ML
1 INJECTION INTRAMUSCULAR; INTRAVENOUS ONCE
Status: COMPLETED | OUTPATIENT
Start: 2018-10-27 | End: 2018-10-27

## 2018-10-27 RX ADMIN — MORPHINE SULFATE 15 MG: 15 TABLET, EXTENDED RELEASE ORAL at 21:01

## 2018-10-27 RX ADMIN — METHYLPREDNISOLONE SODIUM SUCCINATE 30 MG: 40 INJECTION, POWDER, FOR SOLUTION INTRAMUSCULAR; INTRAVENOUS at 05:01

## 2018-10-27 RX ADMIN — FUROSEMIDE 40 MG: 40 TABLET ORAL at 08:40

## 2018-10-27 RX ADMIN — BUDESONIDE AND FORMOTEROL FUMARATE DIHYDRATE 2 PUFF: 80; 4.5 AEROSOL RESPIRATORY (INHALATION) at 20:03

## 2018-10-27 RX ADMIN — MICONAZOLE NITRATE: 20 POWDER TOPICAL at 14:11

## 2018-10-27 RX ADMIN — IPRATROPIUM BROMIDE AND ALBUTEROL SULFATE 3 ML: 2.5; .5 SOLUTION RESPIRATORY (INHALATION) at 20:03

## 2018-10-27 RX ADMIN — METOPROLOL TARTRATE 25 MG: 25 TABLET ORAL at 05:00

## 2018-10-27 RX ADMIN — CYANOCOBALAMIN TAB 1000 MCG 1000 MCG: 1000 TAB at 08:40

## 2018-10-27 RX ADMIN — GUAIFENESIN 1200 MG: 600 TABLET, EXTENDED RELEASE ORAL at 21:01

## 2018-10-27 RX ADMIN — MIRTAZAPINE 15 MG: 15 TABLET, ORALLY DISINTEGRATING ORAL at 21:17

## 2018-10-27 RX ADMIN — FAMOTIDINE 20 MG: 20 TABLET ORAL at 08:39

## 2018-10-27 RX ADMIN — HEPARIN SODIUM 5000 UNITS: 5000 INJECTION, SOLUTION INTRAVENOUS; SUBCUTANEOUS at 05:01

## 2018-10-27 RX ADMIN — NICOTINE 1 PATCH: 21 PATCH, EXTENDED RELEASE TRANSDERMAL at 21:00

## 2018-10-27 RX ADMIN — HEPARIN SODIUM 5000 UNITS: 5000 INJECTION, SOLUTION INTRAVENOUS; SUBCUTANEOUS at 21:02

## 2018-10-27 RX ADMIN — CASTOR OIL AND BALSAM, PERU: 788; 87 OINTMENT TOPICAL at 14:11

## 2018-10-27 RX ADMIN — ATORVASTATIN CALCIUM 40 MG: 40 TABLET, FILM COATED ORAL at 21:02

## 2018-10-27 RX ADMIN — FLUTICASONE PROPIONATE 2 SPRAY: 50 SPRAY, METERED NASAL at 08:48

## 2018-10-27 RX ADMIN — ASPIRIN 81 MG: 81 TABLET, COATED ORAL at 08:40

## 2018-10-27 RX ADMIN — METOPROLOL TARTRATE 25 MG: 25 TABLET ORAL at 21:09

## 2018-10-27 RX ADMIN — IPRATROPIUM BROMIDE AND ALBUTEROL SULFATE 3 ML: 2.5; .5 SOLUTION RESPIRATORY (INHALATION) at 16:13

## 2018-10-27 RX ADMIN — MICONAZOLE NITRATE 1 APPLICATION: 20 POWDER TOPICAL at 21:12

## 2018-10-27 RX ADMIN — GABAPENTIN 200 MG: 100 CAPSULE ORAL at 21:00

## 2018-10-27 RX ADMIN — MORPHINE SULFATE 15 MG: 15 TABLET, EXTENDED RELEASE ORAL at 08:39

## 2018-10-27 RX ADMIN — METHYLPREDNISOLONE SODIUM SUCCINATE 30 MG: 40 INJECTION, POWDER, FOR SOLUTION INTRAMUSCULAR; INTRAVENOUS at 17:26

## 2018-10-27 RX ADMIN — Medication 1 CAPSULE: at 08:40

## 2018-10-27 RX ADMIN — POLYETHYLENE GLYCOL 3350 17 G: 17 POWDER, FOR SOLUTION ORAL at 08:40

## 2018-10-27 RX ADMIN — CASTOR OIL AND BALSAM, PERU 1 EACH: 788; 87 OINTMENT TOPICAL at 21:06

## 2018-10-27 RX ADMIN — GABAPENTIN 200 MG: 100 CAPSULE ORAL at 05:00

## 2018-10-27 RX ADMIN — IPRATROPIUM BROMIDE AND ALBUTEROL SULFATE 3 ML: 2.5; .5 SOLUTION RESPIRATORY (INHALATION) at 07:59

## 2018-10-27 RX ADMIN — GUAIFENESIN 1200 MG: 600 TABLET, EXTENDED RELEASE ORAL at 08:40

## 2018-10-27 RX ADMIN — FUROSEMIDE 40 MG: 40 TABLET ORAL at 17:27

## 2018-10-27 RX ADMIN — Medication 3 ML: at 21:13

## 2018-10-27 RX ADMIN — CLOPIDOGREL BISULFATE 75 MG: 75 TABLET ORAL at 08:40

## 2018-10-27 RX ADMIN — FAMOTIDINE 20 MG: 20 TABLET ORAL at 21:05

## 2018-10-27 RX ADMIN — GABAPENTIN 200 MG: 100 CAPSULE ORAL at 14:12

## 2018-10-27 RX ADMIN — BUMETANIDE 1 MG: 0.25 INJECTION INTRAMUSCULAR; INTRAVENOUS at 14:12

## 2018-10-27 RX ADMIN — METOPROLOL TARTRATE 25 MG: 25 TABLET ORAL at 14:12

## 2018-10-27 RX ADMIN — BUDESONIDE AND FORMOTEROL FUMARATE DIHYDRATE 2 PUFF: 80; 4.5 AEROSOL RESPIRATORY (INHALATION) at 07:59

## 2018-10-27 RX ADMIN — IPRATROPIUM BROMIDE AND ALBUTEROL SULFATE 3 ML: 2.5; .5 SOLUTION RESPIRATORY (INHALATION) at 11:48

## 2018-10-28 ENCOUNTER — APPOINTMENT (OUTPATIENT)
Dept: GENERAL RADIOLOGY | Facility: HOSPITAL | Age: 73
End: 2018-10-28

## 2018-10-28 LAB
ANION GAP SERPL CALCULATED.3IONS-SCNC: 7 MMOL/L (ref 3–11)
BUN BLD-MCNC: 38 MG/DL (ref 9–23)
BUN/CREAT SERPL: 32.2 (ref 7–25)
CALCIUM SPEC-SCNC: 8.2 MG/DL (ref 8.7–10.4)
CHLORIDE SERPL-SCNC: 96 MMOL/L (ref 99–109)
CO2 SERPL-SCNC: 36 MMOL/L (ref 20–31)
CREAT BLD-MCNC: 1.18 MG/DL (ref 0.6–1.3)
DEPRECATED RDW RBC AUTO: 62 FL (ref 37–54)
ERYTHROCYTE [DISTWIDTH] IN BLOOD BY AUTOMATED COUNT: 18.3 % (ref 11.3–14.5)
GFR SERPL CREATININE-BSD FRML MDRD: 61 ML/MIN/1.73
GLUCOSE BLD-MCNC: 151 MG/DL (ref 70–100)
HCT VFR BLD AUTO: 27 % (ref 38.9–50.9)
HGB BLD-MCNC: 8.1 G/DL (ref 13.1–17.5)
MAGNESIUM SERPL-MCNC: 1.9 MG/DL (ref 1.3–2.7)
MCH RBC QN AUTO: 28.7 PG (ref 27–31)
MCHC RBC AUTO-ENTMCNC: 30 G/DL (ref 32–36)
MCV RBC AUTO: 95.7 FL (ref 80–99)
PLATELET # BLD AUTO: 290 10*3/MM3 (ref 150–450)
PMV BLD AUTO: 10.4 FL (ref 6–12)
POTASSIUM BLD-SCNC: 3.6 MMOL/L (ref 3.5–5.5)
RBC # BLD AUTO: 2.82 10*6/MM3 (ref 4.2–5.76)
SODIUM BLD-SCNC: 139 MMOL/L (ref 132–146)
WBC NRBC COR # BLD: 4.92 10*3/MM3 (ref 3.5–10.8)

## 2018-10-28 PROCEDURE — 94760 N-INVAS EAR/PLS OXIMETRY 1: CPT

## 2018-10-28 PROCEDURE — 71045 X-RAY EXAM CHEST 1 VIEW: CPT

## 2018-10-28 PROCEDURE — 63710000001 ATORVASTATIN 40 MG TABLET: Performed by: NURSE PRACTITIONER

## 2018-10-28 PROCEDURE — A9270 NON-COVERED ITEM OR SERVICE: HCPCS | Performed by: NURSE PRACTITIONER

## 2018-10-28 PROCEDURE — 94640 AIRWAY INHALATION TREATMENT: CPT

## 2018-10-28 PROCEDURE — 63710000001 METOPROLOL TARTRATE 25 MG TABLET: Performed by: NURSE PRACTITIONER

## 2018-10-28 PROCEDURE — 99233 SBSQ HOSP IP/OBS HIGH 50: CPT | Performed by: INTERNAL MEDICINE

## 2018-10-28 PROCEDURE — 80048 BASIC METABOLIC PNL TOTAL CA: CPT | Performed by: INTERNAL MEDICINE

## 2018-10-28 PROCEDURE — 63710000001 GUAIFENESIN 600 MG TABLET SUSTAINED-RELEASE 12 HOUR: Performed by: NURSE PRACTITIONER

## 2018-10-28 PROCEDURE — 85027 COMPLETE CBC AUTOMATED: CPT | Performed by: INTERNAL MEDICINE

## 2018-10-28 PROCEDURE — 63710000001 MORPHINE 15 MG TABLET CONTROLLED-RELEASE: Performed by: NURSE PRACTITIONER

## 2018-10-28 PROCEDURE — 63710000001 NICOTINE 21 MG/24HR PATCH 24 HOUR: Performed by: NURSE PRACTITIONER

## 2018-10-28 PROCEDURE — 94799 UNLISTED PULMONARY SVC/PX: CPT

## 2018-10-28 PROCEDURE — 63710000001 VITAMIN B-12 1000 MCG TABLET: Performed by: NURSE PRACTITIONER

## 2018-10-28 PROCEDURE — 63710000001 FAMOTIDINE 20 MG TABLET: Performed by: NURSE PRACTITIONER

## 2018-10-28 PROCEDURE — 63710000001 MIRTAZAPINE 15 MG TABLET DISPERSIBLE: Performed by: NURSE PRACTITIONER

## 2018-10-28 PROCEDURE — 63710000001 FUROSEMIDE 40 MG TABLET: Performed by: NURSE PRACTITIONER

## 2018-10-28 PROCEDURE — 63710000001 ASPIRIN 81 MG TABLET DELAYED-RELEASE: Performed by: NURSE PRACTITIONER

## 2018-10-28 PROCEDURE — 63710000001 GABAPENTIN 100 MG CAPSULE: Performed by: NURSE PRACTITIONER

## 2018-10-28 PROCEDURE — 83735 ASSAY OF MAGNESIUM: CPT | Performed by: INTERNAL MEDICINE

## 2018-10-28 PROCEDURE — 25010000002 HEPARIN (PORCINE) PER 1000 UNITS: Performed by: INTERNAL MEDICINE

## 2018-10-28 PROCEDURE — 63710000001 SENNOSIDES-DOCUSATE SODIUM 8.6-50 MG TABLET: Performed by: NURSE PRACTITIONER

## 2018-10-28 PROCEDURE — 25010000002 METHYLPREDNISOLONE PER 40 MG: Performed by: NURSE PRACTITIONER

## 2018-10-28 PROCEDURE — 63710000001 CALCIUM CARBONATE 500 MG CHEWABLE TABLET 12 EACH PACKAGE: Performed by: NURSE PRACTITIONER

## 2018-10-28 PROCEDURE — 63710000001 CLOPIDOGREL 75 MG TABLET: Performed by: NURSE PRACTITIONER

## 2018-10-28 PROCEDURE — 63710000001 LACTOBACILLUS ACIDOPHILUS CAPSULE: Performed by: NURSE PRACTITIONER

## 2018-10-28 RX ORDER — BUMETANIDE 0.25 MG/ML
1 INJECTION INTRAMUSCULAR; INTRAVENOUS ONCE
Status: COMPLETED | OUTPATIENT
Start: 2018-10-28 | End: 2018-10-28

## 2018-10-28 RX ORDER — PREDNISONE 20 MG/1
40 TABLET ORAL
Status: DISCONTINUED | OUTPATIENT
Start: 2018-10-29 | End: 2018-10-29

## 2018-10-28 RX ADMIN — MICONAZOLE NITRATE 1 APPLICATION: 20 POWDER TOPICAL at 08:17

## 2018-10-28 RX ADMIN — CYANOCOBALAMIN TAB 1000 MCG 1000 MCG: 1000 TAB at 08:15

## 2018-10-28 RX ADMIN — IPRATROPIUM BROMIDE AND ALBUTEROL SULFATE 3 ML: 2.5; .5 SOLUTION RESPIRATORY (INHALATION) at 15:41

## 2018-10-28 RX ADMIN — GABAPENTIN 200 MG: 100 CAPSULE ORAL at 20:01

## 2018-10-28 RX ADMIN — METOPROLOL TARTRATE 25 MG: 25 TABLET ORAL at 16:30

## 2018-10-28 RX ADMIN — MIRTAZAPINE 15 MG: 15 TABLET, ORALLY DISINTEGRATING ORAL at 20:06

## 2018-10-28 RX ADMIN — IPRATROPIUM BROMIDE AND ALBUTEROL SULFATE 3 ML: 2.5; .5 SOLUTION RESPIRATORY (INHALATION) at 07:25

## 2018-10-28 RX ADMIN — GABAPENTIN 200 MG: 100 CAPSULE ORAL at 05:28

## 2018-10-28 RX ADMIN — FUROSEMIDE 40 MG: 40 TABLET ORAL at 17:48

## 2018-10-28 RX ADMIN — ATORVASTATIN CALCIUM 40 MG: 40 TABLET, FILM COATED ORAL at 20:01

## 2018-10-28 RX ADMIN — HEPARIN SODIUM 5000 UNITS: 5000 INJECTION, SOLUTION INTRAVENOUS; SUBCUTANEOUS at 20:01

## 2018-10-28 RX ADMIN — CASTOR OIL AND BALSAM, PERU: 788; 87 OINTMENT TOPICAL at 08:17

## 2018-10-28 RX ADMIN — BUDESONIDE AND FORMOTEROL FUMARATE DIHYDRATE 2 PUFF: 80; 4.5 AEROSOL RESPIRATORY (INHALATION) at 19:27

## 2018-10-28 RX ADMIN — GUAIFENESIN 1200 MG: 600 TABLET, EXTENDED RELEASE ORAL at 08:15

## 2018-10-28 RX ADMIN — GUAIFENESIN 1200 MG: 600 TABLET, EXTENDED RELEASE ORAL at 20:01

## 2018-10-28 RX ADMIN — MORPHINE SULFATE 15 MG: 15 TABLET, EXTENDED RELEASE ORAL at 20:01

## 2018-10-28 RX ADMIN — BUMETANIDE 1 MG: 0.25 INJECTION INTRAMUSCULAR; INTRAVENOUS at 20:02

## 2018-10-28 RX ADMIN — BUDESONIDE AND FORMOTEROL FUMARATE DIHYDRATE 2 PUFF: 80; 4.5 AEROSOL RESPIRATORY (INHALATION) at 07:26

## 2018-10-28 RX ADMIN — MORPHINE SULFATE 15 MG: 15 TABLET, EXTENDED RELEASE ORAL at 08:15

## 2018-10-28 RX ADMIN — FAMOTIDINE 20 MG: 20 TABLET ORAL at 20:02

## 2018-10-28 RX ADMIN — GABAPENTIN 200 MG: 100 CAPSULE ORAL at 16:31

## 2018-10-28 RX ADMIN — FUROSEMIDE 40 MG: 40 TABLET ORAL at 08:16

## 2018-10-28 RX ADMIN — METOPROLOL TARTRATE 25 MG: 25 TABLET ORAL at 05:28

## 2018-10-28 RX ADMIN — NICOTINE 1 PATCH: 21 PATCH, EXTENDED RELEASE TRANSDERMAL at 20:03

## 2018-10-28 RX ADMIN — FAMOTIDINE 20 MG: 20 TABLET ORAL at 08:15

## 2018-10-28 RX ADMIN — Medication 3 ML: at 20:03

## 2018-10-28 RX ADMIN — METHYLPREDNISOLONE SODIUM SUCCINATE 30 MG: 40 INJECTION, POWDER, FOR SOLUTION INTRAMUSCULAR; INTRAVENOUS at 05:28

## 2018-10-28 RX ADMIN — Medication 2 TABLET: at 03:39

## 2018-10-28 RX ADMIN — IPRATROPIUM BROMIDE AND ALBUTEROL SULFATE 3 ML: 2.5; .5 SOLUTION RESPIRATORY (INHALATION) at 12:15

## 2018-10-28 RX ADMIN — HEPARIN SODIUM 5000 UNITS: 5000 INJECTION, SOLUTION INTRAVENOUS; SUBCUTANEOUS at 08:16

## 2018-10-28 RX ADMIN — CASTOR OIL AND BALSAM, PERU: 788; 87 OINTMENT TOPICAL at 20:01

## 2018-10-28 RX ADMIN — Medication 1 CAPSULE: at 08:15

## 2018-10-28 RX ADMIN — CLOPIDOGREL BISULFATE 75 MG: 75 TABLET ORAL at 08:15

## 2018-10-28 RX ADMIN — MICONAZOLE NITRATE: 20 POWDER TOPICAL at 20:01

## 2018-10-28 RX ADMIN — IPRATROPIUM BROMIDE AND ALBUTEROL SULFATE 3 ML: 2.5; .5 SOLUTION RESPIRATORY (INHALATION) at 19:26

## 2018-10-28 RX ADMIN — METHYLPREDNISOLONE SODIUM SUCCINATE 30 MG: 40 INJECTION, POWDER, FOR SOLUTION INTRAMUSCULAR; INTRAVENOUS at 17:48

## 2018-10-28 RX ADMIN — ASPIRIN 81 MG: 81 TABLET, COATED ORAL at 08:15

## 2018-10-28 RX ADMIN — SENNOSIDES AND DOCUSATE SODIUM 2 TABLET: 8.6; 5 TABLET ORAL at 20:01

## 2018-10-28 RX ADMIN — Medication 3 ML: at 08:24

## 2018-10-29 LAB
ANION GAP SERPL CALCULATED.3IONS-SCNC: 7 MMOL/L (ref 3–11)
BUN BLD-MCNC: 41 MG/DL (ref 9–23)
BUN/CREAT SERPL: 31.8 (ref 7–25)
CALCIUM SPEC-SCNC: 8.1 MG/DL (ref 8.7–10.4)
CHLORIDE SERPL-SCNC: 97 MMOL/L (ref 99–109)
CO2 SERPL-SCNC: 36 MMOL/L (ref 20–31)
CREAT BLD-MCNC: 1.29 MG/DL (ref 0.6–1.3)
GFR SERPL CREATININE-BSD FRML MDRD: 55 ML/MIN/1.73
GLUCOSE BLD-MCNC: 175 MG/DL (ref 70–100)
HCT VFR BLD AUTO: 26.3 % (ref 38.9–50.9)
HGB BLD-MCNC: 8 G/DL (ref 13.1–17.5)
IRON 24H UR-MRATE: 22 MCG/DL (ref 50–175)
IRON SATN MFR SERPL: 8 % (ref 20–50)
MAGNESIUM SERPL-MCNC: 1.9 MG/DL (ref 1.3–2.7)
POTASSIUM BLD-SCNC: 3.7 MMOL/L (ref 3.5–5.5)
SODIUM BLD-SCNC: 140 MMOL/L (ref 132–146)
TIBC SERPL-MCNC: 279 MCG/DL (ref 250–450)
VIT B12 BLD-MCNC: 1435 PG/ML (ref 211–911)

## 2018-10-29 PROCEDURE — 25010000002 NA FERRIC GLUC CPLX PER 12.5 MG: Performed by: INTERNAL MEDICINE

## 2018-10-29 PROCEDURE — 63710000001 MORPHINE 15 MG TABLET CONTROLLED-RELEASE: Performed by: NURSE PRACTITIONER

## 2018-10-29 PROCEDURE — 83550 IRON BINDING TEST: CPT | Performed by: INTERNAL MEDICINE

## 2018-10-29 PROCEDURE — 63710000001 PREDNISONE PER 1 MG: Performed by: INTERNAL MEDICINE

## 2018-10-29 PROCEDURE — A9270 NON-COVERED ITEM OR SERVICE: HCPCS | Performed by: NURSE PRACTITIONER

## 2018-10-29 PROCEDURE — 83735 ASSAY OF MAGNESIUM: CPT | Performed by: INTERNAL MEDICINE

## 2018-10-29 PROCEDURE — 83540 ASSAY OF IRON: CPT | Performed by: INTERNAL MEDICINE

## 2018-10-29 PROCEDURE — 94799 UNLISTED PULMONARY SVC/PX: CPT

## 2018-10-29 PROCEDURE — 99233 SBSQ HOSP IP/OBS HIGH 50: CPT | Performed by: INTERNAL MEDICINE

## 2018-10-29 PROCEDURE — 97110 THERAPEUTIC EXERCISES: CPT

## 2018-10-29 PROCEDURE — 25010000002 HEPARIN (PORCINE) PER 1000 UNITS: Performed by: INTERNAL MEDICINE

## 2018-10-29 PROCEDURE — 63710000001 CLOPIDOGREL 75 MG TABLET: Performed by: NURSE PRACTITIONER

## 2018-10-29 PROCEDURE — 63710000001 GABAPENTIN 100 MG CAPSULE: Performed by: NURSE PRACTITIONER

## 2018-10-29 PROCEDURE — 63710000001 ASPIRIN 81 MG TABLET DELAYED-RELEASE: Performed by: NURSE PRACTITIONER

## 2018-10-29 PROCEDURE — 25010000002 METHYLPREDNISOLONE PER 40 MG: Performed by: INTERNAL MEDICINE

## 2018-10-29 PROCEDURE — 85014 HEMATOCRIT: CPT | Performed by: INTERNAL MEDICINE

## 2018-10-29 PROCEDURE — 63710000001 LACTOBACILLUS ACIDOPHILUS CAPSULE: Performed by: NURSE PRACTITIONER

## 2018-10-29 PROCEDURE — 63710000001 FUROSEMIDE 40 MG TABLET: Performed by: NURSE PRACTITIONER

## 2018-10-29 PROCEDURE — 97116 GAIT TRAINING THERAPY: CPT

## 2018-10-29 PROCEDURE — A9270 NON-COVERED ITEM OR SERVICE: HCPCS | Performed by: INTERNAL MEDICINE

## 2018-10-29 PROCEDURE — 80048 BASIC METABOLIC PNL TOTAL CA: CPT | Performed by: INTERNAL MEDICINE

## 2018-10-29 PROCEDURE — 94640 AIRWAY INHALATION TREATMENT: CPT

## 2018-10-29 PROCEDURE — 63710000001 FAMOTIDINE 20 MG TABLET: Performed by: NURSE PRACTITIONER

## 2018-10-29 PROCEDURE — 94760 N-INVAS EAR/PLS OXIMETRY 1: CPT

## 2018-10-29 PROCEDURE — 63710000001 GUAIFENESIN 600 MG TABLET SUSTAINED-RELEASE 12 HOUR: Performed by: NURSE PRACTITIONER

## 2018-10-29 PROCEDURE — 63710000001 POLYETHYLENE GLYCOL PACK: Performed by: NURSE PRACTITIONER

## 2018-10-29 PROCEDURE — 82607 VITAMIN B-12: CPT | Performed by: INTERNAL MEDICINE

## 2018-10-29 PROCEDURE — 63710000001 VITAMIN B-12 1000 MCG TABLET: Performed by: NURSE PRACTITIONER

## 2018-10-29 PROCEDURE — 85018 HEMOGLOBIN: CPT | Performed by: INTERNAL MEDICINE

## 2018-10-29 RX ORDER — METHYLPREDNISOLONE SODIUM SUCCINATE 40 MG/ML
40 INJECTION, POWDER, LYOPHILIZED, FOR SOLUTION INTRAMUSCULAR; INTRAVENOUS EVERY 12 HOURS
Status: DISCONTINUED | OUTPATIENT
Start: 2018-10-29 | End: 2018-10-31

## 2018-10-29 RX ORDER — BUMETANIDE 0.25 MG/ML
1 INJECTION INTRAMUSCULAR; INTRAVENOUS ONCE
Status: COMPLETED | OUTPATIENT
Start: 2018-10-29 | End: 2018-10-29

## 2018-10-29 RX ORDER — DOXYCYCLINE 100 MG/1
100 CAPSULE ORAL EVERY 12 HOURS SCHEDULED
Status: DISCONTINUED | OUTPATIENT
Start: 2018-10-29 | End: 2018-11-02 | Stop reason: HOSPADM

## 2018-10-29 RX ADMIN — NICOTINE 1 PATCH: 21 PATCH, EXTENDED RELEASE TRANSDERMAL at 22:19

## 2018-10-29 RX ADMIN — GABAPENTIN 200 MG: 100 CAPSULE ORAL at 14:38

## 2018-10-29 RX ADMIN — FUROSEMIDE 40 MG: 40 TABLET ORAL at 17:37

## 2018-10-29 RX ADMIN — PREDNISONE 40 MG: 20 TABLET ORAL at 08:53

## 2018-10-29 RX ADMIN — IPRATROPIUM BROMIDE AND ALBUTEROL SULFATE 3 ML: 2.5; .5 SOLUTION RESPIRATORY (INHALATION) at 19:09

## 2018-10-29 RX ADMIN — Medication 3 ML: at 08:55

## 2018-10-29 RX ADMIN — IPRATROPIUM BROMIDE AND ALBUTEROL SULFATE 3 ML: 2.5; .5 SOLUTION RESPIRATORY (INHALATION) at 08:41

## 2018-10-29 RX ADMIN — SODIUM CHLORIDE 125 MG: 9 INJECTION, SOLUTION INTRAVENOUS at 23:58

## 2018-10-29 RX ADMIN — POLYETHYLENE GLYCOL 3350 17 G: 17 POWDER, FOR SOLUTION ORAL at 08:54

## 2018-10-29 RX ADMIN — FLUTICASONE PROPIONATE 2 SPRAY: 50 SPRAY, METERED NASAL at 08:52

## 2018-10-29 RX ADMIN — MICONAZOLE NITRATE: 20 POWDER TOPICAL at 08:54

## 2018-10-29 RX ADMIN — ASPIRIN 81 MG: 81 TABLET, COATED ORAL at 08:54

## 2018-10-29 RX ADMIN — BUMETANIDE 1 MG: 0.25 INJECTION INTRAMUSCULAR; INTRAVENOUS at 18:54

## 2018-10-29 RX ADMIN — GUAIFENESIN 1200 MG: 600 TABLET, EXTENDED RELEASE ORAL at 08:54

## 2018-10-29 RX ADMIN — MORPHINE SULFATE 15 MG: 15 TABLET, EXTENDED RELEASE ORAL at 08:54

## 2018-10-29 RX ADMIN — FAMOTIDINE 20 MG: 20 TABLET ORAL at 22:18

## 2018-10-29 RX ADMIN — METHYLPREDNISOLONE SODIUM SUCCINATE 40 MG: 40 INJECTION, POWDER, FOR SOLUTION INTRAMUSCULAR; INTRAVENOUS at 18:53

## 2018-10-29 RX ADMIN — HEPARIN SODIUM 5000 UNITS: 5000 INJECTION, SOLUTION INTRAVENOUS; SUBCUTANEOUS at 22:20

## 2018-10-29 RX ADMIN — MIRTAZAPINE 15 MG: 15 TABLET, ORALLY DISINTEGRATING ORAL at 22:27

## 2018-10-29 RX ADMIN — METOPROLOL TARTRATE 25 MG: 25 TABLET ORAL at 22:18

## 2018-10-29 RX ADMIN — CLOPIDOGREL BISULFATE 75 MG: 75 TABLET ORAL at 08:54

## 2018-10-29 RX ADMIN — ATORVASTATIN CALCIUM 40 MG: 40 TABLET, FILM COATED ORAL at 22:18

## 2018-10-29 RX ADMIN — GABAPENTIN 200 MG: 100 CAPSULE ORAL at 22:18

## 2018-10-29 RX ADMIN — Medication 3 ML: at 22:22

## 2018-10-29 RX ADMIN — Medication 1 CAPSULE: at 08:53

## 2018-10-29 RX ADMIN — BUDESONIDE AND FORMOTEROL FUMARATE DIHYDRATE 2 PUFF: 80; 4.5 AEROSOL RESPIRATORY (INHALATION) at 08:41

## 2018-10-29 RX ADMIN — FUROSEMIDE 40 MG: 40 TABLET ORAL at 08:53

## 2018-10-29 RX ADMIN — IPRATROPIUM BROMIDE AND ALBUTEROL SULFATE 3 ML: 2.5; .5 SOLUTION RESPIRATORY (INHALATION) at 17:00

## 2018-10-29 RX ADMIN — METOPROLOL TARTRATE 25 MG: 25 TABLET ORAL at 14:38

## 2018-10-29 RX ADMIN — BUDESONIDE AND FORMOTEROL FUMARATE DIHYDRATE 2 PUFF: 80; 4.5 AEROSOL RESPIRATORY (INHALATION) at 19:09

## 2018-10-29 RX ADMIN — CASTOR OIL AND BALSAM, PERU: 788; 87 OINTMENT TOPICAL at 08:54

## 2018-10-29 RX ADMIN — CASTOR OIL AND BALSAM, PERU: 788; 87 OINTMENT TOPICAL at 22:23

## 2018-10-29 RX ADMIN — GABAPENTIN 200 MG: 100 CAPSULE ORAL at 05:53

## 2018-10-29 RX ADMIN — FAMOTIDINE 20 MG: 20 TABLET ORAL at 08:54

## 2018-10-29 RX ADMIN — MORPHINE SULFATE 15 MG: 15 TABLET, EXTENDED RELEASE ORAL at 22:17

## 2018-10-29 RX ADMIN — IPRATROPIUM BROMIDE AND ALBUTEROL SULFATE 3 ML: 2.5; .5 SOLUTION RESPIRATORY (INHALATION) at 13:00

## 2018-10-29 RX ADMIN — HEPARIN SODIUM 5000 UNITS: 5000 INJECTION, SOLUTION INTRAVENOUS; SUBCUTANEOUS at 08:53

## 2018-10-29 RX ADMIN — DOXYCYCLINE 100 MG: 100 CAPSULE ORAL at 22:17

## 2018-10-29 RX ADMIN — SENNOSIDES AND DOCUSATE SODIUM 2 TABLET: 8.6; 5 TABLET ORAL at 22:18

## 2018-10-29 RX ADMIN — MICONAZOLE NITRATE: 20 POWDER TOPICAL at 22:22

## 2018-10-29 RX ADMIN — CYANOCOBALAMIN TAB 1000 MCG 1000 MCG: 1000 TAB at 08:54

## 2018-10-30 ENCOUNTER — APPOINTMENT (OUTPATIENT)
Dept: GENERAL RADIOLOGY | Facility: HOSPITAL | Age: 73
End: 2018-10-30

## 2018-10-30 PROBLEM — J44.1 COPD WITH ACUTE EXACERBATION (HCC): Status: ACTIVE | Noted: 2018-10-30

## 2018-10-30 PROBLEM — I50.33 ACUTE ON CHRONIC DIASTOLIC CHF (CONGESTIVE HEART FAILURE) (HCC): Status: ACTIVE | Noted: 2018-10-30

## 2018-10-30 LAB
ANION GAP SERPL CALCULATED.3IONS-SCNC: 3 MMOL/L (ref 3–11)
BUN BLD-MCNC: 37 MG/DL (ref 9–23)
BUN/CREAT SERPL: 34.3 (ref 7–25)
CALCIUM SPEC-SCNC: 7.9 MG/DL (ref 8.7–10.4)
CHLORIDE SERPL-SCNC: 100 MMOL/L (ref 99–109)
CO2 SERPL-SCNC: 37 MMOL/L (ref 20–31)
CREAT BLD-MCNC: 1.08 MG/DL (ref 0.6–1.3)
DEPRECATED RDW RBC AUTO: 63.5 FL (ref 37–54)
ERYTHROCYTE [DISTWIDTH] IN BLOOD BY AUTOMATED COUNT: 18.3 % (ref 11.3–14.5)
GFR SERPL CREATININE-BSD FRML MDRD: 67 ML/MIN/1.73
GLUCOSE BLD-MCNC: 123 MG/DL (ref 70–100)
HCT VFR BLD AUTO: 27.9 % (ref 38.9–50.9)
HGB BLD-MCNC: 8.1 G/DL (ref 13.1–17.5)
MAGNESIUM SERPL-MCNC: 1.8 MG/DL (ref 1.3–2.7)
MCH RBC QN AUTO: 28 PG (ref 27–31)
MCHC RBC AUTO-ENTMCNC: 29 G/DL (ref 32–36)
MCV RBC AUTO: 96.5 FL (ref 80–99)
PLATELET # BLD AUTO: 353 10*3/MM3 (ref 150–450)
PMV BLD AUTO: 10.2 FL (ref 6–12)
POTASSIUM BLD-SCNC: 3.9 MMOL/L (ref 3.5–5.5)
RBC # BLD AUTO: 2.89 10*6/MM3 (ref 4.2–5.76)
SODIUM BLD-SCNC: 140 MMOL/L (ref 132–146)
WBC NRBC COR # BLD: 6.2 10*3/MM3 (ref 3.5–10.8)

## 2018-10-30 PROCEDURE — 99233 SBSQ HOSP IP/OBS HIGH 50: CPT | Performed by: HOSPITALIST

## 2018-10-30 PROCEDURE — 94799 UNLISTED PULMONARY SVC/PX: CPT

## 2018-10-30 PROCEDURE — 94760 N-INVAS EAR/PLS OXIMETRY 1: CPT

## 2018-10-30 PROCEDURE — 25010000002 METHYLPREDNISOLONE PER 40 MG: Performed by: INTERNAL MEDICINE

## 2018-10-30 PROCEDURE — 94640 AIRWAY INHALATION TREATMENT: CPT

## 2018-10-30 PROCEDURE — 83735 ASSAY OF MAGNESIUM: CPT | Performed by: INTERNAL MEDICINE

## 2018-10-30 PROCEDURE — 80048 BASIC METABOLIC PNL TOTAL CA: CPT | Performed by: INTERNAL MEDICINE

## 2018-10-30 PROCEDURE — 25010000002 HEPARIN (PORCINE) PER 1000 UNITS: Performed by: INTERNAL MEDICINE

## 2018-10-30 PROCEDURE — 25010000002 NA FERRIC GLUC CPLX PER 12.5 MG: Performed by: HOSPITALIST

## 2018-10-30 PROCEDURE — 85027 COMPLETE CBC AUTOMATED: CPT | Performed by: INTERNAL MEDICINE

## 2018-10-30 PROCEDURE — 71045 X-RAY EXAM CHEST 1 VIEW: CPT

## 2018-10-30 RX ORDER — POTASSIUM CHLORIDE 750 MG/1
40 CAPSULE, EXTENDED RELEASE ORAL AS NEEDED
Status: DISCONTINUED | OUTPATIENT
Start: 2018-10-30 | End: 2018-11-02 | Stop reason: HOSPADM

## 2018-10-30 RX ORDER — POTASSIUM CHLORIDE 1.5 G/1.77G
40 POWDER, FOR SOLUTION ORAL AS NEEDED
Status: DISCONTINUED | OUTPATIENT
Start: 2018-10-30 | End: 2018-11-02 | Stop reason: HOSPADM

## 2018-10-30 RX ORDER — MAGNESIUM SULFATE HEPTAHYDRATE 40 MG/ML
2 INJECTION, SOLUTION INTRAVENOUS AS NEEDED
Status: DISCONTINUED | OUTPATIENT
Start: 2018-10-30 | End: 2018-11-02 | Stop reason: HOSPADM

## 2018-10-30 RX ORDER — MAGNESIUM SULFATE HEPTAHYDRATE 40 MG/ML
4 INJECTION, SOLUTION INTRAVENOUS AS NEEDED
Status: DISCONTINUED | OUTPATIENT
Start: 2018-10-30 | End: 2018-11-02 | Stop reason: HOSPADM

## 2018-10-30 RX ADMIN — DOXYCYCLINE 100 MG: 100 CAPSULE ORAL at 09:10

## 2018-10-30 RX ADMIN — ASPIRIN 81 MG: 81 TABLET, COATED ORAL at 09:10

## 2018-10-30 RX ADMIN — SODIUM CHLORIDE 125 MG: 9 INJECTION, SOLUTION INTRAVENOUS at 11:13

## 2018-10-30 RX ADMIN — CLOPIDOGREL BISULFATE 75 MG: 75 TABLET ORAL at 09:10

## 2018-10-30 RX ADMIN — Medication 3 ML: at 09:09

## 2018-10-30 RX ADMIN — FAMOTIDINE 20 MG: 20 TABLET ORAL at 21:52

## 2018-10-30 RX ADMIN — MIRTAZAPINE 15 MG: 15 TABLET, ORALLY DISINTEGRATING ORAL at 21:58

## 2018-10-30 RX ADMIN — Medication 1 CAPSULE: at 09:10

## 2018-10-30 RX ADMIN — FUROSEMIDE 40 MG: 40 TABLET ORAL at 17:31

## 2018-10-30 RX ADMIN — IPRATROPIUM BROMIDE AND ALBUTEROL SULFATE 3 ML: 2.5; .5 SOLUTION RESPIRATORY (INHALATION) at 15:44

## 2018-10-30 RX ADMIN — BUDESONIDE AND FORMOTEROL FUMARATE DIHYDRATE 2 PUFF: 80; 4.5 AEROSOL RESPIRATORY (INHALATION) at 07:32

## 2018-10-30 RX ADMIN — CASTOR OIL AND BALSAM, PERU: 788; 87 OINTMENT TOPICAL at 21:54

## 2018-10-30 RX ADMIN — METHYLPREDNISOLONE SODIUM SUCCINATE 40 MG: 40 INJECTION, POWDER, FOR SOLUTION INTRAMUSCULAR; INTRAVENOUS at 06:27

## 2018-10-30 RX ADMIN — IPRATROPIUM BROMIDE AND ALBUTEROL SULFATE 3 ML: 2.5; .5 SOLUTION RESPIRATORY (INHALATION) at 07:31

## 2018-10-30 RX ADMIN — IPRATROPIUM BROMIDE AND ALBUTEROL SULFATE 3 ML: 2.5; .5 SOLUTION RESPIRATORY (INHALATION) at 12:56

## 2018-10-30 RX ADMIN — IPRATROPIUM BROMIDE AND ALBUTEROL SULFATE 3 ML: 2.5; .5 SOLUTION RESPIRATORY (INHALATION) at 19:15

## 2018-10-30 RX ADMIN — Medication 3 ML: at 21:54

## 2018-10-30 RX ADMIN — BUDESONIDE AND FORMOTEROL FUMARATE DIHYDRATE 2 PUFF: 80; 4.5 AEROSOL RESPIRATORY (INHALATION) at 19:15

## 2018-10-30 RX ADMIN — GABAPENTIN 200 MG: 100 CAPSULE ORAL at 06:27

## 2018-10-30 RX ADMIN — FLUTICASONE PROPIONATE 2 SPRAY: 50 SPRAY, METERED NASAL at 09:09

## 2018-10-30 RX ADMIN — MORPHINE SULFATE 15 MG: 15 TABLET, EXTENDED RELEASE ORAL at 09:10

## 2018-10-30 RX ADMIN — HEPARIN SODIUM 5000 UNITS: 5000 INJECTION, SOLUTION INTRAVENOUS; SUBCUTANEOUS at 21:53

## 2018-10-30 RX ADMIN — MICONAZOLE NITRATE: 20 POWDER TOPICAL at 09:12

## 2018-10-30 RX ADMIN — MORPHINE SULFATE 15 MG: 15 TABLET, EXTENDED RELEASE ORAL at 21:52

## 2018-10-30 RX ADMIN — SENNOSIDES AND DOCUSATE SODIUM 2 TABLET: 8.6; 5 TABLET ORAL at 21:52

## 2018-10-30 RX ADMIN — GABAPENTIN 200 MG: 100 CAPSULE ORAL at 21:52

## 2018-10-30 RX ADMIN — METOPROLOL TARTRATE 25 MG: 25 TABLET ORAL at 06:28

## 2018-10-30 RX ADMIN — METOPROLOL TARTRATE 25 MG: 25 TABLET ORAL at 14:02

## 2018-10-30 RX ADMIN — CASTOR OIL AND BALSAM, PERU: 788; 87 OINTMENT TOPICAL at 09:12

## 2018-10-30 RX ADMIN — NICOTINE 1 PATCH: 21 PATCH, EXTENDED RELEASE TRANSDERMAL at 21:53

## 2018-10-30 RX ADMIN — METHYLPREDNISOLONE SODIUM SUCCINATE 40 MG: 40 INJECTION, POWDER, FOR SOLUTION INTRAMUSCULAR; INTRAVENOUS at 17:31

## 2018-10-30 RX ADMIN — ATORVASTATIN CALCIUM 40 MG: 40 TABLET, FILM COATED ORAL at 21:52

## 2018-10-30 RX ADMIN — MAGNESIUM SULFATE HEPTAHYDRATE 4 G: 40 INJECTION, SOLUTION INTRAVENOUS at 13:58

## 2018-10-30 RX ADMIN — MICONAZOLE NITRATE: 20 POWDER TOPICAL at 21:54

## 2018-10-30 RX ADMIN — FAMOTIDINE 20 MG: 20 TABLET ORAL at 09:10

## 2018-10-30 RX ADMIN — METOPROLOL TARTRATE 25 MG: 25 TABLET ORAL at 21:52

## 2018-10-30 RX ADMIN — GABAPENTIN 200 MG: 100 CAPSULE ORAL at 14:02

## 2018-10-30 RX ADMIN — POLYETHYLENE GLYCOL 3350 17 G: 17 POWDER, FOR SOLUTION ORAL at 09:10

## 2018-10-30 RX ADMIN — CYANOCOBALAMIN TAB 1000 MCG 1000 MCG: 1000 TAB at 09:11

## 2018-10-30 RX ADMIN — DOXYCYCLINE 100 MG: 100 CAPSULE ORAL at 21:52

## 2018-10-30 RX ADMIN — FUROSEMIDE 40 MG: 40 TABLET ORAL at 09:10

## 2018-10-30 RX ADMIN — HEPARIN SODIUM 5000 UNITS: 5000 INJECTION, SOLUTION INTRAVENOUS; SUBCUTANEOUS at 09:09

## 2018-10-31 LAB
ANION GAP SERPL CALCULATED.3IONS-SCNC: 9 MMOL/L (ref 3–11)
BACTERIA SPEC AEROBE CULT: NORMAL
BACTERIA SPEC AEROBE CULT: NORMAL
BUN BLD-MCNC: 36 MG/DL (ref 9–23)
BUN/CREAT SERPL: 31.3 (ref 7–25)
CALCIUM SPEC-SCNC: 7.9 MG/DL (ref 8.7–10.4)
CHLORIDE SERPL-SCNC: 97 MMOL/L (ref 99–109)
CO2 SERPL-SCNC: 34 MMOL/L (ref 20–31)
CREAT BLD-MCNC: 1.15 MG/DL (ref 0.6–1.3)
DEPRECATED RDW RBC AUTO: 63.8 FL (ref 37–54)
ERYTHROCYTE [DISTWIDTH] IN BLOOD BY AUTOMATED COUNT: 18.7 % (ref 11.3–14.5)
GFR SERPL CREATININE-BSD FRML MDRD: 62 ML/MIN/1.73
GLUCOSE BLD-MCNC: 189 MG/DL (ref 70–100)
HCT VFR BLD AUTO: 29.6 % (ref 38.9–50.9)
HGB BLD-MCNC: 8.7 G/DL (ref 13.1–17.5)
MAGNESIUM SERPL-MCNC: 2.2 MG/DL (ref 1.3–2.7)
MCH RBC QN AUTO: 28.3 PG (ref 27–31)
MCHC RBC AUTO-ENTMCNC: 29.4 G/DL (ref 32–36)
MCV RBC AUTO: 96.4 FL (ref 80–99)
PLATELET # BLD AUTO: 352 10*3/MM3 (ref 150–450)
PMV BLD AUTO: 10.5 FL (ref 6–12)
POTASSIUM BLD-SCNC: 3.6 MMOL/L (ref 3.5–5.5)
RBC # BLD AUTO: 3.07 10*6/MM3 (ref 4.2–5.76)
SODIUM BLD-SCNC: 140 MMOL/L (ref 132–146)
WBC NRBC COR # BLD: 7.63 10*3/MM3 (ref 3.5–10.8)

## 2018-10-31 PROCEDURE — 25010000002 METHYLPREDNISOLONE PER 40 MG: Performed by: INTERNAL MEDICINE

## 2018-10-31 PROCEDURE — 94799 UNLISTED PULMONARY SVC/PX: CPT

## 2018-10-31 PROCEDURE — 85027 COMPLETE CBC AUTOMATED: CPT | Performed by: HOSPITALIST

## 2018-10-31 PROCEDURE — 97530 THERAPEUTIC ACTIVITIES: CPT

## 2018-10-31 PROCEDURE — 83735 ASSAY OF MAGNESIUM: CPT | Performed by: HOSPITALIST

## 2018-10-31 PROCEDURE — 94640 AIRWAY INHALATION TREATMENT: CPT

## 2018-10-31 PROCEDURE — 97116 GAIT TRAINING THERAPY: CPT

## 2018-10-31 PROCEDURE — 99232 SBSQ HOSP IP/OBS MODERATE 35: CPT | Performed by: HOSPITALIST

## 2018-10-31 PROCEDURE — 80048 BASIC METABOLIC PNL TOTAL CA: CPT | Performed by: HOSPITALIST

## 2018-10-31 PROCEDURE — 25010000002 NA FERRIC GLUC CPLX PER 12.5 MG: Performed by: HOSPITALIST

## 2018-10-31 PROCEDURE — 25010000002 HEPARIN (PORCINE) PER 1000 UNITS: Performed by: INTERNAL MEDICINE

## 2018-10-31 RX ORDER — PREDNISONE 20 MG/1
40 TABLET ORAL
Status: DISCONTINUED | OUTPATIENT
Start: 2018-11-01 | End: 2018-11-02 | Stop reason: HOSPADM

## 2018-10-31 RX ADMIN — IPRATROPIUM BROMIDE AND ALBUTEROL SULFATE 3 ML: 2.5; .5 SOLUTION RESPIRATORY (INHALATION) at 14:57

## 2018-10-31 RX ADMIN — HEPARIN SODIUM 5000 UNITS: 5000 INJECTION, SOLUTION INTRAVENOUS; SUBCUTANEOUS at 22:06

## 2018-10-31 RX ADMIN — SODIUM CHLORIDE 125 MG: 9 INJECTION, SOLUTION INTRAVENOUS at 12:30

## 2018-10-31 RX ADMIN — MIRTAZAPINE 15 MG: 15 TABLET, ORALLY DISINTEGRATING ORAL at 22:06

## 2018-10-31 RX ADMIN — SENNOSIDES AND DOCUSATE SODIUM 2 TABLET: 8.6; 5 TABLET ORAL at 22:05

## 2018-10-31 RX ADMIN — HEPARIN SODIUM 5000 UNITS: 5000 INJECTION, SOLUTION INTRAVENOUS; SUBCUTANEOUS at 09:26

## 2018-10-31 RX ADMIN — ASPIRIN 81 MG: 81 TABLET, COATED ORAL at 09:26

## 2018-10-31 RX ADMIN — MORPHINE SULFATE 15 MG: 15 TABLET, EXTENDED RELEASE ORAL at 09:26

## 2018-10-31 RX ADMIN — DOXYCYCLINE 100 MG: 100 CAPSULE ORAL at 09:26

## 2018-10-31 RX ADMIN — MICONAZOLE NITRATE: 20 POWDER TOPICAL at 22:07

## 2018-10-31 RX ADMIN — NICOTINE 1 PATCH: 21 PATCH, EXTENDED RELEASE TRANSDERMAL at 22:06

## 2018-10-31 RX ADMIN — MICONAZOLE NITRATE: 20 POWDER TOPICAL at 09:27

## 2018-10-31 RX ADMIN — METOPROLOL TARTRATE 25 MG: 25 TABLET ORAL at 16:11

## 2018-10-31 RX ADMIN — BUDESONIDE AND FORMOTEROL FUMARATE DIHYDRATE 2 PUFF: 80; 4.5 AEROSOL RESPIRATORY (INHALATION) at 19:53

## 2018-10-31 RX ADMIN — FUROSEMIDE 40 MG: 40 TABLET ORAL at 09:26

## 2018-10-31 RX ADMIN — Medication 3 ML: at 22:07

## 2018-10-31 RX ADMIN — FLUTICASONE PROPIONATE 2 SPRAY: 50 SPRAY, METERED NASAL at 09:29

## 2018-10-31 RX ADMIN — IPRATROPIUM BROMIDE AND ALBUTEROL SULFATE 3 ML: 2.5; .5 SOLUTION RESPIRATORY (INHALATION) at 19:53

## 2018-10-31 RX ADMIN — CASTOR OIL AND BALSAM, PERU: 788; 87 OINTMENT TOPICAL at 22:08

## 2018-10-31 RX ADMIN — ATORVASTATIN CALCIUM 40 MG: 40 TABLET, FILM COATED ORAL at 22:05

## 2018-10-31 RX ADMIN — Medication 3 ML: at 12:30

## 2018-10-31 RX ADMIN — GABAPENTIN 200 MG: 100 CAPSULE ORAL at 16:12

## 2018-10-31 RX ADMIN — CASTOR OIL AND BALSAM, PERU: 788; 87 OINTMENT TOPICAL at 09:27

## 2018-10-31 RX ADMIN — METOPROLOL TARTRATE 25 MG: 25 TABLET ORAL at 06:32

## 2018-10-31 RX ADMIN — POLYETHYLENE GLYCOL 3350 17 G: 17 POWDER, FOR SOLUTION ORAL at 09:26

## 2018-10-31 RX ADMIN — MORPHINE SULFATE 15 MG: 15 TABLET, EXTENDED RELEASE ORAL at 22:04

## 2018-10-31 RX ADMIN — Medication 1 CAPSULE: at 09:26

## 2018-10-31 RX ADMIN — BUDESONIDE AND FORMOTEROL FUMARATE DIHYDRATE 2 PUFF: 80; 4.5 AEROSOL RESPIRATORY (INHALATION) at 06:54

## 2018-10-31 RX ADMIN — GABAPENTIN 200 MG: 100 CAPSULE ORAL at 22:05

## 2018-10-31 RX ADMIN — FUROSEMIDE 40 MG: 40 TABLET ORAL at 17:47

## 2018-10-31 RX ADMIN — METHYLPREDNISOLONE SODIUM SUCCINATE 40 MG: 40 INJECTION, POWDER, FOR SOLUTION INTRAMUSCULAR; INTRAVENOUS at 06:32

## 2018-10-31 RX ADMIN — FAMOTIDINE 20 MG: 20 TABLET ORAL at 09:26

## 2018-10-31 RX ADMIN — GABAPENTIN 200 MG: 100 CAPSULE ORAL at 06:32

## 2018-10-31 RX ADMIN — CLOPIDOGREL BISULFATE 75 MG: 75 TABLET ORAL at 09:26

## 2018-10-31 RX ADMIN — CYANOCOBALAMIN TAB 1000 MCG 1000 MCG: 1000 TAB at 09:29

## 2018-10-31 RX ADMIN — IPRATROPIUM BROMIDE AND ALBUTEROL SULFATE 3 ML: 2.5; .5 SOLUTION RESPIRATORY (INHALATION) at 06:54

## 2018-10-31 RX ADMIN — METOPROLOL TARTRATE 25 MG: 25 TABLET ORAL at 22:04

## 2018-10-31 RX ADMIN — FAMOTIDINE 20 MG: 20 TABLET ORAL at 22:04

## 2018-10-31 RX ADMIN — IPRATROPIUM BROMIDE AND ALBUTEROL SULFATE 3 ML: 2.5; .5 SOLUTION RESPIRATORY (INHALATION) at 11:04

## 2018-10-31 RX ADMIN — DOXYCYCLINE 100 MG: 100 CAPSULE ORAL at 22:05

## 2018-11-01 LAB
MAGNESIUM SERPL-MCNC: 2.3 MG/DL (ref 1.3–2.7)
POTASSIUM BLD-SCNC: 4.6 MMOL/L (ref 3.5–5.5)

## 2018-11-01 PROCEDURE — 94799 UNLISTED PULMONARY SVC/PX: CPT

## 2018-11-01 PROCEDURE — 97530 THERAPEUTIC ACTIVITIES: CPT

## 2018-11-01 PROCEDURE — 97116 GAIT TRAINING THERAPY: CPT

## 2018-11-01 PROCEDURE — 25010000002 HEPARIN (PORCINE) PER 1000 UNITS: Performed by: INTERNAL MEDICINE

## 2018-11-01 PROCEDURE — 99233 SBSQ HOSP IP/OBS HIGH 50: CPT | Performed by: NURSE PRACTITIONER

## 2018-11-01 PROCEDURE — 84132 ASSAY OF SERUM POTASSIUM: CPT | Performed by: NURSE PRACTITIONER

## 2018-11-01 PROCEDURE — 94760 N-INVAS EAR/PLS OXIMETRY 1: CPT

## 2018-11-01 PROCEDURE — 63710000001 PREDNISONE PER 1 MG: Performed by: HOSPITALIST

## 2018-11-01 PROCEDURE — 94640 AIRWAY INHALATION TREATMENT: CPT

## 2018-11-01 PROCEDURE — 83735 ASSAY OF MAGNESIUM: CPT | Performed by: NURSE PRACTITIONER

## 2018-11-01 RX ORDER — ROFLUMILAST 500 UG/1
500 TABLET ORAL DAILY
Status: DISCONTINUED | OUTPATIENT
Start: 2018-11-01 | End: 2018-11-02 | Stop reason: HOSPADM

## 2018-11-01 RX ORDER — BUDESONIDE AND FORMOTEROL FUMARATE DIHYDRATE 160; 4.5 UG/1; UG/1
2 AEROSOL RESPIRATORY (INHALATION)
Status: DISCONTINUED | OUTPATIENT
Start: 2018-11-01 | End: 2018-11-02 | Stop reason: HOSPADM

## 2018-11-01 RX ADMIN — CASTOR OIL AND BALSAM, PERU: 788; 87 OINTMENT TOPICAL at 10:36

## 2018-11-01 RX ADMIN — FUROSEMIDE 40 MG: 40 TABLET ORAL at 19:17

## 2018-11-01 RX ADMIN — BUDESONIDE AND FORMOTEROL FUMARATE DIHYDRATE 2 PUFF: 80; 4.5 AEROSOL RESPIRATORY (INHALATION) at 07:37

## 2018-11-01 RX ADMIN — DOXYCYCLINE 100 MG: 100 CAPSULE ORAL at 21:01

## 2018-11-01 RX ADMIN — FLUTICASONE PROPIONATE 2 SPRAY: 50 SPRAY, METERED NASAL at 10:45

## 2018-11-01 RX ADMIN — METOPROLOL TARTRATE 25 MG: 25 TABLET ORAL at 21:01

## 2018-11-01 RX ADMIN — MORPHINE SULFATE 15 MG: 15 TABLET, EXTENDED RELEASE ORAL at 21:01

## 2018-11-01 RX ADMIN — GABAPENTIN 200 MG: 100 CAPSULE ORAL at 21:01

## 2018-11-01 RX ADMIN — MICONAZOLE NITRATE: 20 POWDER TOPICAL at 20:59

## 2018-11-01 RX ADMIN — NICOTINE 1 PATCH: 21 PATCH, EXTENDED RELEASE TRANSDERMAL at 21:02

## 2018-11-01 RX ADMIN — MORPHINE SULFATE 15 MG: 15 TABLET, EXTENDED RELEASE ORAL at 10:31

## 2018-11-01 RX ADMIN — SENNOSIDES AND DOCUSATE SODIUM 2 TABLET: 8.6; 5 TABLET ORAL at 21:01

## 2018-11-01 RX ADMIN — IPRATROPIUM BROMIDE AND ALBUTEROL SULFATE 3 ML: 2.5; .5 SOLUTION RESPIRATORY (INHALATION) at 07:37

## 2018-11-01 RX ADMIN — IPRATROPIUM BROMIDE AND ALBUTEROL SULFATE 3 ML: 2.5; .5 SOLUTION RESPIRATORY (INHALATION) at 19:19

## 2018-11-01 RX ADMIN — ASPIRIN 81 MG: 81 TABLET, COATED ORAL at 10:32

## 2018-11-01 RX ADMIN — GABAPENTIN 200 MG: 100 CAPSULE ORAL at 13:37

## 2018-11-01 RX ADMIN — ACETAMINOPHEN 325 MG: 325 TABLET ORAL at 03:41

## 2018-11-01 RX ADMIN — METOPROLOL TARTRATE 25 MG: 25 TABLET ORAL at 13:37

## 2018-11-01 RX ADMIN — POLYETHYLENE GLYCOL 3350 17 G: 17 POWDER, FOR SOLUTION ORAL at 10:31

## 2018-11-01 RX ADMIN — HEPARIN SODIUM 5000 UNITS: 5000 INJECTION, SOLUTION INTRAVENOUS; SUBCUTANEOUS at 21:01

## 2018-11-01 RX ADMIN — FAMOTIDINE 20 MG: 20 TABLET ORAL at 10:46

## 2018-11-01 RX ADMIN — METOPROLOL TARTRATE 25 MG: 25 TABLET ORAL at 03:42

## 2018-11-01 RX ADMIN — MICONAZOLE NITRATE: 20 POWDER TOPICAL at 10:37

## 2018-11-01 RX ADMIN — FAMOTIDINE 20 MG: 20 TABLET ORAL at 21:01

## 2018-11-01 RX ADMIN — CASTOR OIL AND BALSAM, PERU: 788; 87 OINTMENT TOPICAL at 20:59

## 2018-11-01 RX ADMIN — IPRATROPIUM BROMIDE AND ALBUTEROL SULFATE 3 ML: 2.5; .5 SOLUTION RESPIRATORY (INHALATION) at 15:39

## 2018-11-01 RX ADMIN — DOXYCYCLINE 100 MG: 100 CAPSULE ORAL at 10:32

## 2018-11-01 RX ADMIN — ROFLUMILAST 500 MCG: 500 TABLET ORAL at 16:55

## 2018-11-01 RX ADMIN — CLOPIDOGREL BISULFATE 75 MG: 75 TABLET ORAL at 10:32

## 2018-11-01 RX ADMIN — Medication 1 CAPSULE: at 10:32

## 2018-11-01 RX ADMIN — MIRTAZAPINE 15 MG: 15 TABLET, ORALLY DISINTEGRATING ORAL at 21:02

## 2018-11-01 RX ADMIN — HEPARIN SODIUM 5000 UNITS: 5000 INJECTION, SOLUTION INTRAVENOUS; SUBCUTANEOUS at 10:32

## 2018-11-01 RX ADMIN — GABAPENTIN 200 MG: 100 CAPSULE ORAL at 03:41

## 2018-11-01 RX ADMIN — CYANOCOBALAMIN TAB 1000 MCG 1000 MCG: 1000 TAB at 10:46

## 2018-11-01 RX ADMIN — FUROSEMIDE 40 MG: 40 TABLET ORAL at 10:31

## 2018-11-01 RX ADMIN — IPRATROPIUM BROMIDE AND ALBUTEROL SULFATE 3 ML: 2.5; .5 SOLUTION RESPIRATORY (INHALATION) at 12:43

## 2018-11-01 RX ADMIN — Medication 3 ML: at 21:02

## 2018-11-01 RX ADMIN — ATORVASTATIN CALCIUM 40 MG: 40 TABLET, FILM COATED ORAL at 21:01

## 2018-11-01 RX ADMIN — PREDNISONE 40 MG: 20 TABLET ORAL at 10:32

## 2018-11-01 NOTE — PLAN OF CARE
Problem: Patient Care Overview  Goal: Plan of Care Review  Outcome: Ongoing (interventions implemented as appropriate)   11/01/18 1030   Coping/Psychosocial   Plan of Care Reviewed With patient;other (see comments)  (Davi RN)   Plan of Care Review   Progress improving   OTHER   Outcome Summary WOC nurse f/u to reassess groin and buttocks skin. Groin pink and dry; Micotin powder in place. Buttocks red, but all blanchable. Skin interventions in place. On LUCINA bed. WOC nurse will s/o. Please contact WOC nurse as needed for concerns.

## 2018-11-01 NOTE — PLAN OF CARE
Problem: Patient Care Overview  Goal: Plan of Care Review  Outcome: Ongoing (interventions implemented as appropriate)   11/01/18 7311   Coping/Psychosocial   Plan of Care Reviewed With patient   Plan of Care Review   Progress improving   OTHER   Outcome Summary Pt ambulates increased distance in room with improved tolerence and decreased knee buckling. PT to progress as able. Pt expected to d/c to SNF tomorrow/

## 2018-11-01 NOTE — PLAN OF CARE
Problem: Fall Risk (Adult)  Goal: Identify Related Risk Factors and Signs and Symptoms  Outcome: Ongoing (interventions implemented as appropriate)      Problem: Patient Care Overview  Goal: Plan of Care Review  Outcome: Ongoing (interventions implemented as appropriate)   10/31/18 1646 11/01/18 0200 11/01/18 0226   Coping/Psychosocial   Plan of Care Reviewed With --  patient --    Plan of Care Review   Progress improving --  --    OTHER   Outcome Summary --  --  Patient's Vss, has slept well, 4lnc with attempt to tolerate Cpap at night. Patient is waiting on placement will continue to monitor.       Problem: Chronic Obstructive Pulmonary Disease (Adult)  Goal: Signs and Symptoms of Listed Potential Problems Will be Absent, Minimized or Managed (Chronic Obstructive Pulmonary Disease)  Outcome: Ongoing (interventions implemented as appropriate)      Problem: Skin Injury Risk (Adult)  Goal: Identify Related Risk Factors and Signs and Symptoms  Outcome: Ongoing (interventions implemented as appropriate)

## 2018-11-01 NOTE — PROGRESS NOTES
[facility] Medicine Services  PROGRESS NOTE    Patient Name: Yassine Love  : 1945  MRN: [id number]    Date of Admission: 10/26/2018  Length of Stay: 6  Primary Care Physician: Provider, No Known    Subjective   Subjective     CC:  F/U dyspnea    HPI:  Doesn't feel good, thinks he is going to be ready for rehab tomorrow, still soa with PC cough  Per nursing he had a run where his heart rate was fast, looked like a fib vs vtach  Patient reports his heart was racing but no dizziness, chest pain, or vision changes    Review of Systems  Gen-no fevers, no chills  CV-no chest pain,  Resp-+cough, improved dyspnea  GI-no N/V/D, no abd pain    Otherwise ROS is negative except as mentioned in the HPI.    Objective   Objective     Vital Signs:   Temp:  [98.4 °F (36.9 °C)-98.8 °F (37.1 °C)] 98.8 °F (37.1 °C)  Heart Rate:  [52-75] 65  Resp:  [18] 18  BP: (107-121)/(59-68) 107/68        Physical Exam:  Gen-no acute distress, resting in bed  CV-RRR, sinus with PAC, S1 S2 normal, no m/r/g  Resp-exp wheezing with faint rhales in bases, normal WOB, on 4LNC  Abd-soft, NT, ND, +BS  Ext-no edema, PPP  Neuro-A&Ox3, no focal deficits  Skin-no rashes  Psych-appropriate mood    Results Reviewed:  I have personally reviewed current lab, radiology, and data and agree.      Results from last 7 days  Lab Units 10/31/18  0351 10/30/18  0312 10/29/18  0429 10/28/18  0621   WBC 10*3/mm3 7.63 6.20  --  4.92   HEMOGLOBIN g/dL 8.7* 8.1* 8.0* 8.1*   HEMATOCRIT % 29.6* 27.9* 26.3* 27.0*   PLATELETS 10*3/mm3 352 353  --  290       Results from last 7 days  Lab Units 10/31/18  0351 10/30/18  0312 10/29/18  0429  10/26/18  1329   SODIUM mmol/L 140 140 140  < > 138   POTASSIUM mmol/L 3.6 3.9 3.7  < > 3.9   CHLORIDE mmol/L 97* 100 97*  < > 96*   CO2 mmol/L 34.0* 37.0* 36.0*  < > 35.0*   BUN mg/dL 36* 37* 41*  < > 22   CREATININE mg/dL 1.15 1.08 1.29  < > 1.26   GLUCOSE mg/dL 189* 123* 175*  < > 104*   CALCIUM mg/dL 7.9*  7.9* 8.1*  < > 8.7   ALT (SGPT) U/L  --   --   --   --  20   AST (SGOT) U/L  --   --   --   --  19   TROPONIN I ng/mL  --   --   --   --  0.013   < > = values in this interval not displayed.  Estimated Creatinine Clearance: 61.3 mL/min (by C-G formula based on SCr of 1.15 mg/dL).  No results found for: BNP    Microbiology Results Abnormal     Procedure Component Value - Date/Time    Blood Culture - Blood, [[id number]]  (Normal) Collected:  10/26/18 1555    Lab Status:  Final result Specimen:  Blood from Arm, Left Updated:  10/31/18 1700     Blood Culture No growth at 5 days    Blood Culture - Blood, [[id number]]  (Normal) Collected:  10/26/18 1550    Lab Status:  Final result Specimen:  Blood from Arm, Right Updated:  10/31/18 1700     Blood Culture No growth at 5 days        Results for orders placed during the hospital encounter of 10/02/18   Adult Transthoracic Echo Complete W/ Cont if Necessary Per Protocol    Narrative · Left ventricular systolic function is normal. Estimated EF = 60%.  · Trace-to-mild mitral valve regurgitation is present.          I have reviewed the medications.      aspirin 81 mg Oral Daily   atorvastatin 40 mg Oral Nightly   budesonide-formoterol 2 puff Inhalation BID - RT   castor oil-balsam peru  Topical Q12H   clopidogrel 75 mg Oral Daily   doxycycline 100 mg Oral Q12H   famotidine 20 mg Oral BID   fluticasone 2 spray Each Nare Daily   furosemide 40 mg Oral BID   gabapentin 200 mg Oral Q8H   heparin (porcine) 5,000 Units Subcutaneous Q12H   ipratropium-albuterol 3 mL Nebulization 4x Daily - RT   lactobacillus acidophilus 1 capsule Oral Daily   metoprolol tartrate 25 mg Oral Q8H   miconazole  Topical Q12H   mirtazapine 15 mg Oral Nightly   Morphine 15 mg Oral Q12H   nicotine 1 patch Transdermal Q24H   polyethylene glycol 17 g Oral Daily   predniSONE 40 mg Oral Daily With Breakfast   roflumilast 500 mcg Oral Daily   sennosides-docusate sodium 2 tablet Oral Nightly   sodium chloride 3 mL  Intravenous Q12H   cyanocobalamin 1,000 mcg Oral Daily         Assessment/Plan   Assessment / Plan     Active Hospital Problems    Diagnosis   • **Acute and chronic respiratory failure with hypoxia (CMS/[facility])   • COPD with acute exacerbation (CMS/[facility])   • Acute on chronic diastolic CHF (congestive heart failure) (CMS/[facility])   • Renal insufficiency   • Anemia, chronic disease   • Sleep apnea   • Chronic venous stasis dermatitis of both lower extremities   • Chronic pain   • Decubitus ulcer of buttock, stage 2   • Debility   • CAD status post GIGI ×2 to LAD and RCA 3/12/18     S/P LHC per Dr. Harvey on 3/12/18 with PCI revealing severe 2-vessel disease.    GIGI x 2 to LAD and RCA     • End stage COPD in an active smoker on home O2 with chronic hypercarbia     Managed by [facility]      • Essential hypertension        Brief Hospital Course to date:  Yassine Love is a 72 y.o. male with hx of COPD on chronic 3 liters O2 and chronic pain presents from rehab facility due to worsening hypoxia. Had been recently discharged to the [facility] on 10/22 after admission for respiratory failure requiring intubation.    Assessment & Plan:  --CXR no acute infiltrates, showed small pleural effusions and emphysema. Likely acute on chronic diastolic CHF + COPD exacerbation. s/p BiPAP and diuresis. Now on 4 liters. Continue CPAP at night.  --Continue prednisone taper. Continue Doxycycline.   --increase symbicort, add daliresp, continue nebs  --monitor for arrhthymias, did not look like vtach when I reviewed strip, continue metoprolol, check labs  --PT/OT.  --Pending SNF placement.     DVT Prophylaxis:  [facility]    CODE STATUS:   Code Status and Medical Interventions:   Ordered at: 10/26/18 8323     Limited Support to NOT Include:    Artificial Nutrition    Cardioversion/Defibrillation    Dialysis    Intubation     Level Of Support Discussed With:    Patient     Code Status:    No CPR     Medical Interventions (Level of Support Prior to  Arrest):    Limited       Disposition: I expect the patient to be discharged to SNF tomorrow at 9am    Electronically signed by BRITTNY Alvares, 11/01/18, 2:13 PM.

## 2018-11-01 NOTE — PROGRESS NOTES
Case Management Discharge Note    Final Note: Pt has bed offers at [facility] and [facility].  Son has chosen [facility].  CM unable to arrange ambulance until 2200 tonight which is not feasible for pt nor facility.  Therfore, ambulance has been arranged for Friday, 11/2, at 9am.  RN to call report to [phone].  CM will fax DC summary.   Should DC summary be needed sooner, RN can fax to [phone].    Destination - Selection Complete     Service Request Status Selected Specialties Address Phone Number Fax Number    [facility] Selected Skilled Nursing Facility [address] [address] [phone]      Durable Medical Equipment     No service has been selected for the patient.      Dialysis/Infusion     No service has been selected for the patient.      Home Medical Care     No service has been selected for the patient.      Social Care     No service has been selected for the patient.        Ambulance: [facility]    Final Discharge Disposition Code: 03 - skilled nursing facility (SNF)

## 2018-11-01 NOTE — PROGRESS NOTES
Case Management Discharge Note    Final Note: Pt has bed offers at [facility] and [place].  Son has chosen [facility].  RN to call report to [phone].  CM will fax DC summary.  Ambulance to transport (time will be relayed to RN once established).      Destination - Selection Complete     Service Request Status Selected Specialties Address Phone Number Fax Number    [facility] Selected Skilled Nursing Facility [address] [address] [phone]      Durable Medical Equipment     No service has been selected for the patient.      Dialysis/Infusion     No service has been selected for the patient.      Home Medical Care     No service has been selected for the patient.      Social Care     No service has been selected for the patient.        Ambulance: [facility]    Final Discharge Disposition Code: 03 - skilled nursing facility (SNF)

## 2018-11-02 VITALS
HEART RATE: 52 BPM | HEIGHT: 68 IN | DIASTOLIC BLOOD PRESSURE: 64 MMHG | BODY MASS INDEX: 29.08 KG/M2 | OXYGEN SATURATION: 100 % | RESPIRATION RATE: 18 BRPM | SYSTOLIC BLOOD PRESSURE: 117 MMHG | WEIGHT: 191.9 LBS | TEMPERATURE: 97.9 F

## 2018-11-02 PROCEDURE — 63710000001 PREDNISONE PER 1 MG: Performed by: HOSPITALIST

## 2018-11-02 PROCEDURE — 94640 AIRWAY INHALATION TREATMENT: CPT

## 2018-11-02 PROCEDURE — 94799 UNLISTED PULMONARY SVC/PX: CPT

## 2018-11-02 PROCEDURE — 94660 CPAP INITIATION&MGMT: CPT

## 2018-11-02 PROCEDURE — 25010000002 HEPARIN (PORCINE) PER 1000 UNITS: Performed by: INTERNAL MEDICINE

## 2018-11-02 PROCEDURE — 94760 N-INVAS EAR/PLS OXIMETRY 1: CPT

## 2018-11-02 PROCEDURE — 99239 HOSP IP/OBS DSCHRG MGMT >30: CPT | Performed by: NURSE PRACTITIONER

## 2018-11-02 RX ORDER — BENZONATATE 200 MG/1
200 CAPSULE ORAL 3 TIMES DAILY PRN
Start: 2018-11-02

## 2018-11-02 RX ORDER — DOXYCYCLINE 100 MG/1
100 CAPSULE ORAL EVERY 12 HOURS SCHEDULED
Qty: 3 CAPSULE | Refills: 0 | Status: SHIPPED | OUTPATIENT
Start: 2018-11-02 | End: 2018-11-04

## 2018-11-02 RX ORDER — ROFLUMILAST 500 UG/1
500 TABLET ORAL DAILY
Qty: 30 TABLET
Start: 2018-11-02

## 2018-11-02 RX ORDER — GUAIFENESIN 600 MG/1
600 TABLET, EXTENDED RELEASE ORAL EVERY 12 HOURS SCHEDULED
Qty: 14 TABLET | Refills: 0
Start: 2018-11-02 | End: 2018-11-09

## 2018-11-02 RX ORDER — BUDESONIDE AND FORMOTEROL FUMARATE DIHYDRATE 160; 4.5 UG/1; UG/1
2 AEROSOL RESPIRATORY (INHALATION)
Refills: 12
Start: 2018-11-02

## 2018-11-02 RX ORDER — PREDNISONE 20 MG/1
40 TABLET ORAL
Start: 2018-11-02 | End: 2018-11-07

## 2018-11-02 RX ADMIN — CLOPIDOGREL BISULFATE 75 MG: 75 TABLET ORAL at 08:42

## 2018-11-02 RX ADMIN — DOXYCYCLINE 100 MG: 100 CAPSULE ORAL at 08:42

## 2018-11-02 RX ADMIN — ROFLUMILAST 500 MCG: 500 TABLET ORAL at 08:43

## 2018-11-02 RX ADMIN — GABAPENTIN 200 MG: 100 CAPSULE ORAL at 05:42

## 2018-11-02 RX ADMIN — IPRATROPIUM BROMIDE AND ALBUTEROL SULFATE 3 ML: 2.5; .5 SOLUTION RESPIRATORY (INHALATION) at 07:42

## 2018-11-02 RX ADMIN — FLUTICASONE PROPIONATE 2 SPRAY: 50 SPRAY, METERED NASAL at 08:43

## 2018-11-02 RX ADMIN — CASTOR OIL AND BALSAM, PERU: 788; 87 OINTMENT TOPICAL at 08:43

## 2018-11-02 RX ADMIN — BUDESONIDE AND FORMOTEROL FUMARATE DIHYDRATE 2 PUFF: 160; 4.5 AEROSOL RESPIRATORY (INHALATION) at 07:43

## 2018-11-02 RX ADMIN — ASPIRIN 81 MG: 81 TABLET, COATED ORAL at 08:42

## 2018-11-02 RX ADMIN — FUROSEMIDE 40 MG: 40 TABLET ORAL at 08:43

## 2018-11-02 RX ADMIN — FAMOTIDINE 20 MG: 20 TABLET ORAL at 08:42

## 2018-11-02 RX ADMIN — CYANOCOBALAMIN TAB 1000 MCG 1000 MCG: 1000 TAB at 08:43

## 2018-11-02 RX ADMIN — PREDNISONE 40 MG: 20 TABLET ORAL at 08:42

## 2018-11-02 RX ADMIN — METOPROLOL TARTRATE 25 MG: 25 TABLET ORAL at 05:42

## 2018-11-02 RX ADMIN — HEPARIN SODIUM 5000 UNITS: 5000 INJECTION, SOLUTION INTRAVENOUS; SUBCUTANEOUS at 08:41

## 2018-11-02 RX ADMIN — MORPHINE SULFATE 15 MG: 15 TABLET, EXTENDED RELEASE ORAL at 08:42

## 2018-11-02 RX ADMIN — Medication 1 CAPSULE: at 08:41

## 2018-11-02 RX ADMIN — POLYETHYLENE GLYCOL 3350 17 G: 17 POWDER, FOR SOLUTION ORAL at 08:41

## 2018-11-02 NOTE — DISCHARGE SUMMARY
[facility] Medicine Services  DISCHARGE SUMMARY    Patient Name: Ysasine Love  : 1945  MRN: [id number]    Date of Admission: 10/26/2018  Date of Discharge:  18  Primary Care Physician: Provider, No Known    Consults     Date and Time Order Name Status Description    10/9/2018 1007 Inpatient Cardiology Consult Completed     10/8/2018 1023 Inpatient Infectious Diseases Consult Completed     10/8/2018 0930 Inpatient Palliative Care MD Consult Completed         Hospital Course     Presenting Problem:   Acute and chronic respiratory failure with hypoxia (CMS/HCC) [J96.21]    Active Hospital Problems    Diagnosis Date Noted   • **Acute and chronic respiratory failure with hypoxia (CMS/HCC) [J96.21] 10/26/2018   • COPD with acute exacerbation (CMS/[facility]) [J44.1] 10/30/2018   • Acute on chronic diastolic CHF (congestive heart failure) (CMS/[facility]) [I50.33] 10/30/2018   • Renal insufficiency [N28.9] 10/26/2018   • Anemia, chronic disease [D63.8] 10/26/2018   • Sleep apnea [G47.30] 10/10/2018   • Chronic venous stasis dermatitis of both lower extremities [I87.2] 10/02/2018   • Chronic pain [G89.29] 2018   • Decubitus ulcer of buttock, stage 2 [L89.302] 2018   • Debility [R53.81] 2018   • CAD status post GIGI ×2 to LAD and RCA 3/12/18 [I25.10] 2018   • End stage COPD in an active smoker on home O2 with chronic hypercarbia [J44.9] 2018   • Essential hypertension [I10] 2018      Resolved Hospital Problems    Diagnosis Date Noted Date Resolved   No resolved problems to display.          Hospital Course:  Yassine Love is a 72 y.o. male pmh significant for HTN, HLP, CAD, chronic venous stasis, end stage COPD on chronic O2, ANDREE that was discharged from [facility] on 10/22 after being admitted for respiratory failure/ intubated and treated for pneumonia. Presented back to [facility] with hypoxia and worsening SOA with oxygen saturation of 50's. Reportedly noncompliant with  CPAP. Treated for acute on chronic heart failure and COPD exacerbation with diuresis, steroids and Doxycycline. Increased Symbicort, and added Daliresp. Now medically stable for transfer back to rehab, will be taken by ambulance to [place]. Follow up PCP as needed.       Day of Discharge     HPI:   In bed eating breakfast, NAD. Feels weak and SOA with ambulation to chair. No worsening in SOA or dyspnea. Productive cough. Oxygen sats of 93% on 3LNC. No new issues. Denies f/c, n/v/d, palpitations or cp.     Review of Systems  Otherwise ROS is negative except as mentioned in the HPI.    Vital Signs:   Temp:  [97.9 °F (36.6 °C)-98.8 °F (37.1 °C)] 97.9 °F (36.6 °C)  Heart Rate:  [52-86] 52  Resp:  [18-20] 18  BP: (107-117)/(57-68) 117/64  FiO2 (%):  [35 %] 35 %     Physical Exam:  Constitutional: No acute distress, awake, alert  Eyes: PERRLA, sclerae anicteric, no conjunctival injection  HENT: NCAT, mucous membranes moist  Neck: Supple, no thyromegaly, no lymphadenopathy, trachea midline  Respiratory: Bibasilar rhonchi with faint exp wheezing, no rales, nonlabored respirations on 3LNC    Cardiovascular: RRR, no murmurs, rubs, or gallops, palpable pedal pulses bilaterally  Gastrointestinal: Positive bowel sounds, soft, nontender, nondistended  Musculoskeletal: 1+ bilateral ankle edema, no clubbing or cyanosis to extremities  Psychiatric: Appropriate affect, cooperative  Neurologic: Oriented x 3, strength symmetric in all extremities, Cranial Nerves grossly intact to confrontation, speech clear  Skin: No rashes     Pertinent  and/or Most Recent Results       Results from last 7 days  Lab Units 11/01/18  1444 10/31/18  0351 10/30/18  0312 10/29/18  0429 10/28/18  0621 10/27/18  0855 10/26/18  1329   WBC 10*3/mm3  --  7.63 6.20  --  4.92 3.67 6.06   HEMOGLOBIN g/dL  --  8.7* 8.1* 8.0* 8.1* 9.0* 8.8*   HEMATOCRIT %  --  29.6* 27.9* 26.3* 27.0* 30.8* 29.0*   PLATELETS 10*3/mm3  --  352 353  --  290 275 221   SODIUM mmol/L   --  140 140 140 139  --  138   POTASSIUM mmol/L 4.6 3.6 3.9 3.7 3.6  --  3.9   CHLORIDE mmol/L  --  97* 100 97* 96*  --  96*   CO2 mmol/L  --  34.0* 37.0* 36.0* 36.0*  --  35.0*   BUN mg/dL  --  36* 37* 41* 38*  --  22   CREATININE mg/dL  --  1.15 1.08 1.29 1.18  --  1.26   GLUCOSE mg/dL  --  189* 123* 175* 151*  --  104*   CALCIUM mg/dL  --  7.9* 7.9* 8.1* 8.2*  --  8.7       Results from last 7 days  Lab Units 10/26/18  1329   BILIRUBIN mg/dL 0.4   ALK PHOS U/L 114*   ALT (SGPT) U/L 20   AST (SGOT) U/L 19           Invalid input(s): TG, LDLCALC, LDLREALC    Results from last 7 days  Lab Units 10/27/18  0855 10/26/18  1329   BNP pg/mL 225.0* 114.0*   TROPONIN I ng/mL  --  0.013     Brief Urine Lab Results  (Last result in the past 365 days)      Color   Clarity   Blood   Leuk Est   Nitrite   Protein   CREAT   Urine HCG        08/20/18 1442 Yellow Clear Negative Trace(A) Negative Negative               Microbiology Results Abnormal     Procedure Component Value - Date/Time    Blood Culture - Blood, [[id number]]  (Normal) Collected:  10/26/18 1555    Lab Status:  Final result Specimen:  Blood from Arm, Left Updated:  10/31/18 1700     Blood Culture No growth at 5 days    Blood Culture - Blood, [[id number]]  (Normal) Collected:  10/26/18 1550    Lab Status:  Final result Specimen:  Blood from Arm, Right Updated:  10/31/18 1700     Blood Culture No growth at 5 days          Imaging Results (all)     Procedure Component Value Units Date/Time    XR Chest 1 View [[id number]] Collected:  10/30/18 1036     Updated:  10/30/18 1129    Narrative:       EXAMINATION: XR CHEST 1 VW-      INDICATION: DHF, diuresing; J96.21-Acute and chronic respiratory failure  with hypoxia; J44.1-Chronic obstructive pulmonary disease with (acute)  exacerbation; Z74.09-Other reduced mobility.     TECHNIQUE:  Single view frontal chest.     COMPARISONS: 10/28/2018.     FINDINGS:  Trace volume bilateral pleural effusions with adjacent  atelectasis do  not appear to have changed. No pneumothorax. The cardiac  mediastinal silhouette is also unchanged.       Impression:       No significant interval change.     D:  10/30/2018  E:  10/30/2018     This report was finalized on 10/30/2018 11:27 AM by Eliezer Hale.       XR Chest 1 View [[id number]] Collected:  10/28/18 0934     Updated:  10/29/18 0856    Narrative:          EXAMINATION: XR CHEST 1 VW - 10/28/2018     INDICATION: J96.21-Acute and chronic respiratory failure with hypoxia;  J44.1-Chronic obstructive pulmonary disease with (acute) exacerbation;  Z74.09-Other reduced mobility.      COMPARISON: Chest x-ray 10/26/2018     FINDINGS: Cardiac silhouette borderline enlarged and unchanged with  increased interstitial lung markings consistent with interstitial edema  pattern as well as bibasilar atelectasis and probable trace right as  well as small left pleural effusions.           Impression:       Persistent interstitial lung markings are prominent,  concerning for interstitial edema pattern with bibasilar opacifications  left greater than right and likely small effusion component.     DICTATED:   10/28/2018  EDITED/ls :   10/28/2018      This report was finalized on 10/29/2018 8:54 AM by Dr. Abiodun Baker.       XR Chest 1 View [[id number]] Collected:  10/26/18 1514     Updated:  10/26/18 1611    Narrative:       EXAMINATION: XR CHEST 1 VW- 10/26/2018     INDICATION: TIP     TECHNIQUE:  Single view frontal chest.     COMPARISONS: 10/22/2018 radiograph     FINDINGS:  Right arm PICC has been removed. Atherosclerosis aortic knob.  Prominent but unchanged cardiopericardial silhouette. Unchanged small  effusions. Emphysema. There is opacity at the right cardiophrenic angle,  unchanged; mass at this location is possible. No pneumothorax.       Impression:       Emphysema and small pleural effusions.     D:  10/26/2018  E:  10/26/2018     This report was finalized on 10/26/2018 4:09 PM by Eliezer Hale.             Results  for orders placed during the hospital encounter of 08/20/18   Duplex Venous Upper Extremity - Left CAR    Narrative · Sub-acute left upper extremity superficial thrombophlebitis noted in the   cephalic (forearm).  · All other left sided vessels appear normal.     Dr. Addison aware.          Results for orders placed during the hospital encounter of 08/20/18   Duplex Venous Upper Extremity - Left CAR    Narrative · Sub-acute left upper extremity superficial thrombophlebitis noted in the   cephalic (forearm).  · All other left sided vessels appear normal.     Dr. Addison aware.          Results for orders placed during the hospital encounter of 10/02/18   Adult Transthoracic Echo Complete W/ Cont if Necessary Per Protocol    Narrative · Left ventricular systolic function is normal. Estimated EF = 60%.  · Trace-to-mild mitral valve regurgitation is present.            Discharge Details        Discharge Medications      New Medications      Instructions Start Date   benzonatate 200 MG capsule  Commonly known as:  TESSALON   200 mg, Oral, 3 Times Daily PRN      budesonide-formoterol 160-4.5 MCG/ACT inhaler  Commonly known as:  SYMBICORT   2 puffs, Inhalation, 2 Times Daily - RT      doxycycline 100 MG capsule  Commonly known as:  MONODOX   100 mg, Oral, Every 12 Hours Scheduled      predniSONE 20 MG tablet  Commonly known as:  DELTASONE   40 mg, Oral, Daily With Breakfast      roflumilast 500 MCG tablet tablet  Commonly known as:  DALIRESP   500 mcg, Oral, Daily         Continue These Medications      Instructions Start Date   acetaminophen 325 MG tablet  Commonly known as:  TYLENOL   325 mg, Oral, Every 6 Hours PRN      albuterol (2.5 MG/3ML) 0.083% nebulizer solution  Commonly known as:  PROVENTIL   2.5 mg, Nebulization, Every 6 Hours PRN      aspirin 81 MG EC tablet   81 mg, Oral, Daily      atorvastatin 40 MG tablet  Commonly known as:  LIPITOR   40 mg, Oral, Nightly      bisacodyl 10 MG suppository  Commonly known as:   DULCOLAX   10 mg, Rectal, Daily PRN      calcium carbonate 500 MG chewable tablet  Commonly known as:  TUMS   2 tablets, Oral, 2 Times Daily PRN      clopidogrel 75 MG tablet  Commonly known as:  PLAVIX   75 mg, Oral, Daily      cyanocobalamin 1000 MCG tablet  Commonly known as:  VITAMIN B-12   1,000 mcg, Oral, Daily      famotidine 20 MG tablet  Commonly known as:  PEPCID   20 mg, Oral, 2 Times Daily      fluticasone 50 MCG/ACT nasal spray  Commonly known as:  FLONASE   2 sprays, Each Nare, Daily      furosemide 40 MG tablet  Commonly known as:  LASIX   40 mg, Oral, 2 Times Daily      gabapentin 100 MG capsule  Commonly known as:  NEURONTIN   200 mg, Oral, Every 8 Hours Scheduled      guaiFENesin 600 MG 12 hr tablet  Commonly known as:  MUCINEX   600 mg, Oral, Every 12 Hours Scheduled      ipratropium-albuterol 0.5-2.5 mg/3 ml nebulizer  Commonly known as:  DUO-NEB   3 mL, Nebulization, Every 8 Hours - RT      lactobacillus acidophilus capsule capsule   1 capsule, Oral, Daily      lactulose 10 GM/15ML solution  Commonly known as:  CHRONULAC   20 g, Oral, 2 Times Daily PRN      metoprolol tartrate 25 MG tablet  Commonly known as:  LOPRESSOR   25 mg, Oral, Every 8 Hours, Hold SBP <100 or HR <50      mirtazapine 15 MG disintegrating tablet  Commonly known as:  REMERON SOL-TAB   15 mg, Oral, Nightly      Morphine 15 MG 12 hr tablet  Commonly known as:  MS CONTIN   15 mg, Oral, Every 12 Hours Scheduled      nicotine 21 MG/24HR patch  Commonly known as:  NICODERM CQ   1 patch, Transdermal, Every 24 Hours      polyethylene glycol pack packet  Commonly known as:  MIRALAX   17 g, Oral, Daily      sennosides-docusate sodium 8.6-50 MG tablet  Commonly known as:  SENOKOT-S   2 tablets, Oral, Nightly               Discharge Disposition:  Rehab Facility or Unit (DC - External)    Discharge Diet:  Diet Instructions     Diet: Regular, Cardiac; Thin       Discharge Diet:   Regular  Cardiac       Fluid Consistency:  Thin           Discharge Activity:   Activity Instructions     Activity as Tolerated             Code Status/Level of Support:  Code Status and Medical Interventions:   Ordered at: 10/26/18 1626     Limited Support to NOT Include:    Artificial Nutrition    Cardioversion/Defibrillation    Dialysis    Intubation     Level Of Support Discussed With:    Patient     Code Status:    No CPR     Medical Interventions (Level of Support Prior to Arrest):    Limited       Future Appointments  Date Time Provider Department Center   12/3/2018 3:15 PM Carlos Harvey MD [facility] None       Additional Instructions for the Follow-ups that You Need to Schedule     Discharge Follow-up with PCP    As directed      Currently Documented PCP:  Provider, No Known  PCP Phone Number:  [phone]    Follow Up Details:  as needed               Time Spent on Discharge:  40 minutes    Electronically signed by BRITTNY Britt, 11/02/18, 7:54 AM.

## 2018-11-02 NOTE — PROGRESS NOTES
"Adult Nutrition  Assessment/PES    Patient Name:  Yassine Love  YOB: 1945  MRN: [id number]  Admit Date:  10/26/2018    Assessment Date:  11/2/2018    Comments:            Reason for Assessment     Row Name 11/02/18 0808          Reason for Assessment    Reason For Assessment other (see comments)   LOS/5\"                       Nutrition Prescription Ordered     Row Name 11/02/18 0808          Nutrition Prescription PO    Current PO Diet Regular     Common Modifiers Cardiac;Consistent Carbohydrate               Problem/Interventions:        Problem 1     Row Name 11/02/18 0809          Nutrition Diagnoses Problem 1    Problem 1 Nutrition Appropriate for Condition at this Time     Signs/Symptoms (evidenced by) PO Intake     Percent (%) intake recorded 67 %   IN ADDITION TO A RECORDED SNACK     Over number of meals 3                           Education/Evaluation     Row Name 11/02/18 0809          Monitor/Evaluation    Monitor Per protocol         Electronically signed by:  Mary Finney RD  11/02/18 8:10 AM  "

## 2018-11-02 NOTE — PLAN OF CARE
Problem: Fall Risk (Adult)  Goal: Identify Related Risk Factors and Signs and Symptoms  Outcome: Ongoing (interventions implemented as appropriate)      Problem: Patient Care Overview  Goal: Plan of Care Review  Outcome: Ongoing (interventions implemented as appropriate)   11/02/18 0302   Coping/Psychosocial   Plan of Care Reviewed With patient   Plan of Care Review   Progress no change   OTHER   Outcome Summary Pt. VSS. No reports of pain or discomfort. On BiPAP. Will continue to monitor.      Goal: Individualization and Mutuality  Outcome: Ongoing (interventions implemented as appropriate)    Goal: Discharge Needs Assessment  Outcome: Ongoing (interventions implemented as appropriate)    Goal: Interprofessional Rounds/Family Conf  Outcome: Ongoing (interventions implemented as appropriate)      Problem: Skin Injury Risk (Adult)  Goal: Skin Health and Integrity  Outcome: Ongoing (interventions implemented as appropriate)

## 2018-12-05 ENCOUNTER — APPOINTMENT (OUTPATIENT)
Dept: CT IMAGING | Facility: HOSPITAL | Age: 73
End: 2018-12-05

## 2018-12-05 ENCOUNTER — HOSPITAL ENCOUNTER (INPATIENT)
Facility: HOSPITAL | Age: 73
LOS: 1 days | End: 2018-12-06
Attending: EMERGENCY MEDICINE | Admitting: INTERNAL MEDICINE

## 2018-12-05 ENCOUNTER — APPOINTMENT (OUTPATIENT)
Dept: GENERAL RADIOLOGY | Facility: HOSPITAL | Age: 73
End: 2018-12-05

## 2018-12-05 DIAGNOSIS — J96.21 ACUTE ON CHRONIC RESPIRATORY FAILURE WITH HYPOXIA (HCC): Primary | ICD-10-CM

## 2018-12-05 DIAGNOSIS — J18.9 MULTIFOCAL PNEUMONIA: ICD-10-CM

## 2018-12-05 LAB
ALBUMIN SERPL-MCNC: 3.98 G/DL (ref 3.2–4.8)
ALBUMIN/GLOB SERPL: 1.5 G/DL (ref 1.5–2.5)
ALP SERPL-CCNC: 106 U/L (ref 25–100)
ALT SERPL W P-5'-P-CCNC: 13 U/L (ref 7–40)
ANION GAP SERPL CALCULATED.3IONS-SCNC: 5 MMOL/L (ref 3–11)
ARTERIAL PATENCY WRIST A: ABNORMAL
AST SERPL-CCNC: 13 U/L (ref 0–33)
ATMOSPHERIC PRESS: ABNORMAL MMHG
BASE EXCESS BLDA CALC-SCNC: 7.1 MMOL/L (ref 0–2)
BASOPHILS # BLD AUTO: 0.02 10*3/MM3 (ref 0–0.2)
BASOPHILS NFR BLD AUTO: 0.1 % (ref 0–1)
BDY SITE: ABNORMAL
BILIRUB SERPL-MCNC: 0.4 MG/DL (ref 0.3–1.2)
BNP SERPL-MCNC: 81 PG/ML (ref 0–100)
BODY TEMPERATURE: 37 C
BUN BLD-MCNC: 32 MG/DL (ref 9–23)
BUN/CREAT SERPL: 25 (ref 7–25)
CALCIUM SPEC-SCNC: 9 MG/DL (ref 8.7–10.4)
CHLORIDE SERPL-SCNC: 101 MMOL/L (ref 99–109)
CO2 BLDA-SCNC: 38.2 MMOL/L (ref 23–27)
CO2 SERPL-SCNC: 35 MMOL/L (ref 20–31)
COHGB MFR BLD: 2.3 % (ref 0–2)
CREAT BLD-MCNC: 1.28 MG/DL (ref 0.6–1.3)
D-LACTATE SERPL-SCNC: 1.6 MMOL/L (ref 0.5–2)
DEPRECATED RDW RBC AUTO: 65.4 FL (ref 37–54)
EOSINOPHIL # BLD AUTO: 0.29 10*3/MM3 (ref 0–0.3)
EOSINOPHIL NFR BLD AUTO: 2.1 % (ref 0–3)
ERYTHROCYTE [DISTWIDTH] IN BLOOD BY AUTOMATED COUNT: 18.2 % (ref 11.3–14.5)
FLUAV AG NPH QL: NEGATIVE
FLUBV AG NPH QL IA: NEGATIVE
GFR SERPL CREATININE-BSD FRML MDRD: 55 ML/MIN/1.73
GLOBULIN UR ELPH-MCNC: 2.7 GM/DL
GLUCOSE BLD-MCNC: 119 MG/DL (ref 70–100)
HCO3 BLDA-SCNC: 35.8 MMOL/L (ref 20–26)
HCT VFR BLD AUTO: 38.7 % (ref 38.9–50.9)
HCT VFR BLD CALC: 31.9 %
HGB BLD-MCNC: 11.5 G/DL (ref 13.1–17.5)
HGB BLDA-MCNC: 10.4 G/DL (ref 13.5–17.5)
HOLD SPECIMEN: NORMAL
HOLD SPECIMEN: NORMAL
HOROWITZ INDEX BLD+IHG-RTO: 80 %
IMM GRANULOCYTES # BLD: 0.06 10*3/MM3 (ref 0–0.03)
IMM GRANULOCYTES NFR BLD: 0.4 % (ref 0–0.6)
LYMPHOCYTES # BLD AUTO: 0.99 10*3/MM3 (ref 0.6–4.8)
LYMPHOCYTES NFR BLD AUTO: 7.3 % (ref 24–44)
Lab: ABNORMAL
MCH RBC QN AUTO: 28.9 PG (ref 27–31)
MCHC RBC AUTO-ENTMCNC: 29.7 G/DL (ref 32–36)
MCV RBC AUTO: 97.2 FL (ref 80–99)
METHGB BLD QL: 0.5 % (ref 0–1.5)
MODALITY: ABNORMAL
MONOCYTES # BLD AUTO: 0.92 10*3/MM3 (ref 0–1)
MONOCYTES NFR BLD AUTO: 6.8 % (ref 0–12)
NEUTROPHILS # BLD AUTO: 11.29 10*3/MM3 (ref 1.5–8.3)
NEUTROPHILS NFR BLD AUTO: 83.3 % (ref 41–71)
NOTE: ABNORMAL
NOTIFIED BY: ABNORMAL
NOTIFIED WHO: ABNORMAL
OXYHGB MFR BLDV: 87.8 % (ref 94–99)
PCO2 BLDA: 76.6 MM HG
PCO2 TEMP ADJ BLD: 76.6 MM HG (ref 35–48)
PH BLDA: 7.28 PH UNITS (ref 7.35–7.45)
PH, TEMP CORRECTED: 7.28 PH UNITS
PLATELET # BLD AUTO: 395 10*3/MM3 (ref 150–450)
PMV BLD AUTO: 9.4 FL (ref 6–12)
PO2 BLDA: 69 MM HG (ref 83–108)
PO2 TEMP ADJ BLD: 69 MM HG (ref 83–108)
POTASSIUM BLD-SCNC: 4.1 MMOL/L (ref 3.5–5.5)
PROT SERPL-MCNC: 6.7 G/DL (ref 5.7–8.2)
RBC # BLD AUTO: 3.98 10*6/MM3 (ref 4.2–5.76)
SODIUM BLD-SCNC: 141 MMOL/L (ref 132–146)
TROPONIN I SERPL-MCNC: 0.01 NG/ML (ref 0–0.07)
VENTILATOR MODE: ABNORMAL
WBC NRBC COR # BLD: 13.57 10*3/MM3 (ref 3.5–10.8)
WHOLE BLOOD HOLD SPECIMEN: NORMAL
WHOLE BLOOD HOLD SPECIMEN: NORMAL

## 2018-12-05 PROCEDURE — 99285 EMERGENCY DEPT VISIT HI MDM: CPT

## 2018-12-05 PROCEDURE — 94799 UNLISTED PULMONARY SVC/PX: CPT

## 2018-12-05 PROCEDURE — 36600 WITHDRAWAL OF ARTERIAL BLOOD: CPT

## 2018-12-05 PROCEDURE — 71045 X-RAY EXAM CHEST 1 VIEW: CPT

## 2018-12-05 PROCEDURE — 25010000002 METHYLPREDNISOLONE PER 125 MG: Performed by: EMERGENCY MEDICINE

## 2018-12-05 PROCEDURE — 94640 AIRWAY INHALATION TREATMENT: CPT

## 2018-12-05 PROCEDURE — 87804 INFLUENZA ASSAY W/OPTIC: CPT | Performed by: EMERGENCY MEDICINE

## 2018-12-05 PROCEDURE — 80053 COMPREHEN METABOLIC PANEL: CPT

## 2018-12-05 PROCEDURE — 94660 CPAP INITIATION&MGMT: CPT

## 2018-12-05 PROCEDURE — 71275 CT ANGIOGRAPHY CHEST: CPT

## 2018-12-05 PROCEDURE — 84484 ASSAY OF TROPONIN QUANT: CPT

## 2018-12-05 PROCEDURE — 93005 ELECTROCARDIOGRAM TRACING: CPT

## 2018-12-05 PROCEDURE — 83605 ASSAY OF LACTIC ACID: CPT | Performed by: EMERGENCY MEDICINE

## 2018-12-05 PROCEDURE — 82805 BLOOD GASES W/O2 SATURATION: CPT

## 2018-12-05 PROCEDURE — 0 IOPAMIDOL PER 1 ML: Performed by: EMERGENCY MEDICINE

## 2018-12-05 PROCEDURE — 85025 COMPLETE CBC W/AUTO DIFF WBC: CPT

## 2018-12-05 PROCEDURE — 83880 ASSAY OF NATRIURETIC PEPTIDE: CPT

## 2018-12-05 PROCEDURE — 87040 BLOOD CULTURE FOR BACTERIA: CPT | Performed by: EMERGENCY MEDICINE

## 2018-12-05 PROCEDURE — 25010000002 VANCOMYCIN 1 G RECONSTITUTED SOLUTION: Performed by: EMERGENCY MEDICINE

## 2018-12-05 RX ORDER — IPRATROPIUM BROMIDE AND ALBUTEROL SULFATE 2.5; .5 MG/3ML; MG/3ML
3 SOLUTION RESPIRATORY (INHALATION) ONCE
Status: COMPLETED | OUTPATIENT
Start: 2018-12-05 | End: 2018-12-05

## 2018-12-05 RX ORDER — METHYLPREDNISOLONE SODIUM SUCCINATE 125 MG/2ML
125 INJECTION, POWDER, LYOPHILIZED, FOR SOLUTION INTRAMUSCULAR; INTRAVENOUS ONCE
Status: COMPLETED | OUTPATIENT
Start: 2018-12-05 | End: 2018-12-05

## 2018-12-05 RX ORDER — SODIUM CHLORIDE 0.9 % (FLUSH) 0.9 %
10 SYRINGE (ML) INJECTION AS NEEDED
Status: DISCONTINUED | OUTPATIENT
Start: 2018-12-05 | End: 2018-12-06 | Stop reason: HOSPADM

## 2018-12-05 RX ADMIN — IOPAMIDOL 85 ML: 755 INJECTION, SOLUTION INTRAVENOUS at 23:01

## 2018-12-05 RX ADMIN — VANCOMYCIN HYDROCHLORIDE 1750 MG: 1 INJECTION, POWDER, LYOPHILIZED, FOR SOLUTION INTRAVENOUS at 23:39

## 2018-12-05 RX ADMIN — IPRATROPIUM BROMIDE AND ALBUTEROL SULFATE 3 ML: 2.5; .5 SOLUTION RESPIRATORY (INHALATION) at 21:45

## 2018-12-05 RX ADMIN — SODIUM CHLORIDE 1000 ML: 9 INJECTION, SOLUTION INTRAVENOUS at 21:54

## 2018-12-05 RX ADMIN — SODIUM CHLORIDE 500 ML: 9 INJECTION, SOLUTION INTRAVENOUS at 23:45

## 2018-12-05 RX ADMIN — METHYLPREDNISOLONE SODIUM SUCCINATE 125 MG: 125 INJECTION, POWDER, FOR SOLUTION INTRAMUSCULAR; INTRAVENOUS at 21:28

## 2018-12-05 RX ADMIN — SODIUM CHLORIDE 2 G: 9 INJECTION, SOLUTION INTRAVENOUS at 22:41

## 2018-12-06 ENCOUNTER — HOSPITAL ENCOUNTER (INPATIENT)
Facility: HOSPITAL | Age: 73
LOS: 27 days | Discharge: HOSPICE | End: 2019-01-02
Attending: INTERNAL MEDICINE | Admitting: INTERNAL MEDICINE

## 2018-12-06 VITALS
BODY MASS INDEX: 29.05 KG/M2 | OXYGEN SATURATION: 95 % | SYSTOLIC BLOOD PRESSURE: 106 MMHG | DIASTOLIC BLOOD PRESSURE: 63 MMHG | HEIGHT: 68 IN | WEIGHT: 191.7 LBS | RESPIRATION RATE: 24 BRPM | HEART RATE: 77 BPM | TEMPERATURE: 98.2 F

## 2018-12-06 DIAGNOSIS — Z74.09 IMPAIRED FUNCTIONAL MOBILITY, BALANCE, GAIT, AND ENDURANCE: Primary | ICD-10-CM

## 2018-12-06 PROBLEM — A41.9 SEPSIS, UNSPECIFIED ORGANISM (HCC): Status: ACTIVE | Noted: 2018-12-06

## 2018-12-06 PROBLEM — J96.20 ACUTE ON CHRONIC RESPIRATORY FAILURE (HCC): Status: ACTIVE | Noted: 2018-12-06

## 2018-12-06 PROBLEM — A41.9 SEPSIS (HCC): Status: ACTIVE | Noted: 2018-12-06

## 2018-12-06 PROBLEM — D72.829 LEUKOCYTOSIS: Status: ACTIVE | Noted: 2018-12-06

## 2018-12-06 PROBLEM — I95.9 HYPOTENSION: Status: ACTIVE | Noted: 2018-12-06

## 2018-12-06 PROBLEM — J18.9 PNEUMONIA OF BOTH LOWER LOBES: Status: ACTIVE | Noted: 2018-12-06

## 2018-12-06 LAB
ANION GAP SERPL CALCULATED.3IONS-SCNC: 4 MMOL/L (ref 3–11)
BASOPHILS # BLD AUTO: 0 10*3/MM3 (ref 0–0.2)
BASOPHILS NFR BLD AUTO: 0 % (ref 0–1)
BUN BLD-MCNC: 32 MG/DL (ref 9–23)
BUN/CREAT SERPL: 27.1 (ref 7–25)
CALCIUM SPEC-SCNC: 8.3 MG/DL (ref 8.7–10.4)
CHLORIDE SERPL-SCNC: 101 MMOL/L (ref 99–109)
CO2 SERPL-SCNC: 32 MMOL/L (ref 20–31)
CREAT BLD-MCNC: 1.18 MG/DL (ref 0.6–1.3)
DEPRECATED RDW RBC AUTO: 68.2 FL (ref 37–54)
EOSINOPHIL # BLD AUTO: 0 10*3/MM3 (ref 0–0.3)
EOSINOPHIL NFR BLD AUTO: 0 % (ref 0–3)
ERYTHROCYTE [DISTWIDTH] IN BLOOD BY AUTOMATED COUNT: 18.5 % (ref 11.3–14.5)
GFR SERPL CREATININE-BSD FRML MDRD: 60 ML/MIN/1.73
GLUCOSE BLD-MCNC: 151 MG/DL (ref 70–100)
HCT VFR BLD AUTO: 34.1 % (ref 38.9–50.9)
HGB BLD-MCNC: 9.6 G/DL (ref 13.1–17.5)
IMM GRANULOCYTES # BLD: 0.06 10*3/MM3 (ref 0–0.03)
IMM GRANULOCYTES NFR BLD: 0.5 % (ref 0–0.6)
LYMPHOCYTES # BLD AUTO: 0.29 10*3/MM3 (ref 0.6–4.8)
LYMPHOCYTES NFR BLD AUTO: 2.2 % (ref 24–44)
MCH RBC QN AUTO: 28.1 PG (ref 27–31)
MCHC RBC AUTO-ENTMCNC: 28.2 G/DL (ref 32–36)
MCV RBC AUTO: 99.7 FL (ref 80–99)
MONOCYTES # BLD AUTO: 0.14 10*3/MM3 (ref 0–1)
MONOCYTES NFR BLD AUTO: 1.1 % (ref 0–12)
NEUTROPHILS # BLD AUTO: 12.69 10*3/MM3 (ref 1.5–8.3)
NEUTROPHILS NFR BLD AUTO: 96.7 % (ref 41–71)
PLATELET # BLD AUTO: 360 10*3/MM3 (ref 150–450)
PMV BLD AUTO: 10.1 FL (ref 6–12)
POTASSIUM BLD-SCNC: 4.5 MMOL/L (ref 3.5–5.5)
RBC # BLD AUTO: 3.42 10*6/MM3 (ref 4.2–5.76)
SODIUM BLD-SCNC: 137 MMOL/L (ref 132–146)
WBC NRBC COR # BLD: 13.12 10*3/MM3 (ref 3.5–10.8)

## 2018-12-06 PROCEDURE — 94799 UNLISTED PULMONARY SVC/PX: CPT

## 2018-12-06 PROCEDURE — 94760 N-INVAS EAR/PLS OXIMETRY 1: CPT

## 2018-12-06 PROCEDURE — 99223 1ST HOSP IP/OBS HIGH 75: CPT | Performed by: FAMILY MEDICINE

## 2018-12-06 PROCEDURE — 25010000002 HEPARIN (PORCINE) PER 1000 UNITS: Performed by: NURSE PRACTITIONER

## 2018-12-06 PROCEDURE — 25010000002 MORPHINE SULFATE (PF) 2 MG/ML SOLUTION: Performed by: INTERNAL MEDICINE

## 2018-12-06 PROCEDURE — 97162 PT EVAL MOD COMPLEX 30 MIN: CPT

## 2018-12-06 PROCEDURE — 85025 COMPLETE CBC W/AUTO DIFF WBC: CPT | Performed by: NURSE PRACTITIONER

## 2018-12-06 PROCEDURE — 25010000002 METHYLPREDNISOLONE PER 125 MG: Performed by: NURSE PRACTITIONER

## 2018-12-06 PROCEDURE — 25010000002 METHYLPREDNISOLONE PER 125 MG: Performed by: INTERNAL MEDICINE

## 2018-12-06 PROCEDURE — 94660 CPAP INITIATION&MGMT: CPT

## 2018-12-06 PROCEDURE — 94640 AIRWAY INHALATION TREATMENT: CPT

## 2018-12-06 PROCEDURE — 99239 HOSP IP/OBS DSCHRG MGMT >30: CPT | Performed by: INTERNAL MEDICINE

## 2018-12-06 PROCEDURE — 92610 EVALUATE SWALLOWING FUNCTION: CPT

## 2018-12-06 PROCEDURE — 80048 BASIC METABOLIC PNL TOTAL CA: CPT | Performed by: NURSE PRACTITIONER

## 2018-12-06 RX ORDER — CASTOR OIL AND BALSAM, PERU 788; 87 MG/G; MG/G
OINTMENT TOPICAL AS NEEDED
Status: CANCELLED | OUTPATIENT
Start: 2018-12-06

## 2018-12-06 RX ORDER — CLOPIDOGREL BISULFATE 75 MG/1
75 TABLET ORAL DAILY
Status: DISCONTINUED | OUTPATIENT
Start: 2018-12-06 | End: 2018-12-06 | Stop reason: HOSPADM

## 2018-12-06 RX ORDER — MIRTAZAPINE 15 MG/1
15 TABLET, ORALLY DISINTEGRATING ORAL NIGHTLY
Status: DISCONTINUED | OUTPATIENT
Start: 2018-12-06 | End: 2019-01-02 | Stop reason: HOSPADM

## 2018-12-06 RX ORDER — MORPHINE SULFATE 2 MG/ML
2 INJECTION, SOLUTION INTRAMUSCULAR; INTRAVENOUS EVERY 6 HOURS
Status: DISCONTINUED | OUTPATIENT
Start: 2018-12-06 | End: 2018-12-08

## 2018-12-06 RX ORDER — IPRATROPIUM BROMIDE AND ALBUTEROL SULFATE 2.5; .5 MG/3ML; MG/3ML
3 SOLUTION RESPIRATORY (INHALATION)
Status: DISCONTINUED | OUTPATIENT
Start: 2018-12-06 | End: 2019-01-02 | Stop reason: HOSPADM

## 2018-12-06 RX ORDER — MORPHINE SULFATE 2 MG/ML
2 INJECTION, SOLUTION INTRAMUSCULAR; INTRAVENOUS
Status: DISCONTINUED | OUTPATIENT
Start: 2018-12-06 | End: 2018-12-16

## 2018-12-06 RX ORDER — SODIUM CHLORIDE 0.9 % (FLUSH) 0.9 %
3-10 SYRINGE (ML) INJECTION AS NEEDED
Status: DISCONTINUED | OUTPATIENT
Start: 2018-12-06 | End: 2019-01-02

## 2018-12-06 RX ORDER — CASTOR OIL AND BALSAM, PERU 788; 87 MG/G; MG/G
OINTMENT TOPICAL EVERY 12 HOURS SCHEDULED
Status: DISCONTINUED | OUTPATIENT
Start: 2018-12-06 | End: 2018-12-06 | Stop reason: HOSPADM

## 2018-12-06 RX ORDER — SODIUM CHLORIDE 0.9 % (FLUSH) 0.9 %
10 SYRINGE (ML) INJECTION AS NEEDED
Status: CANCELLED | OUTPATIENT
Start: 2018-12-06

## 2018-12-06 RX ORDER — BISACODYL 10 MG
10 SUPPOSITORY, RECTAL RECTAL DAILY PRN
Status: CANCELLED | OUTPATIENT
Start: 2018-12-06

## 2018-12-06 RX ORDER — MORPHINE SULFATE 4 MG/ML
4 INJECTION, SOLUTION INTRAMUSCULAR; INTRAVENOUS
Status: DISCONTINUED | OUTPATIENT
Start: 2018-12-06 | End: 2018-12-16

## 2018-12-06 RX ORDER — SODIUM CHLORIDE 0.9 % (FLUSH) 0.9 %
3 SYRINGE (ML) INJECTION EVERY 12 HOURS SCHEDULED
Status: CANCELLED | OUTPATIENT
Start: 2018-12-06

## 2018-12-06 RX ORDER — IPRATROPIUM BROMIDE AND ALBUTEROL SULFATE 2.5; .5 MG/3ML; MG/3ML
3 SOLUTION RESPIRATORY (INHALATION) EVERY 4 HOURS PRN
Status: DISCONTINUED | OUTPATIENT
Start: 2018-12-06 | End: 2018-12-06 | Stop reason: HOSPADM

## 2018-12-06 RX ORDER — MIRTAZAPINE 15 MG/1
15 TABLET, ORALLY DISINTEGRATING ORAL NIGHTLY
Status: CANCELLED | OUTPATIENT
Start: 2018-12-06

## 2018-12-06 RX ORDER — SODIUM CHLORIDE 9 MG/ML
75 INJECTION, SOLUTION INTRAVENOUS ONCE
Status: COMPLETED | OUTPATIENT
Start: 2018-12-06 | End: 2018-12-06

## 2018-12-06 RX ORDER — SODIUM CHLORIDE 0.9 % (FLUSH) 0.9 %
3-10 SYRINGE (ML) INJECTION AS NEEDED
Status: CANCELLED | OUTPATIENT
Start: 2018-12-06

## 2018-12-06 RX ORDER — IPRATROPIUM BROMIDE AND ALBUTEROL SULFATE 2.5; .5 MG/3ML; MG/3ML
3 SOLUTION RESPIRATORY (INHALATION)
Status: DISCONTINUED | OUTPATIENT
Start: 2018-12-06 | End: 2018-12-06 | Stop reason: HOSPADM

## 2018-12-06 RX ORDER — ATORVASTATIN CALCIUM 40 MG/1
40 TABLET, FILM COATED ORAL NIGHTLY
Status: DISCONTINUED | OUTPATIENT
Start: 2018-12-06 | End: 2018-12-06 | Stop reason: HOSPADM

## 2018-12-06 RX ORDER — LIDOCAINE HYDROCHLORIDE 40 MG/ML
4 INJECTION, SOLUTION RETROBULBAR; TOPICAL 4 TIMES DAILY PRN
Status: DISCONTINUED | OUTPATIENT
Start: 2018-12-06 | End: 2019-01-02

## 2018-12-06 RX ORDER — FAMOTIDINE 20 MG/1
20 TABLET, FILM COATED ORAL 2 TIMES DAILY
Status: DISCONTINUED | OUTPATIENT
Start: 2018-12-06 | End: 2018-12-06

## 2018-12-06 RX ORDER — ROFLUMILAST 500 UG/1
500 TABLET ORAL DAILY
Status: DISCONTINUED | OUTPATIENT
Start: 2018-12-06 | End: 2018-12-06 | Stop reason: HOSPADM

## 2018-12-06 RX ORDER — SENNA AND DOCUSATE SODIUM 50; 8.6 MG/1; MG/1
2 TABLET, FILM COATED ORAL NIGHTLY
Status: DISCONTINUED | OUTPATIENT
Start: 2018-12-06 | End: 2018-12-06 | Stop reason: HOSPADM

## 2018-12-06 RX ORDER — HEPARIN SODIUM 5000 [USP'U]/ML
5000 INJECTION, SOLUTION INTRAVENOUS; SUBCUTANEOUS EVERY 8 HOURS SCHEDULED
Status: CANCELLED | OUTPATIENT
Start: 2018-12-06

## 2018-12-06 RX ORDER — CASTOR OIL AND BALSAM, PERU 788; 87 MG/G; MG/G
OINTMENT TOPICAL AS NEEDED
Status: DISCONTINUED | OUTPATIENT
Start: 2018-12-06 | End: 2018-12-06 | Stop reason: HOSPADM

## 2018-12-06 RX ORDER — ACETAMINOPHEN 325 MG/1
650 TABLET ORAL EVERY 6 HOURS PRN
Status: DISCONTINUED | OUTPATIENT
Start: 2018-12-06 | End: 2019-01-02 | Stop reason: HOSPADM

## 2018-12-06 RX ORDER — BISACODYL 10 MG
10 SUPPOSITORY, RECTAL RECTAL DAILY PRN
Status: DISCONTINUED | OUTPATIENT
Start: 2018-12-06 | End: 2018-12-06 | Stop reason: HOSPADM

## 2018-12-06 RX ORDER — BISACODYL 10 MG
10 SUPPOSITORY, RECTAL RECTAL DAILY PRN
Status: DISCONTINUED | OUTPATIENT
Start: 2018-12-06 | End: 2018-12-07

## 2018-12-06 RX ORDER — ASPIRIN 81 MG/1
81 TABLET ORAL DAILY
Status: CANCELLED | OUTPATIENT
Start: 2018-12-07

## 2018-12-06 RX ORDER — METHYLPREDNISOLONE SODIUM SUCCINATE 125 MG/2ML
60 INJECTION, POWDER, LYOPHILIZED, FOR SOLUTION INTRAMUSCULAR; INTRAVENOUS EVERY 12 HOURS
Status: DISCONTINUED | OUTPATIENT
Start: 2018-12-06 | End: 2018-12-07

## 2018-12-06 RX ORDER — FLUTICASONE PROPIONATE 50 MCG
2 SPRAY, SUSPENSION (ML) NASAL DAILY
Status: DISCONTINUED | OUTPATIENT
Start: 2018-12-06 | End: 2018-12-06 | Stop reason: HOSPADM

## 2018-12-06 RX ORDER — FAMOTIDINE 20 MG/1
20 TABLET, FILM COATED ORAL 2 TIMES DAILY
Status: DISCONTINUED | OUTPATIENT
Start: 2018-12-06 | End: 2018-12-06 | Stop reason: HOSPADM

## 2018-12-06 RX ORDER — KETOROLAC TROMETHAMINE 30 MG/ML
15 INJECTION, SOLUTION INTRAMUSCULAR; INTRAVENOUS EVERY 6 HOURS PRN
Status: DISCONTINUED | OUTPATIENT
Start: 2018-12-06 | End: 2018-12-09

## 2018-12-06 RX ORDER — SODIUM CHLORIDE 0.9 % (FLUSH) 0.9 %
3 SYRINGE (ML) INJECTION EVERY 12 HOURS SCHEDULED
Status: DISCONTINUED | OUTPATIENT
Start: 2018-12-06 | End: 2018-12-06 | Stop reason: HOSPADM

## 2018-12-06 RX ORDER — METHYLPREDNISOLONE SODIUM SUCCINATE 125 MG/2ML
60 INJECTION, POWDER, LYOPHILIZED, FOR SOLUTION INTRAMUSCULAR; INTRAVENOUS EVERY 12 HOURS
Status: CANCELLED | OUTPATIENT
Start: 2018-12-06

## 2018-12-06 RX ORDER — SENNA AND DOCUSATE SODIUM 50; 8.6 MG/1; MG/1
2 TABLET, FILM COATED ORAL NIGHTLY
Status: CANCELLED | OUTPATIENT
Start: 2018-12-06

## 2018-12-06 RX ORDER — IPRATROPIUM BROMIDE AND ALBUTEROL SULFATE 2.5; .5 MG/3ML; MG/3ML
3 SOLUTION RESPIRATORY (INHALATION) EVERY 4 HOURS PRN
Status: CANCELLED | OUTPATIENT
Start: 2018-12-06

## 2018-12-06 RX ORDER — FAMOTIDINE 20 MG/1
20 TABLET, FILM COATED ORAL 2 TIMES DAILY
Status: CANCELLED | OUTPATIENT
Start: 2018-12-06

## 2018-12-06 RX ORDER — ACETAMINOPHEN 325 MG/1
650 TABLET ORAL EVERY 6 HOURS PRN
Status: CANCELLED | OUTPATIENT
Start: 2018-12-06

## 2018-12-06 RX ORDER — ACETAMINOPHEN 325 MG/1
650 TABLET ORAL EVERY 6 HOURS PRN
Status: DISCONTINUED | OUTPATIENT
Start: 2018-12-06 | End: 2018-12-06 | Stop reason: HOSPADM

## 2018-12-06 RX ORDER — HALOPERIDOL 5 MG/ML
1 INJECTION INTRAMUSCULAR EVERY 4 HOURS PRN
Status: DISCONTINUED | OUTPATIENT
Start: 2018-12-06 | End: 2018-12-17

## 2018-12-06 RX ORDER — LANOLIN ALCOHOL/MO/W.PET/CERES
1000 CREAM (GRAM) TOPICAL DAILY
Status: CANCELLED | OUTPATIENT
Start: 2018-12-07

## 2018-12-06 RX ORDER — IPRATROPIUM BROMIDE AND ALBUTEROL SULFATE 2.5; .5 MG/3ML; MG/3ML
3 SOLUTION RESPIRATORY (INHALATION) EVERY 4 HOURS PRN
Status: DISCONTINUED | OUTPATIENT
Start: 2018-12-06 | End: 2019-01-02 | Stop reason: HOSPADM

## 2018-12-06 RX ORDER — METHYLPREDNISOLONE SODIUM SUCCINATE 125 MG/2ML
60 INJECTION, POWDER, LYOPHILIZED, FOR SOLUTION INTRAMUSCULAR; INTRAVENOUS EVERY 12 HOURS
Status: DISCONTINUED | OUTPATIENT
Start: 2018-12-06 | End: 2018-12-06 | Stop reason: HOSPADM

## 2018-12-06 RX ORDER — BUDESONIDE AND FORMOTEROL FUMARATE DIHYDRATE 160; 4.5 UG/1; UG/1
2 AEROSOL RESPIRATORY (INHALATION)
Status: DISCONTINUED | OUTPATIENT
Start: 2018-12-06 | End: 2018-12-06 | Stop reason: HOSPADM

## 2018-12-06 RX ORDER — FAMOTIDINE 10 MG/ML
20 INJECTION, SOLUTION INTRAVENOUS EVERY 12 HOURS SCHEDULED
Status: DISCONTINUED | OUTPATIENT
Start: 2018-12-06 | End: 2018-12-09

## 2018-12-06 RX ORDER — BUDESONIDE AND FORMOTEROL FUMARATE DIHYDRATE 160; 4.5 UG/1; UG/1
2 AEROSOL RESPIRATORY (INHALATION)
Status: CANCELLED | OUTPATIENT
Start: 2018-12-06

## 2018-12-06 RX ORDER — SODIUM CHLORIDE 0.9 % (FLUSH) 0.9 %
10 SYRINGE (ML) INJECTION AS NEEDED
Status: DISCONTINUED | OUTPATIENT
Start: 2018-12-06 | End: 2019-01-02

## 2018-12-06 RX ORDER — FLUTICASONE PROPIONATE 50 MCG
2 SPRAY, SUSPENSION (ML) NASAL DAILY
Status: DISCONTINUED | OUTPATIENT
Start: 2018-12-07 | End: 2019-01-02 | Stop reason: HOSPADM

## 2018-12-06 RX ORDER — CLOPIDOGREL BISULFATE 75 MG/1
75 TABLET ORAL DAILY
Status: CANCELLED | OUTPATIENT
Start: 2018-12-07

## 2018-12-06 RX ORDER — CASTOR OIL AND BALSAM, PERU 788; 87 MG/G; MG/G
OINTMENT TOPICAL AS NEEDED
Status: DISCONTINUED | OUTPATIENT
Start: 2018-12-06 | End: 2019-01-02 | Stop reason: HOSPADM

## 2018-12-06 RX ORDER — FLUTICASONE PROPIONATE 50 MCG
2 SPRAY, SUSPENSION (ML) NASAL DAILY
Status: CANCELLED | OUTPATIENT
Start: 2018-12-07

## 2018-12-06 RX ORDER — LORAZEPAM 2 MG/ML
0.5 INJECTION INTRAMUSCULAR
Status: DISCONTINUED | OUTPATIENT
Start: 2018-12-06 | End: 2018-12-15

## 2018-12-06 RX ORDER — MIRTAZAPINE 15 MG/1
15 TABLET, ORALLY DISINTEGRATING ORAL NIGHTLY
Status: DISCONTINUED | OUTPATIENT
Start: 2018-12-06 | End: 2018-12-06 | Stop reason: HOSPADM

## 2018-12-06 RX ORDER — BISACODYL 10 MG
10 SUPPOSITORY, RECTAL RECTAL DAILY PRN
Status: DISCONTINUED | OUTPATIENT
Start: 2018-12-06 | End: 2018-12-06

## 2018-12-06 RX ORDER — HEPARIN SODIUM 5000 [USP'U]/ML
5000 INJECTION, SOLUTION INTRAVENOUS; SUBCUTANEOUS EVERY 8 HOURS SCHEDULED
Status: DISCONTINUED | OUTPATIENT
Start: 2018-12-06 | End: 2018-12-06 | Stop reason: HOSPADM

## 2018-12-06 RX ORDER — GLYCOPYRROLATE 0.2 MG/ML
0.4 INJECTION INTRAMUSCULAR; INTRAVENOUS EVERY 6 HOURS PRN
Status: DISCONTINUED | OUTPATIENT
Start: 2018-12-06 | End: 2018-12-09

## 2018-12-06 RX ORDER — ATORVASTATIN CALCIUM 40 MG/1
40 TABLET, FILM COATED ORAL NIGHTLY
Status: CANCELLED | OUTPATIENT
Start: 2018-12-06

## 2018-12-06 RX ORDER — IPRATROPIUM BROMIDE AND ALBUTEROL SULFATE 2.5; .5 MG/3ML; MG/3ML
3 SOLUTION RESPIRATORY (INHALATION)
Status: CANCELLED | OUTPATIENT
Start: 2018-12-06

## 2018-12-06 RX ORDER — BUDESONIDE AND FORMOTEROL FUMARATE DIHYDRATE 160; 4.5 UG/1; UG/1
2 AEROSOL RESPIRATORY (INHALATION)
Status: DISCONTINUED | OUTPATIENT
Start: 2018-12-06 | End: 2019-01-02 | Stop reason: HOSPADM

## 2018-12-06 RX ORDER — SODIUM CHLORIDE 0.9 % (FLUSH) 0.9 %
3-10 SYRINGE (ML) INJECTION AS NEEDED
Status: DISCONTINUED | OUTPATIENT
Start: 2018-12-06 | End: 2018-12-06 | Stop reason: HOSPADM

## 2018-12-06 RX ORDER — SODIUM CHLORIDE 0.9 % (FLUSH) 0.9 %
3 SYRINGE (ML) INJECTION EVERY 12 HOURS SCHEDULED
Status: DISCONTINUED | OUTPATIENT
Start: 2018-12-06 | End: 2018-12-09

## 2018-12-06 RX ORDER — SENNA AND DOCUSATE SODIUM 50; 8.6 MG/1; MG/1
2 TABLET, FILM COATED ORAL NIGHTLY
Status: DISCONTINUED | OUTPATIENT
Start: 2018-12-06 | End: 2018-12-11

## 2018-12-06 RX ORDER — CASTOR OIL AND BALSAM, PERU 788; 87 MG/G; MG/G
OINTMENT TOPICAL EVERY 12 HOURS SCHEDULED
Status: CANCELLED | OUTPATIENT
Start: 2018-12-06

## 2018-12-06 RX ORDER — ROFLUMILAST 500 UG/1
500 TABLET ORAL DAILY
Status: CANCELLED | OUTPATIENT
Start: 2018-12-07

## 2018-12-06 RX ORDER — ROFLUMILAST 500 UG/1
500 TABLET ORAL DAILY
Status: DISCONTINUED | OUTPATIENT
Start: 2018-12-07 | End: 2019-01-02 | Stop reason: HOSPADM

## 2018-12-06 RX ORDER — ASPIRIN 81 MG/1
81 TABLET ORAL DAILY
Status: DISCONTINUED | OUTPATIENT
Start: 2018-12-06 | End: 2018-12-06 | Stop reason: HOSPADM

## 2018-12-06 RX ORDER — ACETAMINOPHEN 650 MG/1
650 SUPPOSITORY RECTAL ONCE
Status: COMPLETED | OUTPATIENT
Start: 2018-12-06 | End: 2018-12-06

## 2018-12-06 RX ORDER — CASTOR OIL AND BALSAM, PERU 788; 87 MG/G; MG/G
OINTMENT TOPICAL EVERY 12 HOURS SCHEDULED
Status: DISCONTINUED | OUTPATIENT
Start: 2018-12-06 | End: 2019-01-02 | Stop reason: HOSPADM

## 2018-12-06 RX ORDER — LANOLIN ALCOHOL/MO/W.PET/CERES
1000 CREAM (GRAM) TOPICAL DAILY
Status: DISCONTINUED | OUTPATIENT
Start: 2018-12-06 | End: 2018-12-06 | Stop reason: HOSPADM

## 2018-12-06 RX ORDER — L.ACID,PARA/B.BIFIDUM/S.THERM 8B CELL
1 CAPSULE ORAL DAILY
Status: DISCONTINUED | OUTPATIENT
Start: 2018-12-06 | End: 2018-12-06 | Stop reason: HOSPADM

## 2018-12-06 RX ORDER — L.ACID,PARA/B.BIFIDUM/S.THERM 8B CELL
1 CAPSULE ORAL DAILY
Status: CANCELLED | OUTPATIENT
Start: 2018-12-07

## 2018-12-06 RX ORDER — ACETAMINOPHEN 650 MG/1
650 SUPPOSITORY RECTAL EVERY 4 HOURS PRN
Status: DISCONTINUED | OUTPATIENT
Start: 2018-12-06 | End: 2018-12-09

## 2018-12-06 RX ADMIN — IPRATROPIUM BROMIDE AND ALBUTEROL SULFATE 3 ML: 2.5; .5 SOLUTION RESPIRATORY (INHALATION) at 16:09

## 2018-12-06 RX ADMIN — BUDESONIDE AND FORMOTEROL FUMARATE DIHYDRATE 2 PUFF: 160; 4.5 AEROSOL RESPIRATORY (INHALATION) at 08:35

## 2018-12-06 RX ADMIN — IPRATROPIUM BROMIDE AND ALBUTEROL SULFATE 3 ML: 2.5; .5 SOLUTION RESPIRATORY (INHALATION) at 11:58

## 2018-12-06 RX ADMIN — ACETAMINOPHEN 650 MG: 325 TABLET ORAL at 21:19

## 2018-12-06 RX ADMIN — SODIUM CHLORIDE 75 ML/HR: 9 INJECTION, SOLUTION INTRAVENOUS at 03:55

## 2018-12-06 RX ADMIN — CASTOR OIL AND BALSAM, PERU: 788; 87 OINTMENT TOPICAL at 20:59

## 2018-12-06 RX ADMIN — BUDESONIDE AND FORMOTEROL FUMARATE DIHYDRATE 2 PUFF: 160; 4.5 AEROSOL RESPIRATORY (INHALATION) at 19:19

## 2018-12-06 RX ADMIN — METHYLPREDNISOLONE SODIUM SUCCINATE 60 MG: 125 INJECTION, POWDER, FOR SOLUTION INTRAMUSCULAR; INTRAVENOUS at 20:58

## 2018-12-06 RX ADMIN — IPRATROPIUM BROMIDE AND ALBUTEROL SULFATE 3 ML: 2.5; .5 SOLUTION RESPIRATORY (INHALATION) at 08:34

## 2018-12-06 RX ADMIN — HEPARIN SODIUM 5000 UNITS: 5000 INJECTION, SOLUTION INTRAVENOUS; SUBCUTANEOUS at 05:15

## 2018-12-06 RX ADMIN — IPRATROPIUM BROMIDE AND ALBUTEROL SULFATE 3 ML: 2.5; .5 SOLUTION RESPIRATORY (INHALATION) at 19:19

## 2018-12-06 RX ADMIN — FAMOTIDINE 20 MG: 10 INJECTION, SOLUTION INTRAVENOUS at 20:57

## 2018-12-06 RX ADMIN — IPRATROPIUM BROMIDE AND ALBUTEROL SULFATE 3 ML: 2.5; .5 SOLUTION RESPIRATORY (INHALATION) at 23:48

## 2018-12-06 RX ADMIN — SODIUM CHLORIDE 2 G: 9 INJECTION, SOLUTION INTRAVENOUS at 09:23

## 2018-12-06 RX ADMIN — MORPHINE SULFATE 2 MG: 2 INJECTION, SOLUTION INTRAMUSCULAR; INTRAVENOUS at 22:49

## 2018-12-06 RX ADMIN — METHYLPREDNISOLONE SODIUM SUCCINATE 60 MG: 125 INJECTION, POWDER, FOR SOLUTION INTRAMUSCULAR; INTRAVENOUS at 09:23

## 2018-12-06 RX ADMIN — MORPHINE SULFATE 2 MG: 2 INJECTION, SOLUTION INTRAMUSCULAR; INTRAVENOUS at 17:28

## 2018-12-06 RX ADMIN — ACETAMINOPHEN 650 MG: 650 SUPPOSITORY RECTAL at 02:35

## 2018-12-06 RX ADMIN — SODIUM CHLORIDE, PRESERVATIVE FREE 3 ML: 5 INJECTION INTRAVENOUS at 20:57

## 2018-12-06 NOTE — PLAN OF CARE
Problem: Patient Care Overview  Goal: Plan of Care Review  Outcome: Ongoing (interventions implemented as appropriate)   12/06/18 1141   Coping/Psychosocial   Plan of Care Reviewed With patient   SLP evaluation completed. Will continue to address suspected pharyngeal dysphagia. Please see note for further details and recommendations.

## 2018-12-06 NOTE — DISCHARGE SUMMARY
[facility] Medicine Services  DISCHARGE TO INPATIENT HOSPICE    Patient Name: Yassine Love  : 1945  MRN: [id number]    Date of Admission: 2018  Date of Discharge:  18  Primary Care Physician: Provider, No Known    Consults     No orders found from 2018 to 2018.        Hospital Course     Presenting Problem:   Acute on chronic respiratory failure (CMS/HCC) [J96.20]  Acute on chronic respiratory failure (CMS/HCC) [J96.20]    Active Hospital Problems    Diagnosis Date Noted   • Acute on chronic respiratory failure (CMS/HCC) [J96.20] 2018      Resolved Hospital Problems   No resolved problems to display.          Hospital Course:  Yassine Love is a 73 y.o. male with severe COPD, CAD, and anemia who presented with fever and hypoxia. Imaging was obtained and concern for PNA/aspiration. Patient was started on vanc and aztreonam. Speech evaluated patient and recommend NPO and alternate nutrition. Spoke with patient, son and reviewed living will which stated no artificial nutrition. Reviewed frequent hospitalizations, respiratory status and aspiration with patient and son. Agreeable to hospice consult and agreeable to inpatient hospice.     Day of Discharge     HPI:   Reviewed his recent findings and failed swallow eval and NPO recommendations. Discussed hospice and to speak with them to get some information. Patient in agreement. Spoke with son as well.     ROS:\  Gen- No fevers, chills  CV- No chest pain, palpitations  Resp- No cough, + dyspnea  GI- No N/V/D, abd pain    Otherwise ROS is negative except as mentioned in the HPI.    Vital Signs:   Temp:  [98.2 °F (36.8 °C)-100.7 °F (38.2 °C)] 98.2 °F (36.8 °C)  Heart Rate:  [] 77  Resp:  [22-26] 24  BP: ()/(50-68) 106/63  FiO2 (%):  [50 %] 50 %     Physical Exam:  Constitutional: No acute distress, awake, alert; on high flow O2  HENT: NCAT, mucous membranes dry  Respiratory: Clear to auscultation  bilaterally, respiratory effort normal; occ rhonchi   Cardiovascular: RRR, no murmurs, rubs, or gallops, palpable pedal pulses bilaterally  Gastrointestinal: Positive bowel sounds, soft, nontender, nondistended  Musculoskeletal: No bilateral ankle edema  Psychiatric: Appropriate affect, cooperative  Neurologic: Oriented x 3, strength symmetric in all extremities, Cranial Nerves grossly intact to confrontation, speech clear  Skin: No rashes    Most Recent Results       Lab Results   Component Value Date    WBC 13.12 (H) 12/06/2018    HGB 9.6 (L) 12/06/2018    HCT 34.1 (L) 12/06/2018    MCV 99.7 (H) 12/06/2018     12/06/2018       Brief Urine Lab Results  (Last result in the past 365 days)      Color   Clarity   Blood   Leuk Est   Nitrite   Protein   CREAT   Urine HCG        08/20/18 1442 Yellow Clear Negative Trace Negative Negative               Microbiology Results Abnormal     None          Imaging Results (last 72 hours)     ** No results found for the last 72 hours. **            Discharge Details       Discharge Disposition:  Transfer care to inpatient Hospice at [facility]    Time Spent on Discharge:  35 minutes    Electronically signed by Kaitlin Johansen DO, 12/06/18, 3:41 PM.

## 2018-12-06 NOTE — PLAN OF CARE
Problem: Patient Care Overview  Goal: Plan of Care Review  Outcome: Ongoing (interventions implemented as appropriate)   12/06/18 0900   Coping/Psychosocial   Plan of Care Reviewed With patient;other (see comments)  ([facility] RN)   Plan of Care Review   Progress no change   OTHER   Outcome Summary WOC nurse consulted to assess buttocks skin. Pt has stage 2 pressure injury (POA) right gluteus; cleaned with blue skin wipes; Z-guard applied; covered with foam dressing. Venelex ordered. [facility] due [facility] immobility, moisture, high skin risk. Bilateral heels boggy; black heel boots applied. See LDA and orders. WOC nurse will f/u. Please contact WOC nurse as needed for concerns.
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
## 2018-12-06 NOTE — PLAN OF CARE
Problem: Patient Care Overview  Goal: Plan of Care Review  Outcome: Ongoing (interventions implemented as appropriate)   12/06/18 0534   Coping/Psychosocial   Plan of Care Reviewed With patient   Plan of Care Review   Progress improving   OTHER   Outcome Summary Pt admitted this AM for respiratory distress/PNA. Pt is a recent DNR/DNI, and has limited interventions. Pt and son did agree for pt to go on bipap, and pt has maintained a sat above 90 since admission. Pt is currently NPO for aspiration precautions. Pt is A&O to self, time, and place. Pt thought he was admitted for a stroke, but is easily reoriented. Pt has slept on and off during shift, and VSS. Will continue to monitor.        Problem: Skin Injury Risk (Adult)  Goal: Identify Related Risk Factors and Signs and Symptoms  Outcome: Ongoing (interventions implemented as appropriate)    Goal: Skin Health and Integrity  Outcome: Ongoing (interventions implemented as appropriate)      Problem: Sepsis/Septic Shock (Adult)  Goal: Signs and Symptoms of Listed Potential Problems Will be Absent, Minimized or Managed (Sepsis/Septic Shock)  Outcome: Ongoing (interventions implemented as appropriate)

## 2018-12-06 NOTE — PROGRESS NOTES
Pharmacokinetic Consult - Vancomycin Dosing    Yassine Love is a 73 y.o. male who has been consulted for vancomycin dosing for sepsis/PNA.    Relevant clinical data and objective history reviewed:  Creatinine   Date Value Ref Range Status   12/05/2018 1.28 0.60 - 1.30 mg/dL Final   10/31/2018 1.15 0.60 - 1.30 mg/dL Final   10/30/2018 1.08 0.60 - 1.30 mg/dL Final     BUN   Date Value Ref Range Status   12/05/2018 32 (H) 9 - 23 mg/dL Final   10/31/2018 36 (H) 9 - 23 mg/dL Final   10/30/2018 37 (H) 9 - 23 mg/dL Final     Estimated Creatinine Clearance: 54.3 mL/min (by C-G formula based on SCr of 1.28 mg/dL).  Lab Results   Component Value Date/Time    WBC 13.57 (H) 12/05/2018 09:20 PM    HGB 11.5 (L) 12/05/2018 09:20 PM    HCT 38.7 (L) 12/05/2018 09:20 PM    MCV 97.2 12/05/2018 09:20 PM     12/05/2018 09:20 PM     Temp Readings from Last 3 Encounters:   12/06/18 98.3 °F (36.8 °C) (Temporal)   11/02/18 97.9 °F (36.6 °C) (Oral)   10/22/18 98.5 °F (36.9 °C) (Oral)       Assessment/Plan  The patient got a dose of 1750mg in the ED. Will initiate maintenance dose at 1250 mg IV every 12 hours.  Plan for trough as patient approaches steady state, prior to the 4th dose.  Due to infection severity, will target a trough of 15-20.     Pharmacy will continue to follow the patient’s culture results and clinical progress daily.    Yassine Stanley, BonillaD

## 2018-12-07 PROCEDURE — 94799 UNLISTED PULMONARY SVC/PX: CPT

## 2018-12-07 PROCEDURE — 25010000002 MORPHINE SULFATE (PF) 2 MG/ML SOLUTION: Performed by: INTERNAL MEDICINE

## 2018-12-07 PROCEDURE — 94640 AIRWAY INHALATION TREATMENT: CPT

## 2018-12-07 PROCEDURE — 25010000002 MORPHINE PER 10 MG: Performed by: INTERNAL MEDICINE

## 2018-12-07 PROCEDURE — 25010000002 DEXAMETHASONE PER 1 MG: Performed by: INTERNAL MEDICINE

## 2018-12-07 RX ORDER — FENTANYL 12 UG/H
1 PATCH TRANSDERMAL
Status: DISCONTINUED | OUTPATIENT
Start: 2018-12-07 | End: 2018-12-13

## 2018-12-07 RX ORDER — DEXAMETHASONE SODIUM PHOSPHATE 4 MG/ML
4 INJECTION, SOLUTION INTRA-ARTICULAR; INTRALESIONAL; INTRAMUSCULAR; INTRAVENOUS; SOFT TISSUE 2 TIMES DAILY
Status: DISCONTINUED | OUTPATIENT
Start: 2018-12-07 | End: 2018-12-09

## 2018-12-07 RX ORDER — BISACODYL 10 MG
10 SUPPOSITORY, RECTAL RECTAL DAILY PRN
Status: DISCONTINUED | OUTPATIENT
Start: 2018-12-07 | End: 2019-01-02

## 2018-12-07 RX ADMIN — MORPHINE SULFATE 2 MG: 2 INJECTION, SOLUTION INTRAMUSCULAR; INTRAVENOUS at 11:49

## 2018-12-07 RX ADMIN — SODIUM CHLORIDE, PRESERVATIVE FREE 3 ML: 5 INJECTION INTRAVENOUS at 21:19

## 2018-12-07 RX ADMIN — BUDESONIDE AND FORMOTEROL FUMARATE DIHYDRATE 2 PUFF: 160; 4.5 AEROSOL RESPIRATORY (INHALATION) at 07:22

## 2018-12-07 RX ADMIN — CASTOR OIL AND BALSAM, PERU 1 EACH: 788; 87 OINTMENT TOPICAL at 21:18

## 2018-12-07 RX ADMIN — MORPHINE SULFATE 4 MG: 4 INJECTION, SOLUTION INTRAMUSCULAR; INTRAVENOUS at 14:31

## 2018-12-07 RX ADMIN — FAMOTIDINE 20 MG: 10 INJECTION, SOLUTION INTRAVENOUS at 09:05

## 2018-12-07 RX ADMIN — DEXAMETHASONE SODIUM PHOSPHATE 4 MG: 4 INJECTION, SOLUTION INTRA-ARTICULAR; INTRALESIONAL; INTRAMUSCULAR; INTRAVENOUS; SOFT TISSUE at 09:05

## 2018-12-07 RX ADMIN — MORPHINE SULFATE 2 MG: 2 INJECTION, SOLUTION INTRAMUSCULAR; INTRAVENOUS at 13:36

## 2018-12-07 RX ADMIN — ACETAMINOPHEN 650 MG: 325 TABLET ORAL at 19:14

## 2018-12-07 RX ADMIN — IPRATROPIUM BROMIDE AND ALBUTEROL SULFATE 3 ML: 2.5; .5 SOLUTION RESPIRATORY (INHALATION) at 07:22

## 2018-12-07 RX ADMIN — MIRTAZAPINE 15 MG: 15 TABLET, ORALLY DISINTEGRATING ORAL at 21:19

## 2018-12-07 RX ADMIN — MORPHINE SULFATE 2 MG: 2 INJECTION, SOLUTION INTRAMUSCULAR; INTRAVENOUS at 06:42

## 2018-12-07 RX ADMIN — IPRATROPIUM BROMIDE AND ALBUTEROL SULFATE 3 ML: 2.5; .5 SOLUTION RESPIRATORY (INHALATION) at 15:56

## 2018-12-07 RX ADMIN — IPRATROPIUM BROMIDE AND ALBUTEROL SULFATE 3 ML: 2.5; .5 SOLUTION RESPIRATORY (INHALATION) at 12:15

## 2018-12-07 RX ADMIN — MORPHINE SULFATE 2 MG: 2 INJECTION, SOLUTION INTRAMUSCULAR; INTRAVENOUS at 16:37

## 2018-12-07 RX ADMIN — DEXAMETHASONE SODIUM PHOSPHATE 4 MG: 4 INJECTION, SOLUTION INTRA-ARTICULAR; INTRALESIONAL; INTRAMUSCULAR; INTRAVENOUS; SOFT TISSUE at 14:31

## 2018-12-07 RX ADMIN — FAMOTIDINE 20 MG: 10 INJECTION, SOLUTION INTRAVENOUS at 21:19

## 2018-12-07 RX ADMIN — CASTOR OIL AND BALSAM, PERU: 788; 87 OINTMENT TOPICAL at 09:05

## 2018-12-07 RX ADMIN — ACETAMINOPHEN 650 MG: 325 TABLET ORAL at 08:00

## 2018-12-07 RX ADMIN — IPRATROPIUM BROMIDE AND ALBUTEROL SULFATE 3 ML: 2.5; .5 SOLUTION RESPIRATORY (INHALATION) at 19:28

## 2018-12-07 RX ADMIN — BUDESONIDE AND FORMOTEROL FUMARATE DIHYDRATE 2 PUFF: 160; 4.5 AEROSOL RESPIRATORY (INHALATION) at 19:28

## 2018-12-07 RX ADMIN — MORPHINE SULFATE 2 MG: 2 INJECTION, SOLUTION INTRAMUSCULAR; INTRAVENOUS at 03:50

## 2018-12-07 RX ADMIN — ROFLUMILAST 500 MCG: 500 TABLET ORAL at 09:05

## 2018-12-07 RX ADMIN — MORPHINE SULFATE 2 MG: 2 INJECTION, SOLUTION INTRAMUSCULAR; INTRAVENOUS at 09:05

## 2018-12-07 RX ADMIN — FENTANYL 1 PATCH: 12.5 PATCH TRANSDERMAL at 09:08

## 2018-12-07 RX ADMIN — MORPHINE SULFATE 2 MG: 2 INJECTION, SOLUTION INTRAMUSCULAR; INTRAVENOUS at 21:40

## 2018-12-07 RX ADMIN — FLUTICASONE PROPIONATE 2 SPRAY: 50 SPRAY, METERED NASAL at 09:05

## 2018-12-08 PROCEDURE — 25010000002 MORPHINE PER 10 MG: Performed by: INTERNAL MEDICINE

## 2018-12-08 PROCEDURE — 25010000002 DEXAMETHASONE PER 1 MG: Performed by: INTERNAL MEDICINE

## 2018-12-08 PROCEDURE — 94640 AIRWAY INHALATION TREATMENT: CPT

## 2018-12-08 PROCEDURE — 94799 UNLISTED PULMONARY SVC/PX: CPT

## 2018-12-08 PROCEDURE — 94760 N-INVAS EAR/PLS OXIMETRY 1: CPT

## 2018-12-08 PROCEDURE — 25010000002 MORPHINE SULFATE (PF) 2 MG/ML SOLUTION: Performed by: INTERNAL MEDICINE

## 2018-12-08 PROCEDURE — 25010000002 LORAZEPAM PER 2 MG: Performed by: INTERNAL MEDICINE

## 2018-12-08 RX ORDER — MORPHINE SULFATE 20 MG/ML
6 SOLUTION ORAL EVERY 4 HOURS
Status: DISCONTINUED | OUTPATIENT
Start: 2018-12-08 | End: 2018-12-13

## 2018-12-08 RX ORDER — MORPHINE SULFATE 20 MG/ML
10 SOLUTION ORAL
Status: DISCONTINUED | OUTPATIENT
Start: 2018-12-08 | End: 2018-12-14

## 2018-12-08 RX ORDER — LIDOCAINE 50 MG/G
2 PATCH TOPICAL
Status: DISCONTINUED | OUTPATIENT
Start: 2018-12-08 | End: 2018-12-09

## 2018-12-08 RX ORDER — LORAZEPAM 2 MG/ML
0.25 INJECTION INTRAMUSCULAR ONCE
Status: DISCONTINUED | OUTPATIENT
Start: 2018-12-08 | End: 2018-12-14

## 2018-12-08 RX ADMIN — IPRATROPIUM BROMIDE AND ALBUTEROL SULFATE 3 ML: 2.5; .5 SOLUTION RESPIRATORY (INHALATION) at 05:17

## 2018-12-08 RX ADMIN — DEXAMETHASONE SODIUM PHOSPHATE 4 MG: 4 INJECTION, SOLUTION INTRA-ARTICULAR; INTRALESIONAL; INTRAMUSCULAR; INTRAVENOUS; SOFT TISSUE at 09:49

## 2018-12-08 RX ADMIN — FLUTICASONE PROPIONATE 2 SPRAY: 50 SPRAY, METERED NASAL at 09:49

## 2018-12-08 RX ADMIN — MIRTAZAPINE 15 MG: 15 TABLET, ORALLY DISINTEGRATING ORAL at 20:07

## 2018-12-08 RX ADMIN — BUDESONIDE AND FORMOTEROL FUMARATE DIHYDRATE 2 PUFF: 160; 4.5 AEROSOL RESPIRATORY (INHALATION) at 19:01

## 2018-12-08 RX ADMIN — IPRATROPIUM BROMIDE AND ALBUTEROL SULFATE 3 ML: 2.5; .5 SOLUTION RESPIRATORY (INHALATION) at 15:26

## 2018-12-08 RX ADMIN — IPRATROPIUM BROMIDE AND ALBUTEROL SULFATE 3 ML: 2.5; .5 SOLUTION RESPIRATORY (INHALATION) at 19:01

## 2018-12-08 RX ADMIN — BUDESONIDE AND FORMOTEROL FUMARATE DIHYDRATE 2 PUFF: 160; 4.5 AEROSOL RESPIRATORY (INHALATION) at 07:34

## 2018-12-08 RX ADMIN — MORPHINE SULFATE 6 MG: 20 SOLUTION ORAL at 13:34

## 2018-12-08 RX ADMIN — ACETAMINOPHEN 650 MG: 325 TABLET ORAL at 10:41

## 2018-12-08 RX ADMIN — ROFLUMILAST 500 MCG: 500 TABLET ORAL at 09:49

## 2018-12-08 RX ADMIN — CASTOR OIL AND BALSAM, PERU: 788; 87 OINTMENT TOPICAL at 09:00

## 2018-12-08 RX ADMIN — FAMOTIDINE 20 MG: 10 INJECTION, SOLUTION INTRAVENOUS at 09:49

## 2018-12-08 RX ADMIN — MORPHINE SULFATE 6 MG: 20 SOLUTION ORAL at 16:56

## 2018-12-08 RX ADMIN — MORPHINE SULFATE 2 MG: 2 INJECTION, SOLUTION INTRAMUSCULAR; INTRAVENOUS at 04:56

## 2018-12-08 RX ADMIN — MORPHINE SULFATE 10 MG: 100 SOLUTION ORAL at 11:53

## 2018-12-08 RX ADMIN — DEXAMETHASONE SODIUM PHOSPHATE 4 MG: 4 INJECTION, SOLUTION INTRA-ARTICULAR; INTRALESIONAL; INTRAMUSCULAR; INTRAVENOUS; SOFT TISSUE at 13:35

## 2018-12-08 RX ADMIN — IPRATROPIUM BROMIDE AND ALBUTEROL SULFATE 3 ML: 2.5; .5 SOLUTION RESPIRATORY (INHALATION) at 07:34

## 2018-12-08 RX ADMIN — MORPHINE SULFATE 2 MG: 2 INJECTION, SOLUTION INTRAMUSCULAR; INTRAVENOUS at 09:49

## 2018-12-08 RX ADMIN — LORAZEPAM 0.5 MG: 2 INJECTION, SOLUTION INTRAMUSCULAR; INTRAVENOUS at 15:08

## 2018-12-08 RX ADMIN — MORPHINE SULFATE 6 MG: 20 SOLUTION ORAL at 20:07

## 2018-12-08 RX ADMIN — IPRATROPIUM BROMIDE AND ALBUTEROL SULFATE 3 ML: 2.5; .5 SOLUTION RESPIRATORY (INHALATION) at 12:08

## 2018-12-08 RX ADMIN — CASTOR OIL AND BALSAM, PERU 1 EACH: 788; 87 OINTMENT TOPICAL at 22:18

## 2018-12-09 PROCEDURE — 25010000002 DEXAMETHASONE PER 1 MG: Performed by: INTERNAL MEDICINE

## 2018-12-09 PROCEDURE — 94799 UNLISTED PULMONARY SVC/PX: CPT

## 2018-12-09 PROCEDURE — 94640 AIRWAY INHALATION TREATMENT: CPT

## 2018-12-09 PROCEDURE — 63710000001 DEXAMETHASONE PER 0.25 MG: Performed by: NURSE PRACTITIONER

## 2018-12-09 PROCEDURE — 94760 N-INVAS EAR/PLS OXIMETRY 1: CPT

## 2018-12-09 RX ORDER — LORAZEPAM 0.5 MG/1
0.5 TABLET ORAL EVERY 4 HOURS PRN
Status: DISCONTINUED | OUTPATIENT
Start: 2018-12-09 | End: 2019-01-02

## 2018-12-09 RX ORDER — PANTOPRAZOLE SODIUM 40 MG/1
40 TABLET, DELAYED RELEASE ORAL
Status: DISCONTINUED | OUTPATIENT
Start: 2018-12-10 | End: 2019-01-02 | Stop reason: HOSPADM

## 2018-12-09 RX ORDER — DEXAMETHASONE 4 MG/1
4 TABLET ORAL 2 TIMES DAILY
Status: DISCONTINUED | OUTPATIENT
Start: 2018-12-09 | End: 2019-01-02 | Stop reason: HOSPADM

## 2018-12-09 RX ADMIN — MORPHINE SULFATE 10 MG: 100 SOLUTION ORAL at 04:12

## 2018-12-09 RX ADMIN — CASTOR OIL AND BALSAM, PERU: 788; 87 OINTMENT TOPICAL at 20:39

## 2018-12-09 RX ADMIN — MIRTAZAPINE 15 MG: 15 TABLET, ORALLY DISINTEGRATING ORAL at 20:39

## 2018-12-09 RX ADMIN — FLUTICASONE PROPIONATE 2 SPRAY: 50 SPRAY, METERED NASAL at 09:23

## 2018-12-09 RX ADMIN — MORPHINE SULFATE 6 MG: 20 SOLUTION ORAL at 16:19

## 2018-12-09 RX ADMIN — CASTOR OIL AND BALSAM, PERU: 788; 87 OINTMENT TOPICAL at 09:23

## 2018-12-09 RX ADMIN — MORPHINE SULFATE 6 MG: 20 SOLUTION ORAL at 20:39

## 2018-12-09 RX ADMIN — LIDOCAINE 2 PATCH: 50 PATCH CUTANEOUS at 09:23

## 2018-12-09 RX ADMIN — BUDESONIDE AND FORMOTEROL FUMARATE DIHYDRATE 2 PUFF: 160; 4.5 AEROSOL RESPIRATORY (INHALATION) at 07:30

## 2018-12-09 RX ADMIN — IPRATROPIUM BROMIDE AND ALBUTEROL SULFATE 3 ML: 2.5; .5 SOLUTION RESPIRATORY (INHALATION) at 11:54

## 2018-12-09 RX ADMIN — SODIUM CHLORIDE, PRESERVATIVE FREE 3 ML: 5 INJECTION INTRAVENOUS at 09:24

## 2018-12-09 RX ADMIN — IPRATROPIUM BROMIDE AND ALBUTEROL SULFATE 3 ML: 2.5; .5 SOLUTION RESPIRATORY (INHALATION) at 23:27

## 2018-12-09 RX ADMIN — IPRATROPIUM BROMIDE AND ALBUTEROL SULFATE 3 ML: 2.5; .5 SOLUTION RESPIRATORY (INHALATION) at 16:23

## 2018-12-09 RX ADMIN — ACETAMINOPHEN 650 MG: 325 TABLET ORAL at 04:11

## 2018-12-09 RX ADMIN — BUDESONIDE AND FORMOTEROL FUMARATE DIHYDRATE 2 PUFF: 160; 4.5 AEROSOL RESPIRATORY (INHALATION) at 18:33

## 2018-12-09 RX ADMIN — IPRATROPIUM BROMIDE AND ALBUTEROL SULFATE 3 ML: 2.5; .5 SOLUTION RESPIRATORY (INHALATION) at 03:09

## 2018-12-09 RX ADMIN — MORPHINE SULFATE 6 MG: 20 SOLUTION ORAL at 12:33

## 2018-12-09 RX ADMIN — ACETAMINOPHEN 650 MG: 325 TABLET ORAL at 14:44

## 2018-12-09 RX ADMIN — IPRATROPIUM BROMIDE AND ALBUTEROL SULFATE 3 ML: 2.5; .5 SOLUTION RESPIRATORY (INHALATION) at 18:33

## 2018-12-09 RX ADMIN — DEXAMETHASONE SODIUM PHOSPHATE 4 MG: 4 INJECTION, SOLUTION INTRA-ARTICULAR; INTRALESIONAL; INTRAMUSCULAR; INTRAVENOUS; SOFT TISSUE at 09:22

## 2018-12-09 RX ADMIN — MORPHINE SULFATE 6 MG: 20 SOLUTION ORAL at 09:22

## 2018-12-09 RX ADMIN — FAMOTIDINE 20 MG: 10 INJECTION, SOLUTION INTRAVENOUS at 09:22

## 2018-12-09 RX ADMIN — IPRATROPIUM BROMIDE AND ALBUTEROL SULFATE 3 ML: 2.5; .5 SOLUTION RESPIRATORY (INHALATION) at 07:30

## 2018-12-09 RX ADMIN — MORPHINE SULFATE 6 MG: 20 SOLUTION ORAL at 01:00

## 2018-12-09 RX ADMIN — IPRATROPIUM BROMIDE AND ALBUTEROL SULFATE 3 ML: 2.5; .5 SOLUTION RESPIRATORY (INHALATION) at 00:15

## 2018-12-09 RX ADMIN — DEXAMETHASONE 4 MG: 4 TABLET ORAL at 14:44

## 2018-12-09 RX ADMIN — ROFLUMILAST 500 MCG: 500 TABLET ORAL at 09:24

## 2018-12-10 LAB
BACTERIA SPEC AEROBE CULT: NORMAL
BACTERIA SPEC AEROBE CULT: NORMAL

## 2018-12-10 PROCEDURE — 94799 UNLISTED PULMONARY SVC/PX: CPT

## 2018-12-10 PROCEDURE — 94640 AIRWAY INHALATION TREATMENT: CPT

## 2018-12-10 PROCEDURE — 63710000001 DEXAMETHASONE PER 0.25 MG: Performed by: NURSE PRACTITIONER

## 2018-12-10 PROCEDURE — 94760 N-INVAS EAR/PLS OXIMETRY 1: CPT

## 2018-12-10 RX ADMIN — MORPHINE SULFATE 6 MG: 20 SOLUTION ORAL at 15:33

## 2018-12-10 RX ADMIN — IPRATROPIUM BROMIDE AND ALBUTEROL SULFATE 3 ML: 2.5; .5 SOLUTION RESPIRATORY (INHALATION) at 23:04

## 2018-12-10 RX ADMIN — CASTOR OIL AND BALSAM, PERU: 788; 87 OINTMENT TOPICAL at 20:39

## 2018-12-10 RX ADMIN — ROFLUMILAST 500 MCG: 500 TABLET ORAL at 08:42

## 2018-12-10 RX ADMIN — FENTANYL 1 PATCH: 12.5 PATCH TRANSDERMAL at 08:50

## 2018-12-10 RX ADMIN — BUDESONIDE AND FORMOTEROL FUMARATE DIHYDRATE 2 PUFF: 160; 4.5 AEROSOL RESPIRATORY (INHALATION) at 19:06

## 2018-12-10 RX ADMIN — IPRATROPIUM BROMIDE AND ALBUTEROL SULFATE 3 ML: 2.5; .5 SOLUTION RESPIRATORY (INHALATION) at 15:20

## 2018-12-10 RX ADMIN — IPRATROPIUM BROMIDE AND ALBUTEROL SULFATE 3 ML: 2.5; .5 SOLUTION RESPIRATORY (INHALATION) at 03:28

## 2018-12-10 RX ADMIN — MORPHINE SULFATE 6 MG: 20 SOLUTION ORAL at 04:18

## 2018-12-10 RX ADMIN — CASTOR OIL AND BALSAM, PERU: 788; 87 OINTMENT TOPICAL at 08:43

## 2018-12-10 RX ADMIN — MORPHINE SULFATE 6 MG: 20 SOLUTION ORAL at 08:41

## 2018-12-10 RX ADMIN — MORPHINE SULFATE 6 MG: 20 SOLUTION ORAL at 20:39

## 2018-12-10 RX ADMIN — DEXAMETHASONE 4 MG: 4 TABLET ORAL at 08:42

## 2018-12-10 RX ADMIN — DEXAMETHASONE 4 MG: 4 TABLET ORAL at 12:18

## 2018-12-10 RX ADMIN — MORPHINE SULFATE 6 MG: 20 SOLUTION ORAL at 00:24

## 2018-12-10 RX ADMIN — FLUTICASONE PROPIONATE 2 SPRAY: 50 SPRAY, METERED NASAL at 08:43

## 2018-12-10 RX ADMIN — IPRATROPIUM BROMIDE AND ALBUTEROL SULFATE 3 ML: 2.5; .5 SOLUTION RESPIRATORY (INHALATION) at 19:05

## 2018-12-10 RX ADMIN — ACETAMINOPHEN 650 MG: 325 TABLET ORAL at 16:28

## 2018-12-10 RX ADMIN — PANTOPRAZOLE SODIUM 40 MG: 40 TABLET, DELAYED RELEASE ORAL at 04:18

## 2018-12-10 RX ADMIN — BUDESONIDE AND FORMOTEROL FUMARATE DIHYDRATE 2 PUFF: 160; 4.5 AEROSOL RESPIRATORY (INHALATION) at 07:27

## 2018-12-10 RX ADMIN — IPRATROPIUM BROMIDE AND ALBUTEROL SULFATE 3 ML: 2.5; .5 SOLUTION RESPIRATORY (INHALATION) at 07:27

## 2018-12-10 RX ADMIN — MIRTAZAPINE 15 MG: 15 TABLET, ORALLY DISINTEGRATING ORAL at 20:37

## 2018-12-10 RX ADMIN — MORPHINE SULFATE 6 MG: 20 SOLUTION ORAL at 12:18

## 2018-12-11 PROCEDURE — 94640 AIRWAY INHALATION TREATMENT: CPT

## 2018-12-11 PROCEDURE — 94799 UNLISTED PULMONARY SVC/PX: CPT

## 2018-12-11 PROCEDURE — 63710000001 DEXAMETHASONE PER 0.25 MG: Performed by: NURSE PRACTITIONER

## 2018-12-11 RX ORDER — SENNA AND DOCUSATE SODIUM 50; 8.6 MG/1; MG/1
2 TABLET, FILM COATED ORAL 2 TIMES DAILY
Status: DISCONTINUED | OUTPATIENT
Start: 2018-12-11 | End: 2018-12-19

## 2018-12-11 RX ADMIN — BUDESONIDE AND FORMOTEROL FUMARATE DIHYDRATE 2 PUFF: 160; 4.5 AEROSOL RESPIRATORY (INHALATION) at 06:46

## 2018-12-11 RX ADMIN — MORPHINE SULFATE 6 MG: 20 SOLUTION ORAL at 19:55

## 2018-12-11 RX ADMIN — MORPHINE SULFATE 6 MG: 20 SOLUTION ORAL at 08:59

## 2018-12-11 RX ADMIN — DEXAMETHASONE 4 MG: 4 TABLET ORAL at 08:59

## 2018-12-11 RX ADMIN — DOCUSATE SODIUM AND SENNOSIDES 2 TABLET: 8.6; 5 TABLET, FILM COATED ORAL at 20:04

## 2018-12-11 RX ADMIN — IPRATROPIUM BROMIDE AND ALBUTEROL SULFATE 3 ML: 2.5; .5 SOLUTION RESPIRATORY (INHALATION) at 11:50

## 2018-12-11 RX ADMIN — CASTOR OIL AND BALSAM, PERU: 788; 87 OINTMENT TOPICAL at 08:59

## 2018-12-11 RX ADMIN — IPRATROPIUM BROMIDE AND ALBUTEROL SULFATE 3 ML: 2.5; .5 SOLUTION RESPIRATORY (INHALATION) at 06:46

## 2018-12-11 RX ADMIN — IPRATROPIUM BROMIDE AND ALBUTEROL SULFATE 3 ML: 2.5; .5 SOLUTION RESPIRATORY (INHALATION) at 03:41

## 2018-12-11 RX ADMIN — MORPHINE SULFATE 6 MG: 20 SOLUTION ORAL at 11:34

## 2018-12-11 RX ADMIN — IPRATROPIUM BROMIDE AND ALBUTEROL SULFATE 3 ML: 2.5; .5 SOLUTION RESPIRATORY (INHALATION) at 15:00

## 2018-12-11 RX ADMIN — IPRATROPIUM BROMIDE AND ALBUTEROL SULFATE 3 ML: 2.5; .5 SOLUTION RESPIRATORY (INHALATION) at 20:10

## 2018-12-11 RX ADMIN — MORPHINE SULFATE 6 MG: 20 SOLUTION ORAL at 01:09

## 2018-12-11 RX ADMIN — MIRTAZAPINE 15 MG: 15 TABLET, ORALLY DISINTEGRATING ORAL at 20:04

## 2018-12-11 RX ADMIN — MORPHINE SULFATE 6 MG: 20 SOLUTION ORAL at 16:32

## 2018-12-11 RX ADMIN — BUDESONIDE AND FORMOTEROL FUMARATE DIHYDRATE 2 PUFF: 160; 4.5 AEROSOL RESPIRATORY (INHALATION) at 20:10

## 2018-12-11 RX ADMIN — CASTOR OIL AND BALSAM, PERU: 788; 87 OINTMENT TOPICAL at 21:42

## 2018-12-11 RX ADMIN — PANTOPRAZOLE SODIUM 40 MG: 40 TABLET, DELAYED RELEASE ORAL at 05:56

## 2018-12-11 RX ADMIN — DEXAMETHASONE 4 MG: 4 TABLET ORAL at 11:33

## 2018-12-11 RX ADMIN — MORPHINE SULFATE 6 MG: 20 SOLUTION ORAL at 04:41

## 2018-12-11 RX ADMIN — ROFLUMILAST 500 MCG: 500 TABLET ORAL at 11:33

## 2018-12-12 PROCEDURE — 97161 PT EVAL LOW COMPLEX 20 MIN: CPT

## 2018-12-12 PROCEDURE — 63710000001 DEXAMETHASONE PER 0.25 MG: Performed by: NURSE PRACTITIONER

## 2018-12-12 PROCEDURE — 94640 AIRWAY INHALATION TREATMENT: CPT

## 2018-12-12 PROCEDURE — 94799 UNLISTED PULMONARY SVC/PX: CPT

## 2018-12-12 PROCEDURE — 94760 N-INVAS EAR/PLS OXIMETRY 1: CPT

## 2018-12-12 PROCEDURE — 97110 THERAPEUTIC EXERCISES: CPT

## 2018-12-12 RX ADMIN — IPRATROPIUM BROMIDE AND ALBUTEROL SULFATE 3 ML: 2.5; .5 SOLUTION RESPIRATORY (INHALATION) at 15:44

## 2018-12-12 RX ADMIN — ACETAMINOPHEN 650 MG: 325 TABLET ORAL at 19:40

## 2018-12-12 RX ADMIN — DEXAMETHASONE 4 MG: 4 TABLET ORAL at 11:33

## 2018-12-12 RX ADMIN — BUDESONIDE AND FORMOTEROL FUMARATE DIHYDRATE 2 PUFF: 160; 4.5 AEROSOL RESPIRATORY (INHALATION) at 07:33

## 2018-12-12 RX ADMIN — FLUTICASONE PROPIONATE 2 SPRAY: 50 SPRAY, METERED NASAL at 09:04

## 2018-12-12 RX ADMIN — MORPHINE SULFATE 6 MG: 20 SOLUTION ORAL at 19:40

## 2018-12-12 RX ADMIN — PANTOPRAZOLE SODIUM 40 MG: 40 TABLET, DELAYED RELEASE ORAL at 06:16

## 2018-12-12 RX ADMIN — MORPHINE SULFATE 6 MG: 20 SOLUTION ORAL at 04:00

## 2018-12-12 RX ADMIN — MORPHINE SULFATE 6 MG: 20 SOLUTION ORAL at 00:07

## 2018-12-12 RX ADMIN — DOCUSATE SODIUM AND SENNOSIDES 2 TABLET: 8.6; 5 TABLET, FILM COATED ORAL at 09:05

## 2018-12-12 RX ADMIN — CASTOR OIL AND BALSAM, PERU: 788; 87 OINTMENT TOPICAL at 09:04

## 2018-12-12 RX ADMIN — DEXAMETHASONE 4 MG: 4 TABLET ORAL at 09:05

## 2018-12-12 RX ADMIN — MIRTAZAPINE 15 MG: 15 TABLET, ORALLY DISINTEGRATING ORAL at 20:51

## 2018-12-12 RX ADMIN — BUDESONIDE AND FORMOTEROL FUMARATE DIHYDRATE 2 PUFF: 160; 4.5 AEROSOL RESPIRATORY (INHALATION) at 19:30

## 2018-12-12 RX ADMIN — IPRATROPIUM BROMIDE AND ALBUTEROL SULFATE 3 ML: 2.5; .5 SOLUTION RESPIRATORY (INHALATION) at 12:18

## 2018-12-12 RX ADMIN — IPRATROPIUM BROMIDE AND ALBUTEROL SULFATE 3 ML: 2.5; .5 SOLUTION RESPIRATORY (INHALATION) at 04:42

## 2018-12-12 RX ADMIN — MORPHINE SULFATE 6 MG: 20 SOLUTION ORAL at 09:05

## 2018-12-12 RX ADMIN — MORPHINE SULFATE 6 MG: 20 SOLUTION ORAL at 11:33

## 2018-12-12 RX ADMIN — CASTOR OIL AND BALSAM, PERU: 788; 87 OINTMENT TOPICAL at 20:51

## 2018-12-12 RX ADMIN — MORPHINE SULFATE 6 MG: 20 SOLUTION ORAL at 15:54

## 2018-12-12 RX ADMIN — IPRATROPIUM BROMIDE AND ALBUTEROL SULFATE 3 ML: 2.5; .5 SOLUTION RESPIRATORY (INHALATION) at 19:29

## 2018-12-12 RX ADMIN — ROFLUMILAST 500 MCG: 500 TABLET ORAL at 09:04

## 2018-12-12 RX ADMIN — IPRATROPIUM BROMIDE AND ALBUTEROL SULFATE 3 ML: 2.5; .5 SOLUTION RESPIRATORY (INHALATION) at 07:33

## 2018-12-12 RX ADMIN — DOCUSATE SODIUM AND SENNOSIDES 2 TABLET: 8.6; 5 TABLET, FILM COATED ORAL at 20:51

## 2018-12-13 PROCEDURE — 63710000001 DEXAMETHASONE PER 0.25 MG: Performed by: NURSE PRACTITIONER

## 2018-12-13 PROCEDURE — 94640 AIRWAY INHALATION TREATMENT: CPT

## 2018-12-13 PROCEDURE — 94799 UNLISTED PULMONARY SVC/PX: CPT

## 2018-12-13 RX ORDER — MORPHINE SULFATE 20 MG/ML
6 SOLUTION ORAL ONCE
Status: COMPLETED | OUTPATIENT
Start: 2018-12-13 | End: 2018-12-13

## 2018-12-13 RX ORDER — FENTANYL 25 UG/H
1 PATCH TRANSDERMAL
Status: COMPLETED | OUTPATIENT
Start: 2018-12-13 | End: 2018-12-25

## 2018-12-13 RX ORDER — MORPHINE SULFATE 20 MG/ML
6 SOLUTION ORAL EVERY 4 HOURS PRN
Status: DISCONTINUED | OUTPATIENT
Start: 2018-12-13 | End: 2018-12-16

## 2018-12-13 RX ADMIN — DEXAMETHASONE 4 MG: 4 TABLET ORAL at 08:30

## 2018-12-13 RX ADMIN — MORPHINE SULFATE 6 MG: 20 SOLUTION ORAL at 20:59

## 2018-12-13 RX ADMIN — BUDESONIDE AND FORMOTEROL FUMARATE DIHYDRATE 2 PUFF: 160; 4.5 AEROSOL RESPIRATORY (INHALATION) at 19:17

## 2018-12-13 RX ADMIN — FLUTICASONE PROPIONATE 2 SPRAY: 50 SPRAY, METERED NASAL at 08:29

## 2018-12-13 RX ADMIN — DOCUSATE SODIUM AND SENNOSIDES 2 TABLET: 8.6; 5 TABLET, FILM COATED ORAL at 08:30

## 2018-12-13 RX ADMIN — IPRATROPIUM BROMIDE AND ALBUTEROL SULFATE 3 ML: 2.5; .5 SOLUTION RESPIRATORY (INHALATION) at 12:24

## 2018-12-13 RX ADMIN — IPRATROPIUM BROMIDE AND ALBUTEROL SULFATE 3 ML: 2.5; .5 SOLUTION RESPIRATORY (INHALATION) at 19:17

## 2018-12-13 RX ADMIN — MORPHINE SULFATE 6 MG: 20 SOLUTION ORAL at 00:06

## 2018-12-13 RX ADMIN — MORPHINE SULFATE 6 MG: 20 SOLUTION ORAL at 04:06

## 2018-12-13 RX ADMIN — MIRTAZAPINE 15 MG: 15 TABLET, ORALLY DISINTEGRATING ORAL at 20:59

## 2018-12-13 RX ADMIN — IPRATROPIUM BROMIDE AND ALBUTEROL SULFATE 3 ML: 2.5; .5 SOLUTION RESPIRATORY (INHALATION) at 03:13

## 2018-12-13 RX ADMIN — FENTANYL 1 PATCH: 25 PATCH TRANSDERMAL at 18:21

## 2018-12-13 RX ADMIN — ROFLUMILAST 500 MCG: 500 TABLET ORAL at 08:29

## 2018-12-13 RX ADMIN — DEXAMETHASONE 4 MG: 4 TABLET ORAL at 11:45

## 2018-12-13 RX ADMIN — CASTOR OIL AND BALSAM, PERU: 788; 87 OINTMENT TOPICAL at 08:29

## 2018-12-13 RX ADMIN — IPRATROPIUM BROMIDE AND ALBUTEROL SULFATE 3 ML: 2.5; .5 SOLUTION RESPIRATORY (INHALATION) at 15:54

## 2018-12-13 RX ADMIN — MORPHINE SULFATE 6 MG: 20 SOLUTION ORAL at 08:30

## 2018-12-13 RX ADMIN — PANTOPRAZOLE SODIUM 40 MG: 40 TABLET, DELAYED RELEASE ORAL at 05:44

## 2018-12-13 RX ADMIN — IPRATROPIUM BROMIDE AND ALBUTEROL SULFATE 3 ML: 2.5; .5 SOLUTION RESPIRATORY (INHALATION) at 07:34

## 2018-12-13 RX ADMIN — MORPHINE SULFATE 6 MG: 20 SOLUTION ORAL at 11:45

## 2018-12-13 RX ADMIN — MORPHINE SULFATE 6 MG: 20 SOLUTION ORAL at 15:57

## 2018-12-13 RX ADMIN — BUDESONIDE AND FORMOTEROL FUMARATE DIHYDRATE 2 PUFF: 160; 4.5 AEROSOL RESPIRATORY (INHALATION) at 07:35

## 2018-12-14 PROCEDURE — 94799 UNLISTED PULMONARY SVC/PX: CPT

## 2018-12-14 PROCEDURE — 97530 THERAPEUTIC ACTIVITIES: CPT

## 2018-12-14 PROCEDURE — 63710000001 DEXAMETHASONE PER 0.25 MG: Performed by: NURSE PRACTITIONER

## 2018-12-14 PROCEDURE — 97110 THERAPEUTIC EXERCISES: CPT

## 2018-12-14 PROCEDURE — 94640 AIRWAY INHALATION TREATMENT: CPT

## 2018-12-14 RX ORDER — GUAIFENESIN 600 MG/1
600 TABLET, EXTENDED RELEASE ORAL EVERY 12 HOURS SCHEDULED
Status: DISCONTINUED | OUTPATIENT
Start: 2018-12-14 | End: 2018-12-17

## 2018-12-14 RX ADMIN — MORPHINE SULFATE 6 MG: 20 SOLUTION ORAL at 08:16

## 2018-12-14 RX ADMIN — MORPHINE SULFATE 6 MG: 20 SOLUTION ORAL at 18:23

## 2018-12-14 RX ADMIN — DOCUSATE SODIUM AND SENNOSIDES 2 TABLET: 8.6; 5 TABLET, FILM COATED ORAL at 21:55

## 2018-12-14 RX ADMIN — ACETAMINOPHEN 650 MG: 325 TABLET ORAL at 12:31

## 2018-12-14 RX ADMIN — GUAIFENESIN 600 MG: 600 TABLET, EXTENDED RELEASE ORAL at 21:55

## 2018-12-14 RX ADMIN — FLUTICASONE PROPIONATE 2 SPRAY: 50 SPRAY, METERED NASAL at 08:12

## 2018-12-14 RX ADMIN — IPRATROPIUM BROMIDE AND ALBUTEROL SULFATE 3 ML: 2.5; .5 SOLUTION RESPIRATORY (INHALATION) at 11:36

## 2018-12-14 RX ADMIN — BUDESONIDE AND FORMOTEROL FUMARATE DIHYDRATE 2 PUFF: 160; 4.5 AEROSOL RESPIRATORY (INHALATION) at 07:14

## 2018-12-14 RX ADMIN — GUAIFENESIN 600 MG: 600 TABLET, EXTENDED RELEASE ORAL at 13:59

## 2018-12-14 RX ADMIN — IPRATROPIUM BROMIDE AND ALBUTEROL SULFATE 3 ML: 2.5; .5 SOLUTION RESPIRATORY (INHALATION) at 07:05

## 2018-12-14 RX ADMIN — DEXAMETHASONE 4 MG: 4 TABLET ORAL at 08:12

## 2018-12-14 RX ADMIN — ROFLUMILAST 500 MCG: 500 TABLET ORAL at 08:12

## 2018-12-14 RX ADMIN — CASTOR OIL AND BALSAM, PERU: 788; 87 OINTMENT TOPICAL at 08:12

## 2018-12-14 RX ADMIN — IPRATROPIUM BROMIDE AND ALBUTEROL SULFATE 3 ML: 2.5; .5 SOLUTION RESPIRATORY (INHALATION) at 00:49

## 2018-12-14 RX ADMIN — DOCUSATE SODIUM AND SENNOSIDES 2 TABLET: 8.6; 5 TABLET, FILM COATED ORAL at 08:12

## 2018-12-14 RX ADMIN — DEXAMETHASONE 4 MG: 4 TABLET ORAL at 11:46

## 2018-12-14 RX ADMIN — IPRATROPIUM BROMIDE AND ALBUTEROL SULFATE 3 ML: 2.5; .5 SOLUTION RESPIRATORY (INHALATION) at 20:14

## 2018-12-14 RX ADMIN — IPRATROPIUM BROMIDE AND ALBUTEROL SULFATE 3 ML: 2.5; .5 SOLUTION RESPIRATORY (INHALATION) at 17:34

## 2018-12-14 RX ADMIN — MIRTAZAPINE 15 MG: 15 TABLET, ORALLY DISINTEGRATING ORAL at 21:55

## 2018-12-14 RX ADMIN — BUDESONIDE AND FORMOTEROL FUMARATE DIHYDRATE 2 PUFF: 160; 4.5 AEROSOL RESPIRATORY (INHALATION) at 20:14

## 2018-12-14 RX ADMIN — IPRATROPIUM BROMIDE AND ALBUTEROL SULFATE 3 ML: 2.5; .5 SOLUTION RESPIRATORY (INHALATION) at 23:31

## 2018-12-14 RX ADMIN — IPRATROPIUM BROMIDE AND ALBUTEROL SULFATE 3 ML: 2.5; .5 SOLUTION RESPIRATORY (INHALATION) at 04:00

## 2018-12-15 PROCEDURE — 63710000001 DEXAMETHASONE PER 0.25 MG: Performed by: NURSE PRACTITIONER

## 2018-12-15 PROCEDURE — 94640 AIRWAY INHALATION TREATMENT: CPT

## 2018-12-15 PROCEDURE — 94799 UNLISTED PULMONARY SVC/PX: CPT

## 2018-12-15 PROCEDURE — 94760 N-INVAS EAR/PLS OXIMETRY 1: CPT

## 2018-12-15 RX ORDER — LORAZEPAM 2 MG/ML
0.5 INJECTION INTRAMUSCULAR
Status: DISCONTINUED | OUTPATIENT
Start: 2018-12-15 | End: 2018-12-17

## 2018-12-15 RX ORDER — MORPHINE SULFATE 20 MG/ML
5 SOLUTION ORAL EVERY 6 HOURS
Status: DISCONTINUED | OUTPATIENT
Start: 2018-12-15 | End: 2018-12-18

## 2018-12-15 RX ADMIN — MORPHINE SULFATE 5 MG: 20 SOLUTION ORAL at 15:35

## 2018-12-15 RX ADMIN — CASTOR OIL AND BALSAM, PERU: 788; 87 OINTMENT TOPICAL at 21:30

## 2018-12-15 RX ADMIN — CASTOR OIL AND BALSAM, PERU: 788; 87 OINTMENT TOPICAL at 13:04

## 2018-12-15 RX ADMIN — MIRTAZAPINE 15 MG: 15 TABLET, ORALLY DISINTEGRATING ORAL at 21:31

## 2018-12-15 RX ADMIN — ACETAMINOPHEN 650 MG: 325 TABLET ORAL at 00:08

## 2018-12-15 RX ADMIN — IPRATROPIUM BROMIDE AND ALBUTEROL SULFATE 3 ML: 2.5; .5 SOLUTION RESPIRATORY (INHALATION) at 22:33

## 2018-12-15 RX ADMIN — IPRATROPIUM BROMIDE AND ALBUTEROL SULFATE 3 ML: 2.5; .5 SOLUTION RESPIRATORY (INHALATION) at 19:10

## 2018-12-15 RX ADMIN — DEXAMETHASONE 4 MG: 4 TABLET ORAL at 13:03

## 2018-12-15 RX ADMIN — FLUTICASONE PROPIONATE 2 SPRAY: 50 SPRAY, METERED NASAL at 09:16

## 2018-12-15 RX ADMIN — DEXAMETHASONE 4 MG: 4 TABLET ORAL at 09:10

## 2018-12-15 RX ADMIN — IPRATROPIUM BROMIDE AND ALBUTEROL SULFATE 3 ML: 2.5; .5 SOLUTION RESPIRATORY (INHALATION) at 07:06

## 2018-12-15 RX ADMIN — IPRATROPIUM BROMIDE AND ALBUTEROL SULFATE 3 ML: 2.5; .5 SOLUTION RESPIRATORY (INHALATION) at 11:17

## 2018-12-15 RX ADMIN — MORPHINE SULFATE 6 MG: 20 SOLUTION ORAL at 03:44

## 2018-12-15 RX ADMIN — ACETAMINOPHEN 650 MG: 325 TABLET ORAL at 09:11

## 2018-12-15 RX ADMIN — DOCUSATE SODIUM AND SENNOSIDES 2 TABLET: 8.6; 5 TABLET, FILM COATED ORAL at 09:11

## 2018-12-15 RX ADMIN — BUDESONIDE AND FORMOTEROL FUMARATE DIHYDRATE 2 PUFF: 160; 4.5 AEROSOL RESPIRATORY (INHALATION) at 19:11

## 2018-12-15 RX ADMIN — IPRATROPIUM BROMIDE AND ALBUTEROL SULFATE 3 ML: 2.5; .5 SOLUTION RESPIRATORY (INHALATION) at 14:33

## 2018-12-15 RX ADMIN — MORPHINE SULFATE 6 MG: 20 SOLUTION ORAL at 13:03

## 2018-12-15 RX ADMIN — MORPHINE SULFATE 5 MG: 20 SOLUTION ORAL at 21:30

## 2018-12-15 RX ADMIN — IPRATROPIUM BROMIDE AND ALBUTEROL SULFATE 3 ML: 2.5; .5 SOLUTION RESPIRATORY (INHALATION) at 03:05

## 2018-12-15 RX ADMIN — ROFLUMILAST 500 MCG: 500 TABLET ORAL at 09:49

## 2018-12-15 RX ADMIN — BUDESONIDE AND FORMOTEROL FUMARATE DIHYDRATE 2 PUFF: 160; 4.5 AEROSOL RESPIRATORY (INHALATION) at 07:06

## 2018-12-16 PROCEDURE — 94640 AIRWAY INHALATION TREATMENT: CPT

## 2018-12-16 PROCEDURE — 94799 UNLISTED PULMONARY SVC/PX: CPT

## 2018-12-16 PROCEDURE — 94760 N-INVAS EAR/PLS OXIMETRY 1: CPT

## 2018-12-16 PROCEDURE — 63710000001 DEXAMETHASONE PER 0.25 MG: Performed by: NURSE PRACTITIONER

## 2018-12-16 RX ORDER — MORPHINE SULFATE 20 MG/ML
10 SOLUTION ORAL EVERY 4 HOURS PRN
Status: DISCONTINUED | OUTPATIENT
Start: 2018-12-16 | End: 2018-12-18 | Stop reason: SDUPTHER

## 2018-12-16 RX ADMIN — DEXAMETHASONE 4 MG: 4 TABLET ORAL at 11:44

## 2018-12-16 RX ADMIN — MORPHINE SULFATE 5 MG: 20 SOLUTION ORAL at 03:44

## 2018-12-16 RX ADMIN — IPRATROPIUM BROMIDE AND ALBUTEROL SULFATE 3 ML: 2.5; .5 SOLUTION RESPIRATORY (INHALATION) at 18:50

## 2018-12-16 RX ADMIN — MORPHINE SULFATE 6 MG: 20 SOLUTION ORAL at 14:06

## 2018-12-16 RX ADMIN — DOCUSATE SODIUM AND SENNOSIDES 2 TABLET: 8.6; 5 TABLET, FILM COATED ORAL at 08:04

## 2018-12-16 RX ADMIN — CASTOR OIL AND BALSAM, PERU: 788; 87 OINTMENT TOPICAL at 20:20

## 2018-12-16 RX ADMIN — LORAZEPAM 0.5 MG: 0.5 TABLET ORAL at 20:19

## 2018-12-16 RX ADMIN — BUDESONIDE AND FORMOTEROL FUMARATE DIHYDRATE 2 PUFF: 160; 4.5 AEROSOL RESPIRATORY (INHALATION) at 06:47

## 2018-12-16 RX ADMIN — CASTOR OIL AND BALSAM, PERU: 788; 87 OINTMENT TOPICAL at 08:05

## 2018-12-16 RX ADMIN — IPRATROPIUM BROMIDE AND ALBUTEROL SULFATE 3 ML: 2.5; .5 SOLUTION RESPIRATORY (INHALATION) at 14:33

## 2018-12-16 RX ADMIN — GUAIFENESIN 600 MG: 600 TABLET, EXTENDED RELEASE ORAL at 08:04

## 2018-12-16 RX ADMIN — IPRATROPIUM BROMIDE AND ALBUTEROL SULFATE 3 ML: 2.5; .5 SOLUTION RESPIRATORY (INHALATION) at 06:47

## 2018-12-16 RX ADMIN — IPRATROPIUM BROMIDE AND ALBUTEROL SULFATE 3 ML: 2.5; .5 SOLUTION RESPIRATORY (INHALATION) at 22:42

## 2018-12-16 RX ADMIN — FENTANYL 1 PATCH: 25 PATCH TRANSDERMAL at 17:22

## 2018-12-16 RX ADMIN — PANTOPRAZOLE SODIUM 40 MG: 40 TABLET, DELAYED RELEASE ORAL at 05:21

## 2018-12-16 RX ADMIN — DEXAMETHASONE 4 MG: 4 TABLET ORAL at 08:04

## 2018-12-16 RX ADMIN — ROFLUMILAST 500 MCG: 500 TABLET ORAL at 08:04

## 2018-12-16 RX ADMIN — BUDESONIDE AND FORMOTEROL FUMARATE DIHYDRATE 2 PUFF: 160; 4.5 AEROSOL RESPIRATORY (INHALATION) at 18:50

## 2018-12-16 RX ADMIN — FLUTICASONE PROPIONATE 2 SPRAY: 50 SPRAY, METERED NASAL at 08:04

## 2018-12-16 RX ADMIN — MIRTAZAPINE 15 MG: 15 TABLET, ORALLY DISINTEGRATING ORAL at 22:00

## 2018-12-16 RX ADMIN — MORPHINE SULFATE 5 MG: 20 SOLUTION ORAL at 20:19

## 2018-12-16 RX ADMIN — MORPHINE SULFATE 5 MG: 20 SOLUTION ORAL at 08:08

## 2018-12-16 RX ADMIN — IPRATROPIUM BROMIDE AND ALBUTEROL SULFATE 3 ML: 2.5; .5 SOLUTION RESPIRATORY (INHALATION) at 10:35

## 2018-12-16 RX ADMIN — IPRATROPIUM BROMIDE AND ALBUTEROL SULFATE 3 ML: 2.5; .5 SOLUTION RESPIRATORY (INHALATION) at 03:01

## 2018-12-17 PROCEDURE — 97530 THERAPEUTIC ACTIVITIES: CPT

## 2018-12-17 PROCEDURE — 63710000001 DEXAMETHASONE PER 0.25 MG: Performed by: NURSE PRACTITIONER

## 2018-12-17 PROCEDURE — 94799 UNLISTED PULMONARY SVC/PX: CPT

## 2018-12-17 RX ORDER — HALOPERIDOL 1 MG/1
1 TABLET ORAL EVERY 4 HOURS PRN
Status: DISCONTINUED | OUTPATIENT
Start: 2018-12-17 | End: 2019-01-02

## 2018-12-17 RX ADMIN — IPRATROPIUM BROMIDE AND ALBUTEROL SULFATE 3 ML: 2.5; .5 SOLUTION RESPIRATORY (INHALATION) at 19:38

## 2018-12-17 RX ADMIN — CASTOR OIL AND BALSAM, PERU: 788; 87 OINTMENT TOPICAL at 20:43

## 2018-12-17 RX ADMIN — IPRATROPIUM BROMIDE AND ALBUTEROL SULFATE 3 ML: 2.5; .5 SOLUTION RESPIRATORY (INHALATION) at 02:59

## 2018-12-17 RX ADMIN — CASTOR OIL AND BALSAM, PERU: 788; 87 OINTMENT TOPICAL at 08:36

## 2018-12-17 RX ADMIN — FLUTICASONE PROPIONATE 2 SPRAY: 50 SPRAY, METERED NASAL at 08:36

## 2018-12-17 RX ADMIN — ACETAMINOPHEN 650 MG: 325 TABLET ORAL at 02:35

## 2018-12-17 RX ADMIN — MORPHINE SULFATE 10 MG: 20 SOLUTION ORAL at 20:44

## 2018-12-17 RX ADMIN — PANTOPRAZOLE SODIUM 40 MG: 40 TABLET, DELAYED RELEASE ORAL at 05:19

## 2018-12-17 RX ADMIN — DEXAMETHASONE 4 MG: 4 TABLET ORAL at 11:22

## 2018-12-17 RX ADMIN — MORPHINE SULFATE 10 MG: 20 SOLUTION ORAL at 13:17

## 2018-12-17 RX ADMIN — DEXAMETHASONE 4 MG: 4 TABLET ORAL at 08:36

## 2018-12-17 RX ADMIN — ACETAMINOPHEN 650 MG: 325 TABLET ORAL at 19:35

## 2018-12-17 RX ADMIN — MORPHINE SULFATE 5 MG: 20 SOLUTION ORAL at 08:40

## 2018-12-17 RX ADMIN — MIRTAZAPINE 15 MG: 15 TABLET, ORALLY DISINTEGRATING ORAL at 20:43

## 2018-12-17 RX ADMIN — BUDESONIDE AND FORMOTEROL FUMARATE DIHYDRATE 2 PUFF: 160; 4.5 AEROSOL RESPIRATORY (INHALATION) at 07:48

## 2018-12-17 RX ADMIN — BUDESONIDE AND FORMOTEROL FUMARATE DIHYDRATE 2 PUFF: 160; 4.5 AEROSOL RESPIRATORY (INHALATION) at 19:39

## 2018-12-17 RX ADMIN — MORPHINE SULFATE 5 MG: 20 SOLUTION ORAL at 15:27

## 2018-12-17 RX ADMIN — ROFLUMILAST 500 MCG: 500 TABLET ORAL at 08:38

## 2018-12-17 RX ADMIN — IPRATROPIUM BROMIDE AND ALBUTEROL SULFATE 3 ML: 2.5; .5 SOLUTION RESPIRATORY (INHALATION) at 07:48

## 2018-12-17 RX ADMIN — MORPHINE SULFATE 5 MG: 20 SOLUTION ORAL at 19:36

## 2018-12-17 RX ADMIN — MORPHINE SULFATE 5 MG: 20 SOLUTION ORAL at 02:35

## 2018-12-18 PROCEDURE — 94760 N-INVAS EAR/PLS OXIMETRY 1: CPT

## 2018-12-18 PROCEDURE — 94640 AIRWAY INHALATION TREATMENT: CPT

## 2018-12-18 PROCEDURE — 63710000001 DEXAMETHASONE PER 0.25 MG: Performed by: NURSE PRACTITIONER

## 2018-12-18 PROCEDURE — 94799 UNLISTED PULMONARY SVC/PX: CPT

## 2018-12-18 PROCEDURE — 97530 THERAPEUTIC ACTIVITIES: CPT

## 2018-12-18 RX ORDER — MORPHINE SULFATE 20 MG/ML
15 SOLUTION ORAL
Status: DISCONTINUED | OUTPATIENT
Start: 2018-12-18 | End: 2018-12-27

## 2018-12-18 RX ORDER — MORPHINE SULFATE 20 MG/ML
10 SOLUTION ORAL
Status: DISCONTINUED | OUTPATIENT
Start: 2018-12-18 | End: 2018-12-27

## 2018-12-18 RX ORDER — MORPHINE SULFATE 20 MG/ML
10 SOLUTION ORAL EVERY 6 HOURS
Status: DISCONTINUED | OUTPATIENT
Start: 2018-12-18 | End: 2018-12-24

## 2018-12-18 RX ADMIN — MORPHINE SULFATE 10 MG: 20 SOLUTION ORAL at 17:10

## 2018-12-18 RX ADMIN — DEXAMETHASONE 4 MG: 4 TABLET ORAL at 12:18

## 2018-12-18 RX ADMIN — ACETAMINOPHEN 650 MG: 325 TABLET ORAL at 21:02

## 2018-12-18 RX ADMIN — BUDESONIDE AND FORMOTEROL FUMARATE DIHYDRATE 2 PUFF: 160; 4.5 AEROSOL RESPIRATORY (INHALATION) at 07:27

## 2018-12-18 RX ADMIN — MIRTAZAPINE 15 MG: 15 TABLET, ORALLY DISINTEGRATING ORAL at 21:02

## 2018-12-18 RX ADMIN — IPRATROPIUM BROMIDE AND ALBUTEROL SULFATE 3 ML: 2.5; .5 SOLUTION RESPIRATORY (INHALATION) at 22:45

## 2018-12-18 RX ADMIN — CASTOR OIL AND BALSAM, PERU: 788; 87 OINTMENT TOPICAL at 21:02

## 2018-12-18 RX ADMIN — MORPHINE SULFATE 10 MG: 20 SOLUTION ORAL at 21:02

## 2018-12-18 RX ADMIN — IPRATROPIUM BROMIDE AND ALBUTEROL SULFATE 3 ML: 2.5; .5 SOLUTION RESPIRATORY (INHALATION) at 07:27

## 2018-12-18 RX ADMIN — MORPHINE SULFATE 5 MG: 20 SOLUTION ORAL at 15:59

## 2018-12-18 RX ADMIN — MORPHINE SULFATE 5 MG: 20 SOLUTION ORAL at 03:26

## 2018-12-18 RX ADMIN — DOCUSATE SODIUM AND SENNOSIDES 2 TABLET: 8.6; 5 TABLET, FILM COATED ORAL at 21:02

## 2018-12-18 RX ADMIN — MORPHINE SULFATE 5 MG: 20 SOLUTION ORAL at 08:58

## 2018-12-18 RX ADMIN — ROFLUMILAST 500 MCG: 500 TABLET ORAL at 08:57

## 2018-12-18 RX ADMIN — MORPHINE SULFATE 10 MG: 20 SOLUTION ORAL at 06:00

## 2018-12-18 RX ADMIN — BUDESONIDE AND FORMOTEROL FUMARATE DIHYDRATE 2 PUFF: 160; 4.5 AEROSOL RESPIRATORY (INHALATION) at 19:21

## 2018-12-18 RX ADMIN — IPRATROPIUM BROMIDE AND ALBUTEROL SULFATE 3 ML: 2.5; .5 SOLUTION RESPIRATORY (INHALATION) at 15:25

## 2018-12-18 RX ADMIN — ACETAMINOPHEN 650 MG: 325 TABLET ORAL at 06:53

## 2018-12-18 RX ADMIN — CASTOR OIL AND BALSAM, PERU: 788; 87 OINTMENT TOPICAL at 08:57

## 2018-12-18 RX ADMIN — IPRATROPIUM BROMIDE AND ALBUTEROL SULFATE 3 ML: 2.5; .5 SOLUTION RESPIRATORY (INHALATION) at 03:19

## 2018-12-18 RX ADMIN — FLUTICASONE PROPIONATE 2 SPRAY: 50 SPRAY, METERED NASAL at 09:44

## 2018-12-18 RX ADMIN — DEXAMETHASONE 4 MG: 4 TABLET ORAL at 08:57

## 2018-12-18 RX ADMIN — IPRATROPIUM BROMIDE AND ALBUTEROL SULFATE 3 ML: 2.5; .5 SOLUTION RESPIRATORY (INHALATION) at 19:21

## 2018-12-19 PROCEDURE — 94799 UNLISTED PULMONARY SVC/PX: CPT

## 2018-12-19 PROCEDURE — 94760 N-INVAS EAR/PLS OXIMETRY 1: CPT

## 2018-12-19 PROCEDURE — 94640 AIRWAY INHALATION TREATMENT: CPT

## 2018-12-19 PROCEDURE — 63710000001 DEXAMETHASONE PER 0.25 MG: Performed by: NURSE PRACTITIONER

## 2018-12-19 RX ORDER — SENNA AND DOCUSATE SODIUM 50; 8.6 MG/1; MG/1
2 TABLET, FILM COATED ORAL 2 TIMES DAILY PRN
Status: DISCONTINUED | OUTPATIENT
Start: 2018-12-19 | End: 2019-01-02 | Stop reason: HOSPADM

## 2018-12-19 RX ORDER — SENNA AND DOCUSATE SODIUM 50; 8.6 MG/1; MG/1
2 TABLET, FILM COATED ORAL NIGHTLY
Status: DISCONTINUED | OUTPATIENT
Start: 2018-12-19 | End: 2019-01-02 | Stop reason: HOSPADM

## 2018-12-19 RX ADMIN — MORPHINE SULFATE 10 MG: 20 SOLUTION ORAL at 20:19

## 2018-12-19 RX ADMIN — DOCUSATE SODIUM AND SENNOSIDES 2 TABLET: 8.6; 5 TABLET, FILM COATED ORAL at 20:19

## 2018-12-19 RX ADMIN — PANTOPRAZOLE SODIUM 40 MG: 40 TABLET, DELAYED RELEASE ORAL at 06:04

## 2018-12-19 RX ADMIN — IPRATROPIUM BROMIDE AND ALBUTEROL SULFATE 3 ML: 2.5; .5 SOLUTION RESPIRATORY (INHALATION) at 19:24

## 2018-12-19 RX ADMIN — MORPHINE SULFATE 10 MG: 20 SOLUTION ORAL at 13:11

## 2018-12-19 RX ADMIN — MIRTAZAPINE 15 MG: 15 TABLET, ORALLY DISINTEGRATING ORAL at 20:19

## 2018-12-19 RX ADMIN — MORPHINE SULFATE 10 MG: 20 SOLUTION ORAL at 03:18

## 2018-12-19 RX ADMIN — IPRATROPIUM BROMIDE AND ALBUTEROL SULFATE 3 ML: 2.5; .5 SOLUTION RESPIRATORY (INHALATION) at 16:20

## 2018-12-19 RX ADMIN — CASTOR OIL AND BALSAM, PERU: 788; 87 OINTMENT TOPICAL at 20:19

## 2018-12-19 RX ADMIN — CASTOR OIL AND BALSAM, PERU: 788; 87 OINTMENT TOPICAL at 08:12

## 2018-12-19 RX ADMIN — DEXAMETHASONE 4 MG: 4 TABLET ORAL at 12:26

## 2018-12-19 RX ADMIN — ROFLUMILAST 500 MCG: 500 TABLET ORAL at 08:11

## 2018-12-19 RX ADMIN — IPRATROPIUM BROMIDE AND ALBUTEROL SULFATE 3 ML: 2.5; .5 SOLUTION RESPIRATORY (INHALATION) at 12:22

## 2018-12-19 RX ADMIN — BUDESONIDE AND FORMOTEROL FUMARATE DIHYDRATE 2 PUFF: 160; 4.5 AEROSOL RESPIRATORY (INHALATION) at 08:21

## 2018-12-19 RX ADMIN — FENTANYL 1 PATCH: 25 PATCH TRANSDERMAL at 18:11

## 2018-12-19 RX ADMIN — DEXAMETHASONE 4 MG: 4 TABLET ORAL at 08:12

## 2018-12-19 RX ADMIN — BUDESONIDE AND FORMOTEROL FUMARATE DIHYDRATE 2 PUFF: 160; 4.5 AEROSOL RESPIRATORY (INHALATION) at 19:24

## 2018-12-19 RX ADMIN — MORPHINE SULFATE 10 MG: 20 SOLUTION ORAL at 08:09

## 2018-12-19 RX ADMIN — IPRATROPIUM BROMIDE AND ALBUTEROL SULFATE 3 ML: 2.5; .5 SOLUTION RESPIRATORY (INHALATION) at 03:39

## 2018-12-19 RX ADMIN — MORPHINE SULFATE 10 MG: 20 SOLUTION ORAL at 15:36

## 2018-12-19 RX ADMIN — ACETAMINOPHEN 650 MG: 325 TABLET ORAL at 10:51

## 2018-12-19 RX ADMIN — IPRATROPIUM BROMIDE AND ALBUTEROL SULFATE 3 ML: 2.5; .5 SOLUTION RESPIRATORY (INHALATION) at 08:21

## 2018-12-20 PROCEDURE — 94799 UNLISTED PULMONARY SVC/PX: CPT

## 2018-12-20 PROCEDURE — 94640 AIRWAY INHALATION TREATMENT: CPT

## 2018-12-20 PROCEDURE — 63710000001 DEXAMETHASONE PER 0.25 MG: Performed by: NURSE PRACTITIONER

## 2018-12-20 PROCEDURE — 94760 N-INVAS EAR/PLS OXIMETRY 1: CPT

## 2018-12-20 RX ADMIN — DOCUSATE SODIUM AND SENNOSIDES 2 TABLET: 8.6; 5 TABLET, FILM COATED ORAL at 20:55

## 2018-12-20 RX ADMIN — ACETAMINOPHEN 650 MG: 325 TABLET ORAL at 17:51

## 2018-12-20 RX ADMIN — IPRATROPIUM BROMIDE AND ALBUTEROL SULFATE 3 ML: 2.5; .5 SOLUTION RESPIRATORY (INHALATION) at 19:01

## 2018-12-20 RX ADMIN — MORPHINE SULFATE 10 MG: 20 SOLUTION ORAL at 20:56

## 2018-12-20 RX ADMIN — IPRATROPIUM BROMIDE AND ALBUTEROL SULFATE 3 ML: 2.5; .5 SOLUTION RESPIRATORY (INHALATION) at 16:39

## 2018-12-20 RX ADMIN — MORPHINE SULFATE 15 MG: 20 SOLUTION ORAL at 14:15

## 2018-12-20 RX ADMIN — ROFLUMILAST 500 MCG: 500 TABLET ORAL at 08:02

## 2018-12-20 RX ADMIN — IPRATROPIUM BROMIDE AND ALBUTEROL SULFATE 3 ML: 2.5; .5 SOLUTION RESPIRATORY (INHALATION) at 07:31

## 2018-12-20 RX ADMIN — IPRATROPIUM BROMIDE AND ALBUTEROL SULFATE 3 ML: 2.5; .5 SOLUTION RESPIRATORY (INHALATION) at 00:47

## 2018-12-20 RX ADMIN — FLUTICASONE PROPIONATE 2 SPRAY: 50 SPRAY, METERED NASAL at 08:03

## 2018-12-20 RX ADMIN — IPRATROPIUM BROMIDE AND ALBUTEROL SULFATE 3 ML: 2.5; .5 SOLUTION RESPIRATORY (INHALATION) at 12:19

## 2018-12-20 RX ADMIN — CASTOR OIL AND BALSAM, PERU: 788; 87 OINTMENT TOPICAL at 08:03

## 2018-12-20 RX ADMIN — MORPHINE SULFATE 10 MG: 20 SOLUTION ORAL at 03:26

## 2018-12-20 RX ADMIN — DEXAMETHASONE 4 MG: 4 TABLET ORAL at 11:33

## 2018-12-20 RX ADMIN — MORPHINE SULFATE 15 MG: 20 SOLUTION ORAL at 10:14

## 2018-12-20 RX ADMIN — CASTOR OIL AND BALSAM, PERU: 788; 87 OINTMENT TOPICAL at 20:56

## 2018-12-20 RX ADMIN — MIRTAZAPINE 15 MG: 15 TABLET, ORALLY DISINTEGRATING ORAL at 20:55

## 2018-12-20 RX ADMIN — ACETAMINOPHEN 650 MG: 325 TABLET ORAL at 03:26

## 2018-12-20 RX ADMIN — BUDESONIDE AND FORMOTEROL FUMARATE DIHYDRATE 2 PUFF: 160; 4.5 AEROSOL RESPIRATORY (INHALATION) at 19:01

## 2018-12-20 RX ADMIN — PANTOPRAZOLE SODIUM 40 MG: 40 TABLET, DELAYED RELEASE ORAL at 06:05

## 2018-12-20 RX ADMIN — MORPHINE SULFATE 10 MG: 20 SOLUTION ORAL at 15:58

## 2018-12-20 RX ADMIN — DEXAMETHASONE 4 MG: 4 TABLET ORAL at 08:02

## 2018-12-20 RX ADMIN — IPRATROPIUM BROMIDE AND ALBUTEROL SULFATE 3 ML: 2.5; .5 SOLUTION RESPIRATORY (INHALATION) at 22:34

## 2018-12-20 RX ADMIN — BUDESONIDE AND FORMOTEROL FUMARATE DIHYDRATE 2 PUFF: 160; 4.5 AEROSOL RESPIRATORY (INHALATION) at 07:31

## 2018-12-20 RX ADMIN — MORPHINE SULFATE 10 MG: 20 SOLUTION ORAL at 08:02

## 2018-12-21 PROCEDURE — 94760 N-INVAS EAR/PLS OXIMETRY 1: CPT

## 2018-12-21 PROCEDURE — 97530 THERAPEUTIC ACTIVITIES: CPT

## 2018-12-21 PROCEDURE — 94799 UNLISTED PULMONARY SVC/PX: CPT

## 2018-12-21 PROCEDURE — 63710000001 DEXAMETHASONE PER 0.25 MG: Performed by: NURSE PRACTITIONER

## 2018-12-21 RX ADMIN — IPRATROPIUM BROMIDE AND ALBUTEROL SULFATE 3 ML: 2.5; .5 SOLUTION RESPIRATORY (INHALATION) at 16:14

## 2018-12-21 RX ADMIN — BUDESONIDE AND FORMOTEROL FUMARATE DIHYDRATE 2 PUFF: 160; 4.5 AEROSOL RESPIRATORY (INHALATION) at 06:36

## 2018-12-21 RX ADMIN — MIRTAZAPINE 15 MG: 15 TABLET, ORALLY DISINTEGRATING ORAL at 21:00

## 2018-12-21 RX ADMIN — MORPHINE SULFATE 10 MG: 20 SOLUTION ORAL at 21:00

## 2018-12-21 RX ADMIN — DEXAMETHASONE 4 MG: 4 TABLET ORAL at 08:34

## 2018-12-21 RX ADMIN — PANTOPRAZOLE SODIUM 40 MG: 40 TABLET, DELAYED RELEASE ORAL at 05:08

## 2018-12-21 RX ADMIN — ACETAMINOPHEN 650 MG: 325 TABLET ORAL at 22:54

## 2018-12-21 RX ADMIN — FLUTICASONE PROPIONATE 2 SPRAY: 50 SPRAY, METERED NASAL at 08:34

## 2018-12-21 RX ADMIN — CASTOR OIL AND BALSAM, PERU: 788; 87 OINTMENT TOPICAL at 08:34

## 2018-12-21 RX ADMIN — IPRATROPIUM BROMIDE AND ALBUTEROL SULFATE 3 ML: 2.5; .5 SOLUTION RESPIRATORY (INHALATION) at 23:11

## 2018-12-21 RX ADMIN — ROFLUMILAST 500 MCG: 500 TABLET ORAL at 08:34

## 2018-12-21 RX ADMIN — DEXAMETHASONE 4 MG: 4 TABLET ORAL at 12:07

## 2018-12-21 RX ADMIN — BUDESONIDE AND FORMOTEROL FUMARATE DIHYDRATE 2 PUFF: 160; 4.5 AEROSOL RESPIRATORY (INHALATION) at 18:38

## 2018-12-21 RX ADMIN — MORPHINE SULFATE 10 MG: 20 SOLUTION ORAL at 16:00

## 2018-12-21 RX ADMIN — IPRATROPIUM BROMIDE AND ALBUTEROL SULFATE 3 ML: 2.5; .5 SOLUTION RESPIRATORY (INHALATION) at 03:52

## 2018-12-21 RX ADMIN — IPRATROPIUM BROMIDE AND ALBUTEROL SULFATE 3 ML: 2.5; .5 SOLUTION RESPIRATORY (INHALATION) at 06:36

## 2018-12-21 RX ADMIN — MORPHINE SULFATE 10 MG: 20 SOLUTION ORAL at 03:15

## 2018-12-21 RX ADMIN — MORPHINE SULFATE 15 MG: 20 SOLUTION ORAL at 09:28

## 2018-12-21 RX ADMIN — IPRATROPIUM BROMIDE AND ALBUTEROL SULFATE 3 ML: 2.5; .5 SOLUTION RESPIRATORY (INHALATION) at 18:38

## 2018-12-21 RX ADMIN — MORPHINE SULFATE 10 MG: 20 SOLUTION ORAL at 08:34

## 2018-12-22 PROCEDURE — 94760 N-INVAS EAR/PLS OXIMETRY 1: CPT

## 2018-12-22 PROCEDURE — 63710000001 DEXAMETHASONE PER 0.25 MG: Performed by: NURSE PRACTITIONER

## 2018-12-22 PROCEDURE — 94799 UNLISTED PULMONARY SVC/PX: CPT

## 2018-12-22 PROCEDURE — 97530 THERAPEUTIC ACTIVITIES: CPT

## 2018-12-22 RX ADMIN — CASTOR OIL AND BALSAM, PERU: 788; 87 OINTMENT TOPICAL at 20:01

## 2018-12-22 RX ADMIN — MORPHINE SULFATE 15 MG: 20 SOLUTION ORAL at 13:29

## 2018-12-22 RX ADMIN — CASTOR OIL AND BALSAM, PERU: 788; 87 OINTMENT TOPICAL at 09:01

## 2018-12-22 RX ADMIN — IPRATROPIUM BROMIDE AND ALBUTEROL SULFATE 3 ML: 2.5; .5 SOLUTION RESPIRATORY (INHALATION) at 13:11

## 2018-12-22 RX ADMIN — BUDESONIDE AND FORMOTEROL FUMARATE DIHYDRATE 2 PUFF: 160; 4.5 AEROSOL RESPIRATORY (INHALATION) at 18:37

## 2018-12-22 RX ADMIN — IPRATROPIUM BROMIDE AND ALBUTEROL SULFATE 3 ML: 2.5; .5 SOLUTION RESPIRATORY (INHALATION) at 22:43

## 2018-12-22 RX ADMIN — ROFLUMILAST 500 MCG: 500 TABLET ORAL at 09:01

## 2018-12-22 RX ADMIN — IPRATROPIUM BROMIDE AND ALBUTEROL SULFATE 3 ML: 2.5; .5 SOLUTION RESPIRATORY (INHALATION) at 03:01

## 2018-12-22 RX ADMIN — ACETAMINOPHEN 650 MG: 325 TABLET ORAL at 17:36

## 2018-12-22 RX ADMIN — DEXAMETHASONE 4 MG: 4 TABLET ORAL at 12:40

## 2018-12-22 RX ADMIN — MORPHINE SULFATE 10 MG: 20 SOLUTION ORAL at 20:00

## 2018-12-22 RX ADMIN — FENTANYL 1 PATCH: 25 PATCH TRANSDERMAL at 17:36

## 2018-12-22 RX ADMIN — IPRATROPIUM BROMIDE AND ALBUTEROL SULFATE 3 ML: 2.5; .5 SOLUTION RESPIRATORY (INHALATION) at 17:24

## 2018-12-22 RX ADMIN — MORPHINE SULFATE 10 MG: 20 SOLUTION ORAL at 15:22

## 2018-12-22 RX ADMIN — DOCUSATE SODIUM AND SENNOSIDES 2 TABLET: 8.6; 5 TABLET, FILM COATED ORAL at 20:01

## 2018-12-22 RX ADMIN — DEXAMETHASONE 4 MG: 4 TABLET ORAL at 09:00

## 2018-12-22 RX ADMIN — MIRTAZAPINE 15 MG: 15 TABLET, ORALLY DISINTEGRATING ORAL at 20:01

## 2018-12-22 RX ADMIN — MORPHINE SULFATE 10 MG: 20 SOLUTION ORAL at 09:00

## 2018-12-22 RX ADMIN — IPRATROPIUM BROMIDE AND ALBUTEROL SULFATE 3 ML: 2.5; .5 SOLUTION RESPIRATORY (INHALATION) at 18:37

## 2018-12-22 RX ADMIN — FLUTICASONE PROPIONATE 2 SPRAY: 50 SPRAY, METERED NASAL at 09:00

## 2018-12-23 PROCEDURE — 94640 AIRWAY INHALATION TREATMENT: CPT

## 2018-12-23 PROCEDURE — 63710000001 DEXAMETHASONE PER 0.25 MG: Performed by: NURSE PRACTITIONER

## 2018-12-23 PROCEDURE — 97530 THERAPEUTIC ACTIVITIES: CPT

## 2018-12-23 PROCEDURE — 94799 UNLISTED PULMONARY SVC/PX: CPT

## 2018-12-23 PROCEDURE — 94760 N-INVAS EAR/PLS OXIMETRY 1: CPT

## 2018-12-23 PROCEDURE — 97110 THERAPEUTIC EXERCISES: CPT

## 2018-12-23 RX ADMIN — IPRATROPIUM BROMIDE AND ALBUTEROL SULFATE 3 ML: 2.5; .5 SOLUTION RESPIRATORY (INHALATION) at 23:22

## 2018-12-23 RX ADMIN — DEXAMETHASONE 4 MG: 4 TABLET ORAL at 12:13

## 2018-12-23 RX ADMIN — MORPHINE SULFATE 10 MG: 20 SOLUTION ORAL at 15:59

## 2018-12-23 RX ADMIN — MORPHINE SULFATE 15 MG: 20 SOLUTION ORAL at 13:44

## 2018-12-23 RX ADMIN — ROFLUMILAST 500 MCG: 500 TABLET ORAL at 08:01

## 2018-12-23 RX ADMIN — MIRTAZAPINE 15 MG: 15 TABLET, ORALLY DISINTEGRATING ORAL at 20:18

## 2018-12-23 RX ADMIN — CASTOR OIL AND BALSAM, PERU: 788; 87 OINTMENT TOPICAL at 08:01

## 2018-12-23 RX ADMIN — CASTOR OIL AND BALSAM, PERU: 788; 87 OINTMENT TOPICAL at 20:18

## 2018-12-23 RX ADMIN — BUDESONIDE AND FORMOTEROL FUMARATE DIHYDRATE 2 PUFF: 160; 4.5 AEROSOL RESPIRATORY (INHALATION) at 19:11

## 2018-12-23 RX ADMIN — IPRATROPIUM BROMIDE AND ALBUTEROL SULFATE 3 ML: 2.5; .5 SOLUTION RESPIRATORY (INHALATION) at 14:38

## 2018-12-23 RX ADMIN — IPRATROPIUM BROMIDE AND ALBUTEROL SULFATE 3 ML: 2.5; .5 SOLUTION RESPIRATORY (INHALATION) at 19:11

## 2018-12-23 RX ADMIN — IPRATROPIUM BROMIDE AND ALBUTEROL SULFATE 3 ML: 2.5; .5 SOLUTION RESPIRATORY (INHALATION) at 08:08

## 2018-12-23 RX ADMIN — MORPHINE SULFATE 10 MG: 20 SOLUTION ORAL at 20:18

## 2018-12-23 RX ADMIN — MORPHINE SULFATE 10 MG: 20 SOLUTION ORAL at 05:00

## 2018-12-23 RX ADMIN — BUDESONIDE AND FORMOTEROL FUMARATE DIHYDRATE 2 PUFF: 160; 4.5 AEROSOL RESPIRATORY (INHALATION) at 08:08

## 2018-12-23 RX ADMIN — FLUTICASONE PROPIONATE 2 SPRAY: 50 SPRAY, METERED NASAL at 08:01

## 2018-12-23 RX ADMIN — IPRATROPIUM BROMIDE AND ALBUTEROL SULFATE 3 ML: 2.5; .5 SOLUTION RESPIRATORY (INHALATION) at 02:45

## 2018-12-23 RX ADMIN — DEXAMETHASONE 4 MG: 4 TABLET ORAL at 08:01

## 2018-12-23 RX ADMIN — PANTOPRAZOLE SODIUM 40 MG: 40 TABLET, DELAYED RELEASE ORAL at 06:30

## 2018-12-23 RX ADMIN — IPRATROPIUM BROMIDE AND ALBUTEROL SULFATE 3 ML: 2.5; .5 SOLUTION RESPIRATORY (INHALATION) at 12:29

## 2018-12-23 RX ADMIN — MORPHINE SULFATE 10 MG: 20 SOLUTION ORAL at 08:01

## 2018-12-24 PROCEDURE — 94640 AIRWAY INHALATION TREATMENT: CPT

## 2018-12-24 PROCEDURE — 94799 UNLISTED PULMONARY SVC/PX: CPT

## 2018-12-24 PROCEDURE — 63710000001 DEXAMETHASONE PER 0.25 MG: Performed by: NURSE PRACTITIONER

## 2018-12-24 PROCEDURE — 94760 N-INVAS EAR/PLS OXIMETRY 1: CPT

## 2018-12-24 RX ORDER — GUAIFENESIN 600 MG/1
600 TABLET, EXTENDED RELEASE ORAL EVERY 12 HOURS SCHEDULED
Status: DISCONTINUED | OUTPATIENT
Start: 2018-12-24 | End: 2019-01-02 | Stop reason: HOSPADM

## 2018-12-24 RX ORDER — MORPHINE SULFATE 20 MG/ML
10 SOLUTION ORAL EVERY 6 HOURS
Status: DISCONTINUED | OUTPATIENT
Start: 2018-12-24 | End: 2018-12-27

## 2018-12-24 RX ADMIN — IPRATROPIUM BROMIDE AND ALBUTEROL SULFATE 3 ML: 2.5; .5 SOLUTION RESPIRATORY (INHALATION) at 18:39

## 2018-12-24 RX ADMIN — MIRTAZAPINE 15 MG: 15 TABLET, ORALLY DISINTEGRATING ORAL at 20:11

## 2018-12-24 RX ADMIN — DEXAMETHASONE 4 MG: 4 TABLET ORAL at 13:15

## 2018-12-24 RX ADMIN — FLUTICASONE PROPIONATE 2 SPRAY: 50 SPRAY, METERED NASAL at 09:13

## 2018-12-24 RX ADMIN — BUDESONIDE AND FORMOTEROL FUMARATE DIHYDRATE 2 PUFF: 160; 4.5 AEROSOL RESPIRATORY (INHALATION) at 07:27

## 2018-12-24 RX ADMIN — MORPHINE SULFATE 10 MG: 20 SOLUTION ORAL at 20:11

## 2018-12-24 RX ADMIN — IPRATROPIUM BROMIDE AND ALBUTEROL SULFATE 3 ML: 2.5; .5 SOLUTION RESPIRATORY (INHALATION) at 22:31

## 2018-12-24 RX ADMIN — MORPHINE SULFATE 10 MG: 20 SOLUTION ORAL at 09:12

## 2018-12-24 RX ADMIN — IPRATROPIUM BROMIDE AND ALBUTEROL SULFATE 3 ML: 2.5; .5 SOLUTION RESPIRATORY (INHALATION) at 07:27

## 2018-12-24 RX ADMIN — LORAZEPAM 0.5 MG: 0.5 TABLET ORAL at 13:15

## 2018-12-24 RX ADMIN — MORPHINE SULFATE 10 MG: 20 SOLUTION ORAL at 03:23

## 2018-12-24 RX ADMIN — IPRATROPIUM BROMIDE AND ALBUTEROL SULFATE 3 ML: 2.5; .5 SOLUTION RESPIRATORY (INHALATION) at 11:26

## 2018-12-24 RX ADMIN — BUDESONIDE AND FORMOTEROL FUMARATE DIHYDRATE 2 PUFF: 160; 4.5 AEROSOL RESPIRATORY (INHALATION) at 18:39

## 2018-12-24 RX ADMIN — IPRATROPIUM BROMIDE AND ALBUTEROL SULFATE 3 ML: 2.5; .5 SOLUTION RESPIRATORY (INHALATION) at 03:46

## 2018-12-24 RX ADMIN — MORPHINE SULFATE 10 MG: 20 SOLUTION ORAL at 16:21

## 2018-12-24 RX ADMIN — MORPHINE SULFATE 15 MG: 20 SOLUTION ORAL at 14:45

## 2018-12-24 RX ADMIN — IPRATROPIUM BROMIDE AND ALBUTEROL SULFATE 3 ML: 2.5; .5 SOLUTION RESPIRATORY (INHALATION) at 15:20

## 2018-12-24 RX ADMIN — CASTOR OIL AND BALSAM, PERU: 788; 87 OINTMENT TOPICAL at 09:13

## 2018-12-24 RX ADMIN — GUAIFENESIN 600 MG: 600 TABLET, EXTENDED RELEASE ORAL at 16:21

## 2018-12-24 RX ADMIN — CASTOR OIL AND BALSAM, PERU: 788; 87 OINTMENT TOPICAL at 20:11

## 2018-12-24 RX ADMIN — MORPHINE SULFATE 10 MG: 20 SOLUTION ORAL at 13:15

## 2018-12-24 RX ADMIN — DEXAMETHASONE 4 MG: 4 TABLET ORAL at 09:11

## 2018-12-24 RX ADMIN — LORAZEPAM 0.5 MG: 0.5 TABLET ORAL at 09:12

## 2018-12-24 RX ADMIN — ROFLUMILAST 500 MCG: 500 TABLET ORAL at 09:11

## 2018-12-24 RX ADMIN — PANTOPRAZOLE SODIUM 40 MG: 40 TABLET, DELAYED RELEASE ORAL at 06:22

## 2018-12-25 PROCEDURE — 94640 AIRWAY INHALATION TREATMENT: CPT

## 2018-12-25 PROCEDURE — 94760 N-INVAS EAR/PLS OXIMETRY 1: CPT

## 2018-12-25 PROCEDURE — 63710000001 DEXAMETHASONE PER 0.25 MG: Performed by: NURSE PRACTITIONER

## 2018-12-25 PROCEDURE — 94799 UNLISTED PULMONARY SVC/PX: CPT

## 2018-12-25 RX ORDER — FENTANYL 25 UG/H
1 PATCH TRANSDERMAL
Status: DISCONTINUED | OUTPATIENT
Start: 2018-12-25 | End: 2019-01-02 | Stop reason: HOSPADM

## 2018-12-25 RX ADMIN — MORPHINE SULFATE 10 MG: 20 SOLUTION ORAL at 20:48

## 2018-12-25 RX ADMIN — DOCUSATE SODIUM AND SENNOSIDES 2 TABLET: 8.6; 5 TABLET, FILM COATED ORAL at 20:48

## 2018-12-25 RX ADMIN — IPRATROPIUM BROMIDE AND ALBUTEROL SULFATE 3 ML: 2.5; .5 SOLUTION RESPIRATORY (INHALATION) at 23:05

## 2018-12-25 RX ADMIN — GUAIFENESIN 600 MG: 600 TABLET, EXTENDED RELEASE ORAL at 20:47

## 2018-12-25 RX ADMIN — CASTOR OIL AND BALSAM, PERU: 788; 87 OINTMENT TOPICAL at 09:01

## 2018-12-25 RX ADMIN — ROFLUMILAST 500 MCG: 500 TABLET ORAL at 09:00

## 2018-12-25 RX ADMIN — IPRATROPIUM BROMIDE AND ALBUTEROL SULFATE 3 ML: 2.5; .5 SOLUTION RESPIRATORY (INHALATION) at 06:43

## 2018-12-25 RX ADMIN — MORPHINE SULFATE 10 MG: 20 SOLUTION ORAL at 09:01

## 2018-12-25 RX ADMIN — FENTANYL 1 PATCH: 25 PATCH, EXTENDED RELEASE TRANSDERMAL at 17:05

## 2018-12-25 RX ADMIN — IPRATROPIUM BROMIDE AND ALBUTEROL SULFATE 3 ML: 2.5; .5 SOLUTION RESPIRATORY (INHALATION) at 19:14

## 2018-12-25 RX ADMIN — IPRATROPIUM BROMIDE AND ALBUTEROL SULFATE 3 ML: 2.5; .5 SOLUTION RESPIRATORY (INHALATION) at 03:53

## 2018-12-25 RX ADMIN — BUDESONIDE AND FORMOTEROL FUMARATE DIHYDRATE 2 PUFF: 160; 4.5 AEROSOL RESPIRATORY (INHALATION) at 06:43

## 2018-12-25 RX ADMIN — MIRTAZAPINE 15 MG: 15 TABLET, ORALLY DISINTEGRATING ORAL at 20:48

## 2018-12-25 RX ADMIN — MORPHINE SULFATE 10 MG: 20 SOLUTION ORAL at 02:53

## 2018-12-25 RX ADMIN — MORPHINE SULFATE 10 MG: 20 SOLUTION ORAL at 01:33

## 2018-12-25 RX ADMIN — IPRATROPIUM BROMIDE AND ALBUTEROL SULFATE 3 ML: 2.5; .5 SOLUTION RESPIRATORY (INHALATION) at 10:39

## 2018-12-25 RX ADMIN — FLUTICASONE PROPIONATE 2 SPRAY: 50 SPRAY, METERED NASAL at 09:01

## 2018-12-25 RX ADMIN — IPRATROPIUM BROMIDE AND ALBUTEROL SULFATE 3 ML: 2.5; .5 SOLUTION RESPIRATORY (INHALATION) at 01:41

## 2018-12-25 RX ADMIN — BUDESONIDE AND FORMOTEROL FUMARATE DIHYDRATE 2 PUFF: 160; 4.5 AEROSOL RESPIRATORY (INHALATION) at 19:14

## 2018-12-25 RX ADMIN — CASTOR OIL AND BALSAM, PERU: 788; 87 OINTMENT TOPICAL at 20:47

## 2018-12-25 RX ADMIN — LORAZEPAM 0.5 MG: 0.5 TABLET ORAL at 02:53

## 2018-12-25 RX ADMIN — DEXAMETHASONE 4 MG: 4 TABLET ORAL at 09:00

## 2018-12-25 RX ADMIN — DEXAMETHASONE 4 MG: 4 TABLET ORAL at 11:57

## 2018-12-25 RX ADMIN — PANTOPRAZOLE SODIUM 40 MG: 40 TABLET, DELAYED RELEASE ORAL at 05:41

## 2018-12-25 RX ADMIN — IPRATROPIUM BROMIDE AND ALBUTEROL SULFATE 3 ML: 2.5; .5 SOLUTION RESPIRATORY (INHALATION) at 13:53

## 2018-12-25 RX ADMIN — GUAIFENESIN 600 MG: 600 TABLET, EXTENDED RELEASE ORAL at 09:00

## 2018-12-25 RX ADMIN — MORPHINE SULFATE 10 MG: 20 SOLUTION ORAL at 14:32

## 2018-12-25 RX ADMIN — MORPHINE SULFATE 15 MG: 20 SOLUTION ORAL at 19:03

## 2018-12-25 RX ADMIN — HALOPERIDOL 1 MG: 1 TABLET ORAL at 20:15

## 2018-12-25 RX ADMIN — LORAZEPAM 0.5 MG: 0.5 TABLET ORAL at 19:05

## 2018-12-26 PROCEDURE — 97530 THERAPEUTIC ACTIVITIES: CPT

## 2018-12-26 PROCEDURE — 94799 UNLISTED PULMONARY SVC/PX: CPT

## 2018-12-26 PROCEDURE — 94640 AIRWAY INHALATION TREATMENT: CPT

## 2018-12-26 PROCEDURE — 94760 N-INVAS EAR/PLS OXIMETRY 1: CPT

## 2018-12-26 PROCEDURE — 63710000001 DEXAMETHASONE PER 0.25 MG: Performed by: NURSE PRACTITIONER

## 2018-12-26 RX ADMIN — BUDESONIDE AND FORMOTEROL FUMARATE DIHYDRATE 2 PUFF: 160; 4.5 AEROSOL RESPIRATORY (INHALATION) at 06:47

## 2018-12-26 RX ADMIN — MORPHINE SULFATE 15 MG: 20 SOLUTION ORAL at 17:13

## 2018-12-26 RX ADMIN — MORPHINE SULFATE 10 MG: 20 SOLUTION ORAL at 08:02

## 2018-12-26 RX ADMIN — DEXAMETHASONE 4 MG: 4 TABLET ORAL at 08:02

## 2018-12-26 RX ADMIN — IPRATROPIUM BROMIDE AND ALBUTEROL SULFATE 3 ML: 2.5; .5 SOLUTION RESPIRATORY (INHALATION) at 23:06

## 2018-12-26 RX ADMIN — IPRATROPIUM BROMIDE AND ALBUTEROL SULFATE 3 ML: 2.5; .5 SOLUTION RESPIRATORY (INHALATION) at 18:58

## 2018-12-26 RX ADMIN — IPRATROPIUM BROMIDE AND ALBUTEROL SULFATE 3 ML: 2.5; .5 SOLUTION RESPIRATORY (INHALATION) at 11:25

## 2018-12-26 RX ADMIN — MORPHINE SULFATE 15 MG: 20 SOLUTION ORAL at 13:58

## 2018-12-26 RX ADMIN — ACETAMINOPHEN 650 MG: 325 TABLET ORAL at 17:42

## 2018-12-26 RX ADMIN — PANTOPRAZOLE SODIUM 40 MG: 40 TABLET, DELAYED RELEASE ORAL at 06:23

## 2018-12-26 RX ADMIN — LORAZEPAM 0.5 MG: 0.5 TABLET ORAL at 23:00

## 2018-12-26 RX ADMIN — CASTOR OIL AND BALSAM, PERU: 788; 87 OINTMENT TOPICAL at 21:04

## 2018-12-26 RX ADMIN — IPRATROPIUM BROMIDE AND ALBUTEROL SULFATE 3 ML: 2.5; .5 SOLUTION RESPIRATORY (INHALATION) at 03:24

## 2018-12-26 RX ADMIN — DEXAMETHASONE 4 MG: 4 TABLET ORAL at 12:04

## 2018-12-26 RX ADMIN — ROFLUMILAST 500 MCG: 500 TABLET ORAL at 08:02

## 2018-12-26 RX ADMIN — MIRTAZAPINE 15 MG: 15 TABLET, ORALLY DISINTEGRATING ORAL at 21:02

## 2018-12-26 RX ADMIN — MORPHINE SULFATE 10 MG: 20 SOLUTION ORAL at 15:44

## 2018-12-26 RX ADMIN — IPRATROPIUM BROMIDE AND ALBUTEROL SULFATE 3 ML: 2.5; .5 SOLUTION RESPIRATORY (INHALATION) at 06:47

## 2018-12-26 RX ADMIN — FLUTICASONE PROPIONATE 2 SPRAY: 50 SPRAY, METERED NASAL at 08:02

## 2018-12-26 RX ADMIN — MORPHINE SULFATE 10 MG: 20 SOLUTION ORAL at 21:02

## 2018-12-26 RX ADMIN — IPRATROPIUM BROMIDE AND ALBUTEROL SULFATE 3 ML: 2.5; .5 SOLUTION RESPIRATORY (INHALATION) at 14:52

## 2018-12-26 RX ADMIN — GUAIFENESIN 600 MG: 600 TABLET, EXTENDED RELEASE ORAL at 08:02

## 2018-12-26 RX ADMIN — MORPHINE SULFATE 10 MG: 20 SOLUTION ORAL at 03:45

## 2018-12-26 RX ADMIN — CASTOR OIL AND BALSAM, PERU: 788; 87 OINTMENT TOPICAL at 08:02

## 2018-12-26 RX ADMIN — BUDESONIDE AND FORMOTEROL FUMARATE DIHYDRATE 2 PUFF: 160; 4.5 AEROSOL RESPIRATORY (INHALATION) at 18:58

## 2018-12-27 PROCEDURE — 63710000001 DEXAMETHASONE PER 0.25 MG: Performed by: NURSE PRACTITIONER

## 2018-12-27 PROCEDURE — 94799 UNLISTED PULMONARY SVC/PX: CPT

## 2018-12-27 PROCEDURE — 94760 N-INVAS EAR/PLS OXIMETRY 1: CPT

## 2018-12-27 PROCEDURE — 94640 AIRWAY INHALATION TREATMENT: CPT

## 2018-12-27 RX ORDER — MORPHINE SULFATE 20 MG/ML
15 SOLUTION ORAL
Status: DISCONTINUED | OUTPATIENT
Start: 2018-12-27 | End: 2019-01-02

## 2018-12-27 RX ORDER — MORPHINE SULFATE 20 MG/ML
10 SOLUTION ORAL EVERY 4 HOURS
Status: DISCONTINUED | OUTPATIENT
Start: 2018-12-27 | End: 2018-12-30

## 2018-12-27 RX ORDER — MORPHINE SULFATE 20 MG/ML
15 SOLUTION ORAL
Status: DISCONTINUED | OUTPATIENT
Start: 2018-12-27 | End: 2018-12-27

## 2018-12-27 RX ORDER — MORPHINE SULFATE 20 MG/ML
10 SOLUTION ORAL
Status: DISCONTINUED | OUTPATIENT
Start: 2018-12-27 | End: 2018-12-27

## 2018-12-27 RX ORDER — MORPHINE SULFATE 20 MG/ML
15 SOLUTION ORAL EVERY 6 HOURS
Status: DISCONTINUED | OUTPATIENT
Start: 2018-12-27 | End: 2018-12-27

## 2018-12-27 RX ADMIN — MORPHINE SULFATE 15 MG: 20 SOLUTION ORAL at 09:27

## 2018-12-27 RX ADMIN — IPRATROPIUM BROMIDE AND ALBUTEROL SULFATE 3 ML: 2.5; .5 SOLUTION RESPIRATORY (INHALATION) at 08:02

## 2018-12-27 RX ADMIN — MORPHINE SULFATE 10 MG: 20 SOLUTION ORAL at 06:13

## 2018-12-27 RX ADMIN — BUDESONIDE AND FORMOTEROL FUMARATE DIHYDRATE 2 PUFF: 160; 4.5 AEROSOL RESPIRATORY (INHALATION) at 20:05

## 2018-12-27 RX ADMIN — MORPHINE SULFATE 10 MG: 20 SOLUTION ORAL at 08:17

## 2018-12-27 RX ADMIN — CASTOR OIL AND BALSAM, PERU: 788; 87 OINTMENT TOPICAL at 21:25

## 2018-12-27 RX ADMIN — LORAZEPAM 0.5 MG: 0.5 TABLET ORAL at 10:11

## 2018-12-27 RX ADMIN — FLUTICASONE PROPIONATE 2 SPRAY: 50 SPRAY, METERED NASAL at 08:18

## 2018-12-27 RX ADMIN — MIRTAZAPINE 15 MG: 15 TABLET, ORALLY DISINTEGRATING ORAL at 21:25

## 2018-12-27 RX ADMIN — MORPHINE SULFATE 10 MG: 20 SOLUTION ORAL at 17:31

## 2018-12-27 RX ADMIN — DEXAMETHASONE 4 MG: 4 TABLET ORAL at 08:17

## 2018-12-27 RX ADMIN — IPRATROPIUM BROMIDE AND ALBUTEROL SULFATE 3 ML: 2.5; .5 SOLUTION RESPIRATORY (INHALATION) at 23:34

## 2018-12-27 RX ADMIN — MORPHINE SULFATE 10 MG: 20 SOLUTION ORAL at 21:25

## 2018-12-27 RX ADMIN — CASTOR OIL AND BALSAM, PERU: 788; 87 OINTMENT TOPICAL at 08:17

## 2018-12-27 RX ADMIN — IPRATROPIUM BROMIDE AND ALBUTEROL SULFATE 3 ML: 2.5; .5 SOLUTION RESPIRATORY (INHALATION) at 16:14

## 2018-12-27 RX ADMIN — MORPHINE SULFATE 15 MG: 20 SOLUTION ORAL at 14:40

## 2018-12-27 RX ADMIN — IPRATROPIUM BROMIDE AND ALBUTEROL SULFATE 3 ML: 2.5; .5 SOLUTION RESPIRATORY (INHALATION) at 12:22

## 2018-12-27 RX ADMIN — BUDESONIDE AND FORMOTEROL FUMARATE DIHYDRATE 2 PUFF: 160; 4.5 AEROSOL RESPIRATORY (INHALATION) at 08:02

## 2018-12-27 RX ADMIN — LORAZEPAM 0.5 MG: 0.5 TABLET ORAL at 14:40

## 2018-12-27 RX ADMIN — PANTOPRAZOLE SODIUM 40 MG: 40 TABLET, DELAYED RELEASE ORAL at 06:13

## 2018-12-27 RX ADMIN — IPRATROPIUM BROMIDE AND ALBUTEROL SULFATE 3 ML: 2.5; .5 SOLUTION RESPIRATORY (INHALATION) at 20:05

## 2018-12-27 RX ADMIN — LORAZEPAM 0.5 MG: 0.5 TABLET ORAL at 21:25

## 2018-12-27 RX ADMIN — ROFLUMILAST 500 MCG: 500 TABLET ORAL at 08:17

## 2018-12-27 RX ADMIN — DEXAMETHASONE 4 MG: 4 TABLET ORAL at 12:30

## 2018-12-27 RX ADMIN — IPRATROPIUM BROMIDE AND ALBUTEROL SULFATE 3 ML: 2.5; .5 SOLUTION RESPIRATORY (INHALATION) at 03:07

## 2018-12-28 PROCEDURE — 94799 UNLISTED PULMONARY SVC/PX: CPT

## 2018-12-28 PROCEDURE — 94640 AIRWAY INHALATION TREATMENT: CPT

## 2018-12-28 PROCEDURE — 63710000001 DEXAMETHASONE PER 0.25 MG: Performed by: NURSE PRACTITIONER

## 2018-12-28 RX ORDER — LORAZEPAM 0.5 MG/1
0.5 TABLET ORAL EVERY 6 HOURS SCHEDULED
Status: DISCONTINUED | OUTPATIENT
Start: 2018-12-28 | End: 2019-01-02 | Stop reason: HOSPADM

## 2018-12-28 RX ORDER — GLY/DIMETH/UREA/PET,WH/CET ALC
1 LOTION (ML) TOPICAL NIGHTLY
Status: DISCONTINUED | OUTPATIENT
Start: 2018-12-28 | End: 2019-01-02 | Stop reason: HOSPADM

## 2018-12-28 RX ADMIN — MORPHINE SULFATE 15 MG: 20 SOLUTION ORAL at 20:23

## 2018-12-28 RX ADMIN — IPRATROPIUM BROMIDE AND ALBUTEROL SULFATE 3 ML: 2.5; .5 SOLUTION RESPIRATORY (INHALATION) at 12:35

## 2018-12-28 RX ADMIN — GUAIFENESIN 600 MG: 600 TABLET, EXTENDED RELEASE ORAL at 20:23

## 2018-12-28 RX ADMIN — IPRATROPIUM BROMIDE AND ALBUTEROL SULFATE 3 ML: 2.5; .5 SOLUTION RESPIRATORY (INHALATION) at 03:10

## 2018-12-28 RX ADMIN — IPRATROPIUM BROMIDE AND ALBUTEROL SULFATE 3 ML: 2.5; .5 SOLUTION RESPIRATORY (INHALATION) at 07:53

## 2018-12-28 RX ADMIN — MORPHINE SULFATE 10 MG: 20 SOLUTION ORAL at 18:15

## 2018-12-28 RX ADMIN — DEXAMETHASONE 4 MG: 4 TABLET ORAL at 07:28

## 2018-12-28 RX ADMIN — LORAZEPAM 0.5 MG: 0.5 TABLET ORAL at 18:46

## 2018-12-28 RX ADMIN — CASTOR OIL AND BALSAM, PERU: 788; 87 OINTMENT TOPICAL at 09:42

## 2018-12-28 RX ADMIN — MORPHINE SULFATE 10 MG: 20 SOLUTION ORAL at 22:55

## 2018-12-28 RX ADMIN — MORPHINE SULFATE 15 MG: 20 SOLUTION ORAL at 12:56

## 2018-12-28 RX ADMIN — MORPHINE SULFATE 10 MG: 20 SOLUTION ORAL at 09:41

## 2018-12-28 RX ADMIN — IPRATROPIUM BROMIDE AND ALBUTEROL SULFATE 3 ML: 2.5; .5 SOLUTION RESPIRATORY (INHALATION) at 16:30

## 2018-12-28 RX ADMIN — LORAZEPAM 0.5 MG: 0.5 TABLET ORAL at 13:40

## 2018-12-28 RX ADMIN — FENTANYL 1 PATCH: 25 PATCH, EXTENDED RELEASE TRANSDERMAL at 18:46

## 2018-12-28 RX ADMIN — MIRTAZAPINE 15 MG: 15 TABLET, ORALLY DISINTEGRATING ORAL at 20:23

## 2018-12-28 RX ADMIN — BUDESONIDE AND FORMOTEROL FUMARATE DIHYDRATE 2 PUFF: 160; 4.5 AEROSOL RESPIRATORY (INHALATION) at 20:39

## 2018-12-28 RX ADMIN — IPRATROPIUM BROMIDE AND ALBUTEROL SULFATE 3 ML: 2.5; .5 SOLUTION RESPIRATORY (INHALATION) at 20:38

## 2018-12-28 RX ADMIN — IPRATROPIUM BROMIDE AND ALBUTEROL SULFATE 3 ML: 2.5; .5 SOLUTION RESPIRATORY (INHALATION) at 23:47

## 2018-12-28 RX ADMIN — DEXAMETHASONE 4 MG: 4 TABLET ORAL at 12:56

## 2018-12-28 RX ADMIN — DOCUSATE SODIUM AND SENNOSIDES 2 TABLET: 8.6; 5 TABLET, FILM COATED ORAL at 20:23

## 2018-12-28 RX ADMIN — BUDESONIDE AND FORMOTEROL FUMARATE DIHYDRATE 2 PUFF: 160; 4.5 AEROSOL RESPIRATORY (INHALATION) at 07:53

## 2018-12-28 RX ADMIN — MORPHINE SULFATE 15 MG: 20 SOLUTION ORAL at 07:28

## 2018-12-28 RX ADMIN — CASTOR OIL AND BALSAM, PERU: 788; 87 OINTMENT TOPICAL at 20:24

## 2018-12-28 RX ADMIN — MORPHINE SULFATE 10 MG: 20 SOLUTION ORAL at 05:39

## 2018-12-28 RX ADMIN — MORPHINE SULFATE 10 MG: 20 SOLUTION ORAL at 01:10

## 2018-12-28 RX ADMIN — MORPHINE SULFATE 10 MG: 20 SOLUTION ORAL at 13:40

## 2018-12-28 RX ADMIN — ROFLUMILAST 500 MCG: 500 TABLET ORAL at 07:29

## 2018-12-28 RX ADMIN — PANTOPRAZOLE SODIUM 40 MG: 40 TABLET, DELAYED RELEASE ORAL at 05:39

## 2018-12-28 RX ADMIN — Medication 1 APPLICATION: at 20:29

## 2018-12-29 PROCEDURE — 97110 THERAPEUTIC EXERCISES: CPT

## 2018-12-29 PROCEDURE — 97530 THERAPEUTIC ACTIVITIES: CPT

## 2018-12-29 PROCEDURE — 94799 UNLISTED PULMONARY SVC/PX: CPT

## 2018-12-29 PROCEDURE — 94640 AIRWAY INHALATION TREATMENT: CPT

## 2018-12-29 PROCEDURE — 63710000001 DEXAMETHASONE PER 0.25 MG: Performed by: NURSE PRACTITIONER

## 2018-12-29 PROCEDURE — 94760 N-INVAS EAR/PLS OXIMETRY 1: CPT

## 2018-12-29 RX ADMIN — MORPHINE SULFATE 10 MG: 20 SOLUTION ORAL at 21:42

## 2018-12-29 RX ADMIN — IPRATROPIUM BROMIDE AND ALBUTEROL SULFATE 3 ML: 2.5; .5 SOLUTION RESPIRATORY (INHALATION) at 19:03

## 2018-12-29 RX ADMIN — ROFLUMILAST 500 MCG: 500 TABLET ORAL at 09:07

## 2018-12-29 RX ADMIN — CASTOR OIL AND BALSAM, PERU: 788; 87 OINTMENT TOPICAL at 09:08

## 2018-12-29 RX ADMIN — GUAIFENESIN 600 MG: 600 TABLET, EXTENDED RELEASE ORAL at 21:42

## 2018-12-29 RX ADMIN — MORPHINE SULFATE 10 MG: 20 SOLUTION ORAL at 02:05

## 2018-12-29 RX ADMIN — FLUTICASONE PROPIONATE 2 SPRAY: 50 SPRAY, METERED NASAL at 09:08

## 2018-12-29 RX ADMIN — MORPHINE SULFATE 15 MG: 20 SOLUTION ORAL at 04:24

## 2018-12-29 RX ADMIN — LORAZEPAM 0.5 MG: 0.5 TABLET ORAL at 06:09

## 2018-12-29 RX ADMIN — PANTOPRAZOLE SODIUM 40 MG: 40 TABLET, DELAYED RELEASE ORAL at 06:10

## 2018-12-29 RX ADMIN — BUDESONIDE AND FORMOTEROL FUMARATE DIHYDRATE 2 PUFF: 160; 4.5 AEROSOL RESPIRATORY (INHALATION) at 19:03

## 2018-12-29 RX ADMIN — BUDESONIDE AND FORMOTEROL FUMARATE DIHYDRATE 2 PUFF: 160; 4.5 AEROSOL RESPIRATORY (INHALATION) at 08:23

## 2018-12-29 RX ADMIN — DEXAMETHASONE 4 MG: 4 TABLET ORAL at 11:58

## 2018-12-29 RX ADMIN — IPRATROPIUM BROMIDE AND ALBUTEROL SULFATE 3 ML: 2.5; .5 SOLUTION RESPIRATORY (INHALATION) at 12:13

## 2018-12-29 RX ADMIN — DOCUSATE SODIUM AND SENNOSIDES 2 TABLET: 8.6; 5 TABLET, FILM COATED ORAL at 21:42

## 2018-12-29 RX ADMIN — LORAZEPAM 0.5 MG: 0.5 TABLET ORAL at 11:58

## 2018-12-29 RX ADMIN — LORAZEPAM 0.5 MG: 0.5 TABLET ORAL at 00:53

## 2018-12-29 RX ADMIN — IPRATROPIUM BROMIDE AND ALBUTEROL SULFATE 3 ML: 2.5; .5 SOLUTION RESPIRATORY (INHALATION) at 15:49

## 2018-12-29 RX ADMIN — IPRATROPIUM BROMIDE AND ALBUTEROL SULFATE 3 ML: 2.5; .5 SOLUTION RESPIRATORY (INHALATION) at 08:23

## 2018-12-29 RX ADMIN — LORAZEPAM 0.5 MG: 0.5 TABLET ORAL at 18:55

## 2018-12-29 RX ADMIN — IPRATROPIUM BROMIDE AND ALBUTEROL SULFATE 3 ML: 2.5; .5 SOLUTION RESPIRATORY (INHALATION) at 03:37

## 2018-12-29 RX ADMIN — IPRATROPIUM BROMIDE AND ALBUTEROL SULFATE 3 ML: 2.5; .5 SOLUTION RESPIRATORY (INHALATION) at 23:27

## 2018-12-29 RX ADMIN — MORPHINE SULFATE 10 MG: 20 SOLUTION ORAL at 09:57

## 2018-12-29 RX ADMIN — MORPHINE SULFATE 10 MG: 20 SOLUTION ORAL at 06:10

## 2018-12-29 RX ADMIN — GUAIFENESIN 600 MG: 600 TABLET, EXTENDED RELEASE ORAL at 09:08

## 2018-12-29 RX ADMIN — DEXAMETHASONE 4 MG: 4 TABLET ORAL at 09:08

## 2018-12-29 RX ADMIN — MORPHINE SULFATE 10 MG: 20 SOLUTION ORAL at 18:54

## 2018-12-29 RX ADMIN — Medication 1 APPLICATION: at 21:42

## 2018-12-29 RX ADMIN — MORPHINE SULFATE 10 MG: 20 SOLUTION ORAL at 14:01

## 2018-12-29 RX ADMIN — CASTOR OIL AND BALSAM, PERU: 788; 87 OINTMENT TOPICAL at 21:42

## 2018-12-29 RX ADMIN — MIRTAZAPINE 15 MG: 15 TABLET, ORALLY DISINTEGRATING ORAL at 21:42

## 2018-12-29 RX ADMIN — MORPHINE SULFATE 15 MG: 20 SOLUTION ORAL at 15:27

## 2018-12-30 PROCEDURE — 94799 UNLISTED PULMONARY SVC/PX: CPT

## 2018-12-30 PROCEDURE — 63710000001 DEXAMETHASONE PER 0.25 MG: Performed by: NURSE PRACTITIONER

## 2018-12-30 PROCEDURE — 94640 AIRWAY INHALATION TREATMENT: CPT

## 2018-12-30 RX ORDER — MORPHINE SULFATE 20 MG/ML
15 SOLUTION ORAL EVERY 4 HOURS
Status: DISCONTINUED | OUTPATIENT
Start: 2018-12-30 | End: 2019-01-02 | Stop reason: HOSPADM

## 2018-12-30 RX ADMIN — IPRATROPIUM BROMIDE AND ALBUTEROL SULFATE 3 ML: 2.5; .5 SOLUTION RESPIRATORY (INHALATION) at 07:24

## 2018-12-30 RX ADMIN — MORPHINE SULFATE 10 MG: 20 SOLUTION ORAL at 02:26

## 2018-12-30 RX ADMIN — ROFLUMILAST 500 MCG: 500 TABLET ORAL at 09:38

## 2018-12-30 RX ADMIN — MORPHINE SULFATE 15 MG: 20 SOLUTION ORAL at 13:41

## 2018-12-30 RX ADMIN — MORPHINE SULFATE 15 MG: 20 SOLUTION ORAL at 17:09

## 2018-12-30 RX ADMIN — IPRATROPIUM BROMIDE AND ALBUTEROL SULFATE 3 ML: 2.5; .5 SOLUTION RESPIRATORY (INHALATION) at 15:44

## 2018-12-30 RX ADMIN — MORPHINE SULFATE 10 MG: 20 SOLUTION ORAL at 05:40

## 2018-12-30 RX ADMIN — LORAZEPAM 0.5 MG: 0.5 TABLET ORAL at 13:14

## 2018-12-30 RX ADMIN — LORAZEPAM 0.5 MG: 0.5 TABLET ORAL at 00:01

## 2018-12-30 RX ADMIN — LORAZEPAM 0.5 MG: 0.5 TABLET ORAL at 05:40

## 2018-12-30 RX ADMIN — CASTOR OIL AND BALSAM, PERU: 788; 87 OINTMENT TOPICAL at 09:39

## 2018-12-30 RX ADMIN — LORAZEPAM 0.5 MG: 0.5 TABLET ORAL at 17:09

## 2018-12-30 RX ADMIN — IPRATROPIUM BROMIDE AND ALBUTEROL SULFATE 3 ML: 2.5; .5 SOLUTION RESPIRATORY (INHALATION) at 10:02

## 2018-12-30 RX ADMIN — MORPHINE SULFATE 15 MG: 20 SOLUTION ORAL at 18:22

## 2018-12-30 RX ADMIN — MORPHINE SULFATE 15 MG: 20 SOLUTION ORAL at 21:03

## 2018-12-30 RX ADMIN — DEXAMETHASONE 4 MG: 4 TABLET ORAL at 13:14

## 2018-12-30 RX ADMIN — IPRATROPIUM BROMIDE AND ALBUTEROL SULFATE 3 ML: 2.5; .5 SOLUTION RESPIRATORY (INHALATION) at 12:03

## 2018-12-30 RX ADMIN — Medication 1 APPLICATION: at 21:02

## 2018-12-30 RX ADMIN — MORPHINE SULFATE 10 MG: 20 SOLUTION ORAL at 09:39

## 2018-12-30 RX ADMIN — BUDESONIDE AND FORMOTEROL FUMARATE DIHYDRATE 2 PUFF: 160; 4.5 AEROSOL RESPIRATORY (INHALATION) at 07:24

## 2018-12-30 RX ADMIN — IPRATROPIUM BROMIDE AND ALBUTEROL SULFATE 3 ML: 2.5; .5 SOLUTION RESPIRATORY (INHALATION) at 02:56

## 2018-12-30 RX ADMIN — BUDESONIDE AND FORMOTEROL FUMARATE DIHYDRATE 2 PUFF: 160; 4.5 AEROSOL RESPIRATORY (INHALATION) at 20:22

## 2018-12-30 RX ADMIN — DEXAMETHASONE 4 MG: 4 TABLET ORAL at 09:38

## 2018-12-30 RX ADMIN — FLUTICASONE PROPIONATE 2 SPRAY: 50 SPRAY, METERED NASAL at 09:39

## 2018-12-30 RX ADMIN — MIRTAZAPINE 15 MG: 15 TABLET, ORALLY DISINTEGRATING ORAL at 21:02

## 2018-12-30 RX ADMIN — GUAIFENESIN 600 MG: 600 TABLET, EXTENDED RELEASE ORAL at 09:38

## 2018-12-30 RX ADMIN — IPRATROPIUM BROMIDE AND ALBUTEROL SULFATE 3 ML: 2.5; .5 SOLUTION RESPIRATORY (INHALATION) at 20:22

## 2018-12-30 RX ADMIN — PANTOPRAZOLE SODIUM 40 MG: 40 TABLET, DELAYED RELEASE ORAL at 05:40

## 2018-12-30 RX ADMIN — CASTOR OIL AND BALSAM, PERU: 788; 87 OINTMENT TOPICAL at 21:02

## 2018-12-30 RX ADMIN — MORPHINE SULFATE 15 MG: 20 SOLUTION ORAL at 13:14

## 2018-12-30 RX ADMIN — LORAZEPAM 0.5 MG: 0.5 TABLET ORAL at 18:25

## 2018-12-31 PROCEDURE — 94640 AIRWAY INHALATION TREATMENT: CPT

## 2018-12-31 PROCEDURE — 94799 UNLISTED PULMONARY SVC/PX: CPT

## 2018-12-31 PROCEDURE — 63710000001 DEXAMETHASONE PER 0.25 MG: Performed by: NURSE PRACTITIONER

## 2018-12-31 RX ADMIN — MORPHINE SULFATE 15 MG: 20 SOLUTION ORAL at 23:44

## 2018-12-31 RX ADMIN — MORPHINE SULFATE 15 MG: 20 SOLUTION ORAL at 02:57

## 2018-12-31 RX ADMIN — MORPHINE SULFATE 15 MG: 20 SOLUTION ORAL at 06:14

## 2018-12-31 RX ADMIN — MORPHINE SULFATE 15 MG: 20 SOLUTION ORAL at 11:23

## 2018-12-31 RX ADMIN — MIRTAZAPINE 15 MG: 15 TABLET, ORALLY DISINTEGRATING ORAL at 20:22

## 2018-12-31 RX ADMIN — DEXAMETHASONE 4 MG: 4 TABLET ORAL at 11:22

## 2018-12-31 RX ADMIN — BUDESONIDE AND FORMOTEROL FUMARATE DIHYDRATE 2 PUFF: 160; 4.5 AEROSOL RESPIRATORY (INHALATION) at 19:12

## 2018-12-31 RX ADMIN — IPRATROPIUM BROMIDE AND ALBUTEROL SULFATE 3 ML: 2.5; .5 SOLUTION RESPIRATORY (INHALATION) at 07:12

## 2018-12-31 RX ADMIN — IPRATROPIUM BROMIDE AND ALBUTEROL SULFATE 3 ML: 2.5; .5 SOLUTION RESPIRATORY (INHALATION) at 22:50

## 2018-12-31 RX ADMIN — GUAIFENESIN 600 MG: 600 TABLET, EXTENDED RELEASE ORAL at 09:09

## 2018-12-31 RX ADMIN — IPRATROPIUM BROMIDE AND ALBUTEROL SULFATE 3 ML: 2.5; .5 SOLUTION RESPIRATORY (INHALATION) at 00:30

## 2018-12-31 RX ADMIN — FENTANYL 1 PATCH: 25 PATCH, EXTENDED RELEASE TRANSDERMAL at 19:00

## 2018-12-31 RX ADMIN — GUAIFENESIN 600 MG: 600 TABLET, EXTENDED RELEASE ORAL at 20:22

## 2018-12-31 RX ADMIN — MORPHINE SULFATE 15 MG: 20 SOLUTION ORAL at 22:31

## 2018-12-31 RX ADMIN — ROFLUMILAST 500 MCG: 500 TABLET ORAL at 09:13

## 2018-12-31 RX ADMIN — CASTOR OIL AND BALSAM, PERU: 788; 87 OINTMENT TOPICAL at 20:22

## 2018-12-31 RX ADMIN — Medication 1 APPLICATION: at 20:22

## 2018-12-31 RX ADMIN — MORPHINE SULFATE 15 MG: 20 SOLUTION ORAL at 14:38

## 2018-12-31 RX ADMIN — LORAZEPAM 0.5 MG: 0.5 TABLET ORAL at 11:22

## 2018-12-31 RX ADMIN — CASTOR OIL AND BALSAM, PERU: 788; 87 OINTMENT TOPICAL at 09:10

## 2018-12-31 RX ADMIN — IPRATROPIUM BROMIDE AND ALBUTEROL SULFATE 3 ML: 2.5; .5 SOLUTION RESPIRATORY (INHALATION) at 03:23

## 2018-12-31 RX ADMIN — MORPHINE SULFATE 15 MG: 20 SOLUTION ORAL at 18:21

## 2018-12-31 RX ADMIN — IPRATROPIUM BROMIDE AND ALBUTEROL SULFATE 3 ML: 2.5; .5 SOLUTION RESPIRATORY (INHALATION) at 12:22

## 2018-12-31 RX ADMIN — PANTOPRAZOLE SODIUM 40 MG: 40 TABLET, DELAYED RELEASE ORAL at 06:13

## 2018-12-31 RX ADMIN — LORAZEPAM 0.5 MG: 0.5 TABLET ORAL at 00:48

## 2018-12-31 RX ADMIN — IPRATROPIUM BROMIDE AND ALBUTEROL SULFATE 3 ML: 2.5; .5 SOLUTION RESPIRATORY (INHALATION) at 16:08

## 2018-12-31 RX ADMIN — LORAZEPAM 0.5 MG: 0.5 TABLET ORAL at 18:20

## 2018-12-31 RX ADMIN — BUDESONIDE AND FORMOTEROL FUMARATE DIHYDRATE 2 PUFF: 160; 4.5 AEROSOL RESPIRATORY (INHALATION) at 07:12

## 2018-12-31 RX ADMIN — FLUTICASONE PROPIONATE 2 SPRAY: 50 SPRAY, METERED NASAL at 09:11

## 2018-12-31 RX ADMIN — IPRATROPIUM BROMIDE AND ALBUTEROL SULFATE 3 ML: 2.5; .5 SOLUTION RESPIRATORY (INHALATION) at 19:12

## 2018-12-31 RX ADMIN — MORPHINE SULFATE 15 MG: 20 SOLUTION ORAL at 09:09

## 2018-12-31 RX ADMIN — LORAZEPAM 0.5 MG: 0.5 TABLET ORAL at 06:13

## 2018-12-31 RX ADMIN — DEXAMETHASONE 4 MG: 4 TABLET ORAL at 09:09

## 2018-12-31 RX ADMIN — MORPHINE SULFATE 15 MG: 20 SOLUTION ORAL at 16:18

## 2019-01-01 PROCEDURE — 94640 AIRWAY INHALATION TREATMENT: CPT

## 2019-01-01 PROCEDURE — 94799 UNLISTED PULMONARY SVC/PX: CPT

## 2019-01-01 PROCEDURE — 63710000001 DEXAMETHASONE PER 0.25 MG: Performed by: NURSE PRACTITIONER

## 2019-01-01 RX ADMIN — MORPHINE SULFATE 15 MG: 20 SOLUTION ORAL at 08:18

## 2019-01-01 RX ADMIN — MORPHINE SULFATE 15 MG: 20 SOLUTION ORAL at 10:24

## 2019-01-01 RX ADMIN — CASTOR OIL AND BALSAM, PERU: 788; 87 OINTMENT TOPICAL at 08:18

## 2019-01-01 RX ADMIN — IPRATROPIUM BROMIDE AND ALBUTEROL SULFATE 3 ML: 2.5; .5 SOLUTION RESPIRATORY (INHALATION) at 23:34

## 2019-01-01 RX ADMIN — PANTOPRAZOLE SODIUM 40 MG: 40 TABLET, DELAYED RELEASE ORAL at 05:49

## 2019-01-01 RX ADMIN — MORPHINE SULFATE 15 MG: 20 SOLUTION ORAL at 17:37

## 2019-01-01 RX ADMIN — BUDESONIDE AND FORMOTEROL FUMARATE DIHYDRATE 2 PUFF: 160; 4.5 AEROSOL RESPIRATORY (INHALATION) at 19:48

## 2019-01-01 RX ADMIN — DEXAMETHASONE 4 MG: 4 TABLET ORAL at 08:18

## 2019-01-01 RX ADMIN — GUAIFENESIN 600 MG: 600 TABLET, EXTENDED RELEASE ORAL at 08:18

## 2019-01-01 RX ADMIN — IPRATROPIUM BROMIDE AND ALBUTEROL SULFATE 3 ML: 2.5; .5 SOLUTION RESPIRATORY (INHALATION) at 03:09

## 2019-01-01 RX ADMIN — GUAIFENESIN 600 MG: 600 TABLET, EXTENDED RELEASE ORAL at 20:46

## 2019-01-01 RX ADMIN — Medication 1 APPLICATION: at 20:45

## 2019-01-01 RX ADMIN — DEXAMETHASONE 4 MG: 4 TABLET ORAL at 12:23

## 2019-01-01 RX ADMIN — ROFLUMILAST 500 MCG: 500 TABLET ORAL at 08:18

## 2019-01-01 RX ADMIN — IPRATROPIUM BROMIDE AND ALBUTEROL SULFATE 3 ML: 2.5; .5 SOLUTION RESPIRATORY (INHALATION) at 14:50

## 2019-01-01 RX ADMIN — MORPHINE SULFATE 15 MG: 20 SOLUTION ORAL at 09:24

## 2019-01-01 RX ADMIN — IPRATROPIUM BROMIDE AND ALBUTEROL SULFATE 3 ML: 2.5; .5 SOLUTION RESPIRATORY (INHALATION) at 07:36

## 2019-01-01 RX ADMIN — BUDESONIDE AND FORMOTEROL FUMARATE DIHYDRATE 2 PUFF: 160; 4.5 AEROSOL RESPIRATORY (INHALATION) at 07:36

## 2019-01-01 RX ADMIN — MORPHINE SULFATE 15 MG: 20 SOLUTION ORAL at 05:44

## 2019-01-01 RX ADMIN — FLUTICASONE PROPIONATE 2 SPRAY: 50 SPRAY, METERED NASAL at 08:17

## 2019-01-01 RX ADMIN — LORAZEPAM 0.5 MG: 0.5 TABLET ORAL at 14:41

## 2019-01-01 RX ADMIN — MORPHINE SULFATE 15 MG: 20 SOLUTION ORAL at 06:35

## 2019-01-01 RX ADMIN — LORAZEPAM 0.5 MG: 0.5 TABLET ORAL at 17:37

## 2019-01-01 RX ADMIN — IPRATROPIUM BROMIDE AND ALBUTEROL SULFATE 3 ML: 2.5; .5 SOLUTION RESPIRATORY (INHALATION) at 05:52

## 2019-01-01 RX ADMIN — MORPHINE SULFATE 15 MG: 20 SOLUTION ORAL at 20:54

## 2019-01-01 RX ADMIN — LORAZEPAM 0.5 MG: 0.5 TABLET ORAL at 05:49

## 2019-01-01 RX ADMIN — MORPHINE SULFATE 15 MG: 20 SOLUTION ORAL at 14:00

## 2019-01-01 RX ADMIN — IPRATROPIUM BROMIDE AND ALBUTEROL SULFATE 3 ML: 2.5; .5 SOLUTION RESPIRATORY (INHALATION) at 19:47

## 2019-01-01 RX ADMIN — MORPHINE SULFATE 15 MG: 20 SOLUTION ORAL at 14:41

## 2019-01-01 RX ADMIN — IPRATROPIUM BROMIDE AND ALBUTEROL SULFATE 3 ML: 2.5; .5 SOLUTION RESPIRATORY (INHALATION) at 10:30

## 2019-01-01 RX ADMIN — CASTOR OIL AND BALSAM, PERU: 788; 87 OINTMENT TOPICAL at 20:45

## 2019-01-01 RX ADMIN — LORAZEPAM 0.5 MG: 0.5 TABLET ORAL at 10:24

## 2019-01-01 RX ADMIN — LORAZEPAM 0.5 MG: 0.5 TABLET ORAL at 23:39

## 2019-01-01 RX ADMIN — DOCUSATE SODIUM AND SENNOSIDES 2 TABLET: 8.6; 5 TABLET, FILM COATED ORAL at 20:46

## 2019-01-01 RX ADMIN — MIRTAZAPINE 15 MG: 15 TABLET, ORALLY DISINTEGRATING ORAL at 20:46

## 2019-01-01 RX ADMIN — LORAZEPAM 0.5 MG: 0.5 TABLET ORAL at 12:23

## 2019-01-01 NOTE — PLAN OF CARE
Problem: Dying Patient, Actively (Adult)  Goal: Comfort/Pain Control  Outcome: Ongoing (interventions implemented as appropriate)   12/30/18 5756   Dying Patient, Actively (Adult)   Comfort/Pain Control making progress toward outcome   Pt c/o soa much more frequently than normal. Shows less signs of dyspnea. High flow nasal canula applied by night nurse. Taking scheduled meds and asking for prns when awake. Asking for breathing treatments q 2h. Frequently falling asleep with high flow out of nose and wakes up disoriented. Nasal canula offered. Pt refused.

## 2019-01-01 NOTE — PLAN OF CARE
Problem: Patient Care Overview  Goal: Plan of Care Review  Outcome: Ongoing (interventions implemented as appropriate)   12/31/18 9318   Coping/Psychosocial   Plan of Care Reviewed With patient   OTHER   Outcome Summary Pt. received several prns due to SOB today. Rested today. Son at bedside today. Continuing to monitor.

## 2019-01-01 NOTE — PROGRESS NOTES
30% pps, Patient is currently on RHC with Hospice at [facility] plan is for his return to home tomorrow. Patient has been having significant anxiety today most likely associated with pending Discharge to home. Multiple PRNs given. Patient was sound asleep when Hospice nurse rounded. Plan continues to move forward with discharge to home

## 2019-01-02 VITALS
TEMPERATURE: 97.8 F | HEIGHT: 68 IN | SYSTOLIC BLOOD PRESSURE: 153 MMHG | HEART RATE: 86 BPM | DIASTOLIC BLOOD PRESSURE: 82 MMHG | OXYGEN SATURATION: 100 % | BODY MASS INDEX: 29.05 KG/M2 | RESPIRATION RATE: 28 BRPM | WEIGHT: 191.7 LBS

## 2019-01-02 PROCEDURE — 94640 AIRWAY INHALATION TREATMENT: CPT

## 2019-01-02 PROCEDURE — 94799 UNLISTED PULMONARY SVC/PX: CPT

## 2019-01-02 PROCEDURE — 94760 N-INVAS EAR/PLS OXIMETRY 1: CPT

## 2019-01-02 PROCEDURE — 63710000001 DEXAMETHASONE PER 0.25 MG: Performed by: NURSE PRACTITIONER

## 2019-01-02 RX ORDER — SENNA AND DOCUSATE SODIUM 50; 8.6 MG/1; MG/1
2 TABLET, FILM COATED ORAL 2 TIMES DAILY PRN
Start: 2019-01-02

## 2019-01-02 RX ORDER — LORAZEPAM 1 MG/1
1 TABLET ORAL EVERY 4 HOURS PRN
Status: DISCONTINUED | OUTPATIENT
Start: 2019-01-02 | End: 2019-01-02 | Stop reason: HOSPADM

## 2019-01-02 RX ORDER — LORAZEPAM 1 MG/1
1 TABLET ORAL EVERY 4 HOURS PRN
Start: 2019-01-02 | End: 2019-01-06

## 2019-01-02 RX ORDER — GLY/DIMETH/UREA/PET,WH/CET ALC
1 LOTION (ML) TOPICAL NIGHTLY
Start: 2019-01-02

## 2019-01-02 RX ORDER — MORPHINE SULFATE 20 MG/ML
15 SOLUTION ORAL EVERY 4 HOURS
Qty: 20.25 ML | Refills: 0
Start: 2019-01-02 | End: 2019-01-07

## 2019-01-02 RX ORDER — LORAZEPAM 0.5 MG/1
0.5 TABLET ORAL EVERY 6 HOURS SCHEDULED
Qty: 22 TABLET | Refills: 0
Start: 2019-01-02 | End: 2019-01-08

## 2019-01-02 RX ORDER — MORPHINE SULFATE 20 MG/ML
15 SOLUTION ORAL
Status: DISCONTINUED | OUTPATIENT
Start: 2019-01-02 | End: 2019-01-02 | Stop reason: HOSPADM

## 2019-01-02 RX ORDER — MORPHINE SULFATE 20 MG/ML
15 SOLUTION ORAL
Qty: 180 ML
Start: 2019-01-02

## 2019-01-02 RX ORDER — ACETAMINOPHEN 325 MG/1
650 TABLET ORAL EVERY 6 HOURS PRN
Start: 2019-01-02

## 2019-01-02 RX ORDER — GUAIFENESIN 600 MG/1
600 TABLET, EXTENDED RELEASE ORAL EVERY 12 HOURS SCHEDULED
Start: 2019-01-02

## 2019-01-02 RX ORDER — IPRATROPIUM BROMIDE AND ALBUTEROL SULFATE 2.5; .5 MG/3ML; MG/3ML
3 SOLUTION RESPIRATORY (INHALATION)
Qty: 360 ML
Start: 2019-01-02

## 2019-01-02 RX ORDER — FUROSEMIDE 40 MG/1
20 TABLET ORAL DAILY
Start: 2019-01-02

## 2019-01-02 RX ORDER — IPRATROPIUM BROMIDE AND ALBUTEROL SULFATE 2.5; .5 MG/3ML; MG/3ML
3 SOLUTION RESPIRATORY (INHALATION) EVERY 4 HOURS PRN
Qty: 360 ML
Start: 2019-01-02

## 2019-01-02 RX ORDER — FENTANYL 25 UG/H
1 PATCH TRANSDERMAL
Qty: 7 PATCH | Refills: 0
Start: 2019-01-03 | End: 2019-01-22

## 2019-01-02 RX ORDER — DEXAMETHASONE 4 MG/1
4 TABLET ORAL 2 TIMES DAILY
Start: 2019-01-02

## 2019-01-02 RX ADMIN — MORPHINE SULFATE 15 MG: 20 SOLUTION ORAL at 12:28

## 2019-01-02 RX ADMIN — PANTOPRAZOLE SODIUM 40 MG: 40 TABLET, DELAYED RELEASE ORAL at 05:22

## 2019-01-02 RX ADMIN — MORPHINE SULFATE 15 MG: 20 SOLUTION ORAL at 10:15

## 2019-01-02 RX ADMIN — LORAZEPAM 0.5 MG: 0.5 TABLET ORAL at 05:22

## 2019-01-02 RX ADMIN — ROFLUMILAST 500 MCG: 500 TABLET ORAL at 08:12

## 2019-01-02 RX ADMIN — BUDESONIDE AND FORMOTEROL FUMARATE DIHYDRATE 2 PUFF: 160; 4.5 AEROSOL RESPIRATORY (INHALATION) at 07:02

## 2019-01-02 RX ADMIN — MORPHINE SULFATE 15 MG: 20 SOLUTION ORAL at 06:00

## 2019-01-02 RX ADMIN — CASTOR OIL AND BALSAM, PERU: 788; 87 OINTMENT TOPICAL at 08:12

## 2019-01-02 RX ADMIN — IPRATROPIUM BROMIDE AND ALBUTEROL SULFATE 3 ML: 2.5; .5 SOLUTION RESPIRATORY (INHALATION) at 03:47

## 2019-01-02 RX ADMIN — MORPHINE SULFATE 15 MG: 20 SOLUTION ORAL at 05:22

## 2019-01-02 RX ADMIN — LORAZEPAM 1 MG: 1 TABLET ORAL at 12:28

## 2019-01-02 RX ADMIN — MORPHINE SULFATE 15 MG: 20 SOLUTION ORAL at 10:45

## 2019-01-02 RX ADMIN — DEXAMETHASONE 4 MG: 4 TABLET ORAL at 08:12

## 2019-01-02 RX ADMIN — DEXAMETHASONE 4 MG: 4 TABLET ORAL at 12:28

## 2019-01-02 RX ADMIN — IPRATROPIUM BROMIDE AND ALBUTEROL SULFATE 3 ML: 2.5; .5 SOLUTION RESPIRATORY (INHALATION) at 10:48

## 2019-01-02 RX ADMIN — MORPHINE SULFATE 15 MG: 20 SOLUTION ORAL at 02:01

## 2019-01-02 RX ADMIN — FLUTICASONE PROPIONATE 2 SPRAY: 50 SPRAY, METERED NASAL at 08:12

## 2019-01-02 RX ADMIN — GUAIFENESIN 600 MG: 600 TABLET, EXTENDED RELEASE ORAL at 08:12

## 2019-01-02 RX ADMIN — IPRATROPIUM BROMIDE AND ALBUTEROL SULFATE 3 ML: 2.5; .5 SOLUTION RESPIRATORY (INHALATION) at 07:00

## 2019-01-02 NOTE — PLAN OF CARE
Problem: Patient Care Overview  Goal: Plan of Care Review  Outcome: Ongoing (interventions implemented as appropriate)   12/29/18 0251 01/02/19 0307   Coping/Psychosocial   Plan of Care Reviewed With --  patient   Coping/Psychosocial   Patient Agreement with Plan of Care agrees --    Plan of Care Review   Progress --  no change   OTHER   Outcome Summary --  Has used only scheduled medications during the night. Having urinary incontinence. Hi emperatriz oxygen needed. Anticipate discharge to home today pending planning details.

## 2019-01-02 NOTE — DISCHARGE SUMMARY
Discharge Summary    Date of admission - 12/6/18  Date of discharge - 1/2/19    Presenting Problem:   Acute on chronic respiratory failure (CMS/HCC) [J96.20]  Acute on chronic respiratory failure (CMS/HCC) [J96.20]          Active Hospital Problems     Diagnosis Date Noted   • Acute on chronic respiratory failure (CMS/HCC) [J96.20] 12/06/2018      Diagnosis   • **Sepsis, unspecified organism (CMS/[facility])   • Pneumonia of both lower lobes   • Leukocytosis   • Hypotension   • COPD with acute exacerbation (CMS/[facility])   • Acute and chronic respiratory failure with hypoxia (CMS/[facility])   • Anemia, chronic disease   • Chronic venous stasis dermatitis of both lower extremities   • CAD status post GIGI ×2 to LAD and RCA 3/12/18       S/P LHC per Dr. Harvey on 3/12/18 with PCI revealing severe 2-vessel disease.    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 hypercarbia   • Essential hypertension   • Non-sustained ventricular tachycardia (CMS/[facility])   • CAD status post GIGI ×2 to LAD and RCA 3/12/18   • Debility   • Decubitus ulcer of buttock, stage 2   • Chronic pain   • Chronic venous stasis dermatitis of both lower extremities   • History of tobacco use   • Acute kidney injury   • H/O bacteremia due to Staphylococcus   • Opioid use   • Overweight (BMI 25.0-29.9)   • Pneumonia due to infectious organism   • Sleep apnea   • Acute and chronic respiratory failure with hypoxia (CMS/[facility])   • Renal insufficiency   • Anemia, chronic disease   • COPD with acute exacerbation (CMS/[facility])   • Acute on chronic diastolic CHF (congestive heart failure) (CMS/[facility])   • Pneumonia of both lower lobes   • Leukocytosis   • Sepsis, unspecified organism (CMS/[facility])   • Hypotension   • Sepsis (CMS/[facility])   • Acute on chronic respiratory failure (CMS/[facility])  "    Symptoms:  1. Dyspnea with hypoxia - fentanyl patch with ATC MOS. PRN morphine available PO.   2. Tremor - Resolved  3. Pharyngeal dysphagia - comfort po diet  4. Constipation prophylaxis - senokot-S daily.  Multiple PRNs available.  Monitoring closely.  5. Back/Neck Pain-  Opiate regimen as above.  Defers offer of heat or topical.  6. HA - PRN tylenol.  Likely some effect from chronic hypoxia.  7. Cough - Improved.  Symbicort, duonebs, and steroid, as well as incentive spirometry.    8. Debility - s/p PT during hospitalization.  Has maximized benefit, up to chair and wheelchair several times per day.   9. Anxiety - exacerbation by dyspnea.  Ativan ATC and PRN.      Hospital Course:  Yassine Love is a 73 y.o. male with severe COPD, CAD, and anemia who presented with fever and hypoxia. Imaging was obtained with concern for PNA/aspiration. Patient was started on vanc and aztreonam. Speech evaluated patient and recommend NPO with alternate nutrition. Spoke with patient and son, reviewed living will which stated no artificial nutrition. Reviewed frequent hospitalizations, respiratory status and aspiration with patient and son. Both agreeable to hospice after Hospice consultation.    Patient admitted to inpatient Hospice service on 12/6/18. Morphine was intitiated to control pt's dyspnea and he was weaned from High Flow oxygen.  Meds were maximized to control pt's symptoms and focus on comfort.  Given pt's debility, facility placement was recommended, however, pt deferred.  He wished to return to his home, where he previously lived alone.  PT was consulted for pt in hopes of improving his weakness and debility.  Pt was able to spend increased amounts of time in his chair and wheelchair but continued to require significant assistance for standing.    Despite well controlled symptoms, hospital course was prolonged as pt continued to refuse rehab or long term care.  Persistently maintained he \"just needed more time to " "get stronger.\"  Hospice resources were exhausted and it was felt that pt had achieved maximal benefit of hospital stay.  Discussed almost daily with pt, then met with pt and his son for multiple discussions regarding planned discharge.    Patient is discharged home today per his choice to decline all offers of disposition with greater assistance.  Pt's son will be involved with his care and home hospice will follow up.  Pt's care has been discussed with his home hospice nurse and attending physician.  Morphine, ativan, and steroid have been ordered at local pharmacy for .  Hospice will also f/u meds and provide as needed.    Patient is high risk for quick return to the hospital.  However, in addition to Hospice team physician and RN,  will be involved for ongoing needs re: patient's care.    Physical Exam:   /82 (BP Location: Right arm, Patient Position: Lying)   Pulse 86   Temp 97.8 °F (36.6 °C) (Oral)   Resp 28   Ht 172.7 cm (67.99\")   Wt 87 kg (191 lb 11.2 oz)   SpO2 100%   BMI 29.15 kg/m²      Intake/Output Summary (Last 24 hours) at 1/2/2019 1118  Last data filed at 1/2/2019 0900  Gross per 24 hour   Intake 806 ml   Output 700 ml   Net 106 ml     General Appearance:    NAD, elderly male, on stretcher for transport, somewhat disheveled   HEENT:    NC/AT, EOMI, anicteric, MMM   Neck:   supple, trachea midline, no JVD   Lungs:     Bilateral air movement fair, clear, no rhonchi, regular and unlabored    Heart:    RRR, no M/R/G   Abdomen:     Active bowel sounds, soft, non-tender, non-distended   Extremities:   Moves all extremities, no edema   Pulses:   Pulses palpable and equal bilaterally   Skin:   Warm, dry   Neurologic:   A/Ox3, no focal deficit, no myoclonus   Psych:   Calm, resolute  PPS 30%       Scheduled     Medication Dose/Rate, Route, Frequency Last Action   budesonide-formoterol (SYMBICORT) 160-4.5 MCG/ACT inhaler 2 puff 2 puff, IN, BID - RT Given: 01/02 0702   castor " oil-balsam peru (VENELEX) ointment No Dose/Rate, TOP, Q12H Given: 01/02 0812   dexamethasone (DECADRON) tablet 4 mg 4 mg, PO, BID Given: 01/02 0812   fentaNYL (DURAGESIC) 25 MCG/HR patch 1 patch 1 patch, TD, Q72H Medication Applied: 12/31 1900   fluticasone (FLONASE) 50 MCG/ACT nasal spray 2 spray 2 spray, EACH NARE, Daily Given: 01/02 0812   guaiFENesin (MUCINEX) 12 hr tablet 600 mg 600 mg, PO, Q12H Given: 01/02 0812   ipratropium-albuterol (DUO-NEB) nebulizer solution 3 mL 3 mL, NEBULIZATION, Q4H - RT Given: 01/02 1048   LORazepam (ATIVAN) tablet 0.5 mg 0.5 mg, PO, Q6H Given: 01/02 0522   LUBRIDERM SKIN NOURISHING lotion 1 application 1 application, TOP, Nightly Given: 01/01 2045   mirtazapine (REMERON SOL-TAB) disintegrating tablet 15 mg 15 mg, PO, Nightly Given: 01/01 2046   morphine concentrated solution solution 15 mg 15 mg, PO, Q4H Given: 01/02 1015   pantoprazole (PROTONIX) EC tablet 40 mg 40 mg, PO, Q AM Given: 01/02 0522   roflumilast (DALIRESP) tablet 500 mcg 500 mcg, PO, Daily Given: 01/02 0812   sennosides-docusate sodium (SENOKOT-S) 8.6-50 MG tablet 2 tablet 2 tablet, PO, Nightly Given: 01/01 2046      PRN     Medication Dose/Rate, Route, Frequency Last Action   acetaminophen (TYLENOL) tablet 650 mg 650 mg, PO, Q6H PRN Given: 12/26 1742   castor oil-balsam peru (VENELEX) ointment No Dose/Rate, TOP, PRN Given: 12/15 1304   ipratropium-albuterol (DUO-NEB) nebulizer solution 3 mL 3 mL, NEBULIZATION, Q4H PRN Given: 01/01 0552   LORazepam (ATIVAN) tablet 0.5 mg 0.5 mg, PO, Q4H PRN Given: 01/01 1441   morphine concentrated solution solution 15 mg 15 mg, PO, Q1H PRN Given: 01/02 1045   polyethylene glycol 3350 powder (packet) 17 g, PO, BID PRN Ordered   sennosides-docusate sodium (SENOKOT-S) 8.6-50 MG tablet 2 tablet 2 tablet, PO, BID PRN Ordered

## 2019-01-02 NOTE — SIGNIFICANT NOTE
DATE OF HOSPICE ADMISSION - 12/6/18  DATE OF DISCHARGE HOME WITH HOSPICE - 1/2/19    HOSPICE F/U:  [place] [facility]  Nurse - Jelena JACK   Physician: Dr. Lindsey Valadez/Dr. Maira Herrera    DISCHARGE MEDICATION LIST  -  1/2/18       Your medication list      START taking these medications      Instructions Last Dose Given Next Dose Due   dexamethasone 4 MG tablet  Commonly known as:  DECADRON      Take 1 tablet by mouth 2 (Two) Times a Day.       fentaNYL 25 MCG/HR patch  Commonly known as:  DURAGESIC      Place 1 patch on the skin as directed by provider Every 72 (Seventy-Two) Hours for 7 doses.       guaiFENesin 600 MG 12 hr tablet  Commonly known as:  MUCINEX      Take 1 tablet by mouth Every 12 (Twelve) Hours.       LORazepam 0.5 MG tablet  Commonly known as:  ATIVAN      Take 1 tablet by mouth Every 6 (Six) Hours for 22 doses.       LORazepam 1 MG tablet  Commonly known as:  ATIVAN      Take 1 tablet by mouth Every 4 (Four) Hours As Needed for Anxiety for up to 4 days.       LUBRIDERM SKIN NOURISHING lotion      Apply 1 application topically to the appropriate area as directed Every Night.       morphine 100 MG/5ML solution concentrated solution  Replaces:  Morphine 15 MG 12 hr tablet      Take 0.75 mL by mouth Every 4 (Four) Hours for 27 doses.       morphine 100 MG/5ML solution concentrated solution      Take 0.75 mL by mouth Every 2 (Two) Hours As Needed (pain, dypsnea, air hunger respiratory distress).          CHANGE how you take these medications      Instructions Last Dose Given Next Dose Due   acetaminophen 325 MG tablet  Commonly known as:  TYLENOL  What changed:    · how much to take  · reasons to take this      Take 2 tablets by mouth Every 6 (Six) Hours As Needed for Mild Pain .       furosemide 40 MG tablet  Commonly known as:  LASIX  What changed:    · how much to take  · when to take this      Take 0.5 tablets by mouth Daily.       ipratropium-albuterol 0.5-2.5 mg/3 ml nebulizer  Commonly  known as:  DUO-NEB  What changed:  when to take this      Take 3 mL by nebulization Every 4 (Four) Hours.       ipratropium-albuterol 0.5-2.5 mg/3 ml nebulizer  Commonly known as:  DUO-NEB  What changed:  You were already taking a medication with the same name, and this prescription was added. Make sure you understand how and when to take each.      Take 3 mL by nebulization Every 4 (Four) Hours As Needed for Shortness of Air.       polyethylene glycol pack packet  Commonly known as:  MIRALAX  What changed:    · when to take this  · reasons to take this      Take 17 g by mouth 2 (Two) Times a Day As Needed (constipation, no BM in 2 days).       sennosides-docusate sodium 8.6-50 MG tablet  Commonly known as:  SENOKOT-S  What changed:  Another medication with the same name was added. Make sure you understand how and when to take each.      Take 2 tablets by mouth Every Night.       sennosides-docusate sodium 8.6-50 MG tablet  Commonly known as:  SENOKOT-S  What changed:  You were already taking a medication with the same name, and this prescription was added. Make sure you understand how and when to take each.      Take 2 tablets by mouth 2 (Two) Times a Day As Needed for Constipation.          CONTINUE taking these medications      Instructions Last Dose Given Next Dose Due   benzonatate 200 MG capsule  Commonly known as:  TESSALON      Take 1 capsule by mouth 3 (Three) Times a Day As Needed for Cough.       budesonide-formoterol 160-4.5 MCG/ACT inhaler  Commonly known as:  SYMBICORT      Inhale 2 puffs 2 (Two) Times a Day.       calcium carbonate 500 MG chewable tablet  Commonly known as:  TUMS      Chew 2 tablets 2 (Two) Times a Day As Needed for Heartburn.       famotidine 20 MG tablet  Commonly known as:  PEPCID      Take 1 tablet by mouth 2 (Two) Times a Day.       fluticasone 50 MCG/ACT nasal spray  Commonly known as:  FLONASE      2 sprays by Each Nare route Daily.       gabapentin 100 MG capsule  Commonly  known as:  NEURONTIN      Take 2 capsules by mouth Every 8 (Eight) Hours.       lactulose 10 GM/15ML solution  Commonly known as:  CHRONULAC      Take 30 mL by mouth 2 (Two) Times a Day As Needed (constipation, no BM in 3 days).       mirtazapine 15 MG disintegrating tablet  Commonly known as:  REMERON SOL-TAB      Take 1 tablet by mouth Every Night.       roflumilast 500 MCG tablet tablet  Commonly known as:  DALIRESP      Take 1 tablet by mouth Daily.          STOP taking these medications    albuterol (2.5 MG/3ML) 0.083% nebulizer solution  Commonly known as:  PROVENTIL        aspirin 81 MG EC tablet        atorvastatin 40 MG tablet  Commonly known as:  LIPITOR        bisacodyl 10 MG suppository  Commonly known as:  DULCOLAX        clopidogrel 75 MG tablet  Commonly known as:  PLAVIX        cyanocobalamin 1000 MCG tablet  Commonly known as:  VITAMIN B-12        lactobacillus acidophilus capsule capsule        metoprolol tartrate 25 MG tablet  Commonly known as:  LOPRESSOR        Morphine 15 MG 12 hr tablet  Commonly known as:  MS CONTIN  Replaced by:  morphine 100 MG/5ML solution concentrated solution        nicotine 21 MG/24HR patch  Commonly known as:  NICODERM CQ              Where to Get Your Medications      Information about where to get these medications is not yet available    Ask your nurse or doctor about these medications  · acetaminophen 325 MG tablet  · dexamethasone 4 MG tablet  · fentaNYL 25 MCG/HR patch  · furosemide 40 MG tablet  · guaiFENesin 600 MG 12 hr tablet  · ipratropium-albuterol 0.5-2.5 mg/3 ml nebulizer  · ipratropium-albuterol 0.5-2.5 mg/3 ml nebulizer  · LORazepam 0.5 MG tablet  · LORazepam 1 MG tablet  · LUBRIDERM SKIN NOURISHING lotion  · morphine 100 MG/5ML solution concentrated solution  · morphine 100 MG/5ML solution concentrated solution  · polyethylene glycol pack packet  · sennosides-docusate sodium 8.6-50 MG tablet

## 2019-01-02 NOTE — PROGRESS NOTES
Continued Stay Note  [facility]     Patient Name: Yassine Love  MRN: [id number]  Today's Date: 1/2/2019    Admit Date: 12/6/2018    Discharge Plan     Row Name 01/02/19 1139       Plan    Plan  [facility]    Plan Comments Chart reviewed. PPS 30%. Pt asleep. Arouses to voice. Complains of SOA. Was on HFNC again yesterday but weaned to 5LNC this morning. Asked RN to give PRN morphine. Plan is for pt to go home today. Ambulance is scheduled for 1pm. Equipment delivered today. [facility] offered but refused. If further assist needed call ext [phone].         Discharge Codes    No documentation.       Expected Discharge Date and Time     Expected Discharge Date Expected Discharge Time    Jan 2, 2019             Alva Rodriguez RN

## 2019-06-12 NOTE — PLAN OF CARE
Problem: Patient Care Overview  Goal: Plan of Care Review  Outcome: Ongoing (interventions implemented as appropriate)   10/09/18 0540   Coping/Psychosocial   Plan of Care Reviewed With patient   Plan of Care Review   Progress improving   OTHER   Outcome Summary VSS. pt has c/o leg pain that was relieved mostly with his PRN norco. Pt has remained on 2L throughout the night. Continues to receive IV abx. Will continue to monitor.        Problem: Breathing Pattern Ineffective (Adult)  Goal: Effective Oxygenation/Ventilation  Outcome: Ongoing (interventions implemented as appropriate)   10/09/18 0540   Breathing Pattern Ineffective (Adult)   Effective Oxygenation/Ventilation making progress toward outcome     Goal: Anxiety/Fear Reduction  Outcome: Ongoing (interventions implemented as appropriate)   10/09/18 0540   Breathing Pattern Ineffective (Adult)   Anxiety/Fear Reduction making progress toward outcome          Cell Phone/PDA (specify)/Clothing

## 2021-11-16 NOTE — PROGRESS NOTES
[facility]    Inpatient Progress Note      Chief Complaint/Reason for service:    · Wide complex tachycardia         Subjective:     Denies chest pain to suggest angina.  Denies palpitations to suggest VT.  No VT noted on monitor overnight. Stable chronic dyspnea on nasal cannula O2.  No femoral access site difficulties. Moderate headache for couple days; unclear if worsened with Imdur addition recently    Past medical, surgical, social and family history reviewed in the patient's electronic medical record.    Review of Systems:   Positive for dyspnea, headache  Negative for exertional chest pain, lower extremity edema, palpitations     Problem List  Active Hospital Problems (** Indicates Principal Problem)    Diagnosis Date Noted   • **Cellulitis of both lower extremities [L03.115, L03.116] 02/16/2018   • Coronary artery disease involving native coronary artery without angina pectoris [I25.10] 03/12/2018   • Non-sustained ventricular tachycardia [I47.2] 03/08/2018   • COPD (chronic obstructive pulmonary disease) [J44.9] 02/16/2018   • Essential hypertension [I10] 02/16/2018   • Hypoxia [R09.02] 02/16/2018   • Bilateral cellulitis of lower leg [L03.116, L03.115] 02/16/2018   • RAFAEL (acute kidney injury) [N17.9] 06/20/2017   • PVD (peripheral vascular disease) [I73.9] 06/20/2017      Resolved Hospital Problems    Diagnosis Date Noted Date Resolved   • Hyponatremia [E87.1] 02/16/2018 02/17/2018            Objective:      Current Facility-Administered Medications:   •  acetaminophen (TYLENOL) tablet 650 mg, 650 mg, Oral, Q4H PRN, Nicole Hewitt DO, 650 mg at 03/12/18 1737  •  albuterol (PROVENTIL) nebulizer solution 0.083% 2.5 mg/3mL, 2.5 mg, Nebulization, Q6H PRN, Casie M Mayne, PA-C  •  arformoterol (BROVANA) nebulizer solution 15 mcg, 15 mcg, Nebulization, BID - RT, Todd Keys MD, 15 mcg at 03/13/18 8812  •  aspirin EC tablet 81 mg, 81 mg, Oral, Daily, Carlos  MD Wes, 81 mg at 03/13/18 0848  •  atorvastatin (LIPITOR) tablet 40 mg, 40 mg, Oral, Nightly, Carlos Harvey MD, 40 mg at 03/13/18 2111  •  bisacodyl (DULCOLAX) suppository 10 mg, 10 mg, Rectal, Daily PRN, Nicole Hewitt DO, 10 mg at 02/23/18 0823  •  budesonide (PULMICORT) nebulizer solution 0.5 mg, 0.5 mg, Nebulization, BID - RT, Todd Keys MD, 0.5 mg at 03/13/18 2108  •  bumetanide (BUMEX) tablet 1 mg, 1 mg, Oral, Daily, Casie M Mayne, PA-C  •  calcium carbonate (TUMS) chewable tablet 500 mg (200 mg elemental), 2 tablet, Oral, BID PRN, Beatriz Yousif, APRN, 2 tablet at 02/27/18 0946  •  castor oil-balsam peru (VENELEX) ointment, , Topical, Q12H, Nicole Hewitt DO, 1 each at 03/14/18 0805  •  clopidogrel (PLAVIX) tablet 75 mg, 75 mg, Oral, Daily, Carlos Harvey MD, 75 mg at 03/13/18 0848  •  diclofenac (VOLTAREN) 1 % gel 2 g, 2 g, Topical, 4x Daily, Nicole Hewitt DO, 2 g at 03/14/18 0805  •  docusate sodium (COLACE) capsule 200 mg, 200 mg, Oral, Daily, Nicole Hewitt DO, 200 mg at 03/13/18 0848  •  fluticasone (FLONASE) 50 MCG/ACT nasal spray 2 spray, 2 spray, Each Nare, Daily, Esmer Watkins MD, 2 spray at 03/14/18 0806  •  gabapentin (NEURONTIN) capsule 200 mg, 200 mg, Oral, Q8H, Nicole Hewitt DO, 200 mg at 03/14/18 0518  •  guaiFENesin (MUCINEX) 12 hr tablet 600 mg, 600 mg, Oral, BID PRN, Ezequiel Alfred PA-C, 600 mg at 02/20/18 0818  •  heparin (porcine) 5000 UNIT/ML injection 5,000 Units, 5,000 Units, Subcutaneous, Q8H, Nicole Hewitt DO, 5,000 Units at 03/14/18 0520  •  HYDROcodone-acetaminophen (NORCO) 7.5-325 MG per tablet 1 tablet, 1 tablet, Oral, Q4H PRN, Yumiko Castanon MD, 1 tablet at 03/14/18 0805  •  ipratropium-albuterol (DUO-NEB) nebulizer solution 3 mL, 3 mL, Nebulization, 4x Daily - RT, Nicole Hewitt DO, 3 mL at 03/14/18 0809  •  isosorbide mononitrate (IMDUR) 24 hr tablet 30 mg, 30 mg, Oral, Q24H, Carlos Harvey MD, 30 mg at 03/13/18 1455  •   magnesium hydroxide (MILK OF MAGNESIA) suspension 2400 mg/10mL 10 mL, 10 mL, Oral, Daily PRN, Nicole Hewitt, , 10 mL at 03/11/18 1802  •  Magnesium Sulfate 2 gram Bolus, followed by 8 gram infusion (total Mg dose 10 grams)- Mg less than or equal to 1mg/dL, 2 g, Intravenous, PRN **OR** Magnesium Sulfate 6 gram Infusion (2 gm x 3) -Mg 1.1 -1.5 mg/dL, 2 g, Intravenous, PRN **OR** magnesium sulfate 4 gram infusion- Mg 1.6-1.9 mg/dL, 4 g, Intravenous, PRN, Esmer Watkins MD  •  metoprolol tartrate (LOPRESSOR) tablet 25 mg, 25 mg, Oral, Q12H, Zohra Griggs, BRITTNY, 25 mg at 03/13/18 2112  •  minocycline (MINOCIN,DYNACIN) capsule 100 mg, 100 mg, Oral, Q12H, Casie M Mayne, PA-C, 100 mg at 03/14/18 0519  •  Morphine (MSIR) tablet 30 mg, 30 mg, Oral, Q6H PRN, Esmer Watkins MD, 30 mg at 03/14/18 0519  •  nicotine (NICODERM CQ) 14 MG/24HR patch 1 patch, 1 patch, Transdermal, Q24H, Stella Garvin PA-C, 1 patch at 03/13/18 0847  •  ondansetron (ZOFRAN) injection 4 mg, 4 mg, Intravenous, Q6H PRN, Alexandro Suarez PA-C, 4 mg at 02/21/18 1945  •  pantoprazole (PROTONIX) EC tablet 40 mg, 40 mg, Oral, Q AM, Todd Keys MD, 40 mg at 03/14/18 0519  •  Pharmacy Consult - [facility], , Does not apply, Daily, Annette Ray [facility]  •  potassium chloride (KLOR-CON) packet 40 mEq, 40 mEq, Oral, PRN, Esmer Watkins MD  •  potassium chloride (MICRO-K) CR capsule 40 mEq, 40 mEq, Oral, PRN, Esmer Watkins MD, 40 mEq at 02/22/18 1431  •  saccharomyces boulardii (FLORASTOR) capsule 250 mg, 250 mg, Oral, BID, Casie M Mayne, PA-C, 250 mg at 03/13/18 2112  •  sodium chloride 0.9 % flush 1-10 mL, 1-10 mL, Intravenous, PRN, Nicole Hewitt, DO  •  sodium chloride 0.9 % flush 10 mL, 10 mL, Intravenous, PRN, Steve Mcmanus MD, 10 mL at 03/01/18 2118  •  vitamin B-12 (CYANOCOBALAMIN) tablet 1,000 mcg, 1,000 mcg, Oral, Daily, Yumiko Castanon MD, 1,000 mcg at 03/13/18 0854      Vital Sign Min/Max for last 24 hours  Temp  Min:  97.9 °F (36.6 °C)  Max: 98.1 °F (36.7 °C)   BP  Min: 100/54  Max: 136/69   Pulse  Min: 62  Max: 70   Resp  Min: 16  Max: 20   SpO2  Min: 93 %  Max: 96 %   No Data Recorded      Intake/Output Summary (Last 24 hours) at 03/14/18 0811  Last data filed at 03/14/18 0700   Gross per 24 hour   Intake              948 ml   Output             1850 ml   Net             -902 ml         CONSTITUTIONAL: No acute distress, normal affect  RESPIRATORY: Normal effort. without wheezing or rales rhonchi   CARDIOVASCULAR: Regular rate and rhythm with normal S1 and S2. Without murmur, gallop or rub.  PERIPHERAL VASCULAR: There is mild peripheral edema bilaterally.  Femoral artery access site CDI, normal 2+ pulse    Results Review:   I reviewed the patient's recent labs in the electronic medical record.      Tele:  NSR, no VTach overnight    TTE 3/8/18  · This was a limited echocardiogram performed to assess left ventricular ejection fraction and wall motion only  · Left ventricular systolic function is normal. Estimated EF = 65%.  · All left ventricular wall segments contract normally.    TTE 6/2017  · Left ventricular systolic function is normal. Estimated EF = 60%.  · Left atrial cavity size is mildly dilated.  · Trace mitral regurgitation, trace tricuspid regurgitation.    [facility] 3/9/18:  · There is moderate diffuse coronary calcification.  There is severe multivessel coronary artery disease involving the proximal LAD and the mid RCA.  · There is an 80% discrete proximal LAD stenosis.  There is an 80% discrete mid RCA stenosis.  · Left ventricular ejection fraction 65% with no regional wall motion abnormalities in the VILLA projection.    [facility] PCI 3/13/18  · The 80% discrete proximal LAD stenosis is now status post intervention with a Xience Alpine 3.5 x 15 mm drug-eluting stent.  · The 80% discrete mid RCA stenosis is now status post intervention with a Xience Alpine 4.0 x 18 mm drug-eluting stent.         Assessment/Plan:      ASSESSMENT:  1. Severe 2V CAD  - s/p GIGI to prox LAD and mid RCA    2. NSVT, possible sustained VTach (40 seconds) versus SVT with aberrancy  - Two episodes of NSVT/VT, 3/6 00:38AM lasting 20 seconds, 3/7 07:04AM with symptoms of racing heart lasting 40 seconds, 3/8 22:52PM 15 seconds  - Echocardiogram without significant structural abnormality  - Thyroid, electrolytes normal  - Possibly ischemic rhythms in the setting of severe CAD and severe COPD; now s/p GIGI to LAD and RCA  - Currently on metoprolol at 25 mg BID; on 100mg BID at home     3. SVT, 20 seconds, 3/7/18 16:28  -Beta blocker as above     4. Essential hypertension:  - Well controlled  - Starting metoprolol as noted above, monitor BPs; increase as tolerated     5. COPD/hypoxic respiratory failure:  - Managed per primary team/pulmonology    6. Hyperlipidemia  - . Continue statin.    PLAN:  -Continue DAPT, statin  -Continue Lopressor 25mg BID (was on higher dose at home; unable to titrate further due to BP)  -Discontinued Imdur because it may be exacerbating to his HA (which has been ongoing for a few days)  -14 day ZIO patch heart monitor upon discharge to continue watching for NSVT. I've asked my clinic to arrange this.    -Okay for discharge from CV standpoint. Follow up in clinic in 4 weeks with Dr Harvey  -We'll sign off for now. Please call with any questions or concerns    Carlos Harvey MD, MSc, FACC  Interventional Cardiology  [facility]     soft/nondistended

## 2022-03-22 NOTE — PROGRESS NOTES
Discharge Planning Assessment  [facility]     Patient Name: Yassine Love  MRN: [id number]  Today's Date: 10/3/2018    Admit Date: 10/2/2018          Discharge Needs Assessment     Row Name 10/03/18 1031       Living Environment    Lives With facility resident    Current Living Arrangements extended care facility   He has been at [facility] for 3 weeks for rehab.    Primary Care Provided by other (see comments)    Provides Primary Care For no one, unable/limited ability to care for self    Family Caregiver if Needed child(allison), adult    Quality of Family Relationships supportive       Resource/Environmental Concerns    Resource/Environmental Concerns none       Transition Planning    Patient/Family Anticipates Transition to long term care facility    Transportation Anticipated family or friend will provide       Discharge Needs Assessment    Readmission Within the Last 30 Days no previous admission in last 30 days    Concerns to be Addressed adjustment to diagnosis/illness;basic needs;discharge planning;decision making    Equipment Currently Used at Home cane, straight;walker, rolling    Anticipated Changes Related to Illness inability to care for self            Discharge Plan     Row Name 10/03/18 1033       Plan    Plan TBD    Patient/Family in Agreement with Plan yes    Plan Comments Mr. Love is sedated and intubated on mech. vent.  He has been a patient at [facility] for last 3 weeks for rehab.  Talked to his son at .  Mr. Love wants to go home, but he has not been able to care for himself at home.   He uses a RW @ [facility] as needed.  He sees a MD at [facility] for his PCP.  He gets his Rx filled at [facility] on [address].  Discharge plan is to be determined.  CM will follow for needs.        Destination     No service coordination in this encounter.      Durable Medical Equipment     No service coordination in this encounter.      Dialysis/Infusion     No service coordination in this  encounter.      Home Medical Care     No service coordination in this encounter.      Social Care     No service coordination in this encounter.        Expected Discharge Date and Time     Expected Discharge Date Expected Discharge Time    Oct 7, 2018               Demographic Summary     Row Name 10/03/18 1030       General Information    General Information Comments His PCP is a  at [facility].  His son does not know his name.    Row Name 10/03/18 0742       General Information    Admission Type inpatient    Arrived From emergency department    Referral Source admission list    Reason for Consult discharge planning    Preferred Language English       Contact Information    Permission Granted to Share Info With             Functional Status     Row Name 10/03/18 1030       Functional Status    Usual Activity Tolerance poor    Current Activity Tolerance other (see comments)    Functional Status Comments --   Sedated and intubated on [facility] vent.       Functional Status, IADL    Medications assistive person    Meal Preparation assistive person    Housekeeping assistive person    Laundry assistive person    Shopping assistive person       Mental Status Summary    Recent Changes in Mental Status/Cognitive Functioning unable to assess       Employment/    Employment Status disabled            Psychosocial    No documentation.           Abuse/Neglect    No documentation.           Legal    No documentation.           Substance Abuse    No documentation.           Patient Forms    No documentation.         Ashley De León RN     Female

## 2025-03-18 NOTE — THERAPY WOUND CARE TREATMENT
Acute Care - Wound/Debridement Treatment Note  [facility]     Patient Name: Yassine Love  : 1945  MRN: [id number]  Today's Date: 2017  Onset of Illness/Injury or Date of Surgery Date: 17   Date of Referral to PT: 17   Referring Physician: Dr Otero       Admit Date: 2017    Visit Dx:    ICD-10-CM ICD-9-CM   1. Febrile illness R50.9 780.60   2. Cellulitis of right lower extremity L03.115 682.6   3. Weakness R53.1 780.79   4. Sepsis, due to unspecified organism A41.9 038.9     995.91   5. Renal insufficiency N28.9 593.9   6. Non-traumatic rhabdomyolysis M62.82 728.88   7. Leukocytosis, unspecified type D72.829 288.60   8. Impaired functional mobility, balance, gait, and endurance Z74.09 V49.89   9. Impaired mobility and ADLs Z74.09 799.89       Patient Active Problem List   Diagnosis   • Septic shock   • Cellulitis of right lower extremity   • RAFAEL (acute kidney injury)   • PVD (peripheral vascular disease)   • Streptococcal bacteremia                 Lymphedema       17 0815          Lymphedema Edema Assessment    Ptting Edema Category By severity  -      Pitting Edema Severe  -      Skin Changes/Observations    Location/Assessment Lower Extremity  -      Lower Extremity Conditions bilateral:;shiny;inflamed;weeping;fragile  -      Lower Extremity Color/Pigment bilateral:;red;erythema  -      Lower Extremity Skin Details Pt cont to have moderate erythema and scattered small ulcerations to feet and toes.   -      Compression/Skin Care    Compression/Skin Care skin care;wrapping location;bandaging  -      Skin Care washed/dried;lotion applied;moisturizing lotion applied  -      Wrapping Location lower extremity  -MF      Wrapping Location LE bilateral:;foot to knee  -      Wrapping Comments Ag foam to dorsal feet and toes  -      Bandage Layers cotton liner;cotton elastic stocking- double layer (comment size)   size 5 and 6  -        User Key  (r) =  INPATIENT DEATH NOTE  Rodney Pizarro 58 y.o. male MRN: [id number]  Unit/Bed#:  Encounter: [id number]             PHYSICAL EXAM:  Unresponsive to noxious stimuli, Spontaneous respirations absent, Breath sounds absent, Carotid pulse absent, Heart sounds absent, Pupillary light reflex absent, and Corneal blink reflex absent    Medical Examiner notification criteria:  Patient  within 24 hours of arrival to hospital   Medical Examiner's office notified?:  pending decision   Medical Examiner accepted case?:  pending decision   Name of Medical Examiner: pending          Autopsy Options:  pending medical examiner's decision     Primary Service Attending Physician notified?:  yes - Attending:  Elli Jimenez,*    Physician/Resident responsible for completing Discharge Summary:  Dr. Jimenez          Recorded By, (t) = Taken By, (c) = Cosigned By    Initials Name Provider Type     Carlos Hassan, PT Physical Therapist          WOUND DEBRIDEMENT  Debridement Site 1  Location- Site 1: Btoes  Selective Debridement- Site 1: Wound Surface >20cmsq  Instruments- Site 1: other (comment)  Excised Tissue Description- Site 1: minimum, moderate, other (comment) (dry flaking skin )  Bleeding- Site 1: none                  Adult Rehabilitation Note       06/26/17 0815 06/26/17 0807       Rehab Assessment/Intervention    Discipline physical therapist  - physical therapist  -     Document Type therapy note (daily note)  - therapy note (daily note)  -     Subjective Information agree to therapy;complains of;weakness;fatigue;pain  - agree to therapy;complains of;pain  -     Patient Effort, Rehab Treatment  good  -     Precautions/Limitations  fall precautions;oxygen therapy device and L/min   hypersensitive LEs, decreased skin integrity  -     Recorded by [] Carlos Hassan, PT [EH] Mary Kate Parra, PT     Vital Signs    Pre Systolic BP Rehab  112  -EH     Pre Treatment Diastolic BP  63  -EH     Pretreatment Heart Rate (beats/min)  98  -EH     Pre SpO2 (%)  89  -EH     O2 Delivery Pre Treatment  room air  -EH     Post SpO2 (%)  94  -EH     O2 Delivery Post Treatment  supplemental O2  -     Pre Patient Position  Sitting  -EH     Intra Patient Position  Standing  -EH     Post Patient Position  Sitting  -EH     Recorded by  [] Mary Kate Parra, PT     Pain Assessment    Pain Assessment Rojas-Fontenot FACES  - 0-10  -EH     Rojas-Fontenot FACES Pain Rating 6  -MF 4  -EH     Pain Score  3  -EH     Post Pain Score  3  -EH     Pain Type Chronic pain  - Chronic pain  -     Pain Location Knee  - Knee  -     Pain Orientation Left;Right  -MF Left;Right   and hip  -EH     Pain Intervention(s) Medication (See MAR);Repositioned  -      Recorded by [] Carlos Hassan, PT [EH] Mary Kate Parra, PT      Cognitive Assessment/Intervention    Current Cognitive/Communication Assessment functional  - functional  -     Orientation Status oriented x 4  - oriented x 4  -     Follows Commands/Answers Questions  100% of the time;able to follow single-step instructions  -     Personal Safety  mild impairment  -     Personal Safety Interventions  fall prevention program maintained;nonskid shoes/slippers when out of bed;gait belt  -     Recorded by [] Carlos Hassan, PT [] Mary Kate Parra, PT     Bed Mobility, Assessment/Treatment    Bed Mobility, Comment  pt sitting EOB; requests to be left EOB  -     Recorded by  [] Mary Kate Parra PT     Transfer Assessment/Treatment    Transfers, Sit-Stand [place]  supervision required  -     Transfers, Stand-Sit [place]  conditional independence  -     Transfers, Sit-Stand-Sit, Assist Device  rolling walker  -     Recorded by  [] Mary Kate Parra, PT     Gait Assessment/Treatment    Gait, [place] Level  supervision required   initially CGA; no LOB  -     Gait, Assistive Device  rolling walker  -     Gait, Distance (Feet)  200  -     Gait, Gait Deviations  vidhi decreased;forward flexed posture;step length decreased  -     Gait, Comment  3 standing rest breaks; extended time needed; neck in forward flexed posture, per pt can not look up   cues for pursed lip breathing needed  -     Recorded by  [] Mary Kate Parra PT     Positioning and Restraints    Pre-Treatment Position in bed  - in bed  -     Post Treatment Position bed  - bed  -     In Bed sitting EOB  - notified nsg;sitting EOB;call light within reach;encouraged to call for assist;with PT;with nsg   with PT wound care  -     Recorded by [] Carlos Hassan, PT [] Mary Kate Parra PT       User Key  (r) = Recorded By, (t) = Taken By, (c) = Cosigned By    Initials Name Effective Dates     Calros Hassan, PT 06/19/15 -      Mary Kate ALAMO  Fidel, PT 06/19/15 -                 IP PT Goals       06/26/17 0848 06/23/17 1101 06/23/17 0815    Bed Mobility PT LTG    Bed Mobility PT LTG, Outcome goal not met  -EH goal ongoing  -LS     Transfer Training PT LTG    Transfer Training PT LTG, Outcome goal met  -EH goal ongoing  -LS     Gait Training PT LTG    Gait Training Goal PT LTG, Outcome goal met  -EH goal ongoing  -LS     Wound Care PT LTG    Wound Care PT LTG 1, Outcome   goal ongoing  -MF      06/22/17 1437 06/22/17 0815 06/21/17 1445    Bed Mobility PT LTG    Bed Mobility PT LTG, Date Established   06/21/17  -LS    Bed Mobility PT LTG, Time to Achieve   2 wks  -LS    Bed Mobility PT LTG, Activity Type   supine to sit/sit to supine  -LS    Bed Mobility PT LTG, [place] Level   supervision required  -LS    Bed Mobility PT LTG, Outcome goal ongoing  -LS      Transfer Training PT LTG    Transfer Training PT LTG, Date Established   06/21/17  -LS    Transfer Training PT LTG, Time to Achieve   2 wks  -LS    Transfer Training PT LTG, Activity Type   sit to stand/stand to sit  -LS    Transfer Training PT LTG, [place] Level   supervision required  -LS    Transfer Training PT LTG, Assist Device   walker, rolling  -LS    Transfer Training PT LTG, Outcome goal ongoing  -LS      Gait Training PT LTG    Gait Training Goal PT LTG, Date Established   06/21/17  -LS    Gait Training Goal PT LTG, Time to Achieve   2 wks  -LS    Gait Training Goal PT LTG, [place] Level   supervision required  -LS    Gait Training Goal PT LTG, Assist Device   walker, rolling  -LS    Gait Training Goal PT LTG, Distance to Achieve   200  -LS    Gait Training Goal PT LTG, Outcome goal ongoing  -LS      Wound Care PT LTG    Wound Care PT LTG 1, Date Established  06/22/17  -MF     Wound Care PT LTG 1, Time to Achieve  other (see comments)   10 days  -MF     Wound Care PT LTG 1, Location  BLEs  -MF     Wound Care PT LTG 1, No S&S of Infection  yes  -MF     Wound Care PT LTG 1,  Decrease Limb Girth %  10  -MF     Wound Care PT LTG 1, No Skin Break Down  yes  -MF     Wound Care PT LTG 1, No New Skin Break Down  yes  -MF       User Key  (r) = Recorded By, (t) = Taken By, (c) = Cosigned By    Initials Name Provider Type    ALBANIA Hassan, PT Physical Therapist     Mary Kate Parra, PT Physical Therapist    LS Zari Rai, PT Physical Therapist          Physical Therapy Education     Title: PT OT SLP Therapies (Active)     Topic: Physical Therapy (Done)     Point: Mobility training (Done)    Learning Progress Summary    Learner Readiness Method Response Comment Documented by Status   Patient Acceptance E VU,DU teaching on RWx vs standard Wx. Pt recommended to have Rwx.  06/26/17 0845 Done    Acceptance E,D NR   06/23/17 1100 Active    Acceptance E VU   06/23/17 0151 Done    Acceptance E,D NR   06/22/17 1437 Active    Acceptance E,D NR   06/21/17 1445 Active               Point: Home exercise program (Done)    Learning Progress Summary    Learner Readiness Method Response Comment Documented by Status   Patient Acceptance E VU,DU teaching on RWx vs standard Wx. Pt recommended to have Rwx.  06/26/17 0845 Done    Acceptance E,D NR   06/23/17 1100 Active    Acceptance E,D NR   06/22/17 1437 Active               Point: Body mechanics (Done)    Learning Progress Summary    Learner Readiness Method Response Comment Documented by Status   Patient Acceptance E VU,DU teaching on RWx vs standard Wx. Pt recommended to have Rwx.  06/26/17 0845 Done    Acceptance E,D NR   06/23/17 1100 Active    Acceptance E VU   06/23/17 0151 Done    Acceptance E,D NR   06/22/17 1437 Active    Acceptance E,D NR   06/21/17 1445 Active               Point: Precautions (Done)    Learning Progress Summary    Learner Readiness Method Response Comment Documented by Status   Patient Acceptance E VU,DU teaching on RWx vs standard Wx. Pt recommended to have Rwx.  06/26/17 0845 Done     Acceptance E,D NR   06/23/17 1100 Active    Acceptance E,D NR   06/22/17 1437 Active    Acceptance E,D NR   06/21/17 1445 Active                      User Key     Initials Effective Dates Name Provider Type Discipline     06/19/15 -  Mary Kate Parra, PT Physical Therapist PT     06/19/15 -  Zari Rai, PT Physical Therapist PT     06/16/16 -  Demian De La Torre, RN Registered Nurse Nurse                   PT ASSESSMENT (last 72 hours)      PT Evaluation       06/26/17 0815 06/26/17 0807    Rehab Evaluation    Document Type therapy note (daily note)  - therapy note (daily note)  -    Subjective Information agree to therapy;complains of;weakness;fatigue;pain  - agree to therapy;complains of;pain  -EH    Patient Effort, Rehab Treatment  good  -    General Information    Precautions/Limitations  fall precautions;oxygen therapy device and L/min   hypersensitive LEs, decreased skin integrity  -    Vital Signs    Pre Systolic BP Rehab  112  -EH    Pre Treatment Diastolic BP  63  -EH    Pretreatment Heart Rate (beats/min)  98  -    Pre SpO2 (%)  89  -EH    O2 Delivery Pre Treatment  room air  -EH    Post SpO2 (%)  94  -EH    O2 Delivery Post Treatment  supplemental O2  -EH    Pre Patient Position  Sitting  -EH    Intra Patient Position  Standing  -EH    Post Patient Position  Sitting  -EH    Pain Assessment    Pain Assessment Rojas-Fontenot FACES  - 0-10  -EH    Rojas-Fontenot FACES Pain Rating 6  - 4  -EH    Pain Score  3  -EH    Post Pain Score  3  -EH    Pain Type Chronic pain  - Chronic pain  -    Pain Location Knee  - Knee  -    Pain Orientation Left;Right  -MF Left;Right   and hip  -EH    Pain Intervention(s) Medication (See MAR);Repositioned  -     Cognitive Assessment/Intervention    Current Cognitive/Communication Assessment functional  - functional  -    Orientation Status oriented x 4  -MF oriented x 4  -EH    Follows Commands/Answers Questions  100% of the time;able to  follow single-step instructions  -    Personal Safety  mild impairment  -    Personal Safety Interventions  fall prevention program maintained;nonskid shoes/slippers when out of bed;gait belt  -    Bed Mobility, Assessment/Treatment    Bed Mobility, Comment  pt sitting EOB; requests to be left EOB  -    Transfer Assessment/Treatment    Transfers, Sit-Stand [place]  supervision required  -    Transfers, Stand-Sit [place]  conditional independence  -    Transfers, Sit-Stand-Sit, Assist Device  rolling walker  -    Gait Assessment/Treatment    Gait, [place] Level  supervision required   initially CGA; no LOB  -    Gait, Assistive Device  rolling walker  -    Gait, Distance (Feet)  200  -    Gait, Gait Deviations  vidhi decreased;forward flexed posture;step length decreased  -    Gait, Comment  3 standing rest breaks; extended time needed; neck in forward flexed posture, per pt can not look up   cues for pursed lip breathing needed  -    Positioning and Restraints    Pre-Treatment Position in bed  - in bed  -    Post Treatment Position bed  - bed  -    In Bed sitting EOB  - notified nsg;sitting EOB;call light within reach;encouraged to call for assist;with PT;with nsg   with PT wound care  -      06/25/17 0900 06/25/17 0153    Living Environment    Transportation Available  car;family or friend will provide  -MW    Muscle Tone Assessment    Muscle Tone Assessment Bilateral Upper Extremities;Bilateral Lower Extremities  -MM     Bilateral Upper Extremities Muscle Tone Assessment mildly decreased tone  -MM     Bilateral Lower Extremities Muscle Tone Assessment severely decreased tone  -MM     Sensory Assessment/Intervention    Light Touch LUE;RUE  -MM     LUE Light Touch mild impairment  -MM     RUE Light Touch mild impairment  -MM     LLE Light Touch moderate impairment  -MM     RLE Light Touch moderate impairment  -MM       06/24/17 2024 06/24/17 0800    Sensory  Assessment/Intervention    LLE Light Touch moderate impairment  -MW moderate impairment  -AP    RLE Light Touch moderate impairment  -MW moderate impairment  -AP      06/24/17 0233 06/23/17 2018    Living Environment    Transportation Available family or friend will provide  -MW     Sensory Assessment/Intervention    LLE Light Touch  moderate impairment  -MW    RLE Light Touch  moderate impairment  -MW      User Key  (r) = Recorded By, (t) = Taken By, (c) = Cosigned By    Initials Name Provider Type     Carlos Hassan, PT Physical Therapist     Mary Kate Parra, PT Physical Therapist     Brittany Wyatt, RN Registered Nurse    LARRY Cox, RN Registered Nurse    PIEDAD Munroe RN Registered Nurse            PT Recommendation and Plan  Anticipated Discharge Disposition: skilled nursing facility  Demonstrates Need for Referral to Another Service: occupational therapy  PT Frequency: daily    Plan Of Care Reviewed With: patient       Outcome Summary/Follow up Plan: Pt cont to have significant edema in BLEs, but erythema and ulcerations to B feet noted to be significantly improved.           Outcome Measures       06/26/17 0807          How much help from another person do you currently need...    Turning from your back to your side while in flat bed without using bedrails? 3  -EH      Moving from lying on back to sitting on the side of a flat bed without bedrails? 3  -EH      Moving to and from a bed to a chair (including a wheelchair)? 3  -EH      Standing up from a chair using your arms (e.g., wheelchair, bedside chair)? 4  -EH      Climbing 3-5 steps with a railing? 3  -EH      To walk in hospital room? 3  -EH      AM-PAC 6 Clicks Score 19  -EH      Functional Assessment    Outcome Measure Options AM-PAC 6 Clicks Basic Mobility (PT)  -EH        User Key  (r) = Recorded By, (t) = Taken By, (c) = Cosigned By    Initials Name Provider Type     Mary Kate Parra, PT Physical Therapist               Time Calculation        PT Charges       06/26/17 0900 06/26/17 0815       Time Calculation    Start Time 0807  - 0815  -     PT Received On 06/26/17  -      PT Goal Re-Cert Due Date 07/01/17  - 07/01/17  -     Time Calculation- PT    Total Timed Code Minutes- PT 26 minute(s)  - 40 minute(s)  -       User Key  (r) = Recorded By, (t) = Taken By, (c) = Cosigned By    Initials Name Provider Type     Carlos Hassan, PT Physical Therapist     Mary Kate Parra, PT Physical Therapist             Therapy Charges for Today     Code Description Service Date Service Provider Modifiers Qty    48814961423 HC NONSELECTIVE DEBRIDEMENT 6/26/2017 Carlos Hassan, PT GP 1    [id number] HC PT MULTI LAYER COMP SYS BELOW KNEE 6/26/2017 Carlos Hassan, PT GP 1            PT G-Codes  Outcome Measure Options: AM-PAC 6 Clicks Basic Mobility (PT)        Carlos Hassan, PT  6/26/2017

## 2025-06-23 NOTE — PLAN OF CARE
Problem: Patient Care Overview  Goal: Plan of Care Review  Outcome: Ongoing (interventions implemented as appropriate)   09/04/18 1403   Coping/Psychosocial   Plan of Care Reviewed With patient   Plan of Care Review   Progress improving   OTHER   Outcome Summary Pt requires max encouragement for participation. Pt declined to complete sit to stand this day. Pt dependent for transfers via mechanical lift. Pt required modA to comb hair and set up for oral care and washing face. Pt completed BUE HEP x15 reps. If pt does not improve participation will move to 3x per week.           Hepatitis B

## (undated) DEVICE — NC TREK CORONARY DILATATION CATHETER 4.0 MM X 12 MM / RAPID-EXCHANGE: Brand: NC TREK

## (undated) DEVICE — GLIDESHEATH SLENDER STAINLESS STEEL KIT: Brand: GLIDESHEATH SLENDER

## (undated) DEVICE — GUIDE CATHETER: Brand: MACH1™

## (undated) DEVICE — PINNACLE R/O II INTRODUCER SHEATH WITH RADIOPAQUE MARKER: Brand: PINNACLE

## (undated) DEVICE — GW EXCHG TSCF .035 260CM 3MM

## (undated) DEVICE — TREK CORONARY DILATATION CATHETER 2.50 MM X 12 MM / RAPID-EXCHANGE: Brand: TREK

## (undated) DEVICE — GW FC FLOP/TP .035 260CM 3MM

## (undated) DEVICE — RADIFOCUS GLIDEWIRE: Brand: GLIDEWIRE

## (undated) DEVICE — GW J TP FIX CORE .035 150

## (undated) DEVICE — CATH DIAG EXPO M/ PK 5F FL4/FR4 PIG

## (undated) DEVICE — CATH DIAG EXPO .045 FL3.5 5F 100CM

## (undated) DEVICE — CATH INTRAVAS ULTRASND EAGLE EYE 2.9FR

## (undated) DEVICE — PINNACLE INTRODUCER SHEATH: Brand: PINNACLE

## (undated) DEVICE — GUIDELINER CATHETERS ARE INTENDED TO BE USED IN CONJUNCTION WITH GUIDE CATHETERS TO ACCESS DISCRETE REGIONS OF THE CORONARY AND/OR PERIPHERAL VASCULATURE, AND TO FACILITATE PLACEMENT OF INTERVENTIONAL DEVICES.: Brand: GUIDELINER® V3 CATHETER

## (undated) DEVICE — Device: Brand: ASAHI SION BLUE

## (undated) DEVICE — DEV COMP RAD PRELUDESYNC 24CM